# Patient Record
Sex: FEMALE | Race: WHITE | NOT HISPANIC OR LATINO | Employment: OTHER | URBAN - METROPOLITAN AREA
[De-identification: names, ages, dates, MRNs, and addresses within clinical notes are randomized per-mention and may not be internally consistent; named-entity substitution may affect disease eponyms.]

---

## 2017-01-05 DIAGNOSIS — I10 HTN, GOAL BELOW 140/90: Primary | ICD-10-CM

## 2017-01-05 RX ORDER — LISINOPRIL 40 MG/1
40 TABLET ORAL DAILY
Qty: 90 TABLET | Refills: 0 | Status: SHIPPED | OUTPATIENT
Start: 2017-01-05 | End: 2017-03-23

## 2017-01-05 RX ORDER — HYDROCHLOROTHIAZIDE 25 MG/1
25 TABLET ORAL EVERY MORNING
Qty: 90 TABLET | Refills: 0 | Status: SHIPPED | OUTPATIENT
Start: 2017-01-05 | End: 2017-03-23

## 2017-01-05 NOTE — TELEPHONE ENCOUNTER
Pt calls, reports she requested refills from pharm a few days ago.    Lisinopril, HCTZ      Last Written Prescription Date: 09/30/16 (both)  Last Fill Quantity: 90 (both), # refills: 0 (both)  [place] [facility] [place] or [facility] prescribing provider: 05/12/16       POTASSIUM   Date Value Ref Range Status   09/07/2016 4.1 3.4 - 5.3 mmol/L Final     CREATININE   Date Value Ref Range Status   09/07/2016 0.97 0.52 - 1.04 mg/dL Final     BP Readings from Last 3 Encounters:   12/14/16 142/72   11/17/16 116/70   11/10/16 116/68     Routing refill request to provider for review/approval because:  BP out SO parameters     Please advise, thanks.    Pt reports she does not need phone call once rxs sent.

## 2017-01-23 ENCOUNTER — TRANSFERRED RECORDS (OUTPATIENT)
Dept: HEALTH INFORMATION MANAGEMENT | Facility: CLINIC | Age: 67
End: 2017-01-23

## 2017-01-30 ENCOUNTER — ALLIED HEALTH/NURSE VISIT (OUTPATIENT)
Dept: EDUCATION SERVICES | Facility: CLINIC | Age: 67
End: 2017-01-30
Payer: COMMERCIAL

## 2017-01-30 DIAGNOSIS — E11.9 DIABETES MELLITUS WITHOUT COMPLICATION (H): Primary | Chronic | ICD-10-CM

## 2017-01-30 PROCEDURE — G0108 DIAB MANAGE TRN  PER INDIV: HCPCS

## 2017-01-30 NOTE — TELEPHONE ENCOUNTER
glipizide         Last Written Prescription Date: 11/22/16  Last Fill Quantity: 60, # refills: 1  Last Office Visit with [facility] P or [facility] prescribing provider:  5/12/16        BP Readings from Last 3 Encounters:   12/14/16 142/72   11/17/16 116/70   11/10/16 116/68     MICROL        7   9/7/2016  No results found for this basename: microalbumin  CREATININE   Date Value Ref Range Status   09/07/2016 0.97 0.52 - 1.04 mg/dL Final   ]  GFR ESTIMATE   Date Value Ref Range Status   09/07/2016 57* >60 mL/min/1.7m2 Final     Comment:     Non  GFR Calc   02/04/2016 83 >60 mL/min/1.7m2 Final     Comment:     Non  GFR Calc   08/03/2015 79 >60 mL/min/1.7m2 Final     Comment:     Non  GFR Calc     GFR ESTIMATE IF BLACK   Date Value Ref Range Status   09/07/2016 69 >60 mL/min/1.7m2 Final     Comment:      GFR Calc   02/04/2016 >90   GFR Calc   >60 mL/min/1.7m2 Final   08/03/2015 >90   GFR Calc   >60 mL/min/1.7m2 Final     CHOL      165   9/7/2016  HDL       38   9/7/2016  LDL       60   9/7/2016  TRIG      334   9/7/2016  CHOLHDLRATIO      3.1   8/3/2015  AST       23   9/7/2016  ALT       42   9/7/2016  A1C      7.0   12/5/2016  A1C      6.8   9/7/2016  A1C      7.6   5/3/2016  A1C      7.4   2/4/2016  A1C      7.0   8/3/2015  POTASSIUM   Date Value Ref Range Status   09/07/2016 4.1 3.4 - 5.3 mmol/L Final       Labs showing if normal/abnormal  Lab Results   Component Value Date    UCRR 98 09/07/2016    MICROL 7 09/07/2016     Lab Results   Component Value Date    CHOL 165 09/07/2016    TRIG 334* 09/07/2016    HDL 38* 09/07/2016    LDL 60 09/07/2016    VLDL 33* 08/03/2015    CHOLHDLRATIO 3.1 08/03/2015     Lab Results   Component Value Date    A1C 7.0* 12/05/2016    A1C 6.8* 09/07/2016    A1C 7.6* 05/03/2016

## 2017-01-30 NOTE — PROGRESS NOTES
Diabetes Self Management Training: Individual Review Visit    Gricelda Sabillon presents today for education related to Type 2 diabetes.    She is accompanied by self    Patient's diabetes management related comments/concerns: none, doesn't know why she was told to come here    Patient's emotional response to diabetes: helplessness and uncertain    Patient would like this visit to be focused around the following diabetes-related behaviors and goals: not sure    ASSESSMENT:  Patient Problem List and Family Medical History reviewed for relevant medical history, current medical status, and diabetes risk factors.    Gricelda came in frustrated, not sure why she was told to come see me, but was hoping I knew why she needed to see me. Has been to diabetes classes before, and found it very unhelpful, and was not sure anything would be different today. Did want to see if she needed medication adjustment, however did not bring a meter, so this was unable to be accomplished.     Eventually, opened up that she has not understood carb counting or what it involves, and allowed a conversation on this. She was open to this education, and did find it helpful. Was not interested in talking about any other AADE7 topics today.     Current Diabetes Management per Patient:  Taking diabetes medications?   yes:     Diabetes Medication(s)     Biguanides Sig    metFORMIN (GLUCOPHAGE) 1000 MG tablet Take 1 tablet (1,000 mg) by mouth 2 times daily (with meals)    Dipeptidyl Peptidase-4 (DPP-4) Inhibitors Sig    sitagliptin (JANUVIA) 50 MG tablet Take 1 tablet (50 mg) by mouth daily    Sulfonylureas Sig    glipiZIDE (GLUCOTROL) 5 MG tablet Take 1 tablet (5 mg) by mouth 2 times daily (before meals)          *Abbreviated insulin dose documentation key: Insulin Trade Name (nytmzseai-ttujs-vbkwow-bedtime) - i.e. Humalog 5-5-5-0 (Humalog 5 units at breakfast, 5 units at lunch, and 5 units at dinner).    Past Diabetes Education: Yes    Patient  "glucose self monitoring as follows: one time daily.   BG meter: unsure meter  BG results: not available     BG values are: unable to assess  Patient's most recent A1C      7.0   12/5/2016 is not meeting goal of <7.0    Nutrition:  Patient skips lunch regularly    Not hungry in the morning  Breakfast (late) - 2 slices toast, pastrami, 2 clementines, coffee with cream (SF)  Snack - fruit in the afternoon  Dinner (6:30pm) - split pea soup and ham, bread, glass of milk OR meat/baked potato/nonstarchy veggies, glass of milk    Snacks - not in the evening OR sometimes some chocolate covered cherries (1 per day)    Beverages: diet soda in the afternoon, as as above    Cultural/Temple diet restrictions: No     Biggest Challenge to Healthy Eating: portion control and knowing what to eat    Physical Activity:    Rotator cuff and hip issues, states unable to be active right now    Diabetes Complications:  Not discussed today.    Vitals:  LMP 12/13/2002  Estimated body mass index is 34.45 kg/(m^2) as calculated from the following:    Height as of 12/14/16: 1.702 m (5' 7\").    Weight as of 11/17/16: 99.791 kg (220 lb).   Last 3 BP:   BP Readings from Last 3 Encounters:   12/14/16 142/72   11/17/16 116/70   11/10/16 116/68       History   Smoking status     Never Smoker    Smokeless tobacco     Never Used       Labs:  A1C      7.0   12/5/2016  GLC      155   9/7/2016  LDL       60   9/7/2016  HDL CHOLESTEROL   Date Value Ref Range Status   09/07/2016 38* >49 mg/dL Final   ]  GFR ESTIMATE   Date Value Ref Range Status   09/07/2016 57* >60 mL/min/1.7m2 Final     Comment:     Non  GFR Calc     GFR ESTIMATE IF BLACK   Date Value Ref Range Status   09/07/2016 69 >60 mL/min/1.7m2 Final     Comment:      GFR Calc     CR     0.97   9/7/2016  No results found for this basename: microalbumin    Socio/Economic Considerations:    Support system: spouse/significant other    Health Beliefs and Attitudes: "   Patient Activation Measure Survey Score:  ADRIANO Score (Last Two) 8/26/2013   ADRIANO Raw Score 41   Activation Score 63.2   ADRIANO Level 3       Stage of Change: CONTEMPLATION (Considering change and yet undecided)      Diabetes knowledge and skills assessment:     Patient is knowledgeable in diabetes management concepts related to: Healthy Eating    Patient needs further education on the following diabetes management concepts: Being Active and Healthy Coping    Barriers to Learning Assessment: No Barriers identified    Based on learning assessment above, most appropriate setting for further diabetes education would be: Individual setting.    INTERVENTION:   Education provided today on:  AADE Self-Care Behaviors:  Healthy Eating: carbohydrate counting, consistency in amount, composition, and timing of food intake, plate planning method and label reading  Healthy Coping: benefits of making appropriate lifestyle changes    Opportunities for ongoing education and support in diabetes-self management were discussed.    Pt verbalized understanding of concepts discussed and recommendations provided today.       Education Materials Provided:  Carbohydrate Counting and MyPlate    PLAN:  See Patient Instructions for co-developed, patient-stated behavior change goals.  Meal Plan Recommendation: eat 3 meals a day, have small snacks between meals, if needed, use plate planning method and Aim for 2-3 carb servings at meals and 1-2 carb servings at snacks  Consider bedtime snack to see if this helps morning blood sugars    FOLLOW-UP:  Follow-up as needed.   Follow-up yearly for diabetes education review.  Chart routed to referring provider.    Ongoing plan for education and support: Written resources (magazines, books, etc.)    DANTE Jasmine CDE  Time Spent: 60 minutes  Encounter Type: Individual    Any diabetes medication dose changes were made via the CDE Protocol and Collaborative Practice Agreement with the patient's referring  provider. A copy of this encounter was shared with the provider.

## 2017-01-31 RX ORDER — GLIPIZIDE 5 MG/1
5 TABLET ORAL
Qty: 60 TABLET | Refills: 4 | Status: SHIPPED | OUTPATIENT
Start: 2017-01-31 | End: 2017-07-20

## 2017-01-31 NOTE — TELEPHONE ENCOUNTER
Routing refill request to provider for review/approval because:  Labs out of range:      Pt states she is out of med

## 2017-03-09 ENCOUNTER — TELEPHONE (OUTPATIENT)
Dept: ENDOCRINOLOGY | Facility: CLINIC | Age: 67
End: 2017-03-09

## 2017-03-09 ENCOUNTER — TELEPHONE (OUTPATIENT)
Dept: EDUCATION SERVICES | Facility: CLINIC | Age: 67
End: 2017-03-09

## 2017-03-09 DIAGNOSIS — E11.9 DIABETES MELLITUS WITHOUT COMPLICATION (H): Primary | Chronic | ICD-10-CM

## 2017-03-09 NOTE — TELEPHONE ENCOUNTER
Pt calls because her morning BG readings are too high.  The readings for the last week's are:  174, 158, 139, 144, 125, 162, 191.    Natalia, diabetic educator recommended that she double her Januvia, however if she did that she would run out too soon.  If Dr Corado agrees, will she please send new rx for Januvia to [facility].      Also she has an appt scheduled for 4/6/17, and would like to have some lab orders so she can have values at appt.

## 2017-03-09 NOTE — TELEPHONE ENCOUNTER
Increase Januvia to 100 mg/day  Rx sent.    Please inform patient of above information by call or mychart.

## 2017-03-09 NOTE — TELEPHONE ENCOUNTER
Left patient a voice message (left voice message twice, as she has called back and then was unavailable when I called again). Explained than we can talk about a few medication adjustments potentially, but I'd need to be able to speak with her over the phone to discuss what has changed with her blood sugars recently. Also noticed that she is due to see Endo again, so encouraged her to make this appointment as well in case I do not hear back from her.    DANTE Jasmine CDE      ADDENDUM: Did speak with patient, has been having morning blood sugars >160 for 2-3 weeks, and does not know why. No changes in food intake, weight, or illnesses. Ok'ed an increase in her Januvia to 100 mg (currently on 50mg) for now, but needs to make an appointment with Dr Corado as she is due for an Endo followup. Patient agreed.     DANTE Jasmine CDE

## 2017-03-09 NOTE — TELEPHONE ENCOUNTER
Pt called at 2:14 pm, trying to connect with Diabetic nurse.  Please call her again at [phone].  Can leave a detailed message.

## 2017-03-09 NOTE — TELEPHONE ENCOUNTER
Pt is concerned about her meds and #'s. Feels the med change is not working well for her.    BB #'s starting 3-3-17: 191  162  125  144  139  168  174

## 2017-03-10 NOTE — TELEPHONE ENCOUNTER
Patient informed of information.  Future labs ordered for next appointment.  Lori Oliveros CMA ([facility])

## 2017-03-17 DIAGNOSIS — E11.9 DIABETES MELLITUS WITHOUT COMPLICATION (H): Chronic | ICD-10-CM

## 2017-03-17 LAB — HBA1C MFR BLD: 7.5 % (ref 4.3–6)

## 2017-03-17 PROCEDURE — 36415 COLL VENOUS BLD VENIPUNCTURE: CPT | Performed by: INTERNAL MEDICINE

## 2017-03-17 PROCEDURE — 83036 HEMOGLOBIN GLYCOSYLATED A1C: CPT | Performed by: INTERNAL MEDICINE

## 2017-03-22 ENCOUNTER — OFFICE VISIT (OUTPATIENT)
Dept: ORTHOPEDICS | Facility: CLINIC | Age: 67
End: 2017-03-22
Payer: COMMERCIAL

## 2017-03-22 VITALS
DIASTOLIC BLOOD PRESSURE: 84 MMHG | WEIGHT: 226 LBS | BODY MASS INDEX: 36.32 KG/M2 | HEIGHT: 66 IN | SYSTOLIC BLOOD PRESSURE: 128 MMHG

## 2017-03-22 DIAGNOSIS — M75.41 SUBACROMIAL IMPINGEMENT OF RIGHT SHOULDER: Primary | ICD-10-CM

## 2017-03-22 PROCEDURE — 20610 DRAIN/INJ JOINT/BURSA W/O US: CPT | Mod: RT | Performed by: ORTHOPAEDIC SURGERY

## 2017-03-22 PROCEDURE — 99213 OFFICE O/P EST LOW 20 MIN: CPT | Mod: 25 | Performed by: ORTHOPAEDIC SURGERY

## 2017-03-22 NOTE — MR AVS SNAPSHOT
"              After Visit Summary   3/22/2017    Gricelda Sabillon    MRN: [id number]           Patient Information     Date Of Birth          1950        Visit Information        Provider Department      3/22/2017 2:40 PM Ayaan Pena MD [facility]        Today's Diagnoses     Subacromial impingement of right shoulder    -  1       Follow-ups after your visit        Who to contact     If you have questions or need follow up information about today's clinic visit or your schedule please contact [facility] directly at [phone].  Normal or non-critical lab and imaging results will be communicated to you by [facility], letter or phone within 4 business days after the clinic has received the results. If you do not hear from us within 7 days, please contact the clinic through [facility] or phone. If you have a critical or abnormal lab result, we will notify you by phone as soon as possible.  Submit refill requests through [employer] or call your pharmacy and they will forward the refill request to us. Please allow 3 business days for your refill to be completed.          Additional Information About Your Visit        MyChart Information     [employer] gives you secure access to your electronic health record. If you see a primary care provider, you can also send messages to your care team and make appointments. If you have questions, please call your primary care clinic.  If you do not have a primary care provider, please call [phone] and they will assist you.        Care EveryWhere ID     This is your Care EveryWhere ID. This could be used by other organizations to access your [facility] medical records  [id number]        Your Vitals Were     Height Last Period BMI (Body Mass Index)             5' 6.4\" (1.687 m) 12/13/2002 36.04 kg/m2          Blood Pressure from Last 3 Encounters:   03/22/17 128/84   12/14/16 142/72   11/17/16 116/70    Weight from Last 3 " Encounters:   03/22/17 226 lb (102.5 kg)   11/17/16 220 lb (99.8 kg)   11/10/16 220 lb (99.8 kg)              We Performed the Following     DEXAMETHASONE SODIUM PHOS PER 1 MG     DRAIN/INJECT LARGE JOINT/BURSA     METHYLPREDNISOLONE 40 MG INJ          Today's Medication Changes          These changes are accurate as of: 3/22/17 11:59 PM.  If you have any questions, ask your nurse or doctor.               Start taking these medicines.        Dose/Directions    dexamethasone 4 MG/ML injection   Commonly known as:  DECADRON   Used for:  Subacromial impingement of right shoulder   Started by:  Ayaan Pena MD        Dose:  4 mg   Inject 1 mL (4 mg) as directed once for 1 dose   Quantity:  1 mL   Refills:  0       lidocaine 1 % injection   Used for:  Subacromial impingement of right shoulder   Started by:  Ayaan Pena MD        Dose:  3 mL   3 mLs by INTRA-ARTICULAR route once for 1 dose   Quantity:  3 mL   Refills:  0       methylPREDNISolone acetate 40 MG/ML injection   Commonly known as:  DEPO-MEDROL   Used for:  Subacromial impingement of right shoulder   Started by:  Ayaan Pena MD        Dose:  40 mg   1 mL (40 mg) by INTRA-ARTICULAR route once for 1 dose   Quantity:  1 mL   Refills:  0         These medicines have changed or have updated prescriptions.        Dose/Directions    aspirin 81 MG tablet   This may have changed:  See the new instructions.   Used for:  Type II or unspecified type diabetes mellitus without mention of complication, not stated as uncontrolled        1 tab po QD (Once per day)   Quantity:  0   Refills:  0            Where to get your medicines      Some of these will need a paper prescription and others can be bought over the counter.  Ask your nurse if you have questions.     You don't need a prescription for these medications     dexamethasone 4 MG/ML injection    lidocaine 1 % injection    methylPREDNISolone acetate 40 MG/ML injection                 Primary Care Provider Office Phone # Fax #    Raisa Davidson [phone]       [facility] [address] [facility] [address]        Thank you!     Thank you for choosing [facility]  for your care. Our goal is always to provide you with excellent care. Hearing back from our patients is one way we can continue to improve our services. Please take a few minutes to complete the written survey that you may receive in the mail after your visit with us. Thank you!             Your Updated Medication List - Protect others around you: Learn how to safely use, store and throw away your medicines at www.disposemymeds.org.          This list is accurate as of: 3/22/17 11:59 PM.  Always use your most recent med list.                   Brand Name Dispense Instructions for use    albuterol 108 (90 BASE) MCG/ACT Inhaler    PROAIR HFA/PROVENTIL HFA/VENTOLIN HFA    1 Inhaler    Inhale 2 puffs into the lungs every 4 hours as needed for shortness of breath / dyspnea       aspirin 81 MG tablet     0    1 tab po QD (Once per day)       azelastine 0.05 % Soln ophthalmic solution    OPTIVAR     Place 1 drop into both eyes 2 times daily as needed Reported on 3/22/2017       azelastine 0.1 % spray    ASTELIN     Spray 1 spray into both nostrils 2 times daily as needed Reported on 3/22/2017       blood glucose monitoring test strip    no brand specified    100 each    by In Vitro route. Test as directed3 times a day or as directed       cetirizine 10 MG tablet    zyrTEC     Take 10 mg by mouth every morning.       desoximetasone 0.25 % cream    TOPICORT    60 g    Apply sparingly to affected area's       dexamethasone 4 MG/ML injection    DECADRON    1 mL    Inject 1 mL (4 mg) as directed once for 1 dose       dorzolamide 2 % ophthalmic solution    TRUSOPT    1 Bottle    Place 2 drops into both eyes 2 times daily       fish oil-omega-3 fatty acids 1000 MG capsule       Take 1 g by mouth daily Reported on 3/22/2017       glipiZIDE 5 MG tablet    GLUCOTROL    60 tablet    Take 1 tablet (5 mg) by mouth 2 times daily (before meals)       lidocaine 1 % injection     3 mL    3 mLs by INTRA-ARTICULAR route once for 1 dose       metFORMIN 1000 MG tablet    GLUCOPHAGE    180 tablet    Take 1 tablet (1,000 mg) by mouth 2 times daily (with meals)       methylPREDNISolone acetate 40 MG/ML injection    DEPO-MEDROL    1 mL    1 mL (40 mg) by INTRA-ARTICULAR route once for 1 dose       MULTIVITAMIN TABS   OR      Reported on 3/22/2017       order for DME     3 Month    All diabetic testing supplies including test strips, lancets and solution for testing 3 times per day. Patient is using  no Insulin. A1C      7.6   5/3/2016 A1C      7.4   2/4/2016       sitagliptin 100 MG tablet    JANUVIA    30 tablet    Take 1 tablet (100 mg) by mouth daily       triamcinolone 0.1 % cream    KENALOG    60 g    Apply  topically 2 times daily.       XALATAN 0.005 % ophthalmic solution   Generic drug:  latanoprost     2.5 mL    Place 1 drop Into the left eye At Bedtime

## 2017-03-22 NOTE — NURSING NOTE
"Chief Complaint   Patient presents with     RECHECK     Right shoulder       Initial /84 (BP Location: Right arm, Patient Position: Chair, Cuff Size: Adult Regular)  Ht 5' 6.4\" (1.687 m)  Wt 226 lb (102.5 kg)  LMP 12/13/2002  BMI 36.04 kg/m2 Estimated body mass index is 36.04 kg/(m^2) as calculated from the following:    Height as of this encounter: 5' 6.4\" (1.687 m).    Weight as of this encounter: 226 lb (102.5 kg).  Medication Reconciliation: complete    "

## 2017-03-22 NOTE — LETTER
"  3/22/2017       RE: Gricelda Sabillon  [address]  [facility] [address]           Dear Colleague,    Thank you for referring your patient, Gricelda Sabillon, to the [facility]. Please see a copy of my visit note below.    HISTORY OF PRESENT ILLNESS:    Gricelda Sabillon is a 66 year old female who is seen in follow up for right shoulder pain.  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  Pt states the entire shoulder aches, will have pain going into the upper arm at times as well.  Pt states she had relief from the cortisone injection states relief lasted until the end of January 2+ months.  Treatments tried to this point: MRI, cortisone injection (11/17/2016)    PHYSICAL EXAM:  /84 (BP Location: Right arm, Patient Position: Chair, Cuff Size: Adult Regular)  Ht 5' 6.4\" (1.687 m)  Wt 226 lb (102.5 kg)  LMP 12/13/2002  BMI 36.04 kg/m2  Body mass index is 36.04 kg/(m^2).   GENERAL APPEARANCE: healthy, alert and no distress   SKIN: no suspicious lesions or rashes  NEURO: Normal strength and tone, mentation intact and speech normal  VASCULAR:  good pulses, and cappillary refill   LYMPH: no lymphadenopathy   PSYCH:  mentation appears normal and affect normal/bright    MSK:  Examination of the neck and shoulder girdle musculature reveals no asymmetry, or atrophy to the muscle masses.  There is no erythema, ecchymosis or edema.  There is a normal lordosis to the cervical and lumbar spine regions.  There is a normal kyphosis to the thoracic spine.  There is no clinical evidence of scoliosis. There is no tenderness to palpation or percussion.  There is no paraspinal muscle spasm present.  There is a Normal range of motion to the cervical spine. Forward flexion to 45, extension to 55, R and L lateral bending to 45, and rotation to 70, both R " and L.    Strength : Bicep 5/5   C5-6       Sensation :     Deltoid 5/5   C5    Lateral Arm    Wrist extensors 5/5  C6    Thumb    Tricep  5/5   C7    Middle Finger    Finger flexors  5/5  C8    Little Finger    Interossei  5/5   T1    Medial Arm    Reflexes :  Bicep   Symmetric  C5-6    BR Symmetric  C6    Tricep Symmetric  C7    Shoulder:  Examination of the right shoulder reveals a partial active ROM and full passive ROM.    Forward Flexion : 180 degrees Pain  YES --   Abduction  :  180 degrees  YES --   Internal Rotation : thoracic 10  YES --  Subscap Lift-off test negative  External Rotation :    0 Deg-90  90 Deg- 90  Contralateral side symmetric    There is no tenderness to palpation about the AC joint, anterior capsule or Bicep tendon.  There is mild tenderness to palpation in the subacromial space.  There is give-way rotator cuff weakness.  Speed's an Yergason's Tests for Bicep pathology are negative. Arm adduction does not cause pain in the AC joint.  Apprehension sign is negative. Scapular winging is not present. ROM of the elbow and wrist is normal and CMS is intact to fingertips.        IMAGING INTERPRETATION:       ASSESSMENT / PLAN: Subacromial impingement. I offered her another corticosteroid injection, which she accepted.  Procedure Note:  After risks and benefits were explained and questions answered, pt agreed to undergo a right sub-acromial shoulder injection. Using aseptic technique and a posterior approach, the subacromial space was infiltrated with 4 mg of Dexamethasone, 40 mg of Depo Medrol, and 3 cc of 1.0% Lidocaine.  There were no complications and the patient tolerated the procedure well.      Return to clinic p.carol.    Peña Pena MD  Dept. Orthopedic Surgery  [facility]       Disclaimer: This note consists of symbols derived from keyboarding, dictation and/or voice recognition software. As a result, there may be errors in the script that have gone undetected. Please  consider this when interpreting information found in this chart.        Again, thank you for allowing me to participate in the care of your patient.        Sincerely,              Ayaan Pena MD

## 2017-03-22 NOTE — PROGRESS NOTES
"HISTORY OF PRESENT ILLNESS:    Gricelda Sabillon is a 66 year old female who is seen in follow up for right shoulder pain.  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  Pt states the entire shoulder aches, will have pain going into the upper arm at times as well.  Pt states she had relief from the cortisone injection states relief lasted until the end of January 2+ months.  Treatments tried to this point: MRI, cortisone injection (11/17/2016)    PHYSICAL EXAM:  /84 (BP Location: Right arm, Patient Position: Chair, Cuff Size: Adult Regular)  Ht 5' 6.4\" (1.687 m)  Wt 226 lb (102.5 kg)  LMP 12/13/2002  BMI 36.04 kg/m2  Body mass index is 36.04 kg/(m^2).   GENERAL APPEARANCE: healthy, alert and no distress   SKIN: no suspicious lesions or rashes  NEURO: Normal strength and tone, mentation intact and speech normal  VASCULAR:  good pulses, and cappillary refill   LYMPH: no lymphadenopathy   PSYCH:  mentation appears normal and affect normal/bright    MSK:  Examination of the neck and shoulder girdle musculature reveals no asymmetry, or atrophy to the muscle masses.  There is no erythema, ecchymosis or edema.  There is a normal lordosis to the cervical and lumbar spine regions.  There is a normal kyphosis to the thoracic spine.  There is no clinical evidence of scoliosis. There is no tenderness to palpation or percussion.  There is no paraspinal muscle spasm present.  There is a Normal range of motion to the cervical spine. Forward flexion to 45, extension to 55, R and L lateral bending to 45, and rotation to 70, both R and L.    Strength : Bicep 5/5   C5-6       Sensation :     Deltoid 5/5   C5    Lateral Arm    Wrist extensors 5/5  C6    Thumb    Tricep  5/5   C7    Middle Finger    Finger flexors  5/5  C8    Little Finger    Interossei  5/5   T1    Medial Arm    Reflexes : "  Bicep   Symmetric  C5-6    BR Symmetric  C6    Tricep Symmetric  C7    Shoulder:  Examination of the right shoulder reveals a partial active ROM and full passive ROM.    Forward Flexion : 180 degrees Pain  YES --   Abduction  :  180 degrees  YES --   Internal Rotation : thoracic 10  YES --  Subscap Lift-off test negative  External Rotation :    0 Deg-90  90 Deg- 90  Contralateral side symmetric    There is no tenderness to palpation about the AC joint, anterior capsule or Bicep tendon.  There is mild tenderness to palpation in the subacromial space.  There is give-way rotator cuff weakness.  Speed's an Yergason's Tests for Bicep pathology are negative. Arm adduction does not cause pain in the AC joint.  Apprehension sign is negative. Scapular winging is not present. ROM of the elbow and wrist is normal and CMS is intact to fingertips.        IMAGING INTERPRETATION:       ASSESSMENT / PLAN: Subacromial impingement. I offered her another corticosteroid injection, which she accepted.  Procedure Note:  After risks and benefits were explained and questions answered, pt agreed to undergo a right sub-acromial shoulder injection. Using aseptic technique and a posterior approach, the subacromial space was infiltrated with 4 mg of Dexamethasone, 40 mg of Depo Medrol, and 3 cc of 1.0% Lidocaine.  There were no complications and the patient tolerated the procedure well.      Return to clinic p.r.n.    Peña Pena MD  Dept. Orthopedic Surgery  [facility]       Disclaimer: This note consists of symbols derived from keyboarding, dictation and/or voice recognition software. As a result, there may be errors in the script that have gone undetected. Please consider this when interpreting information found in this chart.

## 2017-03-23 DIAGNOSIS — E78.5 HYPERLIPIDEMIA WITH TARGET LDL LESS THAN 100: Chronic | ICD-10-CM

## 2017-03-23 DIAGNOSIS — R00.0 SINUS TACHYCARDIA: ICD-10-CM

## 2017-03-23 DIAGNOSIS — I10 HTN, GOAL BELOW 140/90: ICD-10-CM

## 2017-03-23 RX ORDER — ATENOLOL 50 MG/1
50 TABLET ORAL DAILY
Qty: 90 TABLET | Refills: 1 | Status: SHIPPED | OUTPATIENT
Start: 2017-03-23 | End: 2017-12-09

## 2017-03-23 RX ORDER — ATORVASTATIN CALCIUM 20 MG/1
20 TABLET, FILM COATED ORAL DAILY
Qty: 90 TABLET | Refills: 1 | Status: SHIPPED | OUTPATIENT
Start: 2017-03-23 | End: 2017-12-01

## 2017-03-23 RX ORDER — HYDROCHLOROTHIAZIDE 25 MG/1
25 TABLET ORAL EVERY MORNING
Qty: 90 TABLET | Refills: 1 | Status: SHIPPED | OUTPATIENT
Start: 2017-03-23 | End: 2017-10-19

## 2017-03-23 RX ORDER — LISINOPRIL 40 MG/1
40 TABLET ORAL DAILY
Qty: 90 TABLET | Refills: 0 | Status: SHIPPED | OUTPATIENT
Start: 2017-03-23 | End: 2017-07-07

## 2017-03-23 NOTE — TELEPHONE ENCOUNTER
Dr Corado is not interested to see non endocrinologic problems.   Since she is doing well I refilled the BP and Chol meds and I will see her in aug- Sept . I refilled BP and chol meds  She is due though for mamm

## 2017-03-23 NOTE — TELEPHONE ENCOUNTER
atenolol      Last Written Prescription Date: 12/7/16  Last Fill Quantity: 90, # refills: 1    Last Office Visit with [facility], [facility] or [facility] prescribing provider:  5/12/16   Future Office Visit:    Next 5 appointments (look out 90 days)     Apr 06, 2017  9:30 AM CDT   Return Visit with Clara Corado MD   [facility]    [address] [facility] [address]  [facility] [id number] [phone]                    BP Readings from Last 3 Encounters:   03/22/17 128/84   12/14/16 142/72   11/17/16 116/70

## 2017-03-23 NOTE — TELEPHONE ENCOUNTER
Pt calling back and informed due for OV.    States she was advised by PCP that Dr. Corado is to see her for everything now.    Please advise who pt is to see re: HTN?    States she does not want to go to multiple doctors.    Please advise, thanks.    (Has a future appt scheduled with Dr. Corado in April 2017.)

## 2017-03-24 NOTE — TELEPHONE ENCOUNTER
Called pt-relayed below. Cxl future appt with Michele. Pt is working on getting mammo scheduled. Will call back to get f/u sched with Melissa for Aug/Sept

## 2017-04-22 RX ORDER — DEXAMETHASONE SODIUM PHOSPHATE 4 MG/ML
4 INJECTION, SOLUTION INTRA-ARTICULAR; INTRALESIONAL; INTRAMUSCULAR; INTRAVENOUS; SOFT TISSUE ONCE
Qty: 1 ML | Refills: 0 | OUTPATIENT
Start: 2017-04-22 | End: 2017-09-08

## 2017-04-22 RX ORDER — METHYLPREDNISOLONE ACETATE 40 MG/ML
40 INJECTION, SUSPENSION INTRA-ARTICULAR; INTRALESIONAL; INTRAMUSCULAR; SOFT TISSUE ONCE
Qty: 1 ML | Refills: 0 | OUTPATIENT
Start: 2017-04-22 | End: 2017-04-22

## 2017-04-22 RX ORDER — LIDOCAINE HYDROCHLORIDE 10 MG/ML
3 INJECTION, SOLUTION INFILTRATION; PERINEURAL ONCE
Qty: 3 ML | Refills: 0 | OUTPATIENT
Start: 2017-04-22 | End: 2017-04-22

## 2017-05-22 ENCOUNTER — TRANSFERRED RECORDS (OUTPATIENT)
Dept: HEALTH INFORMATION MANAGEMENT | Facility: CLINIC | Age: 67
End: 2017-05-22

## 2017-05-30 DIAGNOSIS — E11.9 TYPE 2 DIABETES, HBA1C GOAL < 8% (H): ICD-10-CM

## 2017-05-30 NOTE — TELEPHONE ENCOUNTER
Metformin         Last Written Prescription Date: 12/7/16  Last Fill Quantity: 180, # refills: 1  Last Office Visit with [facility], [facility] or [facility] prescribing provider:  5/12/16- no future OV made        BP Readings from Last 3 Encounters:   03/22/17 128/84   12/14/16 142/72   11/17/16 116/70     Lab Results   Component Value Date    MICROL 7 09/07/2016     Lab Results   Component Value Date    UMALCR 6.64 09/07/2016     Creatinine   Date Value Ref Range Status   09/07/2016 0.97 0.52 - 1.04 mg/dL Final   ]  GFR Estimate   Date Value Ref Range Status   09/07/2016 57 (L) >60 mL/min/1.7m2 Final     Comment:     Non  GFR Calc   02/04/2016 83 >60 mL/min/1.7m2 Final     Comment:     Non  GFR Calc   08/03/2015 79 >60 mL/min/1.7m2 Final     Comment:     Non  GFR Calc     GFR Estimate If Black   Date Value Ref Range Status   09/07/2016 69 >60 mL/min/1.7m2 Final     Comment:      GFR Calc   02/04/2016 >90   GFR Calc   >60 mL/min/1.7m2 Final   08/03/2015 >90   GFR Calc   >60 mL/min/1.7m2 Final     Lab Results   Component Value Date    CHOL 165 09/07/2016     Lab Results   Component Value Date    HDL 38 09/07/2016     Lab Results   Component Value Date    LDL 60 09/07/2016     Lab Results   Component Value Date    TRIG 334 09/07/2016     Lab Results   Component Value Date    CHOLHDLRATIO 3.1 08/03/2015     Lab Results   Component Value Date    AST 23 09/07/2016     Lab Results   Component Value Date    ALT 42 09/07/2016     Lab Results   Component Value Date    A1C 7.5 03/17/2017    A1C 7.0 12/05/2016    A1C 6.8 09/07/2016    A1C 7.6 05/03/2016    A1C 7.4 02/04/2016     Potassium   Date Value Ref Range Status   09/07/2016 4.1 3.4 - 5.3 mmol/L Final       Labs showing if normal/abnormal  Lab Results   Component Value Date    UCRR 98 09/07/2016    MICROL 7 09/07/2016     Lab Results   Component Value Date    CHOL 165 09/07/2016    TRIG 334  (H) 09/07/2016    HDL 38 (L) 09/07/2016    LDL 60 09/07/2016    VLDL 33 (H) 08/03/2015    CHOLHDLRATIO 3.1 08/03/2015     Lab Results   Component Value Date    A1C 7.5 (H) 03/17/2017    A1C 7.0 (H) 12/05/2016    A1C 6.8 (H) 09/07/2016

## 2017-06-09 ENCOUNTER — TELEPHONE (OUTPATIENT)
Dept: INTERNAL MEDICINE | Facility: CLINIC | Age: 67
End: 2017-06-09

## 2017-06-19 NOTE — TELEPHONE ENCOUNTER
Panel Management Review      Patient has the following on her problem list:     Asthma review     ACT Total Scores 2/11/2016   ACT TOTAL SCORE -   ASTHMA ER VISITS -   ASTHMA HOSPITALIZATIONS -   ACT TOTAL SCORE (Goal Greater than or Equal to 20) 25   In the past 12 months, how many times did you visit the emergency room for your asthma without being admitted to the hospital? 0   In the past 12 months, how many times were you hospitalized overnight because of your asthma? 0      1. Is Asthma diagnosis on the Problem List? Yes    2. Is Asthma listed on Health Maintenance? Yes    3. Patient is due for:  ACT and AAP    Diabetes    ASA: Passed    Last A1C  Lab Results   Component Value Date    A1C 7.5 03/17/2017    A1C 7.0 12/05/2016    A1C 6.8 09/07/2016    A1C 7.6 05/03/2016    A1C 7.4 02/04/2016     A1C tested: Passed    Last LDL:    Lab Results   Component Value Date    CHOL 165 09/07/2016     Lab Results   Component Value Date    HDL 38 09/07/2016     Lab Results   Component Value Date    LDL 60 09/07/2016     Lab Results   Component Value Date    TRIG 334 09/07/2016     Lab Results   Component Value Date    CHOLHDLRATIO 3.1 08/03/2015     Lab Results   Component Value Date    NHDL 127 09/07/2016       Is the patient on a Statin? YES             Is the patient on Aspirin? YES    Medications     HMG CoA Reductase Inhibitors    atorvastatin (LIPITOR) 20 MG tablet    Salicylates    ASPIRIN 81 MG OR TABS          Last three blood pressure readings:  BP Readings from Last 3 Encounters:   03/22/17 128/84   12/14/16 142/72   11/17/16 116/70       Date of last diabetes office visit: 12/2016     Tobacco History:     History   Smoking Status     Never Smoker   Smokeless Tobacco     Never Used           Composite cancer screening  Chart review shows that this patient is due/due soon for the following None  Summary:    Patient is due/failing the following:   A1C, AAP, ACT and MAMMOGRAM    Action needed:   Above     Type of  outreach:    Phone, spoke to patient.  she states she does not want a mammo scheduled by us . She will wait until she sees  's a gyn ----also states she does not have asthma , she only has bronchitis that gets out of control     Questions for provider review:    Please review my notes                                                                                                                                     ROSENDA Lagunas LPN       Chart routed to Provider .

## 2017-07-07 ENCOUNTER — TELEPHONE (OUTPATIENT)
Dept: INTERNAL MEDICINE | Facility: CLINIC | Age: 67
End: 2017-07-07

## 2017-07-07 DIAGNOSIS — I10 HTN, GOAL BELOW 140/90: ICD-10-CM

## 2017-07-07 DIAGNOSIS — E11.9 DIABETES MELLITUS WITHOUT COMPLICATION (H): Chronic | ICD-10-CM

## 2017-07-07 DIAGNOSIS — E78.5 HYPERLIPIDEMIA WITH TARGET LDL LESS THAN 100: Primary | Chronic | ICD-10-CM

## 2017-07-07 DIAGNOSIS — I10 ESSENTIAL HYPERTENSION WITH GOAL BLOOD PRESSURE LESS THAN 140/90: Chronic | ICD-10-CM

## 2017-07-07 DIAGNOSIS — Z00.00 ENCOUNTER FOR ROUTINE ADULT HEALTH EXAMINATION WITHOUT ABNORMAL FINDINGS: ICD-10-CM

## 2017-07-07 RX ORDER — LISINOPRIL 40 MG/1
TABLET ORAL
Qty: 90 TABLET | Refills: 0 | Status: SHIPPED | OUTPATIENT
Start: 2017-07-07 | End: 2017-10-19

## 2017-07-07 NOTE — TELEPHONE ENCOUNTER
Reason for Call: Request for an order or referral:    Order or referral being requested: labs    Date needed: before my next appointment 09/05/17  Has the patient been seen by the PCP for this problem? YES    Additional comments: no need to call pt    Phone number Patient can be reached at:  Home number on file [phone] (home)    Best Time:  any    Can we leave a detailed message on this number?  Not Applicable    Call taken on 7/7/2017 at 10:28 AM by Venecia Jacobson

## 2017-07-07 NOTE — TELEPHONE ENCOUNTER
Lisinopril 40mg       Last Written Prescription Date: 3/23/17  Last Fill Quantity: 90, # refills: 0  Last Office Visit with [facility], [facility] or [facility] prescribing provider: 5/12/16  Next 5 appointments (look out 90 days)     Sep 12, 2017  1:00 PM CDT   SHORT with Raisa Davidson MD   [facility])    [address]  [facility] [address]   [phone]                   Potassium   Date Value Ref Range Status   09/07/2016 4.1 3.4 - 5.3 mmol/L Final     Creatinine   Date Value Ref Range Status   09/07/2016 0.97 0.52 - 1.04 mg/dL Final     BP Readings from Last 3 Encounters:   03/22/17 128/84   12/14/16 142/72   11/17/16 116/70     Prescription approved per [facility] Refill Protocol.    EDY Walker

## 2017-07-11 RX ORDER — SITAGLIPTIN 100 MG/1
TABLET, FILM COATED ORAL
Qty: 30 TABLET | Refills: 1 | Status: SHIPPED | OUTPATIENT
Start: 2017-07-11 | End: 2017-09-08

## 2017-07-20 DIAGNOSIS — E11.9 DIABETES MELLITUS WITHOUT COMPLICATION (H): Chronic | ICD-10-CM

## 2017-07-20 RX ORDER — GLIPIZIDE 5 MG/1
TABLET ORAL
Qty: 60 TABLET | Refills: 1 | Status: SHIPPED | OUTPATIENT
Start: 2017-07-20 | End: 2017-09-08

## 2017-07-20 NOTE — TELEPHONE ENCOUNTER
Glipizide 5 mg         Last Written Prescription Date: 1/31/17  Last Fill Quantity: 60, # refills: 4  Last Office Visit with [facility], [facility] or  Health prescribing provider:  3/22/17   Next 5 appointments (look out 90 days)     Sep 12, 2017  1:00 PM CDT   SHORT with Raisa Davidson MD   [facility] ([facility])    [address]  [facility] [id number] [phone]                   BP Readings from Last 3 Encounters:   03/22/17 128/84   12/14/16 142/72   11/17/16 116/70     Lab Results   Component Value Date    MICROL 7 09/07/2016     Lab Results   Component Value Date    UMALCR 6.64 09/07/2016     Creatinine   Date Value Ref Range Status   09/07/2016 0.97 0.52 - 1.04 mg/dL Final   ]  GFR Estimate   Date Value Ref Range Status   09/07/2016 57 (L) >60 mL/min/1.7m2 Final     Comment:     Non  GFR Calc   02/04/2016 83 >60 mL/min/1.7m2 Final     Comment:     Non  GFR Calc   08/03/2015 79 >60 mL/min/1.7m2 Final     Comment:     Non  GFR Calc     GFR Estimate If Black   Date Value Ref Range Status   09/07/2016 69 >60 mL/min/1.7m2 Final     Comment:      GFR Calc   02/04/2016 >90   GFR Calc   >60 mL/min/1.7m2 Final   08/03/2015 >90   GFR Calc   >60 mL/min/1.7m2 Final     Lab Results   Component Value Date    CHOL 165 09/07/2016     Lab Results   Component Value Date    HDL 38 09/07/2016     Lab Results   Component Value Date    LDL 60 09/07/2016     Lab Results   Component Value Date    TRIG 334 09/07/2016     Lab Results   Component Value Date    CHOLHDLRATIO 3.1 08/03/2015     Lab Results   Component Value Date    AST 23 09/07/2016     Lab Results   Component Value Date    ALT 42 09/07/2016     Lab Results   Component Value Date    A1C 7.5 03/17/2017    A1C 7.0 12/05/2016    A1C 6.8 09/07/2016    A1C 7.6 05/03/2016    A1C 7.4 02/04/2016     Potassium   Date Value Ref Range  Status   09/07/2016 4.1 3.4 - 5.3 mmol/L Final       Labs showing if normal/abnormal  Lab Results   Component Value Date    UCRR 98 09/07/2016    MICROL 7 09/07/2016     Lab Results   Component Value Date    CHOL 165 09/07/2016    TRIG 334 (H) 09/07/2016    HDL 38 (L) 09/07/2016    LDL 60 09/07/2016    VLDL 33 (H) 08/03/2015    CHOLHDLRATIO 3.1 08/03/2015     Lab Results   Component Value Date    A1C 7.5 (H) 03/17/2017    A1C 7.0 (H) 12/05/2016    A1C 6.8 (H) 09/07/2016

## 2017-08-23 DIAGNOSIS — E11.9 DIABETES MELLITUS WITHOUT COMPLICATION (H): Primary | Chronic | ICD-10-CM

## 2017-08-23 NOTE — TELEPHONE ENCOUNTER
One Touch Ultra test strips      Last Written Prescription Date: 03/12/12  Last Fill Quantity: 100,  # refills: 3   Last Office Visit with [facility], [facility] or [facility] prescribing provider: 09/15/16 Mickie                                         Next 5 appointments (look out 90 days)     Sep 12, 2017  1:00 PM CDT   SHORT with Raisa Davidson MD   [facility] ([facility])    [address]  [facility] [id number] [phone]

## 2017-08-25 ENCOUNTER — OFFICE VISIT (OUTPATIENT)
Dept: ORTHOPEDICS | Facility: CLINIC | Age: 67
End: 2017-08-25
Payer: COMMERCIAL

## 2017-08-25 ENCOUNTER — RADIANT APPOINTMENT (OUTPATIENT)
Dept: GENERAL RADIOLOGY | Facility: CLINIC | Age: 67
End: 2017-08-25
Attending: ORTHOPAEDIC SURGERY
Payer: COMMERCIAL

## 2017-08-25 VITALS — BODY MASS INDEX: 36.32 KG/M2 | WEIGHT: 226 LBS | HEART RATE: 75 BPM | HEIGHT: 66 IN

## 2017-08-25 DIAGNOSIS — M25.551 RIGHT HIP PAIN: Primary | ICD-10-CM

## 2017-08-25 PROCEDURE — 99213 OFFICE O/P EST LOW 20 MIN: CPT | Performed by: ORTHOPAEDIC SURGERY

## 2017-08-25 PROCEDURE — 73502 X-RAY EXAM HIP UNI 2-3 VIEWS: CPT

## 2017-08-25 NOTE — MR AVS SNAPSHOT
After Visit Summary   8/25/2017    Gricelda Sabillon    MRN: [id number]           Patient Information     Date Of Birth          1950        Visit Information        Provider Department      8/25/2017 1:40 PM Ayaan Pena MD [facility]        Today's Diagnoses     Right hip pain    -  1       Follow-ups after your visit        Your next 10 appointments already scheduled     Sep 15, 2017  4:20 PM CDT   SHORT with [facility] MD   [facility]    [address]  [facility] [address]   [phone]            Sep 25, 2017   Procedure with Ayaan Pena MD   [facility] (--)    [address]  [facility] [address]   [phone]            Oct 06, 2017 11:00 AM CDT   Return Visit with Patricio Samuels PA-C   [facility] ([facility])    [address]  [address]  [facility] [address]   [phone]              Who to contact     If you have questions or need follow up information about today's clinic visit or your schedule please contact [facility] directly at [phone].  Normal or non-critical lab and imaging results will be communicated to you by MyChart, letter or phone within 4 business days after the clinic has received the results. If you do not hear from us within 7 days, please contact the clinic through MyChart or phone. If you have a critical or abnormal lab result, we will notify you by phone as soon as possible.  Submit refill requests through [employer] or call your pharmacy and they will forward the refill request to us. Please allow 3 business days for your refill to be completed.          Additional Information About Your Visit        [facility] Information     [employer] gives you secure access to your electronic health record. If you see a primary care provider,  "you can also send messages to your care team and make appointments. If you have questions, please call your primary care clinic.  If you do not have a primary care provider, please call [phone] and they will assist you.        Care EveryWhere ID     This is your Care EveryWhere ID. This could be used by other organizations to access your [place] medical records  [id number]        Your Vitals Were     Pulse Height Last Period BMI (Body Mass Index)          75 5' 6\" (1.676 m) 12/13/2002 36.48 kg/m2         Blood Pressure from Last 3 Encounters:   09/08/17 118/80   03/22/17 128/84   12/14/16 142/72    Weight from Last 3 Encounters:   09/08/17 220 lb 8 oz (100 kg)   08/25/17 226 lb (102.5 kg)   03/22/17 226 lb (102.5 kg)              We Performed the Following     XR Pelvis and Hip Right 1 View          Today's Medication Changes          These changes are accurate as of: 8/25/17 11:59 PM.  If you have any questions, ask your nurse or doctor.               These medicines have changed or have updated prescriptions.        Dose/Directions    aspirin 81 MG tablet   This may have changed:  See the new instructions.   Used for:  Type II or unspecified type diabetes mellitus without mention of complication, not stated as uncontrolled        1 tab po QD (Once per day)   Quantity:  0   Refills:  0                Primary Care Provider Office Phone # Fax #    Raisa Michelle Davidson [phone]       [address] [facility] [address]        Equal Access to Services     [facility]: Hadii aad ku hadasho Soomaali, waaxda luqadaha, qaybta kaalmada adeegyada, thomas perez. So [facility] [phone].    ATENCIÓN: Si habla español, tiene a vail disposición servicios gratuitos de asistencia lingüística. Llame al [phone].    We comply with applicable federal civil rights laws and [place] laws. We do not discriminate on the basis of race, color, national origin, age, " disability sex, sexual orientation or gender identity.            Thank you!     Thank you for choosing [facility]  for your care. Our goal is always to provide you with excellent care. Hearing back from our patients is one way we can continue to improve our services. Please take a few minutes to complete the written survey that you may receive in the mail after your visit with us. Thank you!             Your Updated Medication List - Protect others around you: Learn how to safely use, store and throw away your medicines at www.disposemymeds.org.          This list is accurate as of: 8/25/17 11:59 PM.  Always use your most recent med list.                   Brand Name Dispense Instructions for use Diagnosis    albuterol 108 (90 BASE) MCG/ACT Inhaler    PROAIR HFA/PROVENTIL HFA/VENTOLIN HFA    1 Inhaler    Inhale 2 puffs into the lungs every 4 hours as needed for shortness of breath / dyspnea    Acute bronchitis       aspirin 81 MG tablet     0    1 tab po QD (Once per day)    Type II or unspecified type diabetes mellitus without mention of complication, not stated as uncontrolled       atenolol 50 MG tablet    TENORMIN    90 tablet    Take 1 tablet (50 mg) by mouth daily    HTN, goal below 140/90, Sinus tachycardia       atorvastatin 20 MG tablet    LIPITOR    90 tablet    Take 1 tablet (20 mg) by mouth daily    Hyperlipidemia with target LDL less than 100       azelastine 0.05 % Soln ophthalmic solution    OPTIVAR     Place 1 drop into both eyes 2 times daily as needed Reported on 3/22/2017        azelastine 0.1 % spray    ASTELIN     Spray 1 spray into both nostrils 2 times daily as needed Reported on 3/22/2017        blood glucose monitoring test strip    ONE TOUCH ULTRA    50 strip    Pt checks BS 1x a day    Diabetes mellitus without complication (H)       cetirizine 10 MG tablet    zyrTEC     Take 10 mg by mouth every morning.        desoximetasone 0.25 % cream    TOPICORT    60 g     Apply sparingly to affected area's    Eczema       dorzolamide 2 % ophthalmic solution    TRUSOPT    1 Bottle    Place 2 drops into both eyes 2 times daily        fish oil-omega-3 fatty acids 1000 MG capsule      Take 1 g by mouth daily Reported on 3/22/2017        hydrochlorothiazide 25 MG tablet    HYDRODIURIL    90 tablet    Take 1 tablet (25 mg) by mouth every morning    HTN, goal below 140/90       lisinopril 40 MG tablet    PRINIVIL/ZESTRIL    90 tablet    TAKE ONE TABLET BY MOUTH ONCE DAILY    HTN, goal below 140/90       MULTIVITAMIN TABS   OR      Reported on 3/22/2017        order for DME     3 Month    All diabetic testing supplies including test strips, lancets and solution for testing 3 times per day. Patient is using  no Insulin. A1C      7.6   5/3/2016 A1C      7.4   2/4/2016    Type 2 diabetes mellitus, uncontrolled (H), Hyperlipidemia with target LDL less than 100, Essential hypertension with goal blood pressure less than 140/90       XALATAN 0.005 % ophthalmic solution   Generic drug:  latanoprost     2.5 mL    Place 1 drop Into the left eye At Bedtime

## 2017-08-25 NOTE — PROGRESS NOTES
HISTORY OF PRESENT ILLNESS:    Gricelda Sabillon is a 67 year old female who is seen as self referral for right hip pain    Present symptoms: pain started about 6 months ago, has been worsening.  Hx TKA on right .  Pain described anterior and posterior.  Denies burning, tingling radiculopathy.  Pain going from sitting to standing, feels better with light walking.  Treatments tried to this point: OTC Medication:  Ibuprofen (Advil) 800mg 2-3 times per day  Orthopedic PMH: As above     Past Medical History:   Diagnosis Date     Allergic rhinitis, cause unspecified      Asthma      Asthma, mild intermittent      Cataract      Cellulitis and abscess of leg, except foot     Bilateral     Heart murmur     aortic area     HTN, goal below 140/90      Hyperlipidemia LDL goal < 100      Hyperplastic colon polyp      Infection     bilateral eye infections     Macular hole of left eye      Obesity (BMI 30-39.9)      Osteoarthritis     knees     Other chronic pain     right knee     Sleep apnea     uses c pap     Type 2 diabetes, HbA1C goal < 8% (H)        Past Surgical History:   Procedure Laterality Date     ARTHROPLASTY KNEE  2013    Procedure: ARTHROPLASTY KNEE;  right total knee arthroplasty ;  Surgeon: Chaparro Wesley MD;  Location: [facility] OR     ARTHROSCOPY KNEE Right 2015    Procedure: ARTHROSCOPY KNEE;  Surgeon: Ayaan Pena MD;  Location: [facility] OR     C INCISION OF HYMEN       CATARACT IOL, RT/LT       COLONOSCOPY       EYE SURGERY      macular hole repaired left eye     HC KNEE SCOPE, DIAGNOSTIC      - both knees     HEAD & NECK SURGERY      wisdom teeth       Family History   Problem Relation Age of Onset     HEART DISEASE Father      , cancer lip     Hypertension Mother      diabetes,hypoythryoidism, stroke     DIABETES Mother      HEART DISEASE Paternal Grandfather           CANCER Paternal Grandmother           CEREBROVASCULAR DISEASE Maternal  Grandfather           CEREBROVASCULAR DISEASE Maternal Grandmother      , diabetes     Connective Tissue Disorder Sister      fibromyalgia     DIABETES Sister      Hypertension Brother      CANCER Paternal Aunt      ovarian       Social History     Social History     Marital status:      Spouse name: Allen     Number of children: 3     Years of education: 15     Occupational History     Not on file.     Social History Main Topics     Smoking status: Never Smoker     Smokeless tobacco: Never Used     Alcohol use No     Drug use: No     Sexual activity: No     Other Topics Concern     Not on file     Social History Narrative       Current Outpatient Prescriptions   Medication Sig Dispense Refill     blood glucose monitoring (ONE TOUCH ULTRA) test strip Pt checks BS 1x a day 50 strip 0     glipiZIDE (GLUCOTROL) 5 MG tablet TAKE ONE TABLET BY MOUTH TWICE DAILY BEFORE MEAL(S) 60 tablet 1     JANUVIA 100 MG tablet TAKE ONE TABLET BY MOUTH ONCE DAILY 30 tablet 1     lisinopril (PRINIVIL/ZESTRIL) 40 MG tablet TAKE ONE TABLET BY MOUTH ONCE DAILY 90 tablet 0     metFORMIN (GLUCOPHAGE) 1000 MG tablet TAKE ONE TABLET BY MOUTH TWICE DAILY WITH MEALS 180 tablet 0     dexamethasone (DECADRON) 4 MG/ML injection Inject 1 mL (4 mg) as directed once for 1 dose 1 mL 0     atenolol (TENORMIN) 50 MG tablet Take 1 tablet (50 mg) by mouth daily 90 tablet 1     hydrochlorothiazide (HYDRODIURIL) 25 MG tablet Take 1 tablet (25 mg) by mouth every morning 90 tablet 1     atorvastatin (LIPITOR) 20 MG tablet Take 1 tablet (20 mg) by mouth daily 90 tablet 1     order for DME All diabetic testing supplies including test strips, lancets and solution for testing 3 times per day. Patient is using  no Insulin. A1C      7.6   5/3/2016  A1C      7.4   2016 3 Month 1     dorzolamide (TRUSOPT) 2 % ophthalmic solution Place 2 drops into both eyes 2 times daily 1 Bottle      desoximetasone (TOPICORT) 0.25 % cream Apply sparingly to  "affected area's (Patient not taking: Reported on 3/22/2017) 60 g 1     triamcinolone (KENALOG) 0.1 % cream Apply  topically 2 times daily. 60 g 1     latanoprost (XALATAN) 0.005 % ophthalmic solution Place 1 drop Into the left eye At Bedtime 2.5 mL 1     albuterol (PROAIR HFA, PROVENTIL HFA, VENTOLIN HFA) 108 (90 BASE) MCG/ACT inhaler Inhale 2 puffs into the lungs every 4 hours as needed for shortness of breath / dyspnea (Patient not taking: Reported on 3/22/2017) 1 Inhaler 3     azelastine (ASTELIN) 137 MCG/SPRAY nasal spray [place] 1 spray into both nostrils 2 times daily as needed Reported on 3/22/2017       azelastine (OPTIVAR) 0.05 % SOLN Place 1 drop into both eyes 2 times daily as needed Reported on 3/22/2017       cetirizine (ZYRTEC) 10 MG tablet Take 10 mg by mouth every morning.       fish oil-omega-3 fatty acids (FISH OIL) 1000 MG capsule Take 1 g by mouth daily Reported on 3/22/2017       MULTIVITAMIN TABS   OR Reported on 3/22/2017       ASPIRIN 81 MG OR TABS 1 tab po QD (Once per day) (Patient taking differently: No sig reported) 0 0       Allergies   Allergen Reactions     Codeine      \"NAUSE,HA AND DIZZINESS\"     Sulfites Visual Disturbance     Xibrom (bromfenac opthalmic solution) 0.09%--eye pain       REVIEW OF SYSTEMS:  CONSTITUTIONAL:  NEGATIVE for fever, chills, change in weight  INTEGUMENTARY/SKIN:  NEGATIVE for worrisome rashes, moles or lesions  EYES:  NEGATIVE for vision changes or irritation  ENT/MOUTH:  NEGATIVE for ear, mouth and throat problems  RESP:  NEGATIVE for significant cough or SOB  BREAST:  NEGATIVE for masses, tenderness or discharge  CV:  NEGATIVE for chest pain, palpitations or peripheral edema  GI:  NEGATIVE for nausea, abdominal pain, heartburn, or change in bowel habits  :  Negative   MUSCULOSKELETAL:  See HPI above  NEURO:  NEGATIVE for weakness, dizziness or paresthesias  ENDOCRINE:  NEGATIVE for temperature intolerance, skin/hair changes  HEME/ALLERGY/IMMUNE:  NEGATIVE " "for bleeding problems  PSYCHIATRIC:  NEGATIVE for changes in mood or affect      PHYSICAL EXAM:  Pulse 75  Ht 5' 6\" (1.676 m)  Wt 226 lb (102.5 kg)  LMP 12/13/2002  BMI 36.48 kg/m2  Body mass index is 36.48 kg/(m^2).   GENERAL APPEARANCE: healthy, alert and no distress   SKIN: no suspicious lesions or rashes  NEURO: Normal strength and tone, mentation intact and speech normal  VASCULAR: Good pulses, and capillary refill   LYMPH: no lymphadenopathy   PSYCH:  mentation appears normal and affect normal/bright    MSK:  A&OX3, NAD  Neck supple, no lymphadenopathy  The patient ambulates without an antalgic gait.  The patient is able to get on and off the exam table without difficulty.    Examination of the spine reveals a normal lordosis to the cervical and lumbar spine, and a normal kyphosis to the thoracic spine.  There is no clinical evidence of scoliosis.    The pelvis is clinically level.  Trendelenberg is negative.  The patient is non-tender to palpation over the greater trochanteric bursal region or piriformis fossa.  With the hip flexed to 90 degrees, internal and external rotation is 20/40 respectively,  with pain at extremes.  The calves and thighs are symmetric, without atrophy and non-tender to palpation. Aleajndro's sign is negative, bilaterally.   CMS is intact to the toes.      ASSESSMENT / PLAN: Primary osteoarthritis right hip. We discussed the treatment algorithm and she feels as though she is at the point of considering a total hip arthroplasty. We discussed the potential risks as well as benefits of this and she would like to proceed. She'll schedule this at her convenience.    Imaging Interpretation:         Peña Pena MD  Department of Orthopedic Surgery        Disclaimer: This note consists of symbols derived from keyboarding, dictation and/or voice recognition software. As a result, there may be errors in the script that have gone undetected. Please consider this when interpreting information " found in this chart.

## 2017-08-25 NOTE — NURSING NOTE
"Chief Complaint   Patient presents with     Musculoskeletal Problem     right hip pain       Initial Pulse 75  Ht 5' 6\" (1.676 m)  Wt 226 lb (102.5 kg)  LMP 12/13/2002  BMI 36.48 kg/m2 Estimated body mass index is 36.48 kg/(m^2) as calculated from the following:    Height as of this encounter: 5' 6\" (1.676 m).    Weight as of this encounter: 226 lb (102.5 kg).  Medication Reconciliation: complete     Jhon Rivera MS, ATC      "

## 2017-08-28 ENCOUNTER — TELEPHONE (OUTPATIENT)
Dept: ORTHOPEDICS | Facility: CLINIC | Age: 67
End: 2017-08-28

## 2017-08-28 NOTE — TELEPHONE ENCOUNTER
Scheduled surgery for Right total hip arthroplasty on 9/25/2017 with  @ [facility] @ 10:10.  Surgery education packet provided to patient.

## 2017-08-30 DIAGNOSIS — E11.9 TYPE 2 DIABETES, HBA1C GOAL < 8% (H): ICD-10-CM

## 2017-08-30 NOTE — TELEPHONE ENCOUNTER
Metformin          Last Written Prescription Date: 5/31/17  Last Fill Quantity: 180, # refills: 0  Last Office Visit with [facility], [facility] or  Health prescribing provider:  5/12/16   Next 5 appointments (look out 90 days)     Sep 12, 2017  1:00 PM CDT   [facility] with Raisa Davidson MD   [facility]    [address]  [facility] [address]   [phone]            Oct 06, 2017 11:00 AM CDT   Return Visit with Patricio Samuels PA-C   [facility] ([facility])    [address]  [address]  [facility] [address]   [phone]                   BP Readings from Last 3 Encounters:   03/22/17 128/84   12/14/16 142/72   11/17/16 116/70     Lab Results   Component Value Date    MICROL 7 09/07/2016     Lab Results   Component Value Date    UMALCR 6.64 09/07/2016     Creatinine   Date Value Ref Range Status   09/07/2016 0.97 0.52 - 1.04 mg/dL Final   ]  GFR Estimate   Date Value Ref Range Status   09/07/2016 57 (L) >60 mL/min/1.7m2 Final     Comment:     Non  GFR Calc   02/04/2016 83 >60 mL/min/1.7m2 Final     Comment:     Non  GFR Calc   08/03/2015 79 >60 mL/min/1.7m2 Final     Comment:     Non  GFR Calc     GFR Estimate If Black   Date Value Ref Range Status   09/07/2016 69 >60 mL/min/1.7m2 Final     Comment:      GFR Calc   02/04/2016 >90   GFR Calc   >60 mL/min/1.7m2 Final   08/03/2015 >90   GFR Calc   >60 mL/min/1.7m2 Final     Lab Results   Component Value Date    CHOL 165 09/07/2016     Lab Results   Component Value Date    HDL 38 09/07/2016     Lab Results   Component Value Date    LDL 60 09/07/2016     Lab Results   Component Value Date    TRIG 334 09/07/2016     Lab Results   Component Value Date    CHOLHDLRATIO 3.1 08/03/2015     Lab Results   Component Value Date    AST 23 09/07/2016     Lab Results    Component Value Date    ALT 42 09/07/2016     Lab Results   Component Value Date    A1C 7.5 03/17/2017    A1C 7.0 12/05/2016    A1C 6.8 09/07/2016    A1C 7.6 05/03/2016    A1C 7.4 02/04/2016     Potassium   Date Value Ref Range Status   09/07/2016 4.1 3.4 - 5.3 mmol/L Final       Labs showing if normal/abnormal  Lab Results   Component Value Date    UCRR 98 09/07/2016    MICROL 7 09/07/2016     Lab Results   Component Value Date    CHOL 165 09/07/2016    TRIG 334 (H) 09/07/2016    HDL 38 (L) 09/07/2016    LDL 60 09/07/2016    VLDL 33 (H) 08/03/2015    CHOLHDLRATIO 3.1 08/03/2015     Lab Results   Component Value Date    A1C 7.5 (H) 03/17/2017    A1C 7.0 (H) 12/05/2016    A1C 6.8 (H) 09/07/2016

## 2017-09-05 DIAGNOSIS — E78.5 HYPERLIPIDEMIA WITH TARGET LDL LESS THAN 100: Chronic | ICD-10-CM

## 2017-09-05 DIAGNOSIS — I10 ESSENTIAL HYPERTENSION WITH GOAL BLOOD PRESSURE LESS THAN 140/90: Chronic | ICD-10-CM

## 2017-09-05 DIAGNOSIS — Z00.00 ENCOUNTER FOR ROUTINE ADULT HEALTH EXAMINATION WITHOUT ABNORMAL FINDINGS: ICD-10-CM

## 2017-09-05 DIAGNOSIS — E11.9 DIABETES MELLITUS WITHOUT COMPLICATION (H): Chronic | ICD-10-CM

## 2017-09-05 LAB
ALBUMIN SERPL-MCNC: 3.4 G/DL (ref 3.4–5)
ALP SERPL-CCNC: 106 U/L (ref 40–150)
ALT SERPL W P-5'-P-CCNC: 36 U/L (ref 0–50)
ANION GAP SERPL CALCULATED.3IONS-SCNC: 9 MMOL/L (ref 3–14)
AST SERPL W P-5'-P-CCNC: 22 U/L (ref 0–45)
BILIRUB SERPL-MCNC: 0.3 MG/DL (ref 0.2–1.3)
BUN SERPL-MCNC: 26 MG/DL (ref 7–30)
CALCIUM SERPL-MCNC: 9.9 MG/DL (ref 8.5–10.1)
CHLORIDE SERPL-SCNC: 101 MMOL/L (ref 94–109)
CHOLEST SERPL-MCNC: 160 MG/DL
CO2 SERPL-SCNC: 26 MMOL/L (ref 20–32)
CREAT SERPL-MCNC: 0.96 MG/DL (ref 0.52–1.04)
ERYTHROCYTE [DISTWIDTH] IN BLOOD BY AUTOMATED COUNT: 13.3 % (ref 10–15)
GFR SERPL CREATININE-BSD FRML MDRD: 58 ML/MIN/1.7M2
GLUCOSE SERPL-MCNC: 150 MG/DL (ref 70–99)
HBA1C MFR BLD: 7.7 % (ref 4.3–6)
HCT VFR BLD AUTO: 37.7 % (ref 35–47)
HDLC SERPL-MCNC: 37 MG/DL
HGB BLD-MCNC: 11.9 G/DL (ref 11.7–15.7)
LDLC SERPL CALC-MCNC: 68 MG/DL
MCH RBC QN AUTO: 27.2 PG (ref 26.5–33)
MCHC RBC AUTO-ENTMCNC: 31.6 G/DL (ref 31.5–36.5)
MCV RBC AUTO: 86 FL (ref 78–100)
NONHDLC SERPL-MCNC: 123 MG/DL
PLATELET # BLD AUTO: 337 10E9/L (ref 150–450)
POTASSIUM SERPL-SCNC: 4.5 MMOL/L (ref 3.4–5.3)
PROT SERPL-MCNC: 7.5 G/DL (ref 6.8–8.8)
RBC # BLD AUTO: 4.37 10E12/L (ref 3.8–5.2)
SODIUM SERPL-SCNC: 136 MMOL/L (ref 133–144)
TRIGL SERPL-MCNC: 276 MG/DL
TSH SERPL DL<=0.005 MIU/L-ACNC: 2.33 MU/L (ref 0.4–4)
WBC # BLD AUTO: 6.8 10E9/L (ref 4–11)

## 2017-09-05 PROCEDURE — 84443 ASSAY THYROID STIM HORMONE: CPT | Performed by: INTERNAL MEDICINE

## 2017-09-05 PROCEDURE — 80061 LIPID PANEL: CPT | Performed by: INTERNAL MEDICINE

## 2017-09-05 PROCEDURE — 80053 COMPREHEN METABOLIC PANEL: CPT | Performed by: INTERNAL MEDICINE

## 2017-09-05 PROCEDURE — 36415 COLL VENOUS BLD VENIPUNCTURE: CPT | Performed by: INTERNAL MEDICINE

## 2017-09-05 PROCEDURE — 83036 HEMOGLOBIN GLYCOSYLATED A1C: CPT | Performed by: INTERNAL MEDICINE

## 2017-09-05 PROCEDURE — 85027 COMPLETE CBC AUTOMATED: CPT | Performed by: INTERNAL MEDICINE

## 2017-09-07 ENCOUNTER — TELEPHONE (OUTPATIENT)
Dept: INTERNAL MEDICINE | Facility: CLINIC | Age: 67
End: 2017-09-07

## 2017-09-07 ENCOUNTER — HOSPITAL ENCOUNTER (OUTPATIENT)
Dept: LAB | Facility: CLINIC | Age: 67
Discharge: HOME | End: 2017-09-07
Attending: ORTHOPAEDIC SURGERY | Admitting: ORTHOPAEDIC SURGERY
Payer: MEDICARE

## 2017-09-07 DIAGNOSIS — Z01.812 PRE-OPERATIVE LABORATORY EXAMINATION: ICD-10-CM

## 2017-09-07 DIAGNOSIS — E11.9 DIABETES MELLITUS WITHOUT COMPLICATION (H): Chronic | ICD-10-CM

## 2017-09-07 LAB
MRSA DNA SPEC QL NAA+PROBE: NEGATIVE
SPECIMEN SOURCE: NORMAL

## 2017-09-07 PROCEDURE — 40000829 ZZHCL STATISTIC STAPH AUREUS SUSCEPT SCREEN PCR: Performed by: ORTHOPAEDIC SURGERY

## 2017-09-07 PROCEDURE — 40000830 ZZHCL STATISTIC STAPH AUREUS METH RESIST SCREEN PCR: Performed by: ORTHOPAEDIC SURGERY

## 2017-09-07 PROCEDURE — 87640 STAPH A DNA AMP PROBE: CPT | Performed by: ORTHOPAEDIC SURGERY

## 2017-09-07 PROCEDURE — 87641 MR-STAPH DNA AMP PROBE: CPT | Performed by: ORTHOPAEDIC SURGERY

## 2017-09-07 RX ORDER — SITAGLIPTIN 100 MG/1
TABLET, FILM COATED ORAL
Qty: 30 TABLET | Refills: 1 | Status: CANCELLED | OUTPATIENT
Start: 2017-09-07

## 2017-09-07 NOTE — TELEPHONE ENCOUNTER
Please call the patient A1c is higher and we need to work on BS before the surgery.    Recommendation:  -- come tomorrow with BS records and we will decide what to do  -- keep the preop for the Sept 12. Tomorrow appointment is not prop

## 2017-09-08 ENCOUNTER — OFFICE VISIT (OUTPATIENT)
Dept: INTERNAL MEDICINE | Facility: CLINIC | Age: 67
End: 2017-09-08
Payer: COMMERCIAL

## 2017-09-08 VITALS
HEART RATE: 83 BPM | TEMPERATURE: 97.8 F | BODY MASS INDEX: 35.44 KG/M2 | SYSTOLIC BLOOD PRESSURE: 118 MMHG | HEIGHT: 66 IN | OXYGEN SATURATION: 96 % | DIASTOLIC BLOOD PRESSURE: 80 MMHG | WEIGHT: 220.5 LBS

## 2017-09-08 DIAGNOSIS — Z71.89 ADVANCED DIRECTIVES, COUNSELING/DISCUSSION: ICD-10-CM

## 2017-09-08 DIAGNOSIS — E11.65 TYPE 2 DIABETES MELLITUS WITH HYPERGLYCEMIA, WITHOUT LONG-TERM CURRENT USE OF INSULIN (H): ICD-10-CM

## 2017-09-08 DIAGNOSIS — Z12.31 VISIT FOR SCREENING MAMMOGRAM: ICD-10-CM

## 2017-09-08 DIAGNOSIS — E11.9 DIABETES MELLITUS WITHOUT COMPLICATION (H): Primary | Chronic | ICD-10-CM

## 2017-09-08 DIAGNOSIS — J45.20 MILD INTERMITTENT ASTHMA WITHOUT COMPLICATION: Chronic | ICD-10-CM

## 2017-09-08 PROCEDURE — 99213 OFFICE O/P EST LOW 20 MIN: CPT | Performed by: INTERNAL MEDICINE

## 2017-09-08 RX ORDER — GLIPIZIDE 5 MG/1
TABLET ORAL
Qty: 120 TABLET | Refills: 1 | Status: SHIPPED | OUTPATIENT
Start: 2017-09-08 | End: 2017-11-16

## 2017-09-08 ASSESSMENT — ANXIETY QUESTIONNAIRES
1. FEELING NERVOUS, ANXIOUS, OR ON EDGE: NOT AT ALL
IF YOU CHECKED OFF ANY PROBLEMS ON THIS QUESTIONNAIRE, HOW DIFFICULT HAVE THESE PROBLEMS MADE IT FOR YOU TO DO YOUR WORK, TAKE CARE OF THINGS AT HOME, OR GET ALONG WITH OTHER PEOPLE: NOT DIFFICULT AT ALL
2. NOT BEING ABLE TO STOP OR CONTROL WORRYING: NOT AT ALL
7. FEELING AFRAID AS IF SOMETHING AWFUL MIGHT HAPPEN: NOT AT ALL
6. BECOMING EASILY ANNOYED OR IRRITABLE: NOT AT ALL
GAD7 TOTAL SCORE: 0
5. BEING SO RESTLESS THAT IT IS HARD TO SIT STILL: NOT AT ALL
3. WORRYING TOO MUCH ABOUT DIFFERENT THINGS: NOT AT ALL

## 2017-09-08 ASSESSMENT — PATIENT HEALTH QUESTIONNAIRE - PHQ9
5. POOR APPETITE OR OVEREATING: NOT AT ALL
SUM OF ALL RESPONSES TO PHQ QUESTIONS 1-9: 2

## 2017-09-08 NOTE — LETTER
My Asthma Action Plan  Name: Gricelda Sabillon   YOB: 1950  Date: 9/8/2017   My doctor: Raisa Davidson MD   My clinic: [facility]        My Control Medicine: None  My Rescue Medicine: Albuterol (Proair/Ventolin/Proventil) inhaler .   My Asthma Severity: intermittent  Avoid your asthma triggers: .               GREEN ZONE   Good Control    I feel good    No cough or wheeze    Can work, sleep and play without asthma symptoms       Take your asthma control medicine every day.     1. If exercise triggers your asthma, take your rescue medication    15 minutes before exercise or sports, and    During exercise if you have asthma symptoms  2. Spacer to use with inhaler: If you have a spacer, make sure to use it with your inhaler             YELLOW ZONE Getting Worse  I have ANY of these:    I do not feel good    Cough or wheeze    Chest feels tight    Wake up at night   1. Keep taking your Green Zone medications  2. Start taking your rescue medicine:    every 20 minutes for up to 1 hour. Then every 4 hours for 24-48 hours.  3. If you stay in the Yellow Zone for more than 12-24 hours, contact your doctor.  4. If you do not return to the Green Zone in 12-24 hours or you get worse, start taking your oral steroid medicine if prescribed by your provider.           RED ZONE Medical Alert - Get Help  I have ANY of these:    I feel awful    Medicine is not helping    Breathing getting harder    Trouble walking or talking    Nose opens wide to breathe       1. Take your rescue medicine NOW  2. If your provider has prescribed an oral steroid medicine, start taking it NOW  3. Call your doctor NOW  4. If you are still in the Red Zone after 20 minutes and you have not reached your doctor:    Take your rescue medicine again and    Call 911 or go to the emergency room right away    See your regular doctor within 2 weeks of an Emergency Room or Urgent Care visit for follow-up treatment.         Electronically signed by: Raisa Davidson, September 8, 2017    Annual Reminders:  Meet with Asthma Educator,  Flu Shot in the Fall, consider Pneumonia Vaccination for patients with asthma (aged 19 and older).    Pharmacy:    [facility] [place], [place] - [address]                    Asthma Triggers  How To Control Things That Make Your Asthma Worse    Triggers are things that make your asthma worse.  Look at the list below to help you find your triggers and what you can do about them.  You can help prevent asthma flare-ups by staying away from your triggers.      Trigger                                                          What you can do   Cigarette Smoke  Tobacco smoke can make asthma worse. Do not allow smoking in your home, car or around you.  Be sure no one smokes at a child s day care or school.  If you smoke, ask your health care provider for ways to help you quit.  Ask family members to quit too.  Ask your health care provider for a referral to Quit Plan to help you quit smoking, or call 4-588-722-PLAN.     Colds, Flu, Bronchitis  These are common triggers of asthma. Wash your hands often.  Don t touch your eyes, nose or mouth.  Get a flu shot every year.     Dust Mites  These are tiny bugs that live in cloth or carpet. They are too small to see. Wash sheets and blankets in hot water every week.   Encase pillows and mattress in dust mite proof covers.  Avoid having carpet if you can. If you have carpet, vacuum weekly.   Use a dust mask and HEPA vacuum.   Pollen and Outdoor Mold  Some people are allergic to trees, grass, or weed pollen, or molds. Try to keep your windows closed.  Limit time out doors when pollen count is high.   Ask you health care provider about taking medicine during allergy season.     Animal Dander  Some people are allergic to skin flakes, urine or saliva from pets with fur or feathers. Keep pets with  fur or feathers out of your home.    If you can t keep the pet outdoors, then keep the pet out of your bedroom.  Keep the bedroom door closed.  Keep pets off cloth furniture and away from stuffed toys.     Mice, Rats, and Cockroaches  Some people are allergic to the waste from these pests.   Cover food and garbage.  Clean up spills and food crumbs.  Store grease in the refrigerator.   Keep food out of the bedroom.   Indoor Mold  This can be a trigger if your home has high moisture. Fix leaking faucets, pipes, or other sources of water.   Clean moldy surfaces.  Dehumidify basement if it is damp and smelly.   Smoke, Strong Odors, and Sprays  These can reduce air quality. Stay away from strong odors and sprays, such as perfume, powder, hair spray, paints, smoke incense, paint, cleaning products, candles and new carpet.   Exercise or Sports  Some people with asthma have this trigger. Be active!  Ask your doctor about taking medicine before sports or exercise to prevent symptoms.    Warm up for 5-10 minutes before and after sports or exercise.     Other Triggers of Asthma  Cold air:  Cover your nose and mouth with a scarf.  Sometimes laughing or crying can be a trigger.  Some medicines and food can trigger asthma.

## 2017-09-08 NOTE — ASSESSMENT & PLAN NOTE
Advance Care Planning 9/8/2017: ACP Review of Chart / Resources Provided:  Reviewed chart for advance care plan.  Gricelda Sabillon has been provided information and resources to begin or update their advance care plan.  Added by Pati Frank

## 2017-09-08 NOTE — NURSING NOTE
"Chief Complaint   Patient presents with     Diabetes       Initial /80 (BP Location: Right arm, Patient Position: Chair, Cuff Size: Adult Large)  Pulse 83  Temp 97.8  F (36.6  C) (Oral)  Ht 5' 6\" (1.676 m)  Wt 220 lb 8 oz (100 kg)  LMP 12/13/2002  SpO2 96%  Breastfeeding? No  BMI 35.59 kg/m2 Estimated body mass index is 35.59 kg/(m^2) as calculated from the following:    Height as of this encounter: 5' 6\" (1.676 m).    Weight as of this encounter: 220 lb 8 oz (100 kg).  Medication Reconciliation: complete   Mammogram ordered today-she will likely not be completing this until she has had some time to recover from her upcoming surgery. Please do not call her with health maintenance reminders. She is also aware that she will be due for colonoscopy in January 2018.  Pati Frank CMA        "

## 2017-09-08 NOTE — PROGRESS NOTES
Dr Torres's note      Patient's instructions / PLAN:                                                        Plan:     increase Glipizide to 2 tabs in the morning and one tab in the afternoon for 3-4 days and if no low blood sugars increase to two tablets twice a day      ASSESSMENT & PLAN:                                                      (E11.9) DM2 no complication  (primary encounter diagnosis)  Comment: Not controlled   Plan: glipiZIDE (GLUCOTROL) 5 MG tablet, sitagliptin         (JANUVIA) 100 MG tablet        as above     (J45.20) Mild intermittent asthma without complication  Comment:   Plan: Asthma Action Plan (AAP)            (Z71.89) Advanced directives, counseling/discussion  Comment:   Plan:     (Z12.31) Visit for screening mammogram  Comment:   Plan: MA Screening Digital Bilateral               Chief complaint:                                                      DM    SUBJECTIVE:   Gricelda Sabillon is a 67 year old female who presents to clinic today for the following health issues:    She is scheduled for hip replacement surgery on September 25.  The recent A1c is 7.7.  I ask her to come to discuss about blood sugars.  They need to be optimized before the surgery.    The BS are allover the place : 110 - 180s in am. Usually is 130-150 in am.   She checks it just in am       Diabetes Follow-up      Patient is checking blood sugars: Every morning before eating, says her results are all over the place, from 112-180.    Diabetic concerns: None     Symptoms of hypoglycemia (low blood sugar): none     Paresthesias (numbness or burning in feet) or sores: No     Date of last diabetic eye exam: About 1 week ago.    Hyperlipidemia Follow-Up      Rate your low fat/cholesterol diet?: fair    Taking statin?  Yes, no muscle aches from statin    Other lipid medications/supplements?:  none    Hypertension Follow-up      Outpatient blood pressures are not being checked.    Low Salt Diet: She tries, but she's not  "sure how well she does.             Amount of exercise or physical activity: None right now, having a lot of trouble with mobility.     Problems taking medications regularly: No    Medication side effects: none  Diet: Tries to do low-salt, low carbs.      Review of Systems:                                                      ROS: negative for fever, chills, cough, wheezes, chest pain, shortness of breath, vomiting, abdominal pain, leg swelling     A 10-point review of systems was obtained.  Those pertinent are above and in the in the Subjective section.  The rest of the systems are negative.           OBJECTIVE:             Physical exam:  Blood pressure 118/80, pulse 83, temperature 97.8  F (36.6  C), temperature source Oral, height 5' 6\" (1.676 m), weight 220 lb 8 oz (100 kg), last menstrual period 12/13/2002, SpO2 96 %, not currently breastfeeding.   NAD, appears comfortable    PMHx: reviewed  Past Medical History:   Diagnosis Date     Allergic rhinitis, cause unspecified      Asthma      Asthma, mild intermittent      Cataract      Cellulitis and abscess of leg, except foot 03/00    Bilateral     Heart murmur     aortic area     HTN, goal below 140/90      Hyperlipidemia LDL goal < 100      Hyperplastic colon polyp 2008     Infection     bilateral eye infections     Macular hole of left eye      Obesity (BMI 30-39.9)      Osteoarthritis     knees     Other chronic pain     right knee     Sleep apnea     uses c pap     Type 2 diabetes, HbA1C goal < 8% (H)       PSHx: reviewed  Past Surgical History:   Procedure Laterality Date     ARTHROPLASTY KNEE  7/12/2013    Procedure: ARTHROPLASTY KNEE;  right total knee arthroplasty ;  Surgeon: Chaparro Wesley MD;  Location: [facility] OR     ARTHROSCOPY KNEE Right 1/21/2015    Procedure: ARTHROSCOPY KNEE;  Surgeon: Ayaan Pena MD;  Location: [facility] OR     C INCISION OF HYMEN       CATARACT IOL, RT/LT       COLONOSCOPY  2008     EYE SURGERY      macular hole repaired " left eye     HC KNEE SCOPE, DIAGNOSTIC      - both knees     HEAD & NECK SURGERY      wisdom teeth        Meds: reviewed  Current Outpatient Prescriptions   Medication Sig Dispense Refill     metFORMIN (GLUCOPHAGE) 1000 MG tablet TAKE ONE TABLET BY MOUTH TWICE DAILY WITH MEALS 60 tablet 0     blood glucose monitoring (ONE TOUCH ULTRA) test strip Pt checks BS 1x a day 50 strip 0     glipiZIDE (GLUCOTROL) 5 MG tablet TAKE ONE TABLET BY MOUTH TWICE DAILY BEFORE MEAL(S) 60 tablet 1     JANUVIA 100 MG tablet TAKE ONE TABLET BY MOUTH ONCE DAILY 30 tablet 1     lisinopril (PRINIVIL/ZESTRIL) 40 MG tablet TAKE ONE TABLET BY MOUTH ONCE DAILY 90 tablet 0     atenolol (TENORMIN) 50 MG tablet Take 1 tablet (50 mg) by mouth daily 90 tablet 1     hydrochlorothiazide (HYDRODIURIL) 25 MG tablet Take 1 tablet (25 mg) by mouth every morning 90 tablet 1     atorvastatin (LIPITOR) 20 MG tablet Take 1 tablet (20 mg) by mouth daily 90 tablet 1     order for DME All diabetic testing supplies including test strips, lancets and solution for testing 3 times per day. Patient is using  no Insulin. A1C      7.6   5/3/2016  A1C      7.4   2/4/2016 3 Month 1     dorzolamide (TRUSOPT) 2 % ophthalmic solution Place 2 drops into both eyes 2 times daily 1 Bottle      desoximetasone (TOPICORT) 0.25 % cream Apply sparingly to affected area's 60 g 1     latanoprost (XALATAN) 0.005 % ophthalmic solution Place 1 drop Into the left eye At Bedtime 2.5 mL 1     albuterol (PROAIR HFA, PROVENTIL HFA, VENTOLIN HFA) 108 (90 BASE) MCG/ACT inhaler Inhale 2 puffs into the lungs every 4 hours as needed for shortness of breath / dyspnea 1 Inhaler 3     azelastine (ASTELIN) 137 MCG/SPRAY nasal spray [place] 1 spray into both nostrils 2 times daily as needed Reported on 3/22/2017       azelastine (OPTIVAR) 0.05 % SOLN Place 1 drop into both eyes 2 times daily as needed Reported on 3/22/2017       cetirizine (ZYRTEC) 10 MG tablet Take 10 mg by mouth every morning.        fish oil-omega-3 fatty acids (FISH OIL) 1000 MG capsule Take 1 g by mouth daily Reported on 3/22/2017       MULTIVITAMIN TABS   OR Reported on 3/22/2017       ASPIRIN 81 MG OR TABS 1 tab po QD (Once per day) (Patient taking differently: No sig reported) 0 0       Soc Hx: reviewed  Fam Hx: reviewed          Raisa Torres MD  Internal Medicine

## 2017-09-08 NOTE — MR AVS SNAPSHOT
After Visit Summary   9/8/2017    Gricelda Sabillon    MRN: [id number]           Patient Information     Date Of Birth          1950        Visit Information        Provider Department      9/8/2017 12:20 PM Raisa Davidson MD [facility]        Today's Diagnoses     DM2 no complication    -  1    Mild intermittent asthma without complication        Advanced directives, counseling/discussion        Visit for screening mammogram        Type 2 diabetes mellitus with hyperglycemia, without long-term current use of insulin (H)           Follow-ups after your visit        Your next 10 appointments already scheduled     Sep 15, 2017  1:40 PM CDT   SHORT with Raisa Davidson MD   [facility] ([facility]    [address]  [facility] [id number] [phone]            Sep 25, 2017   Procedure with Ayaan Pena MD   [facility] PeriOp Services (--)    [address]  [facility] [id number] [phone]            Oct 06, 2017 11:00 AM CDT   Return Visit with Patricio Samuels PA-C   [facility] ORTHOPEDIC SURGERY ([facility] [place])    [address]  [address]  [facility] [address]   [phone]              Future tests that were ordered for you today     Open Future Orders        Priority Expected Expires Ordered    MA Screening Digital Bilateral Routine  9/8/2018 9/8/2017            Who to contact     If you have questions or need follow up information about today's clinic visit or your schedule please contact [facility] directly at [phone].  Normal or non-critical lab and imaging results will be communicated to you by MyChart, letter or phone within 4 business days after the clinic has received the results. If you do not hear from us within 7 days, please contact the clinic through MyChart or phone. If you have a critical or abnormal  "lab result, we will notify you by phone as soon as possible.  Submit refill requests through [email or web address] or call your pharmacy and they will forward the refill request to us. Please allow 3 business days for your refill to be completed.          Additional Information About Your Visit        [employer] Information     [email or web address] gives you secure access to your electronic health record. If you see a primary care provider, you can also send messages to your care team and make appointments. If you have questions, please call your primary care clinic.  If you do not have a primary care provider, please call [phone] and they will assist you.        Care EveryWhere ID     This is your Care EveryWhere ID. This could be used by other organizations to access your [facility] medical records  [id number]        Your Vitals Were     Pulse Temperature Height Last Period Pulse Oximetry Breastfeeding?    83 97.8  F (36.6  C) (Oral) 5' 6\" (1.676 m) 12/13/2002 96% No    BMI (Body Mass Index)                   35.59 kg/m2            Blood Pressure from Last 3 Encounters:   09/08/17 118/80   03/22/17 128/84   12/14/16 142/72    Weight from Last 3 Encounters:   09/08/17 220 lb 8 oz (100 kg)   08/25/17 226 lb (102.5 kg)   03/22/17 226 lb (102.5 kg)              We Performed the Following     Asthma Action Plan (AAP)          Today's Medication Changes          These changes are accurate as of: 9/8/17  2:18 PM.  If you have any questions, ask your nurse or doctor.               These medicines have changed or have updated prescriptions.        Dose/Directions    aspirin 81 MG tablet   This may have changed:  See the new instructions.   Used for:  Type II or unspecified type diabetes mellitus without mention of complication, not stated as uncontrolled        1 tab po QD (Once per day)   Quantity:  0   Refills:  0       glipiZIDE 5 MG tablet   Commonly known as:  GLUCOTROL   This may have changed:  See the new instructions.   Used for:  Diabetes " mellitus without complication (H)   Changed by:  Raisa Davidson MD        TAKE 2 TABLETs BY MOUTH TWICE DAILY BEFORE MEAL(S)   Quantity:  120 tablet   Refills:  1       * order for DME   This may have changed:  Another medication with the same name was added. Make sure you understand how and when to take each.   Used for:  Type 2 diabetes mellitus, uncontrolled (H), Hyperlipidemia with target LDL less than 100, Essential hypertension with goal blood pressure less than 140/90   Changed by:  Raisa Davidson MD        All diabetic testing supplies including test strips, lancets and solution for testing 3 times per day. Patient is using  no Insulin. A1C      7.6   5/3/2016 A1C      7.4   2/4/2016   Quantity:  3 Month   Refills:  1       * order for DME   This may have changed:  You were already taking a medication with the same name, and this prescription was added. Make sure you understand how and when to take each.   Used for:  Type 2 diabetes mellitus with hyperglycemia, without long-term current use of insulin (H)   Changed by:  Raisa Davidson MD        All diabetic testing supplies including test strips, lancets and solution for testing 4 times per day. Patient is using  no Insulin. Last A1c Lab Results      Component                Value               Date                      A1C                      7.7                 09/05/2017                A1C                      7.5                 03/17/2017   Quantity:  3 Month   Refills:  1       sitagliptin 100 MG tablet   Commonly known as:  JANUVIA   This may have changed:  See the new instructions.   Used for:  Diabetes mellitus without complication (H)   Changed by:  Raisa Davidson MD        Dose:  100 mg   Take 1 tablet (100 mg) by mouth daily   Quantity:  90 tablet   Refills:  0       * Notice:  This list has 2 medication(s) that are the same as other medications prescribed for you. Read the  directions carefully, and ask your doctor or other care provider to review them with you.         Where to get your medicines      These medications were sent to [facility] [address] [place] [address] [facility]  [address] [facility], [facility] [address]     Phone:  [phone]     glipiZIDE 5 MG tablet    sitagliptin 100 MG tablet         Some of these will need a paper prescription and others can be bought over the counter.  Ask your nurse if you have questions.     Bring a paper prescription for each of these medications     order for DME                Primary Care Provider Office Phone # Fax #    Raisa Davidson [phone]       [address]  [facility] [address]        Equal Access to Services     TAWANA TREVINO : Hadii elisabeth ashley hadasho Sojoycelynali, waaxda luqadaha, qaybta kaalmada adeegyada, thomas marmolejo . So [facility] [phone].    ATENCIÓN: Si habla español, tiene a vail disposición servicios gratuitos de asistencia lingüística. [facility] [phone].    We comply with applicable federal civil rights laws and [place] laws. We do not discriminate on the basis of race, color, national origin, age, disability sex, sexual orientation or gender identity.            Thank you!     Thank you for choosing [facility]  for your care. Our goal is always to provide you with excellent care. Hearing back from our patients is one way we can continue to improve our services. Please take a few minutes to complete the written survey that you may receive in the mail after your visit with us. Thank you!             Your Updated Medication List - Protect others around you: Learn how to safely use, store and throw away your medicines at www.disposemymeds.org.          This list is accurate as of: 9/8/17  2:18 PM.  Always use your most recent med list.                   Brand Name Dispense Instructions for use Diagnosis    albuterol 108  (90 BASE) MCG/ACT Inhaler    PROAIR HFA/PROVENTIL HFA/VENTOLIN HFA    1 Inhaler    Inhale 2 puffs into the lungs every 4 hours as needed for shortness of breath / dyspnea    Acute bronchitis       aspirin 81 MG tablet     0    1 tab po QD (Once per day)    Type II or unspecified type diabetes mellitus without mention of complication, not stated as uncontrolled       atenolol 50 MG tablet    TENORMIN    90 tablet    Take 1 tablet (50 mg) by mouth daily    HTN, goal below 140/90, Sinus tachycardia       atorvastatin 20 MG tablet    LIPITOR    90 tablet    Take 1 tablet (20 mg) by mouth daily    Hyperlipidemia with target LDL less than 100       azelastine 0.05 % Soln ophthalmic solution    OPTIVAR     Place 1 drop into both eyes 2 times daily as needed Reported on 3/22/2017        azelastine 0.1 % spray    ASTELIN     Spray 1 spray into both nostrils 2 times daily as needed Reported on 3/22/2017        blood glucose monitoring test strip    ONE TOUCH ULTRA    50 strip    Pt checks BS 1x a day    Diabetes mellitus without complication (H)       cetirizine 10 MG tablet    zyrTEC     Take 10 mg by mouth every morning.        desoximetasone 0.25 % cream    TOPICORT    60 g    Apply sparingly to affected area's    Eczema       dorzolamide 2 % ophthalmic solution    TRUSOPT    1 Bottle    Place 2 drops into both eyes 2 times daily        fish oil-omega-3 fatty acids 1000 MG capsule      Take 1 g by mouth daily Reported on 3/22/2017        glipiZIDE 5 MG tablet    GLUCOTROL    120 tablet    TAKE 2 TABLETs BY MOUTH TWICE DAILY BEFORE MEAL(S)    Diabetes mellitus without complication (H)       hydrochlorothiazide 25 MG tablet    HYDRODIURIL    90 tablet    Take 1 tablet (25 mg) by mouth every morning    HTN, goal below 140/90       lisinopril 40 MG tablet    PRINIVIL/ZESTRIL    90 tablet    TAKE ONE TABLET BY MOUTH ONCE DAILY    HTN, goal below 140/90       metFORMIN 1000 MG tablet    GLUCOPHAGE    60 tablet    TAKE ONE TABLET  BY MOUTH TWICE DAILY WITH MEALS    Type 2 diabetes, HbA1C goal < 8% (H)       MULTIVITAMIN TABS   OR      Reported on 3/22/2017        * order for DME     3 Month    All diabetic testing supplies including test strips, lancets and solution for testing 3 times per day. Patient is using  no Insulin. A1C      7.6   5/3/2016 A1C      7.4   2/4/2016    Type 2 diabetes mellitus, uncontrolled (H), Hyperlipidemia with target LDL less than 100, Essential hypertension with goal blood pressure less than 140/90       * order for DME     3 Month    All diabetic testing supplies including test strips, lancets and solution for testing 4 times per day. Patient is using  no Insulin. Last A1c Lab Results      Component                Value               Date                      A1C                      7.7                 09/05/2017                A1C                      7.5                 03/17/2017    Type 2 diabetes mellitus with hyperglycemia, without long-term current use of insulin (H)       sitagliptin 100 MG tablet    JANUVIA    90 tablet    Take 1 tablet (100 mg) by mouth daily    Diabetes mellitus without complication (H)       XALATAN 0.005 % ophthalmic solution   Generic drug:  latanoprost     2.5 mL    Place 1 drop Into the left eye At Bedtime        * Notice:  This list has 2 medication(s) that are the same as other medications prescribed for you. Read the directions carefully, and ask your doctor or other care provider to review them with you.

## 2017-09-09 ASSESSMENT — ANXIETY QUESTIONNAIRES: GAD7 TOTAL SCORE: 0

## 2017-09-09 ASSESSMENT — ASTHMA QUESTIONNAIRES: ACT_TOTALSCORE: 25

## 2017-09-14 DIAGNOSIS — E11.9 DIABETES MELLITUS WITHOUT COMPLICATION (H): Chronic | ICD-10-CM

## 2017-09-14 DIAGNOSIS — L30.9 ECZEMA, UNSPECIFIED TYPE: Primary | ICD-10-CM

## 2017-09-14 RX ORDER — GLIPIZIDE 5 MG/1
TABLET ORAL
Qty: 60 TABLET | Refills: 1 | OUTPATIENT
Start: 2017-09-14

## 2017-09-15 ENCOUNTER — OFFICE VISIT (OUTPATIENT)
Dept: INTERNAL MEDICINE | Facility: CLINIC | Age: 67
End: 2017-09-15
Payer: COMMERCIAL

## 2017-09-15 VITALS
HEIGHT: 66 IN | DIASTOLIC BLOOD PRESSURE: 70 MMHG | BODY MASS INDEX: 35.36 KG/M2 | HEART RATE: 82 BPM | SYSTOLIC BLOOD PRESSURE: 138 MMHG | WEIGHT: 220 LBS | OXYGEN SATURATION: 97 % | TEMPERATURE: 98.7 F

## 2017-09-15 DIAGNOSIS — M16.10 HIP ARTHRITIS: ICD-10-CM

## 2017-09-15 DIAGNOSIS — J45.20 MILD INTERMITTENT ASTHMA WITHOUT COMPLICATION: Chronic | ICD-10-CM

## 2017-09-15 DIAGNOSIS — E11.9 DIABETES MELLITUS WITHOUT COMPLICATION (H): Chronic | ICD-10-CM

## 2017-09-15 DIAGNOSIS — Z01.818 PREOP GENERAL PHYSICAL EXAM: Primary | ICD-10-CM

## 2017-09-15 DIAGNOSIS — G47.33 OSA (OBSTRUCTIVE SLEEP APNEA): ICD-10-CM

## 2017-09-15 DIAGNOSIS — E78.5 HYPERLIPIDEMIA WITH TARGET LDL LESS THAN 100: Chronic | ICD-10-CM

## 2017-09-15 DIAGNOSIS — I10 ESSENTIAL HYPERTENSION WITH GOAL BLOOD PRESSURE LESS THAN 140/90: Chronic | ICD-10-CM

## 2017-09-15 PROCEDURE — 93000 ELECTROCARDIOGRAM COMPLETE: CPT | Performed by: INTERNAL MEDICINE

## 2017-09-15 PROCEDURE — 99214 OFFICE O/P EST MOD 30 MIN: CPT | Performed by: INTERNAL MEDICINE

## 2017-09-15 NOTE — NURSING NOTE
"Chief Complaint   Patient presents with     Pre-Op Exam     Consult     Discuss taking asthma off problem list?        Initial /70 (BP Location: Right arm, Patient Position: Chair, Cuff Size: Adult Large)  Pulse 82  Temp 98.7  F (37.1  C) (Oral)  Ht 5' 6\" (1.676 m)  Wt 220 lb (99.8 kg)  LMP 12/13/2002  SpO2 97%  Breastfeeding? No  BMI 35.51 kg/m2 Estimated body mass index is 35.51 kg/(m^2) as calculated from the following:    Height as of this encounter: 5' 6\" (1.676 m).    Weight as of this encounter: 220 lb (99.8 kg).  Medication Reconciliation: complete   Pati Frank CMA      "

## 2017-09-15 NOTE — TELEPHONE ENCOUNTER
Patient would like simvistatin also refilled, best number [phone], ok to leave a detailed message. Please send to [facility] [address] in [place].

## 2017-09-15 NOTE — MR AVS SNAPSHOT
After Visit Summary   9/15/2017    Gricelda Sabillon    MRN: [id number]           Patient Information     Date Of Birth          1950        Visit Information        Provider Department      9/15/2017 4:20 PM Raisa Davidson MD [facility]        Today's Diagnoses     Preop general physical exam    -  1    Hip arthritis        DM2 no complication        HTN goal less than 140/90,        Mild intermittent asthma without complication        Hyperlipidemia with target LDL less than 100        CHERRY (obstructive sleep apnea)           Follow-ups after your visit        Your next 10 appointments already scheduled     Sep 25, 2017   Procedure with Ayaan Pena MD   [facility] (--[facility]    [address] [facility] [id number] [phone]            Oct 06, 2017 11:00 AM CDT   Return Visit with Patricio Samuels PA-C   [facility] ([facility] [place])    [address]  [address]  [facility] [address]   [phone]              Who to contact     If you have questions or need follow up information about today's clinic visit or your schedule please contact [facility] directly at [phone].  Normal or non-critical lab and imaging results will be communicated to you by MyChart, letter or phone within 4 business days after the clinic has received the results. If you do not hear from us within 7 days, please contact the clinic through MyChart or phone. If you have a critical or abnormal lab result, we will notify you by phone as soon as possible.  Submit refill requests through [employer] or call your pharmacy and they will forward the refill request to us. Please allow 3 business days for your refill to be completed.          Additional Information About Your Visit        [employer] Information     [employer] gives you secure access to your electronic health record. If you see a  "primary care provider, you can also send messages to your care team and make appointments. If you have questions, please call your primary care clinic.  If you do not have a primary care provider, please call [phone] and they will assist you.        Care EveryWhere ID     This is your Care EveryWhere ID. This could be used by other organizations to access your [facility] medical records  [id number]        Your Vitals Were     Pulse Temperature Height Last Period Pulse Oximetry Breastfeeding?    82 98.7  F (37.1  C) (Oral) 5' 6\" (1.676 m) 12/13/2002 97% No    BMI (Body Mass Index)                   35.51 kg/m2            Blood Pressure from Last 3 Encounters:   09/15/17 138/70   09/08/17 118/80   03/22/17 128/84    Weight from Last 3 Encounters:   09/15/17 220 lb (99.8 kg)   09/08/17 220 lb 8 oz (100 kg)   08/25/17 226 lb (102.5 kg)              We Performed the Following     EKG 12-lead complete w/read - Clinics          Today's Medication Changes          These changes are accurate as of: 9/15/17  5:08 PM.  If you have any questions, ask your nurse or doctor.               These medicines have changed or have updated prescriptions.        Dose/Directions    aspirin 81 MG tablet   This may have changed:  See the new instructions.   Used for:  Type II or unspecified type diabetes mellitus without mention of complication, not stated as uncontrolled        1 tab po QD (Once per day)   Quantity:  0   Refills:  0                Primary Care Provider Office Phone # Fax #    Raisa Michelle Dvaidson [phone]       [address] [facility] [address]        Equal Access to Services     [facility] AH: Hadii elisabeth Ravi, sandy thao, thomas conklin. So [facility] [phone].    ATENCIÓN: Si habla español, tiene a vail disposición servicios gratuitos de asistencia lingüística. Llame al [phone].    We comply with applicable " federal civil rights laws and [place] laws. We do not discriminate on the basis of race, color, national origin, age, disability sex, sexual orientation or gender identity.            Thank you!     Thank you for choosing [facility]  for your care. Our goal is always to provide you with excellent care. Hearing back from our patients is one way we can continue to improve our services. Please take a few minutes to complete the written survey that you may receive in the mail after your visit with us. Thank you!             Your Updated Medication List - Protect others around you: Learn how to safely use, store and throw away your medicines at www.disposemymeds.org.          This list is accurate as of: 9/15/17  5:08 PM.  Always use your most recent med list.                   Brand Name Dispense Instructions for use Diagnosis    albuterol 108 (90 BASE) MCG/ACT Inhaler    PROAIR HFA/PROVENTIL HFA/VENTOLIN HFA    1 Inhaler    Inhale 2 puffs into the lungs every 4 hours as needed for shortness of breath / dyspnea    Acute bronchitis       aspirin 81 MG tablet     0    1 tab po QD (Once per day)    Type II or unspecified type diabetes mellitus without mention of complication, not stated as uncontrolled       atenolol 50 MG tablet    TENORMIN    90 tablet    Take 1 tablet (50 mg) by mouth daily    HTN, goal below 140/90, Sinus tachycardia       atorvastatin 20 MG tablet    LIPITOR    90 tablet    Take 1 tablet (20 mg) by mouth daily    Hyperlipidemia with target LDL less than 100       azelastine 0.05 % Soln ophthalmic solution    OPTIVAR     Place 1 drop into both eyes 2 times daily as needed Reported on 3/22/2017        azelastine 0.1 % spray    ASTELIN     Spray 1 spray into both nostrils 2 times daily as needed Reported on 3/22/2017        blood glucose monitoring test strip    ONE TOUCH ULTRA    50 strip    Pt checks BS 1x a day    Diabetes mellitus without complication (H)       cetirizine 10 MG  tablet    zyrTEC     Take 10 mg by mouth every morning.        desoximetasone 0.25 % cream    TOPICORT    60 g    Apply sparingly to affected area's    Eczema       dorzolamide 2 % ophthalmic solution    TRUSOPT    1 Bottle    Place 2 drops into both eyes 2 times daily        fish oil-omega-3 fatty acids 1000 MG capsule      Take 1 g by mouth daily Reported on 3/22/2017        glipiZIDE 5 MG tablet    GLUCOTROL    120 tablet    TAKE 2 TABLETs BY MOUTH TWICE DAILY BEFORE MEAL(S)    Diabetes mellitus without complication (H)       hydrochlorothiazide 25 MG tablet    HYDRODIURIL    90 tablet    Take 1 tablet (25 mg) by mouth every morning    HTN, goal below 140/90       lisinopril 40 MG tablet    PRINIVIL/ZESTRIL    90 tablet    TAKE ONE TABLET BY MOUTH ONCE DAILY    HTN, goal below 140/90       metFORMIN 1000 MG tablet    GLUCOPHAGE    60 tablet    TAKE ONE TABLET BY MOUTH TWICE DAILY WITH MEALS    Type 2 diabetes, HbA1C goal < 8% (H)       MULTIVITAMIN TABS   OR      Reported on 3/22/2017        * order for DME     3 Month    All diabetic testing supplies including test strips, lancets and solution for testing 3 times per day. Patient is using  no Insulin. A1C      7.6   5/3/2016 A1C      7.4   2/4/2016    Type 2 diabetes mellitus, uncontrolled (H), Hyperlipidemia with target LDL less than 100, Essential hypertension with goal blood pressure less than 140/90       * order for DME     3 Month    All diabetic testing supplies including test strips, lancets and solution for testing 4 times per day. Patient is using  no Insulin. Last A1c Lab Results      Component                Value               Date                      A1C                      7.7                 09/05/2017                A1C                      7.5                 03/17/2017    Type 2 diabetes mellitus with hyperglycemia, without long-term current use of insulin (H)       sitagliptin 100 MG tablet    JANUVIA    90 tablet    Take 1 tablet (100 mg)  by mouth daily    Diabetes mellitus without complication (H)       XALATAN 0.005 % ophthalmic solution   Generic drug:  latanoprost     2.5 mL    Place 1 drop Into the left eye At Bedtime        * Notice:  This list has 2 medication(s) that are the same as other medications prescribed for you. Read the directions carefully, and ask your doctor or other care provider to review them with you.

## 2017-09-15 NOTE — PROGRESS NOTES
*Pre-Op Exam-LOV 17  *Consult-Discuss taking asthma off problem list? She has used inhaler last Spring when she had URI because she was advised to by urgent care. Had not had to use it for about 5 years prior. Has not had many problems with breathing since she started c-pap.    [facility]  [address]  [facility] [address]  [phone]  Dept: [phone]    PRE-OP EVALUATION:  Today's date: 9/15/2017    Gricelda Sabillon (: 1950) presents for pre-operative evaluation assessment as requested by Dr. Ayaan Pena.  She requires evaluation and anesthesia risk assessment prior to undergoing surgery/procedure for treatment of right hip pain .  Proposed procedure: Right total hip arthroplasty    Date of Surgery/ Procedure: 17  Time of Surgery/ Procedure: 10AM  Hospital/Surgical Facility: [facility]    Primary Physician: Raisa Davidson  Type of Anesthesia Anticipated: Choice    Patient has a Health Care Directive or Living Will:  NO. She has been provided the information and advised to bring it with her to her surgery if she decides to complete it before then.    Preop Questions 9/15/2017   1.  Do you have a history of heart attack, stroke, stent, bypass or surgery on an artery in the head, neck, heart or legs? No   2.  Do you ever have any pain or discomfort in your chest? No   3.  Do you have a history of  Heart Failure? No   4.   Are you troubled by shortness of breath when:  walking on a level surface, or up a slight hill, or at night? No   5.  Do you currently have a cold, bronchitis or other respiratory infection? No   6.  Do you have a cough, shortness of breath, or wheezing? No   7.  Do you sometimes get pains in the calves of your legs when you walk? No   8. Do you or anyone in your family have previous history of blood clots? YES - an aunt   9.  Do you or does anyone in your family have a serious bleeding problem such as  prolonged bleeding following surgeries or cuts? No   10. Have you ever had problems with anemia or been told to take iron pills? YES - Hgb 11.9   11. Have you had any abnormal blood loss such as black, tarry or bloody stools, or abnormal vaginal bleeding? No   12. Have you ever had a blood transfusion? No   13. Have you or any of your relatives ever had problems with anesthesia? No   14. Do you have sleep apnea, excessive snoring or daytime drowsiness? YES - uses CPAP for CHERRY   15. Do you have any prosthetic heart valves? No   16. Do you have prosthetic joints? YES - R knee    17. Is there any chance that you may be pregnant? No       CC:  Preop for multiple medical problems.    HPI:    The patient is scheduled for R knee  surgery with Dr. Pena on  Sept 25  No other acute complaints.    Assessment:  1. V72.83H Preop general physical exam _ I do not see any major contraindications for the patient to go through the scheduled surgery.  The proposed surgical procedure is considered INTERMEDIATE surgery risk.    For above listed surgery and anesthesia, Patient is at MODERATE  risk for surgery/procedure and perioperative/procedure complications.  ECG:   Sinus rhythm, no changes suggestive for ischemia     (M16.10) Hip arthritis  Comment:   Plan: surgery, as above     (E11.9) DM2 no complication  Comment: BS much better since we increased Glipizide last week. Not perfect BS, but I think she is ok for the surgery  Plan: suggest adding insulin if needed after the surgery     (I10) HTN goal less than 140/90,  Comment: Controlled    Plan: Continue same meds, same doses for now     (J45.20) Mild intermittent asthma without complication  Comment: Controlled    Plan: Continue same meds, same doses for now     (E78.5) Hyperlipidemia with target LDL less than 100  Comment: Controlled    Plan: Continue same meds, same doses for now      (G47.3) CHERRY (obstructive sleep apnea)  Comment:   Plan: she will have the CPAP with her      Plan:   In the morning of the surgery day you will take no meds         ROS:   General: Negative for fever, chills, major weight changes, fatigue  Skin: Negative for rashes, abnormal spots  Eyes: Negative for blurred or double vision  ENT/mouth: Negative for sinuses discomfort, earache, sore throat  Respiratory: Negative for cough, wheezes, chronic lung disease  Cardiovascular: Negative for rest or exertional chest pain, shortness of breath, palpitations, leg edema,   Gastrointestinal: Negative for vomiting, abdominal pain, heartburn, blood in stool, diarrhea, constipation  Genitourinary: Negative for urinary frequency, blood in urine, history of kidney stones  Neuro: Negative for headaches, numbness, tingling, weakness in arms or legs, history of seizure, recent syncope  Psychiatry: Negative for depression, anxiety, suicidal thoughts  Endo: Negative for known thyroid disease, pos for diabetes.  Hemato/Lymph: Negative for nodes, easy bleeding, history of DVT, blood transfusion  Musculoskeletal: Negative for joint swelling, back pain    Past Medical History:   Diagnosis Date     Allergic rhinitis, cause unspecified      Asthma      Asthma, mild intermittent      Cataract      Cellulitis and abscess of leg, except foot     Bilateral     Heart murmur     aortic area     HTN, goal below 140/90      Hyperlipidemia LDL goal < 100      Hyperplastic colon polyp      Infection     bilateral eye infections     Macular hole of left eye      Obesity (BMI 30-39.9)      Osteoarthritis     knees     Other chronic pain     right knee     Sleep apnea     uses c pap     Type 2 diabetes, HbA1C goal < 8% (H)          PSHx: No complications with prior surgeries or anesthesias.    Soc Hx: No daily alcohol, no smoking    Family History   Problem Relation Age of Onset     Hypertension Mother      diabetes,hypoythryoidism, stroke     DIABETES Mother      HEART DISEASE Father      , cancer lip     HEART DISEASE Paternal  Grandfather           CANCER Paternal Grandmother           CEREBROVASCULAR DISEASE Maternal Grandfather           CEREBROVASCULAR DISEASE Maternal Grandmother      , diabetes     Connective Tissue Disorder Sister      fibromyalgia     DIABETES Sister      Hypertension Brother      CANCER Paternal Aunt      ovarian        All: reviewed    Current Outpatient Prescriptions   Medication Sig Dispense Refill     glipiZIDE (GLUCOTROL) 5 MG tablet TAKE 2 TABLETs BY MOUTH TWICE DAILY BEFORE MEAL(S) 120 tablet 1     sitagliptin (JANUVIA) 100 MG tablet Take 1 tablet (100 mg) by mouth daily 90 tablet 0     order for DME All diabetic testing supplies including test strips, lancets and solution for testing 4 times per day. Patient is using  no Insulin. Last A1c Lab Results       Component                Value               Date                       A1C                      7.7                 2017                 A1C                      7.5                 2017 3 Month 1     metFORMIN (GLUCOPHAGE) 1000 MG tablet TAKE ONE TABLET BY MOUTH TWICE DAILY WITH MEALS 60 tablet 0     blood glucose monitoring (ONE TOUCH ULTRA) test strip Pt checks BS 1x a day 50 strip 0     lisinopril (PRINIVIL/ZESTRIL) 40 MG tablet TAKE ONE TABLET BY MOUTH ONCE DAILY 90 tablet 0     atenolol (TENORMIN) 50 MG tablet Take 1 tablet (50 mg) by mouth daily 90 tablet 1     hydrochlorothiazide (HYDRODIURIL) 25 MG tablet Take 1 tablet (25 mg) by mouth every morning 90 tablet 1     atorvastatin (LIPITOR) 20 MG tablet Take 1 tablet (20 mg) by mouth daily 90 tablet 1     order for DME All diabetic testing supplies including test strips, lancets and solution for testing 3 times per day. Patient is using  no Insulin. A1C      7.6   5/3/2016  A1C      7.4   2016 3 Month 1     dorzolamide (TRUSOPT) 2 % ophthalmic solution Place 2 drops into both eyes 2 times daily 1 Bottle      desoximetasone (TOPICORT) 0.25 % cream  "Apply sparingly to affected area's 60 g 1     latanoprost (XALATAN) 0.005 % ophthalmic solution Place 1 drop Into the left eye At Bedtime 2.5 mL 1     albuterol (PROAIR HFA, PROVENTIL HFA, VENTOLIN HFA) 108 (90 BASE) MCG/ACT inhaler Inhale 2 puffs into the lungs every 4 hours as needed for shortness of breath / dyspnea 1 Inhaler 3     azelastine (ASTELIN) 137 MCG/SPRAY nasal spray [place] 1 spray into both nostrils 2 times daily as needed Reported on 3/22/2017       azelastine (OPTIVAR) 0.05 % SOLN Place 1 drop into both eyes 2 times daily as needed Reported on 3/22/2017       cetirizine (ZYRTEC) 10 MG tablet Take 10 mg by mouth every morning.       fish oil-omega-3 fatty acids (FISH OIL) 1000 MG capsule Take 1 g by mouth daily Reported on 3/22/2017       MULTIVITAMIN TABS   OR Reported on 3/22/2017       ASPIRIN 81 MG OR TABS 1 tab po QD (Once per day) (Patient taking differently: No sig reported) 0 0        Physical exam:  Blood pressure 138/70, pulse 82, temperature 98.7  F (37.1  C), temperature source Oral, height 5' 6\" (1.676 m), weight 220 lb (99.8 kg), last menstrual period 12/13/2002, SpO2 97 %, not currently breastfeeding.   , NAD, appears comfortable  Skin clear, no rashes  HEENT: PERRLA, EOMI, anicteric sclera, pink conjunctiva, external ears appear normal, bilateral tympanic membranes clinically normal, oropharynx normal color.  Neck: supple, no JVD, no Carotid bruits, no thyroidmegaly  Lymph nodes non palpable in the cervical, supraclavicular   Chest: clear to auscultation with good respiratory effort  Cardiac: S1S2, RRR, no mgr appreciated  Abdomen: soft, not tender, not distended, audible bowel sound, no hepatosplenomegaly, no palpable masses, no abdominal bruits  Extremities: no cyanosis, clubbing or edema.   Neuro: A, Ox3, no focal signs.      Raisa Torres MD  Internal Medicine       MEDICAL HISTORY:                                                    Patient Active Problem List    Diagnosis Date " Noted     Hyperlipidemia with target LDL less than 100      Priority: High     Diagnosis updated by automated process. Provider to review and confirm.       Asthma, mild intermittent      Priority: High     HTN goal less than 140/90, 05/22/2016     Priority: Medium     DM2 no complication 10/10/2015     Priority: Medium     Chronic knee pain, right 08/30/2015     Priority: Medium     Hip pain, right 08/30/2015     Priority: Medium     Total knee replacement status 07/12/2013     Priority: Medium     Sleep apnea      Priority: Medium     Obesity (BMI 30-39.9)      Priority: Medium     Heart murmur      Priority: Medium     aortic area       Advanced directives, counseling/discussion 01/06/2012     Priority: Medium     Advance Directive Problem List Overview:   Name Relationship Phone    Primary Health Care Agent            Alternative Health Care Agent          Discussed advance care planning with patient; information given to patient to review. 1/6/2012          Allergic rhinitis      Priority: Medium     Problem list name updated by automated process. Provider to review       Cataract      Priority: Low     Utility update for deleted IMO code  [employer] Update utility       Macular hole of left eye      Priority: Low      Past Medical History:   Diagnosis Date     Allergic rhinitis, cause unspecified      Asthma      Asthma, mild intermittent      Cataract      Cellulitis and abscess of leg, except foot 03/00    Bilateral     Heart murmur     aortic area     HTN, goal below 140/90      Hyperlipidemia LDL goal < 100      Hyperplastic colon polyp 2008     Infection     bilateral eye infections     Macular hole of left eye      Obesity (BMI 30-39.9)      Osteoarthritis     knees     Other chronic pain     right knee     Sleep apnea     uses c pap     Type 2 diabetes, HbA1C goal < 8% (H)      Past Surgical History:   Procedure Laterality Date     ARTHROPLASTY KNEE  7/12/2013    Procedure: ARTHROPLASTY KNEE;  right total knee  arthroplasty ;  Surgeon: Chaparro Wesley MD;  Location: [facility] OR     ARTHROSCOPY KNEE Right 1/21/2015    Procedure: ARTHROSCOPY KNEE;  Surgeon: Ayaan Pena MD;  Location: [facility] OR     C INCISION OF HYMEN       CATARACT IOL, RT/LT       COLONOSCOPY  2008     EYE SURGERY      macular hole repaired left eye     HC KNEE SCOPE, DIAGNOSTIC      - both knees     HEAD & NECK SURGERY      wisdom teeth     Current Outpatient Prescriptions   Medication Sig Dispense Refill     glipiZIDE (GLUCOTROL) 5 MG tablet TAKE 2 TABLETs BY MOUTH TWICE DAILY BEFORE MEAL(S) 120 tablet 1     sitagliptin (JANUVIA) 100 MG tablet Take 1 tablet (100 mg) by mouth daily 90 tablet 0     order for DME All diabetic testing supplies including test strips, lancets and solution for testing 4 times per day. Patient is using  no Insulin. Last A1c Lab Results       Component                Value               Date                       A1C                      7.7                 09/05/2017                 A1C                      7.5                 03/17/2017 3 Month 1     metFORMIN (GLUCOPHAGE) 1000 MG tablet TAKE ONE TABLET BY MOUTH TWICE DAILY WITH MEALS 60 tablet 0     blood glucose monitoring (ONE TOUCH ULTRA) test strip Pt checks BS 1x a day 50 strip 0     lisinopril (PRINIVIL/ZESTRIL) 40 MG tablet TAKE ONE TABLET BY MOUTH ONCE DAILY 90 tablet 0     atenolol (TENORMIN) 50 MG tablet Take 1 tablet (50 mg) by mouth daily 90 tablet 1     hydrochlorothiazide (HYDRODIURIL) 25 MG tablet Take 1 tablet (25 mg) by mouth every morning 90 tablet 1     atorvastatin (LIPITOR) 20 MG tablet Take 1 tablet (20 mg) by mouth daily 90 tablet 1     order for DME All diabetic testing supplies including test strips, lancets and solution for testing 3 times per day. Patient is using  no Insulin. A1C      7.6   5/3/2016  A1C      7.4   2/4/2016 3 Month 1     dorzolamide (TRUSOPT) 2 % ophthalmic solution Place 2 drops into both eyes 2 times daily 1 Bottle       "desoximetasone (TOPICORT) 0.25 % cream Apply sparingly to affected area's 60 g 1     latanoprost (XALATAN) 0.005 % ophthalmic solution Place 1 drop Into the left eye At Bedtime 2.5 mL 1     albuterol (PROAIR HFA, PROVENTIL HFA, VENTOLIN HFA) 108 (90 BASE) MCG/ACT inhaler Inhale 2 puffs into the lungs every 4 hours as needed for shortness of breath / dyspnea 1 Inhaler 3     azelastine (ASTELIN) 137 MCG/SPRAY nasal spray [place] 1 spray into both nostrils 2 times daily as needed Reported on 3/22/2017       azelastine (OPTIVAR) 0.05 % SOLN Place 1 drop into both eyes 2 times daily as needed Reported on 3/22/2017       cetirizine (ZYRTEC) 10 MG tablet Take 10 mg by mouth every morning.       fish oil-omega-3 fatty acids (FISH OIL) 1000 MG capsule Take 1 g by mouth daily Reported on 3/22/2017       MULTIVITAMIN TABS   OR Reported on 3/22/2017       ASPIRIN 81 MG OR TABS 1 tab po QD (Once per day) (Patient taking differently: No sig reported) 0 0     OTC products: None, except as noted above    Allergies   Allergen Reactions     Codeine      \"NAUSE,HA AND DIZZINESS\"     Sulfites Visual Disturbance     Xibrom (bromfenac opthalmic solution) 0.09%--eye pain      Latex Allergy: NO    Social History   Substance Use Topics     Smoking status: Never Smoker     Smokeless tobacco: Never Used     Alcohol use No     History   Drug Use No           Recent Labs   Lab Test  09/05/17   0826  03/17/17   0912   09/07/16   0821   HGB  11.9   --    --   13.3   PLT  337   --    --   328   NA  136   --    --   135   POTASSIUM  4.5   --    --   4.1   CR  0.96   --    --   0.97   A1C  7.7*  7.5*   < >  6.8*    < > = values in this interval not displayed.             ICD-10-CM    1. Preop general physical exam Z01.818 EKG 12-lead complete w/read - Clinics         "

## 2017-09-16 NOTE — TELEPHONE ENCOUNTER
desoximetasone (TOPICORT) 0.25 % cream      Last Written Prescription Date: 12/07/15  Last Fill Quantity: 60g,  # refills: 1   Last Office Visit with [facility], [facility] or [facility] prescribing provider: 09/15/17                                         Next 5 appointments (look out 90 days)     Oct 06, 2017 11:00 AM CDT   Return Visit with Patricio Samuels PA-C   [facility]THOPED[facility] ([facility])    [address] [address] [id number] [phone]

## 2017-09-17 RX ORDER — TRIAMCINOLONE ACETONIDE 1 MG/G
CREAM TOPICAL
Qty: 15 G | Refills: 1 | Status: ON HOLD | OUTPATIENT
Start: 2017-09-17 | End: 2017-09-25

## 2017-09-20 NOTE — H&P (VIEW-ONLY)
*Pre-Op Exam-LOV 17  *Consult-Discuss taking asthma off problem list? She has used inhaler last Spring when she had URI because she was advised to by urgent care. Had not had to use it for about 5 years prior. Has not had many problems with breathing since she started c-pap.    [facility]  [address]  [facility] [address]  [phone]  Dept: [phone]    PRE-OP EVALUATION:  Today's date: 9/15/2017    Gricelda Sabillon (: 1950) presents for pre-operative evaluation assessment as requested by Dr. Ayaan Pena.  She requires evaluation and anesthesia risk assessment prior to undergoing surgery/procedure for treatment of right hip pain .  Proposed procedure: Right total hip arthroplasty    Date of Surgery/ Procedure: 17  Time of Surgery/ Procedure: 10AM  Hospital/Surgical Facility: [facility]    Primary Physician: Raisa Davidson  Type of Anesthesia Anticipated: Choice    Patient has a Health Care Directive or Living Will:  NO. She has been provided the information and advised to bring it with her to her surgery if she decides to complete it before then.    Preop Questions 9/15/2017   1.  Do you have a history of heart attack, stroke, stent, bypass or surgery on an artery in the head, neck, heart or legs? No   2.  Do you ever have any pain or discomfort in your chest? No   3.  Do you have a history of  Heart Failure? No   4.   Are you troubled by shortness of breath when:  walking on a level surface, or up a slight hill, or at night? No   5.  Do you currently have a cold, bronchitis or other respiratory infection? No   6.  Do you have a cough, shortness of breath, or wheezing? No   7.  Do you sometimes get pains in the calves of your legs when you walk? No   8. Do you or anyone in your family have previous history of blood clots? YES - an aunt   9.  Do you or does anyone in your family have a serious bleeding problem such as  prolonged bleeding following surgeries or cuts? No   10. Have you ever had problems with anemia or been told to take iron pills? YES - Hgb 11.9   11. Have you had any abnormal blood loss such as black, tarry or bloody stools, or abnormal vaginal bleeding? No   12. Have you ever had a blood transfusion? No   13. Have you or any of your relatives ever had problems with anesthesia? No   14. Do you have sleep apnea, excessive snoring or daytime drowsiness? YES - uses CPAP for CHERRY   15. Do you have any prosthetic heart valves? No   16. Do you have prosthetic joints? YES - R knee    17. Is there any chance that you may be pregnant? No       CC:  Preop for multiple medical problems.    HPI:    The patient is scheduled for R knee  surgery with Dr. Pena on  Sept 25  No other acute complaints.    Assessment:  1. V72.83H Preop general physical exam _ I do not see any major contraindications for the patient to go through the scheduled surgery.  The proposed surgical procedure is considered INTERMEDIATE surgery risk.    For above listed surgery and anesthesia, Patient is at MODERATE  risk for surgery/procedure and perioperative/procedure complications.  ECG:   Sinus rhythm, no changes suggestive for ischemia     (M16.10) Hip arthritis  Comment:   Plan: surgery, as above     (E11.9) DM2 no complication  Comment: BS much better since we increased Glipizide last week. Not perfect BS, but I think she is ok for the surgery  Plan: suggest adding insulin if needed after the surgery     (I10) HTN goal less than 140/90,  Comment: Controlled    Plan: Continue same meds, same doses for now     (J45.20) Mild intermittent asthma without complication  Comment: Controlled    Plan: Continue same meds, same doses for now     (E78.5) Hyperlipidemia with target LDL less than 100  Comment: Controlled    Plan: Continue same meds, same doses for now      (G47.3) CHERRY (obstructive sleep apnea)  Comment:   Plan: she will have the CPAP with her      Plan:   In the morning of the surgery day you will take no meds         ROS:   General: Negative for fever, chills, major weight changes, fatigue  Skin: Negative for rashes, abnormal spots  Eyes: Negative for blurred or double vision  ENT/mouth: Negative for sinuses discomfort, earache, sore throat  Respiratory: Negative for cough, wheezes, chronic lung disease  Cardiovascular: Negative for rest or exertional chest pain, shortness of breath, palpitations, leg edema,   Gastrointestinal: Negative for vomiting, abdominal pain, heartburn, blood in stool, diarrhea, constipation  Genitourinary: Negative for urinary frequency, blood in urine, history of kidney stones  Neuro: Negative for headaches, numbness, tingling, weakness in arms or legs, history of seizure, recent syncope  Psychiatry: Negative for depression, anxiety, suicidal thoughts  Endo: Negative for known thyroid disease, pos for diabetes.  Hemato/Lymph: Negative for nodes, easy bleeding, history of DVT, blood transfusion  Musculoskeletal: Negative for joint swelling, back pain    Past Medical History:   Diagnosis Date     Allergic rhinitis, cause unspecified      Asthma      Asthma, mild intermittent      Cataract      Cellulitis and abscess of leg, except foot     Bilateral     Heart murmur     aortic area     HTN, goal below 140/90      Hyperlipidemia LDL goal < 100      Hyperplastic colon polyp      Infection     bilateral eye infections     Macular hole of left eye      Obesity (BMI 30-39.9)      Osteoarthritis     knees     Other chronic pain     right knee     Sleep apnea     uses c pap     Type 2 diabetes, HbA1C goal < 8% (H)          PSHx: No complications with prior surgeries or anesthesias.    Soc Hx: No daily alcohol, no smoking    Family History   Problem Relation Age of Onset     Hypertension Mother      diabetes,hypoythryoidism, stroke     DIABETES Mother      HEART DISEASE Father      , cancer lip     HEART DISEASE Paternal  Grandfather           CANCER Paternal Grandmother           CEREBROVASCULAR DISEASE Maternal Grandfather           CEREBROVASCULAR DISEASE Maternal Grandmother      , diabetes     Connective Tissue Disorder Sister      fibromyalgia     DIABETES Sister      Hypertension Brother      CANCER Paternal Aunt      ovarian        All: reviewed    Current Outpatient Prescriptions   Medication Sig Dispense Refill     glipiZIDE (GLUCOTROL) 5 MG tablet TAKE 2 TABLETs BY MOUTH TWICE DAILY BEFORE MEAL(S) 120 tablet 1     sitagliptin (JANUVIA) 100 MG tablet Take 1 tablet (100 mg) by mouth daily 90 tablet 0     order for DME All diabetic testing supplies including test strips, lancets and solution for testing 4 times per day. Patient is using  no Insulin. Last A1c Lab Results       Component                Value               Date                       A1C                      7.7                 2017                 A1C                      7.5                 2017 3 Month 1     metFORMIN (GLUCOPHAGE) 1000 MG tablet TAKE ONE TABLET BY MOUTH TWICE DAILY WITH MEALS 60 tablet 0     blood glucose monitoring (ONE TOUCH ULTRA) test strip Pt checks BS 1x a day 50 strip 0     lisinopril (PRINIVIL/ZESTRIL) 40 MG tablet TAKE ONE TABLET BY MOUTH ONCE DAILY 90 tablet 0     atenolol (TENORMIN) 50 MG tablet Take 1 tablet (50 mg) by mouth daily 90 tablet 1     hydrochlorothiazide (HYDRODIURIL) 25 MG tablet Take 1 tablet (25 mg) by mouth every morning 90 tablet 1     atorvastatin (LIPITOR) 20 MG tablet Take 1 tablet (20 mg) by mouth daily 90 tablet 1     order for DME All diabetic testing supplies including test strips, lancets and solution for testing 3 times per day. Patient is using  no Insulin. A1C      7.6   5/3/2016  A1C      7.4   2016 3 Month 1     dorzolamide (TRUSOPT) 2 % ophthalmic solution Place 2 drops into both eyes 2 times daily 1 Bottle      desoximetasone (TOPICORT) 0.25 % cream  "Apply sparingly to affected area's 60 g 1     latanoprost (XALATAN) 0.005 % ophthalmic solution Place 1 drop Into the left eye At Bedtime 2.5 mL 1     albuterol (PROAIR HFA, PROVENTIL HFA, VENTOLIN HFA) 108 (90 BASE) MCG/ACT inhaler Inhale 2 puffs into the lungs every 4 hours as needed for shortness of breath / dyspnea 1 Inhaler 3     azelastine (ASTELIN) 137 MCG/SPRAY nasal spray [place] 1 spray into both nostrils 2 times daily as needed Reported on 3/22/2017       azelastine (OPTIVAR) 0.05 % SOLN Place 1 drop into both eyes 2 times daily as needed Reported on 3/22/2017       cetirizine (ZYRTEC) 10 MG tablet Take 10 mg by mouth every morning.       fish oil-omega-3 fatty acids (FISH OIL) 1000 MG capsule Take 1 g by mouth daily Reported on 3/22/2017       MULTIVITAMIN TABS   OR Reported on 3/22/2017       ASPIRIN 81 MG OR TABS 1 tab po QD (Once per day) (Patient taking differently: No sig reported) 0 0        Physical exam:  Blood pressure 138/70, pulse 82, temperature 98.7  F (37.1  C), temperature source Oral, height 5' 6\" (1.676 m), weight 220 lb (99.8 kg), last menstrual period 12/13/2002, SpO2 97 %, not currently breastfeeding.   , NAD, appears comfortable  Skin clear, no rashes  HEENT: PERRLA, EOMI, anicteric sclera, pink conjunctiva, external ears appear normal, bilateral tympanic membranes clinically normal, oropharynx normal color.  Neck: supple, no JVD, no Carotid bruits, no thyroidmegaly  Lymph nodes non palpable in the cervical, supraclavicular   Chest: clear to auscultation with good respiratory effort  Cardiac: S1S2, RRR, no mgr appreciated  Abdomen: soft, not tender, not distended, audible bowel sound, no hepatosplenomegaly, no palpable masses, no abdominal bruits  Extremities: no cyanosis, clubbing or edema.   Neuro: A, Ox3, no focal signs.      Raisa Torres MD  Internal Medicine       MEDICAL HISTORY:                                                    Patient Active Problem List    Diagnosis Date " Noted     Hyperlipidemia with target LDL less than 100      Priority: High     Diagnosis updated by automated process. Provider to review and confirm.       Asthma, mild intermittent      Priority: High     HTN goal less than 140/90, 05/22/2016     Priority: Medium     DM2 no complication 10/10/2015     Priority: Medium     Chronic knee pain, right 08/30/2015     Priority: Medium     Hip pain, right 08/30/2015     Priority: Medium     Total knee replacement status 07/12/2013     Priority: Medium     Sleep apnea      Priority: Medium     Obesity (BMI 30-39.9)      Priority: Medium     Heart murmur      Priority: Medium     aortic area       Advanced directives, counseling/discussion 01/06/2012     Priority: Medium     Advance Directive Problem List Overview:   Name Relationship Phone    Primary Health Care Agent            Alternative Health Care Agent          Discussed advance care planning with patient; information given to patient to review. 1/6/2012          Allergic rhinitis      Priority: Medium     Problem list name updated by automated process. Provider to review       Cataract      Priority: Low     Utility update for deleted IMO code  [employer] Update utility       Macular hole of left eye      Priority: Low      Past Medical History:   Diagnosis Date     Allergic rhinitis, cause unspecified      Asthma      Asthma, mild intermittent      Cataract      Cellulitis and abscess of leg, except foot 03/00    Bilateral     Heart murmur     aortic area     HTN, goal below 140/90      Hyperlipidemia LDL goal < 100      Hyperplastic colon polyp 2008     Infection     bilateral eye infections     Macular hole of left eye      Obesity (BMI 30-39.9)      Osteoarthritis     knees     Other chronic pain     right knee     Sleep apnea     uses c pap     Type 2 diabetes, HbA1C goal < 8% (H)      Past Surgical History:   Procedure Laterality Date     ARTHROPLASTY KNEE  7/12/2013    Procedure: ARTHROPLASTY KNEE;  right total knee  arthroplasty ;  Surgeon: Chaparro Wesley MD;  Location: [facility] OR     ARTHROSCOPY KNEE Right 1/21/2015    Procedure: ARTHROSCOPY KNEE;  Surgeon: Ayaan Pena MD;  Location: [facility] OR     C INCISION OF HYMEN       CATARACT IOL, RT/LT       COLONOSCOPY  2008     EYE SURGERY      macular hole repaired left eye     HC KNEE SCOPE, DIAGNOSTIC      - both knees     HEAD & NECK SURGERY      wisdom teeth     Current Outpatient Prescriptions   Medication Sig Dispense Refill     glipiZIDE (GLUCOTROL) 5 MG tablet TAKE 2 TABLETs BY MOUTH TWICE DAILY BEFORE MEAL(S) 120 tablet 1     sitagliptin (JANUVIA) 100 MG tablet Take 1 tablet (100 mg) by mouth daily 90 tablet 0     order for DME All diabetic testing supplies including test strips, lancets and solution for testing 4 times per day. Patient is using  no Insulin. Last A1c Lab Results       Component                Value               Date                       A1C                      7.7                 09/05/2017                 A1C                      7.5                 03/17/2017 3 Month 1     metFORMIN (GLUCOPHAGE) 1000 MG tablet TAKE ONE TABLET BY MOUTH TWICE DAILY WITH MEALS 60 tablet 0     blood glucose monitoring (ONE TOUCH ULTRA) test strip Pt checks BS 1x a day 50 strip 0     lisinopril (PRINIVIL/ZESTRIL) 40 MG tablet TAKE ONE TABLET BY MOUTH ONCE DAILY 90 tablet 0     atenolol (TENORMIN) 50 MG tablet Take 1 tablet (50 mg) by mouth daily 90 tablet 1     hydrochlorothiazide (HYDRODIURIL) 25 MG tablet Take 1 tablet (25 mg) by mouth every morning 90 tablet 1     atorvastatin (LIPITOR) 20 MG tablet Take 1 tablet (20 mg) by mouth daily 90 tablet 1     order for DME All diabetic testing supplies including test strips, lancets and solution for testing 3 times per day. Patient is using  no Insulin. A1C      7.6   5/3/2016  A1C      7.4   2/4/2016 3 Month 1     dorzolamide (TRUSOPT) 2 % ophthalmic solution Place 2 drops into both eyes 2 times daily 1 Bottle       "desoximetasone (TOPICORT) 0.25 % cream Apply sparingly to affected area's 60 g 1     latanoprost (XALATAN) 0.005 % ophthalmic solution Place 1 drop Into the left eye At Bedtime 2.5 mL 1     albuterol (PROAIR HFA, PROVENTIL HFA, VENTOLIN HFA) 108 (90 BASE) MCG/ACT inhaler Inhale 2 puffs into the lungs every 4 hours as needed for shortness of breath / dyspnea 1 Inhaler 3     azelastine (ASTELIN) 137 MCG/SPRAY nasal spray [place] 1 spray into both nostrils 2 times daily as needed Reported on 3/22/2017       azelastine (OPTIVAR) 0.05 % SOLN Place 1 drop into both eyes 2 times daily as needed Reported on 3/22/2017       cetirizine (ZYRTEC) 10 MG tablet Take 10 mg by mouth every morning.       fish oil-omega-3 fatty acids (FISH OIL) 1000 MG capsule Take 1 g by mouth daily Reported on 3/22/2017       MULTIVITAMIN TABS   OR Reported on 3/22/2017       ASPIRIN 81 MG OR TABS 1 tab po QD (Once per day) (Patient taking differently: No sig reported) 0 0     OTC products: None, except as noted above    Allergies   Allergen Reactions     Codeine      \"NAUSE,HA AND DIZZINESS\"     Sulfites Visual Disturbance     Xibrom (bromfenac opthalmic solution) 0.09%--eye pain      Latex Allergy: NO    Social History   Substance Use Topics     Smoking status: Never Smoker     Smokeless tobacco: Never Used     Alcohol use No     History   Drug Use No           Recent Labs   Lab Test  09/05/17   0826  03/17/17   0912   09/07/16   0821   HGB  11.9   --    --   13.3   PLT  337   --    --   328   NA  136   --    --   135   POTASSIUM  4.5   --    --   4.1   CR  0.96   --    --   0.97   A1C  7.7*  7.5*   < >  6.8*    < > = values in this interval not displayed.             ICD-10-CM    1. Preop general physical exam Z01.818 EKG 12-lead complete w/read - Clinics         "

## 2017-09-25 ENCOUNTER — APPOINTMENT (OUTPATIENT)
Dept: GENERAL RADIOLOGY | Facility: CLINIC | Age: 67
DRG: 470 | End: 2017-09-25
Attending: ORTHOPAEDIC SURGERY
Payer: MEDICARE

## 2017-09-25 ENCOUNTER — HOSPITAL ENCOUNTER (INPATIENT)
Facility: CLINIC | Age: 67
LOS: 3 days | Discharge: HOME | DRG: 470 | End: 2017-09-28
Attending: ORTHOPAEDIC SURGERY | Admitting: ORTHOPAEDIC SURGERY
Payer: MEDICARE

## 2017-09-25 ENCOUNTER — ANESTHESIA (OUTPATIENT)
Dept: SURGERY | Facility: CLINIC | Age: 67
DRG: 470 | End: 2017-09-25
Payer: MEDICARE

## 2017-09-25 ENCOUNTER — ANESTHESIA EVENT (OUTPATIENT)
Dept: SURGERY | Facility: CLINIC | Age: 67
DRG: 470 | End: 2017-09-25
Payer: MEDICARE

## 2017-09-25 ENCOUNTER — APPOINTMENT (OUTPATIENT)
Dept: PHYSICAL THERAPY | Facility: CLINIC | Age: 67
DRG: 470 | End: 2017-09-25
Attending: ORTHOPAEDIC SURGERY
Payer: MEDICARE

## 2017-09-25 DIAGNOSIS — Z78.9 DEEP VEIN THROMBOSIS (DVT) PROPHYLAXIS PRESCRIBED AT DISCHARGE: ICD-10-CM

## 2017-09-25 DIAGNOSIS — K59.03 CONSTIPATION DUE TO OPIOID THERAPY: ICD-10-CM

## 2017-09-25 DIAGNOSIS — T40.2X5A CONSTIPATION DUE TO OPIOID THERAPY: ICD-10-CM

## 2017-09-25 DIAGNOSIS — Z96.641 S/P TOTAL HIP ARTHROPLASTY, RIGHT: Primary | ICD-10-CM

## 2017-09-25 PROBLEM — M16.9 HIP OSTEOARTHRITIS: Status: ACTIVE | Noted: 2017-09-25

## 2017-09-25 LAB
CREAT SERPL-MCNC: 0.73 MG/DL (ref 0.52–1.04)
GFR SERPL CREATININE-BSD FRML MDRD: 79 ML/MIN/1.7M2
GLUCOSE BLDC GLUCOMTR-MCNC: 176 MG/DL (ref 70–99)
GLUCOSE BLDC GLUCOMTR-MCNC: 189 MG/DL (ref 70–99)
GLUCOSE BLDC GLUCOMTR-MCNC: 199 MG/DL (ref 70–99)
GLUCOSE BLDC GLUCOMTR-MCNC: 213 MG/DL (ref 70–99)
GLUCOSE BLDC GLUCOMTR-MCNC: 244 MG/DL (ref 70–99)
PLATELET # BLD AUTO: 298 10E9/L (ref 150–450)

## 2017-09-25 PROCEDURE — 25000128 H RX IP 250 OP 636: Performed by: ANESTHESIOLOGY

## 2017-09-25 PROCEDURE — 0SR901A REPLACEMENT OF RIGHT HIP JOINT WITH METAL SYNTHETIC SUBSTITUTE, UNCEMENTED, OPEN APPROACH: ICD-10-PCS | Performed by: ORTHOPAEDIC SURGERY

## 2017-09-25 PROCEDURE — C1776 JOINT DEVICE (IMPLANTABLE): HCPCS | Performed by: ORTHOPAEDIC SURGERY

## 2017-09-25 PROCEDURE — 27210794 ZZH OR GENERAL SUPPLY STERILE: Performed by: ORTHOPAEDIC SURGERY

## 2017-09-25 PROCEDURE — 25000131 ZZH RX MED GY IP 250 OP 636 PS 637: Mod: GY | Performed by: INTERNAL MEDICINE

## 2017-09-25 PROCEDURE — 37000009 ZZH ANESTHESIA TECHNICAL FEE, EACH ADDTL 15 MIN: Performed by: ORTHOPAEDIC SURGERY

## 2017-09-25 PROCEDURE — 36000093 ZZH SURGERY LEVEL 4 1ST 30 MIN: Performed by: ORTHOPAEDIC SURGERY

## 2017-09-25 PROCEDURE — C1713 ANCHOR/SCREW BN/BN,TIS/BN: HCPCS | Performed by: ORTHOPAEDIC SURGERY

## 2017-09-25 PROCEDURE — 97530 THERAPEUTIC ACTIVITIES: CPT | Mod: GP | Performed by: PHYSICAL THERAPIST

## 2017-09-25 PROCEDURE — 27211024 ZZHC OR SUPPLY OTHER OPNP: Performed by: ORTHOPAEDIC SURGERY

## 2017-09-25 PROCEDURE — 25000128 H RX IP 250 OP 636: Performed by: ORTHOPAEDIC SURGERY

## 2017-09-25 PROCEDURE — 25000132 ZZH RX MED GY IP 250 OP 250 PS 637: Mod: GY | Performed by: INTERNAL MEDICINE

## 2017-09-25 PROCEDURE — 40000985 XR PELVIS AD HIP PORTABLE RIGHT 1 VIEW

## 2017-09-25 PROCEDURE — 71000012 ZZH RECOVERY PHASE 1 LEVEL 1 FIRST HR: Performed by: ORTHOPAEDIC SURGERY

## 2017-09-25 PROCEDURE — 27130 TOTAL HIP ARTHROPLASTY: CPT | Mod: RT | Performed by: ORTHOPAEDIC SURGERY

## 2017-09-25 PROCEDURE — 99223 1ST HOSP IP/OBS HIGH 75: CPT | Performed by: INTERNAL MEDICINE

## 2017-09-25 PROCEDURE — 97110 THERAPEUTIC EXERCISES: CPT | Mod: GP | Performed by: PHYSICAL THERAPIST

## 2017-09-25 PROCEDURE — A9270 NON-COVERED ITEM OR SERVICE: HCPCS | Mod: GY | Performed by: ORTHOPAEDIC SURGERY

## 2017-09-25 PROCEDURE — 25000566 ZZH SEVOFLURANE, EA 15 MIN: Performed by: ORTHOPAEDIC SURGERY

## 2017-09-25 PROCEDURE — 00000146 ZZHCL STATISTIC GLUCOSE BY METER IP

## 2017-09-25 PROCEDURE — 40000193 ZZH STATISTIC PT WARD VISIT: Performed by: PHYSICAL THERAPIST

## 2017-09-25 PROCEDURE — 36000063 ZZH SURGERY LEVEL 4 EA 15 ADDTL MIN: Performed by: ORTHOPAEDIC SURGERY

## 2017-09-25 PROCEDURE — 37000008 ZZH ANESTHESIA TECHNICAL FEE, 1ST 30 MIN: Performed by: ORTHOPAEDIC SURGERY

## 2017-09-25 PROCEDURE — 36415 COLL VENOUS BLD VENIPUNCTURE: CPT | Performed by: ORTHOPAEDIC SURGERY

## 2017-09-25 PROCEDURE — 97161 PT EVAL LOW COMPLEX 20 MIN: CPT | Mod: GP | Performed by: PHYSICAL THERAPIST

## 2017-09-25 PROCEDURE — 71000013 ZZH RECOVERY PHASE 1 LEVEL 1 EA ADDTL HR: Performed by: ORTHOPAEDIC SURGERY

## 2017-09-25 PROCEDURE — 25000128 H RX IP 250 OP 636: Performed by: NURSE ANESTHETIST, CERTIFIED REGISTERED

## 2017-09-25 PROCEDURE — 25000125 ZZHC RX 250: Performed by: NURSE ANESTHETIST, CERTIFIED REGISTERED

## 2017-09-25 PROCEDURE — 25000132 ZZH RX MED GY IP 250 OP 250 PS 637: Mod: GY | Performed by: ORTHOPAEDIC SURGERY

## 2017-09-25 PROCEDURE — 12000007 ZZH R&B INTERMEDIATE

## 2017-09-25 PROCEDURE — 85049 AUTOMATED PLATELET COUNT: CPT | Performed by: ORTHOPAEDIC SURGERY

## 2017-09-25 PROCEDURE — 25800025 ZZH RX 258: Performed by: ORTHOPAEDIC SURGERY

## 2017-09-25 PROCEDURE — 40000306 ZZH STATISTIC PRE PROC ASSESS II: Performed by: ORTHOPAEDIC SURGERY

## 2017-09-25 PROCEDURE — 99207 ZZC CDG-CODE CATEGORY CHANGED: CPT | Performed by: INTERNAL MEDICINE

## 2017-09-25 PROCEDURE — A9270 NON-COVERED ITEM OR SERVICE: HCPCS | Mod: GY | Performed by: INTERNAL MEDICINE

## 2017-09-25 PROCEDURE — 82565 ASSAY OF CREATININE: CPT | Performed by: ORTHOPAEDIC SURGERY

## 2017-09-25 RX ORDER — NICOTINE POLACRILEX 4 MG
15-30 LOZENGE BUCCAL
Status: DISCONTINUED | OUTPATIENT
Start: 2017-09-25 | End: 2017-09-28 | Stop reason: HOSPADM

## 2017-09-25 RX ORDER — SODIUM CHLORIDE, SODIUM LACTATE, POTASSIUM CHLORIDE, CALCIUM CHLORIDE 600; 310; 30; 20 MG/100ML; MG/100ML; MG/100ML; MG/100ML
INJECTION, SOLUTION INTRAVENOUS CONTINUOUS
Status: DISCONTINUED | OUTPATIENT
Start: 2017-09-25 | End: 2017-09-25 | Stop reason: CLARIF

## 2017-09-25 RX ORDER — LIDOCAINE 40 MG/G
CREAM TOPICAL
Status: DISCONTINUED | OUTPATIENT
Start: 2017-09-25 | End: 2017-09-28 | Stop reason: HOSPADM

## 2017-09-25 RX ORDER — ONDANSETRON 2 MG/ML
4 INJECTION INTRAMUSCULAR; INTRAVENOUS EVERY 6 HOURS PRN
Status: DISCONTINUED | OUTPATIENT
Start: 2017-09-25 | End: 2017-09-28 | Stop reason: HOSPADM

## 2017-09-25 RX ORDER — LABETALOL HYDROCHLORIDE 5 MG/ML
10 INJECTION, SOLUTION INTRAVENOUS
Status: DISCONTINUED | OUTPATIENT
Start: 2017-09-25 | End: 2017-09-25 | Stop reason: HOSPADM

## 2017-09-25 RX ORDER — KETOROLAC TROMETHAMINE 30 MG/ML
30 INJECTION, SOLUTION INTRAMUSCULAR; INTRAVENOUS EVERY 6 HOURS PRN
Status: DISCONTINUED | OUTPATIENT
Start: 2017-09-25 | End: 2017-09-25

## 2017-09-25 RX ORDER — LATANOPROST 50 UG/ML
1 SOLUTION/ DROPS OPHTHALMIC AT BEDTIME
Status: DISCONTINUED | OUTPATIENT
Start: 2017-09-25 | End: 2017-09-28 | Stop reason: HOSPADM

## 2017-09-25 RX ORDER — ONDANSETRON 4 MG/1
4 TABLET, ORALLY DISINTEGRATING ORAL EVERY 30 MIN PRN
Status: DISCONTINUED | OUTPATIENT
Start: 2017-09-25 | End: 2017-09-25 | Stop reason: HOSPADM

## 2017-09-25 RX ORDER — NEOSTIGMINE METHYLSULFATE 1 MG/ML
VIAL (ML) INJECTION PRN
Status: DISCONTINUED | OUTPATIENT
Start: 2017-09-25 | End: 2017-09-25

## 2017-09-25 RX ORDER — SODIUM CHLORIDE, SODIUM LACTATE, POTASSIUM CHLORIDE, CALCIUM CHLORIDE 600; 310; 30; 20 MG/100ML; MG/100ML; MG/100ML; MG/100ML
INJECTION, SOLUTION INTRAVENOUS CONTINUOUS
Status: DISCONTINUED | OUTPATIENT
Start: 2017-09-25 | End: 2017-09-25 | Stop reason: HOSPADM

## 2017-09-25 RX ORDER — ATENOLOL 50 MG/1
50 TABLET ORAL DAILY
Status: DISCONTINUED | OUTPATIENT
Start: 2017-09-25 | End: 2017-09-28 | Stop reason: HOSPADM

## 2017-09-25 RX ORDER — HYDROMORPHONE HYDROCHLORIDE 1 MG/ML
.5-1 INJECTION, SOLUTION INTRAMUSCULAR; INTRAVENOUS; SUBCUTANEOUS
Status: DISCONTINUED | OUTPATIENT
Start: 2017-09-25 | End: 2017-09-28 | Stop reason: HOSPADM

## 2017-09-25 RX ORDER — METOCLOPRAMIDE 5 MG/1
5 TABLET ORAL EVERY 6 HOURS PRN
Status: DISCONTINUED | OUTPATIENT
Start: 2017-09-25 | End: 2017-09-25 | Stop reason: HOSPADM

## 2017-09-25 RX ORDER — DEXAMETHASONE SODIUM PHOSPHATE 4 MG/ML
INJECTION, SOLUTION INTRA-ARTICULAR; INTRALESIONAL; INTRAMUSCULAR; INTRAVENOUS; SOFT TISSUE PRN
Status: DISCONTINUED | OUTPATIENT
Start: 2017-09-25 | End: 2017-09-25

## 2017-09-25 RX ORDER — ACETAMINOPHEN 325 MG/1
650 TABLET ORAL EVERY 4 HOURS PRN
Status: DISCONTINUED | OUTPATIENT
Start: 2017-09-28 | End: 2017-09-28 | Stop reason: HOSPADM

## 2017-09-25 RX ORDER — KETOROLAC TROMETHAMINE 15 MG/ML
15 INJECTION, SOLUTION INTRAMUSCULAR; INTRAVENOUS
Status: COMPLETED | OUTPATIENT
Start: 2017-09-25 | End: 2017-09-25

## 2017-09-25 RX ORDER — HYDROMORPHONE HYDROCHLORIDE 2 MG/1
2-4 TABLET ORAL
Status: DISCONTINUED | OUTPATIENT
Start: 2017-09-25 | End: 2017-09-28 | Stop reason: HOSPADM

## 2017-09-25 RX ORDER — ATORVASTATIN CALCIUM 20 MG/1
20 TABLET, FILM COATED ORAL EVERY EVENING
Status: DISCONTINUED | OUTPATIENT
Start: 2017-09-25 | End: 2017-09-28 | Stop reason: HOSPADM

## 2017-09-25 RX ORDER — METOCLOPRAMIDE HYDROCHLORIDE 5 MG/ML
5 INJECTION INTRAMUSCULAR; INTRAVENOUS EVERY 6 HOURS PRN
Status: DISCONTINUED | OUTPATIENT
Start: 2017-09-25 | End: 2017-09-25 | Stop reason: HOSPADM

## 2017-09-25 RX ORDER — CEFAZOLIN SODIUM 2 G/100ML
2 INJECTION, SOLUTION INTRAVENOUS
Status: COMPLETED | OUTPATIENT
Start: 2017-09-25 | End: 2017-09-25

## 2017-09-25 RX ORDER — TEMAZEPAM 7.5 MG/1
7.5 CAPSULE ORAL
Status: DISCONTINUED | OUTPATIENT
Start: 2017-09-26 | End: 2017-09-28 | Stop reason: HOSPADM

## 2017-09-25 RX ORDER — DROPERIDOL 2.5 MG/ML
0.62 INJECTION, SOLUTION INTRAMUSCULAR; INTRAVENOUS
Status: DISCONTINUED | OUTPATIENT
Start: 2017-09-25 | End: 2017-09-25 | Stop reason: RX

## 2017-09-25 RX ORDER — CEFAZOLIN SODIUM 2 G/100ML
2 INJECTION, SOLUTION INTRAVENOUS EVERY 8 HOURS
Status: COMPLETED | OUTPATIENT
Start: 2017-09-25 | End: 2017-09-26

## 2017-09-25 RX ORDER — ONDANSETRON 2 MG/ML
4 INJECTION INTRAMUSCULAR; INTRAVENOUS EVERY 30 MIN PRN
Status: DISCONTINUED | OUTPATIENT
Start: 2017-09-25 | End: 2017-09-25 | Stop reason: HOSPADM

## 2017-09-25 RX ORDER — CYCLOBENZAPRINE HCL 5 MG
5 TABLET ORAL 3 TIMES DAILY PRN
Status: DISCONTINUED | OUTPATIENT
Start: 2017-09-25 | End: 2017-09-28 | Stop reason: HOSPADM

## 2017-09-25 RX ORDER — GLIPIZIDE 10 MG/1
10 TABLET ORAL
Status: DISCONTINUED | OUTPATIENT
Start: 2017-09-25 | End: 2017-09-28 | Stop reason: HOSPADM

## 2017-09-25 RX ORDER — FENTANYL CITRATE 50 UG/ML
25-50 INJECTION, SOLUTION INTRAMUSCULAR; INTRAVENOUS
Status: DISCONTINUED | OUTPATIENT
Start: 2017-09-25 | End: 2017-09-25 | Stop reason: HOSPADM

## 2017-09-25 RX ORDER — MEPERIDINE HYDROCHLORIDE 25 MG/ML
12.5 INJECTION INTRAMUSCULAR; INTRAVENOUS; SUBCUTANEOUS EVERY 5 MIN PRN
Status: DISCONTINUED | OUTPATIENT
Start: 2017-09-25 | End: 2017-09-25 | Stop reason: HOSPADM

## 2017-09-25 RX ORDER — CEFAZOLIN SODIUM 1 G/3ML
1 INJECTION, POWDER, FOR SOLUTION INTRAMUSCULAR; INTRAVENOUS SEE ADMIN INSTRUCTIONS
Status: DISCONTINUED | OUTPATIENT
Start: 2017-09-25 | End: 2017-09-25 | Stop reason: CLARIF

## 2017-09-25 RX ORDER — NALOXONE HYDROCHLORIDE 0.4 MG/ML
.1-.4 INJECTION, SOLUTION INTRAMUSCULAR; INTRAVENOUS; SUBCUTANEOUS
Status: DISCONTINUED | OUTPATIENT
Start: 2017-09-25 | End: 2017-09-28 | Stop reason: HOSPADM

## 2017-09-25 RX ORDER — LIDOCAINE HYDROCHLORIDE 10 MG/ML
INJECTION, SOLUTION INFILTRATION; PERINEURAL PRN
Status: DISCONTINUED | OUTPATIENT
Start: 2017-09-25 | End: 2017-09-25

## 2017-09-25 RX ORDER — PROPOFOL 10 MG/ML
INJECTION, EMULSION INTRAVENOUS PRN
Status: DISCONTINUED | OUTPATIENT
Start: 2017-09-25 | End: 2017-09-25

## 2017-09-25 RX ORDER — SODIUM CHLORIDE 9 MG/ML
INJECTION, SOLUTION INTRAVENOUS CONTINUOUS
Status: DISCONTINUED | OUTPATIENT
Start: 2017-09-25 | End: 2017-09-28 | Stop reason: HOSPADM

## 2017-09-25 RX ORDER — HYDRALAZINE HYDROCHLORIDE 20 MG/ML
2.5-5 INJECTION INTRAMUSCULAR; INTRAVENOUS EVERY 10 MIN PRN
Status: DISCONTINUED | OUTPATIENT
Start: 2017-09-25 | End: 2017-09-25 | Stop reason: HOSPADM

## 2017-09-25 RX ORDER — GLYCOPYRROLATE 0.2 MG/ML
INJECTION, SOLUTION INTRAMUSCULAR; INTRAVENOUS PRN
Status: DISCONTINUED | OUTPATIENT
Start: 2017-09-25 | End: 2017-09-25

## 2017-09-25 RX ORDER — HYDROCHLOROTHIAZIDE 25 MG/1
25 TABLET ORAL EVERY MORNING
Status: DISCONTINUED | OUTPATIENT
Start: 2017-09-26 | End: 2017-09-28 | Stop reason: HOSPADM

## 2017-09-25 RX ORDER — AMOXICILLIN 250 MG
1-2 CAPSULE ORAL 2 TIMES DAILY
Status: DISCONTINUED | OUTPATIENT
Start: 2017-09-25 | End: 2017-09-28 | Stop reason: HOSPADM

## 2017-09-25 RX ORDER — DORZOLAMIDE HCL 20 MG/ML
2 SOLUTION/ DROPS OPHTHALMIC 2 TIMES DAILY
Status: DISCONTINUED | OUTPATIENT
Start: 2017-09-25 | End: 2017-09-28 | Stop reason: HOSPADM

## 2017-09-25 RX ORDER — DIMENHYDRINATE 50 MG/ML
25 INJECTION, SOLUTION INTRAMUSCULAR; INTRAVENOUS
Status: DISCONTINUED | OUTPATIENT
Start: 2017-09-25 | End: 2017-09-25 | Stop reason: HOSPADM

## 2017-09-25 RX ORDER — LIDOCAINE 40 MG/G
CREAM TOPICAL
Status: DISCONTINUED | OUTPATIENT
Start: 2017-09-25 | End: 2017-09-25 | Stop reason: CLARIF

## 2017-09-25 RX ORDER — FENTANYL CITRATE 50 UG/ML
INJECTION, SOLUTION INTRAMUSCULAR; INTRAVENOUS PRN
Status: DISCONTINUED | OUTPATIENT
Start: 2017-09-25 | End: 2017-09-25

## 2017-09-25 RX ORDER — DIPHENHYDRAMINE HYDROCHLORIDE 50 MG/ML
12.5 INJECTION INTRAMUSCULAR; INTRAVENOUS EVERY 6 HOURS PRN
Status: DISCONTINUED | OUTPATIENT
Start: 2017-09-25 | End: 2017-09-28 | Stop reason: HOSPADM

## 2017-09-25 RX ORDER — DIPHENHYDRAMINE HCL 12.5MG/5ML
12.5 LIQUID (ML) ORAL EVERY 6 HOURS PRN
Status: DISCONTINUED | OUTPATIENT
Start: 2017-09-25 | End: 2017-09-28 | Stop reason: HOSPADM

## 2017-09-25 RX ORDER — KETOROLAC TROMETHAMINE 15 MG/ML
15 INJECTION, SOLUTION INTRAMUSCULAR; INTRAVENOUS EVERY 6 HOURS
Status: DISCONTINUED | OUTPATIENT
Start: 2017-09-25 | End: 2017-09-26

## 2017-09-25 RX ORDER — ONDANSETRON 2 MG/ML
INJECTION INTRAMUSCULAR; INTRAVENOUS PRN
Status: DISCONTINUED | OUTPATIENT
Start: 2017-09-25 | End: 2017-09-25

## 2017-09-25 RX ORDER — LISINOPRIL 40 MG/1
40 TABLET ORAL DAILY
Status: DISCONTINUED | OUTPATIENT
Start: 2017-09-25 | End: 2017-09-28 | Stop reason: HOSPADM

## 2017-09-25 RX ORDER — DESOXIMETASONE 2.5 MG/G
CREAM TOPICAL 2 TIMES DAILY PRN
Status: DISCONTINUED | OUTPATIENT
Start: 2017-09-25 | End: 2017-09-28 | Stop reason: HOSPADM

## 2017-09-25 RX ORDER — ACETAMINOPHEN 325 MG/1
975 TABLET ORAL EVERY 8 HOURS
Status: DISCONTINUED | OUTPATIENT
Start: 2017-09-25 | End: 2017-09-28 | Stop reason: HOSPADM

## 2017-09-25 RX ORDER — ONDANSETRON 4 MG/1
4 TABLET, ORALLY DISINTEGRATING ORAL EVERY 6 HOURS PRN
Status: DISCONTINUED | OUTPATIENT
Start: 2017-09-25 | End: 2017-09-28 | Stop reason: HOSPADM

## 2017-09-25 RX ORDER — DEXTROSE MONOHYDRATE 25 G/50ML
25-50 INJECTION, SOLUTION INTRAVENOUS
Status: DISCONTINUED | OUTPATIENT
Start: 2017-09-25 | End: 2017-09-28 | Stop reason: HOSPADM

## 2017-09-25 RX ORDER — HYDROMORPHONE HYDROCHLORIDE 1 MG/ML
.3-.5 INJECTION, SOLUTION INTRAMUSCULAR; INTRAVENOUS; SUBCUTANEOUS EVERY 5 MIN PRN
Status: DISCONTINUED | OUTPATIENT
Start: 2017-09-25 | End: 2017-09-25 | Stop reason: HOSPADM

## 2017-09-25 RX ADMIN — FENTANYL CITRATE 50 MCG: 50 INJECTION INTRAMUSCULAR; INTRAVENOUS at 13:17

## 2017-09-25 RX ADMIN — INSULIN ASPART 2 UNITS: 100 INJECTION, SOLUTION INTRAVENOUS; SUBCUTANEOUS at 18:20

## 2017-09-25 RX ADMIN — GLYCOPYRROLATE 0.2 MG: 0.2 INJECTION, SOLUTION INTRAMUSCULAR; INTRAVENOUS at 11:44

## 2017-09-25 RX ADMIN — SODIUM CHLORIDE, POTASSIUM CHLORIDE, SODIUM LACTATE AND CALCIUM CHLORIDE: 600; 310; 30; 20 INJECTION, SOLUTION INTRAVENOUS at 12:00

## 2017-09-25 RX ADMIN — Medication 2 MG: at 12:44

## 2017-09-25 RX ADMIN — FENTANYL CITRATE 50 MCG: 50 INJECTION INTRAMUSCULAR; INTRAVENOUS at 13:08

## 2017-09-25 RX ADMIN — CEFAZOLIN SODIUM 2 G: 2 INJECTION, SOLUTION INTRAVENOUS at 11:39

## 2017-09-25 RX ADMIN — ACETAMINOPHEN 975 MG: 325 TABLET, FILM COATED ORAL at 23:59

## 2017-09-25 RX ADMIN — KETOROLAC TROMETHAMINE 15 MG: 15 INJECTION, SOLUTION INTRAMUSCULAR; INTRAVENOUS at 13:55

## 2017-09-25 RX ADMIN — LATANOPROST 1 DROP: 50 SOLUTION/ DROPS OPHTHALMIC at 22:51

## 2017-09-25 RX ADMIN — ATORVASTATIN CALCIUM 20 MG: 20 TABLET, FILM COATED ORAL at 21:30

## 2017-09-25 RX ADMIN — ONDANSETRON 4 MG: 2 INJECTION INTRAMUSCULAR; INTRAVENOUS at 12:40

## 2017-09-25 RX ADMIN — FENTANYL CITRATE 150 MCG: 50 INJECTION, SOLUTION INTRAMUSCULAR; INTRAVENOUS at 11:44

## 2017-09-25 RX ADMIN — SODIUM CHLORIDE: 9 INJECTION, SOLUTION INTRAVENOUS at 15:00

## 2017-09-25 RX ADMIN — ATENOLOL 50 MG: 50 TABLET ORAL at 16:52

## 2017-09-25 RX ADMIN — SODIUM CHLORIDE, POTASSIUM CHLORIDE, SODIUM LACTATE AND CALCIUM CHLORIDE: 600; 310; 30; 20 INJECTION, SOLUTION INTRAVENOUS at 14:20

## 2017-09-25 RX ADMIN — METFORMIN HYDROCHLORIDE 1000 MG: 500 TABLET ORAL at 16:57

## 2017-09-25 RX ADMIN — CEFAZOLIN SODIUM 2 G: 2 INJECTION, SOLUTION INTRAVENOUS at 18:18

## 2017-09-25 RX ADMIN — FENTANYL CITRATE 50 MCG: 50 INJECTION, SOLUTION INTRAMUSCULAR; INTRAVENOUS at 12:49

## 2017-09-25 RX ADMIN — KETOROLAC TROMETHAMINE 15 MG: 15 INJECTION, SOLUTION INTRAMUSCULAR; INTRAVENOUS at 21:30

## 2017-09-25 RX ADMIN — SODIUM CHLORIDE, POTASSIUM CHLORIDE, SODIUM LACTATE AND CALCIUM CHLORIDE: 600; 310; 30; 20 INJECTION, SOLUTION INTRAVENOUS at 11:00

## 2017-09-25 RX ADMIN — SITAGLIPTIN 100 MG: 50 TABLET, FILM COATED ORAL at 16:53

## 2017-09-25 RX ADMIN — HYDRALAZINE HYDROCHLORIDE 2.5 MG: 20 INJECTION INTRAMUSCULAR; INTRAVENOUS at 14:09

## 2017-09-25 RX ADMIN — DORZOLAMIDE HYDROCHLORIDE 2 DROP: 20 SOLUTION/ DROPS OPHTHALMIC at 21:36

## 2017-09-25 RX ADMIN — LIDOCAINE HYDROCHLORIDE 50 MG: 10 INJECTION, SOLUTION INFILTRATION; PERINEURAL at 11:44

## 2017-09-25 RX ADMIN — SENNOSIDES AND DOCUSATE SODIUM 2 TABLET: 8.6; 5 TABLET ORAL at 21:30

## 2017-09-25 RX ADMIN — PROPOFOL 150 MG: 10 INJECTION, EMULSION INTRAVENOUS at 11:44

## 2017-09-25 RX ADMIN — HYDROMORPHONE HYDROCHLORIDE 0.3 MG: 1 INJECTION, SOLUTION INTRAMUSCULAR; INTRAVENOUS; SUBCUTANEOUS at 13:48

## 2017-09-25 RX ADMIN — HYDROMORPHONE HYDROCHLORIDE 1 MG: 1 INJECTION, SOLUTION INTRAMUSCULAR; INTRAVENOUS; SUBCUTANEOUS at 13:00

## 2017-09-25 RX ADMIN — GLYCOPYRROLATE 0.2 MG: 0.2 INJECTION, SOLUTION INTRAMUSCULAR; INTRAVENOUS at 12:44

## 2017-09-25 RX ADMIN — GLIPIZIDE 10 MG: 10 TABLET ORAL at 16:53

## 2017-09-25 RX ADMIN — DEXAMETHASONE SODIUM PHOSPHATE 4 MG: 4 INJECTION, SOLUTION INTRA-ARTICULAR; INTRALESIONAL; INTRAMUSCULAR; INTRAVENOUS; SOFT TISSUE at 11:44

## 2017-09-25 RX ADMIN — ROCURONIUM BROMIDE 50 MG: 10 INJECTION INTRAVENOUS at 11:44

## 2017-09-25 RX ADMIN — LISINOPRIL 40 MG: 40 TABLET ORAL at 16:52

## 2017-09-25 RX ADMIN — FENTANYL CITRATE 50 MCG: 50 INJECTION, SOLUTION INTRAMUSCULAR; INTRAVENOUS at 12:00

## 2017-09-25 NOTE — ANESTHESIA CARE TRANSFER NOTE
Patient: Gricelda Sabillon    Procedure(s):  right total  hip arthroplasty     - Wound Class: I-Clean    Diagnosis: right  hip osteoarthritis  Diagnosis Additional Information: No value filed.    Anesthesia Type:   General     Note:  Airway :Face Mask  Patient transferred to:PACU  Comments: Pt SV good tidal volume, op sxn cuff down extubated.  Transfer to pacu.  Report to PACU RN transfer care.       Vitals: (Last set prior to Anesthesia Care Transfer)    CRNA VITALS  9/25/2017 1227 - 9/25/2017 1302      9/25/2017             Pulse: 86    SpO2: 98 %    Resp Rate (observed): 10                Electronically Signed By: DENYS Jiménez CRNA  September 25, 2017  1:02 PM

## 2017-09-25 NOTE — IP AVS SNAPSHOT
MRN:[id number]                      After Visit Summary   9/25/2017    Gricelda Sabillon    MRN: [id number]           Thank you!     Thank you for choosing [facility] for your care. Our goal is always to provide you with excellent care. Hearing back from our patients is one way we can continue to improve our services. Please take a few minutes to complete the written survey that you may receive in the mail after you visit. If you would like to speak to someone directly about your visit please contact Patient Relations at [phone]. Thank you!          Patient Information     Date Of Birth          1950        Designated Caregiver       Most Recent Value    Caregiver    Will someone help with your care after discharge? yes    Name of designated caregiver Allen    Phone number of caregiver [phone]    Caregiver address same as pt      About your hospital stay     You were admitted on:  September 25, 2017 You last received care in the:  [facility]    You were discharged on:  September 28, 2017        Reason for your hospital stay       Right total hip arthroplasty                  Who to Call     For medical emergencies, please call 911.  For non-urgent questions about your medical care, please call your primary care provider or clinic, [phone]  For questions related to your surgery, please call your surgery clinic        Attending Provider     Provider Specialty    Ayaan Pena MD Orthopedics       Primary Care Provider Office Phone # Fax #    Raisa Michelle Davidson [phone]      After Care Instructions     Diet       Follow this diet upon discharge: Advance to a regular diet as tolerated and increase water and fiber intake while on pain medication                  Follow-up Appointments     Follow-up and recommended labs and tests        Follow up with me,  Patricio Samuels, within 14 days as previously  scheduled. to evaluate after surgery.  No follow up labs or test are needed.    Patricio Samuels PA-C  [facility] - Surgery  [facility]  [address] [facility]  [place]  [address]  [phone]  [phone] fax  [phone] for scheduling                  Your next 10 appointments already scheduled     Oct 06, 2017 11:00 AM CDT   Return Visit with Patircio Samuels PA-C   [facility] ([facility] [place])    [address]  [address]  [place] [address]   [phone]            Oct 25, 2017  1:30 PM CDT   [facility] with Clara Corado MD   [facility]    [address] [facility] [address]  [facility] [id number] [phone]              Further instructions from your care team       POST OP TOTAL HIP INSTRUCTION:    Incision care:  To close the incision, staples will be used in most cases. The staples will be removed at the office in 10-14 days from the surgery. If you are going to a TCU (nursing home) from the hospital, the staples can be removed at the nursing home in that time frame. For first 3 days, place a water proof cover over the dressing for showers. If the dressings get wet, change it with new gauze and paper tape. The incision can be wet after 4 days from the surgery.  You can take a shower but do not soak the incision. The incision should be covered with a light gauze that is held by 2 inch paper tape until follow up.     If you have an aquacell dressing, (flesh colored rubber like bandage), you may leave this dressing in place until follow up in the clinic, unless; the dressing becomes completely saturated with blood, is leaking, gets water inside as evident by moisture appearing on the inside of the dressing, or you have a skin reaction.  In this case you should remove and use dressings like what is mentioned above.    Soft tissue: It is not unusual to have  swelling in the leg (thigh down to the foot) up to 2 months from the surgery. Frequent ankle pumping, elevation of the leg above the heart level and gentle compression support with ace wrap should help with swelling. Icing regularly, for 20 minutes every hour, will also help with swelling and pain.  Due to soft tissue swelling, increased amount of redness around the incision site is also commonly seen. Progressively worsening redness along with increasing pain or increasing drainage from the wound should be a reason to alert us immediately.     Medication:You will be discharged from the hospital with pain medication(s). In most cases, consistent use the pain pills can be limited to a few days. After this period, the medication should be weaned off as soon as possible to avoid potential complications of constipation, nausea, or rash, etc. Until you can be completely off the pain pills, using them only at nighttime along with controlling the pain with over-the-counter medication(s) such as Ibuprofen/Naproxen and/or Tylenol during the day would be a good way of managing the pain.       In order to minimize the potential problem of blood clot, you'll be asked to take aspirin (low risk patients) or undergo a period of blood thinner injections. If you were on Coumadin before the surgery you will be going back to it after the surgery.    It is also recommend you take a stool softener while taking opioid prescription pain mediation to avoid constipation.  Also stay well hydrated in general during this period.    Activities:  One of the main problems related to the hip replacement is that there is 1-3% chance for dislocation. This means the ball comes out of the socket.  This is due to the fact that the size of the femoral head is going to be smaller than the native femoral head and also due to the fact that the hip capsule which supports the joint is disrupted and repaired to perform the surgery.  As a result, there is a  lack of a protecting barrier until it is re-established with healing of the capsule, which takes a minimum of 4 months.  For this reason, you are asked to avoid bending your hip greater than 90 degrees of flexion for the first 4 months. This also means avoiding sitting on a very plush chair/couch and not crossing the legs.    As long as there is no fracture complication, you can put all the weight on the operated leg. Walker/crutches can be discontinued according to your own progress. The general guideline is to discontinue the walker/crutches when you feel fairly comfortable and confident with your balance. You can return to walking, swimming, golfing, double tennis but jogging or running are not recommended.     You can lie on either side of your body as soon as you feel comfortable putting pressure on the incision. We recommend putting one or 2 pillows in between the legs when you lie on that side.     Please do not undergo procedures such as dental cleaning, oral surgery, colonoscopy as well as any type of surgery that involves a significant incision for 4 months after surgery.  In case of an emergency, you will need an antibiotic, usually amoxicillin or clindamycin is used for this purpose.  You'll be taking the medication one hour before the procedure.  Please let the provider know that you have artificial implants in your body    Also, for the rest of your life, some providers recommend that you take an antibiotic for procedures such as dental cleaning, oral surgery, colonoscopy as well as any type of surgery that involves a significant incision.  Please check with that provider.    Patricio Samuels PA-C  [facility] - Surgery  [facility]  [address] [place]  [address]  [phone]          Pending Results     No orders found from 9/23/2017 to 9/26/2017.            Statement of Approval     Ordered          09/28/17 1436  I have reviewed and agree with all the  "recommendations and orders detailed in this document.  EFFECTIVE NOW     Approved and electronically signed by:  Patricio Samuels PA-C             Admission Information     Date & Time Provider Department Dept. Phone    9/25/2017 Ayaan Pena MD [facility] [phone]      Your Vitals Were     Blood Pressure Pulse Temperature Respirations Height Last Period    113/52 (BP Location: Right arm) 94 97.7  F (36.5  C) (Oral) 16 1.676 m (5' 6\") 12/13/2002    Pulse Oximetry                   96%           MyChart Information     [facility] gives you secure access to your electronic health record. If you see a primary care provider, you can also send messages to your care team and make appointments. If you have questions, please call your primary care clinic.  If you do not have a primary care provider, please call [phone] and they will assist you.        Care EveryWhere ID     This is your Care EveryWhere ID. This could be used by other organizations to access your [facility] medical records  [id number]        Equal Access to Services     TAWANA TREVINO : Hadii aad ku hadasho Sotiffanie, waaxda luqadaha, qaybta kaalmada adebridgetyabrionna, thomas marmolejo . So [facility] [phone].    ATENCIÓN: Si habla español, tiene a vail disposición servicios gratuitos de asistencia lingüística. Llame al [phone].    We comply with applicable federal civil rights laws and [place] laws. We do not discriminate on the basis of race, color, national origin, age, disability sex, sexual orientation or gender identity.               Review of your medicines      START taking        Dose / Directions    acetaminophen 325 MG tablet   Commonly known as:  TYLENOL   Used for:  S/P total hip arthroplasty, right   Notes to Patient:  Available today at 7 PM  Do NOT take more than 4,000 mg daily        Dose:  650 mg   Take 2 tablets (650 mg) by mouth every 4 hours as needed for other (surgical pain) "   Quantity:  100 tablet   Refills:  0       aspirin  MG EC tablet   Used for:  Deep vein thrombosis (DVT) prophylaxis prescribed at discharge   Replaces:  aspirin 81 MG tablet        Dose:  325 mg   Take 1 tablet (325 mg) by mouth daily   Quantity:  45 tablet   Refills:  0       senna-docusate 8.6-50 MG per tablet   Commonly known as:  SENOKOT-S;PERICOLACE   Used for:  Constipation due to opioid therapy        Dose:  1-2 tablet   Take 1-2 tablets by mouth 2 times daily as needed for constipation   Quantity:  40 tablet   Refills:  0       traMADol 50 MG tablet   Commonly known as:  ULTRAM   Used for:  S/P total hip arthroplasty, right   Notes to Patient:  Available today at 9 PM        Dose:  25-50 mg   Take 0.5-1 tablets (25-50 mg) by mouth every 6 hours as needed for moderate pain   Quantity:  40 tablet   Refills:  0         CONTINUE these medicines which have NOT CHANGED        Dose / Directions    albuterol 108 (90 BASE) MCG/ACT Inhaler   Commonly known as:  PROAIR HFA/PROVENTIL HFA/VENTOLIN HFA   Used for:  Acute bronchitis        Dose:  2 puff   Inhale 2 puffs into the lungs every 4 hours as needed for shortness of breath / dyspnea   Quantity:  1 Inhaler   Refills:  3       atenolol 50 MG tablet   Commonly known as:  TENORMIN   Used for:  HTN, goal below 140/90, Sinus tachycardia        Dose:  50 mg   Take 1 tablet (50 mg) by mouth daily   Quantity:  90 tablet   Refills:  1       atorvastatin 20 MG tablet   Commonly known as:  LIPITOR   Used for:  Hyperlipidemia with target LDL less than 100        Dose:  20 mg   Take 1 tablet (20 mg) by mouth daily   Quantity:  90 tablet   Refills:  1       azelastine 0.05 % Soln ophthalmic solution   Commonly known as:  OPTIVAR        Dose:  1 drop   Place 1 drop into both eyes 2 times daily as needed Reported on 3/22/2017   Refills:  0       azelastine 0.1 % spray   Commonly known as:  ASTELIN        Dose:  1 spray   Spray 1 spray into both nostrils 2 times daily as  needed Reported on 3/22/2017   Refills:  0       blood glucose monitoring test strip   Commonly known as:  ONE TOUCH ULTRA   Used for:  Diabetes mellitus without complication (H)        Pt checks BS 1x a day   Quantity:  50 strip   Refills:  0       cetirizine 10 MG tablet   Commonly known as:  zyrTEC        Dose:  10 mg   Take 10 mg by mouth every morning.   Refills:  0       desoximetasone 0.25 % cream   Commonly known as:  TOPICORT   Used for:  Eczema        Apply sparingly to affected area's   Quantity:  60 g   Refills:  1       dorzolamide 2 % ophthalmic solution   Commonly known as:  TRUSOPT        Dose:  2 drop   Place 2 drops into both eyes 2 times daily   Quantity:  1 Bottle   Refills:  0       fish oil-omega-3 fatty acids 1000 MG capsule   Notes to Patient:  Resume per home schedule        Dose:  1 g   Take 1 g by mouth daily Reported on 3/22/2017   Refills:  0       glipiZIDE 5 MG tablet   Commonly known as:  GLUCOTROL   Used for:  Diabetes mellitus without complication (H)        TAKE 2 TABLETs BY MOUTH TWICE DAILY BEFORE MEAL(S)   Quantity:  120 tablet   Refills:  1       hydrochlorothiazide 25 MG tablet   Commonly known as:  HYDRODIURIL   Used for:  HTN, goal below 140/90        Dose:  25 mg   Take 1 tablet (25 mg) by mouth every morning   Quantity:  90 tablet   Refills:  1       lisinopril 40 MG tablet   Commonly known as:  PRINIVIL/ZESTRIL   Used for:  HTN, goal below 140/90        TAKE ONE TABLET BY MOUTH ONCE DAILY   Quantity:  90 tablet   Refills:  0       metFORMIN 1000 MG tablet   Commonly known as:  GLUCOPHAGE   Used for:  Type 2 diabetes, HbA1C goal < 8% (H)        TAKE ONE TABLET BY MOUTH TWICE DAILY WITH MEALS   Quantity:  60 tablet   Refills:  0       MULTIVITAMIN TABS   OR   Notes to Patient:  Resume per home schedule        Reported on 3/22/2017   Refills:  0       * order for DME   Used for:  Type 2 diabetes mellitus, uncontrolled (H), Hyperlipidemia with target LDL less than 100,  Essential hypertension with goal blood pressure less than 140/90        All diabetic testing supplies including test strips, lancets and solution for testing 3 times per day. Patient is using  no Insulin. A1C      7.6   5/3/2016 A1C      7.4   2/4/2016   Quantity:  3 Month   Refills:  1       * order for DME   Used for:  Type 2 diabetes mellitus with hyperglycemia, without long-term current use of insulin (H)        All diabetic testing supplies including test strips, lancets and solution for testing 4 times per day. Patient is using  no Insulin. Last A1c Lab Results      Component                Value               Date                      A1C                      7.7                 09/05/2017                A1C                      7.5                 03/17/2017   Quantity:  3 Month   Refills:  1       sitagliptin 100 MG tablet   Commonly known as:  JANUVIA   Used for:  Diabetes mellitus without complication (H)        Dose:  100 mg   Take 1 tablet (100 mg) by mouth daily   Quantity:  90 tablet   Refills:  0       XALATAN 0.005 % ophthalmic solution   Generic drug:  latanoprost        Dose:  1 drop   Place 1 drop Into the left eye At Bedtime   Quantity:  2.5 mL   Refills:  1       * Notice:  This list has 2 medication(s) that are the same as other medications prescribed for you. Read the directions carefully, and ask your doctor or other care provider to review them with you.      STOP taking     aspirin 81 MG tablet   Replaced by:  aspirin  MG EC tablet           IBUPROFEN PO                Where to get your medicines      These medications were sent to [facility] [id number] [facility] [address]     Phone:  [phone]     acetaminophen 325 MG tablet    aspirin  MG EC tablet    senna-docusate 8.6-50 MG per tablet         Some of these will need a paper prescription and others can be bought over the counter. Ask your nurse if  you have questions.     Bring a paper prescription for each of these medications     traMADol 50 MG tablet                Protect others around you: Learn how to safely use, store and throw away your medicines at www.disposemymeds.org.             Medication List: This is a list of all your medications and when to take them. Check marks below indicate your daily home schedule. Keep this list as a reference.      Medications           Morning Afternoon Evening Bedtime As Needed    acetaminophen 325 MG tablet   Commonly known as:  TYLENOL   Take 2 tablets (650 mg) by mouth every 4 hours as needed for other (surgical pain)   Last time this was given:  975 mg on 9/28/2017 10:45 AM   Notes to Patient:  Available today at 7 PM  Do NOT take more than 4,000 mg daily                                   albuterol 108 (90 BASE) MCG/ACT Inhaler   Commonly known as:  PROAIR HFA/PROVENTIL HFA/VENTOLIN HFA   Inhale 2 puffs into the lungs every 4 hours as needed for shortness of breath / dyspnea                                   aspirin  MG EC tablet   Take 1 tablet (325 mg) by mouth daily   Next Dose Due:  Friday                                   atenolol 50 MG tablet   Commonly known as:  TENORMIN   Take 1 tablet (50 mg) by mouth daily   Last time this was given:  50 mg on 9/28/2017  8:52 AM   Next Dose Due:  Friday                                   atorvastatin 20 MG tablet   Commonly known as:  LIPITOR   Take 1 tablet (20 mg) by mouth daily   Last time this was given:  20 mg on 9/27/2017  7:54 PM   Next Dose Due:  Friday                                   azelastine 0.05 % Soln ophthalmic solution   Commonly known as:  OPTIVAR   Place 1 drop into both eyes 2 times daily as needed Reported on 3/22/2017                                         azelastine 0.1 % spray   Commonly known as:  ASTELIN   Spray 1 spray into both nostrils 2 times daily as needed Reported on 3/22/2017                                         blood glucose  monitoring test strip   Commonly known as:  ONE TOUCH ULTRA   Pt checks BS 1x a day                                cetirizine 10 MG tablet   Commonly known as:  zyrTEC   Take 10 mg by mouth every morning.   Last time this was given:  10 mg on 9/28/2017  8:53 AM   Next Dose Due:  Friday                                   desoximetasone 0.25 % cream   Commonly known as:  TOPICORT   Apply sparingly to affected area's                                dorzolamide 2 % ophthalmic solution   Commonly known as:  TRUSOPT   Place 2 drops into both eyes 2 times daily   Last time this was given:  2 drops on 9/28/2017  8:57 AM   Next Dose Due:  Tonight                                      fish oil-omega-3 fatty acids 1000 MG capsule   Take 1 g by mouth daily Reported on 3/22/2017   Notes to Patient:  Resume per home schedule                                glipiZIDE 5 MG tablet   Commonly known as:  GLUCOTROL   TAKE 2 TABLETs BY MOUTH TWICE DAILY BEFORE MEAL(S)   Last time this was given:  10 mg on 9/28/2017  8:53 AM   Next Dose Due:  Friday                                   hydrochlorothiazide 25 MG tablet   Commonly known as:  HYDRODIURIL   Take 1 tablet (25 mg) by mouth every morning   Last time this was given:  25 mg on 9/27/2017  8:57 AM   Next Dose Due:  Friday                                   lisinopril 40 MG tablet   Commonly known as:  PRINIVIL/ZESTRIL   TAKE ONE TABLET BY MOUTH ONCE DAILY   Last time this was given:  40 mg on 9/27/2017  8:58 AM   Next Dose Due:  Friday                                   metFORMIN 1000 MG tablet   Commonly known as:  GLUCOPHAGE   TAKE ONE TABLET BY MOUTH TWICE DAILY WITH MEALS   Last time this was given:  1,000 mg on 9/28/2017  8:53 AM   Next Dose Due:  Tonight                                      MULTIVITAMIN TABS   OR   Reported on 3/22/2017   Notes to Patient:  Resume per home schedule                                * order for DME   All diabetic testing supplies including test strips,  lancets and solution for testing 3 times per day. Patient is using  no Insulin. A1C      7.6   5/3/2016 A1C      7.4   2/4/2016                                * order for DME   All diabetic testing supplies including test strips, lancets and solution for testing 4 times per day. Patient is using  no Insulin. Last A1c Lab Results      Component                Value               Date                      A1C                      7.7                 09/05/2017                A1C                      7.5                 03/17/2017                                senna-docusate 8.6-50 MG per tablet   Commonly known as:  SENOKOT-S;PERICOLACE   Take 1-2 tablets by mouth 2 times daily as needed for constipation   Last time this was given:  2 tablets on 9/27/2017  7:54 PM   Next Dose Due:  Tonight                                         sitagliptin 100 MG tablet   Commonly known as:  JANUVIA   Take 1 tablet (100 mg) by mouth daily   Last time this was given:  100 mg on 9/28/2017  8:53 AM   Next Dose Due:  Friday                                   traMADol 50 MG tablet   Commonly known as:  ULTRAM   Take 0.5-1 tablets (25-50 mg) by mouth every 6 hours as needed for moderate pain   Last time this was given:  25 mg on 9/28/2017  2:59 PM   Notes to Patient:  Available today at 9 PM                                   XALATAN 0.005 % ophthalmic solution   Place 1 drop Into the left eye At Bedtime   Last time this was given:  1 drop on 9/27/2017  9:40 PM   Generic drug:  latanoprost   Next Dose Due:  Tonight                                   * Notice:  This list has 2 medication(s) that are the same as other medications prescribed for you. Read the directions carefully, and ask your doctor or other care provider to review them with you.

## 2017-09-25 NOTE — IP AVS SNAPSHOT
[facility]    [address]    [facility] [address]    Phone:  [phone]    Fax:  [phone]                                       After Visit Summary   9/25/2017    Gricelda Sabillon    MRN: [id number]           After Visit Summary Signature Page     I have received my discharge instructions, and my questions have been answered. I have discussed any challenges I see with this plan with the nurse or doctor.    ..........................................................................................................................................  Patient/Patient Representative Signature      ..........................................................................................................................................  Patient Representative Print Name and Relationship to Patient    ..................................................               ................................................  Date                                            Time    ..........................................................................................................................................  Reviewed by Signature/Title    ...................................................              ..............................................  Date                                                            Time

## 2017-09-25 NOTE — PHARMACY-ADMISSION MEDICATION HISTORY
Pharmacy reviewed prior to admission med list from pre-admitting rn, RAVINDRA Dukes.        Prior to Admission medications    Medication Sig Last Dose Taking? Auth Provider   IBUPROFEN PO Take 800 mg by mouth every 6 hours 1 week ago Yes Reported, Patient   glipiZIDE (GLUCOTROL) 5 MG tablet TAKE 2 TABLETs BY MOUTH TWICE DAILY BEFORE MEAL(S) 9/24/2017 at 2000 Yes Raisa Davidson MD   sitagliptin (JANUVIA) 100 MG tablet Take 1 tablet (100 mg) by mouth daily 9/24/2017 at 0800 Yes Raisa Davidson MD   metFORMIN (GLUCOPHAGE) 1000 MG tablet TAKE ONE TABLET BY MOUTH TWICE DAILY WITH MEALS 9/24/2017 at 2000 Yes Raisa Davidson MD   lisinopril (PRINIVIL/ZESTRIL) 40 MG tablet TAKE ONE TABLET BY MOUTH ONCE DAILY 9/24/2017 at 0800 Yes Raisa Davidson MD   atenolol (TENORMIN) 50 MG tablet Take 1 tablet (50 mg) by mouth daily 9/24/2017 at 0800 Yes Raisa Davidson MD   hydrochlorothiazide (HYDRODIURIL) 25 MG tablet Take 1 tablet (25 mg) by mouth every morning 9/24/2017 at 0800 Yes Raisa Davidson MD   atorvastatin (LIPITOR) 20 MG tablet Take 1 tablet (20 mg) by mouth daily 9/24/2017 at 2200 Yes Raisa Davidson MD   dorzolamide (TRUSOPT) 2 % ophthalmic solution Place 2 drops into both eyes 2 times daily 9/25/2017 at 0730 Yes Raisa Davidson MD   desoximetasone (TOPICORT) 0.25 % cream Apply sparingly to affected area's 9/23/2017 at 2000 Yes Raisa Davidson MD   latanoprost (XALATAN) 0.005 % ophthalmic solution Place 1 drop Into the left eye At Bedtime 9/24/2017 at 2200 Yes Raisa Davidson MD   albuterol (PROAIR HFA, PROVENTIL HFA, VENTOLIN HFA) 108 (90 BASE) MCG/ACT inhaler Inhale 2 puffs into the lungs every 4 hours as needed for shortness of breath / dyspnea March Yes Raisa Davidson MD   azelastine (ASTELIN) 137 MCG/SPRAY nasal spray [place] 1 spray into both  nostrils 2 times daily as needed Reported on 3/22/2017 April Yes Unknown, Entered By History   azelastine (OPTIVAR) 0.05 % SOLN Place 1 drop into both eyes 2 times daily as needed Reported on 3/22/2017 April Yes Unknown, Entered By History   cetirizine (ZYRTEC) 10 MG tablet Take 10 mg by mouth every morning. 9/24/2017 at 0800 Yes Unknown, Entered By History   MULTIVITAMIN TABS   OR Reported on 3/22/2017 9/24/2017 at 0800 Yes Reported, Patient   ASPIRIN 81 MG OR TABS 1 tab po QD (Once per day)  Patient taking differently: No sig reported 1 week ago Yes Patricia Hensley MD   fish oil-omega-3 fatty acids (FISH OIL) 1000 MG capsule Take 1 g by mouth daily Reported on 3/22/2017 1 week ago at Unknown time  Unknown, Entered By History

## 2017-09-25 NOTE — ANESTHESIA POSTPROCEDURE EVALUATION
Patient: Gricelda Sabillon    Procedure(s):  right total  hip arthroplasty     - Wound Class: I-Clean    Diagnosis:right  hip osteoarthritis  Diagnosis Additional Information: right  hip osteoarthritis    Anesthesia Type:  General    Note:  Anesthesia Post Evaluation    Patient location during evaluation: PACU  Patient participation: Able to fully participate in evaluation  Level of consciousness: awake and alert  Pain management: adequate  Airway patency: patent  Cardiovascular status: acceptable  Respiratory status: acceptable  Hydration status: acceptable  PONV: controlled     Anesthetic complications: None          Last vitals:  Vitals:    09/25/17 1300 09/25/17 1315 09/25/17 1330   BP: (!) 167/121  169/83   Pulse:      Resp: 9 12 13   Temp:      SpO2: 98% 94% 94%         Electronically Signed By: Miah Rain MD  September 25, 2017  1:53 PM

## 2017-09-25 NOTE — ANESTHESIA PREPROCEDURE EVALUATION
Anesthesia Evaluation     .             ROS/MED HX    ENT/Pulmonary:     (+)sleep apnea, Intermittent asthma Treatment: Inhaler prn,  uses CPAP , . .    Neurologic:  - neg neurologic ROS     Cardiovascular:     (+) hypertension----. : . . . :. valvular problems/murmurs .       METS/Exercise Tolerance:     Hematologic:  - neg hematologic  ROS       Musculoskeletal:  - neg musculoskeletal ROS       GI/Hepatic:  - neg GI/hepatic ROS       Renal/Genitourinary:  - ROS Renal section negative       Endo:     (+) type II DM Not using insulin - not using insulin pump not previously admitted for DM/DKA Obesity, .      Psychiatric:  - neg psychiatric ROS       Infectious Disease:  - neg infectious disease ROS       Malignancy:      - no malignancy   Other:    (+) No chance of pregnancy C-spine cleared: N/A, H/O Chronic Pain,no other significant disability   - neg other ROS                 Physical Exam  Normal systems: cardiovascular, pulmonary and dental    Airway   Mallampati: II  TM distance: >3 FB  Neck ROM: full    Dental     Cardiovascular       Pulmonary                     Anesthesia Plan      History & Physical Review  History and physical reviewed and following examination; no interval change.    ASA Status:  3 .    NPO Status:  > 8 hours    Plan for General with Intravenous induction. Maintenance will be Balanced.    PONV prophylaxis:  Ondansetron (or other 5HT-3) and Dexamethasone or Solumedrol       Postoperative Care  Postoperative pain management:  IV analgesics.      Consents  Anesthetic plan, risks, benefits and alternatives discussed with:  Patient.  Use of blood products discussed: Yes.   Use of blood products discussed with Patient.  Consented to blood products.  .                          .

## 2017-09-25 NOTE — PLAN OF CARE
Problem: Patient Care Overview  Goal: Plan of Care/Patient Progress Review  PT: Patient seen for physical therapy evaluation and treatment. Pt is a 67 year old female s/p R KEVEN. Pt has a PMH of DM2, mild intermittent asthma, osteoarthritis and HTN. Pt lives in a single story home with her , they have 2 large steps to enter with no hand rails. 1 step inside to reach sunken living room. Pt reports that at baseline she was walking without an AD only using the SEC for uneven surfaces and icy areas.      Discharge Planner PT   Patient plan for discharge: Home with assist from spouse  Current status: Pt supine upon arrival. Educated in KEVEN precautions. Pt performed supine KEVEN exercises. Pt transferred supine to sit with SBA and verbal cues for technique. Upon sitting pt reported dizziness, resolved in 1-2 minutes. Pt transferred sit to stand with Min A and raised bed height. Pt reports hip pain and dizziness with standing. Sit to supine with Min A for LE. Pt having bouts of dizziness with scooting in bed.   Barriers to return to prior living situation: Stairs  Recommendations for discharge: TBD POD #2  Rationale for recommendations: TBD POD #2       Entered by: Kena Meredith 09/25/2017 4:53 PM

## 2017-09-25 NOTE — PLAN OF CARE
Problem: Patient Care Overview  Goal: Plan of Care/Patient Progress Review  Outcome: No Change  To 607 1430: A/O. Drowsy post anesthesia. Dressing CDI. Incontinent of urine. Skin intact. Nausea during transport but improved once settled in bed. Tolerating clears. Reports chronic pain in R knee, no pain in hip currently.

## 2017-09-25 NOTE — PROGRESS NOTES
09/25/17 1600   Quick Adds   Type of Visit Initial PT Evaluation   Living Environment   Lives With spouse   Living Arrangements house   Number of Stairs to Enter Home 2   Number of Stairs Within Home 1   Stair Railings at Home none   Self-Care   Usual Activity Tolerance good   Current Activity Tolerance moderate   Regular Exercise no   Equipment Currently Used at Home cane, straight;walker, rolling;shower chair;raised toilet   Activity/Exercise/Self-Care Comment Pt was walking independently prior to admission. SEC for uneven or snowy surfaces.   Functional Level Prior   Ambulation 0-->independent   Transferring 0-->independent   Toileting 0-->independent   Bathing 0-->independent   Dressing 0-->independent   Eating 0-->independent   Communication 0-->understands/communicates without difficulty   Swallowing 0-->swallows foods/liquids without difficulty   Cognition 0 - no cognition issues reported   Fall history within last six months no   Which of the above functional risks had a recent onset or change? ambulation;transferring   General Information   Onset of Illness/Injury or Date of Surgery - Date 09/25/17   Referring Physician Ayaan Pena MD   Patient/Family Goals Statement Pt would like to return home   Pertinent History of Current Problem (include personal factors and/or comorbidities that impact the POC) Pt is a 67 year old female s/p R KEVEN. Pt has a PMH of DM2, mild intermittent asthma, osteoarthritis and HTN.   Precautions/Limitations right hip precautions   Weight-Bearing Status - RLE weight-bearing as tolerated   Cognitive Status Examination   Orientation orientation to person, place and time   Follows Commands and Answers Questions 100% of the time   Personal Safety and Judgment intact   Memory intact   Pain Assessment   Patient Currently in Pain Yes, see Vital Sign flowsheet   Integumentary/Edema   Integumentary/Edema no deficits were identifed   Posture    Posture Not impaired   Range of  "Motion (ROM)   ROM Comment WFL, except R hip due to precautions   Strength   Strength Comments Decreased strength in R LE   Bed Mobility   Bed Mobility Comments Pt transfered supine to sit with SBA and verbal cues. Sit to supine with Min A for LE.    Transfer Skills   Transfer Comments Sit to stand with Min A, 2WW and raised bed height.   Gait   Gait Comments Pt took 1 step to HOB   Balance   Balance no deficits were identified   Sensory Examination   Sensory Perception no deficits were identified   Coordination   Coordination no deficits were identified   Muscle Tone   Muscle Tone no deficits were identified   General Therapy Interventions   Planned Therapy Interventions bed mobility training;gait training;strengthening;ROM;transfer training;risk factor education;home program guidelines   Clinical Impression   Criteria for Skilled Therapeutic Intervention yes, treatment indicated   PT Diagnosis Decreased functional mobility   Influenced by the following impairments pain, weakness, decreased ROM   Functional limitations due to impairments bed mobility, transfers, gait, stairs   Clinical Presentation Stable/Uncomplicated   Clinical Presentation Rationale Clinical Judgement   Clinical Decision Making (Complexity) Low complexity   Therapy Frequency` 2 times/day   Predicted Duration of Therapy Intervention (days/wks) 3 days   Anticipated Discharge Disposition Home   Risk & Benefits of therapy have been explained Yes   Patient, Family & other staff in agreement with plan of care Yes   [facility] [employer] TM \"6 Clicks\"   2016, Trustees of [facility], under license to [employer].  All rights reserved.   6 Clicks Short Forms Basic Mobility Inpatient Short Form   [facility] [employer]  \"6 Clicks\" V.2 Basic Mobility Inpatient Short Form   1. Turning from your back to your side while in a flat bed without using bedrails? 3 - A Little   2. Moving from lying on your back to sitting on the side of a flat bed " without using bedrails? 3 - A Little   3. Moving to and from a bed to a chair (including a wheelchair)? 3 - A Little   4. Standing up from a chair using your arms (e.g., wheelchair, or bedside chair)? 3 - A Little   5. To walk in hospital room? 2 - A Lot   6. Climbing 3-5 steps with a railing? 2 - A Lot   Basic Mobility Raw Score (Score out of 24.Lower scores equate to lower levels of function) 16   Total Evaluation Time   Total Evaluation Time (Minutes) 5

## 2017-09-25 NOTE — CONSULTS
[facility]  Hospitalist Consult Note  Name: Gricelda Sabillon    MRN: [id number]  YOB: 1950    Age: 67 year old  Date of admission: 9/25/2017  Primary care provider: Raisa Davidson     Requesting Physician:  Dr. Pena  Reason for consult:  Post-operative medical management         Assessment and Plan:   Gricelda Sabillon is a 67 year old female with a history of DM2, mild intermittent asthma, osteoarthritis and HTN who was admitted for right KEVEN on 9/25.    1. DJD s/p right total hip arthroplasty on 9/25: The patient is doing well, currently has well controlled pain and is hemodynamically stable. Will defer diet, activity, DVT prophylaxis, and pain control to the primary team. Continue physical and occupational therapy. Social work consulted for possible placement needs. Continue incentive spirometry and check hemoglobin to evaluate for surgical blood loss and potential need for transfusion.     2. DM2: resume home metformin, januvia, glipizide.  Will have insulin SSI available as well.  A1C 7.7 on 9/5/17.  I have added hold parameters to metformin to hold this for now unless she is eating >50% of a regular meal as she has had some nausea and metformin may worsen things.    3.  HTN: agree w/ resuming home lisinopril, hctz and atenolol though have added hold parameters favoring BB use.  Will check a bmp in the AM.    4.  Mild intermittent asthma: resume home inhalers.  Not in an acute exacerbation.  She relates this more to seasonal allergies and she feels it is currently not an issue.    5.  Chronic right knee pain: she notes this has been an issue for a few years following a right TKA.  Continue pain control, mobilize as able.  Somewhat worse after history.      Code status: full      Thank you for the consultation, we will continue to follow along during the hospitalization. Please page with any questions or concerns.         History of Present Illness:    Gricelda Sabillon is a 67 year old female with a history of DM2, mild intermittent asthma, osteoarthritis and HTN who was admitted for right KEVEN on 9/25.  She has some chronic right knee pain which she feels is worse than usual after surgery as well as some nausea.  She is somewhat sedated but alert and oriented and appropriate.  She is on low rate O2 via nasal cannula currently.    Otherwise the patient had no complications related to the procedure and has had an unremarkable post-operative course to this point.  No vomiting, diarrhea or constipation. No fevers, chills, diaphoresis. No chest pain, palpitations, dyspnea.  Tolerating oral intake. No excessive somnolence and patient is fully alert and oriented. The patient has no other complaints at this time.                Past Medical History:     Past Medical History:   Diagnosis Date     Allergic rhinitis, cause unspecified      Asthma      Asthma, mild intermittent      Cataract      Cellulitis and abscess of leg, except foot 03/00    Bilateral     Eczema      Heart murmur     aortic area     HTN, goal below 140/90      Hyperlipidemia LDL goal < 100      Hyperplastic colon polyp 2008     Infection     bilateral eye infections     Macular hole of left eye      Obesity (BMI 30-39.9)      Osteoarthritis     knees     Other chronic pain     right knee     Sleep apnea     uses c pap     Type 2 diabetes, HbA1C goal < 8% (H)              Past Surgical History:     Past Surgical History:   Procedure Laterality Date     ARTHROPLASTY KNEE  7/12/2013    Procedure: ARTHROPLASTY KNEE;  right total knee arthroplasty ;  Surgeon: Chaparro Wesley MD;  Location: [facility] OR     ARTHROSCOPY KNEE Right 1/21/2015    Procedure: ARTHROSCOPY KNEE;  Surgeon: Ayaan Pena MD;  Location: [facility] OR     C INCISION OF HYMEN       CATARACT IOL, RT/LT       COLONOSCOPY  2008     EYE SURGERY      macular hole repaired left eye     HC KNEE SCOPE, DIAGNOSTIC      - both knees     HEAD &  "NECK SURGERY      wisdom teeth               Social History:     Social History   Substance Use Topics     Smoking status: Never Smoker     Smokeless tobacco: Never Used     Alcohol use No             Family History:   Family history was fully reviewed and non-contributory in this case.          Allergies:     Allergies   Allergen Reactions     Codeine      \"NAUSE,HA AND DIZZINESS\"     Sulfites Visual Disturbance     Xibrom (bromfenac opthalmic solution) 0.09%--eye pain             Medications:     Prior to Admission medications    Medication Sig Last Dose Taking? Auth Provider   IBUPROFEN PO Take 800 mg by mouth every 6 hours 1 week ago Yes Reported, Patient   glipiZIDE (GLUCOTROL) 5 MG tablet TAKE 2 TABLETs BY MOUTH TWICE DAILY BEFORE MEAL(S) 9/24/2017 at 2000 Yes Raisa Davidson MD   sitagliptin (JANUVIA) 100 MG tablet Take 1 tablet (100 mg) by mouth daily 9/24/2017 at 0800 Yes Raisa Davidson MD   order for DME All diabetic testing supplies including test strips, lancets and solution for testing 4 times per day. Patient is using  no Insulin. Last A1c Lab Results       Component                Value               Date                       A1C                      7.7                 09/05/2017                 A1C                      7.5                 03/17/2017  Yes Raisa Davidson MD   metFORMIN (GLUCOPHAGE) 1000 MG tablet TAKE ONE TABLET BY MOUTH TWICE DAILY WITH MEALS 9/24/2017 at 2000 Yes Raisa Davidson MD   lisinopril (PRINIVIL/ZESTRIL) 40 MG tablet TAKE ONE TABLET BY MOUTH ONCE DAILY 9/24/2017 at 0800 Yes Raisa Davidson MD   atenolol (TENORMIN) 50 MG tablet Take 1 tablet (50 mg) by mouth daily 9/24/2017 at 0800 Yes Raisa Davidson MD   hydrochlorothiazide (HYDRODIURIL) 25 MG tablet Take 1 tablet (25 mg) by mouth every morning 9/24/2017 at 0800 Yes Raisa Davidson MD   atorvastatin (LIPITOR) " 20 MG tablet Take 1 tablet (20 mg) by mouth daily 9/24/2017 at 2200 Yes Raisa Davidson MD   order for DME All diabetic testing supplies including test strips, lancets and solution for testing 3 times per day. Patient is using  no Insulin. A1C      7.6   5/3/2016  A1C      7.4   2/4/2016  Yes Raisa aDvidson MD   dorzolamide (TRUSOPT) 2 % ophthalmic solution Place 2 drops into both eyes 2 times daily 9/25/2017 at 0730 Yes Raisa Davidson MD   desoximetasone (TOPICORT) 0.25 % cream Apply sparingly to affected area's 9/23/2017 at 2000 Yes Raisa Davidson MD   latanoprost (XALATAN) 0.005 % ophthalmic solution Place 1 drop Into the left eye At Bedtime 9/24/2017 at 2200 Yes Raisa Davidson MD   albuterol (PROAIR HFA, PROVENTIL HFA, VENTOLIN HFA) 108 (90 BASE) MCG/ACT inhaler Inhale 2 puffs into the lungs every 4 hours as needed for shortness of breath / dyspnea March Yes Raisa Davidson MD   azelastine (ASTELIN) 137 MCG/SPRAY nasal spray [place] 1 spray into both nostrils 2 times daily as needed Reported on 3/22/2017 April Yes Unknown, Entered By History   azelastine (OPTIVAR) 0.05 % SOLN Place 1 drop into both eyes 2 times daily as needed Reported on 3/22/2017 April Yes Unknown, Entered By History   cetirizine (ZYRTEC) 10 MG tablet Take 10 mg by mouth every morning. 9/24/2017 at 0800 Yes Unknown, Entered By History   MULTIVITAMIN TABS   OR Reported on 3/22/2017 9/24/2017 at 0800 Yes Reported, Patient   ASPIRIN 81 MG OR TABS 1 tab po QD (Once per day)  Patient taking differently: No sig reported 1 week ago Yes Patricia Hensley MD   blood glucose monitoring (ONE TOUCH ULTRA) test strip Pt checks BS 1x a day   Raisa Davidson MD   fish oil-omega-3 fatty acids (FISH OIL) 1000 MG capsule Take 1 g by mouth daily Reported on 3/22/2017 1 week ago at Unknown time  Unknown, Entered By History       Current  "hospital administered medication list (MAR) also reviewed.          Review of Systems:   A comprehensive greater than 10 system review of systems was carried out.  Pertinent positives and negatives are noted above.  Otherwise negative for contributory info.            Physical Exam:   Blood pressure 147/68, pulse 72, temperature 95.4  F (35.2  C), temperature source Oral, resp. rate 16, height 1.676 m (5' 6\"), last menstrual period 12/13/2002, SpO2 92 %, not currently breastfeeding.  Exam:  GENERAL: No apparent distress. Awake, alert, and fully oriented but tired/slightly somnolent.  HEENT: Normocephalic, atraumatic. Extraocular movements intact.  CARDIOVASCULAR: Regular rate and rhythm without murmurs or rubs. No S3.  PULMONARY: Clear bilaterally.  ABDOMINAL: Soft, non-tender, non-distended. Bowel sounds normoactive. No hepatosplenomegaly.  EXTREMITIES: right hip dressed, c/d/i.  Right leg is warm, well perfused.  No cyanosis or clubbing. No edema.  NEUROLOGICAL: CN 2-12 grossly intact, no focal neurological deficits.  DERMATOLOGICAL: No rash, ulcer, ecchymoses, jaundice.         Data:   Imaging:  Reviewed.    EKG/Telemetry:  Reviewed.    Labs: Reviewed.   No results for input(s): WBC, HGB, HCT, MCV, PLT in the last 168 hours.       Lab Results   Component Value Date     09/05/2017     09/07/2016     02/04/2016    Lab Results   Component Value Date    CHLORIDE 101 09/05/2017    CHLORIDE 102 09/07/2016    CHLORIDE 106 02/04/2016    Lab Results   Component Value Date    BUN 26 09/05/2017    BUN 30 09/07/2016    BUN 13 02/04/2016      Lab Results   Component Value Date    POTASSIUM 4.5 09/05/2017    POTASSIUM 4.1 09/07/2016    POTASSIUM 3.7 02/04/2016    Lab Results   Component Value Date    CO2 26 09/05/2017    CO2 30 09/07/2016    CO2 25 02/04/2016    Lab Results   Component Value Date    CR 0.96 09/05/2017    CR 0.97 09/07/2016    CR 0.70 02/04/2016          Avinash Holder MD  [facility] " Hospitalist  September 25, 2017

## 2017-09-25 NOTE — BRIEF OP NOTE
[facility]  Orthopedics Brief Operative Note    Pre-operative diagnosis: right  hip osteoarthritis   Post-operative diagnosis Same   Procedure: Procedure(s):  right total  hip arthroplasty     - Wound Class: I-Clean   Surgeon: Ayaan Pena MD   Assistants(s): Surgeon(s) and Role:     * Ayaan Pena MD - Primary     * Patricio Samuels PA-C - Assisting   Anesthesia: General    Estimated blood loss: 300 mL    Total IV fluids: See anesthesia record   Blood transfusion: None       Drains: None   Specimens: * No specimens in log *   Implants: Rodriguez Nephew #13 femur, 50mm acetab. 20 degree liner, 32mm +0 chrome head   Findings: Dictated   Complications: None   Condition: Stable   Comments: See Dictated Operative report for full details

## 2017-09-26 ENCOUNTER — APPOINTMENT (OUTPATIENT)
Dept: PHYSICAL THERAPY | Facility: CLINIC | Age: 67
DRG: 470 | End: 2017-09-26
Attending: ORTHOPAEDIC SURGERY
Payer: MEDICARE

## 2017-09-26 ENCOUNTER — APPOINTMENT (OUTPATIENT)
Dept: OCCUPATIONAL THERAPY | Facility: CLINIC | Age: 67
DRG: 470 | End: 2017-09-26
Attending: ORTHOPAEDIC SURGERY
Payer: MEDICARE

## 2017-09-26 LAB
ANION GAP SERPL CALCULATED.3IONS-SCNC: 8 MMOL/L (ref 3–14)
BUN SERPL-MCNC: 20 MG/DL (ref 7–30)
CALCIUM SERPL-MCNC: 8.8 MG/DL (ref 8.5–10.1)
CHLORIDE SERPL-SCNC: 100 MMOL/L (ref 94–109)
CO2 SERPL-SCNC: 25 MMOL/L (ref 20–32)
CREAT SERPL-MCNC: 0.88 MG/DL (ref 0.52–1.04)
GFR SERPL CREATININE-BSD FRML MDRD: 63 ML/MIN/1.7M2
GLUCOSE BLDC GLUCOMTR-MCNC: 111 MG/DL (ref 70–99)
GLUCOSE BLDC GLUCOMTR-MCNC: 112 MG/DL (ref 70–99)
GLUCOSE BLDC GLUCOMTR-MCNC: 131 MG/DL (ref 70–99)
GLUCOSE BLDC GLUCOMTR-MCNC: 143 MG/DL (ref 70–99)
GLUCOSE BLDC GLUCOMTR-MCNC: 206 MG/DL (ref 70–99)
GLUCOSE SERPL-MCNC: 137 MG/DL (ref 70–99)
HGB BLD-MCNC: 9.4 G/DL (ref 11.7–15.7)
POTASSIUM SERPL-SCNC: 4.1 MMOL/L (ref 3.4–5.3)
SODIUM SERPL-SCNC: 133 MMOL/L (ref 133–144)

## 2017-09-26 PROCEDURE — 99232 SBSQ HOSP IP/OBS MODERATE 35: CPT | Performed by: INTERNAL MEDICINE

## 2017-09-26 PROCEDURE — 25000132 ZZH RX MED GY IP 250 OP 250 PS 637: Mod: GY | Performed by: ORTHOPAEDIC SURGERY

## 2017-09-26 PROCEDURE — 97110 THERAPEUTIC EXERCISES: CPT | Mod: GP

## 2017-09-26 PROCEDURE — 12000000 ZZH R&B MED SURG/OB

## 2017-09-26 PROCEDURE — 40000133 ZZH STATISTIC OT WARD VISIT: Performed by: REHABILITATION PRACTITIONER

## 2017-09-26 PROCEDURE — 25000128 H RX IP 250 OP 636: Performed by: ORTHOPAEDIC SURGERY

## 2017-09-26 PROCEDURE — 80048 BASIC METABOLIC PNL TOTAL CA: CPT | Performed by: ORTHOPAEDIC SURGERY

## 2017-09-26 PROCEDURE — 97116 GAIT TRAINING THERAPY: CPT | Mod: GP

## 2017-09-26 PROCEDURE — 97165 OT EVAL LOW COMPLEX 30 MIN: CPT | Mod: GO | Performed by: REHABILITATION PRACTITIONER

## 2017-09-26 PROCEDURE — A9270 NON-COVERED ITEM OR SERVICE: HCPCS | Mod: GY | Performed by: INTERNAL MEDICINE

## 2017-09-26 PROCEDURE — 85018 HEMOGLOBIN: CPT | Performed by: ORTHOPAEDIC SURGERY

## 2017-09-26 PROCEDURE — 36415 COLL VENOUS BLD VENIPUNCTURE: CPT | Performed by: ORTHOPAEDIC SURGERY

## 2017-09-26 PROCEDURE — 97535 SELF CARE MNGMENT TRAINING: CPT | Mod: GO | Performed by: REHABILITATION PRACTITIONER

## 2017-09-26 PROCEDURE — A9270 NON-COVERED ITEM OR SERVICE: HCPCS | Mod: GY | Performed by: ORTHOPAEDIC SURGERY

## 2017-09-26 PROCEDURE — 97530 THERAPEUTIC ACTIVITIES: CPT | Mod: GP

## 2017-09-26 PROCEDURE — 00000146 ZZHCL STATISTIC GLUCOSE BY METER IP

## 2017-09-26 PROCEDURE — 25000132 ZZH RX MED GY IP 250 OP 250 PS 637: Mod: GY | Performed by: INTERNAL MEDICINE

## 2017-09-26 PROCEDURE — 40000193 ZZH STATISTIC PT WARD VISIT

## 2017-09-26 RX ADMIN — SENNOSIDES AND DOCUSATE SODIUM 2 TABLET: 8.6; 5 TABLET ORAL at 08:16

## 2017-09-26 RX ADMIN — DORZOLAMIDE HYDROCHLORIDE 2 DROP: 20 SOLUTION/ DROPS OPHTHALMIC at 21:10

## 2017-09-26 RX ADMIN — ENOXAPARIN SODIUM 40 MG: 40 INJECTION SUBCUTANEOUS at 08:19

## 2017-09-26 RX ADMIN — LATANOPROST 1 DROP: 50 SOLUTION/ DROPS OPHTHALMIC at 21:14

## 2017-09-26 RX ADMIN — KETOROLAC TROMETHAMINE 15 MG: 15 INJECTION, SOLUTION INTRAMUSCULAR; INTRAVENOUS at 02:28

## 2017-09-26 RX ADMIN — KETOROLAC TROMETHAMINE 15 MG: 15 INJECTION, SOLUTION INTRAMUSCULAR; INTRAVENOUS at 08:20

## 2017-09-26 RX ADMIN — CEFAZOLIN SODIUM 2 G: 2 INJECTION, SOLUTION INTRAVENOUS at 02:28

## 2017-09-26 RX ADMIN — METFORMIN HYDROCHLORIDE 1000 MG: 500 TABLET ORAL at 18:51

## 2017-09-26 RX ADMIN — ACETAMINOPHEN 975 MG: 325 TABLET, FILM COATED ORAL at 08:16

## 2017-09-26 RX ADMIN — SODIUM CHLORIDE: 9 INJECTION, SOLUTION INTRAVENOUS at 04:05

## 2017-09-26 RX ADMIN — ATENOLOL 50 MG: 50 TABLET ORAL at 08:18

## 2017-09-26 RX ADMIN — HYDROMORPHONE HYDROCHLORIDE 2 MG: 2 TABLET ORAL at 13:06

## 2017-09-26 RX ADMIN — DORZOLAMIDE HYDROCHLORIDE 2 DROP: 20 SOLUTION/ DROPS OPHTHALMIC at 09:18

## 2017-09-26 RX ADMIN — GLIPIZIDE 10 MG: 10 TABLET ORAL at 08:17

## 2017-09-26 RX ADMIN — SENNOSIDES AND DOCUSATE SODIUM 2 TABLET: 8.6; 5 TABLET ORAL at 21:07

## 2017-09-26 RX ADMIN — METFORMIN HYDROCHLORIDE 1000 MG: 500 TABLET ORAL at 08:16

## 2017-09-26 RX ADMIN — ATORVASTATIN CALCIUM 20 MG: 20 TABLET, FILM COATED ORAL at 21:07

## 2017-09-26 RX ADMIN — GLIPIZIDE 10 MG: 10 TABLET ORAL at 18:51

## 2017-09-26 RX ADMIN — SITAGLIPTIN 100 MG: 50 TABLET, FILM COATED ORAL at 08:17

## 2017-09-26 RX ADMIN — HYDROMORPHONE HYDROCHLORIDE 2 MG: 2 TABLET ORAL at 09:15

## 2017-09-26 RX ADMIN — ACETAMINOPHEN 975 MG: 325 TABLET, FILM COATED ORAL at 17:02

## 2017-09-26 RX ADMIN — INSULIN ASPART 1 UNITS: 100 INJECTION, SOLUTION INTRAVENOUS; SUBCUTANEOUS at 13:08

## 2017-09-26 ASSESSMENT — ACTIVITIES OF DAILY LIVING (ADL): IADL_COMMENTS: SPOUSE TO COMPLETE AS NEEDED

## 2017-09-26 NOTE — PLAN OF CARE
Problem: Patient Care Overview  Goal: Plan of Care/Patient Progress Review  Outcome: Improving  A/O. Up A1/SBA walker and belt. Minimal pain, dilaudid pre PT. Voided once today small amount, see bladder scans, DTV. Tolerating reg diet. Blood sugars controlled. D/C home today or tomorrow? If pt does go home today she will need her vaccinations pre d/c otherwise tomorrow.

## 2017-09-26 NOTE — PLAN OF CARE
Problem: Patient Care Overview  Goal: Plan of Care/Patient Progress Review      Discharge Planner PT   Patient plan for discharge: Home with assist from spouse  Current status: Pt seen for treatment in [facility] gym. Pt completed supine KEVEN exercises. Pt performs supine to sit with SBA. Sit to supine with Min A for LE. Pt amb ~275 ft with 2WW and CGA-SBA.   Barriers to return to prior living situation: Stairs  Recommendations for discharge: TBD POD #2  Rationale for recommendations: TBD POD #2       Entered by: Kena Meredith 09/26/2017 4:53 PM

## 2017-09-26 NOTE — PLAN OF CARE
Problem: Patient Care Overview  Goal: Discharge Needs Assessment  Outcome: No Change  [id number]: Pt resting comfortably. AO. VSS- Requiring O2 with CPAP.  Pain controlled with Tylenol and toradol. Blood sugars 176, and 112. Dressing CDI, CMS intact. Transfers with Ax1. DTV- Bladder scan showed 458ml., straight cathed for 400ml. DTV. Bladder scan this am was 76ml. Pt denies bladder pressure, or urge to void. Will continue to monitor.

## 2017-09-26 NOTE — PLAN OF CARE
Problem: Patient Care Overview  Goal: Plan of Care/Patient Progress Review      Discharge Planner PT   Patient plan for discharge: Home with assist from spouse  Current status: Pt in bedside chair upon arrival. Transferred sit to stand with CGA. Pt performs sit to supine transfer with SBA and verbal cues, second attempt Pt was able to do without bed rail. Pt performed supine KEVEN exercises. Pt ambulated ~200 ft with 2WW and CGA. Pt required verbal cues to maintain hip precautions.   Barriers to return to prior living situation: Stairs  Recommendations for discharge: TBD POD #2  Rationale for recommendations: TBD POD #2       Entered by: Kena Meredith 09/26/2017 9:57 AM

## 2017-09-26 NOTE — PROGRESS NOTES
"  [facility]  Hospitalist Progress Note  Mandeep Garcia MD 09/26/2017    Reason for Stay (Diagnosis): *s/p KEVEN         Assessment and Plan:      Summary of Stay: Gricelda Sabillon is a 67 year old female with a history of DM2, mild intermittent asthma, osteoarthritis and HTN who was admitted for right KEVEN on 9/25.  She is having some issues with mild urine retention, o/w no complaints     1. DJD s/p right total hip arthroplasty on 9/25: The patient is doing well.  DVT prophylaxis, and pain control to the primary team. Continue physical and occupational therapy.      2. DM2: resumed home metformin, januvia, glipizide.  Will have insulin SSI available as well.  A1C 7.7 on 9/5/17.  adequate control in-house     3.  HTN: continue home lisinopril, hctz and atenolol.  Good control on home meds.  BMP with normal creatinine     4.  Mild intermittent asthma: resume home inhalers.  no e/o exacerbation    5.  Mild urine retention with reported PVR of 300-400cc.  Suspect from narcotic pain meds, and anticipate improvement with decreased pain med needs.  On no other culprit meds.  D/w pt          DVT Prophylaxis: Defer to primary service  Code Status: Full Code  Discharge Dispo: home  Estimated Disch Date / # of Days until Disch: POD #3        Interval History (Subjective):      Pain adequately controlled.  Mobilizing with rehab.  Having some urine retention                  Physical Exam:      Last Vital Signs:  /54 (BP Location: Right arm)  Pulse 94  Temp 97.7  F (36.5  C) (Oral)  Resp 16  Ht 1.676 m (5' 6\")  LMP 12/13/2002  SpO2 98%  Breastfeeding? No      Intake/Output Summary (Last 24 hours) at 09/26/17 1709  Last data filed at 09/26/17 1438   Gross per 24 hour   Intake             2081 ml   Output              650 ml   Net             1431 ml       Constitutional: Awake, alert, cooperative, no apparent distress   Respiratory: Clear to auscultation bilaterally, no crackles or wheezing "   Cardiovascular: Regular rate and rhythm, normal S1 and S2, and no murmur noted   Abdomen: Normal bowel sounds, soft, non-distended, non-tender   Skin: No rashes, no cyanosis, dry to touch. Incision of R hip covered with dressing   Neuro: Alert and oriented x3, no weakness, numbness, memory loss   Extremities: No edema, normal range of motion   Other(s):        All other systems: Negative          Medications:      All current medications were reviewed with changes reflected in problem list.         Data:      All new lab and imaging data was reviewed.   Labs:    Recent Labs  Lab 09/26/17  0750 09/25/17 2000   HGB 9.4*  --    PLT  --  298      Imaging:   No results found for this or any previous visit (from the past 24 hour(s)).

## 2017-09-26 NOTE — PLAN OF CARE
A&O x 4. Up with one assist, walker, and gait belt. Dilaudid available for pain control. Voided 100 ml, - pt declined urge to void, will monitor close. NS at 75 ml/hr. Dressing CDI, denies N/T

## 2017-09-26 NOTE — PROGRESS NOTES
[facility]  Orthopedics Progress Note             Interval History:     67 year old female admitted postoperatively for elective Right total hip arthroplasty  with Dr. SANDY Pena due to DJD unresponsive to non operative treatment.  There were no intraoperative complications.  Patient currently Post op day #1.    Patient reports doing well.  Pain tolerable, eating, ambulating, one second of lighthead if stands up too quick.  Issue with urinary retention.   Understands hip restrictions.     Pain: tolerable.   Nausea: none.   Light headedness : none  Chest pain: none  Shortness of breath: none              Assessment and Plan:   S/P R KEVEN, Day #1.  VSS, hemodynamically asymptomatic, pain management adequate.  Urinary retention.    PLAN:  DVT prop  Pain control  PT/OT  Monitor outputs    Hopeful discharge to home tomorrow.                  Physical Exam:   Patient Vitals for the past 12 hrs:   BP Temp Temp src Heart Rate Resp SpO2   09/26/17 1702 - - - - 16 -   09/26/17 1614 133/54 97.7  F (36.5  C) Oral 82 16 -   09/26/17 1355 - - - - 16 - 09/26/17 1306 - - - - 16 - 09/26/17 1000 - - - - 16 - 09/26/17 0915 - - - - 16 - 09/26/17 0820 - - - - 16 - 09/26/17 0751 110/48 97.8  F (36.6  C) Oral 69 16 98 %     Hemoglobin   Date Value Ref Range Status   09/26/2017 9.4 (L) 11.7 - 15.7 g/dL Final   09/05/2017 11.9 11.7 - 15.7 g/dL Final       Patient is alert and oriented.  Vitals: stable  Neurovascular status: intact  Dressing: clean and dry  Calves: soft and non tender          Patricio Samuels PA-C  [facility] - Surgery    9/26/2017

## 2017-09-26 NOTE — PROGRESS NOTES
09/26/17 1022   Quick Adds   Type of Visit Initial Occupational Therapy Evaluation   Living Environment   Lives With spouse   Living Arrangements house  (rambler with walkout)   Home Accessibility (laundry in lower level)   Number of Stairs to Enter Home 2   Number of Stairs Within Home 1   Transportation Available car;family or friend will provide   Living Environment Comment patient will walk in shower   Self-Care   Usual Activity Tolerance good   Current Activity Tolerance moderate   Regular Exercise no   Equipment Currently Used at Home cane, straight;walker, rolling;shower chair;commode  (reacher)   Activity/Exercise/Self-Care Comment Pt was walking independently prior to admission. SEC for uneven or snowy surfaces.   Functional Level Prior   Ambulation 0-->independent  (used SEC only icy or uneven surfaces)   Transferring 0-->independent   Toileting 0-->independent   Bathing 0-->independent   Dressing 0-->independent   Eating 0-->independent   Communication 0-->understands/communicates without difficulty   Swallowing 0-->swallows foods/liquids without difficulty   Cognition 0 - no cognition issues reported   Fall history within last six months no   Which of the above functional risks had a recent onset or change? ambulation;transferring;toileting;bathing;dressing   General Information   Onset of Illness/Injury or Date of Surgery - Date 09/25/17   Referring Physician Dr. Pena   Patient/Family Goals Statement to return home   Additional Occupational Profile Info/Pertinent History of Current Problem Patient is POD#1 for R KEVEN   Precautions/Limitations fall precautions;right hip precautions  (posterior hip precautions)   Weight-Bearing Status - RLE weight-bearing as tolerated   General Observations patient was in bed and agreeable to OT session   Cognitive Status Examination   Orientation orientation to person, place and time   Level of Consciousness alert   Able to Follow Commands WNL/WFL   Personal Safety  (Cognitive) WNL/WFL   Memory intact   Visual Perception   Visual Perception Wears glasses   Integumentary/Edema   Integumentary/Edema no deficits were identifed   Posture   Posture not impaired   Range of Motion (ROM)   ROM Quick Adds No deficits were identified   Strength   Manual Muscle Testing Quick Adds No deficits were identified   Hand Strength   Hand Strength Comments intact   Muscle Tone Assessment   Muscle Tone Quick Adds No deficits were identified   Coordination   Upper Extremity Coordination No deficits were identified   Mobility   Bed Mobility Comments min A with sit to supine, treatment initiated-defer to OT daily note for details   Transfer Skill: Bed to Chair/Chair to Bed   Level of [place]: Bed to Chair (treatment initiated-defer OT daily note for details)   Transfer Skill: Sit to Stand   Level of [place]: Sit/Stand contact guard   Physical Assist/Nonphysical Assist: Sit/Stand supervision;1 person assist   Transfer Skill: Sit to Stand weight-bearing as tolerated   Assistive Device for Transfer: Sit/Stand rolling walker   Toilet Transfer   Toilet Transfer Comments treatment initiated-defer OT daily note for details   Tub/Shower Transfer   Tub/Shower Transfer Comments treatment initiated-defer OT daily note for details   Balance   Balance Comments LOB was not noted- general unsteadiness to be expected-   Lower Body Dressing   Level of [place]: Dress Lower Body (treatment initiated-defer OT daily note for details)   Eating/Self Feeding   Level of [place]: Eating independent   Instrumental Activities of Daily Living (IADL)   IADL Comments spouse to complete as needed   Activities of Daily Living Analysis   Impairments Contributing to Impaired Activities of Daily Living balance impaired;pain;post surgical precautions;ROM decreased;strength decreased   General Therapy Interventions   Planned Therapy Interventions ADL retraining;progressive activity/exercise;transfer training   Clinical  "Impression   Criteria for Skilled Therapeutic Interventions Met yes, treatment indicated   OT Diagnosis decreased ADL's   Influenced by the following impairments balance impaired;pain;post surgical precautions;ROM decreased;strength decreased   Assessment of Occupational Performance 5 or more Performance Deficits   Identified Performance Deficits decreased ADL's and IADL's- dsg, toileting, bathing, housekeeping, cooking, driving, community mobility   Clinical Decision Making (Complexity) Low complexity   Therapy Frequency daily   Predicted Duration of Therapy Intervention (days/wks) 3 days   Anticipated Discharge Disposition Home   Risks and Benefits of Treatment have been explained. Yes   Patient, Family & other staff in agreement with plan of care Yes   [facility] TM \"6 Clicks\"   2016, Trustees of [facility], under license to [employer].  All rights reserved.   6 Clicks Short Forms Daily Activity Inpatient Short Form   [facility]  \"6 Clicks\" Daily Activity Inpatient Short Form   1. Putting on and taking off regular lower body clothing? 3 - A Little   2. Bathing (including washing, rinsing, drying)? 3 - A Little   3. Toileting, which includes using toilet, bedpan or urinal? 4 - None   4. Putting on and taking off regular upper body clothing? 4 - None   5. Taking care of personal grooming such as brushing teeth? 4 - None   6. Eating meals? 4 - None   Daily Activity Raw Score (Score out of 24.Lower scores equate to lower levels of function) 22   Total Evaluation Time   Total Evaluation Time (Minutes) 10     "

## 2017-09-26 NOTE — OP NOTE
DATE OF PROCEDURE:  09/25/2017      PREOPERATIVE DIAGNOSIS:  End-stage primary osteoarthritis to the right hip.      POSTOPERATIVE DIAGNOSIS:  End-stage primary osteoarthritis to the right hip.      PROCEDURE:  Right total hip arthroplasty.      SURGEON:  Ayaan Pena MD      ASSISTANT:  Avery Samuels PA-C      ANESTHESIA:  General.      INDICATIONS FOR PROCEDURE:  Gricelda Sabillon is a 67-year-old woman with end-stage osteoarthritis of her right hip who elected to undergo the above-stated procedure, fully understanding the potential risks as well as benefits of this procedure.      DESCRIPTION OF PROCEDURE:  Patient was brought to the operating room, placed in a supine position on the operating table, where general anesthesia was administered without difficulty.  She was then placed in a left lateral decubitus position, carefully padding all dependent appendages.  Her right hip and lower extremity were scrubbed and prepped in the usual sterile fashion and draped sterilely.      Using a posterolateral approach the skin and subcutaneous tissue were dissected sharply down to the IT band and gluteus luna which were split along their fibers.  Dissection was then carried around the posterior aspect of the greater trochanter to the short external rotators and posterior hip capsule which was opened in a T fashion and tagged with #1 Ethibond suture.  The hip was dislocated and the femoral neck was cut off at 15 mm above the lesser trochanter as preoperatively planned.  Sequential reaming of the acetabulum allowed for a size 50 acetabular shell.  A [employer] ingrowth shell was then fitted and fixed with a single screw.  A 20 degree liner was then fitted to the acetabular shell for a 32 mm head.  Sequential reaming and broaching of the femur allowed for a size 13 femoral prosthetic stem to be fitted.  This had the best range of motion with a 32 mm chrome head with a +0 neck length.  Following a final reduction  hemostasis was observed.  Short external rotators were reapproximated through drill holes in the posterior greater trochanter and the wound was closed in a routine layered fashion and dressed sterilely.  Patient tolerated the procedure well and left the operating room in satisfactory and stable condition.      ESTIMATED BLOOD LOSS:  300 mL.      SPONGE AND NEEDLE COUNT:  Correct x2.         BRAD HAMM MD             D: 2017 14:01   T: 2017 21:23   MT: [id number]      Name:     MOLINA KO   MRN:      [id number]        Account:        [id number]   :      1950           Procedure Date: 2017      Document: [id number]

## 2017-09-27 ENCOUNTER — APPOINTMENT (OUTPATIENT)
Dept: PHYSICAL THERAPY | Facility: CLINIC | Age: 67
DRG: 470 | End: 2017-09-27
Attending: ORTHOPAEDIC SURGERY
Payer: MEDICARE

## 2017-09-27 LAB
GLUCOSE BLDC GLUCOMTR-MCNC: 120 MG/DL (ref 70–99)
GLUCOSE BLDC GLUCOMTR-MCNC: 122 MG/DL (ref 70–99)
GLUCOSE BLDC GLUCOMTR-MCNC: 123 MG/DL (ref 70–99)
GLUCOSE BLDC GLUCOMTR-MCNC: 134 MG/DL (ref 70–99)
GLUCOSE BLDC GLUCOMTR-MCNC: 153 MG/DL (ref 70–99)
GLUCOSE BLDC GLUCOMTR-MCNC: 211 MG/DL (ref 70–99)
HGB BLD-MCNC: 8.9 G/DL (ref 11.7–15.7)

## 2017-09-27 PROCEDURE — 25000128 H RX IP 250 OP 636: Performed by: ORTHOPAEDIC SURGERY

## 2017-09-27 PROCEDURE — 25000132 ZZH RX MED GY IP 250 OP 250 PS 637: Mod: GY | Performed by: INTERNAL MEDICINE

## 2017-09-27 PROCEDURE — 97110 THERAPEUTIC EXERCISES: CPT | Mod: GP | Performed by: PHYSICAL THERAPY ASSISTANT

## 2017-09-27 PROCEDURE — 97110 THERAPEUTIC EXERCISES: CPT | Mod: GP | Performed by: PHYSICAL THERAPIST

## 2017-09-27 PROCEDURE — A9270 NON-COVERED ITEM OR SERVICE: HCPCS | Mod: GY | Performed by: INTERNAL MEDICINE

## 2017-09-27 PROCEDURE — 40000193 ZZH STATISTIC PT WARD VISIT: Performed by: PHYSICAL THERAPY ASSISTANT

## 2017-09-27 PROCEDURE — 00000146 ZZHCL STATISTIC GLUCOSE BY METER IP

## 2017-09-27 PROCEDURE — 36415 COLL VENOUS BLD VENIPUNCTURE: CPT | Performed by: ORTHOPAEDIC SURGERY

## 2017-09-27 PROCEDURE — 85018 HEMOGLOBIN: CPT | Performed by: ORTHOPAEDIC SURGERY

## 2017-09-27 PROCEDURE — 97530 THERAPEUTIC ACTIVITIES: CPT | Mod: GP | Performed by: PHYSICAL THERAPIST

## 2017-09-27 PROCEDURE — 25000132 ZZH RX MED GY IP 250 OP 250 PS 637: Mod: GY | Performed by: PHYSICIAN ASSISTANT

## 2017-09-27 PROCEDURE — 25000132 ZZH RX MED GY IP 250 OP 250 PS 637: Mod: GY | Performed by: ORTHOPAEDIC SURGERY

## 2017-09-27 PROCEDURE — 12000000 ZZH R&B MED SURG/OB

## 2017-09-27 PROCEDURE — 97116 GAIT TRAINING THERAPY: CPT | Mod: GP | Performed by: PHYSICAL THERAPIST

## 2017-09-27 PROCEDURE — 40000193 ZZH STATISTIC PT WARD VISIT: Performed by: PHYSICAL THERAPIST

## 2017-09-27 PROCEDURE — 97116 GAIT TRAINING THERAPY: CPT | Mod: GP | Performed by: PHYSICAL THERAPY ASSISTANT

## 2017-09-27 PROCEDURE — 99232 SBSQ HOSP IP/OBS MODERATE 35: CPT | Performed by: INTERNAL MEDICINE

## 2017-09-27 PROCEDURE — A9270 NON-COVERED ITEM OR SERVICE: HCPCS | Mod: GY | Performed by: ORTHOPAEDIC SURGERY

## 2017-09-27 RX ORDER — CETIRIZINE HYDROCHLORIDE 10 MG/1
10 TABLET ORAL EVERY MORNING
Status: DISCONTINUED | OUTPATIENT
Start: 2017-09-27 | End: 2017-09-28 | Stop reason: HOSPADM

## 2017-09-27 RX ADMIN — INSULIN ASPART 1 UNITS: 100 INJECTION, SOLUTION INTRAVENOUS; SUBCUTANEOUS at 13:40

## 2017-09-27 RX ADMIN — SENNOSIDES AND DOCUSATE SODIUM 2 TABLET: 8.6; 5 TABLET ORAL at 19:54

## 2017-09-27 RX ADMIN — HYDROCHLOROTHIAZIDE 25 MG: 25 TABLET ORAL at 08:57

## 2017-09-27 RX ADMIN — SENNOSIDES AND DOCUSATE SODIUM 2 TABLET: 8.6; 5 TABLET ORAL at 08:54

## 2017-09-27 RX ADMIN — ACETAMINOPHEN 975 MG: 325 TABLET, FILM COATED ORAL at 00:38

## 2017-09-27 RX ADMIN — HYDROMORPHONE HYDROCHLORIDE 2 MG: 2 TABLET ORAL at 00:37

## 2017-09-27 RX ADMIN — METFORMIN HYDROCHLORIDE 1000 MG: 500 TABLET ORAL at 10:05

## 2017-09-27 RX ADMIN — ATORVASTATIN CALCIUM 20 MG: 20 TABLET, FILM COATED ORAL at 19:54

## 2017-09-27 RX ADMIN — DORZOLAMIDE HYDROCHLORIDE 2 DROP: 20 SOLUTION/ DROPS OPHTHALMIC at 21:40

## 2017-09-27 RX ADMIN — GLIPIZIDE 10 MG: 10 TABLET ORAL at 10:05

## 2017-09-27 RX ADMIN — ACETAMINOPHEN 975 MG: 325 TABLET, FILM COATED ORAL at 08:53

## 2017-09-27 RX ADMIN — GLIPIZIDE 10 MG: 10 TABLET ORAL at 18:14

## 2017-09-27 RX ADMIN — ENOXAPARIN SODIUM 40 MG: 40 INJECTION SUBCUTANEOUS at 08:57

## 2017-09-27 RX ADMIN — ATENOLOL 50 MG: 50 TABLET ORAL at 08:57

## 2017-09-27 RX ADMIN — TRAMADOL HYDROCHLORIDE 25 MG: 50 TABLET, FILM COATED ORAL at 21:39

## 2017-09-27 RX ADMIN — TRAMADOL HYDROCHLORIDE 25 MG: 50 TABLET, FILM COATED ORAL at 13:31

## 2017-09-27 RX ADMIN — LATANOPROST 1 DROP: 50 SOLUTION/ DROPS OPHTHALMIC at 21:40

## 2017-09-27 RX ADMIN — LISINOPRIL 40 MG: 40 TABLET ORAL at 08:58

## 2017-09-27 RX ADMIN — ACETAMINOPHEN 975 MG: 325 TABLET, FILM COATED ORAL at 16:18

## 2017-09-27 RX ADMIN — CETIRIZINE HYDROCHLORIDE 10 MG: 10 TABLET, FILM COATED ORAL at 10:05

## 2017-09-27 RX ADMIN — METFORMIN HYDROCHLORIDE 1000 MG: 500 TABLET ORAL at 18:14

## 2017-09-27 RX ADMIN — DORZOLAMIDE HYDROCHLORIDE 2 DROP: 20 SOLUTION/ DROPS OPHTHALMIC at 09:00

## 2017-09-27 RX ADMIN — SITAGLIPTIN 100 MG: 50 TABLET, FILM COATED ORAL at 08:58

## 2017-09-27 RX ADMIN — HYDROMORPHONE HYDROCHLORIDE 2 MG: 2 TABLET ORAL at 08:54

## 2017-09-27 NOTE — PLAN OF CARE
Problem: Patient Care Overview  Goal: Plan of Care/Patient Progress Review  Outcome: Improving  Pain controlled with po meds. Up with assist of 1 to bathroom- needing some help lifting leg back into bed. Cms intact. drsg CD&I. Still having post void residuals- last at 2225 was 461 and pt refused st cath- wants to have second attempt at void.

## 2017-09-27 NOTE — PROGRESS NOTES
Day RN  VS monitored, up w/A1 and ww, aquacel drsg placed, CMS+ ex mild edema LE's, BG checks QID, voiding, PVR this morning 160 cc, voiding larger amounts this afternoon (up in chair after each PM void so unable to check PVR), pt generally not feeling well this morning and became lightheaded w/PT, VS and BG checked and stable, switched pain meds from PO Dilaudid to Tramadol and pt reports pain controlled and feeling better in afternoon, plan is home tomorrow, will cont to monitor.

## 2017-09-27 NOTE — PLAN OF CARE
Problem: Hip Replacement, Total (Adult)  Goal: Signs and Symptoms of Listed Potential Problems Will be Absent or Manageable (Hip Replacement, Total)  Signs and symptoms of listed potential problems will be absent or manageable by discharge/transition of care (reference Hip Replacement, Total (Adult) CPG).   Outcome: Improving   Sleeping well. Rating right hip and knee pain 4/10. PO Dilaudid effective.  Voided  in BR x 2/ PVR= 300cc- improved from earlier residual. Will monitor. BRPs w/A1/walker/gait belt. This am voided 400cc/PVR= 650cc. Able to void another 200cc. Straight cathed for 400cc clear, zach urine.

## 2017-09-27 NOTE — PROGRESS NOTES
[facility]  Orthopedics Progress Note             Interval History:     67 year old female admitted postoperatively for elective Right total hip arthroplasty  with Dr. SANDY Pena due to DJD unresponsive to non operative treatment.  There were no intraoperative complications.  Patient currently Post op day #2.    Patient reports pain fluctuating.  Is very tired currently, and states is very foggy.  Eating, ambulating.  Still with some residual urine after voiding.  No new complaints.     Nursing reporting voiding residual are decreasing.    Pain: tolerable.   Nausea: none.   Light headedness : none  Chest pain: none  Shortness of breath: none              Assessment and Plan:   S/P R KEVEN day #2.  VSS, hemodynamically asymptomatic, pain management adequate.  Urine retention.  Foggy.    PLAN:  Continue voiding measures.  Change dilaudid to tramadol for decreased opioid side effects.  Hip restrictions.  PT/OT    Discharge to home tomorrow.                  Physical Exam:   Patient Vitals for the past 12 hrs:   BP Temp Temp src Heart Rate Resp SpO2   09/27/17 1615 118/56 98.8  F (37.1  C) Oral 89 16 95 %   09/27/17 1416 - - - - 16 -   09/27/17 1331 - - - - 16 -   09/27/17 1117 147/57 - - 86 16 96 %   09/27/17 0940 - - - - 16 -   09/27/17 0856 142/55 - - 82 - -   09/27/17 0854 - - - - 16 -   09/27/17 0817 145/53 98.4  F (36.9  C) Oral 88 16 95 %     Hemoglobin   Date Value Ref Range Status   09/27/2017 8.9 (L) 11.7 - 15.7 g/dL Final   09/26/2017 9.4 (L) 11.7 - 15.7 g/dL Final       Patient is alert and oriented.  Vitals: stable  Neurovascular status: intact  Dressing: clean and dry  Calves: soft and non tender          Patricio Samuels PA-C  [facility] - Surgery    9/27/2017

## 2017-09-27 NOTE — PROGRESS NOTES
"  [facility]  Hospitalist Progress Note  Mateus Blank MD 09/27/2017    Reason for Stay (Diagnosis): s/p KEVEN         Assessment and Plan:      Summary of Stay: Gricelda Sabillon is a 67 year old female with a history of DM2, mild intermittent asthma, osteoarthritis and HTN who was admitted for right KEVEN on 9/25.       1. DJD s/p right total hip arthroplasty on 9/25: The patient is doing well.  DVT prophylaxis, and pain control to the primary team. Continue physical and occupational therapy.      2. DM II - Continue home metformin, januvia, glipizide.  Insulin SSI available as well.  A1C 7.7 on 9/5/17.  adequate control in-house     3.  HTN: Continue home lisinopril, hctz and atenolol.  BP better controlled home meds.  BMP with normal creatinine     4.  Mild intermittent asthma: resume home inhalers.  no e/o exacerbation    5.  Mild urine retention- resolved. Likely due to narcotic pain medication.     DVT Prophylaxis: Defer to primary service.  Code Status: Full Code  Discharge Dispo: home  Estimated Disch Date / # of Days until Disch: Likely tomorrow per Ortho.  I discussed with patient and also nursing staff.      Interval History (Subjective):      Patient seen and examined, assumed care today.  No new issues. Pain adequately controlled.  Mobilizing with rehab.  FDelt dizzy earlier when trying to work                 Physical Exam:      Last Vital Signs:  /57  Pulse 94  Temp 98.4  F (36.9  C) (Oral)  Resp 16  Ht 1.676 m (5' 6\")  LMP 12/13/2002  SpO2 96%  Breastfeeding? No    Intake/Output Summary (Last 24 hours) - Reviewed.    Constitutional: Awake, alert, cooperative, no apparent distress   Respiratory: Clear to auscultation bilaterally, no crackles or wheezing   Cardiovascular: Regular rate and rhythm, normal S1 and S2, and no murmur noted   Abdomen: Normal bowel sounds, soft, non-distended, non-tender   Skin: No rashes, no cyanosis, dry to touch. Incision of R hip covered " with dressing   Neuro: Alert and oriented x3, no weakness, numbness, memory loss   Extremities: No edema, normal range of motion   Other(s):        All other systems: Negative          Medications:      All current medications were reviewed with changes reflected in problem list.         Data:      All new lab and imaging data was reviewed.   Labs:    Recent Labs  Lab 09/27/17 0637  09/25/17 2000   HGB 8.9*  < >  --    PLT  --   --  298   < > = values in this interval not displayed.   Imaging:   No results found for this or any previous visit (from the past 24 hour(s)).

## 2017-09-27 NOTE — PLAN OF CARE
Problem: Patient Care Overview  Goal: Plan of Care/Patient Progress Review      Discharge Planner PT   Patient plan for discharge: Home with assist from spouse  Current status: Pt transfers supine to sit independently and CGA with sit to supine. Pt transfers sit to/from stand indep. Pt transfers bed to chair with ww with CGA. Pt amb 175' and 15' with ww with SBA to CGA. Pt performed 4 steps with both rails with CGA with c/o dizziness on last step. Pt transferred to chair and notified nursing. Note HR @ 91 & O2 sats @ 95% with RA. Pt transferred back to room via wc.   Barriers to return to prior living situation: Stairs  Recommendations for discharge:Collaborated with PT and recommend home with assist from spouse.   Rationale for recommendations:  Pt would benefit from continued therapy to allow pt to meet all goals and return home safely.        Entered by: Maribel Mueller 09/27/2017 11:27 AM

## 2017-09-28 ENCOUNTER — APPOINTMENT (OUTPATIENT)
Dept: PHYSICAL THERAPY | Facility: CLINIC | Age: 67
DRG: 470 | End: 2017-09-28
Attending: ORTHOPAEDIC SURGERY
Payer: MEDICARE

## 2017-09-28 VITALS
OXYGEN SATURATION: 96 % | TEMPERATURE: 97.7 F | HEART RATE: 94 BPM | SYSTOLIC BLOOD PRESSURE: 113 MMHG | DIASTOLIC BLOOD PRESSURE: 52 MMHG | RESPIRATION RATE: 16 BRPM | HEIGHT: 66 IN

## 2017-09-28 LAB
GLUCOSE BLDC GLUCOMTR-MCNC: 102 MG/DL (ref 70–99)
GLUCOSE BLDC GLUCOMTR-MCNC: 133 MG/DL (ref 70–99)
GLUCOSE BLDC GLUCOMTR-MCNC: 147 MG/DL (ref 70–99)
PLATELET # BLD AUTO: 246 10E9/L (ref 150–450)

## 2017-09-28 PROCEDURE — 25000132 ZZH RX MED GY IP 250 OP 250 PS 637: Mod: GY | Performed by: INTERNAL MEDICINE

## 2017-09-28 PROCEDURE — 25000132 ZZH RX MED GY IP 250 OP 250 PS 637: Mod: GY | Performed by: ORTHOPAEDIC SURGERY

## 2017-09-28 PROCEDURE — 00000146 ZZHCL STATISTIC GLUCOSE BY METER IP

## 2017-09-28 PROCEDURE — A9270 NON-COVERED ITEM OR SERVICE: HCPCS | Mod: GY | Performed by: INTERNAL MEDICINE

## 2017-09-28 PROCEDURE — 97530 THERAPEUTIC ACTIVITIES: CPT | Mod: GP | Performed by: PHYSICAL THERAPY ASSISTANT

## 2017-09-28 PROCEDURE — 40000193 ZZH STATISTIC PT WARD VISIT: Performed by: PHYSICAL THERAPY ASSISTANT

## 2017-09-28 PROCEDURE — 97116 GAIT TRAINING THERAPY: CPT | Mod: GP | Performed by: PHYSICAL THERAPY ASSISTANT

## 2017-09-28 PROCEDURE — 25000128 H RX IP 250 OP 636: Performed by: ORTHOPAEDIC SURGERY

## 2017-09-28 PROCEDURE — 25000128 H RX IP 250 OP 636: Performed by: INTERNAL MEDICINE

## 2017-09-28 PROCEDURE — 90732 PPSV23 VACC 2 YRS+ SUBQ/IM: CPT | Performed by: INTERNAL MEDICINE

## 2017-09-28 PROCEDURE — A9270 NON-COVERED ITEM OR SERVICE: HCPCS | Mod: GY | Performed by: ORTHOPAEDIC SURGERY

## 2017-09-28 PROCEDURE — 36415 COLL VENOUS BLD VENIPUNCTURE: CPT | Performed by: ORTHOPAEDIC SURGERY

## 2017-09-28 PROCEDURE — 25000132 ZZH RX MED GY IP 250 OP 250 PS 637: Mod: GY | Performed by: PHYSICIAN ASSISTANT

## 2017-09-28 PROCEDURE — 99232 SBSQ HOSP IP/OBS MODERATE 35: CPT | Performed by: INTERNAL MEDICINE

## 2017-09-28 PROCEDURE — 85049 AUTOMATED PLATELET COUNT: CPT | Performed by: ORTHOPAEDIC SURGERY

## 2017-09-28 RX ORDER — AMOXICILLIN 250 MG
1-2 CAPSULE ORAL 2 TIMES DAILY PRN
Qty: 40 TABLET | Refills: 0 | Status: SHIPPED | OUTPATIENT
Start: 2017-09-28 | End: 2019-04-12

## 2017-09-28 RX ORDER — TRAMADOL HYDROCHLORIDE 50 MG/1
25-50 TABLET ORAL EVERY 6 HOURS PRN
Qty: 40 TABLET | Refills: 0 | Status: SHIPPED | OUTPATIENT
Start: 2017-09-28 | End: 2017-10-25

## 2017-09-28 RX ORDER — ACETAMINOPHEN 325 MG/1
650 TABLET ORAL EVERY 4 HOURS PRN
Qty: 100 TABLET | Refills: 0 | Status: SHIPPED | OUTPATIENT
Start: 2017-09-28 | End: 2017-10-25

## 2017-09-28 RX ADMIN — SITAGLIPTIN 100 MG: 50 TABLET, FILM COATED ORAL at 08:53

## 2017-09-28 RX ADMIN — PNEUMOCOCCAL VACCINE POLYVALENT 0.5 ML
25; 25; 25; 25; 25; 25; 25; 25; 25; 25; 25; 25; 25; 25; 25; 25; 25; 25; 25; 25; 25; 25; 25 INJECTION, SOLUTION INTRAMUSCULAR; SUBCUTANEOUS at 10:45

## 2017-09-28 RX ADMIN — METFORMIN HYDROCHLORIDE 1000 MG: 500 TABLET ORAL at 08:53

## 2017-09-28 RX ADMIN — ACETAMINOPHEN 975 MG: 325 TABLET, FILM COATED ORAL at 10:45

## 2017-09-28 RX ADMIN — ENOXAPARIN SODIUM 40 MG: 40 INJECTION SUBCUTANEOUS at 08:54

## 2017-09-28 RX ADMIN — TRAMADOL HYDROCHLORIDE 25 MG: 50 TABLET, FILM COATED ORAL at 14:59

## 2017-09-28 RX ADMIN — DORZOLAMIDE HYDROCHLORIDE 2 DROP: 20 SOLUTION/ DROPS OPHTHALMIC at 08:57

## 2017-09-28 RX ADMIN — CETIRIZINE HYDROCHLORIDE 10 MG: 10 TABLET, FILM COATED ORAL at 08:53

## 2017-09-28 RX ADMIN — GLIPIZIDE 10 MG: 10 TABLET ORAL at 08:53

## 2017-09-28 RX ADMIN — ACETAMINOPHEN 975 MG: 325 TABLET, FILM COATED ORAL at 02:50

## 2017-09-28 RX ADMIN — TRAMADOL HYDROCHLORIDE 25 MG: 50 TABLET, FILM COATED ORAL at 08:52

## 2017-09-28 RX ADMIN — ATENOLOL 50 MG: 50 TABLET ORAL at 08:52

## 2017-09-28 NOTE — PROGRESS NOTES
Day RN  VS monitored, cpap at hs, up w/A1 and ww, drsg w/scant dried drainage, CMS+ ex mild edema feet, voiding, Tylenol/Tramadol for pain, BG checks QID, home early evening, gave pt d/c ppwk to look over, awaiting meds to be filled, doing well overall.

## 2017-09-28 NOTE — PLAN OF CARE
Problem: Patient Care Overview  Goal: Plan of Care/Patient Progress Review  Discharge Planner PT   Patient plan for discharge: home later today  Current status: Mod Ind with all transfers and bed mobility gait with RW to 145 feet. Up and down 4 steps with 1 rail and SEC does not plan to need to use stairs at home but able to do with SBA  Barriers to return to prior living situation: none has RW and education on how to fit SEC  Recommendations for discharge: PT- Per plan established by the Physical Therapist, according to functional mobility the discharge recommendation is home with no further PT until sees MD     Rationale for recommendations: all goals are met will continue to progress at home with HEP.       Entered by: Katherin Marmolejo 09/28/2017 10:27 AM           Physical Therapy Discharge Summary    Reason for therapy discharge:    Discharged to home.    Progress towards therapy goal(s). See goals on Care Plan in [facility] electronic health record for goal details.  Goals met    Therapy recommendation(s):    Continue home exercise program.

## 2017-09-28 NOTE — PROGRESS NOTES
Reviewed discharge instructions and medications with patient and . Questions answered. Patient discharged to home with , discharge instructions, medications (Tylenol, Senna, Aspirin, and Tramadol), and belongings at this time.

## 2017-09-28 NOTE — PLAN OF CARE
Problem: Hip Replacement, Total (Adult)  Goal: Signs and Symptoms of Listed Potential Problems Will be Absent or Manageable (Hip Replacement, Total)  Signs and symptoms of listed potential problems will be absent or manageable by discharge/transition of care (reference Hip Replacement, Total (Adult) CPG).   Outcome: Improving  Alert and oriented. VSS. Tolerating moderate carb diet. Transfers with Ax1, gait belt, and walker. CMS intact. 2+ edema in BLE. Voiding in adequate amounts. Pain managed with scheduled Tylenol and prn Tramadol. Plans d/c to home tomorrow.

## 2017-09-28 NOTE — DISCHARGE SUMMARY
[facility]  Discharge Summary            Interval History:     67 year old female admitted postoperatively for elective Right total hip arthroplasty  with Dr. SANDY Pena due to DJD unresponsive to non operative treatment.  There were no notable intraoperative complications.  Patient being discharged post op Day #3.  Gricelda Sabillon received skilled nursing, PT, OT, SW inquiry.  There was no Hospitalist care during the stay.     Gricelda Sabillon has progressed satisfactorily with ambulation and pain management and without medical complication.  Patient is confident in their discharge and has  met goals with PT and OT. Pt lives at home with spouse and will be discharged to home based on home living conditions and level of support.  Gricelda Sabillon leaves the hospital in stable condition with good chance for recovery.  Today: Doing well.  Pain well controlled, voiding, ambulating, eating.  No new complaints.     Pain: tolerable.   Nausea: none.  Light headedness : none  Chest pain: none  Shortness of breath: none              Assessment and Plan:   S/P R KEVEN Day #3.  Final Diagnosis: same.   VSS, Hemodynamically asymptomatic, pain management adequate.    PLAN:  DVT prophylaxis with aspirin, as patient has no history of VTE.  Pain management with tramadol and tylenol.  pericolace  Hip restrictions.  Patient instructions attached to discharge.      Discharge to home.  Follow up in clinic as previously scheduled, approx 10-14 days post op.    [facility]  [facility] [address]  [facility]  [address]  [phone]                    Physical Exam:   Patient Vitals for the past 12 hrs:   BP Temp Temp src Heart Rate Resp SpO2   09/28/17 1158 113/52 - - 81 - -   09/28/17 0937 - - - - 16 -   09/28/17 0843 116/55 - - 76 16 -   09/28/17 0825 108/73 97.7  F (36.5  C) Oral 80 16 96 %     Hemoglobin   Date Value Ref Range Status   09/27/2017 8.9 (L) 11.7 - 15.7 g/dL Final   09/26/2017 9.4  (L) 11.7 - 15.7 g/dL Final       Patient is alert and oriented.  Vitals: stable  Neurovascular status: intact  Dressing: clean and dry  Calves: soft and non tender          Patricio Samuels PA-C  [facility] - Surgery    9/28/2017

## 2017-09-28 NOTE — PROGRESS NOTES
"  [facility]  Hospitalist Progress Note  Mateus Blank MD 09/28/2017    Reason for Stay (Diagnosis): s/p KEVEN         Assessment and Plan:      Summary of Stay: Gricelda Sabillon is a 67 year old female with a history of DM2, mild intermittent asthma, osteoarthritis and HTN who was admitted for right KEVEN on 9/25.       1. DJD s/p right total hip arthroplasty on 9/25: The patient is doing well.  DVT prophylaxis, and pain control to the primary team. Continue physical and occupational therapy.      2. DM II - Continue home metformin, januvia, glipizide.  Insulin SSI available as well.  A1C 7.7 on 9/5/17.  adequate control in-house     3.  HTN: Continue home lisinopril, hctz and atenolol.  BP better controlled on home meds.  BMP with normal creatinine.   - Advised her to get a BP machine and check BP at home.     4.  Mild intermittent asthma: resume home inhalers.  no e/o exacerbation    5.  Mild urine retention- resolved. Likely due to narcotic pain medication.     DVT Prophylaxis: Defer to primary service.  Code Status: Full Code  Discharge Dispo: home  Estimated Disch Date / # of Days until Disch: Likely today per Ortho.  I discussed with patient at length the plan of care and also discussed with nursing staff.      Interval History (Subjective):      Patient seen and examined, no new issues.feels better, no dizziness, worked well with PT. Pain adequately controlled.  Mobilizing with rehab.                 Physical Exam:      Last Vital Signs:  /52 (BP Location: Right arm)  Pulse 94  Temp 97.7  F (36.5  C) (Oral)  Resp 16  Ht 1.676 m (5' 6\")  LMP 12/13/2002  SpO2 96%  Breastfeeding? No    Intake/Output Summary (Last 24 hours) - Reviewed.    Constitutional: Awake, alert, cooperative, no apparent distress   Respiratory: Clear to auscultation bilaterally, no crackles or wheezing   Cardiovascular: Regular rate and rhythm, normal S1 and S2, and no murmur noted   Abdomen: Normal bowel " sounds, soft, non-distended, non-tender   Skin: No rashes, no cyanosis, dry to touch. Incision of R hip covered with dressing   Neuro: Alert and oriented x3, no weakness, numbness, memory loss   Extremities: No edema, normal range of motion   Other(s):        All other systems: Negative          Medications:      All current medications were reviewed with changes reflected in problem list.         Data:      All new lab and imaging data was reviewed.   Labs:    Recent Labs  Lab 09/28/17  0708 09/27/17  0637   HGB  --  8.9*     --       Imaging:   No results found for this or any previous visit (from the past 24 hour(s)).

## 2017-09-29 ENCOUNTER — TELEPHONE (OUTPATIENT)
Dept: INTERNAL MEDICINE | Facility: CLINIC | Age: 67
End: 2017-09-29

## 2017-09-29 ENCOUNTER — TELEPHONE (OUTPATIENT)
Dept: ORTHOPEDICS | Facility: CLINIC | Age: 67
End: 2017-09-29

## 2017-09-29 NOTE — TELEPHONE ENCOUNTER
Spoke with patient.  Doing well, no questions at this time.  Offered number for nurse triage and confirmed follow up appointment.    Patricio Samuels PA-C  [facility] - Surgery

## 2017-09-29 NOTE — TELEPHONE ENCOUNTER
IP F/U    Date: 9-28-17  Diagnosis: R hip osteoarthritis  Is patient active in care coordination? No  Was patient in TCU? No    Next 5 appointments (look out 90 days)     Oct 06, 2017 11:00 AM CDT   Return Visit with Patricio Samuels PA-C   [facility] ORTHOPEDIC SURGERY ([facility])    [address]  [address]  [place] [address]   [phone]            Oct 25, 2017  1:30 PM CDT   Return Visit with Clara Corado MD   [facility] ([facility])    [address] [facility] [address]  [place] [id number] [phone]

## 2017-10-06 ENCOUNTER — OFFICE VISIT (OUTPATIENT)
Dept: ORTHOPEDICS | Facility: CLINIC | Age: 67
End: 2017-10-06
Payer: COMMERCIAL

## 2017-10-06 DIAGNOSIS — E11.9 TYPE 2 DIABETES, HBA1C GOAL < 8% (H): ICD-10-CM

## 2017-10-06 DIAGNOSIS — Z47.89 ORTHOPEDIC AFTERCARE: Primary | ICD-10-CM

## 2017-10-06 PROCEDURE — 99024 POSTOP FOLLOW-UP VISIT: CPT | Performed by: PHYSICIAN ASSISTANT

## 2017-10-06 NOTE — MR AVS SNAPSHOT
After Visit Summary   10/6/2017    Gricelda Sabillon    MRN: [id number]           Patient Information     Date Of Birth          1950        Visit Information        Provider Department      10/6/2017 11:00 AM Patricio Samuels PA-C [facility] ORTHOPEDIC SURGERY        Today's Diagnoses     Orthopedic aftercare    -  1      Care Instructions    Incision care instructions:  Keep dry 24 hours.  Showering is ok after that time, however no soaking or scrubbing of incision for 2 weeks.  Tape-strips will most likely fall off on their own, however they may be removed after 2 weeks with rubbing alcohol if they have not.    If draining or bleeding stops the tape-strips are enough coverage unless you were instructed otherwise or you would like to cover for comfort.  If drainage or bleeding continues please cover with clean dressings.     Outside thigh pain.  1. Stretch as described 3-4x daily - stand up straight, slide opposite hand down the outside of the leg while throwing your right hip out.  2. Ice the outside of the thigh for 20 min several times daily    Start IBUPROFEN, up to 600 mg 3-4 times daily.    Some providers recommend that from now on, you take a single dose antibiotic prior to any invasive procedure such as dental work, colonoscopy or minor surgeries.  Please notify the provider of the procedure of your implant prior to the procedure. They may prescribe an antibiotic for you.    It is recommended that you avoid invasive procedures such as the before mentioned for 4 months after surgery unless it is an emergency.    You may discontinue the aspirin regimen or return to a dose recommended by your Primary care provider 6 weeks after surgery.    You may increase your activities as you can tolerate them.  Walking is a good activity.    No bending past 90 degrees at the hip joint, do not cross your legs, or perform excessive twisting at the hip for 4 months from surgery.    Please  follow up in 4 weeks.              Follow-ups after your visit        Follow-up notes from your care team     Return in about 4 weeks (around 11/3/2017).      Your next 10 appointments already scheduled     Oct 25, 2017  1:30 PM CDT   Return Visit with Clara Corado MD   [facility] ([facility])    [address]  [facility] [id number] [phone]              Who to contact     If you have questions or need follow up information about today's clinic visit or your schedule please contact [facility] ORTHOPEDIC SURGERY directly at [phone].  Normal or non-critical lab and imaging results will be communicated to you by MyChart, letter or phone within 4 business days after the clinic has received the results. If you do not hear from us within 7 days, please contact the clinic through [employer] or phone. If you have a critical or abnormal lab result, we will notify you by phone as soon as possible.  Submit refill requests through [employer] or call your pharmacy and they will forward the refill request to us. Please allow 3 business days for your refill to be completed.          Additional Information About Your Visit        [employer] Information     [employer] gives you secure access to your electronic health record. If you see a primary care provider, you can also send messages to your care team and make appointments. If you have questions, please call your primary care clinic.  If you do not have a primary care provider, please call [phone] and they will assist you.        Care EveryWhere ID     This is your Care EveryWhere ID. This could be used by other organizations to access your [facility] medical records  [id number]        Your Vitals Were     Last Period                   12/13/2002            Blood Pressure from Last 3 Encounters:   09/28/17 113/52   09/15/17 138/70   09/08/17 118/80    Weight from Last 3 Encounters:   09/15/17 220 lb (99.8 kg)    09/08/17 220 lb 8 oz (100 kg)   08/25/17 226 lb (102.5 kg)              Today, you had the following     No orders found for display       Primary Care Provider Office Phone # Fax #    Raisa Davidson [phone]       [address]  [facility] [address]        Equal Access to Services     [facility]: Hadii aad ku hadasho Soomaali, waaxda luqadaha, qaybta kaalmada adeegyada, waxay idiin hayaan adeeg kharash la'aan ah. So [facility] [place] [phone].    ATENCIÓN: Si habla español, tiene a vail disposición servicios gratuitos de asistencia lingüística. Llame al [phone].    We comply with applicable federal civil rights laws and [place] laws. We do not discriminate on the basis of race, color, national origin, age, disability, sex, sexual orientation, or gender identity.            Thank you!     Thank you for choosing [facility] ORTHOPEDIC SURGERY  for your care. Our goal is always to provide you with excellent care. Hearing back from our patients is one way we can continue to improve our services. Please take a few minutes to complete the written survey that you may receive in the mail after your visit with us. Thank you!             Your Updated Medication List - Protect others around you: Learn how to safely use, store and throw away your medicines at www.disposemymeds.org.          This list is accurate as of: 10/6/17 11:35 AM.  Always use your most recent med list.                   Brand Name Dispense Instructions for use Diagnosis    acetaminophen 325 MG tablet    TYLENOL    100 tablet    Take 2 tablets (650 mg) by mouth every 4 hours as needed for other (surgical pain)    S/P total hip arthroplasty, right       albuterol 108 (90 BASE) MCG/ACT Inhaler    PROAIR HFA/PROVENTIL HFA/VENTOLIN HFA    1 Inhaler    Inhale 2 puffs into the lungs every 4 hours as needed for shortness of breath / dyspnea    Acute bronchitis       aspirin  MG EC tablet     45 tablet    Take 1  tablet (325 mg) by mouth daily    Deep vein thrombosis (DVT) prophylaxis prescribed at discharge       atenolol 50 MG tablet    TENORMIN    90 tablet    Take 1 tablet (50 mg) by mouth daily    HTN, goal below 140/90, Sinus tachycardia       atorvastatin 20 MG tablet    LIPITOR    90 tablet    Take 1 tablet (20 mg) by mouth daily    Hyperlipidemia with target LDL less than 100       azelastine 0.05 % Soln ophthalmic solution    OPTIVAR     Place 1 drop into both eyes 2 times daily as needed Reported on 3/22/2017        azelastine 0.1 % spray    ASTELIN     Spray 1 spray into both nostrils 2 times daily as needed Reported on 3/22/2017        blood glucose monitoring test strip    ONE TOUCH ULTRA    50 strip    Pt checks BS 1x a day    Diabetes mellitus without complication (H)       cetirizine 10 MG tablet    zyrTEC     Take 10 mg by mouth every morning.        desoximetasone 0.25 % cream    TOPICORT    60 g    Apply sparingly to affected area's    Eczema       dorzolamide 2 % ophthalmic solution    TRUSOPT    1 Bottle    Place 2 drops into both eyes 2 times daily        fish oil-omega-3 fatty acids 1000 MG capsule      Take 1 g by mouth daily Reported on 3/22/2017        glipiZIDE 5 MG tablet    GLUCOTROL    120 tablet    TAKE 2 TABLETs BY MOUTH TWICE DAILY BEFORE MEAL(S)    Diabetes mellitus without complication (H)       hydrochlorothiazide 25 MG tablet    HYDRODIURIL    90 tablet    Take 1 tablet (25 mg) by mouth every morning    HTN, goal below 140/90       lisinopril 40 MG tablet    PRINIVIL/ZESTRIL    90 tablet    TAKE ONE TABLET BY MOUTH ONCE DAILY    HTN, goal below 140/90       metFORMIN 1000 MG tablet    GLUCOPHAGE    60 tablet    TAKE ONE TABLET BY MOUTH TWICE DAILY WITH MEALS    Type 2 diabetes, HbA1C goal < 8% (H)       MULTIVITAMIN TABS   OR      Reported on 3/22/2017        * order for DME     3 Month    All diabetic testing supplies including test strips, lancets and solution for testing 3 times per  day. Patient is using  no Insulin. A1C      7.6   5/3/2016 A1C      7.4   2/4/2016    Type 2 diabetes mellitus, uncontrolled (H), Hyperlipidemia with target LDL less than 100, Essential hypertension with goal blood pressure less than 140/90       * order for DME     3 Month    All diabetic testing supplies including test strips, lancets and solution for testing 4 times per day. Patient is using  no Insulin. Last A1c Lab Results      Component                Value               Date                      A1C                      7.7                 09/05/2017                A1C                      7.5                 03/17/2017    Type 2 diabetes mellitus with hyperglycemia, without long-term current use of insulin (H)       senna-docusate 8.6-50 MG per tablet    SENOKOT-S;PERICOLACE    40 tablet    Take 1-2 tablets by mouth 2 times daily as needed for constipation    Constipation due to opioid therapy       sitagliptin 100 MG tablet    JANUVIA    90 tablet    Take 1 tablet (100 mg) by mouth daily    Diabetes mellitus without complication (H)       traMADol 50 MG tablet    ULTRAM    40 tablet    Take 0.5-1 tablets (25-50 mg) by mouth every 6 hours as needed for moderate pain    S/P total hip arthroplasty, right       XALATAN 0.005 % ophthalmic solution   Generic drug:  latanoprost     2.5 mL    Place 1 drop Into the left eye At Bedtime        * Notice:  This list has 2 medication(s) that are the same as other medications prescribed for you. Read the directions carefully, and ask your doctor or other care provider to review them with you.

## 2017-10-06 NOTE — PATIENT INSTRUCTIONS
Incision care instructions:  Keep dry 24 hours.  Showering is ok after that time, however no soaking or scrubbing of incision for 2 weeks.  Tape-strips will most likely fall off on their own, however they may be removed after 2 weeks with rubbing alcohol if they have not.    If draining or bleeding stops the tape-strips are enough coverage unless you were instructed otherwise or you would like to cover for comfort.  If drainage or bleeding continues please cover with clean dressings.     Outside thigh pain.  1. Stretch as described 3-4x daily - stand up straight, slide opposite hand down the outside of the leg while throwing your right hip out.  2. Ice the outside of the thigh for 20 min several times daily    Start IBUPROFEN, up to 600 mg 3-4 times daily.    Some providers recommend that from now on, you take a single dose antibiotic prior to any invasive procedure such as dental work, colonoscopy or minor surgeries.  Please notify the provider of the procedure of your implant prior to the procedure. They may prescribe an antibiotic for you.    It is recommended that you avoid invasive procedures such as the before mentioned for 4 months after surgery unless it is an emergency.    You may discontinue the aspirin regimen or return to a dose recommended by your Primary care provider 6 weeks after surgery.    You may increase your activities as you can tolerate them.  Walking is a good activity.    No bending past 90 degrees at the hip joint, do not cross your legs, or perform excessive twisting at the hip for 4 months from surgery.    Please follow up in 4 weeks.

## 2017-10-19 DIAGNOSIS — I10 HTN, GOAL BELOW 140/90: ICD-10-CM

## 2017-10-23 RX ORDER — LISINOPRIL 40 MG/1
TABLET ORAL
Qty: 90 TABLET | Refills: 1 | Status: SHIPPED | OUTPATIENT
Start: 2017-10-23 | End: 2018-01-26

## 2017-10-23 RX ORDER — HYDROCHLOROTHIAZIDE 25 MG/1
TABLET ORAL
Qty: 90 TABLET | Refills: 1 | Status: SHIPPED | OUTPATIENT
Start: 2017-10-23 | End: 2018-01-26

## 2017-10-25 ENCOUNTER — OFFICE VISIT (OUTPATIENT)
Dept: ENDOCRINOLOGY | Facility: CLINIC | Age: 67
End: 2017-10-25
Payer: COMMERCIAL

## 2017-10-25 VITALS
TEMPERATURE: 98.3 F | HEIGHT: 66 IN | SYSTOLIC BLOOD PRESSURE: 120 MMHG | DIASTOLIC BLOOD PRESSURE: 72 MMHG | WEIGHT: 220.1 LBS | OXYGEN SATURATION: 97 % | BODY MASS INDEX: 35.37 KG/M2 | HEART RATE: 90 BPM

## 2017-10-25 DIAGNOSIS — E11.9 DIABETES MELLITUS WITHOUT COMPLICATION (H): Primary | Chronic | ICD-10-CM

## 2017-10-25 DIAGNOSIS — I10 ESSENTIAL HYPERTENSION WITH GOAL BLOOD PRESSURE LESS THAN 140/90: Chronic | ICD-10-CM

## 2017-10-25 DIAGNOSIS — E78.5 HYPERLIPIDEMIA WITH TARGET LDL LESS THAN 100: Chronic | ICD-10-CM

## 2017-10-25 PROCEDURE — 99213 OFFICE O/P EST LOW 20 MIN: CPT | Performed by: INTERNAL MEDICINE

## 2017-10-25 RX ORDER — IBUPROFEN 200 MG
TABLET ORAL
Status: ON HOLD | COMMUNITY
End: 2022-08-03

## 2017-10-25 NOTE — PATIENT INSTRUCTIONS
[facility]   Dr Corado, Endocrinology Department      [facility]   [address] [address]  Appointment [phone]  Fax: [phone]   Monday and Tuesday         [facility]   [address] [place] [address]  Appointment [phone]  Fax: [phone]  Wednesday and Thursday           Labs in mid Decme  Continue current regimen    Follow up in 3-6 months with  or with me.    Check Blood glucose fasting every day and before lunch/dinner/bedtime on alternate days.  If Blood glucose are low more often-> 2-3 times/week- give us a call.  The patient is advised to Make better food choices: reduce carbs, Reduce portion size, weight loss and exercise 3-4 times a week.  Discussed hypoglycemia signs and symptoms as well as management in detail.

## 2017-10-25 NOTE — NURSING NOTE
"Chief Complaint   Patient presents with     Diabetes     DM2       Initial /72 (BP Location: Left arm, Patient Position: Sitting, Cuff Size: Adult Large)  Pulse 90  Temp 98.3  F (36.8  C) (Oral)  Ht 1.676 m (5' 6\")  Wt 99.8 kg (220 lb 1.6 oz)  LMP 12/13/2002  SpO2 97%  BMI 35.53 kg/m2 Estimated body mass index is 35.53 kg/(m^2) as calculated from the following:    Height as of this encounter: 1.676 m (5' 6\").    Weight as of this encounter: 99.8 kg (220 lb 1.6 oz).  Medication Reconciliation: complete   Katarzyna Choi MA      "

## 2017-10-25 NOTE — PROGRESS NOTES
ENDOCRINOLOGY CLINIC NOTE:  Name: Gricelda Sabillon  Seen for f/u of Diabetes.  HPI:  Gricelda Sabillon is a 66 year old female who presents for the evaluation/management of Diabetes.  S/p hip replacement in 9/2017  Since then activity is improved.  Walking everyday.    1. Type 2 DM:  Orginally diagnosed about 10 years back   Current Regimen: Metformin 1000 mg twice a day, glipizide 10 mg twice a day and Januvia 100 mg/day  Was on INvokana but not able to afford.  BS checks: Once a day.  Average Meter Download: 116. Checking FBgs- mostly in range  No major episodes of hypoglycemia noted/reported.     Reports FBGs are usually <110  Exercise: Limited secondary to knee pain  Last A1c: 7.7%  Symptoms of hypoglycemia (low blood sugar):  Gets symptoms of hypoglycemia.  Episodes of hypoglycemia: No  Fixed meal pattern: Yes.    Patient counting carbs: No    DM Complications:   Nephropathy: No  Retinopathy: No  Neuropathy: No  Microalbuminuria: No  MICROL       12   8/3/2015  MICROALBUMIN     8.58   8/3/2015    CAD/PAD: No  Gastroparesis: No  Hypoglycemia unawareness: No    2. Hypertension:    Blood pressure medications include lisinopril 40 mg, atenolol 50 mg and hydrochlorothiazide 25 mg  3. Hyperlipidemia: Takes atorvastatin 20 mg.    PMH/PSH:  Past Medical History:   Diagnosis Date     Allergic rhinitis, cause unspecified      Asthma      Asthma, mild intermittent      Cataract      Cellulitis and abscess of leg, except foot 03/00    Bilateral     Eczema      Heart murmur     aortic area     HTN, goal below 140/90      Hyperlipidemia LDL goal < 100      Hyperplastic colon polyp 2008     Infection     bilateral eye infections     Macular hole of left eye      Obesity (BMI 30-39.9)      Osteoarthritis     knees     Other chronic pain     right knee     Sleep apnea     uses c pap     Type 2 diabetes, HbA1C goal < 8% (H)      Past Surgical History:   Procedure Laterality Date     ARTHROPLASTY HIP Right 9/25/2017     Procedure: ARTHROPLASTY HIP;  Right total hip arthroplasty  ;  Surgeon: Ayaan Pena MD;  Location: [facility]     ARTHROPLASTY KNEE  2013    Procedure: ARTHROPLASTY KNEE;  right total knee arthroplasty ;  Surgeon: Chaparro Wesley MD;  Location: [facility] OR     ARTHROSCOPY KNEE Right 2015    Procedure: ARTHROSCOPY KNEE;  Surgeon: Ayaan Pena MD;  Location: [facility] OR     C INCISION OF HYMEN       CATARACT IOL, RT/LT       COLONOSCOPY  2008     EYE SURGERY      macular hole repaired left eye     HC KNEE SCOPE, DIAGNOSTIC      - both knees     HEAD & NECK SURGERY      wisdom teeth     Family Hx:  Family History   Problem Relation Age of Onset     Hypertension Mother      diabetes,hypoythryoidism, stroke     DIABETES Mother      HEART DISEASE Father      , cancer lip     HEART DISEASE Paternal Grandfather           CANCER Paternal Grandmother           CEREBROVASCULAR DISEASE Maternal Grandfather           CEREBROVASCULAR DISEASE Maternal Grandmother      , diabetes     Connective Tissue Disorder Sister      fibromyalgia     DIABETES Sister      Hypertension Brother      CANCER Paternal Aunt      ovarian       Diabetes:    Social Hx:  Social History     Social History     Marital status:      Spouse name: Allen     Number of children: 3     Years of education: 15     Occupational History     Not on file.     Social History Main Topics     Smoking status: Never Smoker     Smokeless tobacco: Never Used     Alcohol use No     Drug use: No     Sexual activity: No     Other Topics Concern     Not on file     Social History Narrative          MEDICATIONS:  has a current medication list which includes the following prescription(s): hydrochlorothiazide, lisinopril, metformin, aspirin ec, glipizide, sitagliptin, order for dme, blood glucose monitoring, atenolol, atorvastatin, order for dme, dorzolamide, desoximetasone, latanoprost, albuterol, azelastine,  "azelastine, cetirizine, multiple vitamin, ibuprofen, senna-docusate, and fish oil-omega-3 fatty acids.    ROS     ROS: 10 point ROS neg other than the symptoms noted above in the HPI.    Physical Exam   VS: /72 (BP Location: Left arm, Patient Position: Sitting, Cuff Size: Adult Large)  Pulse 90  Temp 98.3  F (36.8  C) (Oral)  Ht 1.676 m (5' 6\")  Wt 99.8 kg (220 lb 1.6 oz)  LMP 12/13/2002  SpO2 97%  BMI 35.53 kg/m2  GENERAL: AXOX3, NAD, well dressed, answering questions appropriately, appears stated age.  HEENT: No exopthalmous, no proptosis, EOMI, no lig lag, no retraction  NECK: Thyroid normal in size, non tender, no nodules were palpated.  CV: RRR  LUNGS: CTAB  ABDOMEN: +BS  NEUROLOGY: CN grossly intact, no tremors  PSYCH: normal affect and mood    LABS:  A1c:  Lab Results   Component Value Date    A1C 7.7 09/05/2017    A1C 7.5 03/17/2017    A1C 7.0 12/05/2016    A1C 6.8 09/07/2016    A1C 7.6 05/03/2016       Creatinine:  Creatinine   Date Value Ref Range Status   09/26/2017 0.88 0.52 - 1.04 mg/dL Final   ]    Urine Micro:  Lab Results   Component Value Date    UMALCR 6.64 09/07/2016      MICROL       12   8/3/2015  MICROALBUMIN     8.58   8/3/2015      LFTs/Lipids:  Recent Labs   Lab Test  09/05/17   0826  09/07/16   0821  08/03/15   0835  08/12/14   0848   CHOL  160  165  132  149   HDL  37*  38*  42*  46*   LDL  68  60  57  43   TRIG  276*  334*  167*  298*   CHOLHDLRATIO   --    --   3.1  3.2       TFTs:  TSH   Date Value Ref Range Status   09/05/2017 2.33 0.40 - 4.00 mU/L Final       All pertinent notes, labs, and images personally reviewed by me.     A/P  Ms.Barbara Evens Sabillon is a 66 year old here for the evaluation/management of diabetes:    1. DM2 - Under good control.  A1c 7.7%.    BG mostly normal range.  She will be more active now after hip replacement  As blood sugars are mostly normal range and evidence is improving planned to continue current regimen for diabetes.  Continue Metformin " 1000 mg twice a day, glipizide 10 mg twice a day and Januvia 100 mg/day     Recommend checking blood sugars before meals and at bedtime.    If Blood glucose are low more often-> 2-3 times/week- give us a call.  The patient is advised to Make better food choices: reduce carbs, Reduce portion size, weight loss and exercise 3-4 times a week.  Discussed hypoglycemia signs and symptoms as well as management in detail.        2. Hypertension - Under Good control.  Continue lisinopril 40 mg, atenolol 50 mg and hydrochlorothiazide 25 mg      3. Hyperlipidemia - Under Good control.  Continue atorvastatin 20 mg.  LDL 57, HDL 42.     4. Prevention  Opthalmology- August 2016  ASA-81 mg  Smoking- no    5.  Obesity:  The patient is advised to Make better food choices: reduce carbs, Reduce portion size, weight loss and exercise 3-4 times a week.  Health  referral. She has registered at [facility] and is planning to start exercise.      All questions were answered.  The patient indicates understanding of the above issues and agrees with the plan set forth.     More than 50% of face to face time spent with Ms. Sabillon on counseling / coordinating her care.  Total appointment times was 30 minutes.      Follow-up:  Follow up in 3-6 months/triny Corado M.D  Endocrinology  [facility]/[place]  CC: Raisa Davidson    Disclaimer: This note consists of symbols derived from keyboarding, dictation and/or voice recognition software. As a result, there may be errors in the script that have gone undetected. Please consider this when interpreting information found in this chart.

## 2017-10-25 NOTE — MR AVS SNAPSHOT
After Visit Summary   10/25/2017    Gricelda Sabillon    MRN: [id number]           Patient Information     Date Of Birth          1950        Visit Information        Provider Department      10/25/2017 1:30 PM Clara Corado MD [facility]        Today's Diagnoses     DM2 no complication    -  1    Hyperlipidemia with target LDL less than 100        HTN goal less than 140/90,          Care Instructions    [facility]   Dr Corado, Endocrinology Department      [facility]   [address] [address]  Appointment [phone]  Fax: [phone]   Monday and Tuesday         [place] [address] [address] [place] [address]  Appointment [phone]  Fax: [phone]  Wednesday and Thursday           [facility]  Continue current regimen    Follow up in 3-6 months with  or with me.    Check Blood glucose fasting every day and before lunch/dinner/bedtime on alternate days.  If Blood glucose are low more often-> 2-3 times/week- give us a call.  The patient is advised to Make better food choices: reduce carbs, Reduce portion size, weight loss and exercise 3-4 times a week.  Discussed hypoglycemia signs and symptoms as well as management in detail.                Follow-ups after your visit        Your next 10 appointments already scheduled     2017  1:40 PM CST   Return Visit with Patricio Samuels PA-C   [facility] ([facility])    [address] [address] [id number] [phone]              Future tests that were ordered for you today     Open Future Orders        Priority Expected Expires Ordered    Hemoglobin A1c Routine  2018 10/25/2017            Who to contact     If you have questions or need follow up information about today's clinic visit  "or your schedule please contact [facility] directly at [phone].  Normal or non-critical lab and imaging results will be communicated to you by MyChart, letter or phone within 4 business days after the clinic has received the results. If you do not hear from us within 7 days, please contact the clinic through [facility] or phone. If you have a critical or abnormal lab result, we will notify you by phone as soon as possible.  Submit refill requests through [email or web address] or call your pharmacy and they will forward the refill request to us. Please allow 3 business days for your refill to be completed.          Additional Information About Your Visit        [facility] Information     [email or web address] gives you secure access to your electronic health record. If you see a primary care provider, you can also send messages to your care team and make appointments. If you have questions, please call your primary care clinic.  If you do not have a primary care provider, please call [phone] and they will assist you.        Care EveryWhere ID     This is your Care EveryWhere ID. This could be used by other organizations to access your [facility] medical records  [id number]        Your Vitals Were     Pulse Temperature Height Last Period Pulse Oximetry BMI (Body Mass Index)    90 98.3  F (36.8  C) (Oral) 1.676 m (5' 6\") 12/13/2002 97% 35.53 kg/m2       Blood Pressure from Last 3 Encounters:   10/25/17 120/72   09/28/17 113/52   09/15/17 138/70    Weight from Last 3 Encounters:   10/25/17 99.8 kg (220 lb 1.6 oz)   09/15/17 99.8 kg (220 lb)   09/08/17 100 kg (220 lb 8 oz)               Primary Care Provider Office Phone # Fax #    Raisa Davidson [phone]       [address] [facility] [address]        Equal Access to Services     TAWANA TREVINO AH: Gisel reynosoo Sotiffanie, waaxda luqadaha, qaybta kaalmada jordy, thomas perez. So [facility] " [phone].    ATENCIÓN: Si benton kern, tiene a vail disposición servicios gratuitos de asistencia lingüística. Baldev gauthier [phone].    We comply with applicable federal civil rights laws and [place] laws. We do not discriminate on the basis of race, color, national origin, age, disability, sex, sexual orientation, or gender identity.            Thank you!     Thank you for choosing [facility]  for your care. Our goal is always to provide you with excellent care. Hearing back from our patients is one way we can continue to improve our services. Please take a few minutes to complete the written survey that you may receive in the mail after your visit with us. Thank you!             Your Updated Medication List - Protect others around you: Learn how to safely use, store and throw away your medicines at www.disposemymeds.org.          This list is accurate as of: 10/25/17  2:06 PM.  Always use your most recent med list.                   Brand Name Dispense Instructions for use Diagnosis    ADVIL 200 MG tablet   Generic drug:  ibuprofen      take 4 tablet (200 mg) by oral route every 8 hours as needed with food        albuterol 108 (90 BASE) MCG/ACT Inhaler    PROAIR HFA/PROVENTIL HFA/VENTOLIN HFA    1 Inhaler    Inhale 2 puffs into the lungs every 4 hours as needed for shortness of breath / dyspnea    Acute bronchitis       aspirin  MG EC tablet     45 tablet    Take 1 tablet (325 mg) by mouth daily    Deep vein thrombosis (DVT) prophylaxis prescribed at discharge       atenolol 50 MG tablet    TENORMIN    90 tablet    Take 1 tablet (50 mg) by mouth daily    HTN, goal below 140/90, Sinus tachycardia       atorvastatin 20 MG tablet    LIPITOR    90 tablet    Take 1 tablet (20 mg) by mouth daily    Hyperlipidemia with target LDL less than 100       azelastine 0.05 % Soln ophthalmic solution    OPTIVAR     Place 1 drop into both eyes 2 times daily as needed Reported on 3/22/2017         azelastine 0.1 % spray    ASTELIN     Spray 1 spray into both nostrils 2 times daily as needed Reported on 3/22/2017        blood glucose monitoring test strip    ONE TOUCH ULTRA    50 strip    Pt checks BS 1x a day    Diabetes mellitus without complication (H)       cetirizine 10 MG tablet    zyrTEC     Take 10 mg by mouth every morning.        desoximetasone 0.25 % cream    TOPICORT    60 g    Apply sparingly to affected area's    Eczema       dorzolamide 2 % ophthalmic solution    TRUSOPT    1 Bottle    Place 2 drops into both eyes 2 times daily        fish oil-omega-3 fatty acids 1000 MG capsule      Take 1 g by mouth daily Reported on 3/22/2017        glipiZIDE 5 MG tablet    GLUCOTROL    120 tablet    TAKE 2 TABLETs BY MOUTH TWICE DAILY BEFORE MEAL(S)    Diabetes mellitus without complication (H)       hydrochlorothiazide 25 MG tablet    HYDRODIURIL    90 tablet    TAKE ONE TABLET BY MOUTH ONCE DAILY IN THE MORNING    HTN, goal below 140/90       lisinopril 40 MG tablet    PRINIVIL/ZESTRIL    90 tablet    TAKE ONE TABLET BY MOUTH ONCE DAILY    HTN, goal below 140/90       metFORMIN 1000 MG tablet    GLUCOPHAGE    180 tablet    TAKE ONE TABLET BY MOUTH TWICE DAILY WITH MEALS    Type 2 diabetes, HbA1C goal < 8% (H)       MULTIVITAMIN TABS   OR      Reported on 3/22/2017        * order for DME     3 Month    All diabetic testing supplies including test strips, lancets and solution for testing 3 times per day. Patient is using  no Insulin. A1C      7.6   5/3/2016 A1C      7.4   2/4/2016    Type 2 diabetes mellitus, uncontrolled (H), Hyperlipidemia with target LDL less than 100, Essential hypertension with goal blood pressure less than 140/90       * order for DME     3 Month    All diabetic testing supplies including test strips, lancets and solution for testing 4 times per day. Patient is using  no Insulin. Last A1c Lab Results      Component                Value               Date                      A1C                       7.7                 09/05/2017                A1C                      7.5                 03/17/2017    Type 2 diabetes mellitus with hyperglycemia, without long-term current use of insulin (H)       senna-docusate 8.6-50 MG per tablet    SENOKOT-S;PERICOLACE    40 tablet    Take 1-2 tablets by mouth 2 times daily as needed for constipation    Constipation due to opioid therapy       sitagliptin 100 MG tablet    JANUVIA    90 tablet    Take 1 tablet (100 mg) by mouth daily    Diabetes mellitus without complication (H)       XALATAN 0.005 % ophthalmic solution   Generic drug:  latanoprost     2.5 mL    Place 1 drop Into the left eye At Bedtime        * Notice:  This list has 2 medication(s) that are the same as other medications prescribed for you. Read the directions carefully, and ask your doctor or other care provider to review them with you.

## 2017-10-27 DIAGNOSIS — E11.9 DIABETES MELLITUS WITHOUT COMPLICATION (H): Chronic | ICD-10-CM

## 2017-10-30 NOTE — TELEPHONE ENCOUNTER
Lab Results   Component Value Date    A1C 7.7 09/05/2017    A1C 7.5 03/17/2017    A1C 7.0 12/05/2016    A1C 6.8 09/07/2016    A1C 7.6 05/03/2016

## 2017-11-06 ENCOUNTER — OFFICE VISIT (OUTPATIENT)
Dept: ORTHOPEDICS | Facility: CLINIC | Age: 67
End: 2017-11-06
Payer: COMMERCIAL

## 2017-11-06 DIAGNOSIS — Z47.89 ORTHOPEDIC AFTERCARE: Primary | ICD-10-CM

## 2017-11-06 PROCEDURE — 99024 POSTOP FOLLOW-UP VISIT: CPT | Performed by: PHYSICIAN ASSISTANT

## 2017-11-06 NOTE — PATIENT INSTRUCTIONS
Some providers recommend that from now on, you take a single dose antibiotic prior to any invasive procedure such as dental work, colonoscopy or minor surgeries.  Please notify the provider of the procedure of your implant prior to the procedure. They may prescribe an antibiotic for you.    It is recommended that you avoid invasive procedures such as the before mentioned for 4 months after surgery unless it is an emergency.    You may discontinue the aspirin regimen or return to a dose recommended by your Primary care provider 6 weeks after surgery.    You may increase your activities as you can tolerate them.  Walking is a good activity.    No bending past 90 degrees at the hip joint, do not cross your legs, or perform excessive twisting at the hip for 4 months from surgery.    Please follow up in 6 week and/or with your surgeon at one year from surgery for an X ray of your hip.

## 2017-11-06 NOTE — PROGRESS NOTES
HISTORY OF PRESENT ILLNESS:    Gricelda Sabillon is a 67 year old female who is seen in follow up for Right KEVEN, DOS 9/25/2017, Dr. Pena.  Present symptoms: Pain in left 4th toe, kicked cane.  Taping and icing.    Hip, advil at night and PRN during the day.  Aspirin daily.  Following hip restrictions.  Cane for ambulation. Noticing slow improvements.  No new complaints.    Denies Chest pain, Calve pain, Fever, Chills.    Current Treatment: post op    PHYSICAL EXAM:  LMP 12/13/2002  There is no height or weight on file to calculate BMI.   GENERAL APPEARANCE: healthy, alert and no distress   PSYCH:  mentation appears normal and affect normal/bright    MSK:  Right: hip .  Ambulates: well with cane, slowly.  Incision clean and dry, well healed.   Edema min at ankle.  CMS: rita incisional numbness, otherwise grossly intact.      IMAGING INTERPRETATION:  None today.       ASSESSMENT:  Gricelda Sabillon is a 67 year old female S/P Right KEVEN, DOS 9/25/2017, Dr. Pena..  Doing well.    PLAN:  - Surgery discussed, images reviewed if applicable, and all questions were answered at this time.  - Care instructions given and verbally acknowledged.  - Medications: return to preop dose aspirn.  - hip restrictions.  - AAT    Return to clinic 6 weeks or at 1 year post op with Dr. Pena.    Patricio Samuels PA-C    Dept. Orthopedic Surgery  [facility]   11/6/2017

## 2017-11-16 DIAGNOSIS — E11.9 DIABETES MELLITUS WITHOUT COMPLICATION (H): Chronic | ICD-10-CM

## 2017-11-20 RX ORDER — GLIPIZIDE 5 MG/1
TABLET ORAL
Qty: 360 TABLET | Refills: 1 | Status: SHIPPED | OUTPATIENT
Start: 2017-11-20 | End: 2018-01-26

## 2017-11-21 ENCOUNTER — TRANSFERRED RECORDS (OUTPATIENT)
Dept: HEALTH INFORMATION MANAGEMENT | Facility: CLINIC | Age: 67
End: 2017-11-21

## 2017-12-01 DIAGNOSIS — E78.5 HYPERLIPIDEMIA WITH TARGET LDL LESS THAN 100: Chronic | ICD-10-CM

## 2017-12-05 RX ORDER — ATORVASTATIN CALCIUM 20 MG/1
TABLET, FILM COATED ORAL
Qty: 90 TABLET | Refills: 1 | Status: SHIPPED | OUTPATIENT
Start: 2017-12-05 | End: 2018-01-26

## 2017-12-09 DIAGNOSIS — I10 HTN, GOAL BELOW 140/90: ICD-10-CM

## 2017-12-09 DIAGNOSIS — R00.0 SINUS TACHYCARDIA: ICD-10-CM

## 2017-12-09 RX ORDER — ATENOLOL 50 MG/1
TABLET ORAL
Qty: 90 TABLET | Refills: 1 | Status: SHIPPED | OUTPATIENT
Start: 2017-12-09 | End: 2018-01-26

## 2017-12-16 DIAGNOSIS — E11.9 DIABETES MELLITUS WITHOUT COMPLICATION (H): Chronic | ICD-10-CM

## 2017-12-19 RX ORDER — SITAGLIPTIN 100 MG/1
TABLET, FILM COATED ORAL
Qty: 90 TABLET | Refills: 0 | Status: SHIPPED | OUTPATIENT
Start: 2017-12-19 | End: 2018-01-26

## 2017-12-19 NOTE — TELEPHONE ENCOUNTER
Requested Prescriptions   Pending Prescriptions Disp Refills     JANUVIA 100 MG tablet [Pharmacy Med Name: JANUVIA 100MG       TAB] 90 tablet 0     Sig: TAKE ONE TABLET BY MOUTH ONCE DAILY    DPP4 Inhibitors Protocol Failed    12/16/2017 12:28 PM       Failed - Microalbumin on file in past 12 months    Recent Labs   Lab Test  09/07/16   0822   MICROL  7   UMALCR  6.64            Passed - Blood pressure less than 140/90 in past 6 months    BP Readings from Last 3 Encounters:   10/25/17 120/72   09/28/17 113/52   09/15/17 138/70                Passed - LDL on file in past 12 months    Recent Labs   Lab Test  09/05/17   0826   LDL  68            Passed - HgbA1C in past 3 or 6 months    Recent Labs   Lab Test  09/05/17   0826   A1C  7.7*            Passed - Patient is age 18 or older       Passed - No active pregnancy on record       Passed - Normal serum creatinine in past 12 months    Recent Labs   Lab Test  09/26/17   0750   CR  0.88            Passed - No positive pregnancy test in past 12 months       Passed - Recent visit with authorizing provider's specialty in past 6 months    IV to PO - Antibiotics     None                    Medication is being filled for 1 time refill only due to:  Patient needs labs Microalbumin past due.

## 2017-12-26 ENCOUNTER — TELEPHONE (OUTPATIENT)
Dept: ORTHOPEDICS | Facility: CLINIC | Age: 67
End: 2017-12-26

## 2017-12-26 NOTE — TELEPHONE ENCOUNTER
Pt left VM stating she fell on Saturday and is concerned she needs to be seen.    Returned call to patient - pt states she miss judged the steps going into her living room, and fell into her debi tree.  Pt denies having significant pain at this time states overall she has some soreness from the fall and walking will cause pain, this is new since fall on Saturday.  But patient states today pain is better than yesterday, has been resting.  Pt reports overall she has had very little pain with her hip and post op had been going well, states she continues to struggle not to limp as she states she had been doing this for so long prior to surgery it has become a habit for her.    We discussed continued monitoring at this time as pain has been improving, scheduled patient to see A[facility] on 1/5 (1st available) if she continues to have pain, likely pain will continue to resolve, if that is the case she will call to cancel appointment.  Pt verbalized understanding and was in agreement with POC.

## 2018-01-19 DIAGNOSIS — E11.9 DIABETES MELLITUS WITHOUT COMPLICATION (H): Chronic | ICD-10-CM

## 2018-01-19 LAB — HBA1C MFR BLD: 7.3 % (ref 4.3–6)

## 2018-01-19 PROCEDURE — 83036 HEMOGLOBIN GLYCOSYLATED A1C: CPT | Performed by: INTERNAL MEDICINE

## 2018-01-19 PROCEDURE — 36415 COLL VENOUS BLD VENIPUNCTURE: CPT | Performed by: INTERNAL MEDICINE

## 2018-01-26 ENCOUNTER — OFFICE VISIT (OUTPATIENT)
Dept: INTERNAL MEDICINE | Facility: CLINIC | Age: 68
End: 2018-01-26
Payer: COMMERCIAL

## 2018-01-26 VITALS
HEIGHT: 66 IN | BODY MASS INDEX: 35.36 KG/M2 | DIASTOLIC BLOOD PRESSURE: 72 MMHG | HEART RATE: 92 BPM | SYSTOLIC BLOOD PRESSURE: 124 MMHG | OXYGEN SATURATION: 97 % | WEIGHT: 220 LBS | TEMPERATURE: 98.4 F

## 2018-01-26 DIAGNOSIS — I10 ESSENTIAL HYPERTENSION WITH GOAL BLOOD PRESSURE LESS THAN 140/90: Chronic | ICD-10-CM

## 2018-01-26 DIAGNOSIS — I10 HTN, GOAL BELOW 140/90: ICD-10-CM

## 2018-01-26 DIAGNOSIS — E11.9 DIABETES MELLITUS WITHOUT COMPLICATION (H): Primary | Chronic | ICD-10-CM

## 2018-01-26 DIAGNOSIS — E78.5 HYPERLIPIDEMIA WITH TARGET LDL LESS THAN 100: Chronic | ICD-10-CM

## 2018-01-26 DIAGNOSIS — R00.0 SINUS TACHYCARDIA: ICD-10-CM

## 2018-01-26 PROCEDURE — 82043 UR ALBUMIN QUANTITATIVE: CPT | Performed by: INTERNAL MEDICINE

## 2018-01-26 PROCEDURE — 99214 OFFICE O/P EST MOD 30 MIN: CPT | Performed by: INTERNAL MEDICINE

## 2018-01-26 RX ORDER — LISINOPRIL 40 MG/1
40 TABLET ORAL DAILY
Qty: 90 TABLET | Refills: 1 | Status: SHIPPED | OUTPATIENT
Start: 2018-01-26 | End: 2018-05-21

## 2018-01-26 RX ORDER — GLIPIZIDE 5 MG/1
TABLET ORAL
Qty: 360 TABLET | Refills: 1 | Status: SHIPPED | OUTPATIENT
Start: 2018-01-26 | End: 2018-05-21

## 2018-01-26 RX ORDER — ATENOLOL 50 MG/1
50 TABLET ORAL DAILY
Qty: 90 TABLET | Refills: 1 | Status: SHIPPED | OUTPATIENT
Start: 2018-01-26 | End: 2018-05-21

## 2018-01-26 RX ORDER — HYDROCHLOROTHIAZIDE 25 MG/1
TABLET ORAL
Qty: 90 TABLET | Refills: 1 | Status: SHIPPED | OUTPATIENT
Start: 2018-01-26 | End: 2018-05-21

## 2018-01-26 RX ORDER — ATORVASTATIN CALCIUM 20 MG/1
20 TABLET, FILM COATED ORAL DAILY
Qty: 90 TABLET | Refills: 1 | Status: SHIPPED | OUTPATIENT
Start: 2018-01-26 | End: 2018-05-21

## 2018-01-26 NOTE — MR AVS SNAPSHOT
After Visit Summary   1/26/2018    Gricelda Sabillon    MRN: [id number]           Patient Information     Date Of Birth          1950        Visit Information        Provider Department      1/26/2018 1:40 PM Raisa Davidson MD [facility]        Today's Diagnoses     DM2 no complication    -  1    HTN goal less than 140/90,        Hyperlipidemia with target LDL less than 100        HTN, goal below 140/90        Sinus tachycardia          Care Instructions    Plan:  1. Mammogram ( please call [phone] to schedule it)   2. Cerave or Cetaphil cream - for dry skin   3. Continue same meds, same doses for now   4. Urine trest today   5. Please make a lab appointment for non fasting labs in May  6. Please make an appointment few days after the labs to discuss about the results.           Follow-ups after your visit        Your next 10 appointments already scheduled     Jan 29, 2018  2:00 PM CST   Return Visit with Patricio Samuels PA-C   [facility] ([facility])    [address] [address]   [phone]              Future tests that were ordered for you today     Open Future Orders        Priority Expected Expires Ordered    CBC with platelets Routine  1/26/2019 1/26/2018    Comprehensive metabolic panel Routine  1/26/2019 1/26/2018    Lipid panel reflex to direct LDL Fasting Routine  1/26/2019 1/26/2018    TSH with free T4 reflex Routine  1/26/2019 1/26/2018    Hemoglobin A1c Routine  1/26/2019 1/26/2018            Who to contact     If you have questions or need follow up information about today's clinic visit or your schedule please contact [facility] directly at [phone].  Normal or non-critical lab and imaging results will be communicated to you by MyChart, letter or phone within 4 business days after the clinic has received the results. If you do not hear from  "us within 7 days, please contact the clinic through [employer] or phone. If you have a critical or abnormal lab result, we will notify you by phone as soon as possible.  Submit refill requests through [employer] or call your pharmacy and they will forward the refill request to us. Please allow 3 business days for your refill to be completed.          Additional Information About Your Visit        [employer] Information     [employer] gives you secure access to your electronic health record. If you see a primary care provider, you can also send messages to your care team and make appointments. If you have questions, please call your primary care clinic.  If you do not have a primary care provider, please call [phone] and they will assist you.        Care EveryWhere ID     This is your Care EveryWhere ID. This could be used by other organizations to access your [facility] medical records  [id number]        Your Vitals Were     Pulse Temperature Height Last Period Pulse Oximetry Breastfeeding?    92 98.4  F (36.9  C) (Oral) 5' 6\" (1.676 m) 12/13/2002 97% No    BMI (Body Mass Index)                   35.51 kg/m2            Blood Pressure from Last 3 Encounters:   01/26/18 124/72   10/25/17 120/72   09/28/17 113/52    Weight from Last 3 Encounters:   01/26/18 220 lb (99.8 kg)   10/25/17 220 lb 1.6 oz (99.8 kg)   09/15/17 220 lb (99.8 kg)              We Performed the Following     Albumin Random Urine Quantitative with Creat Ratio          Today's Medication Changes          These changes are accurate as of 1/26/18  2:07 PM.  If you have any questions, ask your nurse or doctor.               These medicines have changed or have updated prescriptions.        Dose/Directions    atenolol 50 MG tablet   Commonly known as:  TENORMIN   This may have changed:  See the new instructions.   Used for:  HTN, goal below 140/90, Sinus tachycardia, Diabetes mellitus without complication (H), Essential hypertension with goal blood pressure " less than 140/90, Hyperlipidemia with target LDL less than 100   Changed by:  Raisa Davidson MD        Dose:  50 mg   Take 1 tablet (50 mg) by mouth daily   Quantity:  90 tablet   Refills:  1       atorvastatin 20 MG tablet   Commonly known as:  LIPITOR   This may have changed:  See the new instructions.   Used for:  Hyperlipidemia with target LDL less than 100, Diabetes mellitus without complication (H), Essential hypertension with goal blood pressure less than 140/90, HTN, goal below 140/90, Sinus tachycardia   Changed by:  Raisa Davidson MD        Dose:  20 mg   Take 1 tablet (20 mg) by mouth daily   Quantity:  90 tablet   Refills:  1       glipiZIDE 5 MG tablet   Commonly known as:  GLUCOTROL   This may have changed:  See the new instructions.   Used for:  Diabetes mellitus without complication (H), Essential hypertension with goal blood pressure less than 140/90, Hyperlipidemia with target LDL less than 100, HTN, goal below 140/90, Sinus tachycardia   Changed by:  Raisa Davidson MD        TAKE TWO TABLETS BY MOUTH TWICE DAILY BEFORE MEAL(S)   Quantity:  360 tablet   Refills:  1       hydrochlorothiazide 25 MG tablet   Commonly known as:  HYDRODIURIL   This may have changed:  See the new instructions.   Used for:  HTN, goal below 140/90, Diabetes mellitus without complication (H), Essential hypertension with goal blood pressure less than 140/90, Hyperlipidemia with target LDL less than 100, Sinus tachycardia   Changed by:  Raisa Davidson MD        TAKE ONE TABLET BY MOUTH ONCE DAILY IN THE MORNING   Quantity:  90 tablet   Refills:  1       lisinopril 40 MG tablet   Commonly known as:  PRINIVIL/ZESTRIL   This may have changed:  See the new instructions.   Used for:  HTN, goal below 140/90, Diabetes mellitus without complication (H), Essential hypertension with goal blood pressure less than 140/90, Hyperlipidemia with target LDL less than 100,  Sinus tachycardia   Changed by:  Raisa Davidson MD        Dose:  40 mg   Take 1 tablet (40 mg) by mouth daily   Quantity:  90 tablet   Refills:  1       metFORMIN 1000 MG tablet   Commonly known as:  GLUCOPHAGE   This may have changed:  See the new instructions.   Used for:  Diabetes mellitus without complication (H), Essential hypertension with goal blood pressure less than 140/90, Hyperlipidemia with target LDL less than 100, HTN, goal below 140/90, Sinus tachycardia   Changed by:  Raisa Davidson MD        TAKE ONE TABLET BY MOUTH TWICE DAILY WITH MEALS   Quantity:  180 tablet   Refills:  1       sitagliptin 100 MG tablet   Commonly known as:  JANUVIA   This may have changed:  See the new instructions.   Used for:  Diabetes mellitus without complication (H), Essential hypertension with goal blood pressure less than 140/90, Hyperlipidemia with target LDL less than 100, HTN, goal below 140/90, Sinus tachycardia   Changed by:  Raisa Davidson MD        Dose:  100 mg   Take 1 tablet (100 mg) by mouth daily   Quantity:  90 tablet   Refills:  0            Where to get your medicines      These medications were sent to [facility] [address] [place], [place] [id number] [facility]  [address] [facility] [address]     Phone:  [phone]     atenolol 50 MG tablet    atorvastatin 20 MG tablet    glipiZIDE 5 MG tablet    hydrochlorothiazide 25 MG tablet    lisinopril 40 MG tablet    metFORMIN 1000 MG tablet    sitagliptin 100 MG tablet                Primary Care Provider Office Phone # Fax #    Raisa Davidson [phone]       [address] [facility] [address]        Equal Access to Services     [facility]: Hadii elisabeth ashley hadasho Sotiffanie, waaxda luqadaha, qaybta kaalmada jordy, thomas perez. So [facility] [phone].    ATENCIÓN: Si habla español, tiene a vail disposición servicios  shantelle de asistencia lingüística. Baldev gauthier [phone].    We comply with applicable federal civil rights laws and [place] laws. We do not discriminate on the basis of race, color, national origin, age, disability, sex, sexual orientation, or gender identity.            Thank you!     Thank you for choosing [facility]  for your care. Our goal is always to provide you with excellent care. Hearing back from our patients is one way we can continue to improve our services. Please take a few minutes to complete the written survey that you may receive in the mail after your visit with us. Thank you!             Your Updated Medication List - Protect others around you: Learn how to safely use, store and throw away your medicines at www.disposemymeds.org.          This list is accurate as of 1/26/18  2:07 PM.  Always use your most recent med list.                   Brand Name Dispense Instructions for use Diagnosis    ADVIL 200 MG tablet   Generic drug:  ibuprofen      take 4 tablet (200 mg) by oral route every 8 hours as needed with food        albuterol 108 (90 BASE) MCG/ACT Inhaler    PROAIR HFA/PROVENTIL HFA/VENTOLIN HFA    1 Inhaler    Inhale 2 puffs into the lungs every 4 hours as needed for shortness of breath / dyspnea    Acute bronchitis       aspirin  MG EC tablet     45 tablet    Take 1 tablet (325 mg) by mouth daily    Deep vein thrombosis (DVT) prophylaxis prescribed at discharge       atenolol 50 MG tablet    TENORMIN    90 tablet    Take 1 tablet (50 mg) by mouth daily    HTN, goal below 140/90, Sinus tachycardia, Diabetes mellitus without complication (H), Essential hypertension with goal blood pressure less than 140/90, Hyperlipidemia with target LDL less than 100       atorvastatin 20 MG tablet    LIPITOR    90 tablet    Take 1 tablet (20 mg) by mouth daily    Hyperlipidemia with target LDL less than 100, Diabetes mellitus without complication (H), Essential hypertension with  goal blood pressure less than 140/90, HTN, goal below 140/90, Sinus tachycardia       azelastine 0.05 % Soln ophthalmic solution    OPTIVAR     Place 1 drop into both eyes 2 times daily as needed Reported on 3/22/2017        azelastine 0.1 % spray    ASTELIN     Spray 1 spray into both nostrils 2 times daily as needed Reported on 3/22/2017        cetirizine 10 MG tablet    zyrTEC     Take 10 mg by mouth every morning.        desoximetasone 0.25 % cream    TOPICORT    60 g    Apply sparingly to affected area's    Eczema       dorzolamide 2 % ophthalmic solution    TRUSOPT    1 Bottle    Place 2 drops into both eyes 2 times daily        fish oil-omega-3 fatty acids 1000 MG capsule      Take 1 g by mouth daily Reported on 3/22/2017        glipiZIDE 5 MG tablet    GLUCOTROL    360 tablet    TAKE TWO TABLETS BY MOUTH TWICE DAILY BEFORE MEAL(S)    Diabetes mellitus without complication (H), Essential hypertension with goal blood pressure less than 140/90, Hyperlipidemia with target LDL less than 100, HTN, goal below 140/90, Sinus tachycardia       hydrochlorothiazide 25 MG tablet    HYDRODIURIL    90 tablet    TAKE ONE TABLET BY MOUTH ONCE DAILY IN THE MORNING    HTN, goal below 140/90, Diabetes mellitus without complication (H), Essential hypertension with goal blood pressure less than 140/90, Hyperlipidemia with target LDL less than 100, Sinus tachycardia       lisinopril 40 MG tablet    PRINIVIL/ZESTRIL    90 tablet    Take 1 tablet (40 mg) by mouth daily    HTN, goal below 140/90, Diabetes mellitus without complication (H), Essential hypertension with goal blood pressure less than 140/90, Hyperlipidemia with target LDL less than 100, Sinus tachycardia       metFORMIN 1000 MG tablet    GLUCOPHAGE    180 tablet    TAKE ONE TABLET BY MOUTH TWICE DAILY WITH MEALS    Diabetes mellitus without complication (H), Essential hypertension with goal blood pressure less than 140/90, Hyperlipidemia with target LDL less than 100,  HTN, goal below 140/90, Sinus tachycardia       MULTIVITAMIN TABS   OR      Reported on 3/22/2017        ONETOUCH ULTRA test strip   Generic drug:  blood glucose monitoring     100 strip    USE ONE STRIP TO CHECK GLUCOSE ONCE DAILY DIAG  CODE  E11.9    Diabetes mellitus without complication (H)       * order for DME     3 Month    All diabetic testing supplies including test strips, lancets and solution for testing 3 times per day. Patient is using  no Insulin. A1C      7.6   5/3/2016 A1C      7.4   2/4/2016    Type 2 diabetes mellitus, uncontrolled (H), Hyperlipidemia with target LDL less than 100, Essential hypertension with goal blood pressure less than 140/90       * order for DME     3 Month    All diabetic testing supplies including test strips, lancets and solution for testing 4 times per day. Patient is using  no Insulin. Last A1c Lab Results      Component                Value               Date                      A1C                      7.7                 09/05/2017                A1C                      7.5                 03/17/2017    Type 2 diabetes mellitus with hyperglycemia, without long-term current use of insulin (H)       senna-docusate 8.6-50 MG per tablet    SENOKOT-S;PERICOLACE    40 tablet    Take 1-2 tablets by mouth 2 times daily as needed for constipation    Constipation due to opioid therapy       sitagliptin 100 MG tablet    JANUVIA    90 tablet    Take 1 tablet (100 mg) by mouth daily    Diabetes mellitus without complication (H), Essential hypertension with goal blood pressure less than 140/90, Hyperlipidemia with target LDL less than 100, HTN, goal below 140/90, Sinus tachycardia       XALATAN 0.005 % ophthalmic solution   Generic drug:  latanoprost     2.5 mL    Place 1 drop Into the left eye At Bedtime        * Notice:  This list has 2 medication(s) that are the same as other medications prescribed for you. Read the directions carefully, and ask your doctor or other care  provider to review them with you.

## 2018-01-26 NOTE — PATIENT INSTRUCTIONS
Plan:  1. Mammogram ( please call [phone] to schedule it)   2. Cerave or Cetaphil cream - for dry skin   3. Continue same meds, same doses for now   4. Urine trest today   5. Please make a lab appointment for non fasting labs in May  6. Please make an appointment few days after the labs to discuss about the results.

## 2018-01-26 NOTE — NURSING NOTE
"Chief Complaint   Patient presents with     RECHECK     DM, Bp,Lip       Initial /72 (BP Location: Left arm, Patient Position: Sitting, Cuff Size: Adult Regular)  Pulse 92  Temp 98.4  F (36.9  C) (Oral)  Ht 5' 6\" (1.676 m)  Wt 220 lb (99.8 kg)  LMP 12/13/2002  SpO2 97%  Breastfeeding? No  BMI 35.51 kg/m2 Estimated body mass index is 35.51 kg/(m^2) as calculated from the following:    Height as of this encounter: 5' 6\" (1.676 m).    Weight as of this encounter: 220 lb (99.8 kg).  Medication Reconciliation: complete   Ro Pollack MA    "

## 2018-01-26 NOTE — PROGRESS NOTES
Dr Torres's note      Patient's instructions / PLAN:                                                        Plan:  1. Mammogram ( please call [phone] to schedule it)   2. Cerave or Cetaphil cream - for dry skin   3. Continue same meds, same doses for now   4. Urine trest today   5. Please make a lab appointment for non fasting labs in May  6. Please make an appointment few days after the labs to discuss about the results.       ASSESSMENT & PLAN:                                                      (E11.9) DM2 no complication  (primary encounter diagnosis)  Comment: Controlled    Plan: sitagliptin (JANUVIA) 100 MG tablet,         hydrochlorothiazide (HYDRODIURIL) 25 MG tablet,        lisinopril (PRINIVIL/ZESTRIL) 40 MG tablet,         metFORMIN (GLUCOPHAGE) 1000 MG tablet,         glipiZIDE (GLUCOTROL) 5 MG tablet, atenolol         (TENORMIN) 50 MG tablet, atorvastatin (LIPITOR)        20 MG tablet, Albumin Random Urine Quantitative        with Creat Ratio, CBC with platelets,         Comprehensive metabolic panel, Lipid panel         reflex to direct LDL Fasting, TSH with free T4         reflex, Hemoglobin A1c            (I10) HTN goal less than 140/90,  Comment: Controlled    Plan: sitagliptin (JANUVIA) 100 MG tablet,         hydrochlorothiazide (HYDRODIURIL) 25 MG tablet,        lisinopril (PRINIVIL/ZESTRIL) 40 MG tablet,         metFORMIN (GLUCOPHAGE) 1000 MG tablet,         glipiZIDE (GLUCOTROL) 5 MG tablet, atenolol         (TENORMIN) 50 MG tablet, atorvastatin (LIPITOR)        20 MG tablet, Albumin Random Urine Quantitative        with Creat Ratio, CBC with platelets,         Comprehensive metabolic panel, Lipid panel         reflex to direct LDL Fasting, TSH with free T4         reflex, Hemoglobin A1c            (E78.5) Hyperlipidemia with target LDL less than 100  Comment: Controlled    Plan: sitagliptin (JANUVIA) 100 MG tablet,         hydrochlorothiazide (HYDRODIURIL) 25 MG tablet,        lisinopril  (PRINIVIL/ZESTRIL) 40 MG tablet,         metFORMIN (GLUCOPHAGE) 1000 MG tablet,         glipiZIDE (GLUCOTROL) 5 MG tablet, atenolol         (TENORMIN) 50 MG tablet, atorvastatin (LIPITOR)        20 MG tablet, Albumin Random Urine Quantitative        with Creat Ratio, CBC with platelets,         Comprehensive metabolic panel, Lipid panel         reflex to direct LDL Fasting, TSH with free T4         reflex, Hemoglobin A1c            (I10) HTN, goal below 140/90  Comment: Controlled    Plan: sitagliptin (JANUVIA) 100 MG tablet,         hydrochlorothiazide (HYDRODIURIL) 25 MG tablet,        lisinopril (PRINIVIL/ZESTRIL) 40 MG tablet,         metFORMIN (GLUCOPHAGE) 1000 MG tablet,         glipiZIDE (GLUCOTROL) 5 MG tablet, atenolol         (TENORMIN) 50 MG tablet, atorvastatin (LIPITOR)        20 MG tablet, Albumin Random Urine Quantitative        with Creat Ratio, CBC with platelets,         Comprehensive metabolic panel, Lipid panel         reflex to direct LDL Fasting, TSH with free T4         reflex, Hemoglobin A1c            (R00.0) Sinus tachycardia  Comment:   Plan: sitagliptin (JANUVIA) 100 MG tablet,         hydrochlorothiazide (HYDRODIURIL) 25 MG tablet,        lisinopril (PRINIVIL/ZESTRIL) 40 MG tablet,         metFORMIN (GLUCOPHAGE) 1000 MG tablet,         glipiZIDE (GLUCOTROL) 5 MG tablet, atenolol         (TENORMIN) 50 MG tablet, atorvastatin (LIPITOR)        20 MG tablet, Albumin Random Urine Quantitative        with Creat Ratio, CBC with platelets,         Comprehensive metabolic panel, Lipid panel         reflex to direct LDL Fasting, TSH with free T4         reflex, Hemoglobin A1c               Chief complaint:                                                      Follow up chronic medical problems      SUBJECTIVE:   Gricelda Sabillon is a 67 year old female who presents to clinic today for the following health issues:    Walking is better. She will try to do more exercise. She does 5 days a week  "        Diabetes Follow-up    Patient is checking blood sugars: once daily.  Results are as follows:         am - 140    Diabetic concerns: None     Symptoms of hypoglycemia (low blood sugar): none     Paresthesias (numbness or burning in feet) or sores: No     Date of last diabetic eye exam: November 2017    Hyperlipidemia Follow-Up      Rate your low fat/cholesterol diet?: fair    Taking statin?  Yes, no muscle aches from statin    Other lipid medications/supplements?:  none    Hypertension Follow-up      Outpatient blood pressures are not being checked.    Low Salt Diet: no added salt    BP Readings from Last 2 Encounters:   10/25/17 120/72   09/28/17 113/52     Hemoglobin A1C (%)   Date Value   01/19/2018 7.3 (H)   09/05/2017 7.7 (H)     LDL Cholesterol Calculated (mg/dL)   Date Value   09/05/2017 68   09/07/2016 60       Amount of exercise or physical activity: 4-5 days/week for an average of 30-45 minutes    Problems taking medications regularly: No    Medication side effects: none    Diet: low salt      Review of Systems:                                                      ROS: negative for fever, chills, cough, wheezes, chest pain, shortness of breath, vomiting, abdominal pain, leg swelling       OBJECTIVE:             Physical exam:  Blood pressure 124/72, pulse 92, temperature 98.4  F (36.9  C), temperature source Oral, height 5' 6\" (1.676 m), weight 220 lb (99.8 kg), last menstrual period 12/13/2002, SpO2 97 %, not currently breastfeeding.   NAD, appears comfortable  Skin: no rashes  Very dry skin on feet.   Neck: supple, no JVD,  No thyroidmegaly. Lymph nodes nonpalpable cervical and supraclavicular.  Chest: clear to auscultation bilaterally, good respiratory effort  Heart: S1 S2, RRR, no mgr appreciated  Abdomen: soft, not tender, no hepatosplenomegaly or masses appreciated, no abdominal bruit, present bowel sounds  Extremities: no edema, clubbing, cyanosis. Palpable pedal pulses bilaterally, " Monofilament skin sensation is intact, no feet skin lesions   Neurologic: A, Ox3, no focal signs appreciated    PMHx: reviewed  Past Medical History:   Diagnosis Date     Allergic rhinitis, cause unspecified      Asthma      Asthma, mild intermittent      Cataract      Cellulitis and abscess of leg, except foot 03/00    Bilateral     Eczema      Heart murmur     aortic area     HTN, goal below 140/90      Hyperlipidemia LDL goal < 100      Hyperplastic colon polyp 2008     Infection     bilateral eye infections     Macular hole of left eye      Obesity (BMI 30-39.9)      Osteoarthritis     knees     Other chronic pain     right knee     Sleep apnea     uses c pap     Type 2 diabetes, HbA1C goal < 8% (H)       PSHx: reviewed  Past Surgical History:   Procedure Laterality Date     ARTHROPLASTY HIP Right 9/25/2017    Procedure: ARTHROPLASTY HIP;  Right total hip arthroplasty  ;  Surgeon: Ayaan Pena MD;  Location: [facility] OR     ARTHROPLASTY KNEE  7/12/2013    Procedure: ARTHROPLASTY KNEE;  right total knee arthroplasty ;  Surgeon: Chaparro Wesley MD;  Location: [facility] OR     ARTHROSCOPY KNEE Right 1/21/2015    Procedure: ARTHROSCOPY KNEE;  Surgeon: Ayaan Pena MD;  Location: [facility] OR     C INCISION OF HYMEN       CATARACT IOL, RT/LT       COLONOSCOPY  2008     EYE SURGERY      macular hole repaired left eye     HC KNEE SCOPE, DIAGNOSTIC      - both knees     HEAD & NECK SURGERY      wisdom teeth        Meds: reviewed  Current Outpatient Prescriptions   Medication Sig Dispense Refill     JANUVIA 100 MG tablet TAKE ONE TABLET BY MOUTH ONCE DAILY 90 tablet 0     atenolol (TENORMIN) 50 MG tablet TAKE ONE TABLET BY MOUTH ONCE DAILY 90 tablet 1     atorvastatin (LIPITOR) 20 MG tablet TAKE ONE TABLET BY MOUTH ONCE DAILY 90 tablet 1     glipiZIDE (GLUCOTROL) 5 MG tablet TAKE TWO TABLETS BY MOUTH TWICE DAILY BEFORE MEAL(S) 360 tablet 1     ONE TOUCH ULTRA test strip USE ONE STRIP TO CHECK GLUCOSE ONCE DAILY  DIAG  CODE  E11.9 100 strip 0     ibuprofen (ADVIL) 200 MG tablet take 4 tablet (200 mg) by oral route every 8 hours as needed with food       hydrochlorothiazide (HYDRODIURIL) 25 MG tablet TAKE ONE TABLET BY MOUTH ONCE DAILY IN THE MORNING 90 tablet 1     lisinopril (PRINIVIL/ZESTRIL) 40 MG tablet TAKE ONE TABLET BY MOUTH ONCE DAILY 90 tablet 1     metFORMIN (GLUCOPHAGE) 1000 MG tablet TAKE ONE TABLET BY MOUTH TWICE DAILY WITH MEALS 180 tablet 1     aspirin  MG EC tablet Take 1 tablet (325 mg) by mouth daily 45 tablet 0     order for DME All diabetic testing supplies including test strips, lancets and solution for testing 4 times per day. Patient is using  no Insulin. Last A1c Lab Results       Component                Value               Date                       A1C                      7.7                 09/05/2017                 A1C                      7.5                 03/17/2017 3 Month 1     order for DME All diabetic testing supplies including test strips, lancets and solution for testing 3 times per day. Patient is using  no Insulin. A1C      7.6   5/3/2016  A1C      7.4   2/4/2016 3 Month 1     dorzolamide (TRUSOPT) 2 % ophthalmic solution Place 2 drops into both eyes 2 times daily 1 Bottle      desoximetasone (TOPICORT) 0.25 % cream Apply sparingly to affected area's 60 g 1     latanoprost (XALATAN) 0.005 % ophthalmic solution Place 1 drop Into the left eye At Bedtime 2.5 mL 1     albuterol (PROAIR HFA, PROVENTIL HFA, VENTOLIN HFA) 108 (90 BASE) MCG/ACT inhaler Inhale 2 puffs into the lungs every 4 hours as needed for shortness of breath / dyspnea 1 Inhaler 3     azelastine (ASTELIN) 137 MCG/SPRAY nasal spray [place] 1 spray into both nostrils 2 times daily as needed Reported on 3/22/2017       azelastine (OPTIVAR) 0.05 % SOLN Place 1 drop into both eyes 2 times daily as needed Reported on 3/22/2017       cetirizine (ZYRTEC) 10 MG tablet Take 10 mg by mouth every morning.       fish oil-omega-3  fatty acids (FISH OIL) 1000 MG capsule Take 1 g by mouth daily Reported on 3/22/2017       MULTIVITAMIN TABS   OR Reported on 3/22/2017       senna-docusate (SENOKOT-S;PERICOLACE) 8.6-50 MG per tablet Take 1-2 tablets by mouth 2 times daily as needed for constipation (Patient not taking: Reported on 10/25/2017) 40 tablet 0       Soc Hx: reviewed  Fam Hx: reviewed          Raisa Torres MD  Internal Medicine

## 2018-01-27 LAB
CREAT UR-MCNC: 81 MG/DL
MICROALBUMIN UR-MCNC: 7 MG/L
MICROALBUMIN/CREAT UR: 8.89 MG/G CR (ref 0–25)

## 2018-01-27 ASSESSMENT — ASTHMA QUESTIONNAIRES: ACT_TOTALSCORE: 25

## 2018-01-27 ASSESSMENT — PATIENT HEALTH QUESTIONNAIRE - PHQ9: SUM OF ALL RESPONSES TO PHQ QUESTIONS 1-9: 4

## 2018-01-29 ENCOUNTER — OFFICE VISIT (OUTPATIENT)
Dept: ORTHOPEDICS | Facility: CLINIC | Age: 68
End: 2018-01-29
Payer: COMMERCIAL

## 2018-01-29 DIAGNOSIS — R29.898 ANKLE WEAKNESS: ICD-10-CM

## 2018-01-29 DIAGNOSIS — Z96.641 S/P HIP REPLACEMENT, RIGHT: ICD-10-CM

## 2018-01-29 DIAGNOSIS — Z91.81 RISK FOR FALLS: ICD-10-CM

## 2018-01-29 DIAGNOSIS — Z47.89 ORTHOPEDIC AFTERCARE: Primary | ICD-10-CM

## 2018-01-29 PROCEDURE — 99212 OFFICE O/P EST SF 10 MIN: CPT | Performed by: PHYSICIAN ASSISTANT

## 2018-01-29 NOTE — PROGRESS NOTES
HISTORY OF PRESENT ILLNESS:    Gricelda Sabillon is a 67 year old female who is seen in follow up for S/P Right KEVEN, DOS 9/25/2017, Dr. Pena.  Present symptoms: Pt reports no pain at hips, but none at hip.  Knees are better.  Uses cane for balance.  Main issue is strength on stairs and getting up from chair and noticing limps if not concentrating.    Denies Chest pain, Calve pain, Fever, Chills.    Current Treatment: Post op.    PHYSICAL EXAM:  [facility] 12/13/2002  There is no height or weight on file to calculate BMI.   GENERAL APPEARANCE: healthy, alert and no distress   PSYCH:  mentation appears normal and affect normal/bright    MSK:  Right: Hip .  Ambulates: slight limp on right without cane.  Incision clean and dry, well healed.  Edema min at ankles.  CMS: rita incisional numbness, otherwise grossly intact.  Strength:  Seated, hip flexion, abd and add 5/5.  Standing, hip abd 4+/5.  Right Ankle with pronation.  Unable to single leg stand on right LE due to balance observed at ankle.  Unable to bear weight on right LE with more than 30 deg knee flexion.       ASSESSMENT:  Gricelda Sabillon is a 67 year old female S/P Right KEVEN, DOS 9/25/17, Dr. Pena.        PLAN:  - Long term care instructions given and verbally acknowledged.  - Medications: per PCP.  - continue exercises for strengthening/ PT referral..  - Brace ankle and or good pronation control at foot.    Return to clinic 1 year post op with Dr. Becky Samuels PA-C    Dept. Orthopedic Surgery  [facility]   1/29/2018

## 2018-01-31 NOTE — PATIENT INSTRUCTIONS
May proceed with activities as tolerated.    Continue exercises for strengthening.    Use cane to avoid limping.    Good foot wear with Pronation control as we discussed.      Speak with PT about strengthening hip and ankle.    Follow up at 1 year after surgery with Dr. Pena

## 2018-02-10 DIAGNOSIS — E11.9 DIABETES MELLITUS WITHOUT COMPLICATION (H): Chronic | ICD-10-CM

## 2018-02-12 ENCOUNTER — HOSPITAL ENCOUNTER (OUTPATIENT)
Dept: MAMMOGRAPHY | Facility: CLINIC | Age: 68
Discharge: HOME | End: 2018-02-12
Attending: INTERNAL MEDICINE | Admitting: INTERNAL MEDICINE
Payer: MEDICARE

## 2018-02-12 DIAGNOSIS — Z12.31 VISIT FOR SCREENING MAMMOGRAM: ICD-10-CM

## 2018-02-12 PROCEDURE — 77063 BREAST TOMOSYNTHESIS BI: CPT

## 2018-02-12 NOTE — TELEPHONE ENCOUNTER
"Requested Prescriptions   Pending Prescriptions Disp Refills     ONETOUCH ULTRA test strip [Pharmacy Med Name: ONETOUCH ULTRA BLUE TEST STP] 100 strip 0     Sig: USE ONE STRIP TO CHECK GLUCOSE ONCE DAILY DIAG  CODE  E11.9    Diabetic Supplies Protocol Passed    2/10/2018  1:36 PM       Passed - Patient is 18 years of age or older       Passed - Patient has had appt within past 6 mos    Patient had office visit in the last 6 months or has a visit in the next 30 days with authorizing provider.  See \"Patient Info\" tab in inbasket, or \"Choose Columns\" in Meds & Orders section of the refill encounter.              Prescription approved per [facility] Refill Protocol.    "

## 2018-02-13 ENCOUNTER — THERAPY VISIT (OUTPATIENT)
Dept: PHYSICAL THERAPY | Facility: CLINIC | Age: 68
End: 2018-02-13
Payer: COMMERCIAL

## 2018-02-13 DIAGNOSIS — R26.9 GAIT DISTURBANCE: ICD-10-CM

## 2018-02-13 DIAGNOSIS — Z96.641 STATUS POST TOTAL REPLACEMENT OF RIGHT HIP: Primary | ICD-10-CM

## 2018-02-13 PROCEDURE — 97110 THERAPEUTIC EXERCISES: CPT | Mod: GP | Performed by: PHYSICAL THERAPIST

## 2018-02-13 PROCEDURE — 97162 PT EVAL MOD COMPLEX 30 MIN: CPT | Mod: GP | Performed by: PHYSICAL THERAPIST

## 2018-02-13 ASSESSMENT — ACTIVITIES OF DAILY LIVING (ADL)
STEPPING_UP_AND_DOWN_CURBS: SLIGHT DIFFICULTY
GOING_DOWN_1_FLIGHT_OF_STAIRS: MODERATE DIFFICULTY
PUTTING_ON_SOCKS_AND_SHOES: EXTREME DIFFICULTY
SITTING_FOR_15_MINUTES: NO DIFFICULTY AT ALL
LIGHT_TO_MODERATE_WORK: NO DIFFICULTY AT ALL
WALKING_15_MINUTES_OR_GREATER: NO DIFFICULTY AT ALL
GETTING_INTO_AND_OUT_OF_AN_AVERAGE_CAR: SLIGHT DIFFICULTY
HOS_ADL_SCORE(%): 68.33
WALKING_UP_STEEP_HILLS: MODERATE DIFFICULTY
WALKING_DOWN_STEEP_HILLS: MODERATE DIFFICULTY
HEAVY_WORK: SLIGHT DIFFICULTY
HOS_ADL_COUNT: 15
HOS_ADL_ITEM_SCORE_TOTAL: 41
TWISTING/PIVOTING_ON_INVOLVED_LEG: SLIGHT DIFFICULTY
DEEP_SQUATTING: UNABLE TO DO
STANDING_FOR_15_MINUTES: SLIGHT DIFFICULTY
WALKING_APPROXIMATELY_10_MINUTES: NO DIFFICULTY AT ALL
GOING_UP_1_FLIGHT_OF_STAIRS: MODERATE DIFFICULTY
ROLLING_OVER_IN_BED: SLIGHT DIFFICULTY
HOS_ADL_HIGHEST_POTENTIAL_SCORE: 60
WALKING_INITIALLY: SLIGHT DIFFICULTY

## 2018-02-13 NOTE — PROGRESS NOTES
"[place] [facility] Initial Evaluation -- Lower Extremity    Evaluation Date: February 13, 2018  Gricelda Sabillon is a 67 year old female with a R ankle/hip condition.   Referral: Dr. Pena  Work mechanical stresses: retired   Employment status: retired  Leisure mechanical stresses: walking 5 days per week, 40-60'  Functional disability score: see flow sheet  VAS score (0-10): 0/10  Patient goals/expectations:  \"I want to walk normally, and working on stairs up and down better\"    HISTORY:    Present symptoms: not pain, but instability and can limp  Pain quality (sharp/shooting/stabbing/aching/burning/cramping):  Aching back, R knee    Present since (onset date): September 2017 after R KEVEN    Symptoms (improving/unchaning/worsening):  unchanging.      Symptoms commenced as a result of: worsening of gait/pain   Condition occurred in the following environment: n/a     Symptoms at onset: R hip/knee  Paresthesia (yes/no):  no  Spinal history: episodic back pain over several years   Cough/Sneeze (pos/neg):  neg    Constant symptoms:   Intermittent symptoms: R knee    Symptoms are worse with the following: Always Stairs and Sometimes Squatting   Symptoms are better with the following: Always Sitting    Continued use makes the pain (better/worse/no effect): no effect    Disturbed night (yes/no): no      Pain at rest (yes/no):  no  Site (back/hip/knee/ankle/foot):  R hip, knee, ankle    Other questions (swelling/clicking/locking/giving way/falling):  Swelling in R ankle     Previous episodes: n/a  Previous treatments:  R TKA    Specific Questions:  General health (excellent/good/fair/poor):  good  Pertinent medical history includes: Osteoarthritis, Diabetes, Overweight and High blood pressure  Medications (nil/NSAIDS/analg/steroids/anticoag/other):  NSAIDS and Other - High blood pressure  Medical allergies:  Codiene  Imaging (none/Xray/MRI/other):  None recent  Recent or major surgery (yes/no):  No   Night " "pain (yes/no):  no  Accidents (yes/no):  no  Unexplained weight loss (yes/no):  none  Barriers at home: none  Other red flags: none    Sites for physical examination (back/hip/knee/ankle/foot/other): R hip/knee/ankle    EXAMINATION    Posture:  Sitting (good/fair/poor): n/a    Correction of Posture (better/worse/no effect/NA): n/a  Standing (good/fair/poor): n/a  Other observations:  n/a    Neurological: (NA/motor/sensory/reflexes/dural): intact    Baselines (pain or functional activity): descending stairs    Extremities (Hip / Knee / Ankle / Foot): R ankle    Movement Loss Celestine Mod Min Nil Pain   Flexion        Extension        Abduction        Adduction        Internal Rotation        External Rotation        Dorsiflexion  x x     Plantarflexion   x     Inversion    x    Eversion    x      Passive Movement (+/- over pressure)/(PDM/ERP):  -pain w/passive op  Resisted Test Response (pain): painfree MMT: DF bilat=4,4+/5, PF on R=4-/5, INV=5/5 bilat, EV=4+/5 R; R hip flex=4+/5, ABD=4-/5.   Other Tests: SLS on R=20\" difficulty maintaining R knee extension.    Spine:  Movement loss: n/a  Effect of repeated movements: n/a  Effect of static positioning: n/a  Spine testing (not relevant/relevant/secondary problem): n/a    Baseline Symptoms:   Repeated Tests Symptom Response Mechanical Response   Active/Passive movement, resisted test, functional test During -  Produce, Abolish, Increase, Decrease, NE After -  Better, Worse, NB, NW, NE Effect -   ? or ? ROM, strength or key functional test No   Effect                               Effect of static positioning                Provisional Classification (Extremity/Spine):  Extremity - Inconclusive/Other - Post-Surgery      Princicple of Management:   Education:  Frequency of HEP, expectations from PT    Equipment provided:  none  Exercise and dosage:  Gastroc/Soleus stretching, strengthening DF/PF, balance.     ASSESSMENT/PLAN:    Patient is a 67 year old female with gait/R LE " weakness complaints.    Patient has the following significant findings with corresponding treatment plan.                Diagnosis 1:  R leg weakness  Decreased ROM/flexibility - manual therapy and therapeutic exercise  Decreased strength - therapeutic exercise and therapeutic activities  Impaired gait - gait training  Decreased function - therapeutic activities    Therapy Evaluation Codes:   1) History comprised of:   Personal factors that impact the plan of care:      None.    Comorbidity factors that impact the plan of care are:      Diabetes, High blood pressure, Osteoarthritis and Overweight.     Medications impacting care: High blood pressure and Pain.  2) Examination of Body Systems comprised of:   Body structures and functions that impact the plan of care:      Ankle and Hip.   Activity limitations that impact the plan of care are:      Squatting/kneeling, Stairs and Walking.  3) Clinical presentation characteristics are:   Stable/Uncomplicated.  4) Decision-Making    Low complexity using standardized patient assessment instrument and/or measureable assessment of functional outcome.  Cumulative Therapy Evaluation is: Low complexity.    Previous and current functional limitations:  (See Goal Flow Sheet for this information)    Short term and Long term goals: (See Goal Flow Sheet for this information)     Communication ability:  Patient appears to be able to clearly communicate and understand verbal and written communication and follow directions correctly.  Treatment Explanation - The following has been discussed with the patient:   RX ordered/plan of care  Anticipated outcomes  Possible risks and side effects  This patient would benefit from PT intervention to resume normal activities.   Rehab potential is good.    Frequency:  1 X week, once daily  Duration:  for 8 weeks  Discharge Plan:  Achieve all LTG.  Independent in home treatment program.  Reach maximal therapeutic benefit.    Please refer to the daily  flowsheet for treatment today, total treatment time and time spent performing 1:1 timed codes.

## 2018-02-13 NOTE — PROGRESS NOTES
[place] [facility] Initial Evaluation -- Lumbar    Date: February 13, 2018  Gricelda Sabillon is a 67 year old female with a *** condition.   Referral: ***  Work mechanical stresses:  ***  Employment status:  ***  Leisure mechanical stresses: ***  Functional disability score (AUR[employer]A/STarT Back):  ***  VAS score (0-10): ***  Patient goals/expectations:  ***    HISTORY:    Present symptoms: ***  Pain quality (sharp/shooting/stabbing/aching/burning/cramping):  ***   Paresthesia (yes/no):  ***    Present since (onset date): ***.     Symptoms (improving/unchanging/worsening):  ***.     Symptoms commenced as a result of: ***   Condition occurred in the following environment:   ***     Symptoms at onset (back/thigh/leg): ***  Constant symptoms (back/thigh/leg): ***  Intermittent symptoms (back/thigh/leg): ***    Symptoms are made worse with the following: {PACO Lumbar Better/Worse:650139}   Symptoms are made better with the following: {PACO Lumbar Better/Worse:646455}    Disturbed sleep (yes/no):  *** Sleeping postures (prone/sup/side R/L): ***    Previous episodes (0/1-5/6-10/11+): *** Year of first episode: ***    Previous history: ***  Previous treatments: ***      Specific Questions:  Cough/Sneeze/Strain (pos/neg): ***  Bowel/Bladder (normal/abnormal): ***  Gait (normal/abnormal): ***  Medications (nil/NSAIDS/analg/steroids/anticoag/other):  {PACO Medications:389524}  Medical allergies:  ***  General health (excellent/good/fair/poor):  ***  Pertinent medical history:  {PACO Past Medical History:801187}  Imaging (None/Xray/MRI/Other):  ***  Recent or major surgery (yes/no):  ***  Night pain (yes/no): ***  Accidents (yes/no): ***  Unexplained weight loss (yes/no): ***  Barriers at home: ***  Other red flags: ***    EXAMINATION    Posture:   Sitting (good/fair/poor): ***  Standing (good/fair/poor):***  Lordosis (red/acc/normal): ***  Correction of posture (better/worse/no effect): ***    Lateral Shift  "(right/left/nil): ***  Relevant (yes/no):  ***  Other Observations: ***    Neurological:    Motor deficit:  ***  Reflexes:  ***  Sensory deficit:  ***  Dural signs:  ***    Movement Loss:   Celestine Mod Min Nil Pain   Flexion     ***   Extension     ***   Side Gliding R     ***   Side Gliding L     ***     Test Movements:   During: produces, abolishes, increases, decreases, no effect, centralizing, peripheralizing   After: better, worse, no better, no worse, no effect, centralized, peripheralized    Pretest symptoms standing: ***   Symptoms During Symptoms After ROM increased ROM decreased No Effect   FIS {PACO MDT During Testin::\" \"} {PACO MDT After Testin::\" \"}      Rep FIS {PACO MDT During Testin::\" \"} {PACO MDT After Testin::\" \"}      EIS {PACO MDT During Testin::\" \"} {PACO MDT After Testin::\" \"}      Rep EIS {PACO MDT During Testin::\" \"} {PACO MDT After Testin::\" \"}      Pretest symptoms lying: ***    Symptoms During Symptoms After ROM increased ROM decreased No Effect   AVERY {PACO MDT During Testin::\" \"} {PACO MDT After Testin::\" \"}      Rep AVERY {PACO MDT During Testin::\" \"} {PACO MDT After Testin::\" \"}      EIL {PACO MDT During Testin::\" \"} {PACO MDT After Testin::\" \"}      Rep EIL {PACO MDT During Testin::\" \"} {PACO MDT After Testin::\" \"}      If required, pretest symptoms: ***   Symptoms During Symptoms After ROM increased ROM decreased No Effect   SGIS - R {PACO MDT During Testin::\" \"} {PACO MDT After Testin::\" \"}      Rep SGIS - R {PACO MDT During Testin::\" \"} {PACO MDT After Testin::\" \"}      SGIS - L {PACO MDT During Testin::\" \"} {PACO MDT After Testin::\" \"}      Rep SGIS - L {PACO MDT During Testin::\" \"} {PACO MDT After Testin::\" \"}        Static Tests:  Sitting slouched:  ***  Sitting erect:  ***  Standing slouched ***  Standing erect:  " ***  Lying prone in extension:  *** Long sitting:  ***    Other Tests: ***    Provisional Classification:  {oscar mdt lumbar classification:321276}    Principle of Management:  Education:  ***   Equipment provided:  ***  Mechanical therapy (Y/N):  ***   Extension principle:  ***  Lateral Principle:  ***  Flexion principle:  ***  Other:  ***    ASSESSMENT/PLAN:    {REHAB NOTES:405962}

## 2018-02-13 NOTE — MR AVS SNAPSHOT
After Visit Summary   2/13/2018    Gricelda Sabillon    MRN: [id number]           Patient Information     Date Of Birth          1950        Visit Information        Provider Department      2/13/2018 3:30 PM Eugenia Lin, PT [facility]        Today's Diagnoses     Status post total replacement of right hip    -  1    Gait disturbance           Follow-ups after your visit        Your next 10 appointments already scheduled     Feb 20, 2018  2:50 PM CST   PACO Extremity with Eugenia Lin, PT   [facility] ([facility]  )    [address] [place] [address]   [phone]            Feb 27, 2018  2:50 PM CST   PACO Extremity with Eugenia Lin, PT   [facility] ([facility]  )    [address] [place] [address]   [phone]            May 14, 2018  9:00 AM CDT   LAB with RI LAB   [facility] ([facility])    [address]  [facility] [id number] [phone]           Please do not eat 10-12 hours before your appointment if you are coming in fasting for labs on lipids, cholesterol, or glucose (sugar). This does not apply to pregnant women. Water, hot tea and black coffee (with nothing added) are okay. Do not drink other fluids, diet soda or chew gum.            May 21, 2018  1:00 PM CDT   Office Visit with Raisa Davidson MD   [facility] ([facility])    [address]  [facility] [address] [phone]           Bring a current list of meds and any records pertaining to this visit. For Physicals, please bring immunization records and any forms needing to be filled out. Please arrive 10 minutes early to complete paperwork.            Sep 24, 2018  1:20 PM CDT   Return Visit with Ayaan Pena MD   [facility] ([facility])    [address] [place] [address]  [place] [address]   [phone]              Who to contact     If you have questions or need follow up information about today's clinic visit or your schedule please contact [facility] directly at [phone].  Normal or non-critical lab and imaging results will be communicated to you by MyChart, letter or phone within 4 business days after the clinic has received the results. If you do not hear from us within 7 days, please contact the clinic through MyChart or phone. If you have a critical or abnormal lab result, we will notify you by phone as soon as possible.  Submit refill requests through [employer] or call your pharmacy and they will forward the refill request to us. Please allow 3 business days for your refill to be completed.          Additional Information About Your Visit        [email or web address] Information     [employer] gives you secure access to your electronic health record. If you see a primary care provider, you can also send messages to your care team and make appointments. If you have questions, please call your primary care clinic.  If you do not have a primary care provider, please call [phone] and they will assist you.        Care EveryWhere ID     This is your Care EveryWhere ID. This could be used by other organizations to access your [facility] medical records  [id number]        Your Vitals Were     Last Period                   12/13/2002            Blood Pressure from Last 3 Encounters:   01/26/18 124/72   10/25/17 120/72   09/28/17 113/52    Weight from Last 3 Encounters:   01/26/18 99.8 kg (220 lb)   10/25/17 99.8 kg (220 lb 1.6 oz)   09/15/17 99.8 kg (220 lb)              We Performed the Following     HC PT EVAL, MODERATE COMPLEXITY     PACO INITIAL EVAL REPORT     THERAPEUTIC EXERCISES        Primary Care Provider Office Phone # Fax #    Raisa Davidson [phone]       [address] [place] [address]        Equal Access to Services     TAWANA TREVINO  AH: Hadii elisabeth ashley angeleso Soojycelynali, waaxda luqadaha, qaybta kaalmanjit spence, thomas josein hayaamarisela ruedabridget cash laAlessiaarlene perez. So [facility] [phone].    ATENCIÓN: Si habla español, tiene a vail disposición servicios gratuitos de asistencia lingüística. Llame al [phone].    We comply with applicable federal civil rights laws and [place] laws. We do not discriminate on the basis of race, color, national origin, age, disability, sex, sexual orientation, or gender identity.            Thank you!     Thank you for choosing [facility]  for your care. Our goal is always to provide you with excellent care. Hearing back from our patients is one way we can continue to improve our services. Please take a few minutes to complete the written survey that you may receive in the mail after your visit with us. Thank you!             Your Updated Medication List - Protect others around you: Learn how to safely use, store and throw away your medicines at www.disposemymeds.org.          This list is accurate as of 2/13/18  4:25 PM.  Always use your most recent med list.                   Brand Name Dispense Instructions for use Diagnosis    ADVIL 200 MG tablet   Generic drug:  ibuprofen      take 4 tablet (200 mg) by oral route every 8 hours as needed with food        albuterol 108 (90 BASE) MCG/ACT Inhaler    PROAIR HFA/PROVENTIL HFA/VENTOLIN HFA    1 Inhaler    Inhale 2 puffs into the lungs every 4 hours as needed for shortness of breath / dyspnea    Acute bronchitis       aspirin  MG EC tablet     45 tablet    Take 1 tablet (325 mg) by mouth daily    Deep vein thrombosis (DVT) prophylaxis prescribed at discharge       atenolol 50 MG tablet    TENORMIN    90 tablet    Take 1 tablet (50 mg) by mouth daily    HTN, goal below 140/90, Sinus tachycardia, Diabetes mellitus without complication (H), Essential hypertension with goal blood pressure less than 140/90, Hyperlipidemia with target LDL less than 100        atorvastatin 20 MG tablet    LIPITOR    90 tablet    Take 1 tablet (20 mg) by mouth daily    Hyperlipidemia with target LDL less than 100, Diabetes mellitus without complication (H), Essential hypertension with goal blood pressure less than 140/90, HTN, goal below 140/90, Sinus tachycardia       azelastine 0.05 % Soln ophthalmic solution    OPTIVAR     Place 1 drop into both eyes 2 times daily as needed Reported on 3/22/2017        azelastine 0.1 % spray    ASTELIN     Spray 1 spray into both nostrils 2 times daily as needed Reported on 3/22/2017        cetirizine 10 MG tablet    zyrTEC     Take 10 mg by mouth every morning.        desoximetasone 0.25 % cream    TOPICORT    60 g    Apply sparingly to affected area's    Eczema       dorzolamide 2 % ophthalmic solution    TRUSOPT    1 Bottle    Place 2 drops into both eyes 2 times daily        fish oil-omega-3 fatty acids 1000 MG capsule      Take 1 g by mouth daily Reported on 3/22/2017        glipiZIDE 5 MG tablet    GLUCOTROL    360 tablet    TAKE TWO TABLETS BY MOUTH TWICE DAILY BEFORE MEAL(S)    Diabetes mellitus without complication (H), Essential hypertension with goal blood pressure less than 140/90, Hyperlipidemia with target LDL less than 100, HTN, goal below 140/90, Sinus tachycardia       hydrochlorothiazide 25 MG tablet    HYDRODIURIL    90 tablet    TAKE ONE TABLET BY MOUTH ONCE DAILY IN THE MORNING    HTN, goal below 140/90, Diabetes mellitus without complication (H), Essential hypertension with goal blood pressure less than 140/90, Hyperlipidemia with target LDL less than 100, Sinus tachycardia       lisinopril 40 MG tablet    PRINIVIL/ZESTRIL    90 tablet    Take 1 tablet (40 mg) by mouth daily    HTN, goal below 140/90, Diabetes mellitus without complication (H), Essential hypertension with goal blood pressure less than 140/90, Hyperlipidemia with target LDL less than 100, Sinus tachycardia       metFORMIN 1000 MG tablet    GLUCOPHAGE    180 tablet     TAKE ONE TABLET BY MOUTH TWICE DAILY WITH MEALS    Diabetes mellitus without complication (H), Essential hypertension with goal blood pressure less than 140/90, Hyperlipidemia with target LDL less than 100, HTN, goal below 140/90, Sinus tachycardia       MULTIVITAMIN TABS   OR      Reported on 3/22/2017        ONETOUCH ULTRA test strip   Generic drug:  blood glucose monitoring     100 strip    USE ONE STRIP TO CHECK GLUCOSE ONCE DAILY DIAG  CODE  E11.9    Diabetes mellitus without complication (H)       * order for DME     3 Month    All diabetic testing supplies including test strips, lancets and solution for testing 3 times per day. Patient is using  no Insulin. A1C      7.6   5/3/2016 A1C      7.4   2/4/2016    Type 2 diabetes mellitus, uncontrolled (H), Hyperlipidemia with target LDL less than 100, Essential hypertension with goal blood pressure less than 140/90       * order for DME     3 Month    All diabetic testing supplies including test strips, lancets and solution for testing 4 times per day. Patient is using  no Insulin. Last A1c Lab Results      Component                Value               Date                      A1C                      7.7                 09/05/2017                A1C                      7.5                 03/17/2017    Type 2 diabetes mellitus with hyperglycemia, without long-term current use of insulin (H)       senna-docusate 8.6-50 MG per tablet    SENOKOT-S;PERICOLACE    40 tablet    Take 1-2 tablets by mouth 2 times daily as needed for constipation    Constipation due to opioid therapy       sitagliptin 100 MG tablet    JANUVIA    90 tablet    Take 1 tablet (100 mg) by mouth daily    Diabetes mellitus without complication (H), Essential hypertension with goal blood pressure less than 140/90, Hyperlipidemia with target LDL less than 100, HTN, goal below 140/90, Sinus tachycardia       XALATAN 0.005 % ophthalmic solution   Generic drug:  latanoprost     2.5 mL    Place 1  drop Into the left eye At Bedtime        * Notice:  This list has 2 medication(s) that are the same as other medications prescribed for you. Read the directions carefully, and ask your doctor or other care provider to review them with you.

## 2018-02-20 ENCOUNTER — THERAPY VISIT (OUTPATIENT)
Dept: PHYSICAL THERAPY | Facility: CLINIC | Age: 68
End: 2018-02-20
Payer: COMMERCIAL

## 2018-02-20 DIAGNOSIS — Z96.641 STATUS POST TOTAL REPLACEMENT OF RIGHT HIP: Primary | ICD-10-CM

## 2018-02-20 PROCEDURE — 97112 NEUROMUSCULAR REEDUCATION: CPT | Mod: GP | Performed by: PHYSICAL THERAPIST

## 2018-02-20 PROCEDURE — 97110 THERAPEUTIC EXERCISES: CPT | Mod: GP | Performed by: PHYSICAL THERAPIST

## 2018-02-27 ENCOUNTER — THERAPY VISIT (OUTPATIENT)
Dept: PHYSICAL THERAPY | Facility: CLINIC | Age: 68
End: 2018-02-27
Payer: COMMERCIAL

## 2018-02-27 DIAGNOSIS — Z96.641 STATUS POST TOTAL REPLACEMENT OF RIGHT HIP: ICD-10-CM

## 2018-02-27 PROCEDURE — 97112 NEUROMUSCULAR REEDUCATION: CPT | Mod: GP | Performed by: PHYSICAL THERAPIST

## 2018-02-27 PROCEDURE — 97110 THERAPEUTIC EXERCISES: CPT | Mod: GP | Performed by: PHYSICAL THERAPIST

## 2018-03-12 ENCOUNTER — THERAPY VISIT (OUTPATIENT)
Dept: PHYSICAL THERAPY | Facility: CLINIC | Age: 68
End: 2018-03-12
Payer: COMMERCIAL

## 2018-03-12 DIAGNOSIS — Z96.641 STATUS POST TOTAL REPLACEMENT OF RIGHT HIP: ICD-10-CM

## 2018-03-12 PROCEDURE — 97110 THERAPEUTIC EXERCISES: CPT | Mod: GP | Performed by: PHYSICAL THERAPIST

## 2018-03-12 PROCEDURE — 97112 NEUROMUSCULAR REEDUCATION: CPT | Mod: GP | Performed by: PHYSICAL THERAPIST

## 2018-04-02 ENCOUNTER — THERAPY VISIT (OUTPATIENT)
Dept: PHYSICAL THERAPY | Facility: CLINIC | Age: 68
End: 2018-04-02
Payer: COMMERCIAL

## 2018-04-02 DIAGNOSIS — Z96.641 STATUS POST TOTAL REPLACEMENT OF RIGHT HIP: ICD-10-CM

## 2018-04-02 PROCEDURE — 97110 THERAPEUTIC EXERCISES: CPT | Mod: GP | Performed by: PHYSICAL THERAPIST

## 2018-04-02 PROCEDURE — 97112 NEUROMUSCULAR REEDUCATION: CPT | Mod: GP | Performed by: PHYSICAL THERAPIST

## 2018-04-02 PROCEDURE — 97530 THERAPEUTIC ACTIVITIES: CPT | Mod: GP | Performed by: PHYSICAL THERAPIST

## 2018-04-16 ENCOUNTER — THERAPY VISIT (OUTPATIENT)
Dept: PHYSICAL THERAPY | Facility: CLINIC | Age: 68
End: 2018-04-16
Payer: COMMERCIAL

## 2018-04-16 DIAGNOSIS — Z96.641 STATUS POST TOTAL REPLACEMENT OF RIGHT HIP: ICD-10-CM

## 2018-04-16 PROCEDURE — 97112 NEUROMUSCULAR REEDUCATION: CPT | Mod: GP | Performed by: PHYSICAL THERAPIST

## 2018-04-16 PROCEDURE — 97110 THERAPEUTIC EXERCISES: CPT | Mod: GP | Performed by: PHYSICAL THERAPIST

## 2018-04-16 PROCEDURE — 97530 THERAPEUTIC ACTIVITIES: CPT | Mod: GP | Performed by: PHYSICAL THERAPIST

## 2018-04-28 DIAGNOSIS — L30.9 ECZEMA, UNSPECIFIED TYPE: ICD-10-CM

## 2018-04-30 RX ORDER — TRIAMCINOLONE ACETONIDE 1 MG/G
CREAM TOPICAL
Qty: 30 G | Refills: 1 | Status: SHIPPED | OUTPATIENT
Start: 2018-04-30 | End: 2018-05-21

## 2018-04-30 NOTE — TELEPHONE ENCOUNTER
Last refill-9/17/17-#15g x1      Med taken off med list-Med clean up    Pt sched for appt on 5/21    Per Melissa-ok to fill

## 2018-05-07 ENCOUNTER — THERAPY VISIT (OUTPATIENT)
Dept: PHYSICAL THERAPY | Facility: CLINIC | Age: 68
End: 2018-05-07
Payer: COMMERCIAL

## 2018-05-07 DIAGNOSIS — Z96.641 STATUS POST TOTAL REPLACEMENT OF RIGHT HIP: ICD-10-CM

## 2018-05-07 PROCEDURE — 97110 THERAPEUTIC EXERCISES: CPT | Mod: GP | Performed by: PHYSICAL THERAPIST

## 2018-05-07 PROCEDURE — 97530 THERAPEUTIC ACTIVITIES: CPT | Mod: GP | Performed by: PHYSICAL THERAPIST

## 2018-05-07 PROCEDURE — 97112 NEUROMUSCULAR REEDUCATION: CPT | Mod: GP | Performed by: PHYSICAL THERAPIST

## 2018-05-07 ASSESSMENT — ACTIVITIES OF DAILY LIVING (ADL)
HEAVY_WORK: SLIGHT DIFFICULTY
SITTING_FOR_15_MINUTES: NO DIFFICULTY AT ALL
HOS_ADL_ITEM_SCORE_TOTAL: 55
RECREATIONAL_ACTIVITIES: SLIGHT DIFFICULTY
STANDING_FOR_15_MINUTES: NO DIFFICULTY AT ALL
STEPPING_UP_AND_DOWN_CURBS: SLIGHT DIFFICULTY
WALKING_APPROXIMATELY_10_MINUTES: NO DIFFICULTY AT ALL
WALKING_UP_STEEP_HILLS: SLIGHT DIFFICULTY
WALKING_15_MINUTES_OR_GREATER: NO DIFFICULTY AT ALL
TWISTING/PIVOTING_ON_INVOLVED_LEG: SLIGHT DIFFICULTY
LIGHT_TO_MODERATE_WORK: NO DIFFICULTY AT ALL
GETTING_INTO_AND_OUT_OF_AN_AVERAGE_CAR: MODERATE DIFFICULTY
HOS_ADL_COUNT: 17
GETTING_INTO_AND_OUT_OF_A_BATHTUB: NO DIFFICULTY AT ALL
GOING_DOWN_1_FLIGHT_OF_STAIRS: MODERATE DIFFICULTY
WALKING_INITIALLY: NO DIFFICULTY AT ALL
PUTTING_ON_SOCKS_AND_SHOES: MODERATE DIFFICULTY
HOS_ADL_HIGHEST_POTENTIAL_SCORE: 68
ROLLING_OVER_IN_BED: SLIGHT DIFFICULTY
DEEP_SQUATTING: SLIGHT DIFFICULTY
WALKING_DOWN_STEEP_HILLS: SLIGHT DIFFICULTY
HOW_WOULD_YOU_RATE_YOUR_CURRENT_LEVEL_OF_FUNCTION_DURING_YOUR_USUAL_ACTIVITIES_OF_DAILY_LIVING_FROM_0_TO_100_WITH_100_BEING_YOUR_LEVEL_OF_FUNCTION_PRIOR_TO_YOUR_HIP_PROBLEM_AND_0_BEING_THE_INABILITY_TO_PERFORM_ANY_OF_YOUR_USUAL_DAILY_ACTIVITIES?: 90
GOING_UP_1_FLIGHT_OF_STAIRS: SLIGHT DIFFICULTY
HOS_ADL_SCORE(%): 80.88

## 2018-05-07 NOTE — MR AVS SNAPSHOT
After Visit Summary   5/7/2018    Gricelda Sabillon    MRN: [id number]           Patient Information     Date Of Birth          1950        Visit Information        Provider Department      5/7/2018 1:05 PM Eugenia Lin, PT [facility]        Today's Diagnoses     Status post total replacement of right hip           Follow-ups after your visit        Your next 10 appointments already scheduled     May 14, 2018  9:00 AM CDT   LAB with RI LAB   [facility] ([facility])    [address]  [facility] [address]   [phone]           Please do not eat 10-12 hours before your appointment if you are coming in fasting for labs on lipids, cholesterol, or glucose (sugar). This does not apply to pregnant women. Water, hot tea and black coffee (with nothing added) are okay. Do not drink other fluids, diet soda or chew gum.            May 21, 2018  1:00 PM CDT   Office Visit with Raisa Davidson MD   [facility] ([facility])    [address]  [facility] [address] [phone]           Bring a current list of meds and any records pertaining to this visit. For Physicals, please bring immunization records and any forms needing to be filled out. Please arrive 10 minutes early to complete paperwork.            Sep 24, 2018  1:20 PM CDT   Return Visit with Ayaan Pena MD   [facility] ([facility])    [address] [address] [address]   [phone]              Who to contact     If you have questions or need follow up information about today's clinic visit or your schedule please contact [facility] directly at [phone].  Normal or non-critical lab and imaging results will be communicated to you by MyChart, letter or phone within 4 business days after the clinic has received the results. If you do not hear  from us within 7 days, please contact the clinic through [employer] or phone. If you have a critical or abnormal lab result, we will notify you by phone as soon as possible.  Submit refill requests through [employer] or call your pharmacy and they will forward the refill request to us. Please allow 3 business days for your refill to be completed.          Additional Information About Your Visit        [facility] Information     [employer] gives you secure access to your electronic health record. If you see a primary care provider, you can also send messages to your care team and make appointments. If you have questions, please call your primary care clinic.  If you do not have a primary care provider, please call [phone] and they will assist you.        Care EveryWhere ID     This is your Care EveryWhere ID. This could be used by other organizations to access your [facility] medical records  [id number]        Your Vitals Were     Last Period                   12/13/2002            Blood Pressure from Last 3 Encounters:   01/26/18 124/72   10/25/17 120/72   09/28/17 113/52    Weight from Last 3 Encounters:   01/26/18 99.8 kg (220 lb)   10/25/17 99.8 kg (220 lb 1.6 oz)   09/15/17 99.8 kg (220 lb)              We Performed the Following     NEUROMUSCULAR RE-EDUCATION     THERAPEUTIC ACTIVITIES     THERAPEUTIC EXERCISES        Primary Care Provider Office Phone # Fax #    Raisa Michelle Davidson [phone]       [address] [facility] [address]        Equal Access to Services     YUE TREVINO : Hadii elisabeth ku hadasho Soomaali, waaxda luqadaha, qaybta kaalmada adeegyabrionna, waxay natalia marmolejo . So [facility] [phone].    ATENCIÓN: Si habla español, tiene a vail disposición servicios gratuitos de asistencia lingüística. Baldev al [phone].    We comply with applicable federal civil rights laws and [place] laws. We do not discriminate on the basis of race, color, national origin,  age, disability, sex, sexual orientation, or gender identity.            Thank you!     Thank you for choosing [facility]  for your care. Our goal is always to provide you with excellent care. Hearing back from our patients is one way we can continue to improve our services. Please take a few minutes to complete the written survey that you may receive in the mail after your visit with us. Thank you!             Your Updated Medication List - Protect others around you: Learn how to safely use, store and throw away your medicines at www.disposemymeds.org.          This list is accurate as of 5/7/18  1:41 PM.  Always use your most recent med list.                   Brand Name Dispense Instructions for use Diagnosis    ADVIL 200 MG tablet   Generic drug:  ibuprofen      take 4 tablet (200 mg) by oral route every 8 hours as needed with food        albuterol 108 (90 Base) MCG/ACT Inhaler    PROAIR HFA/PROVENTIL HFA/VENTOLIN HFA    1 Inhaler    Inhale 2 puffs into the lungs every 4 hours as needed for shortness of breath / dyspnea    Acute bronchitis       aspirin 325 MG EC tablet     45 tablet    Take 1 tablet (325 mg) by mouth daily    Deep vein thrombosis (DVT) prophylaxis prescribed at discharge       atenolol 50 MG tablet    TENORMIN    90 tablet    Take 1 tablet (50 mg) by mouth daily    HTN, goal below 140/90, Sinus tachycardia, Diabetes mellitus without complication (H), Essential hypertension with goal blood pressure less than 140/90, Hyperlipidemia with target LDL less than 100       atorvastatin 20 MG tablet    LIPITOR    90 tablet    Take 1 tablet (20 mg) by mouth daily    Hyperlipidemia with target LDL less than 100, Diabetes mellitus without complication (H), Essential hypertension with goal blood pressure less than 140/90, HTN, goal below 140/90, Sinus tachycardia       azelastine 0.05 % Soln ophthalmic solution    OPTIVAR     Place 1 drop into both eyes 2 times daily as needed Reported on  3/22/2017        azelastine 0.1 % spray    ASTELIN     Spray 1 spray into both nostrils 2 times daily as needed Reported on 3/22/2017        cetirizine 10 MG tablet    zyrTEC     Take 10 mg by mouth every morning.        desoximetasone 0.25 % cream    TOPICORT    60 g    Apply sparingly to affected area's    Eczema       dorzolamide 2 % ophthalmic solution    TRUSOPT    1 Bottle    Place 2 drops into both eyes 2 times daily        fish oil-omega-3 fatty acids 1000 MG capsule      Take 1 g by mouth daily Reported on 3/22/2017        glipiZIDE 5 MG tablet    GLUCOTROL    360 tablet    TAKE TWO TABLETS BY MOUTH TWICE DAILY BEFORE MEAL(S)    Diabetes mellitus without complication (H), Essential hypertension with goal blood pressure less than 140/90, Hyperlipidemia with target LDL less than 100, HTN, goal below 140/90, Sinus tachycardia       hydrochlorothiazide 25 MG tablet    HYDRODIURIL    90 tablet    TAKE ONE TABLET BY MOUTH ONCE DAILY IN THE MORNING    HTN, goal below 140/90, Diabetes mellitus without complication (H), Essential hypertension with goal blood pressure less than 140/90, Hyperlipidemia with target LDL less than 100, Sinus tachycardia       lisinopril 40 MG tablet    PRINIVIL/ZESTRIL    90 tablet    Take 1 tablet (40 mg) by mouth daily    HTN, goal below 140/90, Diabetes mellitus without complication (H), Essential hypertension with goal blood pressure less than 140/90, Hyperlipidemia with target LDL less than 100, Sinus tachycardia       metFORMIN 1000 MG tablet    GLUCOPHAGE    180 tablet    TAKE ONE TABLET BY MOUTH TWICE DAILY WITH MEALS    Diabetes mellitus without complication (H), Essential hypertension with goal blood pressure less than 140/90, Hyperlipidemia with target LDL less than 100, HTN, goal below 140/90, Sinus tachycardia       MULTIVITAMIN TABS   OR      Reported on 3/22/2017        ONETOUCH ULTRA test strip   Generic drug:  blood glucose monitoring     100 strip    USE ONE STRIP TO  CHECK GLUCOSE ONCE DAILY DIAG  CODE  E11.9    Diabetes mellitus without complication (H)       * order for DME     3 Month    All diabetic testing supplies including test strips, lancets and solution for testing 3 times per day. Patient is using  no Insulin. A1C      7.6   5/3/2016 A1C      7.4   2/4/2016    Type 2 diabetes mellitus, uncontrolled (H), Hyperlipidemia with target LDL less than 100, Essential hypertension with goal blood pressure less than 140/90       * order for DME     3 Month    All diabetic testing supplies including test strips, lancets and solution for testing 4 times per day. Patient is using  no Insulin. Last A1c Lab Results      Component                Value               Date                      A1C                      7.7                 09/05/2017                A1C                      7.5                 03/17/2017    Type 2 diabetes mellitus with hyperglycemia, without long-term current use of insulin (H)       senna-docusate 8.6-50 MG per tablet    SENOKOT-S;PERICOLACE    40 tablet    Take 1-2 tablets by mouth 2 times daily as needed for constipation    Constipation due to opioid therapy       sitagliptin 100 MG tablet    JANUVIA    90 tablet    Take 1 tablet (100 mg) by mouth daily    Diabetes mellitus without complication (H), Essential hypertension with goal blood pressure less than 140/90, Hyperlipidemia with target LDL less than 100, HTN, goal below 140/90, Sinus tachycardia       triamcinolone 0.1 % cream    KENALOG    30 g    APPLY  CREAM EXTERNALLY TWICE DAILY    Eczema, unspecified type       XALATAN 0.005 % ophthalmic solution   Generic drug:  latanoprost     2.5 mL    Place 1 drop Into the left eye At Bedtime        * Notice:  This list has 2 medication(s) that are the same as other medications prescribed for you. Read the directions carefully, and ask your doctor or other care provider to review them with you.

## 2018-05-07 NOTE — PROGRESS NOTES
Subjective:  HPI                    Objective:  System    Physical Exam    General     ROS    Assessment/Plan:    DISCHARGE REPORT    Progress reporting period is May 7, 2018.       SUBJECTIVE  Subjective changes noted by patient:  .  Subjective: Overall feels she is at least 40% better, still has problems going down the stairs but can stand unrestricted, go up the stairs and walking with more endurance and less gait disturbance. Performing HEP regularly.  Current pain level is 0/10  .     Previous pain level was  0/10 Initial Pain level: 0/10.   Changes in function:  Yes (See Goal flowsheet attached for changes in current functional level)  Adverse reaction to treatment or activity: None    OBJECTIVE  Changes noted in objective findings:  The objective findings below are from DOS 5-7-18.  Objective: R glut med=4-/5, ambulates with SEC w/out gair deviations.       ASSESSMENT/PLAN  Updated problem list and treatment plan: Diagnosis 1:  S/p R THR  Pain -  self management, education, directional preference exercise and home program  Decreased ROM/flexibility - manual therapy and therapeutic exercise  Decreased joint mobility - manual therapy and therapeutic exercise  Decreased strength - therapeutic exercise and therapeutic activities  Impaired balance - neuro re-education and therapeutic activities  Impaired gait - gait training  Decreased function - therapeutic activities  STG/LTGs have been met or progress has been made towards goals:  Yes (See Goal flow sheet completed today.)  Assessment of Progress: Patient is meeting short term goals and is progressing towards long term goals.  Self Management Plans:  Patient is independent in a home treatment program.  Patient is independent in self management of symptoms.  I have re-evaluated this patient and find that the nature, scope, duration and intensity of the therapy is appropriate for the medical condition of the patient.  Gricelda continues to require the following  intervention to meet STG and LTG's:  PT intervention is no longer required to meet STG/LTG.    Recommendations:  This patient is ready to be discharged from therapy and continue their home treatment program.    Please refer to the daily flowsheet for treatment today, total treatment time and time spent performing 1:1 timed codes.

## 2018-05-14 DIAGNOSIS — I10 HTN, GOAL BELOW 140/90: ICD-10-CM

## 2018-05-14 DIAGNOSIS — I10 ESSENTIAL HYPERTENSION WITH GOAL BLOOD PRESSURE LESS THAN 140/90: Chronic | ICD-10-CM

## 2018-05-14 DIAGNOSIS — E78.5 HYPERLIPIDEMIA WITH TARGET LDL LESS THAN 100: Chronic | ICD-10-CM

## 2018-05-14 DIAGNOSIS — E11.9 DIABETES MELLITUS WITHOUT COMPLICATION (H): Chronic | ICD-10-CM

## 2018-05-14 DIAGNOSIS — R00.0 SINUS TACHYCARDIA: ICD-10-CM

## 2018-05-14 LAB
ALBUMIN SERPL-MCNC: 3.8 G/DL (ref 3.4–5)
ALP SERPL-CCNC: 101 U/L (ref 40–150)
ALT SERPL W P-5'-P-CCNC: 63 U/L (ref 0–50)
ANION GAP SERPL CALCULATED.3IONS-SCNC: 7 MMOL/L (ref 3–14)
AST SERPL W P-5'-P-CCNC: 38 U/L (ref 0–45)
BILIRUB SERPL-MCNC: 0.3 MG/DL (ref 0.2–1.3)
BUN SERPL-MCNC: 18 MG/DL (ref 7–30)
CALCIUM SERPL-MCNC: 9.7 MG/DL (ref 8.5–10.1)
CHLORIDE SERPL-SCNC: 105 MMOL/L (ref 94–109)
CHOLEST SERPL-MCNC: 158 MG/DL
CO2 SERPL-SCNC: 26 MMOL/L (ref 20–32)
CREAT SERPL-MCNC: 0.76 MG/DL (ref 0.52–1.04)
ERYTHROCYTE [DISTWIDTH] IN BLOOD BY AUTOMATED COUNT: 13.5 % (ref 10–15)
GFR SERPL CREATININE-BSD FRML MDRD: 75 ML/MIN/1.7M2
GLUCOSE SERPL-MCNC: 204 MG/DL (ref 70–99)
HBA1C MFR BLD: 7.4 % (ref 0–5.6)
HCT VFR BLD AUTO: 40.4 % (ref 35–47)
HDLC SERPL-MCNC: 40 MG/DL
HGB BLD-MCNC: 12.7 G/DL (ref 11.7–15.7)
LDLC SERPL CALC-MCNC: 75 MG/DL
MCH RBC QN AUTO: 27.2 PG (ref 26.5–33)
MCHC RBC AUTO-ENTMCNC: 31.4 G/DL (ref 31.5–36.5)
MCV RBC AUTO: 87 FL (ref 78–100)
NONHDLC SERPL-MCNC: 118 MG/DL
PLATELET # BLD AUTO: 303 10E9/L (ref 150–450)
POTASSIUM SERPL-SCNC: 4.4 MMOL/L (ref 3.4–5.3)
PROT SERPL-MCNC: 7.5 G/DL (ref 6.8–8.8)
RBC # BLD AUTO: 4.67 10E12/L (ref 3.8–5.2)
SODIUM SERPL-SCNC: 138 MMOL/L (ref 133–144)
TRIGL SERPL-MCNC: 214 MG/DL
TSH SERPL DL<=0.005 MIU/L-ACNC: 3.28 MU/L (ref 0.4–4)
WBC # BLD AUTO: 7.5 10E9/L (ref 4–11)

## 2018-05-14 PROCEDURE — 84443 ASSAY THYROID STIM HORMONE: CPT | Performed by: INTERNAL MEDICINE

## 2018-05-14 PROCEDURE — 36415 COLL VENOUS BLD VENIPUNCTURE: CPT | Performed by: INTERNAL MEDICINE

## 2018-05-14 PROCEDURE — 85027 COMPLETE CBC AUTOMATED: CPT | Performed by: INTERNAL MEDICINE

## 2018-05-14 PROCEDURE — 80061 LIPID PANEL: CPT | Performed by: INTERNAL MEDICINE

## 2018-05-14 PROCEDURE — 83036 HEMOGLOBIN GLYCOSYLATED A1C: CPT | Performed by: INTERNAL MEDICINE

## 2018-05-14 PROCEDURE — 80053 COMPREHEN METABOLIC PANEL: CPT | Performed by: INTERNAL MEDICINE

## 2018-05-21 ENCOUNTER — OFFICE VISIT (OUTPATIENT)
Dept: INTERNAL MEDICINE | Facility: CLINIC | Age: 68
End: 2018-05-21
Payer: COMMERCIAL

## 2018-05-21 VITALS
RESPIRATION RATE: 16 BRPM | TEMPERATURE: 99 F | BODY MASS INDEX: 35.36 KG/M2 | SYSTOLIC BLOOD PRESSURE: 110 MMHG | OXYGEN SATURATION: 95 % | HEART RATE: 84 BPM | HEIGHT: 66 IN | WEIGHT: 220 LBS | DIASTOLIC BLOOD PRESSURE: 60 MMHG

## 2018-05-21 DIAGNOSIS — E78.5 HYPERLIPIDEMIA WITH TARGET LDL LESS THAN 100: Chronic | ICD-10-CM

## 2018-05-21 DIAGNOSIS — R79.89 LFT ELEVATION: ICD-10-CM

## 2018-05-21 DIAGNOSIS — I10 HTN, GOAL BELOW 140/90: ICD-10-CM

## 2018-05-21 DIAGNOSIS — R00.0 SINUS TACHYCARDIA: ICD-10-CM

## 2018-05-21 DIAGNOSIS — E11.9 DIABETES MELLITUS WITHOUT COMPLICATION (H): Primary | Chronic | ICD-10-CM

## 2018-05-21 PROCEDURE — 99214 OFFICE O/P EST MOD 30 MIN: CPT | Performed by: INTERNAL MEDICINE

## 2018-05-21 RX ORDER — GLIPIZIDE 5 MG/1
TABLET ORAL
Qty: 360 TABLET | Refills: 1 | Status: SHIPPED | OUTPATIENT
Start: 2018-05-21 | End: 2019-05-09

## 2018-05-21 RX ORDER — ATENOLOL 50 MG/1
50 TABLET ORAL DAILY
Qty: 90 TABLET | Refills: 1 | Status: SHIPPED | OUTPATIENT
Start: 2018-05-21 | End: 2019-04-12

## 2018-05-21 RX ORDER — LISINOPRIL 40 MG/1
40 TABLET ORAL DAILY
Qty: 90 TABLET | Refills: 1 | Status: SHIPPED | OUTPATIENT
Start: 2018-05-21 | End: 2019-05-09

## 2018-05-21 RX ORDER — HYDROCHLOROTHIAZIDE 25 MG/1
TABLET ORAL
Qty: 90 TABLET | Refills: 1 | Status: SHIPPED | OUTPATIENT
Start: 2018-05-21 | End: 2019-05-09

## 2018-05-21 RX ORDER — ATORVASTATIN CALCIUM 20 MG/1
20 TABLET, FILM COATED ORAL DAILY
Qty: 90 TABLET | Refills: 1 | Status: SHIPPED | OUTPATIENT
Start: 2018-05-21 | End: 2018-10-24

## 2018-05-21 NOTE — PATIENT INSTRUCTIONS
Plan:  1. Continue same meds, same doses for now   2. The following vaccines are recommended for you.   Medicare covers better if you have these vaccines at the pharmacy:  -- Prevnar 13 ( pneumonia vaccine) - Sept 2018  -- Shingerix vaccine - the newest vaccine for shingles   3.  Please make a lab appointment for nonfasting labs    4. Please make an appointment few days after the labs to discuss about the results.

## 2018-05-21 NOTE — NURSING NOTE
"Vital signs:  Temp: 99  F (37.2  C) Temp src: Oral BP: 110/60 Pulse: 84   Resp: 16 SpO2: 95 %     Height: 5' 6\" (167.6 cm) Weight: 220 lb (99.8 kg) (refused)  Estimated body mass index is 35.51 kg/(m^2) as calculated from the following:    Height as of this encounter: 5' 6\" (1.676 m).    Weight as of this encounter: 220 lb (99.8 kg).          "

## 2018-05-21 NOTE — PROGRESS NOTES
Dr Torres's note      Patient's instructions / PLAN:                                                        Plan:  1. Continue same meds, same doses for now   2. The following vaccines are recommended for you.   Medicare covers better if you have these vaccines at the pharmacy:  -- Prevnar 13 ( pneumonia vaccine) - Sept 2018  -- Shingerix vaccine - the newest vaccine for shingles   3.  Please make a lab appointment for nonfasting labs  In 3 month   4. Please make an appointment few days after the labs to discuss about the results.         ASSESSMENT & PLAN:                                                      (E11.9) DM2 no complication  (primary encounter diagnosis)  Comment: Controlled    Plan: Comprehensive metabolic panel, Hemoglobin A1c,         atenolol (TENORMIN) 50 MG tablet, atorvastatin         (LIPITOR) 20 MG tablet, glipiZIDE (GLUCOTROL) 5        MG tablet, hydrochlorothiazide (HYDRODIURIL) 25        MG tablet, lisinopril (PRINIVIL/ZESTRIL) 40 MG         tablet, metFORMIN (GLUCOPHAGE) 1000 MG tablet,         sitagliptin (JANUVIA) 100 MG tablet      (I10) HTN, goal below 140/90  Comment: Controlled    Plan: atenolol (TENORMIN) 50 MG tablet, atorvastatin         (LIPITOR) 20 MG tablet, glipiZIDE (GLUCOTROL) 5        MG tablet, hydrochlorothiazide (HYDRODIURIL) 25        MG tablet, lisinopril (PRINIVIL/ZESTRIL) 40 MG         tablet, metFORMIN (GLUCOPHAGE) 1000 MG tablet,         sitagliptin (JANUVIA) 100 MG tablet            (E78.5) Hyperlipidemia with target LDL less than 100  Comment:   Plan: Comprehensive metabolic panel, Hemoglobin A1c,         atenolol (TENORMIN) 50 MG tablet, atorvastatin         (LIPITOR) 20 MG tablet, glipiZIDE (GLUCOTROL) 5        MG tablet, hydrochlorothiazide (HYDRODIURIL) 25        MG tablet, lisinopril (PRINIVIL/ZESTRIL) 40 MG         tablet, metFORMIN (GLUCOPHAGE) 1000 MG tablet,         sitagliptin (JANUVIA) 100 MG tablet            (R79.89) LFT elevation  Comment:   Plan:  Comprehensive metabolic panel, Hemoglobin A1c            (R00.0) Sinus tachycardia  Comment:   Plan: atenolol (TENORMIN) 50 MG tablet, atorvastatin         (LIPITOR) 20 MG tablet, glipiZIDE (GLUCOTROL) 5        MG tablet, hydrochlorothiazide (HYDRODIURIL) 25        MG tablet, lisinopril (PRINIVIL/ZESTRIL) 40 MG         tablet, metFORMIN (GLUCOPHAGE) 1000 MG tablet,         sitagliptin (JANUVIA) 100 MG tablet               Chief complaint:                                                      Follow up chronic medical problems      SUBJECTIVE:   Gricelda Sabillon is a 68 year old female who presents to clinic today for the following health issues:      Diabetes Follow-up    Patient is checking blood sugars: once daily.  Results are as follows:         am - 140-160    Diabetic concerns: None     Symptoms of hypoglycemia (low blood sugar): none     Paresthesias (numbness or burning in feet) or sores: No     Date of last diabetic eye exam: 04/15/18    We talked about medication changes.  She prefers to work more on the diabetic diet.    Hyperlipidemia Follow-Up      Rate your low fat/cholesterol diet?: fair    Taking statin?  Yes, no muscle aches from statin    Other lipid medications/supplements?:  none    Hypertension Follow-up      Outpatient blood pressures are not being checked.    Low Salt Diet: no added salt    BP Readings from Last 2 Encounters:   05/21/18 110/60   01/26/18 124/72     Hemoglobin A1C (%)   Date Value   05/14/2018 7.4 (H)   01/19/2018 7.3 (H)     LDL Cholesterol Calculated (mg/dL)   Date Value   05/14/2018 75   09/05/2017 68       Amount of exercise or physical activity: physical active, walk, do exercise for the knee    Problems taking medications regularly: No    Medication side effects: none    Diet: regular (no restrictions) and carbohydrate counting      R leg  -- s/p knee and hip replacement and ankle fx. - doing well with PT    Review of Systems:                                             "          ROS: negative for fever, chills, cough, wheezes, chest pain, shortness of breath, vomiting, abdominal pain, leg swelling      OBJECTIVE:             Physical exam:  Blood pressure 110/60, pulse 84, temperature 99  F (37.2  C), temperature source Oral, resp. rate 16, height 5' 6\" (1.676 m), weight 220 lb (99.8 kg), last menstrual period 12/13/2002, SpO2 95 %, not currently breastfeeding.   NAD, appears comfortable  Skin: no rashes   Chest: clear to auscultation bilaterally, good respiratory effort  Heart: S1 S2, RRR, no mgr appreciated  Abdomen: soft, not tender,   Extremities: trace feet edema,   Neurologic: A, Ox3, no focal signs appreciated    PMHx: reviewed  Past Medical History:   Diagnosis Date     Allergic rhinitis, cause unspecified      Asthma      Asthma, mild intermittent      Cataract      Cellulitis and abscess of leg, except foot 03/00    Bilateral     Eczema      Heart murmur     aortic area     HTN, goal below 140/90      Hyperlipidemia LDL goal < 100      Hyperplastic colon polyp 2008     Infection     bilateral eye infections     Macular hole of left eye      Obesity (BMI 30-39.9)      Osteoarthritis     knees     Other chronic pain     right knee     Sleep apnea     uses c pap     Type 2 diabetes, HbA1C goal < 8% (H)       PSHx: reviewed  Past Surgical History:   Procedure Laterality Date     ARTHROPLASTY HIP Right 9/25/2017    Procedure: ARTHROPLASTY HIP;  Right total hip arthroplasty  ;  Surgeon: Ayaan Pena MD;  Location: [facility] OR     ARTHROPLASTY KNEE  7/12/2013    Procedure: ARTHROPLASTY KNEE;  right total knee arthroplasty ;  Surgeon: Chaparro Wesley MD;  Location: [facility] OR     ARTHROSCOPY KNEE Right 1/21/2015    Procedure: ARTHROSCOPY KNEE;  Surgeon: Ayaan Pena MD;  Location: [facility] OR     C INCISION OF HYMEN       CATARACT IOL, RT/LT       COLONOSCOPY  2008     EYE SURGERY      macular hole repaired left eye     HC KNEE SCOPE, DIAGNOSTIC      - both knees     " HEAD & NECK SURGERY      wisdom teeth        Meds: reviewed  Current Outpatient Prescriptions   Medication Sig Dispense Refill     aspirin  MG EC tablet Take 1 tablet (325 mg) by mouth daily (Patient taking differently: Take 81 mg by mouth daily ) 45 tablet 0     atenolol (TENORMIN) 50 MG tablet Take 1 tablet (50 mg) by mouth daily 90 tablet 1     atorvastatin (LIPITOR) 20 MG tablet Take 1 tablet (20 mg) by mouth daily 90 tablet 1     azelastine (OPTIVAR) 0.05 % SOLN Place 1 drop into both eyes 2 times daily as needed Reported on 3/22/2017       calcium-vitamin D 500-125 MG-UNIT TABS        cetirizine (ZYRTEC) 10 MG tablet Take 10 mg by mouth every morning.       desoximetasone (TOPICORT) 0.25 % cream Apply sparingly to affected area's 60 g 1     dorzolamide (TRUSOPT) 2 % ophthalmic solution Place 2 drops into both eyes 2 times daily 1 Bottle      fish oil-omega-3 fatty acids (FISH OIL) 1000 MG capsule Take 1 g by mouth daily Reported on 3/22/2017       glipiZIDE (GLUCOTROL) 5 MG tablet TAKE TWO TABLETS BY MOUTH TWICE DAILY BEFORE MEAL(S) 360 tablet 1     hydrochlorothiazide (HYDRODIURIL) 25 MG tablet TAKE ONE TABLET BY MOUTH ONCE DAILY IN THE MORNING 90 tablet 1     latanoprost (XALATAN) 0.005 % ophthalmic solution Place 1 drop Into the left eye At Bedtime 2.5 mL 1     lisinopril (PRINIVIL/ZESTRIL) 40 MG tablet Take 1 tablet (40 mg) by mouth daily 90 tablet 1     metFORMIN (GLUCOPHAGE) 1000 MG tablet TAKE ONE TABLET BY MOUTH TWICE DAILY WITH MEALS 180 tablet 1     MULTIVITAMIN TABS   OR Reported on 3/22/2017       sitagliptin (JANUVIA) 100 MG tablet Take 1 tablet (100 mg) by mouth daily 90 tablet 0     albuterol (PROAIR HFA, PROVENTIL HFA, VENTOLIN HFA) 108 (90 BASE) MCG/ACT inhaler Inhale 2 puffs into the lungs every 4 hours as needed for shortness of breath / dyspnea 1 Inhaler 3     azelastine (ASTELIN) 137 MCG/SPRAY nasal spray [place] 1 spray into both nostrils 2 times daily as needed Reported on 3/22/2017        ibuprofen (ADVIL) 200 MG tablet take 4 tablet (200 mg) by oral route every 8 hours as needed with food       ONETOUCH ULTRA test strip USE ONE STRIP TO CHECK GLUCOSE ONCE DAILY DIAG  CODE  E11.9 100 strip 3     order for DME All diabetic testing supplies including test strips, lancets and solution for testing 3 times per day. Patient is using  no Insulin. A1C      7.6   5/3/2016  A1C      7.4   2/4/2016 3 Month 1     order for DME All diabetic testing supplies including test strips, lancets and solution for testing 4 times per day. Patient is using  no Insulin. Last A1c Lab Results       Component                Value               Date                       A1C                      7.7                 09/05/2017                 A1C                      7.5                 03/17/2017 3 Month 1     senna-docusate (SENOKOT-S;PERICOLACE) 8.6-50 MG per tablet Take 1-2 tablets by mouth 2 times daily as needed for constipation (Patient not taking: Reported on 10/25/2017) 40 tablet 0       Soc Hx: reviewed  Fam Hx: reviewed          Raisa Torres MD  Internal Medicine

## 2018-05-21 NOTE — MR AVS SNAPSHOT
After Visit Summary   5/21/2018    Gricelda Sabillon    MRN: [id number]           Patient Information     Date Of Birth          1950        Visit Information        Provider Department      5/21/2018 1:00 PM Raisa Davidson MD [facility]        Today's Diagnoses     DM2 no complication    -  1    HTN goal less than 140/90,        Hyperlipidemia with target LDL less than 100        LFT elevation          Care Instructions    Plan:  1. Continue same meds, same doses for now   2. The following vaccines are recommended for you.   Medicare covers better if you have these vaccines at the pharmacy:  -- Prevnar 13 ( pneumonia vaccine) - Sept 2018  -- Shingerix vaccine - the newest vaccine for shingles   3.  Please make a lab appointment for nonfasting labs    4. Please make an appointment few days after the labs to discuss about the results.           Follow-ups after your visit        Your next 10 appointments already scheduled     Sep 24, 2018  1:20 PM CDT   [facility] Visit [facility] Pena MD   [facility]    [address] [address] [address]   [phone]              Future tests that were ordered for you today     Open Future Orders        Priority Expected Expires Ordered    Comprehensive metabolic panel Routine  5/21/2019 5/21/2018    Hemoglobin A1c Routine  5/21/2019 5/21/2018            Who to contact     If you have questions or need follow up information about today's clinic visit or your schedule please contact [facility] directly at [phone].  Normal or non-critical lab and imaging results will be communicated to you by MyChart, letter or phone within 4 business days after the clinic has received the results. If you do not hear from us within 7 days, please contact the clinic through MyChart or phone. If you have a critical or abnormal lab result, we  "will notify you by phone as soon as possible.  Submit refill requests through [employer] or call your pharmacy and they will forward the refill request to us. Please allow 3 business days for your refill to be completed.          Additional Information About Your Visit        [employer] Information     [employer] gives you secure access to your electronic health record. If you see a primary care provider, you can also send messages to your care team and make appointments. If you have questions, please call your primary care clinic.  If you do not have a primary care provider, please call [phone] and they will assist you.        Care EveryWhere ID     This is your Care EveryWhere ID. This could be used by other organizations to access your [place] medical records  [id number]        Your Vitals Were     Pulse Temperature Respirations Height Last Period Pulse Oximetry    84 99  F (37.2  C) (Oral) 16 5' 6\" (1.676 m) 12/13/2002 95%    BMI (Body Mass Index)                   35.51 kg/m2            Blood Pressure from Last 3 Encounters:   05/21/18 110/60   01/26/18 124/72   10/25/17 120/72    Weight from Last 3 Encounters:   05/21/18 220 lb (99.8 kg)   01/26/18 220 lb (99.8 kg)   10/25/17 220 lb 1.6 oz (99.8 kg)                 Today's Medication Changes          These changes are accurate as of 5/21/18  1:41 PM.  If you have any questions, ask your nurse or doctor.               These medicines have changed or have updated prescriptions.        Dose/Directions    aspirin 325 MG EC tablet   This may have changed:  how much to take   Used for:  Deep vein thrombosis (DVT) prophylaxis prescribed at discharge        Dose:  325 mg   Take 1 tablet (325 mg) by mouth daily   Quantity:  45 tablet   Refills:  0                Primary Care Provider Office Phone # Fax #    Raisa Davidson [phone]       [address]  [facility] [address]        Equal Access to Services     TAWANA TREVINO AH: Hadii " elisabeth Ravi, wanicoleda luqadaha, qaybta kaalmada jordy, waxclarissa idiin haynadermarisela mauriciomilanjonh hankinsAlessiaarlene ana. So [facility] [phone].    ATENCIÓN: Si habla español, tiene a vail disposición servicios gratuitos de asistencia lingüística. Courtneyame al [phone].    We comply with applicable federal civil rights laws and [place] laws. We do not discriminate on the basis of race, color, national origin, age, disability, sex, sexual orientation, or gender identity.            Thank you!     Thank you for choosing [facility]  for your care. Our goal is always to provide you with excellent care. Hearing back from our patients is one way we can continue to improve our services. Please take a few minutes to complete the written survey that you may receive in the mail after your visit with us. Thank you!             Your Updated Medication List - Protect others around you: Learn how to safely use, store and throw away your medicines at www.disposemymeds.org.          This list is accurate as of 5/21/18  1:41 PM.  Always use your most recent med list.                   Brand Name Dispense Instructions for use Diagnosis    ADVIL 200 MG tablet   Generic drug:  ibuprofen      take 4 tablet (200 mg) by oral route every 8 hours as needed with food        albuterol 108 (90 Base) MCG/ACT Inhaler    PROAIR HFA/PROVENTIL HFA/VENTOLIN HFA    1 Inhaler    Inhale 2 puffs into the lungs every 4 hours as needed for shortness of breath / dyspnea    Acute bronchitis       aspirin 325 MG EC tablet     45 tablet    Take 1 tablet (325 mg) by mouth daily    Deep vein thrombosis (DVT) prophylaxis prescribed at discharge       atenolol 50 MG tablet    TENORMIN    90 tablet    Take 1 tablet (50 mg) by mouth daily    HTN, goal below 140/90, Sinus tachycardia, Diabetes mellitus without complication (H), Essential hypertension with goal blood pressure less than 140/90, Hyperlipidemia with target LDL less than 100       atorvastatin 20  MG tablet    LIPITOR    90 tablet    Take 1 tablet (20 mg) by mouth daily    Hyperlipidemia with target LDL less than 100, Diabetes mellitus without complication (H), Essential hypertension with goal blood pressure less than 140/90, HTN, goal below 140/90, Sinus tachycardia       azelastine 0.05 % Soln ophthalmic solution    OPTIVAR     Place 1 drop into both eyes 2 times daily as needed Reported on 3/22/2017        azelastine 0.1 % spray    ASTELIN     Spray 1 spray into both nostrils 2 times daily as needed Reported on 3/22/2017        calcium-vitamin D 500-125 MG-UNIT Tabs           cetirizine 10 MG tablet    zyrTEC     Take 10 mg by mouth every morning.        desoximetasone 0.25 % cream    TOPICORT    60 g    Apply sparingly to affected area's    Eczema       dorzolamide 2 % ophthalmic solution    TRUSOPT    1 Bottle    Place 2 drops into both eyes 2 times daily        fish oil-omega-3 fatty acids 1000 MG capsule      Take 1 g by mouth daily Reported on 3/22/2017        glipiZIDE 5 MG tablet    GLUCOTROL    360 tablet    TAKE TWO TABLETS BY MOUTH TWICE DAILY BEFORE MEAL(S)    Diabetes mellitus without complication (H), Essential hypertension with goal blood pressure less than 140/90, Hyperlipidemia with target LDL less than 100, HTN, goal below 140/90, Sinus tachycardia       hydrochlorothiazide 25 MG tablet    HYDRODIURIL    90 tablet    TAKE ONE TABLET BY MOUTH ONCE DAILY IN THE MORNING    HTN, goal below 140/90, Diabetes mellitus without complication (H), Essential hypertension with goal blood pressure less than 140/90, Hyperlipidemia with target LDL less than 100, Sinus tachycardia       lisinopril 40 MG tablet    PRINIVIL/ZESTRIL    90 tablet    Take 1 tablet (40 mg) by mouth daily    HTN, goal below 140/90, Diabetes mellitus without complication (H), Essential hypertension with goal blood pressure less than 140/90, Hyperlipidemia with target LDL less than 100, Sinus tachycardia       metFORMIN 1000 MG  tablet    GLUCOPHAGE    180 tablet    TAKE ONE TABLET BY MOUTH TWICE DAILY WITH MEALS    Diabetes mellitus without complication (H), Essential hypertension with goal blood pressure less than 140/90, Hyperlipidemia with target LDL less than 100, HTN, goal below 140/90, Sinus tachycardia       MULTIVITAMIN TABS   OR      Reported on 3/22/2017        ONETOUCH ULTRA test strip   Generic drug:  blood glucose monitoring     100 strip    USE ONE STRIP TO CHECK GLUCOSE ONCE DAILY DIAG  CODE  E11.9    Diabetes mellitus without complication (H)       * order for DME     3 Month    All diabetic testing supplies including test strips, lancets and solution for testing 3 times per day. Patient is using  no Insulin. A1C      7.6   5/3/2016 A1C      7.4   2/4/2016    Type 2 diabetes mellitus, uncontrolled (H), Hyperlipidemia with target LDL less than 100, Essential hypertension with goal blood pressure less than 140/90       * order for DME     3 Month    All diabetic testing supplies including test strips, lancets and solution for testing 4 times per day. Patient is using  no Insulin. Last A1c Lab Results      Component                Value               Date                      A1C                      7.7                 09/05/2017                A1C                      7.5                 03/17/2017    Type 2 diabetes mellitus with hyperglycemia, without long-term current use of insulin (H)       senna-docusate 8.6-50 MG per tablet    SENOKOT-S;PERICOLACE    40 tablet    Take 1-2 tablets by mouth 2 times daily as needed for constipation    Constipation due to opioid therapy       sitagliptin 100 MG tablet    JANUVIA    90 tablet    Take 1 tablet (100 mg) by mouth daily    Diabetes mellitus without complication (H), Essential hypertension with goal blood pressure less than 140/90, Hyperlipidemia with target LDL less than 100, HTN, goal below 140/90, Sinus tachycardia       XALATAN 0.005 % ophthalmic solution   Generic drug:   latanoprost     2.5 mL    Place 1 drop Into the left eye At Bedtime        * Notice:  This list has 2 medication(s) that are the same as other medications prescribed for you. Read the directions carefully, and ask your doctor or other care provider to review them with you.

## 2018-06-27 ENCOUNTER — TRANSFERRED RECORDS (OUTPATIENT)
Dept: HEALTH INFORMATION MANAGEMENT | Facility: CLINIC | Age: 68
End: 2018-06-27

## 2018-07-17 ENCOUNTER — TELEPHONE (OUTPATIENT)
Dept: INTERNAL MEDICINE | Facility: CLINIC | Age: 68
End: 2018-07-17

## 2018-07-17 NOTE — LETTER
[facility]  [address], [address]  [place], [place]  [address]                                            TEL:[phone]  FAX:[phone]      Gricelda Sabillon  [address] [address]      July 17, 2018    Dear Gricelda       A review of your chart has indicated that you are not up-to-date on your colon cancer screening.   We strongly encourage colonoscopies, but if this is something you are unable or unwilling to do for any reason, we wanted you to be aware of the available alternatives.    *Cologuard. This is a relatively new test to the market that uses DNA to assess your risk for colon cancer. You would be provided a kit for you to collect a small stool sample at home, which is then sent to the lab for testing. This is the preferred alternative if you are unable to have a colonoscopy.  The Pros of this test include the fact that you don't have to do any prep for this, it is quick and easy, and a negative result would not need to be repeated for 3 years. The Cons for this test is that because it is so new, we highly suggest contacting your insurance company to see what portion they would cover.We are finding that most insurance companies are covering this, but the only way to know for sure is to call them and double-check. The out-of-pocket cost for this if it is not covered can be anywhere from $600-900.     *Fecal Immunochemical Test (commonly known as a FIT test). This test requires you to place a small stool sample on a card and send it in to our lab, where it is tested for trace amounts of blood (which can be an early indicator of colon cancer). We would provide you with a kit from our lab that would have all of the necessary materials.   The Pros of this test include that it is quick and easy to do, and is much less costly than some other testing methods. The Cons of this test is that it is not as reliable as some other testing methods-not all patient with  Quick Note:    Your stress was abnormal.   You need to see the cardiologist and have further studies done since the test seems to indicate heart problems that can lead to heart damage.  ______   "blood in their stool will have colon cancer, and not all people with colon cancer will have blood in their stool. Because it is a less reliable screening tool, this would need to be repeated yearly.     Another Con for both of these tests is that if either one of them were to be found abnormal, then it would still be recommended that you have an actual colonoscopy.   Colonoscopies are still considered the \"Gold Standard\" when it comes to screening for colon cancer-nothing beats being able to physically see the inside of the colon and hopefully prevent small issues before they become bigger ones.   If you are interested in either of these colon cancer screening options, please let us know and we will place the appropriate orders.     Please let us know which (if any) of these options you would be interested in when you come for your appointment next month. As mentioned above-if you're interested in having the Cologuard done, please check with your insurance to make sure it would be covered.     Let us know if you have any questions or concerns. Thank you.      Sincerely,      The Office of Raisa Torres M.D.    "

## 2018-07-17 NOTE — TELEPHONE ENCOUNTER
Panel Management Review      Patient has the following on her problem list:     Asthma review     ACT Total Scores 1/26/2018   ACT TOTAL SCORE -   ASTHMA ER VISITS -   ASTHMA HOSPITALIZATIONS -   ACT TOTAL SCORE (Goal Greater than or Equal to 20) 25   In the past 12 months, how many times did you visit the emergency room for your asthma without being admitted to the hospital? 0   In the past 12 months, how many times were you hospitalized overnight because of your asthma? 0      1. Is Asthma diagnosis on the Problem List? Yes    2. Is Asthma listed on Health Maintenance? Yes    3. Patient is due for:  ACT will be due 7/26/18    Diabetes    ASA: Passed    Last A1C  Lab Results   Component Value Date    A1C 7.4 05/14/2018    A1C 7.3 01/19/2018    A1C 7.7 09/05/2017    A1C 7.5 03/17/2017    A1C 7.0 12/05/2016     A1C tested: Passed    Last LDL:    Lab Results   Component Value Date    CHOL 158 05/14/2018     Lab Results   Component Value Date    HDL 40 05/14/2018     Lab Results   Component Value Date    LDL 75 05/14/2018     Lab Results   Component Value Date    TRIG 214 05/14/2018     Lab Results   Component Value Date    CHOLHDLRATIO 3.1 08/03/2015     Lab Results   Component Value Date    NHDL 118 05/14/2018       Is the patient on a Statin? YES             Is the patient on Aspirin? YES    Medications     HMG CoA Reductase Inhibitors    atorvastatin (LIPITOR) 20 MG tablet    Salicylates    aspirin  MG EC tablet          Last three blood pressure readings:  BP Readings from Last 3 Encounters:   05/21/18 110/60   01/26/18 124/72   10/25/17 120/72       Date of last diabetes office visit: 5/21/18     Tobacco History:     History   Smoking Status     Never Smoker   Smokeless Tobacco     Never Used         Hypertension   Last three blood pressure readings:  BP Readings from Last 3 Encounters:   05/21/18 110/60   01/26/18 124/72   10/25/17 120/72     Blood pressure: Passed    HTN Guidelines:  Age 18-59 BP range:   Less than 140/90  Age 60-85 with Diabetes:  Less than 140/90  Age 60-85 without Diabetes:  less than 150/90      Composite cancer screening  Chart review shows that this patient is due/due soon for the following Colonoscopy  Summary:    Patient is due/failing the following:   Colonoscopy, ACT.     Action needed:   She needs ACT, but she already has appt schedule with Dr. Torres for one month from now-I don't think she will do it if we send it to her because the last time we gave it to her, she didn't want to do it because she doesn't feel like she has asthma anymore since her sleep apnea is being treated. Added note to her August appt to complete ACT (she can ask Dr. Torres to take asthma off her problem list if she still wants this).   Pt also needs to complete colonoscopy.     Type of outreach:    Letter sent advising she is due for colonoscopy, and if she is not able to have this done, that she should check with her insurance regarding the Cologuard and we will discuss when she comes for her next appointment.     Questions for provider review:    None                                                                                                                                    Pati Frank [facility]       Chart not routed.

## 2018-07-26 ENCOUNTER — TELEPHONE (OUTPATIENT)
Dept: ENDOCRINOLOGY | Facility: CLINIC | Age: 68
End: 2018-07-26

## 2018-07-26 NOTE — TELEPHONE ENCOUNTER
Panel Management Review      Patient has the following on her problem list:     Asthma review     ACT Total Scores 1/26/2018   ACT TOTAL SCORE -   ASTHMA ER VISITS -   ASTHMA HOSPITALIZATIONS -   ACT TOTAL SCORE (Goal Greater than or Equal to 20) 25   In the past 12 months, how many times did you visit the emergency room for your asthma without being admitted to the hospital? 0   In the past 12 months, how many times were you hospitalized overnight because of your asthma? 0      1. Is Asthma diagnosis on the Problem List? Yes    2. Is Asthma listed on Health Maintenance? Yes    3. Patient is due for:  none    Diabetes    ASA: Passed    Last A1C  Lab Results   Component Value Date    A1C 7.4 05/14/2018    A1C 7.3 01/19/2018    A1C 7.7 09/05/2017    A1C 7.5 03/17/2017    A1C 7.0 12/05/2016     A1C tested: Passed    Last LDL:    Lab Results   Component Value Date    CHOL 158 05/14/2018     Lab Results   Component Value Date    HDL 40 05/14/2018     Lab Results   Component Value Date    LDL 75 05/14/2018     Lab Results   Component Value Date    TRIG 214 05/14/2018     Lab Results   Component Value Date    CHOLHDLRATIO 3.1 08/03/2015     Lab Results   Component Value Date    NHDL 118 05/14/2018       Is the patient on a Statin? YES             Is the patient on Aspirin? YES    Medications     HMG CoA Reductase Inhibitors    atorvastatin (LIPITOR) 20 MG tablet    Salicylates    aspirin  MG EC tablet          Last three blood pressure readings:  BP Readings from Last 3 Encounters:   05/21/18 110/60   01/26/18 124/72   10/25/17 120/72       Date of last diabetes office visit: 10/25/17     Tobacco History:     History   Smoking Status     Never Smoker   Smokeless Tobacco     Never Used         Hypertension   Last three blood pressure readings:  BP Readings from Last 3 Encounters:   05/21/18 110/60   01/26/18 124/72   10/25/17 120/72     Blood pressure: Passed    HTN Guidelines:  Age 18-59 BP range:  Less than  140/90  Age 60-85 with Diabetes:  Less than 140/90  Age 60-85 without Diabetes:  less than 150/90      Composite cancer screening  Chart review shows that this patient is due/due soon for the following Colonoscopy  Summary:    Patient is due/failing the following:   COLONOSCOPY and FOLLOW UP    Action needed:   Patient needs office visit for endo.    Type of outreach:    Sent [employer] message.    Questions for provider review:    None                                                                                                                                    Mica Swift CMA ([facility])       Chart routed to closed .

## 2018-08-20 DIAGNOSIS — R79.89 LFT ELEVATION: ICD-10-CM

## 2018-08-20 DIAGNOSIS — E78.5 HYPERLIPIDEMIA WITH TARGET LDL LESS THAN 100: Chronic | ICD-10-CM

## 2018-08-20 DIAGNOSIS — E11.9 DIABETES MELLITUS WITHOUT COMPLICATION (H): Chronic | ICD-10-CM

## 2018-08-20 LAB — HBA1C MFR BLD: 7.9 % (ref 0–5.6)

## 2018-08-20 PROCEDURE — 36415 COLL VENOUS BLD VENIPUNCTURE: CPT | Performed by: INTERNAL MEDICINE

## 2018-08-20 PROCEDURE — 80053 COMPREHEN METABOLIC PANEL: CPT | Performed by: INTERNAL MEDICINE

## 2018-08-20 PROCEDURE — 83036 HEMOGLOBIN GLYCOSYLATED A1C: CPT | Performed by: INTERNAL MEDICINE

## 2018-08-21 LAB
ALBUMIN SERPL-MCNC: 3.8 G/DL (ref 3.4–5)
ALP SERPL-CCNC: 287 U/L (ref 40–150)
ALT SERPL W P-5'-P-CCNC: 194 U/L (ref 0–50)
ANION GAP SERPL CALCULATED.3IONS-SCNC: 12 MMOL/L (ref 3–14)
AST SERPL W P-5'-P-CCNC: 115 U/L (ref 0–45)
BILIRUB SERPL-MCNC: 0.6 MG/DL (ref 0.2–1.3)
BUN SERPL-MCNC: 18 MG/DL (ref 7–30)
CALCIUM SERPL-MCNC: 10.4 MG/DL (ref 8.5–10.1)
CHLORIDE SERPL-SCNC: 101 MMOL/L (ref 94–109)
CO2 SERPL-SCNC: 26 MMOL/L (ref 20–32)
CREAT SERPL-MCNC: 0.76 MG/DL (ref 0.52–1.04)
GFR SERPL CREATININE-BSD FRML MDRD: 76 ML/MIN/1.7M2
GLUCOSE SERPL-MCNC: 200 MG/DL (ref 70–99)
POTASSIUM SERPL-SCNC: 4.1 MMOL/L (ref 3.4–5.3)
PROT SERPL-MCNC: 7.6 G/DL (ref 6.8–8.8)
SODIUM SERPL-SCNC: 139 MMOL/L (ref 133–144)

## 2018-08-27 ENCOUNTER — OFFICE VISIT (OUTPATIENT)
Dept: INTERNAL MEDICINE | Facility: CLINIC | Age: 68
End: 2018-08-27
Payer: COMMERCIAL

## 2018-08-27 VITALS
BODY MASS INDEX: 35.36 KG/M2 | WEIGHT: 220 LBS | SYSTOLIC BLOOD PRESSURE: 130 MMHG | RESPIRATION RATE: 16 BRPM | HEART RATE: 81 BPM | OXYGEN SATURATION: 99 % | TEMPERATURE: 98.3 F | HEIGHT: 66 IN | DIASTOLIC BLOOD PRESSURE: 80 MMHG

## 2018-08-27 DIAGNOSIS — E11.9 DIABETES MELLITUS WITHOUT COMPLICATION (H): Primary | Chronic | ICD-10-CM

## 2018-08-27 DIAGNOSIS — R73.9 HYPERGLYCEMIA: ICD-10-CM

## 2018-08-27 DIAGNOSIS — R79.89 LFT ELEVATION: ICD-10-CM

## 2018-08-27 DIAGNOSIS — E78.5 HYPERLIPIDEMIA WITH TARGET LDL LESS THAN 100: Chronic | ICD-10-CM

## 2018-08-27 DIAGNOSIS — I10 ESSENTIAL HYPERTENSION WITH GOAL BLOOD PRESSURE LESS THAN 140/90: Chronic | ICD-10-CM

## 2018-08-27 DIAGNOSIS — J45.20 MILD INTERMITTENT ASTHMA WITHOUT COMPLICATION: Chronic | ICD-10-CM

## 2018-08-27 PROCEDURE — 99214 OFFICE O/P EST MOD 30 MIN: CPT | Performed by: INTERNAL MEDICINE

## 2018-08-27 NOTE — MR AVS SNAPSHOT
After Visit Summary   8/27/2018    Gricelda Sabillon    MRN: [id number]           Patient Information     Date Of Birth          1950        Visit Information        Provider Department      8/27/2018 1:00 PM Raisa Davidson MD [facility]        Today's Diagnoses     Mild intermittent asthma without complication    -  1    Hyperglycemia        DM2 no complication        LFT elevation          Care Instructions    Plan:  1. Check with the insurance about these diabetes meds:  --- Bydureon, Trulicity, Tanzeum ( once a week), Victoza ( daily ) Byetta ( twice a day) - injections  2. Hold the lipitor   3.Continue the other meds, same doses for now.  4. Please make a lab appointment for fasting labs  In 3 months  5. Please make an appointment few days after the labs to discuss about the results. With dr Corado and Melissa   6. Please check with your insurance company regarding coverage of the Cologuard.           Follow-ups after your visit        Your next 10 appointments already scheduled     Sep 24, 2018  1:20 PM CDT   Return Visit with Ayaan Pena MD   [facility] ORTHOPEDIC SURGERY ([facility])    [address] [address]   [phone]              Future tests that were ordered for you today     Open Future Orders        Priority Expected Expires Ordered    Hemoglobin A1c Routine  8/27/2019 8/27/2018    Comprehensive metabolic panel Routine  8/27/2019 8/27/2018    Comprehensive metabolic panel Routine  8/27/2019 8/27/2018    Hemoglobin A1c Routine  8/27/2019 8/27/2018            Who to contact     If you have questions or need follow up information about today's clinic visit or your schedule please contact [facility] directly at [phone].  Normal or non-critical lab and imaging results will be communicated to you by MyChart, letter or phone within 4 business days after the  "clinic has received the results. If you do not hear from us within 7 days, please contact the clinic through [employer] or phone. If you have a critical or abnormal lab result, we will notify you by phone as soon as possible.  Submit refill requests through [employer] or call your pharmacy and they will forward the refill request to us. Please allow 3 business days for your refill to be completed.          Additional Information About Your Visit        [employer] Information     [employer] gives you secure access to your electronic health record. If you see a primary care provider, you can also send messages to your care team and make appointments. If you have questions, please call your primary care clinic.  If you do not have a primary care provider, please call [phone] and they will assist you.        Care EveryWhere ID     This is your Care EveryWhere ID. This could be used by other organizations to access your [place] medical records  [id number]        Your Vitals Were     Pulse Temperature Respirations Height Last Period Pulse Oximetry    81 98.3  F (36.8  C) (Oral) 16 5' 6\" (1.676 m) 12/13/2002 99%    Breastfeeding? BMI (Body Mass Index)                No 35.51 kg/m2           Blood Pressure from Last 3 Encounters:   08/27/18 130/80   05/21/18 110/60   01/26/18 124/72    Weight from Last 3 Encounters:   08/27/18 220 lb (99.8 kg)   05/21/18 220 lb (99.8 kg)   01/26/18 220 lb (99.8 kg)              We Performed the Following     Asthma Action Plan (AAP)          Today's Medication Changes          These changes are accurate as of 8/27/18  1:34 PM.  If you have any questions, ask your nurse or doctor.               Start taking these medicines.        Dose/Directions    order for DME   Used for:  Diabetes mellitus without complication (H), Hyperglycemia, Mild intermittent asthma without complication, LFT elevation   Started by:  Raisa Davidson MD        All diabetic testing supplies including test " strips, lancets and solution for testing 2 times per day. Patient is using   no Insulin. Last A1c Lab Results      Component                Value               Date                      A1C                      7.9                 08/20/2018   Quantity:  100 each   Refills:  11         These medicines have changed or have updated prescriptions.        Dose/Directions    aspirin 325 MG EC tablet   This may have changed:  how much to take   Used for:  Deep vein thrombosis (DVT) prophylaxis prescribed at discharge        Dose:  325 mg   Take 1 tablet (325 mg) by mouth daily   Quantity:  45 tablet   Refills:  0            Where to get your medicines      Some of these will need a paper prescription and others can be bought over the counter.  Ask your nurse if you have questions.     Bring a paper prescription for each of these medications     order for DME                Primary Care Provider Office Phone # Fax #    Raisa Davidson [phone]       [address]  [facility] [address]        Equal Access to Services     [facility] Hospital[facility]IS[facility] : Hadii elisabeth ashley hadasho Soomaali, waaxda luqadaha, qaybta kaalmada adeegyada, thomas fisher hayarlene marmolejo . So [facility] [place] [phone].    ATENCIÓN: Si habla español, tiene a vail disposición servicios gratuitos de asistencia lingüística. Llame al [phone].    We comply with applicable federal civil rights laws and [place] laws. We do not discriminate on the basis of race, color, national origin, age, disability, sex, sexual orientation, or gender identity.            Thank you!     Thank you for choosing [facility]  for your care. Our goal is always to provide you with excellent care. Hearing back from our patients is one way we can continue to improve our services. Please take a few minutes to complete the written survey that you may receive in the mail after your visit with us. Thank you!             Your Updated Medication  List - Protect others around you: Learn how to safely use, store and throw away your medicines at www.disposemymeds.org.          This list is accurate as of 8/27/18  1:34 PM.  Always use your most recent med list.                   Brand Name Dispense Instructions for use Diagnosis    ADVIL 200 MG tablet   Generic drug:  ibuprofen      take 4 tablet (200 mg) by oral route every 8 hours as needed with food        albuterol 108 (90 Base) MCG/ACT inhaler    PROAIR HFA/PROVENTIL HFA/VENTOLIN HFA    1 Inhaler    Inhale 2 puffs into the lungs every 4 hours as needed for shortness of breath / dyspnea    Acute bronchitis       aspirin 325 MG EC tablet     45 tablet    Take 1 tablet (325 mg) by mouth daily    Deep vein thrombosis (DVT) prophylaxis prescribed at discharge       atenolol 50 MG tablet    TENORMIN    90 tablet    Take 1 tablet (50 mg) by mouth daily    HTN, goal below 140/90, Sinus tachycardia, Diabetes mellitus without complication (H), Hyperlipidemia with target LDL less than 100       atorvastatin 20 MG tablet    LIPITOR    90 tablet    Take 1 tablet (20 mg) by mouth daily    Hyperlipidemia with target LDL less than 100, Diabetes mellitus without complication (H), HTN, goal below 140/90, Sinus tachycardia       azelastine 0.05 % ophthalmic solution    OPTIVAR     Place 1 drop into both eyes 2 times daily as needed Reported on 3/22/2017        azelastine 0.1 % nasal spray    ASTELIN     Spray 1 spray into both nostrils 2 times daily as needed Reported on 3/22/2017        calcium-vitamin D 500-125 MG-UNIT Tabs           cetirizine 10 MG tablet    zyrTEC     Take 10 mg by mouth every morning.        desoximetasone 0.25 % cream    TOPICORT    60 g    Apply sparingly to affected area's    Eczema       dorzolamide 2 % ophthalmic solution    TRUSOPT    1 Bottle    Place 2 drops into both eyes 2 times daily        fish oil-omega-3 fatty acids 1000 MG capsule      Take 1 g by mouth daily Reported on 3/22/2017         glipiZIDE 5 MG tablet    GLUCOTROL    360 tablet    TAKE TWO TABLETS BY MOUTH TWICE DAILY BEFORE MEAL(S)    Diabetes mellitus without complication (H), Hyperlipidemia with target LDL less than 100, HTN, goal below 140/90, Sinus tachycardia       hydrochlorothiazide 25 MG tablet    HYDRODIURIL    90 tablet    TAKE ONE TABLET BY MOUTH ONCE DAILY IN THE MORNING    HTN, goal below 140/90, Diabetes mellitus without complication (H), Hyperlipidemia with target LDL less than 100, Sinus tachycardia       lisinopril 40 MG tablet    PRINIVIL/ZESTRIL    90 tablet    Take 1 tablet (40 mg) by mouth daily    HTN, goal below 140/90, Diabetes mellitus without complication (H), Hyperlipidemia with target LDL less than 100, Sinus tachycardia       metFORMIN 1000 MG tablet    GLUCOPHAGE    180 tablet    TAKE ONE TABLET BY MOUTH TWICE DAILY WITH MEALS    Diabetes mellitus without complication (H), Hyperlipidemia with target LDL less than 100, HTN, goal below 140/90, Sinus tachycardia       MULTIVITAMIN TABS   OR      Reported on 3/22/2017        ONETOUCH ULTRA test strip   Generic drug:  blood glucose monitoring     100 strip    USE ONE STRIP TO CHECK GLUCOSE ONCE DAILY DIAG  CODE  E11.9    Diabetes mellitus without complication (H)       * order for DME     3 Month    All diabetic testing supplies including test strips, lancets and solution for testing 3 times per day. Patient is using  no Insulin. A1C      7.6   5/3/2016 A1C      7.4   2/4/2016    Type 2 diabetes mellitus, uncontrolled (H), Hyperlipidemia with target LDL less than 100, Essential hypertension with goal blood pressure less than 140/90       * order for DME     3 Month    All diabetic testing supplies including test strips, lancets and solution for testing 4 times per day. Patient is using  no Insulin. Last A1c Lab Results      Component                Value               Date                      A1C                      7.7                 09/05/2017                 A1C                      7.5                 03/17/2017    Type 2 diabetes mellitus with hyperglycemia, without long-term current use of insulin (H)       order for DME     100 each    All diabetic testing supplies including test strips, lancets and solution for testing 2 times per day. Patient is using   no Insulin. Last A1c Lab Results      Component                Value               Date                      A1C                      7.9                 08/20/2018    Diabetes mellitus without complication (H), Hyperglycemia, Mild intermittent asthma without complication, LFT elevation       senna-docusate 8.6-50 MG per tablet    SENOKOT-S;PERICOLACE    40 tablet    Take 1-2 tablets by mouth 2 times daily as needed for constipation    Constipation due to opioid therapy       sitagliptin 100 MG tablet    JANUVIA    90 tablet    Take 1 tablet (100 mg) by mouth daily    Diabetes mellitus without complication (H), Hyperlipidemia with target LDL less than 100, HTN, goal below 140/90, Sinus tachycardia       XALATAN 0.005 % ophthalmic solution   Generic drug:  latanoprost     2.5 mL    Place 1 drop Into the left eye At Bedtime        * Notice:  This list has 2 medication(s) that are the same as other medications prescribed for you. Read the directions carefully, and ask your doctor or other care provider to review them with you.

## 2018-08-27 NOTE — NURSING NOTE
"/80 (BP Location: Right arm, Patient Position: Chair, Cuff Size: Adult Large)  Pulse 81  Temp 98.3  F (36.8  C) (Oral)  Resp 16  Ht 5' 6\" (1.676 m)  Wt 220 lb (99.8 kg)  LMP 12/13/2002  SpO2 99%  Breastfeeding? No  BMI 35.51 kg/m2  Pati Frank CMA    "

## 2018-08-27 NOTE — LETTER
My Asthma Action Plan  Name: Gricelda Sabillon   YOB: 1950  Date: 8/27/2018   My doctor: Raisa Davidson MD   My clinic: [facility]        My Control Medicine: None  My Rescue Medicine: Albuterol (Proair/Ventolin/Proventil) inhaler two puffs every 6 hours as needed   My Asthma Severity: intermittent  Avoid your asthma triggers: Patient is unaware of triggers               GREEN ZONE   Good Control    I feel good    No cough or wheeze    Can work, sleep and play without asthma symptoms       Take your asthma control medicine every day.     1. If exercise triggers your asthma, take your rescue medication    15 minutes before exercise or sports, and    During exercise if you have asthma symptoms  2. Spacer to use with inhaler: If you have a spacer, make sure to use it with your inhaler             YELLOW ZONE Getting Worse  I have ANY of these:    I do not feel good    Cough or wheeze    Chest feels tight    Wake up at night   1. Keep taking your Green Zone medications  2. Start taking your rescue medicine:    every 20 minutes for up to 1 hour. Then every 4 hours for 24-48 hours.  3. If you stay in the Yellow Zone for more than 12-24 hours, contact your doctor.  4. If you do not return to the Green Zone in 12-24 hours or you get worse, start taking your oral steroid medicine if prescribed by your provider.           RED ZONE Medical Alert - Get Help  I have ANY of these:    I feel awful    Medicine is not helping    Breathing getting harder    Trouble walking or talking    Nose opens wide to breathe       1. Take your rescue medicine NOW  2. If your provider has prescribed an oral steroid medicine, start taking it NOW  3. Call your doctor NOW  4. If you are still in the Red Zone after 20 minutes and you have not reached your doctor:    Take your rescue medicine again and    Call 911 or go to the emergency room right away    See your regular doctor within 2 weeks of an  Emergency Room or Urgent Care visit for follow-up treatment.          Annual Reminders:  Meet with Asthma Educator,  Flu Shot in the Fall, consider Pneumonia Vaccination for patients with asthma (aged 19 and older).    Pharm[facility]:    [employer] [facility] [address] [address] [address]                      Asthma Triggers  How To Control Things That Make Your Asthma Worse    Triggers are things that make your asthma worse.  Look at the list below to help you find your triggers and what you can do about them.  You can help prevent asthma flare-ups by staying away from your triggers.      Trigger                                                          What you can do   Cigarette Smoke  Tobacco smoke can make asthma worse. Do not allow smoking in your home, car or around you.  Be sure no one smokes at a child s day care or school.  If you smoke, ask your health care provider for ways to help you quit.  Ask family members to quit too.  Ask your health care provider for a referral to Quit Plan to help you quit smoking, or call 5-363-825-PLAN.     Colds, Flu, Bronchitis  These are common triggers of asthma. Wash your hands often.  Don t touch your eyes, nose or mouth.  Get a flu shot every year.     Dust Mites  These are tiny bugs that live in cloth or carpet. They are too small to see. Wash sheets and blankets in hot water every week.   Encase pillows and mattress in dust mite proof covers.  Avoid having carpet if you can. If you have carpet, vacuum weekly.   Use a dust mask and HEPA vacuum.   Pollen and Outdoor Mold  Some people are allergic to trees, grass, or weed pollen, or molds. Try to keep your windows closed.  Limit time out doors when pollen count is high.   Ask you health care provider about taking medicine during allergy season.     Animal Dander  Some people are allergic to skin flakes, urine or saliva from pets with fur or feathers. Keep pets with fur or  feathers out of your home.    If you can t keep the pet outdoors, then keep the pet out of your bedroom.  Keep the bedroom door closed.  Keep pets off cloth furniture and away from stuffed toys.     Mice, Rats, and Cockroaches  Some people are allergic to the waste from these pests.   Cover food and garbage.  Clean up spills and food crumbs.  Store grease in the refrigerator.   Keep food out of the bedroom.   Indoor Mold  This can be a trigger if your home has high moisture. Fix leaking faucets, pipes, or other sources of water.   Clean moldy surfaces.  Dehumidify basement if it is damp and smelly.   Smoke, Strong Odors, and Sprays  These can reduce air quality. Stay away from strong odors and sprays, such as perfume, powder, hair spray, paints, smoke incense, paint, cleaning products, candles and new carpet.   Exercise or Sports  Some people with asthma have this trigger. Be active!  Ask your doctor about taking medicine before sports or exercise to prevent symptoms.    Warm up for 5-10 minutes before and after sports or exercise.     Other Triggers of Asthma  Cold air:  Cover your nose and mouth with a scarf.  Sometimes laughing or crying can be a trigger.  Some medicines and food can trigger asthma.

## 2018-08-27 NOTE — PROGRESS NOTES
Dr Torres's note      Patient's instructions / PLAN:                                                        Plan:  1. Check with the insurance about these diabetes meds:  --- Bydureon, Trulicity, Tanzeum ( once a week), Victoza ( daily ) Byetta ( twice a day) - injections  2. Hold the lipitor   3.Continue the other meds, same doses for now.  4. Please make a lab appointment for fasting labs  In 3 months  5. Please make an appointment few days after the labs to discuss about the results. With dr Corado and Melissa     ASSESSMENT & PLAN:                                                      (E11.9) DM2 no complication  (primary encounter diagnosis)  Comment: Not controlled   She does not want to start insulin.  We will try to add 1 of the above medications  Plan: Asthma Action Plan (AAP), order for DME,         Comprehensive metabolic panel, Hemoglobin A1c,         Hemoglobin A1c, Comprehensive metabolic panel            (R73.9) Hyperglycemia  Comment: She will check the blood sugars more frequently  Plan: Asthma Action Plan (AAP), order for DME,         Comprehensive metabolic panel, Hemoglobin A1c,         Hemoglobin A1c, Comprehensive metabolic panel            (R79.89) LFT elevation  Comment: Possible secondary statins  Plan: Asthma Action Plan (AAP), order for DME,         Comprehensive metabolic panel, Hemoglobin A1c,         Hemoglobin A1c, Comprehensive metabolic panel        as above     (E78.5) Hyperlipidemia with target LDL less than 100  Comment: Controlled    Plan: Hold the statins for now    (I10) HTN goal less than 140/90,  Comment: Controlled    Plan: Continue same meds, same doses for now     (J45.20) Mild intermittent asthma without complication  Comment: Controlled    Plan: Asthma Action Plan (AAP), order for DME,         Comprehensive metabolic panel, Hemoglobin A1c               Chief complaint:                                                      Follow up chronic medical problems       SUBJECTIVE:   Gricelda Sabillon is a 68 year old female who presents to clinic today for the following health issues:    *Results-Labs done 8/20/18 --discussed  Elevated LFTs  --No alcohol  Diabetes Follow-up      Unfortunately the A1c is getting higher.  Weight has been stable.  Not higher, but unable to lose weight  Lab Results   Component Value Date    A1C 7.9 08/20/2018    A1C 7.4 05/14/2018    A1C 7.3 01/19/2018    A1C 7.7 09/05/2017    A1C 7.5 03/17/2017            Patient is checking blood sugars: Every morning, she noticed her readings were higher when she was only eating two meals per day instead of 3 per day. Readings were 140-200 during that time, but now that she is back to 3 meals per day, she is having readings closer to the 120's.     Diabetic concerns: None     Symptoms of hypoglycemia (low blood sugar): none     Paresthesias (numbness or burning in feet) or sores: No     Date of last diabetic eye exam: Had a macular hole, on medication to prevent glaucoma, last visit was 6/27/18 through [facility].     BP Readings from Last 2 Encounters:   05/21/18 110/60   01/26/18 124/72     Hemoglobin A1C (%)   Date Value   08/20/2018 7.9 (H)   05/14/2018 7.4 (H)     LDL Cholesterol Calculated (mg/dL)   Date Value   05/14/2018 75   09/05/2017 68       Diabetes Management Resources  Hyperlipidemia Follow-Up      Rate your low fat/cholesterol diet?: good    Taking statin?  Yes, no muscle aches from statin    Other lipid medications/supplements?:  none    Hypertension Follow-up      Outpatient blood pressures are not being checked.    Low Salt Diet: Tries to do low salt, no added salt.       Amount of exercise or physical activity: Tries to do long walks at least 3 days per week, would like to try going to the [facility].     Problems taking medications regularly: No    Medication side effects: none    Diet: Tries to do low-salt, low carb.           Review of Systems:                                        "               ROS: negative for fever, chills, cough, wheezes, chest pain, shortness of breath, vomiting, abdominal pain, leg swelling     OBJECTIVE:             Physical exam:  Blood pressure 130/80, pulse 81, temperature 98.3  F (36.8  C), temperature source Oral, resp. rate 16, height 5' 6\" (1.676 m), weight 220 lb (99.8 kg), last menstrual period 12/13/2002, SpO2 99 %, not currently breastfeeding.   NAD, appears comfortable  Skin: no rashes   Neck: supple, no JVD,  No thyroidmegaly. Lymph nodes nonpalpable cervical and supraclavicular.  Chest: clear to auscultation bilaterally, good respiratory effort  Heart: S1 S2, RRR, no mgr appreciated  Abdomen: soft, not tender,   Extremities: no edema,   Neurologic: A, Ox3, no focal signs appreciated    PMHx: reviewed  Past Medical History:   Diagnosis Date     Allergic rhinitis, cause unspecified      Asthma      Asthma, mild intermittent      Cataract      Cellulitis and abscess of leg, except foot 03/00    Bilateral     Eczema      Heart murmur     aortic area     HTN, goal below 140/90      Hyperlipidemia LDL goal < 100      Hyperplastic colon polyp 2008     Infection     bilateral eye infections     Macular hole of left eye      Obesity (BMI 30-39.9)      Osteoarthritis     knees     Other chronic pain     right knee     Sleep apnea     uses c pap     Type 2 diabetes, HbA1C goal < 8% (H)       PSHx: reviewed  Past Surgical History:   Procedure Laterality Date     ARTHROPLASTY HIP Right 9/25/2017    Procedure: ARTHROPLASTY HIP;  Right total hip arthroplasty  ;  Surgeon: Ayaan Pena MD;  Location: [facility] OR     ARTHROPLASTY KNEE  7/12/2013    Procedure: ARTHROPLASTY KNEE;  right total knee arthroplasty ;  Surgeon: Chaparro Wesley MD;  Location: [facility] OR     ARTHROSCOPY KNEE Right 1/21/2015    Procedure: ARTHROSCOPY KNEE;  Surgeon: Ayaan Pena MD;  Location: [facility] OR     C INCISION OF HYMEN       CATARACT IOL, RT/LT       COLONOSCOPY  2008     EYE " SURGERY      macular hole repaired left eye     HC KNEE SCOPE, DIAGNOSTIC      - both knees     HEAD & NECK SURGERY      wisdom teeth        Meds: reviewed  Current Outpatient Prescriptions   Medication Sig Dispense Refill     albuterol (PROAIR HFA, PROVENTIL HFA, VENTOLIN HFA) 108 (90 BASE) MCG/ACT inhaler Inhale 2 puffs into the lungs every 4 hours as needed for shortness of breath / dyspnea 1 Inhaler 3     aspirin  MG EC tablet Take 1 tablet (325 mg) by mouth daily (Patient taking differently: Take 81 mg by mouth daily ) 45 tablet 0     atenolol (TENORMIN) 50 MG tablet Take 1 tablet (50 mg) by mouth daily 90 tablet 1     atorvastatin (LIPITOR) 20 MG tablet Take 1 tablet (20 mg) by mouth daily 90 tablet 1     azelastine (ASTELIN) 137 MCG/SPRAY nasal spray [place] 1 spray into both nostrils 2 times daily as needed Reported on 3/22/2017       azelastine (OPTIVAR) 0.05 % SOLN Place 1 drop into both eyes 2 times daily as needed Reported on 3/22/2017       calcium-vitamin D 500-125 MG-UNIT TABS        cetirizine (ZYRTEC) 10 MG tablet Take 10 mg by mouth every morning.       desoximetasone (TOPICORT) 0.25 % cream Apply sparingly to affected area's 60 g 1     dorzolamide (TRUSOPT) 2 % ophthalmic solution Place 2 drops into both eyes 2 times daily 1 Bottle      fish oil-omega-3 fatty acids (FISH OIL) 1000 MG capsule Take 1 g by mouth daily Reported on 3/22/2017       glipiZIDE (GLUCOTROL) 5 MG tablet TAKE TWO TABLETS BY MOUTH TWICE DAILY BEFORE MEAL(S) 360 tablet 1     hydrochlorothiazide (HYDRODIURIL) 25 MG tablet TAKE ONE TABLET BY MOUTH ONCE DAILY IN THE MORNING 90 tablet 1     ibuprofen (ADVIL) 200 MG tablet take 4 tablet (200 mg) by oral route every 8 hours as needed with food       latanoprost (XALATAN) 0.005 % ophthalmic solution Place 1 drop Into the left eye At Bedtime 2.5 mL 1     lisinopril (PRINIVIL/ZESTRIL) 40 MG tablet Take 1 tablet (40 mg) by mouth daily 90 tablet 1     metFORMIN (GLUCOPHAGE) 1000 MG  tablet TAKE ONE TABLET BY MOUTH TWICE DAILY WITH MEALS 180 tablet 1     MULTIVITAMIN TABS   OR Reported on 3/22/2017       ONETOUCH ULTRA test strip USE ONE STRIP TO CHECK GLUCOSE ONCE DAILY DIAG  CODE  E11.9 100 strip 3     order for DME All diabetic testing supplies including test strips, lancets and solution for testing 4 times per day. Patient is using  no Insulin. Last A1c Lab Results       Component                Value               Date                       A1C                      7.7                 09/05/2017                 A1C                      7.5                 03/17/2017 3 Month 1     order for DME All diabetic testing supplies including test strips, lancets and solution for testing 3 times per day. Patient is using  no Insulin. A1C      7.6   5/3/2016  A1C      7.4   2/4/2016 3 Month 1     senna-docusate (SENOKOT-S;PERICOLACE) 8.6-50 MG per tablet Take 1-2 tablets by mouth 2 times daily as needed for constipation 40 tablet 0     sitagliptin (JANUVIA) 100 MG tablet Take 1 tablet (100 mg) by mouth daily 90 tablet 1       Soc Hx: reviewed  Fam Hx: reviewed          Raisa Torres MD  Internal Medicine

## 2018-08-27 NOTE — Clinical Note
Pt reports last eye exam was done 6/27/18 through [facility].

## 2018-08-27 NOTE — PATIENT INSTRUCTIONS
Plan:  1. Check with the insurance about these diabetes meds:  --- Bydureon, Trulicity, Tanzeum ( once a week), Victoza ( daily ) Byetta ( twice a day) - injections  2. Hold the lipitor   3.Continue the other meds, same doses for now.  4. Please make a lab appointment for fasting labs  In 3 months  5. Please make an appointment few days after the labs to discuss about the results. With dr Trent   6. Please check with your insurance company regarding coverage of the Cologuard.

## 2018-08-28 ASSESSMENT — ASTHMA QUESTIONNAIRES: ACT_TOTALSCORE: 25

## 2018-09-23 NOTE — PROGRESS NOTES
HISTORY OF PRESENT ILLNESS:    Gricelda Sabillon is a 67 year old female who is seen in follow up for Right KEVEN, DOS 9/25/2017, Dr. Pena.  Present symptoms: Pt reports doing ok.  Taking daily outings to stores for walking.  Using walker.  Taking tramadol PRN, 0.5-1 tab at a time.  Using mostly for night time.  Is sleeping several hours at a time.  Main complaint is pain at lateral hip down the leg to the knee.   Denies Chest pain, Calve pain, Fever, Chills.    Current Treatment: postop, walker, hip restrictions.     PHYSICAL EXAM:  LMP 12/13/2002  There is no height or weight on file to calculate BMI.   GENERAL APPEARANCE: healthy, alert and no distress   PSYCH:  mentation appears normal and affect normal/bright    MSK:  Right: Hip .  Ambulates: well with walker. .  Incision clean and dry, staples present, healing.  Appropriate incisional erythema.   Yes Ecchymosis resolving.  No calve pain on palpation.  Edema min at ankle.  CMS: rita incisional numbness, otherwise grossly intact.  Palpation reveals severe tenderness over IT band mid thigh to knee.      IMAGING INTERPRETATION:  None today.     ASSESSMENT:  Gricelda Sabillon is a 67 year old female S/P Right KEVEN, DOS 9/25/2017, Dr. Pena..  Doing well.  IT band issue.    PLAN:  - Surgery discussed, images reviewed if applicable, and all questions were answered at this time.  - Staples removed with sterile technique, steri-strips applied in usual fashion, care instructions given and verbally acknowledged.  - Medications: as current.  - Stretching demonstrated for IT band, along with icing and add Ibuprofen.  - Hip restrictions.    Return to clinic 4, weeks.    Patricio Samuels PA-C    Dept. Orthopedic Surgery  [facility]   10/6/2017       [Erythematous Oropharynx] : erythematous oropharynx [Capillary Refill <2s] : capillary refill < 2s [NL] : warm

## 2018-09-24 ENCOUNTER — RADIANT APPOINTMENT (OUTPATIENT)
Dept: GENERAL RADIOLOGY | Facility: CLINIC | Age: 68
End: 2018-09-24
Attending: ORTHOPAEDIC SURGERY
Payer: COMMERCIAL

## 2018-09-24 ENCOUNTER — OFFICE VISIT (OUTPATIENT)
Dept: ORTHOPEDICS | Facility: CLINIC | Age: 68
End: 2018-09-24
Payer: COMMERCIAL

## 2018-09-24 VITALS — DIASTOLIC BLOOD PRESSURE: 80 MMHG | SYSTOLIC BLOOD PRESSURE: 121 MMHG

## 2018-09-24 DIAGNOSIS — M25.559 PAIN IN JOINT, PELVIC REGION AND THIGH: ICD-10-CM

## 2018-09-24 DIAGNOSIS — Z96.641 S/P HIP REPLACEMENT, RIGHT: Primary | ICD-10-CM

## 2018-09-24 PROCEDURE — 99213 OFFICE O/P EST LOW 20 MIN: CPT | Performed by: ORTHOPAEDIC SURGERY

## 2018-09-24 PROCEDURE — 73502 X-RAY EXAM HIP UNI 2-3 VIEWS: CPT

## 2018-09-24 NOTE — PROGRESS NOTES
HISTORY OF PRESENT ILLNESS:    Gricelda Sabillon is a 68 year old female who is seen in follow up for post 1 year Right KEVEN.    Present symptoms: Patient feels that she is doing well at this point. Patient does have some issues with her knees as well- that makes going up and down steps a little of a challenge yet but she still does them. She also walks with a cane when on uneven surfaces due to some weakness in the right hip/ knee.  Patient reports no issue with walking.  Treatments tried to this point: she is currently doing her home exercise program and has jointed the [facility] to continue to build strength.    PHYSICAL EXAM:  /80  LMP 12/13/2002  There is no height or weight on file to calculate BMI.   GENERAL APPEARANCE: healthy, alert and no distress   SKIN: no suspicious lesions or rashes  NEURO: Normal strength and tone, mentation intact and speech normal  VASCULAR:  good pulses, and cappillary refill   LYMPH: no lymphadenopathy   PSYCH:  mentation appears normal and affect normal/bright    MSK:  The patient ambulates without an antalgic gait.  The patient is able to get on and off the exam table without difficulty.    Examination of the spine reveals a normal lordosis to the cervical and lumbar spine, and a normal kyphosis to the thoracic spine.  There is no clinical evidence of scoliosis.    The pelvis is clinically level.  Trendelenberg is negative.  The patient is none tender to palpation over the greater trochanteric bursal region or piriformis fossa.  With the hip flexed to 90 degrees, internal and external rotation is 30/60 respectively,  with no pain.  The calves and thighs are symmetric, without atrophy and non-tender to palpation. Alejandro's sign is negative, bilaterally.   CMS is intact to the toes.       IMAGING INTERPRETATION:       ASSESSMENT / PLAN: 1 year status post right total hip arthroplasty, doing very well.  She is going to continue her activities of daily living and return to see us  annually or get x-rays with an annual physical.    Return to clinic     Peña Pena MD  Dept. Orthopedic Surgery  [facility]       Disclaimer: This note consists of symbols derived from keyboarding, dictation and/or voice recognition software. As a result, there may be errors in the script that have gone undetected. Please consider this when interpreting information found in this chart.

## 2018-09-24 NOTE — LETTER
9/24/2018         RE: Gricelda Sabillon  [address]  [place] [address]        Dear Colleague,    Thank you for referring your patient, Gricelda Sabillon, to the [facility]. Please see a copy of my visit note below.    HISTORY OF PRESENT ILLNESS:    Gricelda Sabillon is a 68 year old female who is seen in follow up for post 1 year Right KEVEN.    Present symptoms: Patient feels that she is doing well at this point. Patient does have some issues with her knees as well- that makes going up and down steps a little of a challenge yet but she still does them. She also walks with a cane when on uneven surfaces due to some weakness in the right hip/ knee.  Patient reports no issue with walking.  Treatments tried to this point: she is currently doing her home exercise program and has jointed the [facility] to continue to build strength.    PHYSICAL EXAM:  /80  LMP 12/13/2002  There is no height or weight on file to calculate BMI.   GENERAL APPEARANCE: healthy, alert and no distress   SKIN: no suspicious lesions or rashes  NEURO: Normal strength and tone, mentation intact and speech normal  VASCULAR:  good pulses, and cappillary refill   LYMPH: no lymphadenopathy   PSYCH:  mentation appears normal and affect normal/bright    MSK:  The patient ambulates without an antalgic gait.  The patient is able to get on and off the exam table without difficulty.    Examination of the spine reveals a normal lordosis to the cervical and lumbar spine, and a normal kyphosis to the thoracic spine.  There is no clinical evidence of scoliosis.    The pelvis is clinically level.  Trendelenberg is negative.  The patient is none tender to palpation over the greater trochanteric bursal region or piriformis fossa.  With the hip flexed to 90 degrees, internal and external rotation is 30/60 respectively,  with no pain.  The calves and thighs are symmetric, without atrophy and non-tender to palpation.  Alejandro's sign is negative, bilaterally.   CMS is intact to the toes.       IMAGING INTERPRETATION:       ASSESSMENT / PLAN: 1 year status post right total hip arthroplasty, doing very well.  She is going to continue her activities of daily living and return to see us annually or get x-rays with an annual physical.    Return to clinic     Peña Pena MD  Dept. Orthopedic Surgery  [facility]       Disclaimer: This note consists of symbols derived from keyboarding, dictation and/or voice recognition software. As a result, there may be errors in the script that have gone undetected. Please consider this when interpreting information found in this chart.          Again, thank you for allowing me to participate in the care of your patient.        Sincerely,        Ayaan Pena MD

## 2018-09-24 NOTE — MR AVS SNAPSHOT
After Visit Summary   9/24/2018    Gricelda Sabillon    MRN: [id number]           Patient Information     Date Of Birth          1950        Visit Information        Provider Department      9/24/2018 1:20 PM Ayaan Pena MD [facility] ORTHOPEDIC SURGERY        Today's Diagnoses     S/P hip replacement, right    -  1       Follow-ups after your visit        Your next 10 appointments already scheduled     Oct 24, 2018  1:00 PM CDT   New Visit with Clara Corado MD   [facility] ([facility])    [address]  [facility] [address] [phone]            Nov 19, 2018  8:15 AM CST   LAB with [facility]   [facility] ([facility])    [address]  [facility] [id number] [phone]           Please do not eat 10-12 hours before your appointment if you are coming in fasting for labs on lipids, cholesterol, or glucose (sugar). This does not apply to pregnant women. Water, hot tea and black coffee (with nothing added) are okay. Do not drink other fluids, diet soda or chew gum.            Nov 29, 2018  1:00 PM CST   SHORT with Raisa Davidson MD   [facility] ([facility])    [address]  [facility] [id number] [phone]              Who to contact     If you have questions or need follow up information about today's clinic visit or your schedule please contact [facility] ORTHOPEDIC SURGERY directly at [phone].  Normal or non-critical lab and imaging results will be communicated to you by MyChart, letter or phone within 4 business days after the clinic has received the results. If you do not hear from us within 7 days, please contact the clinic through MyChart or phone. If you have a critical or abnormal lab result, we will notify you by phone as soon as possible.  Submit refill requests through [employer] or call  your pharmacy and they will forward the refill request to us. Please allow 3 business days for your refill to be completed.          Additional Information About Your Visit        MyChart Information     [employer] gives you secure access to your electronic health record. If you see a primary care provider, you can also send messages to your care team and make appointments. If you have questions, please call your primary care clinic.  If you do not have a primary care provider, please call [phone] and they will assist you.        Care EveryWhere ID     This is your Care EveryWhere ID. This could be used by other organizations to access your [facility] medical records  [id number]        Your Vitals Were     Last Period                   12/13/2002            Blood Pressure from Last 3 Encounters:   09/24/18 121/80   08/27/18 130/80   05/21/18 110/60    Weight from Last 3 Encounters:   08/27/18 220 lb (99.8 kg)   05/21/18 220 lb (99.8 kg)   01/26/18 220 lb (99.8 kg)                 Today's Medication Changes          These changes are accurate as of 9/24/18 11:59 PM.  If you have any questions, ask your nurse or doctor.               These medicines have changed or have updated prescriptions.        Dose/Directions    aspirin 325 MG EC tablet   This may have changed:  how much to take   Used for:  Deep vein thrombosis (DVT) prophylaxis prescribed at discharge        Dose:  325 mg   Take 1 tablet (325 mg) by mouth daily   Quantity:  45 tablet   Refills:  0                Primary Care Provider Office Phone # Fax #    Raisa Michelle Davidson [phone]       [address] [facility] [address]        Equal Access to Services     [facility]: Hadii elisabeth ashley hadkrystalo Sotiffanie, waaxda luqadaha, qaybta kaalmada thomas spence . So [facility] [phone].    ATENCIÓN: Si habla español, tiene a vail disposición servicios gratuitos de asistencia lingüística. Llame al  [phone].    We comply with applicable federal civil rights laws and [place] laws. We do not discriminate on the basis of race, color, national origin, age, disability, sex, sexual orientation, or gender identity.            Thank you!     Thank you for choosing [facility] ORTHOPEDIC SURGERY  for your care. Our goal is always to provide you with excellent care. Hearing back from our patients is one way we can continue to improve our services. Please take a few minutes to complete the written survey that you may receive in the mail after your visit with us. Thank you!             Your Updated Medication List - Protect others around you: Learn how to safely use, store and throw away your medicines at www.disposemymeds.org.          This list is accurate as of 9/24/18 11:59 PM.  Always use your most recent med list.                   Brand Name Dispense Instructions for use Diagnosis    ADVIL 200 MG tablet   Generic drug:  ibuprofen      take 4 tablet (200 mg) by oral route every 8 hours as needed with food        albuterol 108 (90 Base) MCG/ACT inhaler    PROAIR HFA/PROVENTIL HFA/VENTOLIN HFA    1 Inhaler    Inhale 2 puffs into the lungs every 4 hours as needed for shortness of breath / dyspnea    Acute bronchitis       aspirin 325 MG EC tablet     45 tablet    Take 1 tablet (325 mg) by mouth daily    Deep vein thrombosis (DVT) prophylaxis prescribed at discharge       atenolol 50 MG tablet    TENORMIN    90 tablet    Take 1 tablet (50 mg) by mouth daily    HTN, goal below 140/90, Sinus tachycardia, Diabetes mellitus without complication (H), Hyperlipidemia with target LDL less than 100       atorvastatin 20 MG tablet    LIPITOR    90 tablet    Take 1 tablet (20 mg) by mouth daily    Hyperlipidemia with target LDL less than 100, Diabetes mellitus without complication (H), HTN, goal below 140/90, Sinus tachycardia       azelastine 0.05 % ophthalmic solution    OPTIVAR     Place 1 drop into both eyes 2 times  daily as needed Reported on 3/22/2017        azelastine 0.1 % nasal spray    ASTELIN     Spray 1 spray into both nostrils 2 times daily as needed Reported on 3/22/2017        calcium-vitamin D 500-125 MG-UNIT Tabs           cetirizine 10 MG tablet    zyrTEC     Take 10 mg by mouth every morning.        desoximetasone 0.25 % cream    TOPICORT    60 g    Apply sparingly to affected area's    Eczema       dorzolamide 2 % ophthalmic solution    TRUSOPT    1 Bottle    Place 2 drops into both eyes 2 times daily        fish oil-omega-3 fatty acids 1000 MG capsule      Take 1 g by mouth daily Reported on 3/22/2017        glipiZIDE 5 MG tablet    GLUCOTROL    360 tablet    TAKE TWO TABLETS BY MOUTH TWICE DAILY BEFORE MEAL(S)    Diabetes mellitus without complication (H), Hyperlipidemia with target LDL less than 100, HTN, goal below 140/90, Sinus tachycardia       hydrochlorothiazide 25 MG tablet    HYDRODIURIL    90 tablet    TAKE ONE TABLET BY MOUTH ONCE DAILY IN THE MORNING    HTN, goal below 140/90, Diabetes mellitus without complication (H), Hyperlipidemia with target LDL less than 100, Sinus tachycardia       lisinopril 40 MG tablet    PRINIVIL/ZESTRIL    90 tablet    Take 1 tablet (40 mg) by mouth daily    HTN, goal below 140/90, Diabetes mellitus without complication (H), Hyperlipidemia with target LDL less than 100, Sinus tachycardia       metFORMIN 1000 MG tablet    GLUCOPHAGE    180 tablet    TAKE ONE TABLET BY MOUTH TWICE DAILY WITH MEALS    Diabetes mellitus without complication (H), Hyperlipidemia with target LDL less than 100, HTN, goal below 140/90, Sinus tachycardia       MULTIVITAMIN TABS   OR      Reported on 3/22/2017        ONETOUCH ULTRA test strip   Generic drug:  blood glucose monitoring     100 strip    USE ONE STRIP TO CHECK GLUCOSE ONCE DAILY DIAG  CODE  E11.9    Diabetes mellitus without complication (H)       * order for DME     3 Month    All diabetic testing supplies including test strips,  lancets and solution for testing 3 times per day. Patient is using  no Insulin. A1C      7.6   5/3/2016 A1C      7.4   2/4/2016    Type 2 diabetes mellitus, uncontrolled (H), Hyperlipidemia with target LDL less than 100, Essential hypertension with goal blood pressure less than 140/90       * order for DME     3 Month    All diabetic testing supplies including test strips, lancets and solution for testing 4 times per day. Patient is using  no Insulin. Last A1c Lab Results      Component                Value               Date                      A1C                      7.7                 09/05/2017                A1C                      7.5                 03/17/2017    Type 2 diabetes mellitus with hyperglycemia, without long-term current use of insulin (H)       order for DME     100 each    All diabetic testing supplies including test strips, lancets and solution for testing 2 times per day. Patient is using   no Insulin. Last A1c Lab Results      Component                Value               Date                      A1C                      7.9                 08/20/2018    Diabetes mellitus without complication (H), Hyperglycemia, Mild intermittent asthma without complication, LFT elevation       senna-docusate 8.6-50 MG per tablet    SENOKOT-S;PERICOLACE    40 tablet    Take 1-2 tablets by mouth 2 times daily as needed for constipation    Constipation due to opioid therapy       sitagliptin 100 MG tablet    JANUVIA    90 tablet    Take 1 tablet (100 mg) by mouth daily    Diabetes mellitus without complication (H), Hyperlipidemia with target LDL less than 100, HTN, goal below 140/90, Sinus tachycardia       XALATAN 0.005 % ophthalmic solution   Generic drug:  latanoprost     2.5 mL    Place 1 drop Into the left eye At Bedtime        * Notice:  This list has 2 medication(s) that are the same as other medications prescribed for you. Read the directions carefully, and ask your doctor or other care provider to  review them with you.

## 2018-09-29 ENCOUNTER — TRANSFERRED RECORDS (OUTPATIENT)
Dept: HEALTH INFORMATION MANAGEMENT | Facility: CLINIC | Age: 68
End: 2018-09-29

## 2018-10-24 ENCOUNTER — ALLIED HEALTH/NURSE VISIT (OUTPATIENT)
Dept: EDUCATION SERVICES | Facility: CLINIC | Age: 68
End: 2018-10-24
Payer: COMMERCIAL

## 2018-10-24 ENCOUNTER — OFFICE VISIT (OUTPATIENT)
Dept: ENDOCRINOLOGY | Facility: CLINIC | Age: 68
End: 2018-10-24
Payer: COMMERCIAL

## 2018-10-24 VITALS
SYSTOLIC BLOOD PRESSURE: 126 MMHG | WEIGHT: 215 LBS | BODY MASS INDEX: 34.55 KG/M2 | HEART RATE: 80 BPM | OXYGEN SATURATION: 97 % | DIASTOLIC BLOOD PRESSURE: 70 MMHG | HEIGHT: 66 IN | TEMPERATURE: 97.9 F

## 2018-10-24 DIAGNOSIS — I10 ESSENTIAL HYPERTENSION WITH GOAL BLOOD PRESSURE LESS THAN 140/90: Chronic | ICD-10-CM

## 2018-10-24 DIAGNOSIS — E11.9 DIABETES MELLITUS WITHOUT COMPLICATION (H): Primary | Chronic | ICD-10-CM

## 2018-10-24 PROCEDURE — 95250 CONT GLUC MNTR PHYS/QHP EQP: CPT

## 2018-10-24 PROCEDURE — 99213 OFFICE O/P EST LOW 20 MIN: CPT | Performed by: INTERNAL MEDICINE

## 2018-10-24 NOTE — LETTER
10/24/2018         RE: Gricelda Sabillon  [address]  [facility] [address]        Dear Colleague,    Thank you for referring your patient, Gricelda Sabillon, to the [facility]. Please see a copy of my visit note below.    ENDOCRINOLOGY CLINIC NOTE:  Name: Gricelda Sabillon  Seen for f/u of Diabetes.  HPI:  Gricelda Sabillon is a 68 year old female who presents for the evaluation/management of Diabetes.  S/p hip replacement in 9/2017  Since then activity is improved.  Walking everyday.    1. Type 2 DM:  Orginally diagnosed about 10 years back   Current Regimen: Metformin 1000 mg twice a day, glipizide 10 mg twice a day and Januvia 100 mg/day  Was on INvokana but not able to afford.  BS checks: Once a day.  Average Meter Download:  Patient did not bring glucometer. Without Blood sugar data it is difficult to make adjustment to medical regimen.   Reports that blood sugar is variable and mostly on higher side.  No major episodes of hypoglycemia noted/reported.   Exercise: Limited secondary to knee pain  Last A1c: 7.9%  Symptoms of hypoglycemia (low blood sugar):  Gets symptoms of hypoglycemia.  Episodes of hypoglycemia: No  Fixed meal pattern: Yes.    Patient counting carbs: No    DM Complications:   Nephropathy: No  Retinopathy: No  Neuropathy: No  Microalbuminuria: No  MICROL       12   8/3/2015  MICROALBUMIN     8.58   8/3/2015    CAD/PAD: No  Gastroparesis: No  Hypoglycemia unawareness: No    2. Hypertension:    Blood pressure medications include lisinopril 40 mg, atenolol 50 mg and hydrochlorothiazide 25 mg  3. Hyperlipidemia: Takes atorvastatin 20 mg.    PMH/PSH:  Past Medical History:   Diagnosis Date     Allergic rhinitis, cause unspecified      Asthma      Asthma, mild intermittent      Cataract      Cellulitis and abscess of leg, except foot 03/00    Bilateral     Eczema      Heart murmur     aortic area     HTN, goal below 140/90      Hyperlipidemia LDL goal < 100       Hyperplastic colon polyp      Infection     bilateral eye infections     Macular hole of left eye      Obesity (BMI 30-39.9)      Osteoarthritis     knees     Other chronic pain     right knee     Sleep apnea     uses c pap     Type 2 diabetes, HbA1C goal < 8% (H)      Past Surgical History:   Procedure Laterality Date     ARTHROPLASTY HIP Right 2017    Procedure: ARTHROPLASTY HIP;  Right total hip arthroplasty  ;  Surgeon: Ayaan Pena MD;  Location: [facility] OR     ARTHROPLASTY KNEE  2013    Procedure: ARTHROPLASTY KNEE;  right total knee arthroplasty ;  Surgeon: Chaparro Wesley MD;  Location: [facility] OR     ARTHROSCOPY KNEE Right 2015    Procedure: ARTHROSCOPY KNEE;  Surgeon: Ayaan Pena MD;  Location: [facility] OR     C INCISION OF HYMEN       CATARACT IOL, RT/LT       COLONOSCOPY  2008     EYE SURGERY      macular hole repaired left eye     HC KNEE SCOPE, DIAGNOSTIC      - both knees     HEAD & NECK SURGERY      wisdom teeth     Family Hx:  Family History   Problem Relation Age of Onset     Hypertension Mother      diabetes,hypoythryoidism, stroke     Diabetes Mother      HEART DISEASE Father      , cancer lip     HEART DISEASE Paternal Grandfather           Cancer Paternal Grandmother           Cerebrovascular Disease Maternal Grandfather           Cerebrovascular Disease Maternal Grandmother      , diabetes     Connective Tissue Disorder Sister      fibromyalgia     Diabetes Sister      Hypertension Brother      Cancer Paternal Aunt      ovarian       Diabetes:    Social Hx:  Social History     Social History     Marital status:      Spouse name: Allen     Number of children: 3     Years of education: 15     Occupational History     Not on file.     Social History Main Topics     Smoking status: Never Smoker     Smokeless tobacco: Never Used     Alcohol use No     Drug use: No     Sexual activity: No     Other Topics Concern     Not on  "file     Social History Narrative          MEDICATIONS:  has a current medication list which includes the following prescription(s): albuterol, aspirin, atenolol, azelastine, azelastine, calcium-vitamin d, cetirizine, desoximetasone, dorzolamide, fish oil-omega-3 fatty acids, glipizide, hydrochlorothiazide, ibuprofen, latanoprost, lisinopril, metformin, multiple vitamin, onetouch ultra, order for dme, order for dme, senna-docusate, and sitagliptin.    ROS     ROS: 10 point ROS neg other than the symptoms noted above in the HPI.    Physical Exam   VS: /70 (BP Location: Left arm, Patient Position: Chair, Cuff Size: Adult Large)  Pulse 80  Temp 97.9  F (36.6  C) (Oral)  Ht 1.676 m (5' 6\")  Wt 97.5 kg (215 lb)  LMP 12/13/2002  SpO2 97%  Breastfeeding? No  BMI 34.7 kg/m2  GENERAL: AXOX3, NAD, well dressed, answering questions appropriately, appears stated age.  HEENT: No exopthalmous, no proptosis, EOMI, no lig lag, no retraction  NECK: Thyroid normal in size, non tender, no nodules were palpated.  CV: RRR  LUNGS: CTAB  ABDOMEN: +BS  NEUROLOGY: CN grossly intact, no tremors  PSYCH: normal affect and mood    LABS:  A1c:  Lab Results   Component Value Date    A1C 7.9 08/20/2018    A1C 7.4 05/14/2018    A1C 7.3 01/19/2018    A1C 7.7 09/05/2017    A1C 7.5 03/17/2017       Creatinine:  Creatinine   Date Value Ref Range Status   08/20/2018 0.76 0.52 - 1.04 mg/dL Final   ]    Urine Micro:  Lab Results   Component Value Date    UMALCR 8.89 01/26/2018        LFTs/Lipids:  Recent Labs   Lab Test  05/14/18   0855  09/05/17   0826   08/03/15   0835  08/12/14   0848   CHOL  158  160   < >  132  149   HDL  40*  37*   < >  42*  46*   LDL  75  68   < >  57  43   TRIG  214*  276*   < >  167*  298*   CHOLHDLRATIO   --    --    --   3.1  3.2    < > = values in this interval not displayed.     TFTs:  TSH   Date Value Ref Range Status   05/14/2018 3.28 0.40 - 4.00 mU/L Final       All pertinent notes, labs, and images " personally reviewed by me.     A/P  Ms.Barbara Evens Sabillon is a 68 year old here for the evaluation/management of diabetes:    1. DM2 - Under good control.  A1c 7.9%.    Patient did not bring glucometer. Without Blood sugar data it is difficult to make adjustment to medical regimen.   A1c is rising.  Patient reports that she is not following good diet  Plan  Recommend diagnostic CGM which was placed by CDE today  Continue current regimen for now.  Continue Metformin 1000 mg twice a day, glipizide 10 mg twice a day and Januvia 100 mg/day     Recommend checking blood sugars before meals and at bedtime.    If Blood glucose are low more often-> 2-3 times/week- give us a call.  The patient is advised to Make better food choices: reduce carbs, Reduce portion size, weight loss and exercise 3-4 times a week.  Discussed hypoglycemia signs and symptoms as well as management in detail.        2. Hypertension - Under Good control.  Continue lisinopril 40 mg, atenolol 50 mg and hydrochlorothiazide 25 mg      3. Hyperlipidemia - Under Good control.  Continue atorvastatin 20 mg.  LDL 57, HDL 42.     4. Prevention  Opthalmology- August 2016   ASA-81 mg  Smoking- no    5.  Obesity:  The patient is advised to Make better food choices: reduce carbs, Reduce portion size, weight loss and exercise 3-4 times a week.  Health  referral. She has registered at [facility] and is planning to start exercise.      All questions were answered.  The patient indicates understanding of the above issues and agrees with the plan set forth.     More than 50% of face to face time spent with Ms. Sabillon on counseling / coordinating her care.  Total appointment times was 30 minutes.      Follow-up:  Follow up in 3-6 months/triny Corado M.D  Endocrinology  [facility]/[place]  CC: Raisa Davidson    Disclaimer: This note consists of symbols derived from keyboarding, dictation and/or voice recognition software. As a  result, there may be errors in the script that have gone undetected. Please consider this when interpreting information found in this chart.        Again, thank you for allowing me to participate in the care of your patient.        Sincerely,        Clara Corado MD

## 2018-10-24 NOTE — PATIENT INSTRUCTIONS
1. Plan to wear the LibrePro sensor for 14 days. It is okay to shower, bathe, and swim (up to 3 feet deep for 30 minutes)    2. Continue with your usual diabetes care plan - check blood sugars and take medicines, as prescribed.    3. Keep a log of what you eat and drink, when you take your medications and how much you take, and exercise you do while you are wearing the sensor.    3. Do not cover the sensor with extra adhesive (the small hole in the center of the sensor must remain uncovered)    4. Use a little extra care, especially when getting dressed or exercising, to avoid accidentally loosening or removing the sensor.     5. Remove the sensor if you need to have an MRI or CT scan.     Return the sensor to the [facility] on 11/8.    Follow-up appointment: 11/8    [facility]:  For Your Diabetes Education and Nutrition Appointments Call:  [phone]   For Diabetes Education or Nutrition Related Questions:   Phone: [phone]  E-mail: [email or web address]  Fax: [phone]   If you need a medication refill please contact your pharmacy. Please allow 3 business days for your refills to be completed.    Instructions for emailing the Diabetes Educators    If you need to communicate a non-urgent message to a Diabetes Educator via email, please send to [email or web address].    Please follow the following email guidelines:    Subject line: Secure: your clinic name (example: Secure: [facility])  In the email please include: First name, middle initial, last name and date of birth.    We will be in touch with you within one (1) business day.

## 2018-10-24 NOTE — MR AVS SNAPSHOT
After Visit Summary   10/24/2018    Gricelda Sabillon    MRN: [id number]           Patient Information     Date Of Birth          1950        Visit Information        Provider Department      10/24/2018 1:00 PM Clara Corado MD [facility]        Today's Diagnoses     DM2 no complication    -  1    HTN goal less than 140/90,          Care Instructions    [facility] & [place] locations   Dr Corado, Endocrinology Department      [facility]   [address]  [place] [address]  Appointment [phone]  Fax: [phone]  [place]: Monday and Tuesday         [facility]   [address]  [place] [address]  Appointment [phone]  Fax: [phone]  [place]: Wednesday and Thursday            To provide the best diabetic care, please bring your blood glucose meter to each and every visit with your Endocrinologist.  Your blood glucose meter/insulin pump will be downloaded at every appointment.      Please arrive 15 minutes before your scheduled appointment.  This will allow for your blood glucose meter/insulin pump to be downloaded and glycosylated hemoglobin (A1c) to be obtained prior to your appointment.    Continue current regimen  Your provider has referred you to Diabetes Education: For all [facility]:  Phone [phone]; Fax [phone]  Please call and make the appointment.-->continous glucose monitor (dexom, ipro) (diagnostic)    Check Blood glucose fasting every day and before lunch/dinner/bedtime on alternate days.  If Blood glucose are low more often-> 2-3 times/week- give us a call.  The patient is advised to Make better food choices: reduce carbs, Reduce portion size, weight loss and exercise 3-4 times a week.  Discussed hypoglycemia signs and symptoms as well as management in detail.                  Follow-ups after your visit         Additional Services     DIABETES EDUCATOR REFERRAL       Your provider has referred you to Diabetes Education: For all [place] [facility]:  Phone [phone]; Fax [phone]    This is a Previous Diagnosis     Type of diabetes is Type 2 - On Oral Medication   Medicare covers: 10 hours of initial DSMT in 12 month period from the time of first visit, plus 2 hours of follow-up DSMT annually, and additional hours as requested for insulin training.         Diabetes Co-Morbidities: dyslipidemia and hypertension               A1C Goal:  <7.0       A1C is: Lab Results       Component                Value               Date                       A1C                      7.9                 08/20/2018              MEDICAL NUTRITION THERAPY (MNT) for Diabetes    Medical Nutrition Therapy with a Registered Dietitian can be provided in coordination with Diabetes Self-Management Training to assist in achieving optimal diabetes management.    MNT Type and Hours: Previous diagnosis: Annual follow-up MNT - 2 hours                       Medicare will cover: 3 hours initial MNT in 12 month period after first visit, plus 2 hours of follow-up MNT annually                                                          A1C is: Lab Results       Component                Value               Date                       A1C                      7.9                 08/20/2018            If an urgent visit is needed or A1C is above 12, Care Team to call the diabetes education team at [phone] or send a message to the diabetes education pool (P DIAB ED-PATIENT CARE).    Diabetes education focus: Continuous glucose monitor Diagnostic CGM (minimum of 72-hours)      Education needs: None                                                                                                                                                      Please be aware that coverage of these services is subject to the terms and limitations of your health insurance  plan.  Call member services at your health plan to determine Diabetes Self-Management Training benefits and ask which blood glucose monitor brands are covered by your plan.      Please bring the following to your appointment:    -   List of current medications   -   List of blood glucose monitor brands that are covered by your insurance plan  -   Blood glucose monitor and log book  -   Food records for the 3 days prior to your visit      The Certified Diabetes Educator may make diabetes medication adjustments per  the CDE Protocol and Collaborative Practice Agreement.                  Your next 10 appointments already scheduled     Nov 19, 2018  8:15 AM CST   LAB with [facility]   [facility] ([facility])    [address]  [facility] [address]   [phone]           Please do not eat 10-12 hours before your appointment if you are coming in fasting for labs on lipids, cholesterol, or glucose (sugar). This does not apply to pregnant women. Water, hot tea and black coffee (with nothing added) are okay. Do not drink other fluids, diet soda or chew gum.            Nov 29, 2018  1:00 PM CST   SHORT with Raisa Davidson MD   [facility] ([facility])    [address] [facility] [address] [phone]              Who to contact     If you have questions or need follow up information about today's clinic visit or your schedule please contact [facility] directly at [phone].  Normal or non-critical lab and imaging results will be communicated to you by MyChart, letter or phone within 4 business days after the clinic has received the results. If you do not hear from us within 7 days, please contact the clinic through MyChart or phone. If you have a critical or abnormal lab result, we will notify you by phone as soon as possible.  Submit refill requests through [employer] or call your pharmacy and they  "will forward the refill request to us. Please allow 3 business days for your refill to be completed.          Additional Information About Your Visit        [employer] Information     [employer] gives you secure access to your electronic health record. If you see a primary care provider, you can also send messages to your care team and make appointments. If you have questions, please call your primary care clinic.  If you do not have a primary care provider, please call [phone] and they will assist you.        Care EveryWhere ID     This is your Care EveryWhere ID. This could be used by other organizations to access your [facility] medical records  [id number]        Your Vitals Were     Pulse Temperature Height Last Period Pulse Oximetry Breastfeeding?    80 97.9  F (36.6  C) (Oral) 1.676 m (5' 6\") 12/13/2002 97% No    BMI (Body Mass Index)                   34.7 kg/m2            Blood Pressure from Last 3 Encounters:   10/24/18 126/70   09/24/18 121/80   08/27/18 130/80    Weight from Last 3 Encounters:   10/24/18 97.5 kg (215 lb)   08/27/18 99.8 kg (220 lb)   05/21/18 99.8 kg (220 lb)              We Performed the Following     DIABETES EDUCATOR REFERRAL          Today's Medication Changes          These changes are accurate as of 10/24/18  1:27 PM.  If you have any questions, ask your nurse or doctor.               These medicines have changed or have updated prescriptions.        Dose/Directions    aspirin 81 MG EC tablet   This may have changed:  Another medication with the same name was removed. Continue taking this medication, and follow the directions you see here.   Changed by:  Clara Corado MD        Dose:  81 mg   Take 1 tablet (81 mg) by mouth daily   Quantity:  90 tablet   Refills:  3       order for DME   This may have changed:  Another medication with the same name was removed. Continue taking this medication, and follow the directions you see here.   Used for:  Type 2 diabetes mellitus, " uncontrolled (H), Hyperlipidemia with target LDL less than 100, Essential hypertension with goal blood pressure less than 140/90   Changed by:  Clara Corado MD        All diabetic testing supplies including test strips, lancets and solution for testing 3 times per day. Patient is using  no Insulin. A1C      7.6   5/3/2016 A1C      7.4   2/4/2016   Quantity:  3 Month   Refills:  1         Stop taking these medicines if you haven't already. Please contact your care team if you have questions.     atorvastatin 20 MG tablet   Commonly known as:  LIPITOR   Stopped by:  Clara Corado MD                    Primary Care Provider Office Phone # Fax #    Raisa Michelle Davidson [phone]       [address] [facility] [address]        Equal Access to Services     YUE [facility] HospitalCRISPIN : Hadii elisabeth ashley hadasho Soomaali, waaxda luqadaha, qaybta kaalmada adeegyada, thomas marmolejo . So [facility] [phone].    ATENCIÓN: Si habla español, tiene a vail disposición servicios gratuitos de asistencia lingüística. Llame al [phone].    We comply with applicable federal civil rights laws and [place] laws. We do not discriminate on the basis of race, color, national origin, age, disability, sex, sexual orientation, or gender identity.            Thank you!     Thank you for choosing [facility]  for your care. Our goal is always to provide you with excellent care. Hearing back from our patients is one way we can continue to improve our services. Please take a few minutes to complete the written survey that you may receive in the mail after your visit with us. Thank you!             Your Updated Medication List - Protect others around you: Learn how to safely use, store and throw away your medicines at www.disposemymeds.org.          This list is accurate as of 10/24/18  1:27 PM.  Always use your most recent med list.                   Brand Name Dispense  Instructions for use Diagnosis    ADVIL 200 MG tablet   Generic drug:  ibuprofen      take 4 tablet (200 mg) by oral route every 8 hours as needed with food        albuterol 108 (90 Base) MCG/ACT inhaler    PROAIR HFA/PROVENTIL HFA/VENTOLIN HFA    1 Inhaler    Inhale 2 puffs into the lungs every 4 hours as needed for shortness of breath / dyspnea    Acute bronchitis       aspirin 81 MG EC tablet     90 tablet    Take 1 tablet (81 mg) by mouth daily        atenolol 50 MG tablet    TENORMIN    90 tablet    Take 1 tablet (50 mg) by mouth daily    HTN, goal below 140/90, Sinus tachycardia, Diabetes mellitus without complication (H), Hyperlipidemia with target LDL less than 100       azelastine 0.05 % ophthalmic solution    OPTIVAR     Place 1 drop into both eyes 2 times daily as needed Reported on 3/22/2017        azelastine 0.1 % nasal spray    ASTELIN     Spray 1 spray into both nostrils 2 times daily as needed Reported on 3/22/2017        calcium-vitamin D 500-125 MG-UNIT Tabs           cetirizine 10 MG tablet    zyrTEC     Take 10 mg by mouth every morning.        desoximetasone 0.25 % cream    TOPICORT    60 g    Apply sparingly to affected area's    Eczema       dorzolamide 2 % ophthalmic solution    TRUSOPT    1 Bottle    Place 2 drops into both eyes 2 times daily        fish oil-omega-3 fatty acids 1000 MG capsule      Take 1 g by mouth daily Reported on 3/22/2017        glipiZIDE 5 MG tablet    GLUCOTROL    360 tablet    TAKE TWO TABLETS BY MOUTH TWICE DAILY BEFORE MEAL(S)    Diabetes mellitus without complication (H), Hyperlipidemia with target LDL less than 100, HTN, goal below 140/90, Sinus tachycardia       hydrochlorothiazide 25 MG tablet    HYDRODIURIL    90 tablet    TAKE ONE TABLET BY MOUTH ONCE DAILY IN THE MORNING    HTN, goal below 140/90, Diabetes mellitus without complication (H), Hyperlipidemia with target LDL less than 100, Sinus tachycardia       lisinopril 40 MG tablet    PRINIVIL/ZESTRIL    90  tablet    Take 1 tablet (40 mg) by mouth daily    HTN, goal below 140/90, Diabetes mellitus without complication (H), Hyperlipidemia with target LDL less than 100, Sinus tachycardia       metFORMIN 1000 MG tablet    GLUCOPHAGE    180 tablet    TAKE ONE TABLET BY MOUTH TWICE DAILY WITH MEALS    Diabetes mellitus without complication (H), Hyperlipidemia with target LDL less than 100, HTN, goal below 140/90, Sinus tachycardia       MULTIVITAMIN TABS   OR      Reported on 3/22/2017        ONETOUCH ULTRA test strip   Generic drug:  blood glucose monitoring     100 strip    USE ONE STRIP TO CHECK GLUCOSE ONCE DAILY DIAG  CODE  E11.9    Diabetes mellitus without complication (H)       order for DME     3 Month    All diabetic testing supplies including test strips, lancets and solution for testing 3 times per day. Patient is using  no Insulin. A1C      7.6   5/3/2016 A1C      7.4   2/4/2016    Type 2 diabetes mellitus, uncontrolled (H), Hyperlipidemia with target LDL less than 100, Essential hypertension with goal blood pressure less than 140/90       order for DME     100 each    All diabetic testing supplies including test strips, lancets and solution for testing 2 times per day. Patient is using   no Insulin. Last A1c Lab Results      Component                Value               Date                      A1C                      7.9                 08/20/2018    Diabetes mellitus without complication (H), Hyperglycemia, Mild intermittent asthma without complication, LFT elevation       senna-docusate 8.6-50 MG per tablet    SENOKOT-S;PERICOLACE    40 tablet    Take 1-2 tablets by mouth 2 times daily as needed for constipation    Constipation due to opioid therapy       sitagliptin 100 MG tablet    JANUVIA    90 tablet    Take 1 tablet (100 mg) by mouth daily    Diabetes mellitus without complication (H), Hyperlipidemia with target LDL less than 100, HTN, goal below 140/90, Sinus tachycardia       XALATAN 0.005 %  ophthalmic solution   Generic drug:  latanoprost     2.5 mL    Place 1 drop Into the left eye At Bedtime

## 2018-10-24 NOTE — PATIENT INSTRUCTIONS
[facility] & [place] locations   Dr Corado, Endocrinology Department      [facility]   [address]  [place] [address]  Appointment [phone]  Fax: [phone]  [place]: Monday and Tuesday         [facility]   [address]  [place] [address]  Appointment [phone]  Fax: [phone]  [place]: Wednesday and Thursday            To provide the best diabetic care, please bring your blood glucose meter to each and every visit with your Endocrinologist.  Your blood glucose meter/insulin pump will be downloaded at every appointment.      Please arrive 15 minutes before your scheduled appointment.  This will allow for your blood glucose meter/insulin pump to be downloaded and glycosylated hemoglobin (A1c) to be obtained prior to your appointment.    Continue current regimen  Your provider has referred you to Diabetes Education: For all [facility]:  Phone [phone]; Fax [phone]  Please call and make the appointment.-->continous glucose monitor (dexom, ipro) (diagnostic)    Check Blood glucose fasting every day and before lunch/dinner/bedtime on alternate days.  If Blood glucose are low more often-> 2-3 times/week- give us a call.  The patient is advised to Make better food choices: reduce carbs, Reduce portion size, weight loss and exercise 3-4 times a week.  Discussed hypoglycemia signs and symptoms as well as management in detail.

## 2018-10-24 NOTE — MR AVS SNAPSHOT
After Visit Summary   10/24/2018    Gricelda Sabillon    MRN: [id number]           Patient Information     Date Of Birth          1950        Visit Information        Provider Department      10/24/2018 1:30 PM [facility] DI[facility] ED RESOURCE [facility]        Today's Diagnoses     DM2 no complication    -  1      Care Instructions    1. Plan to wear the LibrePro sensor for 14 days. It is okay to shower, bathe, and swim (up to 3 feet deep for 30 minutes)    2. Continue with your usual diabetes care plan - check blood sugars and take medicines, as prescribed.    3. Keep a log of what you eat and drink, when you take your medications and how much you take, and exercise you do while you are wearing the sensor.    3. Do not cover the sensor with extra adhesive (the small hole in the center of the sensor must remain uncovered)    4. Use a little extra care, especially when getting dressed or exercising, to avoid accidentally loosening or removing the sensor.     5. Remove the sensor if you need to have an MRI or CT scan.     Return the sensor to the [facility] on 11/8.    Follow-up appointment: 11/8    [facility] for the [facility]:  For Your Diabetes Education and Nutrition Appointments Call:  [phone]   For Diabetes Education or Nutrition Related Questions:   Phone: [phone]  E-mail: [email or web address]  Fax: [phone]   If you need a medication refill please contact your pharmacy. Please allow 3 business days for your refills to be completed.    Instructions for emailing the Diabetes Educators    If you need to communicate a non-urgent message to a Diabetes Educator via email, please send to [email or web address].    Please follow the following email guidelines:    Subject line: Secure: your clinic name (example: Secure: [facility])  In the email please include: First name, middle initial, last name and date  of birth.    We will be in touch with you within one (1) business day.            Follow-ups after your visit        Your next 10 appointments already scheduled     Nov 08, 2018  1:30 PM CST   Diabetes Education with [facility] DI[facility] ED RESOURCE   [facility] ([facility])    [address]  [facility] [id number] [phone]            Nov 19, 2018  8:15 AM CST   LAB with [facility] LAB   [facility]    [address]  [facility] [id number] [phone]           Please do not eat 10-12 hours before your appointment if you are coming in fasting for labs on lipids, cholesterol, or glucose (sugar). This does not apply to pregnant women. Water, hot tea and black coffee (with nothing added) are okay. Do not drink other fluids, diet soda or chew gum.            Nov 29, 2018  1:00 PM CST   SHORT with Raisa Davidson MD   [facility]    [address]  [facility] [id number] [phone]              Who to contact     If you have questions or need follow up information about today's clinic visit or your schedule please contact [facility] directly at [phone].  Normal or non-critical lab and imaging results will be communicated to you by MyChart, letter or phone within 4 business days after the clinic has received the results. If you do not hear from us within 7 days, please contact the clinic through MyChart or phone. If you have a critical or abnormal lab result, we will notify you by phone as soon as possible.  Submit refill requests through [employer] or call your pharmacy and they will forward the refill request to us. Please allow 3 business days for your refill to be completed.          Additional Information About Your Visit        [employer] Information     [employer] gives you secure access to your electronic  health record. If you see a primary care provider, you can also send messages to your care team and make appointments. If you have questions, please call your primary care clinic.  If you do not have a primary care provider, please call [phone] and they will assist you.        Care EveryWhere ID     This is your Care EveryWhere ID. This could be used by other organizations to access your [place] medical records  [id number]        Your Vitals Were     Last Period                   12/13/2002            Blood Pressure from Last 3 Encounters:   10/24/18 126/70   09/24/18 121/80   08/27/18 130/80    Weight from Last 3 Encounters:   10/24/18 97.5 kg (215 lb)   08/27/18 99.8 kg (220 lb)   05/21/18 99.8 kg (220 lb)              We Performed the Following     CONTINUOUS GLUCOSE MONITORING                              [[id number]]          Today's Medication Changes          These changes are accurate as of 10/24/18  1:49 PM.  If you have any questions, ask your nurse or doctor.               These medicines have changed or have updated prescriptions.        Dose/Directions    aspirin 81 MG EC tablet   This may have changed:  Another medication with the same name was removed. Continue taking this medication, and follow the directions you see here.   Changed by:  Clara Corado MD        Dose:  81 mg   Take 1 tablet (81 mg) by mouth daily   Quantity:  90 tablet   Refills:  3       order for DME   This may have changed:  Another medication with the same name was removed. Continue taking this medication, and follow the directions you see here.   Used for:  Type 2 diabetes mellitus, uncontrolled (H), Hyperlipidemia with target LDL less than 100, Essential hypertension with goal blood pressure less than 140/90   Changed by:  Clara Corado MD        All diabetic testing supplies including test strips, lancets and solution for testing 3 times per day. Patient is using  no Insulin. A1C      7.6   5/3/2016 A1C       7.4   2/4/2016   Quantity:  3 Month   Refills:  1         Stop taking these medicines if you haven't already. Please contact your care team if you have questions.     atorvastatin 20 MG tablet   Commonly known as:  LIPITOR   Stopped by:  Clara Corado MD                    Primary Care Provider Office Phone # Fax #    Raisa Michelle Davidson [phone]       [address] [facility] [address]        Equal Access to Services     [facility]: Hadii aad ku hadasho Soomaali, waaxda luqadaha, qaybta kaalmada adeegyada, waxay idiin hayaan adeeg kharash la'aan . So [facility] [phone].    ATENCIÓN: Si habla español, tiene a vail disposición servicios gratuitos de asistencia lingüística. [facility] [phone].    We comply with applicable federal civil rights laws and [place] laws. We do not discriminate on the basis of race, color, national origin, age, disability, sex, sexual orientation, or gender identity.            Thank you!     Thank you for choosing [facility]  for your care. Our goal is always to provide you with excellent care. Hearing back from our patients is one way we can continue to improve our services. Please take a few minutes to complete the written survey that you may receive in the mail after your visit with us. Thank you!             Your Updated Medication List - Protect others around you: Learn how to safely use, store and throw away your medicines at www.disposemymeds.org.          This list is accurate as of 10/24/18  1:49 PM.  Always use your most recent med list.                   Brand Name Dispense Instructions for use Diagnosis    ADVIL 200 MG tablet   Generic drug:  ibuprofen      take 4 tablet (200 mg) by oral route every 8 hours as needed with food        albuterol 108 (90 Base) MCG/ACT inhaler    PROAIR HFA/PROVENTIL HFA/VENTOLIN HFA    1 Inhaler    Inhale 2 puffs into the lungs every 4 hours as needed for shortness of  breath / dyspnea    Acute bronchitis       aspirin 81 MG EC tablet     90 tablet    Take 1 tablet (81 mg) by mouth daily        atenolol 50 MG tablet    TENORMIN    90 tablet    Take 1 tablet (50 mg) by mouth daily    HTN, goal below 140/90, Sinus tachycardia, Diabetes mellitus without complication (H), Hyperlipidemia with target LDL less than 100       azelastine 0.05 % ophthalmic solution    OPTIVAR     Place 1 drop into both eyes 2 times daily as needed Reported on 3/22/2017        azelastine 0.1 % nasal spray    ASTELIN     Spray 1 spray into both nostrils 2 times daily as needed Reported on 3/22/2017        calcium-vitamin D 500-125 MG-UNIT Tabs           cetirizine 10 MG tablet    zyrTEC     Take 10 mg by mouth every morning.        desoximetasone 0.25 % cream    TOPICORT    60 g    Apply sparingly to affected area's    Eczema       dorzolamide 2 % ophthalmic solution    TRUSOPT    1 Bottle    Place 2 drops into both eyes 2 times daily        fish oil-omega-3 fatty acids 1000 MG capsule      Take 1 g by mouth daily Reported on 3/22/2017        glipiZIDE 5 MG tablet    GLUCOTROL    360 tablet    TAKE TWO TABLETS BY MOUTH TWICE DAILY BEFORE MEAL(S)    Diabetes mellitus without complication (H), Hyperlipidemia with target LDL less than 100, HTN, goal below 140/90, Sinus tachycardia       hydrochlorothiazide 25 MG tablet    HYDRODIURIL    90 tablet    TAKE ONE TABLET BY MOUTH ONCE DAILY IN THE MORNING    HTN, goal below 140/90, Diabetes mellitus without complication (H), Hyperlipidemia with target LDL less than 100, Sinus tachycardia       lisinopril 40 MG tablet    PRINIVIL/ZESTRIL    90 tablet    Take 1 tablet (40 mg) by mouth daily    HTN, goal below 140/90, Diabetes mellitus without complication (H), Hyperlipidemia with target LDL less than 100, Sinus tachycardia       metFORMIN 1000 MG tablet    GLUCOPHAGE    180 tablet    TAKE ONE TABLET BY MOUTH TWICE DAILY WITH MEALS    Diabetes mellitus without  complication (H), Hyperlipidemia with target LDL less than 100, HTN, goal below 140/90, Sinus tachycardia       MULTIVITAMIN TABS   OR      Reported on 3/22/2017        ONETOUCH ULTRA test strip   Generic drug:  blood glucose monitoring     100 strip    USE ONE STRIP TO CHECK GLUCOSE ONCE DAILY DIAG  CODE  E11.9    Diabetes mellitus without complication (H)       order for DME     3 Month    All diabetic testing supplies including test strips, lancets and solution for testing 3 times per day. Patient is using  no Insulin. A1C      7.6   5/3/2016 A1C      7.4   2/4/2016    Type 2 diabetes mellitus, uncontrolled (H), Hyperlipidemia with target LDL less than 100, Essential hypertension with goal blood pressure less than 140/90       order for DME     100 each    All diabetic testing supplies including test strips, lancets and solution for testing 2 times per day. Patient is using   no Insulin. Last A1c Lab Results      Component                Value               Date                      A1C                      7.9                 08/20/2018    Diabetes mellitus without complication (H), Hyperglycemia, Mild intermittent asthma without complication, LFT elevation       senna-docusate 8.6-50 MG per tablet    SENOKOT-S;PERICOLACE    40 tablet    Take 1-2 tablets by mouth 2 times daily as needed for constipation    Constipation due to opioid therapy       sitagliptin 100 MG tablet    JANUVIA    90 tablet    Take 1 tablet (100 mg) by mouth daily    Diabetes mellitus without complication (H), Hyperlipidemia with target LDL less than 100, HTN, goal below 140/90, Sinus tachycardia       XALATAN 0.005 % ophthalmic solution   Generic drug:  latanoprost     2.5 mL    Place 1 drop Into the left eye At Bedtime

## 2018-10-24 NOTE — PROGRESS NOTES
Diabetes Self-Management Education & Support      Diabetes Self-Management Education & Support - Professional CGM Insertion    SUBJECTIVE/OBJECTIVE     Cultural Influences/Ethnic Background:  American        Patient seen today for Professional CGM Insertion:    Professional CGM Insertion  Sensor Type: LibrePro  Lot #: [id number]  Serial #: [id number]  Expiration Date: 04/30/19  Indication(s) for CGM Study: Unexplained fluctuations in glucose values       Healthy Eating       Being Active       Monitoring           Taking Medications  Diabetes Medication(s)     Biguanides Sig    metFORMIN (GLUCOPHAGE) 1000 MG tablet TAKE ONE TABLET BY MOUTH TWICE DAILY WITH MEALS    Dipeptidyl Peptidase-4 (DPP-4) Inhibitors Sig    sitagliptin (JANUVIA) 100 MG tablet Take 1 tablet (100 mg) by mouth daily    Sulfonylureas Sig    glipiZIDE (GLUCOTROL) 5 MG tablet TAKE TWO TABLETS BY MOUTH TWICE DAILY BEFORE MEAL(S)               Problem Solving                 Reducing Risks       Healthy Coping     Patient Activation Measure Survey Score:  ADRIANO Score (Last Two) 8/26/2013   ADRIANO Raw Score 41   Activation Score 63.2   ADRIANO Level 3         ASSESSMENT  Order received from Dr. Corado for CGM insertion in order to obtain more BG data.      INTERVENTION:   Diabetes knowledge and skills assessment:     Patient is knowledgeable in diabetes management concepts related to: not assessed    Patient needs further education on the following diabetes management concepts: not assessed    Based on learning assessment above, most appropriate setting for further diabetes education would be: Group class or Individual setting.    Education provided today on:  AADE Self-Care Behaviors:  none    WRITTEN AND VERBAL INFORMATION GIVEN TO SUPPORT UNDERSTANDING OF:   LibrePro CGM: Sensor insertion, intention of monitoring for 14 days. Keep records of BG, food intake, exercise, and medication dosing during wear.     Opportunities for ongoing education and support in  diabetes-self management were discussed.    Pt verbalized understanding of concepts discussed and recommendations provided today.       Education Materials Provided:  No new materials provided today      PLAN  See Patient Instructions for co-developed, patient-stated behavior change goals.  AVS printed and provided to patient today. See Follow-Up section for recommended follow-up.    DANTE Navarro CDE    Time Spent: 30 minutes  Encounter Type: Individual    Any diabetes medication dose changes were made via the CDE Protocol and Collaborative Practice Agreement with the patient's endocrinology provider. A copy of this encounter was shared with the provider.

## 2018-10-24 NOTE — NURSING NOTE
"ENDOCRINOLOGY INTAKE FORM    Patient Name:  Gricelda Sabillon  :  1950    Is patient Diabetic?   Yes: type 2  Does patient have non-diabetic or other endocrine issues?  Yes:     Vitals: /70 (BP Location: Left arm, Patient Position: Chair, Cuff Size: Adult Large)  Pulse 80  Temp 97.9  F (36.6  C) (Oral)  Ht 1.676 m (5' 6\")  Wt 97.5 kg (215 lb)  LMP 2002  SpO2 97%  Breastfeeding? No  BMI 34.7 kg/m2  BMI= Body mass index is 34.7 kg/(m^2).    Flu vaccine:  No  Pneumonia vaccine:  Yes: pneumovax    Smoking and Alcohol use:  Social History   Substance Use Topics     Smoking status: Never Smoker     Smokeless tobacco: Never Used     Alcohol use No       Foot Exam: Yes: 18  Eye Exam:  Yes: 18  Dental Exam:  Yes: 2 weeks ago  Aspirin Use:  Yes: 81 mg    Lab Results   Component Value Date    A1C 7.9 2018    A1C 7.4 2018    A1C 7.3 2018    A1C 7.7 2017    A1C 7.5 2017       Lab Results   Component Value Date    MICROL 7 2018     No results found for: MICROALBUMIN    Radha Liao CMA    "

## 2018-10-24 NOTE — PROGRESS NOTES
ENDOCRINOLOGY CLINIC NOTE:  Name: Gricelda Sabillon  Seen for f/u of Diabetes.  HPI:  Gricelda Sabillon is a 68 year old female who presents for the evaluation/management of Diabetes.  S/p hip replacement in 9/2017  Since then activity is improved.  Walking everyday.    1. Type 2 DM:  Orginally diagnosed about 10 years back   Current Regimen: Metformin 1000 mg twice a day, glipizide 10 mg twice a day and Januvia 100 mg/day  Was on INvokana but not able to afford.  BS checks: Once a day.  Average Meter Download:  Patient did not bring glucometer. Without Blood sugar data it is difficult to make adjustment to medical regimen.   Reports that blood sugar is variable and mostly on higher side.  No major episodes of hypoglycemia noted/reported.   Exercise: Limited secondary to knee pain  Last A1c: 7.9%  Symptoms of hypoglycemia (low blood sugar):  Gets symptoms of hypoglycemia.  Episodes of hypoglycemia: No  Fixed meal pattern: Yes.    Patient counting carbs: No    DM Complications:   Nephropathy: No  Retinopathy: No  Neuropathy: No  Microalbuminuria: No  MICROL       12   8/3/2015  MICROALBUMIN     8.58   8/3/2015    CAD/PAD: No  Gastroparesis: No  Hypoglycemia unawareness: No    2. Hypertension:    Blood pressure medications include lisinopril 40 mg, atenolol 50 mg and hydrochlorothiazide 25 mg  3. Hyperlipidemia: Takes atorvastatin 20 mg.    PMH/PSH:  Past Medical History:   Diagnosis Date     Allergic rhinitis, cause unspecified      Asthma      Asthma, mild intermittent      Cataract      Cellulitis and abscess of leg, except foot 03/00    Bilateral     Eczema      Heart murmur     aortic area     HTN, goal below 140/90      Hyperlipidemia LDL goal < 100      Hyperplastic colon polyp 2008     Infection     bilateral eye infections     Macular hole of left eye      Obesity (BMI 30-39.9)      Osteoarthritis     knees     Other chronic pain     right knee     Sleep apnea     uses c pap     Type 2 diabetes, HbA1C  goal < 8% (H)      Past Surgical History:   Procedure Laterality Date     ARTHROPLASTY HIP Right 2017    Procedure: ARTHROPLASTY HIP;  Right total hip arthroplasty  ;  Surgeon: Ayaan Pena MD;  Location: [facility] OR     ARTHROPLASTY KNEE  2013    Procedure: ARTHROPLASTY KNEE;  right total knee arthroplasty ;  Surgeon: Chaparro Wesley MD;  Location: [facility] OR     ARTHROSCOPY KNEE Right 2015    Procedure: ARTHROSCOPY KNEE;  Surgeon: Ayaan Pena MD;  Location: [facility] OR     C INCISION OF HYMEN       CATARACT IOL, RT/LT       COLONOSCOPY       EYE SURGERY      macular hole repaired left eye     HC KNEE SCOPE, DIAGNOSTIC      - both knees     HEAD & NECK SURGERY      wisdom teeth     Family Hx:  Family History   Problem Relation Age of Onset     Hypertension Mother      diabetes,hypoythryoidism, stroke     Diabetes Mother      HEART DISEASE Father      , cancer lip     HEART DISEASE Paternal Grandfather           Cancer Paternal Grandmother           Cerebrovascular Disease Maternal Grandfather           Cerebrovascular Disease Maternal Grandmother      , diabetes     Connective Tissue Disorder Sister      fibromyalgia     Diabetes Sister      Hypertension Brother      Cancer Paternal Aunt      ovarian       Diabetes:    Social Hx:  Social History     Social History     Marital status:      Spouse name: Allen     Number of children: 3     Years of education: 15     Occupational History     Not on file.     Social History Main Topics     Smoking status: Never Smoker     Smokeless tobacco: Never Used     Alcohol use No     Drug use: No     Sexual activity: No     Other Topics Concern     Not on file     Social History Narrative          MEDICATIONS:  has a current medication list which includes the following prescription(s): albuterol, aspirin, atenolol, azelastine, azelastine, calcium-vitamin d, cetirizine, desoximetasone, dorzolamide, fish  "oil-omega-3 fatty acids, glipizide, hydrochlorothiazide, ibuprofen, latanoprost, lisinopril, metformin, multiple vitamin, onetouch ultra, order for dme, order for dme, senna-docusate, and sitagliptin.    ROS     ROS: 10 point ROS neg other than the symptoms noted above in the HPI.    Physical Exam   VS: /70 (BP Location: Left arm, Patient Position: Chair, Cuff Size: Adult Large)  Pulse 80  Temp 97.9  F (36.6  C) (Oral)  Ht 1.676 m (5' 6\")  Wt 97.5 kg (215 lb)  LMP 12/13/2002  SpO2 97%  Breastfeeding? No  BMI 34.7 kg/m2  GENERAL: AXOX3, NAD, well dressed, answering questions appropriately, appears stated age.  HEENT: No exopthalmous, no proptosis, EOMI, no lig lag, no retraction  NECK: Thyroid normal in size, non tender, no nodules were palpated.  CV: RRR  LUNGS: CTAB  ABDOMEN: +BS  NEUROLOGY: CN grossly intact, no tremors  PSYCH: normal affect and mood    LABS:  A1c:  Lab Results   Component Value Date    A1C 7.9 08/20/2018    A1C 7.4 05/14/2018    A1C 7.3 01/19/2018    A1C 7.7 09/05/2017    A1C 7.5 03/17/2017       Creatinine:  Creatinine   Date Value Ref Range Status   08/20/2018 0.76 0.52 - 1.04 mg/dL Final   ]    Urine Micro:  Lab Results   Component Value Date    UMALCR 8.89 01/26/2018        LFTs/Lipids:  Recent Labs   Lab Test  05/14/18   0855  09/05/17   0826   08/03/15   0835  08/12/14   0848   CHOL  158  160   < >  132  149   HDL  40*  37*   < >  42*  46*   LDL  75  68   < >  57  43   TRIG  214*  276*   < >  167*  298*   CHOLHDLRATIO   --    --    --   3.1  3.2    < > = values in this interval not displayed.     TFTs:  TSH   Date Value Ref Range Status   05/14/2018 3.28 0.40 - 4.00 mU/L Final       All pertinent notes, labs, and images personally reviewed by me.     A/P  Ms.Barbara Evens Sabillon is a 68 year old here for the evaluation/management of diabetes:    1. DM2 - Under good control.  A1c 7.9%.    Patient did not bring glucometer. Without Blood sugar data it is difficult to make " adjustment to medical regimen.   A1c is rising.  Patient reports that she is not following good diet  Plan  Recommend diagnostic CGM which was placed by CDE today  Continue current regimen for now.  Continue Metformin 1000 mg twice a day, glipizide 10 mg twice a day and Januvia 100 mg/day     Recommend checking blood sugars before meals and at bedtime.    If Blood glucose are low more often-> 2-3 times/week- give us a call.  The patient is advised to Make better food choices: reduce carbs, Reduce portion size, weight loss and exercise 3-4 times a week.  Discussed hypoglycemia signs and symptoms as well as management in detail.        2. Hypertension - Under Good control.  Continue lisinopril 40 mg, atenolol 50 mg and hydrochlorothiazide 25 mg      3. Hyperlipidemia - Under Good control.  Continue atorvastatin 20 mg.  LDL 57, HDL 42.     4. Prevention  Opthalmology- August 2016   ASA-81 mg  Smoking- no    5.  Obesity:  The patient is advised to Make better food choices: reduce carbs, Reduce portion size, weight loss and exercise 3-4 times a week.  Health  referral. She has [facility] at [facility] and is planning to start exercise.      All questions were answered.  The patient indicates understanding of the above issues and agrees with the plan set forth.     More than 50% of face to face time spent with Ms. Sabillon on counseling / coordinating her care.  Total appointment times was 30 minutes.      Follow-up:  Follow up in 3-6 months/triny Corado M.D  Endocrinology  [facility]/[place]  CC: Raisa Davidson    Disclaimer: This note consists of symbols derived from keyboarding, dictation and/or voice recognition software. As a result, there may be errors in the script that have gone undetected. Please consider this when interpreting information found in this chart.

## 2018-11-08 ENCOUNTER — TELEPHONE (OUTPATIENT)
Dept: ENDOCRINOLOGY | Facility: CLINIC | Age: 68
End: 2018-11-08

## 2018-11-08 ENCOUNTER — ALLIED HEALTH/NURSE VISIT (OUTPATIENT)
Dept: EDUCATION SERVICES | Facility: CLINIC | Age: 68
End: 2018-11-08
Payer: COMMERCIAL

## 2018-11-08 DIAGNOSIS — E11.9 DIABETES MELLITUS WITHOUT COMPLICATION (H): Primary | Chronic | ICD-10-CM

## 2018-11-08 PROCEDURE — G0108 DIAB MANAGE TRN  PER INDIV: HCPCS

## 2018-11-08 PROCEDURE — 95251 CONT GLUC MNTR ANALYSIS I&R: CPT | Performed by: INTERNAL MEDICINE

## 2018-11-08 NOTE — TELEPHONE ENCOUNTER
Current Diabetes Management per Patient:  Diabetes Medication(s)     Biguanides Sig     metFORMIN (GLUCOPHAGE) 1000 MG tablet TAKE ONE TABLET BY MOUTH TWICE DAILY WITH MEALS    Dipeptidyl Peptidase-4 (DPP-4) Inhibitors Sig     sitagliptin (JANUVIA) 100 MG tablet Take 1 tablet (100 mg) by mouth daily    Sulfonylureas Sig     glipiZIDE (GLUCOTROL) 5 MG tablet TAKE TWO TABLETS BY MOUTH TWICE DAILY BEFORE MEAL(S)            Current Treatments: (P) Diet, Oral Agent (triple therapy)     Most Recent A1c Result:          Lab Results   Component Value Date     A1C 7.9 08/20/2018         Continuous Glucose Monitor Interpretation      Reports:                           Healthy Eating  Cultural/Moravian diet restrictions?: (P) No  Meal planning: (P) Smaller portions  Meals include: (P) Breakfast, Dinner, Snacks     Being Active  Barrier to exercise: (P) Time, Physical limitation     Monitoring  Blood Glucose Meter: (P) Accu-check  Home Glucose (Sugar) Monitoring: (P) 1-2 times per day  Blood glucose trend: (P) Fluctuating minimally  Low Glucose Range (mg/dL): (P) 140-180     Taking Medications  Diabetes Medication(s)     Biguanides Sig     metFORMIN (GLUCOPHAGE) 1000 MG tablet TAKE ONE TABLET BY MOUTH TWICE DAILY WITH MEALS    Dipeptidyl Peptidase-4 (DPP-4) Inhibitors Sig     sitagliptin (JANUVIA) 100 MG tablet Take 1 tablet (100 mg) by mouth daily    Sulfonylureas Sig     glipiZIDE (GLUCOTROL) 5 MG tablet TAKE TWO TABLETS BY MOUTH TWICE DAILY BEFORE MEAL(S)            Current Treatments: (P) Diet, Oral Agent (triple therapy)       PLAN:  1. Take Glipizide before meals (currently taking Glipizide after meals at inconsistent times)  2. Modify breakfast - half bagel or whole grain english muffin, 1/2 serving fruit  3. Consider add GLP-1 in place of Januvia in the future  4. Do some post-meal BG checks after breakfast        CGM data reviewed.  Diabetic Educator Assessment and plan reviewed.    I agree with CGM data assessment  and plan.    Clara Corado

## 2018-11-08 NOTE — PROGRESS NOTES
Diabetes Self-Management Education & Support      Diabetes Education Self-Management & Training: Follow-up and Continuous Glucose Monitor Download    Gricelda Sabillon presents today for download and report review of Professional continuous glucose monitor device      Current Diabetes Management per Patient:  Diabetes Medication(s)     Biguanides Sig    metFORMIN (GLUCOPHAGE) 1000 MG tablet TAKE ONE TABLET BY MOUTH TWICE DAILY WITH MEALS    Dipeptidyl Peptidase-4 (DPP-4) Inhibitors Sig    sitagliptin (JANUVIA) 100 MG tablet Take 1 tablet (100 mg) by mouth daily    Sulfonylureas Sig    glipiZIDE (GLUCOTROL) 5 MG tablet TAKE TWO TABLETS BY MOUTH TWICE DAILY BEFORE MEAL(S)          Current Treatments: (P) Diet, Oral Agent (triple therapy)    Most Recent A1c Result:    Lab Results   Component Value Date    A1C 7.9 08/20/2018       Continuous Glucose Monitor Interpretation     Reports:                       Healthy Eating  Cultural/Rastafari diet restrictions?: (P) No  Meal planning: (P) Smaller portions  Meals include: (P) Breakfast, Dinner, Snacks    Being Active  Barrier to exercise: (P) Time, Physical limitation    Monitoring  Blood Glucose Meter: (P) Accu-check  Home Glucose (Sugar) Monitoring: (P) 1-2 times per day  Blood glucose trend: (P) Fluctuating minimally  Low Glucose Range (mg/dL): (P) 140-180    Taking Medications  Diabetes Medication(s)     Biguanides Sig    metFORMIN (GLUCOPHAGE) 1000 MG tablet TAKE ONE TABLET BY MOUTH TWICE DAILY WITH MEALS    Dipeptidyl Peptidase-4 (DPP-4) Inhibitors Sig    sitagliptin (JANUVIA) 100 MG tablet Take 1 tablet (100 mg) by mouth daily    Sulfonylureas Sig    glipiZIDE (GLUCOTROL) 5 MG tablet TAKE TWO TABLETS BY MOUTH TWICE DAILY BEFORE MEAL(S)          Current Treatments: (P) Diet, Oral Agent (triple therapy)    Problem Solving  Hypoglycemia Frequency: (P) Never  Medical alert: (P) No  Severe weather/disaster plan for diabetes management?: (P) No  DKA prevention plan?:  (P) No  Sick day plan for diabetes management?: (P) No              Reducing Risks  CAD Risks: (P) Diabetes Mellitus, Family history, Hypertension, Obesity, Post-menopausal, Sedentary lifestyle  Has dilated eye exam at least once a year?: (P) Yes  Sees dentist every 6 months?: (P) Yes  Sees podiatrist (foot doctor)?: (P) No    Healthy Coping  Informal Support system:: (P) Children, [place] based, Family, Friends, Spouse  Difficulty affording diabetes management supplies?: (P) No  Patient Activation Measure Survey Score:  ADRIANO Score (Last Two) 8/26/2013   ADRIANO Raw Score 41   Activation Score 63.2   ADRIANO Level 3         Assessment:  Medication and/or insulin dosing is: accurate        INTERVENTION:   Diabetes knowledge and skills assessment:     Patient is knowledgeable in diabetes management concepts related to: Healthy Eating and Monitoring    Patient needs further education on the following diabetes management concepts: Healthy Eating, Monitoring, Taking Medication and Problem Solving    Based on learning assessment above, most appropriate setting for further diabetes education would be: Group class or Individual setting.    Education provided today on:  AADE Self-Care Behaviors:  Healthy Eating: carbohydrate counting, consistency in amount, composition, and timing of food intake and portion control  Monitoring: log and interpret results, individual blood glucose targets and frequency of monitoring  Taking Medication: action of prescribed medication, side effects of prescribed medications and when to take medications  Problem Solving: high blood glucose - causes, signs/symptoms, treatment and prevention and low blood glucose - causes, signs/symptoms, treatment and prevention    CGM-specific education:   Use of trends and graphs for pattern management and problem solving    Pt verbalized understanding of concepts discussed and recommendations provided today.       Education Materials Provided:  No new materials provided  today    PLAN:  1. Take Glipizide before meals (currently taking Glipizide after meals at inconsistent times)  2. Modify breakfast - half bagel or whole grain english muffin, 1/2 serving fruit  3. Consider add GLP-1 in place of Januvia in the future  4. Do some post-meal BG checks after breakfast      DANTE Navarro CDE    Time Spent: 30 minutes  Encounter Type: Individual    Any diabetes medication dose changes were made via the CDE Protocol and Collaborative Practice Agreement with the patient's endocrinology provider. A copy of this encounter was shared with the provider.

## 2018-11-08 NOTE — MR AVS SNAPSHOT
After Visit Summary   11/8/2018    Gricelda Sabillon    MRN: [id number]           Patient Information     Date Of Birth          1950        Visit Information        Provider Department      11/8/2018 1:30 PM [facility] DIABETIC ED RESOURCE [facility]        Today's Diagnoses     DM2 no complication    -  1      Care Instructions    PLAN:  1. Take Glipizide before meals (currently taking Glipizide after meals at inconsistent times)  2. Modify breakfast - half bagel or whole grain english muffin, 1/2 serving fruit  3. Consider add GLP-1 in place of Januvia in the future  4. Do some post-meal BG checks after breakfast            Follow-ups after your visit        Your next 10 appointments already scheduled     Nov 19, 2018  8:15 AM CST   LAB with [facility]   [facility]    [address] [facility] [address]   [phone]           Please do not eat 10-12 hours before your appointment if you are coming in fasting for labs on lipids, cholesterol, or glucose (sugar). This does not apply to pregnant women. Water, hot tea and black coffee (with nothing added) are okay. Do not drink other fluids, diet soda or chew gum.            Nov 29, 2018  1:00 PM CST   SHORT with Raisa Davidson MD   [facility] ([facility]    [address] [place]  [facility] [id number] [phone]              Who to contact     If you have questions or need follow up information about today's clinic visit or your schedule please contact [facility] directly at [phone].  Normal or non-critical lab and imaging results will be communicated to you by MyChart, letter or phone within 4 business days after the clinic has received the results. If you do not hear from us within 7 days, please contact the clinic through MyChart or phone. If you have a critical or abnormal  lab result, we will notify you by phone as soon as possible.  Submit refill requests through Hangtime or call your pharmacy and they will forward the refill request to us. Please allow 3 business days for your refill to be completed.          Additional Information About Your Visit        [employer] Information     Hang[employer] gives you secure access to your electronic health record. If you see a primary care provider, you can also send messages to your care team and make appointments. If you have questions, please call your primary care clinic.  If you do not have a primary care provider, please call [phone] and they will assist you.        Care EveryWhere ID     This is your Care EveryWhere ID. This could be used by other organizations to access your [facility] medical records  [id number]        Your Vitals Were     Last Period                   12/13/2002            Blood Pressure from Last 3 Encounters:   10/24/18 126/70   09/24/18 121/80   08/27/18 130/80    Weight from Last 3 Encounters:   10/24/18 97.5 kg (215 lb)   08/27/18 99.8 kg (220 lb)   05/21/18 99.8 kg (220 lb)              We Performed the Following     DIABETES EDUCATION - Individual  []        Primary Care Provider Office Phone # Fax #    Raisa Michelle Davidson [phone]       [address] [facility] [address]        Equal Access to Services     [facility]: Hadii aad ku hadasho Soomaali, waaxda luqadaha, qaybta kaalmada adeegyada, thomas marmolejo . So [facility] [phone].    ATENCIÓN: Si habla español, tiene a vail disposición servicios gratuitos de asistencia lingüística. Baldev al [phone].    We comply with applicable federal civil rights laws and [place] laws. We do not discriminate on the basis of race, color, national origin, age, disability, sex, sexual orientation, or gender identity.            Thank you!     Thank you for choosing [place] [facility]  for  your care. Our goal is always to provide you with excellent care. Hearing back from our patients is one way we can continue to improve our services. Please take a few minutes to complete the written survey that you may receive in the mail after your visit with us. Thank you!             Your Updated Medication List - Protect others around you: Learn how to safely use, store and throw away your medicines at www.disposemymeds.org.          This list is accurate as of 11/8/18  2:08 PM.  Always use your most recent med list.                   Brand Name Dispense Instructions for use Diagnosis    ADVIL 200 MG tablet   Generic drug:  ibuprofen      take 4 tablet (200 mg) by oral route every 8 hours as needed with food        albuterol 108 (90 Base) MCG/ACT inhaler    PROAIR HFA/PROVENTIL HFA/VENTOLIN HFA    1 Inhaler    Inhale 2 puffs into the lungs every 4 hours as needed for shortness of breath / dyspnea    Acute bronchitis       aspirin 81 MG EC tablet     90 tablet    Take 1 tablet (81 mg) by mouth daily        atenolol 50 MG tablet    TENORMIN    90 tablet    Take 1 tablet (50 mg) by mouth daily    HTN, goal below 140/90, Sinus tachycardia, Diabetes mellitus without complication (H), Hyperlipidemia with target LDL less than 100       azelastine 0.05 % ophthalmic solution    OPTIVAR     Place 1 drop into both eyes 2 times daily as needed Reported on 3/22/2017        azelastine 0.1 % nasal spray    ASTELIN     Spray 1 spray into both nostrils 2 times daily as needed Reported on 3/22/2017        calcium-vitamin D 500-125 MG-UNIT Tabs           cetirizine 10 MG tablet    zyrTEC     Take 10 mg by mouth every morning.        desoximetasone 0.25 % cream    TOPICORT    60 g    Apply sparingly to affected area's    Eczema       dorzolamide 2 % ophthalmic solution    TRUSOPT    1 Bottle    Place 2 drops into both eyes 2 times daily        fish oil-omega-3 fatty acids 1000 MG capsule      Take 1 g by mouth daily Reported on  3/22/2017        glipiZIDE 5 MG tablet    GLUCOTROL    360 tablet    TAKE TWO TABLETS BY MOUTH TWICE DAILY BEFORE MEAL(S)    Diabetes mellitus without complication (H), Hyperlipidemia with target LDL less than 100, HTN, goal below 140/90, Sinus tachycardia       hydrochlorothiazide 25 MG tablet    HYDRODIURIL    90 tablet    TAKE ONE TABLET BY MOUTH ONCE DAILY IN THE MORNING    HTN, goal below 140/90, Diabetes mellitus without complication (H), Hyperlipidemia with target LDL less than 100, Sinus tachycardia       lisinopril 40 MG tablet    PRINIVIL/ZESTRIL    90 tablet    Take 1 tablet (40 mg) by mouth daily    HTN, goal below 140/90, Diabetes mellitus without complication (H), Hyperlipidemia with target LDL less than 100, Sinus tachycardia       metFORMIN 1000 MG tablet    GLUCOPHAGE    180 tablet    TAKE ONE TABLET BY MOUTH TWICE DAILY WITH MEALS    Diabetes mellitus without complication (H), Hyperlipidemia with target LDL less than 100, HTN, goal below 140/90, Sinus tachycardia       MULTIVITAMIN TABS   OR      Reported on 3/22/2017        ONETOUCH ULTRA test strip   Generic drug:  blood glucose monitoring     100 strip    USE ONE STRIP TO CHECK GLUCOSE ONCE DAILY DIAG  CODE  E11.9    Diabetes mellitus without complication (H)       order for DME     3 Month    All diabetic testing supplies including test strips, lancets and solution for testing 3 times per day. Patient is using  no Insulin. A1C      7.6   5/3/2016 A1C      7.4   2/4/2016    Type 2 diabetes mellitus, uncontrolled (H), Hyperlipidemia with target LDL less than 100, Essential hypertension with goal blood pressure less than 140/90       order for DME     100 each    All diabetic testing supplies including test strips, lancets and solution for testing 2 times per day. Patient is using   no Insulin. Last A1c Lab Results      Component                Value               Date                      A1C                      7.9                 08/20/2018     Diabetes mellitus without complication (H), Hyperglycemia, Mild intermittent asthma without complication, LFT elevation       senna-docusate 8.6-50 MG per tablet    SENOKOT-S;PERICOLACE    40 tablet    Take 1-2 tablets by mouth 2 times daily as needed for constipation    Constipation due to opioid therapy       sitagliptin 100 MG tablet    JANUVIA    90 tablet    Take 1 tablet (100 mg) by mouth daily    Diabetes mellitus without complication (H), Hyperlipidemia with target LDL less than 100, HTN, goal below 140/90, Sinus tachycardia       XALATAN 0.005 % ophthalmic solution   Generic drug:  latanoprost     2.5 mL    Place 1 drop Into the left eye At Bedtime

## 2018-11-08 NOTE — Clinical Note
Please see scanned continuous glucose monitoring (CGM) reports, interpretation and recommendations. As a provider, you can bill for a non face-to-face interpretation of the sensor report. If you feel it is appropriate, please create a 'Documentation Only' encounter noting the interpretation and your recommended plan and bill for this glucose sensor interpretation using code [id number].

## 2018-11-15 ENCOUNTER — TRANSFERRED RECORDS (OUTPATIENT)
Dept: HEALTH INFORMATION MANAGEMENT | Facility: CLINIC | Age: 68
End: 2018-11-15

## 2018-11-19 DIAGNOSIS — R79.89 LFT ELEVATION: ICD-10-CM

## 2018-11-19 DIAGNOSIS — E11.9 DIABETES MELLITUS WITHOUT COMPLICATION (H): Chronic | ICD-10-CM

## 2018-11-19 DIAGNOSIS — R73.9 HYPERGLYCEMIA: ICD-10-CM

## 2018-11-19 LAB
ALBUMIN SERPL-MCNC: 3.5 G/DL (ref 3.4–5)
ALP SERPL-CCNC: 267 U/L (ref 40–150)
ALT SERPL W P-5'-P-CCNC: 232 U/L (ref 0–50)
ANION GAP SERPL CALCULATED.3IONS-SCNC: 7 MMOL/L (ref 3–14)
AST SERPL W P-5'-P-CCNC: 115 U/L (ref 0–45)
BILIRUB SERPL-MCNC: 0.6 MG/DL (ref 0.2–1.3)
BUN SERPL-MCNC: 17 MG/DL (ref 7–30)
CALCIUM SERPL-MCNC: 10 MG/DL (ref 8.5–10.1)
CHLORIDE SERPL-SCNC: 100 MMOL/L (ref 94–109)
CO2 SERPL-SCNC: 28 MMOL/L (ref 20–32)
CREAT SERPL-MCNC: 0.78 MG/DL (ref 0.52–1.04)
GFR SERPL CREATININE-BSD FRML MDRD: 72 ML/MIN/1.7M2
GLUCOSE SERPL-MCNC: 197 MG/DL (ref 70–99)
HBA1C MFR BLD: 8 % (ref 0–5.6)
POTASSIUM SERPL-SCNC: 3.7 MMOL/L (ref 3.4–5.3)
PROT SERPL-MCNC: 7.6 G/DL (ref 6.8–8.8)
SODIUM SERPL-SCNC: 135 MMOL/L (ref 133–144)

## 2018-11-19 PROCEDURE — 80053 COMPREHEN METABOLIC PANEL: CPT | Performed by: INTERNAL MEDICINE

## 2018-11-19 PROCEDURE — 83036 HEMOGLOBIN GLYCOSYLATED A1C: CPT | Performed by: INTERNAL MEDICINE

## 2018-11-19 PROCEDURE — 36415 COLL VENOUS BLD VENIPUNCTURE: CPT | Performed by: INTERNAL MEDICINE

## 2018-11-29 ENCOUNTER — OFFICE VISIT (OUTPATIENT)
Dept: INTERNAL MEDICINE | Facility: CLINIC | Age: 68
End: 2018-11-29
Payer: COMMERCIAL

## 2018-11-29 VITALS
TEMPERATURE: 98.5 F | RESPIRATION RATE: 16 BRPM | DIASTOLIC BLOOD PRESSURE: 72 MMHG | OXYGEN SATURATION: 96 % | SYSTOLIC BLOOD PRESSURE: 134 MMHG | HEART RATE: 79 BPM

## 2018-11-29 DIAGNOSIS — I10 ESSENTIAL HYPERTENSION WITH GOAL BLOOD PRESSURE LESS THAN 140/90: Chronic | ICD-10-CM

## 2018-11-29 DIAGNOSIS — E11.65 TYPE 2 DIABETES MELLITUS WITH HYPERGLYCEMIA, WITHOUT LONG-TERM CURRENT USE OF INSULIN (H): ICD-10-CM

## 2018-11-29 DIAGNOSIS — E78.5 HYPERLIPIDEMIA WITH TARGET LDL LESS THAN 100: Chronic | ICD-10-CM

## 2018-11-29 DIAGNOSIS — E66.9 OBESITY (BMI 30-39.9): ICD-10-CM

## 2018-11-29 DIAGNOSIS — R79.89 LFT ELEVATION: Primary | ICD-10-CM

## 2018-11-29 PROCEDURE — 83540 ASSAY OF IRON: CPT | Performed by: INTERNAL MEDICINE

## 2018-11-29 PROCEDURE — 83550 IRON BINDING TEST: CPT | Performed by: INTERNAL MEDICINE

## 2018-11-29 PROCEDURE — 83516 IMMUNOASSAY NONANTIBODY: CPT | Mod: 59 | Performed by: INTERNAL MEDICINE

## 2018-11-29 PROCEDURE — 84165 PROTEIN E-PHORESIS SERUM: CPT | Performed by: INTERNAL MEDICINE

## 2018-11-29 PROCEDURE — 86706 HEP B SURFACE ANTIBODY: CPT | Performed by: INTERNAL MEDICINE

## 2018-11-29 PROCEDURE — 82728 ASSAY OF FERRITIN: CPT | Performed by: INTERNAL MEDICINE

## 2018-11-29 PROCEDURE — 36415 COLL VENOUS BLD VENIPUNCTURE: CPT | Performed by: INTERNAL MEDICINE

## 2018-11-29 PROCEDURE — 86038 ANTINUCLEAR ANTIBODIES: CPT | Performed by: INTERNAL MEDICINE

## 2018-11-29 PROCEDURE — 00000402 ZZHCL STATISTIC TOTAL PROTEIN: Performed by: INTERNAL MEDICINE

## 2018-11-29 PROCEDURE — 99214 OFFICE O/P EST MOD 30 MIN: CPT | Performed by: INTERNAL MEDICINE

## 2018-11-29 PROCEDURE — 99000 SPECIMEN HANDLING OFFICE-LAB: CPT | Performed by: INTERNAL MEDICINE

## 2018-11-29 PROCEDURE — 87340 HEPATITIS B SURFACE AG IA: CPT | Performed by: INTERNAL MEDICINE

## 2018-11-29 PROCEDURE — 83516 IMMUNOASSAY NONANTIBODY: CPT | Mod: 90 | Performed by: INTERNAL MEDICINE

## 2018-11-29 NOTE — PATIENT INSTRUCTIONS
Plan:  1. Diabetes meds options. Please call back after you discuss with the insurance   A. Discontinue Januvia and startt Trulicity injections once a week OR Victoza injections daily  B. Continue Januvia and add Invokana OR Jardiance   C. Insulin   2. Liver ultrasound - To schedule this test you may call Scheduling center at [phone]    3. Labs today   4. Please make a lab appointment for non fasting labs in 3 months   5. Please make an appointment few days after the labs to discuss about the results.   6. Referral to Liver specialist

## 2018-11-29 NOTE — MR AVS SNAPSHOT
After Visit Summary   11/29/2018    Gricelda Sabillon    MRN: [id number]           Patient Information     Date Of Birth          1950        Visit Information        Provider Department      11/29/2018 1:00 PM Raisa Davidson MD [facility]        Today's Diagnoses     LFT elevation    -  1      Care Instructions    Plan:  1. Diabetes meds options. Please call back after you discuss with the insurance   A. Discontinue Januvia and startt Trulicity injections once a week OR Victoza injections daily  B. Continue Januvia and add Invokana OR Jardiance   C. Insulin   2. Liver ultrasound - To schedule this test you may call Scheduling center at [phone]    3. Labs today   4. Please make a lab appointment for non fasting labs in 3 months   5. Please make an appointment few days after the labs to discuss about the results.   6. Referral to Liver specialist            Follow-ups after your visit        Additional Services     GASTROENTEROLOGY ADULT REF CONSULT ONLY       Preferred Location: [facility],   [facility] [phone]   [place] [facility] [phone]      Please be aware that coverage of these services is subject to the terms and limitations of your health insurance plan.  Call member services at your health plan with any benefit or coverage questions.  Any procedures must be performed at a [place] facility OR coordinated by your clinic's referral office.    Please bring the following with you to your appointment:    (1) Any X-Rays, CTs or MRIs which have been performed.  Contact the facility where they were done to arrange for  prior to your scheduled appointment.    (2) List of current medications   (3) This referral request   (4) Any documents/labs given to you for this referral                  Future tests that were ordered for you today     Open Future Orders        Priority Expected Expires Ordered    US Abdomen Limited Routine  11/29/2019 11/29/2018             Who to contact     If you have questions or need follow up information about today's clinic visit or your schedule please contact [facility] directly at [phone].  Normal or non-critical lab and imaging results will be communicated to you by MyChart, letter or phone within 4 business days after the clinic has received the results. If you do not hear from us within 7 days, please contact the clinic through [employer] or phone. If you have a critical or abnormal lab result, we will notify you by phone as soon as possible.  Submit refill requests through [employer] or call your pharmacy and they will forward the refill request to us. Please allow 3 business days for your refill to be completed.          Additional Information About Your Visit        [employer] Information     [employer] gives you secure access to your electronic health record. If you see a primary care provider, you can also send messages to your care team and make appointments. If you have questions, please call your primary care clinic.  If you do not have a primary care provider, please call [phone] and they will assist you.        Care EveryWhere ID     This is your Care EveryWhere ID. This could be used by other organizations to access your [facility] medical records  [id number]        Your Vitals Were     Pulse Temperature Respirations Last Period Pulse Oximetry       79 98.5  F (36.9  C) (Oral) 16 12/13/2002 96%        Blood Pressure from Last 3 Encounters:   11/29/18 134/72   10/24/18 126/70   09/24/18 121/80    Weight from Last 3 Encounters:   10/24/18 215 lb (97.5 kg)   08/27/18 220 lb (99.8 kg)   05/21/18 220 lb (99.8 kg)              We Performed the Following     Anti Nuclear Candace IgG by IFA with Reflex     F Actin EIA with reflex     Ferritin     GASTROENTEROLOGY ADULT REF CONSULT ONLY     Hepatitis B Surface Antibody     Hepatitis B surface antigen     Iron and iron binding capacity     Protein electrophoresis      Tissue transglutaminase luis IgA and IgG        Primary Care Provider Office Phone # Fax #    Raisa Davidson [phone]       Yuly KRISHNABETTIE FRANZ  [facility] [address]        Equal Access to Services     CHARIYUE BELINDA : Hadii aad ku hadkrystalo Soomaali, waaxda luqadaha, qaybta kaalmada adeegyada, waxay idiin hayaan adeeg shailesh lacece . So [facility] [phone].    ATENCIÓN: Si habla español, tiene a vail disposición servicios gratuitos de asistencia lingüística. Llame al [phone].    We comply with applicable federal civil rights laws and [place] laws. We do not discriminate on the basis of race, color, national origin, age, disability, sex, sexual orientation, or gender identity.            Thank you!     Thank you for choosing [facility]  for your care. Our goal is always to provide you with excellent care. Hearing back from our patients is one way we can continue to improve our services. Please take a few minutes to complete the written survey that you may receive in the mail after your visit with us. Thank you!             Your Updated Medication List - Protect others around you: Learn how to safely use, store and throw away your medicines at www.disposemymeds.org.          This list is accurate as of 11/29/18  2:02 PM.  Always use your most recent med list.                   Brand Name Dispense Instructions for use Diagnosis    ADVIL 200 MG tablet   Generic drug:  ibuprofen      take 4 tablet (200 mg) by oral route every 8 hours as needed with food        albuterol 108 (90 Base) MCG/ACT inhaler    PROAIR HFA/PROVENTIL HFA/VENTOLIN HFA    1 Inhaler    Inhale 2 puffs into the lungs every 4 hours as needed for shortness of breath / dyspnea    Acute bronchitis       aspirin 81 MG EC tablet    ASA    90 tablet    Take 1 tablet (81 mg) by mouth daily        atenolol 50 MG tablet    TENORMIN    90 tablet    Take 1 tablet (50 mg) by mouth daily    HTN, goal below 140/90,  Sinus tachycardia, Diabetes mellitus without complication (H), Hyperlipidemia with target LDL less than 100       azelastine 0.05 % ophthalmic solution    OPTIVAR     Place 1 drop into both eyes 2 times daily as needed Reported on 3/22/2017        azelastine 0.1 % nasal spray    ASTELIN     Spray 1 spray into both nostrils 2 times daily as needed Reported on 3/22/2017        calcium-vitamin D 500-125 MG-UNIT Tabs           cetirizine 10 MG tablet    zyrTEC     Take 10 mg by mouth every morning.        desoximetasone 0.25 % external cream    TOPICORT    60 g    Apply sparingly to affected area's    Eczema       dorzolamide 2 % ophthalmic solution    TRUSOPT    1 Bottle    Place 2 drops into both eyes 2 times daily        fish oil-omega-3 fatty acids 1000 MG capsule      Take 1 g by mouth daily Reported on 3/22/2017        glipiZIDE 5 MG tablet    GLUCOTROL    360 tablet    TAKE TWO TABLETS BY MOUTH TWICE DAILY BEFORE MEAL(S)    Diabetes mellitus without complication (H), Hyperlipidemia with target LDL less than 100, HTN, goal below 140/90, Sinus tachycardia       hydrochlorothiazide 25 MG tablet    HYDRODIURIL    90 tablet    TAKE ONE TABLET BY MOUTH ONCE DAILY IN THE MORNING    HTN, goal below 140/90, Diabetes mellitus without complication (H), Hyperlipidemia with target LDL less than 100, Sinus tachycardia       lisinopril 40 MG tablet    PRINIVIL/ZESTRIL    90 tablet    Take 1 tablet (40 mg) by mouth daily    HTN, goal below 140/90, Diabetes mellitus without complication (H), Hyperlipidemia with target LDL less than 100, Sinus tachycardia       metFORMIN 1000 MG tablet    GLUCOPHAGE    180 tablet    TAKE ONE TABLET BY MOUTH TWICE DAILY WITH MEALS    Diabetes mellitus without complication (H), Hyperlipidemia with target LDL less than 100, HTN, goal below 140/90, Sinus tachycardia       MULTIVITAMIN TABS   OR      Reported on 3/22/2017        ONETOUCH ULTRA test strip   Generic drug:  blood glucose monitoring      100 strip    USE ONE STRIP TO CHECK GLUCOSE ONCE DAILY DIAG  CODE  E11.9    Diabetes mellitus without complication (H)       order for DME     3 Month    All diabetic testing supplies including test strips, lancets and solution for testing 3 times per day. Patient is using  no Insulin. A1C      7.6   5/3/2016 A1C      7.4   2/4/2016    Type 2 diabetes mellitus, uncontrolled (H), Hyperlipidemia with target LDL less than 100, Essential hypertension with goal blood pressure less than 140/90       order for DME     100 each    All diabetic testing supplies including test strips, lancets and solution for testing 2 times per day. Patient is using   no Insulin. Last A1c Lab Results      Component                Value               Date                      A1C                      7.9                 08/20/2018    Diabetes mellitus without complication (H), Hyperglycemia, Mild intermittent asthma without complication, LFT elevation       senna-docusate 8.6-50 MG tablet    SENOKOT-S/PERICOLACE    40 tablet    Take 1-2 tablets by mouth 2 times daily as needed for constipation    Constipation due to opioid therapy       sitagliptin 100 MG tablet    JANUVIA    90 tablet    Take 1 tablet (100 mg) by mouth daily    Diabetes mellitus without complication (H), Hyperlipidemia with target LDL less than 100, HTN, goal below 140/90, Sinus tachycardia       XALATAN 0.005 % ophthalmic solution   Generic drug:  latanoprost     2.5 mL    Place 1 drop Into the left eye At Bedtime

## 2018-11-29 NOTE — PROGRESS NOTES
Dr Torres's note      Patient's instructions / PLAN:                                                        Plan:  1. Diabetes meds options. Please call back after you discuss with the insurance   A. Discontinue Januvia and startt Trulicity injections once a week OR Victoza injections daily  B. Continue Januvia and add Invokana OR Jardiance   C. Insulin   2. Liver ultrasound - To schedule this test you may call Scheduling center at [phone]    3. Labs today   4. Please make a lab appointment for non fasting labs in 3 months   5. Please make an appointment few days after the labs to discuss about the results.   6. Referral to Liver specialist      ASSESSMENT & PLAN:                                                      (R94.5) LFT elevation  (primary encounter diagnosis)  Comment: not clear the etiol. We will do labs, US   Plan: US Abdomen Limited, GASTROENTEROLOGY ADULT REF         CONSULT ONLY, Hepatitis B Surface Antibody,         Hepatitis B surface antigen, Ferritin, Iron and        iron binding capacity, Protein electrophoresis,        Anti Nuclear Luis IgG by IFA with Reflex, F         Actin EIA with reflex, Tissue transglutaminase         luis IgA and IgG, Comprehensive metabolic panel,        Hemoglobin A1c, STATIN NOT PRESCRIBED         (INTENTIONAL)            (E11.65) DM 2 with hyperglycemia (H)  Comment: Not controlled on max dose of 3 oral meds   Plan: US Abdomen Limited, GASTROENTEROLOGY ADULT REF         CONSULT ONLY, Hepatitis B Surface Antibody,         Hepatitis B surface antigen, Ferritin, Iron and        iron binding capacity, Protein electrophoresis,        Anti Nuclear Luis IgG by IFA with Reflex, F         Actin EIA with reflex, Tissue transglutaminase         luis IgA and IgG, Comprehensive metabolic panel,        Hemoglobin A1c, STATIN NOT PRESCRIBED         (INTENTIONAL)        as above     (I10) HTN goal less than 140/90,  Comment: Controlled    Plan: US Abdomen Limited, GASTROENTEROLOGY  ADULT REF         CONSULT ONLY, Hepatitis B Surface Antibody,         Hepatitis B surface antigen, Ferritin, Iron and        iron binding capacity, Protein electrophoresis,        Anti Nuclear Luis IgG by IFA with Reflex, F         Actin EIA with reflex, Tissue transglutaminase         luis IgA and IgG, Comprehensive metabolic panel,        Hemoglobin A1c            (E66.9) Obesity (BMI 30-39.9)  Comment:   Plan:  we discussed about diet ( reducing calories intake) and increasing the exercise      (E78.5) Hyperlipidemia goal LDL <  100  Comment:   Plan: STATIN NOT PRESCRIBED (INTENTIONAL)               Chief complaint:                                                      Follow up chronic medical problems           SUBJECTIVE:                                                    Gricelda Sabillon is a 68 year old female who presents to clinic today for the following health issues:    LFTs   -- still elevated after stopping the Lipitor  --Denies alcohol      Diabetes Follow-up      Patient is checking blood sugars: 1-2 x a day . 116 to 180    Diabetic concerns: None    She is compliant with her medications.  A1c is getting higher     Symptoms of hypoglycemia (low blood sugar): none     Paresthesias (numbness or burning in feet) or sores: No     Date of last diabetic eye exam: 11/15/18    Diabetes Management Resources    Hyperlipidemia Follow-Up      Rate your low fat/cholesterol diet?: fair    Taking statin?  No--- on hold     Other lipid medications/supplements?:  none    Hypertension Follow-up      Outpatient blood pressures are not being checked.    Low Salt Diet: no added salt    BP Readings from Last 2 Encounters:   11/29/18 170/84   10/24/18 126/70     Hemoglobin A1C (%)   Date Value   11/19/2018 8.0 (H)   08/20/2018 7.9 (H)     LDL Cholesterol Calculated (mg/dL)   Date Value   05/14/2018 75   09/05/2017 68       Amount of exercise or physical activity: 2-3 days/week for an average of 45-60 minutes    Problems  taking medications regularly: No    Medication side effects: none    Diet: diabetic      Review of Systems:                                                      ROS: negative for fever, chills, cough, wheezes, chest pain, shortness of breath, vomiting, abdominal pain, leg swelling       OBJECTIVE:             Physical exam:   Blood pressure 134/72, pulse 79, temperature 98.5  F (36.9  C), temperature source Oral, resp. rate 16, last menstrual period 12/13/2002, SpO2 96 %, not currently breastfeeding.     NAD, appears comfortable  Skin: no rashes   Neck: supple, no JVD,  No thyroidmegaly. Lymph nodes nonpalpable cervical and supraclavicular.  Chest: clear to auscultation bilaterally, good respiratory effort  Heart: S1 S2, RRR, no mgr appreciated  Abdomen: soft, not tender, no hepatosplenomegaly or masses appreciated, no abdominal bruit, present bowel sounds  Extremities: no edema,   Neurologic: A, Ox3, no focal signs appreciated    PMHx: reviewed  Past Medical History:   Diagnosis Date     Allergic rhinitis, cause unspecified      Asthma      Asthma, mild intermittent      Cataract      Cellulitis and abscess of leg, except foot 03/00    Bilateral     Eczema      Heart murmur     aortic area     HTN, goal below 140/90      Hyperlipidemia LDL goal < 100      Hyperplastic colon polyp 2008     Infection     bilateral eye infections     Macular hole of left eye      Obesity (BMI 30-39.9)      Osteoarthritis     knees     Other chronic pain     right knee     Sleep apnea     uses c pap     Type 2 diabetes, HbA1C goal < 8% (H)       PSHx: reviewed  Past Surgical History:   Procedure Laterality Date     ARTHROPLASTY HIP Right 9/25/2017    Procedure: ARTHROPLASTY HIP;  Right total hip arthroplasty  ;  Surgeon: Ayaan Pena MD;  Location:  OR     ARTHROPLASTY KNEE  7/12/2013    Procedure: ARTHROPLASTY KNEE;  right total knee arthroplasty ;  Surgeon: Chaparro Wesley MD;  Location:  OR     ARTHROSCOPY KNEE  Right 1/21/2015    Procedure: ARTHROSCOPY KNEE;  Surgeon: Ayaan Pena MD;  Location: [facility] OR     C INCISION OF HYMEN       CATARACT IOL, RT/LT       COLONOSCOPY  2008     EYE SURGERY      macular hole repaired left eye     HC KNEE SCOPE, DIAGNOSTIC      - both knees     HEAD & NECK SURGERY      wisdom teeth        Meds: reviewed  Current Outpatient Prescriptions   Medication Sig Dispense Refill     albuterol (PROAIR HFA, PROVENTIL HFA, VENTOLIN HFA) 108 (90 BASE) MCG/ACT inhaler Inhale 2 puffs into the lungs every 4 hours as needed for shortness of breath / dyspnea 1 Inhaler 3     aspirin 81 MG EC tablet Take 1 tablet (81 mg) by mouth daily 90 tablet 3     atenolol (TENORMIN) 50 MG tablet Take 1 tablet (50 mg) by mouth daily 90 tablet 1     calcium-vitamin D 500-125 MG-UNIT TABS        cetirizine (ZYRTEC) 10 MG tablet Take 10 mg by mouth every morning.       dorzolamide (TRUSOPT) 2 % ophthalmic solution Place 2 drops into both eyes 2 times daily 1 Bottle      fish oil-omega-3 fatty acids (FISH OIL) 1000 MG capsule Take 1 g by mouth daily Reported on 3/22/2017       glipiZIDE (GLUCOTROL) 5 MG tablet TAKE TWO TABLETS BY MOUTH TWICE DAILY BEFORE MEAL(S) 360 tablet 1     hydrochlorothiazide (HYDRODIURIL) 25 MG tablet TAKE ONE TABLET BY MOUTH ONCE DAILY IN THE MORNING 90 tablet 1     ibuprofen (ADVIL) 200 MG tablet take 4 tablet (200 mg) by oral route every 8 hours as needed with food       latanoprost (XALATAN) 0.005 % ophthalmic solution Place 1 drop Into the left eye At Bedtime 2.5 mL 1     lisinopril (PRINIVIL/ZESTRIL) 40 MG tablet Take 1 tablet (40 mg) by mouth daily 90 tablet 1     metFORMIN (GLUCOPHAGE) 1000 MG tablet TAKE ONE TABLET BY MOUTH TWICE DAILY WITH MEALS 180 tablet 1     MULTIVITAMIN TABS   OR Reported on 3/22/2017       ONETOUCH ULTRA test strip USE ONE STRIP TO CHECK GLUCOSE ONCE DAILY DIAG  CODE  E11.9 100 strip 3     sitagliptin (JANUVIA) 100 MG tablet Take 1 tablet (100 mg) by mouth daily  90 tablet 1     azelastine (ASTELIN) 137 MCG/SPRAY nasal spray [place] 1 spray into both nostrils 2 times daily as needed Reported on 3/22/2017       azelastine (OPTIVAR) 0.05 % SOLN Place 1 drop into both eyes 2 times daily as needed Reported on 3/22/2017       desoximetasone (TOPICORT) 0.25 % cream Apply sparingly to affected area's 60 g 1     order for DME All diabetic testing supplies including test strips, lancets and solution for testing 2 times per day. Patient is using   no Insulin. Last A1c Lab Results       Component                Value               Date                       A1C                      7.9                 08/20/2018 100 each 11     order for DME All diabetic testing supplies including test strips, lancets and solution for testing 3 times per day. Patient is using  no Insulin. A1C      7.6   5/3/2016  A1C      7.4   2/4/2016 3 Month 1     senna-docusate (SENOKOT-S;PERICOLACE) 8.6-50 MG per tablet Take 1-2 tablets by mouth 2 times daily as needed for constipation 40 tablet 0       Soc Hx: reviewed  Fam Hx: reviewed          Raisa Torres MD  Internal Medicine

## 2018-11-30 LAB
ALBUMIN SERPL ELPH-MCNC: 4.5 G/DL (ref 3.7–5.1)
ALPHA1 GLOB SERPL ELPH-MCNC: 0.3 G/DL (ref 0.2–0.4)
ALPHA2 GLOB SERPL ELPH-MCNC: 1 G/DL (ref 0.5–0.9)
ANA SER QL IF: NEGATIVE
B-GLOBULIN SERPL ELPH-MCNC: 1 G/DL (ref 0.6–1)
FERRITIN SERPL-MCNC: 58 NG/ML (ref 8–252)
GAMMA GLOB SERPL ELPH-MCNC: 0.9 G/DL (ref 0.7–1.6)
HBV SURFACE AB SERPL IA-ACNC: 0.13 M[IU]/ML
HBV SURFACE AG SERPL QL IA: NONREACTIVE
IRON SATN MFR SERPL: 32 % (ref 15–46)
IRON SERPL-MCNC: 116 UG/DL (ref 35–180)
M PROTEIN SERPL ELPH-MCNC: 0 G/DL
PROT PATTERN SERPL ELPH-IMP: ABNORMAL
SMA IGG SER-ACNC: 19 UNITS (ref 0–19)
TIBC SERPL-MCNC: 367 UG/DL (ref 240–430)

## 2018-12-02 PROBLEM — E11.65 TYPE 2 DIABETES MELLITUS WITH HYPERGLYCEMIA, WITHOUT LONG-TERM CURRENT USE OF INSULIN (H): Status: ACTIVE | Noted: 2018-12-02

## 2018-12-03 LAB
TTG IGA SER-ACNC: 1 U/ML
TTG IGG SER-ACNC: <1 U/ML

## 2018-12-08 DIAGNOSIS — I10 HTN, GOAL BELOW 140/90: ICD-10-CM

## 2018-12-08 DIAGNOSIS — R00.0 SINUS TACHYCARDIA: ICD-10-CM

## 2018-12-08 DIAGNOSIS — E11.9 DIABETES MELLITUS WITHOUT COMPLICATION (H): Chronic | ICD-10-CM

## 2018-12-08 DIAGNOSIS — E78.5 HYPERLIPIDEMIA WITH TARGET LDL LESS THAN 100: Chronic | ICD-10-CM

## 2018-12-10 NOTE — TELEPHONE ENCOUNTER
"Requested Prescriptions   Pending Prescriptions Disp Refills     JANUVIA 100 MG tablet [Pharmacy Med Name: JANUVIA 100MG       TAB] 90 tablet 1    Last Written Prescription Date:  05/21/2018  Last Fill Quantity: 90,  # refills: 1   Last office visit: 11/29/2018 with prescribing provider:     Future Office Visit:   Next 5 appointments (look out 90 days)    Mar 01, 2019  1:00 PM CST  [facility] Visit with [facility] Davidson MD  [facility] [address]  [facility] [address]  [phone]        Sig: TAKE 1 TABLET BY MOUTH ONCE DAILY    DPP4 Inhibitors Protocol Passed - 12/8/2018  5:30 AM       Passed - Blood pressure less than 140/90 in past 6 months    BP Readings from Last 3 Encounters:   11/29/18 134/72   10/24/18 126/70   09/24/18 121/80                Passed - LDL on file in past 12 months    Recent Labs   Lab Test 05/14/18  0855   LDL 75            Passed - Microalbumin on file in past 12 months    Recent Labs   Lab Test 01/26/18  1414   MICROL 7   UMALCR 8.89            Passed - HgbA1C in past 3 or 6 months    If HgbA1C is 8 or greater, it needs to be on file within the past 3 months.  If less than 8, must be on file within the past 6 months.     Recent Labs   Lab Test 11/19/18  0823   A1C 8.0*            Passed - Patient is age 18 or older       Passed - No active pregnancy on record       Passed - Normal serum creatinine in past 12 months    Recent Labs   Lab Test 11/19/18  0823   CR 0.78            Passed - No positive pregnancy test in past 12 months       Passed - Recent (6 mo) or future (30 days) visit within the authorizing provider's specialty    Patient had office visit in the last 6 months or has a visit in the next 30 days with authorizing provider.  See \"Patient Info\" tab in inbasket, or \"Choose Columns\" in Meds & Orders section of the refill encounter.            " Nutrition Education/Meals and Snack/Mineral/Vitamin

## 2018-12-12 RX ORDER — SITAGLIPTIN 100 MG/1
TABLET, FILM COATED ORAL
Qty: 90 TABLET | Refills: 1 | Status: SHIPPED | OUTPATIENT
Start: 2018-12-12 | End: 2019-06-24

## 2018-12-14 ENCOUNTER — MYC MEDICAL ADVICE (OUTPATIENT)
Dept: INTERNAL MEDICINE | Facility: CLINIC | Age: 68
End: 2018-12-14

## 2018-12-14 ENCOUNTER — HOSPITAL ENCOUNTER (OUTPATIENT)
Dept: ULTRASOUND IMAGING | Facility: CLINIC | Age: 68
Discharge: HOME | End: 2018-12-14
Attending: INTERNAL MEDICINE | Admitting: INTERNAL MEDICINE
Payer: MEDICARE

## 2018-12-14 DIAGNOSIS — I10 ESSENTIAL HYPERTENSION WITH GOAL BLOOD PRESSURE LESS THAN 140/90: Chronic | ICD-10-CM

## 2018-12-14 DIAGNOSIS — K76.0 FATTY LIVER: Primary | ICD-10-CM

## 2018-12-14 DIAGNOSIS — R79.89 LFT ELEVATION: ICD-10-CM

## 2018-12-14 DIAGNOSIS — E11.65 TYPE 2 DIABETES MELLITUS WITH HYPERGLYCEMIA, WITHOUT LONG-TERM CURRENT USE OF INSULIN (H): ICD-10-CM

## 2018-12-14 DIAGNOSIS — R79.89 LFTS ABNORMAL: ICD-10-CM

## 2018-12-14 PROCEDURE — 76705 ECHO EXAM OF ABDOMEN: CPT

## 2018-12-21 DIAGNOSIS — Z11.59 ENCOUNTER FOR SCREENING FOR OTHER VIRAL DISEASES: ICD-10-CM

## 2018-12-21 DIAGNOSIS — R74.8 ELEVATED LIVER ENZYMES: Primary | ICD-10-CM

## 2018-12-21 DIAGNOSIS — R94.5 ABNORMAL RESULTS OF LIVER FUNCTION STUDIES: ICD-10-CM

## 2019-01-01 LAB — RETINOPATHY: NORMAL

## 2019-01-14 DIAGNOSIS — E11.9 DIABETES MELLITUS WITHOUT COMPLICATION (H): Chronic | ICD-10-CM

## 2019-01-14 DIAGNOSIS — R00.0 SINUS TACHYCARDIA: ICD-10-CM

## 2019-01-14 DIAGNOSIS — E78.5 HYPERLIPIDEMIA WITH TARGET LDL LESS THAN 100: Chronic | ICD-10-CM

## 2019-01-14 DIAGNOSIS — I10 HTN, GOAL BELOW 140/90: ICD-10-CM

## 2019-01-14 NOTE — TELEPHONE ENCOUNTER
Requested Prescriptions   Pending Prescriptions Disp Refills     metFORMIN (GLUCOPHAGE) 1000 MG tablet [Pharmacy Med Name: METFORMIN 1000MG TAB]  Last Written Prescription Date:  5/21/2018  Last Fill Quantity: 180,  # refills: 1   Last office visit: 11/29/2018 with prescribing provider:     Future Office Visit:   Next 5 appointments (look out 90 days)    Mar 01, 2019  1:00 PM CST  Office Visit with Raisa [facility] MD  [facility] [address]  [facility] [address]  [phone]        180 tablet 1     Sig: TAKE 1 TABLET BY MOUTH TWICE DAILY WITH MEALS    Biguanide Agents Passed - 1/14/2019  9:33 AM       Passed - Blood pressure less than 140/90 in past 6 months    BP Readings from Last 3 Encounters:   11/29/18 134/72   10/24/18 126/70   09/24/18 121/80                Passed - Patient has documented LDL within the past 12 mos.    Recent Labs   Lab Test 05/14/18  0855   LDL 75            Passed - Patient has had a Microalbumin in the past 15 mos.    Recent Labs   Lab Test 01/26/18  1414   MICROL 7   UMALCR 8.89            Passed - Patient is age 10 or older       Passed - Patient has documented A1c within the specified period of time.    If HgbA1C is 8 or greater, it needs to be on file within the past 3 months.  If less than 8, must be on file within the past 6 months.     Recent Labs   Lab Test 11/19/18  0823   A1C 8.0*            Passed - Patient's CR is NOT>1.4 OR Patient's EGFR is NOT<45 within past 12 mos.    Recent Labs   Lab Test 11/19/18  0823   GFRESTIMATED 72   GFRESTBLACK 88       Recent Labs   Lab Test 11/19/18  0823   CR 0.78            Passed - Patient does NOT have a diagnosis of CHF.       Passed - Medication is active on med list       Passed - Patient is not pregnant       Passed - Patient has not had a positive pregnancy test within the past 12 mos.        Passed - Recent (6 mo) or future (30 days) visit within the  "authorizing provider's specialty    Patient had office visit in the last 6 months or has a visit in the next 30 days with authorizing provider or within the authorizing provider's specialty.  See \"Patient Info\" tab in inbasket, or \"Choose Columns\" in Meds & Orders section of the refill encounter.            "

## 2019-01-16 NOTE — TELEPHONE ENCOUNTER
Prescription approved per [facility] Refill Protocol. TOAN Ferguson R.N.

## 2019-01-17 DIAGNOSIS — R94.5 ABNORMAL RESULTS OF LIVER FUNCTION STUDIES: ICD-10-CM

## 2019-01-17 DIAGNOSIS — Z11.59 ENCOUNTER FOR SCREENING FOR OTHER VIRAL DISEASES: ICD-10-CM

## 2019-01-17 DIAGNOSIS — R74.8 ELEVATED LIVER ENZYMES: ICD-10-CM

## 2019-01-17 LAB
ERYTHROCYTE [DISTWIDTH] IN BLOOD BY AUTOMATED COUNT: 13.7 % (ref 10–15)
HCT VFR BLD AUTO: 39.7 % (ref 35–47)
HGB BLD-MCNC: 12.9 G/DL (ref 11.7–15.7)
INR PPP: 1.02 (ref 0.86–1.14)
MCH RBC QN AUTO: 27.3 PG (ref 26.5–33)
MCHC RBC AUTO-ENTMCNC: 32.5 G/DL (ref 31.5–36.5)
MCV RBC AUTO: 84 FL (ref 78–100)
PLATELET # BLD AUTO: 368 10E9/L (ref 150–450)
RBC # BLD AUTO: 4.73 10E12/L (ref 3.8–5.2)
WBC # BLD AUTO: 5.7 10E9/L (ref 4–11)

## 2019-01-17 PROCEDURE — 36415 COLL VENOUS BLD VENIPUNCTURE: CPT | Performed by: INTERNAL MEDICINE

## 2019-01-17 PROCEDURE — 80076 HEPATIC FUNCTION PANEL: CPT | Performed by: INTERNAL MEDICINE

## 2019-01-17 PROCEDURE — 86803 HEPATITIS C AB TEST: CPT | Performed by: INTERNAL MEDICINE

## 2019-01-17 PROCEDURE — 85027 COMPLETE CBC AUTOMATED: CPT | Performed by: INTERNAL MEDICINE

## 2019-01-17 PROCEDURE — 85610 PROTHROMBIN TIME: CPT | Performed by: INTERNAL MEDICINE

## 2019-01-17 PROCEDURE — 80048 BASIC METABOLIC PNL TOTAL CA: CPT | Performed by: INTERNAL MEDICINE

## 2019-01-17 PROCEDURE — 86704 HEP B CORE ANTIBODY TOTAL: CPT | Performed by: INTERNAL MEDICINE

## 2019-01-17 NOTE — PROGRESS NOTES
REASON FOR CONSULTATION: elevated liver tests  REFERRING PROVIDER: Dr. Raisa Davidson    A/P  Gricelda Sabillon is a 68 year old female with elevated liver tests. Repeat LFTs from today pending.    We discussed possible sources for this change. If they are still elevated, she will need liver biopsy. Highest on the list would be AIH. She may also need MRCP, but will await repeat labs to decide. I do not think this is at all related to Lipitor.     This was a 30 minute visit, over 50% counseling and coordination of care.     Tonya Llamas MD  Hepatology/Liver Transplant  Medical Director, Liver Transplantation  [facility]  Gricelda Sabillon is a 68 year old female referred for elevated liver tests. Here with her .  AST and ALT were previously normal, and then in August 2018 ALT went up to 194, repeat 232. AST was 38, then 115, repeat 115. AP was 101, then 287, 267. Bili normal    Had been on lipitor for years and it was stopped in August of Sept and still off. No new meds.      Has had a few episodes where she has felt dizzy, nauseated and cold. This has occurred a couple of times. Otherwise she is in her usual state of health.    Recent Labs   Lab Test 11/19/18  0823   PROTTOTAL 7.6   ALBUMIN 3.5   BILITOTAL 0.6   ALKPHOS 267*   *   *     Recent Labs   Lab Test 01/17/19  1000   WBC 5.7   RBC 4.73   HGB 12.9   HCT 39.7   MCV 84   MCH 27.3   MCHC 32.5   RDW 13.7        Factin nl  Ferritin 58  Iron sat 32  TTG nl  TSH nl    HCV neg  HBV neg sAg  SPEP nl  KERON neg  ASMA no record  AMA no record    US 12/14/18  1. Mild gallbladder sludge. Mild gallbladder distention.  Borderline-minimal wall thickening. Mild intrahepatic duct and  borderline CBD dilatation. These findings could relate to mild  cholecystitis and if indicated clinically, a hepatobiliary scan could  be performed.  2. Fatty liver.    Risk factors for fatty liver disease: obesity, DM,  HTN  ETOH use: none    Past Medical History:   Diagnosis Date     Allergic rhinitis, cause unspecified      Asthma      Asthma, mild intermittent      Cataract      Cellulitis and abscess of leg, except foot     Bilateral     Eczema      Heart murmur     aortic area     HTN, goal below 140/90      Hyperlipidemia LDL goal < 100      Hyperplastic colon polyp      Infection     bilateral eye infections     Macular hole of left eye      Obesity (BMI 30-39.9)      Osteoarthritis     knees     Other chronic pain     right knee     Sleep apnea     uses c pap     Type 2 diabetes, HbA1C goal < 8% (H)        Social History     Socioeconomic History     Marital status:      Spouse name: Allen     Number of children: 3     Years of education: 15     Highest education level: Not on file   Social Needs     Financial resource strain: Not on file     Food insecurity - worry: Not on file     Food insecurity - inability: Not on file     Transportation needs - medical: Not on file     Transportation needs - non-medical: Not on file   Occupational History     Not on file   Tobacco Use     Smoking status: Never Smoker     Smokeless tobacco: Never Used   Substance and Sexual Activity     Alcohol use: No     Alcohol/week: 0.0 oz     Drug use: No     Sexual activity: No     Partners: Male   Other Topics Concern     Parent/sibling w/ CABG, MI or angioplasty before 65F 55M? Not Asked   Social History Narrative     Not on file       Family History   Problem Relation Age of Onset     Hypertension Mother         diabetes,hypoythryoidism, stroke     Diabetes Mother      Heart Disease Father         , cancer lip     Heart Disease Paternal Grandfather              Cancer Paternal Grandmother              Cerebrovascular Disease Maternal Grandfather              Cerebrovascular Disease Maternal Grandmother         , diabetes     Connective Tissue Disorder Sister         fibromyalgia      "Diabetes Sister      Hypertension Brother      Cancer Paternal Aunt         ovarian       ROS 10 point ROS neg other than the symptoms noted above in the HPI.    LMP 12/13/2002   /77   Pulse 86   Ht 1.676 m (5' 6\")   Wt 96.4 kg (212 lb 9.6 oz)   LMP 12/13/2002   SpO2 96%   BMI 34.31 kg/m    Constitutional: alert and no distress.   Neck: Neck supple. No adenopathy. Thyroid symmetric, normal size  HEENT:Normocephalic. No masses, lesions, tenderness or abnormalities. No temporal muscle wasting ENT exam normal, no neck nodes or sinus tenderness. No oral lesions  Cardiovascular: negative, No lifts, heaves, or thrills. RRR. No murmurs, clicks gallops or rub  Respiratory: negative, Good diaphragmatic excursion. Lungs clear. No wheezes or rales  Gastrointestinal: Abdomen soft, non-tender. BS normal.  No masses. Liver is enlarged.  Skin: no suspicious lesions or rashes. No spider angiomata or palmar erythema. Nails normal.  Neurologic: Alert and oriented x3. No asterixis or tremor. Gait normal.   Psychiatric:  Appropriate, well groomed.  Hematologic/Lymphatic/Immunologic: Normal cervical and supraclavicular  lymph nodes      "

## 2019-01-18 ENCOUNTER — TELEPHONE (OUTPATIENT)
Dept: GASTROENTEROLOGY | Facility: CLINIC | Age: 69
End: 2019-01-18

## 2019-01-18 ENCOUNTER — OFFICE VISIT (OUTPATIENT)
Dept: GASTROENTEROLOGY | Facility: CLINIC | Age: 69
End: 2019-01-18
Attending: INTERNAL MEDICINE
Payer: COMMERCIAL

## 2019-01-18 VITALS
DIASTOLIC BLOOD PRESSURE: 77 MMHG | HEIGHT: 66 IN | HEART RATE: 86 BPM | OXYGEN SATURATION: 96 % | WEIGHT: 212.6 LBS | SYSTOLIC BLOOD PRESSURE: 148 MMHG | BODY MASS INDEX: 34.17 KG/M2

## 2019-01-18 DIAGNOSIS — R74.8 ELEVATED LIVER ENZYMES: Primary | ICD-10-CM

## 2019-01-18 DIAGNOSIS — R79.89 ELEVATED LIVER FUNCTION TESTS: Primary | ICD-10-CM

## 2019-01-18 DIAGNOSIS — E83.52 HYPERCALCEMIA: ICD-10-CM

## 2019-01-18 LAB
ALBUMIN SERPL-MCNC: 3.6 G/DL (ref 3.4–5)
ALP SERPL-CCNC: 288 U/L (ref 40–150)
ALT SERPL W P-5'-P-CCNC: 247 U/L (ref 0–50)
ANION GAP SERPL CALCULATED.3IONS-SCNC: 7 MMOL/L (ref 3–14)
AST SERPL W P-5'-P-CCNC: 129 U/L (ref 0–45)
BILIRUB DIRECT SERPL-MCNC: 0.8 MG/DL (ref 0–0.2)
BILIRUB SERPL-MCNC: 1.2 MG/DL (ref 0.2–1.3)
BUN SERPL-MCNC: 20 MG/DL (ref 7–30)
CALCIUM SERPL-MCNC: 10.3 MG/DL (ref 8.5–10.1)
CHLORIDE SERPL-SCNC: 101 MMOL/L (ref 94–109)
CO2 SERPL-SCNC: 26 MMOL/L (ref 20–32)
CREAT SERPL-MCNC: 0.79 MG/DL (ref 0.52–1.04)
GFR SERPL CREATININE-BSD FRML MDRD: 76 ML/MIN/{1.73_M2}
GLUCOSE SERPL-MCNC: 187 MG/DL (ref 70–99)
HBV CORE AB SERPL QL IA: NONREACTIVE
HCV AB SERPL QL IA: NONREACTIVE
POTASSIUM SERPL-SCNC: 3.8 MMOL/L (ref 3.4–5.3)
PROT SERPL-MCNC: 7.7 G/DL (ref 6.8–8.8)
SODIUM SERPL-SCNC: 134 MMOL/L (ref 133–144)

## 2019-01-18 PROCEDURE — G0463 HOSPITAL OUTPT CLINIC VISIT: HCPCS | Mod: ZF

## 2019-01-18 ASSESSMENT — MIFFLIN-ST. JEOR: SCORE: 1501.1

## 2019-01-18 ASSESSMENT — PAIN SCALES - GENERAL: PAINLEVEL: NO PAIN (0)

## 2019-01-18 NOTE — LETTER
1/18/2019       RE: Gricelda Sabillon  [address]  [place] [place] [address]     Dear Colleague,    Thank you for referring your patient, Gricelda Sabillon, to the [facility] HEPATOLOGY at [facility]. Please see a copy of my visit note below.    REASON FOR CONSULTATION: elevated liver tests  REFERRING PROVIDER: Dr. Raisa Davidson    A/P  Gricelda Sabillon is a 68 year old female with elevated liver tests. Repeat LFTs from today pending.    We discussed possible sources for this change. If they are still elevated, she will need liver biopsy. Highest on the list would be AIH. She may also need MRCP, but will await repeat labs to decide. I do not think this is at all related to Lipitor.     This was a 30 minute visit, over 50% counseling and coordination of care.     Tonya Llamas MD  Hepatology/Liver Transplant  Medical Director, Liver Transplantation  [facility]  Subjective  Gricelda Sabillon is a 68 year old female referred for elevated liver tests. Here with her .  AST and ALT were previously normal, and then in August 2018 ALT went up to 194, repeat 232. AST was 38, then 115, repeat 115. AP was 101, then 287, 267. Bili normal    Had been on lipitor for years and it was stopped in August of Sept and still off. No new meds.      Has had a few episodes where she has felt dizzy, nauseated and cold. This has occurred a couple of times. Otherwise she is in her usual state of health.    Recent Labs   Lab Test 11/19/18  0823   PROTTOTAL 7.6   ALBUMIN 3.5   BILITOTAL 0.6   ALKPHOS 267*   *   *     Recent Labs   Lab Test 01/17/19  1000   WBC 5.7   RBC 4.73   HGB 12.9   HCT 39.7   MCV 84   MCH 27.3   MCHC 32.5   RDW 13.7        Factin nl  Ferritin 58  Iron sat 32  TTG nl  TSH nl    HCV neg  HBV neg sAg  SPEP nl  KERON neg  ASMA no record  AMA no record    US 12/14/18  1. Mild gallbladder sludge. Mild gallbladder  distention.  Borderline-minimal wall thickening. Mild intrahepatic duct and  borderline CBD dilatation. These findings could relate to mild  cholecystitis and if indicated clinically, a hepatobiliary scan could  be performed.  2. Fatty liver.    Risk factors for fatty liver disease: obesity, DM, HTN  ETOH use: none    Past Medical History:   Diagnosis Date     Allergic rhinitis, cause unspecified      Asthma      Asthma, mild intermittent      Cataract      Cellulitis and abscess of leg, except foot     Bilateral     Eczema      Heart murmur     aortic area     HTN, goal below 140/90      Hyperlipidemia LDL goal < 100      Hyperplastic colon polyp      Infection     bilateral eye infections     Macular hole of left eye      Obesity (BMI 30-39.9)      Osteoarthritis     knees     Other chronic pain     right knee     Sleep apnea     uses c pap     Type 2 diabetes, HbA1C goal < 8% (H)        Social History     Socioeconomic History     Marital status:      Spouse name: Allen     Number of children: 3     Years of education: 15     Highest education level: Not on file   Social Needs     Financial resource strain: Not on file     Food insecurity - worry: Not on file     Food insecurity - inability: Not on file     Transportation needs - medical: Not on file     Transportation needs - non-medical: Not on file   Occupational History     Not on file   Tobacco Use     Smoking status: Never Smoker     Smokeless tobacco: Never Used   Substance and Sexual Activity     Alcohol use: No     Alcohol/week: 0.0 oz     Drug use: No     Sexual activity: No     Partners: Male   Other Topics Concern     Parent/sibling w/ CABG, MI or angioplasty before 65F 55M? Not Asked   Social History Narrative     Not on file       Family History   Problem Relation Age of Onset     Hypertension Mother         diabetes,hypoythryoidism, stroke     Diabetes Mother      Heart Disease Father         , cancer lip     Heart Disease  "Paternal Grandfather              Cancer Paternal Grandmother              Cerebrovascular Disease Maternal Grandfather              Cerebrovascular Disease Maternal Grandmother         , diabetes     Connective Tissue Disorder Sister         fibromyalgia     Diabetes Sister      Hypertension Brother      Cancer Paternal Aunt         ovarian       ROS 10 point ROS neg other than the symptoms noted above in the HPI.    LMP 2002   /77   Pulse 86   Ht 1.676 m (5' 6\")   Wt 96.4 kg (212 lb 9.6 oz)   LMP 2002   SpO2 96%   BMI 34.31 kg/m     Constitutional: alert and no distress.   Neck: Neck supple. No adenopathy. Thyroid symmetric, normal size  HEENT:Normocephalic. No masses, lesions, tenderness or abnormalities. No temporal muscle wasting ENT exam normal, no neck nodes or sinus tenderness. No oral lesions  Cardiovascular: negative, No lifts, heaves, or thrills. RRR. No murmurs, clicks gallops or rub  Respiratory: negative, Good diaphragmatic excursion. Lungs clear. No wheezes or rales  Gastrointestinal: Abdomen soft, non-tender. BS normal.  No masses. Liver is enlarged.  Skin: no suspicious lesions or rashes. No spider angiomata or palmar erythema. Nails normal.  Neurologic: Alert and oriented x3. No asterixis or tremor. Gait normal.   Psychiatric:  Appropriate, well groomed.  Hematologic/Lymphatic/Immunologic: Normal cervical and supraclavicular  lymph nodes        Again, thank you for allowing me to participate in the care of your patient.      Sincerely,    Tonya Llamas MD      "

## 2019-01-18 NOTE — NURSING NOTE
"Chief Complaint   Patient presents with     Consult     Abnormal liver function studies     /77   Pulse 86   Ht 1.676 m (5' 6\")   Wt 96.4 kg (212 lb 9.6 oz)   LMP 12/13/2002   SpO2 96%   BMI 34.31 kg/m    Evon Mims CMA    "

## 2019-01-18 NOTE — TELEPHONE ENCOUNTER
Writer spoke to pt and instructed her of Dr. Llamas's orders as follows: Thanks for your patience today.   Your liver tests are still quite elevated.   Instead of going straight to a biopsy, I would like to get an MRI of your bile ducts (test is called MRCP).   I also note that your calcium is elevated. This needs a couple of blood tests as well.   I would like to do the following   1. Labs: alpha-1-AT phenotype, ioinzed Ca, PTH   2. MRCP   Pt plans to schedule test thru [facility]. Pt will schedule them herself and let me know appts.

## 2019-01-21 ENCOUNTER — HOSPITAL ENCOUNTER (OUTPATIENT)
Dept: MRI IMAGING | Facility: CLINIC | Age: 69
Discharge: HOME | End: 2019-01-21
Payer: COMMERCIAL

## 2019-01-21 DIAGNOSIS — E83.52 HYPERCALCEMIA: ICD-10-CM

## 2019-01-21 DIAGNOSIS — R74.8 ELEVATED LIVER ENZYMES: ICD-10-CM

## 2019-01-21 LAB
CA-I SERPL ISE-MCNC: 5.4 MG/DL (ref 4.4–5.2)
PTH-INTACT SERPL-MCNC: 25 PG/ML (ref 18–80)

## 2019-01-21 PROCEDURE — 82330 ASSAY OF CALCIUM: CPT | Performed by: INTERNAL MEDICINE

## 2019-01-21 PROCEDURE — 25500064 ZZH RX 255 OP 636: Performed by: RADIOLOGY

## 2019-01-21 PROCEDURE — 99000 SPECIMEN HANDLING OFFICE-LAB: CPT | Performed by: INTERNAL MEDICINE

## 2019-01-21 PROCEDURE — A9585 GADOBUTROL INJECTION: HCPCS | Performed by: RADIOLOGY

## 2019-01-21 PROCEDURE — 82103 ALPHA-1-ANTITRYPSIN TOTAL: CPT | Mod: 90 | Performed by: INTERNAL MEDICINE

## 2019-01-21 PROCEDURE — 83970 ASSAY OF PARATHORMONE: CPT | Performed by: INTERNAL MEDICINE

## 2019-01-21 PROCEDURE — 74183 MRI ABD W/O CNTR FLWD CNTR: CPT

## 2019-01-21 PROCEDURE — 81332 SERPINA1 GENE: CPT | Performed by: INTERNAL MEDICINE

## 2019-01-21 PROCEDURE — 36415 COLL VENOUS BLD VENIPUNCTURE: CPT | Performed by: INTERNAL MEDICINE

## 2019-01-21 RX ORDER — GADOBUTROL 604.72 MG/ML
10 INJECTION INTRAVENOUS ONCE
Status: COMPLETED | OUTPATIENT
Start: 2019-01-21 | End: 2019-01-21

## 2019-01-21 RX ADMIN — GADOBUTROL 10 ML: 604.72 INJECTION INTRAVENOUS at 09:37

## 2019-01-24 ENCOUNTER — TELEPHONE (OUTPATIENT)
Dept: GASTROENTEROLOGY | Facility: CLINIC | Age: 69
End: 2019-01-24

## 2019-01-24 ENCOUNTER — CARE COORDINATION (OUTPATIENT)
Dept: GASTROENTEROLOGY | Facility: CLINIC | Age: 69
End: 2019-01-24

## 2019-01-24 DIAGNOSIS — K80.50 BILE DUCT STONE: Primary | ICD-10-CM

## 2019-01-24 NOTE — PROGRESS NOTES
Received message from Dr. Gonzalez to organize the following:   Please assist in scheduling:     Procedure/Imaging/Clinic: ERCP   Physician: Dr Gonzalez   Timin/28 or    Dx: Bile duct stone     Called and spoke with patient regarding ERCP-     Can expect a call for date and time for procedure.   Will need a , someone to stay with them for 24 hours and stay in town for 24 hours (within 45 min of Hospital) post procedure, rational explained.   Will get pre-op physical done locally and will fax a copy to us along with bringing a hard copy with them. Fax number given. [phone]    Blood thinner - no    ASA - yes, will talk to provider who manages   Diabetic - yes, will talk to provider who manages   NSAIDS: no    A pre-op nurse will call 1-2 days prior to the procedure.   Is advised to be NPO/solid food at midnight before the procedure in the event the procedure time is moved up. Ok to drink clear liquids (Water, Apple Juice or Gatorade) up to 6 hours prior to procedure.     Post Procedure: start with clear liquids and then advance as tolerated.     ARI[address] Dr[address] Dr. Ramirez, & Dr. Gonzalez  [facility]  [phone]

## 2019-01-24 NOTE — TELEPHONE ENCOUNTER
Guru Andie Gonzalez MD Martinez, Danielle   Cc: Samara Scott             Please assist in scheduling:     Procedure/Imaging/Clinic: ERCP   Physician: Me   Timin/28   Dx: Bile duct stone         Comments:   Can use Dr Llamas note for H aand P            Spoke to patient in regards to message received above. Informed patient that she is scheduled for 19 with Dr. Gómez. Informed patient she does not need a pre-op physical per Dr. Gómez. Advised patient she does need a  and someone to monitor her for 24 hours after the procedure. Confirmed location of procedure with patient. [facility]    HK 19 104pm

## 2019-01-25 ENCOUNTER — TELEPHONE (OUTPATIENT)
Dept: GASTROENTEROLOGY | Facility: CLINIC | Age: 69
End: 2019-01-25

## 2019-01-25 NOTE — TELEPHONE ENCOUNTER
Patient called stating her scheduled procedure on 1/28/19 with Dr. Gómez no longer worked for her scheduled and she needs to reschedule. Informed patient we have her rescheduled to 2/6/19. Informed patient she will need a  and someone to monitor her for 24 hours after the procedure. Advised patient all new scheduling details will be sent to her MyChart. Direct line given in case there were any further questions.     1/25/19 1019am

## 2019-01-25 NOTE — OR NURSING
Spoke with Gricelda and she stated that she is unable to have her surgery on Monday and needs to reschedule. She stated she has been unable to reach someone in the scheduling dept. Informed patient that I would contact the clinic and have someone reach out to her to reschedule and we would be in contact with her to review pre-op information once her surgery is rescheduled. This RN did inform her that she would need a pre-op H&P once her surgery was rescheduled. Pt expressed understanding and was appreciative.

## 2019-01-27 LAB
A1AT PHENOTYP SERPL-IMP: NORMAL
A1AT SERPL-MCNC: 115 MG/DL (ref 90–200)
A1AT SS SERPL-MCNC: NEGATIVE G/L
A1AT SZ SERPL-MCNC: NORMAL G/L
A1AT ZZ SERPL-MCNC: NEGATIVE G/L
SPECIMEN SOURCE: NORMAL

## 2019-02-04 ENCOUNTER — OFFICE VISIT (OUTPATIENT)
Dept: INTERNAL MEDICINE | Facility: CLINIC | Age: 69
End: 2019-02-04
Payer: COMMERCIAL

## 2019-02-04 VITALS
TEMPERATURE: 97.7 F | DIASTOLIC BLOOD PRESSURE: 66 MMHG | OXYGEN SATURATION: 97 % | BODY MASS INDEX: 34.57 KG/M2 | SYSTOLIC BLOOD PRESSURE: 131 MMHG | HEIGHT: 66 IN | WEIGHT: 215.1 LBS | HEART RATE: 88 BPM | RESPIRATION RATE: 14 BRPM

## 2019-02-04 DIAGNOSIS — E11.65 TYPE 2 DIABETES MELLITUS WITH HYPERGLYCEMIA, WITHOUT LONG-TERM CURRENT USE OF INSULIN (H): ICD-10-CM

## 2019-02-04 DIAGNOSIS — K80.50 CALCULUS OF BILE DUCT WITHOUT CHOLECYSTITIS AND WITHOUT OBSTRUCTION: ICD-10-CM

## 2019-02-04 DIAGNOSIS — Z01.818 PREOP GENERAL PHYSICAL EXAM: Primary | ICD-10-CM

## 2019-02-04 DIAGNOSIS — I10 ESSENTIAL HYPERTENSION WITH GOAL BLOOD PRESSURE LESS THAN 140/90: Chronic | ICD-10-CM

## 2019-02-04 DIAGNOSIS — G47.33 OSA (OBSTRUCTIVE SLEEP APNEA): ICD-10-CM

## 2019-02-04 PROCEDURE — 99214 OFFICE O/P EST MOD 30 MIN: CPT | Performed by: INTERNAL MEDICINE

## 2019-02-04 PROCEDURE — 93000 ELECTROCARDIOGRAM COMPLETE: CPT | Performed by: INTERNAL MEDICINE

## 2019-02-04 ASSESSMENT — MIFFLIN-ST. JEOR: SCORE: 1512.44

## 2019-02-04 NOTE — PATIENT INSTRUCTIONS
Plan:  In the morning of the surgery day you take with a sip of water just these meds: Atenolol. The rest of the meds you resume after procedure.

## 2019-02-04 NOTE — PROGRESS NOTES
[place] [address]  [facility] [address]  [phone]  Dept: [phone]    PRE-OP EVALUATION:  Today's date: 2019    Gricelda Sabillon (: 1950) presents for pre-operative evaluation assessment as requested by Dr. Lisa Gómez.  She requires evaluation and anesthesia risk assessment prior to undergoing surgery/procedure for treatment of Endoscopy .    Fax number for surgical facility:   Primary Physician: Raisa Davidson  Type of Anesthesia Anticipated: General    Patient has a Health Care Directive or Living Will:  NO    Preop Questions 2019   Who is doing your surgery? gur   What are you having done? endoscopy of liver pancreas gallblader   Date of Surgery/Procedure: -   Facility or Hospital where procedure/surgery will be performed: [facility]   1.  Do you have a history of Heart attack, stroke, stent, coronary bypass surgery, or other heart surgery? No   2.  Do you ever have any pain or discomfort in your chest? No   3.  Do you have a history of  Heart Failure? No   4.   Are you troubled by shortness of breath when:  walking on a level surface, or up a slight hill, or at night? No   5.  Do you currently have a cold, bronchitis or other respiratory infection? Nasal congestion   6.  Do you have a cough, shortness of breath, or wheezing? No   7.  Do you sometimes get pains in the calves of your legs when you walk? No   8. Do you or anyone in your family have previous history of blood clots? No   9.  Do you or does anyone in your family have a serious bleeding problem such as prolonged bleeding following surgeries or cuts? No   10. Have you ever had problems with anemia or been told to take iron pills? YES in the past. The recent Hgb is normal   11. Have you had any abnormal blood loss such as black, tarry or bloody stools, or abnormal vaginal bleeding? No   12. Have you ever had a blood transfusion? No   13. Have you or  any of your relatives ever had problems with anesthesia? YES - hard to wake up after the anesthesia    14. Do you have sleep apnea, excessive snoring or daytime drowsiness? YES - she wears the CPAP   15. Do you have any prosthetic heart valves? No   16. Do you have prosthetic joints? YES - R knee and R hip   17. Is there any chance that you may be pregnant? No       CC:  Preop for multiple medical problems.    HPI:    The patient is scheduled for endoscopy procedure with Dr. Lisa Gómez.* on  Feb 6, 2019  During the work up for the elevated LFTs she was found with biliary stones and she was advised for the ERCP.  No other acute complaints.    Assessment:  1. V72.83H Preop general physical exam _ I do not see any major contraindications for the patient to go through the scheduled surgery.    The proposed surgical procedure is considered   LOW  surgery risk.    For above listed surgery and anesthesia, Patient is at MODERATE, risk for surgery/procedure and perioperative/procedure complications.    Cardiovascular risk  Assessment -- low risk   --  No further cardiac work up is needed before this surgery.        ECG:    sinus rhythm, no changes suggestive for ischemia    Pulmonary Risk Assessment -- low risk   --  The patient doesn't have chronic lung disease nor acute respiratory problems       Obstructive Sleep Apnea (or suspected sleep apnea) -- she wears the CPAP    Anemia Assessment :  -- no anemia - patient doesn't need further anemia work up    Blood Sugar Assessment  -- patient has controlled DM,     Anticoagulation assessment  --  patient takes ASA for C-vs prevention. She stopped it for the procedure       (E11.65) DM 2 with hyperglycemia (H)  Comment:   Plan: Continue same meds, same doses for now     (G47.33) CHERRY (obstructive sleep apnea)  Comment:   Plan:     (I10) HTN goal less than 140/90,  Comment: Controlled    Plan: Continue same meds, same doses for now      .(K80.50) Calculus of bile duct  "without cholecystitis and without obstruction  Comment:   Plan: procedure, as above     Plan:  In the morning of the surgery day you take with a sip of water just these meds: Atenolol. The rest of the meds you resume after procedure.      Physical exam:    Blood pressure 131/66, pulse 88, temperature 97.7  F (36.5  C), temperature source Oral, resp. rate 14, height 1.676 m (5' 6\"), weight 97.6 kg (215 lb 1.6 oz), last menstrual period 12/13/2002, SpO2 97 %, not currently breastfeeding.   NAD, appears comfortable  Skin clear, no rashes  HEENT: PERRLA, EOMI, anicteric sclera, pink conjunctiva, external ears appear normal, bilateral tympanic membranes clinically normal, oropharynx normal color.  Neck: supple, no JVD,  no thyroidmegaly  Lymph nodes non palpable in the cervical, supraclavicular   Chest: clear to auscultation with good respiratory effort  Cardiac: S1S2, RRR, no mgr appreciated  Abdomen: soft, not tender, not distended, audible bowel sound, no hepatosplenomegaly, no palpable masses, no abdominal bruits  Extremities: no cyanosis, clubbing or edema.   Neuro: A, Ox3, no focal signs.        ROS:   as above     Patient Active Problem List   Diagnosis     Allergic rhinitis     Hyperlipidemia with target LDL less than 100     Asthma, mild intermittent     Cataract     Macular hole of left eye     Advanced directives, counseling/discussion     Obesity (BMI 30-39.9)     Heart murmur     Sleep apnea     Total knee replacement status     Chronic knee pain, right     Hip pain, right     HTN goal less than 140/90,     CHERRY (obstructive sleep apnea)     Hip osteoarthritis     Status post total replacement of right hip     Gait disturbance     DM 2 with hyperglycemia (H)        Past Medical History:   Diagnosis Date     Allergic rhinitis, cause unspecified      Asthma      Asthma, mild intermittent      Cataract      Cellulitis and abscess of leg, except foot 03/00    Bilateral     Eczema      Heart murmur     aortic area "     HTN, goal below 140/90      Hyperlipidemia LDL goal < 100      Hyperplastic colon polyp      Infection     bilateral eye infections     Macular hole of left eye      Obesity (BMI 30-39.9)      Osteoarthritis     knees     Other chronic pain     right knee     Sleep apnea     uses c pap     Type 2 diabetes, HbA1C goal < 8% (H)       Past Surgical History:   Procedure Laterality Date     ARTHROPLASTY HIP Right 2017    Procedure: ARTHROPLASTY HIP;  Right total hip arthroplasty  ;  Surgeon: Ayaan Pena MD;  Location: [facility]     ARTHROPLASTY KNEE  2013    Procedure: ARTHROPLASTY KNEE;  right total knee arthroplasty ;  Surgeon: Chaparro Wesley MD;  Location: [facility] OR     ARTHROSCOPY KNEE Right 2015    Procedure: ARTHROSCOPY KNEE;  Surgeon: Ayaan Pena MD;  Location: [facility] OR     C INCISION OF HYMEN       CATARACT IOL, RT/LT       COLONOSCOPY       EYE SURGERY      macular hole repaired left eye     HC KNEE SCOPE, DIAGNOSTIC      - both knees     HEAD & NECK SURGERY      wisdom teeth        PSHx: Complications with prior surgeries or anesthesia - as above     Soc Hx: No daily alcohol, no smoking     Family History   Problem Relation Age of Onset     Hypertension Mother         diabetes,hypoythryoidism, stroke     Diabetes Mother      Heart Disease Father         , cancer lip     Heart Disease Paternal Grandfather              Cancer Paternal Grandmother              Cerebrovascular Disease Maternal Grandfather              Cerebrovascular Disease Maternal Grandmother         , diabetes     Connective Tissue Disorder Sister         fibromyalgia     Diabetes Sister      Hypertension Brother      Cancer Paternal Aunt         ovarian        All: reviewed    Meds: reviewed  Current Outpatient Medications   Medication Sig Dispense Refill     atenolol (TENORMIN) 50 MG tablet Take 1 tablet (50 mg) by mouth daily 90 tablet 1     calcium-vitamin  D 500-125 MG-UNIT TABS        cetirizine (ZYRTEC) 10 MG tablet Take 10 mg by mouth every morning.       dorzolamide (TRUSOPT) 2 % ophthalmic solution Place 2 drops into both eyes 2 times daily 1 Bottle      fish oil-omega-3 fatty acids (FISH OIL) 1000 MG capsule Take 1 g by mouth daily Reported on 3/22/2017       glipiZIDE (GLUCOTROL) 5 MG tablet TAKE TWO TABLETS BY MOUTH TWICE DAILY BEFORE MEAL(S) 360 tablet 1     hydrochlorothiazide (HYDRODIURIL) 25 MG tablet TAKE ONE TABLET BY MOUTH ONCE DAILY IN THE MORNING 90 tablet 1     JANUVIA 100 MG tablet TAKE 1 TABLET BY MOUTH ONCE DAILY 90 tablet 1     latanoprost (XALATAN) 0.005 % ophthalmic solution Place 1 drop Into the left eye At Bedtime 2.5 mL 1     lisinopril (PRINIVIL/ZESTRIL) 40 MG tablet Take 1 tablet (40 mg) by mouth daily 90 tablet 1     metFORMIN (GLUCOPHAGE) 1000 MG tablet TAKE 1 TABLET BY MOUTH TWICE DAILY WITH MEALS 180 tablet 0     MULTIVITAMIN TABS   OR Reported on 3/22/2017       ONETOUCH ULTRA test strip USE ONE STRIP TO CHECK GLUCOSE ONCE DAILY DIAG  CODE  E11.9 100 strip 3     order for DME All diabetic testing supplies including test strips, lancets and solution for testing 2 times per day. Patient is using   no Insulin. Last A1c Lab Results       Component                Value               Date                       A1C                      7.9                 08/20/2018 100 each 11     order for DME All diabetic testing supplies including test strips, lancets and solution for testing 3 times per day. Patient is using  no Insulin. A1C      7.6   5/3/2016  A1C      7.4   2/4/2016 3 Month 1     albuterol (PROAIR HFA, PROVENTIL HFA, VENTOLIN HFA) 108 (90 BASE) MCG/ACT inhaler Inhale 2 puffs into the lungs every 4 hours as needed for shortness of breath / dyspnea (Patient not taking: Reported on 2/4/2019) 1 Inhaler 3     aspirin 81 MG EC tablet Take 1 tablet (81 mg) by mouth daily 90 tablet 3     azelastine (ASTELIN) 137 MCG/SPRAY nasal spray [place] 1  spray into both nostrils 2 times daily as needed Reported on 3/22/2017       azelastine (OPTIVAR) 0.05 % SOLN Place 1 drop into both eyes 2 times daily as needed Reported on 3/22/2017       desoximetasone (TOPICORT) 0.25 % cream Apply sparingly to affected area's (Patient not taking: Reported on 2/4/2019) 60 g 1     ibuprofen (ADVIL) 200 MG tablet take 4 tablet (200 mg) by oral route every 8 hours as needed with food       senna-docusate (SENOKOT-S;PERICOLACE) 8.6-50 MG per tablet Take 1-2 tablets by mouth 2 times daily as needed for constipation (Patient not taking: Reported on 2/4/2019) 40 tablet 0     STATIN NOT PRESCRIBED (INTENTIONAL) Please choose reason not prescribed, below (Patient not taking: Reported on 2/4/2019)              Raisa Torres MD  Internal Medicine       MEDICAL HISTORY:     Patient Active Problem List    Diagnosis Date Noted     Hyperlipidemia with target LDL less than 100      Priority: High     Diagnosis updated by automated process. Provider to review and confirm.       Asthma, mild intermittent      Priority: High     DM 2 with hyperglycemia (H) 12/02/2018     Priority: Medium     Status post total replacement of right hip 02/13/2018     Priority: Medium     Gait disturbance 02/13/2018     Priority: Medium     Hip osteoarthritis 09/25/2017     Priority: Medium     CHERRY (obstructive sleep apnea) 09/15/2017     Priority: Medium     HTN goal less than 140/90, 05/22/2016     Priority: Medium     Chronic knee pain, right 08/30/2015     Priority: Medium     Hip pain, right 08/30/2015     Priority: Medium     Total knee replacement status 07/12/2013     Priority: Medium     Sleep apnea      Priority: Medium     Obesity (BMI 30-39.9)      Priority: Medium     Heart murmur      Priority: Medium     aortic area       Advanced directives, counseling/discussion 01/06/2012     Priority: Medium     Advance Directive Problem List Overview:   Name Relationship Phone    Primary Health Care Agent             Alternative Health Care Agent          Discussed advance care planning with patient; information given to patient to review. 1/6/2012          Allergic rhinitis      Priority: Medium     Problem list name updated by automated process. Provider to review       Cataract      Priority: Low     Utility update for deleted IMO code  [employer] Update utility       Macular hole of left eye      Priority: Low      Past Medical History:   Diagnosis Date     Allergic rhinitis, cause unspecified      Asthma      Asthma, mild intermittent      Cataract      Cellulitis and abscess of leg, except foot 03/00    Bilateral     Eczema      Heart murmur     aortic area     HTN, goal below 140/90      Hyperlipidemia LDL goal < 100      Hyperplastic colon polyp 2008     Infection     bilateral eye infections     Macular hole of left eye      Obesity (BMI 30-39.9)      Osteoarthritis     knees     Other chronic pain     right knee     Sleep apnea     uses c pap     Type 2 diabetes, HbA1C goal < 8% (H)      Past Surgical History:   Procedure Laterality Date     ARTHROPLASTY HIP Right 9/25/2017    Procedure: ARTHROPLASTY HIP;  Right total hip arthroplasty  ;  Surgeon: Ayaan Pena MD;  Location: [facility]     ARTHROPLASTY KNEE  7/12/2013    Procedure: ARTHROPLASTY KNEE;  right total knee arthroplasty ;  Surgeon: Chaparro Wesley MD;  Location: [facility]     ARTHROSCOPY KNEE Right 1/21/2015    Procedure: ARTHROSCOPY KNEE;  Surgeon: Ayaan Pena MD;  Location: [facility] OR     C INCISION OF HYMEN       CATARACT IOL, RT/LT       COLONOSCOPY  2008     EYE SURGERY      macular hole repaired left eye     HC KNEE SCOPE, DIAGNOSTIC      - both knees     HEAD & NECK SURGERY      wisdom teeth     Current Outpatient Medications   Medication Sig Dispense Refill     atenolol (TENORMIN) 50 MG tablet Take 1 tablet (50 mg) by mouth daily 90 tablet 1     calcium-vitamin D 500-125 MG-UNIT TABS        cetirizine (ZYRTEC) 10 MG tablet Take 10 mg by mouth  every morning.       dorzolamide (TRUSOPT) 2 % ophthalmic solution Place 2 drops into both eyes 2 times daily 1 Bottle      fish oil-omega-3 fatty acids (FISH OIL) 1000 MG capsule Take 1 g by mouth daily Reported on 3/22/2017       glipiZIDE (GLUCOTROL) 5 MG tablet TAKE TWO TABLETS BY MOUTH TWICE DAILY BEFORE MEAL(S) 360 tablet 1     hydrochlorothiazide (HYDRODIURIL) 25 MG tablet TAKE ONE TABLET BY MOUTH ONCE DAILY IN THE MORNING 90 tablet 1     JANUVIA 100 MG tablet TAKE 1 TABLET BY MOUTH ONCE DAILY 90 tablet 1     latanoprost (XALATAN) 0.005 % ophthalmic solution Place 1 drop Into the left eye At Bedtime 2.5 mL 1     lisinopril (PRINIVIL/ZESTRIL) 40 MG tablet Take 1 tablet (40 mg) by mouth daily 90 tablet 1     metFORMIN (GLUCOPHAGE) 1000 MG tablet TAKE 1 TABLET BY MOUTH TWICE DAILY WITH MEALS 180 tablet 0     MULTIVITAMIN TABS   OR Reported on 3/22/2017       ONETOUCH ULTRA test strip USE ONE STRIP TO CHECK GLUCOSE ONCE DAILY DIAG  CODE  E11.9 100 strip 3     order for DME All diabetic testing supplies including test strips, lancets and solution for testing 2 times per day. Patient is using   no Insulin. Last A1c Lab Results       Component                Value               Date                       A1C                      7.9                 08/20/2018 100 each 11     order for DME All diabetic testing supplies including test strips, lancets and solution for testing 3 times per day. Patient is using  no Insulin. A1C      7.6   5/3/2016  A1C      7.4   2/4/2016 3 Month 1     albuterol (PROAIR HFA, PROVENTIL HFA, VENTOLIN HFA) 108 (90 BASE) MCG/ACT inhaler Inhale 2 puffs into the lungs every 4 hours as needed for shortness of breath / dyspnea (Patient not taking: Reported on 2/4/2019) 1 Inhaler 3     aspirin 81 MG EC tablet Take 1 tablet (81 mg) by mouth daily 90 tablet 3     azelastine (ASTELIN) 137 MCG/SPRAY nasal spray [place] 1 spray into both nostrils 2 times daily as needed Reported on 3/22/2017        "azelastine (OPTIVAR) 0.05 % SOLN Place 1 drop into both eyes 2 times daily as needed Reported on 3/22/2017       desoximetasone (TOPICORT) 0.25 % cream Apply sparingly to affected area's (Patient not taking: Reported on 2/4/2019) 60 g 1     ibuprofen (ADVIL) 200 MG tablet take 4 tablet (200 mg) by oral route every 8 hours as needed with food       senna-docusate (SENOKOT-S;PERICOLACE) 8.6-50 MG per tablet Take 1-2 tablets by mouth 2 times daily as needed for constipation (Patient not taking: Reported on 2/4/2019) 40 tablet 0     STATIN NOT PRESCRIBED (INTENTIONAL) Please choose reason not prescribed, below (Patient not taking: Reported on 2/4/2019)       OTC products: None, except as noted above    Allergies   Allergen Reactions     Bromfenac Visual Disturbance     Sulfites, xibrom  Causes sever eye pain     Codeine      \"NAUSE,HA AND DIZZINESS\"     Codeine Nausea     Other reaction(s): Dizziness, Headache     Sulfites Visual Disturbance     Xibrom (bromfenac opthalmic solution) 0.09%--eye pain      Latex Allergy: NO    Social History     Tobacco Use     Smoking status: Never Smoker     Smokeless tobacco: Never Used   Substance Use Topics     Alcohol use: No     Alcohol/week: 0.0 oz     History   Drug Use No       Recent Labs   Lab Test 01/17/19  1000 11/19/18  0823 08/20/18  0956 05/14/18  0855   HGB 12.9  --   --  12.7     --   --  303   INR 1.02  --   --   --     135 139 138   POTASSIUM 3.8 3.7 4.1 4.4   CR 0.79 0.78 0.76 0.76   A1C  --  8.0* 7.9* 7.4*        "

## 2019-02-05 ENCOUNTER — ANESTHESIA EVENT (OUTPATIENT)
Dept: SURGERY | Facility: CLINIC | Age: 69
End: 2019-02-05
Payer: COMMERCIAL

## 2019-02-06 ENCOUNTER — HOSPITAL ENCOUNTER (OUTPATIENT)
Facility: CLINIC | Age: 69
Discharge: HOME | End: 2019-02-06
Attending: INTERNAL MEDICINE | Admitting: INTERNAL MEDICINE
Payer: COMMERCIAL

## 2019-02-06 ENCOUNTER — APPOINTMENT (OUTPATIENT)
Dept: GENERAL RADIOLOGY | Facility: CLINIC | Age: 69
End: 2019-02-06
Attending: INTERNAL MEDICINE
Payer: COMMERCIAL

## 2019-02-06 ENCOUNTER — ANESTHESIA (OUTPATIENT)
Dept: SURGERY | Facility: CLINIC | Age: 69
End: 2019-02-06
Payer: COMMERCIAL

## 2019-02-06 VITALS
HEIGHT: 66 IN | SYSTOLIC BLOOD PRESSURE: 129 MMHG | DIASTOLIC BLOOD PRESSURE: 66 MMHG | RESPIRATION RATE: 16 BRPM | BODY MASS INDEX: 34.55 KG/M2 | HEART RATE: 69 BPM | OXYGEN SATURATION: 91 % | WEIGHT: 214.95 LBS | TEMPERATURE: 97.9 F

## 2019-02-06 LAB
ALBUMIN SERPL-MCNC: 3.5 G/DL (ref 3.4–5)
ALP SERPL-CCNC: 333 U/L (ref 40–150)
ALT SERPL W P-5'-P-CCNC: 215 U/L (ref 0–50)
AMYLASE SERPL-CCNC: 65 U/L (ref 30–110)
AMYLASE SERPL-CCNC: 73 U/L (ref 30–110)
ANION GAP SERPL CALCULATED.3IONS-SCNC: 8 MMOL/L (ref 3–14)
AST SERPL W P-5'-P-CCNC: 153 U/L (ref 0–45)
BILIRUB SERPL-MCNC: 0.9 MG/DL (ref 0.2–1.3)
BUN SERPL-MCNC: 13 MG/DL (ref 7–30)
CALCIUM SERPL-MCNC: 10.1 MG/DL (ref 8.5–10.1)
CHLORIDE SERPL-SCNC: 98 MMOL/L (ref 94–109)
CO2 SERPL-SCNC: 26 MMOL/L (ref 20–32)
CREAT SERPL-MCNC: 0.74 MG/DL (ref 0.52–1.04)
ERCP: NORMAL
ERYTHROCYTE [DISTWIDTH] IN BLOOD BY AUTOMATED COUNT: 13.3 % (ref 10–15)
GFR SERPL CREATININE-BSD FRML MDRD: 81 ML/MIN/{1.73_M2}
GLUCOSE BLDC GLUCOMTR-MCNC: 196 MG/DL (ref 70–99)
GLUCOSE BLDC GLUCOMTR-MCNC: 236 MG/DL (ref 70–99)
GLUCOSE BLDC GLUCOMTR-MCNC: 246 MG/DL (ref 70–99)
GLUCOSE SERPL-MCNC: 248 MG/DL (ref 70–99)
HCT VFR BLD AUTO: 41.9 % (ref 35–47)
HGB BLD-MCNC: 13.1 G/DL (ref 11.7–15.7)
LIPASE SERPL-CCNC: 1062 U/L (ref 73–393)
LIPASE SERPL-CCNC: 589 U/L (ref 73–393)
MCH RBC QN AUTO: 27.3 PG (ref 26.5–33)
MCHC RBC AUTO-ENTMCNC: 31.3 G/DL (ref 31.5–36.5)
MCV RBC AUTO: 88 FL (ref 78–100)
PLATELET # BLD AUTO: 353 10E9/L (ref 150–450)
POTASSIUM SERPL-SCNC: 4.1 MMOL/L (ref 3.4–5.3)
PROT SERPL-MCNC: 8 G/DL (ref 6.8–8.8)
RBC # BLD AUTO: 4.79 10E12/L (ref 3.8–5.2)
SODIUM SERPL-SCNC: 132 MMOL/L (ref 133–144)
WBC # BLD AUTO: 6.8 10E9/L (ref 4–11)

## 2019-02-06 PROCEDURE — 25000128 H RX IP 250 OP 636: Performed by: NURSE ANESTHETIST, CERTIFIED REGISTERED

## 2019-02-06 PROCEDURE — 36000059 ZZH SURGERY LEVEL 3 EA 15 ADDTL MIN UMMC: Performed by: INTERNAL MEDICINE

## 2019-02-06 PROCEDURE — C1877 STENT, NON-COAT/COV W/O DEL: HCPCS | Performed by: INTERNAL MEDICINE

## 2019-02-06 PROCEDURE — 40000170 ZZH STATISTIC PRE-PROCEDURE ASSESSMENT II: Performed by: INTERNAL MEDICINE

## 2019-02-06 PROCEDURE — 36000061 ZZH SURGERY LEVEL 3 W FLUORO 1ST 30 MIN - UMMC: Performed by: INTERNAL MEDICINE

## 2019-02-06 PROCEDURE — 25000125 ZZHC RX 250: Performed by: NURSE ANESTHETIST, CERTIFIED REGISTERED

## 2019-02-06 PROCEDURE — 36415 COLL VENOUS BLD VENIPUNCTURE: CPT | Performed by: INTERNAL MEDICINE

## 2019-02-06 PROCEDURE — 40000277 XR SURGERY CARM FLUORO LESS THAN 5 MIN W STILLS: Mod: TC

## 2019-02-06 PROCEDURE — 25000125 ZZHC RX 250: Performed by: INTERNAL MEDICINE

## 2019-02-06 PROCEDURE — C1726 CATH, BAL DIL, NON-VASCULAR: HCPCS | Performed by: INTERNAL MEDICINE

## 2019-02-06 PROCEDURE — 82962 GLUCOSE BLOOD TEST: CPT

## 2019-02-06 PROCEDURE — 85027 COMPLETE CBC AUTOMATED: CPT | Performed by: INTERNAL MEDICINE

## 2019-02-06 PROCEDURE — 27210794 ZZH OR GENERAL SUPPLY STERILE: Performed by: INTERNAL MEDICINE

## 2019-02-06 PROCEDURE — 71000027 ZZH RECOVERY PHASE 2 EACH 15 MINS: Performed by: INTERNAL MEDICINE

## 2019-02-06 PROCEDURE — 37000009 ZZH ANESTHESIA TECHNICAL FEE, EACH ADDTL 15 MIN: Performed by: INTERNAL MEDICINE

## 2019-02-06 PROCEDURE — 82150 ASSAY OF AMYLASE: CPT | Performed by: INTERNAL MEDICINE

## 2019-02-06 PROCEDURE — 83690 ASSAY OF LIPASE: CPT | Performed by: INTERNAL MEDICINE

## 2019-02-06 PROCEDURE — C1769 GUIDE WIRE: HCPCS | Performed by: INTERNAL MEDICINE

## 2019-02-06 PROCEDURE — 25000565 ZZH ISOFLURANE, EA 15 MIN: Performed by: INTERNAL MEDICINE

## 2019-02-06 PROCEDURE — 25500064 ZZH RX 255 OP 636: Performed by: INTERNAL MEDICINE

## 2019-02-06 PROCEDURE — 80053 COMPREHEN METABOLIC PANEL: CPT | Performed by: INTERNAL MEDICINE

## 2019-02-06 PROCEDURE — 37000008 ZZH ANESTHESIA TECHNICAL FEE, 1ST 30 MIN: Performed by: INTERNAL MEDICINE

## 2019-02-06 PROCEDURE — 71000014 ZZH RECOVERY PHASE 1 LEVEL 2 FIRST HR: Performed by: INTERNAL MEDICINE

## 2019-02-06 DEVICE — IMPLANTABLE DEVICE
Type: IMPLANTABLE DEVICE | Site: BILE DUCT | Status: NON-FUNCTIONAL
Removed: 2019-02-28

## 2019-02-06 RX ORDER — LIDOCAINE 40 MG/G
CREAM TOPICAL
Status: DISCONTINUED | OUTPATIENT
Start: 2019-02-06 | End: 2019-02-06 | Stop reason: HOSPADM

## 2019-02-06 RX ORDER — SODIUM CHLORIDE, SODIUM LACTATE, POTASSIUM CHLORIDE, CALCIUM CHLORIDE 600; 310; 30; 20 MG/100ML; MG/100ML; MG/100ML; MG/100ML
INJECTION, SOLUTION INTRAVENOUS CONTINUOUS PRN
Status: DISCONTINUED | OUTPATIENT
Start: 2019-02-06 | End: 2019-02-06

## 2019-02-06 RX ORDER — SODIUM CHLORIDE, SODIUM LACTATE, POTASSIUM CHLORIDE, CALCIUM CHLORIDE 600; 310; 30; 20 MG/100ML; MG/100ML; MG/100ML; MG/100ML
INJECTION, SOLUTION INTRAVENOUS CONTINUOUS
Status: DISCONTINUED | OUTPATIENT
Start: 2019-02-06 | End: 2019-02-06 | Stop reason: HOSPADM

## 2019-02-06 RX ORDER — FENTANYL CITRATE 50 UG/ML
25-50 INJECTION, SOLUTION INTRAMUSCULAR; INTRAVENOUS
Status: DISCONTINUED | OUTPATIENT
Start: 2019-02-06 | End: 2019-02-06 | Stop reason: HOSPADM

## 2019-02-06 RX ORDER — MEPERIDINE HYDROCHLORIDE 50 MG/ML
12.5 INJECTION INTRAMUSCULAR; INTRAVENOUS; SUBCUTANEOUS
Status: DISCONTINUED | OUTPATIENT
Start: 2019-02-06 | End: 2019-02-06 | Stop reason: HOSPADM

## 2019-02-06 RX ORDER — ONDANSETRON 2 MG/ML
4 INJECTION INTRAMUSCULAR; INTRAVENOUS EVERY 30 MIN PRN
Status: DISCONTINUED | OUTPATIENT
Start: 2019-02-06 | End: 2019-02-06 | Stop reason: HOSPADM

## 2019-02-06 RX ORDER — LIDOCAINE HYDROCHLORIDE 20 MG/ML
INJECTION, SOLUTION INFILTRATION; PERINEURAL PRN
Status: DISCONTINUED | OUTPATIENT
Start: 2019-02-06 | End: 2019-02-06

## 2019-02-06 RX ORDER — OXYCODONE HCL 5 MG/5 ML
5 SOLUTION, ORAL ORAL EVERY 4 HOURS PRN
Status: DISCONTINUED | OUTPATIENT
Start: 2019-02-06 | End: 2019-02-06 | Stop reason: HOSPADM

## 2019-02-06 RX ORDER — ONDANSETRON 4 MG/1
4 TABLET, ORALLY DISINTEGRATING ORAL EVERY 30 MIN PRN
Status: DISCONTINUED | OUTPATIENT
Start: 2019-02-06 | End: 2019-02-06 | Stop reason: HOSPADM

## 2019-02-06 RX ORDER — PROPOFOL 10 MG/ML
INJECTION, EMULSION INTRAVENOUS PRN
Status: DISCONTINUED | OUTPATIENT
Start: 2019-02-06 | End: 2019-02-06

## 2019-02-06 RX ORDER — ONDANSETRON 2 MG/ML
INJECTION INTRAMUSCULAR; INTRAVENOUS PRN
Status: DISCONTINUED | OUTPATIENT
Start: 2019-02-06 | End: 2019-02-06

## 2019-02-06 RX ORDER — NALOXONE HYDROCHLORIDE 0.4 MG/ML
.1-.4 INJECTION, SOLUTION INTRAMUSCULAR; INTRAVENOUS; SUBCUTANEOUS
Status: DISCONTINUED | OUTPATIENT
Start: 2019-02-06 | End: 2019-02-06 | Stop reason: HOSPADM

## 2019-02-06 RX ORDER — LEVOFLOXACIN 5 MG/ML
INJECTION, SOLUTION INTRAVENOUS PRN
Status: DISCONTINUED | OUTPATIENT
Start: 2019-02-06 | End: 2019-02-06

## 2019-02-06 RX ORDER — FENTANYL CITRATE 50 UG/ML
INJECTION, SOLUTION INTRAMUSCULAR; INTRAVENOUS PRN
Status: DISCONTINUED | OUTPATIENT
Start: 2019-02-06 | End: 2019-02-06

## 2019-02-06 RX ORDER — IOPAMIDOL 510 MG/ML
INJECTION, SOLUTION INTRAVASCULAR PRN
Status: DISCONTINUED | OUTPATIENT
Start: 2019-02-06 | End: 2019-02-06 | Stop reason: HOSPADM

## 2019-02-06 RX ORDER — FLUMAZENIL 0.1 MG/ML
0.2 INJECTION, SOLUTION INTRAVENOUS
Status: DISCONTINUED | OUTPATIENT
Start: 2019-02-06 | End: 2019-02-06 | Stop reason: HOSPADM

## 2019-02-06 RX ORDER — ALBUTEROL SULFATE 0.83 MG/ML
2.5 SOLUTION RESPIRATORY (INHALATION) EVERY 4 HOURS PRN
Status: DISCONTINUED | OUTPATIENT
Start: 2019-02-06 | End: 2019-02-06 | Stop reason: HOSPADM

## 2019-02-06 RX ORDER — HYDROMORPHONE HYDROCHLORIDE 1 MG/ML
.3-.5 INJECTION, SOLUTION INTRAMUSCULAR; INTRAVENOUS; SUBCUTANEOUS EVERY 10 MIN PRN
Status: DISCONTINUED | OUTPATIENT
Start: 2019-02-06 | End: 2019-02-06 | Stop reason: HOSPADM

## 2019-02-06 RX ADMIN — FENTANYL CITRATE 100 MCG: 50 INJECTION, SOLUTION INTRAMUSCULAR; INTRAVENOUS at 09:39

## 2019-02-06 RX ADMIN — ROCURONIUM BROMIDE 50 MG: 10 INJECTION INTRAVENOUS at 09:49

## 2019-02-06 RX ADMIN — LEVOFLOXACIN 500 MG: 5 INJECTION, SOLUTION INTRAVENOUS at 10:00

## 2019-02-06 RX ADMIN — PHENYLEPHRINE HYDROCHLORIDE 100 MCG: 10 INJECTION, SOLUTION INTRAMUSCULAR; INTRAVENOUS; SUBCUTANEOUS at 10:06

## 2019-02-06 RX ADMIN — SODIUM CHLORIDE, POTASSIUM CHLORIDE, SODIUM LACTATE AND CALCIUM CHLORIDE: 600; 310; 30; 20 INJECTION, SOLUTION INTRAVENOUS at 09:36

## 2019-02-06 RX ADMIN — ONDANSETRON 4 MG: 2 INJECTION INTRAMUSCULAR; INTRAVENOUS at 10:13

## 2019-02-06 RX ADMIN — PROPOFOL 130 MG: 10 INJECTION, EMULSION INTRAVENOUS at 09:48

## 2019-02-06 RX ADMIN — PHENYLEPHRINE HYDROCHLORIDE 200 MCG: 10 INJECTION, SOLUTION INTRAMUSCULAR; INTRAVENOUS; SUBCUTANEOUS at 10:23

## 2019-02-06 RX ADMIN — LIDOCAINE HYDROCHLORIDE 80 MG: 20 INJECTION, SOLUTION INFILTRATION; PERINEURAL at 09:48

## 2019-02-06 RX ADMIN — SUGAMMADEX 200 MG: 100 INJECTION, SOLUTION INTRAVENOUS at 10:30

## 2019-02-06 RX ADMIN — PHENYLEPHRINE HYDROCHLORIDE 150 MCG: 10 INJECTION, SOLUTION INTRAMUSCULAR; INTRAVENOUS; SUBCUTANEOUS at 10:18

## 2019-02-06 ASSESSMENT — MIFFLIN-ST. JEOR: SCORE: 1511.75

## 2019-02-06 NOTE — ANESTHESIA PREPROCEDURE EVALUATION
Anesthesia Pre-Procedure Evaluation    Patient: Gricelda Sabillon   MRN:     [id number] Gender:   female   Age:    68 year old :      1950        Preoperative Diagnosis: Bile Duct Stone   Procedure(s):  Endoscopic Retrograde Cholangiopancreatogram     Past Medical History:   Diagnosis Date     Allergic rhinitis, cause unspecified      Asthma      Asthma, mild intermittent      Cataract      Cellulitis and abscess of leg, except foot     Bilateral     Eczema      Heart murmur     aortic area     HTN, goal below 140/90      Hyperlipidemia LDL goal < 100      Hyperplastic colon polyp      Infection     bilateral eye infections     Macular hole of left eye      Obesity (BMI 30-39.9)      Osteoarthritis     knees     Other chronic pain     right knee     Sleep apnea     uses c pap     Type 2 diabetes, HbA1C goal < 8% (H)       Past Surgical History:   Procedure Laterality Date     ARTHROPLASTY HIP Right 2017    Procedure: ARTHROPLASTY HIP;  Right total hip arthroplasty  ;  Surgeon: Ayaan Pena MD;  Location: [facility]     ARTHROPLASTY KNEE  2013    Procedure: ARTHROPLASTY KNEE;  right total knee arthroplasty ;  Surgeon: Chaparro Wesley MD;  Location: [facility]     ARTHROSCOPY KNEE Right 2015    Procedure: ARTHROSCOPY KNEE;  Surgeon: Ayaan Pena MD;  Location: [facility]     C INCISION OF HYMEN       CATARACT IOL, RT/LT       COLONOSCOPY  2008     EYE SURGERY      macular hole repaired left eye     HC KNEE SCOPE, DIAGNOSTIC      - both knees     HEAD & NECK SURGERY      wisdom teeth          Anesthesia Evaluation     .             ROS/MED HX    ENT/Pulmonary:     (+)sleep apnea, Intermittent asthma Treatment: Inhaler prn,  uses CPAP , . .    Neurologic:  - neg neurologic ROS     Cardiovascular:     (+) hypertension----. : . . . :. valvular problems/murmurs .       METS/Exercise Tolerance:     Hematologic:  - neg hematologic  ROS       Musculoskeletal:  - neg  musculoskeletal ROS       GI/Hepatic:  - neg GI/hepatic ROS       Renal/Genitourinary:  - ROS Renal section negative       Endo:     (+) type II DM Not using insulin - not using insulin pump not previously admitted for DM/DKA Obesity, .      Psychiatric:  - neg psychiatric ROS       Infectious Disease:  - neg infectious disease ROS       Malignancy:      - no malignancy   Other:    (+) No chance of pregnancy C-spine cleared: N/A, H/O Chronic Pain,no other significant disability   - neg other ROS                     PHYSICAL EXAM:   Mental Status/Neuro: A/A/O   Airway: Facies: Feasible  Mallampati: III  Mouth/Opening: Full  TM distance: > 6 cm  Neck ROM: Full   Respiratory: Auscultation: CTAB     Resp. Rate: Normal     Resp. Effort: Normal      CV: Rhythm: Regular  Rate: Age appropriate  Heart: Normal Sounds   Comments:      Dental: Normal                  Lab Results   Component Value Date    WBC 6.8 02/06/2019    HGB 13.1 02/06/2019    HCT 41.9 02/06/2019     02/06/2019    CRP <5.0 01/07/2014    SED 16 01/07/2014     (L) 02/06/2019    POTASSIUM 4.1 02/06/2019    CHLORIDE 98 02/06/2019    CO2 26 02/06/2019    BUN 13 02/06/2019    CR 0.74 02/06/2019     (H) 02/06/2019    KATHRYN 10.1 02/06/2019    ALBUMIN 3.5 02/06/2019    PROTTOTAL 8.0 02/06/2019     (H) 02/06/2019     (H) 02/06/2019    ALKPHOS 333 (H) 02/06/2019    BILITOTAL 0.9 02/06/2019    LIPASE 1,062 (H) 02/06/2019    AMYLASE 73 02/06/2019    INR 1.02 01/17/2019    TSH 3.28 05/14/2018    T4 1.17 02/04/2016       Preop Vitals  BP Readings from Last 3 Encounters:   02/06/19 161/76   02/04/19 131/66   01/18/19 148/77    Pulse Readings from Last 3 Encounters:   02/06/19 68   02/04/19 88   01/18/19 86      Resp Readings from Last 3 Encounters:   02/06/19 14   02/04/19 14   11/29/18 16    SpO2 Readings from Last 3 Encounters:   02/06/19 95%   02/04/19 97%   01/18/19 96%      Temp Readings from Last 1 Encounters:   02/06/19 36.6  C (97.9  " F) (Oral)    Ht Readings from Last 1 Encounters:   02/06/19 1.676 m (5' 6\")      Wt Readings from Last 1 Encounters:   02/06/19 97.5 kg (214 lb 15.2 oz)    Estimated body mass index is 34.69 kg/m  as calculated from the following:    Height as of this encounter: 1.676 m (5' 6\").    Weight as of this encounter: 97.5 kg (214 lb 15.2 oz).     LDA:  Peripheral IV 02/06/19 Left Lower forearm (Active)   Site Assessment WDL 2/6/2019  9:01 AM   Line Status Saline locked 2/6/2019  9:01 AM   Phlebitis Scale 0-->no symptoms 2/6/2019  9:01 AM   Infiltration Scale 0 2/6/2019  9:01 AM   Number of days: 0            Assessment:   ASA SCORE: 3    NPO Status: > 6 hours since completed Solid Foods   Documentation: H&P complete; Preop Testing complete; Beta blocker taken (<24 hrs)   Proceeding: Proceed without further delay  Tobacco Use:  NO Active use of Tobacco/UNKNOWN Tobacco use status     Plan:   Anes. Type:  General   Pre-Induction: Midazolam IV   Induction:  IV (Standard)   Airway: Oral ETT   Access/Monitoring: PIV   Maintenance: Balanced   Emergence: Procedure Site   Logistics: Same Day Surgery     Postop Pain/Sedation Strategy:  Standard-Options: Opioids PRN     PONV Management:  Adult Risk Factors: Female, Non-Smoker, Postop Opioids  Prevention: Ondansetron     CONSENT: Direct conversation   Plan and risks discussed with: Patient          Comments for Plan/Consent:  ______________________________________________________________________  I discussed the risks and benefits of general anesthesia with the patient.  Questions were sought and answered.      Jevon Kline MD  Attending Anesthesiologist                           Jevon Kline MD  "

## 2019-02-06 NOTE — DISCHARGE INSTRUCTIONS
[facility]  Same-Day Surgery   Adult Discharge Orders & Instructions     For 24 hours after surgery    1. Get plenty of rest.  A responsible adult must stay with you for at least 24 hours after you leave the hospital.   2. Do not drive or use heavy equipment.  If you have weakness or tingling, don't drive or use heavy equipment until this feeling goes away.  3. Do not drink alcohol.  4. Avoid strenuous or risky activities.  Ask for help when climbing stairs.   5. You may feel lightheaded.  IF so, sit for a few minutes before standing.  Have someone help you get up.   6. If you have nausea (feel sick to your stomach): Drink only clear liquids such as apple juice, ginger ale, broth or 7-Up.  Rest may also help.  Be sure to drink enough fluids.  Move to a regular diet as you feel able.  7. You may have a slight fever. Call the doctor if your fever is over 100 F (37.7 C) (taken under the tongue) or lasts longer than 24 hours.  8. You may have a dry mouth, a sore throat, muscle aches or trouble sleeping.  These should go away after 24 hours.  9. Do not make important or legal decisions.   Call your doctor for any of the followin.  Signs of infection (fever, growing tenderness at the surgery site, a large amount of drainage or bleeding, severe pain, foul-smelling drainage, redness, swelling).    2. It has been over 8 to 10 hours since surgery and you are still not able to urinate (pass water).    3.  Headache for over 24 hours.    4.  Numbness, tingling or weakness the day after surgery (if you had spinal anesthesia).  To contact a doctor, call Dr. Guru Gonzalez @ [phone] or:        [phone] and ask for the resident on call for   Gastroenterology (answered 24 hours a day)      Emergency Department:    [facility]: [phone]       (TTY for hearing impaired: [phone])      PATIENT TO USE CPAP MACHINE WHILE SLEEPING/NAPPING DURING THE DAY AND NIGHT.

## 2019-02-06 NOTE — OR NURSING
Dr. Kline notified patient ready for Phase II.  Per Dr. Kline, ok to transfer patient to Phase II and he will see patient prior to discharge in PACU or Phase II.

## 2019-02-06 NOTE — ANESTHESIA CARE TRANSFER NOTE
Patient: Gricelda Sabillon    Procedure(s):  COMBINED ENDOSCOPIC RETROGRADE CHOLANGIOPANCREATOGRAPHY, BILIARY SPINCTEROTOMY AND DILATION, PLACE BILE DUCT STENT    Diagnosis: Bile Duct Stone  Diagnosis Additional Information: No value filed.    Anesthesia Type:   No value filed.     Note:  Airway :Face Mask  Patient transferred to:PACU  Comments: Patient oxygenating and ventilating well on 6LFM.  Patient FLORES, following commands, and denies pain.  Report given to RN and VSS. Handoff Report: Identifed the Patient, Identified the Reponsible Provider, Reviewed the pertinent medical history, Discussed the surgical course, Reviewed Intra-OP anesthesia mangement and issues during anesthesia, Set expectations for post-procedure period and Allowed opportunity for questions and acknowledgement of understanding      Vitals: (Last set prior to Anesthesia Care Transfer)    CRNA VITALS  2/6/2019 1011 - 2/6/2019 1049      2/6/2019             NIBP:  145/83    Pulse:  80    SpO2:  98 %    Resp Rate (observed):  16    EKG:  Sinus rhythm                Electronically Signed By: DENYS Morin CRNA  February 6, 2019  10:49 AM

## 2019-02-06 NOTE — BRIEF OP NOTE
[facility] Brief Operative Note    Pre-operative diagnosis: Bile Duct Stone   Post-operative diagnosis * No post-op diagnosis entered *   Procedure: Procedure(s):  COMBINED ENDOSCOPIC RETROGRADE CHOLANGIOPANCREATOGRAPHY, BILIARY SPINCTEROTOMY AND DILATION, PLACE BILE DUCT STENT   Surgeon: Guru Lisa MD       Estimated blood loss: None    Specimens: None   Findings:    ERCP  Periampullary flat adenoma   Selective biliary cannulation and biliary sphinctertomy was performed     Bile duct was swept and intraducal polyp was seen    No sampling done    A 10 Fr by 7 cm Sofflex stent in CBD    Recommendations    Will schedule EGD with EMR, and EUS with Dr Ramirez

## 2019-02-06 NOTE — ANESTHESIA POSTPROCEDURE EVALUATION
Anesthesia POST Procedure Evaluation    Patient: Gricelda Sabillon   MRN:     [id number] Gender:   female   Age:    68 year old :      1950        Preoperative Diagnosis: Bile Duct Stone   Procedure(s):  COMBINED ENDOSCOPIC RETROGRADE CHOLANGIOPANCREATOGRAPHY, BILIARY SPINCTEROTOMY AND DILATION, PLACE BILE DUCT STENT   Postop Comments: No value filed.       Anesthesia Type:  General    Reportable Event: NO     PAIN: Uncomplicated   Sign Out status: Comfortable, Well controlled pain     PONV: No PONV   Sign Out status:  No Nausea or Vomiting     Neuro/Psych: Uneventful perioperative course   Sign Out Status: Preoperative baseline; Age appropriate mentation     Airway/Resp.: Uneventful perioperative course   Sign Out Status: Non labored breathing, age appropriate RR; Resp. Status within EXPECTED Parameters     CV: Uneventful perioperative course   Sign Out status: Appropriate BP and perfusion indices; Appropriate HR/Rhythm     Disposition:   Sign Out in:  PACU  Disposition:  Phase II; Home  Recovery Course: Uneventful  Follow-Up: Not required     Comments/Narrative:  No noted anesthetic complications.  Patient satisfied with anesthetic.             Last Anesthesia Record Vitals:  CRNA VITALS  2019 1011 - 2019 1111      2019             NIBP:  145/83    Pulse:  80    SpO2:  98 %    Resp Rate (observed):  16    EKG:  Sinus rhythm          Last PACU/Preop Vitals:  Vitals:    19 1130 19 1148 19 1200   BP: 124/55 136/69 156/77   Pulse: 62 69 71   Resp: 16 16 16   Temp:  36.6  C (97.9  F)    SpO2: 98% 98% 96%         Electronically Signed By: Jevon Kline MD, 2019, 12:19 PM

## 2019-02-06 NOTE — OR NURSING
Dr Gonzalez at bedside to see pt, OK to discharge home before lab results. Pt doing well. VSS. Denies pain or nausea.

## 2019-02-08 ENCOUNTER — CARE COORDINATION (OUTPATIENT)
Dept: GASTROENTEROLOGY | Facility: CLINIC | Age: 69
End: 2019-02-08

## 2019-02-08 NOTE — PROGRESS NOTES
Post ERCP (2/6/19) with Dr. Gonzalez: Follow-up    Post procedure recommendations: Discussed with patient and her  post  operatively along with Dr Guerrero. Will recommend  endoscopic ultrasound to evaluate the depth of invasion.   EMR with ampullectomy and biliary/pancreatic stenting  followed by frequent surveillance is a possibility. Whipple procedure may be the alternative. Pt to be  discussed in our multidisciplinary tumor conference to discuss further management. If Surg Onc will need pre-operative sampling a Spylglass cholangioscope can be used to explore the bile duct better     Orders placed: None at this time.     Patient states she is feeling good besides feeling weak. Denies nausea, vomiting, abdominal pain and fever.     Clinic contact and scheduling numbers verified for future questions/concerns.     ARI[address] Dr. Ramirez, & Dr. Gonzalez  Advanced Endoscopy  [phone]

## 2019-02-13 ENCOUNTER — OFFICE VISIT (OUTPATIENT)
Dept: GASTROENTEROLOGY | Facility: CLINIC | Age: 69
End: 2019-02-13
Payer: COMMERCIAL

## 2019-02-13 VITALS
HEIGHT: 66 IN | TEMPERATURE: 98.1 F | SYSTOLIC BLOOD PRESSURE: 147 MMHG | BODY MASS INDEX: 34.67 KG/M2 | DIASTOLIC BLOOD PRESSURE: 78 MMHG | OXYGEN SATURATION: 95 % | HEART RATE: 80 BPM | WEIGHT: 215.7 LBS

## 2019-02-13 DIAGNOSIS — D13.5 AMPULLARY ADENOMA: Primary | ICD-10-CM

## 2019-02-13 RX ORDER — PANTOPRAZOLE SODIUM 40 MG/1
40 TABLET, DELAYED RELEASE ORAL 2 TIMES DAILY
Qty: 120 TABLET | Refills: 3 | Status: ON HOLD | OUTPATIENT
Start: 2019-02-13 | End: 2019-03-01

## 2019-02-13 RX ORDER — SUCRALFATE 1 G/1
1 TABLET ORAL 4 TIMES DAILY
Qty: 120 TABLET | Refills: 1 | Status: ON HOLD | OUTPATIENT
Start: 2019-02-13 | End: 2019-03-01

## 2019-02-13 ASSESSMENT — ENCOUNTER SYMPTOMS
HYPERTENSION: 0
MUSCLE WEAKNESS: 0
NIGHT SWEATS: 0
ORTHOPNEA: 0
WEIGHT LOSS: 0
FATIGUE: 1
NECK PAIN: 0
HYPOTENSION: 0
INCREASED ENERGY: 1
HALLUCINATIONS: 0
LEG PAIN: 1
LIGHT-HEADEDNESS: 1
DECREASED APPETITE: 0
MUSCLE CRAMPS: 1
ALTERED TEMPERATURE REGULATION: 1
PALPITATIONS: 0
ARTHRALGIAS: 1
FEVER: 0
JOINT SWELLING: 0
POLYPHAGIA: 0
CHILLS: 1
MYALGIAS: 1
STIFFNESS: 1
SYNCOPE: 0
BACK PAIN: 1
EXERCISE INTOLERANCE: 0
SLEEP DISTURBANCES DUE TO BREATHING: 0
WEIGHT GAIN: 0
POLYDIPSIA: 0

## 2019-02-13 ASSESSMENT — MIFFLIN-ST. JEOR: SCORE: 1515.16

## 2019-02-13 ASSESSMENT — PAIN SCALES - GENERAL: PAINLEVEL: NO PAIN (0)

## 2019-02-13 NOTE — LETTER
February 13, 2019    Gricelda Sabillon                              [address] [place] [address]    Dear Gricelda,    You are scheduled to receive an EUS (Endoscopic Ultrasound) and ERCP (Endoscopic Retrograde Cholangiopancreatography) under general anesthesia.    DATE: 2/28/2019  ARRIVAL TIME: 12:10 pm   PROCEDURE TIME: 2:10 pm   PHYSICIAN: Dr. Guerrero     Your procedure is taking place at   [facility], [address] [address]    *Go to Station 3[address], 3rd floor of the Hospital*    **A pre-surgery nurse will call you to go over your medications, confirm details & answer questions 1-2 days prior to exam.**    It is an anesthesia requirement to have a PRE-OP PHYSICAL by your Primary Care Clinic before receiving GENERAL ANESTHESIA. Without it, your procedure will be delayed or cancelled. The physical can be faxed to [phone].    You can have milk products/solids until 8 hours prior to procedure. You can also have clear liquids until 4 hours prior to procedure. Make sure to let your Insurance Company know what you are having done. If they need anything from us, let me know. If you are a new patient to the [facility], make sure to call Registration 7-10 days prior to procedure at [phone].    You are scheduled to go home this same day and you will need someone to drive you home (no cabs or buses) and a  24 hour   around you after receiving General Anesthesia.  You and/or the Doctor(s) may want you to spend the night for observation, depending on your recovery process.    Please prepare for this possibility, just in case.      If you take blood thinning medication, you should get your Dr.s approval to be OFF of COUMADIN/WARFARIN for 5 days (if it s switched to Lovenox, that should be held the night before and morning of ERCP). .   DO NOT TAKE IBUPROFEN, ADVIL, ETC. for 7 days prior to EUS and until further notice after your EUS.  If you take  CELEBREX and can HOLD it for 2 days prior to exam, please do so.  Call the GI nurse if you have questions about this.    If you are diabetic, ask your doctor how much medicine, if any, you should take on the day of the exam. You may need to stop taking your medicine.    WHAT IS AN EUS (Endoscopic Ultrasound)?  EUS is an ultrasound examination of the upper part of your gastro-intestinal (GI) tract and surrounding organs.  This includes the esophagus (food tube), stomach, duodenum (the first part of the bowel), the liver, pancreas, gallbladder, spleen and lymph nodes in this area.  Your doctor will explain why the EUS is being performed and will ask you to sign a consent form before the exam begins.      WHAT IS AN ERCP (Endoscopic Retrograde CholangioPancreatography)?  ERCP is a specialized technique used to study the ducts of the gallbladder, pancreas and liver.  Ducts are drainage routes; the drainage channels from the liver are called bile or biliary ducts.  It is a valuable exam for diagnosing - and sometimes treating - disorders of the pancreas, liver, gallbladder and bile ducts.    Using both x-rays and a thin, flexible, lighted tube (endoscope), we can see inside the pancreas, liver, gallbladder and bile ducts. The endoscope is put through the mouth and throat into the stomach.  You will receive General Anesthesia for this exam.    FOLLOW-UP, POST PROCEDURE:   Activity:  You should not drive, operate machinery, make important decisions, or do activities that require coordination or balance until the next day.  Abdominal Cramping:  It is normal to have mild cramping after the study.  This is due to air and water put into the intestines during the ultrasound.  Walking or turning side to side will usually help the air to pass.  Call if the pain is severe.  Sore Throat:  You may have a sore throat for one to two days.  Throat lozenges or warm saltwater gargles may help.  Mix   teaspoon salt in a glass of warm  water, gargle, and then spit.    Sincerely,    Shirley Uribe RN  Phone: [phone]

## 2019-02-13 NOTE — NURSING NOTE
"Chief Complaint   Patient presents with     Consult     NEW - Ampullary Lesion       Vitals:    02/13/19 1455   BP: 147/78   Pulse: 80   Temp: 98.1  F (36.7  C)   TempSrc: Oral   SpO2: 95%   Weight: 97.8 kg (215 lb 11.2 oz)   Height: 1.676 m (5' 6\")       Body mass index is 34.81 kg/m .      Hugo Myers, EMT on 2/13/2019 at 2:59 PM                        "

## 2019-02-13 NOTE — LETTER
2/13/2019       RE: Gricelda Sabillon  [address]  [facility] [address]     Dear Colleague,    Thank you for referring your patient, Gricelda Sabillon, to the [facility] at [facility]. Please see a copy of my visit note below.    INTERVENTIONAL ENDOSCOPY OUTPATIENT CLINIC CONSULT    DATE OF SERVICE: 2/13/2019  PHYSICIAN REQUESTING CONSULT: Guru Lisa MD; Tonya Llamas MD  Reason for Consultation: Periampullary/ampullary polyp    ASSESSMENT:  Gricelda Vaughn is a 68 year old female with a PMHx of CHERRY with CPAP, DM2, asthma, HTN, and HLD who was being evaluated by Dr. Llamas and Dr. Gonzalez for elevated LFTs that was thought to be from choledocholithiasis but ultimately found to be from an obstructing periampullary polyp. I was able to assess the polyp intraoperatively and there were no obvious signs of more advance neoplasia/deep invasion. The extension of polyp into the bile duct appeared superficial and within the ampulla and likely will come out with a papillectomy. Her MRI/MRCP was reviewed at tumor conference as well and no evidence of more advanced neoplasia or lymphadenopathy was seen. 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 No biopsies were obtained at my request to minimize fibrosis and optimize chances for a successful endoscopic resection. I discussed with the patient and her  what I would recommend is further staging with EUS and if that also shows no deep invasion then we would proceed with a papillectomy and piecemeal EMR of the surrounding polyp around the major papilla which appears to extend slightly into D3. Pathology will allow us to determine if we removed the intraductal extension of polyp during the papillectomy. If there is extension into the bile duct on the final path but no signs of adenocarcinoma, then either we pursue Whipple at that juncture or we could do  intraductal RFA to ablate any residual polyp as the extension is likely to be minimal. We would then need to continue surveying that intraductal portion. I also explained that we will need to place pancreatic and biliary stents to ensure patency after resection and minimize the risk of pancreatitis. I explained we will need to do a repeat ERCP/EGD one month later (depending on the EUS and pathology findings) to remove any residual polyp after a piecemeal resection and remove her stents. I also explained that she will need ongoing endoscopic surveillance of her duodenum after that. I explained the alternative to an endoscopic approach would be to proceed with a Whipple surgery, with the main advantage of this approach being definitive upfront treatment without the need for multiple endoscopic procedures however the risk of morbidity and mortality related to a Whipple far outweigh this benefit. This case was also discussed with Dr. Anjum Santos who was in agreement with an endoscopic approach. I also explained that even though I may be able to endoscopically remove all visible polyp tissue, if the pathology shows an area of adenocarcinoma she may need a Whipple surgery anyway. She and her  were in agreement with pursuing EUS, ERCP, papillectomy and EMR. We discussed the risks which include: perforation (~2%), pancreatitis (5-10%), bleeding (10%), cholangitis and delayed complications of biliary/pancreatic orifice stricturing. I think it is unlikely that she would develop a duodenal stricture after EMR as the polyp appears only 1/2 circumferential. To minimize the risk of delayed bleeding post-resection, we will start her on BID PPI and Carafate.    She has never had a colonoscopy. I explained that presence of duodenal adenomas are independently associated colon adenomas and also we should rule out attenuated FAP, although unlikely. Given the complexity of the initial procedure, I would favor doing the  colonoscopy as part of the follow up ERCP/EGD 1 month later.    RECOMMENDATIONS:  - Plan for EUS, ERCP with papillectomy and EMR  - Start BID pantoprazole 40 mg BID a couple days before the procedure and 8 weeks following  - Start Carafate 1 g QID 4 weeks after the procedure  - Tentatively schedule repeat ERCP/EGD and colonoscopy in 1 month depending on results from initial procedure  - Aspirin may be continued periprocedurally      Thank you for this consultation.  It was a pleasure to participate in the care of this patient; please contact us with any further questions.  A total of 45 minutes was spent in face to face evaluation with this patient, >50% of which was counseling and coordinating a management plan for this patient.     Shaun Guerrero MD  [facility]  Division of Gastroenterology and Hepatology  [place] [address]  ________________________________________________________________    HPI:  Gricelda Vaughn is a 68 year old female with a PMHx of CHERRY with CPAP, DM2, asthma, HTN, and HLD who was being evaluated by Dr. Llamas and Dr. Gonzalez for elevated LFTs that was thought to be from choledocholithiasis but ultimately found to be from an obstructing periampullary polyp. The patient was in her usual state of health when it was incidentally noted that she had elevated liver chemistries on routine blood work. U/S showed mild CBD dilation and subsequent MRCP showed a filling defect in the distal bile duct. She underwent ERCP by Dr. Gonzalez on 2/6/19 that endoscopically showed a large carpeted periampullary polyp involving the major papilla. I was asked into the OR to visually assess the lesion as well. Endoscopically it was consistent with an adenoma, Elayne 0-IIa lesion. No high risk features to suggest deeper invasion were seen. A small intraductal polyp was seen after biliary sphincterotomy that appeared to still be within the  ampulla. A biliary stent was placed and clinically she is doing well post-procedure. She has never a an endoscopy previously nor has she had a screening colonoscopy. No family history of GI malignancy.     Patient denies jaundice or abdominal pain. No history of melena, hematemesis or hematochezia.  Patient denies fevers, sweats, chills or weight loss.    PMHx:  Past Medical History:   Diagnosis Date     Allergic rhinitis, cause unspecified      Asthma      Asthma, mild intermittent      Cataract      Cellulitis and abscess of leg, except foot 03/00    Bilateral     Eczema      Heart murmur     aortic area     HTN, goal below 140/90      Hyperlipidemia LDL goal < 100      Hyperplastic colon polyp 2008     Infection     bilateral eye infections     Macular hole of left eye      Obesity (BMI 30-39.9)      Osteoarthritis     knees     Other chronic pain     right knee     Sleep apnea     uses c pap     Type 2 diabetes, HbA1C goal < 8% (H)        PSurgHx:  Past Surgical History:   Procedure Laterality Date     ARTHROPLASTY HIP Right 9/25/2017    Procedure: ARTHROPLASTY HIP;  Right total hip arthroplasty  ;  Surgeon: Ayaan Pena MD;  Location: [facility] OR     ARTHROPLASTY KNEE  7/12/2013    Procedure: ARTHROPLASTY KNEE;  right total knee arthroplasty ;  Surgeon: Chaparro Wesley MD;  Location: [facility] OR     ARTHROSCOPY KNEE Right 1/21/2015    Procedure: ARTHROSCOPY KNEE;  Surgeon: Ayaan Pena MD;  Location: [facility] OR     C INCISION OF HYMEN       CATARACT IOL, RT/LT       COLONOSCOPY  2008     ENDOSCOPIC RETROGRADE CHOLANGIOPANCREATOGRAM N/A 2/6/2019    Procedure: COMBINED ENDOSCOPIC RETROGRADE CHOLANGIOPANCREATOGRAPHY, BILIARY SPINCTEROTOMY AND DILATION, PLACE BILE DUCT STENT;  Surgeon: Guru Andie Gonzalez MD;  Location: [facility] OR     EYE SURGERY      macular hole repaired left eye     HC KNEE SCOPE, DIAGNOSTIC      - both knees     HEAD & NECK SURGERY      wisdom teeth       MEDS:  Current  "Outpatient Medications   Medication     pantoprazole (PROTONIX) 40 MG EC tablet     sucralfate (CARAFATE) 1 GM tablet     albuterol (PROAIR HFA, PROVENTIL HFA, VENTOLIN HFA) 108 (90 BASE) MCG/ACT inhaler     aspirin 81 MG EC tablet     atenolol (TENORMIN) 50 MG tablet     azelastine (ASTELIN) 137 MCG/SPRAY nasal spray     azelastine (OPTIVAR) 0.05 % SOLN     calcium-vitamin D 500-125 MG-UNIT TABS     cetirizine (ZYRTEC) 10 MG tablet     desoximetasone (TOPICORT) 0.25 % cream     dorzolamide (TRUSOPT) 2 % ophthalmic solution     fish oil-omega-3 fatty acids (FISH OIL) 1000 MG capsule     glipiZIDE (GLUCOTROL) 5 MG tablet     hydrochlorothiazide (HYDRODIURIL) 25 MG tablet     ibuprofen (ADVIL) 200 MG tablet     JANUVIA 100 MG tablet     latanoprost (XALATAN) 0.005 % ophthalmic solution     lisinopril (PRINIVIL/ZESTRIL) 40 MG tablet     metFORMIN (GLUCOPHAGE) 1000 MG tablet     MULTIVITAMIN TABS   OR     ONETOUCH ULTRA test strip     order for DME     order for DME     senna-docusate (SENOKOT-S;PERICOLACE) 8.6-50 MG per tablet     STATIN NOT PRESCRIBED (INTENTIONAL)     No current facility-administered medications for this visit.      ALLERGIES:    Allergies   Allergen Reactions     Bromfenac Visual Disturbance     Sulfites, xibrom  Causes sever eye pain     Codeine      \"NAUSE,HA AND DIZZINESS\"     Codeine Nausea     Other reaction(s): Dizziness, Headache     Sulfites Visual Disturbance     Xibrom (bromfenac opthalmic solution) 0.09%--eye pain     FHx:  Family History   Problem Relation Age of Onset     Hypertension Mother         diabetes,hypoythryoidism, stroke     Diabetes Mother      Heart Disease Father         , cancer lip     Heart Disease Paternal Grandfather              Cancer Paternal Grandmother              Cerebrovascular Disease Maternal Grandfather              Cerebrovascular Disease Maternal Grandmother         , diabetes     Connective Tissue Disorder Sister  " "       fibromyalgia     Diabetes Sister      Hypertension Brother      Cancer Paternal Aunt         ovarian       SOCIAL Hx:  Social History     Socioeconomic History     Marital status:      Spouse name: Allen     Number of children: 3     Years of education: 15     Highest education level: Not on file   Social Needs     Financial resource strain: Not on file     Food insecurity - worry: Not on file     Food insecurity - inability: Not on file     Transportation needs - medical: Not on file     Transportation needs - non-medical: Not on file   Occupational History     Not on file   Tobacco Use     Smoking status: Never Smoker     Smokeless tobacco: Never Used   Substance and Sexual Activity     Alcohol use: No     Alcohol/week: 0.0 oz     Drug use: No     Sexual activity: No     Partners: Male   Other Topics Concern     Parent/sibling w/ CABG, MI or angioplasty before 65F 55M? Not Asked   Social History Narrative     Not on file       ROS: A comprehensive Review of Systems was asked and answered in the negative unless specifically commented upon in the HPI    Physical Exam  /78   Pulse 80   Temp 98.1  F (36.7  C) (Oral)   Ht 1.676 m (5' 6\")   Wt 97.8 kg (215 lb 11.2 oz)   LMP 12/13/2002   SpO2 95%   BMI 34.81 kg/m     Body mass index is 34.81 kg/m .  Gen: A&Ox3, NAD  HEENT: Moist mucus membranes, no scleral icterus.  Lungs: no respiratory distress  Abd: soft, non-tender, non-distended.  No guarding/rigidity/rebound.  Skin: no jaundice, no stigmata of chronic liver disease  Ext: warm, dry, no evidence of edema    LABS:  Admission on 02/06/2019, Discharged on 02/06/2019   Component Date Value Ref Range Status     Amylase 02/06/2019 73  30 - 110 U/L Final     Lipase 02/06/2019 1,062* 73 - 393 U/L Final     WBC 02/06/2019 6.8  4.0 - 11.0 10e9/L Final     RBC Count 02/06/2019 4.79  3.8 - 5.2 10e12/L Final     Hemoglobin 02/06/2019 13.1  11.7 - 15.7 g/dL Final     Hematocrit 02/06/2019 41.9  35.0 - " 47.0 % Final     MCV 02/06/2019 88  78 - 100 fl Final     MCH 02/06/2019 27.3  26.5 - 33.0 pg Final     MCHC 02/06/2019 31.3* 31.5 - 36.5 g/dL Final     RDW 02/06/2019 13.3  10.0 - 15.0 % Final     Platelet Count 02/06/2019 353  150 - 450 10e9/L Final     Sodium 02/06/2019 132* 133 - 144 mmol/L Final     Potassium 02/06/2019 4.1  3.4 - 5.3 mmol/L Final     Chloride 02/06/2019 98  94 - 109 mmol/L Final     Carbon Dioxide 02/06/2019 26  20 - 32 mmol/L Final     Anion Gap 02/06/2019 8  3 - 14 mmol/L Final     Glucose 02/06/2019 248* 70 - 99 mg/dL Final     Urea Nitrogen 02/06/2019 13  7 - 30 mg/dL Final     Creatinine 02/06/2019 0.74  0.52 - 1.04 mg/dL Final     GFR Estimate 02/06/2019 81  >60 mL/min/[1.73_m2] Final    Comment: Non  GFR Calc  Starting 12/18/2018, serum creatinine based estimated GFR (eGFR) will be   calculated using the Chronic Kidney Disease Epidemiology Collaboration   (CKD-EPI) equation.       GFR Estimate If Black 02/06/2019 >90  >60 mL/min/[1.73_m2] Final    Comment:  GFR Calc  Starting 12/18/2018, serum creatinine based estimated GFR (eGFR) will be   calculated using the Chronic Kidney Disease Epidemiology Collaboration   (CKD-EPI) equation.       Calcium 02/06/2019 10.1  8.5 - 10.1 mg/dL Final     Bilirubin Total 02/06/2019 0.9  0.2 - 1.3 mg/dL Final     Albumin 02/06/2019 3.5  3.4 - 5.0 g/dL Final     Protein Total 02/06/2019 8.0  6.8 - 8.8 g/dL Final     Alkaline Phosphatase 02/06/2019 333* 40 - 150 U/L Final     ALT 02/06/2019 215* 0 - 50 U/L Final     AST 02/06/2019 153* 0 - 45 U/L Final     Glucose 02/06/2019 246* 70 - 99 mg/dL Final     Glucose 02/06/2019 236* 70 - 99 mg/dL Final     Glucose 02/06/2019 196* 70 - 99 mg/dL Final     Amylase 02/06/2019 65  30 - 110 U/L Final     Lipase 02/06/2019 589* 73 - 393 U/L Final     ERCP 02/06/2019    Final                    Value:[address] [place] [address] [phone]      Endoscopy Department  _______________________________________________________________________________  Patient Name: Gricelda Sabillon         Procedure Date: 2/6/2019 9:46 AM  MRN: [id number]                       Account Number: [id number]  YOB: 1950              Admit Type: Outpatient  Age: 68                               Room: OR  Gender: Female                        Note Status: Finalized  Attending MD: Guru Gonzalez ,     Total Sedation Time:   _______________________________________________________________________________     Procedure:           ERCP  Indications:         Jaundice                       68 year old white female with h/o obstructive sleep                        apnea, asthma, hypertension, hyperlipidemia was                        evaluated by hepatology for elevated LFTs. MRCP showed a                        filling defect in the distal bile duct. ERCP with p                          lans                        to perform biliary sphinctertomy and evaluate filing                        defect  Providers:           Guru Gonzalez  Referring MD:        Tonya Llamas MD  Medicines:           General Anesthesia, Indomethacin 100 mg rectal, Levaquin                        500 mg IV  Complications:       No immediate complications.  _______________________________________________________________________________  Procedure:           Pre-Anesthesia Assessment:                       - Prior to the procedure, a History and Physical was                        performed, and patient medications and allergies were                        reviewed. The patient is competent. The risks and                        benefits of the procedure and the sedation options and                        risks were discussed with the patient. All questions                        were answered and informed consent was obtained. Patient                        identification and proposed p                           rocedure were verified by                        the physician and the nurse in the pre-procedure area.                        Mental Status Examination: alert and oriented. Airway                        Examination: normal oropharyngeal airway and neck                        mobility. Respiratory Examination: clear to                        auscultation. CV Examination: normal. Prophylactic                        Antibiotics: The patient requires prophylactic                        antibiotics for the planned ERCP in an obstructed bile                        duct. The patient received antibiotic therapy before the                        procedure. Prior Anticoagulants: The patient has taken                        no previous anticoagulant or antiplatelet agents. ASA                        Grade Assessment: II - A patient with mild systemic                        disease. After reviewing the risks and benefits, the                        patient was deemed in satisfactory condition to                           undergo                        the procedure. The anesthesia plan was to use general                        anesthesia. Immediately prior to administration of                        medications, the patient was re-assessed for adequacy to                        receive sedatives. The heart rate, respiratory rate,                        oxygen saturations, blood pressure, adequacy of                        pulmonary ventilation, and response to care were                        monitored throughout the procedure. The physical status                        of the patient was re-assessed after the procedure.                       After obtaining informed consent, the scope was passed                        under direct vision. Throughout the procedure, the                        patient's blood pressure, pulse, and oxygen saturations                        were monitored continuously. The  Duodenoscope was                        introduced through the mouth, and used to inject                                                  contrast into and used to inject contrast into the bile                        duct.                                                                                   Findings:       The  film was normal. The scope was advanced to a normal major        papilla in the descending duodenum. Examination of the pharynx, larynx        and associated structures, and upper GI tract was normal. A large        periampullary polypoid lesion seen around the major papilla.The bile        duct was deeply cannulated with the short-nosed traction sphincterotome.        Contrast was injected. I personally interpreted the bile duct images.        There was brisk flow of contrast through the ducts. Image quality was        excellent. Contrast extended to the main bile duct. The main bile duct        was normal. NovaGold was passed into the biliary tree. To discover        objects, the biliary tree was swept with a 12 mm balloon starting at the        upper third of the main bile duct. A in                          traductal polypoid lesion was        everted out of the CBD. One 10 Fr by 7 cm temporary plastic biliary        stent with a single external flap and a single internal flap was placed        7 cm into the common bile duct. Bile flowed through the stent. The stent        was in good position.                                                                                   Impression:          - A large periampullary flat polypoid lesion seen in the                        region of the major papilla possibly involving the                        ampulla                       - Selective biliary cannulation was performed with 9 mm                        biliary sphinctertomy and a small intraductal polyp                        appeared everted in position. This is likely to be the                         filling defect seen in the distal bile duct and was most                        likely the cause for biliary obstruction. Since the                        pancreatic enzymes were elevated                           it is likely                        compressing PD as well                       - No obstructing stone seen                       - Dr Guerrero and Dr Ramirez was intra-operatively                        consulted and they agreed that sampling this lesion will                        make further endoscopic management more challenging                       - A 10 Fr by 7 cm Sofflex stent was placed in the bile                        duct since she had an obstructive component  Recommendation:      - Observe patient in same day observation unit for                        ongoing care.                       - Check amylase and lipase in 2 hours. Will discharge pt                        if she does not have pain                       - Avoid ASA and anti-coagulation for 3 days                       - Discussed with patient and her  post                        operatively along with Dr Guerrero. Will recommend                        endoscopic ultrasound to evaluate the depth of invasion.                                                  EMR with ampullectomy and biliary/pancreatic stenting                        followed by frequent surveillance is a possibility.                        Whipple procedure may be the alternative. Pt to be                        discussed in our multidisciplinary tumor conference to                        discuss further management. If Surg Onc will need                        pre-operative sampling a Spylglass cholangioscope can be                        used to explore the bile duct better                                                                                     ____________________  Guru Gonzalez,   2/6/2019 12:48:09 PM  I was physically present  for the entire viewing portion of the exam.  __________________________  Signature of teaching physician  Michela/[id number] Gonzalez  Number of Addenda: 0    Note Initiated On: 2/6/2019 9:46 AM  Scope In:  Scope Out:         IMAGING:  MR ABDOMEN MAGNETIC RESONANCE CHOLANGIOPANCREATOGRAPHY WITHOUT AND  WITH CONTRAST  1/21/2019 10:35 AM      HISTORY: Abnormal liver function tests (LFTs). Elevated liver enzymes.     TECHNIQUE: Multiplanar multisequence MRI of the liver with and without  contrast including MRCP images. 10 mL Gadavist.     COMPARISON: Ultrasound 12/14/2018     FINDINGS: The common bile duct is dilated at 11 mm. The common hepatic  duct and intrahepatic biliary tree are also dilated. There is a  filling defect in the distal common bile duct that measures 8 mm  (series 4, image 18). Pancreatic duct is not dilated. The gallbladder  is only mildly distended. A few gallstones are present along with  sludge.     No focal liver lesions demonstrated. There is mild fatty infiltration  of the liver. No ascites.     The spleen, pancreas, and adrenal glands are unremarkable. No  hydronephrosis or enhancing renal mass. There is a right renal cyst  that measures 5.5 cm. No abnormal bowel distention or ascites. No  adenopathy.                                                              IMPRESSION:  1. Cholelithiasis and choledocholithiasis. Common bile duct stone  measures 8 mm.  2. Intra- and extrahepatic biliary ductal dilation.  3. Mild fatty infiltration of the liver.     MD Shaun BURR MD

## 2019-02-13 NOTE — LETTER
2/13/2019      RE: Gricelda Sabillon  [address]  [facility] [address]       INTERVENTIONAL ENDOSCOPY OUTPATIENT CLINIC CONSULT    DATE OF SERVICE: 2/13/2019  PHYSICIAN REQUESTING CONSULT: Guru Lisa MD; Tonya Llamas MD  Reason for Consultation: Periampullary/ampullary polyp    ASSESSMENT:  Gricelda Vaughn is a 68 year old female with a PMHx of CHERRY with CPAP, DM2, asthma, HTN, and HLD who was being evaluated by Dr. Llamas and Dr. Gonzalez for elevated LFTs that was thought to be from choledocholithiasis but ultimately found to be from an obstructing periampullary polyp. I was able to assess the polyp intraoperatively and there were no obvious signs of more advance neoplasia/deep invasion. The extension of polyp into the bile duct appeared superficial and within the ampulla and likely will come out with a papillectomy. Her MRI/MRCP was reviewed at tumor conference as well and no evidence of more advanced neoplasia or lymphadenopathy was seen. 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 No biopsies were obtained at my request to minimize fibrosis and optimize chances for a successful endoscopic resection. I discussed with the patient and her  what I would recommend is further staging with EUS and if that also shows no deep invasion then we would proceed with a papillectomy and piecemeal EMR of the surrounding polyp around the major papilla which appears to extend slightly into D3. Pathology will allow us to determine if we removed the intraductal extension of polyp during the papillectomy. If there is extension into the bile duct on the final path but no signs of adenocarcinoma, then either we pursue Whipple at that juncture or we could do intraductal RFA to ablate any residual polyp as the extension is likely to be minimal. We would then need to continue surveying that intraductal portion. I also explained that we will need to place pancreatic and  biliary stents to ensure patency after resection and minimize the risk of pancreatitis. I explained we will need to do a repeat ERCP/EGD one month later (depending on the EUS and pathology findings) to remove any residual polyp after a piecemeal resection and remove her stents. I also explained that she will need ongoing endoscopic surveillance of her duodenum after that. I explained the alternative to an endoscopic approach would be to proceed with a Whipple surgery, with the main advantage of this approach being definitive upfront treatment without the need for multiple endoscopic procedures however the risk of morbidity and mortality related to a Whipple far outweigh this benefit. This case was also discussed with Dr. Anjum Santos who was in agreement with an endoscopic approach. I also explained that even though I may be able to endoscopically remove all visible polyp tissue, if the pathology shows an area of adenocarcinoma she may need a Whipple surgery anyway. She and her  were in agreement with pursuing EUS, ERCP, papillectomy and EMR. We discussed the risks which include: perforation (~2%), pancreatitis (5-10%), bleeding (10%), cholangitis and delayed complications of biliary/pancreatic orifice stricturing. I think it is unlikely that she would develop a duodenal stricture after EMR as the polyp appears only 1/2 circumferential. To minimize the risk of delayed bleeding post-resection, we will start her on BID PPI and Carafate.    She has never had a colonoscopy. I explained that presence of duodenal adenomas are independently associated colon adenomas and also we should rule out attenuated FAP, although unlikely. Given the complexity of the initial procedure, I would favor doing the colonoscopy as part of the follow up ERCP/EGD 1 month later.    RECOMMENDATIONS:  - Plan for EUS, ERCP with papillectomy and EMR  - Start BID pantoprazole 40 mg BID a couple days before the procedure and 8 weeks  following  - Start Carafate 1 g QID 4 weeks after the procedure  - Tentatively schedule repeat ERCP/EGD and colonoscopy in 1 month depending on results from initial procedure  - Aspirin may be continued periprocedurally    Thank you for this consultation.  It was a pleasure to participate in the care of this patient; please contact us with any further questions.  A total of 45 minutes was spent in face to face evaluation with this patient, >50% of which was counseling and coordinating a management plan for this patient.     Shaun Guerrero MD  [facility]  Division of Gastroenterology and Hepatology  [facility] [address] [address]  _______________________________________________________________    HPI:  Gricelda Vaughn is a 68 year old female with a PMHx of CHERRY with CPAP, DM2, asthma, HTN, and HLD who was being evaluated by Dr. Llamas and Dr. Gonzalez for elevated LFTs that was thought to be from choledocholithiasis but ultimately found to be from an obstructing periampullary polyp. The patient was in her usual state of health when it was incidentally noted that she had elevated liver chemistries on routine blood work. U/S showed mild CBD dilation and subsequent MRCP showed a filling defect in the distal bile duct. She underwent ERCP by Dr. Gonzalez on 2/6/19 that endoscopically showed a large carpeted periampullary polyp involving the major papilla. I was asked into the OR to visually assess the lesion as well. Endoscopically it was consistent with an adenoma, Elayne 0-IIa lesion. No high risk features to suggest deeper invasion were seen. A small intraductal polyp was seen after biliary sphincterotomy that appeared to still be within the ampulla. A biliary stent was placed and clinically she is doing well post-procedure. She has never a an endoscopy previously nor has she had a screening colonoscopy. No family history of GI malignancy.     Patient  denies jaundice or abdominal pain. No history of melena, hematemesis or hematochezia.  Patient denies fevers, sweats, chills or weight loss.    PMHx:  Past Medical History:   Diagnosis Date     Allergic rhinitis, cause unspecified      Asthma      Asthma, mild intermittent      Cataract      Cellulitis and abscess of leg, except foot 03/00    Bilateral     Eczema      Heart murmur     aortic area     HTN, goal below 140/90      Hyperlipidemia LDL goal < 100      Hyperplastic colon polyp 2008     Infection     bilateral eye infections     Macular hole of left eye      Obesity (BMI 30-39.9)      Osteoarthritis     knees     Other chronic pain     right knee     Sleep apnea     uses c pap     Type 2 diabetes, HbA1C goal < 8% (H)        PSurgHx:  Past Surgical History:   Procedure Laterality Date     ARTHROPLASTY HIP Right 9/25/2017    Procedure: ARTHROPLASTY HIP;  Right total hip arthroplasty  ;  Surgeon: Ayaan Pena MD;  Location: [facility] OR     ARTHROPLASTY KNEE  7/12/2013    Procedure: ARTHROPLASTY KNEE;  right total knee arthroplasty ;  Surgeon: Chaparro Wesley MD;  Location: [facility] OR     ARTHROSCOPY KNEE Right 1/21/2015    Procedure: ARTHROSCOPY KNEE;  Surgeon: Ayaan Pena MD;  Location: [facility] OR     C INCISION OF HYMEN       CATARACT IOL, RT/LT       COLONOSCOPY  2008     ENDOSCOPIC RETROGRADE CHOLANGIOPANCREATOGRAM N/A 2/6/2019    Procedure: COMBINED ENDOSCOPIC RETROGRADE CHOLANGIOPANCREATOGRAPHY, BILIARY SPINCTEROTOMY AND DILATION, PLACE BILE DUCT STENT;  Surgeon: Guru Andie Gonzalez MD;  Location: [facility] OR     EYE SURGERY      macular hole repaired left eye     HC KNEE SCOPE, DIAGNOSTIC      - both knees     HEAD & NECK SURGERY      wisdom teeth       MEDS:  Current Outpatient Medications   Medication     pantoprazole (PROTONIX) 40 MG EC tablet     sucralfate (CARAFATE) 1 GM tablet     albuterol (PROAIR HFA, PROVENTIL HFA, VENTOLIN HFA) 108 (90 BASE) MCG/ACT inhaler     aspirin  "81 MG EC tablet     atenolol (TENORMIN) 50 MG tablet     azelastine (ASTELIN) 137 MCG/SPRAY nasal spray     azelastine (OPTIVAR) 0.05 % SOLN     calcium-vitamin D 500-125 MG-UNIT TABS     cetirizine (ZYRTEC) 10 MG tablet     desoximetasone (TOPICORT) 0.25 % cream     dorzolamide (TRUSOPT) 2 % ophthalmic solution     fish oil-omega-3 fatty acids (FISH OIL) 1000 MG capsule     glipiZIDE (GLUCOTROL) 5 MG tablet     hydrochlorothiazide (HYDRODIURIL) 25 MG tablet     ibuprofen (ADVIL) 200 MG tablet     JANUVIA 100 MG tablet     latanoprost (XALATAN) 0.005 % ophthalmic solution     lisinopril (PRINIVIL/ZESTRIL) 40 MG tablet     metFORMIN (GLUCOPHAGE) 1000 MG tablet     MULTIVITAMIN TABS   OR     ONETOUCH ULTRA test strip     order for DME     order for DME     senna-docusate (SENOKOT-S;PERICOLACE) 8.6-50 MG per tablet     STATIN NOT PRESCRIBED (INTENTIONAL)     No current facility-administered medications for this visit.      ALLERGIES:    Allergies   Allergen Reactions     Bromfenac Visual Disturbance     Sulfites, xibrom  Causes sever eye pain     Codeine      \"NAUSE,HA AND DIZZINESS\"     Codeine Nausea     Other reaction(s): Dizziness, Headache     Sulfites Visual Disturbance     Xibrom (bromfenac opthalmic solution) 0.09%--eye pain     FHx:  Family History   Problem Relation Age of Onset     Hypertension Mother         diabetes,hypoythryoidism, stroke     Diabetes Mother      Heart Disease Father         , cancer lip     Heart Disease Paternal Grandfather              Cancer Paternal Grandmother              Cerebrovascular Disease Maternal Grandfather              Cerebrovascular Disease Maternal Grandmother         , diabetes     Connective Tissue Disorder Sister         fibromyalgia     Diabetes Sister      Hypertension Brother      Cancer Paternal Aunt         ovarian       SOCIAL Hx:  Social History     Socioeconomic History     Marital status:      Spouse name: Allen" "    Number of children: 3     Years of education: 15     Highest education level: Not on file   Social Needs     Financial resource strain: Not on file     Food insecurity - worry: Not on file     Food insecurity - inability: Not on file     Transportation needs - medical: Not on file     Transportation needs - non-medical: Not on file   Occupational History     Not on file   Tobacco Use     Smoking status: Never Smoker     Smokeless tobacco: Never Used   Substance and Sexual Activity     Alcohol use: No     Alcohol/week: 0.0 oz     Drug use: No     Sexual activity: No     Partners: Male   Other Topics Concern     Parent/sibling w/ CABG, MI or angioplasty before 65F 55M? Not Asked   Social History Narrative     Not on file       ROS: A comprehensive Review of Systems was asked and answered in the negative unless specifically commented upon in the HPI    Physical Exam  /78   Pulse 80   Temp 98.1  F (36.7  C) (Oral)   Ht 1.676 m (5' 6\")   Wt 97.8 kg (215 lb 11.2 oz)   LMP 12/13/2002   SpO2 95%   BMI 34.81 kg/m     Body mass index is 34.81 kg/m .  Gen: A&Ox3, NAD  HEENT: Moist mucus membranes, no scleral icterus.  Lungs: no respiratory distress  Abd: soft, non-tender, non-distended.  No guarding/rigidity/rebound.  Skin: no jaundice, no stigmata of chronic liver disease  Ext: warm, dry, no evidence of edema    LABS:  Admission on 02/06/2019, Discharged on 02/06/2019   Component Date Value Ref Range Status     Amylase 02/06/2019 73  30 - 110 U/L Final     Lipase 02/06/2019 1,062* 73 - 393 U/L Final     WBC 02/06/2019 6.8  4.0 - 11.0 10e9/L Final     RBC Count 02/06/2019 4.79  3.8 - 5.2 10e12/L Final     Hemoglobin 02/06/2019 13.1  11.7 - 15.7 g/dL Final     Hematocrit 02/06/2019 41.9  35.0 - 47.0 % Final     MCV 02/06/2019 88  78 - 100 fl Final     MCH 02/06/2019 27.3  26.5 - 33.0 pg Final     MCHC 02/06/2019 31.3* 31.5 - 36.5 g/dL Final     RDW 02/06/2019 13.3  10.0 - 15.0 % Final     Platelet Count " 02/06/2019 353  150 - 450 10e9/L Final     Sodium 02/06/2019 132* 133 - 144 mmol/L Final     Potassium 02/06/2019 4.1  3.4 - 5.3 mmol/L Final     Chloride 02/06/2019 98  94 - 109 mmol/L Final     Carbon Dioxide 02/06/2019 26  20 - 32 mmol/L Final     Anion Gap 02/06/2019 8  3 - 14 mmol/L Final     Glucose 02/06/2019 248* 70 - 99 mg/dL Final     Urea Nitrogen 02/06/2019 13  7 - 30 mg/dL Final     Creatinine 02/06/2019 0.74  0.52 - 1.04 mg/dL Final     GFR Estimate 02/06/2019 81  >60 mL/min/[1.73_m2] Final    Comment: Non  GFR Calc  Starting 12/18/2018, serum creatinine based estimated GFR (eGFR) will be   calculated using the Chronic Kidney Disease Epidemiology Collaboration   (CKD-EPI) equation.       GFR Estimate If Black 02/06/2019 >90  >60 mL/min/[1.73_m2] Final    Comment:  GFR Calc  Starting 12/18/2018, serum creatinine based estimated GFR (eGFR) will be   calculated using the Chronic Kidney Disease Epidemiology Collaboration   (CKD-EPI) equation.       Calcium 02/06/2019 10.1  8.5 - 10.1 mg/dL Final     Bilirubin Total 02/06/2019 0.9  0.2 - 1.3 mg/dL Final     Albumin 02/06/2019 3.5  3.4 - 5.0 g/dL Final     Protein Total 02/06/2019 8.0  6.8 - 8.8 g/dL Final     Alkaline Phosphatase 02/06/2019 333* 40 - 150 U/L Final     ALT 02/06/2019 215* 0 - 50 U/L Final     AST 02/06/2019 153* 0 - 45 U/L Final     Glucose 02/06/2019 246* 70 - 99 mg/dL Final     Glucose 02/06/2019 236* 70 - 99 mg/dL Final     Glucose 02/06/2019 196* 70 - 99 mg/dL Final     Amylase 02/06/2019 65  30 - 110 U/L Final     Lipase 02/06/2019 589* 73 - 393 U/L Final     ERCP 02/06/2019    Final                    Value:[address] [place] [address] [phone]     Endoscopy Department  _______________________________________________________________________________  Patient Name: Gricelda Sabillon         Procedure Date: 2/6/2019 9:46 AM  N: [id number]                        Account Number: [id number]  YOB: 1950              Admit Type: Outpatient  Age: 68                               Room: OR  Gender: Female                        Note Status: Finalized  Attending MD: Guru Gonzalez ,     Total Sedation Time:   _______________________________________________________________________________     Procedure:           ERCP  Indications:         Jaundice                       68 year old white female with h/o obstructive sleep                        apnea, asthma, hypertension, hyperlipidemia was                        evaluated by hepatology for elevated LFTs. MRCP showed a                        filling defect in the distal bile duct. ERCP with p                          lans                        to perform biliary sphinctertomy and evaluate filing                        defect  Providers:           Guru Gonzalez  Referring MD:        Tonya Llamas MD  Medicines:           General Anesthesia, Indomethacin 100 mg rectal, Levaquin                        500 mg IV  Complications:       No immediate complications.  _______________________________________________________________________________  Procedure:           Pre-Anesthesia Assessment:                       - Prior to the procedure, a History and Physical was                        performed, and patient medications and allergies were                        reviewed. The patient is competent. The risks and                        benefits of the procedure and the sedation options and                        risks were discussed with the patient. All questions                        were answered and informed consent was obtained. Patient                        identification and proposed p                          rocedure were verified by                        the physician and the nurse in the pre-procedure area.                        Mental Status Examination: alert and oriented. Airway                         Examination: normal oropharyngeal airway and neck                        mobility. Respiratory Examination: clear to                        auscultation. CV Examination: normal. Prophylactic                        Antibiotics: The patient requires prophylactic                        antibiotics for the planned ERCP in an obstructed bile                        duct. The patient received antibiotic therapy before the                        procedure. Prior Anticoagulants: The patient has taken                        no previous anticoagulant or antiplatelet agents. ASA                        Grade Assessment: II - A patient with mild systemic                        disease. After reviewing the risks and benefits, the                        patient was deemed in satisfactory condition to                           undergo                        the procedure. The anesthesia plan was to use general                        anesthesia. Immediately prior to administration of                        medications, the patient was re-assessed for adequacy to                        receive sedatives. The heart rate, respiratory rate,                        oxygen saturations, blood pressure, adequacy of                        pulmonary ventilation, and response to care were                        monitored throughout the procedure. The physical status                        of the patient was re-assessed after the procedure.                       After obtaining informed consent, the scope was passed                        under direct vision. Throughout the procedure, the                        patient's blood pressure, pulse, and oxygen saturations                        were monitored continuously. The Duodenoscope was                        introduced through the mouth, and used to inject                                                  contrast into and used to inject contrast into the bile                         duct.                                                                                   Findings:       The  film was normal. The scope was advanced to a normal major        papilla in the descending duodenum. Examination of the pharynx, larynx        and associated structures, and upper GI tract was normal. A large        periampullary polypoid lesion seen around the major papilla.The bile        duct was deeply cannulated with the short-nosed traction sphincterotome.        Contrast was injected. I personally interpreted the bile duct images.        There was brisk flow of contrast through the ducts. Image quality was        excellent. Contrast extended to the main bile duct. The main bile duct        was normal. NovaGold was passed into the biliary tree. To discover        objects, the biliary tree was swept with a 12 mm balloon starting at the        upper third of the main bile duct. A in                          traductal polypoid lesion was        everted out of the CBD. One 10 Fr by 7 cm temporary plastic biliary        stent with a single external flap and a single internal flap was placed        7 cm into the common bile duct. Bile flowed through the stent. The stent        was in good position.                                                                                   Impression:          - A large periampullary flat polypoid lesion seen in the                        region of the major papilla possibly involving the                        ampulla                       - Selective biliary cannulation was performed with 9 mm                        biliary sphinctertomy and a small intraductal polyp                        appeared everted in position. This is likely to be the                        filling defect seen in the distal bile duct and was most                        likely the cause for biliary obstruction. Since the                        pancreatic enzymes were elevated                            it is likely                        compressing PD as well                       - No obstructing stone seen                       - Dr Guerrero and Dr Ramirez was intra-operatively                        consulted and they agreed that sampling this lesion will                        make further endoscopic management more challenging                       - A 10 Fr by 7 cm Sofflex stent was placed in the bile                        duct since she had an obstructive component  Recommendation:      - Observe patient in same day observation unit for                        ongoing care.                       - Check amylase and lipase in 2 hours. Will discharge pt                        if she does not have pain                       - Avoid ASA and anti-coagulation for 3 days                       - Discussed with patient and her  post                        operatively along with Dr Guerrero. Will recommend                        endoscopic ultrasound to evaluate the depth of invasion.                                                  EMR with ampullectomy and biliary/pancreatic stenting                        followed by frequent surveillance is a possibility.                        Whipple procedure may be the alternative. Pt to be                        discussed in our multidisciplinary tumor conference to                        discuss further management. If Surg Onc will need                        pre-operative sampling a Spylglass cholangioscope can be                        used to explore the bile duct better                                                                                     ____________________  Guru Gonzalez,   2/6/2019 12:48:09 PM  I was physically present for the entire viewing portion of the exam.  __________________________  Signature of teaching physician  Michela/Roxane Gonzalez  Number of Addenda: 0    Note Initiated On: 2/6/2019 9:46 AM  Scope In:  Scope Out:          IMAGING:  MR ABDOMEN MAGNETIC RESONANCE CHOLANGIOPANCREATOGRAPHY WITHOUT AND  WITH CONTRAST  1/21/2019 10:35 AM      HISTORY: Abnormal liver function tests (LFTs). Elevated liver enzymes.     TECHNIQUE: Multiplanar multisequence MRI of the liver with and without  contrast including MRCP images. 10 mL Gadavist.     COMPARISON: Ultrasound 12/14/2018     FINDINGS: The common bile duct is dilated at 11 mm. The common hepatic  duct and intrahepatic biliary tree are also dilated. There is a  filling defect in the distal common bile duct that measures 8 mm  (series 4, image 18). Pancreatic duct is not dilated. The gallbladder  is only mildly distended. A few gallstones are present along with  sludge.     No focal liver lesions demonstrated. There is mild fatty infiltration  of the liver. No ascites.     The spleen, pancreas, and adrenal glands are unremarkable. No  hydronephrosis or enhancing renal mass. There is a right renal cyst  that measures 5.5 cm. No abnormal bowel distention or ascites. No  adenopathy.                                                                   IMPRESSION:  1. Cholelithiasis and choledocholithiasis. Common bile duct stone  measures 8 mm.  2. Intra- and extrahepatic biliary ductal dilation.  3. Mild fatty infiltration of the liver.     EDILIA VILLELA MD

## 2019-02-14 ENCOUNTER — MYC MEDICAL ADVICE (OUTPATIENT)
Dept: INTERNAL MEDICINE | Facility: CLINIC | Age: 69
End: 2019-02-14

## 2019-02-19 NOTE — TELEPHONE ENCOUNTER
Only the Atenolol in that morning.   The evening before the colonoscopy skip the Metformin   If low BS in the morning of the colonoscopy - she may use glucose gel or tablets

## 2019-02-25 DIAGNOSIS — E11.65 TYPE 2 DIABETES MELLITUS WITH HYPERGLYCEMIA, WITHOUT LONG-TERM CURRENT USE OF INSULIN (H): ICD-10-CM

## 2019-02-25 DIAGNOSIS — R79.89 LFT ELEVATION: ICD-10-CM

## 2019-02-25 DIAGNOSIS — I10 ESSENTIAL HYPERTENSION WITH GOAL BLOOD PRESSURE LESS THAN 140/90: Chronic | ICD-10-CM

## 2019-02-25 LAB — HBA1C MFR BLD: 8.1 % (ref 0–5.6)

## 2019-02-25 PROCEDURE — 83036 HEMOGLOBIN GLYCOSYLATED A1C: CPT | Performed by: INTERNAL MEDICINE

## 2019-02-25 PROCEDURE — 36415 COLL VENOUS BLD VENIPUNCTURE: CPT | Performed by: INTERNAL MEDICINE

## 2019-02-25 PROCEDURE — 80053 COMPREHEN METABOLIC PANEL: CPT | Performed by: INTERNAL MEDICINE

## 2019-02-26 LAB
ALBUMIN SERPL-MCNC: 3.6 G/DL (ref 3.4–5)
ALP SERPL-CCNC: 128 U/L (ref 40–150)
ALT SERPL W P-5'-P-CCNC: 99 U/L (ref 0–50)
ANION GAP SERPL CALCULATED.3IONS-SCNC: 16 MMOL/L (ref 3–14)
AST SERPL W P-5'-P-CCNC: 69 U/L (ref 0–45)
BILIRUB SERPL-MCNC: 0.3 MG/DL (ref 0.2–1.3)
BUN SERPL-MCNC: 17 MG/DL (ref 7–30)
CALCIUM SERPL-MCNC: 9.9 MG/DL (ref 8.5–10.1)
CHLORIDE SERPL-SCNC: 103 MMOL/L (ref 94–109)
CO2 SERPL-SCNC: 18 MMOL/L (ref 20–32)
CREAT SERPL-MCNC: 0.92 MG/DL (ref 0.52–1.04)
GFR SERPL CREATININE-BSD FRML MDRD: 63 ML/MIN/{1.73_M2}
GLUCOSE SERPL-MCNC: 185 MG/DL (ref 70–99)
POTASSIUM SERPL-SCNC: 4.2 MMOL/L (ref 3.4–5.3)
PROT SERPL-MCNC: 7.3 G/DL (ref 6.8–8.8)
SODIUM SERPL-SCNC: 137 MMOL/L (ref 133–144)

## 2019-02-28 ENCOUNTER — HOSPITAL ENCOUNTER (OUTPATIENT)
Facility: CLINIC | Age: 69
Setting detail: OBSERVATION
Discharge: HOME | End: 2019-03-01
Attending: INTERNAL MEDICINE | Admitting: INTERNAL MEDICINE
Payer: COMMERCIAL

## 2019-02-28 ENCOUNTER — ANESTHESIA EVENT (OUTPATIENT)
Dept: SURGERY | Facility: CLINIC | Age: 69
End: 2019-02-28
Payer: COMMERCIAL

## 2019-02-28 ENCOUNTER — APPOINTMENT (OUTPATIENT)
Dept: GENERAL RADIOLOGY | Facility: CLINIC | Age: 69
End: 2019-02-28
Attending: INTERNAL MEDICINE
Payer: COMMERCIAL

## 2019-02-28 ENCOUNTER — ANESTHESIA (OUTPATIENT)
Dept: SURGERY | Facility: CLINIC | Age: 69
End: 2019-02-28
Payer: COMMERCIAL

## 2019-02-28 DIAGNOSIS — D13.5 AMPULLARY ADENOMA: Primary | ICD-10-CM

## 2019-02-28 LAB
ALBUMIN SERPL-MCNC: 3.5 G/DL (ref 3.4–5)
ALP SERPL-CCNC: 116 U/L (ref 40–150)
ALT SERPL W P-5'-P-CCNC: 99 U/L (ref 0–50)
AMYLASE SERPL-CCNC: 48 U/L (ref 30–110)
ANION GAP SERPL CALCULATED.3IONS-SCNC: 9 MMOL/L (ref 3–14)
AST SERPL W P-5'-P-CCNC: 79 U/L (ref 0–45)
BILIRUB SERPL-MCNC: 0.4 MG/DL (ref 0.2–1.3)
BUN SERPL-MCNC: 15 MG/DL (ref 7–30)
CALCIUM SERPL-MCNC: 9.8 MG/DL (ref 8.5–10.1)
CHLORIDE SERPL-SCNC: 102 MMOL/L (ref 94–109)
CO2 SERPL-SCNC: 25 MMOL/L (ref 20–32)
CREAT SERPL-MCNC: 0.74 MG/DL (ref 0.52–1.04)
ERYTHROCYTE [DISTWIDTH] IN BLOOD BY AUTOMATED COUNT: 13.2 % (ref 10–15)
GFR SERPL CREATININE-BSD FRML MDRD: 81 ML/MIN/{1.73_M2}
GLUCOSE BLDC GLUCOMTR-MCNC: 146 MG/DL (ref 70–99)
GLUCOSE BLDC GLUCOMTR-MCNC: 150 MG/DL (ref 70–99)
GLUCOSE BLDC GLUCOMTR-MCNC: 292 MG/DL (ref 70–99)
GLUCOSE BLDC GLUCOMTR-MCNC: 305 MG/DL (ref 70–99)
GLUCOSE SERPL-MCNC: 152 MG/DL (ref 70–99)
HCT VFR BLD AUTO: 42.7 % (ref 35–47)
HGB BLD-MCNC: 13.3 G/DL (ref 11.7–15.7)
LIPASE SERPL-CCNC: 200 U/L (ref 73–393)
MCH RBC QN AUTO: 27 PG (ref 26.5–33)
MCHC RBC AUTO-ENTMCNC: 31.1 G/DL (ref 31.5–36.5)
MCV RBC AUTO: 87 FL (ref 78–100)
PLATELET # BLD AUTO: 325 10E9/L (ref 150–450)
POTASSIUM SERPL-SCNC: 4 MMOL/L (ref 3.4–5.3)
PROT SERPL-MCNC: 7.3 G/DL (ref 6.8–8.8)
RBC # BLD AUTO: 4.92 10E12/L (ref 3.8–5.2)
SODIUM SERPL-SCNC: 136 MMOL/L (ref 133–144)
WBC # BLD AUTO: 6.6 10E9/L (ref 4–11)

## 2019-02-28 PROCEDURE — 71000014 ZZH RECOVERY PHASE 1 LEVEL 2 FIRST HR: Performed by: INTERNAL MEDICINE

## 2019-02-28 PROCEDURE — 85027 COMPLETE CBC AUTOMATED: CPT | Performed by: INTERNAL MEDICINE

## 2019-02-28 PROCEDURE — C1877 STENT, NON-COAT/COV W/O DEL: HCPCS | Performed by: INTERNAL MEDICINE

## 2019-02-28 PROCEDURE — 80053 COMPREHEN METABOLIC PANEL: CPT | Performed by: INTERNAL MEDICINE

## 2019-02-28 PROCEDURE — 25000125 ZZHC RX 250: Performed by: NURSE ANESTHETIST, CERTIFIED REGISTERED

## 2019-02-28 PROCEDURE — 88305 TISSUE EXAM BY PATHOLOGIST: CPT | Performed by: INTERNAL MEDICINE

## 2019-02-28 PROCEDURE — 25000132 ZZH RX MED GY IP 250 OP 250 PS 637: Performed by: ANESTHESIOLOGY

## 2019-02-28 PROCEDURE — 25000128 H RX IP 250 OP 636: Performed by: STUDENT IN AN ORGANIZED HEALTH CARE EDUCATION/TRAINING PROGRAM

## 2019-02-28 PROCEDURE — 36000066 ZZH SURGERY LEVEL 4 W FLUORO 1ST 30 MIN - UMMC: Performed by: INTERNAL MEDICINE

## 2019-02-28 PROCEDURE — G0378 HOSPITAL OBSERVATION PER HR: HCPCS

## 2019-02-28 PROCEDURE — 25000128 H RX IP 250 OP 636: Performed by: ANESTHESIOLOGY

## 2019-02-28 PROCEDURE — 25000128 H RX IP 250 OP 636: Performed by: NURSE ANESTHETIST, CERTIFIED REGISTERED

## 2019-02-28 PROCEDURE — 25000125 ZZHC RX 250: Performed by: INTERNAL MEDICINE

## 2019-02-28 PROCEDURE — C1769 GUIDE WIRE: HCPCS | Performed by: INTERNAL MEDICINE

## 2019-02-28 PROCEDURE — 25800030 ZZH RX IP 258 OP 636: Performed by: ANESTHESIOLOGY

## 2019-02-28 PROCEDURE — 25000566 ZZH SEVOFLURANE, EA 15 MIN: Performed by: INTERNAL MEDICINE

## 2019-02-28 PROCEDURE — 00000146 ZZHCL STATISTIC GLUCOSE BY METER IP

## 2019-02-28 PROCEDURE — 36415 COLL VENOUS BLD VENIPUNCTURE: CPT | Performed by: INTERNAL MEDICINE

## 2019-02-28 PROCEDURE — 82150 ASSAY OF AMYLASE: CPT | Performed by: INTERNAL MEDICINE

## 2019-02-28 PROCEDURE — 36000064 ZZH SURGERY LEVEL 4 EA 15 ADDTL MIN - UMMC: Performed by: INTERNAL MEDICINE

## 2019-02-28 PROCEDURE — 37000009 ZZH ANESTHESIA TECHNICAL FEE, EACH ADDTL 15 MIN: Performed by: INTERNAL MEDICINE

## 2019-02-28 PROCEDURE — 85018 HEMOGLOBIN: CPT | Mod: 91 | Performed by: STUDENT IN AN ORGANIZED HEALTH CARE EDUCATION/TRAINING PROGRAM

## 2019-02-28 PROCEDURE — 27211024 ZZHC OR SUPPLY OTHER OPNP: Performed by: INTERNAL MEDICINE

## 2019-02-28 PROCEDURE — 36415 COLL VENOUS BLD VENIPUNCTURE: CPT | Performed by: STUDENT IN AN ORGANIZED HEALTH CARE EDUCATION/TRAINING PROGRAM

## 2019-02-28 PROCEDURE — 25000128 H RX IP 250 OP 636: Performed by: INTERNAL MEDICINE

## 2019-02-28 PROCEDURE — 40000277 XR SURGERY CARM FLUORO LESS THAN 5 MIN W STILLS: Mod: TC

## 2019-02-28 PROCEDURE — 83690 ASSAY OF LIPASE: CPT | Performed by: INTERNAL MEDICINE

## 2019-02-28 PROCEDURE — 27210794 ZZH OR GENERAL SUPPLY STERILE: Performed by: INTERNAL MEDICINE

## 2019-02-28 PROCEDURE — 40000169 ZZH STATISTIC PRE-PROCEDURE ASSESSMENT I: Performed by: INTERNAL MEDICINE

## 2019-02-28 PROCEDURE — 37000008 ZZH ANESTHESIA TECHNICAL FEE, 1ST 30 MIN: Performed by: INTERNAL MEDICINE

## 2019-02-28 PROCEDURE — 99220 ZZC INITIAL OBSERVATION CARE,LEVL III: CPT | Mod: GC | Performed by: STUDENT IN AN ORGANIZED HEALTH CARE EDUCATION/TRAINING PROGRAM

## 2019-02-28 PROCEDURE — 25000125 ZZHC RX 250: Performed by: STUDENT IN AN ORGANIZED HEALTH CARE EDUCATION/TRAINING PROGRAM

## 2019-02-28 PROCEDURE — C1726 CATH, BAL DIL, NON-VASCULAR: HCPCS | Performed by: INTERNAL MEDICINE

## 2019-02-28 PROCEDURE — 12000001 ZZH R&B MED SURG/OB UMMC

## 2019-02-28 DEVICE — STENT GEENEN PANCREA 5FRX3CM G22107 GPSO-5-3
Type: IMPLANTABLE DEVICE | Site: PANCREATIC DUCT | Status: NON-FUNCTIONAL
Removed: 2019-07-26

## 2019-02-28 DEVICE — IMPLANTABLE DEVICE
Type: IMPLANTABLE DEVICE | Site: BILE DUCT | Status: NON-FUNCTIONAL
Removed: 2019-04-18

## 2019-02-28 DEVICE — IMPLANTABLE DEVICE
Type: IMPLANTABLE DEVICE | Site: BILE DUCT | Status: NON-FUNCTIONAL
Removed: 2019-07-26

## 2019-02-28 RX ORDER — FENTANYL CITRATE 50 UG/ML
25-50 INJECTION, SOLUTION INTRAMUSCULAR; INTRAVENOUS
Status: DISCONTINUED | OUTPATIENT
Start: 2019-02-28 | End: 2019-02-28 | Stop reason: HOSPADM

## 2019-02-28 RX ORDER — PROPOFOL 10 MG/ML
INJECTION, EMULSION INTRAVENOUS PRN
Status: DISCONTINUED | OUTPATIENT
Start: 2019-02-28 | End: 2019-02-28

## 2019-02-28 RX ORDER — ONDANSETRON 2 MG/ML
4 INJECTION INTRAMUSCULAR; INTRAVENOUS EVERY 6 HOURS PRN
Status: DISCONTINUED | OUTPATIENT
Start: 2019-02-28 | End: 2019-03-01 | Stop reason: HOSPADM

## 2019-02-28 RX ORDER — DORZOLAMIDE HYDROCHLORIDE AND TIMOLOL MALEATE 20; 5 MG/ML; MG/ML
1 SOLUTION/ DROPS OPHTHALMIC 2 TIMES DAILY
Status: DISCONTINUED | OUTPATIENT
Start: 2019-02-28 | End: 2019-03-01 | Stop reason: HOSPADM

## 2019-02-28 RX ORDER — LIDOCAINE HYDROCHLORIDE 20 MG/ML
INJECTION, SOLUTION INFILTRATION; PERINEURAL PRN
Status: DISCONTINUED | OUTPATIENT
Start: 2019-02-28 | End: 2019-02-28

## 2019-02-28 RX ORDER — LATANOPROST 50 UG/ML
1 SOLUTION/ DROPS OPHTHALMIC AT BEDTIME
Status: DISCONTINUED | OUTPATIENT
Start: 2019-02-28 | End: 2019-03-01 | Stop reason: HOSPADM

## 2019-02-28 RX ORDER — SUCRALFATE ORAL 1 G/10ML
1 SUSPENSION ORAL
Status: DISCONTINUED | OUTPATIENT
Start: 2019-02-28 | End: 2019-02-28

## 2019-02-28 RX ORDER — HETASTARCH 6 G/100ML
500 INJECTION, SOLUTION INTRAVENOUS ONCE
Status: DISCONTINUED | OUTPATIENT
Start: 2019-02-28 | End: 2019-02-28

## 2019-02-28 RX ORDER — NALOXONE HYDROCHLORIDE 0.4 MG/ML
.1-.4 INJECTION, SOLUTION INTRAMUSCULAR; INTRAVENOUS; SUBCUTANEOUS
Status: DISCONTINUED | OUTPATIENT
Start: 2019-02-28 | End: 2019-02-28 | Stop reason: HOSPADM

## 2019-02-28 RX ORDER — ONDANSETRON 2 MG/ML
INJECTION INTRAMUSCULAR; INTRAVENOUS PRN
Status: DISCONTINUED | OUTPATIENT
Start: 2019-02-28 | End: 2019-02-28

## 2019-02-28 RX ORDER — MEPERIDINE HYDROCHLORIDE 50 MG/ML
12.5 INJECTION INTRAMUSCULAR; INTRAVENOUS; SUBCUTANEOUS
Status: DISCONTINUED | OUTPATIENT
Start: 2019-02-28 | End: 2019-02-28 | Stop reason: HOSPADM

## 2019-02-28 RX ORDER — ONDANSETRON 4 MG/1
4 TABLET, ORALLY DISINTEGRATING ORAL EVERY 6 HOURS PRN
Status: DISCONTINUED | OUTPATIENT
Start: 2019-02-28 | End: 2019-03-01 | Stop reason: HOSPADM

## 2019-02-28 RX ORDER — LABETALOL 20 MG/4 ML (5 MG/ML) INTRAVENOUS SYRINGE
10 EVERY 4 HOURS PRN
Status: DISCONTINUED | OUTPATIENT
Start: 2019-02-28 | End: 2019-03-01 | Stop reason: HOSPADM

## 2019-02-28 RX ORDER — NALOXONE HYDROCHLORIDE 0.4 MG/ML
.1-.4 INJECTION, SOLUTION INTRAMUSCULAR; INTRAVENOUS; SUBCUTANEOUS
Status: DISCONTINUED | OUTPATIENT
Start: 2019-02-28 | End: 2019-03-01 | Stop reason: HOSPADM

## 2019-02-28 RX ORDER — SODIUM CHLORIDE, SODIUM LACTATE, POTASSIUM CHLORIDE, CALCIUM CHLORIDE 600; 310; 30; 20 MG/100ML; MG/100ML; MG/100ML; MG/100ML
INJECTION, SOLUTION INTRAVENOUS CONTINUOUS
Status: DISCONTINUED | OUTPATIENT
Start: 2019-02-28 | End: 2019-02-28 | Stop reason: HOSPADM

## 2019-02-28 RX ORDER — LIDOCAINE 40 MG/G
CREAM TOPICAL
Status: DISCONTINUED | OUTPATIENT
Start: 2019-02-28 | End: 2019-02-28 | Stop reason: HOSPADM

## 2019-02-28 RX ORDER — NICOTINE POLACRILEX 4 MG
15-30 LOZENGE BUCCAL
Status: DISCONTINUED | OUTPATIENT
Start: 2019-02-28 | End: 2019-03-01 | Stop reason: HOSPADM

## 2019-02-28 RX ORDER — FENTANYL CITRATE 50 UG/ML
INJECTION, SOLUTION INTRAMUSCULAR; INTRAVENOUS PRN
Status: DISCONTINUED | OUTPATIENT
Start: 2019-02-28 | End: 2019-02-28

## 2019-02-28 RX ORDER — DORZOLAMIDE HYDROCHLORIDE AND TIMOLOL MALEATE 20; 5 MG/ML; MG/ML
1 SOLUTION/ DROPS OPHTHALMIC 2 TIMES DAILY
COMMUNITY
End: 2020-08-24

## 2019-02-28 RX ORDER — DEXTROSE MONOHYDRATE 25 G/50ML
25-50 INJECTION, SOLUTION INTRAVENOUS
Status: DISCONTINUED | OUTPATIENT
Start: 2019-02-28 | End: 2019-02-28

## 2019-02-28 RX ORDER — DEXAMETHASONE SODIUM PHOSPHATE 4 MG/ML
INJECTION, SOLUTION INTRA-ARTICULAR; INTRALESIONAL; INTRAMUSCULAR; INTRAVENOUS; SOFT TISSUE PRN
Status: DISCONTINUED | OUTPATIENT
Start: 2019-02-28 | End: 2019-02-28

## 2019-02-28 RX ORDER — ONDANSETRON 2 MG/ML
4 INJECTION INTRAMUSCULAR; INTRAVENOUS EVERY 30 MIN PRN
Status: DISCONTINUED | OUTPATIENT
Start: 2019-02-28 | End: 2019-02-28 | Stop reason: HOSPADM

## 2019-02-28 RX ORDER — FLUMAZENIL 0.1 MG/ML
0.2 INJECTION, SOLUTION INTRAVENOUS
Status: ACTIVE | OUTPATIENT
Start: 2019-02-28 | End: 2019-03-01

## 2019-02-28 RX ORDER — ONDANSETRON 4 MG/1
4 TABLET, ORALLY DISINTEGRATING ORAL EVERY 30 MIN PRN
Status: DISCONTINUED | OUTPATIENT
Start: 2019-02-28 | End: 2019-02-28 | Stop reason: HOSPADM

## 2019-02-28 RX ORDER — SUCRALFATE ORAL 1 G/10ML
1 SUSPENSION ORAL
Status: DISCONTINUED | OUTPATIENT
Start: 2019-02-28 | End: 2019-03-01 | Stop reason: HOSPADM

## 2019-02-28 RX ORDER — AZELASTINE HYDROCHLORIDE 0.5 MG/ML
1 SOLUTION/ DROPS OPHTHALMIC 2 TIMES DAILY PRN
Status: DISCONTINUED | OUTPATIENT
Start: 2019-02-28 | End: 2019-03-01 | Stop reason: HOSPADM

## 2019-02-28 RX ORDER — DEXTROSE MONOHYDRATE 25 G/50ML
25-50 INJECTION, SOLUTION INTRAVENOUS
Status: DISCONTINUED | OUTPATIENT
Start: 2019-02-28 | End: 2019-03-01 | Stop reason: HOSPADM

## 2019-02-28 RX ORDER — SUCRALFATE ORAL 1 G/10ML
1 SUSPENSION ORAL
Status: DISCONTINUED | OUTPATIENT
Start: 2019-03-01 | End: 2019-02-28

## 2019-02-28 RX ORDER — NICOTINE POLACRILEX 4 MG
15-30 LOZENGE BUCCAL
Status: DISCONTINUED | OUTPATIENT
Start: 2019-02-28 | End: 2019-02-28

## 2019-02-28 RX ADMIN — PROCHLORPERAZINE EDISYLATE 2.5 MG: 5 INJECTION INTRAMUSCULAR; INTRAVENOUS at 20:14

## 2019-02-28 RX ADMIN — FENTANYL CITRATE 25 MCG: 50 INJECTION, SOLUTION INTRAMUSCULAR; INTRAVENOUS at 18:37

## 2019-02-28 RX ADMIN — GLUCAGON HYDROCHLORIDE 0.4 MG: KIT at 16:37

## 2019-02-28 RX ADMIN — ROCURONIUM BROMIDE 30 MG: 10 INJECTION INTRAVENOUS at 16:00

## 2019-02-28 RX ADMIN — GLUCAGON HYDROCHLORIDE 0.4 MG: KIT at 16:12

## 2019-02-28 RX ADMIN — FENTANYL CITRATE 50 MCG: 50 INJECTION, SOLUTION INTRAMUSCULAR; INTRAVENOUS at 19:12

## 2019-02-28 RX ADMIN — GLUCAGON HYDROCHLORIDE 0.4 MG: KIT at 15:42

## 2019-02-28 RX ADMIN — GLUCAGON HYDROCHLORIDE 0.4 MG: KIT at 19:02

## 2019-02-28 RX ADMIN — ONDANSETRON 4 MG: 2 INJECTION INTRAMUSCULAR; INTRAVENOUS at 20:00

## 2019-02-28 RX ADMIN — HUMAN INSULIN 4 UNITS: 100 INJECTION, SOLUTION SUBCUTANEOUS at 20:56

## 2019-02-28 RX ADMIN — PROCHLORPERAZINE EDISYLATE 2.5 MG: 5 INJECTION INTRAMUSCULAR; INTRAVENOUS at 20:33

## 2019-02-28 RX ADMIN — PROPOFOL 80 MG: 10 INJECTION, EMULSION INTRAVENOUS at 14:14

## 2019-02-28 RX ADMIN — ONDANSETRON 4 MG: 2 INJECTION INTRAMUSCULAR; INTRAVENOUS at 19:30

## 2019-02-28 RX ADMIN — DEXAMETHASONE SODIUM PHOSPHATE 4 MG: 4 INJECTION, SOLUTION INTRA-ARTICULAR; INTRALESIONAL; INTRAMUSCULAR; INTRAVENOUS; SOFT TISSUE at 14:36

## 2019-02-28 RX ADMIN — FENTANYL CITRATE 50 MCG: 50 INJECTION, SOLUTION INTRAMUSCULAR; INTRAVENOUS at 19:39

## 2019-02-28 RX ADMIN — FENTANYL CITRATE 25 MCG: 50 INJECTION, SOLUTION INTRAMUSCULAR; INTRAVENOUS at 17:08

## 2019-02-28 RX ADMIN — SUGAMMADEX 200 MG: 100 INJECTION, SOLUTION INTRAVENOUS at 19:30

## 2019-02-28 RX ADMIN — FENTANYL CITRATE 25 MCG: 50 INJECTION, SOLUTION INTRAMUSCULAR; INTRAVENOUS at 14:06

## 2019-02-28 RX ADMIN — FENTANYL CITRATE 25 MCG: 50 INJECTION, SOLUTION INTRAMUSCULAR; INTRAVENOUS at 18:57

## 2019-02-28 RX ADMIN — SODIUM CHLORIDE, POTASSIUM CHLORIDE, SODIUM LACTATE AND CALCIUM CHLORIDE: 600; 310; 30; 20 INJECTION, SOLUTION INTRAVENOUS at 16:19

## 2019-02-28 RX ADMIN — ROCURONIUM BROMIDE 20 MG: 10 INJECTION INTRAVENOUS at 14:15

## 2019-02-28 RX ADMIN — SODIUM CHLORIDE, POTASSIUM CHLORIDE, SODIUM LACTATE AND CALCIUM CHLORIDE: 600; 310; 30; 20 INJECTION, SOLUTION INTRAVENOUS at 14:06

## 2019-02-28 RX ADMIN — LIDOCAINE HYDROCHLORIDE 50 MG: 20 INJECTION, SOLUTION INFILTRATION; PERINEURAL at 14:14

## 2019-02-28 RX ADMIN — ROCURONIUM BROMIDE 10 MG: 10 INJECTION INTRAVENOUS at 14:14

## 2019-02-28 RX ADMIN — HUMAN INSULIN 4 UNITS: 100 INJECTION, SOLUTION SUBCUTANEOUS at 20:11

## 2019-02-28 ASSESSMENT — ACTIVITIES OF DAILY LIVING (ADL)
RETIRED_COMMUNICATION: 0-->UNDERSTANDS/COMMUNICATES WITHOUT DIFFICULTY
DRESS: 0-->INDEPENDENT
COGNITION: 0 - NO COGNITION ISSUES REPORTED
TOILETING: 0-->INDEPENDENT
TRANSFERRING: 0-->INDEPENDENT
FALL_HISTORY_WITHIN_LAST_SIX_MONTHS: NO
AMBULATION: 0-->INDEPENDENT
BATHING: 0-->INDEPENDENT
SWALLOWING: 0-->SWALLOWS FOODS/LIQUIDS WITHOUT DIFFICULTY
RETIRED_EATING: 0-->INDEPENDENT

## 2019-02-28 ASSESSMENT — MIFFLIN-ST. JEOR: SCORE: 1525.63

## 2019-02-28 NOTE — LETTER
Transition Communication Hand-off for Care Transitions to Next Level of Care Provider    Name: Gricelda Sabillon  : 1950  MRN #: [id number]  Primary Care Provider: Raisa Davidson     [facility] [address]     Reason for Hospitalization:  Ampullary Adenoma  Ampullary adenoma  Ampullary adenoma  Admit Date/Time: 2019 12:19 PM  Discharge Date: 3/1/19  Payor Source: Payor: [facility] / Plan: [facility] MEDICARE ADVANTAGE / Product Type: Medicare /     Reason for Communication Hand-off Referral: continuation of care    Discharge Plan: home       Concern for non-adherence with plan of care:   Y/N n  Discharge Needs Assessment:      Already enrolled in Tele-monitoring program and name of program:    Follow-up specialty is recommended: Yes    Follow-up plan:    Future Appointments   Date Time Provider Department Center   3/12/2019  2:00 PM Raisa Davidson MD [facility]       Any outstanding tests or procedures:              Key Recommendations:      Raisa Johnston    AVS/Discharge Summary is the source of truth; this is a helpful guide for improved communication of patient story

## 2019-02-28 NOTE — ANESTHESIA PREPROCEDURE EVALUATION
Anesthesia Pre-Procedure Evaluation    Patient: Gricelda Sabillon   MRN:     [id number] Gender:   female   Age:    68 year old :      1950        Preoperative Diagnosis: Ampullary Adenoma   Procedure(s):  Upper Endoscopy, Endoscopic Ultrasound, Endoscopic Mucosal Resection  Endoscopic Retrograde Cholangiopancreatogram     Past Medical History:   Diagnosis Date     Allergic rhinitis, cause unspecified      Asthma      Asthma, mild intermittent      Cataract      Cellulitis and abscess of leg, except foot     Bilateral     Eczema      Heart murmur     aortic area     HTN, goal below 140/90      Hyperlipidemia LDL goal < 100      Hyperplastic colon polyp      Infection     bilateral eye infections     Macular hole of left eye      Obesity (BMI 30-39.9)      Osteoarthritis     knees     Other chronic pain     right knee     Sleep apnea     uses c pap     Type 2 diabetes, HbA1C goal < 8% (H)       Past Surgical History:   Procedure Laterality Date     ARTHROPLASTY HIP Right 2017    Procedure: ARTHROPLASTY HIP;  Right total hip arthroplasty  ;  Surgeon: Ayaan Pena MD;  Location: [facility]     ARTHROPLASTY KNEE  2013    Procedure: ARTHROPLASTY KNEE;  right total knee arthroplasty ;  Surgeon: Chaparro Wesley MD;  Location: [facility] OR     ARTHROSCOPY KNEE Right 2015    Procedure: ARTHROSCOPY KNEE;  Surgeon: Ayaan Pena MD;  Location: [facility] OR     C INCISION OF HYMEN       CATARACT IOL, RT/LT       COLONOSCOPY       ENDOSCOPIC RETROGRADE CHOLANGIOPANCREATOGRAM N/A 2019    Procedure: COMBINED ENDOSCOPIC RETROGRADE CHOLANGIOPANCREATOGRAPHY, BILIARY SPINCTEROTOMY AND DILATION, PLACE BILE DUCT STENT;  Surgeon: Guru Andie Gonzalez MD;  Location: [facility] OR     EYE SURGERY      macular hole repaired left eye     HC KNEE SCOPE, DIAGNOSTIC      - both knees     HEAD & NECK SURGERY      wisdom teeth          Anesthesia Evaluation     . Pt has had prior anesthetic.  Type of anesthetic: SLOW TO WAKE UP.           ROS/MED HX    ENT/Pulmonary:     (+)sleep apnea, asthma uses CPAP , . .    Neurologic: Comment: MACULAR HOLE IN LEFT EYE.      Cardiovascular:     (+) hypertension----. : . . . :. valvular problems/murmurs .       METS/Exercise Tolerance:     Hematologic:     (+) Anemia, -      Musculoskeletal:         GI/Hepatic:         Renal/Genitourinary:         Endo:     (+) type I DM (HbA1c 8.0  Feb. 2019), Obesity, .      Psychiatric:         Infectious Disease:         Malignancy:         Other:                         PHYSICAL EXAM:   Mental Status/Neuro: A/A/O   Airway: Facies: Feasible  Mallampati: II  Mouth/Opening: Full  TM distance: > 6 cm  Neck ROM: Full   Respiratory: Auscultation: CTAB     Resp. Rate: Normal     Resp. Effort: Normal      CV: Rhythm: Regular  Heart: Normal Sounds   Comments:                    Lab Results   Component Value Date    WBC 6.8 02/06/2019    HGB 13.1 02/06/2019    HCT 41.9 02/06/2019     02/06/2019    CRP <5.0 01/07/2014    SED 16 01/07/2014     02/25/2019    POTASSIUM 4.2 02/25/2019    CHLORIDE 103 02/25/2019    CO2 18 (L) 02/25/2019    BUN 17 02/25/2019    CR 0.92 02/25/2019     (H) 02/25/2019    KATHRYN 9.9 02/25/2019    ALBUMIN 3.6 02/25/2019    PROTTOTAL 7.3 02/25/2019    ALT 99 (H) 02/25/2019    AST 69 (H) 02/25/2019    ALKPHOS 128 02/25/2019    BILITOTAL 0.3 02/25/2019    LIPASE 589 (H) 02/06/2019    AMYLASE 65 02/06/2019    INR 1.02 01/17/2019    TSH 3.28 05/14/2018    T4 1.17 02/04/2016       Preop Vitals  BP Readings from Last 3 Encounters:   02/28/19 149/77   02/13/19 147/78   02/06/19 129/66    Pulse Readings from Last 3 Encounters:   02/28/19 73   02/13/19 80   02/06/19 69      Resp Readings from Last 3 Encounters:   02/28/19 12   02/06/19 16   02/04/19 14    SpO2 Readings from Last 3 Encounters:   02/28/19 97%   02/13/19 95%   02/06/19 91%      Temp Readings from Last 1 Encounters:   02/28/19 36.6  C (97.8  F)  "(Oral)    Ht Readings from Last 1 Encounters:   02/28/19 1.702 m (5' 7\")      Wt Readings from Last 1 Encounters:   02/28/19 97.3 kg (214 lb 8.1 oz)    Estimated body mass index is 33.6 kg/m  as calculated from the following:    Height as of this encounter: 1.702 m (5' 7\").    Weight as of this encounter: 97.3 kg (214 lb 8.1 oz).     LDA:            Assessment:   ASA SCORE: 3       Documentation: H&P complete; Preop Testing complete; Consents complete   Proceeding: Proceed without further delay  Tobacco Use:  NO Active use of Tobacco/UNKNOWN Tobacco use status     Plan:   Anes. Type:  General   Pre-Induction: Acetaminophen PO   Induction:  IV (Standard)   Airway: Oral ETT   Access/Monitoring: PIV   Maintenance: Balanced   Emergence: Procedure Site   Logistics: Same Day Surgery     Postop Pain/Sedation Strategy:  Standard-Options: Opioids PRN     PONV Management:  Adult Risk Factors: Female, Non-Smoker, Postop Opioids                         Abbey Soto DO  "

## 2019-03-01 ENCOUNTER — DOCUMENTATION ONLY (OUTPATIENT)
Dept: PHARMACY | Facility: CLINIC | Age: 69
End: 2019-03-01

## 2019-03-01 VITALS
BODY MASS INDEX: 33.67 KG/M2 | WEIGHT: 214.51 LBS | DIASTOLIC BLOOD PRESSURE: 68 MMHG | RESPIRATION RATE: 16 BRPM | HEIGHT: 67 IN | HEART RATE: 79 BPM | TEMPERATURE: 99 F | SYSTOLIC BLOOD PRESSURE: 100 MMHG | OXYGEN SATURATION: 94 %

## 2019-03-01 PROBLEM — Z09 SURGERY FOLLOW-UP: Status: ACTIVE | Noted: 2019-03-01

## 2019-03-01 LAB
ALBUMIN SERPL-MCNC: 2.8 G/DL (ref 3.4–5)
ALP SERPL-CCNC: 91 U/L (ref 40–150)
ALT SERPL W P-5'-P-CCNC: 81 U/L (ref 0–50)
ANION GAP SERPL CALCULATED.3IONS-SCNC: 10 MMOL/L (ref 3–14)
AST SERPL W P-5'-P-CCNC: 55 U/L (ref 0–45)
BASOPHILS # BLD AUTO: 0 10E9/L (ref 0–0.2)
BASOPHILS NFR BLD AUTO: 0.1 %
BILIRUB SERPL-MCNC: 0.4 MG/DL (ref 0.2–1.3)
BUN SERPL-MCNC: 17 MG/DL (ref 7–30)
CALCIUM SERPL-MCNC: 8.6 MG/DL (ref 8.5–10.1)
CHLORIDE SERPL-SCNC: 100 MMOL/L (ref 94–109)
CO2 SERPL-SCNC: 27 MMOL/L (ref 20–32)
CREAT SERPL-MCNC: 0.75 MG/DL (ref 0.52–1.04)
DIFFERENTIAL METHOD BLD: ABNORMAL
EOSINOPHIL # BLD AUTO: 0 10E9/L (ref 0–0.7)
EOSINOPHIL NFR BLD AUTO: 0.3 %
ERYTHROCYTE [DISTWIDTH] IN BLOOD BY AUTOMATED COUNT: 12.9 % (ref 10–15)
GFR SERPL CREATININE-BSD FRML MDRD: 80 ML/MIN/{1.73_M2}
GLUCOSE BLDC GLUCOMTR-MCNC: 133 MG/DL (ref 70–99)
GLUCOSE BLDC GLUCOMTR-MCNC: 212 MG/DL (ref 70–99)
GLUCOSE SERPL-MCNC: 117 MG/DL (ref 70–99)
HCT VFR BLD AUTO: 37.1 % (ref 35–47)
HGB BLD-MCNC: 11.5 G/DL (ref 11.7–15.7)
HGB BLD-MCNC: 11.8 G/DL (ref 11.7–15.7)
IMM GRANULOCYTES # BLD: 0 10E9/L (ref 0–0.4)
IMM GRANULOCYTES NFR BLD: 0.5 %
INR PPP: 1.07 (ref 0.86–1.14)
LIPASE SERPL-CCNC: 457 U/L (ref 73–393)
LYMPHOCYTES # BLD AUTO: 0.9 10E9/L (ref 0.8–5.3)
LYMPHOCYTES NFR BLD AUTO: 11.6 %
MCH RBC QN AUTO: 27.2 PG (ref 26.5–33)
MCHC RBC AUTO-ENTMCNC: 31 G/DL (ref 31.5–36.5)
MCV RBC AUTO: 88 FL (ref 78–100)
MONOCYTES # BLD AUTO: 0.3 10E9/L (ref 0–1.3)
MONOCYTES NFR BLD AUTO: 3.8 %
NEUTROPHILS # BLD AUTO: 6.2 10E9/L (ref 1.6–8.3)
NEUTROPHILS NFR BLD AUTO: 83.7 %
NRBC # BLD AUTO: 0 10*3/UL
NRBC BLD AUTO-RTO: 0 /100
PLATELET # BLD AUTO: 247 10E9/L (ref 150–450)
POTASSIUM SERPL-SCNC: 4.1 MMOL/L (ref 3.4–5.3)
PROT SERPL-MCNC: 6.1 G/DL (ref 6.8–8.8)
RBC # BLD AUTO: 4.23 10E12/L (ref 3.8–5.2)
SODIUM SERPL-SCNC: 136 MMOL/L (ref 133–144)
WBC # BLD AUTO: 7.4 10E9/L (ref 4–11)

## 2019-03-01 PROCEDURE — 96372 THER/PROPH/DIAG INJ SC/IM: CPT | Mod: XS

## 2019-03-01 PROCEDURE — C9113 INJ PANTOPRAZOLE SODIUM, VIA: HCPCS | Performed by: STUDENT IN AN ORGANIZED HEALTH CARE EDUCATION/TRAINING PROGRAM

## 2019-03-01 PROCEDURE — 96376 TX/PRO/DX INJ SAME DRUG ADON: CPT

## 2019-03-01 PROCEDURE — 00000146 ZZHCL STATISTIC GLUCOSE BY METER IP

## 2019-03-01 PROCEDURE — 25000128 H RX IP 250 OP 636: Performed by: STUDENT IN AN ORGANIZED HEALTH CARE EDUCATION/TRAINING PROGRAM

## 2019-03-01 PROCEDURE — 83690 ASSAY OF LIPASE: CPT | Performed by: STUDENT IN AN ORGANIZED HEALTH CARE EDUCATION/TRAINING PROGRAM

## 2019-03-01 PROCEDURE — 96374 THER/PROPH/DIAG INJ IV PUSH: CPT

## 2019-03-01 PROCEDURE — 80053 COMPREHEN METABOLIC PANEL: CPT | Performed by: STUDENT IN AN ORGANIZED HEALTH CARE EDUCATION/TRAINING PROGRAM

## 2019-03-01 PROCEDURE — 25000131 ZZH RX MED GY IP 250 OP 636 PS 637: Performed by: STUDENT IN AN ORGANIZED HEALTH CARE EDUCATION/TRAINING PROGRAM

## 2019-03-01 PROCEDURE — 85610 PROTHROMBIN TIME: CPT | Performed by: STUDENT IN AN ORGANIZED HEALTH CARE EDUCATION/TRAINING PROGRAM

## 2019-03-01 PROCEDURE — G0378 HOSPITAL OBSERVATION PER HR: HCPCS

## 2019-03-01 PROCEDURE — 85025 COMPLETE CBC W/AUTO DIFF WBC: CPT | Performed by: STUDENT IN AN ORGANIZED HEALTH CARE EDUCATION/TRAINING PROGRAM

## 2019-03-01 PROCEDURE — 25000132 ZZH RX MED GY IP 250 OP 250 PS 637: Performed by: STUDENT IN AN ORGANIZED HEALTH CARE EDUCATION/TRAINING PROGRAM

## 2019-03-01 PROCEDURE — 99217 ZZC OBSERVATION CARE DISCHARGE: CPT | Performed by: INTERNAL MEDICINE

## 2019-03-01 PROCEDURE — 36415 COLL VENOUS BLD VENIPUNCTURE: CPT | Performed by: STUDENT IN AN ORGANIZED HEALTH CARE EDUCATION/TRAINING PROGRAM

## 2019-03-01 RX ORDER — PANTOPRAZOLE SODIUM 40 MG/1
40 TABLET, DELAYED RELEASE ORAL
Status: DISCONTINUED | OUTPATIENT
Start: 2019-03-01 | End: 2019-03-01 | Stop reason: HOSPADM

## 2019-03-01 RX ORDER — SUCRALFATE 1 G/1
1 TABLET ORAL 4 TIMES DAILY
Qty: 120 TABLET | Refills: 1 | Status: ON HOLD | OUTPATIENT
Start: 2019-03-01 | End: 2019-04-18

## 2019-03-01 RX ORDER — PANTOPRAZOLE SODIUM 40 MG/1
40 TABLET, DELAYED RELEASE ORAL 2 TIMES DAILY
Qty: 120 TABLET | Refills: 3 | Status: ON HOLD | OUTPATIENT
Start: 2019-03-01 | End: 2019-04-18

## 2019-03-01 RX ORDER — DORZOLAMIDE HYDROCHLORIDE AND TIMOLOL MALEATE 20; 5 MG/ML; MG/ML
1 SOLUTION/ DROPS OPHTHALMIC 2 TIMES DAILY
Status: DISCONTINUED | OUTPATIENT
Start: 2019-03-01 | End: 2019-03-01

## 2019-03-01 RX ORDER — ONDANSETRON 4 MG/1
4 TABLET, ORALLY DISINTEGRATING ORAL EVERY 6 HOURS PRN
Qty: 30 TABLET | Refills: 0 | Status: SHIPPED | OUTPATIENT
Start: 2019-03-01 | End: 2021-03-23

## 2019-03-01 RX ADMIN — SUCRALFATE 1 G: 1 SUSPENSION ORAL at 00:03

## 2019-03-01 RX ADMIN — INSULIN ASPART 2 UNITS: 100 INJECTION, SOLUTION INTRAVENOUS; SUBCUTANEOUS at 00:00

## 2019-03-01 RX ADMIN — SUCRALFATE 1 G: 1 SUSPENSION ORAL at 11:51

## 2019-03-01 RX ADMIN — SUCRALFATE 1 G: 1 SUSPENSION ORAL at 08:09

## 2019-03-01 RX ADMIN — DORZOLAMIDE HYDROCHLORIDE AND TIMOLOL MALEATE 1 DROP: 20; 5 SOLUTION/ DROPS OPHTHALMIC at 00:03

## 2019-03-01 RX ADMIN — PANTOPRAZOLE SODIUM 40 MG: 40 INJECTION, POWDER, FOR SOLUTION INTRAVENOUS at 00:03

## 2019-03-01 RX ADMIN — DORZOLAMIDE HYDROCHLORIDE AND TIMOLOL MALEATE 1 DROP: 20; 5 SOLUTION/ DROPS OPHTHALMIC at 08:09

## 2019-03-01 RX ADMIN — PANTOPRAZOLE SODIUM 40 MG: 40 INJECTION, POWDER, FOR SOLUTION INTRAVENOUS at 08:09

## 2019-03-01 RX ADMIN — LATANOPROST 1 DROP: 50 SOLUTION OPHTHALMIC at 00:03

## 2019-03-01 ASSESSMENT — ACTIVITIES OF DAILY LIVING (ADL)
ADLS_ACUITY_SCORE: 12

## 2019-03-01 NOTE — DISCHARGE SUMMARY
[facility], [place]  Discharge Summary - Medicine & Pediatrics       Date of Admission:  2/28/2019  Date of Discharge:  3/1/2019  Discharging Provider: Dr. Smith  Discharge Service: Tresa Villafana    Discharge Diagnoses   Ampullary adenoma s/p ERCP    Follow-ups Needed After Discharge   Follow-up Appointments     Adult [facility] Follow-up and recommended labs and tests      Your follow up is being arranged by gastroenterology    Appointments on [place] and/or [facility] (with [facility] or [facility]   provider or service). Call [phone] if you haven't heard regarding   these appointments within 7 days of discharge.             Unresulted Labs Ordered in the Past 30 Days of this Admission     Date and Time Order Name Status Description    2/28/2019 1620 Surgical pathology exam In process           Discharge Disposition   Discharged to home  Condition at discharge: Stable    Hospital Course     Gricelda Sabillon is a 68 year old female who has a history of T2DM, HTN, asthma allergic rhinitis, macular hole, CHERRY w/CPAP and is admitted for post procedural observation following ERCP/endoscopic papillectomy.    #. Ampullary adenoma s/p endoscopic mucosal resection  #. S/p ERCP, EMR, and CBD stent exchange  Patient underwent endoscopic intervention the afternoon of admission, tolerated the procedure without incident. Some bleeding intra-op, hemostasis achieved and post-op fluoro without evidence of perforation. Day after procedure without abdominal pain, tolerated clear liquid diet.  -- Clear diet today and tomorrow, can advance diet thereafter  -- PPI 40 mg BID for 8 weeks post procedure  -- Carafate 1 g QID for 4 weeks post procedure  -- Follow up per GI (order placed by GI)    #. T2DM:  Restart PTA medications     #. CHERRY:  -- CPAP     #. Macular hole (s/p eye infections):  -- continue all home ocular/ophtho medications     #. Allergic rhinitis  #. Asthma  -- PTA medication     #. HTN  -- Resume  PTA medications      Consultations This Hospital Stay   None    Code Status   Full Code       The patient was discussed with [facility] 2 [facility]  [facility] [place]  Pager: [phone]  ______________________________________________________________________    Physical Exam   Vital Signs: Temp: 99  F (37.2  C) Temp src: Oral BP: 100/68 Pulse: 79 Heart Rate: 77 Resp: 16 SpO2: 94 % O2 Device: None (Room air) Oxygen Delivery: 4 LPM  Weight: 214 lbs 8.12 oz  General Appearance: alert oriented, appears comfortable, sitting at edge of bed, ambulating  Eyes: EOMI, anicteric sclera  HEENT: full neck ROM  Respiratory: CTAB, comfortable, nasal cannula removed  Cardiovascular: RRR, no m/r/c/g  GI: obese, soft, nontender, no rebound, no organomegaly  Lymph/Hematologic: no LAD, no ecchymoses on limited exam  Genitourinary: no suprapubic tenderness  Skin: grossly intact without lesion on limited exam of exposed skin  Musculoskeletal: moving all extremities without issue, no gross deformity  Neurologic: A&O x3, grossly intact        Primary Care Physician   Raisa Davidson    Immunizations       Discharge Orders      Reason for your hospital stay    Papillectomy     Adult [facility] Follow-up and recommended labs and tests    Your follow up is being arranged by gastroenterology    Appointments on [place] and/or [facility] (with [facility] or [facility] provider or service). Call [phone] if you haven't heard regarding these appointments within 7 days of discharge.     Activity    Your activity upon discharge: activity as tolerated     Discharge Instructions    Monitor for any signs of bleeding - dark, tarry or red stool. If this happens, come to the emergency departement     Diet    Follow this diet upon discharge: Orders Placed This Encounter      Advance Diet as Tolerated: Clear Liquid Diet today and tomorrow. March 3rd can advance diet as tolerated        Significant Results and Procedures   Results for orders placed or performed during the hospital encounter of 02/28/19   XR Surgery NIURKA L/T 5 Min Fluoro w Stills    Narrative    This exam was marked as non-reportable because it will not be read by a   radiologist or a [place] non-radiologist provider.               Results of ERCP/ampullectomy w/CBD stent placement (2/28/19):  Findings: Large flat periampullary polyp involving the major papilla and nearly 50% the circumference of the duodenum. EUS showed polypoid tissue within the CBD but confined within it. No invasion into the pancreatic head or signs of invasion beyond th muscularis propria with the rest of the duodenal polyp. Papillectomy performed and specimen submitted separately. PD stented. CBD cannulated and residual polyp tissue in the distal CBD everted out of the bile duct and snared out in pieces and submitted separately. Bile duct stented. The periampullary polyp was then removed in piecemeal fashion using cold snare EMR. The borders of the EMR defect were treated with APC to reduce the rate of recurrence. Active bleeding arterial vessel just below the PD orifice was treated with a hemoclip with cessation of bleeding. Smaller caliber vessels were treated with a coagrasper. Contrast study during the procedure did not show any suggestion of a perforation on fluoroscopy.   Complications:            None.  Implants:         5 Fr x 3 cm Geenen stent in PD; 10 Fr x 5 cm Sofflex stent in CBD; 1 hemoclip          Discharge Medications   Current Discharge Medication List      START taking these medications    Details   ondansetron (ZOFRAN-ODT) 4 MG ODT tab Take 1 tablet (4 mg) by mouth every 6 hours as needed for nausea or vomiting  Qty: 30 tablet, Refills: 0    Associated Diagnoses: Ampullary adenoma         CONTINUE these medications which have CHANGED    Details   pantoprazole (PROTONIX) 40 MG EC tablet Take 1 tablet (40 mg) by mouth 2 times  daily  Qty: 120 tablet, Refills: 3    Associated Diagnoses: Ampullary adenoma      sucralfate (CARAFATE) 1 GM tablet Take 1 tablet (1 g) by mouth 4 times daily  Qty: 120 tablet, Refills: 1    Associated Diagnoses: Ampullary adenoma         CONTINUE these medications which have NOT CHANGED    Details   aspirin 81 MG EC tablet Take 1 tablet (81 mg) by mouth daily  Qty: 90 tablet, Refills: 3      atenolol (TENORMIN) 50 MG tablet Take 1 tablet (50 mg) by mouth daily  Qty: 90 tablet, Refills: 1    Associated Diagnoses: HTN, goal below 140/90; Sinus tachycardia; Diabetes mellitus without complication (H); Hyperlipidemia with target LDL less than 100      calcium-vitamin D 500-125 MG-UNIT TABS       cetirizine (ZYRTEC) 10 MG tablet Take 10 mg by mouth every morning.      dorzolamide-timolol (COSOPT) 2-0.5 % ophthalmic solution Place 1 drop into both eyes 2 times daily       fish oil-omega-3 fatty acids (FISH OIL) 1000 MG capsule Take 1 g by mouth daily Reported on 3/22/2017      glipiZIDE (GLUCOTROL) 5 MG tablet TAKE TWO TABLETS BY MOUTH TWICE DAILY BEFORE MEAL(S)  Qty: 360 tablet, Refills: 1    Associated Diagnoses: Diabetes mellitus without complication (H); Hyperlipidemia with target LDL less than 100; HTN, goal below 140/90; Sinus tachycardia      hydrochlorothiazide (HYDRODIURIL) 25 MG tablet TAKE ONE TABLET BY MOUTH ONCE DAILY IN THE MORNING  Qty: 90 tablet, Refills: 1    Associated Diagnoses: HTN, goal below 140/90; Diabetes mellitus without complication (H); Hyperlipidemia with target LDL less than 100; Sinus tachycardia      ibuprofen (ADVIL) 200 MG tablet take 4 tablet (200 mg) by oral route every 8 hours as needed with food      JANUVIA 100 MG tablet TAKE 1 TABLET BY MOUTH ONCE DAILY  Qty: 90 tablet, Refills: 1    Associated Diagnoses: Diabetes mellitus without complication (H); Hyperlipidemia with target LDL less than 100; HTN, goal below 140/90; Sinus tachycardia      lisinopril (PRINIVIL/ZESTRIL) 40 MG tablet  Take 1 tablet (40 mg) by mouth daily  Qty: 90 tablet, Refills: 1    Associated Diagnoses: HTN, goal below 140/90; Diabetes mellitus without complication (H); Hyperlipidemia with target LDL less than 100; Sinus tachycardia      metFORMIN (GLUCOPHAGE) 1000 MG tablet TAKE 1 TABLET BY MOUTH TWICE DAILY WITH MEALS  Qty: 180 tablet, Refills: 0    Associated Diagnoses: Diabetes mellitus without complication (H); Hyperlipidemia with target LDL less than 100; HTN, goal below 140/90; Sinus tachycardia      MULTIVITAMIN TABS   OR Reported on 3/22/2017      ONETOUCH ULTRA test strip USE ONE STRIP TO CHECK GLUCOSE ONCE DAILY DIAG  CODE  E11.9  Qty: 100 strip, Refills: 3    Associated Diagnoses: Diabetes mellitus without complication (H)      albuterol (PROAIR HFA, PROVENTIL HFA, VENTOLIN HFA) 108 (90 BASE) MCG/ACT inhaler Inhale 2 puffs into the lungs every 4 hours as needed for shortness of breath / dyspnea  Qty: 1 Inhaler, Refills: 3    Associated Diagnoses: Acute bronchitis      azelastine (ASTELIN) 137 MCG/SPRAY nasal spray Taylor 1 spray into both nostrils 2 times daily as needed Reported on 3/22/2017      azelastine (OPTIVAR) 0.05 % SOLN Place 1 drop into both eyes 2 times daily as needed Reported on 3/22/2017      desoximetasone (TOPICORT) 0.25 % cream Apply sparingly to affected area's  Qty: 60 g, Refills: 1    Associated Diagnoses: Eczema      latanoprost (XALATAN) 0.005 % ophthalmic solution Place 1 drop Into the left eye At Bedtime  Qty: 2.5 mL, Refills: 1      !! order for DME All diabetic testing supplies including test strips, lancets and solution for testing 2 times per day. Patient is using   no Insulin. Last A1c Lab Results       Component                Value               Date                       A1C                      7.9                 08/20/2018  Qty: 100 each, Refills: 11    Associated Diagnoses: Diabetes mellitus without complication (H); Hyperglycemia; Mild intermittent asthma without complication;  "LFT elevation      !! order for DME All diabetic testing supplies including test strips, lancets and solution for testing 3 times per day. Patient is using  no Insulin. A1C      7.6   5/3/2016  A1C      7.4   2/4/2016  Qty: 3 Month, Refills: 1    Associated Diagnoses: Type 2 diabetes mellitus, uncontrolled (H); Hyperlipidemia with target LDL less than 100; Essential hypertension with goal blood pressure less than 140/90      senna-docusate (SENOKOT-S;PERICOLACE) 8.6-50 MG per tablet Take 1-2 tablets by mouth 2 times daily as needed for constipation  Qty: 40 tablet, Refills: 0    Associated Diagnoses: Constipation due to opioid therapy       !! - Potential duplicate medications found. Please discuss with provider.      STOP taking these medications       dorzolamide (TRUSOPT) 2 % ophthalmic solution Comments:   Reason for Stopping:         STATIN NOT PRESCRIBED (INTENTIONAL) Comments:   Reason for Stopping:             Allergies   Allergies   Allergen Reactions     Bromfenac Visual Disturbance     Sulfites, xibrom  Causes sever eye pain     Codeine      \"NAUSE,HA AND DIZZINESS\"     Codeine Nausea     Other reaction(s): Dizziness, Headache     Sulfites Visual Disturbance     Xibrom (bromfenac opthalmic solution) 0.09%--eye pain     Internal Medicine Staff Addendum  Date of Service: 3/1/2019  I have seen and examined Ms Sabillon, reviewed the data and discussed the plan of care with the care team on rounds.  I agree with the above documentation with the additions/changes to the ROS, HPI, Exam or data (including my edits in italics):    I discussed pt's care with bedside RN, case management/social work today.  I personally reviewed, labs, medications and past 24 hr notes.  Assessment/Plan/Diagnoses: plan/dx as above, which contains my edits and reflects our joint medical decision-making.     Ayaan Smith MD PhD  Internal Medicine Hospitalist & Staff Physician   of Internal Medicine   [facility]" [place]  Pager: [phone]

## 2019-03-01 NOTE — ANESTHESIA POSTPROCEDURE EVALUATION
Anesthesia POST Procedure Evaluation    Patient: Gricelda Sabillon   MRN:     [id number] Gender:   female   Age:    68 year old :      1950        Preoperative Diagnosis: Ampullary Adenoma   Procedure(s):  Upper Endoscopy, Endoscopic Ultrasound, Endoscopic Mucosal Resection,  Ampullectomy, polypectomy.  Endoscopic Retrograde Cholangiopancreatogram, Bile duct stent removal and placement   Postop Comments: No value filed.       Anesthesia Type:  General    Reportable Event: NO     PAIN: Uncomplicated   Sign Out status: Comfortable, Well controlled pain     PONV: No PONV   Sign Out status:  No Nausea or Vomiting     Neuro/Psych: Uneventful perioperative course   Sign Out Status: Preoperative baseline; Age appropriate mentation     Airway/Resp.: Uneventful perioperative course   Sign Out Status: Non labored breathing, age appropriate RR; Resp. Status within EXPECTED Parameters     CV: Uneventful perioperative course   Sign Out status: Appropriate BP and perfusion indices; Appropriate HR/Rhythm     Disposition:   Sign Out in:  PACU  Disposition:  Floor  Recovery Course: Uneventful  Follow-Up: Not required           Last Anesthesia Record Vitals:  CRNA VITALS  2019 1908 - 2019 2008      2019             Resp Rate (observed):  29          Last PACU/Preop Vitals:  Vitals:    19 2111   BP: 152/84 147/71 139/74   Pulse:  85    Resp: 18    Temp: 36.5  C (97.7  F) 36.1  C (97  F) 36.7  C (98  F)   SpO2: 94% 95% 90%         Electronically Signed By: Marty Patino MD, 2019, 9:39 PM

## 2019-03-01 NOTE — BRIEF OP NOTE
[facility], [place]    Brief Operative Note    Pre-operative diagnosis: Ampullary Adenoma  Post-operative diagnosis * Same *  Procedure: Procedure(s):  Upper Endoscopy, Endoscopic Ultrasound, Endoscopic Mucosal Resection  Endoscopic Retrograde Cholangiopancreatogram  Surgeon: Surgeon(s) and Role:     * Shaun Guerrero MD - Primary  Anesthesia: General   Estimated blood loss: Minimal  Drains: None  Specimens:   ID Type Source Tests Collected by Time Destination   A : Ampullary polyp.  Tissue Other SURGICAL PATHOLOGY EXAM Shaun Guerrero MD 2/28/2019  4:18 PM    B : Tamica ampullar polyp Polyp Other SURGICAL PATHOLOGY EXAM Shaun Guerrero MD 2/28/2019  4:38 PM    C : Intra ductal polyp Polyp Other SURGICAL PATHOLOGY EXAM Shaun Guerrero MD 2/28/2019  5:17 PM      Findings:   Large flat periampullary polyp involving the major papilla and nearly 50% the circumference of the duodenum. EUS showed polypoid tissue within the CBD but confined within it. No invasion into the pancreatic head or signs of invasion beyond th muscularis propria with the rest of the duodenal polyp. Papillectomy performed and specimen submitted separately. PD stented. CBD cannulated and residual polyp tissue in the distal CBD everted out of the bile duct and snared out in pieces and submitted separately. Bile duct stented. The periampullary polyp was then removed in piecemeal fashion using cold snare EMR. The borders of the EMR defect were treated with APC to reduce the rate of recurrence. Active bleeding arterial vessel just below the PD orifice was treated with a hemoclip with cessation of bleeding. Smaller caliber vessels were treated with a coagrasper. Contrast study during the procedure did not show any suggestion of a perforation on fluoroscopy.   Complications: None.  Implants: 5 Fr x 3 cm Geenen stent in PD; 10 Fr x 5 cm Sofflex stent in CBD; 1 hemoclip    Recommendations:  - Admit patient for observation  - Start  IV PPI and QID 1 g liquid Carafate  - NPO for tonight. IV fluids  - If doing well tomorrow, can advance to liquids and potential discharge later tomorrow.  - Check CBC, CMP, and lipase tomorrow morning  - [facility] GI team will see tomorrow (Dr. Guerrero taking over as staff starting tomorrow)  - Await pathology results  - Next bowel movement will likely have blood/melena related to bleeding during procedure today

## 2019-03-01 NOTE — PLAN OF CARE
Status: POD #1 upper endoscopy and ERCP.     VS: VSS  On RA 94%. Refused home CPAP overnight.    Neuros: A&Ox4.   GI:clear liquid diet. Denies nausea.   : Voiding spontaneously. ?  IV: R PIV discontinued  Activity: Up independently   Pain: Denies.  Respiratory/Trach: No issues.   Skin: Intact.    Labs:  & 133. Checks Q4H.   Plan of care: pt.discharged home in stable condition  At 1237.discharge instructions discussed with pt.she verbalized understanding.pt will  discharger medication from pharmacy.pt wheeled down to main entrance by her .

## 2019-03-01 NOTE — UTILIZATION REVIEW
"  Admission Status; Secondary Review Determination         Under the authority of the Utilization Management Committee, the utilization review process indicated a secondary review on the above patient.  The review outcome is based on review of the medical records, discussions with staff, and applying clinical experience noted on the date of the review.        ()      Inpatient Status Appropriate - This patient's medical care is consistent with medical management for inpatient care and reasonable inpatient medical practice.      (x) Observation Status Appropriate - This patient does not meet hospital inpatient criteria and is placed in observation status. If this patient's primary payer is Medicare and was admitted as an inpatient, Condition Code 44 should be used and patient status changed to \"observation\".   () Admission Status NOT Appropriate - This patient's medical care is not consistent with medical management for Inpatient or Observation Status.          RATIONALE FOR DETERMINATION     Gricelda Sabillon is a 68 year old female who underwent ERCP/endoscopic papillectomy with some intraoperative bleeding on 2/28/19.  She was admitted to the hospital following the procedure for postoperative monitoring for bleeding/complications.  Patient did well and was stable for discharge on 3/1/19.  I spoke with the team who agree observation status appropriate.    The severity of illness, intensity of service provided, expected LOS and risk for adverse outcome make the care complex, high risk and appropriate for hospital admission.        The information on this document is developed by the utilization review team in order for the business office to ensure compliance.  This only denotes the appropriateness of proper admission status and does not reflect the quality of care rendered.         The definitions of Inpatient Status and Observation Status used in making the determination above are those provided in the CMS Coverage " Manual, Chapter 1 and Chapter 6, section 70.4.      Sincerely,     Sara Holley MD  Physician Advisor  Utilization Review/ Case Management  [facility].

## 2019-03-01 NOTE — PROGRESS NOTES
Patient admitted overnight for observation after EUS, ERCP, papillectomy, and duodenal EMR. Seen and examined by me this morning. Had an uneventful night. Denies abdominal pain except for mild discomfort with deep palpation in RUQ. Notes sore throat. No nausea, vomiting. Has not had a bowel movement yet. Labs stable this morning. Mild elevation in lipase expected. No signs of pancreatitis. I discussed the finding from the procedure with her again this morning. There was some intra-bile duct extension of polypoid tissue that appeared to be attached just at the biliary orifice. I resected most of this out. We will need to await pathology findings, but if there is any concern for carcinoma on the path then she would probably be best served with a Whipple. If it all comes back as adenoma/low grade dysplasia, then we will plan on repeating an ERCP/EGD in 4-6 weeks to resect any residual polyp and replace stents and then repeat in 3 months with possible spyglass into the CBD to assess for any remaining polypoid tissue and resect. If no remaining visibile tissue, will then treat with intraductal RFA and continue with close surveillance endoscopic exams.    Can advance to liquid diet today and tomorrow. Then advance to regular diet as tolerated after that.  Continue BID PPI for 8 weeks post-procedure and QID Carafate for 4 weeks after.  If tolerating liquid diet, may be discharged home later this afternoon.  Aspirin may be continued.   Patient instructed to monitor for any signs of bleeding.    Shaun Guerrero MD  [facility]  Division of Gastroenterology and Hepatology  [facility] [address], [place] [address]

## 2019-03-01 NOTE — H&P
[facility]    History and Physical - Cometa Service        Date of Admission:  2/28/2019    Assessment & Plan   Gricelda Sabillon is a 68 year old female who has a history of T2DM, HTN, asthma allergic rhinitis, macular hole, CHERRY w/CPAP and is admitted for post procedural observation following ERCP/endoscopic papillectomy.    #. Ampullary adenoma s/p endoscopic mucosal resection  #. S/p ERCP, EMR, and CBD stent exchange  Patient underwent endoscopic intervention the afternoon of admission, tolerated the procedure without incident. Some bleeding intra-op, hemostasis achieved and post-op fluoro without evidence of perforation.   -- Hgb at [place]  -- IV PPI 40 mg BID  -- Carafate solution q 4 hours  -- CMP, CBC, INR, Lipase in AM  -- NPO overnight   -- IF abdominal pain, obtain abdominal film  -- IF hypotensive, obtain Hgb      #. T2DM:  Hold home oral hypoglycemics. Post-procedural hyperglycemia >300.   -- medium sliding scale insulin  -- hypoglycemia protocol  -- q 4 hr BGs    #. CHERRY:  -- CPAP if desired    #. Macular hole (s/p eye infections):  -- continue all home ocular/ophtho medications    #. Allergic rhinitis  #. Asthma  -- Hold home meds while NPO tonight     #. HTN  -- Hold home meds while NPO tonight  -- PRN labetalol     Diet: NPO  Fluids: bolus PRN  DVT Prophylaxis: Pneumatic Compression Devices  Meza Catheter: not present  Code Status: Full Code      Disposition Plan   Expected discharge: Tomorrow, recommended to prior living arrangement once post procedural course without incident. .  Entered: Anthony Hurley MD 02/28/2019, 9:44 PM       The patient's care was discussed with the Attending Physician, Dr. Ramos.    Anthony Hurley MD  Cometa Service  [facility], [place]  Pager: see medicine-[employer] cross cover  Please see sticky note for cross cover  information  ______________________________________________________________________    Chief Complaint   Post-procedural observation    History is obtained from the patient and the medical record     History of Present Illness   Gricelda Sabillon is a 68 year old female who has a history of T2DM, HTN, asthma allergic rhinitis, macular hole, CHERRY w/CPAP and is admitted for post procedural observation following ERCP/endoscopic ampullectomy.    Briefly, patient was admitted for post-procedural observation following ERCP/ampullectomy for ampullary adenoma. She tolerated the procedure without incident and was transferred to the floor. She experienced transient hyperglycemia following the procedure and was treated with regular insulin.     Patient has no discrete complaints this evening following her procedure aside from fatigue and left hand numbness that is slowly resolving. She would also like to have the BP cuff inflate less frequently. Understands the plan of care moving forward and all questions were answered.     Denies N/V, abdominal pain, fever, chills, presyncope.     =====================================================================    Results of ERCP/ampullectomy w/CBD stent placement (2/28/19):  Findings: Large flat periampullary polyp involving the major papilla and nearly 50% the circumference of the duodenum. EUS showed polypoid tissue within the CBD but confined within it. No invasion into the pancreatic head or signs of invasion beyond th muscularis propria with the rest of the duodenal polyp. Papillectomy performed and specimen submitted separately. PD stented. CBD cannulated and residual polyp tissue in the distal CBD everted out of the bile duct and snared out in pieces and submitted separately. Bile duct stented. The periampullary polyp was then removed in piecemeal fashion using cold snare EMR. The borders of the EMR defect were treated with APC to reduce the rate of recurrence. Active bleeding  arterial vessel just below the PD orifice was treated with a hemoclip with cessation of bleeding. Smaller caliber vessels were treated with a coagrasper. Contrast study during the procedure did not show any suggestion of a perforation on fluoroscopy.   Complications:            None.  Implants:         5 Fr x 3 cm Geenen stent in PD; 10 Fr x 5 cm Sofflex stent in CBD; 1 hemoclip     Recommendations:  - Admit patient for observation  - Start IV PPI and QID 1 g liquid Carafate  - NPO for tonight. IV fluids  - If doing well tomorrow, can advance to liquids and potential discharge later tomorrow.  - Check CBC, CMP, and lipase tomorrow morning  - [facility] team will see tomorrow (Dr. Guerrero taking over as staff starting tomorrow)  - Await pathology results  - Next bowel movement will likely have blood/melena related to bleeding during procedure today  =====================================================================      Review of Systems    The 10 point Review of Systems is negative other than noted in the HPI or here.     Past Medical History    I have reviewed this patient's medical history and updated it with pertinent information if needed.   Past Medical History:   Diagnosis Date     Allergic rhinitis, cause unspecified      Asthma      Asthma, mild intermittent      Cataract      Cellulitis and abscess of leg, except foot 03/00    Bilateral     Eczema      Heart murmur     aortic area     HTN, goal below 140/90      Hyperlipidemia LDL goal < 100      Hyperplastic colon polyp 2008     Infection     bilateral eye infections     Macular hole of left eye      Obesity (BMI 30-39.9)      Osteoarthritis     knees     Other chronic pain     right knee     Sleep apnea     uses c pap     Type 2 diabetes, HbA1C goal < 8% (H)         Past Surgical History   I have reviewed this patient's surgical history and updated it with pertinent information if needed.  Past Surgical History:   Procedure Laterality Date      ARTHROPLASTY HIP Right 2017    Procedure: ARTHROPLASTY HIP;  Right total hip arthroplasty  ;  Surgeon: Ayaan Pena MD;  Location: [facility] OR     ARTHROPLASTY KNEE  2013    Procedure: ARTHROPLASTY KNEE;  right total knee arthroplasty ;  Surgeon: Chaparro Wesley MD;  Location: [facility] OR     ARTHROSCOPY KNEE Right 2015    Procedure: ARTHROSCOPY KNEE;  Surgeon: Ayaan Pena MD;  Location: [facility] OR     C INCISION OF HYMEN       CATARACT IOL, RT/LT       COLONOSCOPY       ENDOSCOPIC RETROGRADE CHOLANGIOPANCREATOGRAM N/A 2019    Procedure: COMBINED ENDOSCOPIC RETROGRADE CHOLANGIOPANCREATOGRAPHY, BILIARY SPINCTEROTOMY AND DILATION, PLACE BILE DUCT STENT;  Surgeon: Guru Andie Gonzalez MD;  Location: [facility] OR     EYE SURGERY      macular hole repaired left eye     HC KNEE SCOPE, DIAGNOSTIC      - both knees     HEAD & NECK SURGERY      wisdom teeth        Social History   I have reviewed this patient's social history and updated it with pertinent information if needed. Gricelda Sabillon  reports that  has never smoked. she has never used smokeless tobacco. She reports that she does not drink alcohol or use drugs.    Family History   I have reviewed this patient's family history and updated it with pertinent information if needed.   Family History   Problem Relation Age of Onset     Hypertension Mother         diabetes,hypoythryoidism, stroke     Diabetes Mother      Heart Disease Father         , cancer lip     Heart Disease Paternal Grandfather              Cancer Paternal Grandmother              Cerebrovascular Disease Maternal Grandfather              Cerebrovascular Disease Maternal Grandmother         , diabetes     Connective Tissue Disorder Sister         fibromyalgia     Diabetes Sister      Hypertension Brother      Cancer Paternal Aunt         ovarian       Prior to Admission Medications   Prior to Admission Medications    Prescriptions Last Dose Informant Patient Reported? Taking?   JANUVIA 100 MG tablet 2/27/2019 at 0700  No Yes   Sig: TAKE 1 TABLET BY MOUTH ONCE DAILY   MULTIVITAMIN TABS   OR Past Week at Unknown time Self Yes Yes   Sig: Reported on 3/22/2017   ONETOUCH ULTRA test strip 2/28/2019 at 0700  No Yes   Sig: USE ONE STRIP TO CHECK GLUCOSE ONCE DAILY DIAG  CODE  E11.9   STATIN NOT PRESCRIBED (INTENTIONAL) Unknown at Unknown time  No No   Sig: Please choose reason not prescribed, below   Patient not taking: Reported on 2/4/2019   albuterol (PROAIR HFA, PROVENTIL HFA, VENTOLIN HFA) 108 (90 BASE) MCG/ACT inhaler More than a month at Unknown time  No No   Sig: Inhale 2 puffs into the lungs every 4 hours as needed for shortness of breath / dyspnea   Patient not taking: Reported on 2/4/2019   aspirin 81 MG EC tablet 1/31/2019 at Unknown time  Yes Yes   Sig: Take 1 tablet (81 mg) by mouth daily   atenolol (TENORMIN) 50 MG tablet 2/28/2019 at 0700  No Yes   Sig: Take 1 tablet (50 mg) by mouth daily   azelastine (ASTELIN) 137 MCG/SPRAY nasal spray Unknown at Unknown time Self Yes No   Sig: Spray 1 spray into both nostrils 2 times daily as needed Reported on 3/22/2017   azelastine (OPTIVAR) 0.05 % SOLN Unknown at Unknown time Self Yes No   Sig: Place 1 drop into both eyes 2 times daily as needed Reported on 3/22/2017   calcium-vitamin D 500-125 MG-UNIT TABS Past Week at Unknown time  Yes Yes   cetirizine (ZYRTEC) 10 MG tablet 2/27/2019 at 0700 Self Yes Yes   Sig: Take 10 mg by mouth every morning.   desoximetasone (TOPICORT) 0.25 % cream Unknown at Unknown time  No No   Sig: Apply sparingly to affected area's   Patient not taking: Reported on 2/4/2019   dorzolamide (TRUSOPT) 2 % ophthalmic solution 2/28/2019 at 0700  Yes Yes   Sig: Place 2 drops into both eyes 2 times daily   fish oil-omega-3 fatty acids (FISH OIL) 1000 MG capsule Past Week at Unknown time Self Yes Yes   Sig: Take 1 g by mouth daily Reported on 3/22/2017  "  glipiZIDE (GLUCOTROL) 5 MG tablet 2/27/2019 at 2300  No Yes   Sig: TAKE TWO TABLETS BY MOUTH TWICE DAILY BEFORE MEAL(S)   hydrochlorothiazide (HYDRODIURIL) 25 MG tablet 2/27/2019 at 0700  No Yes   Sig: TAKE ONE TABLET BY MOUTH ONCE DAILY IN THE MORNING   ibuprofen (ADVIL) 200 MG tablet Past Week at Unknown time  Yes Yes   Sig: take 4 tablet (200 mg) by oral route every 8 hours as needed with food   latanoprost (XALATAN) 0.005 % ophthalmic solution Unknown at Unknown time  Yes No   Sig: Place 1 drop Into the left eye At Bedtime   lisinopril (PRINIVIL/ZESTRIL) 40 MG tablet 2/27/2019 at 0700  No Yes   Sig: Take 1 tablet (40 mg) by mouth daily   metFORMIN (GLUCOPHAGE) 1000 MG tablet 2/27/2019 at 2300  No Yes   Sig: TAKE 1 TABLET BY MOUTH TWICE DAILY WITH MEALS   order for DME   No No   Sig: All diabetic testing supplies including test strips, lancets and solution for testing 3 times per day. Patient is using  no Insulin. A1C      7.6   5/3/2016  A1C      7.4   2/4/2016   order for DME   No No   Sig: All diabetic testing supplies including test strips, lancets and solution for testing 2 times per day. Patient is using   no Insulin. Last A1c Lab Results       Component                Value               Date                       A1C                      7.9                 08/20/2018   pantoprazole (PROTONIX) 40 MG EC tablet Unknown at Unknown time  No No   Sig: Take 1 tablet (40 mg) by mouth 2 times daily   senna-docusate (SENOKOT-S;PERICOLACE) 8.6-50 MG per tablet Unknown at Unknown time  No No   Sig: Take 1-2 tablets by mouth 2 times daily as needed for constipation   Patient not taking: Reported on 2/4/2019   sucralfate (CARAFATE) 1 GM tablet Unknown at Unknown time  No No   Sig: Take 1 tablet (1 g) by mouth 4 times daily      Facility-Administered Medications: None     Allergies   Allergies   Allergen Reactions     Bromfenac Visual Disturbance     Sulfites, xibrom  Causes sever eye pain     Codeine      \"NAUSE,HA " "AND DIZZINESS\"     Codeine Nausea     Other reaction(s): Dizziness, Headache     Sulfites Visual Disturbance     Xibrom (bromfenac opthalmic solution) 0.09%--eye pain       Physical Exam   Vital Signs: Temp: 98  F (36.7  C) Temp src: Oral BP: 139/74 Pulse: 85 Heart Rate: 87 Resp: 22 SpO2: 90 % O2 Device: Nasal cannula Oxygen Delivery: 4 LPM  Weight: 214 lbs 8.12 oz    General Appearance: fatigued, NAD  Eyes: EOMI, anicteric sclera  HEENT: full neck ROM, thyroid midline  Respiratory: CTAB, comfortable on NC, no wheezes  Cardiovascular: RRR, no m/r/c/g  GI: obese, soft, nontender, no rebound, no organomegaly, normoactive BS  Lymph/Hematologic: no LAD, no ecchymoses on limited exam  Genitourinary: no suprapubic tenderness  Skin: grossly intact without lesion on limited exam of exposed skin  Musculoskeletal: moving all extremities without issue, no gross deformity  Neurologic: A&O x3, grossly intact  Psychiatric: appropriate     Data   Data reviewed today: I reviewed all medications, new labs and imaging results over the last 24 hours. I personally reviewed post procedural information/imaging.     Recent Labs   Lab 02/28/19  1335 02/28/19  1319 02/25/19  0900   WBC 6.6  --   --    HGB 13.3  --   --    MCV 87  --   --      --   --    NA  --  136 137   POTASSIUM  --  4.0 4.2   CHLORIDE  --  102 103   CO2  --  25 18*   BUN  --  15 17   CR  --  0.74 0.92   ANIONGAP  --  9 16*   KATHRYN  --  9.8 9.9   GLC  --  152* 185*   ALBUMIN  --  3.5 3.6   PROTTOTAL  --  7.3 7.3   BILITOTAL  --  0.4 0.3   ALKPHOS  --  116 128   ALT  --  99* 99*   AST  --  79* 69*   LIPASE  --  200  --      "

## 2019-03-01 NOTE — PLAN OF CARE
Status: POD #1 upper endoscopy and ERCP.     VS: VSS on 2L NC. Refused home CPAP overnight.    Neuros: A&Ox4. N/T to L middle finger, MD aware.  GI: NPO. Denies nausea.   : Voiding spontaneously. ?  IV: R PIV SL.  Activity: Up w/ SBA.  Pain: Denies.  Respiratory/Trach: No issues.   Skin: Intact.    Labs:  & 133. Checks Q4H.   Plan of care: Continue to monitor and follow POC.

## 2019-03-03 LAB
ERCP: NORMAL
UPPER EUS: NORMAL
UPPER GI ENDOSCOPY: NORMAL

## 2019-03-04 ENCOUNTER — TELEPHONE (OUTPATIENT)
Dept: INTERNAL MEDICINE | Facility: CLINIC | Age: 69
End: 2019-03-04

## 2019-03-04 ENCOUNTER — PATIENT OUTREACH (OUTPATIENT)
Dept: CARE COORDINATION | Facility: CLINIC | Age: 69
End: 2019-03-04

## 2019-03-04 NOTE — PROGRESS NOTES
Prior Authorization Approval    ondansetron 4mg ODT  Date Initiated: 3/1/2019  Date Completed: 3/1/2019  Prior Auth Type: B vs D                Status: Approved    Effective Date: 01/10/2019 - 03/01/2020  Copay: 13.29      [place] Filled: Yes    Insurance: [employer] [place] - Phone [phone] Fax [phone]  ID: [id number]  Case Number: [id number]   Submitted Via: Yaw Joseph  [facility] Liaison  Ph: [phone] Page: [phone]

## 2019-03-05 ENCOUNTER — PATIENT OUTREACH (OUTPATIENT)
Dept: CARE COORDINATION | Facility: CLINIC | Age: 69
End: 2019-03-05

## 2019-03-05 ASSESSMENT — ACTIVITIES OF DAILY LIVING (ADL): DEPENDENT_IADLS:: INDEPENDENT

## 2019-03-05 NOTE — TELEPHONE ENCOUNTER
IP F/U    Date: 3/1/19  Diagnosis: Ampullary adenoma  Is patient active in care coordination? No  Was patient in TCU? No    Next 5 appointments (look out 90 days)    Mar 12, 2019  2:00 PM CDT  Office Visit with Raisa Davidson MD  [facility] ([facility] [address]  [facility] [id number] [phone]

## 2019-03-05 NOTE — PROGRESS NOTES
Clinic Care Coordination Contact    Clinic Care Coordination Contact  OUTREACH    Referral Information:  Referral Source: IP Handoff    Patient admitted to [facility] 2/28-3/1 for post procedural observation following ERCP/endoscopic papillectomy.    CTS handoff received for continuation of care.      RN CC spoke with patient.  Melva states she is doing well.  She is now back to eating a regular diet and that is going slow.  She isn't feeling terribly hungry so she isn't eating much.  She is trying to eating softer foods that are higher in protein, like scrambled eggs.     No clinic care coordination needs at this time.     Primary Diagnosis: GI Disorders    Chief Complaint   Patient presents with     Clinic Care Coordination - Post Hospital     [facility] 2/28/19-3/1/19        [place] Utilization:   Clinic Utilization  Difficulty keeping appointments:: No  Compliance Concerns: No  No-Show Concerns: No  No PCP office visit in Past Year: No  Utilization    Last refreshed: 3/4/2019  9:33 PM:  Hospital Admissions 1           Last refreshed: 3/4/2019  9:33 PM:  ED Visits 0           Last refreshed: 3/4/2019  9:33 PM:  No Show Count (past year) 0              Current as of: 3/4/2019  9:33 PM            Equipment Currently Used at Home: cane, straight, walker, rolling, shower chair, commode     Pt reports understanding and denies any additional questions or concerns at this times. RN CC engaged in A[facility]ET communication during encounter.         Future Appointments              In 1 week Raisa Davidson MD [place], [place]          Plan: Patient will follow up with PCP as scheduled.  No further outreaches will be made at this time unless a new referral is made or a change in the pt's status occurs. Patient was provided with this writer's contact information and encouraged to call with any questions or concerns.    Natalia Bruno RN-BSN   Care Coordination  Phone:  [phone]  Email:  [email or web address]

## 2019-03-05 NOTE — LETTER
[facility] CARE COORDINATION  [address]  [place] [place] [facility] [address]    March 5, 2019    Gricelda Sabillon  [address] [facility] [place] [address]      Dear Gricelda,    I am a clinic care coordinator who works with Raisa Davidson MD at [facility]. I wanted to thank you for spending the time to talk with me. Please do not hesitate to call with any questions or concerns about your health.  Below is a description of clinic care coordination and how I can further assist you.     The clinic care coordinator is a registered nurse and/or  who understand the health care system. The goal of clinic care coordination is to help you manage your health and improve access to the [facility] system in the most efficient manner. The registered nurse can assist you in meeting your health care goals by providing education, coordinating services, and strengthening the communication among your providers. The  can assist you with financial, behavioral, psychosocial, chemical dependency, counseling, and/or psychiatric resources.    Please feel free to contact me at [phone], with any questions or concerns. We at [facility] are focused on providing you with the highest-quality healthcare experience possible and that all starts with you.     Sincerely,     Natalia Bruno RN-BSN   Care Coordination  Phone:  [phone]  Email: [email or web address]

## 2019-03-06 LAB — COPATH REPORT: NORMAL

## 2019-03-06 NOTE — RESULT ENCOUNTER NOTE
I called the patient and reviewed her pathology results from her recent endoscopic procedure. 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 No adenocarcinoma or high grade dysplasia seen. Given these findings, we will proceed as described in my procedure notes. Repeat ERCP and EGD in 4 weeks to remove PD stent and assess for further resection and colonoscopy at the same session in the OR. Then repeat ERCP/EGD in 3 months with spyglass and possible intraductal RFA. Patient and  expressed understanding and were in agreement with this plan.     Shaun Guerrero MD  [facility]  Division of Gastroenterology and Hepatology  [facility] [address] [id number]

## 2019-03-07 ENCOUNTER — CARE COORDINATION (OUTPATIENT)
Dept: SURGERY | Facility: CLINIC | Age: 69
End: 2019-03-07

## 2019-03-07 DIAGNOSIS — Z12.11 ENCOUNTER FOR SCREENING COLONOSCOPY: ICD-10-CM

## 2019-03-07 DIAGNOSIS — D13.5 AMPULLARY ADENOMA: Primary | ICD-10-CM

## 2019-03-07 NOTE — PROGRESS NOTES
[facility] RN Care Coordination Note:    Procedure requested: ERCP, EGD w/ possible EMR, and Colonoscopy (4 week follow up) and then in 3 months pt to be scheduled for ERCP with EGD and spyglass with possible intraductal RFA.     Requesting provider: Dr. Guerrero     Indication: Ampullary Adenoma     Urgency:   4-6 week follow up for ERCP/EGD w/ EMR and colonoscopy  3 month follow up after that procedure for a repeat ERCP w/ spyglass and EGD with possible intraductal RFA    Pre procedure testing needed: Dr. Guerrero will update H&P for 4 week follow up but patient should be scheduled for H&P for the 3 month follow up.     PAC appointment: only if patient prefers.      Other notes:    Received a result note from Dr. Guerrero on patients pathology regarding follow up recommendations. She will need to be scheduled for 2 follow up procedure, initial follow up in 4-6 weeks and then a follow up 3 months after that procedure.     Orders placed for both procedures and prep letter for colonoscopy sent to scheduling to mail with info. Prep medications sent to [facility] pharmacy listed in patients chart.       Shirley Uribe RN   Care Coordinator   [phone]            Repeat ERCP and EGD in 4 weeks to remove PD stent and assess for further resection and colonoscopy at the same session in the OR. Then repeat ERCP/EGD in 3 months with spyglass and possible intraductal RFA. Patient and  expressed understanding and were in agreement with this plan.

## 2019-03-07 NOTE — TELEPHONE ENCOUNTER
Patient has already been contacted by care coordination. See Patient Outreach encounter from 3/4/19.

## 2019-03-11 ENCOUNTER — TELEPHONE (OUTPATIENT)
Dept: GASTROENTEROLOGY | Facility: CLINIC | Age: 69
End: 2019-03-11

## 2019-03-11 NOTE — TELEPHONE ENCOUNTER
LVM for patient in regards to scheduling next few procedures with Dr. Guerrero. Left direct line for patient to call and schedule.     3/11/19 1046am

## 2019-03-12 NOTE — TELEPHONE ENCOUNTER
Patient returned my phone call. Informed patient she is scheduled with Dr. Guerrero on 4/18/19 and 3 months after that 7/19/19. Informed patient she will need an updated pre-op physical within 30 days of both procedures Patient stated she is going to have this done locally with her PCP. Informed patient she will need a  and someone to monitor her for 24 hours after the procedure. Confirmed location of the procedures with the patient. Informed patient all scheduling details will be sent to the address listed on Epic. Address confirmed on this call.     3/12/19 310pm

## 2019-03-12 NOTE — TELEPHONE ENCOUNTER
LVM #2 for patient in regards to scheduling next few procedures with Dr. Guerrero. Left direct line for patient to call and schedule.     3/12/19 1119am

## 2019-03-25 ENCOUNTER — OFFICE VISIT (OUTPATIENT)
Dept: INTERNAL MEDICINE | Facility: CLINIC | Age: 69
End: 2019-03-25
Payer: COMMERCIAL

## 2019-03-25 VITALS
RESPIRATION RATE: 16 BRPM | OXYGEN SATURATION: 96 % | DIASTOLIC BLOOD PRESSURE: 66 MMHG | HEIGHT: 67 IN | HEART RATE: 80 BPM | WEIGHT: 215.9 LBS | SYSTOLIC BLOOD PRESSURE: 128 MMHG | BODY MASS INDEX: 33.89 KG/M2 | TEMPERATURE: 98.3 F

## 2019-03-25 DIAGNOSIS — I10 ESSENTIAL HYPERTENSION WITH GOAL BLOOD PRESSURE LESS THAN 140/90: Chronic | ICD-10-CM

## 2019-03-25 DIAGNOSIS — D13.5 AMPULLARY ADENOMA: ICD-10-CM

## 2019-03-25 DIAGNOSIS — E11.65 TYPE 2 DIABETES MELLITUS WITH HYPERGLYCEMIA, WITHOUT LONG-TERM CURRENT USE OF INSULIN (H): Primary | ICD-10-CM

## 2019-03-25 DIAGNOSIS — E78.5 HYPERLIPIDEMIA WITH TARGET LDL LESS THAN 100: Chronic | ICD-10-CM

## 2019-03-25 PROCEDURE — 99214 OFFICE O/P EST MOD 30 MIN: CPT | Performed by: INTERNAL MEDICINE

## 2019-03-25 ASSESSMENT — MIFFLIN-ST. JEOR: SCORE: 1531.95

## 2019-03-25 NOTE — PROGRESS NOTES
Dr Torres's note      Patient's instructions / PLAN:                                                        Plan:  1. Add Invokana 100 mg daily  2. Please make a lab appointment for non fasting labs in 2-3 weeks (CMP)  3. Continue the other meds, same doses for now.  4. Follow up for Preop       ASSESSMENT & PLAN:                                                      (E11.65) DM 2 with hyperglycemia (H)  (primary encounter diagnosis)  Comment:   Plan: canagliflozin (INVOKANA) 100 MG tablet,         Comprehensive metabolic panel            (I10) HTN goal less than 140/90,  Comment: Controlled    Plan: Continue same meds, same doses for now     (E78.5) Hyperlipidemia with target LDL less than 100  Comment: Controlled    Plan: Continue same meds, same doses for now     (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  Comment:   Plan: f[facility]u w [facility]       Chief complaint:                                                      Follow up chronic medical problems      SUBJECTIVE:   Gricelda Sabillon is a 68 year old female who presents to clinic today for the following health issues:      Diabetes Follow-up    Patient is checking blood sugars: once daily.  Results are as follows:  in past mo    Diabetic concerns: None     Symptoms of hypoglycemia (low blood sugar): none in past mo     Paresthesias (numbness or burning in feet) or sores: No     Date of last diabetic eye exam: 2-    BP Readings from Last 2 Encounters:   03/01/19 100/68   02/13/19 147/78     Hemoglobin A1C (%)   Date Value   02/25/2019 8.1 (H)   11/19/2018 8.0 (H)     LDL Cholesterol Calculated (mg/dL)   Date Value   05/14/2018 75   09/05/2017 68       Diabetes Management Resources  Hypertension Follow-up      Outpatient blood pressures are not being checked.    Low Salt Diet: not monitoring salt      Amount of exercise or physical activity: None    Problems taking medications regularly: No    Medication side effects: none    Diet: diabetic      Review of Systems:      "                                                 ROS: negative for fever, chills, cough, wheezes, chest pain, shortness of breath, vomiting, abdominal pain, leg swelling        OBJECTIVE:             Physical exam:  Blood pressure 128/66, pulse 80, temperature 98.3  F (36.8  C), temperature source Oral, resp. rate 16, height 1.702 m (5' 7\"), weight 97.9 kg (215 lb 14.4 oz), last menstrual period 12/13/2002, SpO2 96 %, not currently breastfeeding.   NAD, appears comfortable  Skin: no rashes   Neck: supple, no JVD,  No thyroidmegaly. Lymph nodes nonpalpable cervical and supraclavicular.  Chest: clear to auscultation bilaterally, good respiratory effort  Heart: S1 S2, RRR, no mgr appreciated  Abdomen: soft, not tender,   Extremities: no edema, clubbing, cyanosis. Palpable pedal pulses bilaterally,    Neurologic: A, Ox3, no focal signs appreciated    PMHx: reviewed  Past Medical History:   Diagnosis Date     Allergic rhinitis, cause unspecified      Asthma      Asthma, mild intermittent      Cataract      Cellulitis and abscess of leg, except foot 03/00    Bilateral     Eczema      Heart murmur     aortic area     HTN, goal below 140/90      Hyperlipidemia LDL goal < 100      Hyperplastic colon polyp 2008     Infection     bilateral eye infections     Macular hole of left eye      Obesity (BMI 30-39.9)      Osteoarthritis     knees     Other chronic pain     right knee     Sleep apnea     uses c pap     Type 2 diabetes, HbA1C goal < 8% (H)       PSHx: reviewed  Past Surgical History:   Procedure Laterality Date     ARTHROPLASTY HIP Right 9/25/2017    Procedure: ARTHROPLASTY HIP;  Right total hip arthroplasty  ;  Surgeon: Ayaan Pena MD;  Location: [facility]     ARTHROPLASTY KNEE  7/12/2013    Procedure: ARTHROPLASTY KNEE;  right total knee arthroplasty ;  Surgeon: Chaparro Wesley MD;  Location: [facility]     ARTHROSCOPY KNEE Right 1/21/2015    Procedure: ARTHROSCOPY KNEE;  Surgeon: Ayaan Pena MD;  " Location: [facility]     C INCISION OF HYMEN       CATARACT IOL, RT/LT       COLONOSCOPY  2008     ENDOSCOPIC RETROGRADE CHOLANGIOPANCREATOGRAM N/A 2/6/2019    Procedure: COMBINED ENDOSCOPIC RETROGRADE CHOLANGIOPANCREATOGRAPHY, BILIARY SPINCTEROTOMY AND DILATION, PLACE BILE DUCT STENT;  Surgeon: Guru Andie Gonzalez MD;  [facility]     ENDOSCOPIC RETROGRADE CHOLANGIOPANCREATOGRAM N/A 2/28/2019    Procedure: Endoscopic Retrograde Cholangiopancreatogram, Bile duct stent removal and placement;  Surgeon: Shaun Guerrero MD;  [facility]: [facility]     ESOPHAGOSCOPY, GASTROSCOPY, DUODENOSCOPY (EGD), RESECT MUCOSA, COMBINED N/A 2/28/2019    Procedure: Upper Endoscopy, Endoscopic Ultrasound, Endoscopic Mucosal Resection,  Ampullectomy, polypectomy.;  Surgeon: Shaun Guerrero MD;  Location: [facility]     EYE SURGERY      macular hole repaired left eye     HC KNEE SCOPE, DIAGNOSTIC      - both knees     HEAD & NECK SURGERY      wisdom teeth        Meds: reviewed  Current Outpatient Medications   Medication Sig Dispense Refill     albuterol (PROAIR HFA, PROVENTIL HFA, VENTOLIN HFA) 108 (90 BASE) MCG/ACT inhaler Inhale 2 puffs into the lungs every 4 hours as needed for shortness of breath / dyspnea 1 Inhaler 3     aspirin 81 MG EC tablet Take 1 tablet (81 mg) by mouth daily 90 tablet 3     atenolol (TENORMIN) 50 MG tablet Take 1 tablet (50 mg) by mouth daily 90 tablet 1     azelastine (ASTELIN) 137 MCG/SPRAY nasal spray [place] 1 spray into both nostrils 2 times daily as needed Reported on 3/22/2017       azelastine (OPTIVAR) 0.05 % SOLN Place 1 drop into both eyes 2 times daily as needed Reported on 3/22/2017       calcium-vitamin D 500-125 MG-UNIT TABS        cetirizine (ZYRTEC) 10 MG tablet Take 10 mg by mouth every morning.       desoximetasone (TOPICORT) 0.25 % cream Apply sparingly to affected area's 60 g 1     dorzolamide-timolol (COSOPT) 2-0.5 % ophthalmic solution Place 1 drop into both eyes 2 times daily        fish  oil-omega-3 fatty acids (FISH OIL) 1000 MG capsule Take 1 g by mouth daily Reported on 3/22/2017       glipiZIDE (GLUCOTROL) 5 MG tablet TAKE TWO TABLETS BY MOUTH TWICE DAILY BEFORE MEAL(S) 360 tablet 1     hydrochlorothiazide (HYDRODIURIL) 25 MG tablet TAKE ONE TABLET BY MOUTH ONCE DAILY IN THE MORNING 90 tablet 1     ibuprofen (ADVIL) 200 MG tablet take 4 tablet (200 mg) by oral route every 8 hours as needed with food       JANUVIA 100 MG tablet TAKE 1 TABLET BY MOUTH ONCE DAILY 90 tablet 1     latanoprost (XALATAN) 0.005 % ophthalmic solution Place 1 drop Into the left eye At Bedtime 2.5 mL 1     lisinopril (PRINIVIL/ZESTRIL) 40 MG tablet Take 1 tablet (40 mg) by mouth daily 90 tablet 1     metFORMIN (GLUCOPHAGE) 1000 MG tablet TAKE 1 TABLET BY MOUTH TWICE DAILY WITH MEALS 180 tablet 0     MULTIVITAMIN TABS   OR Reported on 3/22/2017       ondansetron (ZOFRAN-ODT) 4 MG ODT tab Take 1 tablet (4 mg) by mouth every 6 hours as needed for nausea or vomiting 30 tablet 0     ONETOUCH ULTRA test strip USE ONE STRIP TO CHECK GLUCOSE ONCE DAILY DIAG  CODE  E11.9 100 strip 3     order for DME All diabetic testing supplies including test strips, lancets and solution for testing 2 times per day. Patient is using   no Insulin. Last A1c Lab Results       Component                Value               Date                       A1C                      7.9                 08/20/2018 100 each 11     order for DME All diabetic testing supplies including test strips, lancets and solution for testing 3 times per day. Patient is using  no Insulin. A1C      7.6   5/3/2016  A1C      7.4   2/4/2016 3 Month 1     pantoprazole (PROTONIX) 40 MG EC tablet Take 1 tablet (40 mg) by mouth 2 times daily 120 tablet 3     polyethylene glycol (GOLYTELY/NULYTELY) 236 g suspension Take 4,000 mLs (4 L) by mouth once for 1 dose 4000 mL 0     senna-docusate (SENOKOT-S;PERICOLACE) 8.6-50 MG per tablet Take 1-2 tablets by mouth 2 times daily as needed for  constipation 40 tablet 0     sucralfate (CARAFATE) 1 GM tablet Take 1 tablet (1 g) by mouth 4 times daily 120 tablet 1       Soc Hx: reviewed  Fam Hx: reviewed          Raisa Torres MD  Internal Medicine

## 2019-03-25 NOTE — PATIENT INSTRUCTIONS
Plan:  1. Add Invokana 100 mg daily  2. Please make a lab appointment for non fasting labs in 2-3 weeks (CMP)  3. Continue the other meds, same doses for now.  4. Follow up for Preop

## 2019-03-28 DIAGNOSIS — E11.9 DIABETES MELLITUS WITHOUT COMPLICATION (H): Chronic | ICD-10-CM

## 2019-03-28 DIAGNOSIS — I10 HTN, GOAL BELOW 140/90: ICD-10-CM

## 2019-03-28 DIAGNOSIS — R00.0 SINUS TACHYCARDIA: ICD-10-CM

## 2019-03-28 DIAGNOSIS — I10 ESSENTIAL HYPERTENSION WITH GOAL BLOOD PRESSURE LESS THAN 140/90: Chronic | ICD-10-CM

## 2019-03-28 DIAGNOSIS — E78.5 HYPERLIPIDEMIA WITH TARGET LDL LESS THAN 100: Chronic | ICD-10-CM

## 2019-03-29 NOTE — TELEPHONE ENCOUNTER
"Requested Prescriptions   Pending Prescriptions Disp Refills     ONETOUCH ULTRA test strip [Pharmacy Med Name: ONETOUCH ULTRA BLUE TEST STP] 100 strip 3    Last Written Prescription Date:  02/12/2018  Last Fill Quantity: 100,  # refills: 3   Last office visit: 3/25/2019 with prescribing provider:     Future Office Visit:   Next 5 appointments (look out 90 days)    Apr 12, 2019  1:40 PM CDT  Pre-Op physical with R[facility]a Davidson MD  [facility] [address]  [facility] [address]  [phone]        Sig: USE ONE STRIP TO CHECK GLUCOSE ONE DAILY DIAG CODE E11.9    Diabetic Supplies Protocol Passed - 3/28/2019  1:52 PM       Passed - Medication is active on med list       Passed - Patient is 18 years of age or older       Passed - Recent (6 mo) or future (30 days) visit within the authorizing provider's specialty    Patient had office visit in the last 6 months or has a visit in the next 30 days with authorizing provider.  See \"Patient Info\" tab in inbasket, or \"Choose Columns\" in Meds & Orders section of the refill encounter.            atenolol (TENORMIN) 50 MG tablet [Pharmacy Med Name: ATENOLOL 50MG       TAB] 90 tablet 1    Last Written Prescription Date:  05/21/2018  Last Fill Quantity: 90,  # refills: 1   Last office visit: 3/25/2019 with prescribing provider:     Future Office Visit:   Next 5 appointments (look out 90 days)    Apr 12, 2019  1:40 PM CDT  Pre-Op physical with Raisa Davidson MD  [facility] [address]  [facility] [address]  [phone]        Sig: TAKE ONE TABLET BY MOUTH ONCE DAILY    Beta-Blockers Protocol Passed - 3/28/2019  1:52 PM       Passed - Blood pressure under 140/90 in past 12 months    BP Readings from Last 3 Encounters:   03/25/19 128/66   03/01/19 100/68   02/13/19 147/78                Passed - Patient is age 6 or older       " "Passed - Recent (12 mo) or future (30 days) visit within the authorizing provider's specialty    Patient had office visit in the last 12 months or has a visit in the next 30 days with authorizing provider or within the authorizing provider's specialty.  See \"Patient Info\" tab in inbasket, or \"Choose Columns\" in Meds & Orders section of the refill encounter.             Passed - Medication is active on med list        "

## 2019-03-30 RX ORDER — ATENOLOL 50 MG/1
TABLET ORAL
Qty: 90 TABLET | Refills: 3 | Status: SHIPPED | OUTPATIENT
Start: 2019-03-30 | End: 2020-02-27

## 2019-04-08 DIAGNOSIS — E11.65 TYPE 2 DIABETES MELLITUS WITH HYPERGLYCEMIA, WITHOUT LONG-TERM CURRENT USE OF INSULIN (H): ICD-10-CM

## 2019-04-08 PROCEDURE — 80053 COMPREHEN METABOLIC PANEL: CPT | Performed by: INTERNAL MEDICINE

## 2019-04-08 PROCEDURE — 36415 COLL VENOUS BLD VENIPUNCTURE: CPT | Performed by: INTERNAL MEDICINE

## 2019-04-09 LAB
ALBUMIN SERPL-MCNC: 3.7 G/DL (ref 3.4–5)
ALP SERPL-CCNC: 109 U/L (ref 40–150)
ALT SERPL W P-5'-P-CCNC: 141 U/L (ref 0–50)
ANION GAP SERPL CALCULATED.3IONS-SCNC: 8 MMOL/L (ref 3–14)
AST SERPL W P-5'-P-CCNC: 121 U/L (ref 0–45)
BILIRUB SERPL-MCNC: 0.3 MG/DL (ref 0.2–1.3)
BUN SERPL-MCNC: 27 MG/DL (ref 7–30)
CALCIUM SERPL-MCNC: 10.2 MG/DL (ref 8.5–10.1)
CHLORIDE SERPL-SCNC: 104 MMOL/L (ref 94–109)
CO2 SERPL-SCNC: 26 MMOL/L (ref 20–32)
CREAT SERPL-MCNC: 1.06 MG/DL (ref 0.52–1.04)
GFR SERPL CREATININE-BSD FRML MDRD: 53 ML/MIN/{1.73_M2}
GLUCOSE SERPL-MCNC: 131 MG/DL (ref 70–99)
POTASSIUM SERPL-SCNC: 4.2 MMOL/L (ref 3.4–5.3)
PROT SERPL-MCNC: 7.8 G/DL (ref 6.8–8.8)
SODIUM SERPL-SCNC: 138 MMOL/L (ref 133–144)

## 2019-04-12 ENCOUNTER — OFFICE VISIT (OUTPATIENT)
Dept: INTERNAL MEDICINE | Facility: CLINIC | Age: 69
End: 2019-04-12
Payer: COMMERCIAL

## 2019-04-12 VITALS
RESPIRATION RATE: 22 BRPM | BODY MASS INDEX: 33.78 KG/M2 | HEIGHT: 67 IN | DIASTOLIC BLOOD PRESSURE: 74 MMHG | HEART RATE: 87 BPM | OXYGEN SATURATION: 99 % | TEMPERATURE: 97.9 F | WEIGHT: 215.2 LBS | SYSTOLIC BLOOD PRESSURE: 130 MMHG

## 2019-04-12 DIAGNOSIS — E11.65 TYPE 2 DIABETES MELLITUS WITH HYPERGLYCEMIA, WITHOUT LONG-TERM CURRENT USE OF INSULIN (H): ICD-10-CM

## 2019-04-12 DIAGNOSIS — E78.5 HYPERLIPIDEMIA WITH TARGET LDL LESS THAN 100: Chronic | ICD-10-CM

## 2019-04-12 DIAGNOSIS — I10 ESSENTIAL HYPERTENSION WITH GOAL BLOOD PRESSURE LESS THAN 140/90: Chronic | ICD-10-CM

## 2019-04-12 DIAGNOSIS — G47.33 OSA (OBSTRUCTIVE SLEEP APNEA): ICD-10-CM

## 2019-04-12 DIAGNOSIS — Z01.818 PREOPERATIVE EXAMINATION: Primary | ICD-10-CM

## 2019-04-12 DIAGNOSIS — D13.5 AMPULLARY ADENOMA: ICD-10-CM

## 2019-04-12 PROCEDURE — 99214 OFFICE O/P EST MOD 30 MIN: CPT | Performed by: INTERNAL MEDICINE

## 2019-04-12 ASSESSMENT — MIFFLIN-ST. JEOR: SCORE: 1528.77

## 2019-04-12 NOTE — NURSING NOTE
"/74 (BP Location: Left arm, Patient Position: Sitting, Cuff Size: Adult Large)   Pulse 87   Temp 97.9  F (36.6  C) (Oral)   Resp 22   Ht 1.702 m (5' 7\")   Wt 97.6 kg (215 lb 3.2 oz)   LMP 12/13/2002 (Exact Date)   SpO2 99%   Breastfeeding? No   BMI 33.71 kg/m    Pati Frank CMA    "

## 2019-04-12 NOTE — PATIENT INSTRUCTIONS
Plan:    1. In the morning of the surgery day you take with a sip of water just these meds: Atenolol and 5 mg Glipizide . The rest of the meds you resume after procedure.  2. Do not take the hydrochlorathiazide when you do the colonoscopy prep

## 2019-04-12 NOTE — PROGRESS NOTES
*Pre-op Exam-LOV 3/25/19, labs done 19. Wondering if she needs to stop her baby aspirin prior to procedure?     [facility]  [address]  [facility] [id number] [phone]  Dept: [phone]    PRE-OP EVALUATION:  Today's date: 2019    Gricelda Sabillon (: 1950) presents for pre-operative evaluation assessment as requested by Dr. Shaun Guerrero.  She requires evaluation and anesthesia risk assessment prior to undergoing surgery/procedure for treatment of Endoscopic Retrograde Cholangiopancreatogram, Upper endoscopy possible, Endoscopic Mucosal Resection, Colonoscopy.    Fax number for surgical facility: N/A, at [facility]  Primary Physician: Raisa Davidson  Type of Anesthesia Anticipated: General    Patient has a Health Care Directive or Living Will:  NO    Preop Questions 2019   Who is doing your surgery? dr ash   What are you having done? endoscopic exam and colonoscopy   Date of Surgery/Procedure: 19   Facility or Hospital where procedure/surgery will be performed: [facility]   1.  Do you have a history of Heart attack, stroke, stent, coronary bypass surgery, or other heart surgery? No   2.  Do you ever have any pain or discomfort in your chest? No   3.  Do you have a history of  Heart Failure? No   4.   Are you troubled by shortness of breath when:  walking on a level surface, or up a slight hill, or at night? No   5.  Do you currently have a cold, bronchitis or other respiratory infection? No   6.  Do you have a cough, shortness of breath, or wheezing? No   7.  Do you sometimes get pains in the calves of your legs when you walk? No   8. Do you or anyone in your family have previous history of blood clots? No   9.  Do you or does anyone in your family have a serious bleeding problem such as prolonged bleeding following surgeries or cuts? No   10. Have you ever had problems with anemia or been told  to take iron pills? YES - in the past. The recent Hgb is normal   11. Have you had any abnormal blood loss such as black, tarry or bloody stools, or abnormal vaginal bleeding? No   12. Have you ever had a blood transfusion? No   13. Have you or any of your relatives ever had problems with anesthesia? No   14. Do you have sleep apnea, excessive snoring or daytime drowsiness? YES - she wears the CPAP   15. Do you have any prosthetic heart valves? No   16. Do you have prosthetic joints? YES - R knee and R hip   17. Is there any chance that you may be pregnant? No       CC:  Preop for multiple medical problems.    HPI:    The patient is scheduled for ERCP procedure  with Dr. Guerrero on  April 18, 2019  No other acute complaints.    Assessment:  1. V72.83H Preop general physical exam _ I do not see any major contraindications for the patient to go through the scheduled surgery.     The proposed surgical procedure is considered   LOW  surgery risk.     For above listed surgery and anesthesia, Patient is at MODERATE, risk for surgery/procedure and perioperative/procedure complications.     Cardiovascular risk  Assessment -- low risk   --  No further cardiac work up is needed before this surgery.        ECG:    sinus rhythm, no changes suggestive for ischemia     Pulmonary Risk Assessment -- low risk   --  The patient doesn't have chronic lung disease nor acute respiratory problems         Obstructive Sleep Apnea (or suspected sleep apnea) -- she wears the CPAP     Anemia Assessment :  -- no anemia - patient doesn't need further anemia work up     Blood Sugar Assessment  -- patient has controlled DM,      Anticoagulation assessment  --  patient takes ASA for C-vs prevention. She stopped it for the procedure         (E11.65) DM 2 with hyperglycemia (H)  Comment:   Plan: Continue same meds, same doses for now      (G47.33) CHERRY (obstructive sleep apnea)  Comment:   Plan:      (I10) HTN goal less than 140/90,  Comment: Controlled  "   Plan: Continue same meds, same doses for now      .(K80.50) Calculus of bile duct without cholecystitis and without obstruction  Comment:   Plan: procedure, as above      Plan:    1. In the morning of the surgery day you take with a sip of water just these meds: Atenolol and 5 mg Glipizide . The rest of the meds you resume after procedure.  2. Do not take the hydrochlorathiazide when you do the colonoscopy prep       Physical exam:    Blood pressure 130/74, pulse 87, temperature 97.9  F (36.6  C), temperature source Oral, resp. rate 22, height 1.702 m (5' 7\"), weight 97.6 kg (215 lb 3.2 oz), last menstrual period 12/13/2002, SpO2 99 %, not currently breastfeeding.   NAD, appears comfortable  Skin clear, no rashes  Neck: supple, no JVD,  no thyroidmegaly  Lymph nodes non palpable in the cervical, supraclavicular   Chest: clear to auscultation with good respiratory effort  Cardiac: S1S2, RRR, no mgr appreciated  Abdomen: soft, not tender, not distended, audible bowel sound, no hepatosplenomegaly, no palpable masses, no abdominal bruits  Extremities: no cyanosis, clubbing or edema.   Neuro: A, Ox3, no focal signs.        ROS:   as above     Patient Active Problem List   Diagnosis     Allergic rhinitis     Hyperlipidemia with target LDL less than 100     Asthma, mild intermittent     Cataract     Macular hole of left eye     Advanced directives, counseling/discussion     Obesity (BMI 30-39.9)     Heart murmur     Sleep apnea     Total knee replacement status     Chronic knee pain, right     Hip pain, right     HTN goal less than 140/90,     CHERRY (obstructive sleep apnea)     Hip osteoarthritis     Status post total replacement of right hip     Gait disturbance     DM 2 with hyperglycemia (H)     Ampullary adenoma     Surgery follow-up        Past Medical History:   Diagnosis Date     Allergic rhinitis, cause unspecified      Asthma      Asthma, mild intermittent      Cataract      Cellulitis and abscess of leg, except " foot 03/00    Bilateral     Eczema      Heart murmur     aortic area     HTN, goal below 140/90      Hyperlipidemia LDL goal < 100      Hyperplastic colon polyp 2008     Infection     bilateral eye infections     Macular hole of left eye      Obesity (BMI 30-39.9)      Osteoarthritis     knees     Other chronic pain     right knee     Sleep apnea     uses c pap     Type 2 diabetes, HbA1C goal < 8% (H)       Past Surgical History:   Procedure Laterality Date     ARTHROPLASTY HIP Right 9/25/2017    Procedure: ARTHROPLASTY HIP;  Right total hip arthroplasty  ;  Surgeon: Ayaan Pena MD;  [facility]: [facility]     ARTHROPLASTY KNEE  7/12/2013    Procedure: ARTHROPLASTY KNEE;  right total knee arthroplasty ;  Surgeon: Chaparro Wesley MD;  Location: [facility]     ARTHROSCOPY KNEE Right 1/21/2015    Procedure: ARTHROSCOPY KNEE;  Surgeon: Ayaan Pena MD;  Location: [facility]     C INCISION OF HYMEN       CATARACT IOL, RT/LT       COLONOSCOPY  2008     ENDOSCOPIC RETROGRADE CHOLANGIOPANCREATOGRAM N/A 2/6/2019    Procedure: COMBINED ENDOSCOPIC RETROGRADE CHOLANGIOPANCREATOGRAPHY, BILIARY SPINCTEROTOMY AND DILATION, PLACE BILE DUCT STENT;  Surgeon: Guru Andie Gonzalez MD;  [facility]     ENDOSCOPIC RETROGRADE CHOLANGIOPANCREATOGRAM N/A 2/28/2019    Procedure: Endoscopic Retrograde Cholangiopancreatogram, Bile duct stent removal and placement;  Surgeon: Shaun Guerrero MD;  [facility]     ESOPHAGOSCOPY, GASTROSCOPY, DUODENOSCOPY (EGD), RESECT MUCOSA, COMBINED N/A 2/28/2019    Procedure: Upper Endoscopy, Endoscopic Ultrasound, Endoscopic Mucosal Resection,  Ampullectomy, polypectomy.;  Surgeon: Shaun Guerrero MD;  Location: [facility]     EYE SURGERY      macular hole repaired left eye     HC KNEE SCOPE, DIAGNOSTIC      - both knees     HEAD & NECK SURGERY      wisdom teeth        PSHx: No complications with prior surgeries or anesthesia as above     Soc Hx: No daily alcohol, no smoking      Family History   Problem Relation Age of Onset     Hypertension Mother         diabetes,hypoythryoidism, stroke     Diabetes Mother      Heart Disease Father         , cancer lip     Heart Disease Paternal Grandfather              Cancer Paternal Grandmother              Cerebrovascular Disease Maternal Grandfather              Cerebrovascular Disease Maternal Grandmother         , diabetes     Connective Tissue Disorder Sister         fibromyalgia     Diabetes Sister      Hypertension Brother      Cancer Paternal Aunt         ovarian        All: reviewed    Meds: reviewed  Current Outpatient Medications   Medication Sig Dispense Refill     albuterol (PROAIR HFA, PROVENTIL HFA, VENTOLIN HFA) 108 (90 BASE) MCG/ACT inhaler Inhale 2 puffs into the lungs every 4 hours as needed for shortness of breath / dyspnea 1 Inhaler 3     aspirin 81 MG EC tablet Take 1 tablet (81 mg) by mouth daily 90 tablet 3     atenolol (TENORMIN) 50 MG tablet TAKE ONE TABLET BY MOUTH ONCE DAILY 90 tablet 3     calcium-vitamin D 500-125 MG-UNIT TABS        canagliflozin (INVOKANA) 100 MG tablet Take 1 tablet (100 mg) by mouth every morning (before breakfast) 30 tablet 1     cetirizine (ZYRTEC) 10 MG tablet Take 10 mg by mouth every morning.       dorzolamide-timolol (COSOPT) 2-0.5 % ophthalmic solution Place 1 drop into both eyes 2 times daily        fish oil-omega-3 fatty acids (FISH OIL) 1000 MG capsule Take 1 g by mouth daily Reported on 3/22/2017       glipiZIDE (GLUCOTROL) 5 MG tablet TAKE TWO TABLETS BY MOUTH TWICE DAILY BEFORE MEAL(S) 360 tablet 1     hydrochlorothiazide (HYDRODIURIL) 25 MG tablet TAKE ONE TABLET BY MOUTH ONCE DAILY IN THE MORNING 90 tablet 1     ibuprofen (ADVIL) 200 MG tablet take 4 tablet (200 mg) by oral route every 8 hours as needed with food       JANUVIA 100 MG tablet TAKE 1 TABLET BY MOUTH ONCE DAILY 90 tablet 1     latanoprost (XALATAN) 0.005 % ophthalmic solution Place  1 drop Into the left eye At Bedtime 2.5 mL 1     lisinopril (PRINIVIL/ZESTRIL) 40 MG tablet Take 1 tablet (40 mg) by mouth daily 90 tablet 1     metFORMIN (GLUCOPHAGE) 1000 MG tablet TAKE 1 TABLET BY MOUTH TWICE DAILY WITH MEALS 180 tablet 0     MULTIVITAMIN TABS   OR Reported on 3/22/2017       ondansetron (ZOFRAN-ODT) 4 MG ODT tab Take 1 tablet (4 mg) by mouth every 6 hours as needed for nausea or vomiting 30 tablet 0     pantoprazole (PROTONIX) 40 MG EC tablet Take 1 tablet (40 mg) by mouth 2 times daily 120 tablet 3     polyethylene glycol (GOLYTELY/NULYTELY) 236 g suspension Take 4,000 mLs (4 L) by mouth once for 1 dose 4000 mL 0     STATIN NOT PRESCRIBED (INTENTIONAL) 1 each Please choose reason not prescribed, below       sucralfate (CARAFATE) 1 GM tablet Take 1 tablet (1 g) by mouth 4 times daily 120 tablet 1     azelastine (ASTELIN) 137 MCG/SPRAY nasal spray [place] 1 spray into both nostrils 2 times daily as needed Reported on 3/22/2017       azelastine (OPTIVAR) 0.05 % SOLN Place 1 drop into both eyes 2 times daily as needed Reported on 3/22/2017       desoximetasone (TOPICORT) 0.25 % cream Apply sparingly to affected area's 60 g 1     ONETOUCH ULTRA test strip USE ONE STRIP TO CHECK GLUCOSE ONE DAILY DIAG CODE E11.9 100 strip 1     order for DME All diabetic testing supplies including test strips, lancets and solution for testing 2 times per day. Patient is using   no Insulin. Last A1c Lab Results       Component                Value               Date                       A1C                      7.9                 08/20/2018 100 each 11            Raisa Torres MD  Internal Medicine        MEDICAL HISTORY:     Patient Active Problem List    Diagnosis Date Noted     Hyperlipidemia with target LDL less than 100      Priority: High     Diagnosis updated by automated process. Provider to review and confirm.       Asthma, mild intermittent      Priority: High     Surgery follow-up 03/01/2019     Priority:  Medium     Ampullary adenoma 02/28/2019     Priority: Medium     DM 2 with hyperglycemia (H) 12/02/2018     Priority: Medium     Status post total replacement of right hip 02/13/2018     Priority: Medium     Gait disturbance 02/13/2018     Priority: Medium     Hip osteoarthritis 09/25/2017     Priority: Medium     CHERRY (obstructive sleep apnea) 09/15/2017     Priority: Medium     HTN goal less than 140/90, 05/22/2016     Priority: Medium     Chronic knee pain, right 08/30/2015     Priority: Medium     Hip pain, right 08/30/2015     Priority: Medium     Total knee replacement status 07/12/2013     Priority: Medium     Sleep apnea      Priority: Medium     Obesity (BMI 30-39.9)      Priority: Medium     Heart murmur      Priority: Medium     aortic area       Advanced directives, counseling/discussion 01/06/2012     Priority: Medium               Discussed advance care planning with patient; information given to patient to review. 1/6/2012

## 2019-04-13 ASSESSMENT — ASTHMA QUESTIONNAIRES: ACT_TOTALSCORE: 25

## 2019-04-16 DIAGNOSIS — I10 HTN, GOAL BELOW 140/90: ICD-10-CM

## 2019-04-16 DIAGNOSIS — E11.9 DIABETES MELLITUS WITHOUT COMPLICATION (H): Chronic | ICD-10-CM

## 2019-04-16 DIAGNOSIS — E78.5 HYPERLIPIDEMIA WITH TARGET LDL LESS THAN 100: Chronic | ICD-10-CM

## 2019-04-16 DIAGNOSIS — R00.0 SINUS TACHYCARDIA: ICD-10-CM

## 2019-04-17 ENCOUNTER — ANESTHESIA EVENT (OUTPATIENT)
Dept: SURGERY | Facility: CLINIC | Age: 69
End: 2019-04-17
Payer: COMMERCIAL

## 2019-04-17 NOTE — TELEPHONE ENCOUNTER
"Requested Prescriptions   Pending Prescriptions Disp Refills     metFORMIN (GLUCOPHAGE) 1000 MG tablet [Pharmacy Med Name: METFORMIN 1000MG TAB] 180 tablet 0     Sig: TAKE 1 TABLET BY MOUTH TWICE DAILY WITH MEALS   Last Written Prescription Date:  01/16/2019  Last Fill Quantity: 180,  # refills: 0   Last office visit: 4/12/2019 with prescribing provider:     Future Office Visit:      Biguanide Agents Passed - 4/16/2019  6:13 PM        Passed - Blood pressure less than 140/90 in past 6 months     BP Readings from Last 3 Encounters:   04/12/19 130/74   03/25/19 128/66   03/01/19 100/68                 Passed - Patient has documented LDL within the past 12 mos.     Recent Labs   Lab Test 05/14/18  0855   LDL 75             Passed - Patient has had a Microalbumin in the past 15 mos.     Recent Labs   Lab Test 01/26/18  1414   MICROL 7   UMALCR 8.89             Passed - Patient is age 10 or older        Passed - Patient has documented A1c within the specified period of time.     If HgbA1C is 8 or greater, it needs to be on file within the past 3 months.  If less than 8, must be on file within the past 6 months.     Recent Labs   Lab Test 02/25/19  0900   A1C 8.1*             Passed - Patient's CR is NOT>1.4 OR Patient's EGFR is NOT<45 within past 12 mos.     Recent Labs   Lab Test 04/08/19  1245   GFRESTIMATED 53*   GFRESTBLACK 61       Recent Labs   Lab Test 04/08/19  1245   CR 1.06*             Passed - Patient does NOT have a diagnosis of CHF.        Passed - Medication is active on med list        Passed - Patient is not pregnant        Passed - Patient has not had a positive pregnancy test within the past 12 mos.         Passed - Recent (6 mo) or future (30 days) visit within the authorizing provider's specialty     Patient had office visit in the last 6 months or has a visit in the next 30 days with authorizing provider or within the authorizing provider's specialty.  See \"Patient Info\" tab in inbasket, or \"Choose " "Columns\" in Meds & Orders section of the refill encounter.            "

## 2019-04-18 ENCOUNTER — ANESTHESIA (OUTPATIENT)
Dept: SURGERY | Facility: CLINIC | Age: 69
End: 2019-04-18
Payer: COMMERCIAL

## 2019-04-18 ENCOUNTER — HOSPITAL ENCOUNTER (OUTPATIENT)
Facility: CLINIC | Age: 69
Discharge: HOME | End: 2019-04-18
Attending: INTERNAL MEDICINE | Admitting: INTERNAL MEDICINE
Payer: COMMERCIAL

## 2019-04-18 ENCOUNTER — APPOINTMENT (OUTPATIENT)
Dept: GENERAL RADIOLOGY | Facility: CLINIC | Age: 69
End: 2019-04-18
Attending: INTERNAL MEDICINE
Payer: COMMERCIAL

## 2019-04-18 VITALS
OXYGEN SATURATION: 95 % | WEIGHT: 211.86 LBS | BODY MASS INDEX: 33.25 KG/M2 | TEMPERATURE: 97.6 F | SYSTOLIC BLOOD PRESSURE: 150 MMHG | DIASTOLIC BLOOD PRESSURE: 73 MMHG | HEART RATE: 65 BPM | HEIGHT: 67 IN | RESPIRATION RATE: 16 BRPM

## 2019-04-18 LAB
ALBUMIN SERPL-MCNC: 3.7 G/DL (ref 3.4–5)
ALP SERPL-CCNC: 103 U/L (ref 40–150)
ALT SERPL W P-5'-P-CCNC: 131 U/L (ref 0–50)
AMYLASE SERPL-CCNC: 55 U/L (ref 30–110)
AMYLASE SERPL-CCNC: 65 U/L (ref 30–110)
ANION GAP SERPL CALCULATED.3IONS-SCNC: 10 MMOL/L (ref 3–14)
AST SERPL W P-5'-P-CCNC: 98 U/L (ref 0–45)
BILIRUB SERPL-MCNC: 0.7 MG/DL (ref 0.2–1.3)
BUN SERPL-MCNC: 14 MG/DL (ref 7–30)
CALCIUM SERPL-MCNC: 9.7 MG/DL (ref 8.5–10.1)
CHLORIDE SERPL-SCNC: 103 MMOL/L (ref 94–109)
CO2 SERPL-SCNC: 25 MMOL/L (ref 20–32)
COLONOSCOPY: NORMAL
CREAT SERPL-MCNC: 0.96 MG/DL (ref 0.52–1.04)
ERCP: NORMAL
ERYTHROCYTE [DISTWIDTH] IN BLOOD BY AUTOMATED COUNT: 13 % (ref 10–15)
GFR SERPL CREATININE-BSD FRML MDRD: 59 ML/MIN/{1.73_M2}
GLUCOSE BLDC GLUCOMTR-MCNC: 126 MG/DL (ref 70–99)
GLUCOSE BLDC GLUCOMTR-MCNC: 173 MG/DL (ref 70–99)
GLUCOSE SERPL-MCNC: 178 MG/DL (ref 70–99)
HCT VFR BLD AUTO: 41.9 % (ref 35–47)
HGB BLD-MCNC: 12.8 G/DL (ref 11.7–15.7)
LIPASE SERPL-CCNC: 478 U/L (ref 73–393)
LIPASE SERPL-CCNC: 518 U/L (ref 73–393)
MCH RBC QN AUTO: 26.7 PG (ref 26.5–33)
MCHC RBC AUTO-ENTMCNC: 30.5 G/DL (ref 31.5–36.5)
MCV RBC AUTO: 87 FL (ref 78–100)
PLATELET # BLD AUTO: 312 10E9/L (ref 150–450)
POTASSIUM SERPL-SCNC: 3.9 MMOL/L (ref 3.4–5.3)
PROT SERPL-MCNC: 7.5 G/DL (ref 6.8–8.8)
RBC # BLD AUTO: 4.8 10E12/L (ref 3.8–5.2)
SODIUM SERPL-SCNC: 138 MMOL/L (ref 133–144)
WBC # BLD AUTO: 5.6 10E9/L (ref 4–11)

## 2019-04-18 PROCEDURE — 25000128 H RX IP 250 OP 636: Performed by: NURSE ANESTHETIST, CERTIFIED REGISTERED

## 2019-04-18 PROCEDURE — 36000061 ZZH SURGERY LEVEL 3 W FLUORO 1ST 30 MIN - UMMC: Performed by: INTERNAL MEDICINE

## 2019-04-18 PROCEDURE — 82150 ASSAY OF AMYLASE: CPT | Performed by: INTERNAL MEDICINE

## 2019-04-18 PROCEDURE — 25500064 ZZH RX 255 OP 636: Performed by: INTERNAL MEDICINE

## 2019-04-18 PROCEDURE — 85027 COMPLETE CBC AUTOMATED: CPT | Performed by: INTERNAL MEDICINE

## 2019-04-18 PROCEDURE — C1877 STENT, NON-COAT/COV W/O DEL: HCPCS | Performed by: INTERNAL MEDICINE

## 2019-04-18 PROCEDURE — 83690 ASSAY OF LIPASE: CPT | Performed by: INTERNAL MEDICINE

## 2019-04-18 PROCEDURE — 25000566 ZZH SEVOFLURANE, EA 15 MIN: Performed by: INTERNAL MEDICINE

## 2019-04-18 PROCEDURE — 25000125 ZZHC RX 250: Performed by: INTERNAL MEDICINE

## 2019-04-18 PROCEDURE — 40000170 ZZH STATISTIC PRE-PROCEDURE ASSESSMENT II: Performed by: INTERNAL MEDICINE

## 2019-04-18 PROCEDURE — 37000009 ZZH ANESTHESIA TECHNICAL FEE, EACH ADDTL 15 MIN: Performed by: INTERNAL MEDICINE

## 2019-04-18 PROCEDURE — 25800030 ZZH RX IP 258 OP 636: Performed by: ANESTHESIOLOGY

## 2019-04-18 PROCEDURE — 36000059 ZZH SURGERY LEVEL 3 EA 15 ADDTL MIN UMMC: Performed by: INTERNAL MEDICINE

## 2019-04-18 PROCEDURE — 27211024 ZZHC OR SUPPLY OTHER OPNP: Performed by: INTERNAL MEDICINE

## 2019-04-18 PROCEDURE — 36415 COLL VENOUS BLD VENIPUNCTURE: CPT | Performed by: INTERNAL MEDICINE

## 2019-04-18 PROCEDURE — C1769 GUIDE WIRE: HCPCS | Performed by: INTERNAL MEDICINE

## 2019-04-18 PROCEDURE — 71000027 ZZH RECOVERY PHASE 2 EACH 15 MINS: Performed by: INTERNAL MEDICINE

## 2019-04-18 PROCEDURE — 25800030 ZZH RX IP 258 OP 636: Performed by: NURSE ANESTHETIST, CERTIFIED REGISTERED

## 2019-04-18 PROCEDURE — 37000008 ZZH ANESTHESIA TECHNICAL FEE, 1ST 30 MIN: Performed by: INTERNAL MEDICINE

## 2019-04-18 PROCEDURE — 27210794 ZZH OR GENERAL SUPPLY STERILE: Performed by: INTERNAL MEDICINE

## 2019-04-18 PROCEDURE — 25000125 ZZHC RX 250: Performed by: NURSE ANESTHETIST, CERTIFIED REGISTERED

## 2019-04-18 PROCEDURE — 71000014 ZZH RECOVERY PHASE 1 LEVEL 2 FIRST HR: Performed by: INTERNAL MEDICINE

## 2019-04-18 PROCEDURE — 88305 TISSUE EXAM BY PATHOLOGIST: CPT | Performed by: INTERNAL MEDICINE

## 2019-04-18 PROCEDURE — 80053 COMPREHEN METABOLIC PANEL: CPT | Performed by: INTERNAL MEDICINE

## 2019-04-18 PROCEDURE — 82962 GLUCOSE BLOOD TEST: CPT

## 2019-04-18 PROCEDURE — 40000277 XR SURGERY CARM FLUORO LESS THAN 5 MIN W STILLS: Mod: TC

## 2019-04-18 DEVICE — IMPLANTABLE DEVICE
Type: IMPLANTABLE DEVICE | Site: BILE DUCT | Status: NON-FUNCTIONAL
Removed: 2019-07-26

## 2019-04-18 RX ORDER — NALOXONE HYDROCHLORIDE 0.4 MG/ML
.1-.4 INJECTION, SOLUTION INTRAMUSCULAR; INTRAVENOUS; SUBCUTANEOUS
Status: DISCONTINUED | OUTPATIENT
Start: 2019-04-18 | End: 2019-04-18 | Stop reason: HOSPADM

## 2019-04-18 RX ORDER — INDOMETHACIN 50 MG/1
SUPPOSITORY RECTAL PRN
Status: DISCONTINUED | OUTPATIENT
Start: 2019-04-18 | End: 2019-04-18 | Stop reason: HOSPADM

## 2019-04-18 RX ORDER — SODIUM CHLORIDE, SODIUM LACTATE, POTASSIUM CHLORIDE, CALCIUM CHLORIDE 600; 310; 30; 20 MG/100ML; MG/100ML; MG/100ML; MG/100ML
INJECTION, SOLUTION INTRAVENOUS CONTINUOUS
Status: DISCONTINUED | OUTPATIENT
Start: 2019-04-18 | End: 2019-04-18 | Stop reason: HOSPADM

## 2019-04-18 RX ORDER — ONDANSETRON 2 MG/ML
INJECTION INTRAMUSCULAR; INTRAVENOUS PRN
Status: DISCONTINUED | OUTPATIENT
Start: 2019-04-18 | End: 2019-04-18

## 2019-04-18 RX ORDER — EPHEDRINE SULFATE 50 MG/ML
INJECTION, SOLUTION INTRAMUSCULAR; INTRAVENOUS; SUBCUTANEOUS PRN
Status: DISCONTINUED | OUTPATIENT
Start: 2019-04-18 | End: 2019-04-18

## 2019-04-18 RX ORDER — FLUMAZENIL 0.1 MG/ML
0.2 INJECTION, SOLUTION INTRAVENOUS
Status: DISCONTINUED | OUTPATIENT
Start: 2019-04-18 | End: 2019-04-18 | Stop reason: HOSPADM

## 2019-04-18 RX ORDER — MEPERIDINE HYDROCHLORIDE 25 MG/ML
12.5 INJECTION INTRAMUSCULAR; INTRAVENOUS; SUBCUTANEOUS
Status: DISCONTINUED | OUTPATIENT
Start: 2019-04-18 | End: 2019-04-18 | Stop reason: HOSPADM

## 2019-04-18 RX ORDER — LIDOCAINE 40 MG/G
CREAM TOPICAL
Status: DISCONTINUED | OUTPATIENT
Start: 2019-04-18 | End: 2019-04-18 | Stop reason: HOSPADM

## 2019-04-18 RX ORDER — ONDANSETRON 2 MG/ML
4 INJECTION INTRAMUSCULAR; INTRAVENOUS EVERY 30 MIN PRN
Status: DISCONTINUED | OUTPATIENT
Start: 2019-04-18 | End: 2019-04-18 | Stop reason: HOSPADM

## 2019-04-18 RX ORDER — FENTANYL CITRATE 50 UG/ML
25-50 INJECTION, SOLUTION INTRAMUSCULAR; INTRAVENOUS
Status: DISCONTINUED | OUTPATIENT
Start: 2019-04-18 | End: 2019-04-18 | Stop reason: HOSPADM

## 2019-04-18 RX ORDER — FENTANYL CITRATE 50 UG/ML
INJECTION, SOLUTION INTRAMUSCULAR; INTRAVENOUS PRN
Status: DISCONTINUED | OUTPATIENT
Start: 2019-04-18 | End: 2019-04-18

## 2019-04-18 RX ORDER — IOPAMIDOL 510 MG/ML
INJECTION, SOLUTION INTRAVASCULAR PRN
Status: DISCONTINUED | OUTPATIENT
Start: 2019-04-18 | End: 2019-04-18 | Stop reason: HOSPADM

## 2019-04-18 RX ORDER — PROPOFOL 10 MG/ML
INJECTION, EMULSION INTRAVENOUS PRN
Status: DISCONTINUED | OUTPATIENT
Start: 2019-04-18 | End: 2019-04-18

## 2019-04-18 RX ORDER — DIMENHYDRINATE 50 MG/ML
25 INJECTION, SOLUTION INTRAMUSCULAR; INTRAVENOUS
Status: DISCONTINUED | OUTPATIENT
Start: 2019-04-18 | End: 2019-04-18 | Stop reason: HOSPADM

## 2019-04-18 RX ORDER — LIDOCAINE HYDROCHLORIDE 20 MG/ML
INJECTION, SOLUTION INFILTRATION; PERINEURAL PRN
Status: DISCONTINUED | OUTPATIENT
Start: 2019-04-18 | End: 2019-04-18

## 2019-04-18 RX ORDER — ONDANSETRON 4 MG/1
4 TABLET, ORALLY DISINTEGRATING ORAL EVERY 30 MIN PRN
Status: DISCONTINUED | OUTPATIENT
Start: 2019-04-18 | End: 2019-04-18 | Stop reason: HOSPADM

## 2019-04-18 RX ADMIN — PHENYLEPHRINE HYDROCHLORIDE 100 MCG: 10 INJECTION, SOLUTION INTRAMUSCULAR; INTRAVENOUS; SUBCUTANEOUS at 11:25

## 2019-04-18 RX ADMIN — SODIUM CHLORIDE, POTASSIUM CHLORIDE, SODIUM LACTATE AND CALCIUM CHLORIDE: 600; 310; 30; 20 INJECTION, SOLUTION INTRAVENOUS at 09:50

## 2019-04-18 RX ADMIN — PHENYLEPHRINE HYDROCHLORIDE 100 MCG: 10 INJECTION, SOLUTION INTRAMUSCULAR; INTRAVENOUS; SUBCUTANEOUS at 11:10

## 2019-04-18 RX ADMIN — PROPOFOL 140 MG: 10 INJECTION, EMULSION INTRAVENOUS at 10:47

## 2019-04-18 RX ADMIN — ROCURONIUM BROMIDE 35 MG: 10 INJECTION INTRAVENOUS at 10:47

## 2019-04-18 RX ADMIN — SODIUM CHLORIDE, POTASSIUM CHLORIDE, SODIUM LACTATE AND CALCIUM CHLORIDE: 600; 310; 30; 20 INJECTION, SOLUTION INTRAVENOUS at 11:43

## 2019-04-18 RX ADMIN — PHENYLEPHRINE HYDROCHLORIDE 100 MCG: 10 INJECTION, SOLUTION INTRAMUSCULAR; INTRAVENOUS; SUBCUTANEOUS at 11:54

## 2019-04-18 RX ADMIN — ONDANSETRON 4 MG: 2 INJECTION INTRAMUSCULAR; INTRAVENOUS at 11:39

## 2019-04-18 RX ADMIN — SUGAMMADEX 200 MG: 100 INJECTION, SOLUTION INTRAVENOUS at 12:27

## 2019-04-18 RX ADMIN — Medication 10 MG: at 10:58

## 2019-04-18 RX ADMIN — Medication 5 MG: at 12:22

## 2019-04-18 RX ADMIN — FENTANYL CITRATE 50 MCG: 50 INJECTION, SOLUTION INTRAMUSCULAR; INTRAVENOUS at 10:47

## 2019-04-18 RX ADMIN — LIDOCAINE HYDROCHLORIDE 80 MG: 20 INJECTION, SOLUTION INFILTRATION; PERINEURAL at 10:47

## 2019-04-18 ASSESSMENT — MIFFLIN-ST. JEOR: SCORE: 1513.63

## 2019-04-18 NOTE — OR NURSING
Dr. Guerrero at bedside in PACU to see patient. Per Dr. Guerrero patient may stop taking carafate and protonix.

## 2019-04-18 NOTE — OP NOTE
ERCP 04/18/2019 10:49 AM [facility], [address] [place] [address] [phone]     Endoscopy Department   _______________________________________________________________________________   Patient Name: Gricelda Sabillon         Procedure Date: 4/18/2019 10:49 AM   MRN: [id number]                       Account Number: [id number]   YOB: 1950              Admit Type: Outpatient   Age: 68                                Gender: Female   Note Status: Finalized                Attending MD: Shaun Guerrero MD   Pause for the Cause: time out performed Total Sedation Time:   _______________________________________________________________________________       Procedure:           ERCP   Indications:         Duodenal polyp(s), 68 year old female with a PMHx of CHERRY                        with CPAP, DM2, asthma, HTN, and HLD who was being                        evaluated by Dr. Llamas and Dr. Gonzalez for                        elevated LFTs that was thought to be from                        choledocholithiasis but ultimately found to be from an                        obstructing periampullary polyp with a large duodenal                        extension. Her MRI/MRCP was reviewed at tumor conference                        as well and no evidence of more advanced neoplasia or                        lymphadenopathy was seen. 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 There                        was polypoid tissue entering the distal portion of the                        CBD but without any invasion outside the duct or into                        the pancreatic head or beyond the muscularis propria.                        EMR was performed including resection of the intraductal                        extension on 2/28/19. All path showed just tubulovillous                        adenoma. Plan for reevaluation of EMR  site and resection                        of residual polyp but mainly for PD stent removal.   Providers:           Shaun Guerrero MD   Referring MD:           Requesting Provider: Tonya Harris MD   Medicines:           General Anesthesia, Indomethacin 100 mg SC given after                        colonoscopy; 2 L LR periprocedurally   Complications:       No immediate complications. Estimated blood loss:                        Minimal.   _______________________________________________________________________________   Procedure:           Pre-Anesthesia Assessment:                        - Prior to the procedure, a History and Physical was                        performed, and patient medications and allergies were                        reviewed. The patient is competent. The risks and                        benefits of the procedure and the sedation options and                        risks were discussed with the patient. All questions                        were answered and informed consent was obtained. Patient                        identification and proposed procedure were verified by                        the nurse, the anesthesiologist and the anesthetist in                        the procedure room. Mental Status Examination: alert and                        oriented. Airway Examination: normal oropharyngeal                        airway and neck mobility. Respiratory Examination: clear                        to auscultation. CV Examination: normal. Prophylactic                        Antibiotics: The patient does not require prophylactic                        antibiotics. Prior Anticoagulants: The patient has taken                        no previous anticoagulant or antiplatelet agents. ASA                        Grade Assessment: III - A patient with severe systemic                        disease. After reviewing the risks and benefits, the                        patient was  deemed in satisfactory condition to undergo                        the procedure. The anesthesia plan was to use general                        anesthesia. Immediately prior to administration of                        medications, the patient was re-assessed for adequacy to                        receive sedatives. The heart rate, respiratory rate,                        oxygen saturations, blood pressure, adequacy of                        pulmonary ventilation, and response to care were                        monitored throughout the procedure. The physical status                        of the patient was re-assessed after the procedure.                        After obtaining informed consent, the scope was passed                        under direct vision. Throughout the procedure, the                        patient's blood pressure, pulse, and oxygen saturations                        were monitored continuously. The Duodenoscope was                        introduced through the mouth, and used to inject                        contrast into and used to inject contrast into the bile                        duct. The Endoscope was introduced through the mouth,                        and used to inject contrast into and used to locate the                        major papilla. The ERCP was accomplished without                        difficulty. The patient tolerated the procedure well.                                                                                     Findings:        A biliary stent and pancreatic stent were visible on the  film. An        adult gastroscope with a cap was first advanced to the duodenum. A few        small fundic gland polyps noted in the stomach but otherwise normal        esophagus and stomach. Evidence of prior EMR in the second portion of        the duodenum with slight narrowing of the lumen but gastroscope was able        to be advanced to the thrid portion of the  duodenum. Mild residual poly        noted adjacent to the biliary stent and at the caudal margin of the        prior EMR scar. The was partly resected with a cold snare and retrieved.        Gastroscope was then exchanged for a duodenoscope. The esophagus was        successfully intubated under direct vision. The scope was advanced from        the mouth to the duodenum. The pharynx, larynx and associated        structures, as well as the upper GI tract, were normal. Inspection of        the major papilla revealed that an ampullectomy (papillectomy) had been        performed previously. One plastic stent originating in the common bile        duct was emerging from the major papilla. The stent was visibly patent.        One pancreatic stent originating in the pancreatic duct was emerging        from the major papilla. The stent was visibly occluded. Residual polyps        noted around biliary orifice. Some of this was resected with a cold        snare and retrieved. One stent was removed from the common bile duct        using a snare. The bile duct was deeply cannulated with the short-nosed        traction sphincterotome. Contrast was injected. I personally interpreted        the bile duct images. There was brisk flow of contrast through the        ducts. Image quality was excellent. Contrast extended to the hepatic        ducts. The lower third of the main bile duct contained filling defect(s)        thought to be small residual polyp tissue. Visiglide wire was passed        into the biliary tree. The biliary tree was swept with a 12 mm balloon        starting at the bifurcation. Nothing was found and only minimal residual        endoscopically apparent polyp was seen at the biliary orifice. Residual        polyp then resected with a cold snare. One stent was removed from the        pancreatic duct using a snare. One 10 Fr by 5 cm plastic Sofflex stent        with a single external flap and a single internal flap was  placed 5 cm        into the common bile duct as contrast did not drain well from the duct        and some polyp was resected adjacent to the biliary orifice. Bile flowed        through the stent. The stent was in good position.                                                                                    Impression:          - Forward viewing gastroscope and duodenoscope used.                        Prior EMR scar seen in the second portion of the                        duodenum. Only minimal residual polyp seen by the                        biliary orifice and the caudal margin of the prior EMR                        site although site not completely healed to make an                        accurate assessment. Mild narrowing in D2 from EMR                        scaring but gastroscope able to pass easily into D3.                        - PD stent appeared occluded. Biliary stent in place.                        Evidence of a prior endoscopic papillectomy                        - Endoscopically visible residual polyp removed with a                        cold snare.                        - Biliary stent removed. Cholangiogram suggested some                        residual polyp at the biliary orifice. Balloon sweeping                        yielded nothing. Minimal polyp noted adjacent to the                        biliary orifice which was resected with a cold snare.                        - Biliary stent replaced with a 10 Fr x 5 cm Sofflex                        stent                        - Pancreatic stent removed.   Recommendation:      - Proceed with colonoscopy today.                        - Await path results.                        - Check amylase/lipase 2 hours post-procedure                        - Plan to repeat EGD/ERCP in 3 months to reassess EMR                        site and potentially perform cholangioscopy to assess                        residual intraductal polyp. May perform RFA at  that                        point of distal bile duct depending on findings. 0  - Carafate and pantoprazole may be stopped                       - Above discussed with patient and .                                                                                       Shaun Guerrero MD

## 2019-04-18 NOTE — ANESTHESIA CARE TRANSFER NOTE
Patient: Gricelda Sabillon    Procedure(s):  COMBINED ENDOSCOPIC RETROGRADE CHOLANGIOPANCREATOGRAPHY, Bile duct stent exchange and Polypectomy  upper endoscopy with polypectomy  Colonoscopy with polypectomy    Diagnosis: Ampullary Adenoma  Diagnosis Additional Information: No value filed.    Anesthesia Type:   No value filed.     Note:    Patient transferred to:PACU  Comments: Anesthesia Care Transfer Note    Patient: Gricelda Sabillon    Transferred to: PACU with supplemental O2    Patient vital signs: stable    Airway: none    Monitors on, VSS, breathing spontaneously, report to EDY Langston CRNA   4/18/2019 12:35 PMHandoff Report: Identifed the Patient, Identified the Reponsible Provider, Reviewed the pertinent medical history, Discussed the surgical course, Reviewed Intra-OP anesthesia mangement and issues during anesthesia, Set expectations for post-procedure period and Allowed opportunity for questions and acknowledgement of understanding      Vitals: (Last set prior to Anesthesia Care Transfer)    CRNA VITALS  4/18/2019 1205 - 4/18/2019 1235      4/18/2019             Pulse:  65    SpO2:  99 %  resp 12             Electronically Signed By: DENYS Herrera CRNA  April 18, 2019  12:35 PM

## 2019-04-18 NOTE — DISCHARGE INSTRUCTIONS
General acute hospital  Same-Day Surgery   Adult Discharge Orders & Instructions     For 24 hours after surgery    1. Get plenty of rest.  A responsible adult must stay with you for at least 24 hours after you leave the hospital.   2. Do not drive or use heavy equipment.  If you have weakness or tingling, don't drive or use heavy equipment until this feeling goes away.  3. Do not drink alcohol.  4. Avoid strenuous or risky activities.  Ask for help when climbing stairs.   5. You may feel lightheaded.  IF so, sit for a few minutes before standing.  Have someone help you get up.   6. If you have nausea (feel sick to your stomach): Drink only clear liquids such as apple juice, ginger ale, broth or 7-Up.  Rest may also help.  Be sure to drink enough fluids.  Move to a regular diet as you feel able.  7. You may have a slight fever. Call the doctor if your fever is over 100 F (37.7 C) (taken under the tongue) or lasts longer than 24 hours.  8. You may have a dry mouth, a sore throat, muscle aches or trouble sleeping.  These should go away after 24 hours.  9. Do not make important or legal decisions.   Call your doctor for any of the followin.  Signs of infection (fever, growing tenderness at the surgery site, a large amount of drainage or bleeding, severe pain, foul-smelling drainage, redness, swelling).    2. It has been over 8 to 10 hours since surgery and you are still not able to urinate (pass water).    3.  Headache for over 24 hours.  To contact a doctor, call _____Dr Guerrero's office at [phone] at the GI Clinic or:        [phone] and ask for the resident on call for   _______Gastroenterology________ (answered 24 hours a day)      Emergency Department:    [facility]: [phone]       (TTY for hearing impaired: [phone])    [facility]: [phone]       (TTY for hearing impaired: [phone])

## 2019-04-18 NOTE — ANESTHESIA PREPROCEDURE EVALUATION
Anesthesia Pre-Procedure Evaluation    Patient: Gricelda Sabillon   MRN:     [id number] Gender:   female   Age:    68 year old :      1950        Preoperative Diagnosis: Ampullary Adenoma   Procedure(s):  Endoscopic Retrograde Cholangiopancreatogram  Upper Endoscopy Possible Endoscopic Mucosal Resection  Colonoscopy     Past Medical History:   Diagnosis Date     Allergic rhinitis, cause unspecified      Asthma      Asthma, mild intermittent      Cataract      Cellulitis and abscess of leg, except foot     Bilateral     Eczema      Heart murmur     aortic area     HTN, goal below 140/90      Hyperlipidemia LDL goal < 100      Hyperplastic colon polyp      Infection     bilateral eye infections     Macular hole of left eye      Obesity (BMI 30-39.9)      Osteoarthritis     knees     Other chronic pain     right knee     Sleep apnea     uses c pap     Type 2 diabetes, HbA1C goal < 8% (H)       Past Surgical History:   Procedure Laterality Date     ARTHROPLASTY HIP Right 2017    Procedure: ARTHROPLASTY HIP;  Right total hip arthroplasty  ;  Surgeon: Ayaan Pena MD;  Location: [facility] OR     ARTHROPLASTY KNEE  2013    Procedure: ARTHROPLASTY KNEE;  right total knee arthroplasty ;  Surgeon: Chaparro Wesley MD;  Location: [facility] OR     ARTHROSCOPY KNEE Right 2015    Procedure: ARTHROSCOPY KNEE;  Surgeon: Ayaan Pena MD;  Location: [facility] OR     C INCISION OF HYMEN       CATARACT IOL, RT/LT       COLONOSCOPY       ENDOSCOPIC RETROGRADE CHOLANGIOPANCREATOGRAM N/A 2019    Procedure: COMBINED ENDOSCOPIC RETROGRADE CHOLANGIOPANCREATOGRAPHY, BILIARY SPINCTEROTOMY AND DILATION, PLACE BILE DUCT STENT;  Surgeon: Guru Andie Gonzalez MD;  Location: [facility] OR     ENDOSCOPIC RETROGRADE CHOLANGIOPANCREATOGRAM N/A 2019    Procedure: Endoscopic Retrograde Cholangiopancreatogram, Bile duct stent removal and placement;  Surgeon: Shaun Guerrero MD;  Location: [facility] OR      ESOPHAGOSCOPY, GASTROSCOPY, DUODENOSCOPY (EGD), RESECT MUCOSA, COMBINED N/A 2/28/2019    Procedure: Upper Endoscopy, Endoscopic Ultrasound, Endoscopic Mucosal Resection,  Ampullectomy, polypectomy.;  Surgeon: Shaun Guerrero MD;  Location: [facility] OR     EYE SURGERY      macular hole repaired left eye     HC KNEE SCOPE, DIAGNOSTIC      - both knees     HEAD & NECK SURGERY      wisdom teeth          Anesthesia Evaluation     .             ROS/MED HX    ENT/Pulmonary:     (+)sleep apnea, Intermittent asthma uses CPAP , . .    Neurologic:       Cardiovascular:     (+) hypertension----. : . . . :. .       METS/Exercise Tolerance:  >4 METS   Hematologic:         Musculoskeletal:         GI/Hepatic:     (+) bowel prep,       Renal/Genitourinary:         Endo:     (+) type II DM Obesity, .      Psychiatric:         Infectious Disease:         Malignancy:         Other:                         PHYSICAL EXAM:   Mental Status/Neuro:    Airway: Facies: Feasible  Mallampati: I  Mouth/Opening: Full  TM distance: > 6 cm  Neck ROM: Full   Respiratory: Auscultation: CTAB     Resp. Rate: Normal     Resp. Effort: Normal      CV: Rhythm: Regular   Comments:      Dental: Normal                  Lab Results   Component Value Date    WBC 5.6 04/18/2019    HGB 12.8 04/18/2019    HCT 41.9 04/18/2019     04/18/2019    CRP <5.0 01/07/2014    SED 16 01/07/2014     04/18/2019    POTASSIUM 3.9 04/18/2019    CHLORIDE 103 04/18/2019    CO2 25 04/18/2019    BUN 14 04/18/2019    CR 0.96 04/18/2019     (H) 04/18/2019    KATHRYN 9.7 04/18/2019    ALBUMIN 3.7 04/18/2019    PROTTOTAL 7.5 04/18/2019     (H) 04/18/2019    AST 98 (H) 04/18/2019    ALKPHOS 103 04/18/2019    BILITOTAL 0.7 04/18/2019    LIPASE 478 (H) 04/18/2019    AMYLASE 65 04/18/2019    INR 1.07 03/01/2019    TSH 3.28 05/14/2018    T4 1.17 02/04/2016       Preop Vitals  BP Readings from Last 3 Encounters:   04/18/19 131/61   04/12/19 130/74   03/25/19 128/66    Pulse  "Readings from Last 3 Encounters:   04/18/19 66   04/12/19 87   03/25/19 80      Resp Readings from Last 3 Encounters:   04/18/19 16   04/12/19 22   03/25/19 16    SpO2 Readings from Last 3 Encounters:   04/18/19 97%   04/12/19 99%   03/25/19 96%      Temp Readings from Last 1 Encounters:   04/18/19 36.6  C (97.8  F) (Oral)    Ht Readings from Last 1 Encounters:   04/18/19 1.702 m (5' 7\")      Wt Readings from Last 1 Encounters:   04/18/19 96.1 kg (211 lb 13.8 oz)    Estimated body mass index is 33.18 kg/m  as calculated from the following:    Height as of this encounter: 1.702 m (5' 7\").    Weight as of this encounter: 96.1 kg (211 lb 13.8 oz).     LDA:  Peripheral IV 04/18/19 Right;Posterior Hand (Active)   Number of days: 0            Assessment:   ASA SCORE: 2    NPO Status: > 2 hours since completed Clear Liquids; > 6 hours since completed Solid Foods   Documentation: H&P complete; Preop Testing complete; Consents complete   Proceeding: Proceed without further delay  Tobacco Use:  NO Active use of Tobacco/UNKNOWN Tobacco use status     Plan:   Anes. Type:  General   Pre-Induction: Midazolam IV   Induction:  IV (Standard)   Airway: Oral ETT   Access/Monitoring: PIV   Maintenance: Balanced   Emergence: Procedure Site   Logistics: Same Day Surgery     Postop Pain/Sedation Strategy:  Standard-Options: Opioids PRN     PONV Management:  Adult Risk Factors: Female, Non-Smoker, Postop Opioids  Prevention: Ondansetron; Propofol Infusion     CONSENT: Direct conversation   Plan and risks discussed with: Patient   Blood Products: Consent Deferred (Minimal Blood Loss)                         Camryn Gaytan MD  "

## 2019-04-18 NOTE — ANESTHESIA POSTPROCEDURE EVALUATION
Anesthesia POST Procedure Evaluation    Patient: Gricelda Sabillon   MRN:     [id number] Gender:   female   Age:    68 year old :      1950        Preoperative Diagnosis: Ampullary Adenoma   Procedure(s):  COMBINED ENDOSCOPIC RETROGRADE CHOLANGIOPANCREATOGRAPHY, Bile duct stent exchange and Polypectomy  upper endoscopy with polypectomy  Colonoscopy with polypectomy   Postop Comments: No value filed.       Anesthesia Type:  General  General    Reportable Event: NO     PAIN: Uncomplicated   Sign Out status: Comfortable, Well controlled pain     PONV: No PONV   Sign Out status:  No Nausea or Vomiting     Neuro/Psych: Uneventful perioperative course   Sign Out Status: Preoperative baseline; Age appropriate mentation     Airway/Resp.: Uneventful perioperative course   Sign Out Status: Non labored breathing, age appropriate RR; Resp. Status within EXPECTED Parameters     CV: Uneventful perioperative course   Sign Out status: Appropriate BP and perfusion indices; Appropriate HR/Rhythm     Disposition:   Sign Out in:  PACU  Disposition:  Phase II; Home  Recovery Course: Uneventful  Follow-Up: Not required           Last Anesthesia Record Vitals:  CRNA VITALS  2019 1205 - 2019 1305      2019             Pulse:  65    SpO2:  99 %    Resp Rate (observed):  3  (Abnormal)           Last PACU Vitals:  Vitals Value Taken Time   /68 2019  1:10 PM   Temp 36.4  C (97.5  F) 2019  1:00 PM   Pulse 66 2019  1:10 PM   Resp 18 2019  1:00 PM   SpO2 92 % 2019  1:11 PM   Temp src     NIBP     Pulse     SpO2     Resp     Temp     Ht Rate     Temp 2     Vitals shown include unvalidated device data.      Electronically Signed By: Camryn Gaytan MD, 2019, 1:12 PM

## 2019-04-19 LAB — COPATH REPORT: NORMAL

## 2019-04-22 NOTE — RESULT ENCOUNTER NOTE
Called patient and reviewed pathology. Residual polyp from prior EMR site returned as just TVA again. Patient already schedule for surveillance ERCP/EGD in July. Colon polyp returned as just a hyperplastic polyp. Next surveillance colonoscopy recommended in 10 years.    Shaun Guerrero MD  [facility]  Division of Gastroenterology and Hepatology  [facility] [address], [place] [address]

## 2019-05-09 DIAGNOSIS — I10 HTN, GOAL BELOW 140/90: ICD-10-CM

## 2019-05-09 DIAGNOSIS — E11.9 DIABETES MELLITUS WITHOUT COMPLICATION (H): Chronic | ICD-10-CM

## 2019-05-09 DIAGNOSIS — R00.0 SINUS TACHYCARDIA: ICD-10-CM

## 2019-05-09 DIAGNOSIS — E78.5 HYPERLIPIDEMIA WITH TARGET LDL LESS THAN 100: Chronic | ICD-10-CM

## 2019-05-09 RX ORDER — HYDROCHLOROTHIAZIDE 25 MG/1
TABLET ORAL
Qty: 90 TABLET | Refills: 0 | Status: SHIPPED | OUTPATIENT
Start: 2019-05-09 | End: 2019-07-16

## 2019-05-09 RX ORDER — GLIPIZIDE 5 MG/1
TABLET ORAL
Qty: 360 TABLET | Refills: 0 | Status: SHIPPED | OUTPATIENT
Start: 2019-05-09 | End: 2019-07-16

## 2019-05-09 RX ORDER — LISINOPRIL 40 MG/1
TABLET ORAL
Qty: 90 TABLET | Refills: 0 | Status: SHIPPED | OUTPATIENT
Start: 2019-05-09 | End: 2019-07-16

## 2019-05-09 NOTE — TELEPHONE ENCOUNTER
Routing refill requests to provider for review/approval because:  Most recent blood pressure ouside standing order parameters.    Please advise, thanks.

## 2019-05-09 NOTE — TELEPHONE ENCOUNTER
"Requested Prescriptions   Pending Prescriptions Disp Refills     glipiZIDE (GLUCOTROL) 5 MG tablet [Pharmacy Med Name: GlipiZIDE 5MG       TAB] 360 tablet 1     Sig: TAKE 2 TABLETS BY MOUTH TWICE DAILY BEFORE MEAL(S)   Last Written Prescription Date:  05/21/2018  Last Fill Quantity: 360,  # refills: 1   Last office visit: 4/12/2019 with prescribing provider:     Future Office Visit:      Sulfonylurea Agents Failed - 5/9/2019  9:31 AM        Failed - Blood pressure less than 140/90 in past 6 months     BP Readings from Last 3 Encounters:   04/18/19 150/73   04/12/19 130/74   03/25/19 128/66                 Failed - Patient has had a Microalbumin in the past 12 mos.     Recent Labs   Lab Test 01/26/18  1414   MICROL 7   UMALCR 8.89             Passed - Patient has documented LDL within the past 12 mos.     Recent Labs   Lab Test 05/14/18  0855   LDL 75             Passed - Patient has documented A1c within the specified period of time.     If HgbA1C is 8 or greater, it needs to be on file within the past 3 months.  If less than 8, must be on file within the past 6 months.     Recent Labs   Lab Test 02/25/19  0900   A1C 8.1*             Passed - Medication is active on med list        Passed - Patient is age 18 or older        Passed - No active pregnancy on record        Passed - Patient has a recent creatinine (normal) within the past 12 mos.     Recent Labs   Lab Test 04/18/19  0810   CR 0.96             Passed - Patient has not had a positive pregnancy test within the past 12 mos.        Passed - Recent (6 mo) or future (30 days) visit within the authorizing provider's specialty     Patient had office visit in the last 6 months or has a visit in the next 30 days with authorizing provider or within the authorizing provider's specialty.  See \"Patient Info\" tab in inbasket, or \"Choose Columns\" in Meds & Orders section of the refill encounter.            hydrochlorothiazide (HYDRODIURIL) 25 MG tablet [Pharmacy Med " "Name: HYDROCHLOROT 25MG   TAB] 90 tablet 1     Sig: TAKE 1 TABLET BY MOUTH ONCE DAILY IN THE MORNING   Last Written Prescription Date:  05/21/2018  Last Fill Quantity: 90,  # refills: 1   Last office visit: 4/12/2019 with prescribing provider:     Future Office Visit:      Diuretics (Including Combos) Protocol Failed - 5/9/2019  9:31 AM        Failed - Blood pressure under 140/90 in past 12 months     BP Readings from Last 3 Encounters:   04/18/19 150/73   04/12/19 130/74   03/25/19 128/66                 Passed - Recent (12 mo) or future (30 days) visit within the authorizing provider's specialty     Patient had office visit in the last 12 months or has a visit in the next 30 days with authorizing provider or within the authorizing provider's specialty.  See \"Patient Info\" tab in inbasket, or \"Choose Columns\" in Meds & Orders section of the refill encounter.              Passed - Medication is active on med list        Passed - Patient is age 18 or older        Passed - No active pregancy on record        Passed - Normal serum creatinine on file in past 12 months     Recent Labs   Lab Test 04/18/19  0810   CR 0.96              Passed - Normal serum potassium on file in past 12 months     Recent Labs   Lab Test 04/18/19  0810   POTASSIUM 3.9                    Passed - Normal serum sodium on file in past 12 months     Recent Labs   Lab Test 04/18/19  0810                 Passed - No positive pregnancy test in past 12 months        lisinopril (PRINIVIL/ZESTRIL) 40 MG tablet [Pharmacy Med Name: LISINOPRIL 40MG     TAB] 90 tablet 1     Sig: TAKE 1 TABLET BY MOUTH ONCE DAILY   Last Written Prescription Date:  05/21/2018  Last Fill Quantity: 90,  # refills: 1   Last office visit: 4/12/2019 with prescribing provider:     Future Office Visit:      ACE Inhibitors (Including Combos) Protocol Failed - 5/9/2019  9:31 AM        Failed - Blood pressure under 140/90 in past 12 months     BP Readings from Last 3 Encounters: " "  04/18/19 150/73   04/12/19 130/74   03/25/19 128/66                 Passed - Recent (12 mo) or future (30 days) visit within the authorizing provider's specialty     Patient had office visit in the last 12 months or has a visit in the next 30 days with authorizing provider or within the authorizing provider's specialty.  See \"Patient Info\" tab in inbasket, or \"Choose Columns\" in Meds & Orders section of the refill encounter.              Passed - Medication is active on med list        Passed - Patient is age 18 or older        Passed - No active pregnancy on record        Passed - Normal serum creatinine on file in past 12 months     Recent Labs   Lab Test 04/18/19  0810   CR 0.96             Passed - Normal serum potassium on file in past 12 months     Recent Labs   Lab Test 04/18/19  0810   POTASSIUM 3.9             Passed - No positive pregnancy test within past 12 months        "

## 2019-05-10 ENCOUNTER — TELEPHONE (OUTPATIENT)
Dept: GASTROENTEROLOGY | Facility: CLINIC | Age: 69
End: 2019-05-10

## 2019-05-10 NOTE — TELEPHONE ENCOUNTER
Patient called stating that she needed to reschedule 7/19/19 procedure with Dr. Guerrero. Patient stated that 7/26/19 will be much better for her scheduled. Informed patient she is rescheduled to 7/26/19.    HK 5/10/19

## 2019-05-13 ENCOUNTER — TELEPHONE (OUTPATIENT)
Dept: ENDOCRINOLOGY | Facility: CLINIC | Age: 69
End: 2019-05-13

## 2019-05-13 NOTE — TELEPHONE ENCOUNTER
Panel Management Review      Patient has the following on her problem list:     Diabetes    ASA: Passed    Last A1C  Lab Results   Component Value Date    A1C 8.1 02/25/2019    A1C 8.0 11/19/2018    A1C 7.9 08/20/2018    A1C 7.4 05/14/2018    A1C 7.3 01/19/2018     A1C tested: FAILED    Last LDL:    Lab Results   Component Value Date    CHOL 158 05/14/2018     Lab Results   Component Value Date    HDL 40 05/14/2018     Lab Results   Component Value Date    LDL 75 05/14/2018     Lab Results   Component Value Date    TRIG 214 05/14/2018     Lab Results   Component Value Date    CHOLHDLRATIO 3.1 08/03/2015     Lab Results   Component Value Date    NHDL 118 05/14/2018       Is the patient on a Statin? NO             Is the patient on Aspirin? YES    Medications     HMG CoA Reductase Inhibitors     STATIN NOT PRESCRIBED (INTENTIONAL)       Salicylates     aspirin 81 MG EC tablet             Last three blood pressure readings:  BP Readings from Last 3 Encounters:   04/18/19 150/73   04/12/19 130/74   03/25/19 128/66       Date of last diabetes office visit: 10/24/18     Tobacco History:     History   Smoking Status     Never Smoker   Smokeless Tobacco     Never Used           Composite cancer screening  Chart review shows that this patient is due/due soon for the following None  Summary:    Patient is due/failing the following:   FOLLOW UP    Action needed:   Patient needs office visit for endo.    Type of outreach:    Sent Tag'By message.    Questions for provider review:    None                                                                                                                                    Radha Liao, [facility]  [facility]       Chart routed to no one .

## 2019-05-25 DIAGNOSIS — E11.65 TYPE 2 DIABETES MELLITUS WITH HYPERGLYCEMIA, WITHOUT LONG-TERM CURRENT USE OF INSULIN (H): ICD-10-CM

## 2019-05-25 RX ORDER — CANAGLIFLOZIN 100 MG/1
TABLET, FILM COATED ORAL
Qty: 90 TABLET | Refills: 0 | Status: SHIPPED | OUTPATIENT
Start: 2019-05-25 | End: 2019-07-16

## 2019-05-25 NOTE — TELEPHONE ENCOUNTER
"Requested Prescriptions   Pending Prescriptions Disp Refills     INVOKANA 100 MG tablet [Pharmacy Med Name: INVOKANA 100MG TAB]  Last office visit: 4/12/19 30 tablet 1     Sig: TAKE 1 TABLET BY MOUTH IN THE MORNING BEFORE  BREAKFAST       Sodium Glucose Co-Transport Inhibitor Agents Failed - 5/25/2019  5:30 AM        Failed - Blood pressure less than 140/90 in past 6 months     BP Readings from Last 3 Encounters:   04/18/19 150/73   04/12/19 130/74   03/25/19 128/66                 Failed - Patient has documented LDL within the past 12 mos.     Recent Labs   Lab Test 05/14/18  0855   LDL 75             Failed - Patient has had a Microalbumin in the past 15 mos.     Recent Labs   Lab Test 01/26/18  1414   MICROL 7   UMALCR 8.89             Passed - Patient has documented A1c within the specified period of time.     If HgbA1C is 8 or greater, it needs to be on file within the past 3 months.  If less than 8, must be on file within the past 6 months.     Recent Labs   Lab Test 02/25/19  0900   A1C 8.1*             Passed - No creatinine >1.4 or GFR <45 within the past 12 mos     Recent Labs   Lab Test 04/18/19  0810   GFRESTIMATED 59*   GFRESTBLACK 69       Recent Labs   Lab Test 04/18/19  0810   CR 0.96             Passed - Medication is active on med list        Passed - Patient is age 18 or older        Passed - Patient is not pregnant        Passed - Patient has documented normal Potassium within the last 12 mos.     Recent Labs   Lab Test 04/18/19  0810   POTASSIUM 3.9             Passed - Patient has no positive pregnancy test within the past 12 mos.        Passed - Recent (6 mo) or future (30 days) visit within the authorizing provider's specialty     Patient had office visit in the last 6 months or has a visit in the next 30 days with authorizing provider or within the authorizing provider's specialty.  See \"Patient Info\" tab in inbasket, or \"Choose Columns\" in Meds & Orders section of the refill encounter.     "

## 2019-05-25 NOTE — TELEPHONE ENCOUNTER
Routing refill request to provider for review/approval because:  Blood pressures, labs not current    Last office visit: 4/12/19, patient was advised to follow up in July 2019  Next 5 appointments (look out 90 days)    Jul 16, 2019  1:00 PM CDT  Pre-Op physical with Raisa Davidson MD  [facility] ([facility]) [address]  [facility] [id number] [phone]

## 2019-06-05 ENCOUNTER — TRANSFERRED RECORDS (OUTPATIENT)
Dept: HEALTH INFORMATION MANAGEMENT | Facility: CLINIC | Age: 69
End: 2019-06-05

## 2019-06-13 ENCOUNTER — DOCUMENTATION ONLY (OUTPATIENT)
Dept: LAB | Facility: CLINIC | Age: 69
End: 2019-06-13

## 2019-06-13 DIAGNOSIS — E11.65 TYPE 2 DIABETES MELLITUS WITH HYPERGLYCEMIA, WITHOUT LONG-TERM CURRENT USE OF INSULIN (H): Primary | ICD-10-CM

## 2019-06-13 DIAGNOSIS — E78.5 HYPERLIPIDEMIA WITH TARGET LDL LESS THAN 100: Chronic | ICD-10-CM

## 2019-06-13 DIAGNOSIS — I10 ESSENTIAL HYPERTENSION WITH GOAL BLOOD PRESSURE LESS THAN 140/90: Chronic | ICD-10-CM

## 2019-06-24 DIAGNOSIS — I10 HTN, GOAL BELOW 140/90: ICD-10-CM

## 2019-06-24 DIAGNOSIS — R00.0 SINUS TACHYCARDIA: ICD-10-CM

## 2019-06-24 DIAGNOSIS — E78.5 HYPERLIPIDEMIA WITH TARGET LDL LESS THAN 100: Chronic | ICD-10-CM

## 2019-06-24 DIAGNOSIS — E11.9 DIABETES MELLITUS WITHOUT COMPLICATION (H): Chronic | ICD-10-CM

## 2019-06-24 NOTE — TELEPHONE ENCOUNTER
"Requested Prescriptions   Pending Prescriptions Disp Refills     JANUVIA 100 MG tablet [Pharmacy Med Name: JANUVIA 100MG       TAB] 90 tablet 1     Sig: TAKE 1 TABLET BY MOUTH ONCE DAILY   Last Written Prescription Date:  12/12/2018  Last Fill Quantity: 90,  # refills: 01   Last office visit: 4/12/2019 with prescribing provider:     Future Office Visit:   Next 5 appointments (look out 90 days)    Jul 16, 2019  1:00 PM CDT  Pre-Op physical with Raisa Davidson MD  [facility] [address]  [facility] [address]  [phone]           DPP4 Inhibitors Protocol Failed - 6/24/2019 11:41 AM        Failed - Blood pressure less than 140/90 in past 6 months     BP Readings from Last 3 Encounters:   04/18/19 150/73   04/12/19 130/74   03/25/19 128/66                 Failed - LDL on file in past 12 months     Recent Labs   Lab Test 05/14/18  0855   LDL 75             Failed - Microalbumin on file in past 12 months     Recent Labs   Lab Test 01/26/18  1414   MICROL 7   UMALCR 8.89             Failed - HgbA1C in past 3 or 6 months     If HgbA1C is 8 or greater, it needs to be on file within the past 3 months.  If less than 8, must be on file within the past 6 months.     Recent Labs   Lab Test 02/25/19  0900   A1C 8.1*             Passed - Medication is active on med list        Passed - Patient is age 18 or older        Passed - No active pregnancy on record        Passed - Normal serum creatinine in past 12 months     Recent Labs   Lab Test 04/18/19  0810   CR 0.96             Passed - No positive pregnancy test in past 12 months        Passed - Recent (6 mo) or future (30 days) visit within the authorizing provider's specialty     Patient had office visit in the last 6 months or has a visit in the next 30 days with authorizing provider.  See \"Patient Info\" tab in inbasket, or \"Choose Columns\" in Meds & Orders section of the refill encounter.        "

## 2019-06-25 RX ORDER — SITAGLIPTIN 100 MG/1
TABLET, FILM COATED ORAL
Qty: 90 TABLET | Refills: 0 | Status: SHIPPED | OUTPATIENT
Start: 2019-06-25 | End: 2019-07-16

## 2019-06-25 NOTE — TELEPHONE ENCOUNTER
Patient has lab appointment 7/9.    Medication is being filled for 1 time refill only due to:  Future labs ordered ..

## 2019-07-09 DIAGNOSIS — E11.65 TYPE 2 DIABETES MELLITUS WITH HYPERGLYCEMIA, WITHOUT LONG-TERM CURRENT USE OF INSULIN (H): ICD-10-CM

## 2019-07-09 DIAGNOSIS — E78.5 HYPERLIPIDEMIA WITH TARGET LDL LESS THAN 100: Chronic | ICD-10-CM

## 2019-07-09 DIAGNOSIS — I10 ESSENTIAL HYPERTENSION WITH GOAL BLOOD PRESSURE LESS THAN 140/90: Chronic | ICD-10-CM

## 2019-07-09 LAB
ALBUMIN SERPL-MCNC: 3.6 G/DL (ref 3.4–5)
ALP SERPL-CCNC: 105 U/L (ref 40–150)
ALT SERPL W P-5'-P-CCNC: 138 U/L (ref 0–50)
ANION GAP SERPL CALCULATED.3IONS-SCNC: 7 MMOL/L (ref 3–14)
AST SERPL W P-5'-P-CCNC: 94 U/L (ref 0–45)
BILIRUB SERPL-MCNC: 0.4 MG/DL (ref 0.2–1.3)
BUN SERPL-MCNC: 22 MG/DL (ref 7–30)
CALCIUM SERPL-MCNC: 9.6 MG/DL (ref 8.5–10.1)
CHLORIDE SERPL-SCNC: 107 MMOL/L (ref 94–109)
CHOLEST SERPL-MCNC: 291 MG/DL
CO2 SERPL-SCNC: 27 MMOL/L (ref 20–32)
CREAT SERPL-MCNC: 0.93 MG/DL (ref 0.52–1.04)
CREAT UR-MCNC: 126 MG/DL
ERYTHROCYTE [DISTWIDTH] IN BLOOD BY AUTOMATED COUNT: 14.7 % (ref 10–15)
GFR SERPL CREATININE-BSD FRML MDRD: 62 ML/MIN/{1.73_M2}
GLUCOSE SERPL-MCNC: 125 MG/DL (ref 70–99)
HBA1C MFR BLD: 6.8 % (ref 0–5.6)
HCT VFR BLD AUTO: 45.9 % (ref 35–47)
HDLC SERPL-MCNC: 41 MG/DL
HGB BLD-MCNC: 14.4 G/DL (ref 11.7–15.7)
LDLC SERPL CALC-MCNC: 201 MG/DL
MCH RBC QN AUTO: 26.6 PG (ref 26.5–33)
MCHC RBC AUTO-ENTMCNC: 31.4 G/DL (ref 31.5–36.5)
MCV RBC AUTO: 85 FL (ref 78–100)
MICROALBUMIN UR-MCNC: 11 MG/L
MICROALBUMIN/CREAT UR: 8.57 MG/G CR (ref 0–25)
NONHDLC SERPL-MCNC: 250 MG/DL
PLATELET # BLD AUTO: 299 10E9/L (ref 150–450)
POTASSIUM SERPL-SCNC: 4.3 MMOL/L (ref 3.4–5.3)
PROT SERPL-MCNC: 7.5 G/DL (ref 6.8–8.8)
RBC # BLD AUTO: 5.41 10E12/L (ref 3.8–5.2)
SODIUM SERPL-SCNC: 142 MMOL/L (ref 133–144)
TRIGL SERPL-MCNC: 244 MG/DL
WBC # BLD AUTO: 6.2 10E9/L (ref 4–11)

## 2019-07-09 PROCEDURE — 80053 COMPREHEN METABOLIC PANEL: CPT | Performed by: INTERNAL MEDICINE

## 2019-07-09 PROCEDURE — 85027 COMPLETE CBC AUTOMATED: CPT | Performed by: INTERNAL MEDICINE

## 2019-07-09 PROCEDURE — 83036 HEMOGLOBIN GLYCOSYLATED A1C: CPT | Performed by: INTERNAL MEDICINE

## 2019-07-09 PROCEDURE — 36415 COLL VENOUS BLD VENIPUNCTURE: CPT | Performed by: INTERNAL MEDICINE

## 2019-07-09 PROCEDURE — 80061 LIPID PANEL: CPT | Performed by: INTERNAL MEDICINE

## 2019-07-09 PROCEDURE — 82043 UR ALBUMIN QUANTITATIVE: CPT | Performed by: INTERNAL MEDICINE

## 2019-07-16 ENCOUNTER — OFFICE VISIT (OUTPATIENT)
Dept: INTERNAL MEDICINE | Facility: CLINIC | Age: 69
End: 2019-07-16
Payer: COMMERCIAL

## 2019-07-16 VITALS
OXYGEN SATURATION: 96 % | WEIGHT: 209.4 LBS | HEIGHT: 67 IN | SYSTOLIC BLOOD PRESSURE: 118 MMHG | RESPIRATION RATE: 18 BRPM | BODY MASS INDEX: 32.87 KG/M2 | HEART RATE: 83 BPM | DIASTOLIC BLOOD PRESSURE: 74 MMHG | TEMPERATURE: 98 F

## 2019-07-16 DIAGNOSIS — R00.0 SINUS TACHYCARDIA: ICD-10-CM

## 2019-07-16 DIAGNOSIS — E11.9 DIABETES MELLITUS WITHOUT COMPLICATION (H): Chronic | ICD-10-CM

## 2019-07-16 DIAGNOSIS — I10 HTN, GOAL BELOW 140/90: ICD-10-CM

## 2019-07-16 DIAGNOSIS — E78.5 HYPERLIPIDEMIA WITH TARGET LDL LESS THAN 100: Chronic | ICD-10-CM

## 2019-07-16 DIAGNOSIS — Z01.818 PREOP GENERAL PHYSICAL EXAM: Primary | ICD-10-CM

## 2019-07-16 DIAGNOSIS — E11.65 TYPE 2 DIABETES MELLITUS WITH HYPERGLYCEMIA, WITHOUT LONG-TERM CURRENT USE OF INSULIN (H): ICD-10-CM

## 2019-07-16 PROCEDURE — 99215 OFFICE O/P EST HI 40 MIN: CPT | Performed by: INTERNAL MEDICINE

## 2019-07-16 PROCEDURE — 93000 ELECTROCARDIOGRAM COMPLETE: CPT | Performed by: INTERNAL MEDICINE

## 2019-07-16 RX ORDER — GLIPIZIDE 5 MG/1
TABLET ORAL
Qty: 360 TABLET | Refills: 1 | Status: SHIPPED | OUTPATIENT
Start: 2019-07-16 | End: 2019-11-22

## 2019-07-16 RX ORDER — HYDROCHLOROTHIAZIDE 25 MG/1
TABLET ORAL
Qty: 90 TABLET | Refills: 1 | Status: SHIPPED | OUTPATIENT
Start: 2019-07-16 | End: 2019-11-22

## 2019-07-16 RX ORDER — LISINOPRIL 40 MG/1
40 TABLET ORAL DAILY
Qty: 90 TABLET | Refills: 1 | Status: SHIPPED | OUTPATIENT
Start: 2019-07-16 | End: 2019-11-22

## 2019-07-16 ASSESSMENT — MIFFLIN-ST. JEOR: SCORE: 1497.46

## 2019-07-16 NOTE — PROGRESS NOTES
[place] [address]  [facility] [address]  [phone]  Dept: [phone]    PRE-OP EVALUATION:  Today's date: 2019    Gricelda Sabillon (: 1950) presents for pre-operative evaluation assessment as requested by Dr. Shaun Guerrero.  She requires evaluation and anesthesia risk assessment prior to undergoing surgery/procedure for treatment of Endoscopic cholangiopancreatogram with possible spyglass intraductal radiofrequency ablation. .    Fax number for surgical facility: [facility]  Primary Physician: Raisa Davidson  Type of Anesthesia Anticipated: General    Patient has a Health Care Directive or Living Will:  NO    Preop Questions 2019   Who is doing your surgery? dominga   What are you having done? recheck on  previous procedure endoscopicly   Date of Surgery/Procedure: 2019   Facility or Hospital where procedure/surgery will be performed: [facility]   1.  Do you have a history of Heart attack, stroke, stent, coronary bypass surgery, or other heart surgery? No   2.  Do you ever have any pain or discomfort in your chest? No   3.  Do you have a history of  Heart Failure? No   4.   Are you troubled by shortness of breath when:  walking on a level surface, or up a slight hill, or at night? No   5.  Do you currently have a cold, bronchitis or other respiratory infection? No   6.  Do you have a cough, shortness of breath, or wheezing? No   7.  Do you sometimes get pains in the calves of your legs when you walk? No   8. Do you or anyone in your family have previous history of blood clots? No   9.  Do you or does anyone in your family have a serious bleeding problem such as prolonged bleeding following surgeries or cuts? No   10. Have you ever had problems with anemia or been told to take iron pills? YES - in the past. The recent Hgb is normal   11. Have you had any abnormal blood loss such as black, tarry or bloody  stools, or abnormal vaginal bleeding? No   12. Have you ever had a blood transfusion? No   13. Have you or any of your relatives ever had problems with anesthesia? No   14. Do you have sleep apnea, excessive snoring or daytime drowsiness? YES - - she wears the CPAP   15. Do you have any prosthetic heart valves? No   16. Do you have prosthetic joints? YES - - R knee and R hip   17. Is there any chance that you may be pregnant? No       CC:  Preop for multiple medical problems.    HPI:    The patient is scheduled for ERCP procedure  with Dr. Guerrero on  July 26, 2019  No other acute complaints.    Assessment:  1. V72.83H Preop general physical exam _ I do not see any major contraindications for the patient to go through the scheduled surgery.     The proposed surgical procedure is considered   LOW  surgery risk.     For above listed surgery and anesthesia, Patient is at MODERATE, risk for surgery/procedure and perioperative/procedure complications.     Cardiovascular risk  Assessment -- low risk   --  No further cardiac work up is needed before this surgery.         ECG:    sinus rhythm, no changes suggestive for ischemia     Pulmonary Risk Assessment -- low risk   --  The patient doesn't have chronic lung disease nor acute respiratory problems   Obstructive Sleep Apnea (or suspected sleep apnea) -- she wears the CPAP     Anemia Assessment :  -- no anemia - patient doesn't need further anemia work up     Blood Sugar Assessment  -- patient has controlled DM,      Anticoagulation assessment  --  patient takes ASA for C-vs prevention. She stopped it for the procedure         (E11.65) DM 2 with hyperglycemia (H)  Comment:   Plan: Continue same meds, same doses for now      (G47.33) CHERRY (obstructive sleep apnea)  Comment:   Plan:      (I10) HTN goal less than 140/90,  Comment: Controlled    Plan: Continue same meds, same doses for now      .(K80.50) Calculus of bile duct without cholecystitis and without  "obstruction  Comment:   Plan: procedure, as above      Plan:    1. In the morning of the surgery day you take with a sip of water just these meds: Atenolol   The rest of the meds you resume after procedure.      Physical exam:    Blood pressure 118/74, pulse 83, temperature 98  F (36.7  C), temperature source Oral, resp. rate 18, height 1.702 m (5' 7\"), weight 95 kg (209 lb 6.4 oz), last menstrual period 12/13/2002, SpO2 96 %, not currently breastfeeding.   NAD, appears comfortable  Skin clear, no rashes  Neck: supple, no JVD,  no thyroidmegaly  Lymph nodes non palpable in the cervical, supraclavicular   Chest: clear to auscultation with good respiratory effort  Cardiac: S1S2, RRR, no mgr appreciated  Abdomen: soft, not tender, not distended, audible bowel sound, no hepatosplenomegaly, no palpable masses, no abdominal bruits  Extremities: no cyanosis, clubbing or edema.   Neuro: A, Ox3, no focal signs.      ROS:   as above     Patient Active Problem List   Diagnosis     Allergic rhinitis     Hyperlipidemia with target LDL less than 100     Asthma, mild intermittent     Cataract     Macular hole of left eye     Advanced directives, counseling/discussion     Obesity (BMI 30-39.9)     Heart murmur     Sleep apnea     Total knee replacement status     Chronic knee pain, right     Hip pain, right     HTN goal less than 140/90,     CHERRY (obstructive sleep apnea)     Hip osteoarthritis     Status post total replacement of right hip     Gait disturbance     DM 2 with hyperglycemia (H)     Ampullary adenoma     Surgery follow-up        Past Medical History:   Diagnosis Date     Allergic rhinitis, cause unspecified      Asthma      Asthma, mild intermittent      Cataract      Cellulitis and abscess of leg, except foot 03/00    Bilateral     Eczema      Heart murmur     aortic area     HTN, goal below 140/90      Hyperlipidemia LDL goal < 100      Hyperplastic colon polyp 2008     Infection     bilateral eye infections     " Macular hole of left eye      Obesity (BMI 30-39.9)      Osteoarthritis     knees     Other chronic pain     right knee     Sleep apnea     uses c pap     Type 2 diabetes, HbA1C goal < 8% (H)       Past Surgical History:   Procedure Laterality Date     ARTHROPLASTY HIP Right 9/25/2017    Procedure: ARTHROPLASTY HIP;  Right total hip arthroplasty  ;  Surgeon: Ayaan Pena MD;  Location: [facility]     ARTHROPLASTY KNEE  7/12/2013    Procedure: ARTHROPLASTY KNEE;  right total knee arthroplasty ;  Surgeon: Chaparro Wesley MD;  Location: [facility] OR     ARTHROSCOPY KNEE Right 1/21/2015    Procedure: ARTHROSCOPY KNEE;  Surgeon: Ayaan Pena MD;  Location: [facility] OR     C INCISION OF HYMEN       CATARACT IOL, RT/LT       COLONOSCOPY  2008     COLONOSCOPY N/A 4/18/2019    Procedure: Colonoscopy with polypectomy;  Surgeon: Shaun Guerrero MD;  [facility]     ENDOSCOPIC RETROGRADE CHOLANGIOPANCREATOGRAM N/A 2/6/2019    Procedure: COMBINED ENDOSCOPIC RETROGRADE CHOLANGIOPANCREATOGRAPHY, BILIARY SPINCTEROTOMY AND DILATION, PLACE BILE DUCT STENT;  Surgeon: Guru Andie Gonzalez MD;  [facility]     ENDOSCOPIC RETROGRADE CHOLANGIOPANCREATOGRAM N/A 2/28/2019    Procedure: Endoscopic Retrograde Cholangiopancreatogram, Bile duct stent removal and placement;  Surgeon: Shaun Guerrero MD;  [facility]     ENDOSCOPIC RETROGRADE CHOLANGIOPANCREATOGRAM N/A 4/18/2019    Procedure: COMBINED ENDOSCOPIC RETROGRADE CHOLANGIOPANCREATOGRAPHY, Bile duct stent exchange and Polypectomy;  Surgeon: Shaun Guerrero MD;  [facility]     ESOPHAGOSCOPY, GASTROSCOPY, DUODENOSCOPY (EGD), COMBINED N/A 4/18/2019    Procedure: upper endoscopy with polypectomy;  Surgeon: Shaun Guerrero MD;  [facility]     ESOPHAGOSCOPY, GASTROSCOPY, DUODENOSCOPY (EGD), RESECT MUCOSA, COMBINED N/A 2/28/2019    Procedure: Upper Endoscopy, Endoscopic Ultrasound, Endoscopic Mucosal Resection,  Ampullectomy, polypectomy.;  Surgeon:  Shaun Guerrero MD;  [facility]     EYE SURGERY      macular hole repaired left eye     HC KNEE SCOPE, DIAGNOSTIC      - both knees     HEAD & NECK SURGERY      wisdom teeth        PSHx: No complications with prior surgeries or anesthesia as above      Soc Hx: No daily alcohol, no smoking     Family History   Problem Relation Age of Onset     Hypertension Mother         diabetes,hypoythryoidism, stroke     Diabetes Mother      Heart Disease Father         , cancer lip     Heart Disease Paternal Grandfather              Cancer Paternal Grandmother              Cerebrovascular Disease Maternal Grandfather              Cerebrovascular Disease Maternal Grandmother         , diabetes     Connective Tissue Disorder Sister         fibromyalgia     Diabetes Sister      Hypertension Brother      Cancer Paternal Aunt         ovarian        All: reviewed    Meds: reviewed  Current Outpatient Medications   Medication Sig Dispense Refill     albuterol (PROAIR HFA, PROVENTIL HFA, VENTOLIN HFA) 108 (90 BASE) MCG/ACT inhaler Inhale 2 puffs into the lungs every 4 hours as needed for shortness of breath / dyspnea 1 Inhaler 3     aspirin 81 MG EC tablet Take 1 tablet (81 mg) by mouth daily 90 tablet 3     atenolol (TENORMIN) 50 MG tablet TAKE ONE TABLET BY MOUTH ONCE DAILY 90 tablet 3     azelastine (ASTELIN) 137 MCG/SPRAY nasal spray [place] 1 spray into both nostrils 2 times daily as needed Reported on 3/22/2017       azelastine (OPTIVAR) 0.05 % SOLN Place 1 drop into both eyes 2 times daily as needed Reported on 3/22/2017       calcium-vitamin D 500-125 MG-UNIT TABS        cetirizine (ZYRTEC) 10 MG tablet Take 10 mg by mouth every morning.       desoximetasone (TOPICORT) 0.25 % cream Apply sparingly to affected area's 60 g 1     dorzolamide-timolol (COSOPT) 2-0.5 % ophthalmic solution Place 1 drop into both eyes 2 times daily        fish oil-omega-3 fatty acids (FISH OIL) 1000 MG capsule  Take 1 g by mouth daily Reported on 3/22/2017       glipiZIDE (GLUCOTROL) 5 MG tablet TAKE 2 TABLETS BY MOUTH TWICE DAILY BEFORE MEAL(S) 360 tablet 0     hydrochlorothiazide (HYDRODIURIL) 25 MG tablet TAKE 1 TABLET BY MOUTH ONCE DAILY IN THE MORNING 90 tablet 0     ibuprofen (ADVIL) 200 MG tablet take 4 tablet (200 mg) by oral route every 8 hours as needed with food       INVOKANA 100 MG tablet TAKE 1 TABLET BY MOUTH IN THE MORNING BEFORE  BREAKFAST 90 tablet 0     JANUVIA 100 MG tablet TAKE 1 TABLET BY MOUTH ONCE DAILY 90 tablet 0     latanoprost (XALATAN) 0.005 % ophthalmic solution Place 1 drop Into the left eye At Bedtime 2.5 mL 1     lisinopril (PRINIVIL/ZESTRIL) 40 MG tablet TAKE 1 TABLET BY MOUTH ONCE DAILY 90 tablet 0     metFORMIN (GLUCOPHAGE) 1000 MG tablet TAKE 1 TABLET BY MOUTH TWICE DAILY WITH MEALS 180 tablet 0     MULTIVITAMIN TABS   OR Reported on 3/22/2017       ondansetron (ZOFRAN-ODT) 4 MG ODT tab Take 1 tablet (4 mg) by mouth every 6 hours as needed for nausea or vomiting 30 tablet 0     ONETOUCH ULTRA test strip USE ONE STRIP TO CHECK GLUCOSE ONE DAILY DIAG CODE E11.9 100 strip 1     order for DME All diabetic testing supplies including test strips, lancets and solution for testing 2 times per day. Patient is using   no Insulin. Last A1c Lab Results       Component                Value               Date                       A1C                      7.9                 08/20/2018 100 each 11     STATIN NOT PRESCRIBED (INTENTIONAL) 1 each Please choose reason not prescribed, below              Raisa Torres MD  Internal Medicine       MEDICAL HISTORY:     Patient Active Problem List    Diagnosis Date Noted     Hyperlipidemia with target LDL less than 100      Priority: High     Diagnosis updated by automated process. Provider to review and confirm.       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/2019      Priority: Medium     DM 2 with hyperglycemia (H) 12/02/2018     Priority: Medium     Status post total replacement of right hip 02/13/2018     Priority: Medium     Gait disturbance 02/13/2018     Priority: Medium     Hip osteoarthritis 09/25/2017     Priority: Medium     CHERRY (obstructive sleep apnea) 09/15/2017     Priority: Medium     HTN goal less than 140/90, 05/22/2016     Priority: Medium     Chronic knee pain, right 08/30/2015     Priority: Medium     Hip pain, right 08/30/2015     Priority: Medium     Total knee replacement status 07/12/2013     Priority: Medium     Sleep apnea      Priority: Medium     Obesity (BMI 30-39.9)      Priority: Medium     Heart murmur      Priority: Medium     aortic area       Advanced directives, counseling/discussion 01/06/2012     Priority: Medium     Advance Directive Problem List Overview:   Name Relationship Phone    Primary Health Care Agent            Alternative Health Care Agent          Discussed advance care planning with patient; information given to patient to review. 1/6/2012          Allergic rhinitis      Priority: Medium     Problem list name updated by automated process. Provider to review       Cataract      Priority: Low     Utility update for deleted IMO code  [employer] Update utility       Macular hole of left eye      Priority: Low      Past Medical History:   Diagnosis Date     Allergic rhinitis, cause unspecified      Asthma      Asthma, mild intermittent      Cataract      Cellulitis and abscess of leg, except foot 03/00    Bilateral     Eczema      Heart murmur     aortic area     HTN, goal below 140/90      Hyperlipidemia LDL goal < 100      Hyperplastic colon polyp 2008     Infection     bilateral eye infections     Macular hole of left eye      Obesity (BMI 30-39.9)      Osteoarthritis     knees     Other chronic pain     right knee     Sleep apnea     uses c pap     Type 2 diabetes, HbA1C goal < 8% (H)      Past Surgical History:   Procedure Laterality  Date     ARTHROPLASTY HIP Right 9/25/2017    Procedure: ARTHROPLASTY HIP;  Right total hip arthroplasty  ;  Surgeon: Ayaan Pena MD;  [facility]: [facility]     ARTHROPLASTY KNEE  7/12/2013    Procedure: ARTHROPLASTY KNEE;  right total knee arthroplasty ;  Surgeon: Chaparro Wesley MD;  [facility]: [facility]     ARTHROSCOPY KNEE Right 1/21/2015    Procedure: ARTHROSCOPY KNEE;  Surgeon: Ayaan Pena MD;  Location: [facility]     C INCISION OF HYMEN       CATARACT IOL, RT/LT       COLONOSCOPY  2008     COLONOSCOPY N/A 4/18/2019    Procedure: Colonoscopy with polypectomy;  Surgeon: Shaun Guerrero MD;  [facility]     ENDOSCOPIC RETROGRADE CHOLANGIOPANCREATOGRAM N/A 2/6/2019    Procedure: COMBINED ENDOSCOPIC RETROGRADE CHOLANGIOPANCREATOGRAPHY, BILIARY SPINCTEROTOMY AND DILATION, PLACE BILE DUCT STENT;  Surgeon: Guru Andie Gonzalez MD;  [facility]     ENDOSCOPIC RETROGRADE CHOLANGIOPANCREATOGRAM N/A 2/28/2019    Procedure: Endoscopic Retrograde Cholangiopancreatogram, Bile duct stent removal and placement;  Surgeon: Shaun Guerrero MD;  [facility]     ENDOSCOPIC RETROGRADE CHOLANGIOPANCREATOGRAM N/A 4/18/2019    Procedure: COMBINED ENDOSCOPIC RETROGRADE CHOLANGIOPANCREATOGRAPHY, Bile duct stent exchange and Polypectomy;  Surgeon: Shaun Guerrero MD;  [facility]     ESOPHAGOSCOPY, GASTROSCOPY, DUODENOSCOPY (EGD), COMBINED N/A 4/18/2019    Procedure: upper endoscopy with polypectomy;  Surgeon: Shaun Guerrero MD;  [facility]     ESOPHAGOSCOPY, GASTROSCOPY, DUODENOSCOPY (EGD), RESECT MUCOSA, COMBINED N/A 2/28/2019    Procedure: Upper Endoscopy, Endoscopic Ultrasound, Endoscopic Mucosal Resection,  Ampullectomy, polypectomy.;  Surgeon: Shaun Guerrero MD;  [facility]     EYE SURGERY      macular hole repaired left eye     HC KNEE SCOPE, DIAGNOSTIC      - both knees     HEAD & NECK SURGERY      wisdom teeth     Current Outpatient Medications   Medication Sig Dispense Refill      albuterol (PROAIR HFA, PROVENTIL HFA, VENTOLIN HFA) 108 (90 BASE) MCG/ACT inhaler Inhale 2 puffs into the lungs every 4 hours as needed for shortness of breath / dyspnea 1 Inhaler 3     aspirin 81 MG EC tablet Take 1 tablet (81 mg) by mouth daily 90 tablet 3     atenolol (TENORMIN) 50 MG tablet TAKE ONE TABLET BY MOUTH ONCE DAILY 90 tablet 3     azelastine (ASTELIN) 137 MCG/SPRAY nasal spray [place] 1 spray into both nostrils 2 times daily as needed Reported on 3/22/2017       azelastine (OPTIVAR) 0.05 % SOLN Place 1 drop into both eyes 2 times daily as needed Reported on 3/22/2017       calcium-vitamin D 500-125 MG-UNIT TABS        cetirizine (ZYRTEC) 10 MG tablet Take 10 mg by mouth every morning.       desoximetasone (TOPICORT) 0.25 % cream Apply sparingly to affected area's 60 g 1     dorzolamide-timolol (COSOPT) 2-0.5 % ophthalmic solution Place 1 drop into both eyes 2 times daily        fish oil-omega-3 fatty acids (FISH OIL) 1000 MG capsule Take 1 g by mouth daily Reported on 3/22/2017       glipiZIDE (GLUCOTROL) 5 MG tablet TAKE 2 TABLETS BY MOUTH TWICE DAILY BEFORE MEAL(S) 360 tablet 0     hydrochlorothiazide (HYDRODIURIL) 25 MG tablet TAKE 1 TABLET BY MOUTH ONCE DAILY IN THE MORNING 90 tablet 0     ibuprofen (ADVIL) 200 MG tablet take 4 tablet (200 mg) by oral route every 8 hours as needed with food       INVOKANA 100 MG tablet TAKE 1 TABLET BY MOUTH IN THE MORNING BEFORE  BREAKFAST 90 tablet 0     JANUVIA 100 MG tablet TAKE 1 TABLET BY MOUTH ONCE DAILY 90 tablet 0     latanoprost (XALATAN) 0.005 % ophthalmic solution Place 1 drop Into the left eye At Bedtime 2.5 mL 1     lisinopril (PRINIVIL/ZESTRIL) 40 MG tablet TAKE 1 TABLET BY MOUTH ONCE DAILY 90 tablet 0     metFORMIN (GLUCOPHAGE) 1000 MG tablet TAKE 1 TABLET BY MOUTH TWICE DAILY WITH MEALS 180 tablet 0     MULTIVITAMIN TABS   OR Reported on 3/22/2017       ondansetron (ZOFRAN-ODT) 4 MG ODT tab Take 1 tablet (4 mg) by mouth every 6 hours as needed  "for nausea or vomiting 30 tablet 0     ONETOUCH ULTRA test strip USE ONE STRIP TO CHECK GLUCOSE ONE DAILY DIAG CODE E11.9 100 strip 1     order for DME All diabetic testing supplies including test strips, lancets and solution for testing 2 times per day. Patient is using   no Insulin. Last A1c Lab Results       Component                Value               Date                       A1C                      7.9                 08/20/2018 100 each 11     STATIN NOT PRESCRIBED (INTENTIONAL) 1 each Please choose reason not prescribed, below       OTC products: None, except as noted above    Allergies   Allergen Reactions     Bromfenac Visual Disturbance     Sulfites, xibrom  Causes sever eye pain     Codeine      \"NAUSE,HA AND DIZZINESS\"     Codeine Nausea     Other reaction(s): Dizziness, Headache     Sulfites Visual Disturbance     Xibrom (bromfenac opthalmic solution) 0.09%--eye pain      Latex Allergy: NO    Social History     Tobacco Use     Smoking status: Never Smoker     Smokeless tobacco: Never Used   Substance Use Topics     Alcohol use: No     Alcohol/week: 0.0 oz     History   Drug Use No       REVIEW OF SYSTEMS:           Recent Labs   Lab Test 07/09/19  0824 04/18/19  0810  03/01/19  0536  02/25/19  0900  01/17/19  1000   HGB 14.4 12.8  --  11.5*   < >  --    < > 12.9    312  --  247   < >  --    < > 368   INR  --   --   --  1.07  --   --   --  1.02    138   < > 136   < > 137   < > 134   POTASSIUM 4.3 3.9   < > 4.1   < > 4.2   < > 3.8   CR 0.93 0.96   < > 0.75   < > 0.92   < > 0.79   A1C 6.8*  --   --   --   --  8.1*  --   --     < > = values in this interval not displayed.        "

## 2019-07-16 NOTE — NURSING NOTE
"/74 (BP Location: Left arm, Patient Position: Sitting, Cuff Size: Adult Large)   Pulse 83   Temp 98  F (36.7  C) (Oral)   Resp 18   Ht 1.702 m (5' 7\")   Wt 95 kg (209 lb 6.4 oz)   LMP 12/13/2002 (Exact Date)   SpO2 96%   BMI 32.80 kg/m    Patient is here for a pre op exam.  "

## 2019-07-16 NOTE — PATIENT INSTRUCTIONS
Plan:    1. In the morning of the surgery day you take with a sip of water just these meds: Atenolol   The rest of the meds you resume after procedure.

## 2019-07-25 ENCOUNTER — ANESTHESIA EVENT (OUTPATIENT)
Dept: SURGERY | Facility: CLINIC | Age: 69
End: 2019-07-25
Payer: COMMERCIAL

## 2019-07-26 ENCOUNTER — HOSPITAL ENCOUNTER (OUTPATIENT)
Facility: CLINIC | Age: 69
Discharge: HOME | End: 2019-07-26
Attending: INTERNAL MEDICINE | Admitting: INTERNAL MEDICINE
Payer: COMMERCIAL

## 2019-07-26 ENCOUNTER — APPOINTMENT (OUTPATIENT)
Dept: GENERAL RADIOLOGY | Facility: CLINIC | Age: 69
End: 2019-07-26
Attending: INTERNAL MEDICINE
Payer: COMMERCIAL

## 2019-07-26 ENCOUNTER — ANESTHESIA (OUTPATIENT)
Dept: SURGERY | Facility: CLINIC | Age: 69
End: 2019-07-26
Payer: COMMERCIAL

## 2019-07-26 VITALS
RESPIRATION RATE: 20 BRPM | HEIGHT: 66 IN | WEIGHT: 208.56 LBS | BODY MASS INDEX: 33.52 KG/M2 | OXYGEN SATURATION: 93 % | HEART RATE: 73 BPM | SYSTOLIC BLOOD PRESSURE: 129 MMHG | DIASTOLIC BLOOD PRESSURE: 81 MMHG | TEMPERATURE: 97.9 F

## 2019-07-26 LAB
ALBUMIN SERPL-MCNC: 3.8 G/DL (ref 3.4–5)
ALP SERPL-CCNC: 109 U/L (ref 40–150)
ALT SERPL W P-5'-P-CCNC: 184 U/L (ref 0–50)
AMYLASE SERPL-CCNC: 52 U/L (ref 30–110)
AMYLASE SERPL-CCNC: 77 U/L (ref 30–110)
ANION GAP SERPL CALCULATED.3IONS-SCNC: 8 MMOL/L (ref 3–14)
AST SERPL W P-5'-P-CCNC: 128 U/L (ref 0–45)
BILIRUB SERPL-MCNC: 0.4 MG/DL (ref 0.2–1.3)
BUN SERPL-MCNC: 22 MG/DL (ref 7–30)
CALCIUM SERPL-MCNC: 10.2 MG/DL (ref 8.5–10.1)
CHLORIDE SERPL-SCNC: 102 MMOL/L (ref 94–109)
CO2 SERPL-SCNC: 25 MMOL/L (ref 20–32)
CREAT SERPL-MCNC: 0.83 MG/DL (ref 0.52–1.04)
ERYTHROCYTE [DISTWIDTH] IN BLOOD BY AUTOMATED COUNT: 13.9 % (ref 10–15)
GFR SERPL CREATININE-BSD FRML MDRD: 71 ML/MIN/{1.73_M2}
GLUCOSE BLDC GLUCOMTR-MCNC: 128 MG/DL (ref 70–99)
GLUCOSE BLDC GLUCOMTR-MCNC: 129 MG/DL (ref 70–99)
GLUCOSE SERPL-MCNC: 142 MG/DL (ref 70–99)
HCT VFR BLD AUTO: 49 % (ref 35–47)
HGB BLD-MCNC: 14.9 G/DL (ref 11.7–15.7)
LIPASE SERPL-CCNC: 348 U/L (ref 73–393)
LIPASE SERPL-CCNC: 662 U/L (ref 73–393)
MCH RBC QN AUTO: 26.2 PG (ref 26.5–33)
MCHC RBC AUTO-ENTMCNC: 30.4 G/DL (ref 31.5–36.5)
MCV RBC AUTO: 86 FL (ref 78–100)
PLATELET # BLD AUTO: 267 10E9/L (ref 150–450)
POTASSIUM SERPL-SCNC: 4.2 MMOL/L (ref 3.4–5.3)
PROT SERPL-MCNC: 7.9 G/DL (ref 6.8–8.8)
RBC # BLD AUTO: 5.69 10E12/L (ref 3.8–5.2)
SODIUM SERPL-SCNC: 136 MMOL/L (ref 133–144)
WBC # BLD AUTO: 6.2 10E9/L (ref 4–11)

## 2019-07-26 PROCEDURE — 88305 TISSUE EXAM BY PATHOLOGIST: CPT | Performed by: INTERNAL MEDICINE

## 2019-07-26 PROCEDURE — 25500064 ZZH RX 255 OP 636: Performed by: INTERNAL MEDICINE

## 2019-07-26 PROCEDURE — 25000566 ZZH SEVOFLURANE, EA 15 MIN: Performed by: INTERNAL MEDICINE

## 2019-07-26 PROCEDURE — 82150 ASSAY OF AMYLASE: CPT | Performed by: INTERNAL MEDICINE

## 2019-07-26 PROCEDURE — 37000009 ZZH ANESTHESIA TECHNICAL FEE, EACH ADDTL 15 MIN: Performed by: INTERNAL MEDICINE

## 2019-07-26 PROCEDURE — 71000014 ZZH RECOVERY PHASE 1 LEVEL 2 FIRST HR: Performed by: INTERNAL MEDICINE

## 2019-07-26 PROCEDURE — 27210794 ZZH OR GENERAL SUPPLY STERILE: Performed by: INTERNAL MEDICINE

## 2019-07-26 PROCEDURE — 36000066 ZZH SURGERY LEVEL 4 W FLUORO 1ST 30 MIN - UMMC: Performed by: INTERNAL MEDICINE

## 2019-07-26 PROCEDURE — 25000125 ZZHC RX 250: Performed by: NURSE ANESTHETIST, CERTIFIED REGISTERED

## 2019-07-26 PROCEDURE — 40000279 XR SURGERY CARM FLUORO GREATER THAN 5 MIN W STILLS: Mod: TC

## 2019-07-26 PROCEDURE — 25000125 ZZHC RX 250: Performed by: INTERNAL MEDICINE

## 2019-07-26 PROCEDURE — 36415 COLL VENOUS BLD VENIPUNCTURE: CPT | Performed by: INTERNAL MEDICINE

## 2019-07-26 PROCEDURE — 83690 ASSAY OF LIPASE: CPT | Performed by: INTERNAL MEDICINE

## 2019-07-26 PROCEDURE — 36000064 ZZH SURGERY LEVEL 4 EA 15 ADDTL MIN - UMMC: Performed by: INTERNAL MEDICINE

## 2019-07-26 PROCEDURE — 80053 COMPREHEN METABOLIC PANEL: CPT | Performed by: INTERNAL MEDICINE

## 2019-07-26 PROCEDURE — 37000008 ZZH ANESTHESIA TECHNICAL FEE, 1ST 30 MIN: Performed by: INTERNAL MEDICINE

## 2019-07-26 PROCEDURE — 25000128 H RX IP 250 OP 636: Performed by: NURSE ANESTHETIST, CERTIFIED REGISTERED

## 2019-07-26 PROCEDURE — C1769 GUIDE WIRE: HCPCS | Performed by: INTERNAL MEDICINE

## 2019-07-26 PROCEDURE — 82962 GLUCOSE BLOOD TEST: CPT

## 2019-07-26 PROCEDURE — C1726 CATH, BAL DIL, NON-VASCULAR: HCPCS | Performed by: INTERNAL MEDICINE

## 2019-07-26 PROCEDURE — 71000027 ZZH RECOVERY PHASE 2 EACH 15 MINS: Performed by: INTERNAL MEDICINE

## 2019-07-26 PROCEDURE — 25800030 ZZH RX IP 258 OP 636: Performed by: NURSE ANESTHETIST, CERTIFIED REGISTERED

## 2019-07-26 PROCEDURE — 40000170 ZZH STATISTIC PRE-PROCEDURE ASSESSMENT II: Performed by: INTERNAL MEDICINE

## 2019-07-26 PROCEDURE — 85027 COMPLETE CBC AUTOMATED: CPT | Performed by: INTERNAL MEDICINE

## 2019-07-26 PROCEDURE — C1888 ENDOVAS NON-CARDIAC ABL CATH: HCPCS | Performed by: INTERNAL MEDICINE

## 2019-07-26 PROCEDURE — C1877 STENT, NON-COAT/COV W/O DEL: HCPCS | Performed by: INTERNAL MEDICINE

## 2019-07-26 DEVICE — IMPLANTABLE DEVICE: Type: IMPLANTABLE DEVICE | Site: BILE DUCT | Status: FUNCTIONAL

## 2019-07-26 DEVICE — STENT FREEMAN PANCREA FLEX 5FRX5CM SGL PIGTAIL: Type: IMPLANTABLE DEVICE | Site: PANCREATIC DUCT | Status: FUNCTIONAL

## 2019-07-26 RX ORDER — NALOXONE HYDROCHLORIDE 0.4 MG/ML
.1-.4 INJECTION, SOLUTION INTRAMUSCULAR; INTRAVENOUS; SUBCUTANEOUS
Status: DISCONTINUED | OUTPATIENT
Start: 2019-07-26 | End: 2019-07-26 | Stop reason: HOSPADM

## 2019-07-26 RX ORDER — PROPOFOL 10 MG/ML
INJECTION, EMULSION INTRAVENOUS PRN
Status: DISCONTINUED | OUTPATIENT
Start: 2019-07-26 | End: 2019-07-26

## 2019-07-26 RX ORDER — FENTANYL CITRATE 50 UG/ML
INJECTION, SOLUTION INTRAMUSCULAR; INTRAVENOUS PRN
Status: DISCONTINUED | OUTPATIENT
Start: 2019-07-26 | End: 2019-07-26

## 2019-07-26 RX ORDER — IOPAMIDOL 510 MG/ML
INJECTION, SOLUTION INTRAVASCULAR PRN
Status: DISCONTINUED | OUTPATIENT
Start: 2019-07-26 | End: 2019-07-26 | Stop reason: HOSPADM

## 2019-07-26 RX ORDER — SODIUM CHLORIDE, SODIUM LACTATE, POTASSIUM CHLORIDE, CALCIUM CHLORIDE 600; 310; 30; 20 MG/100ML; MG/100ML; MG/100ML; MG/100ML
INJECTION, SOLUTION INTRAVENOUS CONTINUOUS PRN
Status: DISCONTINUED | OUTPATIENT
Start: 2019-07-26 | End: 2019-07-26

## 2019-07-26 RX ORDER — ONDANSETRON 2 MG/ML
INJECTION INTRAMUSCULAR; INTRAVENOUS PRN
Status: DISCONTINUED | OUTPATIENT
Start: 2019-07-26 | End: 2019-07-26

## 2019-07-26 RX ORDER — LIDOCAINE 40 MG/G
CREAM TOPICAL
Status: DISCONTINUED | OUTPATIENT
Start: 2019-07-26 | End: 2019-07-26 | Stop reason: HOSPADM

## 2019-07-26 RX ORDER — GLYCOPYRROLATE 0.2 MG/ML
INJECTION, SOLUTION INTRAMUSCULAR; INTRAVENOUS PRN
Status: DISCONTINUED | OUTPATIENT
Start: 2019-07-26 | End: 2019-07-26

## 2019-07-26 RX ORDER — LEVOFLOXACIN 5 MG/ML
INJECTION, SOLUTION INTRAVENOUS PRN
Status: DISCONTINUED | OUTPATIENT
Start: 2019-07-26 | End: 2019-07-26

## 2019-07-26 RX ORDER — LIDOCAINE HYDROCHLORIDE 20 MG/ML
INJECTION, SOLUTION INFILTRATION; PERINEURAL PRN
Status: DISCONTINUED | OUTPATIENT
Start: 2019-07-26 | End: 2019-07-26

## 2019-07-26 RX ORDER — EPHEDRINE SULFATE 50 MG/ML
INJECTION, SOLUTION INTRAMUSCULAR; INTRAVENOUS; SUBCUTANEOUS PRN
Status: DISCONTINUED | OUTPATIENT
Start: 2019-07-26 | End: 2019-07-26

## 2019-07-26 RX ORDER — FLUMAZENIL 0.1 MG/ML
0.2 INJECTION, SOLUTION INTRAVENOUS
Status: DISCONTINUED | OUTPATIENT
Start: 2019-07-26 | End: 2019-07-26 | Stop reason: HOSPADM

## 2019-07-26 RX ORDER — INDOMETHACIN 50 MG/1
100 SUPPOSITORY RECTAL
Status: DISCONTINUED | OUTPATIENT
Start: 2019-07-26 | End: 2019-07-26 | Stop reason: HOSPADM

## 2019-07-26 RX ORDER — SODIUM CHLORIDE, SODIUM LACTATE, POTASSIUM CHLORIDE, CALCIUM CHLORIDE 600; 310; 30; 20 MG/100ML; MG/100ML; MG/100ML; MG/100ML
INJECTION, SOLUTION INTRAVENOUS CONTINUOUS
Status: DISCONTINUED | OUTPATIENT
Start: 2019-07-26 | End: 2019-07-26 | Stop reason: HOSPADM

## 2019-07-26 RX ADMIN — PHENYLEPHRINE HYDROCHLORIDE 50 MCG: 10 INJECTION INTRAVENOUS at 14:01

## 2019-07-26 RX ADMIN — ONDANSETRON 4 MG: 2 INJECTION INTRAMUSCULAR; INTRAVENOUS at 13:00

## 2019-07-26 RX ADMIN — PHENYLEPHRINE HYDROCHLORIDE 100 MCG: 10 INJECTION INTRAVENOUS at 14:27

## 2019-07-26 RX ADMIN — FENTANYL CITRATE 50 MCG: 50 INJECTION, SOLUTION INTRAMUSCULAR; INTRAVENOUS at 12:43

## 2019-07-26 RX ADMIN — PHENYLEPHRINE HYDROCHLORIDE 100 MCG: 10 INJECTION INTRAVENOUS at 14:04

## 2019-07-26 RX ADMIN — SODIUM CHLORIDE, POTASSIUM CHLORIDE, SODIUM LACTATE AND CALCIUM CHLORIDE: 600; 310; 30; 20 INJECTION, SOLUTION INTRAVENOUS at 14:26

## 2019-07-26 RX ADMIN — SUGAMMADEX 200 MG: 100 INJECTION, SOLUTION INTRAVENOUS at 14:32

## 2019-07-26 RX ADMIN — PROPOFOL 120 MG: 10 INJECTION, EMULSION INTRAVENOUS at 12:45

## 2019-07-26 RX ADMIN — Medication 5 MG: at 14:13

## 2019-07-26 RX ADMIN — SODIUM CHLORIDE, POTASSIUM CHLORIDE, SODIUM LACTATE AND CALCIUM CHLORIDE: 600; 310; 30; 20 INJECTION, SOLUTION INTRAVENOUS at 12:34

## 2019-07-26 RX ADMIN — Medication 5 MG: at 13:02

## 2019-07-26 RX ADMIN — LEVOFLOXACIN 500 MG: 5 INJECTION, SOLUTION INTRAVENOUS at 13:20

## 2019-07-26 RX ADMIN — Medication 5 MG: at 13:03

## 2019-07-26 RX ADMIN — GLUCAGON HYDROCHLORIDE 0.4 MG: KIT at 13:33

## 2019-07-26 RX ADMIN — Medication 10 MG: at 13:11

## 2019-07-26 RX ADMIN — Medication 5 MG: at 14:27

## 2019-07-26 RX ADMIN — LIDOCAINE HYDROCHLORIDE 100 MG: 20 INJECTION, SOLUTION INFILTRATION; PERINEURAL at 12:45

## 2019-07-26 RX ADMIN — PHENYLEPHRINE HYDROCHLORIDE 100 MCG: 10 INJECTION INTRAVENOUS at 14:13

## 2019-07-26 RX ADMIN — GLYCOPYRROLATE 0.1 MG: 0.2 INJECTION, SOLUTION INTRAMUSCULAR; INTRAVENOUS at 13:04

## 2019-07-26 RX ADMIN — LIDOCAINE HYDROCHLORIDE 50 MG: 20 INJECTION, SOLUTION INFILTRATION; PERINEURAL at 14:21

## 2019-07-26 RX ADMIN — Medication 5 MG: at 13:31

## 2019-07-26 RX ADMIN — ROCURONIUM BROMIDE 50 MG: 10 INJECTION INTRAVENOUS at 12:45

## 2019-07-26 RX ADMIN — Medication 5 MG: at 13:04

## 2019-07-26 RX ADMIN — PHENYLEPHRINE HYDROCHLORIDE 50 MCG: 10 INJECTION INTRAVENOUS at 13:35

## 2019-07-26 RX ADMIN — FENTANYL CITRATE 50 MCG: 50 INJECTION, SOLUTION INTRAMUSCULAR; INTRAVENOUS at 12:45

## 2019-07-26 ASSESSMENT — MIFFLIN-ST. JEOR: SCORE: 1477.75

## 2019-07-26 NOTE — BRIEF OP NOTE
[facility] Brief Operative Note    Pre-operative diagnosis: Ampullary Adenoma   Post-operative diagnosis Residual polyp    Procedure: Procedure(s):  Endoscopic Retrograde Cholangiopancreatogram With Spyglass Possible Intraductal Radiofrequency Ablation   Surgeon: LAVINIA DRAKE MD   Assistants(s): Timothy Paulson MD    Estimated blood loss: None    Specimens: Ampullary biopsy  Duodenal biopsy   Findings: -Small area suggestive of residual polyp by endoscopic view. Biopsied and removed.   -There was no intraductal involvement on cholangioscopy. The distal duct and ampulla was treated with radiofrequency ablation.  -Stent exchange (Pancreatic and CBD)  -Small polyp in the second part of the duodenum. Removed with cold snare.     Recommendations:                      Monitor post op with plan to discharge home                                                        Ice chips now and then light dinner                                                        Resume current medication                                                        KUB in 4 weeks to ensure stent passage                                                          Implants:    Implant Name Type Inv. Item Serial No.  Lot No. LRB No. Used   STENT COTTON-SALOMON ([place]) BILIARY SOF-FLEX [id number] Stent STENT COTTON-SALOMON ([place]) BILIARY SOF-FLEX [id number]  [employer] [id number] N/A 1   STENT GEENEN PANCREA [id number] Stent STENT GEENEN PANCREA [id number]  [employer] [id number] N/A 1   STENT COTTON-SALOMON ([place]) BILIARY SOF-FLEX [id number] Stent STENT COTTON-SALOMON ([place]) BILIARY SOF-FLEX [id number]  [employer] [id number] N/A 1   STENT MCKNIGHT PANCREA FLEX [id number] SGL PIGTAIL Stent STENT MCKNIGHT PANCREA FLEX [id number] SGL PIGTAIL  [place] [id number] N/A 1   STENT COTTON-SALOMON ([place]) BILIARY SOF-FLEX [id number] Stent STENT COTTON-SALOMON ([place] BILIARY SOF-FLEX [id number]  [employer]  [id number] N/A 1          Timothy Paulson MD  Interventional Endoscopy Fellow

## 2019-07-26 NOTE — OR NURSING
1445 patient arrived to PACU via cart with OR Rn and CRNA at bedside , hand off report completed , patient settled into bay19 , VSS , bs 128 , no c/o pain  Assessment completed refer to chart.  1515 Dr Guerrero at bedside spoke with patient , amylase and lipase in two hours , order place in [facility].  1530 anesthesia called for sign out . ( Dr Gaytan ).

## 2019-07-26 NOTE — ANESTHESIA CARE TRANSFER NOTE
Patient: Gricelda Sabillon    Procedure(s):  Endoscopic Retrograde Cholangiopancreatogram With Spyglas,l Radiofrequency Ablation, Stent Exchangeand Duodenal Biopsy x2    Diagnosis: Ampullary Adenoma  Diagnosis Additional Information: No value filed.    Anesthesia Type:   No value filed.     Note:  Airway :Face Mask  Patient transferred to:PACU  Comments: VSS.  Report given to RN.Handoff Report: Identifed the Patient, Identified the Reponsible Provider, Reviewed the pertinent medical history, Discussed the surgical course, Reviewed Intra-OP anesthesia mangement and issues during anesthesia, Set expectations for post-procedure period and Allowed opportunity for questions and acknowledgement of understanding      Vitals: (Last set prior to Anesthesia Care Transfer)    CRNA VITALS  7/26/2019 1416 - 7/26/2019 1446      7/26/2019             Resp Rate (observed):  Sat  HR  BP 12  98  72  135/69                Electronically Signed By: DENYS Cordero CRNA  July 26, 2019  2:46 PM

## 2019-07-26 NOTE — ANESTHESIA PREPROCEDURE EVALUATION
Anesthesia Pre-Procedure Evaluation    Patient: Gricelda Sabillon   MRN:     [id number] Gender:   female   Age:    69 year old :      1950        Preoperative Diagnosis: Ampullary Adenoma   Procedure(s):  Endoscopic Retrograde Cholangiopancreatogram With Spyglass Possible Intraductal Radiofrequency Ablation     Past Medical History:   Diagnosis Date     Allergic rhinitis, cause unspecified      Asthma      Asthma, mild intermittent      Cataract      Cellulitis and abscess of leg, except foot     Bilateral     Eczema      Heart murmur     aortic area     HTN, goal below 140/90      Hyperlipidemia LDL goal < 100      Hyperplastic colon polyp      Infection     bilateral eye infections     Macular hole of left eye      Obesity (BMI 30-39.9)      Osteoarthritis     knees     Other chronic pain     right knee     Sleep apnea     uses c pap     Type 2 diabetes, HbA1C goal < 8% (H)       Past Surgical History:   Procedure Laterality Date     ARTHROPLASTY HIP Right 2017    Procedure: ARTHROPLASTY HIP;  Right total hip arthroplasty  ;  Surgeon: Ayaan Pena MD;  Location: [facility]     ARTHROPLASTY KNEE  2013    Procedure: ARTHROPLASTY KNEE;  right total knee arthroplasty ;  Surgeon: Chaparro Wesley MD;  Location: [facility]     ARTHROSCOPY KNEE Right 2015    Procedure: ARTHROSCOPY KNEE;  Surgeon: Ayaan Pena MD;  Location: [facility]     C INCISION OF HYMEN       CATARACT IOL, RT/LT       COLONOSCOPY       COLONOSCOPY N/A 2019    Procedure: Colonoscopy with polypectomy;  Surgeon: Shaun Guerrero MD;  Location: [facility]     ENDOSCOPIC RETROGRADE CHOLANGIOPANCREATOGRAM N/A 2019    Procedure: COMBINED ENDOSCOPIC RETROGRADE CHOLANGIOPANCREATOGRAPHY, BILIARY SPINCTEROTOMY AND DILATION, PLACE BILE DUCT STENT;  Surgeon: Guru Andie Gonzalez MD;  [facility]     ENDOSCOPIC RETROGRADE CHOLANGIOPANCREATOGRAM N/A 2019    Procedure: Endoscopic Retrograde  Cholangiopancreatogram, Bile duct stent removal and placement;  Surgeon: Shaun Guerrero MD;  [facility]     ENDOSCOPIC RETROGRADE CHOLANGIOPANCREATOGRAM N/A 4/18/2019    Procedure: COMBINED ENDOSCOPIC RETROGRADE CHOLANGIOPANCREATOGRAPHY, Bile duct stent exchange and Polypectomy;  Surgeon: Shaun Guerrero MD;  [facility]     ESOPHAGOSCOPY, GASTROSCOPY, DUODENOSCOPY (EGD), COMBINED N/A 4/18/2019    Procedure: upper endoscopy with polypectomy;  Surgeon: Shaun Guerrero MD;  [facility]     ESOPHAGOSCOPY, GASTROSCOPY, DUODENOSCOPY (EGD), RESECT MUCOSA, COMBINED N/A 2/28/2019    Procedure: Upper Endoscopy, Endoscopic Ultrasound, Endoscopic Mucosal Resection,  Ampullectomy, polypectomy.;  Surgeon: Shaun Guerrero MD;  Location: [facility]     EYE SURGERY      macular hole repaired left eye     HC KNEE SCOPE, DIAGNOSTIC      - both knees     HEAD & NECK SURGERY      wisdom teeth          Anesthesia Evaluation     . Pt has had prior anesthetic.            ROS/MED HX    ENT/Pulmonary:     (+)sleep apnea, Intermittent asthma uses CPAP , . .    Neurologic:       Cardiovascular:     (+) hypertension----. : . . . :. .       METS/Exercise Tolerance:  >4 METS   Hematologic:         Musculoskeletal:         GI/Hepatic:         Renal/Genitourinary:         Endo:     (+) type II DM Obesity, .      Psychiatric:         Infectious Disease:         Malignancy:         Other:                         PHYSICAL EXAM:   Mental Status/Neuro: A/A/O; Age Appropriate   Airway: Facies: Feasible  Mallampati: I  Mouth/Opening: Full  TM distance: > 6 cm  Neck ROM: Full   Respiratory: Auscultation: CTAB     Resp. Rate: Normal     Resp. Effort: Normal      CV: Rhythm: Regular   Comments:      Dental: Normal Dentition                LABS:  CBC:   Lab Results   Component Value Date    WBC 6.2 07/26/2019    WBC 6.2 07/09/2019    HGB 14.9 07/26/2019    HGB 14.4 07/09/2019    HCT 49.0 (H) 07/26/2019    HCT 45.9 07/09/2019     07/26/2019    PLT  "299 07/09/2019     BMP:   Lab Results   Component Value Date     07/26/2019     07/09/2019    POTASSIUM 4.2 07/26/2019    POTASSIUM 4.3 07/09/2019    CHLORIDE 102 07/26/2019    CHLORIDE 107 07/09/2019    CO2 25 07/26/2019    CO2 27 07/09/2019    BUN 22 07/26/2019    BUN 22 07/09/2019    CR 0.83 07/26/2019    CR 0.93 07/09/2019     (H) 07/26/2019     (H) 07/09/2019     COAGS:   Lab Results   Component Value Date    INR 1.07 03/01/2019     POC:   Lab Results   Component Value Date     (H) 07/26/2019     OTHER:   Lab Results   Component Value Date    A1C 6.8 (H) 07/09/2019    KATHRYN 10.2 (H) 07/26/2019    ALBUMIN 3.8 07/26/2019    PROTTOTAL 7.9 07/26/2019     (H) 07/26/2019     (H) 07/26/2019    ALKPHOS 109 07/26/2019    BILITOTAL 0.4 07/26/2019    LIPASE 662 (H) 07/26/2019    AMYLASE 77 07/26/2019    TSH 3.28 05/14/2018    T4 1.17 02/04/2016    CRP <5.0 01/07/2014    SED 16 01/07/2014        Preop Vitals    BP Readings from Last 3 Encounters:   07/26/19 130/79   07/16/19 118/74   04/18/19 150/73    Pulse Readings from Last 3 Encounters:   07/26/19 66   07/16/19 83   04/18/19 65      Resp Readings from Last 3 Encounters:   07/26/19 18   07/16/19 18   04/18/19 16    SpO2 Readings from Last 3 Encounters:   07/26/19 100%   07/16/19 96%   04/18/19 95%      Temp Readings from Last 1 Encounters:   07/26/19 36.5  C (97.7  F) (Oral)    Ht Readings from Last 1 Encounters:   07/26/19 1.676 m (5' 6\")      Wt Readings from Last 1 Encounters:   07/26/19 94.6 kg (208 lb 8.9 oz)    Estimated body mass index is 33.66 kg/m  as calculated from the following:    Height as of this encounter: 1.676 m (5' 6\").    Weight as of this encounter: 94.6 kg (208 lb 8.9 oz).     LDA:  Peripheral IV 07/26/19 Left Hand (Active)   Site Assessment WDL 7/26/2019 10:42 AM   Line Status Saline locked 7/26/2019 10:42 AM   Phlebitis Scale 0-->no symptoms 7/26/2019 10:42 AM   Infiltration Scale 0 7/26/2019 10:42 AM "   Infiltration Site Treatment Method  None 7/26/2019 10:42 AM   Number of days: 0        Assessment:   ASA SCORE: 3    H&P: History and physical reviewed and following examination; no interval change.   Smoking Status:  Non-Smoker/Unknown   NPO Status: NPO Appropriate     Plan:   Anes. Type:  General   Pre-Medication: None   Induction:  IV (Standard)   Airway: ETT; Oral   Access/Monitoring: PIV   Maintenance: Balanced     Postop Plan:   Postop Pain: Opioids  Postop Sedation/Airway: Not planned  Disposition: Outpatient     PONV Management:   Adult Risk Factors: Female, Non-Smoker, Postop Opioids   Prevention: Ondansetron, Propofol     CONSENT: Direct conversation   Plan and risks discussed with: Patient   Blood Products: Consent Deferred (Minimal Blood Loss)                   Camryn Gaytan MD

## 2019-07-26 NOTE — OR NURSING
1618 nurse handoff report completed report given to Tanya Stokes Rn.  1619 patient left Pacu via cart with NST ,

## 2019-07-26 NOTE — ANESTHESIA POSTPROCEDURE EVALUATION
Anesthesia POST Procedure Evaluation    Patient: Gricelda Sabillon   MRN:     [id number] Gender:   female   Age:    69 year old :      1950        Preoperative Diagnosis: Ampullary Adenoma   Procedure(s):  Endoscopic Retrograde Cholangiopancreatogram With Spyglas,l Radiofrequency Ablation, Stent Exchangeand Duodenal Biopsy x2   Postop Comments: No value filed.       Anesthesia Type:  Not documented  General    Reportable Event: NO     PAIN: Uncomplicated   Sign Out status: Comfortable, Well controlled pain     PONV: No PONV   Sign Out status:  No Nausea or Vomiting     Neuro/Psych: Uneventful perioperative course   Sign Out Status: Preoperative baseline; Age appropriate mentation     Airway/Resp.: Uneventful perioperative course   Sign Out Status: Non labored breathing, age appropriate RR; Resp. Status within EXPECTED Parameters     CV: Uneventful perioperative course   Sign Out status: Appropriate BP and perfusion indices; Appropriate HR/Rhythm     Disposition:   Sign Out in:  PACU  Disposition:  Phase II; Home  Recovery Course: Uneventful  Follow-Up: Not required           Last Anesthesia Record Vitals:  CRNA VITALS  2019 1411 - 2019 1511      2019             Resp Rate (observed):  4  (Abnormal)       CRNA VITALS  2019 1412 - 2019 1512      2019             Resp Rate (observed):  4  (Abnormal)           Last PACU Vitals:  Vitals Value Taken Time   /75 2019  4:00 PM   Temp 36.2  C (97.2  F) 2019  2:45 PM   Pulse 55 2019  4:00 PM   Resp 16 2019  4:00 PM   SpO2 98 % 2019  4:10 PM   Temp src     NIBP     Pulse     SpO2     Resp     Temp     Ht Rate     Temp 2     Vitals shown include unvalidated device data.      Electronically Signed By: Camryn Gaytan MD, 2019, 4:11 PM

## 2019-07-26 NOTE — DISCHARGE INSTRUCTIONS
[facility]  Same-Day Surgery   Adult Discharge Orders & Instructions     For 24 hours after surgery    1. Get plenty of rest.  A responsible adult must stay with you for at least 24 hours after you leave the hospital.   2. Do not drive or use heavy equipment.  If you have weakness or tingling, don't drive or use heavy equipment until this feeling goes away.  3. Do not drink alcohol.  4. Avoid strenuous or risky activities.  Ask for help when climbing stairs.   5. You may feel lightheaded.  IF so, sit for a few minutes before standing.  Have someone help you get up.   6. If you have nausea (feel sick to your stomach): Drink only clear liquids such as apple juice, ginger ale, broth or 7-Up.  Rest may also help.  Be sure to drink enough fluids.  Move to a regular diet as you feel able.  7. You may have a slight fever. Call the doctor if your fever is over 100 F (37.7 C) (taken under the tongue) or lasts longer than 24 hours.  8. You may have a dry mouth, a sore throat, muscle aches or trouble sleeping.  These should go away after 24 hours.  9. Do not make important or legal decisions.   Call your doctor for any of the followin.  Signs of infection (fever, growing tenderness at the surgery site, a large amount of drainage or bleeding, severe pain, foul-smelling drainage, redness, swelling).    2. It has been over 8 to 10 hours since surgery and you are still not able to urinate (pass water).    3.  Headache for over 24 hours.      To contact a doctor, call Dr. Guerrero's clinic at #[phone] or:        [phone] and ask for the resident on call for surgery/gastroenterology (answered 24 hours a day)      Emergency Department:    [facility]: [phone]       (TTY for hearing impaired: [phone])

## 2019-07-29 ENCOUNTER — CARE COORDINATION (OUTPATIENT)
Dept: GASTROENTEROLOGY | Facility: CLINIC | Age: 69
End: 2019-07-29

## 2019-07-29 DIAGNOSIS — D13.5 AMPULLARY ADENOMA: Primary | ICD-10-CM

## 2019-07-29 NOTE — PROGRESS NOTES
"Care Coordination Telephone Call  [facility]     Called patient to discuss how she is feeling.    Has c/o lip swelling at one spot on her lip that has been there since endoscopy on Friday.  \"It is getting better.\"    Denies fever, chills, nausea, vomiting or pain.  \"I'm doing pretty well.\"  \"had my first regular food.\"    She is aware that she will need abdominal xray in 4 weeks and potential for EGD for stent removal after that is done.    I have asked the patient to call with any additional questions or concerns and have provided my contact information.      Kalli Le  BSN, HNBC, STAR-T  RN Care Coordinator  [facility]  Ph: [phone]  FAX: [phone]          "

## 2019-07-31 LAB — ERCP: NORMAL

## 2019-08-02 LAB — COPATH REPORT: NORMAL

## 2019-08-02 NOTE — RESULT ENCOUNTER NOTE
Pathology from endoscopy reviewed. 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 Plan remains the same as per procedure note. Message left on patient's phone and [employer] message sent.     Shaun Guerrero MD  [facility]  Division of Gastroenterology and Hepatology  [facility] [address], [place] [address]

## 2019-08-20 ENCOUNTER — TELEPHONE (OUTPATIENT)
Dept: GASTROENTEROLOGY | Facility: CLINIC | Age: 69
End: 2019-08-20

## 2019-08-20 NOTE — TELEPHONE ENCOUNTER
Patient Name: Gricelda Sabillon   : 1950  MRN: [id number]       : [x] N/A   [] Yes:  Language  /  ID:        Additional Information regarding appointment:      Patient scheduled for:  [x] EGD  [] Colonoscopy  [] EUS  [] Flex Sig   [] Other:      Indication for procedure.   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 Guerrero      Referring Provider. Dr. Guerrero    Arrival time verified: 7:30 am / Tues / 19    Facility location verified:   [x][facility] - [address], [address]st Floor, [id number]    Pt meets medical necessity for outpatient procedure in hospital Endoscopy Unit:  Sleep apnea      Prep Type:   [x]NPO /p 5 am, No solid food /p 2200 the night before    Anticoagulants or blood thinners: []None [x] ASA 81mg  - may continue           [] Warfarin   [] Warfarin + Lovenox bridge [] Plavix [] Effient [] Eliquis         [] Xarelto  [] Brilinta [] NSAIDS  [] Other     LAST anticoagulant dose: Date/Time:    INR:      Electronic implanted devices: [x] No  [] IPG  []  ICD  []  LVAD  []      H&P / Pre op physical completed: [x] N/A, [] Complete, Date  , [] Scheduled, Date  , [] No,      Additional Information:  Patient to check in at endoscopy clinic before going to radiology for the x ray. Left a message on 19 @ 1400 to update her on the new plan of care. Also left a VM with call back number with further questions.     _______________________________________________      Instructions given: [] Rec'd & Read   [] Reviewed           Pre procedure teaching completed: [x] Yes - Reviewed, [] No,      [x] No questions regarding Sedation as ordered, [x]      Transportation from procedure & responsible adult to be with patient following procedure for a minimum of 6 hrs (Conscious Sedation) 24 hrs (MAC): [x] Yes   - confirmed will have post-procedure  companionship as required, [] Pending  , [] No        Jamila Donald, RN, RN  [facility] Endoscopy

## 2019-08-21 ENCOUNTER — CARE COORDINATION (OUTPATIENT)
Dept: GASTROENTEROLOGY | Facility: CLINIC | Age: 69
End: 2019-08-21

## 2019-08-21 DIAGNOSIS — K80.50 BILE DUCT STONE: Primary | ICD-10-CM

## 2019-08-25 RX ORDER — LIDOCAINE 40 MG/G
CREAM TOPICAL
Status: CANCELLED | OUTPATIENT
Start: 2019-08-25

## 2019-08-25 RX ORDER — ONDANSETRON 2 MG/ML
4 INJECTION INTRAMUSCULAR; INTRAVENOUS
Status: CANCELLED | OUTPATIENT
Start: 2019-08-25

## 2019-08-27 ENCOUNTER — HOSPITAL ENCOUNTER (OUTPATIENT)
Facility: CLINIC | Age: 69
Discharge: HOME | End: 2019-08-27
Attending: INTERNAL MEDICINE | Admitting: INTERNAL MEDICINE
Payer: COMMERCIAL

## 2019-08-27 ENCOUNTER — HOSPITAL ENCOUNTER (OUTPATIENT)
Dept: GENERAL RADIOLOGY | Facility: CLINIC | Age: 69
End: 2019-08-27
Attending: INTERNAL MEDICINE | Admitting: INTERNAL MEDICINE
Payer: COMMERCIAL

## 2019-08-27 ENCOUNTER — CARE COORDINATION (OUTPATIENT)
Dept: GASTROENTEROLOGY | Facility: CLINIC | Age: 69
End: 2019-08-27

## 2019-08-27 DIAGNOSIS — D13.2 DUODENAL ADENOMA: Primary | ICD-10-CM

## 2019-08-27 DIAGNOSIS — D13.5 AMPULLARY ADENOMA: ICD-10-CM

## 2019-08-27 PROCEDURE — 74019 RADEX ABDOMEN 2 VIEWS: CPT

## 2019-08-27 NOTE — OR NURSING
Dr. Guerrero spoke with pt regarding abdominal xray. Pt informed pancreatic stent has passed, biliary stent is still in and pt will need ercp. Pt verbalized understanding. Pt discharged. No procedure performed.

## 2019-09-06 ENCOUNTER — TELEPHONE (OUTPATIENT)
Dept: GASTROENTEROLOGY | Facility: CLINIC | Age: 69
End: 2019-09-06

## 2019-09-24 NOTE — TELEPHONE ENCOUNTER
Spoke to patient in regards to scheduled procedure. Informed patient she is scheduled with Dr. Guerrero on 10/18/19. Informed patient she will need an updated pre-op physical within 30 days of her procedure. Patient stated she is going to have this done locally. Informed patient she will need a  and someone to monitor her for 24 hours after the procedure. Informed patient all scheduling details will be sent to the address listed on Epic. Address confirmed on this call.     9/6/19 1139am      Dorsal Nasal Flap Text: The defect edges were debeveled with a #15 scalpel blade.  Given the location of the defect and the proximity to free margins a dorsal nasal flap was deemed most appropriate.  Using a sterile surgical marker, an appropriate dorsal nasal flap was drawn around the defect.    The area thus outlined was incised deep to adipose tissue with a #15 scalpel blade.  The skin margins were undermined to an appropriate distance in all directions utilizing iris scissors.

## 2019-09-29 ENCOUNTER — HEALTH MAINTENANCE LETTER (OUTPATIENT)
Age: 69
End: 2019-09-29

## 2019-10-09 ENCOUNTER — OFFICE VISIT (OUTPATIENT)
Dept: INTERNAL MEDICINE | Facility: CLINIC | Age: 69
End: 2019-10-09
Payer: COMMERCIAL

## 2019-10-09 VITALS
BODY MASS INDEX: 33.62 KG/M2 | SYSTOLIC BLOOD PRESSURE: 116 MMHG | WEIGHT: 209.2 LBS | DIASTOLIC BLOOD PRESSURE: 64 MMHG | HEIGHT: 66 IN | OXYGEN SATURATION: 96 % | TEMPERATURE: 98.3 F | HEART RATE: 78 BPM | RESPIRATION RATE: 16 BRPM

## 2019-10-09 DIAGNOSIS — Z01.818 PREOPERATIVE EXAMINATION: Primary | ICD-10-CM

## 2019-10-09 LAB
ERYTHROCYTE [DISTWIDTH] IN BLOOD BY AUTOMATED COUNT: 13.9 % (ref 10–15)
HCT VFR BLD AUTO: 45.2 % (ref 35–47)
HGB BLD-MCNC: 14.5 G/DL (ref 11.7–15.7)
MCH RBC QN AUTO: 26.9 PG (ref 26.5–33)
MCHC RBC AUTO-ENTMCNC: 32.1 G/DL (ref 31.5–36.5)
MCV RBC AUTO: 84 FL (ref 78–100)
PLATELET # BLD AUTO: 294 10E9/L (ref 150–450)
RBC # BLD AUTO: 5.4 10E12/L (ref 3.8–5.2)
WBC # BLD AUTO: 6.8 10E9/L (ref 4–11)

## 2019-10-09 PROCEDURE — 85027 COMPLETE CBC AUTOMATED: CPT | Performed by: NURSE PRACTITIONER

## 2019-10-09 PROCEDURE — 99214 OFFICE O/P EST MOD 30 MIN: CPT | Performed by: NURSE PRACTITIONER

## 2019-10-09 PROCEDURE — 36415 COLL VENOUS BLD VENIPUNCTURE: CPT | Performed by: NURSE PRACTITIONER

## 2019-10-09 PROCEDURE — 80048 BASIC METABOLIC PNL TOTAL CA: CPT | Performed by: NURSE PRACTITIONER

## 2019-10-09 ASSESSMENT — MIFFLIN-ST. JEOR: SCORE: 1480.67

## 2019-10-09 NOTE — PROGRESS NOTES
[place] [address]  [facility] [address]  [phone]  Dept: [phone]    PRE-OP EVALUATION:  Today's date: 10/9/2019    Gricelda Sabillon (: 1950) presents for pre-operative evaluation assessment as requested by Dr. Guerrero.  She requires evaluation and anesthesia risk assessment prior to undergoing surgery/procedure for treatment of ERCP endoscopy .    Fax number for surgical facility: [facility]  Primary Physician: Raisa Davidson  Type of Anesthesia Anticipated: General    Patient has a Health Care Directive or Living Will:  NO    Preop Questions 10/9/2019   Who is doing your surgery? [email or web address]   What are you having done? ERCP   Date of Surgery/Procedure: 10 18 2019   Facility or Hospital where procedure/surgery will be performed: [facility]   1.  Do you have a history of Heart attack, stroke, stent, coronary bypass surgery, or other heart surgery? No   2.  Do you ever have any pain or discomfort in your chest? No   3.  Do you have a history of  Heart Failure? No   4.   Are you troubled by shortness of breath when:  walking on a level surface, or up a slight hill, or at night? No   5.  Do you currently have a cold, bronchitis or other respiratory infection? No   6.  Do you have a cough, shortness of breath, or wheezing? No   7.  Do you sometimes get pains in the calves of your legs when you walk? No   8. Do you or anyone in your family have previous history of blood clots? No   9.  Do you or does anyone in your family have a serious bleeding problem such as prolonged bleeding following surgeries or cuts? No   10. Have you ever had problems with anemia or been told to take iron pills? YES - young adult   11. Have you had any abnormal blood loss such as black, tarry or bloody stools, or abnormal vaginal bleeding? No   12. Have you ever had a blood transfusion? No   13. Have you or any of your relatives ever had problems with anesthesia? No   14. Do you  have sleep apnea, excessive snoring or daytime drowsiness? YES - CPAP   15. Do you have any prosthetic heart valves? No   16. Do you have prosthetic joints? YES - R knee and hip   17. Is there any chance that you may be pregnant? No         HPI:     HPI related to upcoming procedure: ERCP      See problem list for active medical problems.  Problems all longstanding and stable, except as noted/documented.  See ROS for pertinent symptoms related to these conditions.      MEDICAL HISTORY:     Patient Active Problem List    Diagnosis Date Noted     Hyperlipidemia with target LDL less than 100      Priority: High     Diagnosis updated by automated process. Provider to review and confirm.       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/2019     Priority: Medium     DM 2 with hyperglycemia (H) 12/02/2018     Priority: Medium     Status post total replacement of right hip 02/13/2018     Priority: Medium     Gait disturbance 02/13/2018     Priority: Medium     Hip osteoarthritis 09/25/2017     Priority: Medium     CHERRY (obstructive sleep apnea) 09/15/2017     Priority: Medium     HTN goal less than 140/90, 05/22/2016     Priority: Medium     Chronic knee pain, right 08/30/2015     Priority: Medium     Hip pain, right 08/30/2015     Priority: Medium     Total knee replacement status 07/12/2013     Priority: Medium     Sleep apnea      Priority: Medium     Obesity (BMI 30-39.9)      Priority: Medium     Heart murmur      Priority: Medium     aortic area       Advanced directives, counseling/discussion 01/06/2012     Priority: Medium     Advance Directive Problem List Overview:   Name Relationship Phone    Primary Health Care Agent            Alternative Health Care Agent          Discussed advance care planning with patient; information given to patient to review. 1/6/2012          Allergic rhinitis      Priority: Medium     Problem list name  updated by automated process. Provider to review       Cataract      Priority: Low     Utility update for deleted IMO code  [employer] Update utility       Macular hole of left eye      Priority: Low      Past Medical History:   Diagnosis Date     Allergic rhinitis, cause unspecified      Asthma      Asthma, mild intermittent      Cataract      Cellulitis and abscess of leg, except foot 03/00    Bilateral     Eczema      Heart murmur     aortic area     HTN, goal below 140/90      Hyperlipidemia LDL goal < 100      Hyperplastic colon polyp 2008     Infection     bilateral eye infections     Macular hole of left eye      Obesity (BMI 30-39.9)      Osteoarthritis     knees     Other chronic pain     right knee     Sleep apnea     uses c pap     Type 2 diabetes, HbA1C goal < 8% (H)      Past Surgical History:   Procedure Laterality Date     ARTHROPLASTY HIP Right 9/25/2017    Procedure: ARTHROPLASTY HIP;  Right total hip arthroplasty  ;  Surgeon: Ayaan Pena MD;  Location: [facility]     ARTHROPLASTY KNEE  7/12/2013    Procedure: ARTHROPLASTY KNEE;  right total knee arthroplasty ;  Surgeon: Chaparro Wesley MD;  Location: [facility]     ARTHROSCOPY KNEE Right 1/21/2015    Procedure: ARTHROSCOPY KNEE;  Surgeon: Ayaan Pena MD;  Location: [facility]     C INCISION OF HYMEN       CATARACT IOL, RT/LT       COLONOSCOPY  2008     COLONOSCOPY N/A 4/18/2019    Procedure: Colonoscopy with polypectomy;  Surgeon: Shaun Guerrero MD;  [facility]     ENDOSCOPIC RETROGRADE CHOLANGIOPANCREATOGRAM N/A 2/6/2019    Procedure: COMBINED ENDOSCOPIC RETROGRADE CHOLANGIOPANCREATOGRAPHY, BILIARY SPINCTEROTOMY AND DILATION, PLACE BILE DUCT STENT;  Surgeon: Guru Andie Gonzalez MD;  [facility]     ENDOSCOPIC RETROGRADE CHOLANGIOPANCREATOGRAM N/A 2/28/2019    Procedure: Endoscopic Retrograde Cholangiopancreatogram, Bile duct stent removal and placement;  Surgeon: Shaun Guerrero MD;  [facility]     ENDOSCOPIC  RETROGRADE CHOLANGIOPANCREATOGRAM N/A 4/18/2019    Procedure: COMBINED ENDOSCOPIC RETROGRADE CHOLANGIOPANCREATOGRAPHY, Bile duct stent exchange and Polypectomy;  Surgeon: Shaun Guerrero MD;  [facility]     ENDOSCOPIC RETROGRADE CHOLANGIOPANCREATOGRAM WITH SPYGLASS N/A 7/26/2019    Procedure: Endoscopic Retrograde Cholangiopancreatogram With Spyglas,l Radiofrequency Ablation, Stent Exchangeand Duodenal Biopsy x2;  Surgeon: Shaun Guerrero MD;  [facility]     ESOPHAGOSCOPY, GASTROSCOPY, DUODENOSCOPY (EGD), COMBINED N/A 4/18/2019    Procedure: upper endoscopy with polypectomy;  Surgeon: Shaun Guerrero MD;  [facility]     ESOPHAGOSCOPY, GASTROSCOPY, DUODENOSCOPY (EGD), RESECT MUCOSA, COMBINED N/A 2/28/2019    Procedure: Upper Endoscopy, Endoscopic Ultrasound, Endoscopic Mucosal Resection,  Ampullectomy, polypectomy.;  Surgeon: Shaun Guerrero MD;  [facility]     EYE SURGERY      macular hole repaired left eye     HC KNEE SCOPE, DIAGNOSTIC      - both knees     HEAD & NECK SURGERY      wisdom teeth     Current Outpatient Medications   Medication Sig Dispense Refill     albuterol (PROAIR HFA, PROVENTIL HFA, VENTOLIN HFA) 108 (90 BASE) MCG/ACT inhaler Inhale 2 puffs into the lungs every 4 hours as needed for shortness of breath / dyspnea 1 Inhaler 3     aspirin 81 MG EC tablet Take 1 tablet (81 mg) by mouth daily 90 tablet 3     atenolol (TENORMIN) 50 MG tablet TAKE ONE TABLET BY MOUTH ONCE DAILY 90 tablet 3     azelastine (ASTELIN) 137 MCG/SPRAY nasal spray [place] 1 spray into both nostrils 2 times daily as needed Reported on 3/22/2017       azelastine (OPTIVAR) 0.05 % SOLN Place 1 drop into both eyes 2 times daily as needed Reported on 3/22/2017       calcium-vitamin D 500-125 MG-UNIT TABS        canagliflozin (INVOKANA) 100 MG tablet Take 1 tablet (100 mg) by mouth every morning (before breakfast) 90 tablet 1     cetirizine (ZYRTEC) 10 MG tablet Take 10 mg by mouth every morning.       desoximetasone  "(TOPICORT) 0.25 % cream Apply sparingly to affected area's 60 g 1     dorzolamide-timolol (COSOPT) 2-0.5 % ophthalmic solution Place 1 drop into both eyes 2 times daily        fish oil-omega-3 fatty acids (FISH OIL) 1000 MG capsule Take 1 g by mouth daily Reported on 3/22/2017       glipiZIDE (GLUCOTROL) 5 MG tablet TAKE 2 TABLETS BY MOUTH TWICE DAILY BEFORE MEAL(S) 360 tablet 1     hydrochlorothiazide (HYDRODIURIL) 25 MG tablet TAKE 1 TABLET BY MOUTH ONCE DAILY IN THE MORNING 90 tablet 1     ibuprofen (ADVIL) 200 MG tablet take 4 tablet (200 mg) by oral route every 8 hours as needed with food       latanoprost (XALATAN) 0.005 % ophthalmic solution Place 1 drop Into the left eye At Bedtime 2.5 mL 1     lisinopril (PRINIVIL/ZESTRIL) 40 MG tablet Take 1 tablet (40 mg) by mouth daily 90 tablet 1     metFORMIN (GLUCOPHAGE) 1000 MG tablet TAKE 1 TABLET BY MOUTH TWICE DAILY WITH MEALS 180 tablet 1     MULTIVITAMIN TABS   OR Reported on 3/22/2017       ondansetron (ZOFRAN-ODT) 4 MG ODT tab Take 1 tablet (4 mg) by mouth every 6 hours as needed for nausea or vomiting 30 tablet 0     ONETOUCH ULTRA test strip USE ONE STRIP TO CHECK GLUCOSE ONE DAILY DIAG CODE E11.9 100 strip 1     order for DME All diabetic testing supplies including test strips, lancets and solution for testing 2 times per day. Patient is using   no Insulin. Last A1c Lab Results       Component                Value               Date                       A1C                      7.9                 08/20/2018 100 each 11     sitagliptin (JANUVIA) 100 MG tablet Take 1 tablet (100 mg) by mouth daily 90 tablet 1     STATIN NOT PRESCRIBED (INTENTIONAL) 1 each Please choose reason not prescribed, below       OTC products: None, except as noted above    Allergies   Allergen Reactions     Bromfenac Visual Disturbance     Sulfites, xibrom  Causes sever eye pain     Codeine      \"NAUSE,HA AND DIZZINESS\"     Codeine Nausea     Other reaction(s): Dizziness, Headache " "    Sulfites Visual Disturbance     Xibrom (bromfenac opthalmic solution) 0.09%--eye pain      Latex Allergy: NO    Social History     Tobacco Use     Smoking status: Never Smoker     Smokeless tobacco: Never Used   Substance Use Topics     Alcohol use: No     Alcohol/week: 0.0 standard drinks     History   Drug Use No       REVIEW OF SYSTEMS:   CONSTITUTIONAL: NEGATIVE for fever, chills, change in weight  ENT/MOUTH: NEGATIVE for ear, mouth and throat problems  RESP: NEGATIVE for significant cough or SOB  CV: NEGATIVE for chest pain, palpitations or peripheral edema  GI: NEGATIVE for nausea, abdominal pain, heartburn, or change in bowel habits  : NEGATIVE for frequency, dysuria, or hematuria  MUSCULOSKELETAL: NEGATIVE for significant arthralgias or myalgia  NEURO: NEGATIVE for weakness, dizziness or paresthesias  ENDOCRINE: NEGATIVE for temperature intolerance, skin/hair changes  HEME: NEGATIVE for bleeding problems    EXAM:   /64 (BP Location: Right arm, Patient Position: Sitting, Cuff Size: Adult Large)   Pulse 78   Temp 98.3  F (36.8  C) (Oral)   Resp 16   Ht 1.676 m (5' 6\")   Wt 94.9 kg (209 lb 3.2 oz)   LMP 12/13/2002 (Exact Date)   SpO2 96%   BMI 33.77 kg/m      GENERAL APPEARANCE: obese     NECK: no adenopathy, no asymmetry, masses, or scars and thyroid normal to palpation     RESP: lungs clear to auscultation - no rales, rhonchi or wheezes     CV: regular rates and rhythm, normal S1 S2, no S3 or S4 and no murmur, click or rub     ABDOMEN:  soft, nontender, no HSM or masses and bowel sounds normal     MS: extremities normal- no gross deformities noted, no evidence of inflammation in joints, FROM in all extremities.     NEURO: Normal strength and tone, sensory exam grossly normal, mentation intact and speech normal     PSYCH: mentation appears normal. and affect normal/bright     LYMPHATICS: No cervical adenopathy    DIAGNOSTICS:   EKG: appears normal, NSR, 7/21/19    Recent Labs   Lab Test " 07/26/19  1024 07/09/19  0824  03/01/19  0536  02/25/19  0900  01/17/19  1000   HGB 14.9 14.4   < > 11.5*   < >  --    < > 12.9    299   < > 247   < >  --    < > 368   INR  --   --   --  1.07  --   --   --  1.02    142   < > 136   < > 137   < > 134   POTASSIUM 4.2 4.3   < > 4.1   < > 4.2   < > 3.8   CR 0.83 0.93   < > 0.75   < > 0.92   < > 0.79   A1C  --  6.8*  --   --   --  8.1*  --   --     < > = values in this interval not displayed.        IMPRESSION:   Reason for surgery/procedure: ERCP    The proposed surgical procedure is considered LOW risk.    REVISED CARDIAC RISK INDEX  The patient has the following serious cardiovascular risks for perioperative complications such as (MI, PE, VFib and 3  AV Block):  No serious cardiac risks  INTERPRETATION: 0 risks: Class I (very low risk - 0.4% complication rate)    The patient has the following additional risks for perioperative complications:  No identified additional risks    (Z01.818) Preoperative examination  (primary encounter diagnosis)  Comment:   Plan: CBC with platelets, Basic metabolic panel              RECOMMENDATIONS:     --Consult hospital rounder / IM to assist post-op medical management    --Patient is to take all scheduled medications on the day of surgery EXCEPT for modifications listed below.    APPROVAL GIVEN to proceed with proposed procedure, without further diagnostic evaluation       Signed Electronically by: Bettina Rogel NP    Copy of this evaluation report is provided to requesting physician.    [facility] Preop Guidelines    Revised Cardiac Risk Index

## 2019-10-09 NOTE — LETTER
October 14, 2019      Gricelda Sabillon  [address] [address]        Dear ,    We are writing to inform you of your test results.    Your recent lab results were normal.     Resulted Orders   CBC with platelets   Result Value Ref Range    WBC 6.8 4.0 - 11.0 10e9/L    RBC Count 5.40 (H) 3.8 - 5.2 10e12/L    Hemoglobin 14.5 11.7 - 15.7 g/dL    Hematocrit 45.2 35.0 - 47.0 %    MCV 84 78 - 100 fl    MCH 26.9 26.5 - 33.0 pg    MCHC 32.1 31.5 - 36.5 g/dL    RDW 13.9 10.0 - 15.0 %    Platelet Count 294 150 - 450 10e9/L   Basic metabolic panel   Result Value Ref Range    Sodium 137 133 - 144 mmol/L    Potassium 4.1 3.4 - 5.3 mmol/L    Chloride 104 94 - 109 mmol/L    Carbon Dioxide 24 20 - 32 mmol/L    Anion Gap 9 3 - 14 mmol/L    Glucose 88 70 - 99 mg/dL    Urea Nitrogen 22 7 - 30 mg/dL    Creatinine 0.83 0.52 - 1.04 mg/dL    GFR Estimate 70 >60 mL/min/[1.73_m2]      Comment:      Non  GFR Calc  Starting 12/18/2018, serum creatinine based estimated GFR (eGFR) will be   calculated using the Chronic Kidney Disease Epidemiology Collaboration   (CKD-EPI) equation.      GFR Estimate If Black 81 >60 mL/min/[1.73_m2]      Comment:       GFR Calc  Starting 12/18/2018, serum creatinine based estimated GFR (eGFR) will be   calculated using the Chronic Kidney Disease Epidemiology Collaboration   (CKD-EPI) equation.      Calcium 10.0 8.5 - 10.1 mg/dL       If you have any questions or concerns, please call the clinic at the number listed above.       Sincerely,        Bettina Rogel NP

## 2019-10-09 NOTE — NURSING NOTE
"Patient here for a pre op fr endoscopy.  /64 (BP Location: Right arm, Patient Position: Sitting, Cuff Size: Adult Large)   Pulse 78   Temp 98.3  F (36.8  C) (Oral)   Resp 16   Ht 1.676 m (5' 6\")   Wt 94.9 kg (209 lb 3.2 oz)   LMP 12/13/2002 (Exact Date)   SpO2 96%   BMI 33.77 kg/m      "

## 2019-10-10 LAB
ANION GAP SERPL CALCULATED.3IONS-SCNC: 9 MMOL/L (ref 3–14)
BUN SERPL-MCNC: 22 MG/DL (ref 7–30)
CALCIUM SERPL-MCNC: 10 MG/DL (ref 8.5–10.1)
CHLORIDE SERPL-SCNC: 104 MMOL/L (ref 94–109)
CO2 SERPL-SCNC: 24 MMOL/L (ref 20–32)
CREAT SERPL-MCNC: 0.83 MG/DL (ref 0.52–1.04)
GFR SERPL CREATININE-BSD FRML MDRD: 70 ML/MIN/{1.73_M2}
GLUCOSE SERPL-MCNC: 88 MG/DL (ref 70–99)
POTASSIUM SERPL-SCNC: 4.1 MMOL/L (ref 3.4–5.3)
SODIUM SERPL-SCNC: 137 MMOL/L (ref 133–144)

## 2019-10-17 ENCOUNTER — ANESTHESIA EVENT (OUTPATIENT)
Dept: SURGERY | Facility: CLINIC | Age: 69
End: 2019-10-17
Payer: COMMERCIAL

## 2019-10-18 ENCOUNTER — ANESTHESIA (OUTPATIENT)
Dept: SURGERY | Facility: CLINIC | Age: 69
End: 2019-10-18
Payer: COMMERCIAL

## 2019-10-18 ENCOUNTER — APPOINTMENT (OUTPATIENT)
Dept: GENERAL RADIOLOGY | Facility: CLINIC | Age: 69
End: 2019-10-18
Attending: INTERNAL MEDICINE
Payer: COMMERCIAL

## 2019-10-18 ENCOUNTER — HOSPITAL ENCOUNTER (OUTPATIENT)
Facility: CLINIC | Age: 69
Discharge: HOME | End: 2019-10-18
Attending: INTERNAL MEDICINE | Admitting: INTERNAL MEDICINE
Payer: COMMERCIAL

## 2019-10-18 VITALS
SYSTOLIC BLOOD PRESSURE: 143 MMHG | TEMPERATURE: 98 F | HEART RATE: 60 BPM | RESPIRATION RATE: 16 BRPM | DIASTOLIC BLOOD PRESSURE: 77 MMHG | WEIGHT: 209.66 LBS | HEIGHT: 67 IN | BODY MASS INDEX: 32.91 KG/M2 | OXYGEN SATURATION: 97 %

## 2019-10-18 LAB
ALBUMIN SERPL-MCNC: 3.4 G/DL (ref 3.4–5)
ALP SERPL-CCNC: 96 U/L (ref 40–150)
ALT SERPL W P-5'-P-CCNC: 128 U/L (ref 0–50)
AMYLASE SERPL-CCNC: 80 U/L (ref 30–110)
ANION GAP SERPL CALCULATED.3IONS-SCNC: 9 MMOL/L (ref 3–14)
AST SERPL W P-5'-P-CCNC: 82 U/L (ref 0–45)
BILIRUB SERPL-MCNC: 0.4 MG/DL (ref 0.2–1.3)
BUN SERPL-MCNC: 24 MG/DL (ref 7–30)
CALCIUM SERPL-MCNC: 10.3 MG/DL (ref 8.5–10.1)
CHLORIDE SERPL-SCNC: 105 MMOL/L (ref 94–109)
CO2 SERPL-SCNC: 24 MMOL/L (ref 20–32)
CREAT SERPL-MCNC: 0.82 MG/DL (ref 0.52–1.04)
ERCP: NORMAL
ERYTHROCYTE [DISTWIDTH] IN BLOOD BY AUTOMATED COUNT: 13.5 % (ref 10–15)
GFR SERPL CREATININE-BSD FRML MDRD: 72 ML/MIN/{1.73_M2}
GLUCOSE BLDC GLUCOMTR-MCNC: 118 MG/DL (ref 70–99)
GLUCOSE BLDC GLUCOMTR-MCNC: 135 MG/DL (ref 70–99)
GLUCOSE SERPL-MCNC: 127 MG/DL (ref 70–99)
HCT VFR BLD AUTO: 44.2 % (ref 35–47)
HGB BLD-MCNC: 13.5 G/DL (ref 11.7–15.7)
LIPASE SERPL-CCNC: 264 U/L (ref 73–393)
MCH RBC QN AUTO: 26.5 PG (ref 26.5–33)
MCHC RBC AUTO-ENTMCNC: 30.5 G/DL (ref 31.5–36.5)
MCV RBC AUTO: 87 FL (ref 78–100)
PLATELET # BLD AUTO: 265 10E9/L (ref 150–450)
POTASSIUM SERPL-SCNC: 4 MMOL/L (ref 3.4–5.3)
PROT SERPL-MCNC: 7 G/DL (ref 6.8–8.8)
RBC # BLD AUTO: 5.1 10E12/L (ref 3.8–5.2)
SODIUM SERPL-SCNC: 138 MMOL/L (ref 133–144)
WBC # BLD AUTO: 5.6 10E9/L (ref 4–11)

## 2019-10-18 PROCEDURE — 88305 TISSUE EXAM BY PATHOLOGIST: CPT | Performed by: INTERNAL MEDICINE

## 2019-10-18 PROCEDURE — 82150 ASSAY OF AMYLASE: CPT | Performed by: INTERNAL MEDICINE

## 2019-10-18 PROCEDURE — 36415 COLL VENOUS BLD VENIPUNCTURE: CPT | Performed by: INTERNAL MEDICINE

## 2019-10-18 PROCEDURE — 83690 ASSAY OF LIPASE: CPT | Performed by: INTERNAL MEDICINE

## 2019-10-18 PROCEDURE — C1769 GUIDE WIRE: HCPCS | Performed by: INTERNAL MEDICINE

## 2019-10-18 PROCEDURE — 25000128 H RX IP 250 OP 636: Performed by: NURSE ANESTHETIST, CERTIFIED REGISTERED

## 2019-10-18 PROCEDURE — 25000125 ZZHC RX 250: Performed by: NURSE ANESTHETIST, CERTIFIED REGISTERED

## 2019-10-18 PROCEDURE — 40000170 ZZH STATISTIC PRE-PROCEDURE ASSESSMENT II: Performed by: INTERNAL MEDICINE

## 2019-10-18 PROCEDURE — 82962 GLUCOSE BLOOD TEST: CPT

## 2019-10-18 PROCEDURE — C1726 CATH, BAL DIL, NON-VASCULAR: HCPCS | Performed by: INTERNAL MEDICINE

## 2019-10-18 PROCEDURE — 37000009 ZZH ANESTHESIA TECHNICAL FEE, EACH ADDTL 15 MIN: Performed by: INTERNAL MEDICINE

## 2019-10-18 PROCEDURE — 36000061 ZZH SURGERY LEVEL 3 W FLUORO 1ST 30 MIN - UMMC: Performed by: INTERNAL MEDICINE

## 2019-10-18 PROCEDURE — 36000059 ZZH SURGERY LEVEL 3 EA 15 ADDTL MIN UMMC: Performed by: INTERNAL MEDICINE

## 2019-10-18 PROCEDURE — 25500064 ZZH RX 255 OP 636: Performed by: INTERNAL MEDICINE

## 2019-10-18 PROCEDURE — 80053 COMPREHEN METABOLIC PANEL: CPT | Performed by: INTERNAL MEDICINE

## 2019-10-18 PROCEDURE — 40000277 XR SURGERY CARM FLUORO LESS THAN 5 MIN W STILLS: Mod: TC

## 2019-10-18 PROCEDURE — 37000008 ZZH ANESTHESIA TECHNICAL FEE, 1ST 30 MIN: Performed by: INTERNAL MEDICINE

## 2019-10-18 PROCEDURE — 71000014 ZZH RECOVERY PHASE 1 LEVEL 2 FIRST HR: Performed by: INTERNAL MEDICINE

## 2019-10-18 PROCEDURE — 71000027 ZZH RECOVERY PHASE 2 EACH 15 MINS: Performed by: INTERNAL MEDICINE

## 2019-10-18 PROCEDURE — 27210794 ZZH OR GENERAL SUPPLY STERILE: Performed by: INTERNAL MEDICINE

## 2019-10-18 PROCEDURE — 25000125 ZZHC RX 250: Performed by: INTERNAL MEDICINE

## 2019-10-18 PROCEDURE — 25000566 ZZH SEVOFLURANE, EA 15 MIN: Performed by: INTERNAL MEDICINE

## 2019-10-18 PROCEDURE — 25800030 ZZH RX IP 258 OP 636: Performed by: NURSE ANESTHETIST, CERTIFIED REGISTERED

## 2019-10-18 PROCEDURE — 85027 COMPLETE CBC AUTOMATED: CPT | Performed by: INTERNAL MEDICINE

## 2019-10-18 RX ORDER — FENTANYL CITRATE 50 UG/ML
25-50 INJECTION, SOLUTION INTRAMUSCULAR; INTRAVENOUS
Status: DISCONTINUED | OUTPATIENT
Start: 2019-10-18 | End: 2019-10-18 | Stop reason: HOSPADM

## 2019-10-18 RX ORDER — SODIUM CHLORIDE, SODIUM LACTATE, POTASSIUM CHLORIDE, CALCIUM CHLORIDE 600; 310; 30; 20 MG/100ML; MG/100ML; MG/100ML; MG/100ML
INJECTION, SOLUTION INTRAVENOUS CONTINUOUS
Status: CANCELLED | OUTPATIENT
Start: 2019-10-18

## 2019-10-18 RX ORDER — FENTANYL CITRATE 50 UG/ML
INJECTION, SOLUTION INTRAMUSCULAR; INTRAVENOUS PRN
Status: DISCONTINUED | OUTPATIENT
Start: 2019-10-18 | End: 2019-10-18

## 2019-10-18 RX ORDER — FLUMAZENIL 0.1 MG/ML
0.2 INJECTION, SOLUTION INTRAVENOUS
Status: CANCELLED | OUTPATIENT
Start: 2019-10-18 | End: 2019-10-19

## 2019-10-18 RX ORDER — NALOXONE HYDROCHLORIDE 0.4 MG/ML
.1-.4 INJECTION, SOLUTION INTRAMUSCULAR; INTRAVENOUS; SUBCUTANEOUS
Status: CANCELLED | OUTPATIENT
Start: 2019-10-18 | End: 2019-10-19

## 2019-10-18 RX ORDER — MEPERIDINE HYDROCHLORIDE 50 MG/ML
12.5 INJECTION INTRAMUSCULAR; INTRAVENOUS; SUBCUTANEOUS
Status: CANCELLED | OUTPATIENT
Start: 2019-10-18

## 2019-10-18 RX ORDER — ONDANSETRON 4 MG/1
4 TABLET, ORALLY DISINTEGRATING ORAL EVERY 30 MIN PRN
Status: CANCELLED | OUTPATIENT
Start: 2019-10-18

## 2019-10-18 RX ORDER — IOPAMIDOL 510 MG/ML
INJECTION, SOLUTION INTRAVASCULAR PRN
Status: DISCONTINUED | OUTPATIENT
Start: 2019-10-18 | End: 2019-10-18 | Stop reason: HOSPADM

## 2019-10-18 RX ORDER — ONDANSETRON 2 MG/ML
INJECTION INTRAMUSCULAR; INTRAVENOUS PRN
Status: DISCONTINUED | OUTPATIENT
Start: 2019-10-18 | End: 2019-10-18

## 2019-10-18 RX ORDER — HYDROMORPHONE HYDROCHLORIDE 1 MG/ML
.3-.5 INJECTION, SOLUTION INTRAMUSCULAR; INTRAVENOUS; SUBCUTANEOUS EVERY 10 MIN PRN
Status: CANCELLED | OUTPATIENT
Start: 2019-10-18

## 2019-10-18 RX ORDER — HYDRALAZINE HYDROCHLORIDE 20 MG/ML
2.5-5 INJECTION INTRAMUSCULAR; INTRAVENOUS EVERY 10 MIN PRN
Status: DISCONTINUED | OUTPATIENT
Start: 2019-10-18 | End: 2019-10-18 | Stop reason: HOSPADM

## 2019-10-18 RX ORDER — ONDANSETRON 2 MG/ML
4 INJECTION INTRAMUSCULAR; INTRAVENOUS EVERY 30 MIN PRN
Status: CANCELLED | OUTPATIENT
Start: 2019-10-18

## 2019-10-18 RX ORDER — LIDOCAINE HYDROCHLORIDE 20 MG/ML
INJECTION, SOLUTION INFILTRATION; PERINEURAL PRN
Status: DISCONTINUED | OUTPATIENT
Start: 2019-10-18 | End: 2019-10-18

## 2019-10-18 RX ORDER — LIDOCAINE 40 MG/G
CREAM TOPICAL
Status: DISCONTINUED | OUTPATIENT
Start: 2019-10-18 | End: 2019-10-18 | Stop reason: HOSPADM

## 2019-10-18 RX ORDER — ALBUTEROL SULFATE 0.83 MG/ML
2.5 SOLUTION RESPIRATORY (INHALATION) EVERY 4 HOURS PRN
Status: DISCONTINUED | OUTPATIENT
Start: 2019-10-18 | End: 2019-10-18 | Stop reason: HOSPADM

## 2019-10-18 RX ORDER — PROPOFOL 10 MG/ML
INJECTION, EMULSION INTRAVENOUS PRN
Status: DISCONTINUED | OUTPATIENT
Start: 2019-10-18 | End: 2019-10-18

## 2019-10-18 RX ORDER — SODIUM CHLORIDE, SODIUM LACTATE, POTASSIUM CHLORIDE, CALCIUM CHLORIDE 600; 310; 30; 20 MG/100ML; MG/100ML; MG/100ML; MG/100ML
INJECTION, SOLUTION INTRAVENOUS CONTINUOUS
Status: DISCONTINUED | OUTPATIENT
Start: 2019-10-18 | End: 2019-10-18 | Stop reason: HOSPADM

## 2019-10-18 RX ORDER — SODIUM CHLORIDE, SODIUM LACTATE, POTASSIUM CHLORIDE, CALCIUM CHLORIDE 600; 310; 30; 20 MG/100ML; MG/100ML; MG/100ML; MG/100ML
INJECTION, SOLUTION INTRAVENOUS CONTINUOUS PRN
Status: DISCONTINUED | OUTPATIENT
Start: 2019-10-18 | End: 2019-10-18

## 2019-10-18 RX ADMIN — PHENYLEPHRINE HYDROCHLORIDE 100 MCG: 10 INJECTION INTRAVENOUS at 11:49

## 2019-10-18 RX ADMIN — ONDANSETRON 4 MG: 2 INJECTION INTRAMUSCULAR; INTRAVENOUS at 11:55

## 2019-10-18 RX ADMIN — FENTANYL CITRATE 50 MCG: 50 INJECTION, SOLUTION INTRAMUSCULAR; INTRAVENOUS at 11:42

## 2019-10-18 RX ADMIN — PROPOFOL 150 MG: 10 INJECTION, EMULSION INTRAVENOUS at 11:18

## 2019-10-18 RX ADMIN — LIDOCAINE HYDROCHLORIDE 80 MG: 20 INJECTION, SOLUTION INFILTRATION; PERINEURAL at 11:16

## 2019-10-18 RX ADMIN — SUGAMMADEX 200 MG: 100 INJECTION, SOLUTION INTRAVENOUS at 11:56

## 2019-10-18 RX ADMIN — SODIUM CHLORIDE, POTASSIUM CHLORIDE, SODIUM LACTATE AND CALCIUM CHLORIDE: 600; 310; 30; 20 INJECTION, SOLUTION INTRAVENOUS at 11:03

## 2019-10-18 RX ADMIN — ROCURONIUM BROMIDE 50 MG: 10 INJECTION INTRAVENOUS at 11:20

## 2019-10-18 RX ADMIN — FENTANYL CITRATE 50 MCG: 50 INJECTION, SOLUTION INTRAMUSCULAR; INTRAVENOUS at 11:16

## 2019-10-18 ASSESSMENT — MIFFLIN-ST. JEOR: SCORE: 1498.63

## 2019-10-18 NOTE — ANESTHESIA CARE TRANSFER NOTE
Patient: Gricelda Sabillon    Procedure(s):  Upper Endoscopy and ERCP with stent removal, stone removal and biopsy  Endoscopic Retrograde Cholangiopancreatogram    Diagnosis: Duodenal Adenoma  Diagnosis Additional Information: No value filed.    Anesthesia Type:   General     Note:  Airway :Face Mask  Patient transferred to:PACU  Handoff Report: Identifed the Patient, Identified the Reponsible Provider, Reviewed the pertinent medical history, Discussed the surgical course, Reviewed Intra-OP anesthesia mangement and issues during anesthesia, Set expectations for post-procedure period and Allowed opportunity for questions and acknowledgement of understanding      Vitals: (Last set prior to Anesthesia Care Transfer)    CRNA VITALS  10/18/2019 1131 - 10/18/2019 1205      10/18/2019             Resp Rate (observed):  12                Electronically Signed By: Charles Patrick Schlatter, APRN CRNA  October 18, 2019  12:05 PM

## 2019-10-18 NOTE — ANESTHESIA PREPROCEDURE EVALUATION
Anesthesia Pre-Procedure Evaluation    Patient: Gricelda Sabillon   MRN:     [id number] Gender:   female   Age:    69 year old :      1950        Preoperative Diagnosis: Duodenal Adenoma   Procedure(s):  Upper Endoscopy  Endoscopic Retrograde Cholangiopancreatogram     Past Medical History:   Diagnosis Date     Allergic rhinitis, cause unspecified      Asthma      Asthma, mild intermittent      Cataract      Cellulitis and abscess of leg, except foot     Bilateral     Eczema      Heart murmur     aortic area     HTN, goal below 140/90      Hyperlipidemia LDL goal < 100      Hyperplastic colon polyp      Infection     bilateral eye infections     Macular hole of left eye      Obesity (BMI 30-39.9)      Osteoarthritis     knees     Other chronic pain     right knee     Sleep apnea     uses c pap     Type 2 diabetes, HbA1C goal < 8% (H)       Past Surgical History:   Procedure Laterality Date     ARTHROPLASTY HIP Right 2017    Procedure: ARTHROPLASTY HIP;  Right total hip arthroplasty  ;  Surgeon: Ayaan Pena MD;  [facility]: [facility]     ARTHROPLASTY KNEE  2013    Procedure: ARTHROPLASTY KNEE;  right total knee arthroplasty ;  Surgeon: Chaparro Wesley MD;  [facility]: [facility]     ARTHROSCOPY KNEE Right 2015    Procedure: ARTHROSCOPY KNEE;  Surgeon: Ayaan Pena MD;  Location: [facility]     C INCISION OF HYMEN       CATARACT IOL, RT/LT       COLONOSCOPY       COLONOSCOPY N/A 2019    Procedure: Colonoscopy with polypectomy;  Surgeon: Shaun Guerrero MD;  [facility]     ENDOSCOPIC RETROGRADE CHOLANGIOPANCREATOGRAM N/A 2019    Procedure: COMBINED ENDOSCOPIC RETROGRADE CHOLANGIOPANCREATOGRAPHY, BILIARY SPINCTEROTOMY AND DILATION, PLACE BILE DUCT STENT;  Surgeon: Guru Andie Gonzalez MD;  [facility]     ENDOSCOPIC RETROGRADE CHOLANGIOPANCREATOGRAM N/A 2019    Procedure: Endoscopic Retrograde Cholangiopancreatogram, Bile duct stent  removal and placement;  Surgeon: Shaun Guerrero MD;  [facility]     ENDOSCOPIC RETROGRADE CHOLANGIOPANCREATOGRAM N/A 4/18/2019    Procedure: COMBINED ENDOSCOPIC RETROGRADE CHOLANGIOPANCREATOGRAPHY, Bile duct stent exchange and Polypectomy;  Surgeon: Shaun Guerrero MD;  [facility]     ENDOSCOPIC RETROGRADE CHOLANGIOPANCREATOGRAM WITH SPYGLASS N/A 7/26/2019    Procedure: Endoscopic Retrograde Cholangiopancreatogram With Spyglas,l Radiofrequency Ablation, Stent Exchangeand Duodenal Biopsy x2;  Surgeon: Shaun Guerrero MD;  [facility]     ESOPHAGOSCOPY, GASTROSCOPY, DUODENOSCOPY (EGD), COMBINED N/A 4/18/2019    Procedure: upper endoscopy with polypectomy;  Surgeon: Shaun Guerrero MD;  [facility]     ESOPHAGOSCOPY, GASTROSCOPY, DUODENOSCOPY (EGD), RESECT MUCOSA, COMBINED N/A 2/28/2019    Procedure: Upper Endoscopy, Endoscopic Ultrasound, Endoscopic Mucosal Resection,  Ampullectomy, polypectomy.;  Surgeon: Shaun Guerrero MD;  [facility]     EYE SURGERY      macular hole repaired left eye     HC KNEE SCOPE, DIAGNOSTIC      - both knees     HEAD & NECK SURGERY      wisdom teeth          Anesthesia Evaluation     .             ROS/MED HX    ENT/Pulmonary:     (+)sleep apnea, Mild Persistent asthma uses CPAP , . .    Neurologic:       Cardiovascular:     (+) hypertension-range: treated ace inhibitor, diuretic, ---. : . . . :. .       METS/Exercise Tolerance:     Hematologic:     (+) Anemia, -      Musculoskeletal:         GI/Hepatic:         Renal/Genitourinary:         Endo:     (+) type II DM Last HgA1c: about 6.5 Obesity, .      Psychiatric:         Infectious Disease:         Malignancy:         Other:                         PHYSICAL EXAM:   Mental Status/Neuro:    Airway: Facies: Feasible  Mallampati: I  Mouth/Opening: Full  TM distance: > 6 cm  Neck ROM: Full   Respiratory: Auscultation: CTAB     Resp. Rate: Normal     Resp. Effort: Normal      CV: Rhythm: Regular  Rate: Age appropriate  Heart:  "Normal Sounds  Edema: None   Comments: Looks well. Chest - sounds clear     Dental: Normal Dentition                LABS:  CBC:   Lab Results   Component Value Date    WBC 6.8 10/09/2019    WBC 6.2 07/26/2019    HGB 14.5 10/09/2019    HGB 14.9 07/26/2019    HCT 45.2 10/09/2019    HCT 49.0 (H) 07/26/2019     10/09/2019     07/26/2019     BMP:   Lab Results   Component Value Date     10/09/2019     07/26/2019    POTASSIUM 4.1 10/09/2019    POTASSIUM 4.2 07/26/2019    CHLORIDE 104 10/09/2019    CHLORIDE 102 07/26/2019    CO2 24 10/09/2019    CO2 25 07/26/2019    BUN 22 10/09/2019    BUN 22 07/26/2019    CR 0.83 10/09/2019    CR 0.83 07/26/2019    GLC 88 10/09/2019     (H) 07/26/2019     COAGS:   Lab Results   Component Value Date    INR 1.07 03/01/2019     POC:   Lab Results   Component Value Date     (H) 10/18/2019     OTHER:   Lab Results   Component Value Date    A1C 6.8 (H) 07/09/2019    KATHRYN 10.0 10/09/2019    ALBUMIN 3.8 07/26/2019    PROTTOTAL 7.9 07/26/2019     (H) 07/26/2019     (H) 07/26/2019    ALKPHOS 109 07/26/2019    BILITOTAL 0.4 07/26/2019    LIPASE 348 07/26/2019    AMYLASE 52 07/26/2019    TSH 3.28 05/14/2018    T4 1.17 02/04/2016    CRP <5.0 01/07/2014    SED 16 01/07/2014        Preop Vitals    BP Readings from Last 3 Encounters:   10/18/19 (!) 142/75   10/09/19 116/64   07/26/19 129/81    Pulse Readings from Last 3 Encounters:   10/18/19 62   10/09/19 78   07/26/19 73      Resp Readings from Last 3 Encounters:   10/18/19 15   10/09/19 16   07/26/19 20    SpO2 Readings from Last 3 Encounters:   10/18/19 98%   10/09/19 96%   07/26/19 93%      Temp Readings from Last 1 Encounters:   10/18/19 36.5  C (97.7  F) (Oral)    Ht Readings from Last 1 Encounters:   10/18/19 1.702 m (5' 7\")      Wt Readings from Last 1 Encounters:   10/18/19 95.1 kg (209 lb 10.5 oz)    Estimated body mass index is 32.84 kg/m  as calculated from the following:    Height as of " "this encounter: 1.702 m (5' 7\").    Weight as of this encounter: 95.1 kg (209 lb 10.5 oz).     LDA:        Assessment:   ASA SCORE: 2      Smoking Status:  Non-Smoker/Unknown   NPO Status: NPO Appropriate     Plan:   Anes. Type:  General                    Postop Plan:   Postop Pain: Opioids  Disposition: Outpatient     PONV Management:   Adult Risk Factors: Female, Non-Smoker, Postop Opioids   Prevention: Ondansetron, Dexamethasone     CONSENT: Direct conversation   Plan and risks discussed with: Patient   Blood Products: Consent Deferred (Minimal Blood Loss)       Comments for Plan/Consent:  Looks well. bmi 33 + sleep apnoea + cpap. Reactive airways Diabetes 2 no history or symptoms heart problems. Hb 11.5.                 Allen Conte MD  "

## 2019-10-18 NOTE — ANESTHESIA POSTPROCEDURE EVALUATION
Anesthesia POST Procedure Evaluation    Patient: Gricelda Sabillon   MRN:     [id number] Gender:   female   Age:    69 year old :      1950        Preoperative Diagnosis: Duodenal Adenoma   Procedure(s):  Upper Endoscopy and ERCP with stent removal, stone removal and biopsy  Endoscopic Retrograde Cholangiopancreatogram   Postop Comments: No value filed.       Anesthesia Type:  Not documented  General    Reportable Event: NO     PAIN: Uncomplicated   Sign Out status: Comfortable, Well controlled pain     PONV: No PONV   Sign Out status:  No Nausea or Vomiting     Neuro/Psych: Uneventful perioperative course   Sign Out Status: Preoperative baseline; Age appropriate mentation     Airway/Resp.: Uneventful perioperative course   Sign Out Status: Non labored breathing, age appropriate RR; Resp. Status within EXPECTED Parameters     CV: Uneventful perioperative course   Sign Out status: Appropriate BP and perfusion indices; Appropriate HR/Rhythm     Disposition:   Sign Out in:  PACU  Disposition:  Phase II; Home  Recovery Course: Uneventful  Follow-Up: Not required           Last Anesthesia Record Vitals:  CRNA VITALS  10/18/2019 1131 - 10/18/2019 1231      10/18/2019             Resp Rate (observed):  12          Last PACU Vitals:  Vitals Value Taken Time   /56 10/18/2019 12:30 PM   Temp 36.7  C (98  F) 10/18/2019 12:04 PM   Pulse 65 10/18/2019 12:30 PM   Resp 16 10/18/2019 12:30 PM   SpO2 97 % 10/18/2019 12:37 PM   Temp src     NIBP     Pulse     SpO2     Resp     Temp     Ht Rate     Temp 2     Vitals shown include unvalidated device data.      Electronically Signed By: Allen Conte MD, 2019, 12:38 PM

## 2019-10-18 NOTE — OR NURSING
Dr. Guerrero at bedside to update pt. Stated that pt does not need any post op labs and does not need to see pt before she leaves.

## 2019-10-18 NOTE — DISCHARGE INSTRUCTIONS
REMEMBER: SAME DAY SURGERY IS NOT SAME DAY RECOVERY. TAKE IT EASY, GET PLENTY OF REST, AND HAVE SOMEONE WITH YOU FOR 24 HOURS. FOLLOW THESE INSTRUCTIONS AND PLEASE DO NOT HESITATE TO CALL WITH ANY QUESTIONS.         If you have obstructive sleep apnea     You were given anesthesia medicine during surgery to keep you comfortable and free of pain. After surgery, you may have more apnea spells because of this medicine and other medicines you were given. The spells may last longer than usual.     At home:        Keep using the continuous positive airway pressure (CPAP) device when you sleep. Unless your health care provider tells you not to, use it when you sleep, day or night. CPAP is a common device used to treat obstructive sleep apnea.        [facility], [place]  Same-Day Surgery   Adult Discharge Orders & Instructions     For 24 hours after surgery    1. Get plenty of rest.  A responsible adult must stay with you for at least 24 hours after you leave the hospital.   2. Do not drive or use heavy equipment.  If you have weakness or tingling, don't drive or use heavy equipment until this feeling goes away.  3. Do not drink alcohol.  4. Avoid strenuous or risky activities.  Ask for help when climbing stairs.   5. You may feel lightheaded.  IF so, sit for a few minutes before standing.  Have someone help you get up.   6. If you have nausea (feel sick to your stomach): Drink only clear liquids such as apple juice, ginger ale, broth or 7-Up.  Rest may also help.  Be sure to drink enough fluids.  Move to a regular diet as you feel able.  7. You may have a slight fever. Call the doctor if your fever is over 100 F (37.7 C) (taken under the tongue) or lasts longer than 24 hours.  8. You may have a dry mouth, a sore throat, muscle aches or trouble sleeping.  These should go away after 24 hours.  9. Do not make important or legal decisions.   Call your doctor for any of the followin.  Signs  of infection (fever, growing tenderness at the surgery site, a large amount of drainage or bleeding, severe pain, foul-smelling drainage, redness, swelling).    2. It has been over 8 to 10 hours since surgery and you are still not able to urinate (pass water).    3.  Headache for over 24 hours.    To contact a doctor, call Dr. Cesar baer at [phone] or:        [phone] and ask for the resident on call for Gastroenterology (answered 24 hours a day)      Emergency Department:    [facility]: [phone]       (TTY for hearing impaired: [phone])

## 2019-10-18 NOTE — OP NOTE
ERCP 10/18/2019 11:01 AM [facility], [address] [place] [address] [phone]     Endoscopy Department   _______________________________________________________________________________   Patient Name: Gricelda Sabillon         Procedure Date: 10/18/2019 11:01 AM   MRN: [id number]                       Account Number: [id number]   YOB: 1950              Admit Type: Outpatient   Age: 69                                Gender: Female   Note Status: Finalized                Attending MD: Shaun Guerrero MD   Pause for the Cause: time out performed Total Sedation Time:   _______________________________________________________________________________       Procedure:           ERCP   Indications:         Follow-up of ampullary and periampullary tumor; s/p                        papillectomy and piecemeal resection of duodenal polyp                        and s/p intraductal RFA of biliary orifice for residual                        polyp   Providers:           Shaun Guerrero MD   Referring MD:           Requesting Provider: Tonya Llamas MD, [facility]   Medicines:           General Anesthesia   Complications:       No immediate complications. Estimated blood loss:                        Minimal.   _______________________________________________________________________________   Procedure:           Pre-Anesthesia Assessment:                        - Prior to the procedure, a History and Physical was                        performed, and patient medications and allergies were                        reviewed. The patient is competent. The risks and                        benefits of the procedure and the sedation options and                        risks were discussed with the patient. All questions                        were answered and informed consent was obtained. Patient                        identification and proposed procedure were verified by                         the physician, the nurse, the anesthesiologist and the                        anesthetist in the procedure room. Mental Status                        Examination: alert and oriented. Airway Examination:                        normal oropharyngeal airway and neck mobility.                        Respiratory Examination: clear to auscultation. CV                        Examination: normal. Prophylactic Antibiotics: The                        patient does not require prophylactic antibiotics. Prior                        Anticoagulants: The patient has taken no previous                        anticoagulant or antiplatelet agents. ASA Grade                        Assessment: III - A patient with severe systemic                        disease. After reviewing the risks and benefits, the                        patient was deemed in satisfactory condition to undergo                        the procedure. The anesthesia plan was to use general                        anesthesia. Immediately prior to administration of                        medications, the patient was re-assessed for adequacy to                        receive sedatives. The heart rate, respiratory rate,                        oxygen saturations, blood pressure, adequacy of                        pulmonary ventilation, and response to care were                        monitored throughout the procedure. The physical status                        of the patient was re-assessed after the procedure.                        After obtaining informed consent, the scope was passed                        under direct vision. Throughout the procedure, the                        patient's blood pressure, pulse, and oxygen saturations                        were monitored continuously. The was introduced through                        the mouth, and used to inject contrast into and used to                        inject contrast into the bile duct. The  Endoscope was                        introduced through the mouth, and used to inject                        contrast into and used to locate the major papilla. The                        ERCP was accomplished without difficulty. The patient                        tolerated the procedure well.                                                                                     Findings:        A biliary stent was visible on the  film. The esophagus was        successfully intubated under direct vision. The scope was advanced from        the mouth to the duodenum. The pharynx, larynx and associated        structures, as well as the upper GI tract, were normal. One plastic        stent originating in the common bile duct was emerging from the major        papilla. The stent was visibly occluded. Inspection of the major papilla        revealed that an ampullectomy (papillectomy) had been performed        previously. One stent was removed from the common bile duct using a        snare. The bile duct was deeply cannulated with the short-nosed traction        sphincterotome. Contrast was injected. I personally interpreted the bile        duct images. There was brisk flow of contrast through the ducts. Image        quality was excellent. Contrast extended to the entire biliary tree. The        lower third of the main bile duct contained multiple stones, the largest        of which was small in diameter. Visiglide wire was passed into the        biliary tree. The biliary tree was swept with a 12 mm balloon starting        at the bifurcation. Sludge was swept from the duct. A few stones were        removed. No stones remained. Biopsies were taken in the second portion        of the duodenum through the ERCP scope with the cold forceps for        histology. Duodenoscope then exchanged for a gastroscope to inspect        prior EMR site. No residual polyp seen.                                                                                      Impression:          - One visibly occluded stent from the common bile duct                        was seen in the major papilla. Removed                        - Prior endoscopic papillectomy                        - Likely stent related choledocholithiasis was found.                        Complete removal was accomplished by balloon extraction.                        - One 1 mm area of possible residual polyp just distal                        to the major papilla on the medial D2. Removed with                        biospy forceps and retrieved.                        - No evidence of residual polyp seen at the biliary                        orifice or with a forward viewing gastroscope.   Recommendation:      - Discharge patient to home (ambulatory).                        - Await path results.                        - Repeat ERCP with spyglass and possible repeat RFA/EGD                        in 6 months for surveillance.                                                                                       Shaun Guerrero MD

## 2019-10-21 ENCOUNTER — CARE COORDINATION (OUTPATIENT)
Dept: GASTROENTEROLOGY | Facility: CLINIC | Age: 69
End: 2019-10-21

## 2019-10-21 LAB — COPATH REPORT: NORMAL

## 2019-10-21 NOTE — PROGRESS NOTES
Care Coordination   [facility]     At the request of Dr. Guerrero the following will be scheduled:    Procedure/Imaging/Clinic: ERCP with spyglass and possible repeat RFA/EGD   Physician: Cesar   Timing: in 6 months   Procedure length:60 min   Anesthesia:Gen   Dx: duodenal polyp surveillance   Location: OR     Kalli SIMPSONN, HNBC, STAR-T  RN Care Coordinator  [facility] service  Ph: [phone]  FAX: [phone]

## 2019-10-28 DIAGNOSIS — E11.9 DIABETES MELLITUS WITHOUT COMPLICATION (H): Chronic | ICD-10-CM

## 2019-10-28 NOTE — TELEPHONE ENCOUNTER
"Requested Prescriptions   Pending Prescriptions Disp Refills     CONTOUR NEXT TEST test strip [Pharmacy Med Name: CONTOUR NEXT        ROBERTH] 100 strip 1     Sig: USE 1 STRIP TO CHECK GLUCOSE ONCE DAILY DIAGNOSIS  CODE  E11.9   Last Written Prescription Date:  03/30/2019  Last Fill Quantity: 100,  # refills: 01   Last office visit: 10/9/2019 with prescribing provider:     Future Office Visit:   Next 5 appointments (look out 90 days)    Nov 22, 2019  1:40 PM CST  SHORT with Raisa Davidson MD  [facility] ([facility]) [address]  [facility] [address]  [phone]           Diabetic Supplies Protocol Passed - 10/28/2019  8:22 AM        Passed - Medication is active on med list        Passed - Patient is 18 years of age or older        Passed - Recent (6 mo) or future (30 days) visit within the authorizing provider's specialty     Patient had office visit in the last 6 months or has a visit in the next 30 days with authorizing provider.  See \"Patient Info\" tab in inbasket, or \"Choose Columns\" in Meds & Orders section of the refill encounter.            "

## 2019-10-31 DIAGNOSIS — I10 HTN, GOAL BELOW 140/90: ICD-10-CM

## 2019-10-31 DIAGNOSIS — E11.9 DIABETES MELLITUS WITHOUT COMPLICATION (H): Chronic | ICD-10-CM

## 2019-10-31 LAB
ALBUMIN SERPL-MCNC: 3.6 G/DL (ref 3.4–5)
ALP SERPL-CCNC: 114 U/L (ref 40–150)
ALT SERPL W P-5'-P-CCNC: 129 U/L (ref 0–50)
ANION GAP SERPL CALCULATED.3IONS-SCNC: 8 MMOL/L (ref 3–14)
AST SERPL W P-5'-P-CCNC: 81 U/L (ref 0–45)
BILIRUB SERPL-MCNC: 0.5 MG/DL (ref 0.2–1.3)
BUN SERPL-MCNC: 22 MG/DL (ref 7–30)
CALCIUM SERPL-MCNC: 9.5 MG/DL (ref 8.5–10.1)
CHLORIDE SERPL-SCNC: 103 MMOL/L (ref 94–109)
CO2 SERPL-SCNC: 28 MMOL/L (ref 20–32)
CREAT SERPL-MCNC: 0.83 MG/DL (ref 0.52–1.04)
GFR SERPL CREATININE-BSD FRML MDRD: 70 ML/MIN/{1.73_M2}
GLUCOSE SERPL-MCNC: 158 MG/DL (ref 70–99)
HBA1C MFR BLD: 6.5 % (ref 0–5.6)
POTASSIUM SERPL-SCNC: 4 MMOL/L (ref 3.4–5.3)
PROT SERPL-MCNC: 7.6 G/DL (ref 6.8–8.8)
SODIUM SERPL-SCNC: 139 MMOL/L (ref 133–144)

## 2019-10-31 PROCEDURE — 36415 COLL VENOUS BLD VENIPUNCTURE: CPT | Performed by: INTERNAL MEDICINE

## 2019-10-31 PROCEDURE — 83036 HEMOGLOBIN GLYCOSYLATED A1C: CPT | Performed by: INTERNAL MEDICINE

## 2019-10-31 PROCEDURE — 80053 COMPREHEN METABOLIC PANEL: CPT | Performed by: INTERNAL MEDICINE

## 2019-11-21 ENCOUNTER — TRANSFERRED RECORDS (OUTPATIENT)
Dept: HEALTH INFORMATION MANAGEMENT | Facility: CLINIC | Age: 69
End: 2019-11-21

## 2019-11-22 ENCOUNTER — OFFICE VISIT (OUTPATIENT)
Dept: INTERNAL MEDICINE | Facility: CLINIC | Age: 69
End: 2019-11-22
Payer: COMMERCIAL

## 2019-11-22 VITALS
WEIGHT: 209 LBS | SYSTOLIC BLOOD PRESSURE: 105 MMHG | RESPIRATION RATE: 16 BRPM | OXYGEN SATURATION: 96 % | DIASTOLIC BLOOD PRESSURE: 64 MMHG | TEMPERATURE: 98.2 F | BODY MASS INDEX: 32.8 KG/M2 | HEART RATE: 77 BPM | HEIGHT: 67 IN

## 2019-11-22 DIAGNOSIS — Z71.89 ADVANCED DIRECTIVES, COUNSELING/DISCUSSION: ICD-10-CM

## 2019-11-22 DIAGNOSIS — J45.20 MILD INTERMITTENT ASTHMA WITHOUT COMPLICATION: Chronic | ICD-10-CM

## 2019-11-22 DIAGNOSIS — Z00.00 ROUTINE GENERAL MEDICAL EXAMINATION AT A HEALTH CARE FACILITY: Primary | ICD-10-CM

## 2019-11-22 DIAGNOSIS — R00.0 SINUS TACHYCARDIA: ICD-10-CM

## 2019-11-22 DIAGNOSIS — D13.5 AMPULLARY ADENOMA: ICD-10-CM

## 2019-11-22 DIAGNOSIS — R79.89 LFTS ABNORMAL: ICD-10-CM

## 2019-11-22 DIAGNOSIS — I10 ESSENTIAL HYPERTENSION WITH GOAL BLOOD PRESSURE LESS THAN 140/90: Chronic | ICD-10-CM

## 2019-11-22 DIAGNOSIS — Z78.0 ASYMPTOMATIC POSTMENOPAUSAL STATUS: ICD-10-CM

## 2019-11-22 DIAGNOSIS — E11.65 TYPE 2 DIABETES MELLITUS WITH HYPERGLYCEMIA, WITHOUT LONG-TERM CURRENT USE OF INSULIN (H): ICD-10-CM

## 2019-11-22 DIAGNOSIS — E78.5 HYPERLIPIDEMIA WITH TARGET LDL LESS THAN 100: Chronic | ICD-10-CM

## 2019-11-22 PROCEDURE — 99397 PER PM REEVAL EST PAT 65+ YR: CPT | Performed by: INTERNAL MEDICINE

## 2019-11-22 PROCEDURE — 99213 OFFICE O/P EST LOW 20 MIN: CPT | Mod: 25 | Performed by: INTERNAL MEDICINE

## 2019-11-22 RX ORDER — HYDROCHLOROTHIAZIDE 25 MG/1
TABLET ORAL
Qty: 90 TABLET | Refills: 1 | Status: SHIPPED | OUTPATIENT
Start: 2019-11-22 | End: 2020-02-27

## 2019-11-22 RX ORDER — GLIPIZIDE 5 MG/1
TABLET ORAL
Qty: 360 TABLET | Refills: 1 | Status: SHIPPED | OUTPATIENT
Start: 2019-11-22 | End: 2020-02-27

## 2019-11-22 RX ORDER — ATORVASTATIN CALCIUM 20 MG/1
20 TABLET, FILM COATED ORAL DAILY
Qty: 90 TABLET | Refills: 1 | Status: SHIPPED | OUTPATIENT
Start: 2019-11-22 | End: 2020-02-27

## 2019-11-22 RX ORDER — LISINOPRIL 40 MG/1
40 TABLET ORAL DAILY
Qty: 90 TABLET | Refills: 1 | Status: SHIPPED | OUTPATIENT
Start: 2019-11-22 | End: 2020-02-27

## 2019-11-22 ASSESSMENT — MIFFLIN-ST. JEOR: SCORE: 1495.65

## 2019-11-22 ASSESSMENT — ENCOUNTER SYMPTOMS
HEADACHES: 0
FEVER: 0
DYSURIA: 0
CHILLS: 0
NERVOUS/ANXIOUS: 0
BREAST MASS: 0
WEAKNESS: 0
MYALGIAS: 0
COUGH: 0
FREQUENCY: 0
PARESTHESIAS: 0
HEMATURIA: 0
CONSTIPATION: 0
PALPITATIONS: 0
DIZZINESS: 0
JOINT SWELLING: 1
HEARTBURN: 0
EYE PAIN: 0
NAUSEA: 0
SHORTNESS OF BREATH: 0
ARTHRALGIAS: 1
SORE THROAT: 0
HEMATOCHEZIA: 0
ABDOMINAL PAIN: 0

## 2019-11-22 ASSESSMENT — ACTIVITIES OF DAILY LIVING (ADL): CURRENT_FUNCTION: NO ASSISTANCE NEEDED

## 2019-11-22 NOTE — PATIENT INSTRUCTIONS
Plan:  1.if insurance doesn't approve Atenolol I will change to Metoprolol XR 50 mg daily   2. Resume the Lipitor 20 mg daily   3. Please make a lab appointment for non fasting labs  In 2-3 weeks to check the Liver ( AST, ALT)   4. Please make a lab appointment for fasting labs  In 3-4 months ( A1c, lipid, CMP)   5. Please make an appointment few days after the labs to discuss about the results.   6. Mammogram ( please call [phone] to schedule it)   7. Bone density scan - [phone]  8. The following vaccines are recommended for you. Please check with your insurance about coverage.  Some insurances cover better if you have these vaccines at the pharmacy:  -- Prevnar 13 ( pneumonia vaccine)  -- Tetanus vaccine  -- Shingerix vaccine - the newest vaccine for shingles   -- flu vaccine

## 2019-11-22 NOTE — PROGRESS NOTES
Dr Torres's note    Patient's instructions / PLAN:                                                        Plan:  1.if insurance doesn't approve Atenolol I will change to Metoprolol XR 50 mg daily   2. Resume the Lipitor 20 mg daily   3. Please make a lab appointment for non fasting labs  In 2-3 weeks to check the Liver ( AST, ALT)   4. Please make a lab appointment for fasting labs  In 3-4 months ( A1c, lipid, CMP)   5. Please make an appointment few days after the labs to discuss about the results.   6. Mammogram ( please call [phone] to schedule it)   7. Bone density scan - [phone]  8. The following vaccines are recommended for you. Please check with your insurance about coverage.    ASSESSMENT & PLAN:                                                      (Z00.00) Routine general medical examination at a health care facility  (primary encounter diagnosis)  Comment:   Plan:     (I10) HTN goal less than 140/90  (I10) HTN, goal below 140/90  Comment: Controlled    Plan: Comprehensive metabolic panel, Lipid panel         reflex to direct LDL Fasting, glipiZIDE (GLUCOTROL) 5 MG tablet,         hydrochlorothiazide (HYDRODIURIL) 25 MG tablet,        lisinopril (PRINIVIL/ZESTRIL) 40 MG tablet,         metFORMIN (GLUCOPHAGE) 1000 MG tablet,         sitagliptin (JANUVIA) 100 MG tablet                (E78.5) Hyperlipidemia with target LDL less than 100  Comment: Controlled    Plan: atorvastatin (LIPITOR) 20 MG tablet, ALT, AST,         Comprehensive metabolic panel, Lipid panel         reflex to direct LDL Fasting, glipiZIDE         (GLUCOTROL) 5 MG tablet, hydrochlorothiazide         (HYDRODIURIL) 25 MG tablet, lisinopril         (PRINIVIL/ZESTRIL) 40 MG tablet, metFORMIN         (GLUCOPHAGE) 1000 MG tablet, sitagliptin         (JANUVIA) 100 MG tablet          (J45.20) Mild intermittent asthma without complication  Comment: stable   Plan:     (E11.65) DM 2 with hyperglycemia (H)  (E11.9) DM2 no  complication  Comment: Controlled    Plan: Hemoglobin A1c, Comprehensive metabolic panel,         Lipid panel reflex to direct LDL Fasting,         canagliflozin (INVOKANA) 100 MG tablet        glipiZIDE (GLUCOTROL) 5 MG tablet,         hydrochlorothiazide (HYDRODIURIL) 25 MG tablet,        lisinopril (PRINIVIL/ZESTRIL) 40 MG tablet,         metFORMIN (GLUCOPHAGE) 1000 MG tablet,         sitagliptin (JANUVIA) 100 MG tablet          (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  Comment: no more abnormality, will f/u every 6 months with GI  Plan:     (R94.5) LFTs abnormal  Comment:   Plan: ALT, AST, Comprehensive metabolic panel    (R00.0) Sinus tachycardia  Comment: stable   Plan: glipiZIDE (GLUCOTROL) 5 MG tablet,         hydrochlorothiazide (HYDRODIURIL) 25 MG tablet,        lisinopril (PRINIVIL/ZESTRIL) 40 MG tablet,         metFORMIN (GLUCOPHAGE) 1000 MG tablet,         sitagliptin (JANUVIA) 100 MG tablet          (Z78.0) Asymptomatic postmenopausal status  Comment:   Plan: DX Hip/Pelvis/Spine          (Z71.89) Advanced directives, counseling/discussion  Comment:   Plan:        Chief Complaint:                                                      Annual exam  Follow up chronic medical problems      SUBJECTIVE:                                                    History of present illness     We reviewed the chronic medical problems as above.   I reviewed the recent tests results in Epic     Reviewed Labs from 10/31/2019    -- Liver numbers reviewed we will restart Lipitor    DM  -- A1c 6.5 <--- 6.8  -- Glucose 158 <--- 127    Eye  -- Saw eye doctor yesterday. No changes in retina found. Slightly more glaucoma in left eye, otherwise stable.     -- Had upper endoscopy on 10/18/2019, stent removal, and no new growth was found              ROS:   General: Negative for fever, chills, major weight changes, fatigue  Skin: Negative for rashes, abnormal spots  Eyes: Negative for blurred or double vision  ENT/mouth: Negative for sinuses  discomfort, earache, sore throat  Respiratory: Negative for cough, wheezes, chronic lung disease  Cardiovascular: Negative for rest or exertional chest pain, shortness of breath, palpitations, leg edema,   Gastrointestinal: Negative for vomiting, abdominal pain, heartburn, blood in stool, diarrhea, constipation  Genitourinary: Negative for urinary frequency, blood in urine, history of kidney stones  Female: Negative for abnormal vaginal bleeding, vaginal discharge  Neuro: Negative for headaches, numbness, tingling, weakness in arms or legs, history of seizure, recent syncope  Psychiatry: Negative for depression, anxiety, suicidal thoughts  Endo: Negative for known thyroid disease, diabetes.  Hemato/Lymph: Negative for nodes, easy bleeding, history of DVT, blood transfusion  Musculoskeletal: Negative for joint swelling, back pain    This document serves as a record of the services and decisions personally performed and made by Dr. Melissa MD. It was created on their behalf by Adi Salvador, a trained medical scribe. The creation of this document is based on the provider's statements to the medical scribe.  Adi Salvador November 22, 2019 1:58 PM      PMHx: - reviewed  Past Medical History:   Diagnosis Date     Allergic rhinitis, cause unspecified      Asthma      Asthma, mild intermittent      Cataract      Cellulitis and abscess of leg, except foot 03/00    Bilateral     Eczema      Heart murmur     aortic area     HTN, goal below 140/90      Hyperlipidemia LDL goal < 100      Hyperplastic colon polyp 2008     Infection     bilateral eye infections     Macular hole of left eye      Obesity (BMI 30-39.9)      Osteoarthritis     knees     Other chronic pain     right knee     Sleep apnea     uses c pap     Type 2 diabetes, HbA1C goal < 8% (H)        PSHx: reviewed  Past Surgical History:   Procedure Laterality Date     ARTHROPLASTY HIP Right 9/25/2017    Procedure: ARTHROPLASTY HIP;  Right total hip arthroplasty  ;   Surgeon: Ayaan Pena MD;  Location: [facility]     ARTHROPLASTY KNEE  7/12/2013    Procedure: ARTHROPLASTY KNEE;  right total knee arthroplasty ;  Surgeon: Chaparro Wesley MD;  Location: [facility]     ARTHROSCOPY KNEE Right 1/21/2015    Procedure: ARTHROSCOPY KNEE;  Surgeon: Ayaan Pena MD;  Location: [facility]     C INCISION OF HYMEN       CATARACT IOL, RT/LT       COLONOSCOPY  2008     COLONOSCOPY N/A 4/18/2019    Procedure: Colonoscopy with polypectomy;  Surgeon: Shaun Guerrero MD;  [facility]     ENDOSCOPIC RETROGRADE CHOLANGIOPANCREATOGRAM N/A 2/6/2019    Procedure: COMBINED ENDOSCOPIC RETROGRADE CHOLANGIOPANCREATOGRAPHY, BILIARY SPINCTEROTOMY AND DILATION, PLACE BILE DUCT STENT;  Surgeon: Guru Andie Gonzalez MD;  [facility]: [facility]     ENDOSCOPIC RETROGRADE CHOLANGIOPANCREATOGRAM N/A 2/28/2019    Procedure: Endoscopic Retrograde Cholangiopancreatogram, Bile duct stent removal and placement;  Surgeon: Shaun Guerrero MD;  [facility]     ENDOSCOPIC RETROGRADE CHOLANGIOPANCREATOGRAM N/A 4/18/2019    Procedure: COMBINED ENDOSCOPIC RETROGRADE CHOLANGIOPANCREATOGRAPHY, Bile duct stent exchange and Polypectomy;  Surgeon: Shaun Guerrero MD;  [facility]     ENDOSCOPIC RETROGRADE CHOLANGIOPANCREATOGRAM N/A 10/18/2019    Procedure: Endoscopic Retrograde Cholangiopancreatogram;  Surgeon: Shaun Guerrero MD;  [facility]     ENDOSCOPIC RETROGRADE CHOLANGIOPANCREATOGRAM WITH SPYGLASS N/A 7/26/2019    Procedure: Endoscopic Retrograde Cholangiopancreatogram With Spyglas,l Radiofrequency Ablation, Stent Exchangeand Duodenal Biopsy x2;  Surgeon: Shaun Guerrero MD;  [facility]     ESOPHAGOSCOPY, GASTROSCOPY, DUODENOSCOPY (EGD), COMBINED N/A 4/18/2019    Procedure: upper endoscopy with polypectomy;  Surgeon: Shaun Guerrero MD;  [facility]     ESOPHAGOSCOPY, GASTROSCOPY, DUODENOSCOPY (EGD), COMBINED N/A 10/18/2019    Procedure: Upper Endoscopy and ERCP with stent removal,  stone removal and biopsy;  Surgeon: Shaun Guerrero MD;  [facility]     ESOPHAGOSCOPY, GASTROSCOPY, DUODENOSCOPY (EGD), RESECT MUCOSA, COMBINED N/A 2/28/2019    Procedure: Upper Endoscopy, Endoscopic Ultrasound, Endoscopic Mucosal Resection,  Ampullectomy, polypectomy.;  Surgeon: Shaun Guerrero MD;  Location[facility]     EYE SURGERY      macular hole repaired left eye     HC KNEE SCOPE, DIAGNOSTIC      - both knees     HEAD & NECK SURGERY      wisdom teeth        Soc Hx: No daily alcohol, no smoking  Social History     Socioeconomic History     Marital status:      Spouse name: Allen     Number of children: 3     Years of education: 15     Highest education level: Not on file   Occupational History     Not on file   Social Needs     Financial resource strain: Not on file     Food insecurity:     Worry: Not on file     Inability: Not on file     Transportation needs:     Medical: Not on file     Non-medical: Not on file   Tobacco Use     Smoking status: Never Smoker     Smokeless tobacco: Never Used   Substance and Sexual Activity     Alcohol use: No     Alcohol/week: 0.0 standard drinks     Drug use: No     Sexual activity: Not Currently     Partners: Male   Lifestyle     Physical activity:     Days per week: Not on file     Minutes per session: Not on file     Stress: Not on file   Relationships     Social connections:     Talks on phone: Not on file     Gets together: Not on file     Attends Taoism service: Not on file     Active member of club or organization: Not on file     Attends meetings of clubs or organizations: Not on file     Relationship status: Not on file     Intimate partner violence:     Fear of current or ex partner: Not on file     Emotionally abused: Not on file     Physically abused: Not on file     Forced sexual activity: Not on file   Other Topics Concern     Parent/sibling w/ CABG, MI or angioplasty before 65F 55M? Not Asked   Social History Narrative     Not on file        Fam Hx:  reviewed  Family History   Problem Relation Age of Onset     Hypertension Mother         diabetes,hypoythryoidism, stroke     Diabetes Mother      Heart Disease Father         , cancer lip     Heart Disease Paternal Grandfather              Cancer Paternal Grandmother              Cerebrovascular Disease Maternal Grandfather              Cerebrovascular Disease Maternal Grandmother         , diabetes     Connective Tissue Disorder Sister         fibromyalgia     Diabetes Sister      Hypertension Brother      Cancer Paternal Aunt         ovarian         Screening: reviewed      All: reviewed    Meds: reviewed  Current Outpatient Medications   Medication Sig Dispense Refill     albuterol (PROAIR HFA, PROVENTIL HFA, VENTOLIN HFA) 108 (90 BASE) MCG/ACT inhaler Inhale 2 puffs into the lungs every 4 hours as needed for shortness of breath / dyspnea 1 Inhaler 3     aspirin 81 MG EC tablet Take 1 tablet (81 mg) by mouth daily 90 tablet 3     atenolol (TENORMIN) 50 MG tablet TAKE ONE TABLET BY MOUTH ONCE DAILY 90 tablet 3     azelastine (ASTELIN) 137 MCG/SPRAY nasal spray [place] 1 spray into both nostrils 2 times daily as needed Reported on 3/22/2017       azelastine (OPTIVAR) 0.05 % SOLN Place 1 drop into both eyes 2 times daily as needed Reported on 3/22/2017       calcium-vitamin D 500-125 MG-UNIT TABS        canagliflozin (INVOKANA) 100 MG tablet Take 1 tablet (100 mg) by mouth every morning (before breakfast) 90 tablet 1     cetirizine (ZYRTEC) 10 MG tablet Take 10 mg by mouth every morning.       CONTOUR NEXT TEST test strip USE 1 STRIP TO CHECK GLUCOSE ONCE DAILY DIAGNOSIS  CODE  E11.9 100 strip 1     desoximetasone (TOPICORT) 0.25 % cream Apply sparingly to affected area's 60 g 1     dorzolamide-timolol (COSOPT) 2-0.5 % ophthalmic solution Place 1 drop into both eyes 2 times daily        fish oil-omega-3 fatty acids (FISH OIL) 1000 MG capsule Take 1 g by mouth daily Reported on  "3/22/2017       glipiZIDE (GLUCOTROL) 5 MG tablet TAKE 2 TABLETS BY MOUTH TWICE DAILY BEFORE MEAL(S) 360 tablet 1     hydrochlorothiazide (HYDRODIURIL) 25 MG tablet TAKE 1 TABLET BY MOUTH ONCE DAILY IN THE MORNING 90 tablet 1     ibuprofen (ADVIL) 200 MG tablet take 4 tablet (200 mg) by oral route every 8 hours as needed with food       latanoprost (XALATAN) 0.005 % ophthalmic solution Place 1 drop Into the left eye At Bedtime 2.5 mL 1     lisinopril (PRINIVIL/ZESTRIL) 40 MG tablet Take 1 tablet (40 mg) by mouth daily 90 tablet 1     metFORMIN (GLUCOPHAGE) 1000 MG tablet TAKE 1 TABLET BY MOUTH TWICE DAILY WITH MEALS 180 tablet 1     MULTIVITAMIN TABS   OR Reported on 3/22/2017       ondansetron (ZOFRAN-ODT) 4 MG ODT tab Take 1 tablet (4 mg) by mouth every 6 hours as needed for nausea or vomiting 30 tablet 0     order for DME All diabetic testing supplies including test strips, lancets and solution for testing 2 times per day. Patient is using   no Insulin. Last A1c Lab Results       Component                Value               Date                       A1C                      7.9                 08/20/2018 100 each 11     sitagliptin (JANUVIA) 100 MG tablet Take 1 tablet (100 mg) by mouth daily 90 tablet 1       OBJECTIVE:                                                    Physical Exam :  Blood pressure 105/64, pulse 77, temperature 98.2  F (36.8  C), temperature source Oral, resp. rate 16, height 1.702 m (5' 7\"), weight 94.8 kg (209 lb), last menstrual period 12/13/2002, SpO2 96 %, not currently breastfeeding.     NAD, appears comfortable  Skin clear, no rashes  HEENT: PERRLA, EOMI, anicteric sclera, pink conjunctiva, external ears appear normal, bilateral tympanic membranes clinically normal, oropharynx normal color.  Neck: supple, no JVD,  no thyroidmegaly  Lymph nodes non palpable in the cervical, supraclavicular axillaries, inguinal areas  Chest: clear to auscultation with good respiratory effort  Cardiac: " "S1S2, RRR, no mgr appreciated  Abdomen: soft, not tender, not distended, audible bowel sound, no hepatosplenomegaly, no palpable masses, no abdominal bruits  Extremities: no cyanosis, clubbing or edema.   Neuro: A, Ox3, no focal signs.  Breast exam in supine and erect position: they are symmetrical, no skin changes, no tenderness or nodes on palpation. Nipples are erect, no skin lesions, no discharge on pressure.    Pelvic exam: deferred, s/p menopause, no symptoms, no hx of abnormal pap     The information in this document, created by the medical scribe for me, accurately reflects the services I personally performed and the decisions made by me. I have reviewed and approved this document for accuracy prior to leaving the patient care area.  November 22, 2019 2:38 PM     Raisa Torres MD  Internal Medicine    SUBJECTIVE:   Gricelda Sabillon is a 69 year old female who presents for Preventive Visit.    Are you in the first 12 months of your Medicare coverage?  No    Healthy Habits:     In general, how would you rate your overall health?  Good    Frequency of exercise:  2-3 days/week    Duration of exercise:  Less than 15 minutes    Do you usually eat at least 4 servings of fruit and vegetables a day, include whole grains    & fiber and avoid regularly eating high fat or \"junk\" foods?  Yes    Taking medications regularly:  Yes    Medication side effects:  None    Ability to successfully perform activities of daily living:  No assistance needed    Home Safety:  No safety concerns identified    Hearing Impairment:  No hearing concerns    In the past 6 months, have you been bothered by leaking of urine? Yes    In general, how would you rate your overall mental or emotional health?  Excellent      PHQ-2 Total Score: 0    Additional concerns today:  Yes    Do you feel safe in your environment? Yes    Have you ever done Advance Care Planning? (For example, a Health Directive, POLST, or a discussion with a medical " provider or your loved ones about your wishes): Yes, patient states has an Advance Care Planning document and will bring a copy to the clinic.    Fall risk  Fallen 2 or more times in the past year?: Yes  Any fall with injury in the past year?: No    Cognitive Screening   1) Repeat 3 items (Leader, Season, Table)    2) Clock draw: NORMAL  3) 3 item recall: Recalls 2 objects   Results: NORMAL clock, 1-2 items recalled: COGNITIVE IMPAIRMENT LESS LIKELY    Mini-CogTM Copyright SHELLIE Reza. Licensed by the author for use in [employer]; reprinted with permission (sarah@[facility]). All rights reserved.      Do you have sleep apnea, excessive snoring or daytime drowsiness?: yes      Diabetes Follow-up    How often are you checking your blood sugar? One time daily  What time of day are you checking your blood sugars (select all that apply)?  Before meals  Have you had any blood sugars above 200?  No  Have you had any blood sugars below 70?  No  What symptoms do you notice when your blood sugar is low?  None  What concerns do you have today about your diabetes? None   Do you have any of these symptoms? (Select all that apply)  No numbness or tingling in feet.  No redness, sores or blisters on feet.  No complaints of excessive thirst.  No reports of blurry vision.  No significant changes to weight.   Have you had a diabetic eye exam in the last 12 months? Yes- Date of last eye exam: 11/21/19    Diabetes Management Resources    Hyperlipidemia Follow-Up    Are you having any of the following symptoms? (Select all that apply)  No complaints of shortness of breath, chest pain or pressure.  No increased sweating or nausea with activity.  No left-sided neck or arm pain.  No complaints of pain in calves when walking 1-2 blocks.  Are you regularly taking any medication or supplement to lower your cholesterol?   Yes- Fish oil  Are you having muscle aches or other side effects that you think could be caused by your cholesterol  lowering medication?  No    Hypertension Follow-up    Do you check your blood pressure regularly outside of the clinic? No   Are you following a low salt diet? Yes  Are your blood pressures ever more than 140 on the top number (systolic) OR more    than 90 on the bottom number (diastolic), for example 140/90? No      Reviewed and updated as needed this visit by clinical staff  Tobacco  Allergies  Meds  Soc Hx        Reviewed and updated as needed this visit by Provider        Social History     Tobacco Use     Smoking status: Never Smoker     Smokeless tobacco: Never Used   Substance Use Topics     Alcohol use: No     Alcohol/week: 0.0 standard drinks     If you drink alcohol do you typically have >3 drinks per day or >7 drinks per week? No    Alcohol Use 11/22/2019   Prescreen: >3 drinks/day or >7 drinks/week? No   No flowsheet data found.    Current providers sharing in care for this patient include:   Patient Care Team:  Raisa Davidson MD as PCP - General (Internal Medicine)  Raisa Davidson MD as Assigned PCP  Tanya Sheehan RD as Diabetes Educator (Dietitian, Registered)    The following health maintenance items are reviewed in Epic and correct as of today:  Health Maintenance   Topic Date Due     MEDICARE ANNUAL WELLNESS VISIT  03/11/2015     PNEUMOCOCCAL IMMUNIZATION 65+ LOW/MEDIUM RISK (2 of 2 - PCV13) 09/28/2018     EYE EXAM  06/27/2019     ASTHMA ACTION PLAN  08/27/2019     INFLUENZA VACCINE (1) 09/01/2019     MAMMO SCREENING  02/12/2020     FALL RISK ASSESSMENT  03/05/2020     DIABETIC FOOT EXAM  03/25/2020     ASTHMA CONTROL TEST  04/09/2020     A1C  04/30/2020     TSH W/FREE T4 REFLEX  05/14/2020     LIPID  07/09/2020     MICROALBUMIN  07/09/2020     DTAP/TDAP/TD IMMUNIZATION (2 - Td) 08/23/2020     BMP  10/31/2020     ADVANCE CARE PLANNING  09/08/2022     COLONOSCOPY  04/18/2029     DEXA  Completed     HEPATITIS C SCREENING  Completed     PHQ-2  Completed     IPV  "IMMUNIZATION  Aged Out     MENINGITIS IMMUNIZATION  Aged Out     Labs reviewed in EPIC    Review of Systems   Constitutional: Negative for chills and fever.   HENT: Negative for congestion, ear pain, hearing loss and sore throat.    Eyes: Negative for pain and visual disturbance.   Respiratory: Negative for cough and shortness of breath.    Cardiovascular: Negative for chest pain, palpitations and peripheral edema.   Gastrointestinal: Negative for abdominal pain, constipation, heartburn, hematochezia and nausea.   Breasts:  Negative for tenderness, breast mass and discharge.   Genitourinary: Negative for dysuria, frequency, genital sores, hematuria, pelvic pain, urgency, vaginal bleeding and vaginal discharge.   Musculoskeletal: Positive for arthralgias and joint swelling. Negative for myalgias.   Skin: Negative for rash.   Neurological: Negative for dizziness, weakness, headaches and paresthesias.   Psychiatric/Behavioral: Negative for mood changes. The patient is not nervous/anxious.        COUNSELING:  Reviewed preventive health counseling, as reflected in patient instructions       Regular exercise       Healthy diet/nutrition    Estimated body mass index is 32.84 kg/m  as calculated from the following:    Height as of 10/18/19: 1.702 m (5' 7\").    Weight as of 10/18/19: 95.1 kg (209 lb 10.5 oz).    Weight management plan: Discussed healthy diet and exercise guidelines     reports that she has never smoked. She has never used smokeless tobacco.      Appropriate preventive services were discussed with this patient, including applicable screening as appropriate for cardiovascular disease, diabetes, osteopenia/osteoporosis, and glaucoma.  As appropriate for age/gender, discussed screening for colorectal cancer, prostate cancer, breast cancer, and cervical cancer. Checklist reviewing preventive services available has been given to the patient.    Reviewed patients plan of care and provided an AVS. The Basic Care " Plan (routine screening as documented in Health Maintenance) for Gricelda meets the Care Plan requirement. This Care Plan has been established and reviewed with the Patient.    Counseling Resources:  ATP IV Guidelines  Pooled Cohorts Equation Calculator  Breast Cancer Risk Calculator  FRAX Risk Assessment  ICSI Preventive Guidelines  Dietary Guidelines for Americans, 2010  USDA's MyPlate  ASA Prophylaxis  Lung CA Screening    Raisa Davidson MD  [facility]    Identified Health Risks:

## 2019-11-23 ASSESSMENT — ASTHMA QUESTIONNAIRES: ACT_TOTALSCORE: 25

## 2019-11-26 ENCOUNTER — TELEPHONE (OUTPATIENT)
Dept: ENDOCRINOLOGY | Facility: CLINIC | Age: 69
End: 2019-11-26

## 2019-11-26 NOTE — LETTER
[facility]  [address], [address]  [place], [place]  [address]                                            TEL:[phone]  FAX:[phone]      Gricelda Sabillon  [address] [facility] [id number]      November 26, 2019    Dear Gricelda       This letter is being sent on behalf of Dr. Corado. It is to remind you that your provider expected you to return for a follow up clinic visit. Please make a lab only appointment, and then follow up with a clinic visit one week afterwards to review those results. If this is not done, it may result in your provider not being able to refill your medications.     You may call our office at [phone] ([place]) or [phone] ([place]) to schedule an appointment. You may also see Jamila Rodriguez in [place] at [phone]    If you have already scheduled these appointments, please disregard this notice.      Sincerely,    [facility],  Dr. Clara Corado

## 2019-11-26 NOTE — TELEPHONE ENCOUNTER
Panel Management Review      Patient has the following on her problem list:   Diabetes    ASA: Passed    Last A1C  Lab Results   Component Value Date    A1C 6.5 10/31/2019    A1C 6.8 07/09/2019    A1C 8.1 02/25/2019    A1C 8.0 11/19/2018    A1C 7.9 08/20/2018     A1C tested: Passed    Last LDL:    Lab Results   Component Value Date    CHOL 291 07/09/2019     Lab Results   Component Value Date    HDL 41 07/09/2019     Lab Results   Component Value Date     07/09/2019     Lab Results   Component Value Date    TRIG 244 07/09/2019     Lab Results   Component Value Date    CHOLHDLRATIO 3.1 08/03/2015     Lab Results   Component Value Date    NHDL 250 07/09/2019       Is the patient on a Statin? YES             Is the patient on Aspirin? YES    Medications     HMG CoA Reductase Inhibitors     atorvastatin (LIPITOR) 20 MG tablet       Salicylates     aspirin 81 MG EC tablet             Last three blood pressure readings:  BP Readings from Last 3 Encounters:   11/22/19 105/64   10/18/19 (!) 143/77   10/09/19 116/64       Date of last diabetes office visit: 10/24/18     Tobacco History:     History   Smoking Status     Never Smoker   Smokeless Tobacco     Never Used           Composite cancer screening  Chart review shows that this patient is due/due soon for the following None  Summary:    Patient is due/failing the following:   FOLLOW UP    Action needed:   Patient needs office visit for endo.    Type of outreach:    Sent letter.    Questions for provider review:    None                                                                                                                                    Radha Liao, MARSHALL  [facility]  Simone/[place]mil[place]o       Chart routed to no one .

## 2019-12-06 DIAGNOSIS — R79.89 LFTS ABNORMAL: ICD-10-CM

## 2019-12-06 DIAGNOSIS — E78.5 HYPERLIPIDEMIA WITH TARGET LDL LESS THAN 100: Chronic | ICD-10-CM

## 2019-12-06 PROCEDURE — 84450 TRANSFERASE (AST) (SGOT): CPT | Performed by: INTERNAL MEDICINE

## 2019-12-06 PROCEDURE — 84460 ALANINE AMINO (ALT) (SGPT): CPT | Performed by: INTERNAL MEDICINE

## 2019-12-06 PROCEDURE — 36415 COLL VENOUS BLD VENIPUNCTURE: CPT | Performed by: INTERNAL MEDICINE

## 2019-12-07 LAB
ALT SERPL W P-5'-P-CCNC: 109 U/L (ref 0–50)
AST SERPL W P-5'-P-CCNC: 77 U/L (ref 0–45)

## 2020-01-20 ENCOUNTER — ANCILLARY PROCEDURE (OUTPATIENT)
Dept: BONE DENSITY | Facility: CLINIC | Age: 70
End: 2020-01-20
Attending: INTERNAL MEDICINE
Payer: COMMERCIAL

## 2020-01-20 ENCOUNTER — ANCILLARY PROCEDURE (OUTPATIENT)
Dept: BONE DENSITY | Facility: CLINIC | Age: 70
End: 2020-01-20
Payer: COMMERCIAL

## 2020-01-20 ENCOUNTER — HOSPITAL ENCOUNTER (OUTPATIENT)
Dept: MAMMOGRAPHY | Facility: CLINIC | Age: 70
Discharge: HOME | End: 2020-01-20
Attending: INTERNAL MEDICINE | Admitting: INTERNAL MEDICINE
Payer: COMMERCIAL

## 2020-01-20 DIAGNOSIS — Z78.0 ASYMPTOMATIC POSTMENOPAUSAL STATUS: ICD-10-CM

## 2020-01-20 DIAGNOSIS — Z12.31 SCREENING MAMMOGRAM FOR HIGH-RISK PATIENT: ICD-10-CM

## 2020-01-20 DIAGNOSIS — Z78.0 ASYMPTOMATIC MENOPAUSAL STATE: ICD-10-CM

## 2020-01-20 PROCEDURE — 77081 DXA BONE DENSITY APPENDICULR: CPT | Mod: 59 | Performed by: INTERNAL MEDICINE

## 2020-01-20 PROCEDURE — 77063 BREAST TOMOSYNTHESIS BI: CPT

## 2020-01-20 PROCEDURE — 77085 DXA BONE DENSITY AXL VRT FX: CPT | Performed by: INTERNAL MEDICINE

## 2020-02-18 ENCOUNTER — CARE COORDINATION (OUTPATIENT)
Dept: GASTROENTEROLOGY | Facility: CLINIC | Age: 70
End: 2020-02-18

## 2020-02-18 ENCOUNTER — PREP FOR PROCEDURE (OUTPATIENT)
Dept: GASTROENTEROLOGY | Facility: CLINIC | Age: 70
End: 2020-02-18

## 2020-02-18 DIAGNOSIS — K31.7 POLYP OF DUODENUM: Primary | ICD-10-CM

## 2020-02-18 NOTE — PROGRESS NOTES
[facility] RN CARE COORDINATOR NOTE:    Information provided for Patient:   The following will need to be completed for the procedure that is due for April 2020 with Dr. Guerrero:     1. You may have solid food up to 8 hours prior to the procedure;  You may have clear liquids up to 2 hours prior to procedure;  NOTHING TO EAT OR DRINK FOR 2 HOURS PRIOR TO PROCEDURE  2. You must have a  present the day of the exam, cabs and buses are not accepted as rides. If you do not have a proper ride at the time of the exam your exam will be canceled and rescheduled.   3. If you are currently taking a blood thinner: It is recommended that you stop taking your Coumadin/Warfarin or Plavix at least 5 days prior to exam, as long as it is ok with the prescribing MD.  If you are taking aspirin we prefer stopping the medication 3 days prior to procedure. Please check with your provider.  - If your MD switches you to Lovenox prior to exam we prefer the PM dose the evening before the exam and the AM dose the day of the exam are held.   4. If you are a diabetic please ask your primary care provider about adjusting your insulin for the procedure as you will not be able to eat or drink prior to the procedure.   5. If you have medications that need to be taken the morning of the procedure,  please take with small sips of water.   It is an anesthesia requirement to have a PRE-OP PHYSICAL by your Primary Care Clinic before receiving GENERAL ANESTHESIA. THIS MUST BE COMPLETED WITHIN 30 DAYS OF YOUR PROCEDURE.  Without it, your procedure will be delayed or cancelled. The physical can be faxed to [phone]    I have asked that she call me back to confirm above and discuss any additional questions or concerns that she may have for procedure.    Orders placed for OR case:   Procedure/Imaging/Clinic: ERCP with spyglass and possible repeat RFA/EGD   Physician: Cesar   Timing: in 6 months   Procedure length:60 min   Anesthesia:Gen   Dx:  duodenal polyp surveillance   Location: OR   Kalli Le RN   BSN, HNBC, STAR-T  [facility]  Care Coordinator  Ph: [phone]  FAX: [phone]

## 2020-02-20 DIAGNOSIS — E78.5 HYPERLIPIDEMIA WITH TARGET LDL LESS THAN 100: Chronic | ICD-10-CM

## 2020-02-20 DIAGNOSIS — R79.89 LFTS ABNORMAL: ICD-10-CM

## 2020-02-20 LAB
ALBUMIN SERPL-MCNC: 3.7 G/DL (ref 3.4–5)
ALP SERPL-CCNC: 118 U/L (ref 40–150)
ALT SERPL W P-5'-P-CCNC: 178 U/L (ref 0–50)
ANION GAP SERPL CALCULATED.3IONS-SCNC: 6 MMOL/L (ref 3–14)
AST SERPL W P-5'-P-CCNC: 122 U/L (ref 0–45)
BILIRUB SERPL-MCNC: 0.6 MG/DL (ref 0.2–1.3)
BUN SERPL-MCNC: 20 MG/DL (ref 7–30)
CALCIUM SERPL-MCNC: 9.9 MG/DL (ref 8.5–10.1)
CHLORIDE SERPL-SCNC: 104 MMOL/L (ref 94–109)
CHOLEST SERPL-MCNC: 172 MG/DL
CO2 SERPL-SCNC: 28 MMOL/L (ref 20–32)
CREAT SERPL-MCNC: 0.84 MG/DL (ref 0.52–1.04)
GFR SERPL CREATININE-BSD FRML MDRD: 70 ML/MIN/{1.73_M2}
GLUCOSE SERPL-MCNC: 195 MG/DL (ref 70–99)
HBA1C MFR BLD: 7 % (ref 0–5.6)
HDLC SERPL-MCNC: 42 MG/DL
LDLC SERPL CALC-MCNC: 88 MG/DL
NONHDLC SERPL-MCNC: 130 MG/DL
POTASSIUM SERPL-SCNC: 4.2 MMOL/L (ref 3.4–5.3)
PROT SERPL-MCNC: 7.5 G/DL (ref 6.8–8.8)
SODIUM SERPL-SCNC: 138 MMOL/L (ref 133–144)
TRIGL SERPL-MCNC: 209 MG/DL

## 2020-02-20 PROCEDURE — 80061 LIPID PANEL: CPT | Performed by: INTERNAL MEDICINE

## 2020-02-20 PROCEDURE — 80053 COMPREHEN METABOLIC PANEL: CPT | Performed by: INTERNAL MEDICINE

## 2020-02-20 PROCEDURE — 83036 HEMOGLOBIN GLYCOSYLATED A1C: CPT | Performed by: INTERNAL MEDICINE

## 2020-02-20 PROCEDURE — 36415 COLL VENOUS BLD VENIPUNCTURE: CPT | Performed by: INTERNAL MEDICINE

## 2020-02-27 ENCOUNTER — OFFICE VISIT (OUTPATIENT)
Dept: INTERNAL MEDICINE | Facility: CLINIC | Age: 70
End: 2020-02-27
Payer: COMMERCIAL

## 2020-02-27 VITALS
TEMPERATURE: 98 F | OXYGEN SATURATION: 97 % | HEART RATE: 83 BPM | DIASTOLIC BLOOD PRESSURE: 60 MMHG | BODY MASS INDEX: 32.93 KG/M2 | HEIGHT: 67 IN | RESPIRATION RATE: 18 BRPM | SYSTOLIC BLOOD PRESSURE: 90 MMHG | WEIGHT: 209.8 LBS

## 2020-02-27 DIAGNOSIS — Z23 NEED FOR VACCINATION: ICD-10-CM

## 2020-02-27 DIAGNOSIS — Z23 NEED FOR PROPHYLACTIC VACCINATION AND INOCULATION AGAINST INFLUENZA: ICD-10-CM

## 2020-02-27 DIAGNOSIS — I10 ESSENTIAL HYPERTENSION WITH GOAL BLOOD PRESSURE LESS THAN 140/90: Chronic | ICD-10-CM

## 2020-02-27 DIAGNOSIS — R00.0 SINUS TACHYCARDIA: ICD-10-CM

## 2020-02-27 DIAGNOSIS — I10 HTN, GOAL BELOW 140/90: ICD-10-CM

## 2020-02-27 DIAGNOSIS — E11.9 DIABETES MELLITUS WITHOUT COMPLICATION (H): Primary | Chronic | ICD-10-CM

## 2020-02-27 DIAGNOSIS — E78.5 HYPERLIPIDEMIA WITH TARGET LDL LESS THAN 100: Chronic | ICD-10-CM

## 2020-02-27 DIAGNOSIS — K31.7 POLYP OF DUODENUM: ICD-10-CM

## 2020-02-27 PROCEDURE — G0008 ADMIN INFLUENZA VIRUS VAC: HCPCS | Performed by: INTERNAL MEDICINE

## 2020-02-27 PROCEDURE — 99214 OFFICE O/P EST MOD 30 MIN: CPT | Mod: 25 | Performed by: INTERNAL MEDICINE

## 2020-02-27 PROCEDURE — 90662 IIV NO PRSV INCREASED AG IM: CPT | Performed by: INTERNAL MEDICINE

## 2020-02-27 RX ORDER — ATORVASTATIN CALCIUM 20 MG/1
20 TABLET, FILM COATED ORAL DAILY
Qty: 90 TABLET | Refills: 1 | Status: SHIPPED | OUTPATIENT
Start: 2020-02-27 | End: 2020-05-27

## 2020-02-27 RX ORDER — HYDROCHLOROTHIAZIDE 25 MG/1
TABLET ORAL
Qty: 90 TABLET | Refills: 1 | Status: SHIPPED | OUTPATIENT
Start: 2020-02-27 | End: 2020-05-27

## 2020-02-27 RX ORDER — GLIPIZIDE 5 MG/1
TABLET ORAL
Qty: 360 TABLET | Refills: 1 | Status: SHIPPED | OUTPATIENT
Start: 2020-02-27 | End: 2021-01-12

## 2020-02-27 RX ORDER — ATENOLOL 50 MG/1
50 TABLET ORAL DAILY
Qty: 90 TABLET | Refills: 1 | Status: SHIPPED | OUTPATIENT
Start: 2020-02-27 | End: 2020-05-27

## 2020-02-27 RX ORDER — LISINOPRIL 40 MG/1
40 TABLET ORAL DAILY
Qty: 90 TABLET | Refills: 1 | Status: SHIPPED | OUTPATIENT
Start: 2020-02-27 | End: 2020-10-26

## 2020-02-27 ASSESSMENT — MIFFLIN-ST. JEOR: SCORE: 1499.28

## 2020-02-27 NOTE — NURSING NOTE
"BP 90/60   Pulse 83   Temp 98  F (36.7  C) (Oral)   Resp 18   Ht 1.702 m (5' 7\")   Wt 95.2 kg (209 lb 12.8 oz)   LMP 12/13/2002 (Exact Date)   SpO2 97%   BMI 32.86 kg/m    Patient is being seen to review her labs.  "

## 2020-02-27 NOTE — PATIENT INSTRUCTIONS
Plan:  1. If you feel lightheaded decrease the Lisinopril to half tablet   2. Continue the other meds, same doses for now.  3. Schedule the preop beginning of April 4. For diabetes we will schedule labs and appointment in 3-4 months

## 2020-02-27 NOTE — NURSING NOTE
Prior to immunization administration, verified patients identity using patient s name and date of birth. Please see Immunization Activity for additional information.     Screening Questionnaire for Adult Immunization    Are you sick today?   No   Do you have allergies to medications, food, a vaccine component or latex?   No   Have you ever had a serious reaction after receiving a vaccination?   No   Do you have a long-term health problem with heart, lung, kidney, or metabolic disease (e.g., diabetes), asthma, a blood disorder, no spleen, complement component deficiency, a cochlear implant, or a spinal fluid leak?  Are you on long-term aspirin therapy?   No   Do you have cancer, leukemia, HIV/AIDS, or any other immune system problem?   No   Do you have a parent, brother, or sister with an immune system problem?   No   In the past 3 months, have you taken medications that affect  your immune system, such as prednisone, other steroids, or anticancer drugs; drugs for the treatment of rheumatoid arthritis, Crohn s disease, or psoriasis; or have you had radiation treatments?   No   Have you had a seizure, or a brain or other nervous system problem?   No   During the past year, have you received a transfusion of blood or blood    products, or been given immune (gamma) globulin or antiviral drug?   No   For women: Are you pregnant or is there a chance you could become       pregnant during the next month?   No   Have you received any vaccinations in the past 4 weeks?   No     Immunization questionnaire answers were all negative.        Per orders of Dr. Torres, injection of High dose flu given by Thais Cadet. Patient instructed to remain in clinic for 15 minutes afterwards, and to report any adverse reaction to me immediately.       Screening performed by Thais Cadet on 2/27/2020 at 1:52 PM.

## 2020-02-27 NOTE — PROGRESS NOTES
Dr Torres's note      Patient's instructions / PLAN:                                                        Plan:  1. If you feel lightheaded decrease the Lisinopril to half tablet   2. Continue the other meds, same doses for now.  3. Schedule the preop beginning of April 4. 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) Polyp of duodenum  Comment: With elevated transaminase  Plan: Follow-up with GI    (E78.5) Hyperlipidemia with target LDL less than 100  Comment: controlled  Plan: atenolol (TENORMIN) 50 MG tablet, atorvastatin         (LIPITOR) 20 MG tablet, glipiZIDE (GLUCOTROL) 5        MG tablet, hydrochlorothiazide (HYDRODIURIL) 25        MG tablet, lisinopril (ZESTRIL) 40 MG tablet,         metFORMIN (GLUCOPHAGE) 1000 MG tablet,         sitagliptin (JANUVIA) 100 MG tablet        Continue same meds, same doses for now    (Z23) Need for prophylactic vaccination and inoculation against influenza  (primary encounter diagnosis)  Comment:   Plan: INFLUENZA (HIGH DOSE) 3 VALENT VACCINE [58514]          Chief complaint:                                                      Review Labs  Follow up chronic medical problems     SUBJECTIVE:                                                    History of present illness:    02/20/2020 Labs Reviewed       Duodenal Polyp:    -- Elevated ALT - 178  -- Elevated AST - 122  -- Denies any discomfort or pain    DM  -- Elevated A1c  -- A1c 7.0 <-- 6.5  < 6.8  -- she thinks the numbers are higher because of too much dark chocolate   -- Eye exam 11/2019, at [facility]    HTN  -- Low BP  -- Pt denies light headedness with posture changes, but felt light headed today after Physical exam   -- Pt notes leg edema     Diabetes Follow-up    How often are you checking your blood sugar? One time daily  What time of day are you checking your blood sugars (select all that apply)?  Before meals  Have you had any blood sugars above 200?  No  Have you had any blood sugars below 70?  No    What symptoms do you notice when your blood sugar is low?  None    What concerns do you have today about your diabetes? None and Other: Would like it later.     Do you have any of these symptoms? (Select all that apply)  No numbness or tingling in feet.  No redness, sores or blisters on feet.  No complaints of excessive thirst.  No reports of blurry vision.  No significant changes to weight.    Have you had a diabetic eye exam in the last 12 months? Yes- Date of last eye exam: [facility] ,  Location: 2019      Hyperlipidemia Follow-Up      Are you regularly taking any medication or supplement to lower your cholesterol?   Yes- Lipitor    Are you having muscle aches or other side effects that you think could be caused by your cholesterol lowering medication?  Yes- Patient has pain on outside of thumb, not sure if it Lipitor is the casue.    Hypertension Follow-up      Do you check your blood pressure regularly outside of the clinic? No     Are you following a low salt diet? Yes    Are your blood pressures ever more than 140 on the top number (systolic) OR more   than 90 on the bottom number (diastolic), for example 140/90? Yes    BP Readings from Last 2 Encounters:   02/27/20 90/60   11/22/19 105/64     Hemoglobin A1C (%)   Date Value   02/20/2020 7.0 (H)   10/31/2019 6.5 (H)     LDL Cholesterol Calculated (mg/dL)   Date Value   02/20/2020 88   07/09/2019 201 (H)  "        How many servings of fruits and vegetables do you eat daily?  4 or more    On average, how many sweetened beverages do you drink each day (Examples: soda, juice, sweet tea, etc.  Do NOT count diet or artificially sweetened beverages)?   0    How many days per week do you exercise enough to make your heart beat faster? 3 or less    How many minutes a day do you exercise enough to make your heart beat faster? 9 or less    How many days per week do you miss taking your medication? 0      Review of Systems:                                                      ROS: negative for fever, chills, cough, wheezes, chest pain, shortness of breath, vomiting, abdominal pain, leg swelling     This document serves as a record of the services and decisions personally performed and made by Josefina Torres MD. It was created on her behalf by Jeannie Gresham, a trained medical scribe. The creation of this document is based on the provider's statements to the medical scribe.  Jeannie Gresham 1:30 PM February 27, 2020     OBJECTIVE:             Physical exam:  Blood pressure 90/60, pulse 83, temperature 98  F (36.7  C), temperature source Oral, resp. rate 18, height 1.702 m (5' 7\"), weight 95.2 kg (209 lb 12.8 oz), last menstrual period 12/13/2002, SpO2 97 %, not currently breastfeeding.   NAD, appears comfortable  Skin: no rashes   Neck: supple, no JVD,  No thyroidmegaly. Lymph nodes nonpalpable cervical and supraclavicular.  Chest: clear to auscultation bilaterally, good respiratory effort  Heart: S1 S2, RRR, no mgr appreciated  Abdomen: soft, not tender, no hepatosplenomegaly or masses appreciated, no abdominal bruit, present bowel sounds  Extremities: no edema,  Neurologic: A, Ox3, no focal signs appreciated    PMHx: reviewed  Past Medical History:   Diagnosis Date     Allergic rhinitis, cause unspecified      Asthma      Asthma, mild intermittent      Cataract      Cellulitis and abscess of leg, except foot 03/00    Bilateral "     Eczema      Heart murmur     aortic area     HTN, goal below 140/90      Hyperlipidemia LDL goal < 100      Hyperplastic colon polyp 2008     Infection     bilateral eye infections     Macular hole of left eye      Obesity (BMI 30-39.9)      Osteoarthritis     knees     Other chronic pain     right knee     Sleep apnea     uses c pap     Type 2 diabetes, HbA1C goal < 8% (H)       PSHx: reviewed  Past Surgical History:   Procedure Laterality Date     ARTHROPLASTY HIP Right 9/25/2017    Procedure: ARTHROPLASTY HIP;  Right total hip arthroplasty  ;  Surgeon: Ayaan Pena MD;  Location: [facility] OR     ARTHROPLASTY KNEE  7/12/2013    Procedure: ARTHROPLASTY KNEE;  right total knee arthroplasty ;  Surgeon: Chaparro Wesley MD;  Location: [facility] OR     ARTHROSCOPY KNEE Right 1/21/2015    Procedure: ARTHROSCOPY KNEE;  Surgeon: Ayaan Pena MD;  Location: [facility] OR     C INCISION OF HYMEN       CATARACT IOL, RT/LT       COLONOSCOPY  2008     COLONOSCOPY N/A 4/18/2019    Procedure: Colonoscopy with polypectomy;  Surgeon: Shaun Guerrero MD;  Location: [facility]     ENDOSCOPIC RETROGRADE CHOLANGIOPANCREATOGRAM N/A 2/6/2019    Procedure: COMBINED ENDOSCOPIC RETROGRADE CHOLANGIOPANCREATOGRAPHY, BILIARY SPINCTEROTOMY AND DILATION, PLACE BILE DUCT STENT;  Surgeon: Guru Andie Gonzalez MD;  Location: [facility]     ENDOSCOPIC RETROGRADE CHOLANGIOPANCREATOGRAM N/A 2/28/2019    Procedure: Endoscopic Retrograde Cholangiopancreatogram, Bile duct stent removal and placement;  Surgeon: Shaun Guerrero MD;  [facility]: [facility]     ENDOSCOPIC RETROGRADE CHOLANGIOPANCREATOGRAM N/A 4/18/2019    Procedure: COMBINED ENDOSCOPIC RETROGRADE CHOLANGIOPANCREATOGRAPHY, Bile duct stent exchange and Polypectomy;  Surgeon: Shaun Guerrero MD;  [facility]: [facility]     ENDOSCOPIC RETROGRADE CHOLANGIOPANCREATOGRAM N/A 10/18/2019    Procedure: Endoscopic Retrograde Cholangiopancreatogram;  Surgeon: Shaun Guerrero MD;  [facility]      ENDOSCOPIC RETROGRADE CHOLANGIOPANCREATOGRAM WITH SPYGLASS N/A 7/26/2019    Procedure: Endoscopic Retrograde Cholangiopancreatogram With Spyglas,l Radiofrequency Ablation, Stent Exchangeand Duodenal Biopsy x2;  Surgeon: Shaun Guerrero MD;  Location: [facility]     ESOPHAGOSCOPY, GASTROSCOPY, DUODENOSCOPY (EGD), COMBINED N/A 4/18/2019    Procedure: upper endoscopy with polypectomy;  Surgeon: Shaun Guerrero MD;  [facility]     ESOPHAGOSCOPY, GASTROSCOPY, DUODENOSCOPY (EGD), COMBINED N/A 10/18/2019    Procedure: Upper Endoscopy and ERCP with stent removal, stone removal and biopsy;  Surgeon: Shaun Guerrero MD;  Location: [facility]     ESOPHAGOSCOPY, GASTROSCOPY, DUODENOSCOPY (EGD), RESECT MUCOSA, COMBINED N/A 2/28/2019    Procedure: Upper Endoscopy, Endoscopic Ultrasound, Endoscopic Mucosal Resection,  Ampullectomy, polypectomy.;  Surgeon: Shaun Guerrero MD;  Location: [facility]     EYE SURGERY      macular hole repaired left eye     HC KNEE SCOPE, DIAGNOSTIC      - both knees     HEAD & NECK SURGERY      wisdom teeth        Meds: reviewed  Current Outpatient Medications   Medication Sig Dispense Refill     albuterol (PROAIR HFA, PROVENTIL HFA, VENTOLIN HFA) 108 (90 BASE) MCG/ACT inhaler Inhale 2 puffs into the lungs every 4 hours as needed for shortness of breath / dyspnea 1 Inhaler 3     aspirin 81 MG EC tablet Take 1 tablet (81 mg) by mouth daily 90 tablet 3     atenolol (TENORMIN) 50 MG tablet Take 1 tablet (50 mg) by mouth daily 90 tablet 1     atorvastatin (LIPITOR) 20 MG tablet Take 1 tablet (20 mg) by mouth daily 90 tablet 1     azelastine (ASTELIN) 137 MCG/SPRAY nasal spray Kinzers 1 spray into both nostrils 2 times daily as needed Reported on 3/22/2017       azelastine (OPTIVAR) 0.05 % SOLN Place 1 drop into both eyes 2 times daily as needed Reported on 3/22/2017       calcium-vitamin D 500-125 MG-UNIT TABS        canagliflozin (INVOKANA) 100 MG tablet Take 1 tablet (100 mg) by mouth every morning (before breakfast) 90  tablet 1     cetirizine (ZYRTEC) 10 MG tablet Take 10 mg by mouth every morning.       CONTOUR NEXT TEST test strip USE 1 STRIP TO CHECK GLUCOSE ONCE DAILY DIAGNOSIS  CODE  E11.9 100 strip 1     desoximetasone (TOPICORT) 0.25 % cream Apply sparingly to affected area's 60 g 1     dorzolamide-timolol (COSOPT) 2-0.5 % ophthalmic solution Place 1 drop into both eyes 2 times daily        fish oil-omega-3 fatty acids (FISH OIL) 1000 MG capsule Take 1 g by mouth daily Reported on 3/22/2017       glipiZIDE (GLUCOTROL) 5 MG tablet TAKE 2 TABLETS BY MOUTH TWICE DAILY BEFORE MEAL(S) 360 tablet 1     hydrochlorothiazide (HYDRODIURIL) 25 MG tablet TAKE 1 TABLET BY MOUTH ONCE DAILY IN THE MORNING 90 tablet 1     ibuprofen (ADVIL) 200 MG tablet take 4 tablet (200 mg) by oral route every 8 hours as needed with food       latanoprost (XALATAN) 0.005 % ophthalmic solution Place 1 drop Into the left eye At Bedtime 2.5 mL 1     lisinopril (ZESTRIL) 40 MG tablet Take 1 tablet (40 mg) by mouth daily 90 tablet 1     metFORMIN (GLUCOPHAGE) 1000 MG tablet TAKE 1 TABLET BY MOUTH TWICE DAILY WITH MEALS 180 tablet 1     MULTIVITAMIN TABS   OR Reported on 3/22/2017       ondansetron (ZOFRAN-ODT) 4 MG ODT tab Take 1 tablet (4 mg) by mouth every 6 hours as needed for nausea or vomiting 30 tablet 0     order for DME All diabetic testing supplies including test strips, lancets and solution for testing 2 times per day. Patient is using   no Insulin. Last A1c Lab Results       Component                Value               Date                       A1C                      7.9                 08/20/2018 100 each 11     sitagliptin (JANUVIA) 100 MG tablet Take 1 tablet (100 mg) by mouth daily 90 tablet 1       Soc Hx: reviewed  Fam Hx: reviewed    The information in this document, created by the medical scribe for me, accurately reflects the services I personally performed and the decisions made by me. I have reviewed and approved this document for  accuracy prior to leaving the patient care area.  February 27, 2020 1:38 PM    Raisa Torres MD  Internal Medicine

## 2020-03-02 ENCOUNTER — TELEPHONE (OUTPATIENT)
Dept: GASTROENTEROLOGY | Facility: CLINIC | Age: 70
End: 2020-03-02

## 2020-03-02 NOTE — TELEPHONE ENCOUNTER
Spoke to patient in regards to scheduled procedure. Informed patient she is scheduled with Dr. Guerrero on 4/17/2020. Informed patient she will need an updated pre-op physical within 30 days of her procedure. Patient stated she is going to have this done locally. Informed patient she will need a  and someone to monitor her for 24 hours after the procedure. Informed patient all scheduling details will be sent to the address listed on Epic. Address confirmed on this call.

## 2020-03-15 ENCOUNTER — HEALTH MAINTENANCE LETTER (OUTPATIENT)
Age: 70
End: 2020-03-15

## 2020-03-18 DIAGNOSIS — J20.9 ACUTE BRONCHITIS: ICD-10-CM

## 2020-03-18 NOTE — TELEPHONE ENCOUNTER
"Patient requests medications due to allergies        Requested Prescriptions   Pending Prescriptions Disp Refills     albuterol (PROAIR HFA/PROVENTIL HFA/VENTOLIN HFA) 108 (90 Base) MCG/ACT inhaler  Last Written Prescription Date:  unknown  Last Fill Quantity: unknown,  # refills: unknown   Last office visit: 2/27/2020 with prescribing provider:  Melissa Johnson Office Visit:   Next 5 appointments (look out 90 days)    Apr 06, 2020 12:00 PM CDT  Pre-Op physical with Raisa Davidson MD  [facility] ([facility] [address]  [facility] [address]  [phone]   May 11, 2020  2:20 PM CDT  SHORT with Raisa Davidson MD  [facility] ([facility]) [address]  [facility] [address]  [phone]        1 Inhaler 3     Sig: Inhale 2 puffs into the lungs every 4 hours as needed for shortness of breath / dyspnea       Asthma Maintenance Inhalers - Anticholinergics Passed - 3/18/2020 12:29 PM        Passed - Patient is age 12 years or older        Passed - Asthma control assessment score within normal limits in last 6 months     Please review ACT score.           Passed - Medication is active on med list        Passed - Recent (6 mo) or future (30 days) visit within the authorizing provider's specialty     Patient had office visit in the last 6 months or has a visit in the next 30 days with authorizing provider or within the authorizing provider's specialty.  See \"Patient Info\" tab in inbasket, or \"Choose Columns\" in Meds & Orders section of the refill encounter.           Short-Acting Beta Agonist Inhalers Protocol  Passed - 3/18/2020 12:29 PM        Passed - Patient is age 12 or older        Passed - Asthma control assessment score within normal limits in last 6 months     Please review ACT score.           Passed - Medication is active on med list        Passed - Recent (6 mo) or future (30 days) " "visit within the authorizing provider's specialty     Patient had office visit in the last 6 months or has a visit in the next 30 days with authorizing provider or within the authorizing provider's specialty.  See \"Patient Info\" tab in inbasket, or \"Choose Columns\" in Meds & Orders section of the refill encounter.               azelastine (ASTELIN) 0.1 % nasal spray  Last Written Prescription Date:  unknown  Last Fill Quantity: unknown,  # refills: unknown   Last office visit: 2/27/2020 with prescribing provider:  Melissa   Future Office Visit:   Next 5 appointments (look out 90 days)    Apr 06, 2020 12:00 PM CDT  Pre-Op physical with Raisa Davidson MD  [facility]) [address] [place]  [facility] [address]  [phone]   May 11, 2020  2:20 PM CDT  SHORT with Raisa Davidson MD  [facility] ([facility]) [address]  [facility] [address]  [phone]                Sig: Spray 1 spray into both nostrils 2 times daily as needed Reported on 3/22/2017       Antihistamines Protocol Failed - 3/18/2020 12:29 PM        Failed - Patient is 3-64 years of age     Apply weight-based dosing for peds patients age 3 - 12 years of age.    Forward request to provider for patients under the age of 3 or over the age of 64.          Passed - Recent (12 mo) or future (30 days) visit within the authorizing provider's specialty     Patient has had an office visit with the authorizing provider or a provider within the authorizing providers department within the previous 12 mos or has a future within next 30 days. See \"Patient Info\" tab in inbasket, or \"Choose Columns\" in Meds & Orders section of the refill encounter.              Passed - Medication is active on med list           "

## 2020-03-20 RX ORDER — AZELASTINE 1 MG/ML
1 SPRAY, METERED NASAL 2 TIMES DAILY PRN
Qty: 30 ML | Refills: 3 | Status: SHIPPED | OUTPATIENT
Start: 2020-03-20 | End: 2021-04-06

## 2020-03-20 RX ORDER — ALBUTEROL SULFATE 90 UG/1
2 AEROSOL, METERED RESPIRATORY (INHALATION) EVERY 4 HOURS PRN
Qty: 1 INHALER | Refills: 3 | Status: SHIPPED | OUTPATIENT
Start: 2020-03-20 | End: 2022-12-20

## 2020-03-20 NOTE — TELEPHONE ENCOUNTER
Prescription approved per [facility] Refill Protocol.  Arlet Harding , Pharm D  [phone] (phone)  [phone] (pager)  Medication Therapy Management Pharmacist

## 2020-03-30 ENCOUNTER — CARE COORDINATION (OUTPATIENT)
Dept: GASTROENTEROLOGY | Facility: CLINIC | Age: 70
End: 2020-03-30

## 2020-03-31 ENCOUNTER — TELEPHONE (OUTPATIENT)
Dept: GASTROENTEROLOGY | Facility: CLINIC | Age: 70
End: 2020-03-31

## 2020-03-31 NOTE — TELEPHONE ENCOUNTER
Patient returned my phone call from earlier. Patient stated she would like to be rescheduled to 5/29/2020 because her allergies are usually very bad in the beginning of May. Informed patient she will need an updated pre-op within 30 days of her procedure. Patient stated she is going to have this done with her PCP. Informed patient she will need a  and someone to monitor her for 24 hours after the procedure. Patient would like all scheduling informations end to address listed in epic.

## 2020-03-31 NOTE — TELEPHONE ENCOUNTER
LVM for patient in regards to rescheduling procedure with Dr. Guerrero to some time in May. Left direct line for patient to call to go over scheduling details.

## 2020-04-30 ENCOUNTER — DOCUMENTATION ONLY (OUTPATIENT)
Dept: INTERNAL MEDICINE | Facility: CLINIC | Age: 70
End: 2020-04-30

## 2020-05-04 DIAGNOSIS — Z11.59 ENCOUNTER FOR SCREENING FOR OTHER VIRAL DISEASES: Primary | ICD-10-CM

## 2020-05-05 ENCOUNTER — PATIENT OUTREACH (OUTPATIENT)
Dept: GASTROENTEROLOGY | Facility: CLINIC | Age: 70
End: 2020-05-05

## 2020-05-05 NOTE — PROGRESS NOTES
Care Coordination Telephone Call  [facility]     Called patient to discuss plan to move OR case to first part of September as part of the Covid Tier 4 protocol.  Will review again at beginning of August 2020 for possible go ahead with procedure.    I have asked the patient to call with any additional questions or concerns and have provided my contact information.    Plan:  Postpone procedure until later this year; patient was in agreement with above plan    Kalli ROCHA, HNBC, STAR-T  RN Care Coordinator  [facility]  Ph: [phone]  FAX: [phone]

## 2020-05-06 ENCOUNTER — TELEPHONE (OUTPATIENT)
Dept: INTERNAL MEDICINE | Facility: CLINIC | Age: 70
End: 2020-05-06

## 2020-05-06 DIAGNOSIS — R79.89 LFTS ABNORMAL: ICD-10-CM

## 2020-05-06 DIAGNOSIS — E11.9 DIABETES MELLITUS WITHOUT COMPLICATION (H): Primary | ICD-10-CM

## 2020-05-06 NOTE — TELEPHONE ENCOUNTER
"Per office visit note from  2/27/20:  \"4. For diabetes we will schedule labs and appointment in 3-4 \"    Please see message below and advise.  "

## 2020-05-09 DIAGNOSIS — E11.9 DIABETES MELLITUS WITHOUT COMPLICATION (H): Chronic | ICD-10-CM

## 2020-05-11 NOTE — TELEPHONE ENCOUNTER
Pending Prescriptions:                       Disp   Refills    CONTOUR NEXT TEST test strip [Pharmacy Me*       0            Sig: USE 1 STRIP TO CHECK GLUCOSE ONCE DAILY E11.9      Prescription approved per [facility] Refill Protocol.

## 2020-05-21 DIAGNOSIS — E11.9 DIABETES MELLITUS WITHOUT COMPLICATION (H): ICD-10-CM

## 2020-05-21 DIAGNOSIS — R79.89 LFTS ABNORMAL: ICD-10-CM

## 2020-05-21 LAB
ALBUMIN SERPL-MCNC: 3.5 G/DL (ref 3.4–5)
ALP SERPL-CCNC: 86 U/L (ref 40–150)
ALT SERPL W P-5'-P-CCNC: 83 U/L (ref 0–50)
ANION GAP SERPL CALCULATED.3IONS-SCNC: 6 MMOL/L (ref 3–14)
AST SERPL W P-5'-P-CCNC: 61 U/L (ref 0–45)
BILIRUB SERPL-MCNC: 0.6 MG/DL (ref 0.2–1.3)
BUN SERPL-MCNC: 20 MG/DL (ref 7–30)
CALCIUM SERPL-MCNC: 9.8 MG/DL (ref 8.5–10.1)
CHLORIDE SERPL-SCNC: 104 MMOL/L (ref 94–109)
CO2 SERPL-SCNC: 28 MMOL/L (ref 20–32)
CREAT SERPL-MCNC: 0.84 MG/DL (ref 0.52–1.04)
GFR SERPL CREATININE-BSD FRML MDRD: 70 ML/MIN/{1.73_M2}
GLUCOSE SERPL-MCNC: 165 MG/DL (ref 70–99)
HBA1C MFR BLD: 7.3 % (ref 0–5.6)
POTASSIUM SERPL-SCNC: 3.9 MMOL/L (ref 3.4–5.3)
PROT SERPL-MCNC: 7.5 G/DL (ref 6.8–8.8)
SODIUM SERPL-SCNC: 138 MMOL/L (ref 133–144)

## 2020-05-21 PROCEDURE — 36415 COLL VENOUS BLD VENIPUNCTURE: CPT | Performed by: INTERNAL MEDICINE

## 2020-05-21 PROCEDURE — 83036 HEMOGLOBIN GLYCOSYLATED A1C: CPT | Performed by: INTERNAL MEDICINE

## 2020-05-21 PROCEDURE — 80053 COMPREHEN METABOLIC PANEL: CPT | Performed by: INTERNAL MEDICINE

## 2020-05-27 ENCOUNTER — VIRTUAL VISIT (OUTPATIENT)
Dept: INTERNAL MEDICINE | Facility: CLINIC | Age: 70
End: 2020-05-27
Payer: COMMERCIAL

## 2020-05-27 DIAGNOSIS — R00.0 SINUS TACHYCARDIA: ICD-10-CM

## 2020-05-27 DIAGNOSIS — E78.5 HYPERLIPIDEMIA WITH TARGET LDL LESS THAN 100: Chronic | ICD-10-CM

## 2020-05-27 DIAGNOSIS — E11.9 DIABETES MELLITUS WITHOUT COMPLICATION (H): Chronic | ICD-10-CM

## 2020-05-27 DIAGNOSIS — I10 HTN, GOAL BELOW 140/90: Primary | ICD-10-CM

## 2020-05-27 PROCEDURE — 99214 OFFICE O/P EST MOD 30 MIN: CPT | Mod: 95 | Performed by: INTERNAL MEDICINE

## 2020-05-27 RX ORDER — ATENOLOL 50 MG/1
50 TABLET ORAL DAILY
Qty: 90 TABLET | Refills: 1 | Status: SHIPPED | OUTPATIENT
Start: 2020-05-27 | End: 2021-03-18

## 2020-05-27 RX ORDER — HYDROCHLOROTHIAZIDE 25 MG/1
TABLET ORAL
Qty: 90 TABLET | Refills: 1 | Status: SHIPPED | OUTPATIENT
Start: 2020-05-27 | End: 2021-03-23

## 2020-05-27 RX ORDER — ATORVASTATIN CALCIUM 20 MG/1
20 TABLET, FILM COATED ORAL DAILY
Qty: 90 TABLET | Refills: 1 | Status: SHIPPED | OUTPATIENT
Start: 2020-05-27 | End: 2021-03-23

## 2020-05-27 RX ORDER — LISINOPRIL 20 MG/1
20 TABLET ORAL 2 TIMES DAILY
Qty: 180 TABLET | Refills: 1 | Status: SHIPPED | OUTPATIENT
Start: 2020-05-27 | End: 2020-12-09

## 2020-05-27 RX ORDER — HYDROCHLOROTHIAZIDE 25 MG/1
TABLET ORAL
Qty: 90 TABLET | Refills: 1 | Status: CANCELLED | OUTPATIENT
Start: 2020-05-27

## 2020-05-27 RX ORDER — GLIPIZIDE 5 MG/1
TABLET ORAL
Qty: 360 TABLET | Refills: 1 | Status: CANCELLED | OUTPATIENT
Start: 2020-05-27

## 2020-05-27 NOTE — PROGRESS NOTES
"Gricelda Sabillon is a 70 year old female who is being evaluated via a billable telephone visit.      The patient has been notified of following:     \"This telephone visit will be conducted via a call between you and your physician/provider. We have found that certain health care needs can be provided without the need for a physical exam.  This service lets us provide the care you need with a short phone conversation.  If a prescription is necessary we can send it directly to your pharmacy.  If lab work is needed we can place an order for that and you can then stop by our lab to have the test done at a later time.    Telephone visits are billed at different rates depending on your insurance coverage. During this emergency period, for some insurers they may be billed the same as an in-person visit.  Please reach out to your insurance provider with any questions.    If during the course of the call the physician/provider feels a telephone visit is not appropriate, you will not be charged for this service.\"    Patient has given verbal consent for Telephone visit?  Yes, Katarzyna Choi MA    What phone number would you like to be contacted at? [phone]    How would you like to obtain your AVS? MyChart      This is a telephone encounter with the patient.       09:01 --- 09:22          Dr Torres's note      Patient's instructions / PLAN:                                                        Plan:  1. Blood pressure machine - convince the insurance to approve it   2. Continue same meds, same doses for now   3. Please make a lab appointment for non fasting labs in 3 months  4. Please make an appointment few days after the labs to discuss about the results.   5. Voltaren gel to the painful muscle or and Lidocaine patch 4% ( maximum 12 hours a day)         ASSESSMENT & PLAN:                                                      (I10) HTN, goal below 140/90  (primary encounter diagnosis)  Comment: Controlled    Plan: " Home Blood Pressure Monitor Order for DME -         ONLY FOR DME, atenolol (TENORMIN) 50 MG tablet,        hydrochlorothiazide (HYDRODIURIL) 25 MG tablet,        lisinopril (ZESTRIL) 20 MG tablet, Hemoglobin         A1c, CBC with platelets, Albumin Random Urine         Quantitative with Creat Ratio, TSH with free T4        reflex            (E11.9) DM2 no complication  Comment: Controlled  But higher A1c   Plan: canagliflozin (INVOKANA) 100 MG tablet,         metFORMIN (GLUCOPHAGE) 1000 MG tablet,         sitagliptin (JANUVIA) 100 MG tablet, Hemoglobin        A1c, CBC with platelets, TSH with free T4         reflex            (E78.5) Hyperlipidemia with target LDL less than 100  Comment: Controlled    Plan: atorvastatin (LIPITOR) 20 MG tablet, Hemoglobin        A1c, CBC with platelets, Albumin Random Urine         Quantitative with Creat Ratio, TSH with free T4        reflex            (R00.0) Sinus tachycardia  Comment: Controlled    Plan: atenolol (TENORMIN) 50 MG tablet               Chief complaint:                                                      Follow up chronic medical problems       SUBJECTIVE:                                                    History of present illness:    DM   A1c 7.3 <-- 6.5<-- 6.8  -- less exercise and diet difficulties with the COvid     Seasonal allergies     HTN  -- no lightheadedness. Tolerates Lisinopril 40 mg 1/2 tab bid, but the pills don't split easily   -- no BP machine at home  --     Muscle pulled  -- around on the R shoulder blade with certain movements or deep breath    LOV: Plan:  1. If you feel lightheaded decrease the Lisinopril to half tablet   2. Continue the other meds, same doses for now.  3. Schedule the preop beginning of April 4. For diabetes we will schedule labs and appointment in 3-4 months     Diabetes Follow-up    How often are you checking your blood sugar? One time daily  What time of day are you checking your blood sugars (select all that apply)?   Before meals  Have you had any blood sugars above 200?  No  Have you had any blood sugars below 70?  No    What symptoms do you notice when your blood sugar is low?  None    What concerns do you have today about your diabetes? Blood sugar is high at 120     Do you have any of these symptoms? (Select all that apply)  No numbness or tingling in feet.  No redness, sores or blisters on feet.  No complaints of excessive thirst.  No reports of blurry vision.  No significant changes to weight.              Hyperlipidemia Follow-Up      Are you regularly taking any medication or supplement to lower your cholesterol?   Yes- Lipitor    Are you having muscle aches or other side effects that you think could be caused by your cholesterol lowering medication?  No    Hypertension Follow-up      Do you check your blood pressure regularly outside of the clinic? No     Are you following a low salt diet? Yes    Are your blood pressures ever more than 140 on the top number (systolic) OR more   than 90 on the bottom number (diastolic), for example 140/90? No    BP Readings from Last 2 Encounters:   02/27/20 90/60   11/22/19 105/64     Hemoglobin A1C (%)   Date Value   05/21/2020 7.3 (H)   02/20/2020 7.0 (H)     LDL Cholesterol Calculated (mg/dL)   Date Value   02/20/2020 88   07/09/2019 201 (H)         How many servings of fruits and vegetables do you eat daily?  4 or more    On average, how many sweetened beverages do you drink each day (Examples: soda, juice, sweet tea, etc.  Do NOT count diet or artificially sweetened beverages)?   0    How many days per week do you exercise enough to make your heart beat faster? 3 or less    How many minutes a day do you exercise enough to make your heart beat faster? 9 or less    How many days per week do you miss taking your medication? 0      Review of Systems:                                                      ROS: negative for fever, chills, cough, wheezes, chest pain, shortness of breath,  vomiting, abdominal pain, leg swelling     A 10-point review of systems was obtained.  Those pertinent are above and in the in the Subjective section.  The rest of the systems are negative.           OBJECTIVE:           An actual physical exam can't be done during phone visit   A limited exam can sometimes be performed by video visit       PMHx: reviewed  Past Medical History:   Diagnosis Date     Allergic rhinitis, cause unspecified      Asthma      Asthma, mild intermittent      Cataract      Cellulitis and abscess of leg, except foot 03/00    Bilateral     Eczema      Heart murmur     aortic area     HTN, goal below 140/90      Hyperlipidemia LDL goal < 100      Hyperplastic colon polyp 2008     Infection     bilateral eye infections     Macular hole of left eye      Obesity (BMI 30-39.9)      Osteoarthritis     knees     Other chronic pain     right knee     Sleep apnea     uses c pap     Type 2 diabetes, HbA1C goal < 8% (H)       PSHx: reviewed  Past Surgical History:   Procedure Laterality Date     ARTHROPLASTY HIP Right 9/25/2017    Procedure: ARTHROPLASTY HIP;  Right total hip arthroplasty  ;  Surgeon: Ayaan Pena MD;  Location: [facility] OR     ARTHROPLASTY KNEE  7/12/2013    Procedure: ARTHROPLASTY KNEE;  right total knee arthroplasty ;  Surgeon: Chaparro Wesley MD;  Location: [facility] OR     ARTHROSCOPY KNEE Right 1/21/2015    Procedure: ARTHROSCOPY KNEE;  Surgeon: Ayaan Pena MD;  Location: [facility] OR     C INCISION OF HYMEN       CATARACT IOL, RT/LT       COLONOSCOPY  2008     COLONOSCOPY N/A 4/18/2019    Procedure: Colonoscopy with polypectomy;  Surgeon: Shaun Guerrero MD;  Location: [facility]     ENDOSCOPIC RETROGRADE CHOLANGIOPANCREATOGRAM N/A 2/6/2019    Procedure: COMBINED ENDOSCOPIC RETROGRADE CHOLANGIOPANCREATOGRAPHY, BILIARY SPINCTEROTOMY AND DILATION, PLACE BILE DUCT STENT;  Surgeon: Guru Andie Gonzalez MD;  Location: [facility]     ENDOSCOPIC RETROGRADE  CHOLANGIOPANCREATOGRAM N/A 2/28/2019    Procedure: Endoscopic Retrograde Cholangiopancreatogram, Bile duct stent removal and placement;  Surgeon: Shaun Guerrero MD;  Location: [facility]     ENDOSCOPIC RETROGRADE CHOLANGIOPANCREATOGRAM N/A 4/18/2019    Procedure: COMBINED ENDOSCOPIC RETROGRADE CHOLANGIOPANCREATOGRAPHY, Bile duct stent exchange and Polypectomy;  Surgeon: Shaun Guerrero MD;  Location: [facility]     ENDOSCOPIC RETROGRADE CHOLANGIOPANCREATOGRAM N/A 10/18/2019    Procedure: Endoscopic Retrograde Cholangiopancreatogram;  Surgeon: Shaun Guerrero MD;  [facility]: [facility]     ENDOSCOPIC RETROGRADE CHOLANGIOPANCREATOGRAM WITH SPYGLASS N/A 7/26/2019    Procedure: Endoscopic Retrograde Cholangiopancreatogram With Spyglas,l Radiofrequency Ablation, Stent Exchangeand Duodenal Biopsy x2;  Surgeon: Shaun Guerrero MD;  Location: [facility]     ESOPHAGOSCOPY, GASTROSCOPY, DUODENOSCOPY (EGD), COMBINED N/A 4/18/2019    Procedure: upper endoscopy with polypectomy;  Surgeon: Shaun Guerrero MD;  [facility]: [facility]     ESOPHAGOSCOPY, GASTROSCOPY, DUODENOSCOPY (EGD), COMBINED N/A 10/18/2019    Procedure: Upper Endoscopy and ERCP with stent removal, stone removal and biopsy;  Surgeon: Shaun Guerrero MD;  Location: [facility]     ESOPHAGOSCOPY, GASTROSCOPY, DUODENOSCOPY (EGD), RESECT MUCOSA, COMBINED N/A 2/28/2019    Procedure: Upper Endoscopy, Endoscopic Ultrasound, Endoscopic Mucosal Resection,  Ampullectomy, polypectomy.;  Surgeon: Shaun Guerrero MD;  Location: [facility]     EYE SURGERY      macular hole repaired left eye     HC KNEE SCOPE, DIAGNOSTIC      - both knees     HEAD & NECK SURGERY      wisdom teeth        Meds: reviewed  Current Outpatient Medications   Medication Sig Dispense Refill     albuterol (PROAIR HFA/PROVENTIL HFA/VENTOLIN HFA) 108 (90 Base) MCG/ACT inhaler Inhale 2 puffs into the lungs every 4 hours as needed for shortness of breath / dyspnea 1 Inhaler 3     aspirin 81 MG EC tablet Take 1 tablet (81 mg) by mouth daily 90 tablet 3      atenolol (TENORMIN) 50 MG tablet Take 1 tablet (50 mg) by mouth daily 90 tablet 1     atorvastatin (LIPITOR) 20 MG tablet Take 1 tablet (20 mg) by mouth daily 90 tablet 1     azelastine (ASTELIN) 0.1 % nasal spray Spray 1 spray into both nostrils 2 times daily as needed Reported on 3/22/2017 30 mL 3     azelastine (OPTIVAR) 0.05 % SOLN Place 1 drop into both eyes 2 times daily as needed Reported on 3/22/2017       calcium-vitamin D 500-125 MG-UNIT TABS        canagliflozin (INVOKANA) 100 MG tablet Take 1 tablet (100 mg) by mouth every morning (before breakfast) 90 tablet 1     cetirizine (ZYRTEC) 10 MG tablet Take 10 mg by mouth every morning.       CONTOUR NEXT TEST test strip USE 1 STRIP TO CHECK GLUCOSE ONCE DAILY E11.9 100 each 2     desoximetasone (TOPICORT) 0.25 % cream Apply sparingly to affected area's 60 g 1     dorzolamide-timolol (COSOPT) 2-0.5 % ophthalmic solution Place 1 drop into both eyes 2 times daily        fish oil-omega-3 fatty acids (FISH OIL) 1000 MG capsule Take 1 g by mouth daily Reported on 3/22/2017       glipiZIDE (GLUCOTROL) 5 MG tablet TAKE 2 TABLETS BY MOUTH TWICE DAILY BEFORE MEAL(S) 360 tablet 1     hydrochlorothiazide (HYDRODIURIL) 25 MG tablet TAKE 1 TABLET BY MOUTH ONCE DAILY IN THE MORNING 90 tablet 1     ibuprofen (ADVIL) 200 MG tablet take 4 tablet (200 mg) by oral route every 8 hours as needed with food       latanoprost (XALATAN) 0.005 % ophthalmic solution Place 1 drop Into the left eye At Bedtime 2.5 mL 1     lisinopril (ZESTRIL) 40 MG tablet Take 1 tablet (40 mg) by mouth daily 90 tablet 1     metFORMIN (GLUCOPHAGE) 1000 MG tablet TAKE 1 TABLET BY MOUTH TWICE DAILY WITH MEALS 180 tablet 1     MULTIVITAMIN TABS   OR Reported on 3/22/2017       ondansetron (ZOFRAN-ODT) 4 MG ODT tab Take 1 tablet (4 mg) by mouth every 6 hours as needed for nausea or vomiting 30 tablet 0     order for DME All diabetic testing supplies including test strips, lancets and solution for testing  2 times per day. Patient is using   no Insulin. Last A1c Lab Results       Component                Value               Date                       A1C                      7.9                 08/20/2018 100 each 11     sitagliptin (JANUVIA) 100 MG tablet Take 1 tablet (100 mg) by mouth daily 90 tablet 1       Soc Hx: reviewed  Fam Hx: reviewed          Raisa Torres MD  Internal Medicine

## 2020-05-27 NOTE — PATIENT INSTRUCTIONS
Plan:  1. Blood pressure machine - convince the insurance to approve it   2. Continue same meds, same doses for now   3. Please make a lab appointment for non fasting labs in 3 months  4. Please make an appointment few days after the labs to discuss about the results.   5. Voltaren gel to the painful muscle or and Lidocaine patch 4% ( maximum 12 hours a day)

## 2020-05-27 NOTE — LETTER
May 29, 2020      Gricelda Sabillon  [address] [place] [address]

## 2020-07-22 NOTE — PLAN OF CARE
Problem: Patient Care Overview  Goal: Plan of Care/Patient Progress Review  OT- eval and treatment completed. Patient is POD #1 for R KEVEN with posterior hip precautions. Patient resides with spouse in rambler style home with 1 step down to sunken living room. Patient has following AE from previous TKA 3 years ago; commode, shower chair, reacher, SEC, fww.     Discharge Planner OT   Patient plan for discharge: home, possibly today  Current status: Throughout session, patient was CGA to SBA with fww for functional mobility. After instruction, patient was mod I with TB dressing with use of reacher and sockaid to don pants, socks and shoes. Mod I with toilet transfer with fww and commode for support and walk in shower with support from fww. Min A with bed mobility, educated in use of leg , patient to consider practicing with PT at next session. Educated in walker safety, EC/WS techniques, advancement of activity following surgery and driving readiness, patient was engaged in instruction and verbalized understanding. Recommended AE includes; long handle sponge, sockaid and possibly a leg . Educated in how to obtain in community as needed. Goals met, will dc OT services.   Barriers to return to prior living situation: stairs- defer to PT, none from OT standpoint  Recommendations for discharge: home with spouse A  Rationale for recommendations: patient has met all OT goals for safe discharge home with spouse to A as needed with IADL's       Entered by: Katherin Tamayo 09/26/2017 11:29 AM         Occupational Therapy Discharge Summary     Reason for therapy discharge:    All goals and outcomes met, no further needs identified.     Progress towards therapy goal(s). See goals on Care Plan in [facility] electronic health record for goal details.  Goals met     Therapy recommendation(s):    No further therapy is recommended.                 No

## 2020-08-19 ENCOUNTER — PATIENT OUTREACH (OUTPATIENT)
Dept: GASTROENTEROLOGY | Facility: CLINIC | Age: 70
End: 2020-08-19

## 2020-08-19 NOTE — PROGRESS NOTES
Care Coordination Telephone Call  [facility]     Called patient to discuss scheduling of ERCP that was delayed due to Covid 19.      Call back received and she is ok to proceed on 9/10/20.    Reviewed preop instructions and she will physical done with her PCP, Covid within 4 days, she is diabetic and will discuss with PCP for medications, no blood thinners, NPO solids 8 hours, clear liquids up to 2 hours prior.    I have asked the patient to call with any additional questions or concerns and have provided my contact information.    Plan:  Scheduling to contact to confirm procedure    Kalli SIMPSONN, HNBC, STAR-T  RN Care Coordinator  [facility]  Ph: [phone]  FAX: [phone]

## 2020-08-24 ENCOUNTER — OFFICE VISIT (OUTPATIENT)
Dept: INTERNAL MEDICINE | Facility: CLINIC | Age: 70
End: 2020-08-24
Payer: COMMERCIAL

## 2020-08-24 VITALS
DIASTOLIC BLOOD PRESSURE: 74 MMHG | BODY MASS INDEX: 32.89 KG/M2 | OXYGEN SATURATION: 98 % | HEART RATE: 78 BPM | WEIGHT: 210 LBS | SYSTOLIC BLOOD PRESSURE: 128 MMHG | RESPIRATION RATE: 16 BRPM

## 2020-08-24 DIAGNOSIS — E11.9 DIABETES MELLITUS WITHOUT COMPLICATION (H): Chronic | ICD-10-CM

## 2020-08-24 DIAGNOSIS — I10 HTN, GOAL BELOW 140/90: ICD-10-CM

## 2020-08-24 DIAGNOSIS — E78.5 HYPERLIPIDEMIA WITH TARGET LDL LESS THAN 100: Chronic | ICD-10-CM

## 2020-08-24 DIAGNOSIS — Z01.818 PREOP GENERAL PHYSICAL EXAM: Primary | ICD-10-CM

## 2020-08-24 LAB
ERYTHROCYTE [DISTWIDTH] IN BLOOD BY AUTOMATED COUNT: 13.4 % (ref 10–15)
HBA1C MFR BLD: 7.2 % (ref 0–5.6)
HCT VFR BLD AUTO: 46.4 % (ref 35–47)
HGB BLD-MCNC: 14.6 G/DL (ref 11.7–15.7)
MCH RBC QN AUTO: 27.2 PG (ref 26.5–33)
MCHC RBC AUTO-ENTMCNC: 31.5 G/DL (ref 31.5–36.5)
MCV RBC AUTO: 86 FL (ref 78–100)
PLATELET # BLD AUTO: 290 10E9/L (ref 150–450)
RBC # BLD AUTO: 5.37 10E12/L (ref 3.8–5.2)
WBC # BLD AUTO: 7.4 10E9/L (ref 4–11)

## 2020-08-24 PROCEDURE — 83036 HEMOGLOBIN GLYCOSYLATED A1C: CPT | Performed by: INTERNAL MEDICINE

## 2020-08-24 PROCEDURE — 99215 OFFICE O/P EST HI 40 MIN: CPT | Performed by: INTERNAL MEDICINE

## 2020-08-24 PROCEDURE — 36415 COLL VENOUS BLD VENIPUNCTURE: CPT | Performed by: INTERNAL MEDICINE

## 2020-08-24 PROCEDURE — 82043 UR ALBUMIN QUANTITATIVE: CPT | Performed by: INTERNAL MEDICINE

## 2020-08-24 PROCEDURE — 84443 ASSAY THYROID STIM HORMONE: CPT | Performed by: INTERNAL MEDICINE

## 2020-08-24 PROCEDURE — 93000 ELECTROCARDIOGRAM COMPLETE: CPT | Performed by: INTERNAL MEDICINE

## 2020-08-24 PROCEDURE — 85027 COMPLETE CBC AUTOMATED: CPT | Performed by: INTERNAL MEDICINE

## 2020-08-24 RX ORDER — LATANOPROSTENE BUNOD 0.24 MG/ML
1 SOLUTION/ DROPS OPHTHALMIC AT BEDTIME
COMMUNITY
End: 2021-03-03

## 2020-08-24 NOTE — PROGRESS NOTES
[place] [address]  [facility] [address]  [phone]  Dept: [phone]    PRE-OP EVALUATION:  Today's date: 2020    Gricelda Sabillon (: 1950) presents for pre-operative evaluation assessment as requested by Dr. Guerrero.  She requires evaluation and anesthesia risk assessment prior to undergoing surgery/procedure for treatment of endoscopic of possible polyp .    Fax number for surgical facility: [facility]  Primary Physician: Raisa Davidson  Type of Anesthesia Anticipated: to be determined    Preop Questionnnaire:  Pre-op Questionnaire 2020   Surgery Location: [facility]   Surgeon: Unknown   Surgery/Procedure: Endoscopic exam   Surgery Date: September  10,2020   Time of Surgery: Unknown   Where patient plans to recover: At home with family   Have you ever had a heart attack or stroke? No   Have you ever had surgery on your heart or blood vessels, such as a stent placement, a coronary artery bypass, or surgery on an artery in your head, neck, heart, or legs? No   Do you have chest pain with activity? No   Do you have a history of  heart failure? No   Do you currently have a cold, bronchitis or symptoms of other infection? No   Do you have a cough, shortness of breath, or wheezing? No   Do you or anyone in your family have previous history of blood clots? No   Do you or does anyone in your family have a serious bleeding problem such as prolonged bleeding following surgeries or cuts? No   Have you ever had problems with anemia or been told to take iron pills? YES - Last Hgb was normal   Have you had any abnormal blood loss such as black, tarry or bloody stools, or abnormal vaginal bleeding? No   Have you ever had a blood transfusion? No   Are you willing to have a blood transfusion if it is medically needed before, during, or after your surgery? Yes   Have you or any of your relatives ever had problems with anesthesia? No    Do you have sleep apnea, excessive snoring or daytime drowsiness? YES - she wears the CPAP   Do you have a CPAP machine? Yes   Do you have any artifical heart valves or other implanted medical devices like a pacemaker, defibrillator, or continuous glucose monitor? No   Do you have artificial joints? YES - R hip, R knee   Are you allergic to latex? No   Is there any chance that you may be pregnant? No         CC:  Preop for multiple medical problems.     HPI:    The patient is scheduled for ERCP procedure  with Dr. Guerrero on  Sept 10, 2020  No other acute complaints.     Assessment:  1. V72.83H Preop general physical exam _ I do not see any major contraindications for the patient to go through the scheduled surgery.     The proposed surgical procedure is considered   LOW  surgery risk.     For above listed surgery and anesthesia, Patient is at MODERATE, risk for surgery/procedure and perioperative/procedure complications.     Cardiovascular risk  Assessment -- low risk   --  No further cardiac work up is needed before this surgery.         ECG:    sinus rhythm, no changes suggestive for ischemia     Pulmonary Risk Assessment -- low risk   --  The patient doesn't have chronic lung disease nor acute respiratory problems   Obstructive Sleep Apnea (or suspected sleep apnea) -- she wears the CPAP     Anemia Assessment :  -- no anemia - patient doesn't need further anemia work up     Blood Sugar Assessment  -- patient has controlled DM,      Anticoagulation assessment  --  patient takes ASA for C-vs prevention. She stopped it for the procedure         (E11.65) DM 2 with hyperglycemia (H)  Comment:   Plan: Continue same meds, same doses for now      (G47.33) CHERRY (obstructive sleep apnea)  Comment:   Plan:      (I10) HTN goal less than 140/90,  Comment: Controlled    Plan: Continue same meds, same doses for now      .(K80.50) Calculus of bile duct without cholecystitis and without obstruction  Comment:   Plan: procedure, as  above      Plan:    1. In the morning of the surgery day you take with a sip of water just these meds: Atenolol   The rest of the meds you resume after procedure.  2.  Labs today - [address]       ===    MEDICAL HISTORY:     Patient Active Problem List    Diagnosis Date Noted     Hyperlipidemia with target LDL less than 100      Priority: High     Diagnosis updated by automated process. Provider to review and confirm.       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     Priority: Medium     DM 2 with hyperglycemia (H) 12/02/2018     Priority: Medium     Status post total replacement of right hip 02/13/2018     Priority: Medium     Gait disturbance 02/13/2018     Priority: Medium     Hip osteoarthritis 09/25/2017     Priority: Medium     CHERRY (obstructive sleep apnea) 09/15/2017     Priority: Medium     HTN goal less than 140/90, 05/22/2016     Priority: Medium     Chronic knee pain, right 08/30/2015     Priority: Medium     Hip pain, right 08/30/2015     Priority: Medium     Total knee replacement status 07/12/2013     Priority: Medium     Sleep apnea      Priority: Medium     Obesity (BMI 30-39.9)      Priority: Medium     Heart murmur      Priority: Medium     aortic area       Advanced directives, counseling/discussion 01/06/2012     Priority: Medium     Advance Directive Problem List Overview:   Name Relationship Phone    Primary Health Care Agent            Alternative Health Care Agent          Discussed advance care planning with patient; information given to patient to review. 1/6/2012          Allergic rhinitis      Priority: Medium     Problem list name updated by automated process. Provider to review       Cataract      Priority: Low     Utility  update for deleted IMO code  [employer] Update utility       Macular hole of left eye      Priority: Low      Past Medical History:   Diagnosis Date     Allergic rhinitis, cause unspecified      Asthma      Asthma, mild intermittent      Cataract      Cellulitis and abscess of leg, except foot 03/00    Bilateral     Eczema      Heart murmur     aortic area     HTN, goal below 140/90      Hyperlipidemia LDL goal < 100      Hyperplastic colon polyp 2008     Infection     bilateral eye infections     Macular hole of left eye      Obesity (BMI 30-39.9)      Osteoarthritis     knees     Other chronic pain     right knee     Sleep apnea     uses c pap     Type 2 diabetes, HbA1C goal < 8% (H)      Past Surgical History:   Procedure Laterality Date     ARTHROPLASTY HIP Right 9/25/2017    Procedure: ARTHROPLASTY HIP;  Right total hip arthroplasty  ;  Surgeon: Ayaan Pena MD;  Location: [facility]     ARTHROPLASTY KNEE  7/12/2013    Procedure: ARTHROPLASTY KNEE;  right total knee arthroplasty ;  Surgeon: Chaparro Wesley MD;  Location: [facility]     ARTHROSCOPY KNEE Right 1/21/2015    Procedure: ARTHROSCOPY KNEE;  Surgeon: Ayaan Pena MD;  Location: [facility]     C INCISION OF HYMEN       CATARACT IOL, RT/LT       COLONOSCOPY  2008     COLONOSCOPY N/A 4/18/2019    Procedure: Colonoscopy with polypectomy;  Surgeon: Shaun Guerrero MD;  [facility]     ENDOSCOPIC RETROGRADE CHOLANGIOPANCREATOGRAM N/A 2/6/2019    Procedure: COMBINED ENDOSCOPIC RETROGRADE CHOLANGIOPANCREATOGRAPHY, BILIARY SPINCTEROTOMY AND DILATION, PLACE BILE DUCT STENT;  Surgeon: Guru Andie Gonzalez MD;  [facility]     ENDOSCOPIC RETROGRADE CHOLANGIOPANCREATOGRAM N/A 2/28/2019    Procedure: Endoscopic Retrograde Cholangiopancreatogram, Bile duct stent removal and placement;  Surgeon: Shaun Guerrero MD;  [facility]     ENDOSCOPIC RETROGRADE CHOLANGIOPANCREATOGRAM N/A 4/18/2019    Procedure: COMBINED ENDOSCOPIC RETROGRADE  CHOLANGIOPANCREATOGRAPHY, Bile duct stent exchange and Polypectomy;  Surgeon: Shaun Guerrero MD;  [facility]     ENDOSCOPIC RETROGRADE CHOLANGIOPANCREATOGRAM N/A 10/18/2019    Procedure: Endoscopic Retrograde Cholangiopancreatogram;  Surgeon: Shaun Guerrero MD;  [facility]     ENDOSCOPIC RETROGRADE CHOLANGIOPANCREATOGRAM WITH SPYGLASS N/A 7/26/2019    Procedure: Endoscopic Retrograde Cholangiopancreatogram With Spyglas,l Radiofrequency Ablation, Stent Exchangeand Duodenal Biopsy x2;  Surgeon: Shaun Guerrero MD;  [facility]     ESOPHAGOSCOPY, GASTROSCOPY, DUODENOSCOPY (EGD), COMBINED N/A 4/18/2019    Procedure: upper endoscopy with polypectomy;  Surgeon: Shaun Guerrero MD;  [facility]     ESOPHAGOSCOPY, GASTROSCOPY, DUODENOSCOPY (EGD), COMBINED N/A 10/18/2019    Procedure: Upper Endoscopy and ERCP with stent removal, stone removal and biopsy;  Surgeon: Shaun Guerrero MD;  [facility]     ESOPHAGOSCOPY, GASTROSCOPY, DUODENOSCOPY (EGD), RESECT MUCOSA, COMBINED N/A 2/28/2019    Procedure: Upper Endoscopy, Endoscopic Ultrasound, Endoscopic Mucosal Resection,  Ampullectomy, polypectomy.;  Surgeon: Shaun Guerrero MD;  [facility]     EYE SURGERY      macular hole repaired left eye     HC KNEE SCOPE, DIAGNOSTIC      - both knees     HEAD & NECK SURGERY      wisdom teeth     Current Outpatient Medications   Medication Sig Dispense Refill     albuterol (PROAIR HFA/PROVENTIL HFA/VENTOLIN HFA) 108 (90 Base) MCG/ACT inhaler Inhale 2 puffs into the lungs every 4 hours as needed for shortness of breath / dyspnea 1 Inhaler 3     aspirin 81 MG EC tablet Take 1 tablet (81 mg) by mouth daily 90 tablet 3     atenolol (TENORMIN) 50 MG tablet Take 1 tablet (50 mg) by mouth daily 90 tablet 1     atorvastatin (LIPITOR) 20 MG tablet Take 1 tablet (20 mg) by mouth daily 90 tablet 1     azelastine (ASTELIN) 0.1 % nasal spray Spray 1 spray into both nostrils 2 times daily as needed Reported on 3/22/2017 30 mL 3      azelastine (OPTIVAR) 0.05 % SOLN Place 1 drop into both eyes 2 times daily as needed Reported on 3/22/2017       calcium-vitamin D 500-125 MG-UNIT TABS        canagliflozin (INVOKANA) 100 MG tablet Take 1 tablet (100 mg) by mouth every morning (before breakfast) 90 tablet 1     cetirizine (ZYRTEC) 10 MG tablet Take 10 mg by mouth every morning.       CONTOUR NEXT TEST test strip USE 1 STRIP TO CHECK GLUCOSE ONCE DAILY E11.9 100 each 2     desoximetasone (TOPICORT) 0.25 % cream Apply sparingly to affected area's 60 g 1     fish oil-omega-3 fatty acids (FISH OIL) 1000 MG capsule Take 1 g by mouth daily Reported on 3/22/2017       glipiZIDE (GLUCOTROL) 5 MG tablet TAKE 2 TABLETS BY MOUTH TWICE DAILY BEFORE MEAL(S) 360 tablet 1     hydrochlorothiazide (HYDRODIURIL) 25 MG tablet TAKE 1 TABLET BY MOUTH ONCE DAILY IN THE MORNING 90 tablet 1     ibuprofen (ADVIL) 200 MG tablet take 4 tablet (200 mg) by oral route every 8 hours as needed with food       latanoprost (XALATAN) 0.005 % ophthalmic solution Place 1 drop Into the left eye At Bedtime 2.5 mL 1     latanoprostene bunod (VYZULTA) 0.024 % SOLN ophthalmic solution 1 drop At Bedtime       lisinopril (ZESTRIL) 20 MG tablet Take 1 tablet (20 mg) by mouth 2 times daily 180 tablet 1     lisinopril (ZESTRIL) 40 MG tablet Take 1 tablet (40 mg) by mouth daily 90 tablet 1     metFORMIN (GLUCOPHAGE) 1000 MG tablet TAKE 1 TABLET BY MOUTH TWICE DAILY WITH MEALS 180 tablet 1     MULTIVITAMIN TABS   OR Reported on 3/22/2017       ondansetron (ZOFRAN-ODT) 4 MG ODT tab Take 1 tablet (4 mg) by mouth every 6 hours as needed for nausea or vomiting 30 tablet 0     order for DME All diabetic testing supplies including test strips, lancets and solution for testing 2 times per day. Patient is using   no Insulin. Last A1c Lab Results       Component                Value               Date                       A1C                      7.9                 08/20/2018 100 each 11     sitagliptin  "(JANUVIA) 100 MG tablet Take 1 tablet (100 mg) by mouth daily 90 tablet 1     Voltaren gel  OTC products: None, except as noted above    Allergies   Allergen Reactions     Bromfenac Visual Disturbance     Sulfites, xibrom  Causes sever eye pain     Codeine      \"NAUSE,HA AND DIZZINESS\"     Codeine Nausea     Other reaction(s): Dizziness, Headache     Sulfites Visual Disturbance     Xibrom (bromfenac opthalmic solution) 0.09%--eye pain      Latex Allergy: NO    Social History     Tobacco Use     Smoking status: Never Smoker     Smokeless tobacco: Never Used   Substance Use Topics     Alcohol use: No     Alcohol/week: 0.0 standard drinks     History   Drug Use No         Recent Labs   Lab Test 05/21/20  0848 02/20/20  0841  10/18/19  0949 10/09/19  1336  03/01/19  0536  01/17/19  1000   HGB  --   --   --  13.5 14.5   < > 11.5*   < > 12.9   PLT  --   --   --  265 294   < > 247   < > 368   INR  --   --   --   --   --   --  1.07  --  1.02    138   < > 138 137   < > 136   < > 134   POTASSIUM 3.9 4.2   < > 4.0 4.1   < > 4.1   < > 3.8   CR 0.84 0.84   < > 0.82 0.83   < > 0.75   < > 0.79   A1C 7.3* 7.0*   < >  --   --    < >  --    < >  --     < > = values in this interval not displayed.            Signed Electronically by: Raisa Davidson MD    Copy of this evaluation report is provided to requesting physician.    [facility] Preop Guidelines    Revised Cardiac Risk Index  "

## 2020-08-24 NOTE — PATIENT INSTRUCTIONS
Plan:    1. In the morning of the surgery day you take with a sip of water just these meds: Atenolol   The rest of the meds you resume after procedure.  2.  Labs today - [address]

## 2020-08-25 LAB
CREAT UR-MCNC: 84 MG/DL
MICROALBUMIN UR-MCNC: 8 MG/L
MICROALBUMIN/CREAT UR: 8.93 MG/G CR (ref 0–25)
TSH SERPL DL<=0.005 MIU/L-ACNC: 2.13 MU/L (ref 0.4–4)

## 2020-09-08 DIAGNOSIS — Z11.59 ENCOUNTER FOR SCREENING FOR OTHER VIRAL DISEASES: ICD-10-CM

## 2020-09-08 LAB
SARS-COV-2 RNA SPEC QL NAA+PROBE: NOT DETECTED
SPECIMEN SOURCE: NORMAL

## 2020-09-08 PROCEDURE — U0003 INFECTIOUS AGENT DETECTION BY NUCLEIC ACID (DNA OR RNA); SEVERE ACUTE RESPIRATORY SYNDROME CORONAVIRUS 2 (SARS-COV-2) (CORONAVIRUS DISEASE [COVID-19]), AMPLIFIED PROBE TECHNIQUE, MAKING USE OF HIGH THROUGHPUT TECHNOLOGIES AS DESCRIBED BY CMS-2020-01-R: HCPCS | Performed by: INTERNAL MEDICINE

## 2020-09-09 ENCOUNTER — ANESTHESIA EVENT (OUTPATIENT)
Dept: SURGERY | Facility: CLINIC | Age: 70
End: 2020-09-09
Payer: COMMERCIAL

## 2020-09-09 NOTE — ANESTHESIA PREPROCEDURE EVALUATION
Anesthesia Pre-Procedure Evaluation    Patient: Gricelda Sabillon   MRN:     [id number] Gender:   female   Age:    70 year old :      1950        Preoperative Diagnosis: Polyp of duodenum [K31.7]   Procedure(s):  ENDOSCOPIC RETROGRADE CHOLANGIOPANCREATOGRAPHY, WITH DIRECT DUCT VISUALIZATION, USING PANCREATICOBILIARY FIBEROPTIC PROBE  possible repeat ESOPHAGOGASTRODUODENOSCOPY, WITH RADIOFREQUENCY ABLATION     LABS:  CBC:   Lab Results   Component Value Date    WBC 7.4 2020    WBC 5.6 10/18/2019    HGB 14.6 2020    HGB 13.5 10/18/2019    HCT 46.4 2020    HCT 44.2 10/18/2019     2020     10/18/2019     BMP:   Lab Results   Component Value Date     2020     2020    POTASSIUM 3.9 2020    POTASSIUM 4.2 2020    CHLORIDE 104 2020    CHLORIDE 104 2020    CO2 28 2020    CO2 28 2020    BUN 20 2020    BUN 20 2020    CR 0.84 2020    CR 0.84 2020     (H) 2020     (H) 2020     COAGS:   Lab Results   Component Value Date    INR 1.07 2019     POC:   Lab Results   Component Value Date     (H) 10/18/2019     OTHER:   Lab Results   Component Value Date    A1C 7.2 (H) 2020    KATHRYN 9.8 2020    ALBUMIN 3.5 2020    PROTTOTAL 7.5 2020    ALT 83 (H) 2020    AST 61 (H) 2020    ALKPHOS 86 2020    BILITOTAL 0.6 2020    LIPASE 264 10/18/2019    AMYLASE 80 10/18/2019    TSH 2.13 2020    T4 1.17 2016    CRP <5.0 2014    SED 16 2014        Preop Vitals    BP Readings from Last 3 Encounters:   20 128/74   20 90/60   19 105/64    Pulse Readings from Last 3 Encounters:   20 78   20 83   19 77      Resp Readings from Last 3 Encounters:   20 16   20 18   19 16    SpO2 Readings from Last 3 Encounters:   20 98%   20 97%   19 96%      Temp  "Readings from Last 1 Encounters:   02/27/20 36.7  C (98  F) (Oral)    Ht Readings from Last 1 Encounters:   02/27/20 1.702 m (5' 7\")      Wt Readings from Last 1 Encounters:   08/24/20 95.3 kg (210 lb)    Estimated body mass index is 32.89 kg/m  as calculated from the following:    Height as of 2/27/20: 1.702 m (5' 7\").    Weight as of 8/24/20: 95.3 kg (210 lb).     LDA:        Past Medical History:   Diagnosis Date     Allergic rhinitis, cause unspecified      Asthma      Asthma, mild intermittent      Cataract      Cellulitis and abscess of leg, except foot 03/00    Bilateral     Eczema      Heart murmur     aortic area     HTN, goal below 140/90      Hyperlipidemia LDL goal < 100      Hyperplastic colon polyp 2008     Infection     bilateral eye infections     Macular hole of left eye      Obesity (BMI 30-39.9)      Osteoarthritis     knees     Other chronic pain     right knee     Sleep apnea     uses c pap     Type 2 diabetes, HbA1C goal < 8% (H)       Past Surgical History:   Procedure Laterality Date     ARTHROPLASTY HIP Right 9/25/2017    Procedure: ARTHROPLASTY HIP;  Right total hip arthroplasty  ;  Surgeon: Ayaan Pena MD;  [facility]: [facility]     ARTHROPLASTY KNEE  7/12/2013    Procedure: ARTHROPLASTY KNEE;  right total knee arthroplasty ;  Surgeon: Chaparro Wesley MD;  [facility]: [facility]     ARTHROSCOPY KNEE Right 1/21/2015    Procedure: ARTHROSCOPY KNEE;  Surgeon: Ayaan Pena MD;  Location: [facility]     C INCISION OF HYMEN       CATARACT IOL, RT/LT       COLONOSCOPY  2008     COLONOSCOPY N/A 4/18/2019    Procedure: Colonoscopy with polypectomy;  Surgeon: Shaun Guerrero MD;  [facility]     ENDOSCOPIC RETROGRADE CHOLANGIOPANCREATOGRAM N/A 2/6/2019    Procedure: COMBINED ENDOSCOPIC RETROGRADE CHOLANGIOPANCREATOGRAPHY, BILIARY SPINCTEROTOMY AND DILATION, PLACE BILE DUCT STENT;  Surgeon: Guru Andie Gonzalez MD;  [facility]     ENDOSCOPIC RETROGRADE " "CHOLANGIOPANCREATOGRAM N/A 2/28/2019    Procedure: Endoscopic Retrograde Cholangiopancreatogram, Bile duct stent removal and placement;  Surgeon: Shaun Guerrero MD;  [facility]     ENDOSCOPIC RETROGRADE CHOLANGIOPANCREATOGRAM N/A 4/18/2019    Procedure: COMBINED ENDOSCOPIC RETROGRADE CHOLANGIOPANCREATOGRAPHY, Bile duct stent exchange and Polypectomy;  Surgeon: Shaun Guerrero MD;  [facility]     ENDOSCOPIC RETROGRADE CHOLANGIOPANCREATOGRAM N/A 10/18/2019    Procedure: Endoscopic Retrograde Cholangiopancreatogram;  Surgeon: Shaun Guerrero MD;  [facility]     ENDOSCOPIC RETROGRADE CHOLANGIOPANCREATOGRAM WITH SPYGLASS N/A 7/26/2019    Procedure: Endoscopic Retrograde Cholangiopancreatogram With Spyglas,l Radiofrequency Ablation, Stent Exchangeand Duodenal Biopsy x2;  Surgeon: Shaun Guerrero MD;  [facility]     ESOPHAGOSCOPY, GASTROSCOPY, DUODENOSCOPY (EGD), COMBINED N/A 4/18/2019    Procedure: upper endoscopy with polypectomy;  Surgeon: Shaun Guerrero MD;  [facility]     ESOPHAGOSCOPY, GASTROSCOPY, DUODENOSCOPY (EGD), COMBINED N/A 10/18/2019    Procedure: Upper Endoscopy and ERCP with stent removal, stone removal and biopsy;  Surgeon: Shaun Guerrero MD;  [facility]     ESOPHAGOSCOPY, GASTROSCOPY, DUODENOSCOPY (EGD), RESECT MUCOSA, COMBINED N/A 2/28/2019    Procedure: Upper Endoscopy, Endoscopic Ultrasound, Endoscopic Mucosal Resection,  Ampullectomy, polypectomy.;  Surgeon: Shaun Guerrero MD;  [facility]     EYE SURGERY      macular hole repaired left eye     HC KNEE SCOPE, DIAGNOSTIC      - both knees     HEAD & NECK SURGERY      wisdom teeth      Allergies   Allergen Reactions     Bromfenac Visual Disturbance     Sulfites, xibrom  Causes sever eye pain     Codeine      \"NAUSE,HA AND DIZZINESS\"     Codeine Nausea     Other reaction(s): Dizziness, Headache     Sulfites Visual Disturbance     Xibrom (bromfenac opthalmic solution) 0.09%--eye pain        Anesthesia Evaluation     . Pt has " had prior anesthetic. Type: General    No history of anesthetic complications          ROS/MED HX    ENT/Pulmonary:     (+)sleep apnea, asthma , . .    Neurologic:  - neg neurologic ROS     Cardiovascular:         METS/Exercise Tolerance:     Hematologic:         Musculoskeletal:         GI/Hepatic:         Renal/Genitourinary:         Endo:         Psychiatric:  - neg psychiatric ROS       Infectious Disease:         Malignancy:         Other:                         PHYSICAL EXAM:   Mental Status/Neuro: A/A/O   Airway: Facies: Feasible  Mallampati: II  Mouth/Opening: Full  TM distance: > 6 cm  Neck ROM: Full   Respiratory: Auscultation: CTAB     Resp. Rate: Normal     Resp. Effort: Normal      CV: Rhythm: Regular  Rate: Age appropriate  Heart: Normal Sounds  Edema: None   Comments:      Dental: Normal Dentition                Assessment:   ASA SCORE: 2      Smoking Status:  Non-Smoker/Unknown   NPO Status: NPO Appropriate     Plan:   Anes. Type:  General   Pre-Medication: None   Induction:  IV (Standard)   Airway: ETT; Oral   Access/Monitoring: PIV   Maintenance: Balanced     Postop Plan:   Postop Pain: Opioids  Postop Sedation/Airway: Not planned     PONV Management:   Adult Risk Factors: Female, Non-Smoker, Postop Opioids   Prevention: Ondansetron, Dexamethasone     CONSENT: Direct conversation   Plan and risks discussed with: Patient   Blood Products: Consent Deferred (Minimal Blood Loss)                   Uday Gross MD

## 2020-09-10 ENCOUNTER — APPOINTMENT (OUTPATIENT)
Dept: GENERAL RADIOLOGY | Facility: CLINIC | Age: 70
End: 2020-09-10
Attending: INTERNAL MEDICINE
Payer: COMMERCIAL

## 2020-09-10 ENCOUNTER — HOSPITAL ENCOUNTER (OUTPATIENT)
Facility: CLINIC | Age: 70
Discharge: HOME | End: 2020-09-10
Attending: INTERNAL MEDICINE | Admitting: INTERNAL MEDICINE
Payer: COMMERCIAL

## 2020-09-10 ENCOUNTER — ANESTHESIA (OUTPATIENT)
Dept: SURGERY | Facility: CLINIC | Age: 70
End: 2020-09-10
Payer: COMMERCIAL

## 2020-09-10 ENCOUNTER — PATIENT OUTREACH (OUTPATIENT)
Dept: GASTROENTEROLOGY | Facility: CLINIC | Age: 70
End: 2020-09-10

## 2020-09-10 VITALS
TEMPERATURE: 98.4 F | HEIGHT: 67 IN | OXYGEN SATURATION: 96 % | DIASTOLIC BLOOD PRESSURE: 74 MMHG | SYSTOLIC BLOOD PRESSURE: 130 MMHG | BODY MASS INDEX: 33.63 KG/M2 | WEIGHT: 214.29 LBS | RESPIRATION RATE: 16 BRPM | HEART RATE: 81 BPM

## 2020-09-10 DIAGNOSIS — K31.7 POLYP OF DUODENUM: ICD-10-CM

## 2020-09-10 LAB
ALBUMIN SERPL-MCNC: 3.8 G/DL (ref 3.4–5)
ALP SERPL-CCNC: 92 U/L (ref 40–150)
ALT SERPL W P-5'-P-CCNC: 63 U/L (ref 0–50)
AMYLASE SERPL-CCNC: 103 U/L (ref 30–110)
ANION GAP SERPL CALCULATED.3IONS-SCNC: 8 MMOL/L (ref 3–14)
AST SERPL W P-5'-P-CCNC: 57 U/L (ref 0–45)
BILIRUB SERPL-MCNC: 0.8 MG/DL (ref 0.2–1.3)
BUN SERPL-MCNC: 26 MG/DL (ref 7–30)
CALCIUM SERPL-MCNC: 10 MG/DL (ref 8.5–10.1)
CHLORIDE SERPL-SCNC: 106 MMOL/L (ref 94–109)
CO2 SERPL-SCNC: 23 MMOL/L (ref 20–32)
CREAT SERPL-MCNC: 0.86 MG/DL (ref 0.52–1.04)
ERCP: NORMAL
ERYTHROCYTE [DISTWIDTH] IN BLOOD BY AUTOMATED COUNT: 13.3 % (ref 10–15)
GFR SERPL CREATININE-BSD FRML MDRD: 67 ML/MIN/{1.73_M2}
GLUCOSE BLDC GLUCOMTR-MCNC: 145 MG/DL (ref 70–99)
GLUCOSE BLDC GLUCOMTR-MCNC: 211 MG/DL (ref 70–99)
GLUCOSE BLDC GLUCOMTR-MCNC: 214 MG/DL (ref 70–99)
GLUCOSE SERPL-MCNC: 167 MG/DL (ref 70–99)
HCT VFR BLD AUTO: 45.5 % (ref 35–47)
HGB BLD-MCNC: 14.2 G/DL (ref 11.7–15.7)
INR PPP: 1 (ref 0.86–1.14)
LIPASE SERPL-CCNC: 1463 U/L (ref 73–393)
MCH RBC QN AUTO: 27.4 PG (ref 26.5–33)
MCHC RBC AUTO-ENTMCNC: 31.2 G/DL (ref 31.5–36.5)
MCV RBC AUTO: 88 FL (ref 78–100)
PLATELET # BLD AUTO: 253 10E9/L (ref 150–450)
POTASSIUM SERPL-SCNC: 3.8 MMOL/L (ref 3.4–5.3)
PROT SERPL-MCNC: 7.5 G/DL (ref 6.8–8.8)
RBC # BLD AUTO: 5.19 10E12/L (ref 3.8–5.2)
SODIUM SERPL-SCNC: 137 MMOL/L (ref 133–144)
WBC # BLD AUTO: 7.5 10E9/L (ref 4–11)

## 2020-09-10 PROCEDURE — 25000131 ZZH RX MED GY IP 250 OP 636 PS 637: Performed by: ANESTHESIOLOGY

## 2020-09-10 PROCEDURE — 71000014 ZZH RECOVERY PHASE 1 LEVEL 2 FIRST HR: Performed by: INTERNAL MEDICINE

## 2020-09-10 PROCEDURE — 82150 ASSAY OF AMYLASE: CPT | Performed by: STUDENT IN AN ORGANIZED HEALTH CARE EDUCATION/TRAINING PROGRAM

## 2020-09-10 PROCEDURE — 27210794 ZZH OR GENERAL SUPPLY STERILE: Performed by: INTERNAL MEDICINE

## 2020-09-10 PROCEDURE — 82962 GLUCOSE BLOOD TEST: CPT | Mod: GZ

## 2020-09-10 PROCEDURE — 36000066 ZZH SURGERY LEVEL 4 W FLUORO 1ST 30 MIN - UMMC: Performed by: INTERNAL MEDICINE

## 2020-09-10 PROCEDURE — 25500064 ZZH RX 255 OP 636: Performed by: INTERNAL MEDICINE

## 2020-09-10 PROCEDURE — 25000566 ZZH SEVOFLURANE, EA 15 MIN: Performed by: INTERNAL MEDICINE

## 2020-09-10 PROCEDURE — 40000277 XR SURGERY CARM FLUORO LESS THAN 5 MIN W STILLS: Mod: TC

## 2020-09-10 PROCEDURE — 37000009 ZZH ANESTHESIA TECHNICAL FEE, EACH ADDTL 15 MIN: Performed by: INTERNAL MEDICINE

## 2020-09-10 PROCEDURE — 71000027 ZZH RECOVERY PHASE 2 EACH 15 MINS: Performed by: INTERNAL MEDICINE

## 2020-09-10 PROCEDURE — 85610 PROTHROMBIN TIME: CPT | Mod: GZ | Performed by: STUDENT IN AN ORGANIZED HEALTH CARE EDUCATION/TRAINING PROGRAM

## 2020-09-10 PROCEDURE — 37000008 ZZH ANESTHESIA TECHNICAL FEE, 1ST 30 MIN: Performed by: INTERNAL MEDICINE

## 2020-09-10 PROCEDURE — 25000128 H RX IP 250 OP 636: Performed by: STUDENT IN AN ORGANIZED HEALTH CARE EDUCATION/TRAINING PROGRAM

## 2020-09-10 PROCEDURE — 85027 COMPLETE CBC AUTOMATED: CPT | Performed by: STUDENT IN AN ORGANIZED HEALTH CARE EDUCATION/TRAINING PROGRAM

## 2020-09-10 PROCEDURE — 83690 ASSAY OF LIPASE: CPT | Performed by: STUDENT IN AN ORGANIZED HEALTH CARE EDUCATION/TRAINING PROGRAM

## 2020-09-10 PROCEDURE — 40000170 ZZH STATISTIC PRE-PROCEDURE ASSESSMENT II: Performed by: INTERNAL MEDICINE

## 2020-09-10 PROCEDURE — 36000064 ZZH SURGERY LEVEL 4 EA 15 ADDTL MIN - UMMC: Performed by: INTERNAL MEDICINE

## 2020-09-10 PROCEDURE — 80053 COMPREHEN METABOLIC PANEL: CPT | Performed by: STUDENT IN AN ORGANIZED HEALTH CARE EDUCATION/TRAINING PROGRAM

## 2020-09-10 PROCEDURE — 25800030 ZZH RX IP 258 OP 636: Performed by: STUDENT IN AN ORGANIZED HEALTH CARE EDUCATION/TRAINING PROGRAM

## 2020-09-10 PROCEDURE — 25000125 ZZHC RX 250: Performed by: INTERNAL MEDICINE

## 2020-09-10 PROCEDURE — 36415 COLL VENOUS BLD VENIPUNCTURE: CPT | Performed by: STUDENT IN AN ORGANIZED HEALTH CARE EDUCATION/TRAINING PROGRAM

## 2020-09-10 PROCEDURE — 25000125 ZZHC RX 250: Performed by: STUDENT IN AN ORGANIZED HEALTH CARE EDUCATION/TRAINING PROGRAM

## 2020-09-10 PROCEDURE — 88305 TISSUE EXAM BY PATHOLOGIST: CPT | Performed by: INTERNAL MEDICINE

## 2020-09-10 RX ORDER — IOPAMIDOL 510 MG/ML
INJECTION, SOLUTION INTRAVASCULAR PRN
Status: DISCONTINUED | OUTPATIENT
Start: 2020-09-10 | End: 2020-09-10 | Stop reason: HOSPADM

## 2020-09-10 RX ORDER — LIDOCAINE 40 MG/G
CREAM TOPICAL
Status: DISCONTINUED | OUTPATIENT
Start: 2020-09-10 | End: 2020-09-10 | Stop reason: HOSPADM

## 2020-09-10 RX ORDER — FLUMAZENIL 0.1 MG/ML
0.2 INJECTION, SOLUTION INTRAVENOUS
Status: DISCONTINUED | OUTPATIENT
Start: 2020-09-10 | End: 2020-09-10 | Stop reason: HOSPADM

## 2020-09-10 RX ORDER — NALOXONE HYDROCHLORIDE 0.4 MG/ML
.1-.4 INJECTION, SOLUTION INTRAMUSCULAR; INTRAVENOUS; SUBCUTANEOUS
Status: DISCONTINUED | OUTPATIENT
Start: 2020-09-10 | End: 2020-09-10

## 2020-09-10 RX ORDER — LABETALOL 20 MG/4 ML (5 MG/ML) INTRAVENOUS SYRINGE
10
Status: DISCONTINUED | OUTPATIENT
Start: 2020-09-10 | End: 2020-09-10 | Stop reason: HOSPADM

## 2020-09-10 RX ORDER — FENTANYL CITRATE 50 UG/ML
25-50 INJECTION, SOLUTION INTRAMUSCULAR; INTRAVENOUS
Status: DISCONTINUED | OUTPATIENT
Start: 2020-09-10 | End: 2020-09-10 | Stop reason: HOSPADM

## 2020-09-10 RX ORDER — ONDANSETRON 2 MG/ML
INJECTION INTRAMUSCULAR; INTRAVENOUS PRN
Status: DISCONTINUED | OUTPATIENT
Start: 2020-09-10 | End: 2020-09-10

## 2020-09-10 RX ORDER — ONDANSETRON 2 MG/ML
4 INJECTION INTRAMUSCULAR; INTRAVENOUS EVERY 30 MIN PRN
Status: DISCONTINUED | OUTPATIENT
Start: 2020-09-10 | End: 2020-09-10 | Stop reason: HOSPADM

## 2020-09-10 RX ORDER — LIDOCAINE HYDROCHLORIDE 20 MG/ML
INJECTION, SOLUTION INFILTRATION; PERINEURAL PRN
Status: DISCONTINUED | OUTPATIENT
Start: 2020-09-10 | End: 2020-09-10

## 2020-09-10 RX ORDER — SODIUM CHLORIDE, SODIUM LACTATE, POTASSIUM CHLORIDE, CALCIUM CHLORIDE 600; 310; 30; 20 MG/100ML; MG/100ML; MG/100ML; MG/100ML
INJECTION, SOLUTION INTRAVENOUS CONTINUOUS
Status: DISCONTINUED | OUTPATIENT
Start: 2020-09-10 | End: 2020-09-10 | Stop reason: HOSPADM

## 2020-09-10 RX ORDER — DEXAMETHASONE SODIUM PHOSPHATE 4 MG/ML
INJECTION, SOLUTION INTRA-ARTICULAR; INTRALESIONAL; INTRAMUSCULAR; INTRAVENOUS; SOFT TISSUE PRN
Status: DISCONTINUED | OUTPATIENT
Start: 2020-09-10 | End: 2020-09-10

## 2020-09-10 RX ORDER — ONDANSETRON 4 MG/1
4 TABLET, ORALLY DISINTEGRATING ORAL EVERY 30 MIN PRN
Status: DISCONTINUED | OUTPATIENT
Start: 2020-09-10 | End: 2020-09-10 | Stop reason: HOSPADM

## 2020-09-10 RX ORDER — MEPERIDINE HYDROCHLORIDE 25 MG/ML
12.5 INJECTION INTRAMUSCULAR; INTRAVENOUS; SUBCUTANEOUS
Status: DISCONTINUED | OUTPATIENT
Start: 2020-09-10 | End: 2020-09-10 | Stop reason: HOSPADM

## 2020-09-10 RX ORDER — FENTANYL CITRATE 50 UG/ML
25-50 INJECTION, SOLUTION INTRAMUSCULAR; INTRAVENOUS EVERY 5 MIN PRN
Status: DISCONTINUED | OUTPATIENT
Start: 2020-09-10 | End: 2020-09-10 | Stop reason: HOSPADM

## 2020-09-10 RX ORDER — INDOMETHACIN 50 MG/1
100 SUPPOSITORY RECTAL
Status: DISCONTINUED | OUTPATIENT
Start: 2020-09-10 | End: 2020-09-10 | Stop reason: HOSPADM

## 2020-09-10 RX ORDER — HYDROMORPHONE HYDROCHLORIDE 1 MG/ML
.3-.5 INJECTION, SOLUTION INTRAMUSCULAR; INTRAVENOUS; SUBCUTANEOUS EVERY 10 MIN PRN
Status: DISCONTINUED | OUTPATIENT
Start: 2020-09-10 | End: 2020-09-10 | Stop reason: HOSPADM

## 2020-09-10 RX ORDER — DEXTROSE MONOHYDRATE 25 G/50ML
25-50 INJECTION, SOLUTION INTRAVENOUS
Status: DISCONTINUED | OUTPATIENT
Start: 2020-09-10 | End: 2020-09-10 | Stop reason: HOSPADM

## 2020-09-10 RX ORDER — PROPOFOL 10 MG/ML
INJECTION, EMULSION INTRAVENOUS PRN
Status: DISCONTINUED | OUTPATIENT
Start: 2020-09-10 | End: 2020-09-10

## 2020-09-10 RX ORDER — INDOMETHACIN 50 MG/1
SUPPOSITORY RECTAL PRN
Status: DISCONTINUED | OUTPATIENT
Start: 2020-09-10 | End: 2020-09-10 | Stop reason: HOSPADM

## 2020-09-10 RX ORDER — FENTANYL CITRATE 50 UG/ML
INJECTION, SOLUTION INTRAMUSCULAR; INTRAVENOUS PRN
Status: DISCONTINUED | OUTPATIENT
Start: 2020-09-10 | End: 2020-09-10

## 2020-09-10 RX ORDER — NALOXONE HYDROCHLORIDE 0.4 MG/ML
.1-.4 INJECTION, SOLUTION INTRAMUSCULAR; INTRAVENOUS; SUBCUTANEOUS
Status: DISCONTINUED | OUTPATIENT
Start: 2020-09-10 | End: 2020-09-10 | Stop reason: HOSPADM

## 2020-09-10 RX ORDER — NICOTINE POLACRILEX 4 MG
15-30 LOZENGE BUCCAL
Status: DISCONTINUED | OUTPATIENT
Start: 2020-09-10 | End: 2020-09-10 | Stop reason: HOSPADM

## 2020-09-10 RX ADMIN — PROPOFOL 20 MG: 10 INJECTION, EMULSION INTRAVENOUS at 08:26

## 2020-09-10 RX ADMIN — INSULIN ASPART 3 UNITS: 100 INJECTION, SOLUTION INTRAVENOUS; SUBCUTANEOUS at 09:33

## 2020-09-10 RX ADMIN — ONDANSETRON 4 MG: 2 INJECTION INTRAMUSCULAR; INTRAVENOUS at 08:22

## 2020-09-10 RX ADMIN — PHENYLEPHRINE HYDROCHLORIDE 100 MCG: 10 INJECTION INTRAVENOUS at 08:04

## 2020-09-10 RX ADMIN — PHENYLEPHRINE HYDROCHLORIDE 100 MCG: 10 INJECTION INTRAVENOUS at 08:24

## 2020-09-10 RX ADMIN — PHENYLEPHRINE HYDROCHLORIDE 100 MCG: 10 INJECTION INTRAVENOUS at 07:58

## 2020-09-10 RX ADMIN — ROCURONIUM BROMIDE 60 MG: 10 INJECTION INTRAVENOUS at 07:30

## 2020-09-10 RX ADMIN — PHENYLEPHRINE HYDROCHLORIDE 100 MCG: 10 INJECTION INTRAVENOUS at 08:15

## 2020-09-10 RX ADMIN — PHENYLEPHRINE HYDROCHLORIDE 100 MCG: 10 INJECTION INTRAVENOUS at 07:35

## 2020-09-10 RX ADMIN — PROPOFOL 130 MG: 10 INJECTION, EMULSION INTRAVENOUS at 07:30

## 2020-09-10 RX ADMIN — SUGAMMADEX 200 MG: 100 INJECTION, SOLUTION INTRAVENOUS at 08:23

## 2020-09-10 RX ADMIN — LIDOCAINE HYDROCHLORIDE 100 MG: 20 INJECTION, SOLUTION INFILTRATION; PERINEURAL at 07:30

## 2020-09-10 RX ADMIN — GLUCAGON HYDROCHLORIDE 0.24 MG: KIT at 08:11

## 2020-09-10 RX ADMIN — DEXAMETHASONE SODIUM PHOSPHATE 8 MG: 4 INJECTION, SOLUTION INTRA-ARTICULAR; INTRALESIONAL; INTRAMUSCULAR; INTRAVENOUS; SOFT TISSUE at 08:18

## 2020-09-10 RX ADMIN — FENTANYL CITRATE 50 MCG: 50 INJECTION, SOLUTION INTRAMUSCULAR; INTRAVENOUS at 07:30

## 2020-09-10 ASSESSMENT — MIFFLIN-ST. JEOR: SCORE: 1514.63

## 2020-09-10 NOTE — PROGRESS NOTES
[facility] Internal Procedure form:    Referring/Requesting provider: Cesar    Procedure Requested:   Procedure/Imaging/Clinic: EGD with possible EMR, ERCP with spyglass and possible RFA   Physician: Cesar   Timin months (due mid 2021)  Procedure length: 60 min   Anesthesia: Gen   Dx: ampullary adenoma   Tier: 4   [facility]   Requested provider (if specified): Cesar    OR orders to be placed closer to time of procedure.     Kalli Le RN   BSN, HNBC, STAR-T  [facility]  Care Coordinator  Ph: [phone]  FAX: [phone]

## 2020-09-10 NOTE — ANESTHESIA POSTPROCEDURE EVALUATION
Anesthesia POST Procedure Evaluation    Patient: Gricelda Sabillon   MRN:     [id number] Gender:   female   Age:    70 year old :      1950        Preoperative Diagnosis: Polyp of duodenum [K31.7]   Procedure(s):  ENDOSCOPIC RETROGRADE CHOLANGIOPANCREATOGRAPHY, WITH DIRECT DUCT VISUALIZATION, USING PANCREATICOBILIARY FIBEROPTIC PROBE  possible repeat ESOPHAGOGASTRODUODENOSCOPY, WITH RADIOFREQUENCY ABLATION and endoscopic mucosal resection   Postop Comments: No value filed.     Anesthesia Type: General       Disposition: Outpatient   Postop Pain Control: Uneventful            Sign Out: Well controlled pain   PONV: No   Neuro/Psych: Uneventful            Sign Out: Acceptable/Baseline neuro status   Airway/Respiratory: Uneventful            Sign Out: Acceptable/Baseline resp. status   CV/Hemodynamics: Uneventful            Sign Out: Acceptable CV status   Other NRE:    DID A NON-ROUTINE EVENT OCCUR? No    Event details/Postop Comments:  Hyperglycemia, treated with subcutaneous insulin         Last Anesthesia Record Vitals:  CRNA VITALS  9/10/2020 0805 - 9/10/2020 0905      9/10/2020             NIBP:  111/66    Ht Rate:  70          Last PACU Vitals:  Vitals Value Taken Time   /91 9/10/2020  9:30 AM   Temp 36.5  C (97.7  F) 9/10/2020  9:30 AM   Pulse 70 9/10/2020  9:40 AM   Resp 18 9/10/2020  9:30 AM   SpO2 96 % 9/10/2020  9:40 AM   Temp src     NIBP     Pulse     SpO2     Resp     Temp     Ht Rate     Temp 2     Vitals shown include unvalidated device data.      Electronically Signed By: Chaparro Hartman MD, September 10, 2020, 9:41 AM

## 2020-09-10 NOTE — DISCHARGE INSTRUCTIONS
[facility]  Same-Day Surgery   Adult Discharge Orders & Instructions     For 24 hours after surgery    1. Get plenty of rest.  A responsible adult must stay with you for at least 24 hours after you leave the hospital.   2. Do not drive or use heavy equipment.  If you have weakness or tingling, don't drive or use heavy equipment until this feeling goes away.  3. Do not drink alcohol.  4. Avoid strenuous or risky activities.  Ask for help when climbing stairs.   5. You may feel lightheaded.  IF so, sit for a few minutes before standing.  Have someone help you get up.   6. If you have nausea (feel sick to your stomach): Drink only clear liquids such as apple juice, ginger ale, broth or 7-Up.  Rest may also help.  Be sure to drink enough fluids.  Move to a regular diet as you feel able.  7. You may have a slight fever. Call the doctor if your fever is over 100.4 F (taken under the tongue) or lasts longer than 24 hours.  8. You may have a dry mouth, a sore throat, muscle aches or trouble sleeping.  These should go away after 24 hours.  9. Do not make important or legal decisions.   Call your doctor for any of the followin.  Signs of infection (fever, growing tenderness at the surgery site, a large amount of drainage or bleeding, severe pain, foul-smelling drainage, redness, swelling).    2. It has been over 8 to 10 hours since surgery and you are still not able to urinate (pass water).    3.  Headache for over 24 hours.      To contact a doctor, call [facility] at [phone] or:        [phone] and ask for the resident on call for GASTROENTEROLOGY OR SURGERY (answered 24 hours a day)      Emergency Department:    [facility]: [phone]       (TTY for hearing impaired: [phone])

## 2020-09-10 NOTE — ANESTHESIA CARE TRANSFER NOTE
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    Anesthesia Type:   General     Note:  Airway :ETT  Patient transferred to:PACU  Comments: Airway :Face Mask  Patient transferred to:PACU  Comments: Prior to extubation, patient was reversed with 200 mg of Sugammadex and shortly afterwards observed with spontaneous breathing with regular pattern and proper tidal volumes. Patient woke up, opened their eyes to verbal stimuli and followed basic commands. Patient was suctioned and extubated without complication.   Transported to PACU on 6L O2 via face mask.   VSS upon arrival to PACU.  Patient denies nausea or pain at this time.   Care transfer plan communicated, appropriate time for questions was given and patient care transferred to PACU RN       Uday Gross MD    Handoff Report: Identifed the Patient, Identified the Reponsible Provider, Reviewed the pertinent medical history, Discussed the surgical course, Reviewed Intra-OP anesthesia mangement and issues during anesthesia, Set expectations for post-procedure period and Allowed opportunity for questions and acknowledgement of understanding      Vitals: (Last set prior to Anesthesia Care Transfer)    CRNA VITALS  9/10/2020 0805 - 9/10/2020 0845      9/10/2020             NIBP:  111/66    Ht Rate:  70                Electronically Signed By: Uday Gross MD  September 10, 2020  8:45 AM

## 2020-09-10 NOTE — OP NOTE
ERCP  09/10/2020  7:40 AM  [facility] ([facility], [address] [place] [address] [phone]     Endoscopy Department   _______________________________________________________________________________   Patient Name: Gricelda Sabillon         Procedure Date: 9/10/2020 7:40 AM   MRN: [id number]                       Account Number: [id number]   YOB: 1950              Admit Type: Outpatient   Age: 70                               Room: OR   Gender: Female                        Note Status: Finalized   Attending MD: Shaun Guerrero MD       Pause for the Cause: time out performed   Total Sedation Time:                     _______________________________________________________________________________       Procedure:           ERCP   Indications:         Follow-up of ampullary and periampullary tumor; s/p                        papillectomy and piecemeal resection of duodenal polyp                        and s/p intraductal RFA of biliary orifice for residual                        polyp; Here for surveillance   Providers:           Shaun Guerrero MD   Referring MD:           Requesting Provider: Raisa Davidson MD   Medicines:           General Anesthesia, Indomethacin 100 mg TX   Complications:       No immediate complications. Estimated blood loss:                        Minimal.   _______________________________________________________________________________   Procedure:           Pre-Anesthesia Assessment:                        - Prior to the procedure, a History and Physical was                        performed, and patient medications and allergies were                        reviewed. The patient is competent. The risks and                        benefits of the procedure and the sedation options and                        risks were discussed with the patient. All questions                        were answered and informed consent was obtained. Patient                         identification and proposed procedure were verified by                        the physician, the nurse, the anesthesiologist and the                        anesthetist in the procedure room. Mental Status                        Examination: alert and oriented. Airway Examination:                        normal oropharyngeal airway and neck mobility.                        Respiratory Examination: clear to auscultation. CV                        Examination: normal. Prophylactic Antibiotics: The                        patient does not require prophylactic antibiotics. Prior                        Anticoagulants: The patient has taken no previous                        anticoagulant or antiplatelet agents. ASA Grade                        Assessment: III - A patient with severe systemic                        disease. After reviewing the risks and benefits, the                        patient was deemed in satisfactory condition to undergo                        the procedure. The anesthesia plan was to use general                        anesthesia. Immediately prior to administration of                        medications, the patient was re-assessed for adequacy to                        receive sedatives. The heart rate, respiratory rate,                        oxygen saturations, blood pressure, adequacy of                        pulmonary ventilation, and response to care were                        monitored throughout the procedure. The physical status                        of the patient was re-assessed after the procedure.                        After obtaining informed consent, the scope was passed                        under direct vision. Throughout the procedure, the                        patient's blood pressure, pulse, and oxygen saturations                        were monitored continuously. The Endoscope was                        introduced through the mouth, and used to inject                         contrast into and used to locate the major papilla. The                        was introduced through the mouth, and used to inject                        contrast into and used to inject contrast into the bile                        duct. The ERCP was accomplished without difficulty. The                        patient tolerated the procedure well.                                                                                     Findings:        The  film was normal. Adult gastroscope first passed to third        portion of duodenum. There was a residual 7 mm sessile (jeanie IIa) polyp        just posterior to the major papilla. The polyp was removed with a cold        snare en bloc. The polypectomy was performed through the        esophagogastroduodenoscope. Resection and retrieval were complete. No        other remaining residual polyp tissue identified. Gastroscope exchanged        for duodenoscope. The esophagus was successfully intubated under direct        vision. The scope was advanced from the mouth to the duodenum. The        pharynx, larynx and associated structures, as well as the upper GI        tract, were normal. Inspection of the major papilla revealed that an        ampullectomy (papillectomy) had been performed previously. No other        sites of residual polyp were seen with the side viewing duodenoscope        including at the biliary/pancreatic orfice. The bile duct was deeply        cannulated with the 12 mm balloon. Contrast was injected. I personally        interpreted the bile duct images. There was brisk flow of contrast        through the ducts. Image quality was excellent. Contrast extended to the        entire biliary tree. The entire biliary tree was normal. Visiglide wire        was passed into the biliary tree. The bile duct was explored        endoscopically using the SpOptizen labslass direct visualization system. The        SpyGlass probe was advanced to the middle third  of the main bile duct.        Visibility with the probe was excellent. The lower third of the main        bile duct and middle third of the main bile duct were normal.                                                                                    Impression:          - EGD performed first. 7 mm residual duodenal polyp seen                        at D2 just posterior to the major papilla. Resected with                        a cold snare and retrieved.                        - Prior endoscopic papillectomy                        - No other residual polyp seen with either the forward                        viewing gastroscope or the side viewing duodenoscope                        - Spyglass cholangioscopy performed and no residual                        polypoid tissue seen within the distal bile duct either.                        - The cholangiogram was normal.   Recommendation:      - Discharge patient to home (ambulatory).                        - Repeat EGD/ERCP in 6 months for surveillance. If                        normal at that time, will likely space out to 1 year                        after that.                        - Resume medications and diet                        - Above discussed with family and patient                                                                                       Shaun Guerrero MD

## 2020-09-11 LAB — COPATH REPORT: NORMAL

## 2020-10-15 NOTE — TELEPHONE ENCOUNTER
Problem: Pain  Goal: #Acceptable pain level achieved/maintained at rest using NRS/Faces  Description: This goal is used for patients who can self-report.  Acceptable means the level is at or below the identified comfort/function goal.  Outcome: Outcome Met, Continue evaluating goal progress toward completion  Note: Pt reports satisfactory control (0-2/10) of RLQ pain with PCA pump.      Problem: Pressure Injury, Risk for  Goal: # Skin remains intact  Outcome: Outcome Met, Continue evaluating goal progress toward completion  Note: Previously noted dark red rash on feet and inner thighs expanded throughout shift, noted also along posterior thighs. New, smaller light pink rash observed across neck and back, and on upper abdomen in morning. D/w Amber Winter,  Sarah, and Hermelindo.      Intervention: # Collaborate with Nutrition Services for patients who have inadequate nutritional intake  Description: Collaborate with Nutritional Services for patients with inadequate intake, nutritional barriers, or BMI < 18.5  Note: Diet advanced to Full liquid today, d/w nutrition to order ensure. Pt consuming ~50% supplement with meals. Tolerating full liquids without nausea or abdominal pain. Tube feed initiated at 1500.         Januvia         Last Written Prescription Date: 3/9/17  Last Fill Quantity: 30, # refills: 3  Last Office Visit with [facility], [facility] or  Health prescribing provider:  3/22/17   Next 5 appointments (look out 90 days)     Sep 12, 2017  1:00 PM CDT   SHORT with Raisa Davidson MD   [facility]    [address]  [facility] [id number] [phone]                   BP Readings from Last 3 Encounters:   03/22/17 128/84   12/14/16 142/72   11/17/16 116/70     Lab Results   Component Value Date    MICROL 7 09/07/2016     Lab Results   Component Value Date    UMALCR 6.64 09/07/2016     Creatinine   Date Value Ref Range Status   09/07/2016 0.97 0.52 - 1.04 mg/dL Final   ]  GFR Estimate   Date Value Ref Range Status   09/07/2016 57 (L) >60 mL/min/1.7m2 Final     Comment:     Non  GFR Calc   02/04/2016 83 >60 mL/min/1.7m2 Final     Comment:     Non  GFR Calc   08/03/2015 79 >60 mL/min/1.7m2 Final     Comment:     Non  GFR Calc     GFR Estimate If Black   Date Value Ref Range Status   09/07/2016 69 >60 mL/min/1.7m2 Final     Comment:      GFR Calc   02/04/2016 >90   GFR Calc   >60 mL/min/1.7m2 Final   08/03/2015 >90   GFR Calc   >60 mL/min/1.7m2 Final     Lab Results   Component Value Date    CHOL 165 09/07/2016     Lab Results   Component Value Date    HDL 38 09/07/2016     Lab Results   Component Value Date    LDL 60 09/07/2016     Lab Results   Component Value Date    TRIG 334 09/07/2016     Lab Results   Component Value Date    CHOLHDLRATIO 3.1 08/03/2015     Lab Results   Component Value Date    AST 23 09/07/2016     Lab Results   Component Value Date    ALT 42 09/07/2016     Lab Results   Component Value Date    A1C 7.5 03/17/2017    A1C 7.0 12/05/2016    A1C 6.8 09/07/2016    A1C 7.6 05/03/2016    A1C 7.4 02/04/2016     Potassium   Date Value Ref Range Status    09/07/2016 4.1 3.4 - 5.3 mmol/L Final       Labs showing if normal/abnormal  Lab Results   Component Value Date    UCRR 98 09/07/2016    MICROL 7 09/07/2016     Lab Results   Component Value Date    CHOL 165 09/07/2016    TRIG 334 (H) 09/07/2016    HDL 38 (L) 09/07/2016    LDL 60 09/07/2016    VLDL 33 (H) 08/03/2015    CHOLHDLRATIO 3.1 08/03/2015     Lab Results   Component Value Date    A1C 7.5 (H) 03/17/2017    A1C 7.0 (H) 12/05/2016    A1C 6.8 (H) 09/07/2016     Prescription approved per [facility].

## 2020-10-26 ENCOUNTER — OFFICE VISIT (OUTPATIENT)
Dept: PHARMACY | Facility: CLINIC | Age: 70
End: 2020-10-26
Payer: COMMERCIAL

## 2020-10-26 DIAGNOSIS — J45.20 MILD INTERMITTENT ASTHMA WITHOUT COMPLICATION: ICD-10-CM

## 2020-10-26 DIAGNOSIS — R52 PAIN: ICD-10-CM

## 2020-10-26 DIAGNOSIS — J30.9 ALLERGIC RHINITIS, UNSPECIFIED SEASONALITY, UNSPECIFIED TRIGGER: ICD-10-CM

## 2020-10-26 DIAGNOSIS — H40.053 BORDERLINE GLAUCOMA WITH OCULAR HYPERTENSION, BILATERAL: ICD-10-CM

## 2020-10-26 DIAGNOSIS — L30.9 ECZEMA, UNSPECIFIED TYPE: ICD-10-CM

## 2020-10-26 DIAGNOSIS — Z78.9 TAKES DIETARY SUPPLEMENTS: ICD-10-CM

## 2020-10-26 DIAGNOSIS — E78.5 HYPERLIPIDEMIA WITH TARGET LDL LESS THAN 100: ICD-10-CM

## 2020-10-26 DIAGNOSIS — Z71.85 VACCINE COUNSELING: ICD-10-CM

## 2020-10-26 DIAGNOSIS — I10 HTN, GOAL BELOW 140/90: ICD-10-CM

## 2020-10-26 DIAGNOSIS — E11.65 TYPE 2 DIABETES MELLITUS WITH HYPERGLYCEMIA, WITHOUT LONG-TERM CURRENT USE OF INSULIN (H): Primary | ICD-10-CM

## 2020-10-26 DIAGNOSIS — R11.0 NAUSEA: ICD-10-CM

## 2020-10-26 PROCEDURE — 99607 MTMS BY PHARM ADDL 15 MIN: CPT | Performed by: PHARMACIST

## 2020-10-26 PROCEDURE — 99605 MTMS BY PHARM NP 15 MIN: CPT | Performed by: PHARMACIST

## 2020-10-26 NOTE — LETTER
"     [facility]     Date: 10/26/2020    Gricelda Sabillon  [address] [address]    To Dr. Davidson,    Thank you for talking with me on 10/26/2020 about your health and medications. Medicare s MTM (Medication Therapy Management) program helps you understand your medications and use them safely.      This letter includes an action plan (Medication Action Plan) and medication list (Personal Medication List). The action plan has steps you should take to help you get the best results from your medications. The medication list will help you keep track of your medications and how to use them the right way.       Have your action plan and medication list with you when you talk with your doctors, pharmacists, and other healthcare providers in your care team.     Ask your doctors, pharmacists, and other healthcare providers to update the action plan and medication list at every visit.     Take your medication list with you if you go to the hospital or emergency room.     Give a copy of the action plan and medication list to your family or caregivers.     If you want to talk about this letter or any of the papers with it, please call   [phone].   We look forward to working with you, your doctors, and other healthcare providers to help you stay healthy through the [facility] MTM program.    Sincerely,    Giovanna Heller [facility]      Enclosed: Medication Action Plan and Personal Medication List    MEDICATION ACTION PLAN FOR Gricelda Sabillon,  1950     This action plan will help you get the best results from your medications if you:   1. Read \"What we talked about.\"   2. Take the steps listed in the \"What I need to do\" boxes.   3. Fill in \"What I did and when I did it.\"   4. Fill in \"My follow-up plan\" and \"Questions I want to ask.\"     Have this action plan with you when you talk with your doctors, pharmacists, and other healthcare providers in your care team. Share " this with your family or caregivers too.  DATE PREPARED: 10/26/2020  What we talked about: Recommend the patient receive the flu shot, Tdap, and Shingrix vaccines.                                                  What I need to do: Review with patient at next visit.     What I did and when I did it:                                              What we talked about: Consider increasing atorvastatin 40 mg per guidelines.                                                  What I need to do: Recommend discussing this with patient at next visit.   What I did and when I did it:                                               What we talked about: Consider checking A1c in about a month to determine if therapy adjustments are needed. Consider increasing canagliflozin if clinically appropriate.                                               What I need to do: Recommend discussing with patient.     What I did and when I did it:                                                     My follow-up plan:                 Questions I want to ask:              If you have any questions about your action plan, call Giovanna Heller [facility], Phone: [phone] , Monday-Friday 8-4:30pm.           PERSONAL MEDICATION LIST FOR Gricelda Sabillon,  1950     This medication list was made for you after we talked. We also used information from your doctor's chart.      Use blank rows to add new medications. Then fill in the dates you started using them.    Cross out medications when you no longer use them. Then write the date and why you stopped using them.    Ask your doctors, pharmacists, and other healthcare providers to update this list at every visit. Keep this list up-to-date with:       Prescription medications    Over the counter drugs     Herbals    Vitamins    Minerals      If you go to the hospital or emergency room, take this list with you. Share this with your family or caregivers too.     DATE PREPARED: 10/26/2020  Allergies or  side effects: Bromfenac, Codeine, Codeine, and Sulfites     Medication:  ALBUTEROL SULFATE  (90 BASE) MCG/ACT IN AERS      How I use it:  Inhale 2 puffs into the lungs every 4 hours as needed for shortness of breath / dyspnea      Why I use it: Asthma    Prescriber:  Raisa Davidson MD      Date I started using it:       Date I stopped using it:         Why I stopped using it:            Medication:  ASPIRIN 81 MG PO TBEC      How I use it:  Take 1 tablet (81 mg) by mouth daily      Why I use it: Elevated ASCVD risk    Prescriber:  Clara Corado MD      Date I started using it:       Date I stopped using it:         Why I stopped using it:            Medication:  ATENOLOL 50 MG PO TABS      How I use it:  Take 1 tablet (50 mg) by mouth daily      Why I use it: HTN, goal below 140/90    Prescriber:  Raisa Davidson MD      Date I started using it:       Date I stopped using it:         Why I stopped using it:            Medication:  ATORVASTATIN CALCIUM 20 MG PO TABS      How I use it:  Take 1 tablet (20 mg) by mouth daily      Why I use it: Hyperlipidemia with target LDL less than 100    Prescriber:  Raisa Davidson MD      Date I started using it:       Date I stopped using it:         Why I stopped using it:            Medication:  AZELASTINE HCL 0.05 % OP SOLN      How I use it:  Place 1 drop into both eyes 2 times daily as needed Reported on 3/22/2017      Why I use it:  Allergies    Prescriber:  Patient Reported      Date I started using it:       Date I stopped using it:         Why I stopped using it:            Medication:  AZELASTINE HCL 0.1 % NA SOLN      How I use it:  Spray 1 spray into both nostrils 2 times daily as needed Reported on 3/22/2017      Why I use it: Acute bronchitis    Prescriber:  Raisa Davidson MD      Date I started using it:       Date I stopped using it:         Why I stopped using it:            Medication:   CALCIUM-VITAMIN D 500-125 MG-UNIT PO TABS      How I use it:   Take 1 tab by mouth daily      Why I use it:  Supplementation    Prescriber:  Patient Reported      Date I started using it:       Date I stopped using it:         Why I stopped using it:            Medication:  CANAGLIFLOZIN 100 MG PO TABS      How I use it:  Take 1 tablet (100 mg) by mouth every morning (before breakfast)      Why I use it: Diabetes mellitus without complication (H)    Prescriber:  Raisa Davidson MD      Date I started using it:       Date I stopped using it:         Why I stopped using it:            Medication:  CETIRIZINE HCL 10 MG PO TABS      How I use it:  Take 10 mg by mouth every morning.      Why I use it:  Allergies    Prescriber:  Patient Reported      Date I started using it:       Date I stopped using it:         Why I stopped using it:            Medication:  DESOXIMETASONE 0.25 % EX CREA      How I use it:  Apply sparingly to affected area's      Why I use it: Eczema    Prescriber:  Raisa Davidson MD      Date I started using it:       Date I stopped using it:         Why I stopped using it:            Medication:  GLIPIZIDE 5 MG PO TABS      How I use it:  TAKE 2 TABLETS BY MOUTH TWICE DAILY BEFORE MEAL(S)      Why I use it:  Type 2 Diabetes    Prescriber:  Raisa Davidson MD      Date I started using it:       Date I stopped using it:         Why I stopped using it:            Medication:  HYDROCHLOROTHIAZIDE 25 MG PO TABS      How I use it:  TAKE 1 TABLET BY MOUTH ONCE DAILY IN THE MORNING      Why I use it: HTN, goal below 140/90    Prescriber:  Raisa Davidson MD      Date I started using it:       Date I stopped using it:         Why I stopped using it:            Medication:  IBUPROFEN 200 MG PO TABS      How I use it:  take 4 tablet (200 mg) by oral route every 8 hours as needed with food      Why I use it:  Pain    Prescriber:  Patient Reported       Date I started using it:       Date I stopped using it:         Why I stopped using it:            Medication:  LATANOPROST 0.005 % OP SOLN      How I use it:  Place 1 drop Into the left eye At Bedtime      Why I use it:  Glaucoma    Prescriber:  Raisa Davidson MD      Date I started using it:       Date I stopped using it:         Why I stopped using it:            Medication:  LISINOPRIL 20 MG PO TABS      How I use it:  Take 1 tablet (20 mg) by mouth 2 times daily      Why I use it: HTN, goal below 140/90    Prescriber:  Raisa Davidson MD      Date I started using it:       Date I stopped using it:         Why I stopped using it:            Medication:  METFORMIN HCL 1000 MG PO TABS      How I use it:  TAKE 1 TABLET BY MOUTH TWICE DAILY WITH MEALS      Why I use it: Diabetes mellitus without complication (H)    Prescriber:  Raisa Davidson MD      Date I started using it:       Date I stopped using it:         Why I stopped using it:            Medication:  MULTIVITAMIN TABS   OR      How I use it:  Reported on 3/22/2017      Why I use it:  Supplement    Prescriber:  Patient Reported      Date I started using it:       Date I stopped using it:         Why I stopped using it:            Medication:  OMEGA-3 FATTY ACIDS 1000 MG PO CAPS      How I use it:  Take 1 g by mouth daily Reported on 3/22/2017      Why I use it:  Hyperlipidemia    Prescriber:  Patient Reports      Date I started using it:       Date I stopped using it:         Why I stopped using it:            Medication:  ONDANSETRON 4 MG PO TBDP      How I use it:  Take 1 tablet (4 mg) by mouth every 6 hours as needed for nausea or vomiting      Why I use it: Ampullary adenoma    Prescriber:  Doreen Holder MD      Date I started using it:       Date I stopped using it:         Why I stopped using it:            Medication:  SITAGLIPTIN PHOSPHATE 100 MG PO TABS      How I use it:  Take 1 tablet (100 mg) by  mouth daily      Why I use it: Diabetes mellitus without complication (H)    Prescriber:  Raisa Davidson MD      Date I started using it:       Date I stopped using it:         Why I stopped using it:            Medication:  VYZULTA 0.024 % OP SOLN      How I use it:  1 drop At Bedtime      Why I use it:  Glaucoma    Prescriber:  Patient Reported      Date I started using it:       Date I stopped using it:         Why I stopped using it:                Other Information:     If you have any questions about your medication list, call Giovanna Heller [facility], Phone: [phone] , Monday-Friday 8-4:30pm.    According to the Paperwork Reduction Act of 1995, no persons are required to respond to a collection of information unless it displays a valid OMB control number. The valid OMB number for this information collection is [id number]. The time required to complete this information collection is estimated to average 40 minutes per response, including the time to review instructions, searching existing data resources, gather the data needed, and complete and review the information collection. If you have any comments concerning the accuracy of the time estimate(s) or suggestions for improving this form, please write to: CMS, Attn: [employer] Reports Clearance Officer, [address] [place] [phone].

## 2020-10-26 NOTE — PROGRESS NOTES
"SUBJECTIVE/OBJECTIVE:                                                    Gricelda Sabillon is a 70 year old female referred to MTM by her insurance company, [employer]. We were unable to reach Melva to schedule an initial MTM appointment. On behalf of the patient's insurance plan, a review of Melva's medication list and chart was completed.      Per Epic Chart:   Allergies   Allergen Reactions     Bromfenac Visual Disturbance     Sulfites, xibrom  Causes sever eye pain     Codeine      \"NAUSE,HA AND DIZZINESS\"     Codeine Nausea     Other reaction(s): Dizziness, Headache     Sulfites Visual Disturbance     Xibrom (bromfenac opthalmic solution) 0.09%--eye pain     Tobacco: No tobacco use   PMH: Reviewed in chart    Type 2 Diabetes:  Currently taking canagliflozin (Invokana) 100 mg, glipizide 10 mg twice daily, metformin 1000 mg twice daily, sitagliptin (Januvia) 100 mg.   Eye exam: up to date  Foot exam: due  Aspirin: Taking 81mg daily - appropriate per ASCVD risk  Statin: Yes: atorvastatin 20 mg   ACEi/ARB: Yes: lisinopril 20 mg twice daily.   Urine Albumin:   Lab Results   Component Value Date    UMALCR 8.93 08/24/2020      Lab Results   Component Value Date    A1C 7.2 08/24/2020    A1C 7.3 05/21/2020    A1C 7.0 02/20/2020    A1C 6.5 10/31/2019    A1C 6.8 07/09/2019     Hyperlipidemia: Current therapy includes atorvastatin 20 mg daily and Fish Oil 1000 mg once daily.    The 10-year ASCVD risk score (Joey DAVENPORT Jr., et al., 2013) is: 28.5%    Values used to calculate the score:      Age: 70 years      Sex: Female      Is Non- : No      Diabetic: Yes      Tobacco smoker: No      Systolic Blood Pressure: 130 mmHg      Is BP treated: Yes      HDL Cholesterol: 42 mg/dL      Total Cholesterol: 172 mg/dL  Recent Labs   Lab Test 02/20/20  0841 07/09/19  0824 08/03/15  0835 08/03/15  0835 08/12/14  0848   CHOL 172 291*   < > 132 149   HDL 42* 41*   < > 42* 46*   LDL 88 201*   < > 57 43   TRIG 209* 244*   < > " 167* 298*   CHOLHDLRATIO  --   --   --  3.1 3.2    < > = values in this interval not displayed.     Hypertension: Current medications include lisinopril 20 mg twice daily, atenolol 50 mg, hydrochlorothiazide 25 mg.    BP Readings from Last 3 Encounters:   09/10/20 130/74   08/24/20 128/74   02/27/20 90/60     Allergic Rhinitis: Current medications include cetirizine 10 mg, azelastine 0.1% nasal spray 1 spray in both nostrils twice daily as needed, and azelastine eye drops as needed.     Glaucoma: Current medications include two different strengths of latanoprost. Unclear if patient is using both.     Supplements: Currently taking calcium-vitamin D 500-125 mg, multivitamin.    Asthma: Current medications: Short-Acting Bronchodilator: Albuterol MDI.  Asthma Action Plan on file: No  ACT Total Scores 8/27/2018 4/12/2019 11/22/2019   ACT TOTAL SCORE - - -   ASTHMA ER VISITS - - -   ASTHMA HOSPITALIZATIONS - - -   ACT TOTAL SCORE (Goal Greater than or Equal to 20) 25 25 25   In the past 12 months, how many times did you visit the emergency room for your asthma without being admitted to the hospital? 0 0 0   In the past 12 months, how many times were you hospitalized overnight because of your asthma? 0 0 0     Nausea:  Current medications include: ondansetron 4 mg every 6 hours as needed.      Pain:  Current medications include: ibuprofen as needed.     Eczema:  Current medications include: desoximetasone 0.25%.     Immunizations: Patient is due for seasonal flu shot, Tdap, and Shingrix vaccines.  Most Recent Immunizations   Administered Date(s) Administered     HEPA 05/31/2011     Influenza (High Dose) 3 valent vaccine 02/27/2020     Influenza (IIV3) PF 12/11/2007     Pneumococcal 23 valent 09/28/2017     TDAP Vaccine (Adacel) 08/23/2010     Typhoid IM 08/23/2010       ASSESSMENT:                                                       Current medications were reviewed with her PCP. Medicare Part D topics discussed:Reminder  to refill/ medication, Lab monitoring and Recommendations to doctor    Type 2 Diabetes: Patient is not at goal of A1c of <7%. Patient would benefit from a re-check of her A1c in about a month to determine if increase in therapy is needed. Prior to increasing therapy would want to confirm patient is not experiencing any lows. If no concerns for lows, consider increasing canagliflozin. If patient is experiencing lows, and concern for safety, consider making A1c <8% her goal and adjust meds as needed.    Hyperlipidemia: Patient is on moderate intensity statin which is indicated based on ADA guidelines. Per ACC/AHA 2019 guidelines consider increasing statin to high intensity due to elevated ASCVD risk. Patient is meeting LDL goal <100.     Hypertension: Stable. Patient is meeting blood pressure goal of < 140/90mmHg.    Allergic rhinitis: Stable.    Galucoma: Patient may benefit from medication reconciliation to confirm meds she is using at next visit.    Supplements: Stable.    Asthma: Not at goal; ACT < 20. Patient would benefit from ACT and Asthma Action Plan, as per care gaps.    Nausea: Stable.    Pain: Stable.    Eczema: Stable.    Immunizations: Patient would benefit from receiving the seasonal flu shot, Tdap, and Shingrix vaccines.    PLAN:                                                      The following items were discussed with Gricelda's primary care provider: These considerations have not been communicated with the patient.    1. Recommend the patient receive the following vaccines: flu shot, Tdap, and Shingrix  2. Consider increasing atorvastatin to 40 mg daily per guideline recommendations  3. Check A1c in 1 month to determine if changes in therapy needed. Consider increasing canagliflozin dose.    The patient is due for the following Health Maintenance items:  - Asthma Action Plan  - ACT  - Diabetic Foot Exam  - Flu shot  - Tdap    I spent 60 minutes completing the chart review and discussing the  findings with the primary care provider  (an extra 15 minutes was spent creating the Medication Action Plan).    Please contact me with any questions or concerns.     I concur with the note as dictated above.   Arlet Harding, ShellieD      Giovanna Heller, PharmD  PGY1 Medication Therapy Management Resident  Voicemail: [phone]

## 2020-12-08 DIAGNOSIS — I10 HTN, GOAL BELOW 140/90: ICD-10-CM

## 2020-12-09 RX ORDER — LISINOPRIL 20 MG/1
TABLET ORAL
Qty: 180 TABLET | Refills: 0 | Status: SHIPPED | OUTPATIENT
Start: 2020-12-09 | End: 2020-12-16

## 2020-12-15 DIAGNOSIS — I10 HTN, GOAL BELOW 140/90: ICD-10-CM

## 2020-12-16 RX ORDER — LISINOPRIL 20 MG/1
TABLET ORAL
Qty: 180 TABLET | Refills: 0 | Status: SHIPPED | OUTPATIENT
Start: 2020-12-16 | End: 2021-03-23

## 2021-01-12 DIAGNOSIS — E11.9 DIABETES MELLITUS (H): Primary | ICD-10-CM

## 2021-01-12 RX ORDER — GLIPIZIDE 5 MG/1
TABLET ORAL
Qty: 360 TABLET | Refills: 0 | Status: SHIPPED | OUTPATIENT
Start: 2021-01-12 | End: 2021-03-23

## 2021-01-13 ENCOUNTER — PREP FOR PROCEDURE (OUTPATIENT)
Dept: GASTROENTEROLOGY | Facility: CLINIC | Age: 71
End: 2021-01-13

## 2021-01-13 DIAGNOSIS — D13.5 AMPULLARY ADENOMA: Primary | ICD-10-CM

## 2021-01-14 ENCOUNTER — HEALTH MAINTENANCE LETTER (OUTPATIENT)
Age: 71
End: 2021-01-14

## 2021-02-16 ENCOUNTER — TELEPHONE (OUTPATIENT)
Dept: GASTROENTEROLOGY | Facility: CLINIC | Age: 71
End: 2021-02-16

## 2021-02-16 PROBLEM — D13.5 AMPULLARY ADENOMA: Status: ACTIVE | Noted: 2019-02-28

## 2021-02-16 NOTE — TELEPHONE ENCOUNTER
LVM for patient in regards to scheduled procedure with Dr. Guerrero. Left direct line for patient to call to go over scheduling details.

## 2021-02-20 DIAGNOSIS — Z11.59 ENCOUNTER FOR SCREENING FOR OTHER VIRAL DISEASES: Primary | ICD-10-CM

## 2021-03-02 ENCOUNTER — TELEPHONE (OUTPATIENT)
Dept: INTERNAL MEDICINE | Facility: CLINIC | Age: 71
End: 2021-03-02

## 2021-03-02 ENCOUNTER — PATIENT OUTREACH (OUTPATIENT)
Dept: GASTROENTEROLOGY | Facility: CLINIC | Age: 71
End: 2021-03-02

## 2021-03-02 ENCOUNTER — DOCUMENTATION ONLY (OUTPATIENT)
Dept: INTERNAL MEDICINE | Facility: CLINIC | Age: 71
End: 2021-03-02

## 2021-03-02 NOTE — PROGRESS NOTES
Care Coordination Telephone Call  [facility]     Called patient to respond to her call regarding timing of covid vaccine.  Informed patient that vaccine cannot be done within 5 days of the procedure.    I have asked the patient to call with any additional questions or concerns and have provided my contact information.    Kalli SIMPSONN, HNBC, STAR-T  RN Care Coordinator  [facility]  Ph: [phone]  FAX: [phone]

## 2021-03-02 NOTE — TELEPHONE ENCOUNTER
MTM referral from: Patient's insurance ([employer])    MTM referral outreach attempt #1 on March 2, 2021 at 2:18 PM      Outcome: Spoke with patient who opted out for MTM for [employer]  Part D    Sade Grullon MTM Coordinator

## 2021-03-03 ENCOUNTER — OFFICE VISIT (OUTPATIENT)
Dept: INTERNAL MEDICINE | Facility: CLINIC | Age: 71
End: 2021-03-03
Payer: COMMERCIAL

## 2021-03-03 VITALS
BODY MASS INDEX: 33.38 KG/M2 | DIASTOLIC BLOOD PRESSURE: 72 MMHG | TEMPERATURE: 97.7 F | SYSTOLIC BLOOD PRESSURE: 124 MMHG | RESPIRATION RATE: 18 BRPM | WEIGHT: 212.7 LBS | OXYGEN SATURATION: 98 % | HEIGHT: 67 IN | HEART RATE: 71 BPM

## 2021-03-03 DIAGNOSIS — E78.5 HYPERLIPIDEMIA WITH TARGET LDL LESS THAN 100: Chronic | ICD-10-CM

## 2021-03-03 DIAGNOSIS — D13.5 AMPULLARY ADENOMA: ICD-10-CM

## 2021-03-03 DIAGNOSIS — I10 HTN, GOAL BELOW 140/90: ICD-10-CM

## 2021-03-03 DIAGNOSIS — E11.65 TYPE 2 DIABETES MELLITUS WITH HYPERGLYCEMIA, WITHOUT LONG-TERM CURRENT USE OF INSULIN (H): ICD-10-CM

## 2021-03-03 DIAGNOSIS — I10 ESSENTIAL HYPERTENSION WITH GOAL BLOOD PRESSURE LESS THAN 140/90: Chronic | ICD-10-CM

## 2021-03-03 DIAGNOSIS — Z01.818 PREOP GENERAL PHYSICAL EXAM: Primary | ICD-10-CM

## 2021-03-03 LAB
ERYTHROCYTE [DISTWIDTH] IN BLOOD BY AUTOMATED COUNT: 13.6 % (ref 10–15)
HBA1C MFR BLD: 7.1 % (ref 0–5.6)
HCT VFR BLD AUTO: 44 % (ref 35–47)
HGB BLD-MCNC: 14 G/DL (ref 11.7–15.7)
MCH RBC QN AUTO: 27.2 PG (ref 26.5–33)
MCHC RBC AUTO-ENTMCNC: 31.8 G/DL (ref 31.5–36.5)
MCV RBC AUTO: 86 FL (ref 78–100)
PLATELET # BLD AUTO: 251 10E9/L (ref 150–450)
RBC # BLD AUTO: 5.14 10E12/L (ref 3.8–5.2)
WBC # BLD AUTO: 7.9 10E9/L (ref 4–11)

## 2021-03-03 PROCEDURE — 80053 COMPREHEN METABOLIC PANEL: CPT | Performed by: INTERNAL MEDICINE

## 2021-03-03 PROCEDURE — 84443 ASSAY THYROID STIM HORMONE: CPT | Performed by: INTERNAL MEDICINE

## 2021-03-03 PROCEDURE — 93000 ELECTROCARDIOGRAM COMPLETE: CPT | Performed by: INTERNAL MEDICINE

## 2021-03-03 PROCEDURE — 83036 HEMOGLOBIN GLYCOSYLATED A1C: CPT | Performed by: INTERNAL MEDICINE

## 2021-03-03 PROCEDURE — 99214 OFFICE O/P EST MOD 30 MIN: CPT | Performed by: INTERNAL MEDICINE

## 2021-03-03 PROCEDURE — 80061 LIPID PANEL: CPT | Performed by: INTERNAL MEDICINE

## 2021-03-03 PROCEDURE — 85027 COMPLETE CBC AUTOMATED: CPT | Performed by: INTERNAL MEDICINE

## 2021-03-03 PROCEDURE — 36415 COLL VENOUS BLD VENIPUNCTURE: CPT | Performed by: INTERNAL MEDICINE

## 2021-03-03 PROCEDURE — 82043 UR ALBUMIN QUANTITATIVE: CPT | Performed by: INTERNAL MEDICINE

## 2021-03-03 ASSESSMENT — MIFFLIN-ST. JEOR: SCORE: 1507.43

## 2021-03-03 NOTE — PATIENT INSTRUCTIONS
Plan:    1. In the morning of the surgery day you take with a sip of water just these meds: Atenolol   The rest of the meds you resume after procedure.  2.  Labs today - [address]         Preparing for Your Surgery  Getting started  A nurse will call you to review your health history and instructions. They will give you an arrival time based on your scheduled surgery time.  Please be ready to share the following:    Your doctor's clinic name and phone number    Your medical, surgical and anesthesia history    A list of allergies and sensitivities    A list of medicines, including herbal treatments and over-the-counter drugs    Whether the patient has a legal guardian (ask how to send us the papers in advance)  If you have a child who's having surgery, please ask for a copy of Preparing for Your Child's Surgery.    Preparing for surgery    Within 30 days of surgery: Have a pre-op exam (sometimes called an H&P, or History and Physical). This can be done at a clinic or pre-operative center.  ? If you're having a , you may not need this exam. Talk to your care team    At your pre-op exam, talk to your care team about all medicines you take. If you need to stop any medicines before surgery, ask when to start taking them again.  ? We do this for your safety. Many medicines can make you bleed too much during surgery. Some change how well surgery (anesthesia) drugs work.    Call your insurance company to let them know you're having surgery. (If you don't have insurance, call [phone].)    Call your clinic if there's any change in your health. This includes signs of a cold or flu (sore throat, runny nose, cough, rash, fever). It also includes a scrape or scratch near the surgery site.    If you have questions on the day of surgery, call your hospital or surgery center.  Eating and drinking guidelines  For your safety: Unless your surgeon tells you otherwise, follow the guidelines below.    Eat and drink as  usual until 8 hours before surgery. After that, no food or milk.    Drink clear liquids until 2 hours before surgery. These are liquids you can see through, like water, Gatorade and Propel Water. You may also have black coffee and tea (no cream or milk).    Nothing by mouth within 2 hours of surgery. This includes gum, candy and breath mints.    If you drink, stop drinking alcohol the night before surgery.    If your care team tells you to take medicine on the morning of surgery, it's okay to take it with a sip of water.  Preventing infection    Shower or bathe the night before and morning of your surgery. Follow the instructions your clinic gave you. (If no instructions, use regular soap.)    Don't shave or clip hair near your surgery site. We'll remove the hair if needed.    Don't smoke or vape the morning of surgery. You may chew nicotine gum up to 2 hours before surgery. A nicotine patch is okay.  ? Note: Some surgeries require you to completely quit smoking and nicotine. Check with your surgeon.    Your care team will make every effort to keep you safe from infection. We will:  ? Clean our hands often with soap and water (or an alcohol-based hand rub).  ? Clean the skin at your surgery site with a special soap that kills germs.  ? Give you a special gown to keep you warm. (Cold raises the risk of infection.)  ? Wear special hair covers, masks, gowns and gloves during surgery.  ? Give antibiotic medicine, if prescribed. Not all surgeries need antibiotics.  What to bring on the day of surgery    Photo ID and insurance card    Copy of your health care directive, if you have one    Glasses and hearing aides (bring cases)  ? You can't wear contacts during surgery    Inhaler and eye drops, if you use them (tell us about these when you arrive)    CPAP machine or breathing device, if you use them    A few personal items, if spending the night    If you have . . .  ? A pacemaker or ICD (cardiac defibrillator): Bring the  ID card.  ? An implanted stimulator: Bring the remote control.  ? A legal guardian: Bring a copy of the certified (court-stamped) guardianship papers.  Please remove any jewelry, including body piercings. Leave jewelry and other valuables at home.  If you're going home the day of surgery  Important: If you don't follow the rules below, we must cancel your surgery.     Arrange for someone to drive you home after surgery. You may not drive, take a taxi or take public transportation by yourself (unless you'll have local anesthesia only).    Arrange for a responsible adult to stay with you overnight. If you don't, we may keep you in the hospital overnight, and you may need to pay the costs yourself.  Questions?   If you have any questions for your care team, list them here: _________________________________________________________________________________________________________________________________________________________________________________________________________________________________________________________________________________________________________________________  For informational purposes only. Not to replace the advice of your health care provider. Copyright   2003, 2019 [employer] [facility]. All rights reserved. Clinically reviewed by Shirin Foote MD. [employer] [id number] - REV 4/20.    Preparing for Your Surgery  Getting started  A nurse will call you to review your health history and instructions. They will give you an arrival time based on your scheduled surgery time.  Please be ready to share the following:    Your doctor's clinic name and phone number    Your medical, surgical and anesthesia history    A list of allergies and sensitivities    A list of medicines, including herbal treatments and over-the-counter drugs    Whether the patient has a legal guardian (ask how to send us the papers in advance)  If you have a child who's having surgery, please ask for a copy of Preparing for Your  Child's Surgery.    Preparing for surgery    Within 30 days of surgery: Have a pre-op exam (sometimes called an H&P, or History and Physical). This can be done at a clinic or pre-operative center.  ? If you're having a , you may not need this exam. Talk to your care team    At your pre-op exam, talk to your care team about all medicines you take. If you need to stop any medicines before surgery, ask when to start taking them again.  ? We do this for your safety. Many medicines can make you bleed too much during surgery. Some change how well surgery (anesthesia) drugs work.    Call your insurance company to let them know you're having surgery. (If you don't have insurance, call [phone].)    Call your clinic if there's any change in your health. This includes signs of a cold or flu (sore throat, runny nose, cough, rash, fever). It also includes a scrape or scratch near the surgery site.    If you have questions on the day of surgery, call your hospital or surgery center.  Eating and drinking guidelines  For your safety: Unless your surgeon tells you otherwise, follow the guidelines below.    Eat and drink as usual until 8 hours before surgery. After that, no food or milk.    Drink clear liquids until 2 hours before surgery. These are liquids you can see through, like water, Gatorade and Propel Water. You may also have black coffee and tea (no cream or milk).    Nothing by mouth within 2 hours of surgery. This includes gum, candy and breath mints.    If you drink, stop drinking alcohol the night before surgery.    If your care team tells you to take medicine on the morning of surgery, it's okay to take it with a sip of water.  Preventing infection    Shower or bathe the night before and morning of your surgery. Follow the instructions your clinic gave you. (If no instructions, use regular soap.)    Don't shave or clip hair near your surgery site. We'll remove the hair if needed.    Don't smoke or vape the  morning of surgery. You may chew nicotine gum up to 2 hours before surgery. A nicotine patch is okay.  ? Note: Some surgeries require you to completely quit smoking and nicotine. Check with your surgeon.    Your care team will make every effort to keep you safe from infection. We will:  ? Clean our hands often with soap and water (or an alcohol-based hand rub).  ? Clean the skin at your surgery site with a special soap that kills germs.  ? Give you a special gown to keep you warm. (Cold raises the risk of infection.)  ? Wear special hair covers, masks, gowns and gloves during surgery.  ? Give antibiotic medicine, if prescribed. Not all surgeries need antibiotics.  What to bring on the day of surgery    Photo ID and insurance card    Copy of your health care directive, if you have one    Glasses and hearing aides (bring cases)  ? You can't wear contacts during surgery    Inhaler and eye drops, if you use them (tell us about these when you arrive)    CPAP machine or breathing device, if you use them    A few personal items, if spending the night    If you have . . .  ? A pacemaker or ICD (cardiac defibrillator): Bring the ID card.  ? An implanted stimulator: Bring the remote control.  ? A legal guardian: Bring a copy of the certified (court-stamped) guardianship papers.  Please remove any jewelry, including body piercings. Leave jewelry and other valuables at home.  If you're going home the day of surgery  Important: If you don't follow the rules below, we must cancel your surgery.     Arrange for someone to drive you home after surgery. You may not drive, take a taxi or take public transportation by yourself (unless you'll have local anesthesia only).    Arrange for a responsible adult to stay with you overnight. If you don't, we may keep you in the hospital overnight, and you may need to pay the costs yourself.  Questions?   If you have any questions for your care team, list them here:  _________________________________________________________________________________________________________________________________________________________________________________________________________________________________________________________________________________________________________________________  For informational purposes only. Not to replace the advice of your health care provider. Copyright   2019 [facility]. All rights reserved. Clinically reviewed by Shirin Foote MD. [employer] [id number] - REV .    Preparing for Your Surgery  Getting started  A nurse will call you to review your health history and instructions. They will give you an arrival time based on your scheduled surgery time.  Please be ready to share the following:    Your doctor's clinic name and phone number    Your medical, surgical and anesthesia history    A list of allergies and sensitivities    A list of medicines, including herbal treatments and over-the-counter drugs    Whether the patient has a legal guardian (ask how to send us the papers in advance)  If you have a child who's having surgery, please ask for a copy of Preparing for Your Child's Surgery.    Preparing for surgery    Within 30 days of surgery: Have a pre-op exam (sometimes called an H&P, or History and Physical). This can be done at a clinic or pre-operative center.  ? If you're having a , you may not need this exam. Talk to your care team    At your pre-op exam, talk to your care team about all medicines you take. If you need to stop any medicines before surgery, ask when to start taking them again.  ? We do this for your safety. Many medicines can make you bleed too much during surgery. Some change how well surgery (anesthesia) drugs work.    Call your insurance company to let them know you're having surgery. (If you don't have insurance, call [phone].)    Call your clinic if there's any change in your health. This includes  signs of a cold or flu (sore throat, runny nose, cough, rash, fever). It also includes a scrape or scratch near the surgery site.    If you have questions on the day of surgery, call your hospital or surgery center.  Eating and drinking guidelines  For your safety: Unless your surgeon tells you otherwise, follow the guidelines below.    Eat and drink as usual until 8 hours before surgery. After that, no food or milk.    Drink clear liquids until 2 hours before surgery. These are liquids you can see through, like water, Gatorade and Propel Water. You may also have black coffee and tea (no cream or milk).    Nothing by mouth within 2 hours of surgery. This includes gum, candy and breath mints.    If you drink, stop drinking alcohol the night before surgery.    If your care team tells you to take medicine on the morning of surgery, it's okay to take it with a sip of water.  Preventing infection    Shower or bathe the night before and morning of your surgery. Follow the instructions your clinic gave you. (If no instructions, use regular soap.)    Don't shave or clip hair near your surgery site. We'll remove the hair if needed.    Don't smoke or vape the morning of surgery. You may chew nicotine gum up to 2 hours before surgery. A nicotine patch is okay.  ? Note: Some surgeries require you to completely quit smoking and nicotine. Check with your surgeon.    Your care team will make every effort to keep you safe from infection. We will:  ? Clean our hands often with soap and water (or an alcohol-based hand rub).  ? Clean the skin at your surgery site with a special soap that kills germs.  ? Give you a special gown to keep you warm. (Cold raises the risk of infection.)  ? Wear special hair covers, masks, gowns and gloves during surgery.  ? Give antibiotic medicine, if prescribed. Not all surgeries need antibiotics.  What to bring on the day of surgery    Photo ID and insurance card    Copy of your health care directive, if  you have one    Glasses and hearing aides (bring cases)  ? You can't wear contacts during surgery    Inhaler and eye drops, if you use them (tell us about these when you arrive)    CPAP machine or breathing device, if you use them    A few personal items, if spending the night    If you have . . .  ? A pacemaker or ICD (cardiac defibrillator): Bring the ID card.  ? An implanted stimulator: Bring the remote control.  ? A legal guardian: Bring a copy of the certified (court-stamped) guardianship papers.  Please remove any jewelry, including body piercings. Leave jewelry and other valuables at home.  If you're going home the day of surgery  Important: If you don't follow the rules below, we must cancel your surgery.     Arrange for someone to drive you home after surgery. You may not drive, take a taxi or take public transportation by yourself (unless you'll have local anesthesia only).    Arrange for a responsible adult to stay with you overnight. If you don't, we may keep you in the hospital overnight, and you may need to pay the costs yourself.  Questions?   If you have any questions for your care team, list them here: _________________________________________________________________________________________________________________________________________________________________________________________________________________________________________________________________________________________________________________________  For informational purposes only. Not to replace the advice of your health care provider. Copyright   2003, 2019 [facility]. All rights reserved. Clinically reviewed by Shirin Foote MD. [employer] [id number] - REV 4/20.

## 2021-03-03 NOTE — PROGRESS NOTES
[place] [address]  [facility] [address]  Phone: [phone]  Primary Provider: Raisa Davidson  Pre-op Performing Provider: RAISA DAVIDSON      PREOPERATIVE EVALUATION:  Today's date: 3/3/2021    Gricelda Sabillon is a 70 year old female who presents for a preoperative evaluation.    Surgical Information:  Surgery/Procedure:   ESOPHAGOGASTRODUODENOSCOPY (EGD) with possible endoscopic mucosal resection N/A General   ENDOSCOPIC RETROGRADE CHOLANGIOPANCREATOGRAPHY, WITH DIRECT DUCT VISUALIZATION, USING PANCREATICOBILIARY FIBEROPTIC PROBE and possible radiofrequency ablation         Surgery Location: [facility]  Surgeon: Shaun Guerrero MD  Surgery Date: 3/26/2021  Time of Surgery: 11:25 AM  Where patient plans to recover: At home with family  Fax number for surgical facility:     Type of Anesthesia Anticipated: General      Risks and Recommendations:  The patient has the following additional risks and recommendations for perioperative complications:   - No identified additional risk factors other than previously addressed      RECOMMENDATION:  APPROVAL GIVEN to proceed with proposed procedure, without further diagnostic evaluation.    Review of the result(s) of each unique test - .              Preop Questions 3/3/2021   1. Have you ever had a heart attack or stroke? No   2. Have you ever had surgery on your heart or blood vessels, such as a stent placement, a coronary artery bypass, or surgery on an artery in your head, neck, heart, or legs? No   3. Do you have chest pain with activity? No   4. Do you have a history of  heart failure? No   5. Do you currently have a cold, bronchitis or symptoms of other infection? No   6. Do you have a cough, shortness of breath, or wheezing? No   7. Do you or anyone in your family have previous history of blood clots? No   8. Do you or does anyone in your family have a serious bleeding problem such as  prolonged bleeding following surgeries or cuts? No   9. Have you ever had problems with anemia or been told to take iron pills? YES - years ago   10. Have you had any abnormal blood loss such as black, tarry or bloody stools, or abnormal vaginal bleeding? No   11. Have you ever had a blood transfusion? No   12. Are you willing to have a blood transfusion if it is medically needed before, during, or after your surgery? Yes   13. Have you or any of your relatives ever had problems with anesthesia? No   14. Do you have sleep apnea, excessive snoring or daytime drowsiness? YES - has CPAP   14a. Do you have a CPAP machine? Yes   15. Do you have any artifical heart valves or other implanted medical devices like a pacemaker, defibrillator, or continuous glucose monitor? No   16. Do you have artificial joints? YES - R hip, R knee   17. Are you allergic to latex? No   18. Is there any chance that you may be pregnant? -     CC:  Preop for multiple medical problems.    HPI:    The patient is scheduled for ERCP procedure  with Dr. Guerrero on  March 12, 2021  No other acute complaints.       Assessment:  1. V72.83H Preop general physical exam _ I do not see any major contraindications for the patient to go through the scheduled surgery.     The proposed surgical procedure is considered   LOW  surgery risk.     For above listed surgery and anesthesia, Patient is at MODERATE, risk for surgery/procedure and perioperative/procedure complications.     Cardiovascular risk  Assessment -- low risk   --  No further cardiac work up is needed before this surgery.         ECG:    sinus rhythm, no changes suggestive for ischemia     Pulmonary Risk Assessment -- low risk   --  The patient doesn't have chronic lung disease nor acute respiratory problems     Obstructive Sleep Apnea (or suspected sleep apnea) -- she wears the CPAP     Anemia Assessment :  -- no anemia - patient doesn't need further anemia work up     Blood Sugar  "Assessment  -- patient has controlled DM,       Anticoagulation assessment  --  patient takes ASA for C-vs prevention. She stopped it for the procedure       (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  Comment:   Plan: procedure, as above     (I10) HTN goal less than 140/90,  Comment: Controlled    Plan: CBC with platelets, Comprehensive metabolic         panel, Hemoglobin A1c, Lipid panel reflex to         direct LDL Fasting, TSH with free T4 reflex,         Albumin Random Urine Quantitative with Creat         Ratio            (E11.65) DM 2 with hyperglycemia (H)  Comment:   Plan: CBC with platelets, Comprehensive metabolic         panel, Hemoglobin A1c, Lipid panel reflex to         direct LDL Fasting, TSH with free T4 reflex,         Albumin Random Urine Quantitative with Creat         Ratio              (E78.5) Hyperlipidemia with target LDL less than 100  Comment:   Plan: CBC with platelets, Comprehensive metabolic         panel, Hemoglobin A1c, Lipid panel reflex to         direct LDL Fasting, TSH with free T4 reflex,         Albumin Random Urine Quantitative with Creat         Ratio             Plan:    1. In the morning of the surgery day you take with a sip of water just these meds: Atenolol   The rest of the meds you resume after procedure.  2.  Labs today - [address]       Physical exam:    Blood pressure 124/72, pulse 71, temperature 97.7  F (36.5  C), temperature source Oral, resp. rate 18, height 1.702 m (5' 7\"), weight 96.5 kg (212 lb 11.2 oz), last menstrual period 12/13/2002, SpO2 98 %, not currently breastfeeding.   NAD, appears comfortable  Skin clear, no rashes  Neck: supple, no JVD,  no thyroidmegaly  Lymph nodes non palpable in the cervical, supraclavicular   Chest: clear to auscultation with good respiratory effort  Cardiac: S1S2, RRR, no mgr appreciated  Abdomen: soft, not tender, not distended, audible bowel sound, no hepatosplenomegaly, no palpable masses, no abdominal bruits  Extremities: no cyanosis, " clubbing or edema.   Neuro: A, Ox3, no focal signs.        ROS:   as above     Patient Active Problem List   Diagnosis     Allergic rhinitis     Hyperlipidemia with target LDL less than 100     Asthma, mild intermittent     Cataract     Macular hole of left eye     Advanced directives, counseling/discussion     Obesity (BMI 30-39.9)     Heart murmur     Sleep apnea     Total knee replacement status     Chronic knee pain, right     Hip pain, right     HTN goal less than 140/90,     CHERRY (obstructive sleep apnea)     Hip osteoarthritis     Status post total replacement of right hip     Gait disturbance     DM 2 with hyperglycemia (H)     Ampullary adenoma     Surgery follow-up     Polyp of duodenum     HTN, goal below 140/90        Past Medical History:   Diagnosis Date     Allergic rhinitis, cause unspecified      Asthma      Asthma, mild intermittent      Cataract      Cellulitis and abscess of leg, except foot 03/00    Bilateral     Eczema      Heart murmur     aortic area     HTN, goal below 140/90      Hyperlipidemia LDL goal < 100      Hyperplastic colon polyp 2008     Infection     bilateral eye infections     Macular hole of left eye      Obesity (BMI 30-39.9)      Osteoarthritis     knees     Other chronic pain     right knee     Sleep apnea     uses c pap     Type 2 diabetes, HbA1C goal < 8% (H)       Past Surgical History:   Procedure Laterality Date     ARTHROPLASTY HIP Right 9/25/2017    Procedure: ARTHROPLASTY HIP;  Right total hip arthroplasty  ;  Surgeon: Ayaan Pena MD;  [facility]     ARTHROPLASTY KNEE  7/12/2013    Procedure: ARTHROPLASTY KNEE;  right total knee arthroplasty ;  Surgeon: Chaparro Wesley MD;  [facility]: [facility]     ARTHROSCOPY KNEE Right 1/21/2015    Procedure: ARTHROSCOPY KNEE;  Surgeon: Ayaan Pena MD;  Location: [facility]     C INCISION OF HYMEN       CATARACT IOL, RT/LT       COLONOSCOPY  2008     COLONOSCOPY N/A 4/18/2019    Procedure: Colonoscopy with  polypectomy;  Surgeon: Shaun Guerrero MD;  [facility]     ENDOSCOPIC RETROGRADE CHOLANGIOPANCREATOGRAM N/A 2/6/2019    Procedure: COMBINED ENDOSCOPIC RETROGRADE CHOLANGIOPANCREATOGRAPHY, BILIARY SPINCTEROTOMY AND DILATION, PLACE BILE DUCT STENT;  Surgeon: Guru Andie Gonzalez MD;  [facility]     ENDOSCOPIC RETROGRADE CHOLANGIOPANCREATOGRAM N/A 2/28/2019    Procedure: Endoscopic Retrograde Cholangiopancreatogram, Bile duct stent removal and placement;  Surgeon: Shaun Guerrero MD;  [facility]     ENDOSCOPIC RETROGRADE CHOLANGIOPANCREATOGRAM N/A 4/18/2019    Procedure: COMBINED ENDOSCOPIC RETROGRADE CHOLANGIOPANCREATOGRAPHY, Bile duct stent exchange and Polypectomy;  Surgeon: Shaun Guerrero MD;  [facility]     ENDOSCOPIC RETROGRADE CHOLANGIOPANCREATOGRAM N/A 10/18/2019    Procedure: Endoscopic Retrograde Cholangiopancreatogram;  Surgeon: Shaun Guerrero MD;  [facility]     ENDOSCOPIC RETROGRADE CHOLANGIOPANCREATOGRAM WITH SPYGLASS N/A 7/26/2019    Procedure: Endoscopic Retrograde Cholangiopancreatogram With Spyglas,l Radiofrequency Ablation, Stent Exchangeand Duodenal Biopsy x2;  Surgeon: Shaun Guerrero MD;  [facility]     ENDOSCOPIC RETROGRADE CHOLANGIOPANCREATOGRAM WITH SPYGLASS N/A 9/10/2020    Procedure: ENDOSCOPIC RETROGRADE CHOLANGIOPANCREATOGRAPHY, WITH DIRECT DUCT VISUALIZATION, USING PANCREATICOBILIARY FIBEROPTIC PROBE;  Surgeon: Shaun Guerrero MD;  [facility]     ESOPHAGOSCOPY, GASTROSCOPY, DUODENOSCOPY (EGD) WITH RADIO FREQUENCY ABLATION, COMBINED N/A 9/10/2020    Procedure: possible repeat ESOPHAGOGASTRODUODENOSCOPY, WITH RADIOFREQUENCY ABLATION and endoscopic mucosal resection;  Surgeon: Shaun Guerrero MD;  [facility]     ESOPHAGOSCOPY, GASTROSCOPY, DUODENOSCOPY (EGD), COMBINED N/A 4/18/2019    Procedure: upper endoscopy with polypectomy;  Surgeon: Shaun Guerrero MD;  [facility]     ESOPHAGOSCOPY, GASTROSCOPY, DUODENOSCOPY (EGD), COMBINED N/A  10/18/2019    Procedure: Upper Endoscopy and ERCP with stent removal, stone removal and biopsy;  Surgeon: Shaun Guerrero MD;  [facility]     ESOPHAGOSCOPY, GASTROSCOPY, DUODENOSCOPY (EGD), RESECT MUCOSA, COMBINED N/A 2019    Procedure: Upper Endoscopy, Endoscopic Ultrasound, Endoscopic Mucosal Resection,  Ampullectomy, polypectomy.;  Surgeon: Shaun Guerrero MD;  [facility]     EYE SURGERY      macular hole repaired left eye     HC KNEE SCOPE, DIAGNOSTIC      - both knees     HEAD & NECK SURGERY      wisdom teeth        PSHx: No complications with prior surgeries or anesthesia     Soc Hx: No daily alcohol, no smoking     Family History   Problem Relation Age of Onset     Hypertension Mother         diabetes,hypoythryoidism, stroke     Diabetes Mother      Heart Disease Father         , cancer lip     Heart Disease Paternal Grandfather              Cancer Paternal Grandmother              Cerebrovascular Disease Maternal Grandfather              Cerebrovascular Disease Maternal Grandmother         , diabetes     Connective Tissue Disorder Sister         fibromyalgia     Diabetes Sister      Hypertension Brother      Cancer Paternal Aunt         ovarian        All: reviewed    Meds: reviewed  Current Outpatient Medications   Medication Sig Dispense Refill     albuterol (PROAIR HFA/PROVENTIL HFA/VENTOLIN HFA) 108 (90 Base) MCG/ACT inhaler Inhale 2 puffs into the lungs every 4 hours as needed for shortness of breath / dyspnea 1 Inhaler 3     aspirin 81 MG EC tablet Take 1 tablet (81 mg) by mouth daily 90 tablet 3     atenolol (TENORMIN) 50 MG tablet Take 1 tablet (50 mg) by mouth daily 90 tablet 1     atorvastatin (LIPITOR) 20 MG tablet Take 1 tablet (20 mg) by mouth daily 90 tablet 1     azelastine (ASTELIN) 0.1 % nasal spray Spray 1 spray into both nostrils 2 times daily as needed Reported on 3/22/2017 30 mL 3     azelastine (OPTIVAR) 0.05 % SOLN Place 1 drop into  both eyes 2 times daily as needed Reported on 3/22/2017       calcium-vitamin D 500-125 MG-UNIT TABS        canagliflozin (INVOKANA) 100 MG tablet Take 1 tablet (100 mg) by mouth every morning (before breakfast) 90 tablet 1     cetirizine (ZYRTEC) 10 MG tablet Take 10 mg by mouth every morning.       CONTOUR NEXT TEST test strip USE 1 STRIP TO CHECK GLUCOSE ONCE DAILY E11.9 100 each 2     desoximetasone (TOPICORT) 0.25 % cream Apply sparingly to affected area's 60 g 1     fish oil-omega-3 fatty acids (FISH OIL) 1000 MG capsule Take 1 g by mouth daily Reported on 3/22/2017       glipiZIDE (GLUCOTROL) 5 MG tablet TAKE 2 TABLETS BY MOUTH TWICE DAILY BEFORE MEAL(S) 360 tablet 0     hydrochlorothiazide (HYDRODIURIL) 25 MG tablet TAKE 1 TABLET BY MOUTH ONCE DAILY IN THE MORNING 90 tablet 1     ibuprofen (ADVIL) 200 MG tablet take 4 tablet (200 mg) by oral route every 8 hours as needed with food       latanoprost (XALATAN) 0.005 % ophthalmic solution Place 1 drop Into the left eye At Bedtime 2.5 mL 1     latanoprostene bunod (VYZULTA) 0.024 % SOLN ophthalmic solution 1 drop At Bedtime       lisinopril (ZESTRIL) 20 MG tablet Take 1 tablet by mouth twice daily 180 tablet 0     metFORMIN (GLUCOPHAGE) 1000 MG tablet TAKE 1 TABLET BY MOUTH TWICE DAILY WITH MEALS 180 tablet 1     MULTIVITAMIN TABS   OR Reported on 3/22/2017       ondansetron (ZOFRAN-ODT) 4 MG ODT tab Take 1 tablet (4 mg) by mouth every 6 hours as needed for nausea or vomiting 30 tablet 0     order for DME All diabetic testing supplies including test strips, lancets and solution for testing 2 times per day. Patient is using   no Insulin. Last A1c Lab Results       Component                Value               Date                       A1C                      7.9                 08/20/2018 100 each 11     sitagliptin (JANUVIA) 100 MG tablet Take 1 tablet (100 mg) by mouth daily 90 tablet 1            Raisa Torres MD  Internal Medicine           Health Care  Directive:  Patient does not have a Health Care Directive or Living Will: Discussed advance care planning with patient; however, patient declined at this time.    Patient Active Problem List    Diagnosis Date Noted     Hyperlipidemia with target LDL less than 100      Priority: High     Diagnosis updated by automated process. Provider to review and confirm.       Asthma, mild intermittent      Priority: High     HTN, goal below 140/90 05/27/2020     Priority: Medium     Polyp of duodenum 02/18/2020     Priority: Medium     Added automatically from request for surgery [id number]       Surgery follow-up 03/01/2019     Priority: Medium     Ampullary adenoma 02/28/2019     Priority: Medium     DM 2 with hyperglycemia (H) 12/02/2018     Priority: Medium     Status post total replacement of right hip 02/13/2018     Priority: Medium     Gait disturbance 02/13/2018     Priority: Medium     Hip osteoarthritis 09/25/2017     Priority: Medium     CHERRY (obstructive sleep apnea) 09/15/2017     Priority: Medium     HTN goal less than 140/90, 05/22/2016     Priority: Medium     Chronic knee pain, right 08/30/2015     Priority: Medium     Hip pain, right 08/30/2015     Priority: Medium     Total knee replacement status 07/12/2013     Priority: Medium     Sleep apnea      Priority: Medium     Obesity (BMI 30-39.9)      Priority: Medium     Heart murmur      Priority: Medium     aortic area       Advanced directives, counseling/discussion 01/06/2012     Priority: Medium     Advance Directive Problem List Overview:   Name Relationship Phone    Primary Health Care Agent            Alternative Health Care Agent          Discussed advance care planning with patient; information given to patient to review. 1/6/2012          Allergic rhinitis      Priority: Medium     Problem list name updated by automated process. Provider to review       Cataract      Priority: Low     Utility update for deleted IMO code  [employer] Update utility       Macular  hole of left eye      Priority: Low      Past Medical History:   Diagnosis Date     Allergic rhinitis, cause unspecified      Asthma      Asthma, mild intermittent      Cataract      Cellulitis and abscess of leg, except foot 03/00    Bilateral     Eczema      Heart murmur     aortic area     HTN, goal below 140/90      Hyperlipidemia LDL goal < 100      Hyperplastic colon polyp 2008     Infection     bilateral eye infections     Macular hole of left eye      Obesity (BMI 30-39.9)      Osteoarthritis     knees     Other chronic pain     right knee     Sleep apnea     uses c pap     Type 2 diabetes, HbA1C goal < 8% (H)      Past Surgical History:   Procedure Laterality Date     ARTHROPLASTY HIP Right 9/25/2017    Procedure: ARTHROPLASTY HIP;  Right total hip arthroplasty  ;  Surgeon: Ayaan Pena MD;  [facility]     ARTHROPLASTY KNEE  7/12/2013    Procedure: ARTHROPLASTY KNEE;  right total knee arthroplasty ;  Surgeon: Chaparro Wesley MD;  [facility]     ARTHROSCOPY KNEE Right 1/21/2015    Procedure: ARTHROSCOPY KNEE;  Surgeon: Ayaan Pena MD;  [facility]     C INCISION OF HYMEN       CATARACT IOL, RT/LT       COLONOSCOPY  2008     COLONOSCOPY N/A 4/18/2019    Procedure: Colonoscopy with polypectomy;  Surgeon: Shaun Guerrero MD;  [facility]     ENDOSCOPIC RETROGRADE CHOLANGIOPANCREATOGRAM N/A 2/6/2019    Procedure: COMBINED ENDOSCOPIC RETROGRADE CHOLANGIOPANCREATOGRAPHY, BILIARY SPINCTEROTOMY AND DILATION, PLACE BILE DUCT STENT;  Surgeon: Guru Andie Gonzalez MD;  [facility]     ENDOSCOPIC RETROGRADE CHOLANGIOPANCREATOGRAM N/A 2/28/2019    Procedure: Endoscopic Retrograde Cholangiopancreatogram, Bile duct stent removal and placement;  Surgeon: Shaun Guerrero MD;  [facility]     ENDOSCOPIC RETROGRADE CHOLANGIOPANCREATOGRAM N/A 4/18/2019    Procedure: COMBINED ENDOSCOPIC RETROGRADE CHOLANGIOPANCREATOGRAPHY, Bile duct stent exchange and  Polypectomy;  Surgeon: Shaun Guerrero MD;  [facility]     ENDOSCOPIC RETROGRADE CHOLANGIOPANCREATOGRAM N/A 10/18/2019    Procedure: Endoscopic Retrograde Cholangiopancreatogram;  Surgeon: Shaun Guerrero MD;  [facility]     ENDOSCOPIC RETROGRADE CHOLANGIOPANCREATOGRAM WITH SPYGLASS N/A 7/26/2019    Procedure: Endoscopic Retrograde Cholangiopancreatogram With Spyglas,l Radiofrequency Ablation, Stent Exchangeand Duodenal Biopsy x2;  Surgeon: Shaun Guerrero MD;  [facility]     ENDOSCOPIC RETROGRADE CHOLANGIOPANCREATOGRAM WITH SPYGLASS N/A 9/10/2020    Procedure: ENDOSCOPIC RETROGRADE CHOLANGIOPANCREATOGRAPHY, WITH DIRECT DUCT VISUALIZATION, USING PANCREATICOBILIARY FIBEROPTIC PROBE;  Surgeon: Shaun Guerrero MD;  [facility]     ESOPHAGOSCOPY, GASTROSCOPY, DUODENOSCOPY (EGD) WITH RADIO FREQUENCY ABLATION, COMBINED N/A 9/10/2020    Procedure: possible repeat ESOPHAGOGASTRODUODENOSCOPY, WITH RADIOFREQUENCY ABLATION and endoscopic mucosal resection;  Surgeon: Shaun Guerrero MD;  [facility]     ESOPHAGOSCOPY, GASTROSCOPY, DUODENOSCOPY (EGD), COMBINED N/A 4/18/2019    Procedure: upper endoscopy with polypectomy;  Surgeon: Shaun Guerrero MD;  [facility]     ESOPHAGOSCOPY, GASTROSCOPY, DUODENOSCOPY (EGD), COMBINED N/A 10/18/2019    Procedure: Upper Endoscopy and ERCP with stent removal, stone removal and biopsy;  Surgeon: Shaun Guerrero MD;  [facility]     ESOPHAGOSCOPY, GASTROSCOPY, DUODENOSCOPY (EGD), RESECT MUCOSA, COMBINED N/A 2/28/2019    Procedure: Upper Endoscopy, Endoscopic Ultrasound, Endoscopic Mucosal Resection,  Ampullectomy, polypectomy.;  Surgeon: Shaun Guerrero MD;  [facility]     EYE SURGERY      macular hole repaired left eye     HC KNEE SCOPE, DIAGNOSTIC      - both knees     HEAD & NECK SURGERY      wisdom teeth     Current Outpatient Medications   Medication Sig Dispense Refill     albuterol (PROAIR HFA/PROVENTIL HFA/VENTOLIN HFA) 108 (90 Base) MCG/ACT inhaler Inhale 2  puffs into the lungs every 4 hours as needed for shortness of breath / dyspnea 1 Inhaler 3     aspirin 81 MG EC tablet Take 1 tablet (81 mg) by mouth daily 90 tablet 3     atenolol (TENORMIN) 50 MG tablet Take 1 tablet (50 mg) by mouth daily 90 tablet 1     atorvastatin (LIPITOR) 20 MG tablet Take 1 tablet (20 mg) by mouth daily 90 tablet 1     azelastine (ASTELIN) 0.1 % nasal spray Spray 1 spray into both nostrils 2 times daily as needed Reported on 3/22/2017 30 mL 3     azelastine (OPTIVAR) 0.05 % SOLN Place 1 drop into both eyes 2 times daily as needed Reported on 3/22/2017       calcium-vitamin D 500-125 MG-UNIT TABS        canagliflozin (INVOKANA) 100 MG tablet Take 1 tablet (100 mg) by mouth every morning (before breakfast) 90 tablet 1     cetirizine (ZYRTEC) 10 MG tablet Take 10 mg by mouth every morning.       CONTOUR NEXT TEST test strip USE 1 STRIP TO CHECK GLUCOSE ONCE DAILY E11.9 100 each 2     desoximetasone (TOPICORT) 0.25 % cream Apply sparingly to affected area's 60 g 1     fish oil-omega-3 fatty acids (FISH OIL) 1000 MG capsule Take 1 g by mouth daily Reported on 3/22/2017       glipiZIDE (GLUCOTROL) 5 MG tablet TAKE 2 TABLETS BY MOUTH TWICE DAILY BEFORE MEAL(S) 360 tablet 0     hydrochlorothiazide (HYDRODIURIL) 25 MG tablet TAKE 1 TABLET BY MOUTH ONCE DAILY IN THE MORNING 90 tablet 1     ibuprofen (ADVIL) 200 MG tablet take 4 tablet (200 mg) by oral route every 8 hours as needed with food       latanoprost (XALATAN) 0.005 % ophthalmic solution Place 1 drop Into the left eye At Bedtime 2.5 mL 1     latanoprostene bunod (VYZULTA) 0.024 % SOLN ophthalmic solution 1 drop At Bedtime       lisinopril (ZESTRIL) 20 MG tablet Take 1 tablet by mouth twice daily 180 tablet 0     metFORMIN (GLUCOPHAGE) 1000 MG tablet TAKE 1 TABLET BY MOUTH TWICE DAILY WITH MEALS 180 tablet 1     MULTIVITAMIN TABS   OR Reported on 3/22/2017       ondansetron (ZOFRAN-ODT) 4 MG ODT tab Take 1 tablet (4 mg) by mouth every 6 hours  "as needed for nausea or vomiting 30 tablet 0     order for DME All diabetic testing supplies including test strips, lancets and solution for testing 2 times per day. Patient is using   no Insulin. Last A1c Lab Results       Component                Value               Date                       A1C                      7.9                 08/20/2018 100 each 11     sitagliptin (JANUVIA) 100 MG tablet Take 1 tablet (100 mg) by mouth daily 90 tablet 1       Allergies   Allergen Reactions     Bromfenac Visual Disturbance     Sulfites, xibrom  Causes sever eye pain     Codeine      \"NAUSE,HA AND DIZZINESS\"     Codeine Nausea     Other reaction(s): Dizziness, Headache     Sulfites Visual Disturbance     Xibrom (bromfenac opthalmic solution) 0.09%--eye pain        Social History     Tobacco Use     Smoking status: Never Smoker     Smokeless tobacco: Never Used   Substance Use Topics     Alcohol use: No     Alcohol/week: 0.0 standard drinks       History   Drug Use No         Recent Labs   Lab Test 09/10/20  0710 08/24/20  1420 05/21/20  0848   HGB 14.2 14.6  --     290  --    INR 1.00  --   --      --  138   POTASSIUM 3.8  --  3.9   CR 0.86  --  0.84   A1C  --  7.2* 7.3*                   Signed Electronically by: Raisa Davidson MD  Copy of this evaluation report is provided to requesting physician.    [facility] Guidelines    Revised Cardiac Risk Index   "

## 2021-03-04 LAB
ALBUMIN SERPL-MCNC: 3.8 G/DL (ref 3.4–5)
ALP SERPL-CCNC: 82 U/L (ref 40–150)
ALT SERPL W P-5'-P-CCNC: 37 U/L (ref 0–50)
ANION GAP SERPL CALCULATED.3IONS-SCNC: 9 MMOL/L (ref 3–14)
AST SERPL W P-5'-P-CCNC: 20 U/L (ref 0–45)
BILIRUB SERPL-MCNC: 0.5 MG/DL (ref 0.2–1.3)
BUN SERPL-MCNC: 19 MG/DL (ref 7–30)
CALCIUM SERPL-MCNC: 10.7 MG/DL (ref 8.5–10.1)
CHLORIDE SERPL-SCNC: 105 MMOL/L (ref 94–109)
CHOLEST SERPL-MCNC: 177 MG/DL
CO2 SERPL-SCNC: 26 MMOL/L (ref 20–32)
CREAT SERPL-MCNC: 0.98 MG/DL (ref 0.52–1.04)
CREAT UR-MCNC: 79 MG/DL
GFR SERPL CREATININE-BSD FRML MDRD: 58 ML/MIN/{1.73_M2}
GLUCOSE SERPL-MCNC: 188 MG/DL (ref 70–99)
HDLC SERPL-MCNC: 40 MG/DL
LDLC SERPL CALC-MCNC: 91 MG/DL
MICROALBUMIN UR-MCNC: 9 MG/L
MICROALBUMIN/CREAT UR: 10.83 MG/G CR (ref 0–25)
NONHDLC SERPL-MCNC: 137 MG/DL
POTASSIUM SERPL-SCNC: 4.4 MMOL/L (ref 3.4–5.3)
PROT SERPL-MCNC: 7.6 G/DL (ref 6.8–8.8)
SODIUM SERPL-SCNC: 140 MMOL/L (ref 133–144)
TRIGL SERPL-MCNC: 231 MG/DL
TSH SERPL DL<=0.005 MIU/L-ACNC: 2.56 MU/L (ref 0.4–4)

## 2021-03-05 ENCOUNTER — PATIENT OUTREACH (OUTPATIENT)
Dept: GASTROENTEROLOGY | Facility: CLINIC | Age: 71
End: 2021-03-05

## 2021-03-05 ENCOUNTER — TELEPHONE (OUTPATIENT)
Dept: GASTROENTEROLOGY | Facility: CLINIC | Age: 71
End: 2021-03-05

## 2021-03-05 DIAGNOSIS — Z11.59 ENCOUNTER FOR SCREENING FOR OTHER VIRAL DISEASES: ICD-10-CM

## 2021-03-05 NOTE — TELEPHONE ENCOUNTER
Spoke to patient in regards to scheduled procedure with Dr. Guerrero on 3/26. Patient though it was scheduled for 3/12 and her  will be out of town on 3/26. Offered patient the date of 3/22 and she stated she can make that work so we have her rescheduled to 3/22. Informed patient she will need a covid test within 96-72 hours of procedure date. Informed patient all scheduling details will be mailed per her request.

## 2021-03-17 DIAGNOSIS — I10 HTN, GOAL BELOW 140/90: ICD-10-CM

## 2021-03-17 DIAGNOSIS — R00.0 SINUS TACHYCARDIA: ICD-10-CM

## 2021-03-18 DIAGNOSIS — Z11.59 ENCOUNTER FOR SCREENING FOR OTHER VIRAL DISEASES: ICD-10-CM

## 2021-03-18 LAB
SARS-COV-2 RNA RESP QL NAA+PROBE: NORMAL
SPECIMEN SOURCE: NORMAL

## 2021-03-18 PROCEDURE — U0003 INFECTIOUS AGENT DETECTION BY NUCLEIC ACID (DNA OR RNA); SEVERE ACUTE RESPIRATORY SYNDROME CORONAVIRUS 2 (SARS-COV-2) (CORONAVIRUS DISEASE [COVID-19]), AMPLIFIED PROBE TECHNIQUE, MAKING USE OF HIGH THROUGHPUT TECHNOLOGIES AS DESCRIBED BY CMS-2020-01-R: HCPCS | Performed by: INTERNAL MEDICINE

## 2021-03-18 PROCEDURE — U0005 INFEC AGEN DETEC AMPLI PROBE: HCPCS | Performed by: INTERNAL MEDICINE

## 2021-03-18 RX ORDER — ATENOLOL 50 MG/1
TABLET ORAL
Qty: 90 TABLET | Refills: 0 | Status: SHIPPED | OUTPATIENT
Start: 2021-03-18 | End: 2021-03-19

## 2021-03-18 NOTE — TELEPHONE ENCOUNTER
Pending Prescriptions:                       Disp   Refills    atenolol (TENORMIN) 50 MG tablet [Pharmacy*90 tab*0        Sig: Take 1 tablet by mouth once daily    Routing refill request to provider for review/approval because:  A break in medication

## 2021-03-19 LAB
LABORATORY COMMENT REPORT: NORMAL
SARS-COV-2 RNA RESP QL NAA+PROBE: NEGATIVE
SPECIMEN SOURCE: NORMAL

## 2021-03-19 RX ORDER — ATENOLOL 50 MG/1
50 TABLET ORAL DAILY
Qty: 90 TABLET | Refills: 0 | Status: SHIPPED | OUTPATIENT
Start: 2021-03-19 | End: 2021-03-23

## 2021-03-22 ENCOUNTER — HOSPITAL ENCOUNTER (OUTPATIENT)
Facility: CLINIC | Age: 71
Discharge: HOME | End: 2021-03-22
Attending: INTERNAL MEDICINE | Admitting: INTERNAL MEDICINE
Payer: COMMERCIAL

## 2021-03-22 ENCOUNTER — ANESTHESIA EVENT (OUTPATIENT)
Dept: SURGERY | Facility: CLINIC | Age: 71
End: 2021-03-22
Payer: COMMERCIAL

## 2021-03-22 ENCOUNTER — APPOINTMENT (OUTPATIENT)
Dept: GENERAL RADIOLOGY | Facility: CLINIC | Age: 71
End: 2021-03-22
Attending: INTERNAL MEDICINE
Payer: COMMERCIAL

## 2021-03-22 ENCOUNTER — TELEPHONE (OUTPATIENT)
Dept: GASTROENTEROLOGY | Facility: CLINIC | Age: 71
End: 2021-03-22

## 2021-03-22 ENCOUNTER — ANESTHESIA (OUTPATIENT)
Dept: SURGERY | Facility: CLINIC | Age: 71
End: 2021-03-22
Payer: COMMERCIAL

## 2021-03-22 VITALS
TEMPERATURE: 97.6 F | DIASTOLIC BLOOD PRESSURE: 92 MMHG | BODY MASS INDEX: 33.74 KG/M2 | HEIGHT: 67 IN | SYSTOLIC BLOOD PRESSURE: 150 MMHG | HEART RATE: 75 BPM | OXYGEN SATURATION: 92 % | WEIGHT: 214.95 LBS | RESPIRATION RATE: 16 BRPM

## 2021-03-22 DIAGNOSIS — D13.5 AMPULLARY ADENOMA: ICD-10-CM

## 2021-03-22 LAB
ALBUMIN SERPL-MCNC: 3.8 G/DL (ref 3.4–5)
ALP SERPL-CCNC: 89 U/L (ref 40–150)
ALT SERPL W P-5'-P-CCNC: 41 U/L (ref 0–50)
AMYLASE SERPL-CCNC: 73 U/L (ref 30–110)
ANION GAP SERPL CALCULATED.3IONS-SCNC: 9 MMOL/L (ref 3–14)
AST SERPL W P-5'-P-CCNC: 19 U/L (ref 0–45)
BILIRUB SERPL-MCNC: 0.5 MG/DL (ref 0.2–1.3)
BUN SERPL-MCNC: 19 MG/DL (ref 7–30)
CALCIUM SERPL-MCNC: 10.2 MG/DL (ref 8.5–10.1)
CHLORIDE SERPL-SCNC: 106 MMOL/L (ref 94–109)
CO2 SERPL-SCNC: 23 MMOL/L (ref 20–32)
CREAT SERPL-MCNC: 0.82 MG/DL (ref 0.52–1.04)
ERCP: NORMAL
ERYTHROCYTE [DISTWIDTH] IN BLOOD BY AUTOMATED COUNT: 13.7 % (ref 10–15)
GFR SERPL CREATININE-BSD FRML MDRD: 71 ML/MIN/{1.73_M2}
GLUCOSE BLDC GLUCOMTR-MCNC: 170 MG/DL (ref 70–99)
GLUCOSE BLDC GLUCOMTR-MCNC: 195 MG/DL (ref 70–99)
GLUCOSE SERPL-MCNC: 222 MG/DL (ref 70–99)
HCT VFR BLD AUTO: 45.4 % (ref 35–47)
HGB BLD-MCNC: 14.1 G/DL (ref 11.7–15.7)
INR PPP: 0.98 (ref 0.86–1.14)
LIPASE SERPL-CCNC: 347 U/L (ref 73–393)
MCH RBC QN AUTO: 27 PG (ref 26.5–33)
MCHC RBC AUTO-ENTMCNC: 31.1 G/DL (ref 31.5–36.5)
MCV RBC AUTO: 87 FL (ref 78–100)
PLATELET # BLD AUTO: 285 10E9/L (ref 150–450)
POTASSIUM SERPL-SCNC: 3.9 MMOL/L (ref 3.4–5.3)
PROT SERPL-MCNC: 7.5 G/DL (ref 6.8–8.8)
RBC # BLD AUTO: 5.23 10E12/L (ref 3.8–5.2)
SODIUM SERPL-SCNC: 138 MMOL/L (ref 133–144)
WBC # BLD AUTO: 6.2 10E9/L (ref 4–11)

## 2021-03-22 PROCEDURE — 710N000009 HC RECOVERY PHASE 1, LEVEL 1, PER MIN: Performed by: INTERNAL MEDICINE

## 2021-03-22 PROCEDURE — 255N000002 HC RX 255 OP 636: Performed by: INTERNAL MEDICINE

## 2021-03-22 PROCEDURE — 84132 ASSAY OF SERUM POTASSIUM: CPT | Performed by: STUDENT IN AN ORGANIZED HEALTH CARE EDUCATION/TRAINING PROGRAM

## 2021-03-22 PROCEDURE — 999N000141 HC STATISTIC PRE-PROCEDURE NURSING ASSESSMENT: Performed by: INTERNAL MEDICINE

## 2021-03-22 PROCEDURE — 999N000179 XR SURGERY CARM FLUORO LESS THAN 5 MIN W STILLS

## 2021-03-22 PROCEDURE — 710N000012 HC RECOVERY PHASE 2, PER MINUTE: Performed by: INTERNAL MEDICINE

## 2021-03-22 PROCEDURE — 85610 PROTHROMBIN TIME: CPT | Performed by: STUDENT IN AN ORGANIZED HEALTH CARE EDUCATION/TRAINING PROGRAM

## 2021-03-22 PROCEDURE — 88305 TISSUE EXAM BY PATHOLOGIST: CPT | Mod: TC | Performed by: INTERNAL MEDICINE

## 2021-03-22 PROCEDURE — 83690 ASSAY OF LIPASE: CPT | Performed by: STUDENT IN AN ORGANIZED HEALTH CARE EDUCATION/TRAINING PROGRAM

## 2021-03-22 PROCEDURE — 250N000011 HC RX IP 250 OP 636

## 2021-03-22 PROCEDURE — 250N000009 HC RX 250

## 2021-03-22 PROCEDURE — 82150 ASSAY OF AMYLASE: CPT | Performed by: STUDENT IN AN ORGANIZED HEALTH CARE EDUCATION/TRAINING PROGRAM

## 2021-03-22 PROCEDURE — 80053 COMPREHEN METABOLIC PANEL: CPT | Performed by: STUDENT IN AN ORGANIZED HEALTH CARE EDUCATION/TRAINING PROGRAM

## 2021-03-22 PROCEDURE — 258N000003 HC RX IP 258 OP 636

## 2021-03-22 PROCEDURE — 272N000002 HC OR SUPPLY OTHER OPNP: Performed by: INTERNAL MEDICINE

## 2021-03-22 PROCEDURE — 88305 TISSUE EXAM BY PATHOLOGIST: CPT | Mod: 26 | Performed by: PATHOLOGY

## 2021-03-22 PROCEDURE — 370N000017 HC ANESTHESIA TECHNICAL FEE, PER MIN: Performed by: INTERNAL MEDICINE

## 2021-03-22 PROCEDURE — 360N000084 HC SURGERY LEVEL 4 W/ FLUORO, PER MIN: Performed by: INTERNAL MEDICINE

## 2021-03-22 PROCEDURE — 250N000009 HC RX 250: Performed by: INTERNAL MEDICINE

## 2021-03-22 PROCEDURE — 250N000025 HC SEVOFLURANE, PER MIN: Performed by: INTERNAL MEDICINE

## 2021-03-22 PROCEDURE — 250N000013 HC RX MED GY IP 250 OP 250 PS 637

## 2021-03-22 PROCEDURE — 82962 GLUCOSE BLOOD TEST: CPT

## 2021-03-22 PROCEDURE — 272N000001 HC OR GENERAL SUPPLY STERILE: Performed by: INTERNAL MEDICINE

## 2021-03-22 PROCEDURE — 250N000009 HC RX 250: Performed by: STUDENT IN AN ORGANIZED HEALTH CARE EDUCATION/TRAINING PROGRAM

## 2021-03-22 PROCEDURE — 258N000003 HC RX IP 258 OP 636: Performed by: ANESTHESIOLOGY

## 2021-03-22 PROCEDURE — 36415 COLL VENOUS BLD VENIPUNCTURE: CPT | Performed by: STUDENT IN AN ORGANIZED HEALTH CARE EDUCATION/TRAINING PROGRAM

## 2021-03-22 PROCEDURE — 85027 COMPLETE CBC AUTOMATED: CPT | Performed by: STUDENT IN AN ORGANIZED HEALTH CARE EDUCATION/TRAINING PROGRAM

## 2021-03-22 RX ORDER — GLYCOPYRROLATE 0.2 MG/ML
INJECTION, SOLUTION INTRAMUSCULAR; INTRAVENOUS PRN
Status: DISCONTINUED | OUTPATIENT
Start: 2021-03-22 | End: 2021-03-22

## 2021-03-22 RX ORDER — NALOXONE HYDROCHLORIDE 0.4 MG/ML
0.2 INJECTION, SOLUTION INTRAMUSCULAR; INTRAVENOUS; SUBCUTANEOUS
Status: DISCONTINUED | OUTPATIENT
Start: 2021-03-22 | End: 2021-03-22 | Stop reason: HOSPADM

## 2021-03-22 RX ORDER — IOPAMIDOL 510 MG/ML
INJECTION, SOLUTION INTRAVASCULAR PRN
Status: DISCONTINUED | OUTPATIENT
Start: 2021-03-22 | End: 2021-03-22 | Stop reason: HOSPADM

## 2021-03-22 RX ORDER — MEPERIDINE HYDROCHLORIDE 25 MG/ML
12.5 INJECTION INTRAMUSCULAR; INTRAVENOUS; SUBCUTANEOUS
Status: DISCONTINUED | OUTPATIENT
Start: 2021-03-22 | End: 2021-03-22 | Stop reason: HOSPADM

## 2021-03-22 RX ORDER — OXYCODONE HYDROCHLORIDE 5 MG/1
5 TABLET ORAL EVERY 4 HOURS PRN
Status: DISCONTINUED | OUTPATIENT
Start: 2021-03-22 | End: 2021-03-22 | Stop reason: HOSPADM

## 2021-03-22 RX ORDER — LIDOCAINE HYDROCHLORIDE 20 MG/ML
INJECTION, SOLUTION INFILTRATION; PERINEURAL PRN
Status: DISCONTINUED | OUTPATIENT
Start: 2021-03-22 | End: 2021-03-22

## 2021-03-22 RX ORDER — ONDANSETRON 4 MG/1
4 TABLET, ORALLY DISINTEGRATING ORAL EVERY 30 MIN PRN
Status: DISCONTINUED | OUTPATIENT
Start: 2021-03-22 | End: 2021-03-22 | Stop reason: HOSPADM

## 2021-03-22 RX ORDER — NALOXONE HYDROCHLORIDE 0.4 MG/ML
0.4 INJECTION, SOLUTION INTRAMUSCULAR; INTRAVENOUS; SUBCUTANEOUS
Status: DISCONTINUED | OUTPATIENT
Start: 2021-03-22 | End: 2021-03-22 | Stop reason: HOSPADM

## 2021-03-22 RX ORDER — ALBUTEROL SULFATE 0.83 MG/ML
2.5 SOLUTION RESPIRATORY (INHALATION) EVERY 4 HOURS PRN
Status: DISCONTINUED | OUTPATIENT
Start: 2021-03-22 | End: 2021-03-22 | Stop reason: HOSPADM

## 2021-03-22 RX ORDER — LIDOCAINE 40 MG/G
CREAM TOPICAL
Status: DISCONTINUED | OUTPATIENT
Start: 2021-03-22 | End: 2021-03-22 | Stop reason: HOSPADM

## 2021-03-22 RX ORDER — EPHEDRINE SULFATE 50 MG/ML
INJECTION, SOLUTION INTRAMUSCULAR; INTRAVENOUS; SUBCUTANEOUS PRN
Status: DISCONTINUED | OUTPATIENT
Start: 2021-03-22 | End: 2021-03-22

## 2021-03-22 RX ORDER — FENTANYL CITRATE 50 UG/ML
25-50 INJECTION, SOLUTION INTRAMUSCULAR; INTRAVENOUS
Status: DISCONTINUED | OUTPATIENT
Start: 2021-03-22 | End: 2021-03-22 | Stop reason: HOSPADM

## 2021-03-22 RX ORDER — HYDRALAZINE HYDROCHLORIDE 20 MG/ML
2.5-5 INJECTION INTRAMUSCULAR; INTRAVENOUS
Status: DISCONTINUED | OUTPATIENT
Start: 2021-03-22 | End: 2021-03-22 | Stop reason: HOSPADM

## 2021-03-22 RX ORDER — ONDANSETRON 2 MG/ML
INJECTION INTRAMUSCULAR; INTRAVENOUS PRN
Status: DISCONTINUED | OUTPATIENT
Start: 2021-03-22 | End: 2021-03-22

## 2021-03-22 RX ORDER — DEXAMETHASONE SODIUM PHOSPHATE 4 MG/ML
INJECTION, SOLUTION INTRA-ARTICULAR; INTRALESIONAL; INTRAMUSCULAR; INTRAVENOUS; SOFT TISSUE PRN
Status: DISCONTINUED | OUTPATIENT
Start: 2021-03-22 | End: 2021-03-22

## 2021-03-22 RX ORDER — PROPOFOL 10 MG/ML
INJECTION, EMULSION INTRAVENOUS PRN
Status: DISCONTINUED | OUTPATIENT
Start: 2021-03-22 | End: 2021-03-22

## 2021-03-22 RX ORDER — INDOMETHACIN 50 MG/1
100 SUPPOSITORY RECTAL
Status: COMPLETED | OUTPATIENT
Start: 2021-03-22 | End: 2021-03-22

## 2021-03-22 RX ORDER — FENTANYL CITRATE 50 UG/ML
INJECTION, SOLUTION INTRAMUSCULAR; INTRAVENOUS PRN
Status: DISCONTINUED | OUTPATIENT
Start: 2021-03-22 | End: 2021-03-22

## 2021-03-22 RX ORDER — ALBUTEROL SULFATE 90 UG/1
AEROSOL, METERED RESPIRATORY (INHALATION) PRN
Status: DISCONTINUED | OUTPATIENT
Start: 2021-03-22 | End: 2021-03-22

## 2021-03-22 RX ORDER — TIMOLOL 5 MG/ML
1 SOLUTION/ DROPS OPHTHALMIC 2 TIMES DAILY
COMMUNITY
End: 2022-01-06

## 2021-03-22 RX ORDER — ONDANSETRON 2 MG/ML
4 INJECTION INTRAMUSCULAR; INTRAVENOUS EVERY 30 MIN PRN
Status: DISCONTINUED | OUTPATIENT
Start: 2021-03-22 | End: 2021-03-22 | Stop reason: HOSPADM

## 2021-03-22 RX ORDER — FLUMAZENIL 0.1 MG/ML
0.2 INJECTION, SOLUTION INTRAVENOUS
Status: DISCONTINUED | OUTPATIENT
Start: 2021-03-22 | End: 2021-03-22 | Stop reason: HOSPADM

## 2021-03-22 RX ORDER — FENTANYL CITRATE 50 UG/ML
25-50 INJECTION, SOLUTION INTRAMUSCULAR; INTRAVENOUS EVERY 5 MIN PRN
Status: DISCONTINUED | OUTPATIENT
Start: 2021-03-22 | End: 2021-03-22 | Stop reason: HOSPADM

## 2021-03-22 RX ORDER — SODIUM CHLORIDE, SODIUM LACTATE, POTASSIUM CHLORIDE, CALCIUM CHLORIDE 600; 310; 30; 20 MG/100ML; MG/100ML; MG/100ML; MG/100ML
INJECTION, SOLUTION INTRAVENOUS CONTINUOUS
Status: DISCONTINUED | OUTPATIENT
Start: 2021-03-22 | End: 2021-03-22 | Stop reason: HOSPADM

## 2021-03-22 RX ORDER — HYDROMORPHONE HYDROCHLORIDE 1 MG/ML
.3-.5 INJECTION, SOLUTION INTRAMUSCULAR; INTRAVENOUS; SUBCUTANEOUS EVERY 10 MIN PRN
Status: DISCONTINUED | OUTPATIENT
Start: 2021-03-22 | End: 2021-03-22 | Stop reason: HOSPADM

## 2021-03-22 RX ADMIN — DEXAMETHASONE SODIUM PHOSPHATE 4 MG: 4 INJECTION, SOLUTION INTRA-ARTICULAR; INTRALESIONAL; INTRAMUSCULAR; INTRAVENOUS; SOFT TISSUE at 08:37

## 2021-03-22 RX ADMIN — Medication 5 MG: at 09:10

## 2021-03-22 RX ADMIN — Medication 10 MG: at 08:57

## 2021-03-22 RX ADMIN — PROPOFOL 100 MG: 10 INJECTION, EMULSION INTRAVENOUS at 08:37

## 2021-03-22 RX ADMIN — GLYCOPYRROLATE 0.2 MG: 0.2 INJECTION, SOLUTION INTRAMUSCULAR; INTRAVENOUS at 09:00

## 2021-03-22 RX ADMIN — SODIUM CHLORIDE, POTASSIUM CHLORIDE, SODIUM LACTATE AND CALCIUM CHLORIDE: 600; 310; 30; 20 INJECTION, SOLUTION INTRAVENOUS at 08:29

## 2021-03-22 RX ADMIN — LIDOCAINE HYDROCHLORIDE 100 MG: 20 INJECTION, SOLUTION INFILTRATION; PERINEURAL at 08:36

## 2021-03-22 RX ADMIN — GLYCOPYRROLATE 0.2 MG: 0.2 INJECTION, SOLUTION INTRAMUSCULAR; INTRAVENOUS at 09:02

## 2021-03-22 RX ADMIN — ALBUTEROL SULFATE 6 PUFF: 108 INHALANT RESPIRATORY (INHALATION) at 09:00

## 2021-03-22 RX ADMIN — FENTANYL CITRATE 50 MCG: 50 INJECTION, SOLUTION INTRAMUSCULAR; INTRAVENOUS at 08:32

## 2021-03-22 RX ADMIN — Medication 5 MG: at 09:22

## 2021-03-22 RX ADMIN — FENTANYL CITRATE 200 MCG: 50 INJECTION, SOLUTION INTRAMUSCULAR; INTRAVENOUS at 08:36

## 2021-03-22 RX ADMIN — Medication 5 MG: at 09:14

## 2021-03-22 RX ADMIN — PHENYLEPHRINE HYDROCHLORIDE 200 MCG: 10 INJECTION INTRAVENOUS at 08:56

## 2021-03-22 RX ADMIN — ROCURONIUM BROMIDE 50 MG: 10 INJECTION INTRAVENOUS at 08:39

## 2021-03-22 RX ADMIN — PHENYLEPHRINE HYDROCHLORIDE 100 MCG: 10 INJECTION INTRAVENOUS at 08:46

## 2021-03-22 RX ADMIN — ONDANSETRON 4 MG: 2 INJECTION INTRAMUSCULAR; INTRAVENOUS at 08:37

## 2021-03-22 RX ADMIN — SUGAMMADEX 200 MG: 100 INJECTION, SOLUTION INTRAVENOUS at 09:33

## 2021-03-22 ASSESSMENT — MIFFLIN-ST. JEOR: SCORE: 1517.63

## 2021-03-22 NOTE — PROGRESS NOTES
Medication Therapy Management (MTM) Encounter    ASSESSMENT:                            Medication Adherence/Access: No issues identified    Type 2 Diabetes: Patient is not meeting A1c goal of < 7%. Self monitoring of blood glucose is not at goal of fasting  mg/dL. Patient would benefit from switching to metformin XR to see if this helps improve her diarrhea. Patient would benefit from increasing Invokana or starting a GLP-1 to help further control her blood sugars. Patient declined today due to wanting to continue trying to make lifestyle changes. In the future consider increasing invokana if blood sugars not well controlled.    Hyperlipidemia: Stable. Patient is on moderate intensity statin which is indicated based on 2019 ACC/AHA guidelines for lipid management.      Hypertension: Stable. Patient is meeting blood pressure goal of < 140/90mmHg.    Glaucoma: Stable.    Supplements: Stable.    Allergic rhinitis: Stable.    Asthma: Stable    Pain: Patient would benefit from continuing to limit her ibuprofen use and using acetaminophen as needed up to a total of 4000 mg daily.    PLAN:                            1. Try to limit the ibuprofen and you can take acetaminophen up to 4000 mg in a day  2. I will talk with Dr. Torres about switching to metformin XR instead to see if this helps your diarrhea - PCP agreed, called patient and notified her that the Rx was sent  3. Try to increase you exercise to 30-60 min 3-5 times a week. Also try to improve your diet by avoiding carbs and eating fruits, vegetables, and whole grains.    Follow-up: 1 month     SUBJECTIVE/OBJECTIVE:                          Gricelda Sabillon is a 70 year old female coming in for an initial visit. She was referred to me from Dr. Torres.      Reason for visit: Blood sugars. Patient had a esophagogastroduodenoscopy with endoscopic mucosal resection/polypectomy yesterday.    Allergies/ADRs: Reviewed in chart  Tobacco: She reports that she  has never smoked. She has never used smokeless tobacco.  Alcohol: not currently using  Caffeine: 1 cups/day of coffee, 1 sodas/day  Activity: Not as much as she would like. Tries to walk 3 times a week half an hour to an hour    Medication Adherence/Access: Patient uses pill box(es).  Patient takes medications 2 time(s) per day.   Per patient, misses medication 0 times per week. Once or twice had a pill that stuck in the pill box.  Medication barriers: none.   The patient fills medications at [facility] [place]: NO, fills medications at [facility] in [place]. Really likes the pharmacist there. The pharmacist has advocated for her in the past to make sure meds were right.    Type 2 Diabetes:  Currently taking canagliflozin (Invokana) 100 mg, glipizide 10 mg twice daily, metformin 1000 mg twice daily, sitagliptin (Januvia) 100 mg.   Patient does report more frequent urination after starting invokana. Didn't notice frequent urination with the start of hydrochlorothiazide, but does remember it with this medication. She does have diarrhea once every week or so.   Patient is happy with her blood sugars. She feels like she they have slowly been improving and is working on continual improvement in diet and exercise.  Would like to stay away from injections right now. Patient would like to try increasing exercise. Doesn't want to make any changes today to regimen. Reports she feels like she is so close  Blood sugars (patient reported):  Patient reports they used to always bet <120 in the morning.   Patient reports that in the morning they are always around 130's. Lowest she has seen lately is 117. Highest fasting blood sugar was in the 140's. Does report yesterday at surgery her blood sugar was 158, but she didn't take any of her meds.  Diet: Breakfast: toast, coffee, fruit  Lunch: tuna or chicken salad with lettuce  Dinner: meat, fruit, veggies  Snack: apple or an orange  No recent episodes of hypoglycemia  No recent episodes of  hyperglycemia  Eye exam: up to date  Foot exam: due  Aspirin: Taking 81mg daily and no concerns with blleding   Statin: Yes: atorvastatin 20 mg   ACEi/ARB: Yes: lisinopril 20 mg twice daily.   Urine Albumin:   Lab Results   Component Value Date    UMALCR 8.93 08/24/2020      Lab Results   Component Value Date    A1C 7.1 03/03/2021    A1C 7.2 08/24/2020    A1C 7.3 05/21/2020    A1C 7.0 02/20/2020    A1C 6.5 10/31/2019     GFR Estimate   Date Value Ref Range Status   03/22/2021 71 >60 mL/min/[1.73_m2] Final     Comment:     Non  GFR Calc  Starting 12/18/2018, serum creatinine based estimated GFR (eGFR) will be   calculated using the Chronic Kidney Disease Epidemiology Collaboration   (CKD-EPI) equation.       Hyperlipidemia: Current therapy includes atorvastatin 20 mg daily.  No concerns with side effects  The 10-year ASCVD risk score (Joey DAVENPORT Jr., et al., 2013) is: 23.6%    Values used to calculate the score:      Age: 70 years      Sex: Female      Is Non- : No      Diabetic: Yes      Tobacco smoker: No      Systolic Blood Pressure: 116 mmHg      Is BP treated: Yes      HDL Cholesterol: 40 mg/dL      Total Cholesterol: 177 mg/dL    Recent Labs   Lab Test 03/03/21  0845 02/20/20  0841 08/03/15  0835 08/03/15  0835 08/12/14  0848   CHOL 177 172   < > 132 149   HDL 40* 42*   < > 42* 46*   LDL 91 88   < > 57 43   TRIG 231* 209*   < > 167* 298*   CHOLHDLRATIO  --   --   --  3.1 3.2    < > = values in this interval not displayed.     Hypertension: Current medications include lisinopril 20 mg twice daily, atenolol 50 mg daily, hydrochlorothiazide 25 mg daily.    No concerns with side effects. Did have some dizziness post surgery, otherwise no concerns.  Per chart review patient is on atenolol for history of sinus tachycardia. Heart rate has been good since being on atenolol. Does check her heart rate at home on occasion, but doesn't remember what it typically is.    BP Readings from  Last 3 Encounters:   03/23/21 116/68   03/22/21 (!) 150/92   03/03/21 124/72     Glaucoma: Current medications include latanoprost, and timolol daily. Also taking fish oil. Fish oil 1 g daily recommended by eye doctor to help with moisture in the eyes. Next eye doctor appt is the middle of April, was supposed to be yesterday but she wasn't feeling great after her surgery.    Supplements: Currently taking calcium-vitamin D 500-125 mg, multivitamin. Takes these to make sure she gets her daily requirements. No concerns with side effects.    Allergic Rhinitis: Current medications include cetirizine 10 mg daily, azelastine 0.1% nasal spray 1 spray in both nostrils twice daily as needed, and azelastine eye drops as needed.  Nasal spray uses with allergies. Uses all of these as needed just in the spring except for cetirizine which she takes every day. No concerns with side effects.  Reports that these work well to control her allergy symptoms.    Asthma: Current medications: Short-Acting Bronchodilator: Albuterol MDI.  Main concern with asthma comes during allergy season. Uses albuterol inhaler with her allergies. In the spring she typically needs it on occasion. Last used last spring, doesn't use it any other time of year. Feels that breathing is well controlled. No concerns with side effects.  ACT Total Scores 8/27/2018 4/12/2019 11/22/2019   ACT TOTAL SCORE - - -   ASTHMA ER VISITS - - -   ASTHMA HOSPITALIZATIONS - - -   ACT TOTAL SCORE (Goal Greater than or Equal to 20) 25 25 25   In the past 12 months, how many times did you visit the emergency room for your asthma without being admitted to the hospital? 0 0 0   In the past 12 months, how many times were you hospitalized overnight because of your asthma? 0 0 0     Pain:  Current medications include: acetaminophen 650 mg 2 tablets before bed, ibuprofen as needed. Will take 2 tablets at bedtime if shoulders hurt. Has torn rotator cuffs in her shoulder. Last took  ibuprofen in February, had only been using acetaminophen up to the surgery. Patient really just needs pain meds before bed. If she isn't careful with her shoulders then she might need pain meds more frequently. Does feel ibuprofen works better than acetaminophen.  She also has arthritis in her hands so she applies voltaren gel as needed to her hand, specifically her thumb and middle finger on her right hand.  Does not use voltaren gel on her shoulder joint, doesn't feel like that is necessary. Overall feels that her pain is well controlled.      Today's Vitals: LMP 12/13/2002 (Exact Date)  Vitals taken at PCP visit today  ----------------    I spent 36 minutes with this patient today. I offer these suggestions for consideration by Dr. Torres. A copy of the visit note was provided to the patient's primary care provider.    The patient was given a summary of these recommendations.     I concur with the note as dictated above.   Arlet Harding, PharmD      Shellie CollinsD  PGY1 Medication Therapy Management Resident  Voicemail: [phone]          Medication Therapy Recommendations  Pain    Current Medication: acetaminophen (ACETAMINOPHEN 8 HOUR) 650 MG CR tablet   Rationale: Frequency inappropriate - Dosage too low - Effectiveness   Recommendation: Increase Frequency - Can take acetaminophen up to 4000 mg a day, and try to limit ibuprofen   Status: Accepted - no CPA Needed         Type 2 diabetes mellitus with hyperglycemia, without long-term current use of insulin (H)    Current Medication: metFORMIN (GLUCOPHAGE) 1000 MG tablet   Rationale: Undesirable effect - Adverse medication event - Safety   Recommendation: Change Medication - Change to metformin XR   Status: Contact Provider - Awaiting Response

## 2021-03-22 NOTE — OR NURSING
Spoke with JENNIFER Booth, . Patient does not take insulin at home. Per JENNIFER no intervention at this time.

## 2021-03-22 NOTE — ANESTHESIA PREPROCEDURE EVALUATION
Anesthesia Pre-Procedure Evaluation    Patient: Gricelda Sabillon   MRN:     [id number] Gender:   female   Age:    70 year old :      1950        Preoperative Diagnosis: Polyp of duodenum [K31.7]   Procedure(s):  ENDOSCOPIC RETROGRADE CHOLANGIOPANCREATOGRAPHY, WITH DIRECT DUCT VISUALIZATION, USING PANCREATICOBILIARY FIBEROPTIC PROBE  possible repeat ESOPHAGOGASTRODUODENOSCOPY, WITH RADIOFREQUENCY ABLATION     LABS:  CBC:   Lab Results   Component Value Date    WBC 6.2 2021    WBC 7.9 2021    HGB 14.1 2021    HGB 14.0 2021    HCT 45.4 2021    HCT 44.0 2021     2021     2021     BMP:   Lab Results   Component Value Date     2021     09/10/2020    POTASSIUM 4.4 2021    POTASSIUM 3.8 09/10/2020    CHLORIDE 105 2021    CHLORIDE 106 09/10/2020    CO2 26 2021    CO2 23 09/10/2020    BUN 19 2021    BUN 26 09/10/2020    CR 0.98 2021    CR 0.86 09/10/2020     (H) 2021     (H) 09/10/2020     COAGS:   Lab Results   Component Value Date    INR 1.00 09/10/2020     POC:   Lab Results   Component Value Date     (H) 2021     OTHER:   Lab Results   Component Value Date    A1C 7.1 (H) 2021    KATHRYN 10.7 (H) 2021    ALBUMIN 3.8 2021    PROTTOTAL 7.6 2021    ALT 37 2021    AST 20 2021    ALKPHOS 82 2021    BILITOTAL 0.5 2021    LIPASE 1,463 (H) 09/10/2020    AMYLASE 103 09/10/2020    TSH 2.56 2021    T4 1.17 2016    CRP <5.0 2014    SED 16 2014        Preop Vitals    BP Readings from Last 3 Encounters:   21 138/64   21 124/72   09/10/20 130/74    Pulse Readings from Last 3 Encounters:   21 70   21 71   09/10/20 81      Resp Readings from Last 3 Encounters:   21 16   21 18   09/10/20 16    SpO2 Readings from Last 3 Encounters:   21 98%   21 98%   09/10/20 96%      Temp  "Readings from Last 1 Encounters:   03/22/21 36.5  C (97.7  F) (Oral)    Ht Readings from Last 1 Encounters:   03/22/21 1.702 m (5' 7\")      Wt Readings from Last 1 Encounters:   03/22/21 97.5 kg (214 lb 15.2 oz)    Estimated body mass index is 33.67 kg/m  as calculated from the following:    Height as of an earlier encounter on 3/22/21: 1.702 m (5' 7\").    Weight as of an earlier encounter on 3/22/21: 97.5 kg (214 lb 15.2 oz).     LDA:  Peripheral IV 03/22/21 Left Lower forearm (Active)   Site Assessment WDL 03/22/21 0812   Line Status Saline locked 03/22/21 0812   Phlebitis Scale 0-->no symptoms 03/22/21 0812   Infiltration Scale 0 03/22/21 0812   Infiltration Site Treatment Method  None 03/22/21 0812   Number of days: 0        Past Medical History:   Diagnosis Date     Allergic rhinitis, cause unspecified      Asthma      Asthma, mild intermittent      Cataract      Cellulitis and abscess of leg, except foot 03/00    Bilateral     Eczema      Heart murmur     aortic area     HTN, goal below 140/90      Hyperlipidemia LDL goal < 100      Hyperplastic colon polyp 2008     Infection     bilateral eye infections     Macular hole of left eye      Obesity (BMI 30-39.9)      Osteoarthritis     knees     Other chronic pain     right knee     Sleep apnea     uses c pap     Type 2 diabetes, HbA1C goal < 8% (H)       Past Surgical History:   Procedure Laterality Date     ARTHROPLASTY HIP Right 9/25/2017    Procedure: ARTHROPLASTY HIP;  Right total hip arthroplasty  ;  Surgeon: Ayaan Pena MD;  Location: [facility] OR     ARTHROPLASTY KNEE  7/12/2013    Procedure: ARTHROPLASTY KNEE;  right total knee arthroplasty ;  Surgeon: Chaparro Wesley MD;  Location: [facility] OR     ARTHROSCOPY KNEE Right 1/21/2015    Procedure: ARTHROSCOPY KNEE;  Surgeon: Ayaan Pena MD;  Location: [facility] OR     C INCISION OF HYMEN       CATARACT IOL, RT/LT       COLONOSCOPY  2008     COLONOSCOPY N/A 4/18/2019    Procedure: Colonoscopy with " polypectomy;  Surgeon: Shaun Guerrero MD;  Location: [facility]     ENDOSCOPIC RETROGRADE CHOLANGIOPANCREATOGRAM N/A 2/6/2019    Procedure: COMBINED ENDOSCOPIC RETROGRADE CHOLANGIOPANCREATOGRAPHY, BILIARY SPINCTEROTOMY AND DILATION, PLACE BILE DUCT STENT;  Surgeon: Guru Andie Gonzalez MD;  Location: [facility]     ENDOSCOPIC RETROGRADE CHOLANGIOPANCREATOGRAM N/A 2/28/2019    Procedure: Endoscopic Retrograde Cholangiopancreatogram, Bile duct stent removal and placement;  Surgeon: Shaun Guerrero MD;  Location: [facility]     ENDOSCOPIC RETROGRADE CHOLANGIOPANCREATOGRAM N/A 4/18/2019    Procedure: COMBINED ENDOSCOPIC RETROGRADE CHOLANGIOPANCREATOGRAPHY, Bile duct stent exchange and Polypectomy;  Surgeon: Shaun Guerrero MD;  Location[facility]     ENDOSCOPIC RETROGRADE CHOLANGIOPANCREATOGRAM N/A 10/18/2019    Procedure: Endoscopic Retrograde Cholangiopancreatogram;  Surgeon: Shaun Guerrero MD;  [facility]     ENDOSCOPIC RETROGRADE CHOLANGIOPANCREATOGRAM WITH SPYGLASS N/A 7/26/2019    Procedure: Endoscopic Retrograde Cholangiopancreatogram With Spyglas,l Radiofrequency Ablation, Stent Exchangeand Duodenal Biopsy x2;  Surgeon: Shaun Guerrero MD;  [facility]     ENDOSCOPIC RETROGRADE CHOLANGIOPANCREATOGRAM WITH SPYGLASS N/A 9/10/2020    Procedure: ENDOSCOPIC RETROGRADE CHOLANGIOPANCREATOGRAPHY, WITH DIRECT DUCT VISUALIZATION, USING PANCREATICOBILIARY FIBEROPTIC PROBE;  Surgeon: Shaun Guerrero MD;  [facility]     ESOPHAGOSCOPY, GASTROSCOPY, DUODENOSCOPY (EGD) WITH RADIO FREQUENCY ABLATION, COMBINED N/A 9/10/2020    Procedure: possible repeat ESOPHAGOGASTRODUODENOSCOPY, WITH RADIOFREQUENCY ABLATION and endoscopic mucosal resection;  Surgeon: Shaun Guerrero MD;  [facility]     ESOPHAGOSCOPY, GASTROSCOPY, DUODENOSCOPY (EGD), COMBINED N/A 4/18/2019    Procedure: upper endoscopy with polypectomy;  Surgeon: Shaun Guerrero MD;  [facility]     ESOPHAGOSCOPY, GASTROSCOPY, DUODENOSCOPY (EGD), COMBINED N/A  "10/18/2019    Procedure: Upper Endoscopy and ERCP with stent removal, stone removal and biopsy;  Surgeon: Shaun Guerrero MD;  [facility]: [facility]     ESOPHAGOSCOPY, GASTROSCOPY, DUODENOSCOPY (EGD), RESECT MUCOSA, COMBINED N/A 2/28/2019    Procedure: Upper Endoscopy, Endoscopic Ultrasound, Endoscopic Mucosal Resection,  Ampullectomy, polypectomy.;  Surgeon: Shaun Guerrero MD;  Location: [facility]     EYE SURGERY      macular hole repaired left eye     HC KNEE SCOPE, DIAGNOSTIC      - both knees     HEAD & NECK SURGERY      wisdom teeth      Allergies   Allergen Reactions     Bromfenac Visual Disturbance     Sulfites, xibrom  Causes sever eye pain     Codeine      \"NAUSE,HA AND DIZZINESS\"     Codeine Nausea     Other reaction(s): Dizziness, Headache     Sulfites Visual Disturbance     Xibrom (bromfenac opthalmic solution) 0.09%--eye pain        Anesthesia Evaluation     . Pt has had prior anesthetic. Type: General.    No history of anesthetic complications          ROS/MED HX    ENT/Pulmonary:     (+) sleep apnea, asthma     Neurologic:  - neg neurologic ROS     Cardiovascular:         METS/Exercise Tolerance:     Hematologic:         Musculoskeletal:         GI/Hepatic:         Renal/Genitourinary:         Endo:         Psychiatric:  - neg psychiatric ROS       Infectious Disease:         Malignancy:       Other:                         PHYSICAL EXAM:   Mental Status/Neuro: A/A/O   Airway: Facies: Feasible  Mallampati: II  Mouth/Opening: Full  TM distance: > 6 cm  Neck ROM: Full   Respiratory: Auscultation: CTAB     Resp. Rate: Normal     Resp. Effort: Normal      CV: Rhythm: Regular  Rate: Age appropriate  Heart: Normal Sounds  Edema: None   Comments:      Dental: Normal Dentition                Assessment:   ASA SCORE: 2      Smoking Status:  Non-Smoker/Unknown   NPO Status: NPO Appropriate     Plan:   Anes. Type:  General   Pre-Medication: None   Induction:  IV (Standard)   Airway: ETT; Oral   Access/Monitoring: PIV "   Maintenance: Balanced     Postop Plan:   Postop Pain: Opioids  Postop Sedation/Airway: Not planned     PONV Management:   Adult Risk Factors: Female, Non-Smoker, Postop Opioids   Prevention: Ondansetron, Dexamethasone     CONSENT: Direct conversation   Plan and risks discussed with: Patient   Blood Products: Consent Deferred (Minimal Blood Loss)                   Robert Montalvo MD    Anesthesia Evaluation   Pt has had prior anesthetic. Type: General.    No history of anesthetic complications       ROS/MED HX  ENT/Pulmonary:     (+) sleep apnea, asthma     Neurologic:  - neg neurologic ROS     Cardiovascular:       METS/Exercise Tolerance:     Hematologic:       Musculoskeletal:       GI/Hepatic:       Renal/Genitourinary:       Endo:       Psychiatric/Substance Use:  - neg psychiatric ROS     Infectious Disease:       Malignancy:       Other:              Physical Exam    Airway        Mallampati: II   TM distance: > 3 FB   Neck ROM: full   Mouth opening: > 3 cm    Respiratory Devices and Support         Dental           Cardiovascular          Rhythm and rate: regular and normal     Pulmonary   pulmonary exam normal                  Anesthesia Plan    ASA Status:  2   NPO Status:  NPO Appropriate    Anesthesia Type: General.     - Airway: ETT   Induction: Intravenous.   Maintenance: Balanced.   Techniques and Equipment:     - Lines/Monitors: BIS     Consents    Anesthesia Plan(s) and associated risks, benefits, and realistic alternatives discussed. Questions answered and patient/representative(s) expressed understanding.     - Discussed with:  Patient         Postoperative Care    Pain management: IV analgesics, Oral pain medications, Multi-modal analgesia.   PONV prophylaxis: Ondansetron (or other 5HT-3), Dexamethasone or Solumedrol     Comments:

## 2021-03-22 NOTE — ANESTHESIA CARE TRANSFER NOTE
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    Anesthesia Type:   General     Note:    Oropharynx: oropharynx clear of all foreign objects and spontaneously breathing  Level of Consciousness: drowsy  Oxygen Supplementation: nasal cannula  Level of Supplemental Oxygen (L/min / FiO2): 4  Independent Airway: airway patency satisfactory and stable  Dentition: dentition unchanged  Vital Signs Stable: post-procedure vital signs reviewed and stable  Report to RN Given: handoff report given  Patient transferred to: PACU    Handoff Report: Identifed the Patient, Identified the Reponsible Provider, Reviewed the pertinent medical history, Discussed the surgical course, Reviewed Intra-OP anesthesia mangement and issues during anesthesia, Set expectations for post-procedure period and Allowed opportunity for questions and acknowledgement of understanding      Vitals: (Last set prior to Anesthesia Care Transfer)  CRNA VITALS  3/22/2021 0909 - 3/22/2021 0945      3/22/2021             Pulse:  71    SpO2:  98 %    Resp Rate (observed): 12        Electronically Signed By: DENYS Tan CRNA  March 22, 2021  9:45 AM

## 2021-03-22 NOTE — DISCHARGE INSTRUCTIONS
[facility]  Same-Day Surgery   Adult Discharge Orders & Instructions     For 24 hours after surgery    1. Get plenty of rest.  A responsible adult must stay with you for at least 24 hours after you leave the hospital.   2. Do not drive or use heavy equipment.  If you have weakness or tingling, don't drive or use heavy equipment until this feeling goes away.  3. Do not drink alcohol.  4. Avoid strenuous or risky activities.  Ask for help when climbing stairs.   5. You may feel lightheaded.  IF so, sit for a few minutes before standing.  Have someone help you get up.   6. If you have nausea (feel sick to your stomach): Drink only clear liquids such as apple juice, ginger ale, broth or 7-Up.  Rest may also help.  Be sure to drink enough fluids.  Move to a regular diet as you feel able.  7. You may have a slight fever. Call the doctor if your fever is over 100 F (37.7 C) (taken under the tongue) or lasts longer than 24 hours.  8. You may have a dry mouth, a sore throat, muscle aches or trouble sleeping.  These should go away after 24 hours.  9. Do not make important or legal decisions.   Call your doctor for any of the followin.  Signs of infection (fever, growing tenderness at the surgery site, a large amount of drainage or bleeding, severe pain, foul-smelling drainage, redness, swelling).    2. It has been over 8 to 10 hours since surgery and you are still not able to urinate (pass water).    3.  Headache for over 24 hours.    4.  Numbness, tingling or weakness the day after surgery (if you had spinal anesthesia).  To contact a doctor, call Dr. Guerrero's Office at [phone] or:        [phone] and ask for the resident on call for gastroenterology (answered 24 hours a day)      Emergency Department:[facility]: [phone]

## 2021-03-22 NOTE — ANESTHESIA PROCEDURE NOTES
Airway       Patient location during procedure: OR  Staff -        Anesthesiologist:  Robert Booth MD       CRNA: Macy Casey APRN CRNA       Other Anesthesia Staff: Ronald Childers       Performed By: SRNA  Consent for Airway        Urgency: elective  Indications and Patient Condition       Indications for airway management: rita-procedural       Induction type:intravenous       Mask difficulty assessment: 2 - vent by mask + OA or adjuvant +/- NMBA    Final Airway Details       Final airway type: endotracheal airway       Successful airway: ETT - single and Oral  Endotracheal Airway Details        ETT size (mm): 7.0       Cuffed: yes       Successful intubation technique: direct laryngoscopy       DL Blade Type: Mullen 2       Grade View of Cords: 1       Adjucts: stylet       Position: Right       Measured from: gums/teeth       Secured at (cm): 21       Bite block used: Oral Airway (w/ egd airway)    Post intubation assessment        Placement verified by: capnometry, equal breath sounds and chest rise        Number of attempts at approach: 1       Secured with: pink tape and plastic tape       Ease of procedure: easy       Dentition: Intact and Unchanged    Medication(s) Administered   Medication Administration Time: 3/22/2021 8:41 AM

## 2021-03-22 NOTE — OP NOTE
ERCP 03/22/2021  8:20 AM [facility], [address] [place] [address] [phone]     Endoscopy Department   _______________________________________________________________________________   Patient Name: Gricelda Sabillon         Procedure Date: 3/22/2021 8:20 AM   MRN: [id number]                       Account Number: [id number]   YOB: 1950              Admit Type: Outpatient   Age: 70                               Room: OR   Gender: Female                        Note Status: Finalized   Attending MD: Shaun Guerrero MD       Pause for the Cause: time out performed   Total Sedation Time:                     _______________________________________________________________________________       Procedure:           ERCP   Indications:         Follow-up of ampullary/periampullary tumor, 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 Clinically doing                        well. Here for surveillance.   Providers:           Shaun Guerrero MD   Referring MD:           Requesting Provider: Raisa Davidson MD   Medicines:           General Anesthesia, Indomethacin 100 mg DE   Complications:       No immediate complications. Estimated blood loss:                        Minimal.   _______________________________________________________________________________   Procedure:           Pre-Anesthesia Assessment:                        - Prior to the procedure, a History and Physical was                        performed, and patient medications and allergies were                        reviewed. The patient is competent. The risks and                        benefits of the procedure and the sedation options and                        risks were discussed with the patient. All questions                         were answered and informed consent was obtained. Patient                        identification and proposed procedure were verified by                        the physician, the nurse, the anesthesiologist and the                        anesthetist in the procedure room. Mental Status                        Examination: alert and oriented. Airway Examination:                        normal oropharyngeal airway and neck mobility.                        Respiratory Examination: clear to auscultation. CV                        Examination: normal. Prophylactic Antibiotics: The                        patient does not require prophylactic antibiotics. Prior                        Anticoagulants: The patient has taken no previous                        anticoagulant or antiplatelet agents. ASA Grade                        Assessment: II - A patient with mild systemic disease.                        After reviewing the risks and benefits, the patient was                        deemed in satisfactory condition to undergo the                        procedure. The anesthesia plan was to use general                        anesthesia. Immediately prior to administration of                        medications, the patient was re-assessed for adequacy to                        receive sedatives. The heart rate, respiratory rate,                        oxygen saturations, blood pressure, adequacy of                        pulmonary ventilation, and response to care were                        monitored throughout the procedure. The physical status                        of the patient was re-assessed after the procedure.                        After obtaining informed consent, the scope was passed                        under direct vision. Throughout the procedure, the                        patient's blood pressure, pulse, and oxygen saturations                        were monitored continuously. The was introduced through                         the mouth, and used to inject contrast into and used to                        inject contrast into the bile duct. The Endoscope was                        introduced through the mouth, and used to inject                        contrast into and used to locate the major papilla. The                        ERCP was accomplished without difficulty. The patient                        tolerated the procedure well.                                                                                     Findings:        The  film was normal. The esophagus was successfully intubated        under direct vision without detailed examination of the pharynx, larynx,        and associated structures. A standard esophagogastroduodenoscopy scope        was used for the examination of the upper gastrointestinal tract. The        scope was passed under direct vision through the upper GI tract. The        examined esophagus was normal. The entire examined stomach was normal. A        single 4 mm sessile polyp was found in the second portion of the        duodenum on the posterior wall at the same level of the major papilla.        The polyp was removed with a Orise gel injection-lift technique using a        cold snare. The polypectomy was performed through the        esophagogastroduodenoscope. Resection and retrieval were complete. The        scope was passed through the upper GI tract without discovering UGI        findings. Inspection of the major papilla revealed that an ampullectomy        (papillectomy) had been performed previously. The major papilla was        normal without evidence of residual adenoma. The bile duct was deeply        cannulated with the 15 mm balloon. Contrast was injected. I personally        interpreted the bile duct images. There was brisk flow of contrast        through the ducts. Image quality was excellent. Contrast extended to the        main bile duct. The main bile duct was normal.  Balloon pull through did        no reveal any intraductal residual polyp. The bile duct was explored        endoscopically using the SpyGlass direct visualization system. The        SpyScope was advanced to the middle third of the main bile duct.        Visibility with the scope was excellent. The lower third of the main        bile duct and middle third of the main bile duct were normal without        evidence of residual polyp.                                                                                     Impression:          - EGD performed first. 4 mm possible residual duodenal                        polyp versus scar tissu seen at D2 on posterior wall at                        the same level of major papilla. Same location as                        residual adenoma seen on last procedure. Resected with a                        lift/cold snare and retrieved.                        - Prior endoscopic papillectomy                        - No other residual polyp seen with either the forward                        viewing gastroscope or the side viewing duodenoscope                        - Spyglass cholangioscopy performed and no residual                        polypoid tissue seen within the distal bile duct either.                        - The cholangiogram was normal.   Recommendation:      - Discharge patient to home (ambulatory).                        - Await pathology results                        - Repeat ERCP in 6 months-1 year for surveillance                        depending on if pathology shows any residual adenoma. If                        residual adenoma still seen on follow up exam, may                        require Endorotor resection versus RFA ablation.                        - Resume medications and diet                        - Above discussed with patient and family                                                                                       Shaun Guerrero MD

## 2021-03-22 NOTE — PROGRESS NOTES
Dr. Guerrero saw pt at bedside. No follow-up labs and does not need to see patient on 3C. Report given to EDY Powell 3C.

## 2021-03-22 NOTE — ANESTHESIA POSTPROCEDURE EVALUATION
Patient: Gricelda Sabillon    Procedure(s):  ESOPHAGOGASTRODUODENOSCOPY (EGD) with  endoscopic mucosal resection/ polypectomy  ENDOSCOPIC RETROGRADE CHOLANGIOPANCREATOGRAPHY, WITH DIRECT DUCT VISUALIZATION, USING PANCREATICOBILIARY FIBEROPTIC PROBE    Diagnosis:Ampullary adenoma [D13.5]  Diagnosis Additional Information: No value filed.    Anesthesia Type:  General    Note:  Disposition: Outpatient   Postop Pain Control: Uneventful            Sign Out: Well controlled pain   PONV: No   Neuro/Psych: Uneventful            Sign Out: Acceptable/Baseline neuro status   Airway/Respiratory: Uneventful            Sign Out: Acceptable/Baseline resp. status   CV/Hemodynamics: Uneventful            Sign Out: Acceptable CV status   Other NRE: NONE   DID A NON-ROUTINE EVENT OCCUR? No    Event details/Postop Comments:  Denies nausea, pain, or dyspnea. Still on 4L O2 do maintain SpO2 >96%. Encouraged pulmonary toilette and will continue to wean. Up to chair in phase II will help as well. Okay to discharge from PACU.          Last vitals:  Vitals:    03/22/21 0941 03/22/21 0945 03/22/21 1000   BP: (!) 149/86  136/63   Pulse: 70 73 72   Resp: 13  12   Temp: 35.7  C (96.3  F)     SpO2: 93% 93% 95%       Last vitals prior to Anesthesia Care Transfer:  CRNA VITALS  3/22/2021 0909 - 3/22/2021 1002      3/22/2021             Pulse:  71    SpO2:  98 %    Resp Rate (observed):  (!) 1          Electronically Signed By: Robert Montalvo MD  March 22, 2021  10:02 AM

## 2021-03-23 ENCOUNTER — TELEPHONE (OUTPATIENT)
Dept: PHARMACY | Facility: CLINIC | Age: 71
End: 2021-03-23

## 2021-03-23 ENCOUNTER — OFFICE VISIT (OUTPATIENT)
Dept: PHARMACY | Facility: CLINIC | Age: 71
End: 2021-03-23
Payer: COMMERCIAL

## 2021-03-23 ENCOUNTER — OFFICE VISIT (OUTPATIENT)
Dept: INTERNAL MEDICINE | Facility: CLINIC | Age: 71
End: 2021-03-23
Payer: COMMERCIAL

## 2021-03-23 VITALS
HEART RATE: 79 BPM | RESPIRATION RATE: 18 BRPM | HEIGHT: 67 IN | TEMPERATURE: 98.7 F | OXYGEN SATURATION: 97 % | DIASTOLIC BLOOD PRESSURE: 68 MMHG | WEIGHT: 215.7 LBS | SYSTOLIC BLOOD PRESSURE: 116 MMHG | BODY MASS INDEX: 33.85 KG/M2

## 2021-03-23 DIAGNOSIS — H40.053 BORDERLINE GLAUCOMA WITH OCULAR HYPERTENSION, BILATERAL: ICD-10-CM

## 2021-03-23 DIAGNOSIS — E78.5 HYPERLIPIDEMIA WITH TARGET LDL LESS THAN 100: ICD-10-CM

## 2021-03-23 DIAGNOSIS — R00.0 SINUS TACHYCARDIA: ICD-10-CM

## 2021-03-23 DIAGNOSIS — I10 HTN, GOAL BELOW 140/90: ICD-10-CM

## 2021-03-23 DIAGNOSIS — E11.65 TYPE 2 DIABETES MELLITUS WITH HYPERGLYCEMIA, WITHOUT LONG-TERM CURRENT USE OF INSULIN (H): Primary | ICD-10-CM

## 2021-03-23 DIAGNOSIS — Z78.9 TAKES DIETARY SUPPLEMENTS: ICD-10-CM

## 2021-03-23 DIAGNOSIS — E78.5 HYPERLIPIDEMIA WITH TARGET LDL LESS THAN 100: Chronic | ICD-10-CM

## 2021-03-23 DIAGNOSIS — R52 PAIN: ICD-10-CM

## 2021-03-23 DIAGNOSIS — J30.9 ALLERGIC RHINITIS, UNSPECIFIED SEASONALITY, UNSPECIFIED TRIGGER: ICD-10-CM

## 2021-03-23 DIAGNOSIS — J45.20 MILD INTERMITTENT ASTHMA WITHOUT COMPLICATION: ICD-10-CM

## 2021-03-23 DIAGNOSIS — E11.9 DIABETES MELLITUS WITHOUT COMPLICATION (H): Chronic | ICD-10-CM

## 2021-03-23 DIAGNOSIS — Z00.00 ROUTINE GENERAL MEDICAL EXAMINATION AT A HEALTH CARE FACILITY: Primary | ICD-10-CM

## 2021-03-23 LAB — COPATH REPORT: NORMAL

## 2021-03-23 PROCEDURE — 99605 MTMS BY PHARM NP 15 MIN: CPT | Performed by: PHARMACIST

## 2021-03-23 PROCEDURE — 99397 PER PM REEVAL EST PAT 65+ YR: CPT | Performed by: INTERNAL MEDICINE

## 2021-03-23 PROCEDURE — 99607 MTMS BY PHARM ADDL 15 MIN: CPT | Performed by: PHARMACIST

## 2021-03-23 PROCEDURE — 99213 OFFICE O/P EST LOW 20 MIN: CPT | Mod: 25 | Performed by: INTERNAL MEDICINE

## 2021-03-23 RX ORDER — GLIPIZIDE 5 MG/1
TABLET ORAL
Qty: 360 TABLET | Refills: 1 | Status: SHIPPED | OUTPATIENT
Start: 2021-03-23 | End: 2021-08-17

## 2021-03-23 RX ORDER — LISINOPRIL 20 MG/1
20 TABLET ORAL 2 TIMES DAILY
Qty: 180 TABLET | Refills: 1 | Status: SHIPPED | OUTPATIENT
Start: 2021-03-23 | End: 2021-07-02

## 2021-03-23 RX ORDER — SENNOSIDES 8.6 MG
650 CAPSULE ORAL EVERY 8 HOURS PRN
Status: ON HOLD | COMMUNITY
End: 2022-06-23

## 2021-03-23 RX ORDER — ATENOLOL 50 MG/1
50 TABLET ORAL DAILY
Qty: 90 TABLET | Refills: 1 | Status: SHIPPED | OUTPATIENT
Start: 2021-03-23 | End: 2021-06-18

## 2021-03-23 RX ORDER — METFORMIN HCL 500 MG
1000 TABLET, EXTENDED RELEASE 24 HR ORAL 2 TIMES DAILY WITH MEALS
Qty: 360 TABLET | Refills: 3 | Status: SHIPPED | OUTPATIENT
Start: 2021-03-23 | End: 2022-04-20

## 2021-03-23 RX ORDER — ATORVASTATIN CALCIUM 20 MG/1
20 TABLET, FILM COATED ORAL DAILY
Qty: 90 TABLET | Refills: 1 | Status: SHIPPED | OUTPATIENT
Start: 2021-03-23 | End: 2021-08-17

## 2021-03-23 RX ORDER — HYDROCHLOROTHIAZIDE 25 MG/1
TABLET ORAL
Qty: 90 TABLET | Refills: 1 | Status: SHIPPED | OUTPATIENT
Start: 2021-03-23 | End: 2021-08-17

## 2021-03-23 ASSESSMENT — MIFFLIN-ST. JEOR: SCORE: 1521.04

## 2021-03-23 NOTE — PATIENT INSTRUCTIONS
Recommendations from today's MTM visit:                                                    MTM (medication therapy management) is a service provided by a clinical pharmacist designed to help you get the most of out of your medicines.   Today we reviewed what your medicines are for, how to know if they are working, that your medicines are safe and how to make your medicine regimen as easy as possible.      1. Try to limit the ibuprofen and you can take acetaminophen up to 4000 mg in a day    2. I will talk with Dr. Torres about switching to metformin XR instead to see if this helps your diarrhea    3. Try to increase you exercise to 30-60 min 3-5 times a week. Also try to improve your diet by avoiding carbs and eating fruits, vegetables, and whole grains.    It was great to speak with you today.  I value your experience and would be very thankful for your time with providing feedback on our clinic survey. You may receive a survey via email or text message in the next few days.     Next MTM visit: ~1 month    To schedule another MTM appointment, please call the clinic directly or you may call the MTM scheduling line at [phone] or toll-free at [phone].     My Clinical Pharmacist's contact information:                                                      It was a pleasure talking with you today!  Please feel free to contact me with any questions or concerns you have.      Giovanna Heller, PharmD  PGY1 Medication Therapy Management Resident  Voicemail: [phone]

## 2021-03-23 NOTE — PROGRESS NOTES
Dr Torres's note    Patient's instructions / PLAN:                                                        Plan:  1. Continue same meds, same doses for now   2. Please make a lab appointment for nonfasting labs in 4-5 months + f/u chi   3.  The following vaccines are recommended for you. Please check with your insurance about coverage.  Some insurances cover better if you have these vaccines at the pharmacy:  -- Tetanus vaccine with whooping cough  4. Mammogram ( please call [phone] to schedule it)     ASSESSMENT & PLAN:                                                      (Z00.00) Routine general medical examination at a health care facility  (primary encounter diagnosis)  Comment:   Plan:     (I10) HTN, goal below 140/90  Comment: Controlled    Plan: atenolol (TENORMIN) 50 MG tablet,         hydrochlorothiazide (HYDRODIURIL) 25 MG tablet,        lisinopril (ZESTRIL) 20 MG tablet, Hemoglobin         A1c, Comprehensive metabolic panel            (E78.5) Hyperlipidemia with target LDL less than 100  Comment: Controlled    Plan: atorvastatin (LIPITOR) 20 MG tablet, Hemoglobin        A1c, Comprehensive metabolic panel            (E11.9) DM2 no complication  Comment: Controlled    Plan: canagliflozin (INVOKANA) 100 MG tablet,         glipiZIDE (GLUCOTROL) 5 MG tablet, metFORMIN         (GLUCOPHAGE) 1000 MG tablet, sitagliptin         (JANUVIA) 100 MG tablet, Hemoglobin A1c,         Comprehensive metabolic panel            (R00.0) Sinus tachycardia  Comment:   Plan: atenolol (TENORMIN) 50 MG tablet               Chief Complaint:                                                        Annual exam  Follow up chronic medical problems      SUBJECTIVE:                                                    History of present illness     We reviewed the chronic medical problems as above.   I reviewed the recent tests results in Epic     She had ERCP yesterday am and she vomited last evening. She kept in touch with GI dept. She  feels well now.       ROS:     General: Negative for fever, chills, major weight changes, fatigue  Skin: Negative for rashes, abnormal spots  Eyes: Negative for blurred or double vision  ENT/mouth: Negative for sinuses discomfort, earache, sore throat  Respiratory: Negative for cough, wheezes, chronic lung disease  Cardiovascular: Negative for rest or exertional chest pain, shortness of breath, palpitations, leg edema,   Gastrointestinal: Negative for vomiting, abdominal pain, heartburn, blood in stool, diarrhea, constipation  Genitourinary: Negative for urinary frequency, blood in urine, history of kidney stones  Female: Negative for abnormal vaginal bleeding, vaginal discharge  Neuro: Negative for headaches, numbness, tingling, weakness in arms or legs, history of seizure, recent syncope  Psychiatry: Negative for depression, anxiety, suicidal thoughts  Endo: Negative for known thyroid disease, pos for diabetes.  Hemato/Lymph: Negative for nodes, easy bleeding, history of DVT, blood transfusion  Musculoskeletal: Negative for joint swelling, back pain      PMHx: - reviewed  Past Medical History:   Diagnosis Date     Allergic rhinitis, cause unspecified      Asthma      Asthma, mild intermittent      Cataract      Cellulitis and abscess of leg, except foot 03/00    Bilateral     Eczema      Heart murmur     aortic area     HTN, goal below 140/90      Hyperlipidemia LDL goal < 100      Hyperplastic colon polyp 2008     Infection     bilateral eye infections     Macular hole of left eye      Obesity (BMI 30-39.9)      Osteoarthritis     knees     Other chronic pain     right knee     Sleep apnea     uses c pap     Type 2 diabetes, HbA1C goal < 8% (H)        PSHx: reviewed  Past Surgical History:   Procedure Laterality Date     ARTHROPLASTY HIP Right 9/25/2017    Procedure: ARTHROPLASTY HIP;  Right total hip arthroplasty  ;  Surgeon: Ayaan Pena MD;  Location: [facility]     ARTHROPLASTY KNEE  7/12/2013     Procedure: ARTHROPLASTY KNEE;  right total knee arthroplasty ;  Surgeon: Chaparro Wesley MD;  Location: [facility]     ARTHROSCOPY KNEE Right 1/21/2015    Procedure: ARTHROSCOPY KNEE;  Surgeon: Ayaan Pena MD;  Location: [facility]     C INCISION OF HYMEN       CATARACT IOL, RT/LT       COLONOSCOPY  2008     COLONOSCOPY N/A 4/18/2019    Procedure: Colonoscopy with polypectomy;  Surgeon: Shaun Guerrero MD;  [facility]     ENDOSCOPIC RETROGRADE CHOLANGIOPANCREATOGRAM N/A 2/6/2019    Procedure: COMBINED ENDOSCOPIC RETROGRADE CHOLANGIOPANCREATOGRAPHY, BILIARY SPINCTEROTOMY AND DILATION, PLACE BILE DUCT STENT;  Surgeon: Guru Andie Gonzalez MD;  [facility]     ENDOSCOPIC RETROGRADE CHOLANGIOPANCREATOGRAM N/A 2/28/2019    Procedure: Endoscopic Retrograde Cholangiopancreatogram, Bile duct stent removal and placement;  Surgeon: Shaun Guerrero MD;  [facility]     ENDOSCOPIC RETROGRADE CHOLANGIOPANCREATOGRAM N/A 4/18/2019    Procedure: COMBINED ENDOSCOPIC RETROGRADE CHOLANGIOPANCREATOGRAPHY, Bile duct stent exchange and Polypectomy;  Surgeon: Shaun Guerrero MD;  [facility]     ENDOSCOPIC RETROGRADE CHOLANGIOPANCREATOGRAM N/A 10/18/2019    Procedure: Endoscopic Retrograde Cholangiopancreatogram;  Surgeon: Shaun Guerrero MD;  [facility]     ENDOSCOPIC RETROGRADE CHOLANGIOPANCREATOGRAM WITH SPYGLASS N/A 7/26/2019    Procedure: Endoscopic Retrograde Cholangiopancreatogram With Spyglas,l Radiofrequency Ablation, Stent Exchangeand Duodenal Biopsy x2;  Surgeon: Shaun Guerrero MD;  [facility]     ENDOSCOPIC RETROGRADE CHOLANGIOPANCREATOGRAM WITH SPYGLASS N/A 9/10/2020    Procedure: ENDOSCOPIC RETROGRADE CHOLANGIOPANCREATOGRAPHY, WITH DIRECT DUCT VISUALIZATION, USING PANCREATICOBILIARY FIBEROPTIC PROBE;  Surgeon: Shaun Guerrero MD;  [facility]     ENDOSCOPIC RETROGRADE CHOLANGIOPANCREATOGRAM WITH SPYGLASS N/A 3/22/2021    Procedure: ENDOSCOPIC RETROGRADE  CHOLANGIOPANCREATOGRAPHY, WITH DIRECT DUCT VISUALIZATION, USING PANCREATICOBILIARY FIBEROPTIC PROBE;  Surgeon: Shaun Guerrero MD;  [facility]     ESOPHAGOSCOPY, GASTROSCOPY, DUODENOSCOPY (EGD) WITH RADIO FREQUENCY ABLATION, COMBINED N/A 9/10/2020    Procedure: possible repeat ESOPHAGOGASTRODUODENOSCOPY, WITH RADIOFREQUENCY ABLATION and endoscopic mucosal resection;  Surgeon: Shaun Guerrero MD;  [facility]     ESOPHAGOSCOPY, GASTROSCOPY, DUODENOSCOPY (EGD), COMBINED N/A 4/18/2019    Procedure: upper endoscopy with polypectomy;  Surgeon: Shaun Guerrero MD;  [facility]     ESOPHAGOSCOPY, GASTROSCOPY, DUODENOSCOPY (EGD), COMBINED N/A 10/18/2019    Procedure: Upper Endoscopy and ERCP with stent removal, stone removal and biopsy;  Surgeon: Shaun Guerrero MD;  [facility]     ESOPHAGOSCOPY, GASTROSCOPY, DUODENOSCOPY (EGD), RESECT MUCOSA, COMBINED N/A 2/28/2019    Procedure: Upper Endoscopy, Endoscopic Ultrasound, Endoscopic Mucosal Resection,  Ampullectomy, polypectomy.;  Surgeon: Shaun Guerrero MD;  [facility]     ESOPHAGOSCOPY, GASTROSCOPY, DUODENOSCOPY (EGD), RESECT MUCOSA, COMBINED N/A 3/22/2021    Procedure: ESOPHAGOGASTRODUODENOSCOPY (EGD) with  endoscopic mucosal resection/ polypectomy;  Surgeon: Shaun Guerrero MD;  [facility]     EYE SURGERY      macular hole repaired left eye     HC KNEE SCOPE, DIAGNOSTIC      - both knees     HEAD & NECK SURGERY      wisdom teeth        Soc Hx: No daily alcohol, no smoking  Social History     Socioeconomic History     Marital status:      Spouse name: Allen     Number of children: 3     Years of education: 15     Highest education level: Not on file   Occupational History     Not on file   Social Needs     Financial resource strain: Not on file     Food insecurity     Worry: Not on file     Inability: Not on file     Transportation needs     Medical: Not on file     Non-medical: Not on file   Tobacco Use     Smoking status: Never Smoker      Smokeless tobacco: Never Used   Substance and Sexual Activity     Alcohol use: No     Alcohol/week: 0.0 standard drinks     Drug use: No     Sexual activity: Not Currently     Partners: Male   Lifestyle     Physical activity     Days per week: Not on file     Minutes per session: Not on file     Stress: Not on file   Relationships     Social connections     Talks on phone: Not on file     Gets together: Not on file     Attends Jew service: Not on file     Active member of club or organization: Not on file     Attends meetings of clubs or organizations: Not on file     Relationship status: Not on file     Intimate partner violence     Fear of current or ex partner: Not on file     Emotionally abused: Not on file     Physically abused: Not on file     Forced sexual activity: Not on file   Other Topics Concern     Parent/sibling w/ CABG, MI or angioplasty before 65F 55M? Not Asked   Social History Narrative     Not on file        Fam Hx: reviewed  Family History   Problem Relation Age of Onset     Hypertension Mother         diabetes,hypoythryoidism, stroke     Diabetes Mother      Heart Disease Father         , cancer lip     Heart Disease Paternal Grandfather              Cancer Paternal Grandmother              Cerebrovascular Disease Maternal Grandfather              Cerebrovascular Disease Maternal Grandmother         , diabetes     Connective Tissue Disorder Sister         fibromyalgia     Diabetes Sister      Hypertension Brother      Cancer Paternal Aunt         ovarian         Screening: reviewed      All: reviewed    Meds: reviewed  Current Outpatient Medications   Medication Sig Dispense Refill     albuterol (PROAIR HFA/PROVENTIL HFA/VENTOLIN HFA) 108 (90 Base) MCG/ACT inhaler Inhale 2 puffs into the lungs every 4 hours as needed for shortness of breath / dyspnea 1 Inhaler 3     aspirin 81 MG EC tablet Take 1 tablet (81 mg) by mouth daily 90 tablet 3     atenolol  (TENORMIN) 50 MG tablet Take 1 tablet (50 mg) by mouth daily 90 tablet 0     atorvastatin (LIPITOR) 20 MG tablet Take 1 tablet (20 mg) by mouth daily 90 tablet 1     azelastine (ASTELIN) 0.1 % nasal spray Spray 1 spray into both nostrils 2 times daily as needed Reported on 3/22/2017 30 mL 3     azelastine (OPTIVAR) 0.05 % SOLN Place 1 drop into both eyes 2 times daily as needed Reported on 3/22/2017       calcium-vitamin D 500-125 MG-UNIT TABS        canagliflozin (INVOKANA) 100 MG tablet Take 1 tablet (100 mg) by mouth every morning (before breakfast) 90 tablet 1     cetirizine (ZYRTEC) 10 MG tablet Take 10 mg by mouth every morning.       CONTOUR NEXT TEST test strip USE 1 STRIP TO CHECK GLUCOSE ONCE DAILY E11.9 100 each 2     desoximetasone (TOPICORT) 0.25 % cream Apply sparingly to affected area's 60 g 1     fish oil-omega-3 fatty acids (FISH OIL) 1000 MG capsule Take 1 g by mouth daily Reported on 3/22/2017       glipiZIDE (GLUCOTROL) 5 MG tablet TAKE 2 TABLETS BY MOUTH TWICE DAILY BEFORE MEAL(S) 360 tablet 0     hydrochlorothiazide (HYDRODIURIL) 25 MG tablet TAKE 1 TABLET BY MOUTH ONCE DAILY IN THE MORNING 90 tablet 1     ibuprofen (ADVIL) 200 MG tablet take 4 tablet (200 mg) by oral route every 8 hours as needed with food       latanoprost (XALATAN) 0.005 % ophthalmic solution Place 1 drop Into the left eye At Bedtime 2.5 mL 1     lisinopril (ZESTRIL) 20 MG tablet Take 1 tablet by mouth twice daily 180 tablet 0     metFORMIN (GLUCOPHAGE) 1000 MG tablet TAKE 1 TABLET BY MOUTH TWICE DAILY WITH MEALS 180 tablet 1     MULTIVITAMIN TABS   OR Reported on 3/22/2017       ondansetron (ZOFRAN-ODT) 4 MG ODT tab Take 1 tablet (4 mg) by mouth every 6 hours as needed for nausea or vomiting 30 tablet 0     order for DME All diabetic testing supplies including test strips, lancets and solution for testing 2 times per day. Patient is using   no Insulin. Last A1c Lab Results       Component                Value                "Date                       A1C                      7.9                 08/20/2018 100 each 11     sitagliptin (JANUVIA) 100 MG tablet Take 1 tablet (100 mg) by mouth daily 90 tablet 1     timolol, PF, (TIMOPTIC OCUDOSE) 0.5 % ophthalmic solution 1 drop 2 times daily         OBJECTIVE:                                                    Physical Exam :  Blood pressure 116/68, pulse 79, temperature 98.7  F (37.1  C), temperature source Oral, resp. rate 18, height 1.702 m (5' 7\"), weight 97.8 kg (215 lb 11.2 oz), last menstrual period 12/13/2002, SpO2 97 %, not currently breastfeeding.     NAD, appears comfortable  Skin clear, no rashes  Neck: supple, no JVD,  no thyroidmegaly  Lymph nodes non palpable in the cervical, supraclavicular axillaries,   Chest: clear to auscultation with good respiratory effort  Cardiac: S1S2, RRR, no mgr appreciated  Abdomen: soft, not tender, not distended, audible bowel sound, no hepatosplenomegaly, no palpable masses, no abdominal bruits  Extremities: no cyanosis, clubbing or edema.   Neuro: A, Ox3, no focal signs.  Breast exam in supine and erect position: they are symmetrical, no skin changes, no tenderness or nodes on palpation. Nipples are erect, no skin lesions, no discharge on pressure.    Pelvic exam: deferred, s/p menopause, no symptoms, no hx of abnormal pap         Raisa Torres MD  Internal Medicine         SUBJECTIVE:   Gricelda Sabillon is a 70 year old female who presents for Preventive Visit.      Patient has been advised of split billing requirements and indicates understanding: Yes  Are you in the first 12 months of your Medicare Part B coverage?  No    Physical Health:    In general, how would you rate your overall physical health? good    Outside of work, how many days during the week do you exercise? none    Outside of work, approximately how many minutes a day do you exercise?not applicable    If you drink alcohol do you typically have >3 drinks per day or >7 " "drinks per week? No    Do you usually eat at least 4 servings of fruit and vegetables a day, include whole grains & fiber and avoid regularly eating high fat or \"junk\" foods? Yes    Do you have any problems taking medications regularly?  No    Do you have any side effects from medications? none    Needs assistance for the following daily activities: no assistance needed    Which of the following safety concerns are present in your home?  none identified     Hearing impairment: No    In the past 6 months, have you been bothered by leaking of urine? yes    Mental Health:    In general, how would you rate your overall mental or emotional health? good  PHQ-2 Score:      Do you feel safe in your environment? Yes    Have you ever done Advance Care Planning? (For example, a Health Directive, POLST, or a discussion with a medical provider or your loved ones about your wishes): No, advance care planning information given to patient to review.  Patient declined advance care planning discussion at this time.    Additional concerns to address?      Fall risk:  Fallen 2 or more times in the past year?: No  Any fall with injury in the past year?: No    Cognitive Screenin) Repeat 3 items (Leader, Season, Table)    2) Clock draw: NORMAL  3) 3 item recall: Recalls 3 objects  Results: 3 items recalled: COGNITIVE IMPAIRMENT LESS LIKELY    Mini-CogTM Copyright S Libia. Licensed by the author for use in [facility]; reprinted with permission (sarah@.[facility]). All rights reserved.      Do you have sleep apnea, excessive snoring or daytime drowsiness?: uses a CPAP            Reviewed and updated as needed this visit by clinical staff  Tobacco  Allergies  Meds   Med Hx  Surg Hx  Fam Hx  Soc Hx        Reviewed and updated as needed this visit by Provider                Social History     Tobacco Use     Smoking status: Never Smoker     Smokeless tobacco: Never Used   Substance Use Topics     Alcohol use: No     " "Alcohol/week: 0.0 standard drinks                           Current providers sharing in care for this patient include:   Patient Care Team:  Raisa Davidson MD as PCP - General (Internal Medicine)  Raisa Davidson MD as Assigned PCP  Tanya Sheehan RD as Diabetes Educator (Dietitian, Registered)    The following health maintenance items are reviewed in Epic and correct as of today:  Health Maintenance   Topic Date Due     ASTHMA ACTION PLAN  08/27/2019     DIABETIC FOOT EXAM  03/25/2020     DTAP/TDAP/TD IMMUNIZATION (2 - Td) 08/23/2020     EYE EXAM  11/21/2020     MEDICARE ANNUAL WELLNESS VISIT  11/22/2020     FALL RISK ASSESSMENT  05/27/2021     A1C  09/03/2021     ASTHMA CONTROL TEST  09/23/2021     MAMMO SCREENING  01/20/2022     LIPID  03/03/2022     MICROALBUMIN  03/03/2022     BMP  03/22/2022     ADVANCE CARE PLANNING  11/26/2024     COLORECTAL CANCER SCREENING  04/18/2029     DEXA  01/20/2035     HEPATITIS C SCREENING  Completed     PHQ-2  Completed     INFLUENZA VACCINE  Completed     Pneumococcal Vaccine: Pediatrics (0 to 5 Years) and At-Risk Patients (6 to 64 Years)  Completed     Pneumococcal Vaccine: 65+ Years  Completed     COVID-19 Vaccine  Completed     IPV IMMUNIZATION  Aged Out     MENINGITIS IMMUNIZATION  Aged Out     ZOSTER IMMUNIZATION  Discontinued     Labs reviewed in EPIC            Patient has been advised of split billing requirements and indicates understanding: Yes At the check in, at the      COUNSELING:  Reviewed preventive health counseling, as reflected in patient instructions       Regular exercise       Healthy diet/nutrition    Estimated body mass index is 33.78 kg/m  as calculated from the following:    Height as of this encounter: 1.702 m (5' 7\").    Weight as of this encounter: 97.8 kg (215 lb 11.2 oz).        She reports that she has never smoked. She has never used smokeless tobacco.    Appropriate preventive services were " discussed with this patient, including applicable screening as appropriate for cardiovascular disease, diabetes, osteopenia/osteoporosis, and glaucoma.  As appropriate for age/gender, discussed screening for colorectal cancer, prostate cancer, breast cancer, and cervical cancer. Checklist reviewing preventive services available has been given to the patient.    Reviewed patients plan of care and provided an AVS. The Basic Care Plan (routine screening as documented in Health Maintenance) for Gricelda meets the Care Plan requirement. This Care Plan has been established and reviewed with the Patient.    Counseling Resources:  ATP IV Guidelines  Pooled Cohorts Equation Calculator  Breast Cancer Risk Calculator  BRCA-Related Cancer Risk Assessment: FHS-7 Tool  FRAX Risk Assessment  ICSI Preventive Guidelines  Dietary Guidelines for Americans, 2010  USDA's MyPlate  ASA Prophylaxis  Lung CA Screening    Raisa Davidson MD  [facility]

## 2021-03-23 NOTE — TELEPHONE ENCOUNTER
I called the patient this morning. She's feeling better this morning just tired. She started having nausea and nonbloody/nonbilious vomiting yesterday evening. No abdominal pain or fevers. Symptoms currently not typical for pancreatitis or cholangitis. No signs of delayed bleeding. Difficult to say what caused her symptoms but it does not sound like a serious complication occurred and now things have resolved. She will do a bland diet today and advance as tolerated. I instructed her to call us if she develops abdominal pain, fevers, or signs of GI bleeding.    Ingrid, can you call her tomorrow to make sure her symptoms haven't returned? Thanks.    Shaun Guerrero MD  [facility]  Division of Gastroenterology and Hepatology  [facility] [address]  [address], [place] [address]

## 2021-03-23 NOTE — PATIENT INSTRUCTIONS
Plan:  1. Continue same meds, same doses for now   2. Please make a lab appointment for nonfasting labs in 4-5 months and follow up appointment   3.  The following vaccines are recommended for you. Please check with your insurance about coverage.  Some insurances cover better if you have these vaccines at the pharmacy:  -- Tetanus vaccine with whooping cough  4. Mammogram ( please call [phone] to schedule it)

## 2021-03-23 NOTE — LETTER
"        Date: 3/23/2021    Gricelda Sabillon  [address] [address]    Dear Ms. Sabillon,    Thank you for talking with me on 3/23/21 about your health and medications. Medicare s MTM (Medication Therapy Management) program helps you understand your medications and use them safely.      This letter includes an action plan (Medication Action Plan) and medication list (Personal Medication List). The action plan has steps you should take to help you get the best results from your medications. The medication list will help you keep track of your medications and how to use them the right way.       Have your action plan and medication list with you when you talk with your doctors, pharmacists, and other healthcare providers in your care team.     Ask your doctors, pharmacists, and other healthcare providers to update the action plan and medication list at every visit.     Take your medication list with you if you go to the hospital or emergency room.     Give a copy of the action plan and medication list to your family or caregivers.     If you want to talk about this letter or any of the papers with it, please call   [phone].We look forward to working with you, your doctors, and other healthcare providers to help you stay healthy through the [employer] [place] MTM program.    Sincerely,  Giovanna Heller [facility]    Enclosed: Medication Action Plan and Personal Medication List    MEDICATION ACTION PLAN FOR Gricelda Sabillon,  1950     This action plan will help you get the best results from your medications if you:   1. Read \"What we talked about.\"   2. Take the steps listed in the \"What I need to do\" boxes.   3. Fill in \"What I did and when I did it.\"   4. Fill in \"My follow-up plan\" and \"Questions I want to ask.\"     Have this action plan with you when you talk with your doctors, pharmacists, and other healthcare providers in your care team. Share this with your family " or caregivers too.  DATE PREPARED: 3/23/2021  What we talked about: Metformin may be contributing to your diarrhea                                                  What I need to do:  I will talk with Dr. Torres about switching to metformin XR instead and let you know what she says   What I did and when I did it:                                              What we talked about: Ibuprofen can be harmful to your heart, kidney, and stomach                                                  What I need to do: Try to limit the ibuprofen and you can take acetaminophen up to 4000 mg in a day   What I did and when I did it:                                                 My follow-up plan:                 Questions I want to ask:              If you have any questions about your action plan, call Giovanna Heller [facility], Phone: [phone] , Monday-Friday 8-4:30pm.           PERSONAL MEDICATION LIST FOR Gricelda SabillonCONCEPCIÓN 1950     This medication list was made for you after we talked. We also used information from your doctor's chart.      Use blank rows to add new medications. Then fill in the dates you started using them.    Cross out medications when you no longer use them. Then write the date and why you stopped using them.    Ask your doctors, pharmacists, and other healthcare providers to update this list at every visit. Keep this list up-to-date with:       Prescription medications    Over the counter drugs     Herbals    Vitamins    Minerals      If you go to the hospital or emergency room, take this list with you. Share this with your family or caregivers too.     DATE PREPARED: 3/23/2021  Allergies or side effects: Bromfenac, Codeine, Codeine, and Sulfites     Medication:  ACETAMINOPHEN  MG PO TBCR      How I use it:  Take 650 mg by mouth every 8 hours as needed for mild pain or fever      Why I use it:  Pain    Prescriber:  Patient Reported      Date I started using it:       Date I stopped using  it:         Why I stopped using it:            Medication:  ALBUTEROL SULFATE  (90 BASE) MCG/ACT IN AERS      How I use it:  Inhale 2 puffs into the lungs every 4 hours as needed for shortness of breath / dyspnea      Why I use it: Acute bronchitis    Prescriber:  Raisa Davidson MD      Date I started using it:       Date I stopped using it:         Why I stopped using it:            Medication:  ASPIRIN 81 MG PO TBEC      How I use it:  Take 1 tablet (81 mg) by mouth daily      Why I use it:  Heart protection    Prescriber:  Clara Corado MD      Date I started using it:       Date I stopped using it:         Why I stopped using it:            Medication:  ATENOLOL 50 MG PO TABS      How I use it:  Take 1 tablet (50 mg) by mouth daily      Why I use it: HTN, goal below 140/90; Sinus tachycardia    Prescriber:  Raisa Davidson MD      Date I started using it:       Date I stopped using it:         Why I stopped using it:            Medication:  ATORVASTATIN CALCIUM 20 MG PO TABS      How I use it:  Take 1 tablet (20 mg) by mouth daily      Why I use it: Hyperlipidemia with target LDL less than 100    Prescriber:  Raisa Davidson MD      Date I started using it:       Date I stopped using it:         Why I stopped using it:            Medication:  AZELASTINE HCL 0.05 % OP SOLN      How I use it:  Place 1 drop into both eyes 2 times daily as needed Reported on 3/22/2017      Why I use it:  Allergies    Prescriber:  Patient reported      Date I started using it:       Date I stopped using it:         Why I stopped using it:            Medication:  AZELASTINE HCL 0.1 % NA SOLN      How I use it:  Spray 1 spray into both nostrils 2 times daily as needed Reported on 3/22/2017      Why I use it: Acute bronchitis    Prescriber:  Raisa Davidson MD      Date I started using it:       Date I stopped using it:         Why I stopped using it:             Medication:  CALCIUM-VITAMIN D 500-125 MG-UNIT PO TABS      How I use it:   1 tablet by mouth daily      Why I use it:  General Health    Prescriber:  Patient Reported      Date I started using it:       Date I stopped using it:         Why I stopped using it:            Medication:  CANAGLIFLOZIN 100 MG PO TABS      How I use it:  Take 1 tablet (100 mg) by mouth every morning (before breakfast)      Why I use it: Diabetes mellitus without complication (H)    Prescriber:  Raisa Davidson MD      Date I started using it:       Date I stopped using it:         Why I stopped using it:            Medication:  CETIRIZINE HCL 10 MG PO TABS      How I use it:  Take 10 mg by mouth every morning.      Why I use it:  Allergies    Prescriber:  Patient reported      Date I started using it:       Date I stopped using it:         Why I stopped using it:            Medication:  DICLOFENAC SODIUM 1 % EX GEL      How I use it:  Apply topically 4 times daily      Why I use it:  Pain    Prescriber:  Patient Reported      Date I started using it:       Date I stopped using it:         Why I stopped using it:            Medication:  GLIPIZIDE 5 MG PO TABS      How I use it:  TAKE 2 TABLETS BY MOUTH TWICE DAILY BEFORE MEAL(S)      Why I use it: Diabetes mellitus without complication (H)    Prescriber:  Raisa Davidson MD      Date I started using it:       Date I stopped using it:         Why I stopped using it:            Medication:  HYDROCHLOROTHIAZIDE 25 MG PO TABS      How I use it:  TAKE 1 TABLET BY MOUTH ONCE DAILY IN THE MORNING      Why I use it: HTN, goal below 140/90    Prescriber:  Raisa Davidson MD      Date I started using it:       Date I stopped using it:         Why I stopped using it:            Medication:  IBUPROFEN 200 MG PO TABS      How I use it:  take 4 tablet (200 mg) by oral route every 8 hours as needed with food      Why I use it:  Pain    Prescriber:  Patient  Reported      Date I started using it:       Date I stopped using it:         Why I stopped using it:            Medication:  LATANOPROST 0.005 % OP SOLN      How I use it:  Place 1 drop into both eyes At Bedtime       Why I use it:  Glaucoma    Prescriber:  Raisa Davidson MD      Date I started using it:       Date I stopped using it:         Why I stopped using it:            Medication:  LISINOPRIL 20 MG PO TABS      How I use it:  Take 1 tablet (20 mg) by mouth 2 times daily      Why I use it: HTN, goal below 140/90    Prescriber:  Raisa Davidson MD      Date I started using it:       Date I stopped using it:         Why I stopped using it:            Medication:  METFORMIN HCL 1000 MG PO TABS      How I use it:  TAKE 1 TABLET BY MOUTH TWICE DAILY WITH MEALS      Why I use it: Diabetes mellitus without complication (H)    Prescriber:  Raisa Davidson MD      Date I started using it:       Date I stopped using it:         Why I stopped using it:            Medication:  MULTIVITAMIN TABS   OR      How I use it:  Reported on 3/22/2017      Why I use it:  General Health    Prescriber:  Patient Reported      Date I started using it:       Date I stopped using it:         Why I stopped using it:            Medication:  OMEGA-3 FATTY ACIDS 1000 MG PO CAPS      How I use it:  Take 1 g by mouth daily Reported on 3/22/2017      Why I use it:  Eye health    Prescriber:  Patient reported      Date I started using it:       Date I stopped using it:         Why I stopped using it:            Medication:  SITAGLIPTIN PHOSPHATE 100 MG PO TABS      How I use it:  Take 1 tablet (100 mg) by mouth daily      Why I use it: Diabetes mellitus without complication (H)    Prescriber:  Raisa Davidson MD      Date I started using it:       Date I stopped using it:         Why I stopped using it:            Medication:  TIMOLOL MALEATE PF 0.5 % OP SOLN (NOT PPL)      How I use  it:  1 drop 2 times daily      Why I use it:  Glaucoma    Prescriber:  Patient Reported      Date I started using it:       Date I stopped using it:         Why I stopped using it:                Other Information:     If you have any questions about your medication list, call Giovanna Heller [facility], Phone: [phone] , Monday-Friday 8-4:30pm.    According to the Paperwork Reduction Act of 1995, no persons are required to respond to a collection of information unless it displays a valid OMB control number. The valid OMB number for this information collection is [id number]. The time required to complete this information collection is estimated to average 40 minutes per response, including the time to review instructions, searching existing data resources, gather the data needed, and complete and review the information collection. If you have any comments concerning the accuracy of the time estimate(s) or suggestions for improving this form, please write to: CMS, Attn: [employer] Reports Clearance Officer, [address] [place] [id number].

## 2021-03-23 NOTE — CONFIDENTIAL NOTE
Patient called in this evening to report nausea and vomiting after procedure earlier today. States that she was initially doing well this afternoon, tolerating sips of water, she also had some root beer and crackers for dinner. Around 7:30 she started to have nausea and vomiting, she had several episodes of NBNB emesis, states she was throwing up what looked like brown liquid (thinks this was her root beer). No coffee ground emesis or hematemesis. No abdominal pain or back pain other than some mild abdominal discomfort when she is throwing up which then resolves after vomiting. Presently, she feels that the nausea and vomiting is slowly improving, she has now gone 30 minutes without any episodes. Feels fatigued but no dizziness. No fevers or chills. States she will trial sips of water and gatorade this evening. Writer advised patient to call if symptoms get worse, specifically if she has worse vomiting, abdominal pain that is persistent or worse with eating or drinking.     Will notify Dr. Guerrero of symptoms this evening. If symptoms are worse, new or persistent abdominal pain will consider ED evaluation to ensure no evidence of pancreatitis.    Kaity Martin MD  GI Fellow  P: [phone]    --------  Impression:          - EGD performed first. 4 mm possible residual duodenal                        polyp versus scar tissu seen at D2 on posterior wall at                        the same level of major papilla. Same location as                        residual adenoma seen on last procedure. Resected with a                        lift/cold snare and retrieved.                        - Prior endoscopic papillectomy                        - No other residual polyp seen with either the forward                        viewing gastroscope or the side viewing duodenoscope                        - Spyglass cholangioscopy performed and no residual                        polypoid tissue seen within the distal bile duct either.                         - The cholangiogram was normal.   Recommendation:      - Discharge patient to home (ambulatory).                        - Await pathology results                        - Repeat ERCP in 6 months-1 year for surveillance                        depending on if pathology shows any residual adenoma. If                        residual adenoma still seen on follow up exam, may                        require Endorotor resection versus RFA ablation.                        - Resume medications and diet                        - Above discussed with patient and family

## 2021-03-23 NOTE — TELEPHONE ENCOUNTER
Saw patient for MTM visit. While evaluating for side effects patient expressed that she is having frequent diarrhea. Recommend changing to metformin XR 1000 mg twice daily to see if this helps improve her symptoms. Please let me know if you agree with this recommendation and I will notify the patient.    Giovanna Heller, PharmD  PGY1 Medication Therapy Management Resident  Voicemail: [phone]

## 2021-03-24 ENCOUNTER — PATIENT OUTREACH (OUTPATIENT)
Dept: GASTROENTEROLOGY | Facility: CLINIC | Age: 71
End: 2021-03-24

## 2021-03-24 NOTE — PROGRESS NOTES
"1215  \"Still not totally back to normal but  I haven't had any vomiting since Monday, have had some diarrhea but that could be d/t the all liquid diet. Started some solid foods (toast and berries) today and tolerating without nausea. No blood in stool and no abdominal pain.\"   Advised patient to call back if experiencing abdominal pain or return of vomiting.    1059  Called patient to follow up on symptoms from yesterday's conversation with Dr Guerrero  Procedure done on 3/22   Left contact information for follow up    Aishwarya Reese, RN, BSN,   [facility]  Care coordinator   [phone]  Fax [phone]    "

## 2021-03-24 NOTE — RESULT ENCOUNTER NOTE
I called the patient and left a message reviewing her pathology. Suspicious area returned as just benign inflammation. No residual adenoma remained. Plan will be to repeat surveillance in 1 year.    Ingrid, can you set a reminder?  Please assist in scheduling:     Procedure/Imaging/Clinic: EGD, ERCP   Physician: Cesar  Timin year  Procedure length: 60 min  Anesthesia: Gen  Dx: ampullary adenoma  Tier: 4  Location: [facility] or [place] [facility]    Shaun Guerrero MD  [facility]  Division of Gastroenterology and Hepatology  [facility]  [address] [place] [id number]

## 2021-03-24 NOTE — TELEPHONE ENCOUNTER
Called patient and notified her of the change.    Giovanna Heller, PharmD  PGY1 Medication Therapy Management Resident  Voicemail: [phone]

## 2021-03-29 DIAGNOSIS — E11.9 DIABETES MELLITUS WITHOUT COMPLICATION (H): Chronic | ICD-10-CM

## 2021-04-03 DIAGNOSIS — J20.9 ACUTE BRONCHITIS: ICD-10-CM

## 2021-04-03 DIAGNOSIS — J30.2 SEASONAL ALLERGIC RHINITIS, UNSPECIFIED TRIGGER: Primary | ICD-10-CM

## 2021-04-06 RX ORDER — AZELASTINE 1 MG/ML
SPRAY, METERED NASAL
Qty: 30 ML | Refills: 0 | Status: SHIPPED | OUTPATIENT
Start: 2021-04-06 | End: 2023-04-11

## 2021-04-06 NOTE — TELEPHONE ENCOUNTER
Routing refill request to provider for review/approval because:  Unable to RF due to age    Aishwarya Sow, RN

## 2021-04-07 ENCOUNTER — HOSPITAL ENCOUNTER (OUTPATIENT)
Dept: MAMMOGRAPHY | Facility: CLINIC | Age: 71
Discharge: HOME | End: 2021-04-07
Attending: INTERNAL MEDICINE | Admitting: INTERNAL MEDICINE
Payer: COMMERCIAL

## 2021-04-07 DIAGNOSIS — Z12.31 VISIT FOR SCREENING MAMMOGRAM: ICD-10-CM

## 2021-04-07 PROCEDURE — 77063 BREAST TOMOSYNTHESIS BI: CPT

## 2021-04-23 DIAGNOSIS — E78.5 HYPERLIPIDEMIA WITH TARGET LDL LESS THAN 100: Chronic | ICD-10-CM

## 2021-04-23 RX ORDER — ATORVASTATIN CALCIUM 20 MG/1
TABLET, FILM COATED ORAL
Qty: 90 TABLET | Refills: 0 | OUTPATIENT
Start: 2021-04-23

## 2021-05-31 NOTE — TELEPHONE ENCOUNTER
Patient advised and has been scheduled to see PCP at 12 noon Fri. 9/8/17.  TOAN Ferguson R.N.     None

## 2021-06-17 DIAGNOSIS — I10 HTN, GOAL BELOW 140/90: ICD-10-CM

## 2021-06-17 DIAGNOSIS — R00.0 SINUS TACHYCARDIA: ICD-10-CM

## 2021-06-18 RX ORDER — ATENOLOL 50 MG/1
TABLET ORAL
Qty: 90 TABLET | Refills: 0 | Status: SHIPPED | OUTPATIENT
Start: 2021-06-18 | End: 2021-08-17

## 2021-06-28 ENCOUNTER — TELEPHONE (OUTPATIENT)
Dept: PHARMACY | Facility: CLINIC | Age: 71
End: 2021-06-28

## 2021-06-28 NOTE — TELEPHONE ENCOUNTER
Called patient to see if she would like to schedule a follow-up MTM visit. Patient declined at this time, stating she has no questions right now and is close with her dispensing pharmacist if she does have questions. Was very appreciative of the changes we made at her previous MTM visit and stated that her side effects improved with the formulation switch. Patient will reach out to the clinic if she would like to speak with us in the future. We will stop following this patient.    Giovanna Heller, PharmD  PGY1 Medication Therapy Management Resident  Voicemail: [phone]

## 2021-07-01 DIAGNOSIS — I10 HTN, GOAL BELOW 140/90: ICD-10-CM

## 2021-07-02 RX ORDER — LISINOPRIL 20 MG/1
TABLET ORAL
Qty: 180 TABLET | Refills: 0 | Status: SHIPPED | OUTPATIENT
Start: 2021-07-02 | End: 2021-07-23

## 2021-07-02 NOTE — TELEPHONE ENCOUNTER
Pending Prescriptions:                       Disp   Refills    lisinopril (ZESTRIL) 20 MG tablet [Pharma*180 ta*0            Sig: Take 1 tablet by mouth twice daily    Prescription approved per [facility] Refill Protocol.    Next 5 appointments (look out 90 days)    Aug 17, 2021  2:00 PM  SHORT with Raisa Davidson MD  [facility] ([facility] ) [address]  [facility] [id number] [phone]

## 2021-07-22 DIAGNOSIS — I10 HTN, GOAL BELOW 140/90: ICD-10-CM

## 2021-07-23 RX ORDER — LISINOPRIL 20 MG/1
TABLET ORAL
Qty: 180 TABLET | Refills: 1 | Status: SHIPPED | OUTPATIENT
Start: 2021-07-23 | End: 2021-08-17

## 2021-08-10 ENCOUNTER — LAB (OUTPATIENT)
Dept: LAB | Facility: CLINIC | Age: 71
End: 2021-08-10
Payer: COMMERCIAL

## 2021-08-10 DIAGNOSIS — E11.9 DIABETES MELLITUS WITHOUT COMPLICATION (H): Chronic | ICD-10-CM

## 2021-08-10 DIAGNOSIS — I10 HTN, GOAL BELOW 140/90: ICD-10-CM

## 2021-08-10 DIAGNOSIS — E78.5 HYPERLIPIDEMIA WITH TARGET LDL LESS THAN 100: Chronic | ICD-10-CM

## 2021-08-10 LAB — HBA1C MFR BLD: 7.5 % (ref 0–5.6)

## 2021-08-10 PROCEDURE — 36415 COLL VENOUS BLD VENIPUNCTURE: CPT

## 2021-08-10 PROCEDURE — 83036 HEMOGLOBIN GLYCOSYLATED A1C: CPT

## 2021-08-10 PROCEDURE — 80053 COMPREHEN METABOLIC PANEL: CPT

## 2021-08-11 LAB
ALBUMIN SERPL-MCNC: 3.6 G/DL (ref 3.4–5)
ALP SERPL-CCNC: 87 U/L (ref 40–150)
ALT SERPL W P-5'-P-CCNC: 31 U/L (ref 0–50)
ANION GAP SERPL CALCULATED.3IONS-SCNC: 6 MMOL/L (ref 3–14)
AST SERPL W P-5'-P-CCNC: 20 U/L (ref 0–45)
BILIRUB SERPL-MCNC: 0.5 MG/DL (ref 0.2–1.3)
BUN SERPL-MCNC: 19 MG/DL (ref 7–30)
CALCIUM SERPL-MCNC: 9.8 MG/DL (ref 8.5–10.1)
CHLORIDE BLD-SCNC: 106 MMOL/L (ref 94–109)
CO2 SERPL-SCNC: 25 MMOL/L (ref 20–32)
CREAT SERPL-MCNC: 0.93 MG/DL (ref 0.52–1.04)
GFR SERPL CREATININE-BSD FRML MDRD: 61 ML/MIN/1.73M2
GLUCOSE BLD-MCNC: 193 MG/DL (ref 70–99)
POTASSIUM BLD-SCNC: 4.2 MMOL/L (ref 3.4–5.3)
PROT SERPL-MCNC: 7.2 G/DL (ref 6.8–8.8)
SODIUM SERPL-SCNC: 137 MMOL/L (ref 133–144)

## 2021-08-17 ENCOUNTER — OFFICE VISIT (OUTPATIENT)
Dept: INTERNAL MEDICINE | Facility: CLINIC | Age: 71
End: 2021-08-17
Payer: COMMERCIAL

## 2021-08-17 VITALS
OXYGEN SATURATION: 96 % | SYSTOLIC BLOOD PRESSURE: 115 MMHG | RESPIRATION RATE: 18 BRPM | DIASTOLIC BLOOD PRESSURE: 72 MMHG | BODY MASS INDEX: 33.68 KG/M2 | TEMPERATURE: 98.2 F | WEIGHT: 214.6 LBS | HEART RATE: 85 BPM | HEIGHT: 67 IN

## 2021-08-17 DIAGNOSIS — E11.9 DIABETES MELLITUS WITHOUT COMPLICATION (H): Chronic | ICD-10-CM

## 2021-08-17 DIAGNOSIS — I10 HTN, GOAL BELOW 140/90: ICD-10-CM

## 2021-08-17 DIAGNOSIS — E78.5 HYPERLIPIDEMIA WITH TARGET LDL LESS THAN 100: Chronic | ICD-10-CM

## 2021-08-17 DIAGNOSIS — R00.0 SINUS TACHYCARDIA: ICD-10-CM

## 2021-08-17 PROCEDURE — 99214 OFFICE O/P EST MOD 30 MIN: CPT | Performed by: INTERNAL MEDICINE

## 2021-08-17 RX ORDER — ATORVASTATIN CALCIUM 20 MG/1
20 TABLET, FILM COATED ORAL DAILY
Qty: 90 TABLET | Refills: 1 | Status: SHIPPED | OUTPATIENT
Start: 2021-08-17 | End: 2022-04-29

## 2021-08-17 RX ORDER — LISINOPRIL 20 MG/1
20 TABLET ORAL 2 TIMES DAILY
Qty: 180 TABLET | Refills: 1 | Status: SHIPPED | OUTPATIENT
Start: 2021-08-17 | End: 2022-06-16

## 2021-08-17 RX ORDER — GLIPIZIDE 5 MG/1
TABLET ORAL
Qty: 360 TABLET | Refills: 1 | Status: SHIPPED | OUTPATIENT
Start: 2021-08-17 | End: 2022-04-20

## 2021-08-17 RX ORDER — HYDROCHLOROTHIAZIDE 25 MG/1
TABLET ORAL
Qty: 90 TABLET | Refills: 1 | Status: SHIPPED | OUTPATIENT
Start: 2021-08-17 | End: 2021-12-14

## 2021-08-17 RX ORDER — ATENOLOL 50 MG/1
50 TABLET ORAL DAILY
Qty: 90 TABLET | Refills: 1 | Status: SHIPPED | OUTPATIENT
Start: 2021-08-17 | End: 2021-09-23

## 2021-08-17 ASSESSMENT — MIFFLIN-ST. JEOR: SCORE: 1511.05

## 2021-08-17 NOTE — PATIENT INSTRUCTIONS
Plan:  1. Continue same meds, same doses for now   2. Please make a lab appointment for fasting labs  In 3-6 months  3. Please make an appointment few days after the labs to discuss about the results.   4. Be aware that sometimes it takes more than 3-4 weeks to find an appointment.   It would be advisable to schedule the appointment far in advance so you get get the care and the refills in time.

## 2021-08-17 NOTE — PROGRESS NOTES
Dr Torres's note      Patient's instructions / PLAN:                                                        Plan:  1. Continue same meds, same doses for now   2. Please make a lab appointment for fasting labs  In 3-6 months  3. Please make an appointment few days after the labs to discuss about the results.   4. Be aware that sometimes it takes more than 3-4 weeks to find an appointment.   It would be advisable to schedule the appointment far in advance so you get get the care and the refills in time.        ASSESSMENT & PLAN:                                                      (E11.9) DM2 no complication  Comment: Controlled  But A1c is higher   Plan: canagliflozin (INVOKANA) 100 MG tablet,         glipiZIDE (GLUCOTROL) 5 MG tablet, sitagliptin         (JANUVIA) 100 MG tablet, Hemoglobin A1c            (I10) HTN, goal below 140/90  Comment: Controlled    Plan: atenolol (TENORMIN) 50 MG tablet,         hydrochlorothiazide (HYDRODIURIL) 25 MG tablet,        lisinopril (ZESTRIL) 20 MG tablet            (R00.0) Sinus tachycardia  Comment:   Plan: atenolol (TENORMIN) 50 MG tablet            (E78.5) Hyperlipidemia with target LDL less than 100  Comment:   Plan: atorvastatin (LIPITOR) 20 MG tablet               Chief complaint:                                                      Follow up chronic medical problems      SUBJECTIVE:                                                    History of present illness:    DM  -- Discussed  About recent labs, meds, exercise  -- she prefers to change life style before considering insulin      Diabetes Follow-up    How often are you checking your blood sugar? One time daily  What time of day are you checking your blood sugars (select all that apply)?  Before meals  Have you had any blood sugars above 200?  Yes , once the day she fell out of bed.  Have you had any blood sugars below 70?  Yes     What symptoms do you notice when your blood sugar is low?  None    What concerns do  you have today about your diabetes? None and Other: A1c has increased     Do you have any of these symptoms? (Select all that apply)  No numbness or tingling in feet.  No redness, sores or blisters on feet.  No complaints of excessive thirst.  No reports of blurry vision.  No significant changes to weight.    Have you had a diabetic eye exam in the last 12 months? No                Hyperlipidemia Follow-Up      Are you regularly taking any medication or supplement to lower your cholesterol?   Yes- Atorvastatin    Are you having muscle aches or other side effects that you think could be caused by your cholesterol lowering medication?  No    Hypertension Follow-up      Do you check your blood pressure regularly outside of the clinic? Yes     Are you following a low salt diet? Yes    Are your blood pressures ever more than 140 on the top number (systolic) OR more   than 90 on the bottom number (diastolic), for example 140/90? No    BP Readings from Last 2 Encounters:   03/23/21 116/68   03/22/21 (!) 150/92     Hemoglobin A1C (%)   Date Value   08/10/2021 7.5 (H)   03/03/2021 7.1 (H)   08/24/2020 7.2 (H)     LDL Cholesterol Calculated (mg/dL)   Date Value   03/03/2021 91   02/20/2020 88         How many servings of fruits and vegetables do you eat daily?  4 or more    On average, how many sweetened beverages do you drink each day (Examples: soda, juice, sweet tea, etc.  Do NOT count diet or artificially sweetened beverages)?   0    How many days per week do you exercise enough to make your heart beat faster? 4    How many minutes a day do you exercise enough to make your heart beat faster? 10 - 19    How many days per week do you miss taking your medication? 0      Review of Systems:                                                      ROS: negative for fever, chills, cough, wheezes, chest pain, shortness of breath, vomiting, abdominal pain, leg swelling       OBJECTIVE:             Physical exam:  Blood pressure  "115/72, pulse 85, temperature 98.2  F (36.8  C), temperature source Oral, resp. rate 18, height 1.702 m (5' 7\"), weight 97.3 kg (214 lb 9.6 oz), last menstrual period 12/13/2002, SpO2 96 %, not currently breastfeeding.   NAD, appears comfortable  Skin: no rashes   Neck: supple, no JVD,  No thyroidmegaly. Lymph nodes nonpalpable cervical and supraclavicular.  Chest: clear to auscultation bilaterally, good respiratory effort  Heart: S1 S2, RRR, no mgr appreciated  Abdomen: soft, not tender  Extremities: no edema   Neurologic: A, Ox3, no focal signs appreciated    PMHx: reviewed  Past Medical History:   Diagnosis Date     Allergic rhinitis, cause unspecified      Asthma      Asthma, mild intermittent      Cataract      Cellulitis and abscess of leg, except foot 03/00    Bilateral     Eczema      Heart murmur     aortic area     HTN, goal below 140/90      Hyperlipidemia LDL goal < 100      Hyperplastic colon polyp 2008     Infection     bilateral eye infections     Macular hole of left eye      Obesity (BMI 30-39.9)      Osteoarthritis     knees     Other chronic pain     right knee     Sleep apnea     uses c pap     Type 2 diabetes, HbA1C goal < 8% (H)       PSHx: reviewed  Past Surgical History:   Procedure Laterality Date     ARTHROPLASTY HIP Right 9/25/2017    Procedure: ARTHROPLASTY HIP;  Right total hip arthroplasty  ;  Surgeon: Ayaan Pena MD;  Location: [facility]     ARTHROPLASTY KNEE  7/12/2013    Procedure: ARTHROPLASTY KNEE;  right total knee arthroplasty ;  Surgeon: Chaparro Wesley MD;  Location: [facility]     ARTHROSCOPY KNEE Right 1/21/2015    Procedure: ARTHROSCOPY KNEE;  Surgeon: Ayaan Pena MD;  Location: [facility]     C INCISION OF HYMEN       CATARACT IOL, RT/LT       COLONOSCOPY  2008     COLONOSCOPY N/A 4/18/2019    Procedure: Colonoscopy with polypectomy;  Surgeon: Shaun Guerrero MD;  Location: [facility]     ENDOSCOPIC RETROGRADE CHOLANGIOPANCREATOGRAM N/A 2/6/2019    Procedure: " COMBINED ENDOSCOPIC RETROGRADE CHOLANGIOPANCREATOGRAPHY, BILIARY SPINCTEROTOMY AND DILATION, PLACE BILE DUCT STENT;  Surgeon: Guru Andie Gonzalez MD;  Location: [facility]     ENDOSCOPIC RETROGRADE CHOLANGIOPANCREATOGRAM N/A 2/28/2019    Procedure: Endoscopic Retrograde Cholangiopancreatogram, Bile duct stent removal and placement;  Surgeon: Shaun Guerrero MD;  Location: [facility]     ENDOSCOPIC RETROGRADE CHOLANGIOPANCREATOGRAM N/A 4/18/2019    Procedure: COMBINED ENDOSCOPIC RETROGRADE CHOLANGIOPANCREATOGRAPHY, Bile duct stent exchange and Polypectomy;  Surgeon: Shaun Guerrero MD;  Location: [facility]     ENDOSCOPIC RETROGRADE CHOLANGIOPANCREATOGRAM N/A 10/18/2019    Procedure: Endoscopic Retrograde Cholangiopancreatogram;  Surgeon: Shaun Guerrero MD;  Location: [facility]     ENDOSCOPIC RETROGRADE CHOLANGIOPANCREATOGRAM WITH SPYGLASS N/A 7/26/2019    Procedure: Endoscopic Retrograde Cholangiopancreatogram With Spyglas,l Radiofrequency Ablation, Stent Exchangeand Duodenal Biopsy x2;  Surgeon: Shaun Guerrero MD;  Location: [facility]     ENDOSCOPIC RETROGRADE CHOLANGIOPANCREATOGRAM WITH SPYGLASS N/A 9/10/2020    Procedure: ENDOSCOPIC RETROGRADE CHOLANGIOPANCREATOGRAPHY, WITH DIRECT DUCT VISUALIZATION, USING PANCREATICOBILIARY FIBEROPTIC PROBE;  Surgeon: Shaun Guerrero MD;  Location: [facility]     ENDOSCOPIC RETROGRADE CHOLANGIOPANCREATOGRAM WITH SPYGLASS N/A 3/22/2021    Procedure: ENDOSCOPIC RETROGRADE CHOLANGIOPANCREATOGRAPHY, WITH DIRECT DUCT VISUALIZATION, USING PANCREATICOBILIARY FIBEROPTIC PROBE;  Surgeon: Shaun Guerrero MD;  Location: [facility]     ESOPHAGOSCOPY, GASTROSCOPY, DUODENOSCOPY (EGD) WITH RADIO FREQUENCY ABLATION, COMBINED N/A 9/10/2020    Procedure: possible repeat ESOPHAGOGASTRODUODENOSCOPY, WITH RADIOFREQUENCY ABLATION and endoscopic mucosal resection;  Surgeon: Shaun Guerrero MD;  Location: [facility]     ESOPHAGOSCOPY, GASTROSCOPY, DUODENOSCOPY (EGD), COMBINED N/A 4/18/2019    Procedure: upper endoscopy with  polypectomy;  Surgeon: Shaun Guererro MD;  Location: [facility]     ESOPHAGOSCOPY, GASTROSCOPY, DUODENOSCOPY (EGD), COMBINED N/A 10/18/2019    Procedure: Upper Endoscopy and ERCP with stent removal, stone removal and biopsy;  Surgeon: Shaun Guerrero MD;  Location: [facility]     ESOPHAGOSCOPY, GASTROSCOPY, DUODENOSCOPY (EGD), RESECT MUCOSA, COMBINED N/A 2/28/2019    Procedure: Upper Endoscopy, Endoscopic Ultrasound, Endoscopic Mucosal Resection,  Ampullectomy, polypectomy.;  Surgeon: Shaun Guerrero MD;  Location: [facility]     ESOPHAGOSCOPY, GASTROSCOPY, DUODENOSCOPY (EGD), RESECT MUCOSA, COMBINED N/A 3/22/2021    Procedure: ESOPHAGOGASTRODUODENOSCOPY (EGD) with  endoscopic mucosal resection/ polypectomy;  Surgeon: Shaun Guerrero MD;  Location: [facility]     EYE SURGERY      macular hole repaired left eye     HC KNEE SCOPE, DIAGNOSTIC      - both knees     HEAD & NECK SURGERY      wisdom teeth        Meds: reviewed  Current Outpatient Medications   Medication Sig Dispense Refill     acetaminophen (ACETAMINOPHEN 8 HOUR) 650 MG CR tablet Take 650 mg by mouth every 8 hours as needed for mild pain or fever       albuterol (PROAIR HFA/PROVENTIL HFA/VENTOLIN HFA) 108 (90 Base) MCG/ACT inhaler Inhale 2 puffs into the lungs every 4 hours as needed for shortness of breath / dyspnea 1 Inhaler 3     aspirin 81 MG EC tablet Take 1 tablet (81 mg) by mouth daily 90 tablet 3     atenolol (TENORMIN) 50 MG tablet Take 1 tablet by mouth once daily 90 tablet 0     atorvastatin (LIPITOR) 20 MG tablet Take 1 tablet (20 mg) by mouth daily 90 tablet 1     azelastine (ASTELIN) 0.1 % nasal spray USE 1 SPRAY(S) IN EACH NOSTRIL TWICE DAILY AS NEEDED 30 mL 0     azelastine (OPTIVAR) 0.05 % SOLN Place 1 drop into both eyes 2 times daily as needed Reported on 3/22/2017       calcium-vitamin D 500-125 MG-UNIT TABS        canagliflozin (INVOKANA) 100 MG tablet Take 1 tablet (100 mg) by mouth every morning (before breakfast) 90 tablet 1     cetirizine (ZYRTEC) 10 MG  tablet Take 10 mg by mouth every morning.       CONTOUR NEXT TEST test strip USE 1 STRIP TO CHECK GLUCOSE ONCE DAILY 100 strip 4     diclofenac (VOLTAREN) 1 % topical gel Apply topically 4 times daily       fish oil-omega-3 fatty acids (FISH OIL) 1000 MG capsule Take 1 g by mouth daily Reported on 3/22/2017       glipiZIDE (GLUCOTROL) 5 MG tablet TAKE 2 TABLETS BY MOUTH TWICE DAILY BEFORE MEAL(S) 360 tablet 1     hydrochlorothiazide (HYDRODIURIL) 25 MG tablet TAKE 1 TABLET BY MOUTH ONCE DAILY IN THE MORNING 90 tablet 1     ibuprofen (ADVIL) 200 MG tablet take 4 tablet (200 mg) by oral route every 8 hours as needed with food       latanoprost (XALATAN) 0.005 % ophthalmic solution Place 1 drop into both eyes At Bedtime  2.5 mL 1     lisinopril (ZESTRIL) 20 MG tablet Take 1 tablet by mouth twice daily 180 tablet 1     metFORMIN (GLUCOPHAGE) 1000 MG tablet TAKE 1 TABLET BY MOUTH TWICE DAILY WITH MEALS 180 tablet 1     metFORMIN (GLUCOPHAGE-XR) 500 MG 24 hr tablet Take 2 tablets (1,000 mg) by mouth 2 times daily (with meals) 360 tablet 3     MULTIVITAMIN TABS   OR Reported on 3/22/2017       order for DME All diabetic testing supplies including test strips, lancets and solution for testing 2 times per day. Patient is using   no Insulin. Last A1c Lab Results       Component                Value               Date                       A1C                      7.9                 08/20/2018 100 each 11     sitagliptin (JANUVIA) 100 MG tablet Take 1 tablet (100 mg) by mouth daily 90 tablet 1     timolol, PF, (TIMOPTIC OCUDOSE) 0.5 % ophthalmic solution 1 drop 2 times daily         Soc Hx: reviewed  Fam Hx: reviewed          Raisa Torres MD  Internal Medicine

## 2021-08-18 ASSESSMENT — ASTHMA QUESTIONNAIRES: ACT_TOTALSCORE: 25

## 2021-10-24 ENCOUNTER — HEALTH MAINTENANCE LETTER (OUTPATIENT)
Age: 71
End: 2021-10-24

## 2021-11-15 ENCOUNTER — LAB (OUTPATIENT)
Dept: LAB | Facility: CLINIC | Age: 71
End: 2021-11-15
Payer: COMMERCIAL

## 2021-11-15 DIAGNOSIS — E11.9 DIABETES MELLITUS WITHOUT COMPLICATION (H): Chronic | ICD-10-CM

## 2021-11-15 LAB — HBA1C MFR BLD: 7.8 % (ref 0–5.6)

## 2021-11-15 PROCEDURE — 36415 COLL VENOUS BLD VENIPUNCTURE: CPT

## 2021-11-15 PROCEDURE — 83036 HEMOGLOBIN GLYCOSYLATED A1C: CPT

## 2021-11-22 ENCOUNTER — OFFICE VISIT (OUTPATIENT)
Dept: INTERNAL MEDICINE | Facility: CLINIC | Age: 71
End: 2021-11-22
Payer: COMMERCIAL

## 2021-11-22 VITALS
RESPIRATION RATE: 18 BRPM | BODY MASS INDEX: 33.56 KG/M2 | HEIGHT: 67 IN | WEIGHT: 213.8 LBS | SYSTOLIC BLOOD PRESSURE: 130 MMHG | DIASTOLIC BLOOD PRESSURE: 66 MMHG | OXYGEN SATURATION: 97 % | TEMPERATURE: 98.5 F | HEART RATE: 85 BPM

## 2021-11-22 DIAGNOSIS — R22.41 LEG MASS, RIGHT: ICD-10-CM

## 2021-11-22 DIAGNOSIS — E78.5 HYPERLIPIDEMIA WITH TARGET LDL LESS THAN 100: ICD-10-CM

## 2021-11-22 DIAGNOSIS — I10 HTN, GOAL BELOW 140/90: ICD-10-CM

## 2021-11-22 DIAGNOSIS — E11.9 DIABETES MELLITUS WITHOUT COMPLICATION (H): Primary | ICD-10-CM

## 2021-11-22 PROCEDURE — 99214 OFFICE O/P EST MOD 30 MIN: CPT | Performed by: INTERNAL MEDICINE

## 2021-11-22 ASSESSMENT — MIFFLIN-ST. JEOR: SCORE: 1507.42

## 2021-11-22 NOTE — PATIENT INSTRUCTIONS
Plan:  1. Increase Invokana to 300 mg  2. We can add Semaglutide and stop the Januvia. I gave the prescription to find out about the coverage, but you will not start it yet.   3. I recommend Metformin XR -- it has less GI side effects   4. Schedule follow up with dr Perez   5. Please make a lab appointment for fasting labs  In March 6. Please make an appointment few days after the labs to discuss about the results. ( ANNUAL EXAM if after March 23 )

## 2021-11-22 NOTE — PROGRESS NOTES
Dr Torres's note      Patient's instructions / PLAN:                                                        Plan:  1. Increase Invokana to 300 mg  2. We can add Semaglutide and stop the Januvia. I gave the prescription to find out about the coverage, but you will not start it yet.   3. I recommend Metformin XR -- it has less GI side effects   4. Schedule follow up with dr Perez   5. Please make a lab appointment for fasting labs  In March 6. Please make an appointment few days after the labs to discuss about the results. ( ANNUAL EXAM if after March 23 )  7. Leg ultrasound To schedule this test you may call Scheduling center at [phone]          ASSESSMENT & PLAN:                                                      (E11.9) Diabetes mellitus without complication (H)  (primary encounter diagnosis)  Comment: Not controlled   Plan: Semaglutide 3 MG TABS, canagliflozin (INVOKANA)        300 MG tablet, CBC with platelets, CK total,         Lipid panel reflex to direct LDL Fasting,         Comprehensive metabolic panel, TSH with free T4        reflex, Albumin Random Urine Quantitative with         Creat Ratio, Hemoglobin A1c            (I10) HTN, goal below 140/90  Comment: Controlled    Plan: CBC with platelets, CK total, Lipid panel         reflex to direct LDL Fasting, Comprehensive         metabolic panel, TSH with free T4 reflex,         Albumin Random Urine Quantitative with Creat         Ratio, Hemoglobin A1c            (E78.5) Hyperlipidemia with target LDL less than 100  Comment: Controlled    Plan: CBC with platelets, CK total, Lipid panel         reflex to direct LDL Fasting, Comprehensive         metabolic panel, TSH with free T4 reflex,         Albumin Random Urine Quantitative with Creat         Ratio, Hemoglobin A1c            (R22.41) Leg mass, right  Comment: new. Suspect lipoma   Plan: US Extremity Non Vascular Bilateral               Chief complaint:                                                       Follow up chronic medical problems      SUBJECTIVE:                                                    History of present illness:    DM meds:  -- Glipizide 10 bid, Metformin 1000 bid, Januvia 100, canagliflozin    Feels very tired/ She has CPAP     R thigh nodule  -- x 1 m  -- ant thigh  -- grew a little  -- not tender  -- exam: round 1.5 cm subcutaneous mass      Check with the insurance about these diabetes meds:  --- Januvia, Nesina, Tradjenta, Onglyza ( tabs)  --- Bydureon, Trulicity, Tanzeum ( once a week), Victoza ( daily ) Byetta ( twice a day) - injections  --- Invokana , Farxiga, Jardiance ( Tabs)     do NOT combine GLIPTIN  with GLUTIDE (GLP 1 AIM)    <>GLIPTIN = DP4I    Dipeptidyl Peptidase-IV Inhibitors po qd  1. SITAGLIPTIN / JANUVIA  25 à 50 à 100  2. SAXAGLIPTIN / ONGLYZA  2.5 -- > 5   3. LINAGLIPTINE / TRADJENTA  5 mg   4. ALOGLIPTIN / NESIMA 6.25 à 12.5 à 25    <>GLIFOZIN (SGLT2I) Sodium-Glucose Cotransporter 2 Inhibitors   1. CANAGLIFLOZIN / INVOKANA 100 à 300 mg qd  2. DAPAGLIFLOZIN / FARXIGA  5 à 10 mg qd  3. EMPAGLIFLOZIN / JARDIANCE  10 à 25 mg qd  4. ERTUGLIFLOZIN / STEGLATRO 5 -- > 15 MG   <> GLUTIDE = ( GLP 1A)  Glucagon-Like Peptide-1 Agonists   1. SEMAGLUTIDE / OZEMPIC     ORAL: qd: 3 à 7 à 14   SQ: qw 0.25 à 0.5  2. LIRAGLUTIDE / VICTOZA  qw: 0.6 à 1.2 - 1.8 mg  3. DULAGLUTIDE / TRULICITY  qw 0.75 à 1.5 à .. max 4.5 mg  4. EXENATIDE / BYETTA    - XR 2 mg qw  - IR: 5 mg bid before meals,  5. LIXISENATIDE / ADLYXIN  qd 10mg -- > 15 à 20        Hyperlipidemia Follow-Up      Are you regularly taking any medication or supplement to lower your cholesterol?   Yes- atorvastatin    Are you having muscle aches or other side effects that you think could be caused by your cholesterol lowering medication?  No    Hypertension Follow-up      Do you check your blood pressure regularly outside of the clinic? No     Are you following a low salt diet? Yes    Are your blood pressures ever more than  "140 on the top number (systolic) OR more   than 90 on the bottom number (diastolic), for example 140/90? No      How many servings of fruits and vegetables do you eat daily?  4 or more    On average, how many sweetened beverages do you drink each day (Examples: soda, juice, sweet tea, etc.  Do NOT count diet or artificially sweetened beverages)?   0    How many days per week do you exercise enough to make your heart beat faster? 3 or less    How many minutes a day do you exercise enough to make your heart beat faster? 9 or less    How many days per week do you miss taking your medication? 0      Review of Systems:                                                      ROS: negative for fever, chills, cough, wheezes, chest pain, shortness of breath, vomiting, abdominal pain, leg swelling     OBJECTIVE:             Physical exam:  Blood pressure 130/66, pulse 85, temperature 98.5  F (36.9  C), temperature source Tympanic, resp. rate 18, height 1.702 m (5' 7\"), weight 97 kg (213 lb 12.8 oz), last menstrual period 12/13/2002, SpO2 97 %, not currently breastfeeding.     NAD, appears comfortable  Skin: no rashes   Neck: supple, no JVD,  No thyroidmegaly. Lymph nodes nonpalpable cervical and supraclavicular.  Chest: clear to auscultation bilaterally, good respiratory effort  Heart: S1 S2, RRR, no mgr appreciated  Abdomen: soft, not tender, no hepatosplenomegaly or masses appreciated, no abdominal bruit, present bowel sounds  Extremities: no edema,   Neurologic: A, Ox3, no focal signs appreciated    PMHx: reviewed  Past Medical History:   Diagnosis Date     Allergic rhinitis, cause unspecified      Asthma      Asthma, mild intermittent      Cataract      Cellulitis and abscess of leg, except foot 03/00    Bilateral     Eczema      Heart murmur     aortic area     HTN, goal below 140/90      Hyperlipidemia LDL goal < 100      Hyperplastic colon polyp 2008     Infection     bilateral eye infections     Macular hole of left eye  "     Obesity (BMI 30-39.9)      Osteoarthritis     knees     Other chronic pain     right knee     Sleep apnea     uses c pap     Type 2 diabetes, HbA1C goal < 8% (H)       PSHx: reviewed  Past Surgical History:   Procedure Laterality Date     ARTHROPLASTY HIP Right 9/25/2017    Procedure: ARTHROPLASTY HIP;  Right total hip arthroplasty  ;  Surgeon: Ayaan Pena MD;  [facility]     ARTHROPLASTY KNEE  7/12/2013    Procedure: ARTHROPLASTY KNEE;  right total knee arthroplasty ;  Surgeon: Chaparro Wesley MD;  [facility]     ARTHROSCOPY KNEE Right 1/21/2015    Procedure: ARTHROSCOPY KNEE;  Surgeon: Ayaan Pena MD;  [facility]     C INCISION OF HYMEN       CATARACT IOL, RT/LT       COLONOSCOPY  2008     COLONOSCOPY N/A 4/18/2019    Procedure: Colonoscopy with polypectomy;  Surgeon: Shaun Guerrero MD;  [facility]     ENDOSCOPIC RETROGRADE CHOLANGIOPANCREATOGRAM N/A 2/6/2019    Procedure: COMBINED ENDOSCOPIC RETROGRADE CHOLANGIOPANCREATOGRAPHY, BILIARY SPINCTEROTOMY AND DILATION, PLACE BILE DUCT STENT;  Surgeon: Guru Andie Gonzalez MD;  [facility]     ENDOSCOPIC RETROGRADE CHOLANGIOPANCREATOGRAM N/A 2/28/2019    Procedure: Endoscopic Retrograde Cholangiopancreatogram, Bile duct stent removal and placement;  Surgeon: Shaun Guerrero MD;  [facility]     ENDOSCOPIC RETROGRADE CHOLANGIOPANCREATOGRAM N/A 4/18/2019    Procedure: COMBINED ENDOSCOPIC RETROGRADE CHOLANGIOPANCREATOGRAPHY, Bile duct stent exchange and Polypectomy;  Surgeon: Shaun Guerrero MD;  [facility]     ENDOSCOPIC RETROGRADE CHOLANGIOPANCREATOGRAM N/A 10/18/2019    Procedure: Endoscopic Retrograde Cholangiopancreatogram;  Surgeon: Shaun Guerrero MD;  [facility]     ENDOSCOPIC RETROGRADE CHOLANGIOPANCREATOGRAM WITH SPYGLASS N/A 7/26/2019    Procedure: Endoscopic Retrograde Cholangiopancreatogram With Spyglas,l Radiofrequency Ablation, Stent Exchangeand Duodenal Biopsy x2;  Surgeon:  Shaun Guerrero MD;  [facility]     ENDOSCOPIC RETROGRADE CHOLANGIOPANCREATOGRAM WITH SPYGLASS N/A 9/10/2020    Procedure: ENDOSCOPIC RETROGRADE CHOLANGIOPANCREATOGRAPHY, WITH DIRECT DUCT VISUALIZATION, USING PANCREATICOBILIARY FIBEROPTIC PROBE;  Surgeon: Shaun Guerrero MD;  [facility]     ENDOSCOPIC RETROGRADE CHOLANGIOPANCREATOGRAM WITH SPYGLASS N/A 3/22/2021    Procedure: ENDOSCOPIC RETROGRADE CHOLANGIOPANCREATOGRAPHY, WITH DIRECT DUCT VISUALIZATION, USING PANCREATICOBILIARY FIBEROPTIC PROBE;  Surgeon: Shaun Guerrero MD;  [facility]     ESOPHAGOSCOPY, GASTROSCOPY, DUODENOSCOPY (EGD) WITH RADIO FREQUENCY ABLATION, COMBINED N/A 9/10/2020    Procedure: possible repeat ESOPHAGOGASTRODUODENOSCOPY, WITH RADIOFREQUENCY ABLATION and endoscopic mucosal resection;  Surgeon: Shaun Guerrero MD;  [facility]     ESOPHAGOSCOPY, GASTROSCOPY, DUODENOSCOPY (EGD), COMBINED N/A 4/18/2019    Procedure: upper endoscopy with polypectomy;  Surgeon: Shaun Guerrero MD;  [facility]     ESOPHAGOSCOPY, GASTROSCOPY, DUODENOSCOPY (EGD), COMBINED N/A 10/18/2019    Procedure: Upper Endoscopy and ERCP with stent removal, stone removal and biopsy;  Surgeon: Shaun Guerrero MD;  [facility]     ESOPHAGOSCOPY, GASTROSCOPY, DUODENOSCOPY (EGD), RESECT MUCOSA, COMBINED N/A 2/28/2019    Procedure: Upper Endoscopy, Endoscopic Ultrasound, Endoscopic Mucosal Resection,  Ampullectomy, polypectomy.;  Surgeon: Shaun Guerrero MD;  [facility]     ESOPHAGOSCOPY, GASTROSCOPY, DUODENOSCOPY (EGD), RESECT MUCOSA, COMBINED N/A 3/22/2021    Procedure: ESOPHAGOGASTRODUODENOSCOPY (EGD) with  endoscopic mucosal resection/ polypectomy;  Surgeon: Shaun Guerrero MD;  [facility]     EYE SURGERY      macular hole repaired left eye     HC KNEE SCOPE, DIAGNOSTIC      - both knees     HEAD & NECK SURGERY      wisdom teeth        Meds: reviewed  Current Outpatient Medications   Medication Sig Dispense Refill     acetaminophen (ACETAMINOPHEN 8  HOUR) 650 MG CR tablet Take 650 mg by mouth every 8 hours as needed for mild pain or fever       albuterol (PROAIR HFA/PROVENTIL HFA/VENTOLIN HFA) 108 (90 Base) MCG/ACT inhaler Inhale 2 puffs into the lungs every 4 hours as needed for shortness of breath / dyspnea 1 Inhaler 3     aspirin 81 MG EC tablet Take 1 tablet (81 mg) by mouth daily 90 tablet 3     atenolol (TENORMIN) 50 MG tablet Take 1 tablet by mouth once daily 90 tablet 1     atorvastatin (LIPITOR) 20 MG tablet Take 1 tablet (20 mg) by mouth daily 90 tablet 1     azelastine (ASTELIN) 0.1 % nasal spray USE 1 SPRAY(S) IN EACH NOSTRIL TWICE DAILY AS NEEDED 30 mL 0     azelastine (OPTIVAR) 0.05 % SOLN Place 1 drop into both eyes 2 times daily as needed Reported on 3/22/2017       calcium-vitamin D 500-125 MG-UNIT TABS        canagliflozin (INVOKANA) 100 MG tablet Take 1 tablet (100 mg) by mouth every morning (before breakfast) 90 tablet 1     cetirizine (ZYRTEC) 10 MG tablet Take 10 mg by mouth every morning.       CONTOUR NEXT TEST test strip USE 1 STRIP TO CHECK GLUCOSE ONCE DAILY 100 strip 4     diclofenac (VOLTAREN) 1 % topical gel Apply topically 4 times daily       fish oil-omega-3 fatty acids (FISH OIL) 1000 MG capsule Take 1 g by mouth daily Reported on 3/22/2017       glipiZIDE (GLUCOTROL) 5 MG tablet TAKE 2 TABLETS BY MOUTH TWICE DAILY BEFORE MEAL(S) 360 tablet 1     hydrochlorothiazide (HYDRODIURIL) 25 MG tablet TAKE 1 TABLET BY MOUTH ONCE DAILY IN THE MORNING 90 tablet 1     ibuprofen (ADVIL) 200 MG tablet take 4 tablet (200 mg) by oral route every 8 hours as needed with food       latanoprost (XALATAN) 0.005 % ophthalmic solution Place 1 drop into both eyes At Bedtime  2.5 mL 1     lisinopril (ZESTRIL) 20 MG tablet Take 1 tablet (20 mg) by mouth 2 times daily 180 tablet 1     metFORMIN (GLUCOPHAGE) 1000 MG tablet TAKE 1 TABLET BY MOUTH TWICE DAILY WITH MEALS 180 tablet 1     metFORMIN (GLUCOPHAGE-XR) 500 MG 24 hr tablet Take 2 tablets (1,000 mg)  by mouth 2 times daily (with meals) 360 tablet 3     MULTIVITAMIN TABS   OR Reported on 3/22/2017       order for DME All diabetic testing supplies including test strips, lancets and solution for testing 2 times per day. Patient is using   no Insulin. Last A1c Lab Results       Component                Value               Date                       A1C                      7.9                 08/20/2018 100 each 11     sitagliptin (JANUVIA) 100 MG tablet Take 1 tablet (100 mg) by mouth daily 90 tablet 1     timolol, PF, (TIMOPTIC OCUDOSE) 0.5 % ophthalmic solution 1 drop 2 times daily         Soc Hx: reviewed  Fam Hx: reviewed          Raisa Torres MD  Internal Medicine

## 2021-11-29 ENCOUNTER — HOSPITAL ENCOUNTER (OUTPATIENT)
Dept: ULTRASOUND IMAGING | Facility: CLINIC | Age: 71
Discharge: HOME | End: 2021-11-29
Attending: INTERNAL MEDICINE | Admitting: INTERNAL MEDICINE
Payer: COMMERCIAL

## 2021-11-29 DIAGNOSIS — R22.41 LEG MASS, RIGHT: ICD-10-CM

## 2021-11-29 PROCEDURE — 76882 US LMTD JT/FCL EVL NVASC XTR: CPT | Mod: RT

## 2021-11-29 PROCEDURE — 76882 US LMTD JT/FCL EVL NVASC XTR: CPT

## 2021-12-02 ENCOUNTER — MYC MEDICAL ADVICE (OUTPATIENT)
Dept: INTERNAL MEDICINE | Facility: CLINIC | Age: 71
End: 2021-12-02
Payer: COMMERCIAL

## 2021-12-02 DIAGNOSIS — R22.41 LEG MASS, RIGHT: Primary | ICD-10-CM

## 2021-12-13 ENCOUNTER — OFFICE VISIT (OUTPATIENT)
Dept: SURGERY | Facility: CLINIC | Age: 71
End: 2021-12-13
Payer: COMMERCIAL

## 2021-12-13 ENCOUNTER — TELEPHONE (OUTPATIENT)
Dept: SURGERY | Facility: CLINIC | Age: 71
End: 2021-12-13

## 2021-12-13 VITALS
HEART RATE: 69 BPM | SYSTOLIC BLOOD PRESSURE: 122 MMHG | WEIGHT: 213 LBS | DIASTOLIC BLOOD PRESSURE: 76 MMHG | OXYGEN SATURATION: 96 % | BODY MASS INDEX: 33.43 KG/M2 | RESPIRATION RATE: 16 BRPM | HEIGHT: 67 IN

## 2021-12-13 DIAGNOSIS — R22.41 MASS OF RIGHT THIGH: Primary | ICD-10-CM

## 2021-12-13 DIAGNOSIS — R00.0 SINUS TACHYCARDIA: ICD-10-CM

## 2021-12-13 DIAGNOSIS — I10 HTN, GOAL BELOW 140/90: ICD-10-CM

## 2021-12-13 PROCEDURE — 99203 OFFICE O/P NEW LOW 30 MIN: CPT | Performed by: SURGERY

## 2021-12-13 ASSESSMENT — MIFFLIN-ST. JEOR: SCORE: 1503.79

## 2021-12-13 NOTE — PROGRESS NOTES
I was asked to see this patient with a lump on her right anterior thigh over the last couple of months.  These have arisen only recently, and as such are somewhat suspicious.  She has also noticed some lumps laterally just above the knee.  Ultrasound showed these to be solid lesions.  She has not previously had any similar lesions.    Past Medical History:   Diagnosis Date     Allergic rhinitis, cause unspecified      Asthma      Asthma, mild intermittent      Cataract      Cellulitis and abscess of leg, except foot 03/00    Bilateral     Eczema      Heart murmur     aortic area     HTN, goal below 140/90      Hyperlipidemia LDL goal < 100      Hyperplastic colon polyp 2008     Infection     bilateral eye infections     Macular hole of left eye      Obesity (BMI 30-39.9)      Osteoarthritis     knees     Other chronic pain     right knee     Sleep apnea     uses c pap     Type 2 diabetes, HbA1C goal < 8% (H)      Past Surgical History:   Procedure Laterality Date     ARTHROPLASTY HIP Right 9/25/2017    Procedure: ARTHROPLASTY HIP;  Right total hip arthroplasty  ;  Surgeon: Ayaan Pena MD;  [facility]     ARTHROPLASTY KNEE  7/12/2013    Procedure: ARTHROPLASTY KNEE;  right total knee arthroplasty ;  Surgeon: Chaparro Wesley MD;  [facility]     ARTHROSCOPY KNEE Right 1/21/2015    Procedure: ARTHROSCOPY KNEE;  Surgeon: Ayaan Pena MD;  [facility]     C INCISION OF HYMEN       CATARACT IOL, RT/LT       COLONOSCOPY  2008     COLONOSCOPY N/A 4/18/2019    Procedure: Colonoscopy with polypectomy;  Surgeon: Shaun Guerrero MD;  [facility]     ENDOSCOPIC RETROGRADE CHOLANGIOPANCREATOGRAM N/A 2/6/2019    Procedure: COMBINED ENDOSCOPIC RETROGRADE CHOLANGIOPANCREATOGRAPHY, BILIARY SPINCTEROTOMY AND DILATION, PLACE BILE DUCT STENT;  Surgeon: Guru Andie Gonzalez MD;  [facility]     ENDOSCOPIC RETROGRADE CHOLANGIOPANCREATOGRAM N/A 2/28/2019    Procedure:  Endoscopic Retrograde Cholangiopancreatogram, Bile duct stent removal and placement;  Surgeon: Shaun Guerrero MD;  [facility]     ENDOSCOPIC RETROGRADE CHOLANGIOPANCREATOGRAM N/A 4/18/2019    Procedure: COMBINED ENDOSCOPIC RETROGRADE CHOLANGIOPANCREATOGRAPHY, Bile duct stent exchange and Polypectomy;  Surgeon: Shaun Guerrero MD;  [facility]     ENDOSCOPIC RETROGRADE CHOLANGIOPANCREATOGRAM N/A 10/18/2019    Procedure: Endoscopic Retrograde Cholangiopancreatogram;  Surgeon: Shaun Guerrero MD;  [facility]     ENDOSCOPIC RETROGRADE CHOLANGIOPANCREATOGRAM WITH SPYGLASS N/A 7/26/2019    Procedure: Endoscopic Retrograde Cholangiopancreatogram With Spyglas,l Radiofrequency Ablation, Stent Exchangeand Duodenal Biopsy x2;  Surgeon: Shaun Guerrero MD;  [facility]     ENDOSCOPIC RETROGRADE CHOLANGIOPANCREATOGRAM WITH SPYGLASS N/A 9/10/2020    Procedure: ENDOSCOPIC RETROGRADE CHOLANGIOPANCREATOGRAPHY, WITH DIRECT DUCT VISUALIZATION, USING PANCREATICOBILIARY FIBEROPTIC PROBE;  Surgeon: Shaun Guerrero MD;  [facility]     ENDOSCOPIC RETROGRADE CHOLANGIOPANCREATOGRAM WITH SPYGLASS N/A 3/22/2021    Procedure: ENDOSCOPIC RETROGRADE CHOLANGIOPANCREATOGRAPHY, WITH DIRECT DUCT VISUALIZATION, USING PANCREATICOBILIARY FIBEROPTIC PROBE;  Surgeon: Shaun Guerrero MD;  [facility]     ESOPHAGOSCOPY, GASTROSCOPY, DUODENOSCOPY (EGD) WITH RADIO FREQUENCY ABLATION, COMBINED N/A 9/10/2020    Procedure: possible repeat ESOPHAGOGASTRODUODENOSCOPY, WITH RADIOFREQUENCY ABLATION and endoscopic mucosal resection;  Surgeon: Shaun Guerrero MD;  [facility]     ESOPHAGOSCOPY, GASTROSCOPY, DUODENOSCOPY (EGD), COMBINED N/A 4/18/2019    Procedure: upper endoscopy with polypectomy;  Surgeon: Shaun Guerrero MD;  [facility]     ESOPHAGOSCOPY, GASTROSCOPY, DUODENOSCOPY (EGD), COMBINED N/A 10/18/2019    Procedure: Upper Endoscopy and ERCP with stent removal, stone removal and biopsy;  Surgeon: Shaun Guerrero MD;  [facility]     ESOPHAGOSCOPY, GASTROSCOPY, DUODENOSCOPY (EGD), RESECT MUCOSA, COMBINED N/A 2/28/2019    Procedure: Upper Endoscopy, Endoscopic Ultrasound, Endoscopic Mucosal Resection,  Ampullectomy, polypectomy.;  Surgeon: Shaun Guerrero MD;  [facility]     ESOPHAGOSCOPY, GASTROSCOPY, DUODENOSCOPY (EGD), RESECT MUCOSA, COMBINED N/A 3/22/2021    Procedure: ESOPHAGOGASTRODUODENOSCOPY (EGD) with  endoscopic mucosal resection/ polypectomy;  Surgeon: Shaun Guerrero MD;  [facility]     EYE SURGERY      macular hole repaired left eye     HC KNEE SCOPE, DIAGNOSTIC      - both knees     HEAD & NECK SURGERY      wisdom teeth     Physical exam:  The patient is in no apparent distress.  Breathing is nonlabored.  The right anterior thigh reveals a firm subcutaneous mass 1.8 cm in diameter.  There are a couple of smaller lesions lateral to and just above the knee, each measuring approximately 5 mm.  The overlying skin is unremarkable, though fairly dry with thinning of the epidermis.    Assessment and plan: This is a patient with solid-appearing lesion in the right thigh.  The etiology is uncertain, but there is some concern for a malignant or neoplastic process.  These do not appear to be consistent with benign lipomas.  Excisional biopsy is indicated, and I have recommended that we go ahead and remove the largest of these lesions for diagnostic purposes and leave the smaller lesions in place for the time being.  I think this will minimize the chance of wound healing issues.    A total of 30 minutes was spent today in chart review, patient examination and discussion, and documentation.

## 2021-12-13 NOTE — LETTER
2021    RE: Gricelda Sabillon, : 1950      I was asked to see this patient with a lump on her right anterior thigh over the last couple of months.  She has also noticed some lumps laterally just above the knee.  Ultrasound showed these to be solid lesions.  She has not previously had any similar lesions.     Physical exam:  The patient is in no apparent distress.  Breathing is nonlabored.  The right anterior thigh reveals a firm subcutaneous mass 1.8 cm in diameter.  There are a couple of smaller lesions lateral to and just above the knee, each measuring approximately 5 mm.  The overlying skin is unremarkable, though fairly dry with thinning of the epidermis.     Assessment and plan: This is a patient with solid-appearing lesion in the right thigh.  The etiology is uncertain.  I think biopsy is certainly reasonable, and I have recommended that we go ahead and remove the largest of these lesions and leave the smaller lesions in place for the time being.  I think this will minimize the chance of wound healing issues.       Salvador Kelly MD  [facility], [place]

## 2021-12-13 NOTE — TELEPHONE ENCOUNTER
Type of surgery: EXCISION OF RIGHT THIGH MASS   Location of surgery: [facility]  Date and time of surgery: 1-13-22, 8:45 AM   Surgeon: DR. MARIE   Pre-Op Appt Date: PATIENT TO SCHEDULE   Post-Op Appt Date: PATIENT TO SCHEDULE    Packet sent out: GIVEN TO PATIENT   Pre-cert/Authorization completed:  Not Applicable  Date: 12-13-21         EXCISION OF RIGHT THIGH MASS   MAC   PT INST TO HAVE H&P WITH DR. MONROY  45 MINS REQ  PA ASSIST DFB   ALW

## 2021-12-13 NOTE — LETTER
[facility]    [address], [address]  [place], [place] [address]  Phone [phone]  Fax [phone] [address], [address]  [facility] [place] [address]  Phone [phone]  Fax [phone]    [email or web address]   December 13, 2021    Gricelda Sabillon  [facility] [facility] [address]    We realize with scheduling surgery, one of your first questions is, how much will this cost?  Below we have provided you with the information you will need to receive an estimate for your surgery.    You are scheduled for the following procedure:  Excision of right thigh mass      Surgeon:  Dr. Kelly      Physician Assistant:  Yes      Please make sure to have your insurance card available at the time of calling.    Surgeon & Physician Assistant charges and facility charges for [facility], [facility] or [facility]:    Consumer Price Line at [phone]   -  It is important to note that there may be a Physician Assistant assisting with your surgery.  Please be sure to mention this when calling for the estimate.      If you prefer, you can also request a price estimate online by completing the secure form at:  [email or web address]    Facility Charges at [facility], [facility] or [facility]:  [facility] at [phone]  [facility] at [phone]  [facility] at [phone] or [phone]    Anesthesiologist Charges:  [facility] at [phone]    [facility] - Nurse Anesthetist Charges:  [facility] at [phone]

## 2021-12-13 NOTE — LETTER
[facility]    [address], [address]  [place], [place] [address]  Phone [phone]  Fax [phone] [address]  [facility] [place] [address]  Phone [phone]  Fax [phone]    [email or web address]           Gricelda Sabillon     You have been scheduled for a COVID-19 testing appointment at [facility].    1-10-22 at 2:00 pm     The clinic is located at:  [address]  [place]  [address]         Please wear a mask or face covering to the testing site.  Have your ID out and ready to show staff when you arrive.    Following your testing, you will be required to self-isolate until your surgery.  If you need a note for your employer due to this, please let us know.

## 2021-12-14 RX ORDER — HYDROCHLOROTHIAZIDE 25 MG/1
TABLET ORAL
Qty: 90 TABLET | Refills: 0 | Status: SHIPPED | OUTPATIENT
Start: 2021-12-14 | End: 2022-05-13

## 2021-12-14 RX ORDER — ATENOLOL 50 MG/1
TABLET ORAL
Qty: 90 TABLET | Refills: 0 | Status: SHIPPED | OUTPATIENT
Start: 2021-12-14 | End: 2022-03-02

## 2021-12-19 DIAGNOSIS — Z11.59 ENCOUNTER FOR SCREENING FOR OTHER VIRAL DISEASES: ICD-10-CM

## 2021-12-27 ENCOUNTER — IMMUNIZATION (OUTPATIENT)
Dept: NURSING | Facility: CLINIC | Age: 71
End: 2021-12-27
Payer: COMMERCIAL

## 2021-12-27 PROCEDURE — 0064A COVID-19,PF,MODERNA (18+ YRS BOOSTER .25ML): CPT

## 2021-12-27 PROCEDURE — 91306 COVID-19,PF,MODERNA (18+ YRS BOOSTER .25ML): CPT

## 2022-01-06 RX ORDER — TIMOLOL MALEATE 5 MG/ML
SOLUTION/ DROPS OPHTHALMIC
COMMUNITY
Start: 2021-04-30

## 2022-01-07 ENCOUNTER — OFFICE VISIT (OUTPATIENT)
Dept: INTERNAL MEDICINE | Facility: CLINIC | Age: 72
End: 2022-01-07
Payer: COMMERCIAL

## 2022-01-07 VITALS
HEIGHT: 67 IN | DIASTOLIC BLOOD PRESSURE: 60 MMHG | WEIGHT: 214 LBS | BODY MASS INDEX: 33.59 KG/M2 | OXYGEN SATURATION: 95 % | HEART RATE: 80 BPM | SYSTOLIC BLOOD PRESSURE: 118 MMHG | RESPIRATION RATE: 14 BRPM | TEMPERATURE: 97.5 F

## 2022-01-07 DIAGNOSIS — R22.41 MASS OF RIGHT THIGH: ICD-10-CM

## 2022-01-07 DIAGNOSIS — Z01.818 PREOPERATIVE EXAMINATION: Primary | ICD-10-CM

## 2022-01-07 DIAGNOSIS — E11.9 TYPE 2 DIABETES MELLITUS WITHOUT COMPLICATION, WITHOUT LONG-TERM CURRENT USE OF INSULIN (H): ICD-10-CM

## 2022-01-07 PROBLEM — E11.65 TYPE 2 DIABETES MELLITUS WITH HYPERGLYCEMIA, WITHOUT LONG-TERM CURRENT USE OF INSULIN (H): Status: RESOLVED | Noted: 2018-12-02 | Resolved: 2022-01-07

## 2022-01-07 LAB
BASOPHILS # BLD AUTO: 0 10E3/UL (ref 0–0.2)
BASOPHILS NFR BLD AUTO: 0 %
EOSINOPHIL # BLD AUTO: 0.2 10E3/UL (ref 0–0.7)
EOSINOPHIL NFR BLD AUTO: 3 %
ERYTHROCYTE [DISTWIDTH] IN BLOOD BY AUTOMATED COUNT: 13.9 % (ref 10–15)
HCT VFR BLD AUTO: 43.2 % (ref 35–47)
HGB BLD-MCNC: 13.9 G/DL (ref 11.7–15.7)
LYMPHOCYTES # BLD AUTO: 1.4 10E3/UL (ref 0.8–5.3)
LYMPHOCYTES NFR BLD AUTO: 20 %
MCH RBC QN AUTO: 27.6 PG (ref 26.5–33)
MCHC RBC AUTO-ENTMCNC: 32.2 G/DL (ref 31.5–36.5)
MCV RBC AUTO: 86 FL (ref 78–100)
MONOCYTES # BLD AUTO: 0.5 10E3/UL (ref 0–1.3)
MONOCYTES NFR BLD AUTO: 7 %
NEUTROPHILS # BLD AUTO: 4.9 10E3/UL (ref 1.6–8.3)
NEUTROPHILS NFR BLD AUTO: 70 %
PLATELET # BLD AUTO: 294 10E3/UL (ref 150–450)
RBC # BLD AUTO: 5.03 10E6/UL (ref 3.8–5.2)
WBC # BLD AUTO: 7 10E3/UL (ref 4–11)

## 2022-01-07 PROCEDURE — 93000 ELECTROCARDIOGRAM COMPLETE: CPT | Performed by: INTERNAL MEDICINE

## 2022-01-07 PROCEDURE — 99214 OFFICE O/P EST MOD 30 MIN: CPT | Performed by: INTERNAL MEDICINE

## 2022-01-07 PROCEDURE — 80048 BASIC METABOLIC PNL TOTAL CA: CPT | Performed by: INTERNAL MEDICINE

## 2022-01-07 PROCEDURE — 36415 COLL VENOUS BLD VENIPUNCTURE: CPT | Performed by: INTERNAL MEDICINE

## 2022-01-07 PROCEDURE — 85025 COMPLETE CBC W/AUTO DIFF WBC: CPT | Performed by: INTERNAL MEDICINE

## 2022-01-07 ASSESSMENT — ENCOUNTER SYMPTOMS
NEUROLOGICAL NEGATIVE: 1
GASTROINTESTINAL NEGATIVE: 1
CONSTITUTIONAL NEGATIVE: 1
MUSCULOSKELETAL NEGATIVE: 1
RESPIRATORY NEGATIVE: 1
CARDIOVASCULAR NEGATIVE: 1

## 2022-01-07 ASSESSMENT — MIFFLIN-ST. JEOR: SCORE: 1508.33

## 2022-01-07 NOTE — H&P (VIEW-ONLY)
Addendum  CBC and basic metabolic panel showed no concerning findings.  Her glucose was noted to be elevated, but her most recent A1c was 7.8.  Assuming that her future COVID test is negative, patient should be able to proceed with her surgery as scheduled.    [facility]  [address]  [facility] [address]  Phone: [phone]  Primary Provider: Raisa Davidson  Pre-op Performing Provider: NIDHI HAIRSTON      PREOPERATIVE EVALUATION:  Today's date: 1/7/2022    Gricelda Sabillon is a 71 year old female who presents for a preoperative evaluation.    Surgical Information:  Surgery/Procedure: excision of right thigh mass  Surgery Location: [facility]  Surgeon: Leticia  Surgery Date: 1/13/2022  Time of Surgery: 0845  Where patient plans to recover: At home with family  Fax number for surgical facility: Note does not need to be faxed, will be available electronically in Epic.    Type of Anesthesia Anticipated: General    Assessment & Plan     The proposed surgical procedure is considered INTERMEDIATE risk.    Preoperative examination and mass of right thigh  At this time, patient does have a relatively unremarkable for his examination.  Her blood pressure was noted to be in acceptable level.  She is able to tolerate greater than 4 METS physical activity.  Patient has also tolerated general anesthesia previously without issue.  Her diabetes appears to be under good control as her most recent A1c was noted to be, with.  She does not require the use of albuterol outside of respiratory infections.  Patient does have a Covid PCR, CBC, and basic metabolic panel that is pending.  EKG is unchanged and shows no concerning findings.  At this time, I would consider her to be an acceptable candidate to proceed with a noncardiac related procedure.  Assuming that there are no unexpected abnormalities on her outstanding lab work, patient should be able  to proceed with her surgery as currently scheduled.  She will continue holding her aspirin.  Patient can continue all other medications as currently prescribed.  She should also follow any additional instructions as provided to her by her performing surgeon.  Patient is aware that she should bring her CPAP machine with her on her surgery date for use during the perioperative period.  - EKG 12-lead complete w/read - Clinics  - CBC with platelets and differential  - Basic metabolic panel  (Ca, Cl, CO2, Creat, Gluc, K, Na, BUN)      Type 2 diabetes mellitus without complication, without long-term current use of insulin (H)  At this time, patient's diabetes appears to be under relatively good control as her most recent hemoglobin A1c was noted to be 7.8.  Her diabetes are managed by her regular primary care physician.  We will continue her Metformin, glipizide, Invokana, and Jardiance as currently prescribed.  Patient already has an appointment for diabetic follow-up arranged in the next few months           Risks and Recommendations:  The patient has the following additional risks and recommendations for perioperative complications:   - No identified additional risk factors other than previously addressed    Medication Instructions:   - aspirin: Discontinue aspirin 7-10 days prior to procedure to reduce bleeding risk. It should be resumed postoperatively.     RECOMMENDATION:  APPROVAL GIVEN to proceed with proposed procedure pending review of diagnostic evaluation.        30 minutes spent on the date of the encounter doing chart review, history and exam, documentation and further activities per the note        Subjective     HPI related to upcoming procedure: Patient is a 71-year-old  female with complicated past medical history who presents to the clinic for preoperative examination.  She is currently scheduled to undergo resection of a mass from her right thigh on January 13, 2022.  Patient states this  lesion has been present for some time, but it is not exactly certain as to the exact etiology of the mass.  Patient has undergone numerous surgical procedures in the past.  She has had multiple orthopedic procedures along with an EGD, ERCP, and cataract surgery.  She has tolerated general anesthesia without issue.  Patient is not aware of any family history of anesthesia intolerance or bleeding disorders.  She has been holding her aspirin for approximately 1 week.  Patient does not have a history of a cardiac murmur.  She is able to walk up a flight of stairs without getting chest pain, shortness of breath, or syncopal episodes.  She does have a history of diabetes, and her last A1c was noted to be 7.8 in November 2021.  Patient does take Metformin, glipizide, Januvia, and Invokana for management of her blood sugar.  She also has a history of hypertension for which she takes atenolol and lisinopril.  Patient states that she does carry a diagnosis of mild intermittent asthma on her medical record.  She typically does not ever required use of any type of inhaled medication unless she is dealing with an acute respiratory infection.  Patient states that she does not use her albuterol inhaler at any other times.  She also has been using a CPAP machine for the past several years for management of her obstructive sleep apnea.    Preop Questions 1/7/2022   1. Have you ever had a heart attack or stroke? No   2. Have you ever had surgery on your heart or blood vessels, such as a stent placement, a coronary artery bypass, or surgery on an artery in your head, neck, heart, or legs? No   3. Do you have chest pain with activity? No   4. Do you have a history of  heart failure? No   5. Do you currently have a cold, bronchitis or symptoms of other infection? No   6. Do you have a cough, shortness of breath, or wheezing? No   7. Do you or anyone in your family have previous history of blood clots? No   8. Do you or does anyone in  your family have a serious bleeding problem such as prolonged bleeding following surgeries or cuts? No   9. Have you ever had problems with anemia or been told to take iron pills? YES -    10. Have you had any abnormal blood loss such as black, tarry or bloody stools, or abnormal vaginal bleeding? No   11. Have you ever had a blood transfusion? No   12. Are you willing to have a blood transfusion if it is medically needed before, during, or after your surgery? Yes   13. Have you or any of your relatives ever had problems with anesthesia? No   14. Do you have sleep apnea, excessive snoring or daytime drowsiness? YES -on CPAP for years.   14a. Do you have a CPAP machine? Yes   15. Do you have any artifical heart valves or other implanted medical devices like a pacemaker, defibrillator, or continuous glucose monitor? No   16. Do you have artificial joints? YES -right hip and right knee replacement   17. Are you allergic to latex? No   18. Is there any chance that you may be pregnant? -       Health Care Directive:  Patient does not have a Health Care Directive or Living Will: Discussed advance care planning with patient; however, patient declined at this time.    Preoperative Review of :   reviewed - no record of controlled substances prescribed.        Review of Systems   Constitutional: Negative.    HENT: Negative.    Respiratory: Negative.    Cardiovascular: Negative.    Gastrointestinal: Negative.    Genitourinary: Negative.    Musculoskeletal: Negative.    Neurological: Negative.          Patient Active Problem List    Diagnosis Date Noted     Hyperlipidemia with target LDL less than 100      Priority: High     Diagnosis updated by automated process. Provider to review and confirm.       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     Priority: Medium     DM 2 with hyperglycemia (H) 12/02/2018     Priority: Medium     Status post total replacement of right hip 02/13/2018     Priority: Medium     CHERRY (obstructive sleep apnea) 09/15/2017     Priority: Medium     HTN goal less than 140/90, 05/22/2016     Priority: Medium     Chronic knee pain, right 08/30/2015     Priority: Medium     Total knee replacement status 07/12/2013     Priority: Medium     Sleep apnea      Priority: Medium     Obesity (BMI 30-39.9)      Priority: Medium     Heart murmur      Priority: Medium     aortic area       Advanced directives, counseling/discussion 01/06/2012     Priority: Medium     Advance Directive Problem List Overview:   Name Relationship Phone    Primary Health Care Agent            Alternative Health Care Agent          Discussed advance care planning with patient; information given to patient to review. 1/6/2012          Allergic rhinitis      Priority: Medium     Problem list name updated by automated process. Provider to review       Cataract      Priority: Low     Utility update for deleted IMO code  [employer] Update utility       Macular hole of left eye      Priority: Low      Past Medical History:   Diagnosis Date     Allergic rhinitis, cause unspecified      Asthma      Asthma, mild intermittent      Cataract      Cellulitis and abscess of leg, except foot 03/00    Bilateral     Eczema      Heart murmur     aortic area     HTN, goal below 140/90      Hyperlipidemia LDL goal < 100      Hyperplastic colon polyp 2008     Infection     bilateral eye infections     Macular hole of left eye      Obesity (BMI 30-39.9)      Osteoarthritis     knees     Other chronic pain     right knee     Sleep apnea     uses c pap     Type 2 diabetes, HbA1C goal < 8% (H)      Past Surgical History:   Procedure Laterality Date     ARTHROPLASTY HIP Right 9/25/2017    Procedure: ARTHROPLASTY HIP;  Right total hip arthroplasty  ;  Surgeon: Becky  Ayaan Scales MD;  Location: [facility] OR     ARTHROPLASTY KNEE  7/12/2013    Procedure: ARTHROPLASTY KNEE;  right total knee arthroplasty ;  Surgeon: Chaparro Wesley MD;  Location: [facility] OR     ARTHROSCOPY KNEE Right 1/21/2015    Procedure: ARTHROSCOPY KNEE;  Surgeon: Ayaan Pena MD;  Location: [facility] OR     C INCISION OF HYMEN       CATARACT IOL, RT/LT       COLONOSCOPY  2008     COLONOSCOPY N/A 4/18/2019    Procedure: Colonoscopy with polypectomy;  Surgeon: Shaun Guerrero MD;  Location: [facility]     ENDOSCOPIC RETROGRADE CHOLANGIOPANCREATOGRAM N/A 2/6/2019    Procedure: COMBINED ENDOSCOPIC RETROGRADE CHOLANGIOPANCREATOGRAPHY, BILIARY SPINCTEROTOMY AND DILATION, PLACE BILE DUCT STENT;  Surgeon: Guru Andie Gonzalez MD;  Location: [facility]     ENDOSCOPIC RETROGRADE CHOLANGIOPANCREATOGRAM N/A 2/28/2019    Procedure: Endoscopic Retrograde Cholangiopancreatogram, Bile duct stent removal and placement;  Surgeon: Shaun Guerrero MD;  Location: [facility]     ENDOSCOPIC RETROGRADE CHOLANGIOPANCREATOGRAM N/A 4/18/2019    Procedure: COMBINED ENDOSCOPIC RETROGRADE CHOLANGIOPANCREATOGRAPHY, Bile duct stent exchange and Polypectomy;  Surgeon: Shaun Guerrero MD;  Location: [facility]     ENDOSCOPIC RETROGRADE CHOLANGIOPANCREATOGRAM N/A 10/18/2019    Procedure: Endoscopic Retrograde Cholangiopancreatogram;  Surgeon: Shaun Guerrero MD;  [facility]: [facility]     ENDOSCOPIC RETROGRADE CHOLANGIOPANCREATOGRAM WITH SPYGLASS N/A 7/26/2019    Procedure: Endoscopic Retrograde Cholangiopancreatogram With Spyglas,l Radiofrequency Ablation, Stent Exchangeand Duodenal Biopsy x2;  Surgeon: Shaun Guerrero MD;  [facility]     ENDOSCOPIC RETROGRADE CHOLANGIOPANCREATOGRAM WITH SPYGLASS N/A 9/10/2020    Procedure: ENDOSCOPIC RETROGRADE CHOLANGIOPANCREATOGRAPHY, WITH DIRECT DUCT VISUALIZATION, USING PANCREATICOBILIARY FIBEROPTIC PROBE;  Surgeon: Shaun Guerrero MD;  [facility]     ENDOSCOPIC RETROGRADE CHOLANGIOPANCREATOGRAM WITH SPYGLASS  N/A 3/22/2021    Procedure: ENDOSCOPIC RETROGRADE CHOLANGIOPANCREATOGRAPHY, WITH DIRECT DUCT VISUALIZATION, USING PANCREATICOBILIARY FIBEROPTIC PROBE;  Surgeon: Shaun Guerrero MD;  [facility]     ESOPHAGOSCOPY, GASTROSCOPY, DUODENOSCOPY (EGD) WITH RADIO FREQUENCY ABLATION, COMBINED N/A 9/10/2020    Procedure: possible repeat ESOPHAGOGASTRODUODENOSCOPY, WITH RADIOFREQUENCY ABLATION and endoscopic mucosal resection;  Surgeon: Shaun Guerrero MD;  [facility]     ESOPHAGOSCOPY, GASTROSCOPY, DUODENOSCOPY (EGD), COMBINED N/A 4/18/2019    Procedure: upper endoscopy with polypectomy;  Surgeon: Shaun Guerrero MD;  [facility]     ESOPHAGOSCOPY, GASTROSCOPY, DUODENOSCOPY (EGD), COMBINED N/A 10/18/2019    Procedure: Upper Endoscopy and ERCP with stent removal, stone removal and biopsy;  Surgeon: Shaun Guerrero MD;  [facility]     ESOPHAGOSCOPY, GASTROSCOPY, DUODENOSCOPY (EGD), RESECT MUCOSA, COMBINED N/A 2/28/2019    Procedure: Upper Endoscopy, Endoscopic Ultrasound, Endoscopic Mucosal Resection,  Ampullectomy, polypectomy.;  Surgeon: Shaun Guerrero MD;  Location: [facility]     ESOPHAGOSCOPY, GASTROSCOPY, DUODENOSCOPY (EGD), RESECT MUCOSA, COMBINED N/A 3/22/2021    Procedure: ESOPHAGOGASTRODUODENOSCOPY (EGD) with  endoscopic mucosal resection/ polypectomy;  Surgeon: Shaun Guerrero MD;  Location: [facility]     EYE SURGERY      macular hole repaired left eye     HC KNEE SCOPE, DIAGNOSTIC      - both knees     HEAD & NECK SURGERY      wisdom teeth     Current Outpatient Medications   Medication Sig Dispense Refill     atenolol (TENORMIN) 50 MG tablet Take 1 tablet by mouth once daily 90 tablet 0     atorvastatin (LIPITOR) 20 MG tablet Take 1 tablet (20 mg) by mouth daily 90 tablet 1     canagliflozin (INVOKANA) 300 MG tablet Take 1 tablet (300 mg) by mouth every morning (before breakfast) 90 tablet 1     cetirizine (ZYRTEC) 10 MG tablet Take 10 mg by mouth every morning Takes only PRN       CONTOUR NEXT TEST test strip  USE 1 STRIP TO CHECK GLUCOSE ONCE DAILY 100 strip 4     diclofenac (VOLTAREN) 1 % topical gel Apply topically 4 times daily as needed        fish oil-omega-3 fatty acids (FISH OIL) 1000 MG capsule Take 1 g by mouth daily Reported on 3/22/2017       glipiZIDE (GLUCOTROL) 5 MG tablet TAKE 2 TABLETS BY MOUTH TWICE DAILY BEFORE MEAL(S) 360 tablet 1     hydrochlorothiazide (HYDRODIURIL) 25 MG tablet TAKE 1 TABLET BY MOUTH ONCE DAILY IN THE MORNING 90 tablet 0     ibuprofen (ADVIL) 200 MG tablet take 4 tablet (200 mg) by oral route every 8 hours as needed with food       latanoprost (XALATAN) 0.005 % ophthalmic solution Place 1 drop into both eyes At Bedtime  2.5 mL 1     lisinopril (ZESTRIL) 20 MG tablet Take 1 tablet (20 mg) by mouth 2 times daily 180 tablet 1     metFORMIN (GLUCOPHAGE-XR) 500 MG 24 hr tablet Take 2 tablets (1,000 mg) by mouth 2 times daily (with meals) 360 tablet 3     MULTIVITAMIN TABS   OR Reported on 3/22/2017       order for DME All diabetic testing supplies including test strips, lancets and solution for testing 2 times per day. Patient is using   no Insulin. Last A1c Lab Results       Component                Value               Date                       A1C                      7.9                 08/20/2018 100 each 11     sitagliptin (JANUVIA) 100 MG tablet Take 1 tablet (100 mg) by mouth daily 90 tablet 1     timolol maleate (TIMOPTIC) 0.5 % ophthalmic solution INSTILL 1 DROP INTO EACH EYE TWICE DAILY       acetaminophen (ACETAMINOPHEN 8 HOUR) 650 MG CR tablet Take 650 mg by mouth every 8 hours as needed for mild pain or fever (Patient not taking: Reported on 1/7/2022)       albuterol (PROAIR HFA/PROVENTIL HFA/VENTOLIN HFA) 108 (90 Base) MCG/ACT inhaler Inhale 2 puffs into the lungs every 4 hours as needed for shortness of breath / dyspnea (Patient not taking: Reported on 1/7/2022) 1 Inhaler 3     aspirin 81 MG EC tablet Take 1 tablet (81 mg) by mouth daily (Patient not taking: Reported on  "1/7/2022) 90 tablet 3     azelastine (ASTELIN) 0.1 % nasal spray USE 1 SPRAY(S) IN EACH NOSTRIL TWICE DAILY AS NEEDED (Patient not taking: Reported on 1/7/2022) 30 mL 0     azelastine (OPTIVAR) 0.05 % SOLN Place 1 drop into both eyes 2 times daily as needed Reported on 3/22/2017 (Patient not taking: Reported on 1/7/2022)       Semaglutide 3 MG TABS Take 3 mg by mouth daily (Patient not taking: Reported on 1/7/2022) 30 tablet 3       Allergies   Allergen Reactions     Bromfenac Visual Disturbance     Sulfites, xibrom  Causes sever eye pain     Codeine      \"NAUSE,HA AND DIZZINESS\"     Codeine Nausea     Other reaction(s): Dizziness, Headache     Sulfites Visual Disturbance     Xibrom (bromfenac opthalmic solution) 0.09%--eye pain        Social History     Tobacco Use     Smoking status: Never Smoker     Smokeless tobacco: Never Used   Substance Use Topics     Alcohol use: No     Alcohol/week: 0.0 standard drinks       History   Drug Use No         Objective     /60   Pulse 80   Temp 97.5  F (36.4  C) (Tympanic)   Resp 14   Ht 1.702 m (5' 7\")   Wt 97.1 kg (214 lb)   LMP 12/13/2002 (Exact Date)   SpO2 95%   Breastfeeding No   BMI 33.52 kg/m      Physical Exam  Vitals and nursing note reviewed.   Constitutional:       Appearance: Normal appearance.   HENT:      Head: Normocephalic and atraumatic.      Right Ear: Tympanic membrane, ear canal and external ear normal.      Left Ear: Tympanic membrane, ear canal and external ear normal.      Mouth/Throat:      Mouth: Mucous membranes are moist.      Pharynx: Oropharynx is clear.   Eyes:      Extraocular Movements: Extraocular movements intact.      Conjunctiva/sclera: Conjunctivae normal.      Pupils: Pupils are equal, round, and reactive to light.   Cardiovascular:      Rate and Rhythm: Normal rate and regular rhythm.      Pulses: Normal pulses.      Heart sounds: Normal heart sounds.   Pulmonary:      Effort: Pulmonary effort is normal.      Breath sounds: " Normal breath sounds.   Abdominal:      General: Bowel sounds are normal.      Palpations: Abdomen is soft.   Skin:     General: Skin is warm.      Capillary Refill: Capillary refill takes less than 2 seconds.   Neurological:      General: No focal deficit present.      Mental Status: She is alert and oriented to person, place, and time. Mental status is at baseline.           Recent Labs   Lab Test 11/15/21  0907 08/10/21  1258 03/22/21  0758 03/03/21  0845 09/10/20  0710   HGB  --   --  14.1 14.0 14.2   PLT  --   --  285 251 253   INR  --   --  0.98  --  1.00   NA  --  137 138 140 137   POTASSIUM  --  4.2 3.9 4.4 3.8   CR  --  0.93 0.82 0.98 0.86   A1C 7.8* 7.5*  --  7.1*  --         Diagnostics:  Labs pending at this time.  Results will be reviewed when available.   EKG: appears normal, NSR, normal axis, normal intervals, no acute ST/T changes c/w ischemia, no LVH by voltage criteria, unchanged from previous tracings    Revised Cardiac Risk Index (RCRI):  The patient has the following serious cardiovascular risks for perioperative complications:   - No serious cardiac risks = 0 points     RCRI Interpretation: 0 points: Class I (very low risk - 0.4% complication rate)           Signed Electronically by: Sukhdev Biggs MD  Copy of this evaluation report is provided to requesting physician.

## 2022-01-07 NOTE — PATIENT INSTRUCTIONS
Patient will bring her CPAP machine with her to the hospital on the day of surgery for use during the perioperative period.    She will also continue holding her aspirin and resume it after her procedure.

## 2022-01-07 NOTE — PROGRESS NOTES
Addendum  CBC and basic metabolic panel showed no concerning findings.  Her glucose was noted to be elevated, but her most recent A1c was 7.8.  Assuming that her future COVID test is negative, patient should be able to proceed with her surgery as scheduled.    [facility]  [address]  [facility] [address]  Phone: [phone]  Primary Provider: Raisa Davidson  Pre-op Performing Provider: NIDHI HAIRSTON      PREOPERATIVE EVALUATION:  Today's date: 1/7/2022    Gricelda Sabillon is a 71 year old female who presents for a preoperative evaluation.    Surgical Information:  Surgery/Procedure: excision of right thigh mass  Surgery Location: [facility]  Surgeon: Leticia  Surgery Date: 1/13/2022  Time of Surgery: 0845  Where patient plans to recover: At home with family  Fax number for surgical facility: Note does not need to be faxed, will be available electronically in Epic.    Type of Anesthesia Anticipated: General    Assessment & Plan     The proposed surgical procedure is considered INTERMEDIATE risk.    Preoperative examination and mass of right thigh  At this time, patient does have a relatively unremarkable for his examination.  Her blood pressure was noted to be in acceptable level.  She is able to tolerate greater than 4 METS physical activity.  Patient has also tolerated general anesthesia previously without issue.  Her diabetes appears to be under good control as her most recent A1c was noted to be, with.  She does not require the use of albuterol outside of respiratory infections.  Patient does have a Covid PCR, CBC, and basic metabolic panel that is pending.  EKG is unchanged and shows no concerning findings.  At this time, I would consider her to be an acceptable candidate to proceed with a noncardiac related procedure.  Assuming that there are no unexpected abnormalities on her outstanding lab work, patient should be able  to proceed with her surgery as currently scheduled.  She will continue holding her aspirin.  Patient can continue all other medications as currently prescribed.  She should also follow any additional instructions as provided to her by her performing surgeon.  Patient is aware that she should bring her CPAP machine with her on her surgery date for use during the perioperative period.  - EKG 12-lead complete w/read - Clinics  - CBC with platelets and differential  - Basic metabolic panel  (Ca, Cl, CO2, Creat, Gluc, K, Na, BUN)      Type 2 diabetes mellitus without complication, without long-term current use of insulin (H)  At this time, patient's diabetes appears to be under relatively good control as her most recent hemoglobin A1c was noted to be 7.8.  Her diabetes are managed by her regular primary care physician.  We will continue her Metformin, glipizide, Invokana, and Jardiance as currently prescribed.  Patient already has an appointment for diabetic follow-up arranged in the next few months           Risks and Recommendations:  The patient has the following additional risks and recommendations for perioperative complications:   - No identified additional risk factors other than previously addressed    Medication Instructions:   - aspirin: Discontinue aspirin 7-10 days prior to procedure to reduce bleeding risk. It should be resumed postoperatively.     RECOMMENDATION:  APPROVAL GIVEN to proceed with proposed procedure pending review of diagnostic evaluation.        30 minutes spent on the date of the encounter doing chart review, history and exam, documentation and further activities per the note        Subjective     HPI related to upcoming procedure: Patient is a 71-year-old  female with complicated past medical history who presents to the clinic for preoperative examination.  She is currently scheduled to undergo resection of a mass from her right thigh on January 13, 2022.  Patient states this  lesion has been present for some time, but it is not exactly certain as to the exact etiology of the mass.  Patient has undergone numerous surgical procedures in the past.  She has had multiple orthopedic procedures along with an EGD, ERCP, and cataract surgery.  She has tolerated general anesthesia without issue.  Patient is not aware of any family history of anesthesia intolerance or bleeding disorders.  She has been holding her aspirin for approximately 1 week.  Patient does not have a history of a cardiac murmur.  She is able to walk up a flight of stairs without getting chest pain, shortness of breath, or syncopal episodes.  She does have a history of diabetes, and her last A1c was noted to be 7.8 in November 2021.  Patient does take Metformin, glipizide, Januvia, and Invokana for management of her blood sugar.  She also has a history of hypertension for which she takes atenolol and lisinopril.  Patient states that she does carry a diagnosis of mild intermittent asthma on her medical record.  She typically does not ever required use of any type of inhaled medication unless she is dealing with an acute respiratory infection.  Patient states that she does not use her albuterol inhaler at any other times.  She also has been using a CPAP machine for the past several years for management of her obstructive sleep apnea.    Preop Questions 1/7/2022   1. Have you ever had a heart attack or stroke? No   2. Have you ever had surgery on your heart or blood vessels, such as a stent placement, a coronary artery bypass, or surgery on an artery in your head, neck, heart, or legs? No   3. Do you have chest pain with activity? No   4. Do you have a history of  heart failure? No   5. Do you currently have a cold, bronchitis or symptoms of other infection? No   6. Do you have a cough, shortness of breath, or wheezing? No   7. Do you or anyone in your family have previous history of blood clots? No   8. Do you or does anyone in  your family have a serious bleeding problem such as prolonged bleeding following surgeries or cuts? No   9. Have you ever had problems with anemia or been told to take iron pills? YES -    10. Have you had any abnormal blood loss such as black, tarry or bloody stools, or abnormal vaginal bleeding? No   11. Have you ever had a blood transfusion? No   12. Are you willing to have a blood transfusion if it is medically needed before, during, or after your surgery? Yes   13. Have you or any of your relatives ever had problems with anesthesia? No   14. Do you have sleep apnea, excessive snoring or daytime drowsiness? YES -on CPAP for years.   14a. Do you have a CPAP machine? Yes   15. Do you have any artifical heart valves or other implanted medical devices like a pacemaker, defibrillator, or continuous glucose monitor? No   16. Do you have artificial joints? YES -right hip and right knee replacement   17. Are you allergic to latex? No   18. Is there any chance that you may be pregnant? -       Health Care Directive:  Patient does not have a Health Care Directive or Living Will: Discussed advance care planning with patient; however, patient declined at this time.    Preoperative Review of :   reviewed - no record of controlled substances prescribed.        Review of Systems   Constitutional: Negative.    HENT: Negative.    Respiratory: Negative.    Cardiovascular: Negative.    Gastrointestinal: Negative.    Genitourinary: Negative.    Musculoskeletal: Negative.    Neurological: Negative.          Patient Active Problem List    Diagnosis Date Noted     Hyperlipidemia with target LDL less than 100      Priority: High     Diagnosis updated by automated process. Provider to review and confirm.       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     Priority: Medium     DM 2 with hyperglycemia (H) 12/02/2018     Priority: Medium     Status post total replacement of right hip 02/13/2018     Priority: Medium     CHRERY (obstructive sleep apnea) 09/15/2017     Priority: Medium     HTN goal less than 140/90, 05/22/2016     Priority: Medium     Chronic knee pain, right 08/30/2015     Priority: Medium     Total knee replacement status 07/12/2013     Priority: Medium     Sleep apnea      Priority: Medium     Obesity (BMI 30-39.9)      Priority: Medium     Heart murmur      Priority: Medium     aortic area       Advanced directives, counseling/discussion 01/06/2012     Priority: Medium     Advance Directive Problem List Overview:   Name Relationship Phone    Primary Health Care Agent            Alternative Health Care Agent          Discussed advance care planning with patient; information given to patient to review. 1/6/2012          Allergic rhinitis      Priority: Medium     Problem list name updated by automated process. Provider to review       Cataract      Priority: Low     Utility update for deleted IMO code  [employer] Update utility       Macular hole of left eye      Priority: Low      Past Medical History:   Diagnosis Date     Allergic rhinitis, cause unspecified      Asthma      Asthma, mild intermittent      Cataract      Cellulitis and abscess of leg, except foot 03/00    Bilateral     Eczema      Heart murmur     aortic area     HTN, goal below 140/90      Hyperlipidemia LDL goal < 100      Hyperplastic colon polyp 2008     Infection     bilateral eye infections     Macular hole of left eye      Obesity (BMI 30-39.9)      Osteoarthritis     knees     Other chronic pain     right knee     Sleep apnea     uses c pap     Type 2 diabetes, HbA1C goal < 8% (H)      Past Surgical History:   Procedure Laterality Date     ARTHROPLASTY HIP Right 9/25/2017    Procedure: ARTHROPLASTY HIP;  Right total hip arthroplasty  ;  Surgeon: Becky  Ayaan Scales MD;  Location: [facility] OR     ARTHROPLASTY KNEE  7/12/2013    Procedure: ARTHROPLASTY KNEE;  right total knee arthroplasty ;  Surgeon: Chaparro Wesley MD;  Location: [facility]     ARTHROSCOPY KNEE Right 1/21/2015    Procedure: ARTHROSCOPY KNEE;  Surgeon: Ayaan Pena MD;  Location: [facility]     C INCISION OF HYMEN       CATARACT IOL, RT/LT       COLONOSCOPY  2008     COLONOSCOPY N/A 4/18/2019    Procedure: Colonoscopy with polypectomy;  Surgeon: Shaun Guerrero MD;  Location: [facility]     ENDOSCOPIC RETROGRADE CHOLANGIOPANCREATOGRAM N/A 2/6/2019    Procedure: COMBINED ENDOSCOPIC RETROGRADE CHOLANGIOPANCREATOGRAPHY, BILIARY SPINCTEROTOMY AND DILATION, PLACE BILE DUCT STENT;  Surgeon: Guru Andie Gonzalez MD;  Location: [facility]     ENDOSCOPIC RETROGRADE CHOLANGIOPANCREATOGRAM N/A 2/28/2019    Procedure: Endoscopic Retrograde Cholangiopancreatogram, Bile duct stent removal and placement;  Surgeon: Shaun Guerrero MD;  Location: [facility]     ENDOSCOPIC RETROGRADE CHOLANGIOPANCREATOGRAM N/A 4/18/2019    Procedure: COMBINED ENDOSCOPIC RETROGRADE CHOLANGIOPANCREATOGRAPHY, Bile duct stent exchange and Polypectomy;  Surgeon: Shaun Guerrero MD;  Location: [facility]     ENDOSCOPIC RETROGRADE CHOLANGIOPANCREATOGRAM N/A 10/18/2019    Procedure: Endoscopic Retrograde Cholangiopancreatogram;  Surgeon: Shaun Guerrero MD;  [facility]: [facility]     ENDOSCOPIC RETROGRADE CHOLANGIOPANCREATOGRAM WITH SPYGLASS N/A 7/26/2019    Procedure: Endoscopic Retrograde Cholangiopancreatogram With Spyglas,l Radiofrequency Ablation, Stent Exchangeand Duodenal Biopsy x2;  Surgeon: Shaun Guerrero MD;  [facility]     ENDOSCOPIC RETROGRADE CHOLANGIOPANCREATOGRAM WITH SPYGLASS N/A 9/10/2020    Procedure: ENDOSCOPIC RETROGRADE CHOLANGIOPANCREATOGRAPHY, WITH DIRECT DUCT VISUALIZATION, USING PANCREATICOBILIARY FIBEROPTIC PROBE;  Surgeon: Shaun Guerrero MD;  [facility]     ENDOSCOPIC RETROGRADE CHOLANGIOPANCREATOGRAM WITH SPYGLASS  N/A 3/22/2021    Procedure: ENDOSCOPIC RETROGRADE CHOLANGIOPANCREATOGRAPHY, WITH DIRECT DUCT VISUALIZATION, USING PANCREATICOBILIARY FIBEROPTIC PROBE;  Surgeon: Shaun Guerrero MD;  [facility]     ESOPHAGOSCOPY, GASTROSCOPY, DUODENOSCOPY (EGD) WITH RADIO FREQUENCY ABLATION, COMBINED N/A 9/10/2020    Procedure: possible repeat ESOPHAGOGASTRODUODENOSCOPY, WITH RADIOFREQUENCY ABLATION and endoscopic mucosal resection;  Surgeon: Shaun Guerrero MD;  [facility]     ESOPHAGOSCOPY, GASTROSCOPY, DUODENOSCOPY (EGD), COMBINED N/A 4/18/2019    Procedure: upper endoscopy with polypectomy;  Surgeon: Shaun Guerrero MD;  [facility]     ESOPHAGOSCOPY, GASTROSCOPY, DUODENOSCOPY (EGD), COMBINED N/A 10/18/2019    Procedure: Upper Endoscopy and ERCP with stent removal, stone removal and biopsy;  Surgeon: Shaun Guerrero MD;  [facility]     ESOPHAGOSCOPY, GASTROSCOPY, DUODENOSCOPY (EGD), RESECT MUCOSA, COMBINED N/A 2/28/2019    Procedure: Upper Endoscopy, Endoscopic Ultrasound, Endoscopic Mucosal Resection,  Ampullectomy, polypectomy.;  Surgeon: Shaun Guerrero MD;  Location: [facility]     ESOPHAGOSCOPY, GASTROSCOPY, DUODENOSCOPY (EGD), RESECT MUCOSA, COMBINED N/A 3/22/2021    Procedure: ESOPHAGOGASTRODUODENOSCOPY (EGD) with  endoscopic mucosal resection/ polypectomy;  Surgeon: Shaun Guerrero MD;  Location: [facility]     EYE SURGERY      macular hole repaired left eye     HC KNEE SCOPE, DIAGNOSTIC      - both knees     HEAD & NECK SURGERY      wisdom teeth     Current Outpatient Medications   Medication Sig Dispense Refill     atenolol (TENORMIN) 50 MG tablet Take 1 tablet by mouth once daily 90 tablet 0     atorvastatin (LIPITOR) 20 MG tablet Take 1 tablet (20 mg) by mouth daily 90 tablet 1     canagliflozin (INVOKANA) 300 MG tablet Take 1 tablet (300 mg) by mouth every morning (before breakfast) 90 tablet 1     cetirizine (ZYRTEC) 10 MG tablet Take 10 mg by mouth every morning Takes only PRN       CONTOUR NEXT TEST test strip  USE 1 STRIP TO CHECK GLUCOSE ONCE DAILY 100 strip 4     diclofenac (VOLTAREN) 1 % topical gel Apply topically 4 times daily as needed        fish oil-omega-3 fatty acids (FISH OIL) 1000 MG capsule Take 1 g by mouth daily Reported on 3/22/2017       glipiZIDE (GLUCOTROL) 5 MG tablet TAKE 2 TABLETS BY MOUTH TWICE DAILY BEFORE MEAL(S) 360 tablet 1     hydrochlorothiazide (HYDRODIURIL) 25 MG tablet TAKE 1 TABLET BY MOUTH ONCE DAILY IN THE MORNING 90 tablet 0     ibuprofen (ADVIL) 200 MG tablet take 4 tablet (200 mg) by oral route every 8 hours as needed with food       latanoprost (XALATAN) 0.005 % ophthalmic solution Place 1 drop into both eyes At Bedtime  2.5 mL 1     lisinopril (ZESTRIL) 20 MG tablet Take 1 tablet (20 mg) by mouth 2 times daily 180 tablet 1     metFORMIN (GLUCOPHAGE-XR) 500 MG 24 hr tablet Take 2 tablets (1,000 mg) by mouth 2 times daily (with meals) 360 tablet 3     MULTIVITAMIN TABS   OR Reported on 3/22/2017       order for DME All diabetic testing supplies including test strips, lancets and solution for testing 2 times per day. Patient is using   no Insulin. Last A1c Lab Results       Component                Value               Date                       A1C                      7.9                 08/20/2018 100 each 11     sitagliptin (JANUVIA) 100 MG tablet Take 1 tablet (100 mg) by mouth daily 90 tablet 1     timolol maleate (TIMOPTIC) 0.5 % ophthalmic solution INSTILL 1 DROP INTO EACH EYE TWICE DAILY       acetaminophen (ACETAMINOPHEN 8 HOUR) 650 MG CR tablet Take 650 mg by mouth every 8 hours as needed for mild pain or fever (Patient not taking: Reported on 1/7/2022)       albuterol (PROAIR HFA/PROVENTIL HFA/VENTOLIN HFA) 108 (90 Base) MCG/ACT inhaler Inhale 2 puffs into the lungs every 4 hours as needed for shortness of breath / dyspnea (Patient not taking: Reported on 1/7/2022) 1 Inhaler 3     aspirin 81 MG EC tablet Take 1 tablet (81 mg) by mouth daily (Patient not taking: Reported on  "1/7/2022) 90 tablet 3     azelastine (ASTELIN) 0.1 % nasal spray USE 1 SPRAY(S) IN EACH NOSTRIL TWICE DAILY AS NEEDED (Patient not taking: Reported on 1/7/2022) 30 mL 0     azelastine (OPTIVAR) 0.05 % SOLN Place 1 drop into both eyes 2 times daily as needed Reported on 3/22/2017 (Patient not taking: Reported on 1/7/2022)       Semaglutide 3 MG TABS Take 3 mg by mouth daily (Patient not taking: Reported on 1/7/2022) 30 tablet 3       Allergies   Allergen Reactions     Bromfenac Visual Disturbance     Sulfites, xibrom  Causes sever eye pain     Codeine      \"NAUSE,HA AND DIZZINESS\"     Codeine Nausea     Other reaction(s): Dizziness, Headache     Sulfites Visual Disturbance     Xibrom (bromfenac opthalmic solution) 0.09%--eye pain        Social History     Tobacco Use     Smoking status: Never Smoker     Smokeless tobacco: Never Used   Substance Use Topics     Alcohol use: No     Alcohol/week: 0.0 standard drinks       History   Drug Use No         Objective     /60   Pulse 80   Temp 97.5  F (36.4  C) (Tympanic)   Resp 14   Ht 1.702 m (5' 7\")   Wt 97.1 kg (214 lb)   LMP 12/13/2002 (Exact Date)   SpO2 95%   Breastfeeding No   BMI 33.52 kg/m      Physical Exam  Vitals and nursing note reviewed.   Constitutional:       Appearance: Normal appearance.   HENT:      Head: Normocephalic and atraumatic.      Right Ear: Tympanic membrane, ear canal and external ear normal.      Left Ear: Tympanic membrane, ear canal and external ear normal.      Mouth/Throat:      Mouth: Mucous membranes are moist.      Pharynx: Oropharynx is clear.   Eyes:      Extraocular Movements: Extraocular movements intact.      Conjunctiva/sclera: Conjunctivae normal.      Pupils: Pupils are equal, round, and reactive to light.   Cardiovascular:      Rate and Rhythm: Normal rate and regular rhythm.      Pulses: Normal pulses.      Heart sounds: Normal heart sounds.   Pulmonary:      Effort: Pulmonary effort is normal.      Breath sounds: " Normal breath sounds.   Abdominal:      General: Bowel sounds are normal.      Palpations: Abdomen is soft.   Skin:     General: Skin is warm.      Capillary Refill: Capillary refill takes less than 2 seconds.   Neurological:      General: No focal deficit present.      Mental Status: She is alert and oriented to person, place, and time. Mental status is at baseline.           Recent Labs   Lab Test 11/15/21  0907 08/10/21  1258 03/22/21  0758 03/03/21  0845 09/10/20  0710   HGB  --   --  14.1 14.0 14.2   PLT  --   --  285 251 253   INR  --   --  0.98  --  1.00   NA  --  137 138 140 137   POTASSIUM  --  4.2 3.9 4.4 3.8   CR  --  0.93 0.82 0.98 0.86   A1C 7.8* 7.5*  --  7.1*  --         Diagnostics:  Labs pending at this time.  Results will be reviewed when available.   EKG: appears normal, NSR, normal axis, normal intervals, no acute ST/T changes c/w ischemia, no LVH by voltage criteria, unchanged from previous tracings    Revised Cardiac Risk Index (RCRI):  The patient has the following serious cardiovascular risks for perioperative complications:   - No serious cardiac risks = 0 points     RCRI Interpretation: 0 points: Class I (very low risk - 0.4% complication rate)           Signed Electronically by: Sukhdev Biggs MD  Copy of this evaluation report is provided to requesting physician.

## 2022-01-08 LAB
ANION GAP SERPL CALCULATED.3IONS-SCNC: 8 MMOL/L (ref 3–14)
BUN SERPL-MCNC: 18 MG/DL (ref 7–30)
CALCIUM SERPL-MCNC: 9.7 MG/DL (ref 8.5–10.1)
CHLORIDE BLD-SCNC: 107 MMOL/L (ref 94–109)
CO2 SERPL-SCNC: 23 MMOL/L (ref 20–32)
CREAT SERPL-MCNC: 0.88 MG/DL (ref 0.52–1.04)
GFR SERPL CREATININE-BSD FRML MDRD: 69 ML/MIN/1.73M2
GLUCOSE BLD-MCNC: 298 MG/DL (ref 70–99)
POTASSIUM BLD-SCNC: 3.8 MMOL/L (ref 3.4–5.3)
SODIUM SERPL-SCNC: 138 MMOL/L (ref 133–144)

## 2022-01-10 ENCOUNTER — LAB (OUTPATIENT)
Dept: LAB | Facility: CLINIC | Age: 72
End: 2022-01-10
Payer: COMMERCIAL

## 2022-01-10 DIAGNOSIS — Z11.59 ENCOUNTER FOR SCREENING FOR OTHER VIRAL DISEASES: ICD-10-CM

## 2022-01-10 PROCEDURE — U0003 INFECTIOUS AGENT DETECTION BY NUCLEIC ACID (DNA OR RNA); SEVERE ACUTE RESPIRATORY SYNDROME CORONAVIRUS 2 (SARS-COV-2) (CORONAVIRUS DISEASE [COVID-19]), AMPLIFIED PROBE TECHNIQUE, MAKING USE OF HIGH THROUGHPUT TECHNOLOGIES AS DESCRIBED BY CMS-2020-01-R: HCPCS

## 2022-01-10 PROCEDURE — U0005 INFEC AGEN DETEC AMPLI PROBE: HCPCS

## 2022-01-11 LAB — SARS-COV-2 RNA RESP QL NAA+PROBE: NEGATIVE

## 2022-01-13 ENCOUNTER — ANESTHESIA (OUTPATIENT)
Dept: SURGERY | Facility: CLINIC | Age: 72
End: 2022-01-13
Payer: COMMERCIAL

## 2022-01-13 ENCOUNTER — APPOINTMENT (OUTPATIENT)
Dept: SURGERY | Facility: PHYSICIAN GROUP | Age: 72
End: 2022-01-13
Payer: COMMERCIAL

## 2022-01-13 ENCOUNTER — ANESTHESIA EVENT (OUTPATIENT)
Dept: SURGERY | Facility: CLINIC | Age: 72
End: 2022-01-13
Payer: COMMERCIAL

## 2022-01-13 ENCOUNTER — HOSPITAL ENCOUNTER (OUTPATIENT)
Facility: CLINIC | Age: 72
Discharge: HOME | End: 2022-01-13
Attending: SURGERY | Admitting: SURGERY
Payer: COMMERCIAL

## 2022-01-13 VITALS
RESPIRATION RATE: 15 BRPM | HEIGHT: 67 IN | WEIGHT: 215 LBS | OXYGEN SATURATION: 92 % | TEMPERATURE: 97.9 F | BODY MASS INDEX: 33.74 KG/M2 | DIASTOLIC BLOOD PRESSURE: 68 MMHG | HEART RATE: 75 BPM | SYSTOLIC BLOOD PRESSURE: 104 MMHG

## 2022-01-13 DIAGNOSIS — R22.41 MASS OF RIGHT THIGH: ICD-10-CM

## 2022-01-13 LAB
GLUCOSE BLDC GLUCOMTR-MCNC: 130 MG/DL (ref 70–99)
GLUCOSE BLDC GLUCOMTR-MCNC: 147 MG/DL (ref 70–99)

## 2022-01-13 PROCEDURE — 370N000017 HC ANESTHESIA TECHNICAL FEE, PER MIN: Performed by: SURGERY

## 2022-01-13 PROCEDURE — 250N000009 HC RX 250: Performed by: SURGERY

## 2022-01-13 PROCEDURE — 250N000011 HC RX IP 250 OP 636: Performed by: SURGERY

## 2022-01-13 PROCEDURE — 88305 TISSUE EXAM BY PATHOLOGIST: CPT | Mod: TC | Performed by: SURGERY

## 2022-01-13 PROCEDURE — 360N000075 HC SURGERY LEVEL 2, PER MIN: Performed by: SURGERY

## 2022-01-13 PROCEDURE — 272N000001 HC OR GENERAL SUPPLY STERILE: Performed by: SURGERY

## 2022-01-13 PROCEDURE — 82962 GLUCOSE BLOOD TEST: CPT

## 2022-01-13 PROCEDURE — 999N000141 HC STATISTIC PRE-PROCEDURE NURSING ASSESSMENT: Performed by: SURGERY

## 2022-01-13 PROCEDURE — 250N000011 HC RX IP 250 OP 636: Performed by: NURSE ANESTHETIST, CERTIFIED REGISTERED

## 2022-01-13 PROCEDURE — 258N000003 HC RX IP 258 OP 636: Performed by: NURSE ANESTHETIST, CERTIFIED REGISTERED

## 2022-01-13 PROCEDURE — 11602 EXC TR-EXT MAL+MARG 1.1-2 CM: CPT | Performed by: SURGERY

## 2022-01-13 PROCEDURE — 250N000009 HC RX 250: Performed by: NURSE ANESTHETIST, CERTIFIED REGISTERED

## 2022-01-13 PROCEDURE — 710N000012 HC RECOVERY PHASE 2, PER MINUTE: Performed by: SURGERY

## 2022-01-13 RX ORDER — GLYCOPYRROLATE 0.2 MG/ML
INJECTION, SOLUTION INTRAMUSCULAR; INTRAVENOUS PRN
Status: DISCONTINUED | OUTPATIENT
Start: 2022-01-13 | End: 2022-01-13

## 2022-01-13 RX ORDER — SODIUM CHLORIDE, SODIUM LACTATE, POTASSIUM CHLORIDE, CALCIUM CHLORIDE 600; 310; 30; 20 MG/100ML; MG/100ML; MG/100ML; MG/100ML
INJECTION, SOLUTION INTRAVENOUS CONTINUOUS PRN
Status: DISCONTINUED | OUTPATIENT
Start: 2022-01-13 | End: 2022-01-13

## 2022-01-13 RX ORDER — ONDANSETRON 4 MG/1
4-8 TABLET, ORALLY DISINTEGRATING ORAL EVERY 8 HOURS PRN
Qty: 8 TABLET | Refills: 0 | Status: SHIPPED | OUTPATIENT
Start: 2022-01-13 | End: 2022-07-25

## 2022-01-13 RX ORDER — CEFAZOLIN SODIUM/WATER 2 G/20 ML
2 SYRINGE (ML) INTRAVENOUS
Status: COMPLETED | OUTPATIENT
Start: 2022-01-13 | End: 2022-01-13

## 2022-01-13 RX ORDER — HYDRALAZINE HYDROCHLORIDE 20 MG/ML
2.5-5 INJECTION INTRAMUSCULAR; INTRAVENOUS EVERY 10 MIN PRN
Status: DISCONTINUED | OUTPATIENT
Start: 2022-01-13 | End: 2022-01-13 | Stop reason: HOSPADM

## 2022-01-13 RX ORDER — ONDANSETRON 2 MG/ML
4 INJECTION INTRAMUSCULAR; INTRAVENOUS EVERY 30 MIN PRN
Status: DISCONTINUED | OUTPATIENT
Start: 2022-01-13 | End: 2022-01-13 | Stop reason: HOSPADM

## 2022-01-13 RX ORDER — SODIUM CHLORIDE, SODIUM LACTATE, POTASSIUM CHLORIDE, CALCIUM CHLORIDE 600; 310; 30; 20 MG/100ML; MG/100ML; MG/100ML; MG/100ML
INJECTION, SOLUTION INTRAVENOUS CONTINUOUS
Status: DISCONTINUED | OUTPATIENT
Start: 2022-01-13 | End: 2022-01-13 | Stop reason: HOSPADM

## 2022-01-13 RX ORDER — PROPOFOL 10 MG/ML
INJECTION, EMULSION INTRAVENOUS CONTINUOUS PRN
Status: DISCONTINUED | OUTPATIENT
Start: 2022-01-13 | End: 2022-01-13

## 2022-01-13 RX ORDER — LABETALOL HYDROCHLORIDE 5 MG/ML
10 INJECTION, SOLUTION INTRAVENOUS
Status: DISCONTINUED | OUTPATIENT
Start: 2022-01-13 | End: 2022-01-13 | Stop reason: HOSPADM

## 2022-01-13 RX ORDER — FENTANYL CITRATE 50 UG/ML
25 INJECTION, SOLUTION INTRAMUSCULAR; INTRAVENOUS
Status: DISCONTINUED | OUTPATIENT
Start: 2022-01-13 | End: 2022-01-13 | Stop reason: HOSPADM

## 2022-01-13 RX ORDER — ONDANSETRON 4 MG/1
4 TABLET, ORALLY DISINTEGRATING ORAL EVERY 30 MIN PRN
Status: DISCONTINUED | OUTPATIENT
Start: 2022-01-13 | End: 2022-01-13 | Stop reason: HOSPADM

## 2022-01-13 RX ORDER — CEFAZOLIN SODIUM/WATER 2 G/20 ML
2 SYRINGE (ML) INTRAVENOUS SEE ADMIN INSTRUCTIONS
Status: DISCONTINUED | OUTPATIENT
Start: 2022-01-13 | End: 2022-01-13 | Stop reason: HOSPADM

## 2022-01-13 RX ORDER — ONDANSETRON 2 MG/ML
INJECTION INTRAMUSCULAR; INTRAVENOUS PRN
Status: DISCONTINUED | OUTPATIENT
Start: 2022-01-13 | End: 2022-01-13

## 2022-01-13 RX ORDER — OXYCODONE HYDROCHLORIDE 5 MG/1
5-10 TABLET ORAL EVERY 4 HOURS PRN
Qty: 16 TABLET | Refills: 0 | Status: SHIPPED | OUTPATIENT
Start: 2022-01-13 | End: 2022-06-16

## 2022-01-13 RX ORDER — OXYCODONE HYDROCHLORIDE 5 MG/1
5 TABLET ORAL
Status: DISCONTINUED | OUTPATIENT
Start: 2022-01-13 | End: 2022-01-13 | Stop reason: HOSPADM

## 2022-01-13 RX ORDER — FENTANYL CITRATE 50 UG/ML
25 INJECTION, SOLUTION INTRAMUSCULAR; INTRAVENOUS EVERY 5 MIN PRN
Status: DISCONTINUED | OUTPATIENT
Start: 2022-01-13 | End: 2022-01-13 | Stop reason: HOSPADM

## 2022-01-13 RX ORDER — FENTANYL CITRATE 50 UG/ML
INJECTION, SOLUTION INTRAMUSCULAR; INTRAVENOUS PRN
Status: DISCONTINUED | OUTPATIENT
Start: 2022-01-13 | End: 2022-01-13

## 2022-01-13 RX ORDER — HYDROMORPHONE HCL IN WATER/PF 6 MG/30 ML
0.2 PATIENT CONTROLLED ANALGESIA SYRINGE INTRAVENOUS EVERY 5 MIN PRN
Status: DISCONTINUED | OUTPATIENT
Start: 2022-01-13 | End: 2022-01-13 | Stop reason: HOSPADM

## 2022-01-13 RX ORDER — DEXAMETHASONE SODIUM PHOSPHATE 4 MG/ML
INJECTION, SOLUTION INTRA-ARTICULAR; INTRALESIONAL; INTRAMUSCULAR; INTRAVENOUS; SOFT TISSUE PRN
Status: DISCONTINUED | OUTPATIENT
Start: 2022-01-13 | End: 2022-01-13

## 2022-01-13 RX ORDER — LIDOCAINE HYDROCHLORIDE 10 MG/ML
INJECTION, SOLUTION INFILTRATION; PERINEURAL PRN
Status: DISCONTINUED | OUTPATIENT
Start: 2022-01-13 | End: 2022-01-13

## 2022-01-13 RX ORDER — LIDOCAINE 40 MG/G
CREAM TOPICAL
Status: DISCONTINUED | OUTPATIENT
Start: 2022-01-13 | End: 2022-01-13 | Stop reason: HOSPADM

## 2022-01-13 RX ORDER — MEPERIDINE HYDROCHLORIDE 25 MG/ML
12.5 INJECTION INTRAMUSCULAR; INTRAVENOUS; SUBCUTANEOUS
Status: DISCONTINUED | OUTPATIENT
Start: 2022-01-13 | End: 2022-01-13 | Stop reason: HOSPADM

## 2022-01-13 RX ADMIN — LIDOCAINE HYDROCHLORIDE 50 MG: 10 INJECTION, SOLUTION INFILTRATION; PERINEURAL at 09:01

## 2022-01-13 RX ADMIN — MIDAZOLAM 2 MG: 1 INJECTION INTRAMUSCULAR; INTRAVENOUS at 08:58

## 2022-01-13 RX ADMIN — PHENYLEPHRINE HYDROCHLORIDE 100 MCG: 10 INJECTION INTRAVENOUS at 09:27

## 2022-01-13 RX ADMIN — SODIUM CHLORIDE, POTASSIUM CHLORIDE, SODIUM LACTATE AND CALCIUM CHLORIDE: 600; 310; 30; 20 INJECTION, SOLUTION INTRAVENOUS at 08:30

## 2022-01-13 RX ADMIN — DEXAMETHASONE SODIUM PHOSPHATE 4 MG: 4 INJECTION, SOLUTION INTRA-ARTICULAR; INTRALESIONAL; INTRAMUSCULAR; INTRAVENOUS; SOFT TISSUE at 09:08

## 2022-01-13 RX ADMIN — PHENYLEPHRINE HYDROCHLORIDE 50 MCG: 10 INJECTION INTRAVENOUS at 09:25

## 2022-01-13 RX ADMIN — Medication 2 G: at 08:58

## 2022-01-13 RX ADMIN — FENTANYL CITRATE 50 MCG: 50 INJECTION, SOLUTION INTRAMUSCULAR; INTRAVENOUS at 09:21

## 2022-01-13 RX ADMIN — GLYCOPYRROLATE 0.1 MG: 0.2 INJECTION, SOLUTION INTRAMUSCULAR; INTRAVENOUS at 09:01

## 2022-01-13 RX ADMIN — FENTANYL CITRATE 50 MCG: 50 INJECTION, SOLUTION INTRAMUSCULAR; INTRAVENOUS at 09:03

## 2022-01-13 RX ADMIN — SODIUM CHLORIDE, POTASSIUM CHLORIDE, SODIUM LACTATE AND CALCIUM CHLORIDE: 600; 310; 30; 20 INJECTION, SOLUTION INTRAVENOUS at 09:36

## 2022-01-13 RX ADMIN — ONDANSETRON HYDROCHLORIDE 4 MG: 2 INJECTION, SOLUTION INTRAVENOUS at 09:10

## 2022-01-13 RX ADMIN — PROPOFOL 70 MCG/KG/MIN: 10 INJECTION, EMULSION INTRAVENOUS at 09:03

## 2022-01-13 ASSESSMENT — MIFFLIN-ST. JEOR
SCORE: 1512.86
SCORE: 1512.86

## 2022-01-13 NOTE — ANESTHESIA PREPROCEDURE EVALUATION
Anesthesia Pre-Procedure Evaluation    Patient: Gricelda Sabillon   MRN: [id number] : 1950        Preoperative Diagnosis: Mass of right thigh [R22.41]    Procedure : Procedure(s):  excision of right thigh mass          Past Medical History:   Diagnosis Date     Allergic rhinitis, cause unspecified      Asthma      Asthma, mild intermittent      Cataract      Cellulitis and abscess of leg, except foot     Bilateral     Eczema      Heart murmur     aortic area     HTN, goal below 140/90      Hyperlipidemia LDL goal < 100      Hyperplastic colon polyp      Infection     bilateral eye infections     Macular hole of left eye      Obesity (BMI 30-39.9)      Osteoarthritis     knees     Other chronic pain     right knee     Sleep apnea     uses c pap     Type 2 diabetes, HbA1C goal < 8% (H)       Past Surgical History:   Procedure Laterality Date     ARTHROPLASTY HIP Right 2017    Procedure: ARTHROPLASTY HIP;  Right total hip arthroplasty  ;  Surgeon: Ayaan Pena MD;  Location: [facility]     ARTHROPLASTY KNEE  2013    Procedure: ARTHROPLASTY KNEE;  right total knee arthroplasty ;  Surgeon: Chaparro Wesley MD;  Location: [facility]     ARTHROSCOPY KNEE Right 2015    Procedure: ARTHROSCOPY KNEE;  Surgeon: Ayaan Pena MD;  Location: [facility]     C INCISION OF HYMEN       CATARACT IOL, RT/LT       COLONOSCOPY       COLONOSCOPY N/A 2019    Procedure: Colonoscopy with polypectomy;  Surgeon: Shaun Guerrero MD;  [facility]     ENDOSCOPIC RETROGRADE CHOLANGIOPANCREATOGRAM N/A 2019    Procedure: COMBINED ENDOSCOPIC RETROGRADE CHOLANGIOPANCREATOGRAPHY, BILIARY SPINCTEROTOMY AND DILATION, PLACE BILE DUCT STENT;  Surgeon: Guru Andie Gonzalez MD;  [facility]     ENDOSCOPIC RETROGRADE CHOLANGIOPANCREATOGRAM N/A 2019    Procedure: Endoscopic Retrograde Cholangiopancreatogram, Bile duct stent removal and placement;  Surgeon: Shaun Guerrero MD;   [facility]     ENDOSCOPIC RETROGRADE CHOLANGIOPANCREATOGRAM N/A 4/18/2019    Procedure: COMBINED ENDOSCOPIC RETROGRADE CHOLANGIOPANCREATOGRAPHY, Bile duct stent exchange and Polypectomy;  Surgeon: Shaun Guerrero MD;  [facility]     ENDOSCOPIC RETROGRADE CHOLANGIOPANCREATOGRAM N/A 10/18/2019    Procedure: Endoscopic Retrograde Cholangiopancreatogram;  Surgeon: Shaun Guerrero MD;  [facility]     ENDOSCOPIC RETROGRADE CHOLANGIOPANCREATOGRAM WITH SPYGLASS N/A 7/26/2019    Procedure: Endoscopic Retrograde Cholangiopancreatogram With Spyglas,l Radiofrequency Ablation, Stent Exchangeand Duodenal Biopsy x2;  Surgeon: Shaun Guerrero MD;  [facility]     ENDOSCOPIC RETROGRADE CHOLANGIOPANCREATOGRAM WITH SPYGLASS N/A 9/10/2020    Procedure: ENDOSCOPIC RETROGRADE CHOLANGIOPANCREATOGRAPHY, WITH DIRECT DUCT VISUALIZATION, USING PANCREATICOBILIARY FIBEROPTIC PROBE;  Surgeon: Shaun Guerrero MD;  [facility]     ENDOSCOPIC RETROGRADE CHOLANGIOPANCREATOGRAM WITH SPYGLASS N/A 3/22/2021    Procedure: ENDOSCOPIC RETROGRADE CHOLANGIOPANCREATOGRAPHY, WITH DIRECT DUCT VISUALIZATION, USING PANCREATICOBILIARY FIBEROPTIC PROBE;  Surgeon: Shaun Guerrero MD;  [facility]     ESOPHAGOSCOPY, GASTROSCOPY, DUODENOSCOPY (EGD) WITH RADIO FREQUENCY ABLATION, COMBINED N/A 9/10/2020    Procedure: possible repeat ESOPHAGOGASTRODUODENOSCOPY, WITH RADIOFREQUENCY ABLATION and endoscopic mucosal resection;  Surgeon: Shaun Guerrero MD;  [facility]     ESOPHAGOSCOPY, GASTROSCOPY, DUODENOSCOPY (EGD), COMBINED N/A 4/18/2019    Procedure: upper endoscopy with polypectomy;  Surgeon: Shaun Guerrero MD;  [facility]     ESOPHAGOSCOPY, GASTROSCOPY, DUODENOSCOPY (EGD), COMBINED N/A 10/18/2019    Procedure: Upper Endoscopy and ERCP with stent removal, stone removal and biopsy;  Surgeon: Shaun Guerrero MD;  [facility]     ESOPHAGOSCOPY, GASTROSCOPY, DUODENOSCOPY (EGD), RESECT MUCOSA, COMBINED N/A 2/28/2019    Procedure: Upper  "Endoscopy, Endoscopic Ultrasound, Endoscopic Mucosal Resection,  Ampullectomy, polypectomy.;  Surgeon: Shaun Guerrero MD;  [facility]     ESOPHAGOSCOPY, GASTROSCOPY, DUODENOSCOPY (EGD), RESECT MUCOSA, COMBINED N/A 3/22/2021    Procedure: ESOPHAGOGASTRODUODENOSCOPY (EGD) with  endoscopic mucosal resection/ polypectomy;  Surgeon: Shaun Guerrero MD;  [facility]     EYE SURGERY      macular hole repaired left eye     HC KNEE SCOPE, DIAGNOSTIC      - both knees     HEAD & NECK SURGERY      wisdom teeth      Allergies   Allergen Reactions     Bromfenac Visual Disturbance     Sulfites, xibrom  Causes sever eye pain     Codeine      \"NAUSE,HA AND DIZZINESS\"     Codeine Nausea     Other reaction(s): Dizziness, Headache     Sulfites Visual Disturbance     Xibrom (bromfenac opthalmic solution) 0.09%--eye pain      Social History     Tobacco Use     Smoking status: Never Smoker     Smokeless tobacco: Never Used   Substance Use Topics     Alcohol use: No     Alcohol/week: 0.0 standard drinks      Wt Readings from Last 1 Encounters:   01/13/22 97.5 kg (215 lb)        Anesthesia Evaluation            ROS/MED HX  ENT/Pulmonary:     (+) sleep apnea, uses CPAP, Intermittent, asthma Treatment: Inhaler prn,      Neurologic:  - neg neurologic ROS     Cardiovascular:     (+) Dyslipidemia hypertension-----valvular problems/murmurs     METS/Exercise Tolerance:     Hematologic: Comments: Lab Test        01/07/22     03/22/21     03/03/21     09/10/20     04/18/19     03/01/19                       1106          0758          0845          0710          0810          0536          WBC          7.0          6.2          7.9          7.5            < >        7.4           HGB          13.9         14.1         14.0         14.2           < >        11.5*         MCV          86           87           86           88             < >        88            PLT          294          285          251          253            < >        247 "           INR           --          0.98          --          1.00          --          1.07           < > = values in this interval not displayed.                  Lab Test        01/13/22     01/07/22     08/10/21     03/22/21                       0722          1106          1258          0758          NA            --          138          137          138           POTASSIUM     --          3.8          4.2          3.9           CHLORIDE      --          107          106          106           CO2           --          23           25           23            BUN           --          18           19           19            CR            --          0.88         0.93         0.82          ANIONGAP      --          8            6            9             KATHRYN           --          9.7          9.8          10.2*         GLC          147*         298*         193*         222*           - neg hematologic  ROS     Musculoskeletal:   (+) arthritis,     GI/Hepatic:  - neg GI/hepatic ROS     Renal/Genitourinary:  - neg Renal ROS     Endo:     (+) type II DM, Obesity,     Psychiatric/Substance Use:  - neg psychiatric ROS     Infectious Disease:  - neg infectious disease ROS     Malignancy:  - neg malignancy ROS     Other:      (+) , H/O Chronic Pain,        Physical Exam    Airway        Mallampati: II   TM distance: > 3 FB   Neck ROM: full   Mouth opening: > 3 cm    Respiratory Devices and Support         Dental  no notable dental history         Cardiovascular   cardiovascular exam normal          Pulmonary   pulmonary exam normal                OUTSIDE LABS:  CBC:   Lab Results   Component Value Date    WBC 7.0 01/07/2022    WBC 6.2 03/22/2021    HGB 13.9 01/07/2022    HGB 14.1 03/22/2021    HCT 43.2 01/07/2022    HCT 45.4 03/22/2021     01/07/2022     03/22/2021     BMP:   Lab Results   Component Value Date     01/07/2022     08/10/2021    POTASSIUM 3.8 01/07/2022    POTASSIUM 4.2  08/10/2021    CHLORIDE 107 01/07/2022    CHLORIDE 106 08/10/2021    CO2 23 01/07/2022    CO2 25 08/10/2021    BUN 18 01/07/2022    BUN 19 08/10/2021    CR 0.88 01/07/2022    CR 0.93 08/10/2021     (H) 01/13/2022     (H) 01/07/2022     COAGS:   Lab Results   Component Value Date    INR 0.98 03/22/2021     POC:   Lab Results   Component Value Date     (H) 03/22/2021     HEPATIC:   Lab Results   Component Value Date    ALBUMIN 3.6 08/10/2021    PROTTOTAL 7.2 08/10/2021    ALT 31 08/10/2021    AST 20 08/10/2021    ALKPHOS 87 08/10/2021    BILITOTAL 0.5 08/10/2021     OTHER:   Lab Results   Component Value Date    A1C 7.8 (H) 11/15/2021    KATHRYN 9.7 01/07/2022    LIPASE 347 03/22/2021    AMYLASE 73 03/22/2021    TSH 2.56 03/03/2021    T4 1.17 02/04/2016    CRP <5.0 01/07/2014    SED 16 01/07/2014       Anesthesia Plan    ASA Status:  3      Anesthesia Type: MAC.     - Reason for MAC: immobility needed   Induction: Propofol.   Maintenance: TIVA.        Consents    Anesthesia Plan(s) and associated risks, benefits, and realistic alternatives discussed. Questions answered and patient/representative(s) expressed understanding.    - Discussed:     - Discussed with:  Patient      - Extended Intubation/Ventilatory Support Discussed: No.      - Patient is DNR/DNI Status: No    Use of blood products discussed: Yes.     - Discussed with: Patient.     Postoperative Care    Pain management: IV analgesics.   PONV prophylaxis: Ondansetron (or other 5HT-3), Dexamethasone or Solumedrol     Comments:                Miah Rain MD

## 2022-01-13 NOTE — ANESTHESIA CARE TRANSFER NOTE
Patient: Gricelda Sabillon    Procedure: Procedure(s):  excision of right thigh mass       Diagnosis: Mass of right thigh [R22.41]  Diagnosis Additional Information: No value filed.    Anesthesia Type:   MAC     Note:    Oropharynx: oropharynx clear of all foreign objects  Level of Consciousness: drowsy  Oxygen Supplementation: face mask  Level of Supplemental Oxygen (L/min / FiO2): 6  Independent Airway: airway patency satisfactory and stable    Vital Signs Stable: post-procedure vital signs reviewed and stable  Report to RN Given: handoff report given  Patient transferred to: Phase II    Handoff Report: Identifed the Patient, Identified the Reponsible Provider, Reviewed the pertinent medical history, Discussed the surgical course, Reviewed Intra-OP anesthesia mangement and issues during anesthesia, Set expectations for post-procedure period and Allowed opportunity for questions and acknowledgement of understanding      Vitals:  Vitals Value Taken Time   BP     Temp     Pulse     Resp     SpO2         Electronically Signed By: DENYS Crouch CRNA  January 13, 2022  9:43 AM

## 2022-01-13 NOTE — ANESTHESIA POSTPROCEDURE EVALUATION
Patient: Gricelda Sabillon    Procedure: Procedure(s):  excision of right thigh mass       Diagnosis:Mass of right thigh [R22.41]  Diagnosis Additional Information: No value filed.    Anesthesia Type:  MAC    Note:     Postop Pain Control: Uneventful            Sign Out: Well controlled pain   PONV: No   Neuro/Psych: Uneventful            Sign Out: Acceptable/Baseline neuro status   Airway/Respiratory: Uneventful            Sign Out: Acceptable/Baseline resp. status   CV/Hemodynamics: Uneventful            Sign Out: Acceptable CV status; No obvious hypovolemia; No obvious fluid overload   Other NRE: NONE   DID A NON-ROUTINE EVENT OCCUR? No           Last vitals:  Vitals Value Taken Time   /68 01/13/22 1030   Temp 97.9  F (36.6  C) 01/13/22 0943   Pulse 75 01/13/22 1030   Resp 15 01/13/22 1030   SpO2 92 % 01/13/22 0955       Electronically Signed By: Miah Rain MD  January 13, 2022  3:40 PM

## 2022-01-13 NOTE — DISCHARGE INSTRUCTIONS
HOME CARE FOLLOWING MINOR SURGERY  SANDY Ashby, DEQUAN Daly R. O Donnell, J. Shaheen    RESULTS:  If a biopsy of tissue was done, you may call for your final pathology report after 1p.m. two working days after surgery.  Otherwise, this will be reviewed with you at time of phone follow-up (described below).    INCISIONAL CARE:    If you have a dressing in place, keep clean and dry for 48 hours; you may replace the gauze if it becomes soiled.    After 48 hours you may remove the dressing and shower.  Do not submerse incision in water for 1 week.    If you have a Dermabond dressing (a type of skin glue), you may shower immediately.    Sutures which are beneath the skin will absorb and do not need to be removed.    Sutures you can see should be removed at your surgeon's office near 2 weeks postop, unless otherwise instructed.    If present, leave the steri-strips (white paper tapes) in place for 14 days after surgery.    If present, leave Dermabond glue in place until it wears/flakes off.    You may expect a small amount of drainage from your incision.    A lump/ridge under the incision is normal and will gradually resolve.  If it becomes red or very uncomfortable, contact the nurse at your surgeon's office to discuss whether this needs to be evaluated.    ACTIVITY:  Cautiously resume exercise and strenuous activities such as jogging, tennis, aerobics, etc. Also, be careful of stretching activities which affect the area of surgery for two weeks.    DIET:  Start with liquids and gradually resume your regular diet as tolerated.  Increased fluid intake is recommended. While taking pain medications, consider use of a stool softener, increase your fiber in your diet, or add a fiber supplement (like Metamucil, Citrucel) to help prevent constipation - a possible side effect of pain medications.    DISCOMFORT:  Local anesthetic placed at surgery should provide relief for 4-8 hours.  Begin taking pain  pills before discomfort is severe.  Take the pain medication with some food, when possible, to minimize side effects.  Intermittent use of ice packs may help during the first 1-3 weeks after surgery.  Expect gradual improvement.    Over-the-counter anti-inflammatory medications (i.e. Ibuprofen/Advil/Motrin or Naprosyn/Aleve) may be used per package instructions in addition to or while tapering off the narcotic pain medications to decrease swelling and sensitivity.  DO NOT TAKE these Anti-inflammatory medications if your primary physician has advised against doing so, or if you have acid reflux, ulcer, or bleeding disorder, or take blood-thinner medications.  Call your primary physician or the surgery office if you have medication questions.      FOLLOW-UP AFTER SURGERY:  -Our office will contact you approximately 2-3 weeks after surgery to check on your progress and answer any questions you may have.  If you are doing well, you will not need to return for an office appointment.  If any concerns are identified over the phone, we will help you make an appointment to see a provider.    -If you have not received a phone call, have any questions or concerns, or would like to be seen, please call us at [phone].  We are located at: [address], [address]; [place] [address]    -CONTACT US IF THE FOLLOWING DEVELOPS:   1. A fever that is above 101     2. Increased redness, warmth, drainage, bleeding, or swelling.   3. Pain that is not relieved by rest/ice and your prescription.   4.  Increasing pain after 48 hours.   5. Drainage that is thick, cloudy, yellow, green or white.   6. Any other questions or concerns.      FREQUENTLY ASKED QUESTIONS:    Q:  How should my incision look?    A:  Normally your incision will appear slightly swollen with light redness directly along the incision itself as it heals.  It may feel like a bump or ridge as the healing/scarring happens, and over time (3-4 months) this bump or  ridge feeling should slowly go away.  In general, clear or pink watery drainage can be normal at first as your incision heals, but should decrease over time.    Q:  How do I know if my incision is infected?  A:  Look at your incision for signs of infection, like redness around the incision spreading to surrounding skin, or drainage of cloudy or foul-smelling drainage.  If you feel warm, check your temperature to see if you are running a fever.    **If any of these things occur, please notify the nurse at our office.  We may need you to come into the office for an incision check.      Q:  How do I take care of my incision?  A:  If you have a dressing in place - Starting the day after surgery, replace the dressing 1-2 times a day until there is no further drainage from the incision.  At that time, a dressing is no longer needed.  Try to minimize tape on the skin if irritation is occurring at the tape sites.  If you have significant irritation from tape on the skin, please call the office to discuss other method of dressing your incision.    Small pieces of tape called  steri-strips  may be present directly overlying your incision; these may be removed 10 days after surgery unless otherwise specified by your surgeon.  If these tapes start to loosen at the ends, you may trim them back until they fall off or are removed.    A:  If you had  Dermabond  tissue glue used as a dressing (this causes your incision to look shiny with a clear covering over it) - This type of dressing wears off with time and does not require more dressings over the top unless it is draining around the glue as it wears off.  Do not apply ointments or lotions over the incisions until the glue has completely worn off.    Q:  There is a piece of tape or a sticky  lead  still on my skin.  Can I remove this?  A:  Sometimes the sticky  leads  used for monitoring during surgery or for evaluation in the emergency department are not all removed while you  are in the hospital.  These sometimes have a tab or metal dot on them.  You can easily remove these on your own, like taking off a band-aid.  If there is a gel substance under the  lead , simply wipe/clean it off with a washcloth or paper towel.      Q:  What can I do to minimize constipation (very hard stools, or lack of stools)?  A:  Stay well hydrated.  Increase your dietary fiber intake or take a fiber supplement -with plenty of water.  Walk around frequently.  You may consider an over-the-counter stool-softener.  Your Pharmacist can assist you with choosing one that is stocked at your pharmacy.  Constipation is also one of the most common side effects of pain medication.  If you are using pain medication, be pro-active and try to PREVENT problems with constipation by taking the steps above BEFORE constipation becomes a problem.    Q:  What do I do if I need more pain medications?  A:  Call the office to receive refills.  Be aware that certain pain meds cannot be called into a pharmacy and actually require a paper prescription.  A change may be made in your pain med as you progress thru your recovery period or if you have side effects to certain meds.    --Pain meds are NOT refilled after 5pm on weekdays, and NOT AT ALL on the weekends, so please look ahead to prevent problems.      Q:  Why am I having a hard time sleeping now that I am at home?  A:  Many medications you receive while you are in the hospital can impact your sleep for a number of days after your surgery/hospitalization.  Decreased level of activity and naps during the day may also make sleeping at night difficult.  Try to minimize day-time naps, and get up frequently during the day to walk around your home during your recovery time.  Sleep aides may be of some help, but are not recommended for long-term use.      Q:  I am having some back discomfort.  What should I do?  A:  This may be related to certain positioning that was required for your  surgery, extended periods of time in bed, or other changes in your overall activity level.  You may try ice, heat, acetaminophen, or ibuprofen to treat this temporarily.  Note that many pain medications have acetaminophen in them and would state this on the prescription bottle.  Be sure not to exceed the maximum of 4000mg per day of acetaminophen.     **If the pain you are having does not resolve, is severe, or is a flare of back pain you have had on other occasions prior to surgery, please contact your primary physician for further recommendations or for an appointment to be examined at their office.    Q:  Why am I having headaches?  A:  Headaches can be caused by many things:  caffeine withdrawal, use of pain meds, dehydration, high blood pressure, lack of sleep, over-activity/exhaustion, flare-up of usual migraine headaches.  If you feel this is related to muscle tension (a band-like feeling around the head, or a pressure at the low-back of the head) you may try ice or heat to this area.  You may need to drink more fluids (try electrolyte drink like Gatorade), rest, or take your usual migraine medications.   **If your headaches do not resolve, worsen, are accompanied by other symptoms, or if your blood pressure is high, please call your primary physician for recommendation and/or examination.    Q:  I am unable to urinate.  What do I do?  A:  A small percentage of people can have difficulty urinating initially after surgery.  This includes being able to urinate only a very small amount at a time and feeling discomfort or pressure in the very low abdomen.  This is called  urinary retention , and is actually an urgent situation.  Proceed to your nearest Emergency department for evaluation (not an Urgent Care Center).  Sometimes the bladder does not work correctly after certain medications you receive during surgery, or related to certain procedures.  You may need to have a catheter placed until your bladder  recovers.  When planning to go to an Emergency department, it may help to call the ER to let them know you are coming in for this problem after a surgery.  This may help you get in quicker to be evaluated.  **If you have symptoms of a urinary tract infection, please contact your primary physician for the proper evaluation and treatment.        If you have other questions, please call the office Monday thru Friday between 8am and 5pm to discuss with the nurse or physician assistant.  #[phone]    There is a surgeon ON CALL on weekday evenings and over the weekend in case of urgent need only, and may be contacted at the same number.    If you are having an emergency, call 911 or proceed to your nearest emergency department.       [facility] ADULT DISCHARGE INSTRUCTIONS   SPECIAL PRECAUTIONS FOR 24 HOURS AFTER SURGERY    IT IS NOT UNUSUAL TO FEEL LIGHT-HEADED OR FAINT, UP TO 24 HOURS AFTER SURGERY OR WHILE TAKING PAIN MEDICATION.  IF YOU HAVE THESE SYMPTOMS; SIT FOR A FEW MINUTES BEFORE STANDING AND HAVE SOMEONE ASSIST YOU WHEN YOU GET UP TO WALK OR USE THE BATHROOM.    YOU SHOULD REST AND RELAX FOR THE NEXT 24 HOURS AND YOU MUST MAKE ARRANGEMENTS TO HAVE SOMEONE STAY WITH YOU FOR AT LEAST 24 HOURS AFTER YOUR DISCHARGE.  AVOID HAZARDOUS AND STRENUOUS ACTIVITIES.  DO NOT MAKE IMPORTANT DECISIONS FOR 24 HOURS.    DO NOT DRIVE ANY VEHICLE OR OPERATE MECHANICAL EQUIPMENT FOR 24 HOURS FOLLOWING THE END OF YOUR SURGERY.  EVEN THOUGH YOU MAY FEEL NORMAL, YOUR REACTIONS MAY BE AFFECTED BY THE MEDICATION YOU HAVE RECEIVED.    DO NOT DRINK ALCOHOLIC BEVERAGES FOR 24 HOURS FOLLOWING YOUR SURGERY.    DRINK CLEAR LIQUIDS (APPLE JUICE, GINGER ALE, 7-UP, BROTH, ETC.).  PROGRESS TO YOUR REGULAR DIET AS YOU FEEL ABLE.    YOU MAY HAVE A DRY MOUTH, A SORE THROAT, MUSCLES ACHES OR TROUBLE SLEEPING.  THESE SHOULD GO AWAY AFTER 24 HOURS.    CALL YOUR DOCTOR FOR ANY OF THE FOLLOWING:  SIGNS OF INFECTION (FEVER, GROWING TENDERNESS AT THE  SURGERY SITE, A LARGE AMOUNT OF DRAINAGE OR BLEEDING, SEVERE PAIN, FOUL-SMELLING DRAINAGE, REDNESS OR SWELLING.    IT HAS BEEN OVER 8 TO 10 HOURS SINCE SURGERY AND YOU ARE STILL NOT ABLE TO URINATE (PASS WATER).

## 2022-01-13 NOTE — OP NOTE
General Surgery Operative Note    Pre-operative diagnosis:  Mass of right thigh [R22.41]   Post-operative diagnosis: same   Procedure:  Excision of 2.5 cm right thigh mass   Surgeon: Salvador Kelly MD   Assistant(s): Tonya Caballero PA-C  - the PA's assistance was medically necessary in providing adequate exposure in the operating field, maintaining hemostasis, cuttting suture, clamping and ligating blood vessels, and visualization of the anatomic structures throughout the surgical procedure.   Anesthesia: Local with MAC    Estimated blood loss: 2 cc's   Drains placed: None   Complications:  None   Findings:   Firm dark-colored lesion without attachment to the underlying musculature fascia.  This appeared to be separate from the overlying skin and the underlying musculoskeletal tissues.  The lesion was removed without obvious residual.     Indications for operation: This is a 71-year-old woman with new onset of several masses in her right leg.  The most notable of these was on the anterior thigh.  Exam revealed a firm mass which was solid by ultrasound.  Excisional biopsy was recommended.  We discussed the procedure, along with its risks and complications, in detail.  The patient agreed to proceed.    Details of the operation: After informed consent, the patient was taken to the operating room where she was sterilely prepped and draped.  A field block was performed by injecting a one-to-one mixture of 1% lidocaine with epinephrine and 0.5% Marcaine around the lesion.  A skin incision was then made and dissection was carried down into the subcutaneous tissue.  Careful dissection was then carried out using electrocautery.  The lesion itself was a dark color raising the possibility of either pigment or heme.  We were able to dissected out underneath this and come across with a plane of fat anterior to the musculature.  The lesion was not adherent to the overlying skin.  The mass was removed in its entirety and  measured 2.5 cm in greatest dimension.  Hemostasis was assured using electrocautery.  Further local anesthetic was injected in the subdermal tissues were approximated using interrupted 3-0 Vicryl sutures.  Skin was closed using skin adhesive.    The patient tolerated the procedure well and was transferred to the recovery room in satisfactory condition.  Sponge and needle counts were correct at the close of the case.    Specimens:   ID Type Source Tests Collected by Time Destination   1 : RIGHT THIGH MASS  Tissue Thigh, Right SURGICAL PATHOLOGY EXAM Salvador Kelly MD 1/13/2022  9:31 AM            Salvador Kelly MD

## 2022-01-17 LAB
INTERPRETATION: NORMAL
SIGNIFICANT RESULTS: NORMAL
SPECIMEN DESCRIPTION: NORMAL
TEST DETAILS, MDL: NORMAL

## 2022-01-17 PROCEDURE — 81445 SO NEO GSAP 5-50DNA/DNA&RNA: CPT | Performed by: SURGERY

## 2022-01-18 LAB
PATH REPORT.COMMENTS IMP SPEC: ABNORMAL
PATH REPORT.COMMENTS IMP SPEC: YES
PATH REPORT.FINAL DX SPEC: ABNORMAL
PATH REPORT.GROSS SPEC: ABNORMAL
PATH REPORT.MICROSCOPIC SPEC OTHER STN: ABNORMAL
PATH REPORT.MICROSCOPIC SPEC OTHER STN: ABNORMAL
PATH REPORT.RELEVANT HX SPEC: ABNORMAL
PATHOLOGY SYNOPTIC REPORT: ABNORMAL
PHOTO IMAGE: ABNORMAL

## 2022-01-18 PROCEDURE — 88342 IMHCHEM/IMCYTCHM 1ST ANTB: CPT | Mod: 26 | Performed by: PATHOLOGY

## 2022-01-18 PROCEDURE — 88341 IMHCHEM/IMCYTCHM EA ADD ANTB: CPT | Mod: 26 | Performed by: PATHOLOGY

## 2022-01-18 PROCEDURE — 88305 TISSUE EXAM BY PATHOLOGIST: CPT | Mod: 26 | Performed by: PATHOLOGY

## 2022-01-19 ENCOUNTER — PREP FOR PROCEDURE (OUTPATIENT)
Dept: GASTROENTEROLOGY | Facility: CLINIC | Age: 72
End: 2022-01-19
Payer: COMMERCIAL

## 2022-01-19 ENCOUNTER — PATIENT OUTREACH (OUTPATIENT)
Dept: GASTROENTEROLOGY | Facility: CLINIC | Age: 72
End: 2022-01-19
Payer: COMMERCIAL

## 2022-01-19 DIAGNOSIS — D13.5 AMPULLARY ADENOMA: Primary | ICD-10-CM

## 2022-01-19 NOTE — PROGRESS NOTES
Called pt to discuss follow up procedure    Procedure/Imaging/Clinic: EGD, ERCP   Physician: Cesar  Timin year  Procedure length: 60 min  Anesthesia: Gen  Dx: ampullary adenoma  Tier: 4  [facility]  Pt opting for 3/24 at [facility]    Pt had a lump removed from leg and may need another procedure if it is melonoma, she may need to work around the date of this possible procedure, will call if she needs to reschedule     Explained they can expect a call from  for date and time of procedure, will need a , someone to stay with them for 24 hours and should stay in town for 24 hours (within 45 min of Hospital) post procedure    Patient needs to get pre-op physical completed. If outside [facility] system will need physical faxed to number [phone]   If you do not get a preop physical, your procedure could be cancelled, patient voiced understanding*    Preop Plan: [facility], understands needed within 30 days of procedure    Med Review    Blood thinner -  none    Diabetic - metformin, glipizide, januvia,   invokana    COVID test discussed: yes, needed within 4 days of procedure    Patient Education r/t procedure: mychart    A pre-op nurse will call 1-2 days prior to the procedure    Verbalized understanding of all instructions. All questions answered.      Case request placed, message routed to OR     Aishwarya Reese, RN, BSN,   [facility]  Care coordinator

## 2022-01-21 ENCOUNTER — TRANSFERRED RECORDS (OUTPATIENT)
Dept: HEALTH INFORMATION MANAGEMENT | Facility: CLINIC | Age: 72
End: 2022-01-21
Payer: COMMERCIAL

## 2022-01-25 ENCOUNTER — HOSPITAL ENCOUNTER (OUTPATIENT)
Dept: MRI IMAGING | Facility: CLINIC | Age: 72
Discharge: HOME | End: 2022-01-25
Attending: INTERNAL MEDICINE | Admitting: INTERNAL MEDICINE
Payer: COMMERCIAL

## 2022-01-25 DIAGNOSIS — C43.71: ICD-10-CM

## 2022-01-25 DIAGNOSIS — R42 DIZZINESS: ICD-10-CM

## 2022-01-25 PROCEDURE — 255N000002 HC RX 255 OP 636: Performed by: INTERNAL MEDICINE

## 2022-01-25 PROCEDURE — 70553 MRI BRAIN STEM W/O & W/DYE: CPT

## 2022-01-25 PROCEDURE — A9585 GADOBUTROL INJECTION: HCPCS | Performed by: INTERNAL MEDICINE

## 2022-01-25 RX ORDER — GADOBUTROL 604.72 MG/ML
10 INJECTION INTRAVENOUS ONCE
Status: COMPLETED | OUTPATIENT
Start: 2022-01-25 | End: 2022-01-25

## 2022-01-25 RX ADMIN — GADOBUTROL 10 ML: 604.72 INJECTION INTRAVENOUS at 12:56

## 2022-01-27 ENCOUNTER — TRANSFERRED RECORDS (OUTPATIENT)
Dept: HEALTH INFORMATION MANAGEMENT | Facility: CLINIC | Age: 72
End: 2022-01-27
Payer: COMMERCIAL

## 2022-02-02 ENCOUNTER — TRANSFERRED RECORDS (OUTPATIENT)
Dept: HEALTH INFORMATION MANAGEMENT | Facility: CLINIC | Age: 72
End: 2022-02-02
Payer: COMMERCIAL

## 2022-02-03 PROCEDURE — G0452 MOLECULAR PATHOLOGY INTERPR: HCPCS | Mod: 26 | Performed by: STUDENT IN AN ORGANIZED HEALTH CARE EDUCATION/TRAINING PROGRAM

## 2022-02-04 ENCOUNTER — TRANSCRIBE ORDERS (OUTPATIENT)
Dept: OTHER | Age: 72
End: 2022-02-04
Payer: COMMERCIAL

## 2022-02-04 DIAGNOSIS — C43.9 MELANOMA (H): Primary | ICD-10-CM

## 2022-02-07 ENCOUNTER — PATIENT OUTREACH (OUTPATIENT)
Dept: SURGERY | Facility: CLINIC | Age: 72
End: 2022-02-07
Payer: COMMERCIAL

## 2022-02-07 ENCOUNTER — DOCUMENTATION ONLY (OUTPATIENT)
Dept: ONCOLOGY | Facility: CLINIC | Age: 72
End: 2022-02-07
Payer: COMMERCIAL

## 2022-02-07 NOTE — PROGRESS NOTES
New Patient Oncology Nurse Navigator Note     Referring provider: Dr. Florez     Referring Clinic/Organization: [facility]      Referred to: Surgical Oncology - Melanoma      Requested provider (if applicable): Dr. Chevy Allison    Referral Received: 02/07/22       Evaluation for : Melanoma      Clinical History (per Nurse review of records provided):      See book marked documents:       Referring MD office note    Pathology report    Imaging reports     Procedure report     Lab Results   Component Value Date/Time    ALBUMIN 3.6 08/10/2021 12:58 PM    ALBUMIN 3.8 03/22/2021 07:58 AM    AST 20 08/10/2021 12:58 PM    AST 19 03/22/2021 07:58 AM    ALT 31 08/10/2021 12:58 PM    ALT 41 03/22/2021 07:58 AM    ALKPHOS 87 08/10/2021 12:58 PM    ALKPHOS 89 03/22/2021 07:58 AM    BILITOTAL 0.5 08/10/2021 12:58 PM    BILITOTAL 0.5 03/22/2021 07:58 AM    WBC 7.0 01/07/2022 11:06 AM    WBC 6.2 03/22/2021 07:58 AM          Past Medical History:   Diagnosis Date     Allergic rhinitis, cause unspecified      Asthma      Asthma, mild intermittent      Cataract      Cellulitis and abscess of leg, except foot 03/00    Bilateral     Eczema      Heart murmur     aortic area     HTN, goal below 140/90      Hyperlipidemia LDL goal < 100      Hyperplastic colon polyp 2008     Infection     bilateral eye infections     Macular hole of left eye      Obesity (BMI 30-39.9)      Osteoarthritis     knees     Other chronic pain     right knee     Sleep apnea     uses c pap     Type 2 diabetes, HbA1C goal < 8% (H)        Past Surgical History:   Procedure Laterality Date     ARTHROPLASTY HIP Right 9/25/2017    Procedure: ARTHROPLASTY HIP;  Right total hip arthroplasty  ;  Surgeon: Ayaan Pena MD;  Location: [facility]     ARTHROPLASTY KNEE  7/12/2013    Procedure: ARTHROPLASTY KNEE;  right total knee arthroplasty ;  Surgeon: Chaparro Wesley MD;  Location: [facility]     ARTHROSCOPY KNEE Right 1/21/2015    Procedure: ARTHROSCOPY KNEE;   Surgeon: Ayaan Pena MD;  Location: [facility]     C INCISION OF HYMEN       CATARACT IOL, RT/LT       COLONOSCOPY  2008     COLONOSCOPY N/A 4/18/2019    Procedure: Colonoscopy with polypectomy;  Surgeon: Shaun Guerrero MD;  [facility]     ENDOSCOPIC RETROGRADE CHOLANGIOPANCREATOGRAM N/A 2/6/2019    Procedure: COMBINED ENDOSCOPIC RETROGRADE CHOLANGIOPANCREATOGRAPHY, BILIARY SPINCTEROTOMY AND DILATION, PLACE BILE DUCT STENT;  Surgeon: Guru Andie Gonzalez MD;  [facility]     ENDOSCOPIC RETROGRADE CHOLANGIOPANCREATOGRAM N/A 2/28/2019    Procedure: Endoscopic Retrograde Cholangiopancreatogram, Bile duct stent removal and placement;  Surgeon: Shaun Guerrero MD;  [facility]     ENDOSCOPIC RETROGRADE CHOLANGIOPANCREATOGRAM N/A 4/18/2019    Procedure: COMBINED ENDOSCOPIC RETROGRADE CHOLANGIOPANCREATOGRAPHY, Bile duct stent exchange and Polypectomy;  Surgeon: Shaun Guerrero MD;  [facility]     ENDOSCOPIC RETROGRADE CHOLANGIOPANCREATOGRAM N/A 10/18/2019    Procedure: Endoscopic Retrograde Cholangiopancreatogram;  Surgeon: Shaun Guerrero MD;  [facility]     ENDOSCOPIC RETROGRADE CHOLANGIOPANCREATOGRAM WITH SPYGLASS N/A 7/26/2019    Procedure: Endoscopic Retrograde Cholangiopancreatogram With Spyglas,l Radiofrequency Ablation, Stent Exchangeand Duodenal Biopsy x2;  Surgeon: Shaun Guerrero MD;  [facility]     ENDOSCOPIC RETROGRADE CHOLANGIOPANCREATOGRAM WITH SPYGLASS N/A 9/10/2020    Procedure: ENDOSCOPIC RETROGRADE CHOLANGIOPANCREATOGRAPHY, WITH DIRECT DUCT VISUALIZATION, USING PANCREATICOBILIARY FIBEROPTIC PROBE;  Surgeon: Shaun Guerrero MD;  [facility]     ENDOSCOPIC RETROGRADE CHOLANGIOPANCREATOGRAM WITH SPYGLASS N/A 3/22/2021    Procedure: ENDOSCOPIC RETROGRADE CHOLANGIOPANCREATOGRAPHY, WITH DIRECT DUCT VISUALIZATION, USING PANCREATICOBILIARY FIBEROPTIC PROBE;  Surgeon: Shaun Guerrero MD;  [facility]     ESOPHAGOSCOPY, GASTROSCOPY, DUODENOSCOPY (EGD) WITH RADIO  FREQUENCY ABLATION, COMBINED N/A 9/10/2020    Procedure: possible repeat ESOPHAGOGASTRODUODENOSCOPY, WITH RADIOFREQUENCY ABLATION and endoscopic mucosal resection;  Surgeon: Shaun Guerrero MD;  [facility]     ESOPHAGOSCOPY, GASTROSCOPY, DUODENOSCOPY (EGD), COMBINED N/A 4/18/2019    Procedure: upper endoscopy with polypectomy;  Surgeon: Shaun Guerrero MD;  [facility]     ESOPHAGOSCOPY, GASTROSCOPY, DUODENOSCOPY (EGD), COMBINED N/A 10/18/2019    Procedure: Upper Endoscopy and ERCP with stent removal, stone removal and biopsy;  Surgeon: Shaun Guerrero MD;  [facility]     ESOPHAGOSCOPY, GASTROSCOPY, DUODENOSCOPY (EGD), RESECT MUCOSA, COMBINED N/A 2/28/2019    Procedure: Upper Endoscopy, Endoscopic Ultrasound, Endoscopic Mucosal Resection,  Ampullectomy, polypectomy.;  Surgeon: Shaun Guerrero MD;  [facility]     ESOPHAGOSCOPY, GASTROSCOPY, DUODENOSCOPY (EGD), RESECT MUCOSA, COMBINED N/A 3/22/2021    Procedure: ESOPHAGOGASTRODUODENOSCOPY (EGD) with  endoscopic mucosal resection/ polypectomy;  Surgeon: Shaun Guerrero MD;  [facility]     EXCISE MASS LOWER EXTREMITY Right 1/13/2022    Procedure: excision of right thigh mass;  Surgeon: Salvador Kelly MD;  Location: [facility]     EYE SURGERY      macular hole repaired left eye     HC KNEE SCOPE, DIAGNOSTIC      - both knees     HEAD & NECK SURGERY      wisdom teeth       Current Outpatient Medications   Medication Sig Dispense Refill     acetaminophen (ACETAMINOPHEN 8 HOUR) 650 MG CR tablet Take 650 mg by mouth every 8 hours as needed for mild pain or fever (Patient not taking: Reported on 1/7/2022)       albuterol (PROAIR HFA/PROVENTIL HFA/VENTOLIN HFA) 108 (90 Base) MCG/ACT inhaler Inhale 2 puffs into the lungs every 4 hours as needed for shortness of breath / dyspnea (Patient not taking: Reported on 1/7/2022) 1 Inhaler 3     aspirin 81 MG EC tablet Take 1 tablet (81 mg) by mouth daily (Patient not taking: Reported on 1/7/2022) 90 tablet 3      atenolol (TENORMIN) 50 MG tablet Take 1 tablet by mouth once daily 90 tablet 0     atorvastatin (LIPITOR) 20 MG tablet Take 1 tablet (20 mg) by mouth daily 90 tablet 1     azelastine (ASTELIN) 0.1 % nasal spray USE 1 SPRAY(S) IN EACH NOSTRIL TWICE DAILY AS NEEDED (Patient not taking: Reported on 1/7/2022) 30 mL 0     azelastine (OPTIVAR) 0.05 % SOLN Place 1 drop into both eyes 2 times daily as needed Reported on 3/22/2017 (Patient not taking: Reported on 1/7/2022)       canagliflozin (INVOKANA) 300 MG tablet Take 1 tablet (300 mg) by mouth every morning (before breakfast) 90 tablet 1     cetirizine (ZYRTEC) 10 MG tablet Take 10 mg by mouth every morning Takes only PRN       CONTOUR NEXT TEST test strip USE 1 STRIP TO CHECK GLUCOSE ONCE DAILY 100 strip 4     diclofenac (VOLTAREN) 1 % topical gel Apply topically 4 times daily as needed        fish oil-omega-3 fatty acids (FISH OIL) 1000 MG capsule Take 1 g by mouth daily Reported on 3/22/2017       glipiZIDE (GLUCOTROL) 5 MG tablet TAKE 2 TABLETS BY MOUTH TWICE DAILY BEFORE MEAL(S) 360 tablet 1     hydrochlorothiazide (HYDRODIURIL) 25 MG tablet TAKE 1 TABLET BY MOUTH ONCE DAILY IN THE MORNING 90 tablet 0     ibuprofen (ADVIL) 200 MG tablet take 4 tablet (200 mg) by oral route every 8 hours as needed with food       latanoprost (XALATAN) 0.005 % ophthalmic solution Place 1 drop into both eyes At Bedtime  2.5 mL 1     lisinopril (ZESTRIL) 20 MG tablet Take 1 tablet (20 mg) by mouth 2 times daily 180 tablet 1     metFORMIN (GLUCOPHAGE-XR) 500 MG 24 hr tablet Take 2 tablets (1,000 mg) by mouth 2 times daily (with meals) 360 tablet 3     MULTIVITAMIN TABS   OR Reported on 3/22/2017       ondansetron (ZOFRAN-ODT) 4 MG ODT tab Take 1-2 tablets (4-8 mg) by mouth every 8 hours as needed for nausea 8 tablet 0     order for DME All diabetic testing supplies including test strips, lancets and solution for testing 2 times per day. Patient is using   no Insulin. Last A1c Lab  "Results       Component                Value               Date                       A1C                      7.9                 08/20/2018 100 each 11     oxyCODONE (ROXICODONE) 5 MG tablet Take 1-2 tablets (5-10 mg) by mouth every 4 hours as needed for moderate to severe pain 16 tablet 0     Semaglutide 3 MG TABS Take 3 mg by mouth daily (Patient not taking: Reported on 1/7/2022) 30 tablet 3     sitagliptin (JANUVIA) 100 MG tablet Take 1 tablet (100 mg) by mouth daily 90 tablet 1     timolol maleate (TIMOPTIC) 0.5 % ophthalmic solution INSTILL 1 DROP INTO EACH EYE TWICE DAILY             Allergies   Allergen Reactions     Bromfenac Visual Disturbance     Sulfites, xibrom  Causes sever eye pain     Codeine      \"NAUSE,HA AND DIZZINESS\"     Codeine Nausea     Other reaction(s): Dizziness, Headache     Sulfites Visual Disturbance     Xibrom (bromfenac opthalmic solution) 0.09%--eye pain          Patient Active Problem List   Diagnosis     Allergic rhinitis     Hyperlipidemia with target LDL less than 100     Asthma, mild intermittent     Cataract     Macular hole of left eye     Advanced directives, counseling/discussion     Obesity (BMI 30-39.9)     Heart murmur     Total knee replacement status     Chronic knee pain, right     HTN goal less than 140/90,     CHERRY (obstructive sleep apnea)     Status post total replacement of right hip     Ampullary adenoma     Polyp of duodenum     HTN, goal below 140/90     Diabetes mellitus, type 2 (H)         Clinical Assessment / Barriers to Care (Per Nurse):    None at this time.     Records Location:     [facility]   Faxed - Media tab/Scanned     Records Needed:     ABDOMINAL IMAGING (CT SCANS, MRI, US)  DATING BACK TO 2022      Additional testing needed prior to consult:     NONE AT THIS TIME    Referral updates and Plan:       Consult with Surgical Oncology       Shirley Uribe, RN, BSN   Surgical Oncology New Patient Nurse Navigator  [facility]  [phone]

## 2022-02-07 NOTE — PROGRESS NOTES
Action February 7, 2022 4:27 PM ABT   Action Taken Image reports from [facility] received and sent to HIM for upload     Action February 8, 2022 8:32 AM ABT   Action Taken Called and spoke to [facility] and requested images to be pushed to [facility] PACS    11:18 AM  Images from [facility] received and resolved to PACS

## 2022-02-10 ENCOUNTER — TRANSFERRED RECORDS (OUTPATIENT)
Dept: HEALTH INFORMATION MANAGEMENT | Facility: CLINIC | Age: 72
End: 2022-02-10
Payer: COMMERCIAL

## 2022-02-17 DIAGNOSIS — Z11.59 ENCOUNTER FOR SCREENING FOR OTHER VIRAL DISEASES: Primary | ICD-10-CM

## 2022-02-21 NOTE — TELEPHONE ENCOUNTER
RECORDS STATUS - ALL OTHER DIAGNOSIS      Action    Action Taken 2/21/22  LVM for pt re: recs call    Spoke w/ Melva @ [facility] - per her, we have most recent notes.        [facility]   DATE RECEIVED:    NOTES STATUS DETAILS   OFFICE NOTE from referring provider [facility]    OFFICE NOTE from medical oncologist [facility] 2/2/22   DISCHARGE SUMMARY from hospital [facility] 1/13/22   OPERATIVE REPORT Epic 1/13/22: Excision of Right Thigh Mass   MEDICATION LIST [facility] 1/13/22   LABS     PATHOLOGY REPORTS [facility] 1/13/22: Surg Path   ANYTHING RELATED TO DIAGNOSIS Epic 1/13/22   GENONOMIC TESTING     TYPE: Epic 1/17/22   IMAGING (NEED IMAGES & REPORT)     CT SCANS PACS 1/27/22: [facility]   MRI PACS 1/25/22: [facility]   PET PACS 1/27/22: [facility]

## 2022-02-25 ENCOUNTER — ONCOLOGY VISIT (OUTPATIENT)
Dept: ONCOLOGY | Facility: CLINIC | Age: 72
End: 2022-02-25
Attending: SURGERY
Payer: COMMERCIAL

## 2022-02-25 ENCOUNTER — PRE VISIT (OUTPATIENT)
Dept: ONCOLOGY | Facility: CLINIC | Age: 72
End: 2022-02-25
Payer: COMMERCIAL

## 2022-02-25 ENCOUNTER — TELEPHONE (OUTPATIENT)
Dept: ONCOLOGY | Facility: CLINIC | Age: 72
End: 2022-02-25

## 2022-02-25 VITALS
HEART RATE: 80 BPM | TEMPERATURE: 98.4 F | DIASTOLIC BLOOD PRESSURE: 73 MMHG | HEIGHT: 67 IN | BODY MASS INDEX: 33.42 KG/M2 | OXYGEN SATURATION: 98 % | WEIGHT: 212.9 LBS | SYSTOLIC BLOOD PRESSURE: 124 MMHG | RESPIRATION RATE: 19 BRPM

## 2022-02-25 DIAGNOSIS — C43.9 MELANOMA OF SKIN (H): Primary | ICD-10-CM

## 2022-02-25 PROCEDURE — 99215 OFFICE O/P EST HI 40 MIN: CPT | Performed by: SURGERY

## 2022-02-25 ASSESSMENT — PAIN SCALES - GENERAL: PAINLEVEL: NO PAIN (0)

## 2022-02-25 NOTE — PROGRESS NOTES
HISTORY OF PRESENT ILLNESS:  Gricelda Sabillon is a 71-year-old woman I was asked to see at the request of Dr. William Florez for evaluation of metastatic melanoma.  The patient noticed a lump in her anterior right thigh.  She underwent an ultrasound on 11/29/2021 which revealed 2 lesions in the proximal thigh.  One was 1.7 cm, the other one was about 1 cm in size.  The ultrasound also showed 3 nodules more distally in the thigh towards the knee.  She saw Dr. Kelly who performed an excisional biopsy of the proximal lesion.  This demonstrated a 2 cm metastatic melanoma with margins negative at less than 1 mm.  There was no cutaneous involvement with the melanoma.  This melanoma was sent off for BRAF and there were no mutations.  She underwent a PET/CT scan, which did not show any evidence of metastatic disease.  There was a nodularity in the thigh, in the subcutaneous tissues and 2 nodules distally.  She saw Dr. Florez who recommended surgical resection followed by immunotherapy.  She subsequently had a brain MRI which showed no evidence of distant metastatic disease.  She saw a dermatologist who found no evidence of a primary tumor.    PAST MEDICAL HISTORY:  Diabetes and hypertension.    PHYSICAL EXAMINATION:  She is a well-appearing woman in no apparent distress.  Examination of her right thigh reveals a transversely oriented incision with no residual palpable mass.  She has no right femoral lymphadenopathy.  Lateral, just proximal to her right knee, she has 2 or 3 small little nodules in a clump.    IMPRESSION:  Stage IV melanoma.    PLAN:  I talked to her about the treatment options.  The primary treatment is immunotherapy with surgery.  I told her the sequence of treatments is probably not that important.  She would like to have the surgery first and then be referred back to Dr. Florez for immunotherapy.  We will go ahead and proceed with getting her scheduled [facility]      Total time 45 minutes which included reviewing her imaging, her biopsies, in-person visit and coordinating her care.    Chevy Allison MD    cc:  Salvador Kelly MD  [facility]  [address], [address]  [place]  [address]    William Florez MD  [facility]  [address], [address]  [place]  [address]

## 2022-02-25 NOTE — TELEPHONE ENCOUNTER
Tier 1 Case Request received to schedule:    Patient Name: Gricelda Sabillon   MRN: [id number]   Case#: [id number]   Surgeon(s) and Role:      * Chevy Allison MD - Primary   Date requested: * No dates entered *   Location: [facility] OR   Procedure(s):   Wide local excision of right knee melanoma (Right)     URGENCY: Please schedule next available    Pre Procedure testing needed:   PCP - PRE OP       Additional Instructions Case Request       Surgeon Procedure time (incision-close in minutes): 30 MIN  H&P to be completed by?: Primary Care   Postop appointment? 2-3 weeks   Surgical assistants?: No  Other\additional comments as needed?:

## 2022-02-25 NOTE — LETTER
2/25/2022    RE: Gricelda Sabillon  [address]  [facility] [address]    Dear Colleague,    Thank you for referring your patient, Gricelda Sabillon, to the [facility]. Please see a copy of my visit note below.    HISTORY OF PRESENT ILLNESS:  Gricelda Sabillon is a 71-year-old woman I was asked to see at the request of Dr. William Florez for evaluation of metastatic melanoma.  The patient noticed a lump in her anterior right thigh.  She underwent an ultrasound on 11/29/2021 which revealed 2 lesions in the proximal thigh.  One was 1.7 cm, the other one was about 1 cm in size.  The ultrasound also showed 3 nodules more distally in the thigh towards the knee.  She saw Dr. Kelly who performed an excisional biopsy of the proximal lesion.  This demonstrated a 2 cm metastatic melanoma with margins negative at less than 1 mm.  There was no cutaneous involvement with the melanoma.  This melanoma was sent off for BRAF and there were no mutations.  She underwent a PET/CT scan, which did not show any evidence of metastatic disease.  There was a nodularity in the thigh, in the subcutaneous tissues and 2 nodules distally.  She saw Dr. Florez who recommended surgical resection followed by immunotherapy.  She subsequently had a brain MRI which showed no evidence of distant metastatic disease.  She saw a dermatologist who found no evidence of a primary tumor.    PAST MEDICAL HISTORY:  Diabetes and hypertension.    PHYSICAL EXAMINATION:  She is a well-appearing woman in no apparent distress.  Examination of her right thigh reveals a transversely oriented incision with no residual palpable mass.  She has no right femoral lymphadenopathy.  Lateral, just proximal to her right knee, she has 2 or 3 small little nodules in a clump.    IMPRESSION:  Stage IV melanoma.    PLAN:  I talked to her about the treatment options.  The primary treatment is immunotherapy with surgery.  I told her the sequence of  treatments is probably not that important.  She would like to have the surgery first and then be referred back to Dr. Florez for immunotherapy.  We will go ahead and proceed with getting her scheduled [facility]      Total time 45 minutes which included reviewing her imaging, her biopsies, in-person visit and coordinating her care.    Chevy Allison MD    cc:  Salvador Kelly MD  [facility]  [address], [address]  [place]  [address]    William Florez MD  [facility]  [address], [address]  [place]  [address]

## 2022-02-25 NOTE — NURSING NOTE
"Oncology Rooming Note    February 25, 2022 10:34 AM   Gricelda Sabillon is a 71 year old female who presents for:    Chief Complaint   Patient presents with     Oncology Clinic Visit     NEW: MELANOMA      Initial Vitals: /73   Pulse 80   Temp 98.4  F (36.9  C) (Oral)   Resp 19   Ht 1.692 m (5' 6.61\")   Wt 96.6 kg (212 lb 14.4 oz)   LMP 12/13/2002 (Exact Date)   SpO2 98%   BMI 33.73 kg/m   Estimated body mass index is 33.73 kg/m  as calculated from the following:    Height as of this encounter: 1.692 m (5' 6.61\").    Weight as of this encounter: 96.6 kg (212 lb 14.4 oz). Body surface area is 2.13 meters squared.  No Pain (0) Comment: Data Unavailable   Patient's last menstrual period was 12/13/2002 (exact date).  Allergies reviewed: Yes  Medications reviewed: Yes    Medications: Medication refills not needed today.  Pharmacy name entered into EPIC:    [facility] - [place], [place] - [address]    Clinical concerns: NEW PATIENT.       Sri Liang CMA            "

## 2022-03-01 DIAGNOSIS — I10 HTN, GOAL BELOW 140/90: ICD-10-CM

## 2022-03-01 DIAGNOSIS — R00.0 SINUS TACHYCARDIA: ICD-10-CM

## 2022-03-01 NOTE — TELEPHONE ENCOUNTER
Pending Prescriptions:                       Disp   Refills    atenolol (TENORMIN) 50 MG tablet [Pharmacy*90 tab*0        Sig: Take 1 tablet by mouth once daily        Susi FUNES RN   [facility]

## 2022-03-02 RX ORDER — ATENOLOL 50 MG/1
TABLET ORAL
Qty: 90 TABLET | Refills: 0 | Status: SHIPPED | OUTPATIENT
Start: 2022-03-02 | End: 2022-07-25

## 2022-03-03 ENCOUNTER — TELEPHONE (OUTPATIENT)
Dept: ONCOLOGY | Facility: CLINIC | Age: 72
End: 2022-03-03
Payer: COMMERCIAL

## 2022-03-03 DIAGNOSIS — Z11.59 ENCOUNTER FOR SCREENING FOR OTHER VIRAL DISEASES: Primary | ICD-10-CM

## 2022-03-03 PROBLEM — C43.9 MELANOMA OF SKIN (H): Status: ACTIVE | Noted: 2022-03-03

## 2022-03-03 NOTE — TELEPHONE ENCOUNTER
Surgery scheduled for 3/28 at [facility].     Patient will have H&P with PCP prior to surgery.     COVID test scheduled for 3/25.    Post-op scheduled for 4/11.    Surg packet sent in the mail.

## 2022-03-04 ENCOUNTER — MYC MEDICAL ADVICE (OUTPATIENT)
Dept: INTERNAL MEDICINE | Facility: CLINIC | Age: 72
End: 2022-03-04
Payer: COMMERCIAL

## 2022-03-08 NOTE — TELEPHONE ENCOUNTER
Call to patient. States Dr. Torres told her she wanted to do the preop herself.    Dear Dr. Torres My surgeries are March 24 and 28. I am not available Tuesday and Wednesday mornings, but otherwise any time is fine.     Please advise. OK to leave a detailed voice mail.

## 2022-03-09 NOTE — TELEPHONE ENCOUNTER
Left message for patient to call back and sent a my chart message  Please offer patient 3/15/22 to arrive in the clinic at 3:10p.

## 2022-03-15 ENCOUNTER — OFFICE VISIT (OUTPATIENT)
Dept: INTERNAL MEDICINE | Facility: CLINIC | Age: 72
End: 2022-03-15
Payer: COMMERCIAL

## 2022-03-15 VITALS
SYSTOLIC BLOOD PRESSURE: 122 MMHG | HEIGHT: 67 IN | TEMPERATURE: 97.6 F | WEIGHT: 213.2 LBS | BODY MASS INDEX: 33.46 KG/M2 | RESPIRATION RATE: 18 BRPM | DIASTOLIC BLOOD PRESSURE: 62 MMHG | HEART RATE: 87 BPM | OXYGEN SATURATION: 97 %

## 2022-03-15 DIAGNOSIS — C43.9 METASTATIC MALIGNANT MELANOMA (H): ICD-10-CM

## 2022-03-15 DIAGNOSIS — Z01.818 PREOP GENERAL PHYSICAL EXAM: Primary | ICD-10-CM

## 2022-03-15 DIAGNOSIS — D13.5 AMPULLARY ADENOMA: ICD-10-CM

## 2022-03-15 DIAGNOSIS — I10 HTN, GOAL BELOW 140/90: ICD-10-CM

## 2022-03-15 DIAGNOSIS — E78.5 HYPERLIPIDEMIA WITH TARGET LDL LESS THAN 100: ICD-10-CM

## 2022-03-15 DIAGNOSIS — E11.9 DIABETES MELLITUS WITHOUT COMPLICATION (H): ICD-10-CM

## 2022-03-15 PROCEDURE — 99214 OFFICE O/P EST MOD 30 MIN: CPT | Performed by: INTERNAL MEDICINE

## 2022-03-15 NOTE — PROGRESS NOTES
[place] [address] [facility] [address]  Phone: [phone]  Primary Provider: Raisa Davidson  Pre-op Performing Provider: RAISA DAVIDSON      PREOPERATIVE EVALUATION:  Today's date: 3/15/2022    Gricelda Sabillon is a 71 year old female who presents for a preoperative evaluation.    Surgical Information:  Surgery/Procedure: Wide local excision of right knee melanoma  Surgery Location: [facility] OR  Surgeon: Chevy Allison MD  Surgery Date: 3/28/2022  Time of Surgery: 7:15 AM  Where patient plans to recover: At home with family  Fax number for surgical facility:     Type of Anesthesia Anticipated: to be determined        Preop Questions 3/15/2022   1. Have you ever had a heart attack or stroke? No   2. Have you ever had surgery on your heart or blood vessels, such as a stent placement, a coronary artery bypass, or surgery on an artery in your head, neck, heart, or legs? No   3. Do you have chest pain with activity? No   4. Do you have a history of  heart failure? No   5. Do you currently have a cold, bronchitis or symptoms of other infection? No   6. Do you have a cough, shortness of breath, or wheezing? No   7. Do you or anyone in your family have previous history of blood clots? No   8. Do you or does anyone in your family have a serious bleeding problem such as prolonged bleeding following surgeries or cuts? No   9. Have you ever had problems with anemia or been told to take iron pills? YES -    10. Have you had any abnormal blood loss such as black, tarry or bloody stools, or abnormal vaginal bleeding? No   11. Have you ever had a blood transfusion? No   12. Are you willing to have a blood transfusion if it is medically needed before, during, or after your surgery? Yes   13. Have you or any of your relatives ever had problems with anesthesia? No   14. Do you have sleep apnea, excessive snoring or daytime drowsiness? -Yes   14a. Do  you have a CPAP machine? -Yes   15. Do you have any artifical heart valves or other implanted medical devices like a pacemaker, defibrillator, or continuous glucose monitor? -No   16. Do you have artificial joints? -Yes right knee and hip   17. Are you allergic to latex? -No   18. Is there any chance that you may be pregnant? -No     Health Care Directive:  Patient does not have a Health Care Directive or Living Will: Discussed advance care planning with patient; however, patient declined at this time.      CC:  Preop for multiple medical problems.    HPI:    The patient is scheduled for skin melanoma and EGD surgery with Dr. Guerrero and dr Allison on  March 24 and 28  No other acute complaints.    Assessment:  1. V72.83H Preop general physical exam _ I do not see any major contraindications for the patient to go through the scheduled surgery.    The proposed surgical procedure is considered  INTERMEDIATE surgery risk.    For above listed surgery and anesthesia, Patient is at  MODERATE risk for surgery/procedure and perioperative/procedure complications.      Cardiovascular risk  Assessment -- low risk   -- No further cardiac work up is needed before this surgery.        ECG:  Jan 2022  sinus rhythm, no changes suggestive for ischemia    Pulmonary Risk Assessment -- low risk   -- The patient doesn't have chronic lung disease nor acute respiratory problems       Obstructive Sleep Apnea (or suspected sleep apnea) -- low risk       Anemia Assessment :  -- no anemia - patient doesn't need further anemia work up      Blood Sugar Assessment  (E11.9) Diabetes mellitus without complication (H)  -- patient has DM, no recent A1c. 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 In the morning of the surgery day you take with a sip of water just these meds: Atenolol The rest of the meds you resume after surgery.  2.  Labs today - [address]     Physical exam:    Blood pressure 122/62, pulse 87, temperature 97.6  F (36.4  C), temperature source Tympanic, resp. rate 18, height 1.692 m (5' 6.61\"), weight 96.7 kg (213 lb 3.2 oz), last menstrual period 12/13/2002, SpO2 97 %, not currently breastfeeding.   NAD, appears comfortable  Skin clear, no rashes  Neck: supple, no JVD,  no thyroidmegaly  Lymph nodes non palpable in the cervical, supraclavicular   Chest: clear to auscultation with good respiratory effort  Cardiac: S1S2, RRR, no mgr appreciated  Abdomen: soft, not tender, not distended, audible bowel sound, no hepatosplenomegaly, no palpable masses, no abdominal bruits  Extremities: no cyanosis, clubbing or edema.   Neuro: A, Ox3, no focal signs.        ROS:   as above     Patient Active Problem List   Diagnosis     Allergic rhinitis     Hyperlipidemia with target LDL less than 100     Asthma, mild intermittent     Cataract     Macular hole of left eye     Advanced directives, counseling/discussion     Obesity (BMI 30-39.9)     Heart murmur     Total knee replacement status     Chronic knee pain, right     HTN goal less than 140/90,     CHERRY (obstructive sleep apnea)     Status post total replacement of right hip     Ampullary adenoma     Polyp of duodenum     HTN, goal below 140/90     Diabetes mellitus, type 2 (H)     Melanoma of skin (H)        Past Medical History:   Diagnosis Date     Allergic rhinitis, cause unspecified      Asthma      Asthma, mild intermittent      Cataract      Cellulitis and abscess of leg, except foot 03/00    Bilateral     Eczema  "     Heart murmur     aortic area     HTN, goal below 140/90      Hyperlipidemia LDL goal < 100      Hyperplastic colon polyp 2008     Infection     bilateral eye infections     Macular hole of left eye      Obesity (BMI 30-39.9)      Osteoarthritis     knees     Other chronic pain     right knee     Sleep apnea     uses c pap     Type 2 diabetes, HbA1C goal < 8% (H)       Past Surgical History:   Procedure Laterality Date     ARTHROPLASTY HIP Right 9/25/2017    Procedure: ARTHROPLASTY HIP;  Right total hip arthroplasty  ;  Surgeon: Ayaan Pena MD;  Location: [facility] OR     ARTHROPLASTY KNEE  7/12/2013    Procedure: ARTHROPLASTY KNEE;  right total knee arthroplasty ;  Surgeon: Chaparro Wesley MD;  Location: [facility] OR     ARTHROSCOPY KNEE Right 1/21/2015    Procedure: ARTHROSCOPY KNEE;  Surgeon: Ayaan Pena MD;  Location: [facility] OR     C INCISION OF HYMEN       CATARACT IOL, RT/LT       COLONOSCOPY  2008     COLONOSCOPY N/A 4/18/2019    Procedure: Colonoscopy with polypectomy;  Surgeon: Shaun Guerrero MD;  [facility]     ENDOSCOPIC RETROGRADE CHOLANGIOPANCREATOGRAM N/A 2/6/2019    Procedure: COMBINED ENDOSCOPIC RETROGRADE CHOLANGIOPANCREATOGRAPHY, BILIARY SPINCTEROTOMY AND DILATION, PLACE BILE DUCT STENT;  Surgeon: Guru Andie Gonzalez MD;  [facility]     ENDOSCOPIC RETROGRADE CHOLANGIOPANCREATOGRAM N/A 2/28/2019    Procedure: Endoscopic Retrograde Cholangiopancreatogram, Bile duct stent removal and placement;  Surgeon: Shaun Guerrero MD;  [facility]     ENDOSCOPIC RETROGRADE CHOLANGIOPANCREATOGRAM N/A 4/18/2019    Procedure: COMBINED ENDOSCOPIC RETROGRADE CHOLANGIOPANCREATOGRAPHY, Bile duct stent exchange and Polypectomy;  Surgeon: Shaun Guerrero MD;  [facility]     ENDOSCOPIC RETROGRADE CHOLANGIOPANCREATOGRAM N/A 10/18/2019    Procedure: Endoscopic Retrograde Cholangiopancreatogram;  Surgeon: Shaun Guerrero MD;  [facility]     ENDOSCOPIC RETROGRADE  CHOLANGIOPANCREATOGRAM WITH SPYGLASS N/A 7/26/2019    Procedure: Endoscopic Retrograde Cholangiopancreatogram With Spyglas,l Radiofrequency Ablation, Stent Exchangeand Duodenal Biopsy x2;  Surgeon: Shaun Guerrero MD;  [facility]     ENDOSCOPIC RETROGRADE CHOLANGIOPANCREATOGRAM WITH SPYGLASS N/A 9/10/2020    Procedure: ENDOSCOPIC RETROGRADE CHOLANGIOPANCREATOGRAPHY, WITH DIRECT DUCT VISUALIZATION, USING PANCREATICOBILIARY FIBEROPTIC PROBE;  Surgeon: Shaun Guerrero MD;  [facility]     ENDOSCOPIC RETROGRADE CHOLANGIOPANCREATOGRAM WITH SPYGLASS N/A 3/22/2021    Procedure: ENDOSCOPIC RETROGRADE CHOLANGIOPANCREATOGRAPHY, WITH DIRECT DUCT VISUALIZATION, USING PANCREATICOBILIARY FIBEROPTIC PROBE;  Surgeon: Shaun Guerrero MD;  [facility]     ESOPHAGOSCOPY, GASTROSCOPY, DUODENOSCOPY (EGD) WITH RADIO FREQUENCY ABLATION, COMBINED N/A 9/10/2020    Procedure: possible repeat ESOPHAGOGASTRODUODENOSCOPY, WITH RADIOFREQUENCY ABLATION and endoscopic mucosal resection;  Surgeon: Shaun Guerrero MD;  [facility]     ESOPHAGOSCOPY, GASTROSCOPY, DUODENOSCOPY (EGD), COMBINED N/A 4/18/2019    Procedure: upper endoscopy with polypectomy;  Surgeon: Shaun Guerrero MD;  [facility]     ESOPHAGOSCOPY, GASTROSCOPY, DUODENOSCOPY (EGD), COMBINED N/A 10/18/2019    Procedure: Upper Endoscopy and ERCP with stent removal, stone removal and biopsy;  Surgeon: Shaun Guerrero MD;  [facility]     ESOPHAGOSCOPY, GASTROSCOPY, DUODENOSCOPY (EGD), RESECT MUCOSA, COMBINED N/A 2/28/2019    Procedure: Upper Endoscopy, Endoscopic Ultrasound, Endoscopic Mucosal Resection,  Ampullectomy, polypectomy.;  Surgeon: Shaun Guerrero MD;  [facility]     ESOPHAGOSCOPY, GASTROSCOPY, DUODENOSCOPY (EGD), RESECT MUCOSA, COMBINED N/A 3/22/2021    Procedure: ESOPHAGOGASTRODUODENOSCOPY (EGD) with  endoscopic mucosal resection/ polypectomy;  Surgeon: Shaun Guerrero MD;  [facility]     EXCISE MASS LOWER EXTREMITY Right 1/13/2022    Procedure:  excision of right thigh mass;  Surgeon: Salvador Kelly MD;  Location: [facility]     EYE SURGERY      macular hole repaired left eye     HC KNEE SCOPE, DIAGNOSTIC      - both knees     HEAD & NECK SURGERY      wisdom teeth        PSHx: No complications with prior surgeries or anesthesia     Soc Hx: No daily alcohol, no smoking       All: reviewed    Meds: reviewed  Current Outpatient Medications   Medication Sig Dispense Refill     acetaminophen (ACETAMINOPHEN 8 HOUR) 650 MG CR tablet Take 650 mg by mouth every 8 hours as needed for mild pain or fever        albuterol (PROAIR HFA/PROVENTIL HFA/VENTOLIN HFA) 108 (90 Base) MCG/ACT inhaler Inhale 2 puffs into the lungs every 4 hours as needed for shortness of breath / dyspnea 1 Inhaler 3     aspirin 81 MG EC tablet Take 1 tablet (81 mg) by mouth daily 90 tablet 3     atenolol (TENORMIN) 50 MG tablet Take 1 tablet by mouth once daily 90 tablet 0     atorvastatin (LIPITOR) 20 MG tablet Take 1 tablet (20 mg) by mouth daily 90 tablet 1     azelastine (ASTELIN) 0.1 % nasal spray USE 1 SPRAY(S) IN EACH NOSTRIL TWICE DAILY AS NEEDED 30 mL 0     azelastine (OPTIVAR) 0.05 % SOLN Place 1 drop into both eyes 2 times daily as needed Reported on 3/22/2017       canagliflozin (INVOKANA) 300 MG tablet Take 1 tablet (300 mg) by mouth every morning (before breakfast) 90 tablet 1     cetirizine (ZYRTEC) 10 MG tablet Take 10 mg by mouth every morning Takes only PRN       CONTOUR NEXT TEST test strip USE 1 STRIP TO CHECK GLUCOSE ONCE DAILY 100 strip 4     diclofenac (VOLTAREN) 1 % topical gel Apply topically 4 times daily as needed        fish oil-omega-3 fatty acids (FISH OIL) 1000 MG capsule Take 1 g by mouth daily Reported on 3/22/2017       glipiZIDE (GLUCOTROL) 5 MG tablet TAKE 2 TABLETS BY MOUTH TWICE DAILY BEFORE MEAL(S) 360 tablet 1     hydrochlorothiazide (HYDRODIURIL) 25 MG tablet TAKE 1 TABLET BY MOUTH ONCE DAILY IN THE MORNING 90 tablet 0     ibuprofen (ADVIL) 200 MG tablet  take 4 tablet (200 mg) by oral route every 8 hours as needed with food       latanoprost (XALATAN) 0.005 % ophthalmic solution Place 1 drop into both eyes At Bedtime  2.5 mL 1     lisinopril (ZESTRIL) 20 MG tablet Take 1 tablet (20 mg) by mouth 2 times daily 180 tablet 1     metFORMIN (GLUCOPHAGE-XR) 500 MG 24 hr tablet Take 2 tablets (1,000 mg) by mouth 2 times daily (with meals) 360 tablet 3     MULTIVITAMIN TABS   OR Reported on 3/22/2017       ondansetron (ZOFRAN-ODT) 4 MG ODT tab Take 1-2 tablets (4-8 mg) by mouth every 8 hours as needed for nausea 8 tablet 0     order for DME All diabetic testing supplies including test strips, lancets and solution for testing 2 times per day. Patient is using   no Insulin. Last A1c Lab Results       Component                Value               Date                       A1C                      7.9                 08/20/2018 100 each 11     oxyCODONE (ROXICODONE) 5 MG tablet Take 1-2 tablets (5-10 mg) by mouth every 4 hours as needed for moderate to severe pain 16 tablet 0     Semaglutide 3 MG TABS Take 3 mg by mouth daily 30 tablet 3     sitagliptin (JANUVIA) 100 MG tablet Take 1 tablet (100 mg) by mouth daily 90 tablet 1     timolol maleate (TIMOPTIC) 0.5 % ophthalmic solution INSTILL 1 DROP INTO EACH EYE TWICE DAILY              Raisa Torres MD  Internal Medicine        Patient Active Problem List    Diagnosis Date Noted     Hyperlipidemia with target LDL less than 100      Priority: High     Diagnosis updated by automated process. Provider to review and confirm.       Asthma, mild intermittent      Priority: High     Melanoma of skin (H) 03/03/2022     Priority: Medium     Added automatically from request for surgery [id number]       Diabetes mellitus, type 2 (H) 01/07/2022     Priority: Medium     HTN, goal below 140/90 05/27/2020     Priority: Medium     Polyp of duodenum 02/18/2020     Priority: Medium     Added automatically from request for surgery [id number]        Ampullary adenoma 02/28/2019     Priority: Medium     Status post total replacement of right hip 02/13/2018     Priority: Medium     CHERRY (obstructive sleep apnea) 09/15/2017     Priority: Medium     HTN goal less than 140/90, 05/22/2016     Priority: Medium     Chronic knee pain, right 08/30/2015     Priority: Medium     Total knee replacement status 07/12/2013     Priority: Medium     Obesity (BMI 30-39.9)      Priority: Medium     Heart murmur      Priority: Medium     aortic area       Advanced directives, counseling/discussion 01/06/2012     Priority: Medium     Advance Directive Problem List Overview:   Name Relationship Phone    Primary Health Care Agent            Alternative Health Care Agent          Discussed advance care planning with patient; information given to patient to review. 1/6/2012          Allergic rhinitis      Priority: Medium     Problem list name updated by automated process. Provider to review       Cataract      Priority: Low     Utility update for deleted IMO code  [employer] Update utility       Macular hole of left eye      Priority: Low      Past Medical History:   Diagnosis Date     Allergic rhinitis, cause unspecified      Asthma      Asthma, mild intermittent      Cataract      Cellulitis and abscess of leg, except foot 03/00    Bilateral     Eczema      Heart murmur     aortic area     HTN, goal below 140/90      Hyperlipidemia LDL goal < 100      Hyperplastic colon polyp 2008     Infection     bilateral eye infections     Macular hole of left eye      Obesity (BMI 30-39.9)      Osteoarthritis     knees     Other chronic pain     right knee     Sleep apnea     uses c pap     Type 2 diabetes, HbA1C goal < 8% (H)      Past Surgical History:   Procedure Laterality Date     ARTHROPLASTY HIP Right 9/25/2017    Procedure: ARTHROPLASTY HIP;  Right total hip arthroplasty  ;  Surgeon: Ayaan Pena MD;  Location: [facility]     ARTHROPLASTY KNEE  7/12/2013    Procedure: ARTHROPLASTY KNEE;   right total knee arthroplasty ;  Surgeon: Chaparro Wesley MD;  [facility]: [facility]     ARTHROSCOPY KNEE Right 1/21/2015    Procedure: ARTHROSCOPY KNEE;  Surgeon: Ayaan Pena MD;  Location: [facility]     C INCISION OF HYMEN       CATARACT IOL, RT/LT       COLONOSCOPY  2008     COLONOSCOPY N/A 4/18/2019    Procedure: Colonoscopy with polypectomy;  Surgeon: Shaun Guerrero MD;  [facility]     ENDOSCOPIC RETROGRADE CHOLANGIOPANCREATOGRAM N/A 2/6/2019    Procedure: COMBINED ENDOSCOPIC RETROGRADE CHOLANGIOPANCREATOGRAPHY, BILIARY SPINCTEROTOMY AND DILATION, PLACE BILE DUCT STENT;  Surgeon: Guru Andie Gonzalez MD;  [facility]     ENDOSCOPIC RETROGRADE CHOLANGIOPANCREATOGRAM N/A 2/28/2019    Procedure: Endoscopic Retrograde Cholangiopancreatogram, Bile duct stent removal and placement;  Surgeon: Shaun Guerrero MD;  [facility]     ENDOSCOPIC RETROGRADE CHOLANGIOPANCREATOGRAM N/A 4/18/2019    Procedure: COMBINED ENDOSCOPIC RETROGRADE CHOLANGIOPANCREATOGRAPHY, Bile duct stent exchange and Polypectomy;  Surgeon: Shaun Guerrero MD;  [facility]     ENDOSCOPIC RETROGRADE CHOLANGIOPANCREATOGRAM N/A 10/18/2019    Procedure: Endoscopic Retrograde Cholangiopancreatogram;  Surgeon: Shaun Guerrero MD;  [facility]     ENDOSCOPIC RETROGRADE CHOLANGIOPANCREATOGRAM WITH SPYGLASS N/A 7/26/2019    Procedure: Endoscopic Retrograde Cholangiopancreatogram With Spyglas,l Radiofrequency Ablation, Stent Exchangeand Duodenal Biopsy x2;  Surgeon: Shaun Guerrero MD;  [facility]     ENDOSCOPIC RETROGRADE CHOLANGIOPANCREATOGRAM WITH SPYGLASS N/A 9/10/2020    Procedure: ENDOSCOPIC RETROGRADE CHOLANGIOPANCREATOGRAPHY, WITH DIRECT DUCT VISUALIZATION, USING PANCREATICOBILIARY FIBEROPTIC PROBE;  Surgeon: Shaun Guerrero MD;  [facility]     ENDOSCOPIC RETROGRADE CHOLANGIOPANCREATOGRAM WITH SPYGLASS N/A 3/22/2021    Procedure: ENDOSCOPIC RETROGRADE CHOLANGIOPANCREATOGRAPHY, WITH DIRECT DUCT  VISUALIZATION, USING PANCREATICOBILIARY FIBEROPTIC PROBE;  Surgeon: Shaun Guerrero MD;  [facility]     ESOPHAGOSCOPY, GASTROSCOPY, DUODENOSCOPY (EGD) WITH RADIO FREQUENCY ABLATION, COMBINED N/A 9/10/2020    Procedure: possible repeat ESOPHAGOGASTRODUODENOSCOPY, WITH RADIOFREQUENCY ABLATION and endoscopic mucosal resection;  Surgeon: Shaun Guerrero MD;  [facility]     ESOPHAGOSCOPY, GASTROSCOPY, DUODENOSCOPY (EGD), COMBINED N/A 4/18/2019    Procedure: upper endoscopy with polypectomy;  Surgeon: Shaun Guerrero MD;  [facility]     ESOPHAGOSCOPY, GASTROSCOPY, DUODENOSCOPY (EGD), COMBINED N/A 10/18/2019    Procedure: Upper Endoscopy and ERCP with stent removal, stone removal and biopsy;  Surgeon: Shaun Guerrero MD;  [facility]     ESOPHAGOSCOPY, GASTROSCOPY, DUODENOSCOPY (EGD), RESECT MUCOSA, COMBINED N/A 2/28/2019    Procedure: Upper Endoscopy, Endoscopic Ultrasound, Endoscopic Mucosal Resection,  Ampullectomy, polypectomy.;  Surgeon: Shaun Guerrero MD;  [facility]     ESOPHAGOSCOPY, GASTROSCOPY, DUODENOSCOPY (EGD), RESECT MUCOSA, COMBINED N/A 3/22/2021    Procedure: ESOPHAGOGASTRODUODENOSCOPY (EGD) with  endoscopic mucosal resection/ polypectomy;  Surgeon: Shaun Guerrero MD;  [facility]     EXCISE MASS LOWER EXTREMITY Right 1/13/2022    Procedure: excision of right thigh mass;  Surgeon: Salvador Kelly MD;  [facility]: [facility]     EYE SURGERY      macular hole repaired left eye     HC KNEE SCOPE, DIAGNOSTIC      - both knees     HEAD & NECK SURGERY      wisdom teeth     Current Outpatient Medications   Medication Sig Dispense Refill     acetaminophen (ACETAMINOPHEN 8 HOUR) 650 MG CR tablet Take 650 mg by mouth every 8 hours as needed for mild pain or fever        albuterol (PROAIR HFA/PROVENTIL HFA/VENTOLIN HFA) 108 (90 Base) MCG/ACT inhaler Inhale 2 puffs into the lungs every 4 hours as needed for shortness of breath / dyspnea 1 Inhaler 3     aspirin 81 MG EC tablet Take 1 tablet (81 mg)  by mouth daily (Patient not taking: Reported on 2/25/2022) 90 tablet 3     atenolol (TENORMIN) 50 MG tablet Take 1 tablet by mouth once daily 90 tablet 0     atorvastatin (LIPITOR) 20 MG tablet Take 1 tablet (20 mg) by mouth daily 90 tablet 1     azelastine (ASTELIN) 0.1 % nasal spray USE 1 SPRAY(S) IN EACH NOSTRIL TWICE DAILY AS NEEDED 30 mL 0     azelastine (OPTIVAR) 0.05 % SOLN Place 1 drop into both eyes 2 times daily as needed Reported on 3/22/2017 (Patient not taking: Reported on 2/25/2022)       canagliflozin (INVOKANA) 300 MG tablet Take 1 tablet (300 mg) by mouth every morning (before breakfast) 90 tablet 1     cetirizine (ZYRTEC) 10 MG tablet Take 10 mg by mouth every morning Takes only PRN       CONTOUR NEXT TEST test strip USE 1 STRIP TO CHECK GLUCOSE ONCE DAILY 100 strip 4     diclofenac (VOLTAREN) 1 % topical gel Apply topically 4 times daily as needed        fish oil-omega-3 fatty acids (FISH OIL) 1000 MG capsule Take 1 g by mouth daily Reported on 3/22/2017       glipiZIDE (GLUCOTROL) 5 MG tablet TAKE 2 TABLETS BY MOUTH TWICE DAILY BEFORE MEAL(S) 360 tablet 1     hydrochlorothiazide (HYDRODIURIL) 25 MG tablet TAKE 1 TABLET BY MOUTH ONCE DAILY IN THE MORNING 90 tablet 0     ibuprofen (ADVIL) 200 MG tablet take 4 tablet (200 mg) by oral route every 8 hours as needed with food       latanoprost (XALATAN) 0.005 % ophthalmic solution Place 1 drop into both eyes At Bedtime  2.5 mL 1     lisinopril (ZESTRIL) 20 MG tablet Take 1 tablet (20 mg) by mouth 2 times daily 180 tablet 1     metFORMIN (GLUCOPHAGE-XR) 500 MG 24 hr tablet Take 2 tablets (1,000 mg) by mouth 2 times daily (with meals) 360 tablet 3     MULTIVITAMIN TABS   OR Reported on 3/22/2017       ondansetron (ZOFRAN-ODT) 4 MG ODT tab Take 1-2 tablets (4-8 mg) by mouth every 8 hours as needed for nausea (Patient not taking: Reported on 2/25/2022) 8 tablet 0     order for DME All diabetic testing supplies including test strips, lancets and solution  "for testing 2 times per day. Patient is using   no Insulin. Last A1c Lab Results       Component                Value               Date                       A1C                      7.9                 08/20/2018 100 each 11     oxyCODONE (ROXICODONE) 5 MG tablet Take 1-2 tablets (5-10 mg) by mouth every 4 hours as needed for moderate to severe pain (Patient not taking: Reported on 2/25/2022) 16 tablet 0     Semaglutide 3 MG TABS Take 3 mg by mouth daily (Patient not taking: Reported on 1/7/2022) 30 tablet 3     sitagliptin (JANUVIA) 100 MG tablet Take 1 tablet (100 mg) by mouth daily 90 tablet 1     timolol maleate (TIMOPTIC) 0.5 % ophthalmic solution INSTILL 1 DROP INTO EACH EYE TWICE DAILY         Allergies   Allergen Reactions     Bromfenac Visual Disturbance     Sulfites, xibrom  Causes sever eye pain     Codeine      \"NAUSE,HA AND DIZZINESS\"     Codeine Nausea     Other reaction(s): Dizziness, Headache     Sulfites Visual Disturbance     Xibrom (bromfenac opthalmic solution) 0.09%--eye pain        Social History     Tobacco Use     Smoking status: Never Smoker     Smokeless tobacco: Never Used   Substance Use Topics     Alcohol use: No     Alcohol/week: 0.0 standard drinks       Recent Labs   Lab Test 01/07/22  1106 11/15/21  0907 08/10/21  1258 03/22/21  0758 03/03/21  0845 09/10/20  0710   HGB 13.9  --   --  14.1   < > 14.2     --   --  285   < > 253   INR  --   --   --  0.98  --  1.00     --  137 138   < > 137   POTASSIUM 3.8  --  4.2 3.9   < > 3.8   CR 0.88  --  0.93 0.82   < > 0.86   A1C  --  7.8* 7.5*  --    < >  --     < > = values in this interval not displayed.          Signed Electronically by: Raisa Davidson MD  Copy of this evaluation report is provided to requesting physician.      "

## 2022-03-15 NOTE — H&P (VIEW-ONLY)
[place] [address] [facility] [address]  Phone: [phone]  Primary Provider: Raisa Davidson  Pre-op Performing Provider: RAISA DAVIDSON      PREOPERATIVE EVALUATION:  Today's date: 3/15/2022    Gricelda Sabillon is a 71 year old female who presents for a preoperative evaluation.    Surgical Information:  Surgery/Procedure: Wide local excision of right knee melanoma  Surgery Location: [facility] OR  Surgeon: Chevy Allison MD  Surgery Date: 3/28/2022  Time of Surgery: 7:15 AM  Where patient plans to recover: At home with family  Fax number for surgical facility:     Type of Anesthesia Anticipated: to be determined        Preop Questions 3/15/2022   1. Have you ever had a heart attack or stroke? No   2. Have you ever had surgery on your heart or blood vessels, such as a stent placement, a coronary artery bypass, or surgery on an artery in your head, neck, heart, or legs? No   3. Do you have chest pain with activity? No   4. Do you have a history of  heart failure? No   5. Do you currently have a cold, bronchitis or symptoms of other infection? No   6. Do you have a cough, shortness of breath, or wheezing? No   7. Do you or anyone in your family have previous history of blood clots? No   8. Do you or does anyone in your family have a serious bleeding problem such as prolonged bleeding following surgeries or cuts? No   9. Have you ever had problems with anemia or been told to take iron pills? YES -    10. Have you had any abnormal blood loss such as black, tarry or bloody stools, or abnormal vaginal bleeding? No   11. Have you ever had a blood transfusion? No   12. Are you willing to have a blood transfusion if it is medically needed before, during, or after your surgery? Yes   13. Have you or any of your relatives ever had problems with anesthesia? No   14. Do you have sleep apnea, excessive snoring or daytime drowsiness? -Yes   14a. Do  you have a CPAP machine? -Yes   15. Do you have any artifical heart valves or other implanted medical devices like a pacemaker, defibrillator, or continuous glucose monitor? -No   16. Do you have artificial joints? -Yes right knee and hip   17. Are you allergic to latex? -No   18. Is there any chance that you may be pregnant? -No     Health Care Directive:  Patient does not have a Health Care Directive or Living Will: Discussed advance care planning with patient; however, patient declined at this time.      CC:  Preop for multiple medical problems.    HPI:    The patient is scheduled for skin melanoma and EGD surgery with Dr. Guerrero and dr Allison on  March 24 and 28  No other acute complaints.    Assessment:  1. V72.83H Preop general physical exam _ I do not see any major contraindications for the patient to go through the scheduled surgery.    The proposed surgical procedure is considered  INTERMEDIATE surgery risk.    For above listed surgery and anesthesia, Patient is at  MODERATE risk for surgery/procedure and perioperative/procedure complications.      Cardiovascular risk  Assessment -- low risk   -- No further cardiac work up is needed before this surgery.        ECG:  Jan 2022  sinus rhythm, no changes suggestive for ischemia    Pulmonary Risk Assessment -- low risk   -- The patient doesn't have chronic lung disease nor acute respiratory problems       Obstructive Sleep Apnea (or suspected sleep apnea) -- low risk       Anemia Assessment :  -- no anemia - patient doesn't need further anemia work up      Blood Sugar Assessment  (E11.9) Diabetes mellitus without complication (H)  -- patient has DM, no recent A1c. 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 In the morning of the surgery day you take with a sip of water just these meds: Atenolol The rest of the meds you resume after surgery.  2.  Labs today - [address]     Physical exam:    Blood pressure 122/62, pulse 87, temperature 97.6  F (36.4  C), temperature source Tympanic, resp. rate 18, height 1.692 m (5' 6.61\"), weight 96.7 kg (213 lb 3.2 oz), last menstrual period 12/13/2002, SpO2 97 %, not currently breastfeeding.   NAD, appears comfortable  Skin clear, no rashes  Neck: supple, no JVD,  no thyroidmegaly  Lymph nodes non palpable in the cervical, supraclavicular   Chest: clear to auscultation with good respiratory effort  Cardiac: S1S2, RRR, no mgr appreciated  Abdomen: soft, not tender, not distended, audible bowel sound, no hepatosplenomegaly, no palpable masses, no abdominal bruits  Extremities: no cyanosis, clubbing or edema.   Neuro: A, Ox3, no focal signs.        ROS:   as above     Patient Active Problem List   Diagnosis     Allergic rhinitis     Hyperlipidemia with target LDL less than 100     Asthma, mild intermittent     Cataract     Macular hole of left eye     Advanced directives, counseling/discussion     Obesity (BMI 30-39.9)     Heart murmur     Total knee replacement status     Chronic knee pain, right     HTN goal less than 140/90,     CHERRY (obstructive sleep apnea)     Status post total replacement of right hip     Ampullary adenoma     Polyp of duodenum     HTN, goal below 140/90     Diabetes mellitus, type 2 (H)     Melanoma of skin (H)        Past Medical History:   Diagnosis Date     Allergic rhinitis, cause unspecified      Asthma      Asthma, mild intermittent      Cataract      Cellulitis and abscess of leg, except foot 03/00    Bilateral     Eczema  "     Heart murmur     aortic area     HTN, goal below 140/90      Hyperlipidemia LDL goal < 100      Hyperplastic colon polyp 2008     Infection     bilateral eye infections     Macular hole of left eye      Obesity (BMI 30-39.9)      Osteoarthritis     knees     Other chronic pain     right knee     Sleep apnea     uses c pap     Type 2 diabetes, HbA1C goal < 8% (H)       Past Surgical History:   Procedure Laterality Date     ARTHROPLASTY HIP Right 9/25/2017    Procedure: ARTHROPLASTY HIP;  Right total hip arthroplasty  ;  Surgeon: Ayaan Pena MD;  Location: [facility] OR     ARTHROPLASTY KNEE  7/12/2013    Procedure: ARTHROPLASTY KNEE;  right total knee arthroplasty ;  Surgeon: Chaparro Wesley MD;  Location: [facility] OR     ARTHROSCOPY KNEE Right 1/21/2015    Procedure: ARTHROSCOPY KNEE;  Surgeon: Ayaan Pena MD;  Location: [facility] OR     C INCISION OF HYMEN       CATARACT IOL, RT/LT       COLONOSCOPY  2008     COLONOSCOPY N/A 4/18/2019    Procedure: Colonoscopy with polypectomy;  Surgeon: Shaun Guerrero MD;  Location: [facility]     ENDOSCOPIC RETROGRADE CHOLANGIOPANCREATOGRAM N/A 2/6/2019    Procedure: COMBINED ENDOSCOPIC RETROGRADE CHOLANGIOPANCREATOGRAPHY, BILIARY SPINCTEROTOMY AND DILATION, PLACE BILE DUCT STENT;  Surgeon: Guru Andie Gonzalez MD;  [facility]: [facility]     ENDOSCOPIC RETROGRADE CHOLANGIOPANCREATOGRAM N/A 2/28/2019    Procedure: Endoscopic Retrograde Cholangiopancreatogram, Bile duct stent removal and placement;  Surgeon: Shaun Guerrero MD;  [facility]     ENDOSCOPIC RETROGRADE CHOLANGIOPANCREATOGRAM N/A 4/18/2019    Procedure: COMBINED ENDOSCOPIC RETROGRADE CHOLANGIOPANCREATOGRAPHY, Bile duct stent exchange and Polypectomy;  Surgeon: Shaun Guerrero MD;  [facility]     ENDOSCOPIC RETROGRADE CHOLANGIOPANCREATOGRAM N/A 10/18/2019    Procedure: Endoscopic Retrograde Cholangiopancreatogram;  Surgeon: Shaun Guerrero MD;  [facility]     ENDOSCOPIC RETROGRADE  CHOLANGIOPANCREATOGRAM WITH SPYGLASS N/A 7/26/2019    Procedure: Endoscopic Retrograde Cholangiopancreatogram With Spyglas,l Radiofrequency Ablation, Stent Exchangeand Duodenal Biopsy x2;  Surgeon: Shaun Guerrero MD;  [facility]     ENDOSCOPIC RETROGRADE CHOLANGIOPANCREATOGRAM WITH SPYGLASS N/A 9/10/2020    Procedure: ENDOSCOPIC RETROGRADE CHOLANGIOPANCREATOGRAPHY, WITH DIRECT DUCT VISUALIZATION, USING PANCREATICOBILIARY FIBEROPTIC PROBE;  Surgeon: Shaun Guerrero MD;  [facility]     ENDOSCOPIC RETROGRADE CHOLANGIOPANCREATOGRAM WITH SPYGLASS N/A 3/22/2021    Procedure: ENDOSCOPIC RETROGRADE CHOLANGIOPANCREATOGRAPHY, WITH DIRECT DUCT VISUALIZATION, USING PANCREATICOBILIARY FIBEROPTIC PROBE;  Surgeon: Shaun Guerrero MD;  [facility]     ESOPHAGOSCOPY, GASTROSCOPY, DUODENOSCOPY (EGD) WITH RADIO FREQUENCY ABLATION, COMBINED N/A 9/10/2020    Procedure: possible repeat ESOPHAGOGASTRODUODENOSCOPY, WITH RADIOFREQUENCY ABLATION and endoscopic mucosal resection;  Surgeon: Shaun Guerrero MD;  [facility]     ESOPHAGOSCOPY, GASTROSCOPY, DUODENOSCOPY (EGD), COMBINED N/A 4/18/2019    Procedure: upper endoscopy with polypectomy;  Surgeon: Shaun Guerrero MD;  [facility]     ESOPHAGOSCOPY, GASTROSCOPY, DUODENOSCOPY (EGD), COMBINED N/A 10/18/2019    Procedure: Upper Endoscopy and ERCP with stent removal, stone removal and biopsy;  Surgeon: Shaun Guerrero MD;  [facility]     ESOPHAGOSCOPY, GASTROSCOPY, DUODENOSCOPY (EGD), RESECT MUCOSA, COMBINED N/A 2/28/2019    Procedure: Upper Endoscopy, Endoscopic Ultrasound, Endoscopic Mucosal Resection,  Ampullectomy, polypectomy.;  Surgeon: Shaun Guerrero MD;  [facility]     ESOPHAGOSCOPY, GASTROSCOPY, DUODENOSCOPY (EGD), RESECT MUCOSA, COMBINED N/A 3/22/2021    Procedure: ESOPHAGOGASTRODUODENOSCOPY (EGD) with  endoscopic mucosal resection/ polypectomy;  Surgeon: Shaun Guerrero MD;  [facility]     EXCISE MASS LOWER EXTREMITY Right 1/13/2022    Procedure:  excision of right thigh mass;  Surgeon: Salvador Kelly MD;  Location: [facility]     EYE SURGERY      macular hole repaired left eye     HC KNEE SCOPE, DIAGNOSTIC      - both knees     HEAD & NECK SURGERY      wisdom teeth        PSHx: No complications with prior surgeries or anesthesia     Soc Hx: No daily alcohol, no smoking       All: reviewed    Meds: reviewed  Current Outpatient Medications   Medication Sig Dispense Refill     acetaminophen (ACETAMINOPHEN 8 HOUR) 650 MG CR tablet Take 650 mg by mouth every 8 hours as needed for mild pain or fever        albuterol (PROAIR HFA/PROVENTIL HFA/VENTOLIN HFA) 108 (90 Base) MCG/ACT inhaler Inhale 2 puffs into the lungs every 4 hours as needed for shortness of breath / dyspnea 1 Inhaler 3     aspirin 81 MG EC tablet Take 1 tablet (81 mg) by mouth daily 90 tablet 3     atenolol (TENORMIN) 50 MG tablet Take 1 tablet by mouth once daily 90 tablet 0     atorvastatin (LIPITOR) 20 MG tablet Take 1 tablet (20 mg) by mouth daily 90 tablet 1     azelastine (ASTELIN) 0.1 % nasal spray USE 1 SPRAY(S) IN EACH NOSTRIL TWICE DAILY AS NEEDED 30 mL 0     azelastine (OPTIVAR) 0.05 % SOLN Place 1 drop into both eyes 2 times daily as needed Reported on 3/22/2017       canagliflozin (INVOKANA) 300 MG tablet Take 1 tablet (300 mg) by mouth every morning (before breakfast) 90 tablet 1     cetirizine (ZYRTEC) 10 MG tablet Take 10 mg by mouth every morning Takes only PRN       CONTOUR NEXT TEST test strip USE 1 STRIP TO CHECK GLUCOSE ONCE DAILY 100 strip 4     diclofenac (VOLTAREN) 1 % topical gel Apply topically 4 times daily as needed        fish oil-omega-3 fatty acids (FISH OIL) 1000 MG capsule Take 1 g by mouth daily Reported on 3/22/2017       glipiZIDE (GLUCOTROL) 5 MG tablet TAKE 2 TABLETS BY MOUTH TWICE DAILY BEFORE MEAL(S) 360 tablet 1     hydrochlorothiazide (HYDRODIURIL) 25 MG tablet TAKE 1 TABLET BY MOUTH ONCE DAILY IN THE MORNING 90 tablet 0     ibuprofen (ADVIL) 200 MG tablet  take 4 tablet (200 mg) by oral route every 8 hours as needed with food       latanoprost (XALATAN) 0.005 % ophthalmic solution Place 1 drop into both eyes At Bedtime  2.5 mL 1     lisinopril (ZESTRIL) 20 MG tablet Take 1 tablet (20 mg) by mouth 2 times daily 180 tablet 1     metFORMIN (GLUCOPHAGE-XR) 500 MG 24 hr tablet Take 2 tablets (1,000 mg) by mouth 2 times daily (with meals) 360 tablet 3     MULTIVITAMIN TABS   OR Reported on 3/22/2017       ondansetron (ZOFRAN-ODT) 4 MG ODT tab Take 1-2 tablets (4-8 mg) by mouth every 8 hours as needed for nausea 8 tablet 0     order for DME All diabetic testing supplies including test strips, lancets and solution for testing 2 times per day. Patient is using   no Insulin. Last A1c Lab Results       Component                Value               Date                       A1C                      7.9                 08/20/2018 100 each 11     oxyCODONE (ROXICODONE) 5 MG tablet Take 1-2 tablets (5-10 mg) by mouth every 4 hours as needed for moderate to severe pain 16 tablet 0     Semaglutide 3 MG TABS Take 3 mg by mouth daily 30 tablet 3     sitagliptin (JANUVIA) 100 MG tablet Take 1 tablet (100 mg) by mouth daily 90 tablet 1     timolol maleate (TIMOPTIC) 0.5 % ophthalmic solution INSTILL 1 DROP INTO EACH EYE TWICE DAILY              Raisa Torres MD  Internal Medicine        Patient Active Problem List    Diagnosis Date Noted     Hyperlipidemia with target LDL less than 100      Priority: High     Diagnosis updated by automated process. Provider to review and confirm.       Asthma, mild intermittent      Priority: High     Melanoma of skin (H) 03/03/2022     Priority: Medium     Added automatically from request for surgery [id number]       Diabetes mellitus, type 2 (H) 01/07/2022     Priority: Medium     HTN, goal below 140/90 05/27/2020     Priority: Medium     Polyp of duodenum 02/18/2020     Priority: Medium     Added automatically from request for surgery [id number]        Ampullary adenoma 02/28/2019     Priority: Medium     Status post total replacement of right hip 02/13/2018     Priority: Medium     CHERRY (obstructive sleep apnea) 09/15/2017     Priority: Medium     HTN goal less than 140/90, 05/22/2016     Priority: Medium     Chronic knee pain, right 08/30/2015     Priority: Medium     Total knee replacement status 07/12/2013     Priority: Medium     Obesity (BMI 30-39.9)      Priority: Medium     Heart murmur      Priority: Medium     aortic area       Advanced directives, counseling/discussion 01/06/2012     Priority: Medium     Advance Directive Problem List Overview:   Name Relationship Phone    Primary Health Care Agent            Alternative Health Care Agent          Discussed advance care planning with patient; information given to patient to review. 1/6/2012          Allergic rhinitis      Priority: Medium     Problem list name updated by automated process. Provider to review       Cataract      Priority: Low     Utility update for deleted IMO code  [employer] Update utility       Macular hole of left eye      Priority: Low      Past Medical History:   Diagnosis Date     Allergic rhinitis, cause unspecified      Asthma      Asthma, mild intermittent      Cataract      Cellulitis and abscess of leg, except foot 03/00    Bilateral     Eczema      Heart murmur     aortic area     HTN, goal below 140/90      Hyperlipidemia LDL goal < 100      Hyperplastic colon polyp 2008     Infection     bilateral eye infections     Macular hole of left eye      Obesity (BMI 30-39.9)      Osteoarthritis     knees     Other chronic pain     right knee     Sleep apnea     uses c pap     Type 2 diabetes, HbA1C goal < 8% (H)      Past Surgical History:   Procedure Laterality Date     ARTHROPLASTY HIP Right 9/25/2017    Procedure: ARTHROPLASTY HIP;  Right total hip arthroplasty  ;  Surgeon: Ayaan Pena MD;  Location: [facility] OR     ARTHROPLASTY KNEE  7/12/2013    Procedure: ARTHROPLASTY KNEE;   right total knee arthroplasty ;  Surgeon: Chaparro Wesley MD;  Location: [facility]     ARTHROSCOPY KNEE Right 1/21/2015    Procedure: ARTHROSCOPY KNEE;  Surgeon: Ayaan Pena MD;  Location: [facility]     C INCISION OF HYMEN       CATARACT IOL, RT/LT       COLONOSCOPY  2008     COLONOSCOPY N/A 4/18/2019    Procedure: Colonoscopy with polypectomy;  Surgeon: Shaun Guerrero MD;  [facility]     ENDOSCOPIC RETROGRADE CHOLANGIOPANCREATOGRAM N/A 2/6/2019    Procedure: COMBINED ENDOSCOPIC RETROGRADE CHOLANGIOPANCREATOGRAPHY, BILIARY SPINCTEROTOMY AND DILATION, PLACE BILE DUCT STENT;  Surgeon: Guru Andie Gonzalez MD;  [facility]     ENDOSCOPIC RETROGRADE CHOLANGIOPANCREATOGRAM N/A 2/28/2019    Procedure: Endoscopic Retrograde Cholangiopancreatogram, Bile duct stent removal and placement;  Surgeon: Shaun Guerrero MD;  [facility]     ENDOSCOPIC RETROGRADE CHOLANGIOPANCREATOGRAM N/A 4/18/2019    Procedure: COMBINED ENDOSCOPIC RETROGRADE CHOLANGIOPANCREATOGRAPHY, Bile duct stent exchange and Polypectomy;  Surgeon: Shaun Guerrero MD;  [facility]     ENDOSCOPIC RETROGRADE CHOLANGIOPANCREATOGRAM N/A 10/18/2019    Procedure: Endoscopic Retrograde Cholangiopancreatogram;  Surgeon: Shaun Guerrero MD;  [facility]     ENDOSCOPIC RETROGRADE CHOLANGIOPANCREATOGRAM WITH SPYGLASS N/A 7/26/2019    Procedure: Endoscopic Retrograde Cholangiopancreatogram With Spyglas,l Radiofrequency Ablation, Stent Exchangeand Duodenal Biopsy x2;  Surgeon: Shaun Guerrero MD;  [facility]     ENDOSCOPIC RETROGRADE CHOLANGIOPANCREATOGRAM WITH SPYGLASS N/A 9/10/2020    Procedure: ENDOSCOPIC RETROGRADE CHOLANGIOPANCREATOGRAPHY, WITH DIRECT DUCT VISUALIZATION, USING PANCREATICOBILIARY FIBEROPTIC PROBE;  Surgeon: Shaun Guerrero MD;  [facility]     ENDOSCOPIC RETROGRADE CHOLANGIOPANCREATOGRAM WITH SPYGLASS N/A 3/22/2021    Procedure: ENDOSCOPIC RETROGRADE CHOLANGIOPANCREATOGRAPHY, WITH DIRECT DUCT  VISUALIZATION, USING PANCREATICOBILIARY FIBEROPTIC PROBE;  Surgeon: Shaun Guerrero MD;  [facility]     ESOPHAGOSCOPY, GASTROSCOPY, DUODENOSCOPY (EGD) WITH RADIO FREQUENCY ABLATION, COMBINED N/A 9/10/2020    Procedure: possible repeat ESOPHAGOGASTRODUODENOSCOPY, WITH RADIOFREQUENCY ABLATION and endoscopic mucosal resection;  Surgeon: Shaun Guerrero MD;  [facility]     ESOPHAGOSCOPY, GASTROSCOPY, DUODENOSCOPY (EGD), COMBINED N/A 4/18/2019    Procedure: upper endoscopy with polypectomy;  Surgeon: Shaun Guerrero MD;  [facility]     ESOPHAGOSCOPY, GASTROSCOPY, DUODENOSCOPY (EGD), COMBINED N/A 10/18/2019    Procedure: Upper Endoscopy and ERCP with stent removal, stone removal and biopsy;  Surgeon: Shaun Guerrero MD;  [facility]     ESOPHAGOSCOPY, GASTROSCOPY, DUODENOSCOPY (EGD), RESECT MUCOSA, COMBINED N/A 2/28/2019    Procedure: Upper Endoscopy, Endoscopic Ultrasound, Endoscopic Mucosal Resection,  Ampullectomy, polypectomy.;  Surgeon: Shaun Guerrero MD;  [facility]     ESOPHAGOSCOPY, GASTROSCOPY, DUODENOSCOPY (EGD), RESECT MUCOSA, COMBINED N/A 3/22/2021    Procedure: ESOPHAGOGASTRODUODENOSCOPY (EGD) with  endoscopic mucosal resection/ polypectomy;  Surgeon: Shaun Guerrero MD;  [facility]     EXCISE MASS LOWER EXTREMITY Right 1/13/2022    Procedure: excision of right thigh mass;  Surgeon: Salvador Kelly MD;  [facility]: [facility]     EYE SURGERY      macular hole repaired left eye     HC KNEE SCOPE, DIAGNOSTIC      - both knees     HEAD & NECK SURGERY      wisdom teeth     Current Outpatient Medications   Medication Sig Dispense Refill     acetaminophen (ACETAMINOPHEN 8 HOUR) 650 MG CR tablet Take 650 mg by mouth every 8 hours as needed for mild pain or fever        albuterol (PROAIR HFA/PROVENTIL HFA/VENTOLIN HFA) 108 (90 Base) MCG/ACT inhaler Inhale 2 puffs into the lungs every 4 hours as needed for shortness of breath / dyspnea 1 Inhaler 3     aspirin 81 MG EC tablet Take 1 tablet (81 mg)  by mouth daily (Patient not taking: Reported on 2/25/2022) 90 tablet 3     atenolol (TENORMIN) 50 MG tablet Take 1 tablet by mouth once daily 90 tablet 0     atorvastatin (LIPITOR) 20 MG tablet Take 1 tablet (20 mg) by mouth daily 90 tablet 1     azelastine (ASTELIN) 0.1 % nasal spray USE 1 SPRAY(S) IN EACH NOSTRIL TWICE DAILY AS NEEDED 30 mL 0     azelastine (OPTIVAR) 0.05 % SOLN Place 1 drop into both eyes 2 times daily as needed Reported on 3/22/2017 (Patient not taking: Reported on 2/25/2022)       canagliflozin (INVOKANA) 300 MG tablet Take 1 tablet (300 mg) by mouth every morning (before breakfast) 90 tablet 1     cetirizine (ZYRTEC) 10 MG tablet Take 10 mg by mouth every morning Takes only PRN       CONTOUR NEXT TEST test strip USE 1 STRIP TO CHECK GLUCOSE ONCE DAILY 100 strip 4     diclofenac (VOLTAREN) 1 % topical gel Apply topically 4 times daily as needed        fish oil-omega-3 fatty acids (FISH OIL) 1000 MG capsule Take 1 g by mouth daily Reported on 3/22/2017       glipiZIDE (GLUCOTROL) 5 MG tablet TAKE 2 TABLETS BY MOUTH TWICE DAILY BEFORE MEAL(S) 360 tablet 1     hydrochlorothiazide (HYDRODIURIL) 25 MG tablet TAKE 1 TABLET BY MOUTH ONCE DAILY IN THE MORNING 90 tablet 0     ibuprofen (ADVIL) 200 MG tablet take 4 tablet (200 mg) by oral route every 8 hours as needed with food       latanoprost (XALATAN) 0.005 % ophthalmic solution Place 1 drop into both eyes At Bedtime  2.5 mL 1     lisinopril (ZESTRIL) 20 MG tablet Take 1 tablet (20 mg) by mouth 2 times daily 180 tablet 1     metFORMIN (GLUCOPHAGE-XR) 500 MG 24 hr tablet Take 2 tablets (1,000 mg) by mouth 2 times daily (with meals) 360 tablet 3     MULTIVITAMIN TABS   OR Reported on 3/22/2017       ondansetron (ZOFRAN-ODT) 4 MG ODT tab Take 1-2 tablets (4-8 mg) by mouth every 8 hours as needed for nausea (Patient not taking: Reported on 2/25/2022) 8 tablet 0     order for DME All diabetic testing supplies including test strips, lancets and solution  "for testing 2 times per day. Patient is using   no Insulin. Last A1c Lab Results       Component                Value               Date                       A1C                      7.9                 08/20/2018 100 each 11     oxyCODONE (ROXICODONE) 5 MG tablet Take 1-2 tablets (5-10 mg) by mouth every 4 hours as needed for moderate to severe pain (Patient not taking: Reported on 2/25/2022) 16 tablet 0     Semaglutide 3 MG TABS Take 3 mg by mouth daily (Patient not taking: Reported on 1/7/2022) 30 tablet 3     sitagliptin (JANUVIA) 100 MG tablet Take 1 tablet (100 mg) by mouth daily 90 tablet 1     timolol maleate (TIMOPTIC) 0.5 % ophthalmic solution INSTILL 1 DROP INTO EACH EYE TWICE DAILY         Allergies   Allergen Reactions     Bromfenac Visual Disturbance     Sulfites, xibrom  Causes sever eye pain     Codeine      \"NAUSE,HA AND DIZZINESS\"     Codeine Nausea     Other reaction(s): Dizziness, Headache     Sulfites Visual Disturbance     Xibrom (bromfenac opthalmic solution) 0.09%--eye pain        Social History     Tobacco Use     Smoking status: Never Smoker     Smokeless tobacco: Never Used   Substance Use Topics     Alcohol use: No     Alcohol/week: 0.0 standard drinks       Recent Labs   Lab Test 01/07/22  1106 11/15/21  0907 08/10/21  1258 03/22/21  0758 03/03/21  0845 09/10/20  0710   HGB 13.9  --   --  14.1   < > 14.2     --   --  285   < > 253   INR  --   --   --  0.98  --  1.00     --  137 138   < > 137   POTASSIUM 3.8  --  4.2 3.9   < > 3.8   CR 0.88  --  0.93 0.82   < > 0.86   A1C  --  7.8* 7.5*  --    < >  --     < > = values in this interval not displayed.          Signed Electronically by: Raisa Davidson MD  Copy of this evaluation report is provided to requesting physician.      "

## 2022-03-15 NOTE — PATIENT INSTRUCTIONS
Plan:  1. In the morning of the surgery day you take with a sip of water just these meds: Atenolol The rest of the meds you resume after surgery.  2.  Labs today - [address]

## 2022-03-21 ENCOUNTER — LAB (OUTPATIENT)
Dept: LAB | Facility: CLINIC | Age: 72
End: 2022-03-21
Payer: COMMERCIAL

## 2022-03-21 DIAGNOSIS — Z11.59 ENCOUNTER FOR SCREENING FOR OTHER VIRAL DISEASES: ICD-10-CM

## 2022-03-21 LAB
ALBUMIN SERPL-MCNC: 3.5 G/DL (ref 3.4–5)
ALP SERPL-CCNC: 94 U/L (ref 40–150)
ALT SERPL W P-5'-P-CCNC: 34 U/L (ref 0–50)
ANION GAP SERPL CALCULATED.3IONS-SCNC: 7 MMOL/L (ref 3–14)
AST SERPL W P-5'-P-CCNC: 22 U/L (ref 0–45)
BILIRUB SERPL-MCNC: 0.4 MG/DL (ref 0.2–1.3)
BUN SERPL-MCNC: 19 MG/DL (ref 7–30)
CALCIUM SERPL-MCNC: 10.4 MG/DL (ref 8.5–10.1)
CHLORIDE BLD-SCNC: 107 MMOL/L (ref 94–109)
CK SERPL-CCNC: 77 U/L (ref 30–225)
CO2 SERPL-SCNC: 25 MMOL/L (ref 20–32)
CREAT SERPL-MCNC: 0.96 MG/DL (ref 0.52–1.04)
CREAT UR-MCNC: 89 MG/DL
ERYTHROCYTE [DISTWIDTH] IN BLOOD BY AUTOMATED COUNT: 13.8 % (ref 10–15)
GFR SERPL CREATININE-BSD FRML MDRD: 62 ML/MIN/1.73M2
GLUCOSE BLD-MCNC: 181 MG/DL (ref 70–99)
HBA1C MFR BLD: 7.7 % (ref 0–5.6)
HCT VFR BLD AUTO: 44.9 % (ref 35–47)
HGB BLD-MCNC: 14.2 G/DL (ref 11.7–15.7)
MCH RBC QN AUTO: 27.6 PG (ref 26.5–33)
MCHC RBC AUTO-ENTMCNC: 31.6 G/DL (ref 31.5–36.5)
MCV RBC AUTO: 87 FL (ref 78–100)
MICROALBUMIN UR-MCNC: 8 MG/L
MICROALBUMIN/CREAT UR: 8.99 MG/G CR (ref 0–25)
PLATELET # BLD AUTO: 266 10E3/UL (ref 150–450)
POTASSIUM BLD-SCNC: 4.4 MMOL/L (ref 3.4–5.3)
PROT SERPL-MCNC: 7.6 G/DL (ref 6.8–8.8)
RBC # BLD AUTO: 5.15 10E6/UL (ref 3.8–5.2)
SARS-COV-2 RNA RESP QL NAA+PROBE: NEGATIVE
SODIUM SERPL-SCNC: 139 MMOL/L (ref 133–144)
TSH SERPL DL<=0.005 MIU/L-ACNC: 3.08 MU/L (ref 0.4–4)
WBC # BLD AUTO: 5.9 10E3/UL (ref 4–11)

## 2022-03-21 PROCEDURE — 36415 COLL VENOUS BLD VENIPUNCTURE: CPT | Performed by: INTERNAL MEDICINE

## 2022-03-21 PROCEDURE — U0005 INFEC AGEN DETEC AMPLI PROBE: HCPCS

## 2022-03-21 PROCEDURE — U0003 INFECTIOUS AGENT DETECTION BY NUCLEIC ACID (DNA OR RNA); SEVERE ACUTE RESPIRATORY SYNDROME CORONAVIRUS 2 (SARS-COV-2) (CORONAVIRUS DISEASE [COVID-19]), AMPLIFIED PROBE TECHNIQUE, MAKING USE OF HIGH THROUGHPUT TECHNOLOGIES AS DESCRIBED BY CMS-2020-01-R: HCPCS

## 2022-03-21 PROCEDURE — 82550 ASSAY OF CK (CPK): CPT | Performed by: INTERNAL MEDICINE

## 2022-03-21 PROCEDURE — 84443 ASSAY THYROID STIM HORMONE: CPT | Performed by: INTERNAL MEDICINE

## 2022-03-21 PROCEDURE — 82043 UR ALBUMIN QUANTITATIVE: CPT | Performed by: INTERNAL MEDICINE

## 2022-03-21 PROCEDURE — 83036 HEMOGLOBIN GLYCOSYLATED A1C: CPT | Performed by: INTERNAL MEDICINE

## 2022-03-21 PROCEDURE — 85027 COMPLETE CBC AUTOMATED: CPT | Performed by: INTERNAL MEDICINE

## 2022-03-21 PROCEDURE — 80053 COMPREHEN METABOLIC PANEL: CPT | Performed by: INTERNAL MEDICINE

## 2022-03-23 ENCOUNTER — ANESTHESIA EVENT (OUTPATIENT)
Dept: SURGERY | Facility: CLINIC | Age: 72
End: 2022-03-23
Payer: COMMERCIAL

## 2022-03-23 ASSESSMENT — LIFESTYLE VARIABLES: TOBACCO_USE: 0

## 2022-03-24 ENCOUNTER — APPOINTMENT (OUTPATIENT)
Dept: GENERAL RADIOLOGY | Facility: CLINIC | Age: 72
End: 2022-03-24
Attending: INTERNAL MEDICINE
Payer: COMMERCIAL

## 2022-03-24 ENCOUNTER — HOSPITAL ENCOUNTER (OUTPATIENT)
Facility: CLINIC | Age: 72
Discharge: HOME | End: 2022-03-24
Attending: INTERNAL MEDICINE | Admitting: INTERNAL MEDICINE
Payer: COMMERCIAL

## 2022-03-24 ENCOUNTER — ANESTHESIA (OUTPATIENT)
Dept: SURGERY | Facility: CLINIC | Age: 72
End: 2022-03-24
Payer: COMMERCIAL

## 2022-03-24 VITALS
WEIGHT: 211 LBS | SYSTOLIC BLOOD PRESSURE: 114 MMHG | HEART RATE: 69 BPM | DIASTOLIC BLOOD PRESSURE: 64 MMHG | OXYGEN SATURATION: 96 % | BODY MASS INDEX: 33.91 KG/M2 | TEMPERATURE: 98 F | RESPIRATION RATE: 16 BRPM | HEIGHT: 66 IN

## 2022-03-24 LAB — ERCP: NORMAL

## 2022-03-24 PROCEDURE — 272N000002 HC OR SUPPLY OTHER OPNP: Performed by: INTERNAL MEDICINE

## 2022-03-24 PROCEDURE — 370N000017 HC ANESTHESIA TECHNICAL FEE, PER MIN: Performed by: INTERNAL MEDICINE

## 2022-03-24 PROCEDURE — 250N000025 HC SEVOFLURANE, PER MIN: Performed by: INTERNAL MEDICINE

## 2022-03-24 PROCEDURE — 999N000141 HC STATISTIC PRE-PROCEDURE NURSING ASSESSMENT: Performed by: INTERNAL MEDICINE

## 2022-03-24 PROCEDURE — 710N000009 HC RECOVERY PHASE 1, LEVEL 1, PER MIN: Performed by: INTERNAL MEDICINE

## 2022-03-24 PROCEDURE — 710N000012 HC RECOVERY PHASE 2, PER MINUTE: Performed by: INTERNAL MEDICINE

## 2022-03-24 PROCEDURE — 258N000003 HC RX IP 258 OP 636: Performed by: NURSE ANESTHETIST, CERTIFIED REGISTERED

## 2022-03-24 PROCEDURE — 258N000003 HC RX IP 258 OP 636: Performed by: ANESTHESIOLOGY

## 2022-03-24 PROCEDURE — 250N000009 HC RX 250: Performed by: NURSE ANESTHETIST, CERTIFIED REGISTERED

## 2022-03-24 PROCEDURE — 88305 TISSUE EXAM BY PATHOLOGIST: CPT | Mod: TC | Performed by: INTERNAL MEDICINE

## 2022-03-24 PROCEDURE — 272N000001 HC OR GENERAL SUPPLY STERILE: Performed by: INTERNAL MEDICINE

## 2022-03-24 PROCEDURE — 250N000009 HC RX 250: Performed by: INTERNAL MEDICINE

## 2022-03-24 PROCEDURE — 360N000076 HC SURGERY LEVEL 3, PER MIN: Performed by: INTERNAL MEDICINE

## 2022-03-24 PROCEDURE — 250N000011 HC RX IP 250 OP 636: Performed by: NURSE ANESTHETIST, CERTIFIED REGISTERED

## 2022-03-24 PROCEDURE — 99207 PR NO BILLABLE SERVICE THIS VISIT: CPT | Performed by: INTERNAL MEDICINE

## 2022-03-24 PROCEDURE — C1769 GUIDE WIRE: HCPCS | Performed by: INTERNAL MEDICINE

## 2022-03-24 PROCEDURE — 74330 X-RAY BILE/PANC ENDOSCOPY: CPT

## 2022-03-24 RX ORDER — SODIUM CHLORIDE, SODIUM LACTATE, POTASSIUM CHLORIDE, CALCIUM CHLORIDE 600; 310; 30; 20 MG/100ML; MG/100ML; MG/100ML; MG/100ML
INJECTION, SOLUTION INTRAVENOUS CONTINUOUS
Status: DISCONTINUED | OUTPATIENT
Start: 2022-03-24 | End: 2022-03-24 | Stop reason: HOSPADM

## 2022-03-24 RX ORDER — NALOXONE HYDROCHLORIDE 0.4 MG/ML
0.4 INJECTION, SOLUTION INTRAMUSCULAR; INTRAVENOUS; SUBCUTANEOUS
Status: DISCONTINUED | OUTPATIENT
Start: 2022-03-24 | End: 2022-03-24 | Stop reason: HOSPADM

## 2022-03-24 RX ORDER — FENTANYL CITRATE 0.05 MG/ML
25 INJECTION, SOLUTION INTRAMUSCULAR; INTRAVENOUS
Status: DISCONTINUED | OUTPATIENT
Start: 2022-03-24 | End: 2022-03-24 | Stop reason: HOSPADM

## 2022-03-24 RX ORDER — ONDANSETRON 2 MG/ML
INJECTION INTRAMUSCULAR; INTRAVENOUS PRN
Status: DISCONTINUED | OUTPATIENT
Start: 2022-03-24 | End: 2022-03-24

## 2022-03-24 RX ORDER — HYDROMORPHONE HCL IN WATER/PF 6 MG/30 ML
0.2 PATIENT CONTROLLED ANALGESIA SYRINGE INTRAVENOUS EVERY 5 MIN PRN
Status: DISCONTINUED | OUTPATIENT
Start: 2022-03-24 | End: 2022-03-24 | Stop reason: HOSPADM

## 2022-03-24 RX ORDER — FLUMAZENIL 0.1 MG/ML
0.2 INJECTION, SOLUTION INTRAVENOUS
Status: DISCONTINUED | OUTPATIENT
Start: 2022-03-24 | End: 2022-03-24 | Stop reason: HOSPADM

## 2022-03-24 RX ORDER — PROPOFOL 10 MG/ML
INJECTION, EMULSION INTRAVENOUS PRN
Status: DISCONTINUED | OUTPATIENT
Start: 2022-03-24 | End: 2022-03-24

## 2022-03-24 RX ORDER — FENTANYL CITRATE 0.05 MG/ML
50 INJECTION, SOLUTION INTRAMUSCULAR; INTRAVENOUS
Status: DISCONTINUED | OUTPATIENT
Start: 2022-03-24 | End: 2022-03-24 | Stop reason: HOSPADM

## 2022-03-24 RX ORDER — NALOXONE HYDROCHLORIDE 0.4 MG/ML
0.2 INJECTION, SOLUTION INTRAMUSCULAR; INTRAVENOUS; SUBCUTANEOUS
Status: DISCONTINUED | OUTPATIENT
Start: 2022-03-24 | End: 2022-03-24 | Stop reason: HOSPADM

## 2022-03-24 RX ORDER — LIDOCAINE HYDROCHLORIDE 20 MG/ML
INJECTION, SOLUTION INFILTRATION; PERINEURAL PRN
Status: DISCONTINUED | OUTPATIENT
Start: 2022-03-24 | End: 2022-03-24

## 2022-03-24 RX ORDER — ONDANSETRON 2 MG/ML
4 INJECTION INTRAMUSCULAR; INTRAVENOUS
Status: DISCONTINUED | OUTPATIENT
Start: 2022-03-24 | End: 2022-03-24 | Stop reason: HOSPADM

## 2022-03-24 RX ORDER — ONDANSETRON 2 MG/ML
4 INJECTION INTRAMUSCULAR; INTRAVENOUS EVERY 30 MIN PRN
Status: DISCONTINUED | OUTPATIENT
Start: 2022-03-24 | End: 2022-03-24 | Stop reason: HOSPADM

## 2022-03-24 RX ORDER — PROPOFOL 10 MG/ML
INJECTION, EMULSION INTRAVENOUS CONTINUOUS PRN
Status: DISCONTINUED | OUTPATIENT
Start: 2022-03-24 | End: 2022-03-24

## 2022-03-24 RX ORDER — FENTANYL CITRATE 50 UG/ML
INJECTION, SOLUTION INTRAMUSCULAR; INTRAVENOUS PRN
Status: DISCONTINUED | OUTPATIENT
Start: 2022-03-24 | End: 2022-03-24

## 2022-03-24 RX ORDER — LIDOCAINE 40 MG/G
CREAM TOPICAL
Status: DISCONTINUED | OUTPATIENT
Start: 2022-03-24 | End: 2022-03-24 | Stop reason: HOSPADM

## 2022-03-24 RX ORDER — ONDANSETRON 2 MG/ML
4 INJECTION INTRAMUSCULAR; INTRAVENOUS EVERY 6 HOURS PRN
Status: DISCONTINUED | OUTPATIENT
Start: 2022-03-24 | End: 2022-03-24 | Stop reason: HOSPADM

## 2022-03-24 RX ORDER — EPHEDRINE SULFATE 50 MG/ML
INJECTION, SOLUTION INTRAMUSCULAR; INTRAVENOUS; SUBCUTANEOUS PRN
Status: DISCONTINUED | OUTPATIENT
Start: 2022-03-24 | End: 2022-03-24

## 2022-03-24 RX ORDER — INDOMETHACIN 50 MG/1
SUPPOSITORY RECTAL PRN
Status: DISCONTINUED | OUTPATIENT
Start: 2022-03-24 | End: 2022-03-24 | Stop reason: HOSPADM

## 2022-03-24 RX ORDER — ONDANSETRON 4 MG/1
4 TABLET, ORALLY DISINTEGRATING ORAL EVERY 30 MIN PRN
Status: DISCONTINUED | OUTPATIENT
Start: 2022-03-24 | End: 2022-03-24 | Stop reason: HOSPADM

## 2022-03-24 RX ORDER — FENTANYL CITRATE 50 UG/ML
50 INJECTION, SOLUTION INTRAMUSCULAR; INTRAVENOUS EVERY 5 MIN PRN
Status: DISCONTINUED | OUTPATIENT
Start: 2022-03-24 | End: 2022-03-24 | Stop reason: HOSPADM

## 2022-03-24 RX ORDER — ONDANSETRON 4 MG/1
4 TABLET, ORALLY DISINTEGRATING ORAL EVERY 6 HOURS PRN
Status: DISCONTINUED | OUTPATIENT
Start: 2022-03-24 | End: 2022-03-24 | Stop reason: HOSPADM

## 2022-03-24 RX ORDER — MEPERIDINE HYDROCHLORIDE 25 MG/ML
12.5 INJECTION INTRAMUSCULAR; INTRAVENOUS; SUBCUTANEOUS
Status: DISCONTINUED | OUTPATIENT
Start: 2022-03-24 | End: 2022-03-24 | Stop reason: HOSPADM

## 2022-03-24 RX ADMIN — LIDOCAINE HYDROCHLORIDE 100 MG: 20 INJECTION, SOLUTION INFILTRATION; PERINEURAL at 07:33

## 2022-03-24 RX ADMIN — FENTANYL CITRATE 100 MCG: 50 INJECTION, SOLUTION INTRAMUSCULAR; INTRAVENOUS at 07:33

## 2022-03-24 RX ADMIN — Medication 5 MG: at 07:43

## 2022-03-24 RX ADMIN — SODIUM CHLORIDE, POTASSIUM CHLORIDE, SODIUM LACTATE AND CALCIUM CHLORIDE: 600; 310; 30; 20 INJECTION, SOLUTION INTRAVENOUS at 07:30

## 2022-03-24 RX ADMIN — ONDANSETRON 4 MG: 2 INJECTION INTRAMUSCULAR; INTRAVENOUS at 08:10

## 2022-03-24 RX ADMIN — PHENYLEPHRINE HYDROCHLORIDE 100 MCG: 10 INJECTION INTRAVENOUS at 07:51

## 2022-03-24 RX ADMIN — PHENYLEPHRINE HYDROCHLORIDE 100 MCG: 10 INJECTION INTRAVENOUS at 08:03

## 2022-03-24 RX ADMIN — PHENYLEPHRINE HYDROCHLORIDE 100 MCG: 10 INJECTION INTRAVENOUS at 07:43

## 2022-03-24 RX ADMIN — ROCURONIUM BROMIDE 50 MG: 50 INJECTION, SOLUTION INTRAVENOUS at 07:34

## 2022-03-24 RX ADMIN — Medication 5 MG: at 07:51

## 2022-03-24 RX ADMIN — PROPOFOL 20 MCG/KG/MIN: 10 INJECTION, EMULSION INTRAVENOUS at 07:50

## 2022-03-24 RX ADMIN — PROPOFOL 200 MG: 10 INJECTION, EMULSION INTRAVENOUS at 07:33

## 2022-03-24 RX ADMIN — SUGAMMADEX 200 MG: 100 INJECTION, SOLUTION INTRAVENOUS at 08:17

## 2022-03-24 ASSESSMENT — ENCOUNTER SYMPTOMS
DYSRHYTHMIAS: 1
ORTHOPNEA: 0
SEIZURES: 0

## 2022-03-24 NOTE — ANESTHESIA PREPROCEDURE EVALUATION
Anesthesia Pre-Procedure Evaluation    Patient: Gricelda Sabillon   MRN: [id number] : 1950        Procedure : Procedure(s):  ESOPHAGOGASTRODUODENOSCOPY (EGD)  ENDOSCOPIC RETROGRADE CHOLANGIOPANCREATOGRAPHY          Past Medical History:   Diagnosis Date     Allergic rhinitis, cause unspecified      Asthma      Asthma, mild intermittent      Cataract      Cellulitis and abscess of leg, except foot     Bilateral     Eczema      Heart murmur     aortic area     HTN, goal below 140/90      Hyperlipidemia LDL goal < 100      Hyperplastic colon polyp      Infection     bilateral eye infections     Macular hole of left eye      Melanoma (H)     RIGHT KNEE     Obesity (BMI 30-39.9)      Osteoarthritis     knees     Other chronic pain     right knee     Sleep apnea     uses c pap     Type 2 diabetes, HbA1C goal < 8% (H)       Past Surgical History:   Procedure Laterality Date     ARTHROPLASTY HIP Right 2017    Procedure: ARTHROPLASTY HIP;  Right total hip arthroplasty  ;  Surgeon: Ayaan Pena MD;  [facility]: [facility]     ARTHROPLASTY KNEE  2013    Procedure: ARTHROPLASTY KNEE;  right total knee arthroplasty ;  Surgeon: Chaparro Wesley MD;  Location: [facility]     ARTHROSCOPY KNEE Right 2015    Procedure: ARTHROSCOPY KNEE;  Surgeon: Ayaan Pena MD;  Location: [facility]     C INCISION OF HYMEN       CATARACT IOL, RT/LT       COLONOSCOPY       COLONOSCOPY N/A 2019    Procedure: Colonoscopy with polypectomy;  Surgeon: Shaun Guerrero MD;  [facility]     ENDOSCOPIC RETROGRADE CHOLANGIOPANCREATOGRAM N/A 2019    Procedure: COMBINED ENDOSCOPIC RETROGRADE CHOLANGIOPANCREATOGRAPHY, BILIARY SPINCTEROTOMY AND DILATION, PLACE BILE DUCT STENT;  Surgeon: Guru Andie Gonzalez MD;  [facility]     ENDOSCOPIC RETROGRADE CHOLANGIOPANCREATOGRAM N/A 2019    Procedure: Endoscopic Retrograde Cholangiopancreatogram, Bile duct stent removal and placement;   Surgeon: Shaun Guerrero MD;  [facility]     ENDOSCOPIC RETROGRADE CHOLANGIOPANCREATOGRAM N/A 4/18/2019    Procedure: COMBINED ENDOSCOPIC RETROGRADE CHOLANGIOPANCREATOGRAPHY, Bile duct stent exchange and Polypectomy;  Surgeon: Shaun Guerrero MD;  [facility]     ENDOSCOPIC RETROGRADE CHOLANGIOPANCREATOGRAM N/A 10/18/2019    Procedure: Endoscopic Retrograde Cholangiopancreatogram;  Surgeon: Shaun Guerrero MD;  [facility]     ENDOSCOPIC RETROGRADE CHOLANGIOPANCREATOGRAM WITH SPYGLASS N/A 7/26/2019    Procedure: Endoscopic Retrograde Cholangiopancreatogram With Spyglas,l Radiofrequency Ablation, Stent Exchangeand Duodenal Biopsy x2;  Surgeon: Shaun Guerrero MD;  [facility]     ENDOSCOPIC RETROGRADE CHOLANGIOPANCREATOGRAM WITH SPYGLASS N/A 9/10/2020    Procedure: ENDOSCOPIC RETROGRADE CHOLANGIOPANCREATOGRAPHY, WITH DIRECT DUCT VISUALIZATION, USING PANCREATICOBILIARY FIBEROPTIC PROBE;  Surgeon: Shaun Guerrero MD;  [facility]     ENDOSCOPIC RETROGRADE CHOLANGIOPANCREATOGRAM WITH SPYGLASS N/A 3/22/2021    Procedure: ENDOSCOPIC RETROGRADE CHOLANGIOPANCREATOGRAPHY, WITH DIRECT DUCT VISUALIZATION, USING PANCREATICOBILIARY FIBEROPTIC PROBE;  Surgeon: Shaun Guerrero MD;  [facility]     ESOPHAGOSCOPY, GASTROSCOPY, DUODENOSCOPY (EGD) WITH RADIO FREQUENCY ABLATION, COMBINED N/A 9/10/2020    Procedure: possible repeat ESOPHAGOGASTRODUODENOSCOPY, WITH RADIOFREQUENCY ABLATION and endoscopic mucosal resection;  Surgeon: Shaun Guerrero MD;  [facility]     ESOPHAGOSCOPY, GASTROSCOPY, DUODENOSCOPY (EGD), COMBINED N/A 4/18/2019    Procedure: upper endoscopy with polypectomy;  Surgeon: Shaun Guerrero MD;  [facility]     ESOPHAGOSCOPY, GASTROSCOPY, DUODENOSCOPY (EGD), COMBINED N/A 10/18/2019    Procedure: Upper Endoscopy and ERCP with stent removal, stone removal and biopsy;  Surgeon: Shaun Guerrero MD;  [facility]     ESOPHAGOSCOPY, GASTROSCOPY, DUODENOSCOPY (EGD), RESECT MUCOSA, COMBINED N/A  "2/28/2019    Procedure: Upper Endoscopy, Endoscopic Ultrasound, Endoscopic Mucosal Resection,  Ampullectomy, polypectomy.;  Surgeon: Shaun Guerrero MD;  [facility]     ESOPHAGOSCOPY, GASTROSCOPY, DUODENOSCOPY (EGD), RESECT MUCOSA, COMBINED N/A 3/22/2021    Procedure: ESOPHAGOGASTRODUODENOSCOPY (EGD) with  endoscopic mucosal resection/ polypectomy;  Surgeon: Shaun Guerrero MD;  [facility]     EXCISE MASS LOWER EXTREMITY Right 1/13/2022    Procedure: excision of right thigh mass;  Surgeon: Salvador Kelly MD;  Location: [facility]     EYE SURGERY      macular hole repaired left eye     HC KNEE SCOPE, DIAGNOSTIC      - both knees     HEAD & NECK SURGERY      wisdom teeth      Allergies   Allergen Reactions     Bromfenac Visual Disturbance     Sulfites, xibrom  Causes sever eye pain     Codeine      \"NAUSE,HA AND DIZZINESS\"     Codeine Nausea     Other reaction(s): Dizziness, Headache     Sulfites Visual Disturbance     Xibrom (bromfenac opthalmic solution) 0.09%--eye pain      Social History     Tobacco Use     Smoking status: Never Smoker     Smokeless tobacco: Never Used   Substance Use Topics     Alcohol use: No     Alcohol/week: 0.0 standard drinks      Wt Readings from Last 1 Encounters:   03/15/22 96.7 kg (213 lb 3.2 oz)        Prior to Admission medications    Medication Sig Start Date End Date Taking? Authorizing Provider   acetaminophen (ACETAMINOPHEN 8 HOUR) 650 MG CR tablet Take 650 mg by mouth every 8 hours as needed for mild pain or fever     Reported, Patient   albuterol (PROAIR HFA/PROVENTIL HFA/VENTOLIN HFA) 108 (90 Base) MCG/ACT inhaler Inhale 2 puffs into the lungs every 4 hours as needed for shortness of breath / dyspnea 3/20/20   Raisa Davidson MD   aspirin 81 MG EC tablet Take 1 tablet (81 mg) by mouth daily    Clara Corado MD   atenolol (TENORMIN) 50 MG tablet Take 1 tablet by mouth once daily 3/2/22   Raisa Davidson MD   atorvastatin (LIPITOR) 20 " MG tablet Take 1 tablet (20 mg) by mouth daily 8/17/21   Raisa Davidson MD   azelastine (ASTELIN) 0.1 % nasal spray USE 1 SPRAY(S) IN EACH NOSTRIL TWICE DAILY AS NEEDED 4/6/21   Raisa Davidson MD   azelastine (OPTIVAR) 0.05 % SOLN Place 1 drop into both eyes 2 times daily as needed Reported on 3/22/2017    Unknown, Entered By History   canagliflozin (INVOKANA) 300 MG tablet Take 1 tablet (300 mg) by mouth every morning (before breakfast) 11/22/21   Raisa Davidson MD   cetirizine (ZYRTEC) 10 MG tablet Take 10 mg by mouth every morning Takes only PRN    Unknown, Entered By History   CONTOUR NEXT TEST test strip USE 1 STRIP TO CHECK GLUCOSE ONCE DAILY 3/29/21   Raisa Davidson MD   diclofenac (VOLTAREN) 1 % topical gel Apply topically 4 times daily as needed     Reported, Patient   fish oil-omega-3 fatty acids (FISH OIL) 1000 MG capsule Take 1 g by mouth daily Reported on 3/22/2017    Unknown, Entered By History   glipiZIDE (GLUCOTROL) 5 MG tablet TAKE 2 TABLETS BY MOUTH TWICE DAILY BEFORE MEAL(S) 8/17/21   Raisa Davidson MD   hydrochlorothiazide (HYDRODIURIL) 25 MG tablet TAKE 1 TABLET BY MOUTH ONCE DAILY IN THE MORNING 12/14/21   Raisa Davidson MD   ibuprofen (ADVIL) 200 MG tablet take 4 tablet (200 mg) by oral route every 8 hours as needed with food    Reported, Patient   latanoprost (XALATAN) 0.005 % ophthalmic solution Place 1 drop into both eyes At Bedtime  8/26/14   Raisa Davidson MD   lisinopril (ZESTRIL) 20 MG tablet Take 1 tablet (20 mg) by mouth 2 times daily 8/17/21   Raisa Davidson MD   metFORMIN (GLUCOPHAGE-XR) 500 MG 24 hr tablet Take 2 tablets (1,000 mg) by mouth 2 times daily (with meals) 3/23/21   Raisa Davidson MD   MULTIVITAMIN TABS   OR Reported on 3/22/2017    Reported, Patient   ondansetron (ZOFRAN-ODT) 4 MG ODT tab Take 1-2 tablets (4-8 mg) by  mouth every 8 hours as needed for nausea 1/13/22   Salvador Kelly MD   order for DME All diabetic testing supplies including test strips, lancets and solution for testing 2 times per day. Patient is using   no Insulin. Last A1c Lab Results       Component                Value               Date                       A1C                      7.9                 08/20/2018 8/27/18   Raisa Davidson MD   oxyCODONE (ROXICODONE) 5 MG tablet Take 1-2 tablets (5-10 mg) by mouth every 4 hours as needed for moderate to severe pain 1/13/22   Salvador Kelly MD   Semaglutide 3 MG TABS Take 3 mg by mouth daily 11/22/21   Raisa Davidson MD   sitagliptin (JANUVIA) 100 MG tablet Take 1 tablet (100 mg) by mouth daily 8/17/21   Raisa Davidson MD   timolol maleate (TIMOPTIC) 0.5 % ophthalmic solution INSTILL 1 DROP INTO EACH EYE TWICE DAILY 4/30/21   Reported, Patient     Anesthesia Evaluation   Pt has had prior anesthetic. Type: General (Mullen 2 = Grade 1 View previously).    No history of anesthetic complications       ROS/MED HX  ENT/Pulmonary:     (+) sleep apnea, uses CPAP, allergic rhinitis, Intermittent, asthma  (-) tobacco use and recent URI   Neurologic:    (-) no seizures and no CVA   Cardiovascular:     (+) Dyslipidemia hypertension-----dysrhythmias, Other, valvular problems/murmurs no significant valvular dz on prior available echo exams. Previous cardiac testing   Echo: Date: Results:    Stress Test: Date: 3/2012 Results:  II.  Myocardial Perfusion Scintigraphy    1.  Myocardial perfusion using single isotope technique demonstrated   no evidence for ischemia or infarction.   2.  Gating demonstrate a normal LV cavity size with mildly reduced LV   systolic function with septal wall motion abnormality.   3.  The ejection fraction is measured at 51%.   4.  I do not have a previous available for comparison    ECG Reviewed: Date: Results:  NSR, LAD, downward QRS complex  leads II & III - ? Old Inf MI. no change from older ECGs  Cath: Date: Results:   (-) angina, CAD, orthopnea/PND, syncope, angina, murmur and wheezes   METS/Exercise Tolerance:     Hematologic:       Musculoskeletal:       GI/Hepatic: Comment: GI Polyp   (-) GERD and liver disease   Renal/Genitourinary:    (-) renal disease   Endo:     (+) type II DM, Not using insulin, Obesity,  (-) thyroid disease   Psychiatric/Substance Use:       Infectious Disease:       Malignancy:   (+) Malignancy, History of Skin.    Other:      (+) , H/O Chronic Pain,        Physical Exam    Airway        Mallampati: II   TM distance: > 3 FB   Neck ROM: full   Mouth opening: > 3 cm    Respiratory Devices and Support         Dental  no notable dental history         Cardiovascular          Rhythm and rate: regular and normal (-) no murmur    Pulmonary           breath sounds clear to auscultation   (-) no rhonchi and no wheezes        OUTSIDE LABS:  CBC:   Lab Results   Component Value Date    WBC 5.9 03/21/2022    WBC 7.0 01/07/2022    HGB 14.2 03/21/2022    HGB 13.9 01/07/2022    HCT 44.9 03/21/2022    HCT 43.2 01/07/2022     03/21/2022     01/07/2022     BMP:   Lab Results   Component Value Date     03/21/2022     01/07/2022    POTASSIUM 4.4 03/21/2022    POTASSIUM 3.8 01/07/2022    CHLORIDE 107 03/21/2022    CHLORIDE 107 01/07/2022    CO2 25 03/21/2022    CO2 23 01/07/2022    BUN 19 03/21/2022    BUN 18 01/07/2022    CR 0.96 03/21/2022    CR 0.88 01/07/2022     (H) 03/21/2022     (H) 01/13/2022     COAGS:   Lab Results   Component Value Date    INR 0.98 03/22/2021     POC:   Lab Results   Component Value Date     (H) 03/22/2021     HEPATIC:   Lab Results   Component Value Date    ALBUMIN 3.5 03/21/2022    PROTTOTAL 7.6 03/21/2022    ALT 34 03/21/2022    AST 22 03/21/2022    ALKPHOS 94 03/21/2022    BILITOTAL 0.4 03/21/2022     OTHER:   Lab Results   Component Value Date    A1C 7.7 (H)  03/21/2022    KATHRYN 10.4 (H) 03/21/2022    LIPASE 347 03/22/2021    AMYLASE 73 03/22/2021    TSH 3.08 03/21/2022    T4 1.17 02/04/2016    CRP <5.0 01/07/2014    SED 16 01/07/2014       Anesthesia Plan    ASA Status:  3   NPO Status:  NPO Appropriate    Anesthesia Type: General.     - Airway: ETT   Induction: Propofol, Intravenous.   Maintenance: Balanced.        Consents    Anesthesia Plan(s) and associated risks, benefits, and realistic alternatives discussed. Questions answered and patient/representative(s) expressed understanding.    - Discussed:     - Discussed with:  Patient         Postoperative Care    Pain management: Multi-modal analgesia.   PONV prophylaxis: Ondansetron (or other 5HT-3), Dexamethasone or Solumedrol, Background Propofol Infusion     Comments:                Santo Hubbard MD

## 2022-03-24 NOTE — INTERVAL H&P NOTE
"I have reviewed the surgical (or preoperative) H&P that is linked to this encounter, and examined the patient. There are no significant changes    Clinical Conditions Present on Arrival:  SECTIONPRESENTONADMISSIONBEGIN@           # Hypercalcemia: Ca = 10.4 mg/dL (Ref range: 8.5 - 10.1 mg/dL) and/or iCa = N/A on admission, will monitor as appropriate       # Platelet Defect: home medication list includes an antiplatelet medication  # Diabetes, type II: last A1C 7.7 % (Ref range: 0.0 - 5.6 %)  # Obesity: Estimated body mass index is 34.06 kg/m  as calculated from the following:    Height as of this encounter: 1.676 m (5' 6\").    Weight as of this encounter: 95.7 kg (211 lb).       "

## 2022-03-24 NOTE — ANESTHESIA PROCEDURE NOTES
Airway       Patient location: [facility] - Operating Room or Procedural Area.       Procedure Start/Stop Times: 3/24/2022 7:38 AM  Staff -        Anesthesiologist:  Santo uHbbard MD       CRNA: Ayaan Valadez APRN CRNA       Performed By: CRNAIndications and Patient Condition       Indications for airway management: rita-procedural and airway protection       Induction type:intravenous       Mask difficulty assessment: 2 - vent by mask + OA or adjuvant +/- NMBA    Final Airway Details       Final airway type: endotracheal airway       Successful airway: ETT - single  Endotracheal Airway Details        ETT size (mm): 7.0       Cuffed: yes       Cuff volume (mL): 10       Successful intubation technique: direct laryngoscopy       DL Blade Type: Mullen 2       Grade View of Cords: 1       Adjucts: stylet       Position: Right       Measured from: lips       Secured at (cm): 21       Bite block used: None    Post intubation assessment        Placement verified by: capnometry, equal breath sounds and chest rise        Number of attempts at approach: 1       Number of other approaches attempted: 0       Secured with: pink tape       Ease of procedure: easy       Dentition: Intact and Unchanged

## 2022-03-24 NOTE — DISCHARGE INSTRUCTIONS
Same Day Surgery Discharge Instructions for  Sedation and General Anesthesia       It's not unusual to feel dizzy, light-headed or faint for up to 24 hours after surgery or while taking pain medication.  If you have these symptoms: sit for a few minutes before standing and have someone assist you when you get up to walk or use the bathroom.      You should rest and relax for the next 24 hours. We recommend you make arrangements to have an adult stay with you for at least 24 hours after your discharge.  Avoid hazardous and strenuous activity.      DO NOT DRIVE any vehicle or operate mechanical equipment for 24 hours following the end of your surgery.  Even though you may feel normal, your reactions may be affected by the medication you have received.      Do not drink alcoholic beverages for 24 hours following surgery.       Slowly progress to your regular diet as you feel able. It's not unusual to feel nauseated and/or vomit after receiving anesthesia.  If you develop these symptoms, drink clear liquids (apple juice, ginger ale, broth, 7-up, etc. ) until you feel better.  If your nausea and vomiting persists for 24 hours, please notify your surgeon.        All narcotic pain medications, along with inactivity and anesthesia, can cause constipation. Drinking plenty of liquids and increasing fiber intake will help.      For any questions of a medical nature, call your surgeon.      Do not make important decisions for 24 hours.      If you had general anesthesia, you may have a sore throat for a couple of days related to the breathing tube used during surgery.  You may use Cepacol lozenges to help with this discomfort.  If it worsens or if you develop a fever, contact your surgeon.       If you feel your pain is not well managed with the pain medications prescribed by your surgeon, please contact your surgeon's office to let them know so they can address your concerns.       CoVid 19 Information    We want to give you  information regarding Covid. Please consult your primary care provider with any questions you might have.     Patient who have symptoms (cough, fever, or shortness of breath), need to isolate for 7 days from when symptoms started OR 72 hours after fever resolves (without fever reducing medications) AND improvement of respiratory symptoms (whichever is longer).      Isolate yourself at home (in own room/own bathroom if possible)    Do Not allow any visitors    Do Not go to work or school    Do Not go to [place],  centers, shopping, or other public places.    Do Not shake hands.    Avoid close and intimate contact with others (hugging, kissing).    Follow CDC recommendations for household cleaning of frequently touched services.     After the initial 7 days, continue to isolate yourself from household members as much as possible. To continue decrease the risk of community spread and exposure, you and any members of your household should limit activities in public for 14 days after starting home isolation.     You can reference the following CDC link for helpful home isolation/care tips:  https://www.cdc.gov/coronavirus/2019-ncov/downloads/10Things.pdf    Protect Others:    Cover Your Mouth and Nose with a mask, disposable tissue or wash cloth to avoid spreading germs to others.    Wash your hands and face frequently with soap and water    Call Your Primary Doctor If: Breathing difficulty develops or you become worse.    For more information about COVID19 and options for caring for yourself at home, please visit the CDC website at https://www.cdc.gov/coronavirus/2019-ncov/about/steps-when-sick.html  For more options for care at [facility], please visit our website at [email or web address]        **If you have questions or concerns about your procedure,   call Dr. Guerrero at [phone]**

## 2022-03-24 NOTE — ANESTHESIA CARE TRANSFER NOTE
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    Anesthesia Type:   General     Note:    Oropharynx: oropharynx clear of all foreign objects and spontaneously breathing  Level of Consciousness: awake  Oxygen Supplementation: face mask  Level of Supplemental Oxygen (L/min / FiO2): 6  Independent Airway: airway patency satisfactory and stable  Dentition: dentition unchanged  Vital Signs Stable: post-procedure vital signs reviewed and stable  Report to RN Given: handoff report given  Patient transferred to: PACU    Handoff Report: Identifed the Patient, Identified the Reponsible Provider, Reviewed the pertinent medical history, Discussed the surgical course, Reviewed Intra-OP anesthesia mangement and issues during anesthesia, Set expectations for post-procedure period and Allowed opportunity for questions and acknowledgement of understanding      Vitals:  Vitals Value Taken Time   BP     Temp     Pulse     Resp 12 03/24/22 0826   SpO2 99 % 03/24/22 0826   Vitals shown include unvalidated device data.    Electronically Signed By: DENYS Hardin CRNA  March 24, 2022  8:27 AM

## 2022-03-24 NOTE — ANESTHESIA POSTPROCEDURE EVALUATION
Patient: Gricelda Sabillon    Procedure: Procedure(s):  ESOPHAGOGASTRODUODENOSCOPY (EGD), Duodenal  polyp removal  ENDOSCOPIC RETROGRADE CHOLANGIOPANCREATOGRAPHY       Anesthesia Type:  General    Note:  Disposition: Outpatient   Postop Pain Control: Uneventful            Sign Out: Well controlled pain   PONV: No   Neuro/Psych: Uneventful            Sign Out: Acceptable/Baseline neuro status   Airway/Respiratory: Uneventful            Sign Out: Acceptable/Baseline resp. status   CV/Hemodynamics: Uneventful            Sign Out: Acceptable CV status   Other NRE: NONE   DID A NON-ROUTINE EVENT OCCUR? No    Event details/Postop Comments:  Pt doing well prior to discharge home.             Last vitals:  Vitals Value Taken Time   /61 03/24/22 0915   Temp 36.8  C (98.2  F) 03/24/22 0915   Pulse 70 03/24/22 0917   Resp 19 03/24/22 0917   SpO2 96 % 03/24/22 0916   Vitals shown include unvalidated device data.    Electronically Signed By: Santo Hubbard MD  March 24, 2022  11:58 AM

## 2022-03-24 NOTE — OP NOTE
ERCP 03/24/2022  7:05 AM [address] [address] [place]  [address]   _______________________________________________________________________________   Patient Name: Gricelda Sabillon         Procedure Date: 3/24/2022 7:05 AM   MRN: [id number]                       Account Number: [id number]   YOB: 1950              Admit Type: Outpatient   Age: 71                               Room: [place] [address]   Note Status: Supervisor Override      Attending MD: LAVINIA DRAKE MD   Instrument Name: 409 TJF-Q180V ERCP,402 GIF- Gastroscope   _______________________________________________________________________________       Procedure:                ERCP   Indications:              Follow-up of periampullary tumor, h/o ampullary                             adenoma with large periampullary spread and                             intraductal polyp extension into the CBD s/p                             papillectomy, EMR, and intraductal?RFA. Here for                             surveillance. Last surveillance ~1 year ago. 4 mm                             area of residual noted last time and resected but                             pathology just showed peptic injury and no adenoma.   Providers:                LAVINIA DRAKE MD, Ileana Amos RN, [facility],                             Technician   Referring MD:               Requesting Provider:      Raisa Davidson MD   Medicines:                General Anesthesia, Indomethacin 100 mg ME   Complications:            No immediate complications. Estimated blood loss:                             Minimal.   _______________________________________________________________________________   Procedure:                Pre-Anesthesia Assessment:                             - Prior to the procedure, a History and Physical                             was performed, and patient medications and                             allergies were  reviewed. The patient is competent.                             The risks and benefits of the procedure and the                             sedation options and risks were discussed with the                             patient. All questions were answered and informed                             consent was obtained. Patient identification and                             proposed procedure were verified by the physician,                             the nurse, the anesthesiologist and the anesthetist                             in the procedure room. Mental Status Examination:                             alert and oriented. Airway Examination: normal                             oropharyngeal airway and neck mobility. Respiratory                             Examination: clear to auscultation. CV Examination:                             normal. Prophylactic Antibiotics: The patient does                             not require prophylactic antibiotics. Prior                             Anticoagulants: The patient has taken no                             anticoagulant or antiplatelet agents. ASA Grade                             Assessment: II - A patient with mild systemic                             disease. After reviewing the risks and benefits,                             the patient was deemed in satisfactory condition to                             undergo the procedure. The anesthesia plan was to                             use general anesthesia. Immediately prior to                             administration of medications, the patient was                             re-assessed for adequacy to receive sedatives. The                             heart rate, respiratory rate, oxygen saturations,                             blood pressure, adequacy of pulmonary ventilation,                             and response to care were monitored throughout the                             procedure. The physical status of  the patient was                             re-assessed after the procedure.                             After obtaining informed consent, the scope was                             passed under direct vision. Throughout the                             procedure, the patient's blood pressure, pulse, and                             oxygen saturations were monitored continuously. The                             Duodenoscope was introduced through the mouth, and                             used to inject contrast into and used to inject                             contrast into the bile duct. The Endoscope was                             introduced through the mouth, and used to inject                             contrast into and used to inject contrast into the                             bile duct. The ERCP was accomplished without                             difficulty. The patient tolerated the procedure                             well.                                                                                     Findings:        The  film was normal. The esophagus was successfully intubated        under direct vision. The scope was advanced from the mouth to the        duodenum. The pharynx, larynx and associated structures, as well as the        upper GI tract, were normal. Inspection of the major papilla revealed        that an ampullectomy (papillectomy) had been performed previously. The        major papilla was normal otherwise. The bile duct was deeply cannulated        with the 12 mm balloon. Contrast was injected. I personally interpreted        the bile duct images. There was brisk flow of contrast through the        ducts. Image quality was excellent. Contrast extended to the main bile        duct. The main bile duct was normal. Visiglide wire was passed into the        biliary tree. The biliary tree was swept with a 12 mm balloon starting        at the bifurcation. Nothing was found. The  scope was passed under direct        vision through the upper GI tract. A single 5 mm sessile polyp was found        in the second portion of the duodenum. The polyp was removed with a cold        snare. The polypectomy was performed through the        esophagogastroduodenoscope. Resection and retrieval were complete.                                                                                     Impression:               - 5 mm possible residual duodenal polyp versus scar                             tissue seen at D2 on posterior wall at the same                             level of major papilla. Same location as seen on                             last procedure but with that pathology showing just                             peptic injury last time. Resected with cold snare                             and retrieved out of abundance of caution.                             - Prior endoscopic papillectomy                             - No other residual polyp seen with either the                             forward viewing gastroscope or the side viewing                             duodenoscope                             - The cholangiogram was normal. Normal balloon                             sweep.   Recommendation:           - Discharge patient to home (ambulatory).                             - Await path results.                             - Repeat ERCP in 1 year for surveillance. Tentative                             plan will be to continue surveillance for a total                             of 5 years from index procedure assuming no                             residual is found (until 2024).                             - Resume diet and medications

## 2022-03-25 ENCOUNTER — ANESTHESIA EVENT (OUTPATIENT)
Dept: SURGERY | Facility: AMBULATORY SURGERY CENTER | Age: 72
End: 2022-03-25
Payer: COMMERCIAL

## 2022-03-25 ENCOUNTER — LAB (OUTPATIENT)
Dept: LAB | Facility: CLINIC | Age: 72
End: 2022-03-25
Payer: COMMERCIAL

## 2022-03-25 DIAGNOSIS — Z11.59 ENCOUNTER FOR SCREENING FOR OTHER VIRAL DISEASES: ICD-10-CM

## 2022-03-25 LAB
PATH REPORT.COMMENTS IMP SPEC: NORMAL
PATH REPORT.COMMENTS IMP SPEC: NORMAL
PATH REPORT.FINAL DX SPEC: NORMAL
PATH REPORT.GROSS SPEC: NORMAL
PATH REPORT.MICROSCOPIC SPEC OTHER STN: NORMAL
PATH REPORT.RELEVANT HX SPEC: NORMAL
PHOTO IMAGE: NORMAL

## 2022-03-25 PROCEDURE — 88305 TISSUE EXAM BY PATHOLOGIST: CPT | Mod: 26 | Performed by: PATHOLOGY

## 2022-03-25 PROCEDURE — U0005 INFEC AGEN DETEC AMPLI PROBE: HCPCS

## 2022-03-25 PROCEDURE — U0003 INFECTIOUS AGENT DETECTION BY NUCLEIC ACID (DNA OR RNA); SEVERE ACUTE RESPIRATORY SYNDROME CORONAVIRUS 2 (SARS-COV-2) (CORONAVIRUS DISEASE [COVID-19]), AMPLIFIED PROBE TECHNIQUE, MAKING USE OF HIGH THROUGHPUT TECHNOLOGIES AS DESCRIBED BY CMS-2020-01-R: HCPCS

## 2022-03-26 LAB — SARS-COV-2 RNA RESP QL NAA+PROBE: NEGATIVE

## 2022-03-28 ENCOUNTER — ANESTHESIA (OUTPATIENT)
Dept: SURGERY | Facility: AMBULATORY SURGERY CENTER | Age: 72
End: 2022-03-28
Payer: COMMERCIAL

## 2022-03-28 ENCOUNTER — HOSPITAL ENCOUNTER (OUTPATIENT)
Facility: AMBULATORY SURGERY CENTER | Age: 72
Discharge: HOME | End: 2022-03-28
Attending: SURGERY
Payer: COMMERCIAL

## 2022-03-28 VITALS
DIASTOLIC BLOOD PRESSURE: 54 MMHG | HEIGHT: 66 IN | OXYGEN SATURATION: 95 % | BODY MASS INDEX: 33.91 KG/M2 | WEIGHT: 211 LBS | HEART RATE: 58 BPM | TEMPERATURE: 97.2 F | SYSTOLIC BLOOD PRESSURE: 102 MMHG | RESPIRATION RATE: 16 BRPM

## 2022-03-28 DIAGNOSIS — C43.9 MELANOMA OF SKIN (H): ICD-10-CM

## 2022-03-28 LAB
GLUCOSE BLDC GLUCOMTR-MCNC: 168 MG/DL (ref 70–99)
GLUCOSE BLDC GLUCOMTR-MCNC: 183 MG/DL (ref 70–99)

## 2022-03-28 PROCEDURE — 82962 GLUCOSE BLOOD TEST: CPT | Mod: GZ

## 2022-03-28 PROCEDURE — 12034 INTMD RPR S/TR/EXT 7.6-12.5: CPT | Performed by: SURGERY

## 2022-03-28 PROCEDURE — 82962 GLUCOSE BLOOD TEST: CPT | Performed by: PATHOLOGY

## 2022-03-28 PROCEDURE — 12034 INTMD RPR S/TR/EXT 7.6-12.5: CPT

## 2022-03-28 PROCEDURE — 11606 EXC TR-EXT MAL+MARG >4 CM: CPT | Performed by: SURGERY

## 2022-03-28 PROCEDURE — 88305 TISSUE EXAM BY PATHOLOGIST: CPT | Mod: TC | Performed by: SURGERY

## 2022-03-28 PROCEDURE — 11606 EXC TR-EXT MAL+MARG >4 CM: CPT

## 2022-03-28 RX ORDER — ACETAMINOPHEN 325 MG/1
975 TABLET ORAL ONCE
Status: COMPLETED | OUTPATIENT
Start: 2022-03-28 | End: 2022-03-28

## 2022-03-28 RX ORDER — SODIUM CHLORIDE, SODIUM LACTATE, POTASSIUM CHLORIDE, CALCIUM CHLORIDE 600; 310; 30; 20 MG/100ML; MG/100ML; MG/100ML; MG/100ML
INJECTION, SOLUTION INTRAVENOUS CONTINUOUS
Status: DISCONTINUED | OUTPATIENT
Start: 2022-03-28 | End: 2022-03-28 | Stop reason: HOSPADM

## 2022-03-28 RX ORDER — PROPOFOL 10 MG/ML
INJECTION, EMULSION INTRAVENOUS CONTINUOUS PRN
Status: DISCONTINUED | OUTPATIENT
Start: 2022-03-28 | End: 2022-03-28

## 2022-03-28 RX ORDER — ONDANSETRON 4 MG/1
4 TABLET, ORALLY DISINTEGRATING ORAL EVERY 30 MIN PRN
Status: DISCONTINUED | OUTPATIENT
Start: 2022-03-28 | End: 2022-03-29 | Stop reason: HOSPADM

## 2022-03-28 RX ORDER — METOPROLOL TARTRATE 1 MG/ML
1-2 INJECTION, SOLUTION INTRAVENOUS EVERY 5 MIN PRN
Status: DISCONTINUED | OUTPATIENT
Start: 2022-03-28 | End: 2022-03-28 | Stop reason: HOSPADM

## 2022-03-28 RX ORDER — ONDANSETRON 2 MG/ML
4 INJECTION INTRAMUSCULAR; INTRAVENOUS EVERY 30 MIN PRN
Status: DISCONTINUED | OUTPATIENT
Start: 2022-03-28 | End: 2022-03-29 | Stop reason: HOSPADM

## 2022-03-28 RX ORDER — FENTANYL CITRATE 50 UG/ML
25 INJECTION, SOLUTION INTRAMUSCULAR; INTRAVENOUS
Status: DISCONTINUED | OUTPATIENT
Start: 2022-03-28 | End: 2022-03-29 | Stop reason: HOSPADM

## 2022-03-28 RX ORDER — FENTANYL CITRATE 50 UG/ML
25 INJECTION, SOLUTION INTRAMUSCULAR; INTRAVENOUS EVERY 5 MIN PRN
Status: DISCONTINUED | OUTPATIENT
Start: 2022-03-28 | End: 2022-03-28 | Stop reason: HOSPADM

## 2022-03-28 RX ORDER — LIDOCAINE HYDROCHLORIDE AND EPINEPHRINE 10; 10 MG/ML; UG/ML
INJECTION, SOLUTION INFILTRATION; PERINEURAL PRN
Status: DISCONTINUED | OUTPATIENT
Start: 2022-03-28 | End: 2022-03-28 | Stop reason: HOSPADM

## 2022-03-28 RX ORDER — HYDRALAZINE HYDROCHLORIDE 20 MG/ML
2.5-5 INJECTION INTRAMUSCULAR; INTRAVENOUS EVERY 10 MIN PRN
Status: DISCONTINUED | OUTPATIENT
Start: 2022-03-28 | End: 2022-03-28 | Stop reason: HOSPADM

## 2022-03-28 RX ORDER — SODIUM CHLORIDE, SODIUM LACTATE, POTASSIUM CHLORIDE, CALCIUM CHLORIDE 600; 310; 30; 20 MG/100ML; MG/100ML; MG/100ML; MG/100ML
INJECTION, SOLUTION INTRAVENOUS CONTINUOUS
Status: DISCONTINUED | OUTPATIENT
Start: 2022-03-28 | End: 2022-03-29 | Stop reason: HOSPADM

## 2022-03-28 RX ORDER — HYDROMORPHONE HYDROCHLORIDE 1 MG/ML
0.2 INJECTION, SOLUTION INTRAMUSCULAR; INTRAVENOUS; SUBCUTANEOUS EVERY 5 MIN PRN
Status: DISCONTINUED | OUTPATIENT
Start: 2022-03-28 | End: 2022-03-28 | Stop reason: HOSPADM

## 2022-03-28 RX ORDER — OXYCODONE HYDROCHLORIDE 5 MG/1
5 TABLET ORAL EVERY 4 HOURS PRN
Status: DISCONTINUED | OUTPATIENT
Start: 2022-03-28 | End: 2022-03-29 | Stop reason: HOSPADM

## 2022-03-28 RX ORDER — BUPIVACAINE HYDROCHLORIDE 5 MG/ML
INJECTION, SOLUTION EPIDURAL; INTRACAUDAL PRN
Status: DISCONTINUED | OUTPATIENT
Start: 2022-03-28 | End: 2022-03-28 | Stop reason: HOSPADM

## 2022-03-28 RX ORDER — ONDANSETRON 2 MG/ML
INJECTION INTRAMUSCULAR; INTRAVENOUS PRN
Status: DISCONTINUED | OUTPATIENT
Start: 2022-03-28 | End: 2022-03-28

## 2022-03-28 RX ORDER — LIDOCAINE 40 MG/G
CREAM TOPICAL
Status: DISCONTINUED | OUTPATIENT
Start: 2022-03-28 | End: 2022-03-28 | Stop reason: HOSPADM

## 2022-03-28 RX ADMIN — SODIUM CHLORIDE, SODIUM LACTATE, POTASSIUM CHLORIDE, CALCIUM CHLORIDE: 600; 310; 30; 20 INJECTION, SOLUTION INTRAVENOUS at 06:49

## 2022-03-28 RX ADMIN — Medication 100 MCG: at 07:40

## 2022-03-28 RX ADMIN — Medication 100 MCG: at 07:44

## 2022-03-28 RX ADMIN — ONDANSETRON 4 MG: 2 INJECTION INTRAMUSCULAR; INTRAVENOUS at 07:20

## 2022-03-28 RX ADMIN — PROPOFOL 150 MCG/KG/MIN: 10 INJECTION, EMULSION INTRAVENOUS at 07:20

## 2022-03-28 RX ADMIN — ACETAMINOPHEN 975 MG: 325 TABLET ORAL at 06:41

## 2022-03-28 RX ADMIN — Medication 100 MCG: at 07:28

## 2022-03-28 RX ADMIN — Medication 100 MCG: at 07:36

## 2022-03-28 NOTE — DISCHARGE INSTRUCTIONS
[facility]  Home Care Following Anesthesia  For 24 hours after surgery:  1. Get plenty of rest.  A responsible adult must stay with you for at least 24 hours after you leave the surgery center.  2. Do not drive or use heavy equipment.  If you have weakness or tingling, don't drive or use heavy equipment until this feeling goes away.   3. Do not drink alcohol.   4. Avoid strenuous or risky activities.  Ask for help when climbing stairs.  5. You may feel lightheaded.  IF so, sit for a few minutes before standing.  Have someone help you get up.   6. If you have nausea (feel sick to your stomach): Drink only clear liquids such as apple juice, ginger ale, broth or 7-Up.  Rest may also help.  Be sure to drink enough fluids.  Move to a regular diet as you feel able.   7. You may have a slight fever.  Call the doctor if your fever is over 100 F (37.7 C) (taken under the tongue) or lasts longer than 24 hours.  8. You may have a dry mouth, a sore throat, muscle aches or trouble sleeping. These should go away after 24 hours.  9. Do not make important or legal decisions.   10. It is recommended to avoid smoking.               Tips for taking pain medications  To get the best pain relief possible, remember these points:    Take pain medications as directed, before pain becomes severe.    Pain medication can upset your stomach: taking it with food may help.    Constipation is a common side effect of pain medication. Drink plenty of  fluids.    Eat foods high in fiber. Take a stool softener if recommended by your doctor or pharmacist.    Do not drink alcohol, drive or operate machinery while taking pain medications.    Ask about other ways to control pain, such as with heat, ice or relaxation.    Tylenol/Acetaminophen Consumption  To help encourage the safe use of acetaminophen, the makers of TYLENOL  have lowered the maximum daily dose for single-ingredient Extra Strength TYLENOL   (acetaminophen) products sold in the [place] from 8 pills per day (4,000 mg) to 6 pills per day (3,000 mg). The dosing interval has also changed from 2 pills every 4-6 hours to 2 pills every 6 hours.    If you feel your pain relief is insufficient, you may take Tylenol/Acetaminophen in addition to your narcotic pain medication.     Be careful not to exceed 3,000 mg of Tylenol/Acetaminophen in a 24 hour period from all sources.    If you are taking extra strength Tylenol/acetaminophen (500 mg), the maximum dose is 6 tablets in 24 hours.    If you are taking regular strength acetaminophen (325 mg), the maximum dose is 9 tablets in 24 hours.    Tylenol 975 mg given at 6:41AM. Next dose available at 12:41PM, then follow package directions.    Call a doctor for any of the followin. Signs of infection (fever, growing tenderness at the surgery site, a large amount of drainage or bleeding, severe pain, foul-smelling drainage, redness, swelling).  2. It has been over 8 to 10 hours since surgery and you are still not able to urinate (pass water).  3. Headache for over 24 hours.  4. Signs of Covid-19 infection (temperature over 100 degrees, shortness of breath, cough, loss of taste/smell, generalized body aches, persistent headache, chills, sore throat, nausea/vomiting/diarrhea)  Your doctor is:       Dr. Chevy Allison, General Surgery: [phone]               Or dial [phone] and ask for the resident on call for:  General Surgery  For emergency care, call the:  [facility]:  [phone] (TTY for hearing impaired: [phone])

## 2022-03-28 NOTE — OP NOTE
Procedure Date: 2022    PREOPERATIVE DIAGNOSIS:  Melanoma, right leg.    POSTOPERATIVE DIAGNOSIS:  Melanoma, right leg.    PROCEDURE:  Wide local excision of right leg melanoma.  Defect size 10 x 4 cm.    ATTENDING SURGEON:  Chevy Allison MD.    ANESTHESIA:  Local with IV sedation.    INDICATIONS FOR PROCEDURE:  The patient is a 71-year-old woman who has melanoma of unknown primary.  She had a metastasis in the subcutaneous tissues of her right thigh and she has 3 small presumed metastases just lateral to her right knee.  She now presents for surgical excision.    DESCRIPTION OF PROCEDURE:  After informed consent, the patient was brought to the operating room and placed in the supine position with her leg bumped and prepped and draped in the usual fashion.  I drew out the elliptical skin incision to ensure at least a 1 centimeter margin around the palpable disease.  There were 3 small little subcutaneous nodules there.  A local anesthetic was administered and an elliptical skin incision was made with a scalpel.  The Bovie cautery was used to incise subcutaneous tissues.  The dissection went down to the underlying fascia, which was left intact.  The specimen was removed, oriented and sent to Pathology.  Strict hemostasis was obtained with the Bovie cautery.  The defect size was 10 x 4 cm.  The incision was closed with interrupted 3-0 Vicryl suture for the dermal layer and a running 4-0 PDS subcuticular stitch for the skin.  I placed 3-0 nylon sutures in a horizontal mattress fashion to secure the incision.  Dermabond was placed and the patient was taken to the recovery room in stable condition.    Chevy Allison MD        D: 2022   T: 2022   MT: [id number]    Name:     MOLINA KO  MRN:      [id number]        Account:        [id number]   :      1950           Procedure Date: 2022     Document: [id number]

## 2022-03-28 NOTE — ANESTHESIA PREPROCEDURE EVALUATION
Anesthesia Pre-Procedure Evaluation    Patient: Gricelda Sabillon   MRN: [id number] : 1950        Procedure : Procedure(s):  Wide local excision of right knee melanoma          Past Medical History:   Diagnosis Date     Allergic rhinitis, cause unspecified      Asthma      Asthma, mild intermittent      Cataract      Cellulitis and abscess of leg, except foot     Bilateral     Eczema      Heart murmur     aortic area     HTN, goal below 140/90      Hyperlipidemia LDL goal < 100      Hyperplastic colon polyp      Infection     bilateral eye infections     Macular hole of left eye      Melanoma (H)     RIGHT KNEE     Obesity (BMI 30-39.9)      Osteoarthritis     knees     Other chronic pain     right knee     Sleep apnea     uses c pap     Type 2 diabetes, HbA1C goal < 8% (H)       Past Surgical History:   Procedure Laterality Date     ARTHROPLASTY HIP Right 2017    Procedure: ARTHROPLASTY HIP;  Right total hip arthroplasty  ;  Surgeon: Ayaan Pena MD;  Location: [facility]     ARTHROPLASTY KNEE  2013    Procedure: ARTHROPLASTY KNEE;  right total knee arthroplasty ;  Surgeon: Chaparro Wesley MD;  Location: [facility]     ARTHROSCOPY KNEE Right 2015    Procedure: ARTHROSCOPY KNEE;  Surgeon: Ayaan Pena MD;  Location: [facility]     C INCISION OF HYMEN       CATARACT IOL, RT/LT       COLONOSCOPY       COLONOSCOPY N/A 2019    Procedure: Colonoscopy with polypectomy;  Surgeon: Shaun Guerrero MD;  [facility]     ENDOSCOPIC RETROGRADE CHOLANGIOPANCREATOGRAM N/A 2019    Procedure: COMBINED ENDOSCOPIC RETROGRADE CHOLANGIOPANCREATOGRAPHY, BILIARY SPINCTEROTOMY AND DILATION, PLACE BILE DUCT STENT;  Surgeon: Guru Andie Gonzalez MD;  [facility]     ENDOSCOPIC RETROGRADE CHOLANGIOPANCREATOGRAM N/A 2019    Procedure: Endoscopic Retrograde Cholangiopancreatogram, Bile duct stent removal and placement;  Surgeon: Shaun Guerrero MD;  [facility]     ENDOSCOPIC RETROGRADE CHOLANGIOPANCREATOGRAM N/A 4/18/2019    Procedure: COMBINED ENDOSCOPIC RETROGRADE CHOLANGIOPANCREATOGRAPHY, Bile duct stent exchange and Polypectomy;  Surgeon: Shaun Guerrero MD;  [facility]     ENDOSCOPIC RETROGRADE CHOLANGIOPANCREATOGRAM N/A 10/18/2019    Procedure: Endoscopic Retrograde Cholangiopancreatogram;  Surgeon: Shaun Guerrero MD;  [facility]     ENDOSCOPIC RETROGRADE CHOLANGIOPANCREATOGRAM N/A 3/24/2022    Procedure: ENDOSCOPIC RETROGRADE CHOLANGIOPANCREATOGRAPHY;  Surgeon: Shaun Guerrero MD;  Location: [facility]     ENDOSCOPIC RETROGRADE CHOLANGIOPANCREATOGRAM WITH SPYGLASS N/A 7/26/2019    Procedure: Endoscopic Retrograde Cholangiopancreatogram With Spyglas,l Radiofrequency Ablation, Stent Exchangeand Duodenal Biopsy x2;  Surgeon: Shaun Guerrero MD;  [facility]     ENDOSCOPIC RETROGRADE CHOLANGIOPANCREATOGRAM WITH SPYGLASS N/A 9/10/2020    Procedure: ENDOSCOPIC RETROGRADE CHOLANGIOPANCREATOGRAPHY, WITH DIRECT DUCT VISUALIZATION, USING PANCREATICOBILIARY FIBEROPTIC PROBE;  Surgeon: Shaun Guerrero MD;  [facility]     ENDOSCOPIC RETROGRADE CHOLANGIOPANCREATOGRAM WITH SPYGLASS N/A 3/22/2021    Procedure: ENDOSCOPIC RETROGRADE CHOLANGIOPANCREATOGRAPHY, WITH DIRECT DUCT VISUALIZATION, USING PANCREATICOBILIARY FIBEROPTIC PROBE;  Surgeon: Shaun Guerrero MD;  [facility]     ESOPHAGOSCOPY, GASTROSCOPY, DUODENOSCOPY (EGD) WITH RADIO FREQUENCY ABLATION, COMBINED N/A 9/10/2020    Procedure: possible repeat ESOPHAGOGASTRODUODENOSCOPY, WITH RADIOFREQUENCY ABLATION and endoscopic mucosal resection;  Surgeon: Shaun Guerrero MD;  [facility]     ESOPHAGOSCOPY, GASTROSCOPY, DUODENOSCOPY (EGD), COMBINED N/A 4/18/2019    Procedure: upper endoscopy with polypectomy;  Surgeon: Shaun Guerrero MD;  [facility]     ESOPHAGOSCOPY, GASTROSCOPY, DUODENOSCOPY (EGD), COMBINED N/A 10/18/2019    Procedure: Upper Endoscopy and ERCP with stent removal, stone removal and biopsy;   "Surgeon: Shaun Guerrero MD;  [facility]     ESOPHAGOSCOPY, GASTROSCOPY, DUODENOSCOPY (EGD), COMBINED N/A 3/24/2022    Procedure: ESOPHAGOGASTRODUODENOSCOPY (EGD), Duodenal  polyp removal;  Surgeon: Shaun Guerrero MD;  [facility]: [facility]     ESOPHAGOSCOPY, GASTROSCOPY, DUODENOSCOPY (EGD), RESECT MUCOSA, COMBINED N/A 2/28/2019    Procedure: Upper Endoscopy, Endoscopic Ultrasound, Endoscopic Mucosal Resection,  Ampullectomy, polypectomy.;  Surgeon: Shaun Guerrero MD;  [facility]     ESOPHAGOSCOPY, GASTROSCOPY, DUODENOSCOPY (EGD), RESECT MUCOSA, COMBINED N/A 3/22/2021    Procedure: ESOPHAGOGASTRODUODENOSCOPY (EGD) with  endoscopic mucosal resection/ polypectomy;  Surgeon: Shaun Guerrero MD;  [facility]     EXCISE MASS LOWER EXTREMITY Right 1/13/2022    Procedure: excision of right thigh mass;  Surgeon: Salvador Kelly MD;  Location: [facility]     EYE SURGERY      macular hole repaired left eye     HC KNEE SCOPE, DIAGNOSTIC      - both knees     HEAD & NECK SURGERY      wisdom teeth      Allergies   Allergen Reactions     Bromfenac Visual Disturbance     Sulfites, xibrom  Causes sever eye pain     Codeine      \"NAUSE,HA AND DIZZINESS\"     Codeine Nausea     Other reaction(s): Dizziness, Headache     Sulfites Visual Disturbance     Xibrom (bromfenac opthalmic solution) 0.09%--eye pain      Social History     Tobacco Use     Smoking status: Never Smoker     Smokeless tobacco: Never Used   Substance Use Topics     Alcohol use: No     Alcohol/week: 0.0 standard drinks      Wt Readings from Last 1 Encounters:   03/24/22 95.7 kg (211 lb)              OUTSIDE LABS:  CBC:   Lab Results   Component Value Date    WBC 5.9 03/21/2022    WBC 7.0 01/07/2022    HGB 14.2 03/21/2022    HGB 13.9 01/07/2022    HCT 44.9 03/21/2022    HCT 43.2 01/07/2022     03/21/2022     01/07/2022     BMP:   Lab Results   Component Value Date     03/21/2022     01/07/2022    POTASSIUM 4.4 03/21/2022    POTASSIUM 3.8 " 01/07/2022    CHLORIDE 107 03/21/2022    CHLORIDE 107 01/07/2022    CO2 25 03/21/2022    CO2 23 01/07/2022    BUN 19 03/21/2022    BUN 18 01/07/2022    CR 0.96 03/21/2022    CR 0.88 01/07/2022     (H) 03/21/2022     (H) 01/13/2022     COAGS:   Lab Results   Component Value Date    INR 0.98 03/22/2021     POC:   Lab Results   Component Value Date     (H) 03/22/2021     HEPATIC:   Lab Results   Component Value Date    ALBUMIN 3.5 03/21/2022    PROTTOTAL 7.6 03/21/2022    ALT 34 03/21/2022    AST 22 03/21/2022    ALKPHOS 94 03/21/2022    BILITOTAL 0.4 03/21/2022     OTHER:   Lab Results   Component Value Date    A1C 7.7 (H) 03/21/2022    KATHRYN 10.4 (H) 03/21/2022    LIPASE 347 03/22/2021    AMYLASE 73 03/22/2021    TSH 3.08 03/21/2022    T4 1.17 02/04/2016    CRP <5.0 01/07/2014    SED 16 01/07/2014       Anesthesia Plan    ASA Status:  2      Anesthesia Type: MAC.     - Reason for MAC: straight local not clinically adequate      Maintenance: TIVA.        Consents            Postoperative Care    Pain management: Multi-modal analgesia.   PONV prophylaxis: Background Propofol Infusion, Ondansetron (or other 5HT-3)     Comments:                Angelina Toth MD

## 2022-03-28 NOTE — ANESTHESIA CARE TRANSFER NOTE
Patient: Gricelda Sabillon    Procedure: Procedure(s):  Wide local excision of right knee melanoma       Diagnosis: Melanoma of skin (H) [C43.9]  Diagnosis Additional Information: No value filed.    Anesthesia Type:   MAC     Note:    Oropharynx: oropharynx clear of all foreign objects and spontaneously breathing  Level of Consciousness: awake  Oxygen Supplementation: room air    Independent Airway: airway patency satisfactory and stable  Dentition: dentition unchanged  Vital Signs Stable: post-procedure vital signs reviewed and stable  Report to RN Given: handoff report given  Patient transferred to: Phase II  Comments: Uneventful transport   Report to RN - Nancy Rosario  Exchanging well; color natl  Pt responds appropriately to command  IV patent  Lips/teeth/dentition as preop status  Questions answered    Handoff Report: Identifed the Patient, Identified the Reponsible Provider, Reviewed the pertinent medical history, Discussed the surgical course, Reviewed Intra-OP anesthesia mangement and issues during anesthesia, Set expectations for post-procedure period and Allowed opportunity for questions and acknowledgement of understanding      Vitals:  Vitals Value Taken Time   BP 93/52    Temp 96.9    Pulse 62    Resp 16    SpO2 95%        Electronically Signed By: DENYS KAPLAN CRNA  March 28, 2022  8:04 AM

## 2022-03-28 NOTE — ANESTHESIA POSTPROCEDURE EVALUATION
Patient: Gricelda Sabillon    Procedure: Procedure(s):  Wide local excision of right knee melanoma       Anesthesia Type:  MAC    Note:  Disposition: Outpatient   Postop Pain Control: Uneventful            Sign Out: Well controlled pain   PONV: No   Neuro/Psych: Uneventful            Sign Out: Acceptable/Baseline neuro status   Airway/Respiratory: Uneventful            Sign Out: Acceptable/Baseline resp. status   CV/Hemodynamics: Uneventful            Sign Out: Acceptable CV status; No obvious hypovolemia; No obvious fluid overload   Other NRE: NONE   DID A NON-ROUTINE EVENT OCCUR? No           Last vitals:  Vitals Value Taken Time   /54 03/28/22 0830   Temp 36.2  C (97.2  F) 03/28/22 0830   Pulse 58 03/28/22 0830   Resp 16 03/28/22 0830   SpO2 95 % 03/28/22 0830       Electronically Signed By: Angelina Toth MD  March 28, 2022  1:31 PM

## 2022-03-28 NOTE — RESULT ENCOUNTER NOTE
Pathology from ERCP/EGD reviewed. Residual adenoma removed. Plan remains the same which is to repeat surveillance in 1 year. Of note patient was recently found to have melanoma of unknown primary. Depending on how this evolves, we will take this into account regarding risks/benefits of future surveillance endoscopy.    Ingrid, can you set a reminder for a repeat procedure in 1 year.    Shaun Guerrero MD  [facility]  Division of Gastroenterology and Hepatology  [facility] [address]  [address] [place], [place] [id number]

## 2022-03-29 ENCOUNTER — PATIENT OUTREACH (OUTPATIENT)
Dept: ONCOLOGY | Facility: CLINIC | Age: 72
End: 2022-03-29

## 2022-03-29 NOTE — TELEPHONE ENCOUNTER
POST-OP CALL  Mar 29, 2022    Gricelda Sabillon is a 71 year old female s/p wide local excision of right leg melanoma with Dr. Allison 3/28.   Called to see how she is doing post op there was no answer and a message was left.   Follow up appointment scheduled on 4/11 with Dr. Allison.    June Blanco PA-C

## 2022-03-30 LAB
PATH REPORT.COMMENTS IMP SPEC: ABNORMAL
PATH REPORT.COMMENTS IMP SPEC: YES
PATH REPORT.FINAL DX SPEC: ABNORMAL
PATH REPORT.GROSS SPEC: ABNORMAL
PATH REPORT.MICROSCOPIC SPEC OTHER STN: ABNORMAL
PATH REPORT.RELEVANT HX SPEC: ABNORMAL
PHOTO IMAGE: ABNORMAL

## 2022-03-30 PROCEDURE — 88305 TISSUE EXAM BY PATHOLOGIST: CPT | Mod: 26 | Performed by: DERMATOLOGY

## 2022-04-11 ENCOUNTER — ONCOLOGY VISIT (OUTPATIENT)
Dept: ONCOLOGY | Facility: CLINIC | Age: 72
End: 2022-04-11
Attending: SURGERY
Payer: COMMERCIAL

## 2022-04-11 VITALS
SYSTOLIC BLOOD PRESSURE: 106 MMHG | TEMPERATURE: 98.2 F | WEIGHT: 211.7 LBS | OXYGEN SATURATION: 98 % | BODY MASS INDEX: 34.17 KG/M2 | DIASTOLIC BLOOD PRESSURE: 70 MMHG | HEART RATE: 90 BPM

## 2022-04-11 DIAGNOSIS — C43.9 MELANOMA OF SKIN (H): Primary | ICD-10-CM

## 2022-04-11 ASSESSMENT — PAIN SCALES - GENERAL: PAINLEVEL: NO PAIN (0)

## 2022-04-11 NOTE — NURSING NOTE
"Oncology Rooming Note    April 11, 2022 3:47 PM   Gricelda Sabillon is a 71 year old female who presents for:    Chief Complaint   Patient presents with     Oncology Clinic Visit     Rtn Melanoma of Skin     Initial Vitals: /70   Pulse 90   Temp 98.2  F (36.8  C) (Oral)   Wt 96 kg (211 lb 11.2 oz)   LMP 12/13/2002 (Exact Date)   SpO2 98%   BMI 34.17 kg/m   Estimated body mass index is 34.17 kg/m  as calculated from the following:    Height as of 3/28/22: 1.676 m (5' 6\").    Weight as of this encounter: 96 kg (211 lb 11.2 oz). Body surface area is 2.11 meters squared.  No Pain (0) Comment: Data Unavailable   Patient's last menstrual period was 12/13/2002 (exact date).  Allergies reviewed: Yes  Medications reviewed: Yes    Medications: Medication refills not needed today.  Pharmacy name entered into EPIC:    [id number] - [facility] [id number] [address]    Clinical concerns: None, Wants to discuss healing and dissolving stitches       Ember Manning, EMT on April 11, 2022 at 3:48 PM          "

## 2022-04-11 NOTE — PROGRESS NOTES
HISTORY OF PRESENT ILLNESS:  Gricelda Sabillon is here after undergoing an excision of metastatic melanoma of her right lateral thigh on 03/28/2022.  Her pathology demonstrated that the melanoma was completely excised.    PHYSICAL EXAMINATION:  Her incision is healing well.  Her remaining sutures are removed.    IMPRESSION:  Postoperative check.    PLAN:  She has a followup appointment tomorrow with Dr. Florez to discuss systemic therapy.  I will see her in the future if any problems arise.

## 2022-04-11 NOTE — LETTER
4/11/2022         RE: Gricelda Sabillon  [address]  [facility] [address]        Dear Colleague,    Thank you for referring your patient, Gricedla Sabillon, to the [facility]. Please see a copy of my visit note below.    HISTORY OF PRESENT ILLNESS:  Gricelda Sabillon is here after undergoing an excision of metastatic melanoma of her right lateral thigh on 03/28/2022.  Her pathology demonstrated that the melanoma was completely excised.    PHYSICAL EXAMINATION:  Her incision is healing well.  Her remaining sutures are removed.    IMPRESSION:  Postoperative check.    PLAN:  She has a followup appointment tomorrow with Dr. Florez to discuss systemic therapy.  I will see her in the future if any problems arise.          Again, thank you for allowing me to participate in the care of your patient.      Sincerely,    Chevy Allison MD

## 2022-04-12 ENCOUNTER — TRANSFERRED RECORDS (OUTPATIENT)
Dept: HEALTH INFORMATION MANAGEMENT | Facility: CLINIC | Age: 72
End: 2022-04-12
Payer: COMMERCIAL

## 2022-04-14 ENCOUNTER — TRANSFERRED RECORDS (OUTPATIENT)
Dept: HEALTH INFORMATION MANAGEMENT | Facility: CLINIC | Age: 72
End: 2022-04-14
Payer: COMMERCIAL

## 2022-04-14 DIAGNOSIS — E11.65 TYPE 2 DIABETES MELLITUS WITH HYPERGLYCEMIA, WITHOUT LONG-TERM CURRENT USE OF INSULIN (H): ICD-10-CM

## 2022-04-14 DIAGNOSIS — E11.9 DIABETES MELLITUS WITHOUT COMPLICATION (H): Chronic | ICD-10-CM

## 2022-04-14 NOTE — TELEPHONE ENCOUNTER
Pending Prescriptions:                       Disp   Refills    glipiZIDE (GLUCOTROL) 5 MG tablet [Pharmac*360 ta*0        Sig: TAKE 2 TABLETS BY MOUTH TWICE DAILY BEFORE MEAL(S)    JANUVIA 100 MG tablet [Pharmacy Med Name: *90 tab*0        Sig: Take 1 tablet by mouth once daily    metFORMIN (GLUCOPHAGE-XR) 500 MG 24 hr tab*360 ta*0        Sig: TAKE 2 TABLETS BY MOUTH TWICE DAILY WITH MEALS

## 2022-04-15 ASSESSMENT — ENCOUNTER SYMPTOMS
DYSURIA: 0
ABDOMINAL PAIN: 0
BREAST MASS: 0
JOINT SWELLING: 0
NERVOUS/ANXIOUS: 0
EYE PAIN: 0
HEMATOCHEZIA: 0
FEVER: 0
SORE THROAT: 0
HEMATURIA: 0
ARTHRALGIAS: 1
WEAKNESS: 0
NAUSEA: 0
CONSTIPATION: 0
CHILLS: 0
DIZZINESS: 0
PARESTHESIAS: 0
HEARTBURN: 0
DIARRHEA: 1
HEADACHES: 0
MYALGIAS: 0
COUGH: 0
FREQUENCY: 0

## 2022-04-15 ASSESSMENT — ACTIVITIES OF DAILY LIVING (ADL): CURRENT_FUNCTION: NO ASSISTANCE NEEDED

## 2022-04-18 ENCOUNTER — OFFICE VISIT (OUTPATIENT)
Dept: INTERNAL MEDICINE | Facility: CLINIC | Age: 72
End: 2022-04-18
Payer: COMMERCIAL

## 2022-04-18 ENCOUNTER — MYC MEDICAL ADVICE (OUTPATIENT)
Dept: INTERNAL MEDICINE | Facility: CLINIC | Age: 72
End: 2022-04-18

## 2022-04-18 VITALS
TEMPERATURE: 97.2 F | OXYGEN SATURATION: 100 % | HEIGHT: 66 IN | DIASTOLIC BLOOD PRESSURE: 61 MMHG | BODY MASS INDEX: 33.91 KG/M2 | WEIGHT: 211 LBS | HEART RATE: 72 BPM | RESPIRATION RATE: 16 BRPM | SYSTOLIC BLOOD PRESSURE: 110 MMHG

## 2022-04-18 DIAGNOSIS — E11.9 TYPE 2 DIABETES MELLITUS WITHOUT COMPLICATION, WITHOUT LONG-TERM CURRENT USE OF INSULIN (H): Primary | ICD-10-CM

## 2022-04-18 DIAGNOSIS — D13.5 AMPULLARY ADENOMA: ICD-10-CM

## 2022-04-18 DIAGNOSIS — E83.52 HYPERCALCEMIA: ICD-10-CM

## 2022-04-18 DIAGNOSIS — C43.9 METASTATIC MALIGNANT MELANOMA (H): ICD-10-CM

## 2022-04-18 DIAGNOSIS — Z00.00 ROUTINE GENERAL MEDICAL EXAMINATION AT A HEALTH CARE FACILITY: Primary | ICD-10-CM

## 2022-04-18 DIAGNOSIS — I10 ESSENTIAL HYPERTENSION WITH GOAL BLOOD PRESSURE LESS THAN 140/90: Chronic | ICD-10-CM

## 2022-04-18 DIAGNOSIS — E11.9 TYPE 2 DIABETES MELLITUS WITHOUT COMPLICATION, WITHOUT LONG-TERM CURRENT USE OF INSULIN (H): ICD-10-CM

## 2022-04-18 DIAGNOSIS — I10 HTN, GOAL BELOW 140/90: ICD-10-CM

## 2022-04-18 PROCEDURE — 99397 PER PM REEVAL EST PAT 65+ YR: CPT | Performed by: INTERNAL MEDICINE

## 2022-04-18 PROCEDURE — 99214 OFFICE O/P EST MOD 30 MIN: CPT | Mod: 25 | Performed by: INTERNAL MEDICINE

## 2022-04-18 ASSESSMENT — ENCOUNTER SYMPTOMS
NAUSEA: 0
BREAST MASS: 0
DYSURIA: 0
ABDOMINAL PAIN: 0
COUGH: 0
ARTHRALGIAS: 1
NERVOUS/ANXIOUS: 0
PARESTHESIAS: 0
FEVER: 0
MYALGIAS: 0
JOINT SWELLING: 0
CHILLS: 0
FREQUENCY: 0
WEAKNESS: 0
HEMATOCHEZIA: 0
CONSTIPATION: 0
HEADACHES: 0
EYE PAIN: 0
DIZZINESS: 0
SORE THROAT: 0
HEARTBURN: 0
DIARRHEA: 1
HEMATURIA: 0

## 2022-04-18 ASSESSMENT — ASTHMA QUESTIONNAIRES
QUESTION_5 LAST FOUR WEEKS HOW WOULD YOU RATE YOUR ASTHMA CONTROL: COMPLETELY CONTROLLED
ACT_TOTALSCORE: 25
QUESTION_3 LAST FOUR WEEKS HOW OFTEN DID YOUR ASTHMA SYMPTOMS (WHEEZING, COUGHING, SHORTNESS OF BREATH, CHEST TIGHTNESS OR PAIN) WAKE YOU UP AT NIGHT OR EARLIER THAN USUAL IN THE MORNING: NOT AT ALL
ACT_TOTALSCORE: 25
QUESTION_2 LAST FOUR WEEKS HOW OFTEN HAVE YOU HAD SHORTNESS OF BREATH: NOT AT ALL
QUESTION_4 LAST FOUR WEEKS HOW OFTEN HAVE YOU USED YOUR RESCUE INHALER OR NEBULIZER MEDICATION (SUCH AS ALBUTEROL): NOT AT ALL
QUESTION_1 LAST FOUR WEEKS HOW MUCH OF THE TIME DID YOUR ASTHMA KEEP YOU FROM GETTING AS MUCH DONE AT WORK, SCHOOL OR AT HOME: NONE OF THE TIME

## 2022-04-18 ASSESSMENT — ACTIVITIES OF DAILY LIVING (ADL): CURRENT_FUNCTION: NO ASSISTANCE NEEDED

## 2022-04-18 NOTE — PROGRESS NOTES
Dr Torres's note    Patient's instructions / PLAN:                                                        Plan:  1. For Diabetes Semaglutide 3 m g daily - instead of Januvia   2. Continue the other meds, same doses for now.  3.  Mammogram ( please call [phone] to schedule it)   4.Please make a lab appointment for non fasting labs in 3 months  5. Please make an appointment few days after the labs to discuss about the results.         ASSESSMENT & PLAN:                                                      (Z00.00) Routine general medical examination at a health care facility  (primary encounter diagnosis)  Comment:   Plan:     (I10) HTN goal less than 140/90,  Comment: stable  Plan: Hemoglobin A1c, Comprehensive metabolic panel,         Vitamin D Deficiency, Parathyroid Hormone         Intact        Continue same meds, same doses for now     (E11.9) Type 2 diabetes mellitus without complication, without long-term current use of insulin (H)  Comment: stable  We discussed about the new meds, advantages and potential side effects. The patient will read also the info from the pharmacy and call back if questions.   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   I reviewed the recent tests results in Epic       DM  -- A1c in the same  range    .ddm    ROS:     See below        PMHx: - reviewed  Past Medical History:   Diagnosis Date     Allergic rhinitis, cause unspecified      Asthma      Asthma, mild intermittent      Cataract      Cellulitis and abscess of leg, except foot 03/00    Bilateral     Eczema      Heart murmur     aortic area     HTN, goal below 140/90      Hyperlipidemia LDL goal < 100      Hyperplastic colon polyp 2008     Infection     bilateral eye infections     Macular hole of left eye      Melanoma (H)     RIGHT KNEE     Obesity (BMI 30-39.9)      Osteoarthritis     knees     Other chronic pain     right knee     Sleep apnea     uses c pap     Type 2 diabetes, HbA1C goal < 8% (H)        PSHx: reviewed  Past Surgical History:   Procedure Laterality Date     ARTHROPLASTY HIP Right 9/25/2017    Procedure: ARTHROPLASTY HIP;  Right total hip arthroplasty  ;  Surgeon: Ayaan Pena MD;  Location: [facility]     ARTHROPLASTY KNEE  7/12/2013    Procedure: ARTHROPLASTY KNEE;  right total knee arthroplasty ;  Surgeon: Chaparro Wesley MD;  Location: [facility]     ARTHROSCOPY KNEE Right 1/21/2015    Procedure: ARTHROSCOPY KNEE;  Surgeon: Ayaan Pena MD;  Location: [facility]     C INCISION OF HYMEN       CATARACT IOL, RT/LT       COLONOSCOPY  2008     COLONOSCOPY N/A 4/18/2019    Procedure: Colonoscopy with polypectomy;  Surgeon: Shaun Guerrero MD;  [facility]     ENDOSCOPIC RETROGRADE CHOLANGIOPANCREATOGRAM N/A 2/6/2019    Procedure: COMBINED ENDOSCOPIC RETROGRADE CHOLANGIOPANCREATOGRAPHY, BILIARY SPINCTEROTOMY AND DILATION, PLACE BILE DUCT STENT;  Surgeon: Guru Andie Gonzalez MD;  [facility]     ENDOSCOPIC RETROGRADE CHOLANGIOPANCREATOGRAM N/A 2/28/2019    Procedure: Endoscopic Retrograde Cholangiopancreatogram, Bile duct stent removal and placement;  Surgeon: Shaun Guerrero MD;  [facility]     ENDOSCOPIC RETROGRADE CHOLANGIOPANCREATOGRAM N/A 4/18/2019    Procedure: COMBINED ENDOSCOPIC  RETROGRADE CHOLANGIOPANCREATOGRAPHY, Bile duct stent exchange and Polypectomy;  Surgeon: Shaun Guerrero MD;  [facility]     ENDOSCOPIC RETROGRADE CHOLANGIOPANCREATOGRAM N/A 10/18/2019    Procedure: Endoscopic Retrograde Cholangiopancreatogram;  Surgeon: Shaun Guerrero MD;  [facility]     ENDOSCOPIC RETROGRADE CHOLANGIOPANCREATOGRAM N/A 3/24/2022    Procedure: ENDOSCOPIC RETROGRADE CHOLANGIOPANCREATOGRAPHY;  Surgeon: Shaun Guerrero MD;  Location: [facility]     ENDOSCOPIC RETROGRADE CHOLANGIOPANCREATOGRAM WITH SPYGLASS N/A 7/26/2019    Procedure: Endoscopic Retrograde Cholangiopancreatogram With Spyglas,l Radiofrequency Ablation, Stent Exchangeand Duodenal Biopsy x2;  Surgeon: Shaun Guerrero MD;  [facility]     ENDOSCOPIC RETROGRADE CHOLANGIOPANCREATOGRAM WITH SPYGLASS N/A 9/10/2020    Procedure: ENDOSCOPIC RETROGRADE CHOLANGIOPANCREATOGRAPHY, WITH DIRECT DUCT VISUALIZATION, USING PANCREATICOBILIARY FIBEROPTIC PROBE;  Surgeon: Shaun Guerrero MD;  [facility]     ENDOSCOPIC RETROGRADE CHOLANGIOPANCREATOGRAM WITH SPYGLASS N/A 3/22/2021    Procedure: ENDOSCOPIC RETROGRADE CHOLANGIOPANCREATOGRAPHY, WITH DIRECT DUCT VISUALIZATION, USING PANCREATICOBILIARY FIBEROPTIC PROBE;  Surgeon: Shaun Guerrero MD;  [facility]     ESOPHAGOSCOPY, GASTROSCOPY, DUODENOSCOPY (EGD) WITH RADIO FREQUENCY ABLATION, COMBINED N/A 9/10/2020    Procedure: possible repeat ESOPHAGOGASTRODUODENOSCOPY, WITH RADIOFREQUENCY ABLATION and endoscopic mucosal resection;  Surgeon: Shaun Guerrero MD;  [facility]     ESOPHAGOSCOPY, GASTROSCOPY, DUODENOSCOPY (EGD), COMBINED N/A 4/18/2019    Procedure: upper endoscopy with polypectomy;  Surgeon: Shaun Guerrero MD;  [facility]     ESOPHAGOSCOPY, GASTROSCOPY, DUODENOSCOPY (EGD), COMBINED N/A 10/18/2019    Procedure: Upper Endoscopy and ERCP with stent removal, stone removal and biopsy;  Surgeon: Shaun Guerrero MD;  [facility]     ESOPHAGOSCOPY, GASTROSCOPY, DUODENOSCOPY (EGD),  COMBINED N/A 3/24/2022    Procedure: ESOPHAGOGASTRODUODENOSCOPY (EGD), Duodenal  polyp removal;  Surgeon: Shaun Guerrero MD;  [facility]: [facility]     ESOPHAGOSCOPY, GASTROSCOPY, DUODENOSCOPY (EGD), RESECT MUCOSA, COMBINED N/A 2/28/2019    Procedure: Upper Endoscopy, Endoscopic Ultrasound, Endoscopic Mucosal Resection,  Ampullectomy, polypectomy.;  Surgeon: Shaun Guerrero MD;  [facility]     ESOPHAGOSCOPY, GASTROSCOPY, DUODENOSCOPY (EGD), RESECT MUCOSA, COMBINED N/A 3/22/2021    Procedure: ESOPHAGOGASTRODUODENOSCOPY (EGD) with  endoscopic mucosal resection/ polypectomy;  Surgeon: Shaun Guerrero MD;  [facility]     EXCISE LESION LOWER EXTREMITY Right 3/28/2022    Procedure: Wide local excision of right knee melanoma;  Surgeon: Chevy Allison MD;  Location: [facility] OR     EXCISE MASS LOWER EXTREMITY Right 1/13/2022    Procedure: excision of right thigh mass;  Surgeon: Salvador Kelly MD;  Location: [facility]     EYE SURGERY      macular hole repaired left eye     HC KNEE SCOPE, DIAGNOSTIC      - both knees     HEAD & NECK SURGERY      wisdom teeth        Soc Hx: No daily alcohol, no smoking  Social History     Socioeconomic History     Marital status:      Spouse name: Allen     Number of children: 3     Years of education: 15     Highest education level: Not on file   Occupational History     Not on file   Tobacco Use     Smoking status: Never Smoker     Smokeless tobacco: Never Used   Vaping Use     Vaping Use: Never used   Substance and Sexual Activity     Alcohol use: No     Alcohol/week: 0.0 standard drinks     Drug use: No     Sexual activity: Not Currently     Partners: Male   Other Topics Concern     Parent/sibling w/ CABG, MI or angioplasty before 65F 55M? Not Asked   Social History Narrative     Not on file     Social Determinants of Health     Financial Resource Strain: Not on file   Food Insecurity: Not on file   Transportation Needs: Not on file   Physical Activity: Not on file   Stress: Not on  file   Social Connections: Not on file   Intimate Partner Violence: Not on file   Housing Stability: Not on file        Fam Hx: reviewed  Family History   Problem Relation Age of Onset     Hypertension Mother         diabetes,hypoythryoidism, stroke     Diabetes Mother      Heart Disease Father         , cancer lip     Heart Disease Paternal Grandfather              Cancer Paternal Grandmother              Cerebrovascular Disease Maternal Grandfather              Cerebrovascular Disease Maternal Grandmother         , diabetes     Connective Tissue Disorder Sister         fibromyalgia     Diabetes Sister      Hypertension Brother      Cancer Paternal Aunt         ovarian         Screening: reviewed      All: reviewed    Meds: reviewed  Current Outpatient Medications   Medication Sig Dispense Refill     acetaminophen (TYLENOL) 650 MG CR tablet Take 650 mg by mouth every 8 hours as needed for mild pain or fever        albuterol (PROAIR HFA/PROVENTIL HFA/VENTOLIN HFA) 108 (90 Base) MCG/ACT inhaler Inhale 2 puffs into the lungs every 4 hours as needed for shortness of breath / dyspnea 1 Inhaler 3     aspirin 81 MG EC tablet Take 1 tablet (81 mg) by mouth daily 90 tablet 3     atenolol (TENORMIN) 50 MG tablet Take 1 tablet by mouth once daily 90 tablet 0     atorvastatin (LIPITOR) 20 MG tablet Take 1 tablet (20 mg) by mouth daily 90 tablet 1     azelastine (ASTELIN) 0.1 % nasal spray USE 1 SPRAY(S) IN EACH NOSTRIL TWICE DAILY AS NEEDED 30 mL 0     azelastine (OPTIVAR) 0.05 % SOLN Place 1 drop into both eyes 2 times daily as needed Reported on 3/22/2017       canagliflozin (INVOKANA) 300 MG tablet Take 1 tablet (300 mg) by mouth every morning (before breakfast) 90 tablet 1     cetirizine (ZYRTEC) 10 MG tablet Take 10 mg by mouth every morning Takes only PRN       CONTOUR NEXT TEST test strip USE 1 STRIP TO CHECK GLUCOSE ONCE DAILY 100 strip 4     diclofenac (VOLTAREN) 1 % topical gel  "Apply topically 4 times daily as needed        fish oil-omega-3 fatty acids 1000 MG capsule Take 1 g by mouth daily Reported on 3/22/2017       glipiZIDE (GLUCOTROL) 5 MG tablet TAKE 2 TABLETS BY MOUTH TWICE DAILY BEFORE MEAL(S) 360 tablet 1     hydrochlorothiazide (HYDRODIURIL) 25 MG tablet TAKE 1 TABLET BY MOUTH ONCE DAILY IN THE MORNING 90 tablet 0     ibuprofen (ADVIL/MOTRIN) 200 MG tablet take 4 tablet (200 mg) by oral route every 8 hours as needed with food       latanoprost (XALATAN) 0.005 % ophthalmic solution Place 1 drop into both eyes At Bedtime  2.5 mL 1     lisinopril (ZESTRIL) 20 MG tablet Take 1 tablet (20 mg) by mouth 2 times daily 180 tablet 1     metFORMIN (GLUCOPHAGE-XR) 500 MG 24 hr tablet Take 2 tablets (1,000 mg) by mouth 2 times daily (with meals) 360 tablet 3     MULTIVITAMIN TABS   OR Reported on 3/22/2017       ondansetron (ZOFRAN-ODT) 4 MG ODT tab Take 1-2 tablets (4-8 mg) by mouth every 8 hours as needed for nausea 8 tablet 0     order for DME All diabetic testing supplies including test strips, lancets and solution for testing 2 times per day. Patient is using   no Insulin. Last A1c Lab Results       Component                Value               Date                       A1C                      7.9                 08/20/2018 100 each 11     oxyCODONE (ROXICODONE) 5 MG tablet Take 1-2 tablets (5-10 mg) by mouth every 4 hours as needed for moderate to severe pain 16 tablet 0     Semaglutide 3 MG TABS Take 3 mg by mouth daily 30 tablet 3     sitagliptin (JANUVIA) 100 MG tablet Take 1 tablet (100 mg) by mouth daily 90 tablet 1     timolol maleate (TIMOPTIC) 0.5 % ophthalmic solution INSTILL 1 DROP INTO EACH EYE TWICE DAILY         OBJECTIVE:                                                    Physical Exam :  Blood pressure 110/61, pulse 72, temperature 97.2  F (36.2  C), temperature source Tympanic, resp. rate 16, height 1.676 m (5' 6\"), weight 95.7 kg (211 lb), last menstrual period " "12/13/2002, SpO2 100 %, not currently breastfeeding.     NAD, appears comfortable  Skin clear, no rashes  Neck: supple, no JVD,  no thyroidmegaly  Lymph nodes non palpable in the cervical, supraclavicular axillaries,   Chest: clear to auscultation with good respiratory effort  Cardiac: S1S2, RRR, no mgr appreciated  Abdomen: soft, not tender, not distended, audible bowel sound, no hepatosplenomegaly, no palpable masses, no abdominal bruits  Extremities: no cyanosis, clubbing or edema.   Neuro: A, Ox3, no focal signs.  Breast exam in supine and erect position: they are symmetrical, no skin changes, no tenderness or nodes on palpation. Nipples are erect, no skin lesions, no discharge on pressure.    Pelvic exam: deferred, s/p menopause, no symptoms, no hx of abnormal pap         Raisa Torres MD  Internal Medicine       SUBJECTIVE:   Gricelda Sabillon is a 71 year old female who presents for Preventive Visit.      Patient has been advised of split billing requirements and indicates understanding: Yes  Are you in the first 12 months of your Medicare coverage?  No    Healthy Habits:     In general, how would you rate your overall health?  Good    Frequency of exercise:  1 day/week    Duration of exercise:  15-30 minutes    Do you usually eat at least 4 servings of fruit and vegetables a day, include whole grains    & fiber and avoid regularly eating high fat or \"junk\" foods?  Yes    Medication side effects:  Other    Ability to successfully perform activities of daily living:  No assistance needed    Home Safety:  No safety concerns identified    Hearing Impairment:  No hearing concerns    In the past 6 months, have you been bothered by leaking of urine? Yes    In general, how would you rate your overall mental or emotional health?  Excellent      PHQ-2 Total Score: 0    Additional concerns today:  No    Do you feel safe in your environment? Yes    Have you ever done Advance Care Planning? (For example, a Health " Directive, POLST, or a discussion with a medical provider or your loved ones about your wishes): No, advance care planning information given to patient to review.  Patient declined advance care planning discussion at this time.       Fall risk  Fallen 2 or more times in the past year?: No  Any fall with injury in the past year?: No    Cognitive Screening   1) Repeat 3 items (Leader, Season, Table)    2) Clock draw: NORMAL  3) 3 item recall: Recalls 3 objects  Results: 3 items recalled: COGNITIVE IMPAIRMENT LESS LIKELY    Mini-CogTM Copyright S Libia. Licensed by the author for use in [facility]; reprinted with permission (sarah@[facility]). All rights reserved.      Do you have sleep apnea, excessive snoring or daytime drowsiness?: sleep apnea    Reviewed and updated as needed this visit by clinical staff   Tobacco  Allergies  Meds   Med Hx  Surg Hx  Fam Hx  Soc Hx          Reviewed and updated as needed this visit by Provider                   Social History     Tobacco Use     Smoking status: Never Smoker     Smokeless tobacco: Never Used   Substance Use Topics     Alcohol use: No     Alcohol/week: 0.0 standard drinks         Alcohol Use 4/15/2022   Prescreen: >3 drinks/day or >7 drinks/week? Not Applicable   Prescreen: >3 drinks/day or >7 drinks/week? -           Hyperlipidemia Follow-Up      Are you regularly taking any medication or supplement to lower your cholesterol?   Yes- Atorvastatin    Are you having muscle aches or other side effects that you think could be caused by your cholesterol lowering medication?  No    Hypertension Follow-up      Do you check your blood pressure regularly outside of the clinic? Yes     Are you following a low salt diet? No    Are your blood pressures ever more than 140 on the top number (systolic) OR more   than 90 on the bottom number (diastolic), for example 140/90? No      Current providers sharing in care for this patient include:   Patient Care  Team:  Raisa Davidson MD as PCP - General (Internal Medicine)  Raisa Davidson MD as Assigned PCP  Tanya Sheehan RD as Diabetes Educator (Dietitian, Registered)  Chevy Allison MD as MD (Surgery)  Chevy Allison MD as Assigned Surgical Provider    The following health maintenance items are reviewed in Epic and correct as of today:  Health Maintenance Due   Topic Date Due     URINE DRUG SCREEN  Never done     ANNUAL REVIEW OF  ORDERS  Never done     Pneumococcal Vaccine: 65+ Years (3 of 3 - PCV13) 09/28/2018     ASTHMA ACTION PLAN  08/27/2019     DIABETIC FOOT EXAM  03/25/2020     EYE EXAM  11/21/2020     COVID-19 Vaccine (3 - Moderna risk 4-dose series) 01/24/2022     ASTHMA CONTROL TEST  02/17/2022     LIPID  03/03/2022     MEDICARE ANNUAL WELLNESS VISIT  03/23/2022     Labs reviewed in EPIC          Review of Systems   Constitutional: Negative for chills and fever.   HENT: Negative for congestion, ear pain, hearing loss and sore throat.    Eyes: Negative for pain and visual disturbance.   Respiratory: Negative for cough.    Cardiovascular: Positive for peripheral edema. Negative for chest pain.   Gastrointestinal: Positive for diarrhea. Negative for abdominal pain, constipation, heartburn, hematochezia and nausea.   Breasts:  Negative for tenderness, breast mass and discharge.   Genitourinary: Negative for dysuria, frequency, genital sores, hematuria, pelvic pain, urgency, vaginal bleeding and vaginal discharge.   Musculoskeletal: Positive for arthralgias. Negative for joint swelling and myalgias.   Neurological: Negative for dizziness, weakness, headaches and paresthesias.   Psychiatric/Behavioral: Negative for mood changes. The patient is not nervous/anxious.          Patient has been advised of split billing requirements and indicates understanding: Yes At the check in, at the      COUNSELING:  Reviewed preventive health counseling, as reflected in patient  "instructions       Regular exercise       Healthy diet/nutrition    Estimated body mass index is 34.17 kg/m  as calculated from the following:    Height as of this encounter: 1.676 m (5' 6\").    Weight as of 4/11/22: 96 kg (211 lb 11.2 oz).    Weight management plan: Discussed healthy diet and exercise guidelines    She reports that she has never smoked. She has never used smokeless tobacco.      Appropriate preventive services were discussed with this patient, including applicable screening as appropriate for cardiovascular disease, diabetes, osteopenia/osteoporosis, and glaucoma.  As appropriate for age/gender, discussed screening for colorectal cancer, prostate cancer, breast cancer, and cervical cancer. Checklist reviewing preventive services available has been given to the patient.    Reviewed patients plan of care and provided an AVS. The Basic Care Plan (routine screening as documented in Health Maintenance) for Gricelda meets the Care Plan requirement. This Care Plan has been established and reviewed with the Patient.    Counseling Resources:  ATP IV Guidelines  Pooled Cohorts Equation Calculator  Breast Cancer Risk Calculator  Breast Cancer: Medication to Reduce Risk  FRAX Risk Assessment  ICSI Preventive Guidelines  Dietary Guidelines for Americans, 2010  USDA's MyPlate  ASA Prophylaxis  Lung CA Screening    Raisa Davidson MD  [facility]    Identified Health Risks:  "

## 2022-04-18 NOTE — PROGRESS NOTES
"SUBJECTIVE:   Gricelda Sabillon is a 71 year old female who presents for Preventive Visit.    {Split Bill scripting  The purpose of this visit is to discuss your medical history and prevent health problems before you are sick. You may be responsible for a co-pay, coinsurance, or deductible if your visit today includes services such as checking on a sore throat, having an x-ray or lab test, or treating and evaluating a new or existing condition :291089}  Patient has been advised of split billing requirements and indicates understanding: Yes  Are you in the first 12 months of your Medicare coverage?  No    Healthy Habits:     In general, how would you rate your overall health?  Good    Frequency of exercise:  1 day/week    Duration of exercise:  15-30 minutes    Do you usually eat at least 4 servings of fruit and vegetables a day, include whole grains    & fiber and avoid regularly eating high fat or \"junk\" foods?  Yes    Medication side effects:  Other    Ability to successfully perform activities of daily living:  No assistance needed    Home Safety:  No safety concerns identified    Hearing Impairment:  No hearing concerns    In the past 6 months, have you been bothered by leaking of urine? Yes    In general, how would you rate your overall mental or emotional health?  Excellent      PHQ-2 Total Score: 0    Additional concerns today:  No    Do you feel safe in your environment? Yes    Have you ever done Advance Care Planning? (For example, a Health Directive, POLST, or a discussion with a medical provider or your loved ones about your wishes): No, advance care planning information given to patient to review.  Patient declined advance care planning discussion at this time.       Fall risk  Fallen 2 or more times in the past year?: No  Any fall with injury in the past year?: No    Cognitive Screening   1) Repeat 3 items (Leader, Season, Table)  {Get patient's attention, then say, \"I am going to say three words that " "I want you to remember now and later.  The words are Leader, Season, and Table.  Please say them for me now.\"  If pt. unable after 3 tries, go to next item}  2) Clock draw: {Say the following phrases in order: \"Please draw a clock.  Start by drawing a large [place].  Put all the numbers in the [place] and set the hands to show 11:10 (10 past 11).\"  If pt cannot complete in 3 minutes, stop and ask for recall items.  \"NORMAL\" test = all twelve numbers in correct location, and clock hands correctly designating 11:10}{ :47197::\"NORMAL\"}  3) 3 item recall: {Say: \"What were the three words I asked you to remember?\"}{ :195065}  Results: {MINICOG RESULTS:614882}    Mini-CogTM Copyright S [employer]. Licensed by the author for use in [facility]; reprinted with permission (soob@[facility]). All rights reserved.      {Do you have sleep apnea, excessive snoring or daytime drowsiness? (Optional):327941}    Reviewed and updated as needed this visit by clinical staff   Tobacco  Allergies  Meds   Med Hx  Surg Hx  Fam Hx  Soc Hx          Reviewed and updated as needed this visit by Provider                   Social History     Tobacco Use     Smoking status: Never Smoker     Smokeless tobacco: Never Used   Substance Use Topics     Alcohol use: No     Alcohol/week: 0.0 standard drinks         Alcohol Use 4/15/2022   Prescreen: >3 drinks/day or >7 drinks/week? Not Applicable   Prescreen: >3 drinks/day or >7 drinks/week? -       {Outside tests to abstract? :949107}    Hypertension Follow-up      Do you check your blood pressure regularly outside of the clinic? { :073706}     Are you following a low salt diet? { :061953}    Are your blood pressures ever more than 140 on the top number (systolic) OR more   than 90 on the bottom number (diastolic), for example 140/90? { :442732}      Current providers sharing in care for this patient include:   Patient Care Team:  Raisa Davidson MD as PCP - General (Internal " "Medicine)  Raisa Davidson MD as Assigned PCP  Tanya Sheehan RD as Diabetes Educator (Dietitian, Registered)  Chevy Allison MD as MD (Surgery)  Chevy Allison MD as Assigned Surgical Provider    The following health maintenance items are reviewed in Epic and correct as of today:  Health Maintenance Due   Topic Date Due     URINE DRUG SCREEN  Never done     ANNUAL REVIEW OF HM ORDERS  Never done     Pneumococcal Vaccine: 65+ Years (3 of 3 - PCV13) 09/28/2018     ASTHMA ACTION PLAN  08/27/2019     DIABETIC FOOT EXAM  03/25/2020     EYE EXAM  11/21/2020     COVID-19 Vaccine (3 - Moderna risk 4-dose series) 01/24/2022     ASTHMA CONTROL TEST  02/17/2022     LIPID  03/03/2022     FALL RISK ASSESSMENT  03/23/2022     MEDICARE ANNUAL WELLNESS VISIT  03/23/2022     {Chronicprobdata (optional):305627}  {Decision Support (Optional):931073}        Review of Systems   Constitutional: Negative for chills and fever.   HENT: Negative for congestion, ear pain, hearing loss and sore throat.    Eyes: Negative for pain and visual disturbance.   Respiratory: Negative for cough.    Cardiovascular: Positive for peripheral edema. Negative for chest pain.   Gastrointestinal: Positive for diarrhea. Negative for abdominal pain, constipation, heartburn, hematochezia and nausea.   Breasts:  Negative for tenderness, breast mass and discharge.   Genitourinary: Negative for dysuria, frequency, genital sores, hematuria, pelvic pain, urgency, vaginal bleeding and vaginal discharge.   Musculoskeletal: Positive for arthralgias. Negative for joint swelling and myalgias.   Neurological: Negative for dizziness, weakness, headaches and paresthesias.   Psychiatric/Behavioral: Negative for mood changes. The patient is not nervous/anxious.      {ROS COMP (Optional):493020}    OBJECTIVE:   Ht 1.676 m (5' 6\")   LMP 12/13/2002 (Exact Date)   BMI 34.17 kg/m   Estimated body mass index is 34.17 kg/m  as calculated from the following:    " "Height as of this encounter: 1.676 m (5' 6\").    Weight as of 22: 96 kg (211 lb 11.2 oz).  Physical Exam  {Exam (Optional) :096375}    {Diagnostic Test Results (Optional):559359::\"Diagnostic Test Results:\",\"Labs reviewed in Epic\"}    ASSESSMENT / PLAN:   {Diag Picklist:411418}    {Patient advised of split billing (Optional):837180}    COUNSELING:  {Medicare Counselin}    Estimated body mass index is 34.17 kg/m  as calculated from the following:    Height as of this encounter: 1.676 m (5' 6\").    Weight as of 22: 96 kg (211 lb 11.2 oz).    {Weight Management Plan (ACO) Complete if BMI is abnormal-  Ages 18-64  BMI >24.9.  Age 65+ with BMI <23 or >30 (Optional):770109}    She reports that she has never smoked. She has never used smokeless tobacco.      Appropriate preventive services were discussed with this patient, including applicable screening as appropriate for cardiovascular disease, diabetes, osteopenia/osteoporosis, and glaucoma.  As appropriate for age/gender, discussed screening for colorectal cancer, prostate cancer, breast cancer, and cervical cancer. Checklist reviewing preventive services available has been given to the patient.    Reviewed patients plan of care and provided an AVS. The {CarePlan:063034} for Gricelda meets the Care Plan requirement. This Care Plan has been established and reviewed with the {PATIENT, FAMILY MEMBER, CAREGIVER:785764}.    Counseling Resources:  ATP IV Guidelines  Pooled Cohorts Equation Calculator  Breast Cancer Risk Calculator  Breast Cancer: Medication to Reduce Risk  FRAX Risk Assessment  ICSI Preventive Guidelines  Dietary Guidelines for Americans, 2010  [employer] MyPlate  ASA Prophylaxis  Lung CA Screening    Raisa Davidson MD  [facility]    Identified Health Risks:  "

## 2022-04-18 NOTE — PATIENT INSTRUCTIONS
Plan:  1. For Diabetes Semaglutide 3 m g daily - instead of Januvia   2. Continue the other meds, same doses for now.  3.  Mammogram ( please call [phone] to schedule it)   4.Please make a lab appointment for non fasting labs in 3 months  5. Please make an appointment few days after the labs to discuss about the results.

## 2022-04-18 NOTE — TELEPHONE ENCOUNTER
Please see "[employer] message.  Patient is requesting  Semaglutide (Rybelsus 3) MG TABS.

## 2022-04-20 PROBLEM — C43.9 METASTATIC MALIGNANT MELANOMA (H): Status: ACTIVE | Noted: 2022-04-20

## 2022-04-20 RX ORDER — SITAGLIPTIN 100 MG/1
TABLET, FILM COATED ORAL
Qty: 90 TABLET | Refills: 0 | OUTPATIENT
Start: 2022-04-20

## 2022-04-20 RX ORDER — METFORMIN HCL 500 MG
TABLET, EXTENDED RELEASE 24 HR ORAL
Qty: 360 TABLET | Refills: 0 | Status: SHIPPED | OUTPATIENT
Start: 2022-04-20 | End: 2022-07-19

## 2022-04-20 RX ORDER — GLIPIZIDE 5 MG/1
TABLET ORAL
Qty: 360 TABLET | Refills: 0 | Status: SHIPPED | OUTPATIENT
Start: 2022-04-20 | End: 2022-08-03

## 2022-04-25 DIAGNOSIS — Z11.59 ENCOUNTER FOR SCREENING FOR OTHER VIRAL DISEASES: Primary | ICD-10-CM

## 2022-04-25 NOTE — PROGRESS NOTES
Ordering Clinic:  MARIKA TRIANA procedure #:  Procedure: Port Placement  Reason for procedure: melanoma  Arrival date: 5/2/22  Arrival time: 0900  Covid-19 testing requirements explained: y  NPO explained: y                 Does patient have transportation available pre and post procedure?   y  Check-in procedure explained: y  Allergies reviewed: y  Blood thinners: n  Labs: need  Results:_____________________________________  H&P:  MARIKA 4/12/22  H&P requested?:   Letter sent/email?: My Chart  Does patient require /language?        PMH:

## 2022-04-28 DIAGNOSIS — E78.5 HYPERLIPIDEMIA WITH TARGET LDL LESS THAN 100: Chronic | ICD-10-CM

## 2022-04-28 DIAGNOSIS — E11.9 DIABETES MELLITUS WITHOUT COMPLICATION (H): Chronic | ICD-10-CM

## 2022-04-29 ENCOUNTER — LAB (OUTPATIENT)
Dept: LAB | Facility: CLINIC | Age: 72
End: 2022-04-29
Attending: INTERNAL MEDICINE
Payer: COMMERCIAL

## 2022-04-29 DIAGNOSIS — Z11.59 ENCOUNTER FOR SCREENING FOR OTHER VIRAL DISEASES: ICD-10-CM

## 2022-04-29 PROCEDURE — U0003 INFECTIOUS AGENT DETECTION BY NUCLEIC ACID (DNA OR RNA); SEVERE ACUTE RESPIRATORY SYNDROME CORONAVIRUS 2 (SARS-COV-2) (CORONAVIRUS DISEASE [COVID-19]), AMPLIFIED PROBE TECHNIQUE, MAKING USE OF HIGH THROUGHPUT TECHNOLOGIES AS DESCRIBED BY CMS-2020-01-R: HCPCS

## 2022-04-29 RX ORDER — ATORVASTATIN CALCIUM 20 MG/1
TABLET, FILM COATED ORAL
Qty: 90 TABLET | Refills: 0 | Status: ON HOLD | OUTPATIENT
Start: 2022-04-29 | End: 2022-08-14

## 2022-04-30 LAB — SARS-COV-2 RNA RESP QL NAA+PROBE: NEGATIVE

## 2022-05-02 ENCOUNTER — HOSPITAL ENCOUNTER (OUTPATIENT)
Facility: CLINIC | Age: 72
Discharge: HOME | End: 2022-05-02
Attending: RADIOLOGY | Admitting: RADIOLOGY
Payer: COMMERCIAL

## 2022-05-02 ENCOUNTER — APPOINTMENT (OUTPATIENT)
Dept: INTERVENTIONAL RADIOLOGY/VASCULAR | Facility: CLINIC | Age: 72
End: 2022-05-02
Attending: INTERNAL MEDICINE
Payer: COMMERCIAL

## 2022-05-02 VITALS
OXYGEN SATURATION: 96 % | RESPIRATION RATE: 16 BRPM | DIASTOLIC BLOOD PRESSURE: 66 MMHG | SYSTOLIC BLOOD PRESSURE: 127 MMHG | HEART RATE: 67 BPM | TEMPERATURE: 98.2 F

## 2022-05-02 DIAGNOSIS — C43.71 MALIGNANT MELANOMA OF RIGHT LOWER EXTREMITY INCLUDING HIP (H): ICD-10-CM

## 2022-05-02 LAB
ANION GAP SERPL CALCULATED.3IONS-SCNC: 4 MMOL/L (ref 3–14)
APTT PPP: 25 SECONDS (ref 22–38)
BUN SERPL-MCNC: 24 MG/DL (ref 7–30)
CALCIUM SERPL-MCNC: 9.8 MG/DL (ref 8.5–10.1)
CHLORIDE BLD-SCNC: 107 MMOL/L (ref 94–109)
CO2 SERPL-SCNC: 24 MMOL/L (ref 20–32)
CREAT SERPL-MCNC: 0.87 MG/DL (ref 0.52–1.04)
ERYTHROCYTE [DISTWIDTH] IN BLOOD BY AUTOMATED COUNT: 13.2 % (ref 10–15)
GFR SERPL CREATININE-BSD FRML MDRD: 70 ML/MIN/1.73M2
GLUCOSE BLD-MCNC: 213 MG/DL (ref 70–99)
HCT VFR BLD AUTO: 43.9 % (ref 35–47)
HGB BLD-MCNC: 13.8 G/DL (ref 11.7–15.7)
INR PPP: 0.96 (ref 0.85–1.15)
MCH RBC QN AUTO: 27.3 PG (ref 26.5–33)
MCHC RBC AUTO-ENTMCNC: 31.4 G/DL (ref 31.5–36.5)
MCV RBC AUTO: 87 FL (ref 78–100)
PLATELET # BLD AUTO: 290 10E3/UL (ref 150–450)
POTASSIUM BLD-SCNC: 4 MMOL/L (ref 3.4–5.3)
RBC # BLD AUTO: 5.06 10E6/UL (ref 3.8–5.2)
SODIUM SERPL-SCNC: 135 MMOL/L (ref 133–144)
WBC # BLD AUTO: 5.9 10E3/UL (ref 4–11)

## 2022-05-02 PROCEDURE — 80048 BASIC METABOLIC PNL TOTAL CA: CPT | Performed by: RADIOLOGY

## 2022-05-02 PROCEDURE — 99152 MOD SED SAME PHYS/QHP 5/>YRS: CPT

## 2022-05-02 PROCEDURE — 85610 PROTHROMBIN TIME: CPT | Performed by: RADIOLOGY

## 2022-05-02 PROCEDURE — 250N000011 HC RX IP 250 OP 636

## 2022-05-02 PROCEDURE — 85014 HEMATOCRIT: CPT | Performed by: RADIOLOGY

## 2022-05-02 PROCEDURE — 36561 INSERT TUNNELED CV CATH: CPT

## 2022-05-02 PROCEDURE — 36415 COLL VENOUS BLD VENIPUNCTURE: CPT | Performed by: RADIOLOGY

## 2022-05-02 PROCEDURE — 250N000009 HC RX 250: Performed by: RADIOLOGY

## 2022-05-02 PROCEDURE — 250N000009 HC RX 250

## 2022-05-02 PROCEDURE — 85730 THROMBOPLASTIN TIME PARTIAL: CPT | Mod: GZ | Performed by: RADIOLOGY

## 2022-05-02 PROCEDURE — 77001 FLUOROGUIDE FOR VEIN DEVICE: CPT

## 2022-05-02 PROCEDURE — 76937 US GUIDE VASCULAR ACCESS: CPT

## 2022-05-02 PROCEDURE — 272N000604 HC WOUND GLUE CR3

## 2022-05-02 PROCEDURE — C1788 PORT, INDWELLING, IMP: HCPCS

## 2022-05-02 PROCEDURE — 272N000504 HC NEEDLE CR4

## 2022-05-02 RX ORDER — NALOXONE HYDROCHLORIDE 0.4 MG/ML
0.2 INJECTION, SOLUTION INTRAMUSCULAR; INTRAVENOUS; SUBCUTANEOUS
Status: DISCONTINUED | OUTPATIENT
Start: 2022-05-02 | End: 2022-05-02 | Stop reason: HOSPADM

## 2022-05-02 RX ORDER — CEFAZOLIN SODIUM 2 G/100ML
INJECTION, SOLUTION INTRAVENOUS
Status: COMPLETED
Start: 2022-05-02 | End: 2022-05-02

## 2022-05-02 RX ORDER — FLUMAZENIL 0.1 MG/ML
0.2 INJECTION, SOLUTION INTRAVENOUS
Status: DISCONTINUED | OUTPATIENT
Start: 2022-05-02 | End: 2022-05-02 | Stop reason: HOSPADM

## 2022-05-02 RX ORDER — LIDOCAINE 40 MG/G
CREAM TOPICAL
Status: DISCONTINUED | OUTPATIENT
Start: 2022-05-02 | End: 2022-05-02 | Stop reason: HOSPADM

## 2022-05-02 RX ORDER — FENTANYL CITRATE 50 UG/ML
25-50 INJECTION, SOLUTION INTRAMUSCULAR; INTRAVENOUS EVERY 5 MIN PRN
Status: DISCONTINUED | OUTPATIENT
Start: 2022-05-02 | End: 2022-05-02 | Stop reason: HOSPADM

## 2022-05-02 RX ORDER — LIDOCAINE HYDROCHLORIDE 10 MG/ML
INJECTION, SOLUTION EPIDURAL; INFILTRATION; INTRACAUDAL; PERINEURAL
Status: COMPLETED
Start: 2022-05-02 | End: 2022-05-02

## 2022-05-02 RX ORDER — HEPARIN SODIUM (PORCINE) LOCK FLUSH IV SOLN 100 UNIT/ML 100 UNIT/ML
SOLUTION INTRAVENOUS
Status: COMPLETED
Start: 2022-05-02 | End: 2022-05-02

## 2022-05-02 RX ORDER — NALOXONE HYDROCHLORIDE 0.4 MG/ML
0.4 INJECTION, SOLUTION INTRAMUSCULAR; INTRAVENOUS; SUBCUTANEOUS
Status: DISCONTINUED | OUTPATIENT
Start: 2022-05-02 | End: 2022-05-02 | Stop reason: HOSPADM

## 2022-05-02 RX ORDER — LIDOCAINE HYDROCHLORIDE AND EPINEPHRINE 10; 10 MG/ML; UG/ML
1-30 INJECTION, SOLUTION INFILTRATION; PERINEURAL
Status: COMPLETED | OUTPATIENT
Start: 2022-05-02 | End: 2022-05-02

## 2022-05-02 RX ORDER — FENTANYL CITRATE 50 UG/ML
INJECTION, SOLUTION INTRAMUSCULAR; INTRAVENOUS
Status: COMPLETED
Start: 2022-05-02 | End: 2022-05-02

## 2022-05-02 RX ORDER — CEFAZOLIN SODIUM 2 G/100ML
2 INJECTION, SOLUTION INTRAVENOUS
Status: COMPLETED | OUTPATIENT
Start: 2022-05-02 | End: 2022-05-02

## 2022-05-02 RX ADMIN — LIDOCAINE HYDROCHLORIDE AND EPINEPHRINE 10 ML: 10; 10 INJECTION, SOLUTION INFILTRATION; PERINEURAL at 10:17

## 2022-05-02 RX ADMIN — HEPARIN SODIUM (PORCINE) LOCK FLUSH IV SOLN 100 UNIT/ML 500 UNITS: 100 SOLUTION at 10:16

## 2022-05-02 RX ADMIN — MIDAZOLAM 2 MG: 1 INJECTION INTRAMUSCULAR; INTRAVENOUS at 10:09

## 2022-05-02 RX ADMIN — CEFAZOLIN SODIUM 2 G: 2 INJECTION, SOLUTION INTRAVENOUS at 09:54

## 2022-05-02 RX ADMIN — LIDOCAINE HYDROCHLORIDE 9 ML: 10 INJECTION, SOLUTION EPIDURAL; INFILTRATION; INTRACAUDAL; PERINEURAL at 10:16

## 2022-05-02 RX ADMIN — FENTANYL CITRATE 50 MCG: 50 INJECTION, SOLUTION INTRAMUSCULAR; INTRAVENOUS at 10:09

## 2022-05-02 NOTE — PRE-PROCEDURE
GENERAL PRE-PROCEDURE:   Procedure:  Port  Date/Time:  5/2/2022 9:49 AM    Verbal consent obtained?: Yes    Written consent obtained?: Yes    Risks and benefits: Risks, benefits and alternatives were discussed    Consent given by:  Patient  Patient states understanding of procedure being performed: Yes    Patient's understanding of procedure matches consent: Yes    Procedure consent matches procedure scheduled: Yes    Expected level of sedation:  Moderate  Appropriately NPO:  Yes  ASA Class:  2  Mallampati  :  Grade 2- soft palate, base of uvula, tonsillar pillars, and portion of posterior pharyngeal wall visible  Lungs:  Lungs clear with good breath sounds bilaterally  Heart:  Normal heart sounds and rate  History & Physical reviewed:  History and physical reviewed and no updates needed  Statement of review:  I have reviewed the lab findings, diagnostic data, medications, and the plan for sedation

## 2022-05-02 NOTE — IP AVS SNAPSHOT
[facility]  [address]  [facility] [address]  Phone: [phone]                                      After Visit Summary   5/2/2022    Gricelda Sabillon   MRN: [id number]           After Visit Summary Signature Page    I have received my discharge instructions, and my questions have been answered. I have discussed any challenges I see with this plan with the nurse or doctor.    ..........................................................................................................................................  Patient/Patient Representative Signature      ..........................................................................................................................................  Patient Representative Print Name and Relationship to Patient    ..................................................               ................................................  Date                                   Time    ..........................................................................................................................................  Reviewed by Signature/Title    ...................................................              ..............................................  Date                                               Time          22EPIC Rev 08/18

## 2022-05-02 NOTE — PLAN OF CARE
Goal Outcome Evaluation:    Pt resting comfortably. VSS on RA. A&O. Denies pain. Dressing DCI. Tolerated PO. PIV removed. Pt and spouse received and verbalized understanding of discharge instructions. All belongings returned to pt at discharge. Spouse providing transportation home. Pt taken out of hospital via wheelchair with RN.

## 2022-05-02 NOTE — PROCEDURES
[facility]    Procedure: Port placement.     Date/Time: 5/2/2022 4:56 PM  Performed by: Anika Hubbard DO  Authorized by: Anika Hubbard DO       UNIVERSAL PROTOCOL   Site Marked: Yes  Prior Images Obtained and Reviewed:  Yes  Required items: Required blood products, implants, devices and special equipment available    Patient identity confirmed:  Verbally with patient, arm band, provided demographic data and hospital-assigned identification number  Patient was reevaluated immediately before administering moderate or deep sedation or anesthesia  Confirmation Checklist:  Patient's identity using two indicators, relevant allergies, procedure was appropriate and matched the consent or emergent situation and correct equipment/implants were available  Time out: Immediately prior to the procedure a time out was called    Universal Protocol: the Joint Commission Universal Protocol was followed    Preparation: Patient was prepped and draped in usual sterile fashion       ANESTHESIA    Anesthesia: Local infiltration  Local Anesthetic:  Lidocaine 1% without epinephrine      SEDATION  Patient Sedated: Yes    Sedation Type:  Moderate (conscious) sedation  Vital signs: Vital signs monitored during sedation    See dictated procedure note for full details.  Findings: Port placement.     Specimens: none    Complications: None    Condition: Stable    Plan: Ok to use.       PROCEDURE    Patient Tolerance:  Patient tolerated the procedure well with no immediate complications  Length of time physician/provider present for 1:1 monitoring during sedation: 20

## 2022-05-02 NOTE — DISCHARGE INSTRUCTIONS
Going Home after Your Port Is Inserted  _______________________________________    Patient Name: Gricelda Sabillon  Today's Date: May 2, 2022    The doctor who inserted your port was:  Dr. Hubbard at [facility])      Have your port site and/or neck wound checked on:  Next Appt.       Go to:  Interventional Radiology    Your port may be accessed right away. A nurse needs to flush your port every 30 days or after each use.     When you get home:  You should have an adult with you for the first 6 hours.  No driving or drinking alcohol until tomorrow. You may still have side effects from the medicine you received today (you may feel drowsy, unsteady or forgetful).   You may go back to your regular diet today.   If you take aspirin or Plavix, you may begin taking it again tomorrow. You may restart all other medicines today. Use pain medicine as directed.  Avoid heavy lifting or the overuse of your shoulder for three days.  Caring for the port site and/or neck wound:  Keep the port site clean and dry. Cover the site with plastic before taking a shower. Do this until the site has healed.   Keep the bandage on your port site for three days. Change it if it gets wet or dirty. After three days, you may use Band-Aids until the wound has healed.  For a neck wound, leave the tape on for three days. You may cover it with a Band-Aid for comfort.   If you have oozing or bleeding from the port site or from the cut in your neck:   Put direct pressure on the wound for 5 to 10 minutes with a gauze pad.  If you still have bleeding after 10 minutes, call your doctor.  If you are bleeding a lot and can't control it with direct pressure, call 911.    Call your doctor at  if you have:  Bleeding from a wound after 10 minutes of direct pressure.  Swelling in your neck or over your port site.  Signs of infection: a fever over 100 F (37.8 C) under the tongue; the site is red, tender or draining.

## 2022-05-02 NOTE — IP AVS SNAPSHOT
After Visit Summary Template Not Found    This Print Group is only intended to be used in the After Visit Summary and can only be used in a report that uses a released After Visit Summary Template.                       MRN:[id number]                          After Visit Summary   5/2/2022    Gricelda Sabillon   MRN: [id number]           Visit Information        Department      5/2/2022  8:49 AM [facility]          Review of your medicines      UNREVIEWED medicines. Ask your doctor about these medicines       Dose / Directions   acetaminophen 650 MG CR tablet  Commonly known as: TYLENOL      Dose: 650 mg  Take 650 mg by mouth every 8 hours as needed for mild pain or fever  Refills: 0     albuterol 108 (90 Base) MCG/ACT inhaler  Commonly known as: PROAIR HFA/PROVENTIL HFA/VENTOLIN HFA  Used for: Acute bronchitis      Dose: 2 puff  Inhale 2 puffs into the lungs every 4 hours as needed for shortness of breath / dyspnea  Quantity: 1 Inhaler  Refills: 3     aspirin 81 MG EC tablet  Commonly known as: ASA      Dose: 81 mg  Take 1 tablet (81 mg) by mouth daily  Quantity: 90 tablet  Refills: 3     atenolol 50 MG tablet  Commonly known as: TENORMIN  Used for: HTN, goal below 140/90, Sinus tachycardia      Take 1 tablet by mouth once daily  Quantity: 90 tablet  Refills: 0     atorvastatin 20 MG tablet  Commonly known as: LIPITOR  Used for: Hyperlipidemia with target LDL less than 100      Take 1 tablet by mouth once daily  Quantity: 90 tablet  Refills: 0     azelastine 0.05 % ophthalmic solution  Commonly known as: OPTIVAR      Dose: 1 drop  Place 1 drop into both eyes 2 times daily as needed Reported on 3/22/2017  Refills: 0     azelastine 0.1 % nasal spray  Commonly known as: ASTELIN  Used for: Seasonal allergic rhinitis, unspecified trigger      USE 1 SPRAY(S) IN EACH NOSTRIL TWICE DAILY AS NEEDED  Quantity: 30 mL  Refills: 0     canagliflozin 300 MG tablet  Commonly known as: INVOKANA  Used for:  Diabetes mellitus without complication (H)      Dose: 300 mg  Take 1 tablet (300 mg) by mouth every morning (before breakfast)  Quantity: 90 tablet  Refills: 1     cetirizine 10 MG tablet  Commonly known as: zyrTEC      Dose: 10 mg  Take 10 mg by mouth every morning Takes only PRN  Refills: 0     diclofenac 1 % topical gel  Commonly known as: VOLTAREN      Apply topically 4 times daily as needed  Refills: 0     glipiZIDE 5 MG tablet  Commonly known as: GLUCOTROL  Used for: DM2 no complication      TAKE 2 TABLETS BY MOUTH TWICE DAILY BEFORE MEAL(S)  Quantity: 360 tablet  Refills: 0     hydrochlorothiazide 25 MG tablet  Commonly known as: HYDRODIURIL  Used for: HTN, goal below 140/90      TAKE 1 TABLET BY MOUTH ONCE DAILY IN THE MORNING  Quantity: 90 tablet  Refills: 0     ibuprofen 200 MG tablet  Commonly known as: ADVIL/MOTRIN      take 4 tablet (200 mg) by oral route every 8 hours as needed with food  Refills: 0     latanoprost 0.005 % ophthalmic solution  Commonly known as: XALATAN      Dose: 1 drop  Place 1 drop into both eyes At Bedtime  Quantity: 2.5 mL  Refills: 1     lisinopril 20 MG tablet  Commonly known as: ZESTRIL  Used for: HTN, goal below 140/90      Dose: 20 mg  Take 1 tablet (20 mg) by mouth 2 times daily  Quantity: 180 tablet  Refills: 1     metFORMIN 500 MG 24 hr tablet  Commonly known as: GLUCOPHAGE-XR  Used for: DM 2 with hyperglycemia (H)      TAKE 2 TABLETS BY MOUTH TWICE DAILY WITH MEALS  Quantity: 360 tablet  Refills: 0     Multivitamin Tabs      Reported on 3/22/2017  Refills: 0     ondansetron 4 MG ODT tab  Commonly known as: ZOFRAN-ODT  Used for: Mass of right thigh      Dose: 4-8 mg  Take 1-2 tablets (4-8 mg) by mouth every 8 hours as needed for nausea  Quantity: 8 tablet  Refills: 0     oxyCODONE 5 MG tablet  Commonly known as: ROXICODONE  Used for: Mass of right thigh      Dose: 5-10 mg  Take 1-2 tablets (5-10 mg) by mouth every 4 hours as needed for moderate to severe pain  Quantity: 16  tablet  Refills: 0     * Semaglutide 3 MG Tabs  Used for: Diabetes mellitus without complication (H)      Dose: 3 mg  Take 3 mg by mouth daily  Quantity: 30 tablet  Refills: 3     * Semaglutide 3 MG Tabs  Used for: Type 2 diabetes mellitus without complication, without long-term current use of insulin (H)      Dose: 3 mg  Take 3 mg by mouth daily  Quantity: 30 tablet  Refills: 3     sitagliptin 100 MG tablet  Commonly known as: Januvia  Used for: DM2 no complication      Dose: 100 mg  Take 1 tablet (100 mg) by mouth daily  Quantity: 90 tablet  Refills: 1     timolol maleate 0.5 % ophthalmic solution  Commonly known as: TIMOPTIC      INSTILL 1 DROP INTO EACH EYE TWICE DAILY  Refills: 0         * This list has 2 medication(s) that are the same as other medications prescribed for you. Read the directions carefully, and ask your doctor or other care provider to review them with you.            CONTINUE these medicines which have NOT CHANGED       Dose / Directions   Contour Next Test test strip  Used for: DM2 no complication  Generic drug: blood glucose      USE 1 STRIP TO CHECK GLUCOSE ONCE DAILY  Quantity: 100 strip  Refills: 4     fish oil-omega-3 fatty acids 1000 MG capsule      Dose: 1 g  Take 1 g by mouth daily Reported on 3/22/2017  Refills: 0     order for DME  Used for: DM2 no complication, Hyperglycemia, Mild intermittent asthma without complication, LFT elevation      All diabetic testing supplies including test strips, lancets and solution for testing 2 times per day. Patient is using   no Insulin. Last A1c Lab Results       Component                Value               Date                       A1C                      7.9                 08/20/2018  Quantity: 100 each  Refills: 11              Protect others around you: Learn how to safely use, store and throw away your medicines at www.disposemymeds.org.       Follow-ups after your visit       Your next 10 appointments already scheduled    May 02, 2022  10:00 AM  (Arrive by 9:00 AM)  IR CHEST PORT PLACEMENT > 5 YRS OF AGE with RHIR11, JONATHAN, RH IR RAD  [facility] Interventional Radiology ([facility] ) [address]  [facility] [address]  [phone]   How do I prepare for my exam? (Food and drink instructions)  Adults and Children over 2 years old: No eating for 8 hours before your procedure. You may have clear liquids up until 2 hours beforehand. These include water, clear tea, black coffee and fruit juice without pulp.  Children under 2 years old:  No eating or formula for 6 hours before your procedure. You may have clear liquids up until 2 hours beforehand. No breast milk for 4 hours before your procedure.    How do I prepare for my exam? (Other instructions)  We will call you to talk about your procedure and answer your questions. We will tell you what time to arrive. We will also ask about any medicines you are taking.  You will need to have a history and physical within 30 days before this procedure.    What should I wear: Please wear loose clothing, such as a sweat suit or jogging clothes. You will be asked to undress and put on a hospital gown.    How long does the exam take: You should expect to be at the facility for approximately 4 hours    What should I bring: Please bring any scans or X-rays taken at other hospitals, if similar tests were done. Also bring a list of your medicines, including vitamins, minerals and over-the-counter drugs. It is safest to leave personal items at home.    Do I need a :  Yes, you may not drive or take a bus or taxi by yourself. You will need an adult to take you home. It is recommended that a responsible adult remain with you for 6 hours.    What do I need to tell my doctor:  Tell your doctor if:  * You have ever had an allergic reaction to X-ray dye (contrast fluid).  * If there's any chance you are pregnant.    What should I do after the exam:  Rest and do quiet  activities for 24 hours. Avoid any heavy activity (lifting, vacuuming, exercise) on the day of the exam.  Do not make any major decisions and ensure you have a responsible adult with you for the remainder of the day.   Do not drive or use dangerous machines or tools for 24 hours.  Eat small, frequent meals to prevent nausea and vomiting.   Drink liquids as directed. Do not drink alcohol or take medicines that make you drowsy.  You should be able to return to your everyday activities the next day.    Who should I call with questions: If you have any questions, please call the Imaging Department where you will have your exam. Directions, parking instructions, and other information are available on our website, [email or web address].       May 16, 2022  1:00 PM  MA SCREENING BILATERAL W/ NAT with [id number]  [facility] ([facility] ) [address] [facility], [address]  [facility] [address] [phone]   How do I prepare for my exam? (Food and drink instructions)  No Food and Drink Restrictions.    How do I prepare for my exam? (Other instructions)  Do not use any powder, lotion or deodorant under your arms or on your breast. If you do, we will ask you to remove it before your exam. Do not wear any scented perfume or cologne to your appointment.    What should I wear: Wear comfortable, two-piece clothing.    How long does the exam take: Most exams will take 15 minutes. If you are having any additional imaging, please allow extra time.    What should I bring: Bring any previous mammograms from other facilities or have them mailed to medical records.    Do I need a :  No  is needed.    What do I need to tell my doctor: If you have any allergies, tell your care team.    What should I do after the exam: No restrictions, you may resume normal activities.    What is this test: This test is an x-ray of the breast to look for breast disease. The breast is pressed  "between two plates to flatten and spread the tissue. An X-ray is taken of the breast from different angles.    Who should I call with questions: If you have any questions, please call the Imaging Department where you will have your exam. Directions, parking instructions, and other information are available on our website, [email or web address].    Other information about my exam    Three-Dimensional (3D) mammograms are available at all [facility] locations except [facility] and [place].   3D is covered by all insurance companies, 3D may be subject to copay and deductible. Check with your insurance company before your appointment for your specific coverage information.  If you are requesting to schedule a screening mammogram and it is less than ONE year and ONE day from your last mammogram,  please verify your mammography benefits with your health insurance as you may be responsible for all charges associated with this exam.     Benefits of 3D mammograms include:  * Improved rate of cancer detection  * Decreases your chance of having to go back for more tests, which means fewer:  * \"False-positive\" results (This means that there is an abnormal area but it isn't cancer.)  * Invasive testing procedures, such as a biopsy or surgery  * Can provide clearer images of the breast if you have dense breast tissue.  *3D mammography is an optional exam that anyone can have with a 2D mammogram. It doesn't replace or take the place of a 2D mammogram. 2D mammograms remain an effective screening test for all women.       Jul 12, 2022  9:30 AM  LAB with RI LAB  [facility] ([facility] ) [address]  [facility] [address] [phone]   Please do not eat 10-12 hours before your appointment if you are coming in fasting for labs on lipids, cholesterol, or glucose (sugar). Does not apply to pregnant women. Water, tea and black coffee (with nothing added) " "is okay. Do not drink other fluids, diet soda or gum. If you have concerns about taking your medications, please send a message by clicking on Secure Messaging, Message Your Care Team.       Jul 19, 2022  1:30 PM  (Arrive by 1:10 PM)  Provider Visit with Raisa Davidson MD  [facility] ([facility] ) [address] [facility] [address]  [phone]   Please plan to arrive at the clinic at your \"Arrive By\" time for your appointment. Our late policy will be enforced based on this time.          Care Instructions       Further instructions from your care team         Going Home after Your Port Is Inserted  _______________________________________    Patient Name: Gricelda Sabillon  Today's Date: May 2, 2022    The doctor who inserted your port was:  Dr. Hubbard at [facility])      Have your port site and/or neck wound checked on:  Next Appt.       Go to:  Interventional Radiology    Your port may be accessed right away. A nurse needs to flush your port every 30 days or after each use.     When you get home:  You should have an adult with you for the first 6 hours.  No driving or drinking alcohol until tomorrow. You may still have side effects from the medicine you received today (you may feel drowsy, unsteady or forgetful).   You may go back to your regular diet today.   If you take aspirin or Plavix, you may begin taking it again tomorrow. You may restart all other medicines today. Use pain medicine as directed.  Avoid heavy lifting or the overuse of your shoulder for three days.  Caring for the port site and/or neck wound:  Keep the port site clean and dry. Cover the site with plastic before taking a shower. Do this until the site has healed.   Keep the bandage on your port site for three days. Change it if it gets wet or dirty. After three days, you may use Band-Aids until the wound has healed.  For a neck wound, leave " the tape on for three days. You may cover it with a Band-Aid for comfort.   If you have oozing or bleeding from the port site or from the cut in your neck:   Put direct pressure on the wound for 5 to 10 minutes with a gauze pad.  If you still have bleeding after 10 minutes, call your doctor.  If you are bleeding a lot and can't control it with direct pressure, call 911.    Call your doctor at  if you have:  Bleeding from a wound after 10 minutes of direct pressure.  Swelling in your neck or over your port site.  Signs of infection: a fever over 100 F (37.8 C) under the tongue; the site is red, tender or draining.      Additional Information About Your Visit       [employer] Information    [facility] gives you secure access to your electronic health record. If you see a primary care provider, you can also send messages to your care team and make appointments. If you have questions, please call your primary care clinic.  If you do not have a primary care provider, please call [phone] and they will assist you.       Care EveryWhere ID    This is your Care EveryWhere ID. This could be used by other organizations to access your [facility] medical records  [id number]       Your Vitals Were  Most recent update: 5/2/2022  9:20 AM    Blood Pressure   118/86 (BP Location: Left arm)          Pulse   63          Temperature   98.4  F (36.9  C) (Temporal)          Respirations   18          Last Period   12/13/2002 (Exact Date)             Pulse Oximetry   97%          Primary Care Provider  Raisa Davidson MD Office Phone #  [phone] Fax #  [phone]      Equal Access to Services    YUE TREVINO : Hadii elisabeth ashley hadasho Soomaali, waaxda luqadaha, qaybta kaalmada adeegyada, thomas perez. So [facility] [phone].    ATENCIÓN: Si habla español, tiene a vail disposición servicios gratuitos de asistencia lingüística. Llame al [phone].    We comply with applicable federal and state  civil rights laws, including the [place] Human Rights Act. We do not discriminate on the basis of race, color, creed, Hinduism, national origin, marital status, age, disability, sex, sexual orientation, or gender identity.    If you would like an itemization of your charges they will now be available in [employer] 30 days after discharge. To access the itemized statements in [employer] go to billing/billing summary. From there select view account. There will be multiple tabs showing an overview of your account, detail, payments, and communications. From the communications tab you can see your monthly statements, your itemized statements, and any billing letters generated for your account. If you do not have a [employer] account and need help getting access please contact [employer] support at [phone].  If you would prefer to have your itemized statements mailed please contact our automated itemized bill request line at [phone] option  2.       Thank you!    Thank you for choosing [facility] for your care. Our goal is always to provide you with excellent care. Hearing back from our patients is one way we can continue to improve our services. Please take a few minutes to complete the written survey that you may receive in the mail after you visit. If you would like to speak to someone directly about your visit please contact Patient Relations at [phone]. Thank you!              Medication List      Medications          Morning Afternoon Evening Bedtime As Needed    Contour Next Test test strip  INSTRUCTIONS: USE 1 STRIP TO CHECK GLUCOSE ONCE DAILY  Generic drug: blood glucose                     fish oil-omega-3 fatty acids 1000 MG capsule  INSTRUCTIONS: Take 1 g by mouth daily Reported on 3/22/2017                     order for DME  INSTRUCTIONS: All diabetic testing supplies including test strips, lancets and solution for testing 2 times per day. Patient is using   no Insulin. Last A1c Lab  Results       Component                Value               Date                       A1C                      7.9                 08/20/2018                       ASK your doctor about these medications          Morning Afternoon Evening Bedtime As Needed    acetaminophen 650 MG CR tablet  Also known as: TYLENOL  INSTRUCTIONS: Take 650 mg by mouth every 8 hours as needed for mild pain or fever                     albuterol 108 (90 Base) MCG/ACT inhaler  Also known as: PROAIR HFA/PROVENTIL HFA/VENTOLIN HFA  INSTRUCTIONS: Inhale 2 puffs into the lungs every 4 hours as needed for shortness of breath / dyspnea  Doctor's comments: Pharmacy may dispense brand covered by insurance (Proair, or proventil or ventolin or generic albuterol inhaler)                     aspirin 81 MG EC tablet  Also known as: ASA  INSTRUCTIONS: Take 1 tablet (81 mg) by mouth daily                     atenolol 50 MG tablet  Also known as: TENORMIN  INSTRUCTIONS: Take 1 tablet by mouth once daily                     atorvastatin 20 MG tablet  Also known as: LIPITOR  INSTRUCTIONS: Take 1 tablet by mouth once daily                     azelastine 0.05 % ophthalmic solution  Also known as: OPTIVAR  INSTRUCTIONS: Place 1 drop into both eyes 2 times daily as needed Reported on 3/22/2017                     azelastine 0.1 % nasal spray  Also known as: ASTELIN  INSTRUCTIONS: USE 1 SPRAY(S) IN EACH NOSTRIL TWICE DAILY AS NEEDED                     canagliflozin 300 MG tablet  Also known as: INVOKANA  INSTRUCTIONS: Take 1 tablet (300 mg) by mouth every morning (before breakfast)                     cetirizine 10 MG tablet  Also known as: zyrTEC  INSTRUCTIONS: Take 10 mg by mouth every morning Takes only PRN                     diclofenac 1 % topical gel  Also known as: VOLTAREN  INSTRUCTIONS: Apply topically 4 times daily as needed                     glipiZIDE 5 MG tablet  Also known as: GLUCOTROL  INSTRUCTIONS: TAKE 2 TABLETS BY MOUTH TWICE DAILY BEFORE  MEAL(S)                     hydrochlorothiazide 25 MG tablet  Also known as: HYDRODIURIL  INSTRUCTIONS: TAKE 1 TABLET BY MOUTH ONCE DAILY IN THE MORNING                     ibuprofen 200 MG tablet  Also known as: ADVIL/MOTRIN  INSTRUCTIONS: take 4 tablet (200 mg) by oral route every 8 hours as needed with food                     latanoprost 0.005 % ophthalmic solution  Also known as: XALATAN  INSTRUCTIONS: Place 1 drop into both eyes At Bedtime                     lisinopril 20 MG tablet  Also known as: ZESTRIL  INSTRUCTIONS: Take 1 tablet (20 mg) by mouth 2 times daily                     metFORMIN 500 MG 24 hr tablet  Also known as: GLUCOPHAGE-XR  INSTRUCTIONS: TAKE 2 TABLETS BY MOUTH TWICE DAILY WITH MEALS                     Multivitamin Tabs  INSTRUCTIONS: Reported on 3/22/2017                     ondansetron 4 MG ODT tab  Also known as: ZOFRAN-ODT  INSTRUCTIONS: Take 1-2 tablets (4-8 mg) by mouth every 8 hours as needed for nausea                     oxyCODONE 5 MG tablet  Also known as: ROXICODONE  INSTRUCTIONS: Take 1-2 tablets (5-10 mg) by mouth every 4 hours as needed for moderate to severe pain                     * Semaglutide 3 MG Tabs  INSTRUCTIONS: Take 3 mg by mouth daily                     * Semaglutide 3 MG Tabs  INSTRUCTIONS: Take 3 mg by mouth daily                     sitagliptin 100 MG tablet  Also known as: Januvia  INSTRUCTIONS: Take 1 tablet (100 mg) by mouth daily                     timolol maleate 0.5 % ophthalmic solution  Also known as: TIMOPTIC  INSTRUCTIONS: INSTILL 1 DROP INTO EACH EYE TWICE DAILY                        * This list has 2 medication(s) that are the same as other medications prescribed for you. Read the directions carefully, and ask your doctor or other care provider to review them with you.

## 2022-05-10 ENCOUNTER — TRANSFERRED RECORDS (OUTPATIENT)
Dept: HEALTH INFORMATION MANAGEMENT | Facility: CLINIC | Age: 72
End: 2022-05-10
Payer: COMMERCIAL

## 2022-05-16 ENCOUNTER — MYC MEDICAL ADVICE (OUTPATIENT)
Dept: INTERNAL MEDICINE | Facility: CLINIC | Age: 72
End: 2022-05-16
Payer: COMMERCIAL

## 2022-05-16 ENCOUNTER — HOSPITAL ENCOUNTER (OUTPATIENT)
Dept: MAMMOGRAPHY | Facility: CLINIC | Age: 72
Discharge: HOME | End: 2022-05-16
Attending: INTERNAL MEDICINE | Admitting: INTERNAL MEDICINE
Payer: COMMERCIAL

## 2022-05-16 DIAGNOSIS — Z12.31 VISIT FOR SCREENING MAMMOGRAM: ICD-10-CM

## 2022-05-16 PROCEDURE — 77067 SCR MAMMO BI INCL CAD: CPT

## 2022-05-18 NOTE — TELEPHONE ENCOUNTER
I called Melva and scheduled her to see Dr. Noriega Wed 5/25/22 for possible endometrial biopsy.    Then we had a cancellation for today I called her back, but she prefers to wait until next week.    Evon Genao CMA

## 2022-05-20 ENCOUNTER — TRANSFERRED RECORDS (OUTPATIENT)
Dept: HEALTH INFORMATION MANAGEMENT | Facility: CLINIC | Age: 72
End: 2022-05-20
Payer: COMMERCIAL

## 2022-05-25 ENCOUNTER — OFFICE VISIT (OUTPATIENT)
Dept: OBGYN | Facility: CLINIC | Age: 72
End: 2022-05-25
Payer: COMMERCIAL

## 2022-05-25 VITALS — SYSTOLIC BLOOD PRESSURE: 114 MMHG | WEIGHT: 210 LBS | DIASTOLIC BLOOD PRESSURE: 68 MMHG | BODY MASS INDEX: 33.89 KG/M2

## 2022-05-25 DIAGNOSIS — R93.89 ENDOMETRIAL THICKENING ON ULTRASOUND: Primary | ICD-10-CM

## 2022-05-25 PROCEDURE — 88341 IMHCHEM/IMCYTCHM EA ADD ANTB: CPT | Performed by: PATHOLOGY

## 2022-05-25 PROCEDURE — 88305 TISSUE EXAM BY PATHOLOGIST: CPT | Performed by: PATHOLOGY

## 2022-05-25 PROCEDURE — 58100 BIOPSY OF UTERUS LINING: CPT | Performed by: OBSTETRICS & GYNECOLOGY

## 2022-05-25 PROCEDURE — 88342 IMHCHEM/IMCYTCHM 1ST ANTB: CPT | Performed by: PATHOLOGY

## 2022-05-25 NOTE — PROGRESS NOTES
INDICATIONS:  Thickened endometrium                                                    71 yo P3 being treated for malignant melanoma.  CT scan and U/S (5/10/22) reviewed.  U/S shows endometrium distended up to 11.2 mm with a focal filling defect measuring 1.7 cm.    Patient has had some mucousy discharge but no bleeding.    Need for endometrial sampling explained    Was a consent obtained?  Yes    Having endometrial biopsy for thickened endometrium        PROCEDURE;                                                      A speculum was placed in the vagina and cervix prepped with betadine. A tenaculum was not attached to the cervix. A small plastic 5 mm Pipelle syringe curette was inserted into the cervical canal. The uterus was sounded to 7 cm's. A vigorous four quadrant biopsy was performed, removing amount moderate  of tissue and mucous. The speculum was removed. This tissue was placed in [place] and sent to pathology.    The patient tolerated the procedure  fairly well and she reported there was  cramping.      POST PROCEDURE;                                                      There  was no cramping at the time of discharge. She  tolerated the procedure well with minimal discomfort. There were no complications. Patient was discharged in stable condition.    Patient advised to call the clinic if severe pelvic pain, fever or heavy bleeding.    Patricio Noriega MD

## 2022-05-31 ENCOUNTER — TELEPHONE (OUTPATIENT)
Dept: OBGYN | Facility: CLINIC | Age: 72
End: 2022-05-31
Payer: COMMERCIAL

## 2022-05-31 ENCOUNTER — PATIENT OUTREACH (OUTPATIENT)
Dept: ONCOLOGY | Facility: CLINIC | Age: 72
End: 2022-05-31
Payer: COMMERCIAL

## 2022-05-31 DIAGNOSIS — C54.1 PRIMARY ENDOMETRIOID CARCINOMA OF ENDOMETRIUM OF UTERINE BODY (H): Primary | ICD-10-CM

## 2022-05-31 LAB
PATH REPORT.COMMENTS IMP SPEC: ABNORMAL
PATH REPORT.COMMENTS IMP SPEC: YES
PATH REPORT.FINAL DX SPEC: ABNORMAL
PATH REPORT.GROSS SPEC: ABNORMAL
PATH REPORT.MICROSCOPIC SPEC OTHER STN: ABNORMAL
PATH REPORT.RELEVANT HX SPEC: ABNORMAL
PATHOLOGY SYNOPTIC REPORT: ABNORMAL
PHOTO IMAGE: ABNORMAL

## 2022-05-31 NOTE — PROGRESS NOTES
"New Patient Hematology / Oncology Nurse Navigator Note     Referral Date: 5/31/22    Referring provider: Patricio Noriega MD    OB/Gyn    Referring Clinic/Organization: [facility]     Referred to: GynOnc    Requested provider (if applicable): First available - did not specify     Evaluation for : Endometrial cancer, grade 1     Clinical History (per Nurse review of records provided):       Office Visit OBGYN. 73 yo P3 being treated for malignant melanoma.  CT scan and U/S (5/10/22) reviewed.  U/S shows endometrium distended up to 11.2 mm with a focal filling defect measuring 1.7 cm.-- BOOKMARKED     5/10 Pelvic US-- BOOKMARKED     5/25 path showing:  Final Diagnosis   A.  Endometrium, biopsy:  -Endometrioid carcinoma, endometrioid type, FIGO grade 1, arising in a background of atypical hyperplasia, with focal mucinous features.  -See comment.       Clinical Assessment / Barriers to Care (Per Nurse):    Per referral note, \"Dr. 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 Introduced self and role as nurse navigator. Patient does not have any questions at this time. Discussed appointment with Dr. Herrera at [place] location 6/14 1PM which pt accepts. Will route to scheduling to complete registration and schedule appointment.     Ada Rojas, CALVINN, RN, PHN, OCN  Hematology/Oncology Nurse Navigator  [facility]  [phone]      "

## 2022-06-01 ENCOUNTER — TELEPHONE (OUTPATIENT)
Dept: INTERNAL MEDICINE | Facility: CLINIC | Age: 72
End: 2022-06-01
Payer: COMMERCIAL

## 2022-06-01 NOTE — TELEPHONE ENCOUNTER
Please let Melva know I have her scheduled with Dr Torres for a preop on 6/16/22 at 11am and she should plan to arrive at 10:40am.

## 2022-06-06 ENCOUNTER — DOCUMENTATION ONLY (OUTPATIENT)
Dept: ONCOLOGY | Facility: CLINIC | Age: 72
End: 2022-06-06
Payer: COMMERCIAL

## 2022-06-06 NOTE — PROGRESS NOTES
Action June 7, 2022 9:46 AM ABT   Action Taken Image from Sub img 05/10/22 US Pel received and resolved to PACS     Action June 6, 2022 3:43 PM ABT   Action Taken Image request sent to Sub [facility] 05/10/22: US pelvic and Lifescan 01/27/22: PET CT Skull & CT Chest Abd Pel

## 2022-06-09 NOTE — PROGRESS NOTES
"RECORDS STATUS - ALL OTHER DIAGNOSIS      Action    Action Taken 6/9/2022 9:45AM KEB     I called pt Melva - unavailable.     I tried calling Melva again - still no luck, her phone is \"Busy\"     RECORDS RECEIVED FROM: [facility]/ [facility]    DATE RECEIVED: 6/14/2022   NOTES STATUS DETAILS   OFFICE NOTE from referring provider Complete Epic   Patricio Noriega MD   OFFICE NOTE from medical oncologist Unrelated oncology notes are in [facility] [place] [facility] Records are in Epic- hx of melanoma    DISCHARGE SUMMARY from hospital     DISCHARGE REPORT from the ER     OPERATIVE REPORT Complete See Endometrium Biopsy in EPIC   MEDICATION LIST Complete [facility]   CLINICAL TRIAL TREATMENTS TO DATE     LABS     PATHOLOGY REPORTS Complete 5/25/2022   A.  Endometrium, biopsy:  -Endometrioid carcinoma, endometrioid type, FIGO grade 1, arising in a background of atypical hyperplasia, with focal mucinous features.   ANYTHING RELATED TO DIAGNOSIS Complete Labs last updated on 5/2/2022   GENONOMIC TESTING     TYPE:     IMAGING (NEED IMAGES & REPORT)     CT SCANS     MRI     MAMMO     ULTRASOUND Complete- [facility]  US Pelvis ([facility]) 5/10/2022   PET         "

## 2022-06-13 ENCOUNTER — TRANSFERRED RECORDS (OUTPATIENT)
Dept: HEALTH INFORMATION MANAGEMENT | Facility: CLINIC | Age: 72
End: 2022-06-13

## 2022-06-13 RX ORDER — HEPARIN SODIUM 10000 [USP'U]/ML
5000 INJECTION, SOLUTION INTRAVENOUS; SUBCUTANEOUS
Status: CANCELLED | OUTPATIENT
Start: 2022-06-13

## 2022-06-13 RX ORDER — PHENAZOPYRIDINE HYDROCHLORIDE 100 MG/1
200 TABLET, FILM COATED ORAL ONCE
Status: CANCELLED | OUTPATIENT
Start: 2022-06-13 | End: 2022-06-13

## 2022-06-13 RX ORDER — METRONIDAZOLE 500 MG/100ML
500 INJECTION, SOLUTION INTRAVENOUS
Status: CANCELLED | OUTPATIENT
Start: 2022-06-13

## 2022-06-14 ENCOUNTER — ONCOLOGY VISIT (OUTPATIENT)
Dept: ONCOLOGY | Facility: CLINIC | Age: 72
End: 2022-06-14
Attending: OBSTETRICS & GYNECOLOGY
Payer: COMMERCIAL

## 2022-06-14 ENCOUNTER — LAB (OUTPATIENT)
Dept: INFUSION THERAPY | Facility: CLINIC | Age: 72
End: 2022-06-14
Attending: INTERNAL MEDICINE
Payer: COMMERCIAL

## 2022-06-14 ENCOUNTER — PRE VISIT (OUTPATIENT)
Dept: ONCOLOGY | Facility: CLINIC | Age: 72
End: 2022-06-14

## 2022-06-14 VITALS
OXYGEN SATURATION: 96 % | RESPIRATION RATE: 16 BRPM | WEIGHT: 205 LBS | HEIGHT: 66 IN | BODY MASS INDEX: 32.95 KG/M2 | DIASTOLIC BLOOD PRESSURE: 68 MMHG | HEART RATE: 73 BPM | SYSTOLIC BLOOD PRESSURE: 116 MMHG | TEMPERATURE: 98.8 F

## 2022-06-14 DIAGNOSIS — C54.1 ENDOMETRIAL CANCER (H): ICD-10-CM

## 2022-06-14 DIAGNOSIS — C54.1 PRIMARY ENDOMETRIOID CARCINOMA OF ENDOMETRIUM OF UTERINE BODY (H): ICD-10-CM

## 2022-06-14 DIAGNOSIS — C54.1 ENDOMETRIAL CANCER (H): Primary | ICD-10-CM

## 2022-06-14 LAB
ABO/RH(D): NORMAL
ALBUMIN SERPL-MCNC: 3.3 G/DL (ref 3.4–5)
ALP SERPL-CCNC: 115 U/L (ref 40–150)
ALT SERPL W P-5'-P-CCNC: 20 U/L (ref 0–50)
ANION GAP SERPL CALCULATED.3IONS-SCNC: 5 MMOL/L (ref 3–14)
ANTIBODY SCREEN: NEGATIVE
AST SERPL W P-5'-P-CCNC: 11 U/L (ref 0–45)
BASOPHILS # BLD AUTO: 0.1 10E3/UL (ref 0–0.2)
BASOPHILS NFR BLD AUTO: 1 %
BILIRUB SERPL-MCNC: 0.5 MG/DL (ref 0.2–1.3)
BUN SERPL-MCNC: 20 MG/DL (ref 7–30)
CALCIUM SERPL-MCNC: 11 MG/DL (ref 8.5–10.1)
CHLORIDE BLD-SCNC: 106 MMOL/L (ref 94–109)
CO2 SERPL-SCNC: 27 MMOL/L (ref 20–32)
CREAT SERPL-MCNC: 0.81 MG/DL (ref 0.52–1.04)
EOSINOPHIL # BLD AUTO: 0.2 10E3/UL (ref 0–0.7)
EOSINOPHIL NFR BLD AUTO: 2 %
ERYTHROCYTE [DISTWIDTH] IN BLOOD BY AUTOMATED COUNT: 13.3 % (ref 10–15)
GFR SERPL CREATININE-BSD FRML MDRD: 76 ML/MIN/1.73M2
GLUCOSE BLD-MCNC: 172 MG/DL (ref 70–99)
HCT VFR BLD AUTO: 43.8 % (ref 35–47)
HGB BLD-MCNC: 13.5 G/DL (ref 11.7–15.7)
IMM GRANULOCYTES # BLD: 0 10E3/UL
IMM GRANULOCYTES NFR BLD: 1 %
LYMPHOCYTES # BLD AUTO: 1.3 10E3/UL (ref 0.8–5.3)
LYMPHOCYTES NFR BLD AUTO: 16 %
MCH RBC QN AUTO: 27.3 PG (ref 26.5–33)
MCHC RBC AUTO-ENTMCNC: 30.8 G/DL (ref 31.5–36.5)
MCV RBC AUTO: 89 FL (ref 78–100)
MONOCYTES # BLD AUTO: 0.6 10E3/UL (ref 0–1.3)
MONOCYTES NFR BLD AUTO: 8 %
NEUTROPHILS # BLD AUTO: 6 10E3/UL (ref 1.6–8.3)
NEUTROPHILS NFR BLD AUTO: 72 %
NRBC # BLD AUTO: 0 10E3/UL
NRBC BLD AUTO-RTO: 0 /100
PLATELET # BLD AUTO: 330 10E3/UL (ref 150–450)
POTASSIUM BLD-SCNC: 4.1 MMOL/L (ref 3.4–5.3)
PROT SERPL-MCNC: 7.5 G/DL (ref 6.8–8.8)
RBC # BLD AUTO: 4.94 10E6/UL (ref 3.8–5.2)
SODIUM SERPL-SCNC: 138 MMOL/L (ref 133–144)
SPECIMEN EXPIRATION DATE: NORMAL
WBC # BLD AUTO: 8.2 10E3/UL (ref 4–11)

## 2022-06-14 PROCEDURE — 80053 COMPREHEN METABOLIC PANEL: CPT | Performed by: OBSTETRICS & GYNECOLOGY

## 2022-06-14 PROCEDURE — 85025 COMPLETE CBC W/AUTO DIFF WBC: CPT | Performed by: OBSTETRICS & GYNECOLOGY

## 2022-06-14 PROCEDURE — G0463 HOSPITAL OUTPT CLINIC VISIT: HCPCS

## 2022-06-14 PROCEDURE — 86901 BLOOD TYPING SEROLOGIC RH(D): CPT | Performed by: OBSTETRICS & GYNECOLOGY

## 2022-06-14 PROCEDURE — 250N000011 HC RX IP 250 OP 636: Performed by: INTERNAL MEDICINE

## 2022-06-14 PROCEDURE — 36591 DRAW BLOOD OFF VENOUS DEVICE: CPT

## 2022-06-14 PROCEDURE — 86850 RBC ANTIBODY SCREEN: CPT | Performed by: OBSTETRICS & GYNECOLOGY

## 2022-06-14 PROCEDURE — 99205 OFFICE O/P NEW HI 60 MIN: CPT | Performed by: OBSTETRICS & GYNECOLOGY

## 2022-06-14 RX ORDER — HEPARIN SODIUM (PORCINE) LOCK FLUSH IV SOLN 100 UNIT/ML 100 UNIT/ML
500 SOLUTION INTRAVENOUS EVERY 8 HOURS
Status: DISCONTINUED | OUTPATIENT
Start: 2022-06-14 | End: 2022-06-14 | Stop reason: HOSPADM

## 2022-06-14 RX ORDER — TRIAMCINOLONE ACETONIDE 1 MG/G
CREAM TOPICAL
COMMUNITY
Start: 2022-02-10 | End: 2022-07-25

## 2022-06-14 RX ORDER — LIDOCAINE/PRILOCAINE 2.5 %-2.5%
CREAM (GRAM) TOPICAL
COMMUNITY
Start: 2022-05-11 | End: 2024-03-11

## 2022-06-14 RX ADMIN — Medication 500 UNITS: at 14:07

## 2022-06-14 ASSESSMENT — PAIN SCALES - GENERAL: PAINLEVEL: NO PAIN (0)

## 2022-06-14 NOTE — PROGRESS NOTES
Consult Notes on Referred Patient        Dr. Patricio Noriega MD  [address] [place] [address]       RE: Gricelda Sabillon  : 1950  CRAIG: 2022    Dear Dr. Patricio Noriega:    I had the pleasure of seeing your patient Gricelda Sabillon here at the Gynecologic Cancer Clinic at the [facility] on 2022.  As you know she is a very pleasant 72 year old woman with a recent diagnosis of grade 1 endometrial adenocarcinoma.  Given these findings she was subsequently sent to the Gynecologic Cancer Clinic for new patient consultation.  She is accompanied today by her  via telephone.    Patient was undergoing work up for melanoma without a cutaneous primary identified which showed thickened endometrium.  She is undergoing treatment with Keytruda every 3 weeks for a year.  She is tolerating this very well without major side effects. She reports for about 6 months she has had a mucous discharge with very light spotting after.    5/10/22:  US pelvis (Personally reviewed):  Uterus measures 8 x 3.7 x 4.2 cm with EMS of 11.2 mm.  Normal appearing adnexa    22:  EMB:  Grade 1 endometrial adenocarcinoma, MMR intact      Obstetrics and Gynecology History:  ,  x 3  Menopause around 48, no HRT use      Past Medical History:  Past Medical History:   Diagnosis Date     Asthma, mild intermittent      Cataract      HTN, goal below 140/90      Hyperlipidemia LDL goal < 100      Macular hole of left eye      Melanoma (H)     RIGHT KNEE     Sleep apnea     uses c pap     Type 2 diabetes, HbA1C goal < 8% (H)        Past Surgical History:  Past Surgical History:   Procedure Laterality Date     ARTHROPLASTY HIP Right 2017    Procedure: ARTHROPLASTY HIP;  Right total hip arthroplasty  ;  Surgeon: Ayaan Pena MD;  Location: [facility]     ARTHROPLASTY KNEE  2013    Procedure: ARTHROPLASTY KNEE;  right total knee arthroplasty ;  Surgeon: Chaparro Wesley,  MD;  Location: [facility] OR     ARTHROSCOPY KNEE Right 1/21/2015    Procedure: ARTHROSCOPY KNEE;  Surgeon: Ayaan Pena MD;  Location: [facility] OR     C INCISION OF HYMEN       CATARACT IOL, RT/LT       COLONOSCOPY  2008     COLONOSCOPY N/A 4/18/2019    Procedure: Colonoscopy with polypectomy;  Surgeon: Shaun Guerrero MD;  [facility]     ENDOSCOPIC RETROGRADE CHOLANGIOPANCREATOGRAM N/A 2/6/2019    Procedure: COMBINED ENDOSCOPIC RETROGRADE CHOLANGIOPANCREATOGRAPHY, BILIARY SPINCTEROTOMY AND DILATION, PLACE BILE DUCT STENT;  Surgeon: Guru Andie Gonzalez MD;  [facility]     ENDOSCOPIC RETROGRADE CHOLANGIOPANCREATOGRAM N/A 2/28/2019    Procedure: Endoscopic Retrograde Cholangiopancreatogram, Bile duct stent removal and placement;  Surgeon: Shaun Guerrero MD;  [facility]     ENDOSCOPIC RETROGRADE CHOLANGIOPANCREATOGRAM N/A 4/18/2019    Procedure: COMBINED ENDOSCOPIC RETROGRADE CHOLANGIOPANCREATOGRAPHY, Bile duct stent exchange and Polypectomy;  Surgeon: Shaun Guerrero MD;  [facility]     ENDOSCOPIC RETROGRADE CHOLANGIOPANCREATOGRAM N/A 10/18/2019    Procedure: Endoscopic Retrograde Cholangiopancreatogram;  Surgeon: Shaun Guerrero MD;  [facility]     ENDOSCOPIC RETROGRADE CHOLANGIOPANCREATOGRAM N/A 3/24/2022    Procedure: ENDOSCOPIC RETROGRADE CHOLANGIOPANCREATOGRAPHY;  Surgeon: Shaun Guerrero MD;  Location:  OR     ENDOSCOPIC RETROGRADE CHOLANGIOPANCREATOGRAM WITH SPYGLASS N/A 7/26/2019    Procedure: Endoscopic Retrograde Cholangiopancreatogram With Spyglas,l Radiofrequency Ablation, Stent Exchangeand Duodenal Biopsy x2;  Surgeon: Shaun Guerrero MD;  [facility]     ENDOSCOPIC RETROGRADE CHOLANGIOPANCREATOGRAM WITH SPYGLASS N/A 9/10/2020    Procedure: ENDOSCOPIC RETROGRADE CHOLANGIOPANCREATOGRAPHY, WITH DIRECT DUCT VISUALIZATION, USING PANCREATICOBILIARY FIBEROPTIC PROBE;  Surgeon: Shaun Guerrero MD;  [facility]     ENDOSCOPIC RETROGRADE CHOLANGIOPANCREATOGRAM WITH  JOSE MYGANI N/A 3/22/2021    Procedure: ENDOSCOPIC RETROGRADE CHOLANGIOPANCREATOGRAPHY, WITH DIRECT DUCT VISUALIZATION, USING PANCREATICOBILIARY FIBEROPTIC PROBE;  Surgeon: Shaun Guerrero MD;  [facility]     ESOPHAGOSCOPY, GASTROSCOPY, DUODENOSCOPY (EGD) WITH RADIO FREQUENCY ABLATION, COMBINED N/A 9/10/2020    Procedure: possible repeat ESOPHAGOGASTRODUODENOSCOPY, WITH RADIOFREQUENCY ABLATION and endoscopic mucosal resection;  Surgeon: Shaun Guerrero MD;  [facility]     ESOPHAGOSCOPY, GASTROSCOPY, DUODENOSCOPY (EGD), COMBINED N/A 4/18/2019    Procedure: upper endoscopy with polypectomy;  Surgeon: Shaun Guerrero MD;  [facility]     ESOPHAGOSCOPY, GASTROSCOPY, DUODENOSCOPY (EGD), COMBINED N/A 10/18/2019    Procedure: Upper Endoscopy and ERCP with stent removal, stone removal and biopsy;  Surgeon: Shaun Guerrero MD;  [facility]     ESOPHAGOSCOPY, GASTROSCOPY, DUODENOSCOPY (EGD), COMBINED N/A 3/24/2022    Procedure: ESOPHAGOGASTRODUODENOSCOPY (EGD), Duodenal  polyp removal;  Surgeon: Shaun Guerrero MD;  Location: [facility]     ESOPHAGOSCOPY, GASTROSCOPY, DUODENOSCOPY (EGD), RESECT MUCOSA, COMBINED N/A 2/28/2019    Procedure: Upper Endoscopy, Endoscopic Ultrasound, Endoscopic Mucosal Resection,  Ampullectomy, polypectomy.;  Surgeon: Shaun Guerrero MD;  [facility]     ESOPHAGOSCOPY, GASTROSCOPY, DUODENOSCOPY (EGD), RESECT MUCOSA, COMBINED N/A 3/22/2021    Procedure: ESOPHAGOGASTRODUODENOSCOPY (EGD) with  endoscopic mucosal resection/ polypectomy;  Surgeon: Shaun Guerrero MD;  [facility]     EXCISE LESION LOWER EXTREMITY Right 3/28/2022    Procedure: Wide local excision of right knee melanoma;  Surgeon: Chevy Allison MD;  Location: [facility] OR     EXCISE MASS LOWER EXTREMITY Right 1/13/2022    Procedure: excision of right thigh mass;  Surgeon: Salvador Kelly MD;  Location: [facility] OR     EYE SURGERY      macular hole repaired left eye     HC KNEE SCOPE, DIAGNOSTIC      - both knees     HEAD & NECK  "SURGERY      wisdom teeth     IR CHEST PORT PLACEMENT > 5 YRS OF AGE  2022       Health Maintenance:  Last Pap Smear: 3/5/13              Result: normal, no HPV done  She has not had a history of abnormal Pap smears.    Last Mammogram: 20              Result: normal      She has not had a history of abnormal mammograms.    Last Colonoscopy: 17              Result: Polyps-repeat 5 years       Social History:  Lives with , feels safe at home.  Retired nurse.  Enjoys reading, bird watching, [place] time [place] the [place].  Does not have an advanced directive on file and would like her  to be her POA.  Would like full resuscitation if reversible cause is identified, however would not like to be kept on life sustaining measures long-term.     Family History:   The patient's family history is significant for.  Family History   Problem Relation Age of Onset     Hypertension Mother         diabetes,hypoythryoidism, stroke     Diabetes Mother      Breast Cancer Mother      Heart Disease Father         , cancer lip     Uterine Cancer Sister      Connective Tissue Disorder Sister         fibromyalgia     Diabetes Sister      Hypertension Brother      Cerebrovascular Disease Maternal Grandmother         , diabetes     Cerebrovascular Disease Maternal Grandfather              Cancer Paternal Grandmother              Heart Disease Paternal Grandfather              Cancer Paternal Aunt         ovarian     Breast Cancer Niece          Physical Exam:   /68   Pulse 73   Temp 98.8  F (37.1  C) (Tympanic)   Resp 16   Ht 1.676 m (5' 6\")   Wt 93 kg (205 lb)   LMP 2002 (Exact Date)   SpO2 96%   BMI 33.09 kg/m    Body mass index is 33.09 kg/m .    General Appearance: healthy and alert, no distress        Cardiovascular: regular rate and rhythm    Respiratory: lungs clear     Gastrointestinal:       abdomen soft, non-tender, non-distended, no organomegaly or " masses    Genitourinary: External genitalia and urethral meatus appears normal.  Vagina is smooth without nodularity or masses.  Cervix appears normal and without lesions.  Bimanual exam reveal no masses, nodularity or fullness.  Recto-vaginal exam confirms these findings.    Labs:  WBC 8.2 with ANC 6.  Hemoglobin 13.5.  Platelets 330.  Creatinine 0.81.  Potassium 4.1.  Magnesium -.  Remainder of electrolytes within normal limits.  AST 11, ALT 20, alkaline phosphatase 115, total bilirubin 0.5.  Albumin 3.3.      Assessment:    Gricelda Sabillon is a 72 year old woman with a new diagnosis of grade 1 endometrial adenocarcinoma.     A total of 60 minutes was spent on patient care today.       Plan:     1.)    Grade 1 endometrial adenocarcinoma.  Discussed natural history and progression of disease.  Discussed standard surgical staging procedure.  Discussed the role of sentinel lymph node dissection and that if sentinel lymph node could not be identified on one or both sides then we would use frozen section analysis to determine if full lymph node dissection was necessary.  Discussed role of frozen section analysis and that further surgical decisions would be based on these findings.  Discussed the small possibility of adjuvant post-operative therapy.  Risks, benefits, indications and alternatives discussed with the patient.  All her questions were answered and consents were signed for laparoscopic removal of uterus, cervix, both ovaries and fallopian tubes, sentinel lymph node dissection, possible full lymph node dissection, possible open, cystoscopy on 6/23 at [facility].     2.) Genetic risk factors were assessed and the patient does meet the qualifications for a referral.      3.) Labs and/or tests ordered include:  CBC, CMP, T/S     4.) Health maintenance issues addressed today include pt is up to date.    5.) Pre-op teaching was completed today.      6.) Melanoma-currently on keytruda.  Discussed with primary oncologist  Dr William Florez and ok to proceed with surgery as planned.      Thank you for allowing us to participate in the care of your patient.         Sincerely,    Eli Herrera MD  Gynecologic Oncology  [facility]       KATIE REESE

## 2022-06-14 NOTE — PATIENT INSTRUCTIONS
You have been scheduled for surgery on: 6/23 at [facility]    Diagnosis:  Endometrial cancer    The surgical procedure is: laparoscopic removal of uterus, cervix, both ovaries and fallopian tubes, sentinel lymph node dissection, possible full lymph node dissection, possible open, cystoscopy    Anticipated length of surgery: 2-3 hrs.  You will be in the recovery room for approximately 2-3 hrs after surgery.  Your family will not be able to see you until after you leave the recovery room.    Length of hospital stay:  This is an outpatient, extended stay surgery.  You will be discharged same day if you meet criteria for discharge.  If you have a larger surgical incision, you will need to be in the hospital 2-3 days.  ______________________________________________________________________    Preparation for Surgery:    To Schedule   1.  Labs:  CBC, CMP, T/S, orders placed, please perform today   2.  Post-operative exam with me 1-2 weeks following surgery      Postoperative Restrictions:  No heavy lifting >20lbs for six weeks, nothing in the vagina (no tampons, no intercourse, no douching) for eight weeks.     Postoperative visit:  Return to clinic 1-2 weeks after surgery for post operative visit.      Please contact the clinic with any questions or concerns.    Eli Herrera MD  Gynecologic Oncology  [facility]

## 2022-06-14 NOTE — PROGRESS NOTES
"Oncology Rooming Note    June 14, 2022 1:07 PM   Gricelda Sabillon is a 72 year old female who presents for:    Chief Complaint   Patient presents with     Oncology Clinic Visit     New Patient     Initial Vitals: /68   Pulse 73   Temp 98.8  F (37.1  C) (Tympanic)   Resp 16   Ht 1.676 m (5' 6\")   Wt 93 kg (205 lb)   LMP 12/13/2002 (Exact Date)   SpO2 96%   BMI 33.09 kg/m   Estimated body mass index is 33.09 kg/m  as calculated from the following:    Height as of this encounter: 1.676 m (5' 6\").    Weight as of this encounter: 93 kg (205 lb). Body surface area is 2.08 meters squared.  No Pain (0) Comment: Data Unavailable   Patient's last menstrual period was 12/13/2002 (exact date).  Allergies reviewed: Yes  Medications reviewed: Yes    Medications: Medication refills not needed today.  Pharmacy name entered into EPIC:    [facility]  [id number] - [place], [place] - [address] ROBY Corona CMA              "

## 2022-06-14 NOTE — LETTER
2022         RE: Gricelda HAMPTON Ramandeep  [address]  [facility] [address]        Dear Colleague,    Thank you for referring your patient, Gricelda Sabillon, to the [facility]. Please see a copy of my visit note below.    Consult Notes on Referred Patient        Dr. Patricio Noriega MD  [address] [place] [address]       RE: Gricelda Avaloslino  : 1950  CRAIG: 2022    Dear Dr. Patricio Noriega:    I had the pleasure of seeing your patient Gricelda Sabillon here at the Gynecologic Cancer Clinic at the [facility] on 2022.  As you know she is a very pleasant 72 year old woman with a recent diagnosis of grade 1 endometrial adenocarcinoma.  Given these findings she was subsequently sent to the Gynecologic Cancer Clinic for new patient consultation.  She is accompanied today by her  via telephone.    Patient was undergoing work up for melanoma without a cutaneous primary identified which showed thickened endometrium.  She is undergoing treatment with Keytruda every 3 weeks for a year.  She is tolerating this very well without major side effects. She reports for about 6 months she has had a mucous discharge with very light spotting after.    5/10/22:  US pelvis (Personally reviewed):  Uterus measures 8 x 3.7 x 4.2 cm with EMS of 11.2 mm.  Normal appearing adnexa    22:  EMB:  Grade 1 endometrial adenocarcinoma, MMR intact      Obstetrics and Gynecology History:  ,  x 3  Menopause around 48, no HRT use      Past Medical History:  Past Medical History:   Diagnosis Date     Asthma, mild intermittent      Cataract      HTN, goal below 140/90      Hyperlipidemia LDL goal < 100      Macular hole of left eye      Melanoma (H)     RIGHT KNEE     Sleep apnea     uses c pap     Type 2 diabetes, HbA1C goal < 8% (H)        Past Surgical History:  Past Surgical History:   Procedure Laterality Date     ARTHROPLASTY HIP  Right 9/25/2017    Procedure: ARTHROPLASTY HIP;  Right total hip arthroplasty  ;  Surgeon: Ayaan Pena MD;  Location: [facility] OR     ARTHROPLASTY KNEE  7/12/2013    Procedure: ARTHROPLASTY KNEE;  right total knee arthroplasty ;  Surgeon: Chaparro Wesley MD;  Location: [facility] OR     ARTHROSCOPY KNEE Right 1/21/2015    Procedure: ARTHROSCOPY KNEE;  Surgeon: Ayaan Pena MD;  Location: [facility] OR     C INCISION OF HYMEN       CATARACT IOL, RT/LT       COLONOSCOPY  2008     COLONOSCOPY N/A 4/18/2019    Procedure: Colonoscopy with polypectomy;  Surgeon: Shaun Guerrero MD;  [facility]     ENDOSCOPIC RETROGRADE CHOLANGIOPANCREATOGRAM N/A 2/6/2019    Procedure: COMBINED ENDOSCOPIC RETROGRADE CHOLANGIOPANCREATOGRAPHY, BILIARY SPINCTEROTOMY AND DILATION, PLACE BILE DUCT STENT;  Surgeon: Guru Andie Gonzalez MD;  [facility]     ENDOSCOPIC RETROGRADE CHOLANGIOPANCREATOGRAM N/A 2/28/2019    Procedure: Endoscopic Retrograde Cholangiopancreatogram, Bile duct stent removal and placement;  Surgeon: Shaun Guerrero MD;  [facility]     ENDOSCOPIC RETROGRADE CHOLANGIOPANCREATOGRAM N/A 4/18/2019    Procedure: COMBINED ENDOSCOPIC RETROGRADE CHOLANGIOPANCREATOGRAPHY, Bile duct stent exchange and Polypectomy;  Surgeon: Shaun Guerrero MD;  [facility]     ENDOSCOPIC RETROGRADE CHOLANGIOPANCREATOGRAM N/A 10/18/2019    Procedure: Endoscopic Retrograde Cholangiopancreatogram;  Surgeon: Shaun Guerrero MD;  [facility]     ENDOSCOPIC RETROGRADE CHOLANGIOPANCREATOGRAM N/A 3/24/2022    Procedure: ENDOSCOPIC RETROGRADE CHOLANGIOPANCREATOGRAPHY;  Surgeon: Shaun Guerrero MD;  Location:  OR     ENDOSCOPIC RETROGRADE CHOLANGIOPANCREATOGRAM WITH SPYGLASS N/A 7/26/2019    Procedure: Endoscopic Retrograde Cholangiopancreatogram With Spyglas,l Radiofrequency Ablation, Stent Exchangeand Duodenal Biopsy x2;  Surgeon: Shaun Guerrero MD;  [facility]     ENDOSCOPIC RETROGRADE CHOLANGIOPANCREATOGRAM WITH  SPYGLASS N/A 9/10/2020    Procedure: ENDOSCOPIC RETROGRADE CHOLANGIOPANCREATOGRAPHY, WITH DIRECT DUCT VISUALIZATION, USING PANCREATICOBILIARY FIBEROPTIC PROBE;  Surgeon: Shaun Guerrero MD;  [facility]     ENDOSCOPIC RETROGRADE CHOLANGIOPANCREATOGRAM WITH SPYGLASS N/A 3/22/2021    Procedure: ENDOSCOPIC RETROGRADE CHOLANGIOPANCREATOGRAPHY, WITH DIRECT DUCT VISUALIZATION, USING PANCREATICOBILIARY FIBEROPTIC PROBE;  Surgeon: Shaun Guerrero MD;  [facility]     ESOPHAGOSCOPY, GASTROSCOPY, DUODENOSCOPY (EGD) WITH RADIO FREQUENCY ABLATION, COMBINED N/A 9/10/2020    Procedure: possible repeat ESOPHAGOGASTRODUODENOSCOPY, WITH RADIOFREQUENCY ABLATION and endoscopic mucosal resection;  Surgeon: Shaun Guerrero MD;  [facility]     ESOPHAGOSCOPY, GASTROSCOPY, DUODENOSCOPY (EGD), COMBINED N/A 4/18/2019    Procedure: upper endoscopy with polypectomy;  Surgeon: Shaun Guerrero MD;  [facility]     ESOPHAGOSCOPY, GASTROSCOPY, DUODENOSCOPY (EGD), COMBINED N/A 10/18/2019    Procedure: Upper Endoscopy and ERCP with stent removal, stone removal and biopsy;  Surgeon: Shaun Guerrero MD;  [facility]     ESOPHAGOSCOPY, GASTROSCOPY, DUODENOSCOPY (EGD), COMBINED N/A 3/24/2022    Procedure: ESOPHAGOGASTRODUODENOSCOPY (EGD), Duodenal  polyp removal;  Surgeon: Shaun Guerrero MD;  Location: [facility] OR     ESOPHAGOSCOPY, GASTROSCOPY, DUODENOSCOPY (EGD), RESECT MUCOSA, COMBINED N/A 2/28/2019    Procedure: Upper Endoscopy, Endoscopic Ultrasound, Endoscopic Mucosal Resection,  Ampullectomy, polypectomy.;  Surgeon: Shaun Guerrero MD;  [facility]     ESOPHAGOSCOPY, GASTROSCOPY, DUODENOSCOPY (EGD), RESECT MUCOSA, COMBINED N/A 3/22/2021    Procedure: ESOPHAGOGASTRODUODENOSCOPY (EGD) with  endoscopic mucosal resection/ polypectomy;  Surgeon: Shaun Guerrero MD;  [facility]     EXCISE LESION LOWER EXTREMITY Right 3/28/2022    Procedure: Wide local excision of right knee melanoma;  Surgeon: Chevy Allison MD;  Location: [facility]      "EXCISE MASS LOWER EXTREMITY Right 2022    Procedure: excision of right thigh mass;  Surgeon: Salvador Kelly MD;  Location: [facility] OR     EYE SURGERY      macular hole repaired left eye     HC KNEE SCOPE, DIAGNOSTIC      - both knees     HEAD & NECK SURGERY      wisdom teeth     IR CHEST PORT PLACEMENT > 5 YRS OF AGE  2022       Health Maintenance:  Last Pap Smear: 3/5/13              Result: normal, no HPV done  She has not had a history of abnormal Pap smears.    Last Mammogram: 20              Result: normal      She has not had a history of abnormal mammograms.    Last Colonoscopy: 17              Result: Polyps-repeat 5 years       Social History:  Lives with , feels safe at home.  Retired nurse.  Enjoys reading, bird watching, [place] time [place] the [place].  Does not have an advanced directive on file and would like her  to be her POA.  Would like full resuscitation if reversible cause is identified, however would not like to be kept on life sustaining measures long-term.     Family History:   The patient's family history is significant for.  Family History   Problem Relation Age of Onset     Hypertension Mother         diabetes,hypoythryoidism, stroke     Diabetes Mother      Breast Cancer Mother      Heart Disease Father         , cancer lip     Uterine Cancer Sister      Connective Tissue Disorder Sister         fibromyalgia     Diabetes Sister      Hypertension Brother      Cerebrovascular Disease Maternal Grandmother         , diabetes     Cerebrovascular Disease Maternal Grandfather              Cancer Paternal Grandmother              Heart Disease Paternal Grandfather              Cancer Paternal Aunt         ovarian     Breast Cancer Niece          Physical Exam:   /68   Pulse 73   Temp 98.8  F (37.1  C) (Tympanic)   Resp 16   Ht 1.676 m (5' 6\")   Wt 93 kg (205 lb)   LMP 2002 (Exact Date)   SpO2 96%   BMI 33.09 " kg/m    Body mass index is 33.09 kg/m .    General Appearance: healthy and alert, no distress        Cardiovascular: regular rate and rhythm    Respiratory: lungs clear     Gastrointestinal:       abdomen soft, non-tender, non-distended, no organomegaly or masses    Genitourinary: External genitalia and urethral meatus appears normal.  Vagina is smooth without nodularity or masses.  Cervix appears normal and without lesions.  Bimanual exam reveal no masses, nodularity or fullness.  Recto-vaginal exam confirms these findings.    Labs:  WBC 8.2 with ANC 6.  Hemoglobin 13.5.  Platelets 330.  Creatinine 0.81.  Potassium 4.1.  Magnesium -.  Remainder of electrolytes within normal limits.  AST 11, ALT 20, alkaline phosphatase 115, total bilirubin 0.5.  Albumin 3.3.      Assessment:    Gricelda Sabillon is a 72 year old woman with a new diagnosis of grade 1 endometrial adenocarcinoma.     A total of 60 minutes was spent on patient care today.       Plan:     1.)    Grade 1 endometrial adenocarcinoma.  Discussed natural history and progression of disease.  Discussed standard surgical staging procedure.  Discussed the role of sentinel lymph node dissection and that if sentinel lymph node could not be identified on one or both sides then we would use frozen section analysis to determine if full lymph node dissection was necessary.  Discussed role of frozen section analysis and that further surgical decisions would be based on these findings.  Discussed the small possibility of adjuvant post-operative therapy.  Risks, benefits, indications and alternatives discussed with the patient.  All her questions were answered and consents were signed for laparoscopic removal of uterus, cervix, both ovaries and fallopian tubes, sentinel lymph node dissection, possible full lymph node dissection, possible open, cystoscopy on 6/23 at [facility].     2.) Genetic risk factors were assessed and the patient does meet the qualifications for a  "referral.      3.) Labs and/or tests ordered include:  CBC, CMP, T/S     4.) Health maintenance issues addressed today include pt is up to date.    5.) Pre-op teaching was completed today.      6.) Melanoma-currently on keytruda.  Discussed with primary oncologist Dr William Florez and ok to proceed with surgery as planned.      Thank you for allowing us to participate in the care of your patient.         Sincerely,    Eli Herrera MD  Gynecologic Oncology  [facility]       KATIE REESE      Oncology Rooming Note    June 14, 2022 1:07 PM   Gricelda Sabillon is a 72 year old female who presents for:    Chief Complaint   Patient presents with     Oncology Clinic Visit     New Patient     Initial Vitals: /68   Pulse 73   Temp 98.8  F (37.1  C) (Tympanic)   Resp 16   Ht 1.676 m (5' 6\")   Wt 93 kg (205 lb)   LMP 12/13/2002 (Exact Date)   SpO2 96%   BMI 33.09 kg/m   Estimated body mass index is 33.09 kg/m  as calculated from the following:    Height as of this encounter: 1.676 m (5' 6\").    Weight as of this encounter: 93 kg (205 lb). Body surface area is 2.08 meters squared.  No Pain (0) Comment: Data Unavailable   Patient's last menstrual period was 12/13/2002 (exact date).  Allergies reviewed: Yes  Medications reviewed: Yes    Medications: Medication refills not needed today.  Pharmacy name entered into EPIC:    [employer] [address] [address] [facility]                  Again, thank you for allowing me to participate in the care of your patient.        Sincerely,        Rivera Herrera MD    "

## 2022-06-14 NOTE — PROGRESS NOTES
Infusion Nursing Note:  Gricelda Sabillon presents today for port labs.     present during visit today: Not Applicable.    Note: N/A.    Intravenous Access:  Implanted Port.    Treatment Conditions:  NA    Post Lab Assessment:   Patient tolerated blood collection   Blood return noted pre and post infusion.  Site patent and intact, free from redness, edema or discomfort.  No evidence of extravasations.  Access discontinued)     Discharge Plan:   Patient and/or family verbalized understanding of  instructions and all questions answered.  Patient  to clinic in stable condition accompanied by: self.  Patient to see provider today: Yes: Javier  Departure Mode: Ambulatory.  Tonya Selby, RN, RN

## 2022-06-15 ENCOUNTER — TELEPHONE (OUTPATIENT)
Dept: ONCOLOGY | Facility: CLINIC | Age: 72
End: 2022-06-15
Payer: COMMERCIAL

## 2022-06-15 NOTE — TELEPHONE ENCOUNTER
RN Care Coordinator: Yarelis Mitchell; [phone]    Surgery is scheduled with Dr. Herrera on 6/23 at the [facility]  Scheduled per orders    H&P to be completed by Surgeon     COVID-19 test: patient to complete an at-home test 1-2 days prior to scheduled date and bring a picture of negative results or call [phone] option # 7 to schedule a PCR test within 4 days of the scheduled date     Post-op: will be scheduled by the clinic    Patient will receive a phone call from pre-admission nurses 1-2 days prior to surgery with arrival and start time.    I left a detailed voicemail regarding the scheduled information and requested a call back. Provided direct line.    Surgery packet was provided by the RNCC during appointment

## 2022-06-16 ENCOUNTER — OFFICE VISIT (OUTPATIENT)
Dept: INTERNAL MEDICINE | Facility: CLINIC | Age: 72
End: 2022-06-16
Payer: COMMERCIAL

## 2022-06-16 VITALS
TEMPERATURE: 97.7 F | HEIGHT: 67 IN | BODY MASS INDEX: 32.33 KG/M2 | WEIGHT: 206 LBS | SYSTOLIC BLOOD PRESSURE: 126 MMHG | OXYGEN SATURATION: 95 % | HEART RATE: 85 BPM | DIASTOLIC BLOOD PRESSURE: 60 MMHG | RESPIRATION RATE: 16 BRPM

## 2022-06-16 DIAGNOSIS — E78.5 HYPERLIPIDEMIA WITH TARGET LDL LESS THAN 100: Chronic | ICD-10-CM

## 2022-06-16 DIAGNOSIS — E11.9 DIABETES MELLITUS WITHOUT COMPLICATION (H): ICD-10-CM

## 2022-06-16 DIAGNOSIS — I10 ESSENTIAL HYPERTENSION WITH GOAL BLOOD PRESSURE LESS THAN 140/90: Chronic | ICD-10-CM

## 2022-06-16 DIAGNOSIS — I10 HTN, GOAL BELOW 140/90: ICD-10-CM

## 2022-06-16 DIAGNOSIS — Z01.818 PRE-OP EXAM: Primary | ICD-10-CM

## 2022-06-16 DIAGNOSIS — C43.9 METASTATIC MALIGNANT MELANOMA (H): ICD-10-CM

## 2022-06-16 DIAGNOSIS — C54.1 ENDOMETRIAL CARCINOMA (H): ICD-10-CM

## 2022-06-16 DIAGNOSIS — D13.5 AMPULLARY ADENOMA: ICD-10-CM

## 2022-06-16 PROCEDURE — 93000 ELECTROCARDIOGRAM COMPLETE: CPT | Performed by: INTERNAL MEDICINE

## 2022-06-16 PROCEDURE — 99214 OFFICE O/P EST MOD 30 MIN: CPT | Performed by: INTERNAL MEDICINE

## 2022-06-16 RX ORDER — LISINOPRIL 20 MG/1
20 TABLET ORAL 2 TIMES DAILY
Qty: 180 TABLET | Refills: 1 | Status: SHIPPED | OUTPATIENT
Start: 2022-06-16 | End: 2022-07-12

## 2022-06-16 NOTE — TELEPHONE ENCOUNTER
Routing refill request to provider for review/approval because:  Appointment today  Next 5 appointments (look out 90 days)      Jun 16, 2022 11:00 AM  (Arrive by 10:40 AM)  Pre-Operative Physical with Raisa Davidson MD  [facility] [address] [facility] [address]  [phone]

## 2022-06-16 NOTE — PROGRESS NOTES
[place] [address] [facility] [address]  Phone: [phone]  Primary Provider: Raisa Davidson        PREOPERATIVE EVALUATION:  Today's date: 6/16/2022    Gricelda Sabillon is a 72 year old female who presents for a preoperative evaluation.    Surgical Information:  Surgery/Procedure: Total Hysterectomy / Lap   Surgery Location: [facility]   Surgeon: Dr Herrera   Surgery Date: 6/23/22  Time of Surgery: TBD  Where patient plans to recover: At home with family  Fax number for surgical facility: Note does not need to be faxed, will be available electronically in Epic.    Type of Anesthesia Anticipated: to be determined        Preop Questions 6/15/2022   1. Have you ever had a heart attack or stroke? No   2. Have you ever had surgery on your heart or blood vessels, such as a stent placement, a coronary artery bypass, or surgery on an artery in your head, neck, heart, or legs? No   3. Do you have chest pain with activity? No   4. Do you have a history of  heart failure? No   5. Do you currently have a cold, bronchitis or symptoms of other infection? No   6. Do you have a cough, shortness of breath, or wheezing? No   7. Do you or anyone in your family have previous history of blood clots? No   8. Do you or does anyone in your family have a serious bleeding problem such as prolonged bleeding following surgeries or cuts? No   9. Have you ever had problems with anemia or been told to take iron pills? YES -    10. Have you had any abnormal blood loss such as black, tarry or bloody stools, or abnormal vaginal bleeding? No   11. Have you ever had a blood transfusion? No   12. Are you willing to have a blood transfusion if it is medically needed before, during, or after your surgery? Yes   13. Have you or any of your relatives ever had problems with anesthesia? No   14. Do you have sleep apnea, excessive snoring or daytime drowsiness? YES -    14a. Do you have a CPAP  machine? Yes   15. Do you have any artifical heart valves or other implanted medical devices like a pacemaker, defibrillator, or continuous glucose monitor? No   16. Do you have artificial joints? YES - R hip, R knee   17. Are you allergic to latex? No   18. Is there any chance that you may be pregnant? -       CC:  Preop for multiple medical problems.    HPI:    The patient is scheduled for hysterectomy  surgery with Dr. Herrera on  June 23, 2022  Sometimes when she is out shopping  Or standing for a while in [place] she feels lightheaded. No palpitations   No other acute complaints.    Assessment:  1. V72.83H Preop general physical exam _ I do not see any major contraindications for the patient to go through the scheduled surgery.    Assessment:  1. V72.83H Preop general physical exam _ I do not see any major contraindications for the patient to go through the scheduled surgery.     The proposed surgical procedure is considered  INTERMEDIATE surgery risk.     For above listed surgery and anesthesia, Patient is at  MODERATE risk for surgery/procedure and perioperative/procedure complications.        Cardiovascular risk  Assessment -- low risk   -- No further cardiac work up is needed before this surgery.         ECG:  Jan 2022  sinus rhythm, no changes suggestive for ischemia     Pulmonary Risk Assessment -- low risk   -- The patient doesn't have chronic lung disease nor acute respiratory problems         Obstructive Sleep Apnea (or suspected sleep apnea) -- low risk         Anemia Assessment :  -- no anemia - patient doesn't need further anemia work up        Blood Sugar Assessment  (E11.9) Diabetes mellitus without complication (H)  -- patient has DM, no recent A1c. 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 Healing well   Plan:     (C54.1) Endometrial carcinoma (H)  Comment:   Plan: surgery, as above     Plan:  1. In the morning of the surgery day you take with a sip of water just these meds: Atenolol The rest of the meds you resume after surgery.  2. Increase Semaglutide to 7 mg daily   3. Keep the appointments in [place]   4. Check blood pressure sitting and standing  5. Make sure you drink plenty of water    Physical exam:    Blood pressure 126/60, pulse 85, temperature 97.7  F (36.5  C), temperature source Oral, resp. rate 16, height 1.702 m (5' 7\"), weight 93.4 kg (206 lb), last menstrual period 12/13/2002, SpO2 95 %, not currently breastfeeding.   NAD, appears comfortable  Skin clear, no rashes  Neck: supple, no JVD,  no thyroidmegaly  Lymph nodes non palpable in the cervical, supraclavicular   Chest: clear to auscultation with good respiratory effort  Cardiac: S1S2, RRR, no mgr appreciated  Abdomen: soft, not tender,   Extremities: no cyanosis, clubbing or edema.   Neuro: A, Ox3, no focal signs.        ROS:   as above     Patient Active Problem List   Diagnosis     Allergic rhinitis     Hyperlipidemia with target LDL less than 100     Asthma, mild intermittent     Cataract     Macular hole of left eye     Advanced directives, counseling/discussion     Obesity (BMI 30-39.9)     Heart murmur     Total knee replacement status     Chronic knee pain, right     HTN goal less than 140/90,     CHERRY (obstructive sleep apnea)     Status post total replacement of right hip     Ampullary adenoma     Polyp of duodenum     HTN, goal below 140/90     Diabetes mellitus, type 2 (H)     Melanoma of skin (H)     Metastatic malignant melanoma (H)  -- R thigh         Past " Medical History:   Diagnosis Date     Asthma, mild intermittent      Cataract      HTN, goal below 140/90      Hyperlipidemia LDL goal < 100      Macular hole of left eye      Melanoma (H)     RIGHT KNEE     Sleep apnea     uses c pap     Type 2 diabetes, HbA1C goal < 8% (H)       Past Surgical History:   Procedure Laterality Date     ARTHROPLASTY HIP Right 9/25/2017    Procedure: ARTHROPLASTY HIP;  Right total hip arthroplasty  ;  Surgeon: Ayaan Pena MD;  [facility]     ARTHROPLASTY KNEE  7/12/2013    Procedure: ARTHROPLASTY KNEE;  right total knee arthroplasty ;  Surgeon: Chaparro Wesley MD;  [facility]: [facility]     ARTHROSCOPY KNEE Right 1/21/2015    Procedure: ARTHROSCOPY KNEE;  Surgeon: Ayaan Pena MD;  Location: [facility]     C INCISION OF HYMEN       CATARACT IOL, RT/LT       COLONOSCOPY  2008     COLONOSCOPY N/A 4/18/2019    Procedure: Colonoscopy with polypectomy;  Surgeon: Shaun Guerrero MD;  [facility]     ENDOSCOPIC RETROGRADE CHOLANGIOPANCREATOGRAM N/A 2/6/2019    Procedure: COMBINED ENDOSCOPIC RETROGRADE CHOLANGIOPANCREATOGRAPHY, BILIARY SPINCTEROTOMY AND DILATION, PLACE BILE DUCT STENT;  Surgeon: Guru Andie Gonzalez MD;  [facility]     ENDOSCOPIC RETROGRADE CHOLANGIOPANCREATOGRAM N/A 2/28/2019    Procedure: Endoscopic Retrograde Cholangiopancreatogram, Bile duct stent removal and placement;  Surgeon: Shaun Guerrero MD;  [facility]     ENDOSCOPIC RETROGRADE CHOLANGIOPANCREATOGRAM N/A 4/18/2019    Procedure: COMBINED ENDOSCOPIC RETROGRADE CHOLANGIOPANCREATOGRAPHY, Bile duct stent exchange and Polypectomy;  Surgeon: Shaun Guerrero MD;  [facility]     ENDOSCOPIC RETROGRADE CHOLANGIOPANCREATOGRAM N/A 10/18/2019    Procedure: Endoscopic Retrograde Cholangiopancreatogram;  Surgeon: Shaun Guerrero MD;  [facility]     ENDOSCOPIC RETROGRADE CHOLANGIOPANCREATOGRAM N/A 3/24/2022    Procedure: ENDOSCOPIC RETROGRADE CHOLANGIOPANCREATOGRAPHY;   Surgeon: Shaun Guerrero MD;  [facility]     ENDOSCOPIC RETROGRADE CHOLANGIOPANCREATOGRAM WITH SPYGLASS N/A 7/26/2019    Procedure: Endoscopic Retrograde Cholangiopancreatogram With Spyglas,l Radiofrequency Ablation, Stent Exchangeand Duodenal Biopsy x2;  Surgeon: Shaun Guerrero MD;  [facility]     ENDOSCOPIC RETROGRADE CHOLANGIOPANCREATOGRAM WITH SPYGLASS N/A 9/10/2020    Procedure: ENDOSCOPIC RETROGRADE CHOLANGIOPANCREATOGRAPHY, WITH DIRECT DUCT VISUALIZATION, USING PANCREATICOBILIARY FIBEROPTIC PROBE;  Surgeon: Shaun Guerrreo MD;  [facility]     ENDOSCOPIC RETROGRADE CHOLANGIOPANCREATOGRAM WITH SPYGLASS N/A 3/22/2021    Procedure: ENDOSCOPIC RETROGRADE CHOLANGIOPANCREATOGRAPHY, WITH DIRECT DUCT VISUALIZATION, USING PANCREATICOBILIARY FIBEROPTIC PROBE;  Surgeon: Shaun Guerrero MD;  [facility]     ESOPHAGOSCOPY, GASTROSCOPY, DUODENOSCOPY (EGD) WITH RADIO FREQUENCY ABLATION, COMBINED N/A 9/10/2020    Procedure: possible repeat ESOPHAGOGASTRODUODENOSCOPY, WITH RADIOFREQUENCY ABLATION and endoscopic mucosal resection;  Surgeon: Shaun Guerrero MD;  [facility]     ESOPHAGOSCOPY, GASTROSCOPY, DUODENOSCOPY (EGD), COMBINED N/A 4/18/2019    Procedure: upper endoscopy with polypectomy;  Surgeon: Shaun Guerrero MD;  [facility]     ESOPHAGOSCOPY, GASTROSCOPY, DUODENOSCOPY (EGD), COMBINED N/A 10/18/2019    Procedure: Upper Endoscopy and ERCP with stent removal, stone removal and biopsy;  Surgeon: Shaun Guerrero MD;  [facility]     ESOPHAGOSCOPY, GASTROSCOPY, DUODENOSCOPY (EGD), COMBINED N/A 3/24/2022    Procedure: ESOPHAGOGASTRODUODENOSCOPY (EGD), Duodenal  polyp removal;  Surgeon: Shaun Guerrero MD;  Location:  OR     ESOPHAGOSCOPY, GASTROSCOPY, DUODENOSCOPY (EGD), RESECT MUCOSA, COMBINED N/A 2/28/2019    Procedure: Upper Endoscopy, Endoscopic Ultrasound, Endoscopic Mucosal Resection,  Ampullectomy, polypectomy.;  Surgeon: Shaun Guerrero MD;  [facility]     ESOPHAGOSCOPY, GASTROSCOPY,  DUODENOSCOPY (EGD), RESECT MUCOSA, COMBINED N/A 3/22/2021    Procedure: ESOPHAGOGASTRODUODENOSCOPY (EGD) with  endoscopic mucosal resection/ polypectomy;  Surgeon: Shaun Guerrero MD;  [facility]     EXCISE LESION LOWER EXTREMITY Right 3/28/2022    Procedure: Wide local excision of right knee melanoma;  Surgeon: Chevy Allison MD;  Location: [facility]     EXCISE MASS LOWER EXTREMITY Right 2022    Procedure: excision of right thigh mass;  Surgeon: Salvador Kelly MD;  [facility]: [facility]     EYE SURGERY      macular hole repaired left eye     HC KNEE SCOPE, DIAGNOSTIC      - both knees     HEAD & NECK SURGERY      wisdom teeth     IR CHEST PORT PLACEMENT > 5 YRS OF AGE  2022        PSHx: No complications with prior surgeries or anesthesia     Soc Hx: No daily alcohol, no smoking     Family History   Problem Relation Age of Onset     Hypertension Mother         diabetes,hypoythryoidism, stroke     Diabetes Mother      Breast Cancer Mother      Heart Disease Father         , cancer lip     Uterine Cancer Sister      Connective Tissue Disorder Sister         fibromyalgia     Diabetes Sister      Hypertension Brother      Cerebrovascular Disease Maternal Grandmother         , diabetes     Cerebrovascular Disease Maternal Grandfather              Cancer Paternal Grandmother              Heart Disease Paternal Grandfather              Cancer Paternal Aunt         ovarian     Breast Cancer Niece         All: reviewed    Meds: reviewed  Current Outpatient Medications   Medication Sig Dispense Refill     aspirin 81 MG EC tablet Take 1 tablet (81 mg) by mouth daily 90 tablet 3     atenolol (TENORMIN) 50 MG tablet Take 1 tablet by mouth once daily 90 tablet 0     atorvastatin (LIPITOR) 20 MG tablet Take 1 tablet by mouth once daily 90 tablet 0     azelastine (ASTELIN) 0.1 % nasal spray USE 1 SPRAY(S) IN EACH NOSTRIL TWICE DAILY AS NEEDED 30 mL 0     azelastine (OPTIVAR) 0.05 %  SOLN Place 1 drop into both eyes 2 times daily as needed Reported on 3/22/2017       canagliflozin (INVOKANA) 300 MG tablet Take 1 tablet (300 mg) by mouth every morning (before breakfast) 90 tablet 1     cetirizine (ZYRTEC) 10 MG tablet Take 10 mg by mouth every morning Takes only PRN       CONTOUR NEXT TEST test strip USE 1 STRIP TO CHECK GLUCOSE ONCE DAILY 100 strip 4     diclofenac (VOLTAREN) 1 % topical gel Apply topically 4 times daily as needed        fish oil-omega-3 fatty acids 1000 MG capsule Take 1 g by mouth daily Reported on 3/22/2017       glipiZIDE (GLUCOTROL) 5 MG tablet TAKE 2 TABLETS BY MOUTH TWICE DAILY BEFORE MEAL(S) 360 tablet 0     hydrochlorothiazide (HYDRODIURIL) 25 MG tablet TAKE 1 TABLET BY MOUTH ONCE DAILY IN THE MORNING 90 tablet 3     latanoprost (XALATAN) 0.005 % ophthalmic solution Place 1 drop into both eyes At Bedtime  2.5 mL 1     lidocaine-prilocaine (EMLA) 2.5-2.5 % external cream APPLY CREAM TOPICALLY ONCE DAILY AS NEEDED FOR PAIN APPLY A PEA SIZED AMOUNT OVER PORT SITE 60 MINUTES PRIOR TO USE COVER WITH PLASTIC WRAP       lisinopril (ZESTRIL) 20 MG tablet Take 1 tablet (20 mg) by mouth 2 times daily 180 tablet 1     metFORMIN (GLUCOPHAGE-XR) 500 MG 24 hr tablet TAKE 2 TABLETS BY MOUTH TWICE DAILY WITH MEALS 360 tablet 0     Semaglutide 3 MG TABS Take 3 mg by mouth daily 30 tablet 3     timolol maleate (TIMOPTIC) 0.5 % ophthalmic solution INSTILL 1 DROP INTO EACH EYE TWICE DAILY       triamcinolone (KENALOG) 0.1 % external cream APPLY CREAM EXTERNALLY TO AFFECTED AREA TWICE DAILY ON THE LEGS UNTIL FULLY CLEAR AND THEN FOR FUTURE FLARES       acetaminophen (TYLENOL) 650 MG CR tablet Take 650 mg by mouth every 8 hours as needed for mild pain or fever        albuterol (PROAIR HFA/PROVENTIL HFA/VENTOLIN HFA) 108 (90 Base) MCG/ACT inhaler Inhale 2 puffs into the lungs every 4 hours as needed for shortness of breath / dyspnea 1 Inhaler 3     ibuprofen (ADVIL/MOTRIN) 200 MG tablet take  4 tablet (200 mg) by oral route every 8 hours as needed with food       ondansetron (ZOFRAN-ODT) 4 MG ODT tab Take 1-2 tablets (4-8 mg) by mouth every 8 hours as needed for nausea 8 tablet 0     order for DME All diabetic testing supplies including test strips, lancets and solution for testing 2 times per day. Patient is using   no Insulin. Last A1c Lab Results       Component                Value               Date                       A1C                      7.9                 08/20/2018 100 each 11      And Ketruda iv every 3 week ruthann Torres MD  Internal Medicine       Patient Active Problem List    Diagnosis Date Noted     Hyperlipidemia with target LDL less than 100      Priority: High     Diagnosis updated by automated process. Provider to review and confirm.       Asthma, mild intermittent      Priority: High     Metastatic malignant melanoma (H)  -- R thigh  04/20/2022     Priority: Medium     Melanoma of skin (H) 03/03/2022     Priority: Medium     Added automatically from request for surgery [id number]       Diabetes mellitus, type 2 (H) 01/07/2022     Priority: Medium     HTN, goal below 140/90 05/27/2020     Priority: Medium     Polyp of duodenum 02/18/2020     Priority: Medium     Added automatically from request for surgery [id number]       Ampullary adenoma 02/28/2019     Priority: Medium     Status post total replacement of right hip 02/13/2018     Priority: Medium     CHERRY (obstructive sleep apnea) 09/15/2017     Priority: Medium     HTN goal less than 140/90, 05/22/2016     Priority: Medium     Chronic knee pain, right 08/30/2015     Priority: Medium     Total knee replacement status 07/12/2013     Priority: Medium     Obesity (BMI 30-39.9)      Priority: Medium     Heart murmur      Priority: Medium     aortic area       Advanced directives, counseling/discussion 01/06/2012     Priority: Medium     Advance Directive Problem List Overview:   Name Relationship Phone    Primary Health Care  Agent            Alternative Health Care Agent          Discussed advance care planning with patient; information given to patient to review. 1/6/2012          Allergic rhinitis      Priority: Medium     Problem list name updated by automated process. Provider to review       Cataract      Priority: Low     Utility update for deleted IMO code  [employer] Update utility       Macular hole of left eye      Priority: Low      Past Medical History:   Diagnosis Date     Asthma, mild intermittent      Cataract      HTN, goal below 140/90      Hyperlipidemia LDL goal < 100      Macular hole of left eye      Melanoma (H)     RIGHT KNEE     Sleep apnea     uses c pap     Type 2 diabetes, HbA1C goal < 8% (H)      Past Surgical History:   Procedure Laterality Date     ARTHROPLASTY HIP Right 9/25/2017    Procedure: ARTHROPLASTY HIP;  Right total hip arthroplasty  ;  Surgeon: Ayaan Pena MD;  [facility]     ARTHROPLASTY KNEE  7/12/2013    Procedure: ARTHROPLASTY KNEE;  right total knee arthroplasty ;  Surgeon: Chaparro Wesley MD;  [facility]     ARTHROSCOPY KNEE Right 1/21/2015    Procedure: ARTHROSCOPY KNEE;  Surgeon: Ayaan Pena MD;  [facility]     C INCISION OF HYMEN       CATARACT IOL, RT/LT       COLONOSCOPY  2008     COLONOSCOPY N/A 4/18/2019    Procedure: Colonoscopy with polypectomy;  Surgeon: Shaun Guerrero MD;  [facility]     ENDOSCOPIC RETROGRADE CHOLANGIOPANCREATOGRAM N/A 2/6/2019    Procedure: COMBINED ENDOSCOPIC RETROGRADE CHOLANGIOPANCREATOGRAPHY, BILIARY SPINCTEROTOMY AND DILATION, PLACE BILE DUCT STENT;  Surgeon: Guru Andie Gonzalez MD;  [facility]     ENDOSCOPIC RETROGRADE CHOLANGIOPANCREATOGRAM N/A 2/28/2019    Procedure: Endoscopic Retrograde Cholangiopancreatogram, Bile duct stent removal and placement;  Surgeon: Shaun Guerrero MD;  [facility]     ENDOSCOPIC RETROGRADE CHOLANGIOPANCREATOGRAM N/A 4/18/2019    Procedure: COMBINED ENDOSCOPIC  RETROGRADE CHOLANGIOPANCREATOGRAPHY, Bile duct stent exchange and Polypectomy;  Surgeon: Shaun Guerrero MD;  [facility]     ENDOSCOPIC RETROGRADE CHOLANGIOPANCREATOGRAM N/A 10/18/2019    Procedure: Endoscopic Retrograde Cholangiopancreatogram;  Surgeon: Shaun Guerrero MD;  [facility]     ENDOSCOPIC RETROGRADE CHOLANGIOPANCREATOGRAM N/A 3/24/2022    Procedure: ENDOSCOPIC RETROGRADE CHOLANGIOPANCREATOGRAPHY;  Surgeon: Shaun Guerrero MD;  [facility]     ENDOSCOPIC RETROGRADE CHOLANGIOPANCREATOGRAM WITH SPYGLASS N/A 7/26/2019    Procedure: Endoscopic Retrograde Cholangiopancreatogram With Spyglas,l Radiofrequency Ablation, Stent Exchangeand Duodenal Biopsy x2;  Surgeon: Shaun Guerrero MD;  [facility]     ENDOSCOPIC RETROGRADE CHOLANGIOPANCREATOGRAM WITH SPYGLASS N/A 9/10/2020    Procedure: ENDOSCOPIC RETROGRADE CHOLANGIOPANCREATOGRAPHY, WITH DIRECT DUCT VISUALIZATION, USING PANCREATICOBILIARY FIBEROPTIC PROBE;  Surgeon: Shaun Guerrero MD;  [facility]     ENDOSCOPIC RETROGRADE CHOLANGIOPANCREATOGRAM WITH SPYGLASS N/A 3/22/2021    Procedure: ENDOSCOPIC RETROGRADE CHOLANGIOPANCREATOGRAPHY, WITH DIRECT DUCT VISUALIZATION, USING PANCREATICOBILIARY FIBEROPTIC PROBE;  Surgeon: Shaun Guerrero MD;  [facility]     ESOPHAGOSCOPY, GASTROSCOPY, DUODENOSCOPY (EGD) WITH RADIO FREQUENCY ABLATION, COMBINED N/A 9/10/2020    Procedure: possible repeat ESOPHAGOGASTRODUODENOSCOPY, WITH RADIOFREQUENCY ABLATION and endoscopic mucosal resection;  Surgeon: Shaun Guerrero MD;  [facility]     ESOPHAGOSCOPY, GASTROSCOPY, DUODENOSCOPY (EGD), COMBINED N/A 4/18/2019    Procedure: upper endoscopy with polypectomy;  Surgeon: Shaun Guerrero MD;  [facility]     ESOPHAGOSCOPY, GASTROSCOPY, DUODENOSCOPY (EGD), COMBINED N/A 10/18/2019    Procedure: Upper Endoscopy and ERCP with stent removal, stone removal and biopsy;  Surgeon: Shaun Guerrero MD;  [facility]     ESOPHAGOSCOPY, GASTROSCOPY, DUODENOSCOPY (EGD),  COMBINED N/A 3/24/2022    Procedure: ESOPHAGOGASTRODUODENOSCOPY (EGD), Duodenal  polyp removal;  Surgeon: Shaun Guerrero MD;  [facility]     ESOPHAGOSCOPY, GASTROSCOPY, DUODENOSCOPY (EGD), RESECT MUCOSA, COMBINED N/A 2/28/2019    Procedure: Upper Endoscopy, Endoscopic Ultrasound, Endoscopic Mucosal Resection,  Ampullectomy, polypectomy.;  Surgeon: Shaun Guerrero MD;  [facility]     ESOPHAGOSCOPY, GASTROSCOPY, DUODENOSCOPY (EGD), RESECT MUCOSA, COMBINED N/A 3/22/2021    Procedure: ESOPHAGOGASTRODUODENOSCOPY (EGD) with  endoscopic mucosal resection/ polypectomy;  Surgeon: Shaun Guerrero MD;  [facility]     EXCISE LESION LOWER EXTREMITY Right 3/28/2022    Procedure: Wide local excision of right knee melanoma;  Surgeon: Chevy Allison MD;  Location: [facility]     EXCISE MASS LOWER EXTREMITY Right 1/13/2022    Procedure: excision of right thigh mass;  Surgeon: Salvador Kelly MD;  [facility]     EYE SURGERY      macular hole repaired left eye     HC KNEE SCOPE, DIAGNOSTIC      - both knees     HEAD & NECK SURGERY      wisdom teeth     IR CHEST PORT PLACEMENT > 5 YRS OF AGE  5/2/2022     Current Outpatient Medications   Medication Sig Dispense Refill     aspirin 81 MG EC tablet Take 1 tablet (81 mg) by mouth daily 90 tablet 3     atenolol (TENORMIN) 50 MG tablet Take 1 tablet by mouth once daily 90 tablet 0     atorvastatin (LIPITOR) 20 MG tablet Take 1 tablet by mouth once daily 90 tablet 0     azelastine (ASTELIN) 0.1 % nasal spray USE 1 SPRAY(S) IN EACH NOSTRIL TWICE DAILY AS NEEDED 30 mL 0     azelastine (OPTIVAR) 0.05 % SOLN Place 1 drop into both eyes 2 times daily as needed Reported on 3/22/2017       canagliflozin (INVOKANA) 300 MG tablet Take 1 tablet (300 mg) by mouth every morning (before breakfast) 90 tablet 1     cetirizine (ZYRTEC) 10 MG tablet Take 10 mg by mouth every morning Takes only PRN       CONTOUR NEXT TEST test strip USE 1 STRIP TO CHECK GLUCOSE ONCE DAILY 100 strip 4      diclofenac (VOLTAREN) 1 % topical gel Apply topically 4 times daily as needed        fish oil-omega-3 fatty acids 1000 MG capsule Take 1 g by mouth daily Reported on 3/22/2017       glipiZIDE (GLUCOTROL) 5 MG tablet TAKE 2 TABLETS BY MOUTH TWICE DAILY BEFORE MEAL(S) 360 tablet 0     hydrochlorothiazide (HYDRODIURIL) 25 MG tablet TAKE 1 TABLET BY MOUTH ONCE DAILY IN THE MORNING 90 tablet 3     latanoprost (XALATAN) 0.005 % ophthalmic solution Place 1 drop into both eyes At Bedtime  2.5 mL 1     lidocaine-prilocaine (EMLA) 2.5-2.5 % external cream APPLY CREAM TOPICALLY ONCE DAILY AS NEEDED FOR PAIN APPLY A PEA SIZED AMOUNT OVER PORT SITE 60 MINUTES PRIOR TO USE COVER WITH PLASTIC WRAP       lisinopril (ZESTRIL) 20 MG tablet Take 1 tablet (20 mg) by mouth 2 times daily 180 tablet 1     metFORMIN (GLUCOPHAGE-XR) 500 MG 24 hr tablet TAKE 2 TABLETS BY MOUTH TWICE DAILY WITH MEALS 360 tablet 0     Semaglutide 3 MG TABS Take 3 mg by mouth daily 30 tablet 3     timolol maleate (TIMOPTIC) 0.5 % ophthalmic solution INSTILL 1 DROP INTO EACH EYE TWICE DAILY       triamcinolone (KENALOG) 0.1 % external cream APPLY CREAM EXTERNALLY TO AFFECTED AREA TWICE DAILY ON THE LEGS UNTIL FULLY CLEAR AND THEN FOR FUTURE FLARES       acetaminophen (TYLENOL) 650 MG CR tablet Take 650 mg by mouth every 8 hours as needed for mild pain or fever        albuterol (PROAIR HFA/PROVENTIL HFA/VENTOLIN HFA) 108 (90 Base) MCG/ACT inhaler Inhale 2 puffs into the lungs every 4 hours as needed for shortness of breath / dyspnea 1 Inhaler 3     ibuprofen (ADVIL/MOTRIN) 200 MG tablet take 4 tablet (200 mg) by oral route every 8 hours as needed with food       ondansetron (ZOFRAN-ODT) 4 MG ODT tab Take 1-2 tablets (4-8 mg) by mouth every 8 hours as needed for nausea 8 tablet 0     order for DME All diabetic testing supplies including test strips, lancets and solution for testing 2 times per day. Patient is using   no Insulin. Last A1c Lab Results        "Component                Value               Date                       A1C                      7.9                 08/20/2018 100 each 11       Allergies   Allergen Reactions     Bromfenac Visual Disturbance     Sulfites, xibrom  Causes sever eye pain     Codeine      \"NAUSE,HA AND DIZZINESS\"     Codeine Nausea     Other reaction(s): Dizziness, Headache     Sulfites Visual Disturbance     Xibrom (bromfenac opthalmic solution) 0.09%--eye pain        Social History     Tobacco Use     Smoking status: Never Smoker     Smokeless tobacco: Never Used   Substance Use Topics     Alcohol use: No     Alcohol/week: 0.0 standard drinks         Recent Labs   Lab Test 06/14/22  1354 05/02/22  0912 03/21/22  0829 01/07/22  1106 11/15/21  0907 08/10/21  1258 03/22/21  0758   HGB 13.5 13.8 14.2   < >  --   --  14.1    290 266   < >  --   --  285   INR  --  0.96  --   --   --   --  0.98    135 139   < >  --    < > 138   POTASSIUM 4.1 4.0 4.4   < >  --    < > 3.9   CR 0.81 0.87 0.96   < >  --    < > 0.82   A1C  --   --  7.7*  --  7.8*   < >  --     < > = values in this interval not displayed.        Signed Electronically by: Raisa Davidson MD  Copy of this evaluation report is provided to requesting physician.      "

## 2022-06-17 RX ORDER — CANAGLIFLOZIN 300 MG/1
TABLET, FILM COATED ORAL
Qty: 90 TABLET | Refills: 0 | Status: ON HOLD | OUTPATIENT
Start: 2022-06-17 | End: 2022-08-03

## 2022-06-22 ENCOUNTER — ANESTHESIA EVENT (OUTPATIENT)
Dept: SURGERY | Facility: CLINIC | Age: 72
End: 2022-06-22
Payer: COMMERCIAL

## 2022-06-22 ASSESSMENT — LIFESTYLE VARIABLES: TOBACCO_USE: 0

## 2022-06-22 NOTE — ANESTHESIA PREPROCEDURE EVALUATION
Anesthesia Pre-Procedure Evaluation    Patient: Gricelda Sabillon   MRN: [id number] : 1950        Procedure : Procedure(s):  laparoscopic removal of uterus, cervix, both ovaries and fallopian tubes, sentinel lymph node dissection, possible full lymph node dissection  possible Open  Cystoscopy          Past Medical History:   Diagnosis Date     Asthma, mild intermittent      Cataract      HTN, goal below 140/90      Hyperlipidemia LDL goal < 100      Macular hole of left eye      Melanoma (H)     RIGHT KNEE     Sleep apnea     uses c pap     Type 2 diabetes, HbA1C goal < 8% (H)       Past Surgical History:   Procedure Laterality Date     ARTHROPLASTY HIP Right 2017    Procedure: ARTHROPLASTY HIP;  Right total hip arthroplasty  ;  Surgeon: Ayaan Pena MD;  Location: [facility]     ARTHROPLASTY KNEE  2013    Procedure: ARTHROPLASTY KNEE;  right total knee arthroplasty ;  Surgeon: Chaparro Wesley MD;  Location: [facility]     ARTHROSCOPY KNEE Right 2015    Procedure: ARTHROSCOPY KNEE;  Surgeon: Ayaan Pena MD;  Location: [facility]     C INCISION OF HYMEN       CATARACT IOL, RT/LT       COLONOSCOPY       COLONOSCOPY N/A 2019    Procedure: Colonoscopy with polypectomy;  Surgeon: Shaun Guerrero MD;  [facility]     ENDOSCOPIC RETROGRADE CHOLANGIOPANCREATOGRAM N/A 2019    Procedure: COMBINED ENDOSCOPIC RETROGRADE CHOLANGIOPANCREATOGRAPHY, BILIARY SPINCTEROTOMY AND DILATION, PLACE BILE DUCT STENT;  Surgeon: Guru Andie Gonzalez MD;  [facility]     ENDOSCOPIC RETROGRADE CHOLANGIOPANCREATOGRAM N/A 2019    Procedure: Endoscopic Retrograde Cholangiopancreatogram, Bile duct stent removal and placement;  Surgeon: Shaun Guerrero MD;  [facility]     ENDOSCOPIC RETROGRADE CHOLANGIOPANCREATOGRAM N/A 2019    Procedure: COMBINED ENDOSCOPIC RETROGRADE CHOLANGIOPANCREATOGRAPHY, Bile duct stent exchange and Polypectomy;  Surgeon: Shaun Guerrero MD;   [facility]     ENDOSCOPIC RETROGRADE CHOLANGIOPANCREATOGRAM N/A 10/18/2019    Procedure: Endoscopic Retrograde Cholangiopancreatogram;  Surgeon: Shaun Guerrero MD;  [facility]     ENDOSCOPIC RETROGRADE CHOLANGIOPANCREATOGRAM N/A 3/24/2022    Procedure: ENDOSCOPIC RETROGRADE CHOLANGIOPANCREATOGRAPHY;  Surgeon: Shaun Guerrero MD;  Location: [facility]     ENDOSCOPIC RETROGRADE CHOLANGIOPANCREATOGRAM WITH SPYGLASS N/A 7/26/2019    Procedure: Endoscopic Retrograde Cholangiopancreatogram With Spyglas,l Radiofrequency Ablation, Stent Exchangeand Duodenal Biopsy x2;  Surgeon: Shaun Guerrero MD;  [facility]     ENDOSCOPIC RETROGRADE CHOLANGIOPANCREATOGRAM WITH SPYGLASS N/A 9/10/2020    Procedure: ENDOSCOPIC RETROGRADE CHOLANGIOPANCREATOGRAPHY, WITH DIRECT DUCT VISUALIZATION, USING PANCREATICOBILIARY FIBEROPTIC PROBE;  Surgeon: Shaun Guerrero MD;  [facility]     ENDOSCOPIC RETROGRADE CHOLANGIOPANCREATOGRAM WITH SPYGLASS N/A 3/22/2021    Procedure: ENDOSCOPIC RETROGRADE CHOLANGIOPANCREATOGRAPHY, WITH DIRECT DUCT VISUALIZATION, USING PANCREATICOBILIARY FIBEROPTIC PROBE;  Surgeon: Shaun Guerrero MD;  [facility]     ESOPHAGOSCOPY, GASTROSCOPY, DUODENOSCOPY (EGD) WITH RADIO FREQUENCY ABLATION, COMBINED N/A 9/10/2020    Procedure: possible repeat ESOPHAGOGASTRODUODENOSCOPY, WITH RADIOFREQUENCY ABLATION and endoscopic mucosal resection;  Surgeon: Shaun Guerrero MD;  [facility]     ESOPHAGOSCOPY, GASTROSCOPY, DUODENOSCOPY (EGD), COMBINED N/A 4/18/2019    Procedure: upper endoscopy with polypectomy;  Surgeon: Shaun Guerrero MD;  [facility]     ESOPHAGOSCOPY, GASTROSCOPY, DUODENOSCOPY (EGD), COMBINED N/A 10/18/2019    Procedure: Upper Endoscopy and ERCP with stent removal, stone removal and biopsy;  Surgeon: Shaun Guerrero MD;  [facility]     ESOPHAGOSCOPY, GASTROSCOPY, DUODENOSCOPY (EGD), COMBINED N/A 3/24/2022    Procedure: ESOPHAGOGASTRODUODENOSCOPY (EGD), Duodenal  polyp removal;  Surgeon: Cesar  "MD Shaun;  Location: [facility]     ESOPHAGOSCOPY, GASTROSCOPY, DUODENOSCOPY (EGD), RESECT MUCOSA, COMBINED N/A 2/28/2019    Procedure: Upper Endoscopy, Endoscopic Ultrasound, Endoscopic Mucosal Resection,  Ampullectomy, polypectomy.;  Surgeon: Shaun Guerrero MD;  [facility]     ESOPHAGOSCOPY, GASTROSCOPY, DUODENOSCOPY (EGD), RESECT MUCOSA, COMBINED N/A 3/22/2021    Procedure: ESOPHAGOGASTRODUODENOSCOPY (EGD) with  endoscopic mucosal resection/ polypectomy;  Surgeon: Shaun Guerrero MD;  [facility]     EXCISE LESION LOWER EXTREMITY Right 3/28/2022    Procedure: Wide local excision of right knee melanoma;  Surgeon: Chevy Allison MD;  Location: [facility]     EXCISE MASS LOWER EXTREMITY Right 1/13/2022    Procedure: excision of right thigh mass;  Surgeon: Salvador Kelly MD;  Location: [facility]     EYE SURGERY      macular hole repaired left eye     HC KNEE SCOPE, DIAGNOSTIC      - both knees     HEAD & NECK SURGERY      wisdom teeth     IR CHEST PORT PLACEMENT > 5 YRS OF AGE  5/2/2022      Allergies   Allergen Reactions     Bromfenac Visual Disturbance     Sulfites, xibrom  Causes sever eye pain     Codeine      \"NAUSE,HA AND DIZZINESS\"     Codeine Nausea     Other reaction(s): Dizziness, Headache     Sulfites Visual Disturbance     Xibrom (bromfenac opthalmic solution) 0.09%--eye pain      Social History     Tobacco Use     Smoking status: Never Smoker     Smokeless tobacco: Never Used   Substance Use Topics     Alcohol use: No     Alcohol/week: 0.0 standard drinks      Wt Readings from Last 1 Encounters:   06/16/22 93.4 kg (206 lb)        Anesthesia Evaluation   Pt has had prior anesthetic. Type: General.    No history of anesthetic complications       ROS/MED HX  ENT/Pulmonary:     (+) sleep apnea, uses CPAP, Intermittent, asthma  (-) tobacco use   Neurologic:       Cardiovascular:     (+) Dyslipidemia hypertension-----Previous cardiac testing   Echo: Date: Results:    Stress Test: Date: Results:    ECG " Reviewed: Date: 1/2022 Results:  Sinus rhythm, no changes suggestive for ischemia  Cath: Date: Results:      METS/Exercise Tolerance:     Hematologic: Comments: Hb:13.5      Musculoskeletal:       GI/Hepatic:       Renal/Genitourinary:     (+) renal disease, type: CRI, Pt does not require dialysis,     Endo:     (+) type II DM, Not using insulin, - not using insulin pump. Obesity,     Psychiatric/Substance Use:       Infectious Disease:       Malignancy: Comment: Metastatic malignant melanoma (H)  -- R thigh   Endometrial carcinoma   (+) Malignancy, History of Skin.    Other:            Physical Exam    Airway        Mallampati: III   TM distance: > 3 FB   Neck ROM: full   Mouth opening: > 3 cm    Respiratory Devices and Support         Dental  no notable dental history         Cardiovascular          Rhythm and rate: regular and normal     Pulmonary           breath sounds clear to auscultation           OUTSIDE LABS:  CBC:   Lab Results   Component Value Date    WBC 8.2 06/14/2022    WBC 5.9 05/02/2022    HGB 13.5 06/14/2022    HGB 13.8 05/02/2022    HCT 43.8 06/14/2022    HCT 43.9 05/02/2022     06/14/2022     05/02/2022     BMP:   Lab Results   Component Value Date     06/14/2022     05/02/2022    POTASSIUM 4.1 06/14/2022    POTASSIUM 4.0 05/02/2022    CHLORIDE 106 06/14/2022    CHLORIDE 107 05/02/2022    CO2 27 06/14/2022    CO2 24 05/02/2022    BUN 20 06/14/2022    BUN 24 05/02/2022    CR 0.81 06/14/2022    CR 0.87 05/02/2022     (H) 06/14/2022     (H) 05/02/2022     COAGS:   Lab Results   Component Value Date    PTT 25 05/02/2022    INR 0.96 05/02/2022     POC:   Lab Results   Component Value Date     (H) 03/22/2021     HEPATIC:   Lab Results   Component Value Date    ALBUMIN 3.3 (L) 06/14/2022    PROTTOTAL 7.5 06/14/2022    ALT 20 06/14/2022    AST 11 06/14/2022    ALKPHOS 115 06/14/2022    BILITOTAL 0.5 06/14/2022     OTHER:   Lab Results   Component Value Date     A1C 7.7 (H) 03/21/2022    KATHRYN 11.0 (H) 06/14/2022    LIPASE 347 03/22/2021    AMYLASE 73 03/22/2021    TSH 3.08 03/21/2022    T4 1.17 02/04/2016    CRP <5.0 01/07/2014    SED 16 01/07/2014       Anesthesia Plan    ASA Status:  3   NPO Status:  NPO Appropriate    Anesthesia Type: General.     - Airway: ETT   Induction: Intravenous.   Maintenance: Balanced.   Techniques and Equipment:     - Lines/Monitors: BIS, 2nd IV     Consents    Anesthesia Plan(s) and associated risks, benefits, and realistic alternatives discussed. Questions answered and patient/representative(s) expressed understanding.    - Discussed:     - Discussed with:  Patient         Postoperative Care    Pain management: IV analgesics, Oral pain medications.   PONV prophylaxis: Ondansetron (or other 5HT-3), Dexamethasone or Solumedrol     Comments:                Geovany Victoria MD     I have seen and evaluated the patient myself and agree with the above assessment and plan.  I have explained the risks/benefits of anesthesia to the patient who understands and agrees to proceed.    Gabriella Cueva MD  Staff Anesthesiologist  Pager [phone]

## 2022-06-23 ENCOUNTER — HOSPITAL ENCOUNTER (OUTPATIENT)
Facility: CLINIC | Age: 72
Discharge: HOME | End: 2022-06-23
Attending: OBSTETRICS & GYNECOLOGY | Admitting: OBSTETRICS & GYNECOLOGY
Payer: COMMERCIAL

## 2022-06-23 ENCOUNTER — ANESTHESIA (OUTPATIENT)
Dept: SURGERY | Facility: CLINIC | Age: 72
End: 2022-06-23
Payer: COMMERCIAL

## 2022-06-23 VITALS
TEMPERATURE: 97.7 F | DIASTOLIC BLOOD PRESSURE: 70 MMHG | SYSTOLIC BLOOD PRESSURE: 131 MMHG | HEART RATE: 72 BPM | OXYGEN SATURATION: 96 % | RESPIRATION RATE: 16 BRPM | HEIGHT: 67 IN | WEIGHT: 204.37 LBS | BODY MASS INDEX: 32.08 KG/M2

## 2022-06-23 DIAGNOSIS — Z90.710 S/P HYSTERECTOMY: Primary | ICD-10-CM

## 2022-06-23 LAB
GLUCOSE BLDC GLUCOMTR-MCNC: 247 MG/DL (ref 70–99)
GLUCOSE BLDC GLUCOMTR-MCNC: 254 MG/DL (ref 70–99)

## 2022-06-23 PROCEDURE — 250N000009 HC RX 250: Performed by: OBSTETRICS & GYNECOLOGY

## 2022-06-23 PROCEDURE — 250N000013 HC RX MED GY IP 250 OP 250 PS 637: Performed by: OBSTETRICS & GYNECOLOGY

## 2022-06-23 PROCEDURE — 250N000011 HC RX IP 250 OP 636: Performed by: OBSTETRICS & GYNECOLOGY

## 2022-06-23 PROCEDURE — 370N000017 HC ANESTHESIA TECHNICAL FEE, PER MIN: Performed by: OBSTETRICS & GYNECOLOGY

## 2022-06-23 PROCEDURE — 88311 DECALCIFY TISSUE: CPT | Mod: TC | Performed by: OBSTETRICS & GYNECOLOGY

## 2022-06-23 PROCEDURE — 999N000141 HC STATISTIC PRE-PROCEDURE NURSING ASSESSMENT: Performed by: OBSTETRICS & GYNECOLOGY

## 2022-06-23 PROCEDURE — 250N000013 HC RX MED GY IP 250 OP 250 PS 637: Performed by: ANESTHESIOLOGY

## 2022-06-23 PROCEDURE — 250N000011 HC RX IP 250 OP 636: Performed by: ANESTHESIOLOGY

## 2022-06-23 PROCEDURE — 710N000010 HC RECOVERY PHASE 1, LEVEL 2, PER MIN: Performed by: OBSTETRICS & GYNECOLOGY

## 2022-06-23 PROCEDURE — 88112 CYTOPATH CELL ENHANCE TECH: CPT | Mod: TC | Performed by: OBSTETRICS & GYNECOLOGY

## 2022-06-23 PROCEDURE — 250N000009 HC RX 250: Performed by: STUDENT IN AN ORGANIZED HEALTH CARE EDUCATION/TRAINING PROGRAM

## 2022-06-23 PROCEDURE — 250N000011 HC RX IP 250 OP 636: Performed by: STUDENT IN AN ORGANIZED HEALTH CARE EDUCATION/TRAINING PROGRAM

## 2022-06-23 PROCEDURE — 710N000012 HC RECOVERY PHASE 2, PER MINUTE: Performed by: OBSTETRICS & GYNECOLOGY

## 2022-06-23 PROCEDURE — 120N000002 HC R&B MED SURG/OB UMMC

## 2022-06-23 PROCEDURE — 250N000025 HC SEVOFLURANE, PER MIN: Performed by: OBSTETRICS & GYNECOLOGY

## 2022-06-23 PROCEDURE — 258N000003 HC RX IP 258 OP 636: Performed by: STUDENT IN AN ORGANIZED HEALTH CARE EDUCATION/TRAINING PROGRAM

## 2022-06-23 PROCEDURE — 272N000001 HC OR GENERAL SUPPLY STERILE: Performed by: OBSTETRICS & GYNECOLOGY

## 2022-06-23 PROCEDURE — 360N000077 HC SURGERY LEVEL 4, PER MIN: Performed by: OBSTETRICS & GYNECOLOGY

## 2022-06-23 PROCEDURE — 250N000013 HC RX MED GY IP 250 OP 250 PS 637: Performed by: STUDENT IN AN ORGANIZED HEALTH CARE EDUCATION/TRAINING PROGRAM

## 2022-06-23 RX ORDER — DEXAMETHASONE SODIUM PHOSPHATE 4 MG/ML
INJECTION, SOLUTION INTRA-ARTICULAR; INTRALESIONAL; INTRAMUSCULAR; INTRAVENOUS; SOFT TISSUE PRN
Status: DISCONTINUED | OUTPATIENT
Start: 2022-06-23 | End: 2022-06-23

## 2022-06-23 RX ORDER — SODIUM CHLORIDE, SODIUM LACTATE, POTASSIUM CHLORIDE, CALCIUM CHLORIDE 600; 310; 30; 20 MG/100ML; MG/100ML; MG/100ML; MG/100ML
INJECTION, SOLUTION INTRAVENOUS CONTINUOUS
Status: DISCONTINUED | OUTPATIENT
Start: 2022-06-23 | End: 2022-06-23 | Stop reason: HOSPADM

## 2022-06-23 RX ORDER — PHENAZOPYRIDINE HYDROCHLORIDE 200 MG/1
200 TABLET, FILM COATED ORAL ONCE
Status: COMPLETED | OUTPATIENT
Start: 2022-06-23 | End: 2022-06-23

## 2022-06-23 RX ORDER — CEFAZOLIN SODIUM/WATER 2 G/20 ML
2 SYRINGE (ML) INTRAVENOUS
Status: DISCONTINUED | OUTPATIENT
Start: 2022-06-23 | End: 2022-06-23 | Stop reason: HOSPADM

## 2022-06-23 RX ORDER — LIDOCAINE 40 MG/G
CREAM TOPICAL
Status: DISCONTINUED | OUTPATIENT
Start: 2022-06-23 | End: 2022-06-23 | Stop reason: HOSPADM

## 2022-06-23 RX ORDER — FENTANYL CITRATE 50 UG/ML
INJECTION, SOLUTION INTRAMUSCULAR; INTRAVENOUS PRN
Status: DISCONTINUED | OUTPATIENT
Start: 2022-06-23 | End: 2022-06-23

## 2022-06-23 RX ORDER — SODIUM CHLORIDE, SODIUM LACTATE, POTASSIUM CHLORIDE, CALCIUM CHLORIDE 600; 310; 30; 20 MG/100ML; MG/100ML; MG/100ML; MG/100ML
INJECTION, SOLUTION INTRAVENOUS CONTINUOUS PRN
Status: DISCONTINUED | OUTPATIENT
Start: 2022-06-23 | End: 2022-06-23

## 2022-06-23 RX ORDER — OXYCODONE HYDROCHLORIDE 5 MG/1
5 TABLET ORAL ONCE
Status: COMPLETED | OUTPATIENT
Start: 2022-06-23 | End: 2022-06-23

## 2022-06-23 RX ORDER — HEPARIN SODIUM 5000 [USP'U]/.5ML
5000 INJECTION, SOLUTION INTRAVENOUS; SUBCUTANEOUS
Status: COMPLETED | OUTPATIENT
Start: 2022-06-23 | End: 2022-06-23

## 2022-06-23 RX ORDER — METHOCARBAMOL 100 MG/ML
1000 INJECTION, SOLUTION INTRAMUSCULAR; INTRAVENOUS ONCE
Status: COMPLETED | OUTPATIENT
Start: 2022-06-23 | End: 2022-06-23

## 2022-06-23 RX ORDER — ACETAMINOPHEN 325 MG/1
975 TABLET ORAL ONCE
Status: DISCONTINUED | OUTPATIENT
Start: 2022-06-23 | End: 2022-06-23 | Stop reason: HOSPADM

## 2022-06-23 RX ORDER — EPHEDRINE SULFATE 50 MG/ML
INJECTION, SOLUTION INTRAMUSCULAR; INTRAVENOUS; SUBCUTANEOUS PRN
Status: DISCONTINUED | OUTPATIENT
Start: 2022-06-23 | End: 2022-06-23

## 2022-06-23 RX ORDER — METRONIDAZOLE 500 MG/100ML
500 INJECTION, SOLUTION INTRAVENOUS
Status: COMPLETED | OUTPATIENT
Start: 2022-06-23 | End: 2022-06-23

## 2022-06-23 RX ORDER — FENTANYL CITRATE 50 UG/ML
25 INJECTION, SOLUTION INTRAMUSCULAR; INTRAVENOUS EVERY 5 MIN PRN
Status: DISCONTINUED | OUTPATIENT
Start: 2022-06-23 | End: 2022-06-23 | Stop reason: HOSPADM

## 2022-06-23 RX ORDER — IPRATROPIUM BROMIDE AND ALBUTEROL SULFATE 2.5; .5 MG/3ML; MG/3ML
3 SOLUTION RESPIRATORY (INHALATION) ONCE
Status: COMPLETED | OUTPATIENT
Start: 2022-06-23 | End: 2022-06-23

## 2022-06-23 RX ORDER — ONDANSETRON 4 MG/1
4 TABLET, ORALLY DISINTEGRATING ORAL EVERY 30 MIN PRN
Status: DISCONTINUED | OUTPATIENT
Start: 2022-06-23 | End: 2022-06-23 | Stop reason: HOSPADM

## 2022-06-23 RX ORDER — INDOCYANINE GREEN AND WATER 25 MG
KIT INJECTION PRN
Status: DISCONTINUED | OUTPATIENT
Start: 2022-06-23 | End: 2022-06-23 | Stop reason: HOSPADM

## 2022-06-23 RX ORDER — HEPARIN SODIUM,PORCINE 10 UNIT/ML
5 VIAL (ML) INTRAVENOUS ONCE
Status: DISCONTINUED | OUTPATIENT
Start: 2022-06-23 | End: 2022-06-23 | Stop reason: HOSPADM

## 2022-06-23 RX ORDER — ONDANSETRON 2 MG/ML
INJECTION INTRAMUSCULAR; INTRAVENOUS PRN
Status: DISCONTINUED | OUTPATIENT
Start: 2022-06-23 | End: 2022-06-23

## 2022-06-23 RX ORDER — HYDROMORPHONE HCL IN WATER/PF 6 MG/30 ML
0.2 PATIENT CONTROLLED ANALGESIA SYRINGE INTRAVENOUS EVERY 5 MIN PRN
Status: DISCONTINUED | OUTPATIENT
Start: 2022-06-23 | End: 2022-06-23 | Stop reason: HOSPADM

## 2022-06-23 RX ORDER — HYDROMORPHONE HYDROCHLORIDE 1 MG/ML
0.5 INJECTION, SOLUTION INTRAMUSCULAR; INTRAVENOUS; SUBCUTANEOUS ONCE
Status: COMPLETED | OUTPATIENT
Start: 2022-06-23 | End: 2022-06-23

## 2022-06-23 RX ORDER — ONDANSETRON 2 MG/ML
4 INJECTION INTRAMUSCULAR; INTRAVENOUS EVERY 30 MIN PRN
Status: DISCONTINUED | OUTPATIENT
Start: 2022-06-23 | End: 2022-06-23 | Stop reason: HOSPADM

## 2022-06-23 RX ORDER — AMOXICILLIN 250 MG
1-2 CAPSULE ORAL 2 TIMES DAILY
Qty: 30 TABLET | Refills: 0 | Status: SHIPPED | OUTPATIENT
Start: 2022-06-23 | End: 2022-07-25

## 2022-06-23 RX ORDER — ONDANSETRON 4 MG/1
4 TABLET, ORALLY DISINTEGRATING ORAL EVERY 8 HOURS PRN
Qty: 4 TABLET | Refills: 0 | Status: SHIPPED | OUTPATIENT
Start: 2022-06-23 | End: 2022-07-25

## 2022-06-23 RX ORDER — PROPOFOL 10 MG/ML
INJECTION, EMULSION INTRAVENOUS PRN
Status: DISCONTINUED | OUTPATIENT
Start: 2022-06-23 | End: 2022-06-23

## 2022-06-23 RX ORDER — ACETAMINOPHEN 325 MG/1
975 TABLET ORAL ONCE
Status: COMPLETED | OUTPATIENT
Start: 2022-06-23 | End: 2022-06-23

## 2022-06-23 RX ORDER — OXYCODONE HYDROCHLORIDE 5 MG/1
5 TABLET ORAL
Status: COMPLETED | OUTPATIENT
Start: 2022-06-23 | End: 2022-06-23

## 2022-06-23 RX ORDER — CEFAZOLIN SODIUM/WATER 2 G/20 ML
2 SYRINGE (ML) INTRAVENOUS SEE ADMIN INSTRUCTIONS
Status: DISCONTINUED | OUTPATIENT
Start: 2022-06-23 | End: 2022-06-23 | Stop reason: HOSPADM

## 2022-06-23 RX ORDER — OXYCODONE HYDROCHLORIDE 5 MG/1
5 TABLET ORAL EVERY 6 HOURS PRN
Qty: 6 TABLET | Refills: 0 | Status: SHIPPED | OUTPATIENT
Start: 2022-06-23 | End: 2022-06-26

## 2022-06-23 RX ORDER — HEPARIN SODIUM (PORCINE) LOCK FLUSH IV SOLN 100 UNIT/ML 100 UNIT/ML
500 SOLUTION INTRAVENOUS EVERY 8 HOURS
Status: DISCONTINUED | OUTPATIENT
Start: 2022-06-23 | End: 2022-06-23 | Stop reason: HOSPADM

## 2022-06-23 RX ORDER — ACETAMINOPHEN 325 MG/1
975 TABLET ORAL EVERY 6 HOURS PRN
Qty: 50 TABLET | Refills: 0 | Status: ON HOLD | OUTPATIENT
Start: 2022-06-23 | End: 2022-08-11

## 2022-06-23 RX ADMIN — Medication 5 MG: at 11:50

## 2022-06-23 RX ADMIN — HEPARIN SODIUM (PORCINE) LOCK FLUSH IV SOLN 100 UNIT/ML 500 UNITS: 100 SOLUTION at 18:11

## 2022-06-23 RX ADMIN — Medication 5 MG: at 10:33

## 2022-06-23 RX ADMIN — Medication 20 MG: at 11:09

## 2022-06-23 RX ADMIN — SODIUM CHLORIDE, POTASSIUM CHLORIDE, SODIUM LACTATE AND CALCIUM CHLORIDE: 600; 310; 30; 20 INJECTION, SOLUTION INTRAVENOUS at 10:12

## 2022-06-23 RX ADMIN — DEXAMETHASONE SODIUM PHOSPHATE 4 MG: 4 INJECTION, SOLUTION INTRA-ARTICULAR; INTRALESIONAL; INTRAMUSCULAR; INTRAVENOUS; SOFT TISSUE at 10:31

## 2022-06-23 RX ADMIN — ONDANSETRON 4 MG: 2 INJECTION INTRAMUSCULAR; INTRAVENOUS at 11:47

## 2022-06-23 RX ADMIN — METHOCARBAMOL 1000 MG: 100 INJECTION, SOLUTION INTRAMUSCULAR; INTRAVENOUS at 16:23

## 2022-06-23 RX ADMIN — OXYCODONE HYDROCHLORIDE 5 MG: 5 TABLET ORAL at 16:00

## 2022-06-23 RX ADMIN — METRONIDAZOLE 500 MG: 500 INJECTION, SOLUTION INTRAVENOUS at 09:36

## 2022-06-23 RX ADMIN — HYDROMORPHONE HYDROCHLORIDE 0.5 MG: 1 INJECTION, SOLUTION INTRAMUSCULAR; INTRAVENOUS; SUBCUTANEOUS at 16:02

## 2022-06-23 RX ADMIN — FENTANYL CITRATE 25 MCG: 50 INJECTION, SOLUTION INTRAMUSCULAR; INTRAVENOUS at 13:30

## 2022-06-23 RX ADMIN — SUGAMMADEX 200 MG: 100 INJECTION, SOLUTION INTRAVENOUS at 11:59

## 2022-06-23 RX ADMIN — HEPARIN SODIUM 5000 UNITS: 5000 INJECTION, SOLUTION INTRAVENOUS; SUBCUTANEOUS at 09:02

## 2022-06-23 RX ADMIN — Medication 50 MG: at 10:19

## 2022-06-23 RX ADMIN — IPRATROPIUM BROMIDE AND ALBUTEROL SULFATE 3 ML: 2.5; .5 SOLUTION RESPIRATORY (INHALATION) at 09:23

## 2022-06-23 RX ADMIN — PROPOFOL 100 MG: 10 INJECTION, EMULSION INTRAVENOUS at 10:19

## 2022-06-23 RX ADMIN — FENTANYL CITRATE 25 MCG: 50 INJECTION, SOLUTION INTRAMUSCULAR; INTRAVENOUS at 12:35

## 2022-06-23 RX ADMIN — FENTANYL CITRATE 50 MCG: 50 INJECTION, SOLUTION INTRAMUSCULAR; INTRAVENOUS at 10:19

## 2022-06-23 RX ADMIN — FENTANYL CITRATE 50 MCG: 50 INJECTION, SOLUTION INTRAMUSCULAR; INTRAVENOUS at 10:41

## 2022-06-23 RX ADMIN — HYDROMORPHONE HYDROCHLORIDE 0.3 MG: 1 INJECTION, SOLUTION INTRAMUSCULAR; INTRAVENOUS; SUBCUTANEOUS at 10:52

## 2022-06-23 RX ADMIN — HYDROMORPHONE HYDROCHLORIDE 0.2 MG: 1 INJECTION, SOLUTION INTRAMUSCULAR; INTRAVENOUS; SUBCUTANEOUS at 11:37

## 2022-06-23 RX ADMIN — FENTANYL CITRATE 50 MCG: 50 INJECTION, SOLUTION INTRAMUSCULAR; INTRAVENOUS at 12:08

## 2022-06-23 RX ADMIN — ACETAMINOPHEN 975 MG: 325 TABLET ORAL at 09:00

## 2022-06-23 RX ADMIN — PROPOFOL 10 MG: 10 INJECTION, EMULSION INTRAVENOUS at 11:45

## 2022-06-23 RX ADMIN — PHENAZOPYRIDINE HYDROCHLORIDE 200 MG: 200 TABLET, FILM COATED ORAL at 09:00

## 2022-06-23 RX ADMIN — Medication 10 MG: at 11:54

## 2022-06-23 RX ADMIN — OXYCODONE HYDROCHLORIDE 5 MG: 5 TABLET ORAL at 12:57

## 2022-06-23 RX ADMIN — FENTANYL CITRATE 50 MCG: 50 INJECTION, SOLUTION INTRAMUSCULAR; INTRAVENOUS at 12:21

## 2022-06-23 RX ADMIN — Medication 2 G: at 10:16

## 2022-06-23 ASSESSMENT — ACTIVITIES OF DAILY LIVING (ADL)
ADLS_ACUITY_SCORE: 18
ADLS_ACUITY_SCORE: 20

## 2022-06-23 NOTE — ANESTHESIA CARE TRANSFER NOTE
Patient: Gricelda Sabillon    Procedure: Procedure(s):  Total Laparoscopic Hyserectomy, Bilateral Salpibgo-oophorectomy, Bilateral Calvert Lymph Node Dissection  Cystoscopy       Diagnosis: Endometrial cancer (H) [C54.1]  Diagnosis Additional Information: No value filed.    Anesthesia Type:   General     Note:    Oropharynx: spontaneously breathing  Level of Consciousness: awake  Oxygen Supplementation: face mask  Level of Supplemental Oxygen (L/min / FiO2): 6  Independent Airway: airway patency satisfactory and stable  Dentition: dentition unchanged  Vital Signs Stable: post-procedure vital signs reviewed and stable  Report to RN Given: handoff report given  Patient transferred to: PACU    Handoff Report: Identifed the Patient, Identified the Reponsible Provider, Reviewed the pertinent medical history, Discussed the surgical course, Reviewed Intra-OP anesthesia mangement and issues during anesthesia, Set expectations for post-procedure period and Allowed opportunity for questions and acknowledgement of understanding      Vitals:  Vitals Value Taken Time   /72    Temp 96.2    Pulse 70    Resp 16    SpO2 99%        Electronically Signed By: Geovany Victoria MD  June 23, 2022  12:17 PM

## 2022-06-23 NOTE — TELEPHONE ENCOUNTER
"ED/Discharge Protocol    \"Hi, my name is Susi Mendiola, a registered nurse, and I am calling on behalf of Dr. Torres's office at [place].  I am calling to follow up and see how things are going for you after your recent visit.\"    \"I see that you were in the (ER/UC/IP) on 9/25-9/28.    How are you doing now that you are home?\" Patient stated she is doing fine and is managing pain well. Reviewed all the newly prescribed medications.. Patient stated that she understands all the new medications. No concerns at this time.     Is patient experiencing symptoms that may require a hospital visit?  N/A    Discharge Instructions    \"Let's review your discharge instructions.  What is/are the follow-up recommendations?  Pt. Response: see above note    \"Were you instructed to make a follow-up appointment?\"  Pt. Response: Yes.  Has appointment been made?   Yes      \"When you see the provider, I would recommend that you bring your discharge instructions with you.    Medications    \"How many new medications are you on since your hospitalization/ED visit?\"    0-1  \"How many of your current medicines changed (dose, timing, name, etc.) while you were in the hospital/ED visit?\"   0-1  \"Do you have questions about your medications?\"   No  \"Were you newly diagnosed with heart failure, COPD, diabetes or did you have a heart attack?\"   No  For patients on insulin: \"Did you start on insulin in the hospital or did you have your insulin dose changed?\"   No    Medication reconciliation completed? Yes    Was MTM referral placed (*Make sure to put transitions as reason for referral)?   No    Call Summary    \"Do you have any questions or concerns about your condition or care plan at the moment?\"    No  Triage nurse advice given: see top note    Patient was in ER 0 in the past year (assess appropriateness of ER visits.)      \"If you have questions or things don't continue to improve, we encourage you contact us through the main clinic number,  " "[phone].  Even if the clinic is not open, triage nurses are available 24/7 to help you.     We would like you to know that our clinic has extended hours (provide information).  We also have urgent care (provide details on closest location and hours/contact info)\"      \"Thank you for your time and take care!\"        " Griseofulvin Counseling:  I discussed with the patient the risks of griseofulvin including but not limited to photosensitivity, cytopenia, liver damage, nausea/vomiting and severe allergy.  The patient understands that this medication is best absorbed when taken with a fatty meal (e.g., ice cream or french fries).

## 2022-06-23 NOTE — OP NOTE
Gynecologic Oncology Operative Report    6/23/2022  Gricelda Sabillon  [id number]    PREOPERATIVE DIAGNOSIS: Grade 1 endometrial adenocarcinoma    POSTOPERATIVE DIAGNOSIS: Same, final pathology pending    PROCEDURES: Total laparoscopic hysterectomy, bilateral salpingo-oophorectomy, bilateral pelvic sentinel lymph node dissection, cystoscopy    SURGEON: Eli Herrera MD     ASSISTANTS:  Ioana Lincoln MD, PGY-3.     ANESTHESIA: General endotracheal    ESTIMATED BLOOD LOSS: 100 cc      IV FLUIDS: 1400 cc crystalloid    URINE OUTPUT: 600 cc clear urine     INDICATIONS: Gricelda Sabillon is a 72 year old who was undergoing a work up for her melanoma in which a primary lesion could not be identified and imaging revealed a thickened endometrium.  Upon further questioning she did report a very light spotting for approximately 6 months.  US showed a normal sized uterus with EMS of 11.2 cm.  She underwent an EMB which revealed a grade 1 endometrial adenocarcinoma.  She is currently undergoing treatment for her melanoma with Keytruda every 3 weeks which she is tolerating very well.  Following a thorough discussion of the risks, benefits, indications and alternatives she consented to the above procedure.    FINDINGS: On EUA the patient had normal external genitalia and a normal sized, mobile uterus without palpable adnexal masses.  On laparoscopy the uterus and bilateral adnexa were grossly normal in appearance.  There were easily identifiable sentinel lymph nodes in the external iliac on the right and on the left.  The remainder of the abdominal and pelvic surveys revealed no damage to underlying structures.  On cystoscopy there was no evidence of bladder injury or foreign body and there was brisk efflux noted from the bilateral ureteral orifices.    SPECIMENS:   1.  Pelvic washings  2.  Right external iliac sentinel lymph node  3.  Left external iliac sentinel lymph node  4.  Uterus, cervix, bilateral ovaries and fallopian  tubes    COMPLICATIONS: None.     CONDITION: Stable to PACU.     PROCEDURE:   Consent was reviewed with the patient in the preoperative setting and confirmed. She received prophylactic antibiotics. In addition, she received heparin for venous thrombosis prevention. The patient was transferred to the operating room and placed in dorsal supine position. General anesthetic was obtained in the usual manner without noted difficulties. The patient was then positioned onto Koffi stirrups and an exam under anesthesia was performed with findings as described above.  The patient was prepped and draped for the above-mentioned procedure and Meza catheter was then placed under sterile techniques.  Timeout was called at which point the patient's name, procedure and operative site was confirmed by the operative team. Initially, the instruments for the vaginal manipulator were positioned, a speculum was placed in the vagina. The anterior lip of the cervix was grasped, the uterus sounded to 6 cm and cervix was serially dilated. The cervix was injected with 4 cc of ICG at the 3 and 9 o'clock positions.  The VCare vaginal manipulator was then inserted and the vaginal balloon was then inserted to obtain intraabdominal pressure.     Attention was then turned to the upper abdomen. Initially, an incision was made at the umbilicus and the Veress needle introduced through this stab incision. The abdomen was insufflated with an opening pressure of 2 mmHg. The Veress needle was removed. The initial midline 5 mm port was inserted without difficulties and initial survey revealed no damage to underlying structures.  The left lateral 12 mm and right lateral 5 mm ports were then placed under direct visualization without any injury noted to underlying structures. At this point, the patient was placed into steep Trendelenburg. The pelvis was inspected as well as the upper abdomen with findings as noted above. Pelvic washings were obtained and sent  for cytology. Bowels were packed up into the upper abdomen with gentle traction.     Attention was then turned to the pelvis. The round ligaments were identified bilaterally. They were divided using cautery and the retroperitoneal space was entered by dissecting the peritoneum lateral and cephalad to the IP ligaments. The ureters were identified and a defect was made underneath the IP ligament and above the ureter. The IP ligament was serially cauterized and then divided sharply. The spaces of the bilateral pelvic lymph node dissections were then opened.  We were able to easily identify a sentinel lymph node in the external iliac region bilaterally and these were carefully dissected off the surrounding structures and sent for pathology.  The rest of the peritoneum was skeletonized down to the level of the uterine arteries which were then cauterized and divided.  The bladder flap was created using cautery down to just below the level of the uterine manipulator cup. The rest of the parametrial tissue was dissected off of the uterus serially cauterized and divided sharply. A colpotomy was made on the anterior side and carried around to the posterior side of the uterine manipulator cup using the electrocautery. The uterus was removed through the vagina and sent for pathology.      The vaginal cuff was then closed using the V-Lock suture and the EndoStitch device with excellent reapproximation and good hemostasis.   Next, we performed cystoscopy using 30-degree angled scope and noted normal bladder mucosa, no evidence of foreign body and brisk efflux from the bilateral ureteral orifices. At this point, the vaginal balloon was removed from the vagina. We closed the fascia on the 12 mm incision using the Orlando-Llamas device. All laparoscopic instruments and ports were now removed and CO2 allowed to escape from the abdomen. Skin was reapproximated with 4-0 Vicryl sutures and Dermabond applied. The patient tolerated the  procedure well and was taken to the recovery room in stable condition.    Sponge, lap and needle counts were reported as correct x2 and instrument count was correct x1.     Eli Herrera MD  Gynecologic Oncology  [facility]

## 2022-06-23 NOTE — DISCHARGE INSTRUCTIONS
[facility]   Adult Discharge Orders & Instructions     For 24 hours after surgery    Get plenty of rest.  A responsible adult must stay with you for at least 24 hours after you leave the hospital.   Do not drive or use heavy equipment.  If you have weakness or tingling, don't drive or use heavy equipment until this feeling goes away.  Do not drink alcohol.  Avoid strenuous or risky activities.  Ask for help when climbing stairs.   You may feel lightheaded.  IF so, sit for a few minutes before standing.  Have someone help you get up.   If you have nausea (feel sick to your stomach): Drink only clear liquids such as apple juice, ginger ale, broth or 7-Up.  Rest may also help.  Be sure to drink enough fluids.  Move to a regular diet as you feel able.  You may have a slight fever. Call the doctor if your fever is over 100 F (37.7 C) (taken under the tongue) or lasts longer than 24 hours.  You may have a dry mouth, a sore throat, muscle aches or trouble sleeping.  These should go away after 24 hours.  Do not make important or legal decisions.   Patient must use CPAP machine for the next few days. This is VERY important post-surgically.  Call your doctor for any of the followin.  Signs of infection (fever, growing tenderness at the surgery site, a large amount of drainage or bleeding, severe pain, foul-smelling drainage, redness, swelling).    2. It has been over 8 to 10 hours since surgery and you are still not able to urinate (pass water).    3.  Headache for over 24 hours.    To contact a doctor, call Dr. Ventura @ [phone] Gynecology/Oncology Clinic

## 2022-06-23 NOTE — OR NURSING
PACU blood sugar 247, notified Dr. Ronald Olivas, no new orders at this time.    Kendra MCKINNEY RN

## 2022-06-23 NOTE — ANESTHESIA PROCEDURE NOTES
Airway       Patient location during procedure: OR       Procedure Start/Stop Times: 6/23/2022 10:27 AM  Staff -        Anesthesiologist:  Gabriella Cueva MD       Resident/Fellow: Geovany Victoria MD       Performed By: resident  Consent for Airway        Urgency: elective  Indications and Patient Condition       Indications for airway management: rita-procedural       Induction type:intravenous       Mask difficulty assessment: 1 - vent by mask    Final Airway Details       Final airway type: endotracheal airway       Successful airway: ETT - single  Endotracheal Airway Details        ETT size (mm): 7.0       Cuffed: yes       Successful intubation technique: video laryngoscopy       VL Blade Size: MAC 3       Grade View of Cords: 2       Adjucts: stylet       Position: Right       Measured from: lips       Secured at (cm): 21       Bite block used: Molar    Post intubation assessment        Placement verified by: capnometry, equal breath sounds and chest rise        Number of attempts at approach: 2       Secured with: silk tape       Ease of procedure: easy       Dentition: Intact    Medication(s) Administered   Medication Administration Time: 6/23/2022 10:27 AM

## 2022-06-23 NOTE — PROGRESS NOTES
Late entry from 6/14/2022.  Met with pt after clinic visit with Dr Herrera to discuss surgical education.  Labs completed in the clinic and pt saw PCP on 6/16/2922 for preop clearance appt and review of medications.  See note from [facility] surgery scheduler from 6/15/2022. Pt will have at home Covid test 1-2 days prior to surgery.      GYN ONC Pre-Op Education - Nursing     Relevant Diagnosis: Endometrial Cancer    Teaching Topic: Laparoscopic removal of uterus, cervix, both ovaries and fallopian tubes, sentinel LND, possible full LND, possible open procedure with Dr Herrera on 6/23/2022.    Teaching Concerns addressed: Yes    Patient demonstrates understanding of the following:   Reason for the appointment, diagnosis and treatment plan:   Yes   Knowledge of proper use of medications and conditions for which they are ordered (with special attention to potential side effects or drug interactions): Yes   Which situations necessitate calling provider and whom to contact: Yes       Nutritional needs and diet plan:  Yes      Pain management techniques:  Yes  Diet:  Yes     Infection Prevention:  Patient demonstrates understanding of the following:   Pre-Op CHG Bathing Instructions: Yes  Surgical procedure site care taught:   Yes   Signs and symptoms of infection taught: Yes     Instructional Materials Used/Given:  [facility] Preparing for Your Surgery  Showering or Bathing before Surgery Instructions & CHG Product (2 bottles)  Home Care after Major Abdominal or Vaginal Surgery  Map  Tips to Increase the Protein in Your Diet  Phone number for [facility]      Comments:  Met with pt for pre-op teaching for surgery on 6/14/2022.  Reviewed NPO restrictions prior to surgery with pt (No food or milk products 8 hours prior to surgery; Only clear liquids up to 2 hours prior to surgery i.e., water, black coffee, tea, apple juice).  Also reviewed medications that must be held for at least 7 days prior to surgery  (Aspirin, Ibuprofen, Naproxen, Fish oil/Flax seed oil, Multivitamin/Vit. E, or any other product containing aspirin, or NSAIDs).  Per Dr Herrera, pt should follow instructions with medications on day of surgery as [facility].  Pt will need to see Dr Herrera, 1-2 weeks after her surgery. Pt will need someone to drive her home after surgery, and someone to stay with her for at least 24 hours after she arrives home. Reviewed with pt signs and symptoms that would warrant contacting provider immediately.  Also reviewed lifting restrictions after surgery with pt.and all other instructions per MD YOUSSEF.  Pt had the opportunity to ask questions, and all questions were answered to her satisfaction.  Patient verbalized understanding and agreement with plan. Pt was instructed to call the clinic with any questions, concerns, or worsening symptoms.     Yarelis Mitchell, RN, BSN, OCN

## 2022-06-23 NOTE — ANESTHESIA POSTPROCEDURE EVALUATION
Patient: Gricelda Sabillon    Procedure: Procedure(s):  Total Laparoscopic Hyserectomy, Bilateral Salpibgo-oophorectomy, Bilateral [place] Lymph Node Dissection  Cystoscopy       Anesthesia Type:  General    Note:     Postop Pain Control: Uneventful            Sign Out: Well controlled pain   PONV: No   Neuro/Psych: Uneventful            Sign Out: Acceptable/Baseline neuro status   Airway/Respiratory: Uneventful            Sign Out: Acceptable/Baseline resp. status   CV/Hemodynamics: Uneventful            Sign Out: Acceptable CV status; No obvious hypovolemia; No obvious fluid overload   Other NRE: NONE   DID A NON-ROUTINE EVENT OCCUR? No           Last vitals:  Vitals Value Taken Time   /66 06/23/22 1345   Temp 36.3  C (97.4  F) 06/23/22 1230   Pulse 72 06/23/22 1346   Resp 14 06/23/22 1330   SpO2 90 % 06/23/22 1346   Vitals shown include unvalidated device data.    Electronically Signed By: Gabriella Cueva MD  June 23, 2022  1:47 PM

## 2022-06-23 NOTE — BRIEF OP NOTE
[facility]  Brief Operative Note    Pre-operative diagnosis: Endometrial cancer (H) [C54.1]  Post-operative diagnosis: Same as above, s/p procedure below    Procedure: Procedure(s):  Total Laparoscopic Hyserectomy, Bilateral Salpibgo-oophorectomy, Bilateral [place] Lymph Node Dissection  Cystoscopy  Surgeon: Surgeon(s) and Role:     * Eli Guidry MD - Primary     * Ioana Lincoln MD - Resident - Assisting  Anesthesia: General   Estimated Blood Loss: 100 mLs  IVF: 1400 mLs  UOP: 600 mLs pyridium stained in kohli at end of case    Drains: None  Specimens:   ID Type Source Tests Collected by Time Destination   1 :  [place] Pelvis NON-GYNECOLOGIC CYTOLOGY Eli Guidry MD 6/23/2022 10:49 AM    2 : LEFT EXTERNAL ILIAC SENTINEL LYMPH NNODE Tissue Lymph Node(s), Iliac, Left SURGICAL PATHOLOGY EXAM Eli Guidry MD 6/23/2022 11:09 AM    3 : RIGHT EXTERNAL ILIAC SENTINEL LYMPH NODE  Tissue Lymph Node(s), Iliac, Right SURGICAL PATHOLOGY EXAM Eli Guidry MD 6/23/2022 11:16 AM    4 : UTERUS, CERVIX, BILATERAL FALLOPIAN TUBES & OVARIES Tissue Uterus, Cervix, Bilateral Fallopian Tubes & Ovaries SURGICAL PATHOLOGY EXAM Eli Guidry MD 6/23/2022 11:25 AM      Findings:   Atraumatic abdominal entry. Normal upper abdominal survey. Normal appearing uterus, bilateral fallopian tubes and ovaries. Hemostatic surgical pedicles at end of case. Brisk bilateral efflux from bilateral ureteral orifices on cystoscopy.        Complications: None apparent    Implants: * No implants in log *     Ioana Lincoln MD  Obstetrics & Gynecology PGY-4  12:12 PM 6/23/2022

## 2022-06-23 NOTE — PROGRESS NOTES
"[facility] Post-Op Check      S: Pt doing well. Pain is 3/10. Has voided and is tolerating PO  without nausea or vomiting.  Desires discharge home    O:    /62   Temp 97.7  F (36.5  C) (Oral)   Resp 15   Ht 1.702 m (5' 7.01\")   Wt 92.7 kg (204 lb 5.9 oz)   LMP 12/13/2002 (Exact Date)   SpO2 99%   BMI 32.00 kg/m    General:   NAD  CV:  Regular rate  Pulm: nonlabored breathing, good inspiratory effort  Abd: soft, appropriately tender to palpation, nondistended.  No rebound or guarding  Incision: c/d/i, bruising around left lateral port site  LE: no edema, no calf tenderness      A:  72 year old F, POD#0 s/p TLH-BSO, sLND, cysto    P:   Dz: grade 1 EMCA    FEN: reg diet  Pain: acetaminophen, prn oxycodone   Heme:  mLs, VSS  CV: No issues.  Pulm: asthma. On RA  GI: prn antiemetics  : s/p kohli, voiding   ID: Afebrile.   Endo: type II DM, continue home meds  Psych/Neuro: No issues  PPX: SCDs, encourage ambulation    Dispo: Discharge home     Ioana Lincoln MD  Obstetrics & Gynecology PGY-4  5:52 PM 6/23/2022      "

## 2022-06-24 ENCOUNTER — PATIENT OUTREACH (OUTPATIENT)
Dept: CARE COORDINATION | Facility: CLINIC | Age: 72
End: 2022-06-24

## 2022-06-24 DIAGNOSIS — Z71.89 OTHER SPECIFIED COUNSELING: ICD-10-CM

## 2022-06-24 LAB
PATH REPORT.COMMENTS IMP SPEC: NORMAL
PATH REPORT.FINAL DX SPEC: NORMAL
PATH REPORT.GROSS SPEC: NORMAL
PATH REPORT.MICROSCOPIC SPEC OTHER STN: NORMAL
PATH REPORT.RELEVANT HX SPEC: NORMAL

## 2022-06-24 PROCEDURE — 88112 CYTOPATH CELL ENHANCE TECH: CPT | Mod: 26 | Performed by: PATHOLOGY

## 2022-06-24 NOTE — PROGRESS NOTES
[facility]: Post-Discharge Note  SITUATION                                                      Admission:    Admission Date: 06/22/22   Reason for Admission: laparoscopic removal of uterus, cervix, both ovaries and fallopian tubes, sentinel lymph node dissection, possible full lymph node dissection  Discharge:   Discharge Date: 06/23/22  Discharge Diagnosis: Grade 1 endometrial adenocarcinoma    BACKGROUND                                                      Per hospital discharge summary and inpatient provider notes:  No discharge summary.    ASSESSMENT      Enrollment  Primary Care Care Coordination Status: Declined    Discharge Assessment  How are you doing now that you are home?: Patient said that she she feels okay; not much of an appetite.  He  is going to get her some Boost.  She will reach out to PCP if her appetite doesn't return.  How are your symptoms? (Red Flag symptoms escalate to triage hotline per guidelines): Improved  Do you feel your condition is stable enough to be safe at home until your provider visit?: Yes  Does the patient have their discharge instructions? : Yes  Does the patient have questions regarding their discharge instructions? : No  Were you started on any new medications or were there changes to any of your previous medications? : No  Does the patient have all of their medications?: Yes  Do you have questions regarding any of your medications? : No  Do you have all of your needed medical supplies or equipment (DME)?  (i.e. oxygen tank, CPAP, cane, etc.): Yes  Discharge follow-up appointment scheduled within 14 calendar days? : No  Is patient agreeable to assistance with scheduling? : No (Patient said she wants to wait until her July appt.)    Post-op ([facility] CTA Only)  If the patient had a surgery or procedure, do they have any questions for a nurse?: No      PLAN                                                      Outpatient Plan:  No discharge summary.    Future  Appointments   Date Time Provider Department Center   7/12/2022  9:30 AM [facility]   7/12/2022 10:30 AM Eli Guidry MD [facility]   7/19/2022  1:30 PM Raisa Davidson MD [facility]         For any urgent concerns, please contact our 24 hour nurse triage line: [phone] ([phone])         ALINA Tadeo  [phone]  [facility]

## 2022-06-27 ENCOUNTER — TELEPHONE (OUTPATIENT)
Dept: INTERNAL MEDICINE | Facility: CLINIC | Age: 72
End: 2022-06-27

## 2022-06-27 NOTE — TELEPHONE ENCOUNTER
Patient Quality Outreach    Patient is due for the following:   Diabetes -  Eye Exam    NEXT STEPS:   Patient has upcoming appointment, these items will be addressed at that time.    Type of outreach:    Chart review performed, no outreach needed.      Questions for provider review:    None     Thais Cadet

## 2022-06-28 PROCEDURE — 88311 DECALCIFY TISSUE: CPT | Mod: 26 | Performed by: PATHOLOGY

## 2022-06-28 PROCEDURE — 81351 TP53 GENE FULL GENE SEQUENCE: CPT | Performed by: OBSTETRICS & GYNECOLOGY

## 2022-06-28 PROCEDURE — 81479 UNLISTED MOLECULAR PATHOLOGY: CPT | Performed by: OBSTETRICS & GYNECOLOGY

## 2022-06-28 PROCEDURE — 81403 MOPATH PROCEDURE LEVEL 4: CPT | Performed by: OBSTETRICS & GYNECOLOGY

## 2022-06-28 PROCEDURE — 88307 TISSUE EXAM BY PATHOLOGIST: CPT | Mod: 26 | Performed by: PATHOLOGY

## 2022-06-28 PROCEDURE — 88309 TISSUE EXAM BY PATHOLOGIST: CPT | Mod: 26 | Performed by: PATHOLOGY

## 2022-06-29 ENCOUNTER — INFUSION THERAPY VISIT (OUTPATIENT)
Dept: ONCOLOGY | Facility: CLINIC | Age: 72
End: 2022-06-29
Attending: NURSE PRACTITIONER
Payer: COMMERCIAL

## 2022-06-29 ENCOUNTER — TELEPHONE (OUTPATIENT)
Dept: INTERNAL MEDICINE | Facility: CLINIC | Age: 72
End: 2022-06-29

## 2022-06-29 VITALS
OXYGEN SATURATION: 94 % | TEMPERATURE: 97.8 F | SYSTOLIC BLOOD PRESSURE: 117 MMHG | HEART RATE: 84 BPM | RESPIRATION RATE: 16 BRPM | DIASTOLIC BLOOD PRESSURE: 68 MMHG

## 2022-06-29 DIAGNOSIS — C54.1 ENDOMETRIAL CANCER (H): ICD-10-CM

## 2022-06-29 DIAGNOSIS — I95.1 ORTHOSTATIC HYPOTENSION: Primary | ICD-10-CM

## 2022-06-29 DIAGNOSIS — R30.0 DYSURIA: ICD-10-CM

## 2022-06-29 DIAGNOSIS — E11.9 TYPE 2 DIABETES MELLITUS WITHOUT COMPLICATION, WITHOUT LONG-TERM CURRENT USE OF INSULIN (H): ICD-10-CM

## 2022-06-29 PROBLEM — E86.0 DEHYDRATION: Status: ACTIVE | Noted: 2022-06-29

## 2022-06-29 LAB
ALBUMIN SERPL-MCNC: 2.7 G/DL (ref 3.4–5)
ALBUMIN UR-MCNC: NEGATIVE MG/DL
ALP SERPL-CCNC: 118 U/L (ref 40–150)
ALT SERPL W P-5'-P-CCNC: 20 U/L (ref 0–50)
ANION GAP SERPL CALCULATED.3IONS-SCNC: 10 MMOL/L (ref 3–14)
APPEARANCE UR: ABNORMAL
AST SERPL W P-5'-P-CCNC: 14 U/L (ref 0–45)
BACTERIA #/AREA URNS HPF: ABNORMAL /HPF
BASOPHILS # BLD AUTO: 0.1 10E3/UL (ref 0–0.2)
BASOPHILS NFR BLD AUTO: 1 %
BILIRUB SERPL-MCNC: 0.3 MG/DL (ref 0.2–1.3)
BILIRUB UR QL STRIP: NEGATIVE
BUN SERPL-MCNC: 31 MG/DL (ref 7–30)
CALCIUM SERPL-MCNC: 10.2 MG/DL (ref 8.5–10.1)
CHLORIDE BLD-SCNC: 99 MMOL/L (ref 94–109)
CO2 SERPL-SCNC: 27 MMOL/L (ref 20–32)
COLOR UR AUTO: ABNORMAL
CREAT SERPL-MCNC: 0.81 MG/DL (ref 0.52–1.04)
EOSINOPHIL # BLD AUTO: 0.2 10E3/UL (ref 0–0.7)
EOSINOPHIL NFR BLD AUTO: 2 %
ERYTHROCYTE [DISTWIDTH] IN BLOOD BY AUTOMATED COUNT: 13.3 % (ref 10–15)
GFR SERPL CREATININE-BSD FRML MDRD: 76 ML/MIN/1.73M2
GLUCOSE BLD-MCNC: 253 MG/DL (ref 70–99)
GLUCOSE UR STRIP-MCNC: >499 MG/DL
HCT VFR BLD AUTO: 38.9 % (ref 35–47)
HGB BLD-MCNC: 12.1 G/DL (ref 11.7–15.7)
HGB UR QL STRIP: ABNORMAL
IMM GRANULOCYTES # BLD: 0.1 10E3/UL
IMM GRANULOCYTES NFR BLD: 1 %
KETONES UR STRIP-MCNC: NEGATIVE MG/DL
LEUKOCYTE ESTERASE UR QL STRIP: ABNORMAL
LYMPHOCYTES # BLD AUTO: 1.4 10E3/UL (ref 0.8–5.3)
LYMPHOCYTES NFR BLD AUTO: 12 %
MCH RBC QN AUTO: 26.9 PG (ref 26.5–33)
MCHC RBC AUTO-ENTMCNC: 31.1 G/DL (ref 31.5–36.5)
MCV RBC AUTO: 86 FL (ref 78–100)
MONOCYTES # BLD AUTO: 0.8 10E3/UL (ref 0–1.3)
MONOCYTES NFR BLD AUTO: 7 %
NEUTROPHILS # BLD AUTO: 8.9 10E3/UL (ref 1.6–8.3)
NEUTROPHILS NFR BLD AUTO: 77 %
NITRATE UR QL: NEGATIVE
NRBC # BLD AUTO: 0 10E3/UL
NRBC BLD AUTO-RTO: 0 /100
PH UR STRIP: 5 [PH] (ref 5–7)
PLATELET # BLD AUTO: 444 10E3/UL (ref 150–450)
POTASSIUM BLD-SCNC: 4 MMOL/L (ref 3.4–5.3)
PROT SERPL-MCNC: 7.4 G/DL (ref 6.8–8.8)
RBC # BLD AUTO: 4.5 10E6/UL (ref 3.8–5.2)
RBC URINE: 9 /HPF
SODIUM SERPL-SCNC: 136 MMOL/L (ref 133–144)
SP GR UR STRIP: 1.02 (ref 1–1.03)
SQUAMOUS EPITHELIAL: 2 /HPF
UROBILINOGEN UR STRIP-MCNC: NORMAL MG/DL
WBC # BLD AUTO: 11.4 10E3/UL (ref 4–11)
WBC URINE: 16 /HPF
YEAST #/AREA URNS HPF: ABNORMAL /HPF

## 2022-06-29 PROCEDURE — 82040 ASSAY OF SERUM ALBUMIN: CPT | Performed by: NURSE PRACTITIONER

## 2022-06-29 PROCEDURE — 99214 OFFICE O/P EST MOD 30 MIN: CPT | Performed by: NURSE PRACTITIONER

## 2022-06-29 PROCEDURE — 258N000003 HC RX IP 258 OP 636: Performed by: NURSE PRACTITIONER

## 2022-06-29 PROCEDURE — 96360 HYDRATION IV INFUSION INIT: CPT

## 2022-06-29 PROCEDURE — 250N000011 HC RX IP 250 OP 636: Performed by: NURSE PRACTITIONER

## 2022-06-29 PROCEDURE — 80053 COMPREHEN METABOLIC PANEL: CPT | Performed by: NURSE PRACTITIONER

## 2022-06-29 PROCEDURE — 85025 COMPLETE CBC W/AUTO DIFF WBC: CPT

## 2022-06-29 PROCEDURE — 36591 DRAW BLOOD OFF VENOUS DEVICE: CPT

## 2022-06-29 PROCEDURE — 87088 URINE BACTERIA CULTURE: CPT

## 2022-06-29 PROCEDURE — 81001 URINALYSIS AUTO W/SCOPE: CPT

## 2022-06-29 RX ORDER — HEPARIN SODIUM,PORCINE 10 UNIT/ML
5 VIAL (ML) INTRAVENOUS
Status: CANCELLED | OUTPATIENT
Start: 2022-06-29

## 2022-06-29 RX ORDER — EPINEPHRINE 1 MG/ML
0.3 INJECTION, SOLUTION INTRAMUSCULAR; SUBCUTANEOUS EVERY 5 MIN PRN
Status: CANCELLED | OUTPATIENT
Start: 2022-06-29

## 2022-06-29 RX ORDER — MEPERIDINE HYDROCHLORIDE 25 MG/ML
25 INJECTION INTRAMUSCULAR; INTRAVENOUS; SUBCUTANEOUS EVERY 30 MIN PRN
Status: CANCELLED | OUTPATIENT
Start: 2022-06-29

## 2022-06-29 RX ORDER — METHYLPREDNISOLONE SODIUM SUCCINATE 125 MG/2ML
125 INJECTION, POWDER, LYOPHILIZED, FOR SOLUTION INTRAMUSCULAR; INTRAVENOUS
Status: CANCELLED
Start: 2022-06-29

## 2022-06-29 RX ORDER — ALBUTEROL SULFATE 90 UG/1
1-2 AEROSOL, METERED RESPIRATORY (INHALATION)
Status: CANCELLED
Start: 2022-06-29

## 2022-06-29 RX ORDER — DIPHENHYDRAMINE HYDROCHLORIDE 50 MG/ML
50 INJECTION INTRAMUSCULAR; INTRAVENOUS
Status: CANCELLED
Start: 2022-06-29

## 2022-06-29 RX ORDER — HEPARIN SODIUM (PORCINE) LOCK FLUSH IV SOLN 100 UNIT/ML 100 UNIT/ML
5 SOLUTION INTRAVENOUS
Status: CANCELLED | OUTPATIENT
Start: 2022-06-29

## 2022-06-29 RX ORDER — NALOXONE HYDROCHLORIDE 0.4 MG/ML
0.2 INJECTION, SOLUTION INTRAMUSCULAR; INTRAVENOUS; SUBCUTANEOUS
Status: CANCELLED | OUTPATIENT
Start: 2022-06-29

## 2022-06-29 RX ORDER — ALBUTEROL SULFATE 0.83 MG/ML
2.5 SOLUTION RESPIRATORY (INHALATION)
Status: CANCELLED | OUTPATIENT
Start: 2022-06-29

## 2022-06-29 RX ORDER — HEPARIN SODIUM (PORCINE) LOCK FLUSH IV SOLN 100 UNIT/ML 100 UNIT/ML
5 SOLUTION INTRAVENOUS
Status: DISCONTINUED | OUTPATIENT
Start: 2022-06-29 | End: 2022-06-29 | Stop reason: HOSPADM

## 2022-06-29 RX ADMIN — Medication 5 ML: at 16:12

## 2022-06-29 RX ADMIN — SODIUM CHLORIDE 1000 ML: 9 INJECTION, SOLUTION INTRAVENOUS at 15:02

## 2022-06-29 NOTE — TELEPHONE ENCOUNTER
Patient had recent surgery. Not on any pain pills. Patient complains of feeling light headed. Recent blood pressure readings  88/62  116/60  98/50  Patient has not taking her blood pressure medications yesterday or today.  Please advise.   Ok to call and lm [phone] or [phone]

## 2022-06-29 NOTE — TELEPHONE ENCOUNTER
Patient had a hysterectomy.   Pain is managed.     Does not feel lightheaded when sitting.  Didn't take hydrochlorothiazide and lisinopril today or lisinopril yesterday evening.     Patient to see NP with surgical team today.    Patient blood pressure was 117/76 when sitting and 83/62 when standing. Patient did feel lightheaded when standing. Patient stated that they have had a bowel movement since surgery. Patient stated they are not taking pain medications. Patient stated they are not eating or drinking enough.    Patient to call back if unable to get appointment with NP.     Please advise if further action is needed.

## 2022-06-29 NOTE — LETTER
2022         RE: Gricelda Sabillon  [address]  [facility] [address]        Dear Colleague,    Thank you for referring your patient, Gricelda Sabillon, to the [facility]. Please see a copy of my visit note below.                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  She is here today for an acute visit.     Treatment history:  Patient was undergoing work up for melanoma without a cutaneous primary identified which showed thickened endometrium.  She is undergoing treatment with Keytruda every 3 weeks for a year.  She is tolerating this very well without major side effects. She reports for about 6 months she has had a mucous discharge with very light spotting after.     5/10/22:  US pelvis  Uterus measures 8 x 3.7 x 4.2 cm with EMS of 11.2 mm.  Normal appearing adnexa     22:  EMB:  Grade 1 endometrial adenocarcinoma, MMR intact    22: Total Laparoscopic Hyserectomy, Bilateral Salpibgo-oophorectomy, Bilateral [place] Lymph Node Dissection. Cystoscopy.          Today she reports she has not been eating or drinking very much since surgery; not because she is nauseated but because she has not been hungry or food does not seem appealing. She ate oatmeal for breakfast this morning and had an apple and some left over hotdish for lunch; she has drank some water and also consumed a low sugar nutritional supplement. (she also had one on ). She checked her BG this morning and reports it was 127. She does have HTN and has not taken her medications today or last night; she did check her BP this morning and it was 117/76 (this is a normal reading for her); and after standing it did decrease to 83/62; she was not lightheaded until after standing. She reports she has had  lightheadedness in the past and would drink 16 oz water and put her feet up and it resolved. She is passing gas, denies any bleeding, but does feel as if she is not urinating as much as she typically does (she does feel as though she is going as often as before). She is not taking any pain medication. She took a stool softener on Saturday and then had soft stools on Sunday into Monday so she did not take any additional stool softernes; she did take one last night. She reports her last BM was on Monday. She has been getting treatment for her melanoma; getting Pembrolizumab and last got this 6/14 and is due on 7/5 for her next cycle and will be having labs done.          Review of Systems:    Systemic           no weight changes; no fever; no chills; no night sweats; no appetite changes  Skin           no rashes, or lesions  Eye           no irritation; no changes in vision  Edll-Laryngeal           no dysphagia; no hoarseness   Pulmonary    no cough; no shortness of breath  Cardiovascular    no chest pain; no palpitations; + HTN  Gastrointestinal    no diarrhea; no constipation; no abdominal pain; no changes in bowel habits; no blood in stool  Genitourinary   no urinary frequency; no urinary urgency; no dysuria; no pain; no abnormal vaginal discharge; no abnormal vaginal bleeding  Breast    no breast discharge; no breast changes; no breast pain  Musculoskeletal    no myalgias; no arthralgias; no back pain  Psychiatric           no depressed mood; no anxiety    Hematologic              no tender lymph nodes; no noticeable swellings or lumps   Endocrine    no hot flashes; no heat/cold intolerance; + DM        Neurological   no tremor; no numbness and tingling; no headaches; no difficulty sleeping      Past Medical History:    Past Medical History:   Diagnosis Date     Asthma, mild intermittent      Cataract      Endometrial cancer (H)      HTN, goal below 140/90      Hyperlipidemia LDL goal < 100      Macular hole of  left eye      Melanoma (H)     RIGHT KNEE     Sleep apnea     uses c pap     Type 2 diabetes, HbA1C goal < 8% (H)          Past Surgical History:    Past Surgical History:   Procedure Laterality Date     ARTHROPLASTY HIP Right 9/25/2017    Procedure: ARTHROPLASTY HIP;  Right total hip arthroplasty  ;  Surgeon: Ayaan Pena MD;  Location: [facility]     ARTHROPLASTY KNEE  7/12/2013    Procedure: ARTHROPLASTY KNEE;  right total knee arthroplasty ;  Surgeon: Chaparro Wesley MD;  Location: [facility]     ARTHROSCOPY KNEE Right 1/21/2015    Procedure: ARTHROSCOPY KNEE;  Surgeon: Ayaan Pena MD;  Location: [facility]     C INCISION OF HYMEN       CATARACT IOL, RT/LT       COLONOSCOPY  2008     COLONOSCOPY N/A 4/18/2019    Procedure: Colonoscopy with polypectomy;  Surgeon: Shaun Guerrero MD;  [facility]     CYSTOSCOPY N/A 6/23/2022    Procedure: Cystoscopy;  Surgeon: Eli Guidry MD;  [facility]     ENDOSCOPIC RETROGRADE CHOLANGIOPANCREATOGRAM N/A 2/6/2019    Procedure: COMBINED ENDOSCOPIC RETROGRADE CHOLANGIOPANCREATOGRAPHY, BILIARY SPINCTEROTOMY AND DILATION, PLACE BILE DUCT STENT;  Surgeon: Guru Andie Gonzalez MD;  [facility]     ENDOSCOPIC RETROGRADE CHOLANGIOPANCREATOGRAM N/A 2/28/2019    Procedure: Endoscopic Retrograde Cholangiopancreatogram, Bile duct stent removal and placement;  Surgeon: Shaun Guerrero MD;  [facility]     ENDOSCOPIC RETROGRADE CHOLANGIOPANCREATOGRAM N/A 4/18/2019    Procedure: COMBINED ENDOSCOPIC RETROGRADE CHOLANGIOPANCREATOGRAPHY, Bile duct stent exchange and Polypectomy;  Surgeon: Shaun Guerrero MD;  [facility]     ENDOSCOPIC RETROGRADE CHOLANGIOPANCREATOGRAM N/A 10/18/2019    Procedure: Endoscopic Retrograde Cholangiopancreatogram;  Surgeon: Shaun Guerrero MD;  [facility]     ENDOSCOPIC RETROGRADE CHOLANGIOPANCREATOGRAM N/A 3/24/2022    Procedure: ENDOSCOPIC RETROGRADE CHOLANGIOPANCREATOGRAPHY;  Surgeon: Shaun Guerrero,  MD;  Location: [facility]     ENDOSCOPIC RETROGRADE CHOLANGIOPANCREATOGRAM WITH SPYGLASS N/A 7/26/2019    Procedure: Endoscopic Retrograde Cholangiopancreatogram With Spyglas,l Radiofrequency Ablation, Stent Exchangeand Duodenal Biopsy x2;  Surgeon: Shaun Guerrero MD;  [facility]     ENDOSCOPIC RETROGRADE CHOLANGIOPANCREATOGRAM WITH SPYGLASS N/A 9/10/2020    Procedure: ENDOSCOPIC RETROGRADE CHOLANGIOPANCREATOGRAPHY, WITH DIRECT DUCT VISUALIZATION, USING PANCREATICOBILIARY FIBEROPTIC PROBE;  Surgeon: Shaun Guerrero MD;  [facility]     ENDOSCOPIC RETROGRADE CHOLANGIOPANCREATOGRAM WITH SPYGLASS N/A 3/22/2021    Procedure: ENDOSCOPIC RETROGRADE CHOLANGIOPANCREATOGRAPHY, WITH DIRECT DUCT VISUALIZATION, USING PANCREATICOBILIARY FIBEROPTIC PROBE;  Surgeon: Shaun Guerrero MD;  [facility]     ESOPHAGOSCOPY, GASTROSCOPY, DUODENOSCOPY (EGD) WITH RADIO FREQUENCY ABLATION, COMBINED N/A 9/10/2020    Procedure: possible repeat ESOPHAGOGASTRODUODENOSCOPY, WITH RADIOFREQUENCY ABLATION and endoscopic mucosal resection;  Surgeon: Shaun Guerrero MD;  [facility]     ESOPHAGOSCOPY, GASTROSCOPY, DUODENOSCOPY (EGD), COMBINED N/A 4/18/2019    Procedure: upper endoscopy with polypectomy;  Surgeon: Shaun Guerrero MD;  [facility]     ESOPHAGOSCOPY, GASTROSCOPY, DUODENOSCOPY (EGD), COMBINED N/A 10/18/2019    Procedure: Upper Endoscopy and ERCP with stent removal, stone removal and biopsy;  Surgeon: Shaun Guerrero MD;  [facility]     ESOPHAGOSCOPY, GASTROSCOPY, DUODENOSCOPY (EGD), COMBINED N/A 3/24/2022    Procedure: ESOPHAGOGASTRODUODENOSCOPY (EGD), Duodenal  polyp removal;  Surgeon: Shaun Guerrero MD;  [facility]     ESOPHAGOSCOPY, GASTROSCOPY, DUODENOSCOPY (EGD), RESECT MUCOSA, COMBINED N/A 2/28/2019    Procedure: Upper Endoscopy, Endoscopic Ultrasound, Endoscopic Mucosal Resection,  Ampullectomy, polypectomy.;  Surgeon: Shaun Guerrero MD;  [facility]     ESOPHAGOSCOPY, GASTROSCOPY, DUODENOSCOPY (EGD),  RESECT MUCOSA, COMBINED N/A 3/22/2021    Procedure: ESOPHAGOGASTRODUODENOSCOPY (EGD) with  endoscopic mucosal resection/ polypectomy;  Surgeon: Shaun Guerrero MD;  [facility]     EXCISE LESION LOWER EXTREMITY Right 3/28/2022    Procedure: Wide local excision of right knee melanoma;  Surgeon: Chevy Allison MD;  Location: [facility] OR     EXCISE MASS LOWER EXTREMITY Right 1/13/2022    Procedure: excision of right thigh mass;  Surgeon: Salvador Kelly MD;  Location: [facility]     EYE SURGERY      macular hole repaired left eye     HC KNEE SCOPE, DIAGNOSTIC      - both knees     HEAD & NECK SURGERY      wisdom teeth     IR CHEST PORT PLACEMENT > 5 YRS OF AGE  5/2/2022     LAPAROSCOPIC HYSTERECTOMY TOTAL, BILATERAL SALPINGO-OOPHORECTOMY, NODE DISSECTION, COMBINED Bilateral 6/23/2022    Procedure: Total Laparoscopic Hyserectomy, Bilateral Salpibgo-oophorectomy, Bilateral [place] Lymph Node Dissection;  Surgeon: Eli Guidry MD;  [facility]         Health Maintenance Due   Topic Date Due     URINE DRUG SCREEN  Never done     Pneumococcal Vaccine: 65+ Years (3 - PCV) 09/28/2018     ASTHMA ACTION PLAN  08/27/2019     DIABETIC FOOT EXAM  03/25/2020     EYE EXAM  11/21/2020     LIPID  03/03/2022       Current Medications:     Current Outpatient Medications   Medication Sig Dispense Refill     acetaminophen (TYLENOL) 325 MG tablet Take 3 tablets (975 mg) by mouth every 6 hours as needed for mild pain 50 tablet 0     albuterol (PROAIR HFA/PROVENTIL HFA/VENTOLIN HFA) 108 (90 Base) MCG/ACT inhaler Inhale 2 puffs into the lungs every 4 hours as needed for shortness of breath / dyspnea 1 Inhaler 3     aspirin 81 MG EC tablet Take 1 tablet (81 mg) by mouth daily 90 tablet 3     atenolol (TENORMIN) 50 MG tablet Take 1 tablet by mouth once daily 90 tablet 0     atorvastatin (LIPITOR) 20 MG tablet Take 1 tablet by mouth once daily 90 tablet 0     azelastine (ASTELIN) 0.1 % nasal spray USE 1 SPRAY(S) IN EACH NOSTRIL  TWICE DAILY AS NEEDED 30 mL 0     azelastine (OPTIVAR) 0.05 % SOLN Place 1 drop into both eyes 2 times daily as needed Reported on 3/22/2017       cetirizine (ZYRTEC) 10 MG tablet Take 10 mg by mouth every morning Takes only PRN       CONTOUR NEXT TEST test strip USE 1 STRIP TO CHECK GLUCOSE ONCE DAILY 100 strip 4     diclofenac (VOLTAREN) 1 % topical gel Apply topically 4 times daily as needed        fish oil-omega-3 fatty acids 1000 MG capsule Take 1 g by mouth daily Reported on 3/22/2017       glipiZIDE (GLUCOTROL) 5 MG tablet TAKE 2 TABLETS BY MOUTH TWICE DAILY BEFORE MEAL(S) 360 tablet 0     hydrochlorothiazide (HYDRODIURIL) 25 MG tablet TAKE 1 TABLET BY MOUTH ONCE DAILY IN THE MORNING 90 tablet 3     ibuprofen (ADVIL/MOTRIN) 200 MG tablet take 4 tablet (200 mg) by oral route every 8 hours as needed with food       INVOKANA 300 MG tablet TAKE 1 TABLET BY MOUTH IN THE MORNING BEFORE BREAKFAST 90 tablet 0     latanoprost (XALATAN) 0.005 % ophthalmic solution Place 1 drop into both eyes At Bedtime  2.5 mL 1     lidocaine-prilocaine (EMLA) 2.5-2.5 % external cream APPLY CREAM TOPICALLY ONCE DAILY AS NEEDED FOR PAIN APPLY A PEA SIZED AMOUNT OVER PORT SITE 60 MINUTES PRIOR TO USE COVER WITH PLASTIC WRAP       lisinopril (ZESTRIL) 20 MG tablet Take 1 tablet (20 mg) by mouth 2 times daily 180 tablet 1     metFORMIN (GLUCOPHAGE-XR) 500 MG 24 hr tablet TAKE 2 TABLETS BY MOUTH TWICE DAILY WITH MEALS 360 tablet 0     ondansetron (ZOFRAN ODT) 4 MG ODT tab Take 1 tablet (4 mg) by mouth every 8 hours as needed for nausea 4 tablet 0     ondansetron (ZOFRAN-ODT) 4 MG ODT tab Take 1-2 tablets (4-8 mg) by mouth every 8 hours as needed for nausea 8 tablet 0     order for DME All diabetic testing supplies including test strips, lancets and solution for testing 2 times per day. Patient is using   no Insulin. Last A1c Lab Results       Component                Value               Date                       A1C                       "7.9                 2018 100 each 11     pembrolizumab Inject into the vein once Every 3 weeks at [facility] office       Semaglutide 7 MG TABS Take 7 mg by mouth daily 90 tablet 1     senna-docusate (SENOKOT-S/PERICOLACE) 8.6-50 MG tablet Take 1-2 tablets by mouth 2 times daily 30 tablet 0     timolol maleate (TIMOPTIC) 0.5 % ophthalmic solution INSTILL 1 DROP INTO EACH EYE TWICE DAILY       triamcinolone (KENALOG) 0.1 % external cream APPLY CREAM EXTERNALLY TO AFFECTED AREA TWICE DAILY ON THE LEGS UNTIL FULLY CLEAR AND THEN FOR FUTURE FLARES           Allergies:        Allergies   Allergen Reactions     Bromfenac Visual Disturbance     Sulfites, xibrom  Causes sever eye pain     Codeine      \"NAUSE,HA AND DIZZINESS\"     Codeine Nausea     Other reaction(s): Dizziness, Headache     Sulfites Visual Disturbance     Xibrom (bromfenac opthalmic solution) 0.09%--eye pain        Social History:     Social History     Tobacco Use     Smoking status: Never Smoker     Smokeless tobacco: Never Used   Substance Use Topics     Alcohol use: No     Alcohol/week: 0.0 standard drinks       History   Drug Use No         Family History:     The patient's family history is notable for:    Family History   Problem Relation Age of Onset     Hypertension Mother         diabetes,hypoythryoidism, stroke     Diabetes Mother      Breast Cancer Mother      Heart Disease Father         , cancer lip     Uterine Cancer Sister      Connective Tissue Disorder Sister         fibromyalgia     Diabetes Sister      Hypertension Brother      Cerebrovascular Disease Maternal Grandmother         , diabetes     Cerebrovascular Disease Maternal Grandfather              Cancer Paternal Grandmother              Heart Disease Paternal Grandfather              Cancer Paternal Aunt         ovarian     Breast Cancer Niece          Physical Exam:     LMP 2002 (Exact Date)   There is no height or weight on file to " calculate BMI.    General Appearance: healthy and alert, no distress     HEENT: no thyromegaly, no palpable nodules or masses        Cardiovascular: regular rate and rhythm, no gallops, rubs or murmurs     Respiratory: lungs clear, no rales, rhonchi or wheezes, normal diaphragmatic excursion    Musculoskeletal: extremities non tender and without edema    Skin: no lesions or rashes     Neurological: normal gait, no gross defects     Psychiatric: appropriate mood and affect                               Hematological: normal cervical, supraclavicular lymph nodes     Gastrointestinal:       abdomen soft, non-tender, non-distended; lap sites with small amount of eccymosis; no drainage or erythema; no tenderness to palpation    Genitourinary:deferred      Assessment:    Gricelda Sabillon is a 72 year old woman with a diagnosis of 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  She is here today for an acute visit.     33 minutes spent on the date of the encounter doing chart review, history and exam, documentation and further activities as noted above      Plan:     1.)        Will give her IL NS today and check a CMP and CBC. Discussed importance of staying adequately hydrated and trying to eat smaller meals throughout the day (to help with her BG).. she can consume a low sugar nutritional supplement/day. Given her urinary symptom and that her WBC is very slightly elevated, will obtain a UAUC today presuming she is able to provide a sample. She is scheduled for her post op visit with Dr. Herrera 7/12. Orthostatic BP 99/62 (from 117/68). Reviewed slow position changes, keeping her eyes open and exhaling when standing.Reviewed signs and symptoms for when she should contact the clinic or seek additional care. Patient to contact the clinic with any questions or concerns in the interim.  Answered all of her questions to the best of my ability.    2.) Genetic risk factors were assessed and her MMR proteins are  intact.    3.) Labs and/or tests ordered include:  CMP. CBC. Generic infusion plan. UAUC.     4.) Health maintenance issues addressed today include annual health maintenance and non-gynecologic issues with PCP.    DENYS Fernandez, WHNP-BC, ANP-BC  Women's Health Nurse Practitioner  Adult Nurse Practitioner  Division of Gynecologic Oncology    CC  Patient Care Team:  Raisa Davidson MD as PCP - General (Internal Medicine)  Tanya Sheehan RD as Diabetes Educator (Dietitian, Registered)  Chevy Allison MD as MD (Surgery)  Patricio Noriega MD as Assigned OBGYN Provider  Yarelis Mitchell, RN as Specialty Care Coordinator (Gynecologic Oncology)  Giovanna Heller [facility] as Assigned MTM Pharmacist  Eli Guidry MD as Assigned Cancer Care Provider

## 2022-06-29 NOTE — PROGRESS NOTES
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  She is here today for an acute visit.     Treatment history:  Patient was undergoing work up for melanoma without a cutaneous primary identified which showed thickened endometrium.  She is undergoing treatment with Keytruda every 3 weeks for a year.  She is tolerating this very well without major side effects. She reports for about 6 months she has had a mucous discharge with very light spotting after.     5/10/22:  US pelvis  Uterus measures 8 x 3.7 x 4.2 cm with EMS of 11.2 mm.  Normal appearing adnexa     22:  EMB:  Grade 1 endometrial adenocarcinoma, MMR intact    22: Total Laparoscopic Hyserectomy, Bilateral Salpibgo-oophorectomy, Bilateral [place] Lymph Node Dissection. Cystoscopy.          Today she reports she has not been eating or drinking very much since surgery; not because she is nauseated but because she has not been hungry or food does not seem appealing. She ate oatmeal for breakfast this morning and had an apple and some left over hotdish for lunch; she has drank some water and also consumed a low sugar nutritional supplement. (she also had one on ). She checked her BG this morning and reports it was 127. She does have HTN and has not taken her medications today or last night; she did check her BP this morning and it was 117/76 (this is a normal reading for her); and after standing it did decrease to 83/62; she was not lightheaded until after standing. She reports she has had lightheadedness in the past and would drink 16 oz water and put her feet up and it resolved. She is passing gas, denies any bleeding, but does feel as if she is not urinating as much as she typically does (she does feel as though she is going as often as before). She  is not taking any pain medication. She took a stool softener on Saturday and then had soft stools on Sunday into Monday so she did not take any additional stool softernes; she did take one last night. She reports her last BM was on Monday. She has been getting treatment for her melanoma; getting Pembrolizumab and last got this 6/14 and is due on 7/5 for her next cycle and will be having labs done.          Review of Systems:    Systemic           no weight changes; no fever; no chills; no night sweats; no appetite changes  Skin           no rashes, or lesions  Eye           no irritation; no changes in vision  Dell-Laryngeal           no dysphagia; no hoarseness   Pulmonary    no cough; no shortness of breath  Cardiovascular    no chest pain; no palpitations; + HTN  Gastrointestinal    no diarrhea; no constipation; no abdominal pain; no changes in bowel habits; no blood in stool  Genitourinary   no urinary frequency; no urinary urgency; no dysuria; no pain; no abnormal vaginal discharge; no abnormal vaginal bleeding  Breast    no breast discharge; no breast changes; no breast pain  Musculoskeletal    no myalgias; no arthralgias; no back pain  Psychiatric           no depressed mood; no anxiety    Hematologic              no tender lymph nodes; no noticeable swellings or lumps   Endocrine    no hot flashes; no heat/cold intolerance; + DM        Neurological   no tremor; no numbness and tingling; no headaches; no difficulty sleeping      Past Medical History:    Past Medical History:   Diagnosis Date     Asthma, mild intermittent      Cataract      Endometrial cancer (H)      HTN, goal below 140/90      Hyperlipidemia LDL goal < 100      Macular hole of left eye      Melanoma (H)     RIGHT KNEE     Sleep apnea     uses c pap     Type 2 diabetes, HbA1C goal < 8% (H)          Past Surgical History:    Past Surgical History:   Procedure Laterality Date     ARTHROPLASTY HIP Right 9/25/2017    Procedure: ARTHROPLASTY HIP;   Right total hip arthroplasty  ;  Surgeon: Ayaan Pena MD;  [facility]     ARTHROPLASTY KNEE  7/12/2013    Procedure: ARTHROPLASTY KNEE;  right total knee arthroplasty ;  Surgeon: Chaparro Wesley MD;  Location: [facility]     ARTHROSCOPY KNEE Right 1/21/2015    Procedure: ARTHROSCOPY KNEE;  Surgeon: Ayaan Pena MD;  Location: [facility]     C INCISION OF HYMEN       CATARACT IOL, RT/LT       COLONOSCOPY  2008     COLONOSCOPY N/A 4/18/2019    Procedure: Colonoscopy with polypectomy;  Surgeon: Shaun Guerrero MD;  [facility]     CYSTOSCOPY N/A 6/23/2022    Procedure: Cystoscopy;  Surgeon: Eli Guidry MD;  [facility]     ENDOSCOPIC RETROGRADE CHOLANGIOPANCREATOGRAM N/A 2/6/2019    Procedure: COMBINED ENDOSCOPIC RETROGRADE CHOLANGIOPANCREATOGRAPHY, BILIARY SPINCTEROTOMY AND DILATION, PLACE BILE DUCT STENT;  Surgeon: Guru Andie Gonzalez MD;  [facility]     ENDOSCOPIC RETROGRADE CHOLANGIOPANCREATOGRAM N/A 2/28/2019    Procedure: Endoscopic Retrograde Cholangiopancreatogram, Bile duct stent removal and placement;  Surgeon: Shaun Guerrero MD;  [facility]     ENDOSCOPIC RETROGRADE CHOLANGIOPANCREATOGRAM N/A 4/18/2019    Procedure: COMBINED ENDOSCOPIC RETROGRADE CHOLANGIOPANCREATOGRAPHY, Bile duct stent exchange and Polypectomy;  Surgeon: Shaun Guerrero MD;  [facility]     ENDOSCOPIC RETROGRADE CHOLANGIOPANCREATOGRAM N/A 10/18/2019    Procedure: Endoscopic Retrograde Cholangiopancreatogram;  Surgeon: Shaun Guerrero MD;  [facility]     ENDOSCOPIC RETROGRADE CHOLANGIOPANCREATOGRAM N/A 3/24/2022    Procedure: ENDOSCOPIC RETROGRADE CHOLANGIOPANCREATOGRAPHY;  Surgeon: Shaun Guerrero MD;  Location:  OR     ENDOSCOPIC RETROGRADE CHOLANGIOPANCREATOGRAM WITH SPYGLASS N/A 7/26/2019    Procedure: Endoscopic Retrograde Cholangiopancreatogram With Spyglas,l Radiofrequency Ablation, Stent Exchangeand Duodenal Biopsy x2;  Surgeon: Shaun Guerrero MD;   [facility]     ENDOSCOPIC RETROGRADE CHOLANGIOPANCREATOGRAM WITH SPYGLASS N/A 9/10/2020    Procedure: ENDOSCOPIC RETROGRADE CHOLANGIOPANCREATOGRAPHY, WITH DIRECT DUCT VISUALIZATION, USING PANCREATICOBILIARY FIBEROPTIC PROBE;  Surgeon: Shaun Guerrero MD;  [facility]     ENDOSCOPIC RETROGRADE CHOLANGIOPANCREATOGRAM WITH SPYGLASS N/A 3/22/2021    Procedure: ENDOSCOPIC RETROGRADE CHOLANGIOPANCREATOGRAPHY, WITH DIRECT DUCT VISUALIZATION, USING PANCREATICOBILIARY FIBEROPTIC PROBE;  Surgeon: Shaun Guerrero MD;  [facility]     ESOPHAGOSCOPY, GASTROSCOPY, DUODENOSCOPY (EGD) WITH RADIO FREQUENCY ABLATION, COMBINED N/A 9/10/2020    Procedure: possible repeat ESOPHAGOGASTRODUODENOSCOPY, WITH RADIOFREQUENCY ABLATION and endoscopic mucosal resection;  Surgeon: Shaun Guerrero MD;  [facility]     ESOPHAGOSCOPY, GASTROSCOPY, DUODENOSCOPY (EGD), COMBINED N/A 4/18/2019    Procedure: upper endoscopy with polypectomy;  Surgeon: Shaun Guerrero MD;  [facility]     ESOPHAGOSCOPY, GASTROSCOPY, DUODENOSCOPY (EGD), COMBINED N/A 10/18/2019    Procedure: Upper Endoscopy and ERCP with stent removal, stone removal and biopsy;  Surgeon: Shaun Guerrero MD;  [facility]     ESOPHAGOSCOPY, GASTROSCOPY, DUODENOSCOPY (EGD), COMBINED N/A 3/24/2022    Procedure: ESOPHAGOGASTRODUODENOSCOPY (EGD), Duodenal  polyp removal;  Surgeon: Shaun Guerrero MD;  [facility]     ESOPHAGOSCOPY, GASTROSCOPY, DUODENOSCOPY (EGD), RESECT MUCOSA, COMBINED N/A 2/28/2019    Procedure: Upper Endoscopy, Endoscopic Ultrasound, Endoscopic Mucosal Resection,  Ampullectomy, polypectomy.;  Surgeon: Shaun Guerrero MD;  [facility]     ESOPHAGOSCOPY, GASTROSCOPY, DUODENOSCOPY (EGD), RESECT MUCOSA, COMBINED N/A 3/22/2021    Procedure: ESOPHAGOGASTRODUODENOSCOPY (EGD) with  endoscopic mucosal resection/ polypectomy;  Surgeon: Shaun Guerrero MD;  [facility]     EXCISE LESION LOWER EXTREMITY Right 3/28/2022    Procedure: Wide local excision of  right knee melanoma;  Surgeon: Chevy Allison MD;  Location: [facility] OR     EXCISE MASS LOWER EXTREMITY Right 1/13/2022    Procedure: excision of right thigh mass;  Surgeon: Salvador Kelly MD;  [facility]: [facility]     EYE SURGERY      macular hole repaired left eye     HC KNEE SCOPE, DIAGNOSTIC      - both knees     HEAD & NECK SURGERY      wisdom teeth     IR CHEST PORT PLACEMENT > 5 YRS OF AGE  5/2/2022     LAPAROSCOPIC HYSTERECTOMY TOTAL, BILATERAL SALPINGO-OOPHORECTOMY, NODE DISSECTION, COMBINED Bilateral 6/23/2022    Procedure: Total Laparoscopic Hyserectomy, Bilateral Salpibgo-oophorectomy, Bilateral [place] Lymph Node Dissection;  Surgeon: Eli Guidry MD;  [facility]         Health Maintenance Due   Topic Date Due     URINE DRUG SCREEN  Never done     Pneumococcal Vaccine: 65+ Years (3 - PCV) 09/28/2018     ASTHMA ACTION PLAN  08/27/2019     DIABETIC FOOT EXAM  03/25/2020     EYE EXAM  11/21/2020     LIPID  03/03/2022       Current Medications:     Current Outpatient Medications   Medication Sig Dispense Refill     acetaminophen (TYLENOL) 325 MG tablet Take 3 tablets (975 mg) by mouth every 6 hours as needed for mild pain 50 tablet 0     albuterol (PROAIR HFA/PROVENTIL HFA/VENTOLIN HFA) 108 (90 Base) MCG/ACT inhaler Inhale 2 puffs into the lungs every 4 hours as needed for shortness of breath / dyspnea 1 Inhaler 3     aspirin 81 MG EC tablet Take 1 tablet (81 mg) by mouth daily 90 tablet 3     atenolol (TENORMIN) 50 MG tablet Take 1 tablet by mouth once daily 90 tablet 0     atorvastatin (LIPITOR) 20 MG tablet Take 1 tablet by mouth once daily 90 tablet 0     azelastine (ASTELIN) 0.1 % nasal spray USE 1 SPRAY(S) IN EACH NOSTRIL TWICE DAILY AS NEEDED 30 mL 0     azelastine (OPTIVAR) 0.05 % SOLN Place 1 drop into both eyes 2 times daily as needed Reported on 3/22/2017       cetirizine (ZYRTEC) 10 MG tablet Take 10 mg by mouth every morning Takes only PRN       CONTOUR NEXT TEST test strip USE  1 STRIP TO CHECK GLUCOSE ONCE DAILY 100 strip 4     diclofenac (VOLTAREN) 1 % topical gel Apply topically 4 times daily as needed        fish oil-omega-3 fatty acids 1000 MG capsule Take 1 g by mouth daily Reported on 3/22/2017       glipiZIDE (GLUCOTROL) 5 MG tablet TAKE 2 TABLETS BY MOUTH TWICE DAILY BEFORE MEAL(S) 360 tablet 0     hydrochlorothiazide (HYDRODIURIL) 25 MG tablet TAKE 1 TABLET BY MOUTH ONCE DAILY IN THE MORNING 90 tablet 3     ibuprofen (ADVIL/MOTRIN) 200 MG tablet take 4 tablet (200 mg) by oral route every 8 hours as needed with food       INVOKANA 300 MG tablet TAKE 1 TABLET BY MOUTH IN THE MORNING BEFORE BREAKFAST 90 tablet 0     latanoprost (XALATAN) 0.005 % ophthalmic solution Place 1 drop into both eyes At Bedtime  2.5 mL 1     lidocaine-prilocaine (EMLA) 2.5-2.5 % external cream APPLY CREAM TOPICALLY ONCE DAILY AS NEEDED FOR PAIN APPLY A PEA SIZED AMOUNT OVER PORT SITE 60 MINUTES PRIOR TO USE COVER WITH PLASTIC WRAP       lisinopril (ZESTRIL) 20 MG tablet Take 1 tablet (20 mg) by mouth 2 times daily 180 tablet 1     metFORMIN (GLUCOPHAGE-XR) 500 MG 24 hr tablet TAKE 2 TABLETS BY MOUTH TWICE DAILY WITH MEALS 360 tablet 0     ondansetron (ZOFRAN ODT) 4 MG ODT tab Take 1 tablet (4 mg) by mouth every 8 hours as needed for nausea 4 tablet 0     ondansetron (ZOFRAN-ODT) 4 MG ODT tab Take 1-2 tablets (4-8 mg) by mouth every 8 hours as needed for nausea 8 tablet 0     order for DME All diabetic testing supplies including test strips, lancets and solution for testing 2 times per day. Patient is using   no Insulin. Last A1c Lab Results       Component                Value               Date                       A1C                      7.9                 08/20/2018 100 each 11     pembrolizumab Inject into the vein once Every 3 weeks at [facility] office       Semaglutide 7 MG TABS Take 7 mg by mouth daily 90 tablet 1     senna-docusate (SENOKOT-S/PERICOLACE) 8.6-50 MG tablet Take 1-2 tablets by mouth 2  "times daily 30 tablet 0     timolol maleate (TIMOPTIC) 0.5 % ophthalmic solution INSTILL 1 DROP INTO EACH EYE TWICE DAILY       triamcinolone (KENALOG) 0.1 % external cream APPLY CREAM EXTERNALLY TO AFFECTED AREA TWICE DAILY ON THE LEGS UNTIL FULLY CLEAR AND THEN FOR FUTURE FLARES           Allergies:        Allergies   Allergen Reactions     Bromfenac Visual Disturbance     Sulfites, xibrom  Causes sever eye pain     Codeine      \"NAUSE,HA AND DIZZINESS\"     Codeine Nausea     Other reaction(s): Dizziness, Headache     Sulfites Visual Disturbance     Xibrom (bromfenac opthalmic solution) 0.09%--eye pain        Social History:     Social History     Tobacco Use     Smoking status: Never Smoker     Smokeless tobacco: Never Used   Substance Use Topics     Alcohol use: No     Alcohol/week: 0.0 standard drinks       History   Drug Use No         Family History:     The patient's family history is notable for:    Family History   Problem Relation Age of Onset     Hypertension Mother         diabetes,hypoythryoidism, stroke     Diabetes Mother      Breast Cancer Mother      Heart Disease Father         , cancer lip     Uterine Cancer Sister      Connective Tissue Disorder Sister         fibromyalgia     Diabetes Sister      Hypertension Brother      Cerebrovascular Disease Maternal Grandmother         , diabetes     Cerebrovascular Disease Maternal Grandfather              Cancer Paternal Grandmother              Heart Disease Paternal Grandfather              Cancer Paternal Aunt         ovarian     Breast Cancer Niece          Physical Exam:     LMP 2002 (Exact Date)   There is no height or weight on file to calculate BMI.    General Appearance: healthy and alert, no distress     HEENT: no thyromegaly, no palpable nodules or masses        Cardiovascular: regular rate and rhythm, no gallops, rubs or murmurs     Respiratory: lungs clear, no rales, rhonchi or wheezes, normal " diaphragmatic excursion    Musculoskeletal: extremities non tender and without edema    Skin: no lesions or rashes     Neurological: normal gait, no gross defects     Psychiatric: appropriate mood and affect                               Hematological: normal cervical, supraclavicular lymph nodes     Gastrointestinal:       abdomen soft, non-tender, non-distended; lap sites with small amount of eccymosis; no drainage or erythema; no tenderness to palpation    Genitourinary:deferred      Assessment:    Gricelda Sabillon is a 72 year old woman with a diagnosis of 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  She is here today for an acute visit.     33 minutes spent on the date of the encounter doing chart review, history and exam, documentation and further activities as noted above      Plan:     1.)        Will give her IL NS today and check a CMP and CBC. Discussed importance of staying adequately hydrated and trying to eat smaller meals throughout the day (to help with her BG).. she can consume a low sugar nutritional supplement/day. Given her urinary symptom and that her WBC is very slightly elevated, will obtain a UAUC today presuming she is able to provide a sample. She is scheduled for her post op visit with Dr. Herrera 7/12. Orthostatic BP 99/62 (from 117/68). Reviewed slow position changes, keeping her eyes open and exhaling when standing.Reviewed signs and symptoms for when she should contact the clinic or seek additional care. Patient to contact the clinic with any questions or concerns in the interim.  Answered all of her questions to the best of my ability.    2.) Genetic risk factors were assessed and her MMR proteins are intact.    3.) Labs and/or tests ordered include:  CMP. CBC. Generic infusion plan. UAUC.     4.) Health maintenance issues addressed today include annual health maintenance and non-gynecologic issues with PCP.    DENYS Fernandez, WHNP-BC, ANP-BC  Women's Health Nurse  Practitioner  Adult Nurse Practitioner  Division of Gynecologic Oncology          CC  Patient Care Team:  Raisa Davidson MD as PCP - General (Internal Medicine)  Raisa Davidson MD as Assigned PCP  Tanya Sheehan RD as Diabetes Educator (Dietitian, Registered)  Chevy Allison MD as MD (Surgery)  Chevy Allison MD as Assigned Surgical Provider  Patricio Noriega MD as Assigned OBGYN Provider  Yarelis Mitchell RN as Specialty Care Coordinator (Gynecologic Oncology)  Giovanna Heller [facility] as Assigned MTM Pharmacist  Eli Guidry MD as Assigned Cancer Care Provider

## 2022-06-29 NOTE — PROGRESS NOTES
Infusion Nursing Note:  Gricelda Sabillon presents today for IV Fluids.    Patient seen by provider today: Yes: Meme Walls NP in infusion   present during visit today: Not Applicable.    Note: Patient presents to infusion today for IV fluids after reporting dizziness over the last day. Orthostatic vital signs obtained per Meme Walls NP. Patient denies any new questions or concerns following her visit with Meme.     ADAM @ 9340 Meme Walls NP/ Deanne Andrade RN:  - Give 1L IV Fluids  - Collect UAUC    Patient denied feeling dizzy or lightheaded after finishing the IV fluids.    Intravenous Access:  Implanted Port.    Treatment Conditions:  Lab Results   Component Value Date    HGB 12.1 06/29/2022    WBC 11.4 (H) 06/29/2022    ANEU 6.2 03/01/2019    ANEUTAUTO 8.9 (H) 06/29/2022     06/29/2022      Lab Results   Component Value Date     06/29/2022    POTASSIUM 4.0 06/29/2022    CR 0.81 06/29/2022    KATHRYN 10.2 (H) 06/29/2022    BILITOTAL 0.3 06/29/2022    ALBUMIN 2.7 (L) 06/29/2022    ALT 20 06/29/2022    AST 14 06/29/2022     Results reviewed, labs MET treatment parameters, ok to proceed with treatment.    Post Infusion Assessment:  Patient tolerated infusion without incident.  Blood return noted pre and post infusion.  Site patent and intact, free from redness, edema or discomfort.  No evidence of extravasations.  Access discontinued per protocol.     Discharge Plan:   Patient declined prescription refills.  Discharge instructions reviewed with: Patient.  Patient and/or family verbalized understanding of discharge instructions and all questions answered.  Copy of AVS reviewed with patient and/or family.  Patient will return 7/12 for next appointment with Dr. Herrera.  Patient discharged in stable condition accompanied by: self.  Departure Mode: Wheelchair.      Deanne Andrade RN

## 2022-06-29 NOTE — PATIENT INSTRUCTIONS
Contact Numbers  [facility]: [phone] (for symptom and scheduling needs)    Please call the [facility] Triage line if you experience a temperature greater than or equal to 100.4, shaking chills, have uncontrolled nausea, vomiting and/or diarrhea, dizziness, shortness of breath, chest pain, bleeding, unexplained bruising, or if you have any other new/concerning symptoms, questions or concerns.     If you are having any concerning symptoms or wish to speak to a provider before your next infusion visit, please call your care coordinator or triage to notify them so we can adequately serve you.     If you need a refill on a narcotic prescription or other medication, please call triage before your infusion appointment.           June 2022 Sunday Monday Tuesday Wednesday Thursday Friday Saturday                  1     2     3     4       5     6     7     8     9     10     11       12     13     14    NEW   1:00 PM   (30 min.)   Eli Guidry MD   [facility]    LAB CENTRAL   2:00 PM   (15 min.)    LAB DRAW [address] [place] [address] 15     16    PRE-OPERATIVE  10:40 AM   (30 min.)   Raisa Davidson MD   [facility] 17     18       19     20     21     22     23    Admission   8:11 AM   Eli Guidry MD   [facility] [place]   (Discharge: 6/23/2022)    HYSTERECTOMY, TOTAL, LAPAROSCOPIC, WITH SALPINGO-OOPHORECTOMY AND LYMPHADENECTOMY  10:15 AM   Eli Guidry MD   [facility] 24     25       26     27     28     29    RETURN   2:25 PM   (40 min.)   Meme Walls APRN CNP   [facility]    ONC INFUSION 2 HR (120 MIN)   2:30 PM   (120 min.)    ONC INFUSION NURSE   [facility] 30 July 2022 Sunday Monday Tuesday Wednesday Thursday Friday Saturday                             1     2       3     4     5     6     7     8     9       10     11     12    LAB   9:30 AM   (15 min.)   [facility]   [facility]    RETURN  10:30 AM   (30 min.)   Eli Guidry MD   [facility] 13     14     15     16       17     18     19    OFFICE VISIT   1:10 PM   (30 min.)   Raisa Davidson MD   [facility] 20     21     22     23       24     25     26     27     28     29     30       31                                                     Lab Results:  Recent Results (from the past 12 hour(s))   Comprehensive metabolic panel    Collection Time: 06/29/22  2:45 PM   Result Value Ref Range    Sodium 136 133 - 144 mmol/L    Potassium 4.0 3.4 - 5.3 mmol/L    Chloride 99 94 - 109 mmol/L    Carbon Dioxide (CO2) 27 20 - 32 mmol/L    Anion Gap 10 3 - 14 mmol/L    Urea Nitrogen 31 (H) 7 - 30 mg/dL    Creatinine 0.81 0.52 - 1.04 mg/dL    Calcium 10.2 (H) 8.5 - 10.1 mg/dL    Glucose 253 (H) 70 - 99 mg/dL    Alkaline Phosphatase 118 40 - 150 U/L    AST 14 0 - 45 U/L    ALT 20 0 - 50 U/L    Protein Total 7.4 6.8 - 8.8 g/dL    Albumin 2.7 (L) 3.4 - 5.0 g/dL    Bilirubin Total 0.3 0.2 - 1.3 mg/dL    GFR Estimate 76 >60 mL/min/1.73m2   CBC with platelets and differential    Collection Time: 06/29/22  2:45 PM   Result Value Ref Range    WBC Count 11.4 (H) 4.0 - 11.0 10e3/uL    RBC Count 4.50 3.80 - 5.20 10e6/uL    Hemoglobin 12.1 11.7 - 15.7 g/dL    Hematocrit 38.9 35.0 - 47.0 %    MCV 86 78 - 100 fL    MCH 26.9 26.5 - 33.0 pg    MCHC 31.1 (L) 31.5 - 36.5 g/dL    RDW 13.3 10.0 - 15.0 %    Platelet Count 444 150 - 450 10e3/uL    % Neutrophils 77 %    % Lymphocytes 12 %    % Monocytes 7 %    % Eosinophils 2 %    % Basophils 1 %    % Immature Granulocytes 1 %    NRBCs per 100 WBC 0 <1 /100    Absolute Neutrophils 8.9 (H) 1.6 - 8.3 10e3/uL    Absolute Lymphocytes 1.4 0.8 - 5.3 10e3/uL    Absolute Monocytes 0.8  0.0 - 1.3 10e3/uL    Absolute Eosinophils 0.2 0.0 - 0.7 10e3/uL    Absolute Basophils 0.1 0.0 - 0.2 10e3/uL    Absolute Immature Granulocytes 0.1 <=0.4 10e3/uL    Absolute NRBCs 0.0 10e3/uL

## 2022-06-29 NOTE — PROGRESS NOTES
Consult Notes on Referred Patient        Dr. 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 She is accompanied today by her  via telephone.  She is doing well post-operatively.  She has had significant nausea and decreased appetite since surgery.  No bowel/bladder concerns. Minimal pain.  no vaginal bleeding.  She also reports vulvar itching and urinary frequency since surgery.    Cancer Course:  Patient was undergoing work up for melanoma without a cutaneous primary identified which showed thickened endometrium.  She is undergoing treatment with Keytruda every 3 weeks for a year.  She is tolerating this very well without major side effects. She reports for about 6 months she has had a mucous discharge with very light spotting after.    5/10/22:  US pelvis:  Uterus measures 8 x 3.7 x 4.2 cm with EMS of 11.2 mm.  Normal appearing adnexa    22:  EMB:  Grade 1 endometrial adenocarcinoma, MMR intact    22:  Total laparoscopic hysterectomy, bilateral salpingo-oophorectomy, bilateral pelvic sentinel lymph node dissection, cystoscopy   Pathology:  Grade 1 endometrial adenocarcinoma, tumor size 2.2 cm, 10/12 mm myometrial invasion, no LVSI,  0/8 sentinel LN, lymphangioleiomyomatosis      Obstetrics and Gynecology History:  ,  x 3  Menopause around 48, no HRT use      Past Medical History:  Past Medical History:   Diagnosis Date     Asthma, mild intermittent      Cataract      Endometrial cancer (H)      HTN, goal below 140/90      Hyperlipidemia LDL goal < 100      Macular hole of left eye      Melanoma (H)     RIGHT KNEE     Sleep apnea     uses c pap     Type 2 diabetes, HbA1C goal < 8% (H)        Past Surgical History:  Past Surgical History:   Procedure Laterality Date     ARTHROPLASTY HIP  Right 9/25/2017    Procedure: ARTHROPLASTY HIP;  Right total hip arthroplasty  ;  Surgeon: Ayaan Pena MD;  Location: [facility]     ARTHROPLASTY KNEE  7/12/2013    Procedure: ARTHROPLASTY KNEE;  right total knee arthroplasty ;  Surgeon: Chaparro Wesley MD;  Location: [facility]     ARTHROSCOPY KNEE Right 1/21/2015    Procedure: ARTHROSCOPY KNEE;  Surgeon: Ayaan Pena MD;  Location: [facility]     C INCISION OF HYMEN       CATARACT IOL, RT/LT       COLONOSCOPY  2008     COLONOSCOPY N/A 4/18/2019    Procedure: Colonoscopy with polypectomy;  Surgeon: Shaun Guerrero MD;  [facility]     CYSTOSCOPY N/A 6/23/2022    Procedure: Cystoscopy;  Surgeon: Eli Guidry MD;  [facility]     ENDOSCOPIC RETROGRADE CHOLANGIOPANCREATOGRAM N/A 2/6/2019    Procedure: COMBINED ENDOSCOPIC RETROGRADE CHOLANGIOPANCREATOGRAPHY, BILIARY SPINCTEROTOMY AND DILATION, PLACE BILE DUCT STENT;  Surgeon: Guru Andie Gonzalez MD;  [facility]     ENDOSCOPIC RETROGRADE CHOLANGIOPANCREATOGRAM N/A 2/28/2019    Procedure: Endoscopic Retrograde Cholangiopancreatogram, Bile duct stent removal and placement;  Surgeon: Shaun Guerrero MD;  [facility]     ENDOSCOPIC RETROGRADE CHOLANGIOPANCREATOGRAM N/A 4/18/2019    Procedure: COMBINED ENDOSCOPIC RETROGRADE CHOLANGIOPANCREATOGRAPHY, Bile duct stent exchange and Polypectomy;  Surgeon: Shaun Guerrero MD;  [facility]     ENDOSCOPIC RETROGRADE CHOLANGIOPANCREATOGRAM N/A 10/18/2019    Procedure: Endoscopic Retrograde Cholangiopancreatogram;  Surgeon: Shaun Guerrero MD;  [facility]     ENDOSCOPIC RETROGRADE CHOLANGIOPANCREATOGRAM N/A 3/24/2022    Procedure: ENDOSCOPIC RETROGRADE CHOLANGIOPANCREATOGRAPHY;  Surgeon: Shaun Guerrero MD;  Location:  OR     ENDOSCOPIC RETROGRADE CHOLANGIOPANCREATOGRAM WITH SPYGLASS N/A 7/26/2019    Procedure: Endoscopic Retrograde Cholangiopancreatogram With Spyglas,l Radiofrequency Ablation, Stent Exchangeand Duodenal  Biopsy x2;  Surgeon: Shaun Guerrero MD;  [facility]     ENDOSCOPIC RETROGRADE CHOLANGIOPANCREATOGRAM WITH SPYGLASS N/A 9/10/2020    Procedure: ENDOSCOPIC RETROGRADE CHOLANGIOPANCREATOGRAPHY, WITH DIRECT DUCT VISUALIZATION, USING PANCREATICOBILIARY FIBEROPTIC PROBE;  Surgeon: Shaun Guerrero MD;  [facility]     ENDOSCOPIC RETROGRADE CHOLANGIOPANCREATOGRAM WITH SPYGLASS N/A 3/22/2021    Procedure: ENDOSCOPIC RETROGRADE CHOLANGIOPANCREATOGRAPHY, WITH DIRECT DUCT VISUALIZATION, USING PANCREATICOBILIARY FIBEROPTIC PROBE;  Surgeon: Shaun Guerrero MD;  [facility]     ESOPHAGOSCOPY, GASTROSCOPY, DUODENOSCOPY (EGD) WITH RADIO FREQUENCY ABLATION, COMBINED N/A 9/10/2020    Procedure: possible repeat ESOPHAGOGASTRODUODENOSCOPY, WITH RADIOFREQUENCY ABLATION and endoscopic mucosal resection;  Surgeon: Shaun Guerrero MD;  [facility]     ESOPHAGOSCOPY, GASTROSCOPY, DUODENOSCOPY (EGD), COMBINED N/A 4/18/2019    Procedure: upper endoscopy with polypectomy;  Surgeon: Shaun Guerrero MD;  [facility]     ESOPHAGOSCOPY, GASTROSCOPY, DUODENOSCOPY (EGD), COMBINED N/A 10/18/2019    Procedure: Upper Endoscopy and ERCP with stent removal, stone removal and biopsy;  Surgeon: Shaun Guerrero MD;  [facility]     ESOPHAGOSCOPY, GASTROSCOPY, DUODENOSCOPY (EGD), COMBINED N/A 3/24/2022    Procedure: ESOPHAGOGASTRODUODENOSCOPY (EGD), Duodenal  polyp removal;  Surgeon: Shaun Guerrero MD;  [facility]: [facility]     ESOPHAGOSCOPY, GASTROSCOPY, DUODENOSCOPY (EGD), RESECT MUCOSA, COMBINED N/A 2/28/2019    Procedure: Upper Endoscopy, Endoscopic Ultrasound, Endoscopic Mucosal Resection,  Ampullectomy, polypectomy.;  Surgeon: Shaun Guerrero MD;  [facility]     ESOPHAGOSCOPY, GASTROSCOPY, DUODENOSCOPY (EGD), RESECT MUCOSA, COMBINED N/A 3/22/2021    Procedure: ESOPHAGOGASTRODUODENOSCOPY (EGD) with  endoscopic mucosal resection/ polypectomy;  Surgeon: Shaun Guerrero MD;  [facility]     EXCISE LESION LOWER EXTREMITY Right 3/28/2022     Procedure: Wide local excision of right knee melanoma;  Surgeon: Chevy Allison MD;  Location: [facility] OR     EXCISE MASS LOWER EXTREMITY Right 2022    Procedure: excision of right thigh mass;  Surgeon: Salvador Kelly MD;  Location: [facility]     EYE SURGERY      macular hole repaired left eye     HC KNEE SCOPE, DIAGNOSTIC      - both knees     HEAD & NECK SURGERY      wisdom teeth     IR CHEST PORT PLACEMENT > 5 YRS OF AGE  2022     LAPAROSCOPIC HYSTERECTOMY TOTAL, BILATERAL SALPINGO-OOPHORECTOMY, NODE DISSECTION, COMBINED Bilateral 2022    Procedure: Total Laparoscopic Hyserectomy, Bilateral Salpibgo-oophorectomy, Bilateral Mountain Lymph Node Dissection;  Surgeon: Eli Guidry MD;  Location:  OR       Health Maintenance:  Last Pap Smear: 3/5/13              Result: normal, no HPV done  She has not had a history of abnormal Pap smears.    Last Mammogram: 20              Result: normal      She has not had a history of abnormal mammograms.    Last Colonoscopy: 17              Result: Polyps-repeat 5 years       Social History:  Lives with , feels safe at home.  Retired nurse.  Enjoys reading, bird watching, [place] time [place].  Does not have an advanced directive on file and would like her  to be her POA.  Would like full resuscitation if reversible cause is identified, however would not like to be kept on life sustaining measures long-term.     Family History:   The patient's family history is significant for.  Family History   Problem Relation Age of Onset     Hypertension Mother         diabetes,hypoythryoidism, stroke     Diabetes Mother      Breast Cancer Mother      Heart Disease Father         , cancer lip     Uterine Cancer Sister      Connective Tissue Disorder Sister         fibromyalgia     Diabetes Sister      Hypertension Brother      Cerebrovascular Disease Maternal Grandmother         , diabetes     Cerebrovascular Disease  "Maternal Grandfather              Cancer Paternal Grandmother              Heart Disease Paternal Grandfather              Cancer Paternal Aunt         ovarian     Breast Cancer Niece          Physical Exam:   /69   Pulse 90   Temp 97.8  F (36.6  C) (Tympanic)   Resp 16   Ht 1.702 m (5' 7\")   Wt 89.3 kg (196 lb 14.4 oz)   LMP 2002 (Exact Date)   SpO2 94%   BMI 30.84 kg/m    Body mass index is 30.84 kg/m .    General Appearance: healthy and alert, no distress     Gastrointestinal:       abdomen soft, non-tender, non-distended, no organomegaly or masses, oprt sites without erythema/induration or drainage    Genitourinary: External genitalia and urethral meatus appears normal with the exception of an eczematoid rash on her bilateral inner thighs and folds.  Vagina is smooth with a well healing vaginal cuff.    Labs:  None    Assessment:    Gricelda Sabillon is a 72 year old woman with stage 1B, grade 1 endometrial adenocarcinoma.     A total of 30 minutes was spent on patient care today.       Plan:     1.)    Stage 1B, grade 1 endometrial adenocarcinoma. Pathology and surgical findings were reviewed.  Discussed the option of adjuvant vaginal brachytherapy, the risks and benefits of this and the fact that it is not associated with a survival advantage but does lead to lower risk of vaginal cuff recurrence. After this discussion she would like to meet with radiation oncology to discuss this option further. Discussed signs and symptoms of recurrence and to call if these should occur.  Discussed role of surveillance with history and physical exam and that labs/imaging would only be done based on symptoms but not routinely.  Discussed no need for further pap smear testing.  Discussed visits every 6 months for up to 5 years () then annually thereafter.  Discussed that these visits would be with the NP unless concerns arise. She will return in 6 months. "     2.) Lymphangioleiomyomatosis-This finding was reviewed and we discussed that this can affect the lungs.  Will plan referral to pulmonary for consult to discuss need for further evaluation.     3.) Genetic risk factors were assessed and the patient does meet the qualifications for a referral.      4.) Labs and/or tests ordered include:  Pulmonary, CT C/A/P, Rad Onc     5.) Health maintenance issues addressed today include pt is up to date.    6.) Persistent nausea and appetite changes-this is likely related to changes to her diabetic medications which were made just prior to surgery, however, will obtain CT to ensure no post-operative complications    7.) Eczematoid rash -will use topical steroid for 1 week.  If no improvement, she will call.        Thank you for allowing us to participate in the care of your patient.         Sincerely,    Eli Herrera MD  Gynecologic Oncology  [facility]       KATIE REESE

## 2022-06-30 ENCOUNTER — PATIENT OUTREACH (OUTPATIENT)
Dept: ONCOLOGY | Facility: CLINIC | Age: 72
End: 2022-06-30

## 2022-06-30 ENCOUNTER — E-VISIT (OUTPATIENT)
Dept: INTERNAL MEDICINE | Facility: CLINIC | Age: 72
End: 2022-06-30
Payer: COMMERCIAL

## 2022-06-30 DIAGNOSIS — I15.9 SECONDARY HYPERTENSION: Primary | ICD-10-CM

## 2022-06-30 PROCEDURE — 99207 PR NON-BILLABLE SERV PER CHARTING: CPT | Performed by: INTERNAL MEDICINE

## 2022-06-30 NOTE — TELEPHONE ENCOUNTER
I called the patient.  She feels better after she received 1 L of IV fluids  I advised her to hold the lisinopril and hydrochlorothiazide  She will monitor the blood pressure  If the blood pressure is above 110 tomorrow morning, she will take atenolol 25 mg  She will send me the blood pressure results and will decide about further blood pressure meds

## 2022-06-30 NOTE — PROGRESS NOTES
Late Entry from 6/29/2022:  Received call from pt and she states she talked to Dr Herrera who called with pathology results.  Pt reported dizziness and hypotension and Dr Herrera recommended pt to see NP today.  Pt s/p TLH, BSO, Blateral sentinel LND, cystoscopy with Dr Herrera on 6/23/2022.  Pt states she takes lisinopril twice daily but has has not taken her dose last evening or today.  Blood glucose this morning was 127.  Pt also states she does not have an appetite since surgery but was able to have oatmeal, a protein drink, and 16 oz of flavored water.      Writer notified Meme Walls NP at [facility] and she can see pt today at 2:40 pm and will also see if she can add IV fluids to visit.  Pt called and updated with appt time and location and pt states she has been at this location before.  Support given to pt and she verbalized understanding of plan.    Yarelis Mitchell RN, BSN, OCN

## 2022-06-30 NOTE — TELEPHONE ENCOUNTER
Provider E-Visit time total (minutes): 3    /71 sitting , 110/64 standing Pulse   Took atenolol 25 this am  She will cont only Atenolol 25 for now and if BP > 130 she will reintroduce the other BP meds slowly as per discussion

## 2022-07-03 LAB
BACTERIA UR CULT: ABNORMAL
BACTERIA UR CULT: ABNORMAL

## 2022-07-05 ENCOUNTER — VIRTUAL VISIT (OUTPATIENT)
Dept: ONCOLOGY | Facility: CLINIC | Age: 72
End: 2022-07-05
Attending: NURSE PRACTITIONER
Payer: COMMERCIAL

## 2022-07-05 DIAGNOSIS — C54.1 ENDOMETRIAL CANCER (H): ICD-10-CM

## 2022-07-05 DIAGNOSIS — N30.01 ACUTE CYSTITIS WITH HEMATURIA: Primary | ICD-10-CM

## 2022-07-05 DIAGNOSIS — I10 ESSENTIAL HYPERTENSION WITH GOAL BLOOD PRESSURE LESS THAN 140/90: Chronic | ICD-10-CM

## 2022-07-05 PROCEDURE — 99441 PR PHYSICIAN TELEPHONE EVALUATION 5-10 MIN: CPT | Mod: 95 | Performed by: NURSE PRACTITIONER

## 2022-07-05 RX ORDER — SULFAMETHOXAZOLE/TRIMETHOPRIM 800-160 MG
1 TABLET ORAL 2 TIMES DAILY
Qty: 10 TABLET | Refills: 0 | Status: SHIPPED | OUTPATIENT
Start: 2022-07-05 | End: 2022-07-25

## 2022-07-05 NOTE — LETTER
7/5/2022         RE: Gricelda Sabillon  [address]  [facility] [address]        Dear Colleague,    Thank you for referring your patient, Gricelda Sabillon, to the [facility]. Please see a copy of my visit note below.    Called patient to follow up on her UC results from the weekend which do show an infection. She states she is urinating without issues (and often). She denies any vaginal spotting or bleeding or any issues with her bowels. She is drinking a 33 oz water bottle plus milk/juice. She is consuming a premier protein daily as well as yogurt. She spoke with her PCP and her Lisinopril, hydrochlorothiazide have been held as well as her Atenolol dose was cut in half. She states she will have some lightheadedness still but attributes this to moving too quickly. Her home BPs have been running in the 120's/60's; she states it was 128/62 this morning. She does endorse having a decreased appetite; not due to nausea, but to food not seeming appealing. She also has noticed that when her and her  went grocery shopping she was tired after wards and unable to go to the second store. She has her post op visit with Dr. Herrera next week. Discussed sending a Rx to her pharmacy for Bactrim DS. She verbalized understanding of the plan.     Telephone call 10 minutes    DENYS Fernandez, WHNP-BC, ANP-BC  Women's Health Nurse Practitioner  Adult Nurse Practitioner  Division of Gynecologic Oncology

## 2022-07-05 NOTE — PROGRESS NOTES
Called patient to follow up on her UC results from the weekend which do show an infection. She states she is urinating without issues (and often). She denies any vaginal spotting or bleeding or any issues with her bowels. She is drinking a 33 oz water bottle plus milk/juice. She is consuming a premier protein daily as well as yogurt. She spoke with her PCP and her Lisinopril, hydrochlorothiazide have been held as well as her Atenolol dose was cut in half. She states she will have some lightheadedness still but attributes this to moving too quickly. Her home BPs have been running in the 120's/60's; she states it was 128/62 this morning. She does endorse having a decreased appetite; not due to nausea, but to food not seeming appealing. She also has noticed that when her and her  went grocery shopping she was tired after wards and unable to go to the second store. She has her post op visit with Dr. Herrera next week. Discussed sending a Rx to her pharmacy for Bactrim DS. She verbalized understanding of the plan.     Telephone call 10 minutes    DENYS Fernandez, WHNP-BC, ANP-BC  Women's Health Nurse Practitioner  Adult Nurse Practitioner  Division of Gynecologic Oncology

## 2022-07-12 ENCOUNTER — LAB (OUTPATIENT)
Dept: LAB | Facility: CLINIC | Age: 72
End: 2022-07-12
Payer: COMMERCIAL

## 2022-07-12 ENCOUNTER — ONCOLOGY VISIT (OUTPATIENT)
Dept: ONCOLOGY | Facility: CLINIC | Age: 72
End: 2022-07-12
Attending: OBSTETRICS & GYNECOLOGY
Payer: COMMERCIAL

## 2022-07-12 ENCOUNTER — HOSPITAL ENCOUNTER (OUTPATIENT)
Dept: CT IMAGING | Facility: CLINIC | Age: 72
Discharge: HOME | End: 2022-07-12
Attending: OBSTETRICS & GYNECOLOGY | Admitting: OBSTETRICS & GYNECOLOGY
Payer: COMMERCIAL

## 2022-07-12 VITALS
SYSTOLIC BLOOD PRESSURE: 111 MMHG | DIASTOLIC BLOOD PRESSURE: 69 MMHG | BODY MASS INDEX: 30.9 KG/M2 | HEART RATE: 90 BPM | OXYGEN SATURATION: 94 % | RESPIRATION RATE: 16 BRPM | TEMPERATURE: 97.8 F | WEIGHT: 196.9 LBS | HEIGHT: 67 IN

## 2022-07-12 DIAGNOSIS — E11.9 TYPE 2 DIABETES MELLITUS WITHOUT COMPLICATION, WITHOUT LONG-TERM CURRENT USE OF INSULIN (H): ICD-10-CM

## 2022-07-12 DIAGNOSIS — I10 HTN, GOAL BELOW 140/90: ICD-10-CM

## 2022-07-12 DIAGNOSIS — L30.9 VULVAR DERMATITIS: ICD-10-CM

## 2022-07-12 DIAGNOSIS — E83.52 HYPERCALCEMIA: ICD-10-CM

## 2022-07-12 DIAGNOSIS — C54.1 ENDOMETRIAL CANCER (H): ICD-10-CM

## 2022-07-12 DIAGNOSIS — I10 ESSENTIAL HYPERTENSION WITH GOAL BLOOD PRESSURE LESS THAN 140/90: Chronic | ICD-10-CM

## 2022-07-12 DIAGNOSIS — C54.1 ENDOMETRIAL CANCER (H): Primary | ICD-10-CM

## 2022-07-12 LAB
HBA1C MFR BLD: 8.3 % (ref 0–5.6)
PTH-INTACT SERPL-MCNC: 29 PG/ML (ref 15–65)

## 2022-07-12 PROCEDURE — 36415 COLL VENOUS BLD VENIPUNCTURE: CPT

## 2022-07-12 PROCEDURE — 83970 ASSAY OF PARATHORMONE: CPT

## 2022-07-12 PROCEDURE — G0463 HOSPITAL OUTPT CLINIC VISIT: HCPCS

## 2022-07-12 PROCEDURE — 250N000011 HC RX IP 250 OP 636: Performed by: OBSTETRICS & GYNECOLOGY

## 2022-07-12 PROCEDURE — 82306 VITAMIN D 25 HYDROXY: CPT

## 2022-07-12 PROCEDURE — 99214 OFFICE O/P EST MOD 30 MIN: CPT | Mod: 24 | Performed by: OBSTETRICS & GYNECOLOGY

## 2022-07-12 PROCEDURE — 250N000009 HC RX 250: Performed by: OBSTETRICS & GYNECOLOGY

## 2022-07-12 PROCEDURE — 74177 CT ABD & PELVIS W/CONTRAST: CPT

## 2022-07-12 PROCEDURE — 250N000011 HC RX IP 250 OP 636: Performed by: RADIOLOGY

## 2022-07-12 PROCEDURE — 80053 COMPREHEN METABOLIC PANEL: CPT

## 2022-07-12 PROCEDURE — 83036 HEMOGLOBIN GLYCOSYLATED A1C: CPT

## 2022-07-12 RX ORDER — HEPARIN SODIUM (PORCINE) LOCK FLUSH IV SOLN 100 UNIT/ML 100 UNIT/ML
5 SOLUTION INTRAVENOUS ONCE
Status: COMPLETED | OUTPATIENT
Start: 2022-07-12 | End: 2022-07-12

## 2022-07-12 RX ORDER — IOPAMIDOL 755 MG/ML
500 INJECTION, SOLUTION INTRAVASCULAR ONCE
Status: COMPLETED | OUTPATIENT
Start: 2022-07-12 | End: 2022-07-12

## 2022-07-12 RX ADMIN — IOPAMIDOL 90 ML: 755 INJECTION, SOLUTION INTRAVENOUS at 16:05

## 2022-07-12 RX ADMIN — SODIUM CHLORIDE 65 ML: 9 INJECTION, SOLUTION INTRAVENOUS at 16:05

## 2022-07-12 RX ADMIN — HEPARIN SODIUM (PORCINE) LOCK FLUSH IV SOLN 100 UNIT/ML 5 ML: 100 SOLUTION at 16:14

## 2022-07-12 ASSESSMENT — PAIN SCALES - GENERAL: PAINLEVEL: NO PAIN (0)

## 2022-07-12 NOTE — LETTER
2022         RE: Gricelda Sabillon  [address]  [facility] [address]        Dear Colleague,    Thank you for referring your patient, Gricelda Sabillon, to the [facility]. Please see a copy of my visit note below.    Consult Notes on Referred Patient        Dr. 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 She is accompanied today by her  via telephone.  She is doing well post-operatively.  She has had significant nausea and decreased appetite since surgery.  No bowel/bladder concerns. Minimal pain.  no vaginal bleeding.  She also reports vulvar itching and urinary frequency since surgery.    Cancer Course:  Patient was undergoing work up for melanoma without a cutaneous primary identified which showed thickened endometrium.  She is undergoing treatment with Keytruda every 3 weeks for a year.  She is tolerating this very well without major side effects. She reports for about 6 months she has had a mucous discharge with very light spotting after.    5/10/22:  US pelvis:  Uterus measures 8 x 3.7 x 4.2 cm with EMS of 11.2 mm.  Normal appearing adnexa    22:  EMB:  Grade 1 endometrial adenocarcinoma, MMR intact    22:  Total laparoscopic hysterectomy, bilateral salpingo-oophorectomy, bilateral pelvic sentinel lymph node dissection, cystoscopy   Pathology:  Grade 1 endometrial adenocarcinoma, tumor size 2.2 cm, 10/12 mm myometrial invasion, no LVSI,  0/8 sentinel LN, lymphangioleiomyomatosis      Obstetrics and Gynecology History:  ,  x 3  Menopause around 48, no HRT use      Past Medical History:  Past Medical History:   Diagnosis Date     Asthma, mild intermittent      Cataract      Endometrial cancer (H)      HTN, goal below 140/90       Hyperlipidemia LDL goal < 100      Macular hole of left eye      Melanoma (H)     RIGHT KNEE     Sleep apnea     uses c pap     Type 2 diabetes, HbA1C goal < 8% (H)        Past Surgical History:  Past Surgical History:   Procedure Laterality Date     ARTHROPLASTY HIP Right 9/25/2017    Procedure: ARTHROPLASTY HIP;  Right total hip arthroplasty  ;  Surgeon: Ayaan Pena MD;  [facility]: [facility]     ARTHROPLASTY KNEE  7/12/2013    Procedure: ARTHROPLASTY KNEE;  right total knee arthroplasty ;  Surgeon: Chaparro Wesley MD;  Location: [facility]     ARTHROSCOPY KNEE Right 1/21/2015    Procedure: ARTHROSCOPY KNEE;  Surgeon: Ayaan Pnea MD;  Location: [facility]     C INCISION OF HYMEN       CATARACT IOL, RT/LT       COLONOSCOPY  2008     COLONOSCOPY N/A 4/18/2019    Procedure: Colonoscopy with polypectomy;  Surgeon: Shaun Guerrero MD;  [facility]     CYSTOSCOPY N/A 6/23/2022    Procedure: Cystoscopy;  Surgeon: Eli Guidry MD;  [facility]     ENDOSCOPIC RETROGRADE CHOLANGIOPANCREATOGRAM N/A 2/6/2019    Procedure: COMBINED ENDOSCOPIC RETROGRADE CHOLANGIOPANCREATOGRAPHY, BILIARY SPINCTEROTOMY AND DILATION, PLACE BILE DUCT STENT;  Surgeon: Guru Andie Gonzalez MD;  [facility]     ENDOSCOPIC RETROGRADE CHOLANGIOPANCREATOGRAM N/A 2/28/2019    Procedure: Endoscopic Retrograde Cholangiopancreatogram, Bile duct stent removal and placement;  Surgeon: Shaun Guerrero MD;  [facility]     ENDOSCOPIC RETROGRADE CHOLANGIOPANCREATOGRAM N/A 4/18/2019    Procedure: COMBINED ENDOSCOPIC RETROGRADE CHOLANGIOPANCREATOGRAPHY, Bile duct stent exchange and Polypectomy;  Surgeon: Shaun Guerrero MD;  [facility]     ENDOSCOPIC RETROGRADE CHOLANGIOPANCREATOGRAM N/A 10/18/2019    Procedure: Endoscopic Retrograde Cholangiopancreatogram;  Surgeon: Shaun Guerrero MD;  [facility]     ENDOSCOPIC RETROGRADE CHOLANGIOPANCREATOGRAM N/A 3/24/2022    Procedure: ENDOSCOPIC RETROGRADE  CHOLANGIOPANCREATOGRAPHY;  Surgeon: Shaun Guerrero MD;  Location: [facility]     ENDOSCOPIC RETROGRADE CHOLANGIOPANCREATOGRAM WITH SPYGLASS N/A 7/26/2019    Procedure: Endoscopic Retrograde Cholangiopancreatogram With Spyglas,l Radiofrequency Ablation, Stent Exchangeand Duodenal Biopsy x2;  Surgeon: Shaun Guerrero MD;  [facility]     ENDOSCOPIC RETROGRADE CHOLANGIOPANCREATOGRAM WITH SPYGLASS N/A 9/10/2020    Procedure: ENDOSCOPIC RETROGRADE CHOLANGIOPANCREATOGRAPHY, WITH DIRECT DUCT VISUALIZATION, USING PANCREATICOBILIARY FIBEROPTIC PROBE;  Surgeon: Shaun Guerrero MD;  [facility]     ENDOSCOPIC RETROGRADE CHOLANGIOPANCREATOGRAM WITH SPYGLASS N/A 3/22/2021    Procedure: ENDOSCOPIC RETROGRADE CHOLANGIOPANCREATOGRAPHY, WITH DIRECT DUCT VISUALIZATION, USING PANCREATICOBILIARY FIBEROPTIC PROBE;  Surgeon: Shaun Guerrero MD;  [facility]     ESOPHAGOSCOPY, GASTROSCOPY, DUODENOSCOPY (EGD) WITH RADIO FREQUENCY ABLATION, COMBINED N/A 9/10/2020    Procedure: possible repeat ESOPHAGOGASTRODUODENOSCOPY, WITH RADIOFREQUENCY ABLATION and endoscopic mucosal resection;  Surgeon: Shaun Guerrero MD;  [facility]     ESOPHAGOSCOPY, GASTROSCOPY, DUODENOSCOPY (EGD), COMBINED N/A 4/18/2019    Procedure: upper endoscopy with polypectomy;  Surgeon: Shaun Guerrero MD;  [facility]     ESOPHAGOSCOPY, GASTROSCOPY, DUODENOSCOPY (EGD), COMBINED N/A 10/18/2019    Procedure: Upper Endoscopy and ERCP with stent removal, stone removal and biopsy;  Surgeon: Shaun Guerrero MD;  [facility]     ESOPHAGOSCOPY, GASTROSCOPY, DUODENOSCOPY (EGD), COMBINED N/A 3/24/2022    Procedure: ESOPHAGOGASTRODUODENOSCOPY (EGD), Duodenal  polyp removal;  Surgeon: Shaun Guerrero MD;  Location:  OR     ESOPHAGOSCOPY, GASTROSCOPY, DUODENOSCOPY (EGD), RESECT MUCOSA, COMBINED N/A 2/28/2019    Procedure: Upper Endoscopy, Endoscopic Ultrasound, Endoscopic Mucosal Resection,  Ampullectomy, polypectomy.;  Surgeon: Shaun Guerrero MD;  [facility]      ESOPHAGOSCOPY, GASTROSCOPY, DUODENOSCOPY (EGD), RESECT MUCOSA, COMBINED N/A 3/22/2021    Procedure: ESOPHAGOGASTRODUODENOSCOPY (EGD) with  endoscopic mucosal resection/ polypectomy;  Surgeon: Shaun Guerrero MD;  [facility]     EXCISE LESION LOWER EXTREMITY Right 3/28/2022    Procedure: Wide local excision of right knee melanoma;  Surgeon: Chevy Allison MD;  Location: [facility] OR     EXCISE MASS LOWER EXTREMITY Right 1/13/2022    Procedure: excision of right thigh mass;  Surgeon: Salvador Kelly MD;  Location: [facility]     EYE SURGERY      macular hole repaired left eye     HC KNEE SCOPE, DIAGNOSTIC      - both knees     HEAD & NECK SURGERY      wisdom teeth     IR CHEST PORT PLACEMENT > 5 YRS OF AGE  5/2/2022     LAPAROSCOPIC HYSTERECTOMY TOTAL, BILATERAL SALPINGO-OOPHORECTOMY, NODE DISSECTION, COMBINED Bilateral 6/23/2022    Procedure: Total Laparoscopic Hyserectomy, Bilateral Salpibgo-oophorectomy, Bilateral [place] Lymph Node Dissection;  Surgeon: Eli Guidry MD;  [facility]       Health Maintenance:  Last Pap Smear: 3/5/13              Result: normal, no HPV done  She has not had a history of abnormal Pap smears.    Last Mammogram: 5/16/22              Result: normal      She has not had a history of abnormal mammograms.    Last Colonoscopy: 4/8/19              Result: Polyps-repeat 5 years       Social History:  Lives with , feels safe at home.  Retired nurse.  Enjoys reading, bird watching, [place] time [place].  Does not have an advanced directive on file and would like her  to be her POA.  Would like full resuscitation if reversible cause is identified, however would not like to be kept on life sustaining measures long-term.     Family History:   The patient's family history is significant for.  Family History   Problem Relation Age of Onset     Hypertension Mother         diabetes,hypoythryoidism, stroke     Diabetes Mother      Breast Cancer Mother      Heart  "Disease Father         , cancer lip     Uterine Cancer Sister      Connective Tissue Disorder Sister         fibromyalgia     Diabetes Sister      Hypertension Brother      Cerebrovascular Disease Maternal Grandmother         , diabetes     Cerebrovascular Disease Maternal Grandfather              Cancer Paternal Grandmother              Heart Disease Paternal Grandfather              Cancer Paternal Aunt         ovarian     Breast Cancer Niece          Physical Exam:   /69   Pulse 90   Temp 97.8  F (36.6  C) (Tympanic)   Resp 16   Ht 1.702 m (5' 7\")   Wt 89.3 kg (196 lb 14.4 oz)   LMP 2002 (Exact Date)   SpO2 94%   BMI 30.84 kg/m    Body mass index is 30.84 kg/m .    General Appearance: healthy and alert, no distress     Gastrointestinal:       abdomen soft, non-tender, non-distended, no organomegaly or masses, oprt sites without erythema/induration or drainage    Genitourinary: External genitalia and urethral meatus appears normal with the exception of an eczematoid rash on her bilateral inner thighs and folds.  Vagina is smooth with a well healing vaginal cuff.    Labs:  None    Assessment:    Gricelda Sabillon is a 72 year old woman with stage 1B, grade 1 endometrial adenocarcinoma.     A total of 30 minutes was spent on patient care today.       Plan:     1.)    Stage 1B, grade 1 endometrial adenocarcinoma. Pathology and surgical findings were reviewed.  Discussed the option of adjuvant vaginal brachytherapy, the risks and benefits of this and the fact that it is not associated with a survival advantage but does lead to lower risk of vaginal cuff recurrence. After this discussion she would like to meet with radiation oncology to discuss this option further. Discussed signs and symptoms of recurrence and to call if these should occur.  Discussed role of surveillance with history and physical exam and that labs/imaging would only be done based on " symptoms but not routinely.  Discussed no need for further pap smear testing.  Discussed visits every 6 months for up to 5 years (6/27) then annually thereafter.  Discussed that these visits would be with the NP unless concerns arise. She will return in 6 months.     2.) Lymphangioleiomyomatosis-This finding was reviewed and we discussed that this can affect the lungs.  Will plan referral to pulmonary for consult to discuss need for further evaluation.     3.) Genetic risk factors were assessed and the patient does meet the qualifications for a referral.      4.) Labs and/or tests ordered include:  Pulmonary, CT C/A/P, Rad Onc     5.) Health maintenance issues addressed today include pt is up to date.    6.) Persistent nausea and appetite changes-this is likely related to changes to her diabetic medications which were made just prior to surgery, however, will obtain CT to ensure no post-operative complications    7.) Eczematoid rash -will use topical steroid for 1 week.  If no improvement, she will call.        Thank you for allowing us to participate in the care of your patient.         Sincerely,    Eli Herrera MD  Gynecologic Oncology  [facility]       KATIE REESE        Again, thank you for allowing me to participate in the care of your patient.        Sincerely,        Rivera Herrera MD

## 2022-07-12 NOTE — NURSING NOTE
"Oncology Rooming Note    July 12, 2022 10:41 AM   Gricelda Sabillon is a 72 year old female who presents for:    Chief Complaint   Patient presents with     Oncology Clinic Visit     Endometrial cancer (H)         Initial Vitals: /69   Pulse 90   Temp 97.8  F (36.6  C) (Tympanic)   Resp 16   Ht 1.702 m (5' 7\")   Wt 89.3 kg (196 lb 14.4 oz)   LMP 12/13/2002 (Exact Date)   SpO2 94%   BMI 30.84 kg/m   Estimated body mass index is 30.84 kg/m  as calculated from the following:    Height as of this encounter: 1.702 m (5' 7\").    Weight as of this encounter: 89.3 kg (196 lb 14.4 oz). Body surface area is 2.05 meters squared.  No Pain (0) Comment: Data Unavailable   Patient's last menstrual period was 12/13/2002 (exact date).  Allergies reviewed: Yes  Medications reviewed: Yes    Medications: Medication refills not needed today.  Pharmacy name entered into EPIC:    [facility] - A MAIL ORDER [id number] - [place], [place] - [address]    Clinical concerns: follow up       Lili Forte, MARSHALL              "

## 2022-07-13 ENCOUNTER — TELEPHONE (OUTPATIENT)
Dept: ONCOLOGY | Facility: CLINIC | Age: 72
End: 2022-07-13

## 2022-07-13 LAB
ALBUMIN SERPL-MCNC: 2.4 G/DL (ref 3.4–5)
ALP SERPL-CCNC: 143 U/L (ref 40–150)
ALT SERPL W P-5'-P-CCNC: 44 U/L (ref 0–50)
ANION GAP SERPL CALCULATED.3IONS-SCNC: 7 MMOL/L (ref 3–14)
AST SERPL W P-5'-P-CCNC: 27 U/L (ref 0–45)
BILIRUB SERPL-MCNC: 0.4 MG/DL (ref 0.2–1.3)
BUN SERPL-MCNC: 21 MG/DL (ref 7–30)
CALCIUM SERPL-MCNC: 10.6 MG/DL (ref 8.5–10.1)
CHLORIDE BLD-SCNC: 101 MMOL/L (ref 94–109)
CO2 SERPL-SCNC: 24 MMOL/L (ref 20–32)
CREAT SERPL-MCNC: 0.72 MG/DL (ref 0.52–1.04)
DEPRECATED CALCIDIOL+CALCIFEROL SERPL-MC: 43 UG/L (ref 20–75)
GFR SERPL CREATININE-BSD FRML MDRD: 87 ML/MIN/1.73M2
GLUCOSE BLD-MCNC: 207 MG/DL (ref 70–99)
POTASSIUM BLD-SCNC: 4.5 MMOL/L (ref 3.4–5.3)
PROT SERPL-MCNC: 7.8 G/DL (ref 6.8–8.8)
SODIUM SERPL-SCNC: 132 MMOL/L (ref 133–144)

## 2022-07-13 RX ORDER — TRIAMCINOLONE ACETONIDE 5 MG/G
1 OINTMENT TOPICAL DAILY
Qty: 15 G | Refills: 1 | Status: SHIPPED | OUTPATIENT
Start: 2022-07-13 | End: 2023-01-10

## 2022-07-13 NOTE — TELEPHONE ENCOUNTER
Eli Guidry MD  You 4 minutes ago (4:18 PM)     CR    Her CT from yesterday did not show any concerning findings in her abdomen so I think most likely this nausea is related to her diabetes medications or treatment for her melanoma and she should proceed with PCP to work on changing diabetic meds    Message text       Called Melva and gave her Dr. Herrera's recommendations. She verbalized understanding and will wait to hear back from PCP's office.

## 2022-07-13 NOTE — TELEPHONE ENCOUNTER
Patient called Dr Torres's Office. Routing to care team. Please call patient.     Phone#:   [phone]

## 2022-07-13 NOTE — TELEPHONE ENCOUNTER
Patient called in to report nausea and vomiting. Patient vomited twice in the past 24 hours. Yesterday morning it was just a small amount of bile. This morning it was undigested food that came back up after eating breakfast, and possibly her morning pills. Currently not feeling nauseous, but feeling weak/lightheaded. Has been having symptoms since the week after her surgery on 6/23. BP this morning 124/68. Urinating normally, urine pale yellow. Last bowel movement was Sunday and will probably have one today. States it is normal for her to go 3 days without having one. Ate one meal yesterday and drank 3-4 cups of water. Has not had any intake since vomiting this morning. Food doesn't taste good and she's having trouble eating. States that taking Zofran seems to help, but the taste of the Zofran itself also contributes to her nausea.     Patient states that she is also going to call her PCP's office to discuss diabetes medications as a possible cause of the nausea.    Will route to Dr. Herrera for recommendations.    aMcy Ramirez, EDY  Triage Nurse Advisor  [facility]

## 2022-07-18 DIAGNOSIS — R91.8 PULMONARY NODULES: Primary | ICD-10-CM

## 2022-07-18 PROCEDURE — G0452 MOLECULAR PATHOLOGY INTERPR: HCPCS | Mod: 26 | Performed by: PATHOLOGY

## 2022-07-19 ENCOUNTER — OFFICE VISIT (OUTPATIENT)
Dept: INTERNAL MEDICINE | Facility: CLINIC | Age: 72
End: 2022-07-19
Payer: COMMERCIAL

## 2022-07-19 VITALS
DIASTOLIC BLOOD PRESSURE: 70 MMHG | WEIGHT: 192.4 LBS | RESPIRATION RATE: 16 BRPM | BODY MASS INDEX: 30.2 KG/M2 | SYSTOLIC BLOOD PRESSURE: 117 MMHG | TEMPERATURE: 98.4 F | HEART RATE: 91 BPM | OXYGEN SATURATION: 99 % | HEIGHT: 67 IN

## 2022-07-19 DIAGNOSIS — C43.9 METASTATIC MALIGNANT MELANOMA (H): ICD-10-CM

## 2022-07-19 DIAGNOSIS — E11.65 TYPE 2 DIABETES MELLITUS WITH HYPERGLYCEMIA, WITHOUT LONG-TERM CURRENT USE OF INSULIN (H): ICD-10-CM

## 2022-07-19 DIAGNOSIS — R53.83 OTHER FATIGUE: ICD-10-CM

## 2022-07-19 DIAGNOSIS — Z79.899 ENCOUNTER FOR LONG-TERM (CURRENT) USE OF MEDICATIONS: ICD-10-CM

## 2022-07-19 DIAGNOSIS — N30.00 ACUTE CYSTITIS WITHOUT HEMATURIA: ICD-10-CM

## 2022-07-19 DIAGNOSIS — I10 HTN, GOAL BELOW 140/90: ICD-10-CM

## 2022-07-19 DIAGNOSIS — R11.2 NAUSEA AND VOMITING, INTRACTABILITY OF VOMITING NOT SPECIFIED, UNSPECIFIED VOMITING TYPE: Primary | ICD-10-CM

## 2022-07-19 DIAGNOSIS — I10 ESSENTIAL HYPERTENSION WITH GOAL BLOOD PRESSURE LESS THAN 140/90: ICD-10-CM

## 2022-07-19 LAB
ALBUMIN UR-MCNC: NEGATIVE MG/DL
APPEARANCE UR: ABNORMAL
BACTERIA #/AREA URNS HPF: ABNORMAL /HPF
BASOPHILS # BLD AUTO: 0 10E3/UL (ref 0–0.2)
BASOPHILS NFR BLD AUTO: 0 %
BILIRUB UR QL STRIP: ABNORMAL
COLOR UR AUTO: YELLOW
EOSINOPHIL # BLD AUTO: 0.1 10E3/UL (ref 0–0.7)
EOSINOPHIL NFR BLD AUTO: 1 %
ERYTHROCYTE [DISTWIDTH] IN BLOOD BY AUTOMATED COUNT: 13.5 % (ref 10–15)
GLUCOSE UR STRIP-MCNC: >=1000 MG/DL
HCT VFR BLD AUTO: 39.1 % (ref 35–47)
HGB BLD-MCNC: 12.2 G/DL (ref 11.7–15.7)
HGB UR QL STRIP: ABNORMAL
IMM GRANULOCYTES # BLD: 0.1 10E3/UL
IMM GRANULOCYTES NFR BLD: 1 %
KETONES UR STRIP-MCNC: ABNORMAL MG/DL
LEUKOCYTE ESTERASE UR QL STRIP: NEGATIVE
LYMPHOCYTES # BLD AUTO: 1 10E3/UL (ref 0.8–5.3)
LYMPHOCYTES NFR BLD AUTO: 7 %
MCH RBC QN AUTO: 26.4 PG (ref 26.5–33)
MCHC RBC AUTO-ENTMCNC: 31.2 G/DL (ref 31.5–36.5)
MCV RBC AUTO: 85 FL (ref 78–100)
MONOCYTES # BLD AUTO: 0.7 10E3/UL (ref 0–1.3)
MONOCYTES NFR BLD AUTO: 5 %
NEUTROPHILS # BLD AUTO: 11.5 10E3/UL (ref 1.6–8.3)
NEUTROPHILS NFR BLD AUTO: 86 %
NITRATE UR QL: NEGATIVE
PH UR STRIP: 5.5 [PH] (ref 5–7)
PLATELET # BLD AUTO: 560 10E3/UL (ref 150–450)
RBC # BLD AUTO: 4.62 10E6/UL (ref 3.8–5.2)
RBC #/AREA URNS AUTO: ABNORMAL /HPF
SP GR UR STRIP: 1.01 (ref 1–1.03)
SQUAMOUS #/AREA URNS AUTO: ABNORMAL /LPF
UROBILINOGEN UR STRIP-ACNC: 0.2 E.U./DL
WBC # BLD AUTO: 13.4 10E3/UL (ref 4–11)
WBC #/AREA URNS AUTO: ABNORMAL /HPF
YEAST #/AREA URNS HPF: ABNORMAL /HPF

## 2022-07-19 PROCEDURE — 82728 ASSAY OF FERRITIN: CPT | Performed by: INTERNAL MEDICINE

## 2022-07-19 PROCEDURE — 85025 COMPLETE CBC W/AUTO DIFF WBC: CPT | Performed by: INTERNAL MEDICINE

## 2022-07-19 PROCEDURE — 82746 ASSAY OF FOLIC ACID SERUM: CPT | Performed by: INTERNAL MEDICINE

## 2022-07-19 PROCEDURE — 87086 URINE CULTURE/COLONY COUNT: CPT | Performed by: INTERNAL MEDICINE

## 2022-07-19 PROCEDURE — 82607 VITAMIN B-12: CPT | Performed by: INTERNAL MEDICINE

## 2022-07-19 PROCEDURE — 84443 ASSAY THYROID STIM HORMONE: CPT | Performed by: INTERNAL MEDICINE

## 2022-07-19 PROCEDURE — 99215 OFFICE O/P EST HI 40 MIN: CPT | Performed by: INTERNAL MEDICINE

## 2022-07-19 PROCEDURE — 83550 IRON BINDING TEST: CPT | Performed by: INTERNAL MEDICINE

## 2022-07-19 PROCEDURE — 81001 URINALYSIS AUTO W/SCOPE: CPT | Performed by: INTERNAL MEDICINE

## 2022-07-19 PROCEDURE — 36415 COLL VENOUS BLD VENIPUNCTURE: CPT | Performed by: INTERNAL MEDICINE

## 2022-07-19 PROCEDURE — 80053 COMPREHEN METABOLIC PANEL: CPT | Performed by: INTERNAL MEDICINE

## 2022-07-19 RX ORDER — METFORMIN HCL 500 MG
TABLET, EXTENDED RELEASE 24 HR ORAL
Qty: 360 TABLET | Refills: 0 | Status: SHIPPED | OUTPATIENT
Start: 2022-07-19 | End: 2022-08-18

## 2022-07-19 RX ORDER — ONDANSETRON 8 MG/1
8 TABLET, FILM COATED ORAL EVERY 8 HOURS PRN
Qty: 30 TABLET | Refills: 1 | Status: SHIPPED | OUTPATIENT
Start: 2022-07-19 | End: 2024-03-11

## 2022-07-19 RX ORDER — CIPROFLOXACIN 500 MG/1
500 TABLET, FILM COATED ORAL 2 TIMES DAILY
Qty: 14 TABLET | Refills: 0 | Status: SHIPPED | OUTPATIENT
Start: 2022-07-19 | End: 2022-08-10

## 2022-07-19 NOTE — PROGRESS NOTES
Dr Torres's note      Patient's instructions / PLAN:                                                        Plan:  1. Stop Semaglutide  2.  Labs today - [address]   3. Follow up next week        ASSESSMENT & PLAN:                                                      72-year-old woman with a lot of recent medical events: Metastatic malignant melanoma to the thigh, recent hysterectomy for endometrial carcinoma in early stages, DM, HTN, hyperlipidemia.  She has constant nausea for the last 2 to 3 weeks, she feels very tired.  She does not tolerate Zofran ODT.  I prescribed the regular Zofran she was recently treated for UTI with Bactrim.  The cause for nausea might be related with semaglutide and we will stop it.  The labs today showed possible UTI and increased white blood cells.  The rest of the results are pending at the time of dictation.  I called her and we agreed to start antibiotic, ciprofloxacin for urine infection.  Her A1c has been higher than before.  She was reluctant in the past to start insulin.  We will see how she does but I think I am came to start insulin.  The newer CT shows lung nodules, worrisome for metastatic malignant melanoma.  She is scheduled for PET CT.       (R11.2) Nausea and vomiting, intractability of vomiting not specified, unspecified vomiting type  (primary encounter diagnosis)  Comment:   Plan: UA with Microscopic reflex to Culture, CBC with        Platelets & Differential, Comprehensive         metabolic panel, Iron & Iron Binding Capacity,         Folate, Ferritin, Vitamin B12, TSH with free T4        reflex, Urine Microscopic, Urine Culture,         ondansetron (ZOFRAN) 8 MG tablet            (R53.83) Other fatigue  Comment:   Plan: UA with Microscopic reflex to Culture, CBC with        Platelets & Differential, Comprehensive         metabolic panel, Iron & Iron Binding Capacity,         Folate, Ferritin, Vitamin B12, TSH with free T4        reflex, Urine Microscopic, Urine  Culture            (I10) HTN goal less than 140/90,  Comment:   Plan: UA with Microscopic reflex to Culture, CBC with        Platelets & Differential, Comprehensive         metabolic panel, Iron & Iron Binding Capacity,         Folate, Ferritin, Vitamin B12, TSH with free T4        reflex, Urine Microscopic, Urine Culture            (I10) HTN, goal below 140/90  Comment:   Plan: UA with Microscopic reflex to Culture, CBC with        Platelets & Differential, Comprehensive         metabolic panel, Iron & Iron Binding Capacity,         Folate, Ferritin, Vitamin B12, TSH with free T4        reflex, Urine Microscopic, Urine Culture            (C79.9) Metastatic malignant melanoma (H)  -- R thigh   Comment:   Plan: UA with Microscopic reflex to Culture, CBC with        Platelets & Differential, Comprehensive         metabolic panel, Iron & Iron Binding Capacity,         Folate, Ferritin, Vitamin B12, TSH with free T4        reflex, Urine Microscopic, Urine Culture            (Z79.899) Encounter for long-term (current) use of medications  Comment:   Plan: UA with Microscopic reflex to Culture, CBC with        Platelets & Differential, Comprehensive         metabolic panel, Iron & Iron Binding Capacity,         Folate, Ferritin, Vitamin B12, TSH with free T4        reflex, Urine Microscopic, Urine Culture            (N30.00) Acute cystitis without hematuria  Comment:   Plan: ciprofloxacin (CIPRO) 500 MG tablet           .      Chief complaint:                                                      Nausea, not feeling well    SUBJECTIVE:                                                    History of present illness:    HTN  --  at home: she takes Atenolol 25 mg. She hasn't been taken lisinopril, hydrochlorothiazide    Nausea  -- since surgery  -- no appetite      Dark urine     Hyperlipidemia Follow-Up      Are you regularly taking any medication or supplement to lower your cholesterol?   Yes- atorvastatin    Are you  "having muscle aches or other side effects that you think could be caused by your cholesterol lowering medication?  No    Hypertension Follow-up      Do you check your blood pressure regularly outside of the clinic? Yes     Are you following a low salt diet? No    Are your blood pressures ever more than 140 on the top number (systolic) OR more   than 90 on the bottom number (diastolic), for example 140/90? No      How many servings of fruits and vegetables do you eat daily?  2-3    On average, how many sweetened beverages do you drink each day (Examples: soda, juice, sweet tea, etc.  Do NOT count diet or artificially sweetened beverages)?   0    How many days per week do you exercise enough to make your heart beat faster? 3 or less    How many minutes a day do you exercise enough to make your heart beat faster? 9 or less    How many days per week do you miss taking your medication? 0      Review of Systems:                                                      ROS: negative for fever, chills, cough, wheezes, chest pain, shortness of breath, vomiting, abdominal pain, leg swelling         OBJECTIVE:             Physical exam:  Blood pressure 117/70, pulse 91, temperature 98.4  F (36.9  C), temperature source Tympanic, resp. rate 16, height 1.702 m (5' 7\"), weight 87.3 kg (192 lb 6.4 oz), last menstrual period 12/13/2002, SpO2 99 %, not currently breastfeeding.     NAD, appears ill, pale  Skin: no rashes   Neck: supple, no JVD,  No thyroidmegaly. Lymph nodes nonpalpable cervical and supraclavicular.  Chest: clear to auscultation bilaterally, good respiratory effort  Heart: S1 S2, RRR, no mgr appreciated  Abdomen: soft, not tender, no hepatosplenomegaly or masses appreciated, no abdominal bruit, present bowel sounds  Extremities: no edema,   Neurologic: A, Ox3, no focal signs appreciated    PMHx: reviewed  Past Medical History:   Diagnosis Date     Asthma, mild intermittent      Cataract      Endometrial cancer (H)      " HTN, goal below 140/90      Hyperlipidemia LDL goal < 100      Macular hole of left eye      Melanoma (H)     RIGHT KNEE     Sleep apnea     uses c pap     Type 2 diabetes, HbA1C goal < 8% (H)       PSHx: reviewed  Past Surgical History:   Procedure Laterality Date     ARTHROPLASTY HIP Right 9/25/2017    Procedure: ARTHROPLASTY HIP;  Right total hip arthroplasty  ;  Surgeon: Ayaan Pena MD;  Location: [facility]     ARTHROPLASTY KNEE  7/12/2013    Procedure: ARTHROPLASTY KNEE;  right total knee arthroplasty ;  Surgeon: Chaparro Wesley MD;  Location: [facility]     ARTHROSCOPY KNEE Right 1/21/2015    Procedure: ARTHROSCOPY KNEE;  Surgeon: Ayaan Pena MD;  Location: [facility]     C INCISION OF HYMEN       CATARACT IOL, RT/LT       COLONOSCOPY  2008     COLONOSCOPY N/A 4/18/2019    Procedure: Colonoscopy with polypectomy;  Surgeon: Shaun Guerrero MD;  [facility]     CYSTOSCOPY N/A 6/23/2022    Procedure: Cystoscopy;  Surgeon: Eli Guidry MD;  [facility]     ENDOSCOPIC RETROGRADE CHOLANGIOPANCREATOGRAM N/A 2/6/2019    Procedure: COMBINED ENDOSCOPIC RETROGRADE CHOLANGIOPANCREATOGRAPHY, BILIARY SPINCTEROTOMY AND DILATION, PLACE BILE DUCT STENT;  Surgeon: Guru Andie Gonzalez MD;  [facility]     ENDOSCOPIC RETROGRADE CHOLANGIOPANCREATOGRAM N/A 2/28/2019    Procedure: Endoscopic Retrograde Cholangiopancreatogram, Bile duct stent removal and placement;  Surgeon: Shaun Guerrero MD;  [facility]     ENDOSCOPIC RETROGRADE CHOLANGIOPANCREATOGRAM N/A 4/18/2019    Procedure: COMBINED ENDOSCOPIC RETROGRADE CHOLANGIOPANCREATOGRAPHY, Bile duct stent exchange and Polypectomy;  Surgeon: Shaun Guerrero MD;  [facility]     ENDOSCOPIC RETROGRADE CHOLANGIOPANCREATOGRAM N/A 10/18/2019    Procedure: Endoscopic Retrograde Cholangiopancreatogram;  Surgeon: Shaun Guerrero MD;  [facility]     ENDOSCOPIC RETROGRADE CHOLANGIOPANCREATOGRAM N/A 3/24/2022    Procedure:  ENDOSCOPIC RETROGRADE CHOLANGIOPANCREATOGRAPHY;  Surgeon: Shaun Guerrero MD;  Location[facility]     ENDOSCOPIC RETROGRADE CHOLANGIOPANCREATOGRAM WITH SPYGLASS N/A 7/26/2019    Procedure: Endoscopic Retrograde Cholangiopancreatogram With Spyglas,l Radiofrequency Ablation, Stent Exchangeand Duodenal Biopsy x2;  Surgeon: Shaun Guerrero MD;  [facility]     ENDOSCOPIC RETROGRADE CHOLANGIOPANCREATOGRAM WITH SPYGLASS N/A 9/10/2020    Procedure: ENDOSCOPIC RETROGRADE CHOLANGIOPANCREATOGRAPHY, WITH DIRECT DUCT VISUALIZATION, USING PANCREATICOBILIARY FIBEROPTIC PROBE;  Surgeon: Shaun Guerrero MD;  [facility]     ENDOSCOPIC RETROGRADE CHOLANGIOPANCREATOGRAM WITH SPYGLASS N/A 3/22/2021    Procedure: ENDOSCOPIC RETROGRADE CHOLANGIOPANCREATOGRAPHY, WITH DIRECT DUCT VISUALIZATION, USING PANCREATICOBILIARY FIBEROPTIC PROBE;  Surgeon: Shaun Guerrero MD;  [facility]     ESOPHAGOSCOPY, GASTROSCOPY, DUODENOSCOPY (EGD) WITH RADIO FREQUENCY ABLATION, COMBINED N/A 9/10/2020    Procedure: possible repeat ESOPHAGOGASTRODUODENOSCOPY, WITH RADIOFREQUENCY ABLATION and endoscopic mucosal resection;  Surgeon: Shaun Guerrero MD;  [facility]     ESOPHAGOSCOPY, GASTROSCOPY, DUODENOSCOPY (EGD), COMBINED N/A 4/18/2019    Procedure: upper endoscopy with polypectomy;  Surgeon: Shaun Guerrero MD;  [facility]     ESOPHAGOSCOPY, GASTROSCOPY, DUODENOSCOPY (EGD), COMBINED N/A 10/18/2019    Procedure: Upper Endoscopy and ERCP with stent removal, stone removal and biopsy;  Surgeon: Shaun Guerrero MD;  [facility]     ESOPHAGOSCOPY, GASTROSCOPY, DUODENOSCOPY (EGD), COMBINED N/A 3/24/2022    Procedure: ESOPHAGOGASTRODUODENOSCOPY (EGD), Duodenal  polyp removal;  Surgeon: Shaun Guerrero MD;  [facility]     ESOPHAGOSCOPY, GASTROSCOPY, DUODENOSCOPY (EGD), RESECT MUCOSA, COMBINED N/A 2/28/2019    Procedure: Upper Endoscopy, Endoscopic Ultrasound, Endoscopic Mucosal Resection,  Ampullectomy, polypectomy.;  Surgeon: Shaun Guerrero MD;   [facility]     ESOPHAGOSCOPY, GASTROSCOPY, DUODENOSCOPY (EGD), RESECT MUCOSA, COMBINED N/A 3/22/2021    Procedure: ESOPHAGOGASTRODUODENOSCOPY (EGD) with  endoscopic mucosal resection/ polypectomy;  Surgeon: Shaun Guerrero MD;  [facility]     EXCISE LESION LOWER EXTREMITY Right 3/28/2022    Procedure: Wide local excision of right knee melanoma;  Surgeon: Chevy Allison MD;  Location: [facility]     EXCISE MASS LOWER EXTREMITY Right 1/13/2022    Procedure: excision of right thigh mass;  Surgeon: Salvador Kelly MD;  Location: [facility]     EYE SURGERY      macular hole repaired left eye     HC KNEE SCOPE, DIAGNOSTIC      - both knees     HEAD & NECK SURGERY      wisdom teeth     IR CHEST PORT PLACEMENT > 5 YRS OF AGE  5/2/2022     LAPAROSCOPIC HYSTERECTOMY TOTAL, BILATERAL SALPINGO-OOPHORECTOMY, NODE DISSECTION, COMBINED Bilateral 6/23/2022    Procedure: Total Laparoscopic Hyserectomy, Bilateral Salpibgo-oophorectomy, Bilateral Bushnell Lymph Node Dissection;  Surgeon: Eli Guidry MD;  [facility]        Meds: reviewed  Current Outpatient Medications   Medication Sig Dispense Refill     acetaminophen (TYLENOL) 325 MG tablet Take 3 tablets (975 mg) by mouth every 6 hours as needed for mild pain 50 tablet 0     albuterol (PROAIR HFA/PROVENTIL HFA/VENTOLIN HFA) 108 (90 Base) MCG/ACT inhaler Inhale 2 puffs into the lungs every 4 hours as needed for shortness of breath / dyspnea 1 Inhaler 3     aspirin 81 MG EC tablet Take 1 tablet (81 mg) by mouth daily 90 tablet 3     atenolol (TENORMIN) 50 MG tablet Take 1 tablet by mouth once daily (Patient taking differently: Takes 1/2 tablet =25 mg) 90 tablet 0     atorvastatin (LIPITOR) 20 MG tablet Take 1 tablet by mouth once daily 90 tablet 0     azelastine (ASTELIN) 0.1 % nasal spray USE 1 SPRAY(S) IN EACH NOSTRIL TWICE DAILY AS NEEDED 30 mL 0     azelastine (OPTIVAR) 0.05 % SOLN Place 1 drop into both eyes 2 times daily as needed Reported on 3/22/2017        cetirizine (ZYRTEC) 10 MG tablet Take 10 mg by mouth every morning Takes only PRN       CONTOUR NEXT TEST test strip USE 1 STRIP TO CHECK GLUCOSE ONCE DAILY 100 strip 4     diclofenac (VOLTAREN) 1 % topical gel Apply topically 4 times daily as needed        fish oil-omega-3 fatty acids 1000 MG capsule Take 1 g by mouth daily Reported on 3/22/2017       glipiZIDE (GLUCOTROL) 5 MG tablet TAKE 2 TABLETS BY MOUTH TWICE DAILY BEFORE MEAL(S) 360 tablet 0     ibuprofen (ADVIL/MOTRIN) 200 MG tablet take 4 tablet (200 mg) by oral route every 8 hours as needed with food       INVOKANA 300 MG tablet TAKE 1 TABLET BY MOUTH IN THE MORNING BEFORE BREAKFAST 90 tablet 0     latanoprost (XALATAN) 0.005 % ophthalmic solution Place 1 drop into both eyes At Bedtime  2.5 mL 1     lidocaine-prilocaine (EMLA) 2.5-2.5 % external cream APPLY CREAM TOPICALLY ONCE DAILY AS NEEDED FOR PAIN APPLY A PEA SIZED AMOUNT OVER PORT SITE 60 MINUTES PRIOR TO USE COVER WITH PLASTIC WRAP       metFORMIN (GLUCOPHAGE-XR) 500 MG 24 hr tablet TAKE 2 TABLETS BY MOUTH TWICE DAILY WITH MEALS 360 tablet 0     ondansetron (ZOFRAN ODT) 4 MG ODT tab Take 1 tablet (4 mg) by mouth every 8 hours as needed for nausea 4 tablet 0     ondansetron (ZOFRAN-ODT) 4 MG ODT tab Take 1-2 tablets (4-8 mg) by mouth every 8 hours as needed for nausea 8 tablet 0     order for DME All diabetic testing supplies including test strips, lancets and solution for testing 2 times per day. Patient is using   no Insulin. Last A1c Lab Results       Component                Value               Date                       A1C                      7.9                 08/20/2018 100 each 11     pembrolizumab Inject into the vein once Every 3 weeks at [facility] office       Semaglutide 7 MG TABS Take 7 mg by mouth daily 90 tablet 1     senna-docusate (SENOKOT-S/PERICOLACE) 8.6-50 MG tablet Take 1-2 tablets by mouth 2 times daily 30 tablet 0     sulfamethoxazole-trimethoprim (BACTRIM DS) 800-160 MG  tablet Take 1 tablet by mouth 2 times daily 10 tablet 0     timolol maleate (TIMOPTIC) 0.5 % ophthalmic solution INSTILL 1 DROP INTO EACH EYE TWICE DAILY       triamcinolone (KENALOG) 0.1 % external cream APPLY CREAM EXTERNALLY TO AFFECTED AREA TWICE DAILY ON THE LEGS UNTIL FULLY CLEAR AND THEN FOR FUTURE FLARES       triamcinolone (KENALOG) 0.5 % external ointment Apply 1 g topically daily 15 g 1       Soc Hx: reviewed  Fam Hx: reviewed      40 minutes spent on the date of the encounter doing   chart review,   review of outside records,   review of test results,   interpretation of tests,   patient visit,   documentation,       Raisa Torres MD  Internal Medicine

## 2022-07-19 NOTE — TELEPHONE ENCOUNTER
Pending Prescriptions:                       Disp   Refills    metFORMIN (GLUCOPHAGE XR) 500 MG 24 hr tab*360 ta*0        Sig: TAKE 2 TABLETS BY MOUTH TWICE DAILY WITH MEALS  Routing refill request to provider for review/approval because:  Appointment today please approve if no changes  Thanks  Next 5 appointments (look out 90 days)      Jul 19, 2022  1:30 PM  (Arrive by 1:10 PM)  Provider Visit with Raisa Davidson MD  [facility] ([facility] ) [address] [facility] [id number] [phone]

## 2022-07-20 LAB
ALBUMIN SERPL-MCNC: 2.2 G/DL (ref 3.4–5)
ALP SERPL-CCNC: 139 U/L (ref 40–150)
ALT SERPL W P-5'-P-CCNC: 33 U/L (ref 0–50)
ANION GAP SERPL CALCULATED.3IONS-SCNC: 9 MMOL/L (ref 3–14)
AST SERPL W P-5'-P-CCNC: 25 U/L (ref 0–45)
BILIRUB SERPL-MCNC: 0.4 MG/DL (ref 0.2–1.3)
BUN SERPL-MCNC: 18 MG/DL (ref 7–30)
CALCIUM SERPL-MCNC: 10.8 MG/DL (ref 8.5–10.1)
CHLORIDE BLD-SCNC: 99 MMOL/L (ref 94–109)
CO2 SERPL-SCNC: 25 MMOL/L (ref 20–32)
CREAT SERPL-MCNC: 0.71 MG/DL (ref 0.52–1.04)
FERRITIN SERPL-MCNC: 622 NG/ML (ref 8–252)
FOLATE SERPL-MCNC: 10 NG/ML (ref 4.6–34.8)
GFR SERPL CREATININE-BSD FRML MDRD: 89 ML/MIN/1.73M2
GLUCOSE BLD-MCNC: 146 MG/DL (ref 70–99)
POTASSIUM BLD-SCNC: 4.2 MMOL/L (ref 3.4–5.3)
PROT SERPL-MCNC: 8 G/DL (ref 6.8–8.8)
SODIUM SERPL-SCNC: 133 MMOL/L (ref 133–144)
VIT B12 SERPL-MCNC: 322 PG/ML (ref 232–1245)

## 2022-07-21 ENCOUNTER — TELEPHONE (OUTPATIENT)
Dept: INTERNAL MEDICINE | Facility: CLINIC | Age: 72
End: 2022-07-21

## 2022-07-21 LAB
BACTERIA UR CULT: NORMAL
TSH SERPL DL<=0.005 MIU/L-ACNC: 1.99 MU/L (ref 0.4–4)

## 2022-07-21 NOTE — TELEPHONE ENCOUNTER
Central Prior Authorization Team   Phone: [phone]    PA Initiation    Medication: ONDANSETRON  Insurance Company[facility] - Phone [phone] Fax [phone]  Pharmacy Filling the Rx: [facility] [address] [place] [id number] [address] [facility]  Filling Pharmacy Phone: [phone]  Filling Pharmacy Fax:    Start Date: 7/21/2022

## 2022-07-21 NOTE — TELEPHONE ENCOUNTER
Prior Authorization Retail Medication Request    Medication/Dose: ONDANSETRON CHL 8 MG TABLETS  ICD code (if different than what is on RX):    Previously Tried and Failed:    Rationale:      Insurance Name:  [employer] MED[employer]ARE ADVANTAGE  Insurance ID:  [id number]       Pharmacy Information (if different than what is on RX)  Name:  [facility]  Phone:  [phone]

## 2022-07-22 ENCOUNTER — MEDICAL CORRESPONDENCE (OUTPATIENT)
Dept: HEALTH INFORMATION MANAGEMENT | Facility: CLINIC | Age: 72
End: 2022-07-22

## 2022-07-23 LAB
IRON SATN MFR SERPL: 12 % (ref 15–46)
IRON SERPL-MCNC: 26 UG/DL (ref 35–180)
TIBC SERPL-MCNC: 209 UG/DL (ref 240–430)

## 2022-07-25 ENCOUNTER — HOSPITAL ENCOUNTER (INPATIENT)
Facility: CLINIC | Age: 72
LOS: 9 days | Discharge: HOME HEALTH | DRG: 175 | End: 2022-08-03
Attending: EMERGENCY MEDICINE | Admitting: INTERNAL MEDICINE
Payer: COMMERCIAL

## 2022-07-25 ENCOUNTER — OFFICE VISIT (OUTPATIENT)
Dept: INTERNAL MEDICINE | Facility: CLINIC | Age: 72
End: 2022-07-25
Payer: COMMERCIAL

## 2022-07-25 ENCOUNTER — APPOINTMENT (OUTPATIENT)
Dept: CT IMAGING | Facility: CLINIC | Age: 72
DRG: 175 | End: 2022-07-25
Attending: EMERGENCY MEDICINE
Payer: COMMERCIAL

## 2022-07-25 VITALS
SYSTOLIC BLOOD PRESSURE: 110 MMHG | WEIGHT: 193.6 LBS | DIASTOLIC BLOOD PRESSURE: 62 MMHG | OXYGEN SATURATION: 84 % | HEART RATE: 86 BPM | HEIGHT: 67 IN | BODY MASS INDEX: 30.39 KG/M2 | TEMPERATURE: 100.1 F | RESPIRATION RATE: 18 BRPM

## 2022-07-25 DIAGNOSIS — R06.02 SOB (SHORTNESS OF BREATH): Primary | ICD-10-CM

## 2022-07-25 DIAGNOSIS — R91.8 BILATERAL PULMONARY INFILTRATES ON CHEST X-RAY: ICD-10-CM

## 2022-07-25 DIAGNOSIS — C78.02 MALIGNANT NEOPLASM METASTATIC TO BOTH LUNGS (H): ICD-10-CM

## 2022-07-25 DIAGNOSIS — C78.01 MALIGNANT NEOPLASM METASTATIC TO BOTH LUNGS (H): ICD-10-CM

## 2022-07-25 DIAGNOSIS — C43.9 METASTATIC MALIGNANT MELANOMA (H): Primary | ICD-10-CM

## 2022-07-25 DIAGNOSIS — E86.0 DEHYDRATION: ICD-10-CM

## 2022-07-25 DIAGNOSIS — I26.99 BILATERAL PULMONARY EMBOLISM (H): ICD-10-CM

## 2022-07-25 DIAGNOSIS — Z71.89 ADVANCED DIRECTIVES, COUNSELING/DISCUSSION: ICD-10-CM

## 2022-07-25 DIAGNOSIS — J96.01 ACUTE RESPIRATORY FAILURE WITH HYPOXIA (H): ICD-10-CM

## 2022-07-25 DIAGNOSIS — C43.9 METASTATIC MALIGNANT MELANOMA (H): ICD-10-CM

## 2022-07-25 DIAGNOSIS — I10 HTN, GOAL BELOW 140/90: ICD-10-CM

## 2022-07-25 DIAGNOSIS — R50.9 FEVER, UNSPECIFIED FEVER CAUSE: ICD-10-CM

## 2022-07-25 LAB
ALBUMIN SERPL-MCNC: 1.9 G/DL (ref 3.4–5)
ALP SERPL-CCNC: 129 U/L (ref 40–150)
ALT SERPL W P-5'-P-CCNC: 31 U/L (ref 0–50)
ANION GAP SERPL CALCULATED.3IONS-SCNC: 9 MMOL/L (ref 3–14)
AST SERPL W P-5'-P-CCNC: 21 U/L (ref 0–45)
ATRIAL RATE - MUSE: 82 BPM
BASOPHILS # BLD AUTO: 0.1 10E3/UL (ref 0–0.2)
BASOPHILS NFR BLD AUTO: 1 %
BILIRUB SERPL-MCNC: 0.4 MG/DL (ref 0.2–1.3)
BUN SERPL-MCNC: 16 MG/DL (ref 7–30)
CALCIUM SERPL-MCNC: 9.7 MG/DL (ref 8.5–10.1)
CHLORIDE BLD-SCNC: 99 MMOL/L (ref 94–109)
CO2 SERPL-SCNC: 25 MMOL/L (ref 20–32)
CREAT SERPL-MCNC: 0.79 MG/DL (ref 0.52–1.04)
DIASTOLIC BLOOD PRESSURE - MUSE: NORMAL MMHG
EOSINOPHIL # BLD AUTO: 0.1 10E3/UL (ref 0–0.7)
EOSINOPHIL NFR BLD AUTO: 1 %
ERYTHROCYTE [DISTWIDTH] IN BLOOD BY AUTOMATED COUNT: 13.9 % (ref 10–15)
FLUAV RNA SPEC QL NAA+PROBE: NEGATIVE
FLUBV RNA RESP QL NAA+PROBE: NEGATIVE
GFR SERPL CREATININE-BSD FRML MDRD: 78 ML/MIN/1.73M2
GLUCOSE BLD-MCNC: 347 MG/DL (ref 70–99)
HCT VFR BLD AUTO: 38.2 % (ref 35–47)
HGB BLD-MCNC: 11.3 G/DL (ref 11.7–15.7)
HOLD SPECIMEN: NORMAL
IMM GRANULOCYTES # BLD: 0.1 10E3/UL
IMM GRANULOCYTES NFR BLD: 1 %
INTERPRETATION ECG - MUSE: NORMAL
LACTATE SERPL-SCNC: 1 MMOL/L (ref 0.7–2)
LACTATE SERPL-SCNC: 2.6 MMOL/L (ref 0.7–2)
LYMPHOCYTES # BLD AUTO: 0.7 10E3/UL (ref 0.8–5.3)
LYMPHOCYTES NFR BLD AUTO: 5 %
MCH RBC QN AUTO: 26 PG (ref 26.5–33)
MCHC RBC AUTO-ENTMCNC: 29.6 G/DL (ref 31.5–36.5)
MCV RBC AUTO: 88 FL (ref 78–100)
MONOCYTES # BLD AUTO: 0.8 10E3/UL (ref 0–1.3)
MONOCYTES NFR BLD AUTO: 5 %
NEUTROPHILS # BLD AUTO: 12.9 10E3/UL (ref 1.6–8.3)
NEUTROPHILS NFR BLD AUTO: 87 %
NRBC # BLD AUTO: 0 10E3/UL
NRBC BLD AUTO-RTO: 0 /100
P AXIS - MUSE: 45 DEGREES
PATH REPORT.ADDENDUM SPEC: NORMAL
PATH REPORT.ADDENDUM SPEC: NORMAL
PATH REPORT.COMMENTS IMP SPEC: NORMAL
PATH REPORT.FINAL DX SPEC: NORMAL
PATH REPORT.GROSS SPEC: NORMAL
PATH REPORT.MICROSCOPIC SPEC OTHER STN: NORMAL
PATH REPORT.RELEVANT HX SPEC: NORMAL
PATHOLOGY SYNOPTIC REPORT: NORMAL
PHOTO IMAGE: NORMAL
PLATELET # BLD AUTO: 395 10E3/UL (ref 150–450)
POTASSIUM BLD-SCNC: 4.4 MMOL/L (ref 3.4–5.3)
PR INTERVAL - MUSE: 172 MS
PROCALCITONIN SERPL-MCNC: 0.26 NG/ML
PROT SERPL-MCNC: 7 G/DL (ref 6.8–8.8)
QRS DURATION - MUSE: 88 MS
QT - MUSE: 382 MS
QTC - MUSE: 446 MS
R AXIS - MUSE: -25 DEGREES
RADIOLOGIST FLAGS: ABNORMAL
RBC # BLD AUTO: 4.34 10E6/UL (ref 3.8–5.2)
RSV RNA SPEC NAA+PROBE: NEGATIVE
SARS-COV-2 RNA RESP QL NAA+PROBE: NEGATIVE
SODIUM SERPL-SCNC: 133 MMOL/L (ref 133–144)
SYSTOLIC BLOOD PRESSURE - MUSE: NORMAL MMHG
T AXIS - MUSE: 3 DEGREES
TROPONIN I SERPL HS-MCNC: 7 NG/L
UFH PPP CHRO-ACNC: 0.43 IU/ML
VENTRICULAR RATE- MUSE: 82 BPM
WBC # BLD AUTO: 14.7 10E3/UL (ref 4–11)

## 2022-07-25 PROCEDURE — 82374 ASSAY BLOOD CARBON DIOXIDE: CPT | Performed by: EMERGENCY MEDICINE

## 2022-07-25 PROCEDURE — 83605 ASSAY OF LACTIC ACID: CPT | Performed by: EMERGENCY MEDICINE

## 2022-07-25 PROCEDURE — 99222 1ST HOSP IP/OBS MODERATE 55: CPT | Mod: AI | Performed by: INTERNAL MEDICINE

## 2022-07-25 PROCEDURE — 99214 OFFICE O/P EST MOD 30 MIN: CPT | Performed by: INTERNAL MEDICINE

## 2022-07-25 PROCEDURE — 84145 PROCALCITONIN (PCT): CPT | Performed by: EMERGENCY MEDICINE

## 2022-07-25 PROCEDURE — 96365 THER/PROPH/DIAG IV INF INIT: CPT

## 2022-07-25 PROCEDURE — 250N000011 HC RX IP 250 OP 636: Performed by: EMERGENCY MEDICINE

## 2022-07-25 PROCEDURE — 74177 CT ABD & PELVIS W/CONTRAST: CPT

## 2022-07-25 PROCEDURE — 88342 IMHCHEM/IMCYTCHM 1ST ANTB: CPT | Mod: TC | Performed by: OBSTETRICS & GYNECOLOGY

## 2022-07-25 PROCEDURE — 99285 EMERGENCY DEPT VISIT HI MDM: CPT | Mod: 25

## 2022-07-25 PROCEDURE — 250N000009 HC RX 250: Performed by: EMERGENCY MEDICINE

## 2022-07-25 PROCEDURE — 93005 ELECTROCARDIOGRAM TRACING: CPT

## 2022-07-25 PROCEDURE — 96375 TX/PRO/DX INJ NEW DRUG ADDON: CPT

## 2022-07-25 PROCEDURE — 84484 ASSAY OF TROPONIN QUANT: CPT | Performed by: EMERGENCY MEDICINE

## 2022-07-25 PROCEDURE — 85025 COMPLETE CBC W/AUTO DIFF WBC: CPT | Performed by: EMERGENCY MEDICINE

## 2022-07-25 PROCEDURE — 96367 TX/PROPH/DG ADDL SEQ IV INF: CPT

## 2022-07-25 PROCEDURE — 88342 IMHCHEM/IMCYTCHM 1ST ANTB: CPT | Mod: 26 | Performed by: PATHOLOGY

## 2022-07-25 PROCEDURE — C9803 HOPD COVID-19 SPEC COLLECT: HCPCS

## 2022-07-25 PROCEDURE — 96366 THER/PROPH/DIAG IV INF ADDON: CPT

## 2022-07-25 PROCEDURE — 87040 BLOOD CULTURE FOR BACTERIA: CPT | Performed by: EMERGENCY MEDICINE

## 2022-07-25 PROCEDURE — 85520 HEPARIN ASSAY: CPT | Performed by: EMERGENCY MEDICINE

## 2022-07-25 PROCEDURE — 87637 SARSCOV2&INF A&B&RSV AMP PRB: CPT | Performed by: EMERGENCY MEDICINE

## 2022-07-25 PROCEDURE — 36415 COLL VENOUS BLD VENIPUNCTURE: CPT | Performed by: EMERGENCY MEDICINE

## 2022-07-25 PROCEDURE — 258N000003 HC RX IP 258 OP 636: Performed by: EMERGENCY MEDICINE

## 2022-07-25 PROCEDURE — 120N000001 HC R&B MED SURG/OB

## 2022-07-25 RX ORDER — ATENOLOL 50 MG/1
25 TABLET ORAL DAILY
COMMUNITY
End: 2023-02-25

## 2022-07-25 RX ORDER — HEPARIN SODIUM 10000 [USP'U]/100ML
0-5000 INJECTION, SOLUTION INTRAVENOUS CONTINUOUS
Status: DISCONTINUED | OUTPATIENT
Start: 2022-07-25 | End: 2022-07-25

## 2022-07-25 RX ORDER — AZITHROMYCIN 500 MG/5ML
500 INJECTION, POWDER, LYOPHILIZED, FOR SOLUTION INTRAVENOUS EVERY 24 HOURS
Status: DISCONTINUED | OUTPATIENT
Start: 2022-07-26 | End: 2022-07-28

## 2022-07-25 RX ORDER — CEFTRIAXONE 1 G/1
1 INJECTION, POWDER, FOR SOLUTION INTRAMUSCULAR; INTRAVENOUS EVERY 24 HOURS
Status: DISCONTINUED | OUTPATIENT
Start: 2022-07-26 | End: 2022-07-28

## 2022-07-25 RX ORDER — HEPARIN SODIUM 10000 [USP'U]/100ML
0-5000 INJECTION, SOLUTION INTRAVENOUS CONTINUOUS
Status: DISPENSED | OUTPATIENT
Start: 2022-07-25 | End: 2022-07-28

## 2022-07-25 RX ORDER — LIDOCAINE 40 MG/G
CREAM TOPICAL
Status: DISCONTINUED | OUTPATIENT
Start: 2022-07-25 | End: 2022-08-03 | Stop reason: HOSPADM

## 2022-07-25 RX ORDER — CEFTRIAXONE 2 G/1
2 INJECTION, POWDER, FOR SOLUTION INTRAMUSCULAR; INTRAVENOUS ONCE
Status: COMPLETED | OUTPATIENT
Start: 2022-07-25 | End: 2022-07-25

## 2022-07-25 RX ORDER — IOPAMIDOL 755 MG/ML
500 INJECTION, SOLUTION INTRAVASCULAR ONCE
Status: COMPLETED | OUTPATIENT
Start: 2022-07-25 | End: 2022-07-25

## 2022-07-25 RX ORDER — AMOXICILLIN 250 MG
1 CAPSULE ORAL DAILY PRN
COMMUNITY
End: 2022-08-23

## 2022-07-25 RX ORDER — AZITHROMYCIN 500 MG/5ML
500 INJECTION, POWDER, LYOPHILIZED, FOR SOLUTION INTRAVENOUS ONCE
Status: COMPLETED | OUTPATIENT
Start: 2022-07-25 | End: 2022-07-25

## 2022-07-25 RX ORDER — ONDANSETRON 4 MG/1
4 TABLET, ORALLY DISINTEGRATING ORAL EVERY 6 HOURS PRN
Status: DISCONTINUED | OUTPATIENT
Start: 2022-07-25 | End: 2022-08-03 | Stop reason: HOSPADM

## 2022-07-25 RX ORDER — ONDANSETRON 2 MG/ML
4 INJECTION INTRAMUSCULAR; INTRAVENOUS EVERY 6 HOURS PRN
Status: DISCONTINUED | OUTPATIENT
Start: 2022-07-25 | End: 2022-08-03 | Stop reason: HOSPADM

## 2022-07-25 RX ADMIN — SODIUM CHLORIDE 95 ML: 9 INJECTION, SOLUTION INTRAVENOUS at 10:34

## 2022-07-25 RX ADMIN — CEFTRIAXONE 2 G: 2 INJECTION, POWDER, FOR SOLUTION INTRAMUSCULAR; INTRAVENOUS at 11:18

## 2022-07-25 RX ADMIN — SODIUM CHLORIDE 2634 ML: 9 INJECTION, SOLUTION INTRAVENOUS at 10:53

## 2022-07-25 RX ADMIN — AZITHROMYCIN MONOHYDRATE 500 MG: 500 INJECTION, POWDER, LYOPHILIZED, FOR SOLUTION INTRAVENOUS at 11:22

## 2022-07-25 RX ADMIN — HEPARIN SODIUM AND DEXTROSE 1600 UNITS/HR: 10000; 5 INJECTION INTRAVENOUS at 12:03

## 2022-07-25 RX ADMIN — HEPARIN SODIUM AND DEXTROSE 1600 UNITS/HR: 10000; 5 INJECTION INTRAVENOUS at 21:41

## 2022-07-25 RX ADMIN — IOPAMIDOL 70 ML: 755 INJECTION, SOLUTION INTRAVENOUS at 10:34

## 2022-07-25 ASSESSMENT — ENCOUNTER SYMPTOMS
LIGHT-HEADEDNESS: 1
DIZZINESS: 1
COUGH: 1
SHORTNESS OF BREATH: 1
ABDOMINAL PAIN: 0
FEVER: 1
VOMITING: 0

## 2022-07-25 ASSESSMENT — ACTIVITIES OF DAILY LIVING (ADL)
ADLS_ACUITY_SCORE: 20
ADLS_ACUITY_SCORE: 35

## 2022-07-25 NOTE — ED TRIAGE NOTES
Patient sent to the ED from clinic with shortness of breath and hypoxia. Provider reports a CXR showing bilateral infiltrates. Patient states intermittent shortness of breath the past few days that got worse this morning.

## 2022-07-25 NOTE — TELEPHONE ENCOUNTER
Prior Authorization Approval    Authorization Effective Date: 4/23/2022  Authorization Expiration Date: 7/22/2023  Medication: ONDANSETRON  Approved Dose/Quantity:    Reference #:     Insurance Company: [employer] [place] - Phone [phone] Fax [phone]  Expected CoPay:       CoPay Card Available:      Foundation Assistance Needed:    Which Pharmacy is filling the prescription (Not needed for infusion/clinic administered): [facility] [address] [place] [address] [id number] [place] [address]  Pharmacy Notified: Yes  Patient Notified: Yes  **Instructed pharmacy to notify patient when script is ready to /ship.**

## 2022-07-25 NOTE — PROGRESS NOTES
Dr Torres's note      ASSESSMENT & PLAN:                                                        72-year-old woman with a lot of recent medical events: Metastatic malignant melanoma to the thigh, recent hysterectomy for endometrial carcinoma in early stages, DM, HTN, hyperlipidemia.  I saw her last week for nausea and vomiting, possible related with semaglutide.  Possible UTI and I prescribed ciprofloxacin  Today she comes for follow-up, not feeling much better.  She has fever hypoxemia with pulse ox 84% in room air.  She looks very weak.  The chest x-ray shows bilateral patchy infiltrates.   The patient needs evaluation in emergency room.  She agrees.  I went to emergency room with the patient and talk to the ER triage nurse.    (R06.02) SOB (shortness of breath)  (primary encounter diagnosis)  Comment:   Plan: XR Chest 2 Views          (R91.8) Bilateral pulmonary infiltrates on chest x-ray  Comment:   Plan:     (J96.01) Acute respiratory failure with hypoxia (H)  Comment:   Plan:     (C79.9) Metastatic malignant melanoma (H)  -- R thigh   Comment:   Plan:     (R50.9) Fever, unspecified fever cause  Comment:   Plan:        Chief complaint:                                                      Fever, sob     SUBJECTIVE:                                                    History of present illness:      Hyperlipidemia Follow-Up      Are you regularly taking any medication or supplement to lower your cholesterol?   Yes- Atorvastatin    Are you having muscle aches or other side effects that you think could be caused by your cholesterol lowering medication?  No    Hypertension Follow-up      Do you check your blood pressure regularly outside of the clinic? Yes     Are you following a low salt diet? No    Are your blood pressures ever more than 140 on the top number (systolic) OR more   than 90 on the bottom number (diastolic), for example 140/90? No      How many servings of fruits and vegetables do you eat daily?   "2-3    On average, how many sweetened beverages do you drink each day (Examples: soda, juice, sweet tea, etc.  Do NOT count diet or artificially sweetened beverages)?   0    How many days per week do you exercise enough to make your heart beat faster? 3 or less    How many minutes a day do you exercise enough to make your heart beat faster? 9 or less    How many days per week do you miss taking your medication? 0      Review of Systems:                                                      ROS: negative for fever, chills, cough, wheezes, chest pain, shortness of breath, vomiting, abdominal pain, leg swelling     OBJECTIVE:             Physical exam:  Blood pressure 110/62, pulse 86, temperature 100.1  F (37.8  C), temperature source Tympanic, resp. rate 18, height 1.702 m (5' 7\"), weight 87.8 kg (193 lb 9.6 oz), last menstrual period 12/13/2002, SpO2 (!) 84 %, not currently breastfeeding.     NAD, appears very weak   Skin: no rashes     Neck: supple, no JVD,  No thyroidmegaly. Lymph nodes nonpalpable cervical and supraclavicular.  Chest: clear to auscultation bilaterally, good respiratory effort  Heart: S1 S2, RRR, no mgr appreciated  Abdomen: soft, not tender, no hepatosplenomegaly or masses appreciated, no abdominal bruit, present bowel sounds  Extremities: no edema,   Neurologic: A, Ox3, no focal signs appreciated    PMHx: reviewed  Past Medical History:   Diagnosis Date     Asthma, mild intermittent      Cataract      Endometrial cancer (H)      HTN, goal below 140/90      Hyperlipidemia LDL goal < 100      Macular hole of left eye      Melanoma (H)     RIGHT KNEE     Sleep apnea     uses c pap     Type 2 diabetes, HbA1C goal < 8% (H)       PSHx: reviewed  Past Surgical History:   Procedure Laterality Date     ARTHROPLASTY HIP Right 9/25/2017    Procedure: ARTHROPLASTY HIP;  Right total hip arthroplasty  ;  Surgeon: Ayaan Pena MD;  Location: [facility]     ARTHROPLASTY KNEE  7/12/2013    Procedure: " ARTHROPLASTY KNEE;  right total knee arthroplasty ;  Surgeon: Chaparro Wesley MD;  Location: [facility]     ARTHROSCOPY KNEE Right 1/21/2015    Procedure: ARTHROSCOPY KNEE;  Surgeon: Ayaan Pena MD;  Location: [facility]     C INCISION OF HYMEN       CATARACT IOL, RT/LT       COLONOSCOPY  2008     COLONOSCOPY N/A 4/18/2019    Procedure: Colonoscopy with polypectomy;  Surgeon: Shaun Guerrero MD;  Location: [facility]     CYSTOSCOPY N/A 6/23/2022    Procedure: Cystoscopy;  Surgeon: Eli Guidry MD;  Location: [facility]     ENDOSCOPIC RETROGRADE CHOLANGIOPANCREATOGRAM N/A 2/6/2019    Procedure: COMBINED ENDOSCOPIC RETROGRADE CHOLANGIOPANCREATOGRAPHY, BILIARY SPINCTEROTOMY AND DILATION, PLACE BILE DUCT STENT;  Surgeon: Guru Andie Gonzalez MD;  Location: [facility]     ENDOSCOPIC RETROGRADE CHOLANGIOPANCREATOGRAM N/A 2/28/2019    Procedure: Endoscopic Retrograde Cholangiopancreatogram, Bile duct stent removal and placement;  Surgeon: Shaun Guerrero MD;  Location: [facility]     ENDOSCOPIC RETROGRADE CHOLANGIOPANCREATOGRAM N/A 4/18/2019    Procedure: COMBINED ENDOSCOPIC RETROGRADE CHOLANGIOPANCREATOGRAPHY, Bile duct stent exchange and Polypectomy;  Surgeon: Shaun Guerrero MD;  [facility]: [facility]     ENDOSCOPIC RETROGRADE CHOLANGIOPANCREATOGRAM N/A 10/18/2019    Procedure: Endoscopic Retrograde Cholangiopancreatogram;  Surgeon: Shaun Guerrero MD;  [facility]     ENDOSCOPIC RETROGRADE CHOLANGIOPANCREATOGRAM N/A 3/24/2022    Procedure: ENDOSCOPIC RETROGRADE CHOLANGIOPANCREATOGRAPHY;  Surgeon: Shaun Guerrero MD;  Location:  OR     ENDOSCOPIC RETROGRADE CHOLANGIOPANCREATOGRAM WITH SPYGLASS N/A 7/26/2019    Procedure: Endoscopic Retrograde Cholangiopancreatogram With Spyglas,l Radiofrequency Ablation, Stent Exchangeand Duodenal Biopsy x2;  Surgeon: Shaun Guerrero MD;  [facility]     ENDOSCOPIC RETROGRADE CHOLANGIOPANCREATOGRAM WITH SPYGLASS N/A 9/10/2020    Procedure: ENDOSCOPIC RETROGRADE  CHOLANGIOPANCREATOGRAPHY, WITH DIRECT DUCT VISUALIZATION, USING PANCREATICOBILIARY FIBEROPTIC PROBE;  Surgeon: Shaun Guerrero MD;  [facility]     ENDOSCOPIC RETROGRADE CHOLANGIOPANCREATOGRAM WITH SPYGLASS N/A 3/22/2021    Procedure: ENDOSCOPIC RETROGRADE CHOLANGIOPANCREATOGRAPHY, WITH DIRECT DUCT VISUALIZATION, USING PANCREATICOBILIARY FIBEROPTIC PROBE;  Surgeon: Shaun Guerrero MD;  [facility]     ESOPHAGOSCOPY, GASTROSCOPY, DUODENOSCOPY (EGD) WITH RADIO FREQUENCY ABLATION, COMBINED N/A 9/10/2020    Procedure: possible repeat ESOPHAGOGASTRODUODENOSCOPY, WITH RADIOFREQUENCY ABLATION and endoscopic mucosal resection;  Surgeon: Shaun Guerrero MD;  [facility]     ESOPHAGOSCOPY, GASTROSCOPY, DUODENOSCOPY (EGD), COMBINED N/A 4/18/2019    Procedure: upper endoscopy with polypectomy;  Surgeon: Shaun Guerrero MD;  [facility]     ESOPHAGOSCOPY, GASTROSCOPY, DUODENOSCOPY (EGD), COMBINED N/A 10/18/2019    Procedure: Upper Endoscopy and ERCP with stent removal, stone removal and biopsy;  Surgeon: Shaun Guerrero MD;  [facility]     ESOPHAGOSCOPY, GASTROSCOPY, DUODENOSCOPY (EGD), COMBINED N/A 3/24/2022    Procedure: ESOPHAGOGASTRODUODENOSCOPY (EGD), Duodenal  polyp removal;  Surgeon: Shaun Guerrero MD;  Location: [facility]     ESOPHAGOSCOPY, GASTROSCOPY, DUODENOSCOPY (EGD), RESECT MUCOSA, COMBINED N/A 2/28/2019    Procedure: Upper Endoscopy, Endoscopic Ultrasound, Endoscopic Mucosal Resection,  Ampullectomy, polypectomy.;  Surgeon: Shaun Guerrero MD;  [facility]     ESOPHAGOSCOPY, GASTROSCOPY, DUODENOSCOPY (EGD), RESECT MUCOSA, COMBINED N/A 3/22/2021    Procedure: ESOPHAGOGASTRODUODENOSCOPY (EGD) with  endoscopic mucosal resection/ polypectomy;  Surgeon: Shaun Guerrero MD;  [facility]     EXCISE LESION LOWER EXTREMITY Right 3/28/2022    Procedure: Wide local excision of right knee melanoma;  Surgeon: Chevy Allison MD;  Location: [facility] OR     EXCISE MASS LOWER EXTREMITY Right 1/13/2022    Procedure:  excision of right thigh mass;  Surgeon: Salvador Kelly MD;  Location: [facility]     EYE SURGERY      macular hole repaired left eye     HC KNEE SCOPE, DIAGNOSTIC      - both knees     HEAD & NECK SURGERY      wisdom teeth     IR CHEST PORT PLACEMENT > 5 YRS OF AGE  5/2/2022     LAPAROSCOPIC HYSTERECTOMY TOTAL, BILATERAL SALPINGO-OOPHORECTOMY, NODE DISSECTION, COMBINED Bilateral 6/23/2022    Procedure: Total Laparoscopic Hyserectomy, Bilateral Salpibgo-oophorectomy, Bilateral [place] Lymph Node Dissection;  Surgeon: Eli Guidry MD;  Location[facility]        Meds: reviewed  Current Outpatient Medications   Medication Sig Dispense Refill     acetaminophen (TYLENOL) 325 MG tablet Take 3 tablets (975 mg) by mouth every 6 hours as needed for mild pain 50 tablet 0     albuterol (PROAIR HFA/PROVENTIL HFA/VENTOLIN HFA) 108 (90 Base) MCG/ACT inhaler Inhale 2 puffs into the lungs every 4 hours as needed for shortness of breath / dyspnea 1 Inhaler 3     aspirin 81 MG EC tablet Take 1 tablet (81 mg) by mouth daily 90 tablet 3     atenolol (TENORMIN) 50 MG tablet Take 1 tablet by mouth once daily (Patient taking differently: Takes 1/2 tablet =25 mg) 90 tablet 0     atorvastatin (LIPITOR) 20 MG tablet Take 1 tablet by mouth once daily 90 tablet 0     azelastine (ASTELIN) 0.1 % nasal spray USE 1 SPRAY(S) IN EACH NOSTRIL TWICE DAILY AS NEEDED 30 mL 0     azelastine (OPTIVAR) 0.05 % SOLN Place 1 drop into both eyes 2 times daily as needed Reported on 3/22/2017       cetirizine (ZYRTEC) 10 MG tablet Take 10 mg by mouth every morning Takes only PRN       ciprofloxacin (CIPRO) 500 MG tablet Take 1 tablet (500 mg) by mouth 2 times daily 14 tablet 0     CONTOUR NEXT TEST test strip USE 1 STRIP TO CHECK GLUCOSE ONCE DAILY 100 strip 4     diclofenac (VOLTAREN) 1 % topical gel Apply topically 4 times daily as needed        fish oil-omega-3 fatty acids 1000 MG capsule Take 1 g by mouth daily Reported on 3/22/2017       glipiZIDE  (GLUCOTROL) 5 MG tablet TAKE 2 TABLETS BY MOUTH TWICE DAILY BEFORE MEAL(S) 360 tablet 0     ibuprofen (ADVIL/MOTRIN) 200 MG tablet take 4 tablet (200 mg) by oral route every 8 hours as needed with food       INVOKANA 300 MG tablet TAKE 1 TABLET BY MOUTH IN THE MORNING BEFORE BREAKFAST 90 tablet 0     latanoprost (XALATAN) 0.005 % ophthalmic solution Place 1 drop into both eyes At Bedtime  2.5 mL 1     lidocaine-prilocaine (EMLA) 2.5-2.5 % external cream APPLY CREAM TOPICALLY ONCE DAILY AS NEEDED FOR PAIN APPLY A PEA SIZED AMOUNT OVER PORT SITE 60 MINUTES PRIOR TO USE COVER WITH PLASTIC WRAP       metFORMIN (GLUCOPHAGE XR) 500 MG 24 hr tablet TAKE 2 TABLETS BY MOUTH TWICE DAILY WITH MEALS 360 tablet 0     ondansetron (ZOFRAN ODT) 4 MG ODT tab Take 1 tablet (4 mg) by mouth every 8 hours as needed for nausea 4 tablet 0     ondansetron (ZOFRAN) 8 MG tablet Take 1 tablet (8 mg) by mouth every 8 hours as needed for nausea 30 tablet 1     ondansetron (ZOFRAN-ODT) 4 MG ODT tab Take 1-2 tablets (4-8 mg) by mouth every 8 hours as needed for nausea 8 tablet 0     order for DME All diabetic testing supplies including test strips, lancets and solution for testing 2 times per day. Patient is using   no Insulin. Last A1c Lab Results       Component                Value               Date                       A1C                      7.9                 08/20/2018 100 each 11     pembrolizumab Inject into the vein once Every 3 weeks at [facility] office       Semaglutide 7 MG TABS Take 7 mg by mouth daily 90 tablet 1     senna-docusate (SENOKOT-S/PERICOLACE) 8.6-50 MG tablet Take 1-2 tablets by mouth 2 times daily 30 tablet 0     sulfamethoxazole-trimethoprim (BACTRIM DS) 800-160 MG tablet Take 1 tablet by mouth 2 times daily 10 tablet 0     timolol maleate (TIMOPTIC) 0.5 % ophthalmic solution INSTILL 1 DROP INTO EACH EYE TWICE DAILY       triamcinolone (KENALOG) 0.1 % external cream APPLY CREAM EXTERNALLY TO AFFECTED AREA TWICE DAILY  ON THE LEGS UNTIL FULLY CLEAR AND THEN FOR FUTURE FLARES       triamcinolone (KENALOG) 0.5 % external ointment Apply 1 g topically daily 15 g 1       Soc Hx: reviewed  Fam Hx: reviewed          Raisa Torres MD  Internal Medicine

## 2022-07-25 NOTE — H&P
[facility]    History and Physical  Hospitalist       Date of Admission:  7/25/2022  Date of Service (when I saw the patient): 07/25/22    Assessment & Plan   Gricelda Sabillon is a 72 year old female patient with past medical history of diabetes mellitus type 2, hypertension, hyperlipidemia, metastatic melanoma, recent hysterectomy for endometrial cancer, obstructive sleep apnea uses CPAP, was recently treated for UTI with ciprofloxacin, came to emergency room for evaluation for shortness of breath.  She states that she has been having significant shortness of breath this morning.  She had no associated cough or fever.  She also denies chest pain.  She states that she is feeling very weak.  She also complains of fever.  She went to the clinic today and had her vital signs checked and was noted to have oxygen saturation at 84% on room air.  Chest x-ray was done and showed bilateral infiltrates.  She was sent to emergency room for further evaluation.  Her vitals were stable on arrival to emergency room.  Laboratory work-up showed sodium 133, potassium 4.4, creatinine 0.79, ALT 31, AST 21, procalcitonin 0.26, troponin I 7.  WBC 14.7, hemoglobin 11.3.  CT of the chest with contrast was also done and revealed occlusive and nonocclusive pulmonary emboli noted within multiple pulmonary artery branches bilaterally.  There is mild prominence of right ventricle suggestive of right heart strain.  Patient was started on heparin drip in the ER.  She was also given IV ceftriaxone in the azithromycin for suspected pneumonia.  She was admitted to the hospital for further management.     Acute hypoxic respiratory failure secondary to likely multifactorial bilateral pulmonary embolism, possible pneumonia  Patient was noted to be hypoxic in the clinic.  Currently on oxygen supplement.  CT of the chest with contrast showed bilateral occlusive and nonocclusive pulmonary emboli multiple pulmonary artery branches.   She was started on heparin drip.  We will continue medication.  Hematocrit elevated at 0.26.  Chest x-ray showed bilateral infiltrates.  CT of the chest revealed metastatic masses and bilateral pulmonary embolism.  Given her subjective fever, leukocytosis, elevated procalcitonin, we will continue antibiotic for now for possible superimposed bacterial pneumonia    History of metastatic melanoma  Patient follows with Dr. Florez at [facility]. On Keytruda. CT of the chest shows numerous ill-defined masses in both lungs have increased in size, and are suspicious for progression of metastatic disease.   We will consult oncology for comanagement    History of ureteric cancer requiring hysterectomy    Diabetes mellitus type 2  On glipizide and metformin.  We will put her on insulin sliding scale.  Due to contrast administration.  Will resume glipizide.    Hypertension, blood pressure stable.  Will resume atenolol.  Will monitor blood pressure    Hyperlipidemia: We will resume atorvastatin    DVT Prophylaxis: heparin drip  Code Status: Full Code. Code status confirmed with patient    Disposition: Expected discharge in 1-2 days     Rivas Gandhi MD    Primary Care Physician   Raisa Davidson    Chief Complaint     History is obtained from the patient    History of Present Illness   Gricelda Sabillon is a 72 year old female patient with past medical history of diabetes mellitus type 2, hypertension, hyperlipidemia, metastatic melanoma, recent hysterectomy for endometrial cancer, obstructive sleep apnea uses CPAP, was recently treated for UTI with ciprofloxacin, came to emergency room for evaluation for shortness of breath.  She states that she has been having significant shortness of breath this morning.  She had no associated cough or fever.  She also denies chest pain.  She states that she is feeling very weak.  She also complains of fever.  She went to the clinic today and had her vital signs checked and was  noted to have oxygen saturation at 84% on room air.  Chest x-ray was done and showed bilateral infiltrates.  She was sent to emergency room for further evaluation.   On arrival to emergency room, her vital signs were checked and showed temperature 98.3, pulse 90, blood pressure 121/67, oxygen saturation 91% on room air.  Laboratory work-up showed sodium 133, potassium 4.4, creatinine 0.79, ALT 31, AST 21, procalcitonin 0.26, troponin I 7.  WBC 14.7, hemoglobin 11.3.  CT of the chest with contrast was also done and revealed occlusive and nonocclusive pulmonary emboli noted within multiple pulmonary artery branches bilaterally.  There is mild prominence of right ventricle suggestive of right heart strain.  There are numerous ill-defined masses in both lungs suspicious for progression of metastatic disease.  There is thrombus within the bilateral ovarian veins which has not changed significantly from previous study.  Patient was started on heparin drip in emergency room. She was also given IV ceftriaxone and azithromycin for possible pneumonia. She was admitted to the hospital for further management.    Past Medical History    I have reviewed this patient's medical history and updated it with pertinent information if needed.   Past Medical History:   Diagnosis Date     Asthma, mild intermittent      Cataract      Endometrial cancer (H)      HTN, goal below 140/90      Hyperlipidemia LDL goal < 100      Macular hole of left eye      Melanoma (H)     RIGHT KNEE     Sleep apnea     uses c pap     Type 2 diabetes, HbA1C goal < 8% (H)        Past Surgical History   I have reviewed this patient's surgical history and updated it with pertinent information if needed.  Past Surgical History:   Procedure Laterality Date     ARTHROPLASTY HIP Right 9/25/2017    Procedure: ARTHROPLASTY HIP;  Right total hip arthroplasty  ;  Surgeon: Ayaan Pena MD;  Location: [facility] OR     ARTHROPLASTY KNEE  7/12/2013    Procedure:  ARTHROPLASTY KNEE;  right total knee arthroplasty ;  Surgeon: Chaparro Wesley MD;  Location: [facility] OR     ARTHROSCOPY KNEE Right 1/21/2015    Procedure: ARTHROSCOPY KNEE;  Surgeon: Ayaan Pena MD;  Location: [facility] OR     C INCISION OF HYMEN       CATARACT IOL, RT/LT       COLONOSCOPY  2008     COLONOSCOPY N/A 4/18/2019    Procedure: Colonoscopy with polypectomy;  Surgeon: Shaun Guerrero MD;  [facility]: [facility]     CYSTOSCOPY N/A 6/23/2022    Procedure: Cystoscopy;  Surgeon: Eli Guidry MD;  [facility]     ENDOSCOPIC RETROGRADE CHOLANGIOPANCREATOGRAM N/A 2/6/2019    Procedure: COMBINED ENDOSCOPIC RETROGRADE CHOLANGIOPANCREATOGRAPHY, BILIARY SPINCTEROTOMY AND DILATION, PLACE BILE DUCT STENT;  Surgeon: Guru Andie Gonzalez MD;  [facility]     ENDOSCOPIC RETROGRADE CHOLANGIOPANCREATOGRAM N/A 2/28/2019    Procedure: Endoscopic Retrograde Cholangiopancreatogram, Bile duct stent removal and placement;  Surgeon: Shaun Guerrero MD;  [facility]     ENDOSCOPIC RETROGRADE CHOLANGIOPANCREATOGRAM N/A 4/18/2019    Procedure: COMBINED ENDOSCOPIC RETROGRADE CHOLANGIOPANCREATOGRAPHY, Bile duct stent exchange and Polypectomy;  Surgeon: Shaun Guerrero MD;  [facility]     ENDOSCOPIC RETROGRADE CHOLANGIOPANCREATOGRAM N/A 10/18/2019    Procedure: Endoscopic Retrograde Cholangiopancreatogram;  Surgeon: Shaun Guerrero MD;  [facility]     ENDOSCOPIC RETROGRADE CHOLANGIOPANCREATOGRAM N/A 3/24/2022    Procedure: ENDOSCOPIC RETROGRADE CHOLANGIOPANCREATOGRAPHY;  Surgeon: Shaun Guerrero MD;  Location:  OR     ENDOSCOPIC RETROGRADE CHOLANGIOPANCREATOGRAM WITH SPYGLASS N/A 7/26/2019    Procedure: Endoscopic Retrograde Cholangiopancreatogram With Spyglas,l Radiofrequency Ablation, Stent Exchangeand Duodenal Biopsy x2;  Surgeon: Shaun Guerrero MD;  [facility]     ENDOSCOPIC RETROGRADE CHOLANGIOPANCREATOGRAM WITH SPYGLASS N/A 9/10/2020    Procedure: ENDOSCOPIC RETROGRADE  CHOLANGIOPANCREATOGRAPHY, WITH DIRECT DUCT VISUALIZATION, USING PANCREATICOBILIARY FIBEROPTIC PROBE;  Surgeon: Shaun Guerrero MD;  [facility]     ENDOSCOPIC RETROGRADE CHOLANGIOPANCREATOGRAM WITH SPYGLASS N/A 3/22/2021    Procedure: ENDOSCOPIC RETROGRADE CHOLANGIOPANCREATOGRAPHY, WITH DIRECT DUCT VISUALIZATION, USING PANCREATICOBILIARY FIBEROPTIC PROBE;  Surgeon: Shaun Guerrero MD;  [facility]     ESOPHAGOSCOPY, GASTROSCOPY, DUODENOSCOPY (EGD) WITH RADIO FREQUENCY ABLATION, COMBINED N/A 9/10/2020    Procedure: possible repeat ESOPHAGOGASTRODUODENOSCOPY, WITH RADIOFREQUENCY ABLATION and endoscopic mucosal resection;  Surgeon: Shaun Guerrero MD;  [facility]     ESOPHAGOSCOPY, GASTROSCOPY, DUODENOSCOPY (EGD), COMBINED N/A 4/18/2019    Procedure: upper endoscopy with polypectomy;  Surgeon: Shaun Guerrero MD;  [facility]     ESOPHAGOSCOPY, GASTROSCOPY, DUODENOSCOPY (EGD), COMBINED N/A 10/18/2019    Procedure: Upper Endoscopy and ERCP with stent removal, stone removal and biopsy;  Surgeon: Shaun Guerrero MD;  [facility]     ESOPHAGOSCOPY, GASTROSCOPY, DUODENOSCOPY (EGD), COMBINED N/A 3/24/2022    Procedure: ESOPHAGOGASTRODUODENOSCOPY (EGD), Duodenal  polyp removal;  Surgeon: Shaun Guerrero MD;  Location: [facility]     ESOPHAGOSCOPY, GASTROSCOPY, DUODENOSCOPY (EGD), RESECT MUCOSA, COMBINED N/A 2/28/2019    Procedure: Upper Endoscopy, Endoscopic Ultrasound, Endoscopic Mucosal Resection,  Ampullectomy, polypectomy.;  Surgeon: Shaun Guerrero MD;  [facility]     ESOPHAGOSCOPY, GASTROSCOPY, DUODENOSCOPY (EGD), RESECT MUCOSA, COMBINED N/A 3/22/2021    Procedure: ESOPHAGOGASTRODUODENOSCOPY (EGD) with  endoscopic mucosal resection/ polypectomy;  Surgeon: Shaun Guerrero MD;  [facility]     EXCISE LESION LOWER EXTREMITY Right 3/28/2022    Procedure: Wide local excision of right knee melanoma;  Surgeon: Chevy Allison MD;  Location: [facility] OR     EXCISE MASS LOWER EXTREMITY Right 1/13/2022    Procedure:  excision of right thigh mass;  Surgeon: Salvador Kelly MD;  Location: [facility]     EYE SURGERY      macular hole repaired left eye     HC KNEE SCOPE, DIAGNOSTIC      - both knees     HEAD & NECK SURGERY      wisdom teeth     IR CHEST PORT PLACEMENT > 5 YRS OF AGE  5/2/2022     LAPAROSCOPIC HYSTERECTOMY TOTAL, BILATERAL SALPINGO-OOPHORECTOMY, NODE DISSECTION, COMBINED Bilateral 6/23/2022    Procedure: Total Laparoscopic Hyserectomy, Bilateral Salpibgo-oophorectomy, Bilateral [place] Lymph Node Dissection;  Surgeon: Eli Guidry MD;  [facility]       Prior to Admission Medications   Prior to Admission Medications   Prescriptions Last Dose Informant Patient Reported? Taking?   CONTOUR NEXT TEST test strip   No No   Sig: USE 1 STRIP TO CHECK GLUCOSE ONCE DAILY   INVOKANA 300 MG tablet 7/25/2022 at Unknown time  No Yes   Sig: TAKE 1 TABLET BY MOUTH IN THE MORNING BEFORE BREAKFAST   acetaminophen (TYLENOL) 325 MG tablet   No Yes   Sig: Take 3 tablets (975 mg) by mouth every 6 hours as needed for mild pain   albuterol (PROAIR HFA/PROVENTIL HFA/VENTOLIN HFA) 108 (90 Base) MCG/ACT inhaler   No Yes   Sig: Inhale 2 puffs into the lungs every 4 hours as needed for shortness of breath / dyspnea   aspirin 81 MG EC tablet 7/25/2022 at Unknown time  Yes Yes   Sig: Take 1 tablet (81 mg) by mouth daily   atenolol (TENORMIN) 50 MG tablet 7/25/2022 at Unknown time  Yes Yes   Sig: Take 25 mg by mouth daily   atorvastatin (LIPITOR) 20 MG tablet 7/24/2022 at Unknown time  No Yes   Sig: Take 1 tablet by mouth once daily   azelastine (ASTELIN) 0.1 % nasal spray   No Yes   Sig: USE 1 SPRAY(S) IN EACH NOSTRIL TWICE DAILY AS NEEDED   azelastine (OPTIVAR) 0.05 % SOLN  Self Yes Yes   Sig: Place 1 drop into both eyes 2 times daily as needed Reported on 3/22/2017   cetirizine (ZYRTEC) 10 MG tablet 7/25/2022 at Unknown time Self Yes Yes   Sig: Take 10 mg by mouth every morning   ciprofloxacin (CIPRO) 500 MG tablet 7/25/2022 at  am  No Yes   Sig: Take 1 tablet (500 mg) by mouth 2 times daily   fish oil-omega-3 fatty acids 1000 MG capsule  Self Yes No   Sig: Take 1 g by mouth daily Reported on 3/22/2017   glipiZIDE (GLUCOTROL) 5 MG tablet 7/25/2022 at am  No Yes   Sig: TAKE 2 TABLETS BY MOUTH TWICE DAILY BEFORE MEAL(S)   ibuprofen (ADVIL/MOTRIN) 200 MG tablet   Yes Yes   Sig: take 4 tablet (200 mg) by oral route every 8 hours as needed with food   latanoprost (XALATAN) 0.005 % ophthalmic solution 7/24/2022 at Unknown time  Yes Yes   Sig: Place 1 drop into both eyes At Bedtime    lidocaine-prilocaine (EMLA) 2.5-2.5 % external cream   Yes No   Sig: APPLY CREAM TOPICALLY ONCE DAILY AS NEEDED FOR PAIN APPLY A PEA SIZED AMOUNT OVER PORT SITE 60 MINUTES PRIOR TO USE COVER WITH PLASTIC WRAP   metFORMIN (GLUCOPHAGE XR) 500 MG 24 hr tablet 7/25/2022 at am  No Yes   Sig: TAKE 2 TABLETS BY MOUTH TWICE DAILY WITH MEALS   ondansetron (ZOFRAN) 8 MG tablet   No Yes   Sig: Take 1 tablet (8 mg) by mouth every 8 hours as needed for nausea   order for DME   No No   Sig: All diabetic testing supplies including test strips, lancets and solution for testing 2 times per day. Patient is using   no Insulin. Last A1c Lab Results       Component                Value               Date                       A1C                      7.9                 08/20/2018   pembrolizumab   Yes No   Sig: Inject into the vein once Every 3 weeks at [facility] office   senna-docusate (SENOKOT-S/PERICOLACE) 8.6-50 MG tablet   Yes Yes   Sig: Take 1 tablet by mouth daily as needed for constipation   timolol maleate (TIMOPTIC) 0.5 % ophthalmic solution 7/25/2022 at am  Yes Yes   Sig: INSTILL 1 DROP INTO EACH EYE TWICE DAILY   triamcinolone (KENALOG) 0.5 % external ointment prn  No Yes   Sig: Apply 1 g topically daily   Patient taking differently: Apply 1 applicator topically daily as needed      Facility-Administered Medications: None     Allergies   Allergies   Allergen Reactions      "Bromfenac Visual Disturbance     Sulfites, xibrom  Causes sever eye pain     Codeine      \"NAUSE,HA AND DIZZINESS\"     Codeine Nausea     Other reaction(s): Dizziness, Headache     Sulfites Visual Disturbance     Xibrom (bromfenac opthalmic solution) 0.09%--eye pain       Social History   I have reviewed this patient's social history and updated it with pertinent information if needed. Gricelda Sabillon  reports that she has never smoked. She has never used smokeless tobacco. She reports that she does not drink alcohol and does not use drugs.    Family History   I have reviewed this patient's family history and updated it with pertinent information if needed.   Family History   Problem Relation Age of Onset     Hypertension Mother         diabetes,hypoythryoidism, stroke     Diabetes Mother      Breast Cancer Mother      Heart Disease Father         , cancer lip     Uterine Cancer Sister      Connective Tissue Disorder Sister         fibromyalgia     Diabetes Sister      Hypertension Brother      Cerebrovascular Disease Maternal Grandmother         , diabetes     Cerebrovascular Disease Maternal Grandfather              Cancer Paternal Grandmother              Heart Disease Paternal Grandfather              Cancer Paternal Aunt         ovarian     Breast Cancer Niece        Review of Systems   The 10 point Review of Systems is negative other than noted in the HPI or here. Shortness of breath    Physical Exam   Temp: 98.3  F (36.8  C)   BP: 114/67 Pulse: 81   Resp: 18 SpO2: 95 % O2 Device: None (Room air)    Vital Signs with Ranges  Temp:  [98.3  F (36.8  C)-100.1  F (37.8  C)] 98.3  F (36.8  C)  Pulse:  [81-90] 81  Resp:  [18-30] 18  BP: (110-121)/(58-67) 114/67  SpO2:  [83 %-96 %] 95 %  0 lbs 0 oz    GEN:  Alert, oriented x 3, appears comfortable, NAD.  HEENT:  Normocephalic/atraumatic, no scleral icterus, no nasal discharge, mouth moist.  CV:  Regular rate and rhythm, no " murmur or JVD.  S1 + S2 noted, no S3 or S4.  LUNGS:  Clear to auscultation bilaterally without rales/rhonchi/wheezing/retractions.  Symmetric chest rise on inhalation noted.  ABD:  Active bowel sounds, soft, non-tender/non-distended.  No rebound/guarding/rigidity.  EXT:  No edema or cyanosis.  Hands/feet warm to touch with good signs of peripheral perfusion.  No joint synovitis noted.  SKIN:  Dry to touch, no exanthems noted in the visualized areas.  NEURO:  Symmetric muscle strength, sensation to touch grossly intact.  No new focal deficits appreciated.    Data   Data reviewed today:  I personally reviewed   Recent Labs   Lab 07/25/22  1012 07/19/22  1407   WBC 14.7* 13.4*   HGB 11.3* 12.2   MCV 88 85    560*    133   POTASSIUM 4.4 4.2   CHLORIDE 99 99   CO2 25 25   BUN 16 18   CR 0.79 0.71   ANIONGAP 9 9   KATHRYN 9.7 10.8*   * 146*   ALBUMIN 1.9* 2.2*   PROTTOTAL 7.0 8.0   BILITOTAL 0.4 0.4   ALKPHOS 129 139   ALT 31 33   AST 21 25       Recent Results (from the past 24 hour(s))   CT Chest (PE) Abdomen Pelvis w Contrast   Result Value    Radiologist flags Pulmonary embolism (AA)    Narrative    CT CHEST PULMONARY EMBOLUS, ABDOMEN PELVIS WITH CONTRAST 7/25/2022  10:50 AM    CLINICAL HISTORY: Shortness of breath. Hypoxia. Vomiting. History of  melanoma with lung metastases. Additional history of uterine cancer.    TECHNIQUE: CT angiogram chest and routine CT abdomen pelvis with IV  contrast. Arterial phase through the chest and venous phase through  the abdomen and pelvis. 2D and 3D MIP reconstructions were preformed  by the CT technologist. Dose reduction techniques were used.   CONTRAST: 70mL [id number]-[id number]    COMPARISON: CT of the abdomen and pelvis performed 7/12/2022. PET/CT  performed 1/27/2022.    FINDINGS:  ANGIOGRAM CHEST: Few areas of occlusive and nonocclusive pulmonary  embolism within pulmonary artery branches bilaterally. No large  central emboli or saddle embolus. Thoracic aorta is  negative for  dissection. Mild prominence of the right ventricle suggests some  degree of associated right heart strain.     LUNGS AND PLEURA: No pleural effusions. Numerous ill-defined masses in  both lungs with a few areas of central cavitation, increased compared  to 7/12/2022, and new compared to 1/27/2022. For example,  consolidation in the left lower lobe posteriorly and medially (series  10 image 106) measures 7.2 x 5.1 cm.    MEDIASTINUM/AXILLAE: No enlarged lymph nodes in the chest. Few mildly  prominent mediastinal and bilateral hilar lymph nodes have increased  compared to 1/27/2022. No pericardial effusion.    CORONARY ARTERY CALCIFICATION: Mild.    HEPATOBILIARY: Mild pneumobilia has increased in prominence. No  hepatic masses are identified.    PANCREAS: Normal.    SPLEEN: Normal.    ADRENAL GLANDS: Normal.    KIDNEYS/BLADDER: Right renal cyst would require no specific follow-up.  The kidneys are otherwise unremarkable. No hydronephrosis.    BOWEL: No bowel obstruction. No convincing evidence for colitis or  diverticulitis. Unremarkable appendix.    PELVIC ORGANS: Hysterectomy. Small amount of free fluid in the pelvis.    LYMPH NODES: No enlarged lymph nodes are identified in the abdomen or  pelvis.    VASCULATURE: Thrombus within the bilateral ovarian veins has a similar  appearance to the previous exam. Moderate atherosclerotic aortoiliac  calcification.    ADDITIONAL FINDINGS: None.    MUSCULOSKELETAL: Degenerative changes throughout the thoracolumbar  spine and within both sacroiliac joints. Right hip arthroplasty.  Nondisplaced right 6th rib fracture is likely subacute or chronic No  destructive bony lesions are identified.      Impression    IMPRESSION:  1.  Occlusive and nonocclusive pulmonary emboli are noted within  multiple pulmonary artery branches bilaterally.  2.  Mild prominence of the right ventricle suggests some degree of  associated right heart strain.  3.  Numerous ill-defined masses  in both lungs have increased in size,  and are suspicious for progression of metastatic disease.  4.  Thrombus within the bilateral ovarian veins is not significantly  changed.    [Critical Result: Pulmonary embolism]    Finding was identified on 7/25/2022 11:14 AM.     Dr. Casey was contacted by me on 7/25/2022 11:33 AM and verbalized  understanding of the critical result.

## 2022-07-25 NOTE — ED PROVIDER NOTES
History   Chief Complaint:  Shortness of Breath     The history is provided by the patient.      Gricelda Sabillon is a 72 year old female who presents with shortness of breath and hypoxia. The patient was seen in clinic today and was found to have hypoxia with an oxygen saturation of 84% on room air. Chest x-ray was performed which showed bilateral infiltrates so she was sent here for evaluation. She states that she came to clinic due having 1 week of shortness of breath which worsened the last 2 days. She also has been feeling lightheaded and dizzy. She has a fever today. She was seen last week in clinic for nausea and vomiting and was prescribed medication which has helped with it. She has intermittent diarrhea for the past 5 days. She has been having an intermittent cough since her hysterectomy for endometrial carcinoma on 6/23. She denies Covid exposure. Patient does not have chest pain or abdominal pain. No increased leg swelling or pain. She is not vomiting anymore. Denies loss of consciousness. She has metastatic malignant melanoma to the thigh. She is not chemotherapy.     Review of Systems   Constitutional: Positive for fever.   Respiratory: Positive for cough and shortness of breath.    Cardiovascular: Negative for chest pain.   Gastrointestinal: Negative for abdominal pain and vomiting.   Neurological: Positive for dizziness and light-headedness. Negative for syncope.   All other systems reviewed and are negative.    Allergies:  Bromfenac  Codeine  Codeine  Sulfites    Medications:  Aspirin 81 mg  Atenolol  Atorvastatin  Cetirizine  fish oil-omega-3 fatty acids   Glipizide   Metformin   pembrolizumab  Semaglutide   senna-docusate   sulfamethoxazole-trimethoprim     Past Medical History:     Endometrial cancer  Orthostatic hypotension  Metastatic malignant melanoma  Type 2 diabetes  Hypertension  Ampullary adenoma  CHERRY  Asthma, mild intermittent  Hyperlipidemia  Heart murmer      Past Surgical History:     ARTHROPLASTY HIP  LAPAROSCOPIC HYSTERECTOMY TOTAL, BILATERAL SALPINGO-OOPHORECTOMY, NODE DISSECTION, COMBINED  IR CHEST PORT PLACEMENT > 5 YRS OF AGE  HEAD & NECK SURGERY  ESOPHAGOSCOPY, GASTROSCOPY, DUODENOSCOPY (EGD), RESECT MUCOSA, COMBINED x2  ESOPHAGOSCOPY, GASTROSCOPY, DUODENOSCOPY (EGD), COMBINED x3  ENDOSCOPIC RETROGRADE CHOLANGIOPANCREATOGRAM    Family History:    Hypertension  Heart disease  Breast cancer  Uterine cancer  Diabetes    Social History:  Presents to the  ED alone via EMS  PCP: Raisa Davidson    Physical Exam     Patient Vitals for the past 24 hrs:   BP Temp Pulse Resp SpO2   07/25/22 0957 121/67 -- 90 -- 91 %   07/25/22 0950 -- -- -- -- (!) 83 %   07/25/22 0949 116/58 98.3  F (36.8  C) 86 30 (!) 86 %       Physical Exam  Nursing note and vitals reviewed.  Constitutional:  Appears well-developed and well-nourished.   HENT:   Head:    Atraumatic.   Mouth/Throat:   Oropharynx is clear and moist. No oropharyngeal exudate.   Eyes:    Pupils are equal, round, and reactive to light.   Neck:    Normal range of motion. Neck supple.      No tracheal deviation present. No thyromegaly present.   Cardiovascular:  Normal rate, regular rhythm, no murmur   Pulmonary/Chest: Breath sounds are clear and equal without wheezes or crackles. Breath sounds bilaterally. Port right upper chest.  Abdominal:   Soft. Bowel sounds are normal. Exhibits no distension and      no mass. There is no tenderness.      There is no rebound and no guarding.   Musculoskeletal:  Exhibits no edema.   Lymphadenopathy:  No cervical adenopathy.   Neurological:   Alert and oriented to person, place, and time.   Skin:    Skin is warm and dry. No rash noted. No pallor.     Emergency Department Course   ECG  ECG taken at 1053, ECG read at 1055  Normal sinus rhythm  Normal ECG   Rate 82 bpm. DC interval 172 ms. QRS duration 88 ms. QT/QTc 382/446 ms. P-R-T axes 45 -25 3.     Imaging:  CT Chest (PE) Abdomen Pelvis w Contrast    Preliminary Result   Abnormal   IMPRESSION:   1.  Occlusive and nonocclusive pulmonary emboli are noted within   multiple pulmonary artery branches bilaterally.   2.  Mild prominence of the right ventricle suggests some degree of   associated right heart strain.   3.  Numerous ill-defined masses in both lungs have increased in size,   and are suspicious for progression of metastatic disease.   4.  Thrombus within the bilateral ovarian veins is not significantly   changed.      [Critical Result: Pulmonary embolism]      Finding was identified on 7/25/2022 11:14 AM.       Dr. Casey was contacted by me on 7/25/2022 11:33 AM and verbalized   understanding of the critical result.          Report per radiology    Laboratory:  Labs Ordered and Resulted from Time of ED Arrival to Time of ED Departure   COMPREHENSIVE METABOLIC PANEL - Abnormal       Result Value    Sodium 133      Potassium 4.4      Chloride 99      Carbon Dioxide (CO2) 25      Anion Gap 9      Urea Nitrogen 16      Creatinine 0.79      Calcium 9.7      Glucose 347 (*)     Alkaline Phosphatase 129      AST 21      ALT 31      Protein Total 7.0      Albumin 1.9 (*)     Bilirubin Total 0.4      GFR Estimate 78     LACTIC ACID WHOLE BLOOD - Abnormal    Lactic Acid 2.6 (*)    CBC WITH PLATELETS AND DIFFERENTIAL - Abnormal    WBC Count 14.7 (*)     RBC Count 4.34      Hemoglobin 11.3 (*)     Hematocrit 38.2      MCV 88      MCH 26.0 (*)     MCHC 29.6 (*)     RDW 13.9      Platelet Count 395      % Neutrophils 87      % Lymphocytes 5      % Monocytes 5      % Eosinophils 1      % Basophils 1      % Immature Granulocytes 1      NRBCs per 100 WBC 0      Absolute Neutrophils 12.9 (*)     Absolute Lymphocytes 0.7 (*)     Absolute Monocytes 0.8      Absolute Eosinophils 0.1      Absolute Basophils 0.1      Absolute Immature Granulocytes 0.1      Absolute NRBCs 0.0     INFLUENZA A/B & SARS-COV2 PCR MULTIPLEX - Normal    Influenza A PCR Negative      Influenza B PCR  Negative      RSV PCR Negative      SARS CoV2 PCR Negative     TROPONIN I - Normal    Troponin I High Sensitivity 7     PROCALCITONIN   LACTIC ACID WHOLE BLOOD   BLOOD CULTURE   BLOOD CULTURE        Emergency Department Course:    Reviewed:  I reviewed nursing notes, vitals, past medical history and Care Everywhere    Assessments:  0951 I obtained history and examined the patient as noted above.   1148 I rechecked the patient and explained findings.     Consults:  1213 I spoke with Dr. Gandhi of the hospitalist service who is in agreement to accept the patient for admission.     Interventions:  Medications   azithromycin 500 mg (ZITHROMAX) in 0.9% NaCl 250 mL intermittent infusion 500 mg (500 mg Intravenous New Bag 7/25/22 1122)   heparin infusion 25,000 units in D5W 250 mL ANTICOAGULANT (1,600 Units/hr Intravenous New Bag 7/25/22 1203)   CT SCAN FLUSH (95 mLs Intravenous Given 7/25/22 1034)   iopamidol (ISOVUE-370) solution 500 mL (70 mLs Intravenous Given 7/25/22 1034)   0.9% sodium chloride BOLUS (2,634 mLs Intravenous New Bag 7/25/22 1053)   cefTRIAXone (ROCEPHIN) 2 g vial to attach to  ml bag for ADULTS or NS 50 ml bag for PEDS (2 g Intravenous New Bag 7/25/22 1118)   heparin ANTICOAGULANT Loading dose for HIGH INTENSITY TREATMENT * Give BEFORE starting heparin infusion (7,000 Units Intravenous Given 7/25/22 1201)     CMS Diagnoses:   The patient has signs of Severe Sepsis     If one the following conditions is present, a 30 mL/kg bolus is recommended as part of the 6 hour bundle (IBW can be used for BMI >30, or document refusal/contraindication):      1.   Initial hypotension  defined as 2 bps < 90 or map < 65 in the 6hrs before or 3hrs after time zero.     2.  Lactate >4.      The patient has signs of Severe Sepsis as evidenced by:    1. 2 SIRS criteria, AND  2. Suspected infection, AND   3. Organ dysfunction: Lactic Acidosis with value >2.0    Time severe sepsis diagnosis confirmed: 10:12 on  07/25/22  as this was the time when Lactate resulted, and the level was > 2.0    3 Hour Severe Sepsis Bundle Completion:    1. Initial Lactic Acid Result:   Recent Labs   Lab Test 07/25/22  1314 07/25/22  1012   LACT 1.0 2.6*     2. Blood Cultures before Antibiotics: Yes  3. Broad Spectrum Antibiotics Administered:  yes       Anti-infectives (From admission through now)    Start     Dose/Rate Route Frequency Ordered Stop    07/25/22 1045  cefTRIAXone (ROCEPHIN) 2 g vial to attach to  ml bag for ADULTS or NS 50 ml bag for PEDS         2 g  over 30 Minutes Intravenous ONCE 07/25/22 1041 07/25/22 1247    07/25/22 1045  azithromycin 500 mg (ZITHROMAX) in 0.9% NaCl 250 mL intermittent infusion 500 mg         500 mg  over 1 Hours Intravenous ONCE 07/25/22 1041 07/25/22 1246          4. Is initial hypotension present?     Yes. (Definition - 2 SBPs <90, MAP <65, or decrease > 40 from baseline due to infection w/in 3 hrs of each other during the time period of 6 hrs before and 3 hrs after time zero)   Full 30 mL/kg bolus given (see amount below).    BMI Readings from Last 1 Encounters:   07/25/22 30.32 kg/m      30 mL/kg fluids based on weight: 2,630 mL  30 mL/kg fluids based on IBW (must be >= 60 inches tall): 1,850 mL      Severe Sepsis reassessment:  1. Repeat Lactic Acid Level within 6 hours of time zero: 13:14 was 1.0    Disposition:  The patient was admitted to the hospital under the care of Dr. Gandhi.     Impression & Plan       Medical Decision Making:  I found this patient to have bilateral pulmonary emboli which I feel is the cause of her hypoxic respiratory failure and shortness of breath.  She was placed on heparin bolus and drip per PE protocol and admitted to the medical telemetry floor under the care of the hospitalist.  There was possible mild heart strain seen on CT imaging so she will need an echocardiogram performed. Her troponin is normal without sign of myocardial infarction. 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  Is concerned about the possibly of pneumonia because of her elevated white blood cell count and lactic acid so she was given ceftriaxone and Zithromax IV and blood cultures x2 were drawn.    Diagnosis:    ICD-10-CM    1. Bilateral pulmonary embolism (H)  I26.99    2. Acute respiratory failure with hypoxia (H)  J96.01    3. Malignant neoplasm metastatic to both lungs (H)  C78.01     C78.02        Discharge Medications:  New Prescriptions    No medications on file       Scribe Disclosure:  Dulce MIRANDA, am serving as a scribe at 9:51 AM on 7/25/2022 to document services personally performed by Natividad Casey MD based on my observations and the provider's statements to me.            Natividad Casey MD  07/25/22 1424       Natividad Casey MD  07/25/22 1432

## 2022-07-25 NOTE — PHARMACY-ADMISSION MEDICATION HISTORY
Admission medication history interview status for this patient is complete. See [facility] admission navigator for allergy information, prior to admission medications and immunization status.     Medication history interview done, indicate source(s): Patient  Medication history resources (including written lists, pill bottles, clinic record):None    Changes made to PTA medication list:  Added: none  Changed: atenolol, senokot-s, triamcinolone  Reported as Not Taking: fish oil  Removed: diclofenac gel, semaglutide    Actions taken by pharmacist (provider contacted, etc):None     Additional medication history information:None    Medication reconciliation/reorder completed by provider prior to medication history?  N   (Y/N)     Prior to Admission medications    Medication Sig Last Dose Taking? Auth Provider Long Term End Date   acetaminophen (TYLENOL) 325 MG tablet Take 3 tablets (975 mg) by mouth every 6 hours as needed for mild pain  Yes Eli Guidry MD     albuterol (PROAIR HFA/PROVENTIL HFA/VENTOLIN HFA) 108 (90 Base) MCG/ACT inhaler Inhale 2 puffs into the lungs every 4 hours as needed for shortness of breath / dyspnea  Yes Raisa Davidson MD Yes    aspirin 81 MG EC tablet Take 1 tablet (81 mg) by mouth daily 7/25/2022 at Unknown time Yes Clara Corado MD     atenolol (TENORMIN) 50 MG tablet Take 25 mg by mouth daily 7/25/2022 at Unknown time Yes Unknown, Entered By History Yes    atorvastatin (LIPITOR) 20 MG tablet Take 1 tablet by mouth once daily 7/24/2022 at Unknown time Yes Raisa Davidson MD Yes    azelastine (ASTELIN) 0.1 % nasal spray USE 1 SPRAY(S) IN EACH NOSTRIL TWICE DAILY AS NEEDED  Yes Raisa Davidson MD     azelastine (OPTIVAR) 0.05 % SOLN Place 1 drop into both eyes 2 times daily as needed Reported on 3/22/2017  Yes Unknown, Entered By History     cetirizine (ZYRTEC) 10 MG tablet Take 10 mg by mouth every morning 7/25/2022 at Unknown  time Yes Unknown, Entered By History     ciprofloxacin (CIPRO) 500 MG tablet Take 1 tablet (500 mg) by mouth 2 times daily 7/25/2022 at am Yes Raisa Davidson MD     glipiZIDE (GLUCOTROL) 5 MG tablet TAKE 2 TABLETS BY MOUTH TWICE DAILY BEFORE MEAL(S) 7/25/2022 at am Yes Raisa Davidson MD Yes    ibuprofen (ADVIL/MOTRIN) 200 MG tablet take 4 tablet (200 mg) by oral route every 8 hours as needed with food  Yes Reported, Patient No    INVOKANA 300 MG tablet TAKE 1 TABLET BY MOUTH IN THE MORNING BEFORE BREAKFAST 7/25/2022 at Unknown time Yes Raisa Davidson MD     latanoprost (XALATAN) 0.005 % ophthalmic solution Place 1 drop into both eyes At Bedtime  7/24/2022 at Unknown time Yes Raisa Davidson MD Yes    metFORMIN (GLUCOPHAGE XR) 500 MG 24 hr tablet TAKE 2 TABLETS BY MOUTH TWICE DAILY WITH MEALS 7/25/2022 at am Yes Raisa Davidson MD Yes    ondansetron (ZOFRAN) 8 MG tablet Take 1 tablet (8 mg) by mouth every 8 hours as needed for nausea  Yes Raisa Davidson MD     senna-docusate (SENOKOT-S/PERICOLACE) 8.6-50 MG tablet Take 1 tablet by mouth daily as needed for constipation  Yes Unknown, Entered By History     timolol maleate (TIMOPTIC) 0.5 % ophthalmic solution INSTILL 1 DROP INTO EACH EYE TWICE DAILY 7/25/2022 at am Yes Reported, Patient     triamcinolone (KENALOG) 0.5 % external ointment Apply 1 g topically daily  Patient taking differently: Apply 1 applicator topically daily as needed prn Yes Eli Guidry MD     CONTOUR NEXT TEST test strip USE 1 STRIP TO CHECK GLUCOSE ONCE DAILY   Raisa Davidson MD     fish oil-omega-3 fatty acids 1000 MG capsule Take 1 g by mouth daily Reported on 3/22/2017   Unknown, Entered By History     lidocaine-prilocaine (EMLA) 2.5-2.5 % external cream APPLY CREAM TOPICALLY ONCE DAILY AS NEEDED FOR PAIN APPLY A PEA SIZED AMOUNT OVER PORT SITE 60 MINUTES PRIOR TO  USE COVER WITH PLASTIC WRAP   Reported, Patient Yes    order for DME All diabetic testing supplies including test strips, lancets and solution for testing 2 times per day. Patient is using   no Insulin. Last A1c Lab Results       Component                Value               Date                       A1C                      7.9                 08/20/2018   Raisa Davidson MD     pembrolizumab Inject into the vein once Every 3 weeks at [facility] office   Reported, Patient

## 2022-07-26 LAB
ANION GAP SERPL CALCULATED.3IONS-SCNC: 5 MMOL/L (ref 3–14)
BASOPHILS # BLD AUTO: 0.1 10E3/UL (ref 0–0.2)
BASOPHILS NFR BLD AUTO: 1 %
BUN SERPL-MCNC: 9 MG/DL (ref 7–30)
CALCIUM SERPL-MCNC: 9.3 MG/DL (ref 8.5–10.1)
CHLORIDE BLD-SCNC: 107 MMOL/L (ref 94–109)
CO2 SERPL-SCNC: 26 MMOL/L (ref 20–32)
CREAT SERPL-MCNC: 0.67 MG/DL (ref 0.52–1.04)
EOSINOPHIL # BLD AUTO: 0.3 10E3/UL (ref 0–0.7)
EOSINOPHIL NFR BLD AUTO: 3 %
ERYTHROCYTE [DISTWIDTH] IN BLOOD BY AUTOMATED COUNT: 14.3 % (ref 10–15)
GFR SERPL CREATININE-BSD FRML MDRD: >90 ML/MIN/1.73M2
GLUCOSE BLD-MCNC: 122 MG/DL (ref 70–99)
GLUCOSE BLDC GLUCOMTR-MCNC: 109 MG/DL (ref 70–99)
GLUCOSE BLDC GLUCOMTR-MCNC: 119 MG/DL (ref 70–99)
GLUCOSE BLDC GLUCOMTR-MCNC: 135 MG/DL (ref 70–99)
GLUCOSE BLDC GLUCOMTR-MCNC: 70 MG/DL (ref 70–99)
GLUCOSE BLDC GLUCOMTR-MCNC: 85 MG/DL (ref 70–99)
GLUCOSE BLDC GLUCOMTR-MCNC: 92 MG/DL (ref 70–99)
HCT VFR BLD AUTO: 32.6 % (ref 35–47)
HGB BLD-MCNC: 9.5 G/DL (ref 11.7–15.7)
IMM GRANULOCYTES # BLD: 0.1 10E3/UL
IMM GRANULOCYTES NFR BLD: 1 %
LYMPHOCYTES # BLD AUTO: 1 10E3/UL (ref 0.8–5.3)
LYMPHOCYTES NFR BLD AUTO: 10 %
MCH RBC QN AUTO: 25.8 PG (ref 26.5–33)
MCHC RBC AUTO-ENTMCNC: 29.1 G/DL (ref 31.5–36.5)
MCV RBC AUTO: 89 FL (ref 78–100)
MONOCYTES # BLD AUTO: 0.8 10E3/UL (ref 0–1.3)
MONOCYTES NFR BLD AUTO: 8 %
NEUTROPHILS # BLD AUTO: 7.7 10E3/UL (ref 1.6–8.3)
NEUTROPHILS NFR BLD AUTO: 77 %
NRBC # BLD AUTO: 0 10E3/UL
NRBC BLD AUTO-RTO: 0 /100
PLATELET # BLD AUTO: 328 10E3/UL (ref 150–450)
POTASSIUM BLD-SCNC: 3.8 MMOL/L (ref 3.4–5.3)
RBC # BLD AUTO: 3.68 10E6/UL (ref 3.8–5.2)
SODIUM SERPL-SCNC: 138 MMOL/L (ref 133–144)
UFH PPP CHRO-ACNC: 0.19 IU/ML
UFH PPP CHRO-ACNC: 0.22 IU/ML
UFH PPP CHRO-ACNC: 0.3 IU/ML
WBC # BLD AUTO: 9.9 10E3/UL (ref 4–11)

## 2022-07-26 PROCEDURE — 250N000011 HC RX IP 250 OP 636: Performed by: INTERNAL MEDICINE

## 2022-07-26 PROCEDURE — 258N000003 HC RX IP 258 OP 636: Performed by: INTERNAL MEDICINE

## 2022-07-26 PROCEDURE — 85520 HEPARIN ASSAY: CPT | Performed by: INTERNAL MEDICINE

## 2022-07-26 PROCEDURE — 85025 COMPLETE CBC W/AUTO DIFF WBC: CPT | Performed by: INTERNAL MEDICINE

## 2022-07-26 PROCEDURE — 120N000001 HC R&B MED SURG/OB

## 2022-07-26 PROCEDURE — 99233 SBSQ HOSP IP/OBS HIGH 50: CPT | Performed by: INTERNAL MEDICINE

## 2022-07-26 PROCEDURE — 250N000011 HC RX IP 250 OP 636: Performed by: EMERGENCY MEDICINE

## 2022-07-26 PROCEDURE — 82310 ASSAY OF CALCIUM: CPT | Performed by: INTERNAL MEDICINE

## 2022-07-26 PROCEDURE — 36415 COLL VENOUS BLD VENIPUNCTURE: CPT | Performed by: INTERNAL MEDICINE

## 2022-07-26 PROCEDURE — 250N000013 HC RX MED GY IP 250 OP 250 PS 637: Performed by: INTERNAL MEDICINE

## 2022-07-26 RX ORDER — AZELASTINE 1 MG/ML
1 SPRAY, METERED NASAL 2 TIMES DAILY
Status: DISCONTINUED | OUTPATIENT
Start: 2022-07-26 | End: 2022-07-28

## 2022-07-26 RX ORDER — ATENOLOL 25 MG/1
25 TABLET ORAL DAILY
Status: DISCONTINUED | OUTPATIENT
Start: 2022-07-26 | End: 2022-08-03 | Stop reason: HOSPADM

## 2022-07-26 RX ORDER — ATORVASTATIN CALCIUM 20 MG/1
20 TABLET, FILM COATED ORAL DAILY
Status: DISCONTINUED | OUTPATIENT
Start: 2022-07-26 | End: 2022-08-03 | Stop reason: HOSPADM

## 2022-07-26 RX ORDER — AMOXICILLIN 250 MG
1 CAPSULE ORAL DAILY PRN
Status: DISCONTINUED | OUTPATIENT
Start: 2022-07-26 | End: 2022-08-03 | Stop reason: HOSPADM

## 2022-07-26 RX ORDER — LATANOPROST 50 UG/ML
1 SOLUTION/ DROPS OPHTHALMIC AT BEDTIME
Status: DISCONTINUED | OUTPATIENT
Start: 2022-07-26 | End: 2022-08-03 | Stop reason: HOSPADM

## 2022-07-26 RX ORDER — ALBUTEROL SULFATE 90 UG/1
2 AEROSOL, METERED RESPIRATORY (INHALATION) EVERY 4 HOURS PRN
Status: DISCONTINUED | OUTPATIENT
Start: 2022-07-26 | End: 2022-08-03 | Stop reason: HOSPADM

## 2022-07-26 RX ORDER — ASPIRIN 81 MG/1
81 TABLET ORAL DAILY
Status: DISCONTINUED | OUTPATIENT
Start: 2022-07-26 | End: 2022-07-28

## 2022-07-26 RX ORDER — GLIPIZIDE 5 MG/1
10 TABLET ORAL
Status: DISCONTINUED | OUTPATIENT
Start: 2022-07-26 | End: 2022-07-28

## 2022-07-26 RX ORDER — DEXTROSE MONOHYDRATE 25 G/50ML
25-50 INJECTION, SOLUTION INTRAVENOUS
Status: DISCONTINUED | OUTPATIENT
Start: 2022-07-26 | End: 2022-08-03 | Stop reason: HOSPADM

## 2022-07-26 RX ORDER — NICOTINE POLACRILEX 4 MG
15-30 LOZENGE BUCCAL
Status: DISCONTINUED | OUTPATIENT
Start: 2022-07-26 | End: 2022-08-03 | Stop reason: HOSPADM

## 2022-07-26 RX ADMIN — AZITHROMYCIN MONOHYDRATE 500 MG: 500 INJECTION, POWDER, LYOPHILIZED, FOR SOLUTION INTRAVENOUS at 12:46

## 2022-07-26 RX ADMIN — GLIPIZIDE 10 MG: 5 TABLET ORAL at 17:22

## 2022-07-26 RX ADMIN — ATENOLOL 25 MG: 25 TABLET ORAL at 09:01

## 2022-07-26 RX ADMIN — ATORVASTATIN CALCIUM 20 MG: 20 TABLET, FILM COATED ORAL at 08:57

## 2022-07-26 RX ADMIN — CEFTRIAXONE SODIUM 1 G: 1 INJECTION, POWDER, FOR SOLUTION INTRAMUSCULAR; INTRAVENOUS at 09:35

## 2022-07-26 RX ADMIN — GLIPIZIDE 10 MG: 5 TABLET ORAL at 08:56

## 2022-07-26 RX ADMIN — ASPIRIN 81 MG: 81 TABLET, COATED ORAL at 08:56

## 2022-07-26 RX ADMIN — HEPARIN SODIUM AND DEXTROSE 1750 UNITS/HR: 10000; 5 INJECTION INTRAVENOUS at 02:04

## 2022-07-26 RX ADMIN — HEPARIN SODIUM AND DEXTROSE 1750 UNITS/HR: 10000; 5 INJECTION INTRAVENOUS at 09:42

## 2022-07-26 RX ADMIN — LATANOPROST 1 DROP: 50 SOLUTION/ DROPS OPHTHALMIC at 21:26

## 2022-07-26 RX ADMIN — HEPARIN SODIUM AND DEXTROSE 1900 UNITS/HR: 10000; 5 INJECTION INTRAVENOUS at 20:47

## 2022-07-26 RX ADMIN — HEPARIN SODIUM AND DEXTROSE 1900 UNITS/HR: 10000; 5 INJECTION INTRAVENOUS at 17:27

## 2022-07-26 ASSESSMENT — ACTIVITIES OF DAILY LIVING (ADL)
ADLS_ACUITY_SCORE: 20

## 2022-07-26 NOTE — PLAN OF CARE
"Goal Outcome Evaluation:        Pt admitted from ED for Acute hypoxic respiratory failure secondary to likely multifactorial bilateral pulmonary embolism, possible pneumonia. LS clear but diminished. 15L oxy mask. Refused Cpap. Arrived to unit on heparin gtt. 0100 Hep XA 0.19. Bolus and increased rate per heparin protocol. Currently running at 1750 Units/hr. Next Hep X A is scheduled for 0745. Up SBA. Tele shows NSR. Denies pain. Hem/oncology consulted.     /45 (BP Location: Right arm)   Pulse 78   Temp 98.2  F (36.8  C) (Axillary)   Resp 16   Ht 1.702 m (5' 7\")   Wt 89.9 kg (198 lb 4.8 oz)   LMP 12/13/2002 (Exact Date)   SpO2 93%   BMI 31.06 kg/m                  "

## 2022-07-26 NOTE — ED NOTES
"[facility]  ED Nurse Handoff Report    Gricelda Sabillon is a 72 year old female   ED Chief complaint: Shortness of Breath  . ED Diagnosis:   Final diagnoses:   Bilateral pulmonary embolism (H)   Acute respiratory failure with hypoxia (H)   Malignant neoplasm metastatic to both lungs (H)     Allergies:   Allergies   Allergen Reactions     Bromfenac Visual Disturbance     Sulfites, xibrom  Causes sever eye pain     Codeine      \"NAUSE,HA AND DIZZINESS\"     Codeine Nausea     Other reaction(s): Dizziness, Headache     Sulfites Visual Disturbance     Xibrom (bromfenac opthalmic solution) 0.09%--eye pain       Code Status: Full Code  Activity level - Baseline/Home:  Independent. Activity Level - Current:   Stand by Assist. Lift room needed: No. Bariatric: No   Needed: No   Isolation: No. Infection: Not Applicable.     Vital Signs:   Vitals:    07/25/22 1505 07/25/22 1510 07/25/22 1515 07/25/22 1735   BP:  102/60  111/57   Pulse: 73 71 70 73   Resp: 15   16   Temp:    98.1  F (36.7  C)   TempSrc:    Oral   SpO2: 93%  95%        Cardiac Rhythm:  ,      Pain level:    Patient confused: No. Patient Falls Risk: Yes.   Elimination Status: Has voided   Patient Report - Initial Complaint: SOB. Focused Assessment: A&O x4, SBA with ambulation. Pt comes in for SOB. LS clear bilaterally and throughout. Pt oxygen saturations originally 83% in triage. 2.5L oxygen via NC started and pt oxygen sats increased to 92-94%. Tele is NSR   Tests Performed: lab, imaging. Abnormal Results:   Labs Ordered and Resulted from Time of ED Arrival to Time of ED Departure   COMPREHENSIVE METABOLIC PANEL - Abnormal       Result Value    Sodium 133      Potassium 4.4      Chloride 99      Carbon Dioxide (CO2) 25      Anion Gap 9      Urea Nitrogen 16      Creatinine 0.79      Calcium 9.7      Glucose 347 (*)     Alkaline Phosphatase 129      AST 21      ALT 31      Protein Total 7.0      Albumin 1.9 (*)     Bilirubin Total 0.4   "    GFR Estimate 78     LACTIC ACID WHOLE BLOOD - Abnormal    Lactic Acid 2.6 (*)    PROCALCITONIN - Abnormal    Procalcitonin 0.26 (*)    CBC WITH PLATELETS AND DIFFERENTIAL - Abnormal    WBC Count 14.7 (*)     RBC Count 4.34      Hemoglobin 11.3 (*)     Hematocrit 38.2      MCV 88      MCH 26.0 (*)     MCHC 29.6 (*)     RDW 13.9      Platelet Count 395      % Neutrophils 87      % Lymphocytes 5      % Monocytes 5      % Eosinophils 1      % Basophils 1      % Immature Granulocytes 1      NRBCs per 100 WBC 0      Absolute Neutrophils 12.9 (*)     Absolute Lymphocytes 0.7 (*)     Absolute Monocytes 0.8      Absolute Eosinophils 0.1      Absolute Basophils 0.1      Absolute Immature Granulocytes 0.1      Absolute NRBCs 0.0     INFLUENZA A/B & SARS-COV2 PCR MULTIPLEX - Normal    Influenza A PCR Negative      Influenza B PCR Negative      RSV PCR Negative      SARS CoV2 PCR Negative     TROPONIN I - Normal    Troponin I High Sensitivity 7     LACTIC ACID WHOLE BLOOD - Normal    Lactic Acid 1.0     HEPARIN UNFRACTIONATED ANTI XA LEVEL - Normal    Anti Xa Unfractionated Heparin 0.43     BLOOD CULTURE   BLOOD CULTURE     CT Chest (PE) Abdomen Pelvis w Contrast   Final Result   Abnormal   IMPRESSION:   1.  Occlusive and nonocclusive pulmonary emboli are noted within   multiple pulmonary artery branches bilaterally.   2.  Mild prominence of the right ventricle suggests some degree of   associated right heart strain.   3.  Numerous ill-defined masses in both lungs have increased in size,   and are suspicious for progression of metastatic disease.   4.  Thrombus within the bilateral ovarian veins is not significantly   changed.      [Critical Result: Pulmonary embolism]      Finding was identified on 7/25/2022 11:14 AM.       Dr. Casey was contacted by me on 7/25/2022 11:33 AM and verbalized   understanding of the critical result.        BRAD REYES MD            SYSTEM ID:  [id number]        .   Treatments provided: See  MAR  Family Comments: None  OBS brochure/video discussed/provided to patient:  N/A  ED Medications:   Medications   heparin infusion 25,000 units in D5W 250 mL ANTICOAGULANT (1,600 Units/hr Intravenous Rate/Dose Verify 7/25/22 1858)   lidocaine 1 % 0.1-1 mL (has no administration in time range)   lidocaine (LMX4) cream (has no administration in time range)   sodium chloride (PF) 0.9% PF flush 3 mL (3 mLs Intracatheter Given 7/25/22 1900)   sodium chloride (PF) 0.9% PF flush 3 mL (has no administration in time range)   melatonin tablet 1 mg (has no administration in time range)   Patient is already receiving anticoagulation with heparin, enoxaparin (LOVENOX), warfarin (COUMADIN)  or other anticoagulant medication (has no administration in time range)   ondansetron (ZOFRAN ODT) ODT tab 4 mg (has no administration in time range)     Or   ondansetron (ZOFRAN) injection 4 mg (has no administration in time range)   cefTRIAXone (ROCEPHIN) 1 g vial to attach to  mL bag for ADULTS or NS 50 mL bag for PEDS (has no administration in time range)   azithromycin 500 mg (ZITHROMAX) in 0.9% NaCl 250 mL intermittent infusion 500 mg (has no administration in time range)   CT SCAN FLUSH (95 mLs Intravenous Given 7/25/22 1034)   iopamidol (ISOVUE-370) solution 500 mL (70 mLs Intravenous Given 7/25/22 1034)   0.9% sodium chloride BOLUS (0 mL/kg × 87.8 kg Intravenous Stopped 7/25/22 1251)   cefTRIAXone (ROCEPHIN) 2 g vial to attach to  ml bag for ADULTS or NS 50 ml bag for PEDS (0 g Intravenous Stopped 7/25/22 1247)   azithromycin 500 mg (ZITHROMAX) in 0.9% NaCl 250 mL intermittent infusion 500 mg (0 mg Intravenous Stopped 7/25/22 1246)   heparin ANTICOAGULANT Loading dose for HIGH INTENSITY TREATMENT * Give BEFORE starting heparin infusion (7,000 Units Intravenous Given 7/25/22 1201)     Drips infusing:  Yes  For the majority of the shift, the patient's behavior Green. Interventions performed were None.    Sepsis treatment  initiated: No     Patient tested for COVID 19 prior to admission: YES    ED Nurse Name/Phone Number: Chevy Moralez RN,   7:01 PM  RECEIVING UNIT ED HANDOFF REVIEW    Above ED Nurse Handoff Report was reviewed: Yes  Reviewed by: Gillian Grady RN on July 25, 2022 at 8:53 PM

## 2022-07-26 NOTE — PROGRESS NOTES
Met with patient regarding PAP therapy. Patient is not interested in using hospital equipment at this time. Sats on 15L mask 91-92%.    Allen Allen, RT on 7/26/2022 at 12:24 AM

## 2022-07-26 NOTE — CONSULTS
Gricelda Sabillon is a 72-year-old woman with an recent medical history of Metastatic melanoma to the thigh with lung mets, endometrial cancer s/p recent hysterectomy and recent UTI admitted with weakness, hypoxia and fevers found to have bilateral patchy infiltrates on CXR and bilateral PE on CT.    IR consult request for consideration of pulmonary thrombectomy.    Reviewed with IR Dr Anika Hubbard. The PE are small and very peripheral. IR thrombectomy procedure would not be able to reach these clots. Hypoxia symptoms could also be related to bilateral infiltrates, possible pneumonia.     In terms of the mild prominence of the right ventricle Dr Hubbard felt that could be due to possible right heart failure or timing of contrast. Troponin I is WNL.     Patient not a candidate and no IR intervention is indicated. Recommend IV Heparin or anticoagulation.     [facility] Interventional Radiology CNP ([phone]) (phone [phone])     CT CHEST PULMONARY EMBOLUS, ABDOMEN PELVIS WITH CONTRAST 7/25/2022 10:50 AM     CLINICAL HISTORY: Shortness of breath. Hypoxia. Vomiting. History of  melanoma with lung metastases. Additional history of uterine cancer.     TECHNIQUE: CT angiogram chest and routine CT abdomen pelvis with IV  contrast. Arterial phase through the chest and venous phase through  the abdomen and pelvis. 2D and 3D MIP reconstructions were preformed  by the CT technologist. Dose reduction techniques were used.   CONTRAST: 70mL [id number]-[id number]     COMPARISON: CT of the abdomen and pelvis performed 7/12/2022. PET/CT  performed 1/27/2022.     FINDINGS:  ANGIOGRAM CHEST: Few areas of occlusive and nonocclusive pulmonary  embolism within pulmonary artery branches bilaterally. No large  central emboli or saddle embolus. Thoracic aorta is negative for  dissection. Mild prominence of the right ventricle suggests some  degree of associated right heart strain.       LUNGS AND PLEURA: No pleural effusions.  Numerous ill-defined masses in  both lungs with a few areas of central cavitation, increased compared  to 7/12/2022, and new compared to 1/27/2022. For example,  consolidation in the left lower lobe posteriorly and medially (series  10 image 106) measures 7.2 x 5.1 cm.     MEDIASTINUM/AXILLAE: No enlarged lymph nodes in the chest. Few mildly  prominent mediastinal and bilateral hilar lymph nodes have increased  compared to 1/27/2022. No pericardial effusion.     CORONARY ARTERY CALCIFICATION: Mild.     HEPATOBILIARY: Mild pneumobilia has increased in prominence. No  hepatic masses are identified.     PANCREAS: Normal.     SPLEEN: Normal.     ADRENAL GLANDS: Normal.     KIDNEYS/BLADDER: Right renal cyst would require no specific follow-up.  The kidneys are otherwise unremarkable. No hydronephrosis.     BOWEL: No bowel obstruction. No convincing evidence for colitis or  diverticulitis. Unremarkable appendix.     PELVIC ORGANS: Hysterectomy. Small amount of free fluid in the pelvis.     LYMPH NODES: No enlarged lymph nodes are identified in the abdomen or  pelvis.     VASCULATURE: Thrombus within the bilateral ovarian veins has a similar  appearance to the previous exam. Moderate atherosclerotic aortoiliac  calcification.     ADDITIONAL FINDINGS: None.     MUSCULOSKELETAL: Degenerative changes throughout the thoracolumbar  spine and within both sacroiliac joints. Right hip arthroplasty.  Nondisplaced right 6th rib fracture is likely subacute or chronic No  destructive bony lesions are identified.                                                                 IMPRESSION:  1.  Occlusive and nonocclusive pulmonary emboli are noted within  multiple pulmonary artery branches bilaterally.  2.  Mild prominence of the right ventricle suggests some degree of  associated right heart strain.  3.  Numerous ill-defined masses in both lungs have increased in size,  and are suspicious for progression of metastatic disease.  4.   Thrombus within the bilateral ovarian veins is not significantly  changed.     [Critical Result: Pulmonary embolism]     Finding was identified on 7/25/2022 11:14 AM.      Dr. Casey was contacted by me on 7/25/2022 11:33 AM and verbalized  understanding of the critical result.       BRAD REYES MD     Thanks, [facility] Interventional Radiology CNP ([phone]) (phone [phone])

## 2022-07-26 NOTE — PLAN OF CARE
VSS. AOx4. Pt has denied SOB, CP, n/v, pain throughout the shift; breathing is unlabored.SBA; continent; regular diet; good appetite today; pt has a port that is not accessed and is not being used.    7 LPM oxymask; oxygen has been weaned down throughout the shift; pt has tolerated well; stable SpO2.    Heparin gtt bolus administered and adjusted to 1900 ut/hr per HepXA out of range (0.22).    Tele: SR.    B, 119, 109    See MD note from this afternoon about the possibility of a lung biopsy; timing still unknown at the moment but may be outpatient/after Hep gtt is completed.    Pt is calm and comfortable resting in bed and reports no further requests.

## 2022-07-26 NOTE — PROGRESS NOTES
[facility] Follow Up Note:    Assessment and Plan:  Gricelda Sabillon is a 72 year old female patient, admitted for acute PE    1.Occlusive and nonocclusive pulmonary emboli, with some evidence of right heart strain  - Hypercoagulability of malignancy  - No hypercoagulable work-up  - Continue IV heparin    PLAN: IV heparin      2. 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 Allison at [facility]  - Subsequent to that has been on adjuvant pembrolizumab, 7/5/2022    PLAN:  - Now with rapidly growing lung mass, plan was for biopsy we will discuss further tomorrow based on overall status and goals    3.Stage I endometrial cancer, resected  - Pulmonary nodules not thought to be related to that    3. CODE : FULL    - I reviewed images personally and there is increased quite a bit in size of lung nodes, this is very concerning for rapidly progressive malignancy  - I recommend DNR/DNI and spoke to her  Little regarding the same.I do not think resuscitation will provide any meaningful benefit especially with rapidly growing malignancy.  - He will discuss with Melva further  - They will visit with me again tomorrow morning to discuss.      D/w Dr. Gandhi and  Little  Subjective:     Worsening shortness of breath, nause better,    Scheduled Medications:  Reviewed active medications    Labs:  CBC RESULTS: Recent Labs   Lab Test 07/26/22  0738 07/25/22  1012 07/19/22  1407   WBC 9.9 14.7* 13.4*   HGB 9.5* 11.3* 12.2   HCT 32.6* 38.2 39.1   MCV 89 88 85    395 560*       CMP  Recent Labs   Lab 07/26/22  1721 07/26/22  1205 07/26/22  0835 07/26/22  0738 07/25/22  1012   NA  --   --   --  138 133   POTASSIUM  --   --   --  3.8 4.4   CHLORIDE  --   --   --  107 99   CO2  --   --   --  26 25   ANIONGAP  --   --   --  5 9   * 119* 135* 122* 347*  "  BUN  --   --   --  9 16   CR  --   --   --  0.67 0.79   GFRESTIMATED  --   --   --  >90 78   KATHRYN  --   --   --  9.3 9.7   PROTTOTAL  --   --   --   --  7.0   ALBUMIN  --   --   --   --  1.9*   BILITOTAL  --   --   --   --  0.4   ALKPHOS  --   --   --   --  129   AST  --   --   --   --  21   ALT  --   --   --   --  31       INRNo lab results found in last 7 days.    Objective/Physical Exam:  Blood pressure 103/58, pulse 77, temperature 100  F (37.8  C), temperature source Axillary, resp. rate 18, height 1.702 m (5' 7\"), weight 89.9 kg (198 lb 4.8 oz), last menstrual period 12/13/2002, SpO2 95 %, not currently breastfeeding.  General appearance:alert, oriented, no acute distress  Weak, tired since my last visit, is on oxygen  William Florez MD  [facility]  7/26/2022 5:55 PM    Time: 35 minutes with patient , 30 minutes in counseling and coordination of care  "

## 2022-07-26 NOTE — PROGRESS NOTES
[facility]    Hospitalist Progress Note  Provider : Rivas Gandhi MD, MD  Date of Service (when I saw the patient): 07/26/2022    Assessment & Plan   Gricelda Sabillon is a 72 year old female patient with past medical history of diabetes mellitus type 2, hypertension, hyperlipidemia, metastatic melanoma, recent hysterectomy for endometrial cancer, obstructive sleep apnea uses CPAP, was recently treated for UTI with ciprofloxacin, came to emergency room for evaluation for shortness of breath.  She states that she has been having significant shortness of breath this morning.  She had no associated cough or fever.  She also denies chest pain.  She states that she is feeling very weak.  She also complains of fever.  She went to the clinic today and had her vital signs checked and was noted to have oxygen saturation at 84% on room air.  Chest x-ray was done and showed bilateral infiltrates.  She was sent to emergency room for further evaluation.  Her vitals were stable on arrival to emergency room.  Laboratory work-up showed sodium 133, potassium 4.4, creatinine 0.79, ALT 31, AST 21, procalcitonin 0.26, troponin I 7.  WBC 14.7, hemoglobin 11.3.  CT of the chest with contrast was also done and revealed occlusive and nonocclusive pulmonary emboli noted within multiple pulmonary artery branches bilaterally.  There is mild prominence of right ventricle suggestive of right heart strain.  Patient was started on heparin drip in the ER.  She was also given IV ceftriaxone in the azithromycin for suspected pneumonia.  She was admitted to the hospital for further management.      Acute hypoxic respiratory failure secondary to likely multifactorial bilateral pulmonary embolism, possible pneumonia  -Patient was noted to be hypoxic in the clinic.  Currently on oxygen supplement.  CT of the chest with contrast showed bilateral occlusive and nonocclusive pulmonary emboli multiple pulmonary artery  branches.    -She was started on heparin drip.  We will continue medication.  -Hematocrit elevated at 0.26.  Chest x-ray showed bilateral infiltrates.  CT of the chest revealed metastatic masses and bilateral pulmonary embolism.  Given her subjective fever, leukocytosis, elevated procalcitonin, we will continue antibiotic for now for possible superimposed bacterial pneumonia     History of metastatic melanoma  Patient follows with Dr. Florez at [facility]. On Keytruda. CT of the chest shows numerous ill-defined masses in both lungs have increased in size, and are suspicious for progression of metastatic disease. I discussed with Dr. Florez and she recommended IR consult for lung biopsy given progression of the masses and also history of endometrial cancer. Consult placed for IR for timing of lung biopsy. Currently patient is on heparin drip for pulmonary embolism.   -Appreciate oncology input  Addendum: Discussed with Dr. Escobedo of interventional radiology and he recommended to wait for couple of days for the biopsy until respiratory status is more stable.      History of ureteric cancer requiring hysterectomy     Diabetes mellitus type 2  On glipizide and metformin.  We will put her on insulin sliding scale.  Due to contrast administration. Resumed  Glipizide. May need addition of Lantus insulin. Will monitor blood sugaar     Hypertension, blood pressure stable.  Will resume atenolol.  Will monitor blood pressure     Hyperlipidemia: We will resume atorvastatin     DVT Prophylaxis: heparin drip  Code Status: Full Code. Code status confirmed with patient     Disposition: Expected discharge in 1-2 days     DVT Prophylaxis: Pneumatic Compression Devices  Code Status: Full Code    Disposition: Expected discharge: anticipate more than 2 nights of hospital course    Rivas Gandhi MD    Interval History   Patient stated that she is feeling a little better. She has no cough or fever. Shortness of breath improving. She denies  nausea or vomiting.     -Data reviewed today: I reviewed all new labs and imaging results over the last 24 hours. I personally reviewed     Physical Exam   Temp: 98.4  F (36.9  C) Temp src: Oral BP: 117/61 Pulse: 80   Resp: 18 SpO2: 97 % O2 Device: Oxymask Oxygen Delivery: 15 LPM  Vitals:    07/25/22 2116   Weight: 89.9 kg (198 lb 4.8 oz)     Vital Signs with Ranges  Temp:  [98  F (36.7  C)-98.4  F (36.9  C)] 98.4  F (36.9  C)  Pulse:  [70-88] 80  Resp:  [11-18] 18  BP: (101-126)/(45-69) 117/61  SpO2:  [92 %-97 %] 97 %  I/O last 3 completed shifts:  In: -   Out: 825 [Urine:825]    GEN:  Alert, oriented x 3, appears comfortable, NAD.  HEENT:  Normocephalic/atraumatic, no scleral icterus, no nasal discharge, mouth moist.  CV:  Regular rate and rhythm, no murmur or JVD.  S1 + S2 noted, no S3 or S4.  LUNGS:  Clear to auscultation bilaterally without rales/rhonchi/wheezing/retractions.  Symmetric chest rise on inhalation noted.  ABD:  Active bowel sounds, soft, non-tender/non-distended.  No rebound/guarding/rigidity.  EXT:  No edema or cyanosis.  Hands/feet warm to touch with good signs of peripheral perfusion.  No joint synovitis noted.  SKIN:  Dry to touch, no exanthems noted in the visualized areas.  NEURO:  Symmetric muscle strength, sensation to touch grossly intact.  No new focal deficits appreciated.    Medications     heparin 1,750 Units/hr (07/26/22 0942)     - MEDICATION INSTRUCTIONS -         aspirin  81 mg Oral Daily     atenolol  25 mg Oral Daily     atorvastatin  20 mg Oral Daily     azelastine  1 spray Both Nostrils BID     azithromycin  500 mg Intravenous Q24H     cefTRIAXone  1 g Intravenous Q24H     glipiZIDE  10 mg Oral BID AC     insulin aspart  1-7 Units Subcutaneous TID AC     insulin aspart  1-5 Units Subcutaneous At Bedtime     latanoprost  1 drop Both Eyes At Bedtime     sodium chloride (PF)  3 mL Intracatheter Q8H       Data   Recent Labs   Lab 07/26/22  0835 07/26/22  0738 07/25/22  1012  07/19/22  1407   WBC  --  9.9 14.7* 13.4*   HGB  --  9.5* 11.3* 12.2   MCV  --  89 88 85   PLT  --  328 395 560*   NA  --  138 133 133   POTASSIUM  --  3.8 4.4 4.2   CHLORIDE  --  107 99 99   CO2  --  26 25 25   BUN  --  9 16 18   CR  --  0.67 0.79 0.71   ANIONGAP  --  5 9 9   KATHRYN  --  9.3 9.7 10.8*   * 122* 347* 146*   ALBUMIN  --   --  1.9* 2.2*   PROTTOTAL  --   --  7.0 8.0   BILITOTAL  --   --  0.4 0.4   ALKPHOS  --   --  129 139   ALT  --   --  31 33   AST  --   --  21 25       No results found for this or any previous visit (from the past 24 hour(s)).

## 2022-07-27 LAB
ANION GAP SERPL CALCULATED.3IONS-SCNC: 10 MMOL/L (ref 7–15)
BUN SERPL-MCNC: 6.4 MG/DL (ref 8–23)
CALCIUM SERPL-MCNC: 9.5 MG/DL (ref 8.8–10.2)
CHLORIDE SERPL-SCNC: 101 MMOL/L (ref 98–107)
CREAT SERPL-MCNC: 0.58 MG/DL (ref 0.51–0.95)
DEPRECATED HCO3 PLAS-SCNC: 25 MMOL/L (ref 22–29)
ERYTHROCYTE [DISTWIDTH] IN BLOOD BY AUTOMATED COUNT: 14 % (ref 10–15)
GFR SERPL CREATININE-BSD FRML MDRD: >90 ML/MIN/1.73M2
GLUCOSE BLDC GLUCOMTR-MCNC: 102 MG/DL (ref 70–99)
GLUCOSE BLDC GLUCOMTR-MCNC: 132 MG/DL (ref 70–99)
GLUCOSE BLDC GLUCOMTR-MCNC: 138 MG/DL (ref 70–99)
GLUCOSE BLDC GLUCOMTR-MCNC: 148 MG/DL (ref 70–99)
GLUCOSE BLDC GLUCOMTR-MCNC: 223 MG/DL (ref 70–99)
GLUCOSE BLDC GLUCOMTR-MCNC: 94 MG/DL (ref 70–99)
GLUCOSE SERPL-MCNC: 121 MG/DL (ref 70–99)
HCT VFR BLD AUTO: 34.7 % (ref 35–47)
HGB BLD-MCNC: 10.1 G/DL (ref 11.7–15.7)
HOLD SPECIMEN: NORMAL
MCH RBC QN AUTO: 25.6 PG (ref 26.5–33)
MCHC RBC AUTO-ENTMCNC: 29.1 G/DL (ref 31.5–36.5)
MCV RBC AUTO: 88 FL (ref 78–100)
PLATELET # BLD AUTO: 374 10E3/UL (ref 150–450)
POTASSIUM SERPL-SCNC: 4 MMOL/L (ref 3.4–5.3)
RBC # BLD AUTO: 3.94 10E6/UL (ref 3.8–5.2)
SODIUM SERPL-SCNC: 136 MMOL/L (ref 136–145)
UFH PPP CHRO-ACNC: 0.21 IU/ML
UFH PPP CHRO-ACNC: 0.35 IU/ML
UFH PPP CHRO-ACNC: 0.38 IU/ML
UFH PPP CHRO-ACNC: 0.43 IU/ML
WBC # BLD AUTO: 10.2 10E3/UL (ref 4–11)

## 2022-07-27 PROCEDURE — 80048 BASIC METABOLIC PNL TOTAL CA: CPT | Performed by: INTERNAL MEDICINE

## 2022-07-27 PROCEDURE — 250N000013 HC RX MED GY IP 250 OP 250 PS 637: Performed by: INTERNAL MEDICINE

## 2022-07-27 PROCEDURE — 250N000012 HC RX MED GY IP 250 OP 636 PS 637: Performed by: INTERNAL MEDICINE

## 2022-07-27 PROCEDURE — 250N000011 HC RX IP 250 OP 636: Performed by: INTERNAL MEDICINE

## 2022-07-27 PROCEDURE — 250N000011 HC RX IP 250 OP 636: Performed by: EMERGENCY MEDICINE

## 2022-07-27 PROCEDURE — 258N000003 HC RX IP 258 OP 636: Performed by: INTERNAL MEDICINE

## 2022-07-27 PROCEDURE — 99233 SBSQ HOSP IP/OBS HIGH 50: CPT | Performed by: INTERNAL MEDICINE

## 2022-07-27 PROCEDURE — 36415 COLL VENOUS BLD VENIPUNCTURE: CPT | Performed by: INTERNAL MEDICINE

## 2022-07-27 PROCEDURE — 85520 HEPARIN ASSAY: CPT | Performed by: INTERNAL MEDICINE

## 2022-07-27 PROCEDURE — 85027 COMPLETE CBC AUTOMATED: CPT | Performed by: INTERNAL MEDICINE

## 2022-07-27 PROCEDURE — 120N000001 HC R&B MED SURG/OB

## 2022-07-27 RX ADMIN — CEFTRIAXONE SODIUM 1 G: 1 INJECTION, POWDER, FOR SOLUTION INTRAMUSCULAR; INTRAVENOUS at 11:29

## 2022-07-27 RX ADMIN — GLIPIZIDE 10 MG: 5 TABLET ORAL at 16:47

## 2022-07-27 RX ADMIN — GLIPIZIDE 10 MG: 5 TABLET ORAL at 09:27

## 2022-07-27 RX ADMIN — INSULIN ASPART 2 UNITS: 100 INJECTION, SOLUTION INTRAVENOUS; SUBCUTANEOUS at 12:52

## 2022-07-27 RX ADMIN — AZITHROMYCIN MONOHYDRATE 500 MG: 500 INJECTION, POWDER, LYOPHILIZED, FOR SOLUTION INTRAVENOUS at 12:19

## 2022-07-27 RX ADMIN — ASPIRIN 81 MG: 81 TABLET, COATED ORAL at 09:27

## 2022-07-27 RX ADMIN — HEPARIN SODIUM AND DEXTROSE 2050 UNITS/HR: 10000; 5 INJECTION INTRAVENOUS at 09:25

## 2022-07-27 RX ADMIN — ATENOLOL 25 MG: 25 TABLET ORAL at 09:27

## 2022-07-27 RX ADMIN — LATANOPROST 1 DROP: 50 SOLUTION/ DROPS OPHTHALMIC at 21:33

## 2022-07-27 RX ADMIN — HEPARIN SODIUM AND DEXTROSE 2050 UNITS/HR: 10000; 5 INJECTION INTRAVENOUS at 06:50

## 2022-07-27 RX ADMIN — HEPARIN SODIUM AND DEXTROSE 2050 UNITS/HR: 10000; 5 INJECTION INTRAVENOUS at 21:29

## 2022-07-27 ASSESSMENT — ACTIVITIES OF DAILY LIVING (ADL)
ADLS_ACUITY_SCORE: 22
ADLS_ACUITY_SCORE: 20
ADLS_ACUITY_SCORE: 22
ADLS_ACUITY_SCORE: 20
ADLS_ACUITY_SCORE: 22
ADLS_ACUITY_SCORE: 20

## 2022-07-27 NOTE — PROGRESS NOTES
[facility]    Hospitalist Progress Note  Provider : Rivas Gandhi MD, MD  Date of Service (when I saw the patient): 07/27/2022    Assessment & Plan   Gricelda Sabillon is a 72 year old female patient with past medical history of diabetes mellitus type 2, hypertension, hyperlipidemia, metastatic melanoma, recent hysterectomy for endometrial cancer, obstructive sleep apnea uses CPAP, was recently treated for UTI with ciprofloxacin, came to emergency room for evaluation for shortness of breath.  She states that she has been having significant shortness of breath this morning.  She had no associated cough or fever.  She also denies chest pain.  She states that she is feeling very weak.  She also complains of fever.  She went to the clinic today and had her vital signs checked and was noted to have oxygen saturation at 84% on room air.  Chest x-ray was done and showed bilateral infiltrates.  She was sent to emergency room for further evaluation.  Her vitals were stable on arrival to emergency room.  Laboratory work-up showed sodium 133, potassium 4.4, creatinine 0.79, ALT 31, AST 21, procalcitonin 0.26, troponin I 7.  WBC 14.7, hemoglobin 11.3.  CT of the chest with contrast was also done and revealed occlusive and nonocclusive pulmonary emboli noted within multiple pulmonary artery branches bilaterally.  There is mild prominence of right ventricle suggestive of right heart strain.  Patient was started on heparin drip in the ER. She was also given IV ceftriaxone in the azithromycin for suspected pneumonia. She was admitted to the hospital for further management.      Acute hypoxic respiratory failure secondary to likely multifactorial bilateral pulmonary embolism, possible pneumonia  -Patient was noted to be hypoxic in the clinic.  Currently on oxygen supplement.  CT of the chest with contrast showed bilateral occlusive and nonocclusive pulmonary emboli multiple pulmonary artery branches.     -She was started on heparin drip.  We will continue medication.  -Hematocrit elevated at 0.26.  Chest x-ray showed bilateral infiltrates. CT of the chest revealed metastatic masses and bilateral pulmonary embolism.    -Given her subjective fever, leukocytosis, elevated procalcitonin, we will continue antibiotic for now for possible superimposed bacterial pneumonia     History of metastatic melanoma  -Patient follows with Dr. Florez at [facility]. On Keytruda. CT of the chest shows numerous ill-defined masses in both lungs have increased in size, and are suspicious for progression of metastatic disease. I discussed with Dr. Florez and she recommended IR consult for lung biopsy given progression of the masses and also history of endometrial cancer. Consult placed for IR for timing of lung biopsy. Currently patient is on heparin drip for pulmonary embolism.   -Discussed with Dr. Escobedo of interventional radiology on 7/26 and he recommended to wait for couple of days for the biopsy until respiratory status is more stable.   -Patient currently on oxygen 3L. Paged IR for plan and awaiting call back  Addendum discussed with Dr. Arita and he is planning to do lung biopsy tomorrow.  He recommended to keep her n.p.o. after midnight, hold heparin drip at 7 AM. Updated RN.     History of ureteric cancer requiring hysterectomy 3.     Diabetes mellitus type 2  -On glipizide and metformin.  We will put her on insulin sliding scale.  Metformin on hold due to contrast administration on 7/25. Will resume metformin tonight. Resumed Glipizide. May need addition of Lantus insulin. Will monitor blood sugar     Hypertension, blood pressure stable.  -Continue atenolol.  Will monitor blood pressure     Hyperlipidemia: continue atorvastatin     DVT Prophylaxis: heparin drip    Code Status: No CPR- Do NOT Intubate    Disposition: Expected discharge: anticipate more than 2 nights of hospital course    Rivas Gnadhi MD    Interval History    Patient seen and examined today. She stated that she is feeling better today. Shortness of breath improving.  She denies cough or fever. She has no nausea or vomiting. Has no abdominal pain. Has no dysuria, urgency or frequency.     -Data reviewed today: I reviewed all new labs and imaging results over the last 24 hours. I personally reviewed     Physical Exam   Temp: 98.4  F (36.9  C) Temp src: Oral BP: 119/63 Pulse: 73   Resp: 18 SpO2: 95 % O2 Device: Nasal cannula Oxygen Delivery: 3 LPM  Vitals:    07/25/22 2116   Weight: 89.9 kg (198 lb 4.8 oz)     Vital Signs with Ranges  Temp:  [98.4  F (36.9  C)-100  F (37.8  C)] 98.4  F (36.9  C)  Pulse:  [73-83] 73  Resp:  [18-20] 18  BP: (103-126)/(51-68) 119/63  SpO2:  [93 %-95 %] 95 %  I/O last 3 completed shifts:  In: 240 [P.O.:240]  Out: 2350 [Urine:2350]    GEN:  Alert, oriented x 3, appears comfortable, NAD.  HEENT:  Normocephalic/atraumatic, no scleral icterus, no nasal discharge, mouth moist.  CV:  Regular rate and rhythm, no murmur or JVD.  S1 + S2 noted, no S3 or S4.  LUNGS:  Clear to auscultation bilaterally without rales/rhonchi/wheezing/retractions.  Symmetric chest rise on inhalation noted.  ABD:  Active bowel sounds, soft, non-tender/non-distended.  No rebound/guarding/rigidity.  EXT:  No edema or cyanosis.  Hands/feet warm to touch with good signs of peripheral perfusion.  No joint synovitis noted.  SKIN:  Dry to touch, no exanthems noted in the visualized areas.  NEURO:  Symmetric muscle strength, sensation to touch grossly intact.  No new focal deficits appreciated.    Medications     heparin 2,050 Units/hr (07/27/22 0914)     - MEDICATION INSTRUCTIONS -         aspirin  81 mg Oral Daily     atenolol  25 mg Oral Daily     atorvastatin  20 mg Oral Daily     azelastine  1 spray Both Nostrils BID     azithromycin  500 mg Intravenous Q24H     cefTRIAXone  1 g Intravenous Q24H     glipiZIDE  10 mg Oral BID AC     insulin aspart  1-7 Units Subcutaneous TID AC      insulin aspart  1-5 Units Subcutaneous At Bedtime     latanoprost  1 drop Both Eyes At Bedtime     sodium chloride (PF)  3 mL Intracatheter Q8H       Data   Recent Labs   Lab 07/27/22  1200 07/27/22  0730 07/27/22  0536 07/26/22  0835 07/26/22  0738 07/25/22  1012   WBC  --   --  10.2  --  9.9 14.7*   HGB  --   --  10.1*  --  9.5* 11.3*   MCV  --   --  88  --  89 88   PLT  --   --  374  --  328 395   NA  --   --  136  --  138 133   POTASSIUM  --   --  4.0  --  3.8 4.4   CHLORIDE  --   --  101  --  107 99   CO2  --   --  25  --  26 25   BUN  --   --  6.4*  --  9 16   CR  --   --  0.58  --  0.67 0.79   ANIONGAP  --   --  10  --  5 9   KATHRYN  --   --  9.5  --  9.3 9.7   * 148* 121*   < > 122* 347*   ALBUMIN  --   --   --   --   --  1.9*   PROTTOTAL  --   --   --   --   --  7.0   BILITOTAL  --   --   --   --   --  0.4   ALKPHOS  --   --   --   --   --  129   ALT  --   --   --   --   --  31   AST  --   --   --   --   --  21    < > = values in this interval not displayed.       No results found for this or any previous visit (from the past 24 hour(s)).

## 2022-07-27 NOTE — PLAN OF CARE
Goal Outcome Evaluation:    Alert and oriented. No c/o of pain or SOB. O2 weaned to 5L oxy mask. Heparin gtt running at 1900 Units/hour. 2330 therapeutic goal range Anti Xa 0.35 and recheck at 0630 is ......  Possible lung bx today, but per oncology note they will discuss more this am. No orders to stop Heparin.      Tele: SR.     B, 85, 92, 138, 94. Juice and crackers given.

## 2022-07-27 NOTE — PLAN OF CARE
Goal Outcome Evaluation:    Problem: Malnutrition  Goal: Improved Nutritional Intake  7/27/2022 1038 by Milagros Mcpherson, DANTE, LD  Outcome: Ongoing, Progressing    Ongoing decreased appetite, taste changes, see other note.

## 2022-07-27 NOTE — PROGRESS NOTES
[facility] Follow Up Note:    Assessment and Plan:  Gricelda Sabillon is a 72 year old female patient, admitted for acute PE     1.Occlusive and nonocclusive pulmonary emboli, with some evidence of right heart strain  - Hypercoagulability of malignancy  - No hypercoagulable work-up  - Continue IV heparin     PLAN: IV heparin        2. 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 Allison at [facility]  - Subsequent to that has been on adjuvant pembrolizumab, 7/5/2022     PLAN:  - Now with rapidly growing lung mass, plan was for biopsy we will discuss further tomorrow based on overall status and goals--Reviewed plan for lung biopsy tomorrow     3.Stage I endometrial cancer, resected  - Pulmonary nodules not thought to be related to that     3. CODE : DNR/DNI( changed today after d/w Melva)    - Long discussion with patient in the presence of  Allen today.    - Also discussed and showed them rapidly growing pulmonary malignancy.  We also discussed that patient may not be in a condition for further treatment based on overall status.  Patient is really hoping to get biopsy to have closure understand neck steps.    Subjective:    Feeling so much better now on 2 to 3 L of oxygen, nausea has come back a little bit.    Scheduled Medications:  Reviewed active medications    Labs:  CBC RESULTS: Recent Labs   Lab Test 07/27/22  0536 07/26/22  0738 07/25/22  1012   WBC 10.2 9.9 14.7*   HGB 10.1* 9.5* 11.3*   HCT 34.7* 32.6* 38.2   MCV 88 89 88    328 395       CMP  Recent Labs   Lab 07/27/22  1714 07/27/22  1200 07/27/22  0730 07/27/22  0536 07/26/22  0835 07/26/22  0738 07/25/22  1012   NA  --   --   --  136  --  138 133   POTASSIUM  --   --   --  4.0  --  3.8 4.4   CHLORIDE  --   --   --  101  --  107 99   CO2  --   --   --  25  --  26 25  "  ANIONGAP  --   --   --  10  --  5 9   * 223* 148* 121*   < > 122* 347*   BUN  --   --   --  6.4*  --  9 16   CR  --   --   --  0.58  --  0.67 0.79   GFRESTIMATED  --   --   --  >90  --  >90 78   KATHRYN  --   --   --  9.5  --  9.3 9.7   PROTTOTAL  --   --   --   --   --   --  7.0   ALBUMIN  --   --   --   --   --   --  1.9*   BILITOTAL  --   --   --   --   --   --  0.4   ALKPHOS  --   --   --   --   --   --  129   AST  --   --   --   --   --   --  21   ALT  --   --   --   --   --   --  31    < > = values in this interval not displayed.       INRNo lab results found in last 7 days.    Objective/Physical Exam:  Blood pressure 119/63, pulse 73, temperature 98.4  F (36.9  C), temperature source Oral, resp. rate 18, height 1.702 m (5' 7\"), weight 89.9 kg (198 lb 4.8 oz), last menstrual period 12/13/2002, SpO2 95 %, not currently breastfeeding.  General appearance:alert, oriented, no acute distress   tired, weak    Time: 35 minutes with patient , 30 minutes in counseling and coordination of care    D/w Dr. Oksana Florez MD  [facility]  7/27/2022 5:39 PM      "

## 2022-07-27 NOTE — PLAN OF CARE
Goal Outcome Evaluation:  Up in room with minimal assist (due to IV pole), A & O, x4, and calm and cooperative. See flowsheets for assessments. IV patient (L) peripheral with Heparin drip at 20.5 units/hr. Medicated with subcutaneous insulin x1 for  at 12Noon. Slight dyspnea with exertion, and O2 at 3L/Nasal cannula with X3Uzx=68%.  Has (R) peripheral IV, capped & flushed, but c/o burning with azithromycin infusing - no redness, swelling or edema noted at site, but medication switched to IV line with Heparin instead.  Next AntiXA due at 20:00.

## 2022-07-27 NOTE — CONSULTS
CLINICAL NUTRITION SERVICES  -  ASSESSMENT NOTE      Recommendations:   - Diet per MD.  Small/frequent meals as needed, consistent self-selection of protein as able.  Food from home as needed.  - Discussed ok to order prn [employer], Ensure, or high protein Gelatein.     MALNUTRITION:  % Weight Loss:  Weight loss does not meet criteria for malnutrition   % Intake:  </= 50% for >/= 5 days (severe malnutrition)  Subcutaneous Fat Loss:  Orbital region mild depletion and Upper arm region moderate depletion  Muscle Loss:  Temporal region mild depletion, Clavicle bone region mild depletion and Acromion bone region mild depletion  Fluid Retention: None documented    Malnutrition Diagnosis: Moderate malnutrition  In Context of:  Acute illness or injury with underlying chronic illness or disease          REASON FOR ASSESSMENT  Gricelda Sabillon is a 72 year old female seen by Registered Dietitian for Admission Nutrition Risk Screen for positive.    PMH of: DMII, metastatic melanoma to the thigh with lung mets (concern for progression of metastatic disease), recent hysterectomy for endometrial CA, recent UTI.    Admit 2/2: Acute respiratory failure, PE, possible PNA.    NUTRITION HISTORY  - Information obtained from patient, family in room, and chart.  - Diet at home: Regular, aiming for whatever wounds good in the moment.  - Usual intakes: Meals BID-TID, though PO intakes have been significantly decreased over the past 3 weeks, <50% of usual during this time.  - Barriers to PO intakes: Decreased appetite, taste changes, and nausea.  - Use of oral supplements: Has tried a variety of supplements and doesn't care for the sweetness, even with lower CHO options.  Turned off by taste changes.  - Chewing/swallowing issues: Denied.  - Allergies: NKFA.      CURRENT NUTRITION ORDERS  Diet Order:     Regular    Current Intake/Tolerance:  Limited timeframe of admit.  Reports she's not fond of the menu.  We discussed supplement options  "that can be ordered prn via room service such as [employer], Ensure, or high protein Gelatein.  She's not sure she would like the flavor.  We did discuss popular food items on the menu and encouraged variety + protein, or food from home as needed.       Obtained from Chart/Interdisciplinary Team:  - Oncology recommended lung biopsy, awaiting improvement in respiratory status  - Weaned to 3 L oxymask  - No documentation of PI  - Stooling patterns reviewed    ANTHROPOMETRICS  Height: 5' 7\"  Weight: 198 lbs 4.8 oz  Body mass index is 31.06 kg/m .  Weight Status:  Obesity Grade I BMI 30-34.9  Weight History:  Wt Readings from Last 10 Encounters:   07/25/22 89.9 kg (198 lb 4.8 oz)   07/25/22 87.8 kg (193 lb 9.6 oz)   07/19/22 87.3 kg (192 lb 6.4 oz)   07/12/22 89.3 kg (196 lb 14.4 oz)   06/23/22 92.7 kg (204 lb 5.9 oz)   06/16/22 93.4 kg (206 lb)   06/14/22 93 kg (205 lb)   05/25/22 95.3 kg (210 lb)   04/18/22 95.7 kg (211 lb)   04/11/22 96 kg (211 lb 11.2 oz)   - 6% wt loss in the past ~2 months.  Confirmed with patient.    LABS  Labs reviewed.      MEDICATIONS  Medications reviewed.      ASSESSED NUTRITION NEEDS PER APPROVED PRACTICE GUIDELINES:    Dosing Weight 90 kg   Estimated Energy Needs: 20-25 Kcal/Kg  Justification: maintenance  Estimated Protein Needs: 1-1.2 g pro/Kg  Justification: preservation of lean body mass  Estimated Fluid Needs: per MD      NUTRITION DIAGNOSIS:  Malnutrition related to inadequate oral intake with nausea, decreased appetite and taste changes, acutely impacted by respiratory needs as well as evidenced by meeting <50% needs orally for ~3 weeks, weight loss that doesn't meet criteria during that time, fat/muscle loss.     NUTRITION INTERVENTIONS  Recommendations / Nutrition Prescription  See above.      Implementation  Nutrition education: Provided education on above.    Medical Food Supplement: As above.     Nutrition Goals  Patient to consume at least 50% of meals or supplements TID to " show improvement in PO trending.      MONITORING AND EVALUATION:  Progress towards goals will be monitored and evaluated per protocol and Practice Guidelines          Milagros Mcpherson RDN, LD  Clinical Dietitian  3rd floor/ICU: [phone]  All other floors: [phone]  Weekend/holiday: [phone]  Office: [phone]

## 2022-07-28 ENCOUNTER — APPOINTMENT (OUTPATIENT)
Dept: PHYSICAL THERAPY | Facility: CLINIC | Age: 72
DRG: 175 | End: 2022-07-28
Attending: INTERNAL MEDICINE
Payer: COMMERCIAL

## 2022-07-28 LAB
GLUCOSE BLDC GLUCOMTR-MCNC: 124 MG/DL (ref 70–99)
GLUCOSE BLDC GLUCOMTR-MCNC: 134 MG/DL (ref 70–99)
GLUCOSE BLDC GLUCOMTR-MCNC: 151 MG/DL (ref 70–99)
GLUCOSE BLDC GLUCOMTR-MCNC: 197 MG/DL (ref 70–99)
GLUCOSE BLDC GLUCOMTR-MCNC: 94 MG/DL (ref 70–99)
UFH PPP CHRO-ACNC: 0.46 IU/ML

## 2022-07-28 PROCEDURE — 97530 THERAPEUTIC ACTIVITIES: CPT | Mod: GP | Performed by: PHYSICAL THERAPIST

## 2022-07-28 PROCEDURE — 250N000013 HC RX MED GY IP 250 OP 250 PS 637: Performed by: INTERNAL MEDICINE

## 2022-07-28 PROCEDURE — 250N000011 HC RX IP 250 OP 636: Performed by: INTERNAL MEDICINE

## 2022-07-28 PROCEDURE — 97161 PT EVAL LOW COMPLEX 20 MIN: CPT | Mod: GP | Performed by: PHYSICAL THERAPIST

## 2022-07-28 PROCEDURE — 120N000001 HC R&B MED SURG/OB

## 2022-07-28 PROCEDURE — 258N000003 HC RX IP 258 OP 636: Performed by: INTERNAL MEDICINE

## 2022-07-28 PROCEDURE — 99233 SBSQ HOSP IP/OBS HIGH 50: CPT | Performed by: INTERNAL MEDICINE

## 2022-07-28 PROCEDURE — 250N000011 HC RX IP 250 OP 636: Performed by: EMERGENCY MEDICINE

## 2022-07-28 PROCEDURE — 250N000009 HC RX 250: Performed by: INTERNAL MEDICINE

## 2022-07-28 PROCEDURE — 85520 HEPARIN ASSAY: CPT | Performed by: INTERNAL MEDICINE

## 2022-07-28 RX ORDER — TIMOLOL MALEATE 5 MG/ML
1 SOLUTION/ DROPS OPHTHALMIC 2 TIMES DAILY
Status: DISCONTINUED | OUTPATIENT
Start: 2022-07-28 | End: 2022-08-03 | Stop reason: HOSPADM

## 2022-07-28 RX ORDER — HEPARIN SODIUM (PORCINE) LOCK FLUSH IV SOLN 100 UNIT/ML 100 UNIT/ML
5-10 SOLUTION INTRAVENOUS
Status: DISCONTINUED | OUTPATIENT
Start: 2022-07-28 | End: 2022-08-03 | Stop reason: HOSPADM

## 2022-07-28 RX ORDER — HEPARIN SODIUM (PORCINE) LOCK FLUSH IV SOLN 100 UNIT/ML 100 UNIT/ML
5 SOLUTION INTRAVENOUS DAILY PRN
Status: DISCONTINUED | OUTPATIENT
Start: 2022-07-28 | End: 2022-08-03 | Stop reason: HOSPADM

## 2022-07-28 RX ORDER — HEPARIN SODIUM,PORCINE 10 UNIT/ML
5-10 VIAL (ML) INTRAVENOUS EVERY 24 HOURS
Status: DISCONTINUED | OUTPATIENT
Start: 2022-07-28 | End: 2022-08-03 | Stop reason: HOSPADM

## 2022-07-28 RX ORDER — CEFUROXIME AXETIL 250 MG/1
500 TABLET ORAL EVERY 12 HOURS SCHEDULED
Status: COMPLETED | OUTPATIENT
Start: 2022-07-29 | End: 2022-08-01

## 2022-07-28 RX ORDER — HEPARIN SODIUM,PORCINE 10 UNIT/ML
5-10 VIAL (ML) INTRAVENOUS
Status: DISCONTINUED | OUTPATIENT
Start: 2022-07-28 | End: 2022-08-03 | Stop reason: HOSPADM

## 2022-07-28 RX ORDER — ENOXAPARIN SODIUM 100 MG/ML
90 INJECTION SUBCUTANEOUS EVERY 12 HOURS
Status: DISCONTINUED | OUTPATIENT
Start: 2022-07-28 | End: 2022-07-31

## 2022-07-28 RX ORDER — METFORMIN HCL 500 MG
1000 TABLET, EXTENDED RELEASE 24 HR ORAL 2 TIMES DAILY WITH MEALS
Status: DISCONTINUED | OUTPATIENT
Start: 2022-07-28 | End: 2022-08-03 | Stop reason: HOSPADM

## 2022-07-28 RX ORDER — AZITHROMYCIN 250 MG/1
250 TABLET, FILM COATED ORAL DAILY
Status: COMPLETED | OUTPATIENT
Start: 2022-07-29 | End: 2022-07-30

## 2022-07-28 RX ORDER — CETIRIZINE HYDROCHLORIDE 10 MG/1
10 TABLET ORAL EVERY MORNING
Status: DISCONTINUED | OUTPATIENT
Start: 2022-07-29 | End: 2022-08-03 | Stop reason: HOSPADM

## 2022-07-28 RX ADMIN — TIMOLOL MALEATE 1 DROP: 5 SOLUTION/ DROPS OPHTHALMIC at 20:34

## 2022-07-28 RX ADMIN — ONDANSETRON 4 MG: 2 INJECTION INTRAMUSCULAR; INTRAVENOUS at 22:41

## 2022-07-28 RX ADMIN — HEPARIN, PORCINE (PF) 10 UNIT/ML INTRAVENOUS SYRINGE 5 ML: at 20:25

## 2022-07-28 RX ADMIN — HEPARIN SODIUM AND DEXTROSE 2050 UNITS/HR: 10000; 5 INJECTION INTRAVENOUS at 13:56

## 2022-07-28 RX ADMIN — METFORMIN HYDROCHLORIDE 1000 MG: 500 TABLET, EXTENDED RELEASE ORAL at 17:42

## 2022-07-28 RX ADMIN — AZITHROMYCIN MONOHYDRATE 500 MG: 500 INJECTION, POWDER, LYOPHILIZED, FOR SOLUTION INTRAVENOUS at 11:32

## 2022-07-28 RX ADMIN — ATORVASTATIN CALCIUM 20 MG: 20 TABLET, FILM COATED ORAL at 20:25

## 2022-07-28 RX ADMIN — Medication 5 ML: at 10:00

## 2022-07-28 RX ADMIN — ATENOLOL 25 MG: 25 TABLET ORAL at 08:38

## 2022-07-28 RX ADMIN — Medication 2700 UNITS: at 11:47

## 2022-07-28 RX ADMIN — SENNOSIDES AND DOCUSATE SODIUM 1 TABLET: 50; 8.6 TABLET ORAL at 22:50

## 2022-07-28 RX ADMIN — INSULIN ASPART 2 UNITS: 100 INJECTION, SOLUTION INTRAVENOUS; SUBCUTANEOUS at 18:36

## 2022-07-28 RX ADMIN — LATANOPROST 1 DROP: 50 SOLUTION/ DROPS OPHTHALMIC at 22:55

## 2022-07-28 RX ADMIN — CEFTRIAXONE SODIUM 1 G: 1 INJECTION, POWDER, FOR SOLUTION INTRAMUSCULAR; INTRAVENOUS at 10:29

## 2022-07-28 RX ADMIN — ENOXAPARIN SODIUM 90 MG: 100 INJECTION SUBCUTANEOUS at 20:26

## 2022-07-28 RX ADMIN — TIMOLOL MALEATE 1 DROP: 5 SOLUTION/ DROPS OPHTHALMIC at 14:07

## 2022-07-28 ASSESSMENT — ACTIVITIES OF DAILY LIVING (ADL)
ADLS_ACUITY_SCORE: 20

## 2022-07-28 NOTE — PLAN OF CARE
Goal Outcome Evaluation:    Plan of Care Reviewed With: patient  Patient initially had lung biopsy scheduled for today but was canceled due to pt. Being still on ASA (despite it being only 81 mg/day).  Patient had been NPO for the biopsy and was very disappointed when informed that the biopsy would have to be done as outpt. Otherwise, continues A&O, see flowsheets.  IV antibiotics to be given orally for now.  Heparin drip IV restarted (turned off at 07:00 pending biopsy) at 2050u/hr after a 2700units of Heparin IV bolus.  See flowsheets. Up with minimal assistance.

## 2022-07-28 NOTE — PROGRESS NOTES
ONCOLOGY CC NOTE:    Plan for lung biopsy today. Will continue ongoing d/w Allen and Melva based on status. Is now DNR/ DNI.  Will be visiting with them tomorrow morning.    Gricelda Sabillon is a 72 year old female patient, admitted for acute PE     1.Occlusive and nonocclusive pulmonary emboli, with some evidence of right heart strain  - Hypercoagulability of malignancy  - No hypercoagulable work-up  - Continue IV heparin     PLAN: IV heparin        2. 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 Allison at [facility]  - Subsequent to that has been on adjuvant pembrolizumab, 7/5/2022     PLAN:  - Now with rapidly growing lung mass, plan was for biopsy we will discuss further tomorrow based on overall status and goals--Reviewed plan for lung biopsy tomorrow     3.Stage I endometrial cancer, resected  - Pulmonary nodules not thought to be related to that     3. CODE : DNR/DNI( changed today after d/w Melva)     - Long discussion with patient in the presence of  Allen today.     - Also discussed and showed them rapidly growing pulmonary malignancy.  We also discussed that patient may not be in a condition for further treatment based on overall status.  Patient is really hoping to get biopsy to have closure understand neck steps.

## 2022-07-28 NOTE — PROGRESS NOTES
[facility]    Medicine Progress Note - Hospitalist Service    Date of Admission:  7/25/2022    Assessment & Plan          Gricelda Sabillon is a 72 year old female with history of diabetes mellitus type 2, hypertension, hyperlipidemia, metastatic melanoma, recent hysterectomy for endometrial cancer, obstructive sleep apnea uses CPAP, and was recent UTI treated with ciprofloxacin.  She presented to the [facility] emergency department on 7/25/2022 for evaluation for shortness of breath.  She had no associated chest pain, cough, or fever. She went to clinic initially and was noted to have oxygen saturation of 84% on room air.  Chest x-ray was done and showed bilateral infiltrates.  She was sent to emergency room for further evaluation.  Her vitals were stable on arrival to emergency room. Laboratory work-up showed sodium 133, potassium 4.4, creatinine 0.79, ALT 31, AST 21, procalcitonin 0.26, troponin I 7.  WBC 14.7, hemoglobin 11.3. CT of the chest with contrast showed occlusive and nonocclusive pulmonary emboli noted within multiple pulmonary artery branches bilaterally. There was mild prominence of right ventricle suggestive of right heart strain. There were numerous ill-defined masses in both lungs that had increased in size, and were suspicious for progression of metastatic disease. She was started on a heparin drip in the ED. She was also given IV ceftriaxone in the azithromycin for suspected pneumonia. She was admitted to the hospital for further management.     Problem List:    Acute hypoxic respiratory failure, multifactorial, due to below  Bilateral pulmonary embolism  Suspected metastatic melanoma to lung  Possible pneumonia  -Patient was noted to be hypoxic in the clinic.  Currently on oxygen supplement.  CT of the chest with contrast showed bilateral occlusive and nonocclusive pulmonary emboli multiple pulmonary artery branches.    -Patient has been on heparin drip in  anticipation of liver biopsy.  Unfortunately, lung biopsy cannot be done until next week due to aspirin.  Continue heparin drip until 7 PM.  At 8 PM start Lovenox 1 mg/kg every 12 hours.  Lovenox teaching.  -Plan on outpatient biopsy next week  -Change antibiotics to oral (Ceftin and Zithromax).  Plan 7 days total of ceftriaxone/Ceftin and 5 days total of Zithromax     History of metastatic melanoma  -Patient follows with Dr. Florez at [facility]. On Keytruda. CT of the chest shows numerous ill-defined masses in both lungs have increased in size, and are suspicious for progression of metastatic disease.   -We had hoped lung mass to be biopsied today but patient has been on aspirin so this had to be postponed  -Convert heparin drip to Lovenox injections and plan on outpatient biopsy next week.     History of ureteric cancer requiring hysterectomy     Diabetes mellitus type 2  -PTA was on glipizide, metformin, and Invokana.    -Here has been on glipizide and as needed NovoLog insulin.  -I discussed with her primary care provider today.  She is concerned about hypoglycemia.  -Start glipizide  -Resume prior to admission metformin  -Likely discharge home on just metformin (as a single agent should not cause hypoglycemia) with outpatient follow-up     Hypertension  -Continue prior to admission atenolol     Hyperlipidemia  -Continue  prior to admission atorvastatin    Weakness  -PT consult  -Increase ambulation       Diet: Snacks/Supplements Adult: Other; Ok to order any diet appropriate supplement prn; With Meals  Regular Diet Adult    DVT Prophylaxis: Heparin drip, changed to treatment dose Lovenox this evening  Meza Catheter: Not present  Central Lines: PRESENT     Cardiac Monitoring: None  Code Status: No CPR- Do NOT Intubate      Disposition Plan      Expected Discharge Date: 07/30/2022      Destination: home with family  Discharge Comments: New admit w/bilateral PEs, Still on Heparin Gtt, no IR candidate for Lung bx,  "spouse to discuss changing code status w/pt        The patient's care was discussed with the Bedside Nurse, Patient and Patient's Family.    Gallo Houser MD  Hospitalist Service  [facility]  Securely message with the Vocera Web Console (learn more here)  Text page via [employer] Paging/Directory         Clinically Significant Risk Factors Present on Admission               # DMII: A1C = N/A within past 3 months  # Obesity: Estimated body mass index is 31.06 kg/m  as calculated from the following:    Height as of this encounter: 1.702 m (5' 7\").    Weight as of this encounter: 89.9 kg (198 lb 4.8 oz).    # Moderate Malnutrition: based on nutrition assessment     ______________________________________________________________________    Interval History   No new problems.  Disappointed the biopsy could not be done.  A little apprehensive about discharging home and doing insulin injections.    Data reviewed today: I reviewed all medications, new labs and imaging results over the last 24 hours. I personally reviewed no images or EKG's today.    Physical Exam   Vital Signs: Temp: 98.7  F (37.1  C) Temp src: Oral BP: 110/61 Pulse: 78   Resp: 18 SpO2: 95 % O2 Device: None (Room air) Oxygen Delivery: 2 LPM  Weight: 198 lbs 4.8 oz  GENERAL:  Comfortable. Cooperative.  PSYCH: pleasant, oriented, No acute distress.  EYES: PERRLA, Normal conjunctiva.  HEART:  Regular rate and rhythm. No JVD. Pulses normal. No edema.  LUNGS:  Clear to auscultation, normal Respiratory effort.  ABDOMEN:  Soft, no hepatosplenomegaly, normal bowel sounds.  EXTREMETIES: No clubbing, cyanosis or ischemia  SKIN:  Dry to touch, No rash.      Data   Recent Labs   Lab 07/28/22  1215 07/28/22  0808 07/28/22  0209 07/27/22  0730 07/27/22  0536 07/26/22  0835 07/26/22  0738 07/25/22  1012   WBC  --   --   --   --  10.2  --  9.9 14.7*   HGB  --   --   --   --  10.1*  --  9.5* 11.3*   MCV  --   --   --   --  88  --  89 88   PLT  --   --  "  --   --  374  --  328 395   NA  --   --   --   --  136  --  138 133   POTASSIUM  --   --   --   --  4.0  --  3.8 4.4   CHLORIDE  --   --   --   --  101  --  107 99   CO2  --   --   --   --  25  --  26 25   BUN  --   --   --   --  6.4*  --  9 16   CR  --   --   --   --  0.58  --  0.67 0.79   ANIONGAP  --   --   --   --  10  --  5 9   KATHRYN  --   --   --   --  9.5  --  9.3 9.7   * 151* 94   < > 121*   < > 122* 347*   ALBUMIN  --   --   --   --   --   --   --  1.9*   PROTTOTAL  --   --   --   --   --   --   --  7.0   BILITOTAL  --   --   --   --   --   --   --  0.4   ALKPHOS  --   --   --   --   --   --   --  129   ALT  --   --   --   --   --   --   --  31   AST  --   --   --   --   --   --   --  21    < > = values in this interval not displayed.     No results found for this or any previous visit (from the past 24 hour(s)).

## 2022-07-28 NOTE — PROGRESS NOTES
RN reviewed chart with bedside RN.  Patient had been taking aspirin the past few days.  Per Radiology protocol and Dr Hubbard there is a 5 day hold for aspirin due to high risk of bleeding.  Bedside RN informed.

## 2022-07-28 NOTE — PLAN OF CARE
VSS, AOx4. Pt denies SOB, CP, n/v, pain throughout shift. SBA, regular diet.    Port accessed this afternoon; pt tolerated well; Hep gtt is infusing through this site now.    Right PIV removed this afternoon due to c/o burning in previous shift; see RN note from previous shift; no signs of redness/swelling/edema; removal was tolerated well by pt.    On 2 LPM NC SpO2 95%; stable SpO2 throughout shift.    HepXA at 20:00 resulted as 0.43; this is the second result in range; AM redraw scheduled; running at  ut/hr.    Lung biopsy scheduled for tomorrow ; NPO at midnight; Hep gtt must be stopped at 7am per MD verbal order.    B, 132.

## 2022-07-28 NOTE — PLAN OF CARE
"Goal Outcome Evaluation:        Alert and oriented. No c/o of pain or SOB. 2L NC. Heparin gtt running at 2050 Units/hour and will stop heparin at 0700 per MD order. Anti Xa therapeutic and will be checked this am. BX planned for today.   Tele shows NSR.     /54 (BP Location: Left arm)   Pulse 80   Temp 98.3  F (36.8  C) (Oral)   Resp 16   Ht 1.702 m (5' 7\")   Wt 89.9 kg (198 lb 4.8 oz)   LMP 12/13/2002 (Exact Date)   SpO2 94%   BMI 31.06 kg/m              "

## 2022-07-28 NOTE — PROGRESS NOTES
07/28/22 1615   Quick Adds   Type of Visit Initial PT Evaluation   Living Environment   People in Home spouse   Current Living Arrangements house   Home Accessibility stairs to enter home;stairs within home   Number of Stairs, Main Entrance 2   Stair Railings, Main Entrance railings safe and in good condition   Number of Stairs, Within Home, Primary greater than 10 stairs  (steps down to the basement)   Stair Railings, Within Home, Primary railings safe and in good condition   Transportation Anticipated family or friend will provide   Self-Care   Usual Activity Tolerance moderate   Current Activity Tolerance fair   Regular Exercise No   Equipment Currently Used at Home none   Fall history within last six months yes   Number of times patient has fallen within last six months 1   Activity/Exercise/Self-Care Comment Pt normally independent with all self-cares and mobility, has not driven for awhile   General Information   Onset of Illness/Injury or Date of Surgery 07/25/22   Referring Physician Gallo Houser MD   Patient/Family Therapy Goals Statement (PT) return home   Pertinent History of Current Problem (include personal factors and/or comorbidities that impact the POC) 72 year old female patient, admitted for acute PE and concern for rapidly growing pulmonary malignancy, hx metastatic melanoma   Existing Precautions/Restrictions oxygen therapy device and L/min  (room air to 2 L O2)   Cognition   Affect/Mental Status (Cognition) WFL   Orientation Status (Cognition) oriented x 4   Follows Commands (Cognition) WFL   Bed Mobility   Comment, (Bed Mobility) SBA supine <> sit   Transfers   Comment, (Transfers) SBA sit <> stand   Gait/Stairs (Locomotion)   Oscar Level (Gait) contact guard   Assistive Device (Gait)   (pushing IV pole)   Distance in Feet (Required for LE Total Joints) 10' eval + 50' + 25'   Comment, (Gait/Stairs) short of breath, unable to ambulate back to room at one point need wheelchair  follow d/t dizziness/lightheadedness with O2 dropping to 86% on room air   Sensory Examination   Sensory Perception patient reports no sensory changes   Clinical Impression   Criteria for Skilled Therapeutic Intervention Yes, treatment indicated   PT Diagnosis (PT) impaired mobility   Influenced by the following impairments weakness, shortness of breath, impaired endurance   Functional limitations due to impairments decreased activity tolerance   Clinical Presentation (PT Evaluation Complexity) Stable/Uncomplicated   Clinical Presentation Rationale clinical judgement   Clinical Decision Making (Complexity) low complexity   Planned Therapy Interventions (PT) transfer training;strengthening;stair training;patient/family education;gait training;bed mobility training;balance training   Anticipated Equipment Needs at Discharge (PT)   (cane vs. FWW vs. none)   Risk & Benefits of therapy have been explained evaluation/treatment results reviewed;care plan/treatment goals reviewed;risks/benefits reviewed;current/potential barriers reviewed;patient;participants included;participants voiced agreement with care plan   PT Discharge Planning   PT Discharge Recommendation (DC Rec) home with assist;home with home care physical therapy;Transitional Care Facility   PT Rationale for DC Rec Pt currently having difficulty with activity, only able to ambulate about 50 feet at a time but becomes dizzy and lightheaded with O2 desatting to high 80s on room air. Pt may benefit from supplemental O2 upon return home pending progress. Will likely need spouse assist and HHPT recommended, if pt unable to navigate stairs into house may need to consider TCU pending pt's plan of care goals in setting of metastatic cancer diagnosis.   PT Brief overview of current status CGA ambulation in hallways, limited to 50 feet pushing IV pole, desat to 86% on room air needing 2 L O2 to recover > 90%   Plan of Care Review   Plan of Care Reviewed With patient    Physical Therapy Goals   PT Frequency Daily   PT Predicted Duration/Target Date for Goal Attainment 08/05/22   PT Goals Bed Mobility;Transfers;Gait;Stairs   PT: Bed Mobility Supine to/from sit;Independent   PT: Transfers Supervision/stand-by assist;Sit to/from stand;Bed to/from chair;Assistive device   PT: Gait Supervision/stand-by assist;Rolling walker;100 feet   PT: Stairs Supervision/stand-by assist;3 stairs;Rail on right

## 2022-07-29 ENCOUNTER — APPOINTMENT (OUTPATIENT)
Dept: PHYSICAL THERAPY | Facility: CLINIC | Age: 72
DRG: 175 | End: 2022-07-29
Payer: COMMERCIAL

## 2022-07-29 ENCOUNTER — APPOINTMENT (OUTPATIENT)
Dept: MRI IMAGING | Facility: CLINIC | Age: 72
DRG: 175 | End: 2022-07-29
Attending: INTERNAL MEDICINE
Payer: COMMERCIAL

## 2022-07-29 LAB
GLUCOSE BLDC GLUCOMTR-MCNC: 127 MG/DL (ref 70–99)
GLUCOSE BLDC GLUCOMTR-MCNC: 135 MG/DL (ref 70–99)
GLUCOSE BLDC GLUCOMTR-MCNC: 155 MG/DL (ref 70–99)
GLUCOSE BLDC GLUCOMTR-MCNC: 189 MG/DL (ref 70–99)
GLUCOSE BLDC GLUCOMTR-MCNC: 196 MG/DL (ref 70–99)
UFH PPP CHRO-ACNC: 0.24 IU/ML

## 2022-07-29 PROCEDURE — 120N000001 HC R&B MED SURG/OB

## 2022-07-29 PROCEDURE — 250N000011 HC RX IP 250 OP 636: Performed by: INTERNAL MEDICINE

## 2022-07-29 PROCEDURE — 255N000002 HC RX 255 OP 636: Performed by: INTERNAL MEDICINE

## 2022-07-29 PROCEDURE — 99233 SBSQ HOSP IP/OBS HIGH 50: CPT | Performed by: INTERNAL MEDICINE

## 2022-07-29 PROCEDURE — 97530 THERAPEUTIC ACTIVITIES: CPT | Mod: GP

## 2022-07-29 PROCEDURE — A9585 GADOBUTROL INJECTION: HCPCS | Performed by: INTERNAL MEDICINE

## 2022-07-29 PROCEDURE — 250N000013 HC RX MED GY IP 250 OP 250 PS 637: Performed by: INTERNAL MEDICINE

## 2022-07-29 PROCEDURE — 70553 MRI BRAIN STEM W/O & W/DYE: CPT

## 2022-07-29 PROCEDURE — 85520 HEPARIN ASSAY: CPT | Performed by: INTERNAL MEDICINE

## 2022-07-29 RX ORDER — GADOBUTROL 604.72 MG/ML
10 INJECTION INTRAVENOUS ONCE
Status: COMPLETED | OUTPATIENT
Start: 2022-07-29 | End: 2022-07-29

## 2022-07-29 RX ORDER — POLYETHYLENE GLYCOL 3350 17 G/17G
17 POWDER, FOR SOLUTION ORAL 2 TIMES DAILY PRN
Status: DISCONTINUED | OUTPATIENT
Start: 2022-07-29 | End: 2022-08-03 | Stop reason: HOSPADM

## 2022-07-29 RX ORDER — LORAZEPAM 2 MG/ML
0.5 INJECTION INTRAMUSCULAR ONCE
Status: COMPLETED | OUTPATIENT
Start: 2022-07-29 | End: 2022-07-29

## 2022-07-29 RX ADMIN — ENOXAPARIN SODIUM 90 MG: 100 INJECTION SUBCUTANEOUS at 20:19

## 2022-07-29 RX ADMIN — HEPARIN, PORCINE (PF) 10 UNIT/ML INTRAVENOUS SYRINGE 5 ML: at 06:36

## 2022-07-29 RX ADMIN — AZITHROMYCIN MONOHYDRATE 250 MG: 250 TABLET ORAL at 09:16

## 2022-07-29 RX ADMIN — POLYETHYLENE GLYCOL 3350 17 G: 17 POWDER, FOR SOLUTION ORAL at 09:15

## 2022-07-29 RX ADMIN — GADOBUTROL 9 ML: 604.72 INJECTION INTRAVENOUS at 15:03

## 2022-07-29 RX ADMIN — INSULIN ASPART 2 UNITS: 100 INJECTION, SOLUTION INTRAVENOUS; SUBCUTANEOUS at 13:17

## 2022-07-29 RX ADMIN — CEFUROXIME AXETIL 500 MG: 250 TABLET, FILM COATED ORAL at 20:19

## 2022-07-29 RX ADMIN — ATORVASTATIN CALCIUM 20 MG: 20 TABLET, FILM COATED ORAL at 20:19

## 2022-07-29 RX ADMIN — METFORMIN HYDROCHLORIDE 1000 MG: 500 TABLET, EXTENDED RELEASE ORAL at 09:15

## 2022-07-29 RX ADMIN — LATANOPROST 1 DROP: 50 SOLUTION/ DROPS OPHTHALMIC at 22:10

## 2022-07-29 RX ADMIN — ENOXAPARIN SODIUM 90 MG: 100 INJECTION SUBCUTANEOUS at 09:17

## 2022-07-29 RX ADMIN — CEFUROXIME AXETIL 500 MG: 250 TABLET, FILM COATED ORAL at 09:15

## 2022-07-29 RX ADMIN — TIMOLOL MALEATE 1 DROP: 5 SOLUTION/ DROPS OPHTHALMIC at 09:27

## 2022-07-29 RX ADMIN — TIMOLOL MALEATE 1 DROP: 5 SOLUTION/ DROPS OPHTHALMIC at 22:10

## 2022-07-29 RX ADMIN — INSULIN ASPART 1 UNITS: 100 INJECTION, SOLUTION INTRAVENOUS; SUBCUTANEOUS at 09:26

## 2022-07-29 RX ADMIN — LORAZEPAM 0.5 MG: 2 INJECTION INTRAMUSCULAR; INTRAVENOUS at 14:32

## 2022-07-29 RX ADMIN — HEPARIN, PORCINE (PF) 10 UNIT/ML INTRAVENOUS SYRINGE 5 ML: at 11:54

## 2022-07-29 RX ADMIN — METFORMIN HYDROCHLORIDE 1000 MG: 500 TABLET, EXTENDED RELEASE ORAL at 18:05

## 2022-07-29 RX ADMIN — ATENOLOL 25 MG: 25 TABLET ORAL at 09:16

## 2022-07-29 ASSESSMENT — ACTIVITIES OF DAILY LIVING (ADL)
ADLS_ACUITY_SCORE: 20

## 2022-07-29 NOTE — PROGRESS NOTES
[facility]    Medicine Progress Note - Hospitalist Service    Date of Admission:  7/25/2022    Assessment & Plan          Gricelda Sabillon is a 72 year old female with history of diabetes mellitus type 2, hypertension, hyperlipidemia, metastatic melanoma, recent hysterectomy for endometrial cancer, obstructive sleep apnea uses CPAP, and was recent UTI treated with ciprofloxacin.  She presented to the [facility] emergency department on 7/25/2022 for evaluation for shortness of breath.  She had no associated chest pain, cough, or fever. She went to clinic initially and was noted to have oxygen saturation of 84% on room air.  Chest x-ray was done and showed bilateral infiltrates.  She was sent to emergency room for further evaluation.  Her vitals were stable on arrival to emergency room. Laboratory work-up showed sodium 133, potassium 4.4, creatinine 0.79, ALT 31, AST 21, procalcitonin 0.26, troponin I 7.  WBC 14.7, hemoglobin 11.3. CT of the chest with contrast showed occlusive and nonocclusive pulmonary emboli noted within multiple pulmonary artery branches bilaterally. There was mild prominence of right ventricle suggestive of right heart strain. There were numerous ill-defined masses in both lungs that had increased in size, and were suspicious for progression of metastatic disease. She was started on a heparin drip in the ED. She was also given IV ceftriaxone in the azithromycin for suspected pneumonia. She was admitted to the hospital for further management. After admission she was seen by Oncology and lung biopsy was recommended. This was scheduled for 7/28/22 once O2 needs had decreased but could not be done due to aspirin being given within 5 days. Heparin drip was changed to Lovenox injections. Oxygen weaning was attempted.      Problem List:     Acute hypoxic respiratory failure, multifactorial, due to below  Bilateral pulmonary embolism  Suspected metastatic melanoma  to lung  Possible pneumonia  -Patient was noted to be hypoxic in the clinic.  CT of the chest with contrast showed bilateral occlusive and nonocclusive pulmonary emboli multiple pulmonary artery branches.    -Patient was on heparin drip in anticipation of liver biopsy.  Unfortunately, lung biopsy could not be done until next week due to aspirin use.  Heparin drip was changed to Lovenox on 7/28/22 which will be continued until biopsy done and then DOAC started.   -Plan on outpatient biopsy next week unless patient is still here for other reasons.   -Was initially treated with Rocephin and Zithromax- changed to Ceftin and oral Zithromax on 7/28/22.  Plan 7 days total of ceftriaxone/Ceftin and 5 days total of Zithromax  -Weaning off of oxygen as able. Was on room air yesterday but put back on O2 overnight. Continue to try weaning. With lung masses may not be able to come off of O2 completely.      History of metastatic melanoma  -Patient follows with Dr. Florez at [facility]. On Keytruda. CT of the chest shows numerous ill-defined masses in both lungs have increased in size, and are suspicious for progression of metastatic disease.   -We had hoped lung mass to be biopsied 7/28/22 but patient had been on aspirin so this had to be postponed  -Converted heparin drip to Lovenox injections on 7/28/22 until biopsy done then change to DOAC.   -Will ask Pharmacy Liason to assess DOAC coverage.   -With nausea last HS Oncology has ordered MRI of brain.      History of ureteric cancer requiring hysterectomy     Diabetes mellitus type 2  -PTA was on glipizide, metformin, and Invokana.    -Here has been on glipizide and as needed NovoLog insulin.  -I discussed with her primary care provider today.  She is concerned about hypoglycemia.  -Stopped glipizide due to risk of hypoglycemia  -Resumed prior to admission metformin  -PTA Invokana remains on hold.   -Likely discharge home on just metformin (as a single agent should not cause  "hypoglycemia) with outpatient follow-up  -Continue Novolog sliding scale insulin for now     Hypertension  -Continue prior to admission atenolol     Hyperlipidemia  -Continue  prior to admission atorvastatin     Weakness  -PT consult appreciated  -Increase ambulation       Diet: Snacks/Supplements Adult: Other; Ok to order any diet appropriate supplement prn; With Meals  Regular Diet Adult    DVT Prophylaxis: Enoxaparin (Lovenox) subcutaneous (treatment dose)   Meza Catheter: Not present  Central Lines: PRESENT     Cardiac Monitoring: None  Code Status: No CPR- Do NOT Intubate      Disposition Plan           Unsure. Possibly home over weekend if improvement continues, stronger, etc.   The patient's care was discussed with the Bedside Nurse, Patient and Patient's Family.    Gallo Houser MD  Hospitalist Service  [facility]  Securely message with the [employer]oc[employer] Web Console (learn more here)  Text page via [employer] Paging/Directory         Clinically Significant Risk Factors Present on Admission               # DMII: A1C = N/A within past 3 months  # Obesity: Estimated body mass index is 31.06 kg/m  as calculated from the following:    Height as of this encounter: 1.702 m (5' 7\").    Weight as of this encounter: 89.9 kg (198 lb 4.8 oz).    # Moderate Malnutrition: based on nutrition assessment     ______________________________________________________________________    Interval History   Some nausea overnight. No pain really. No chest pain, abdominal pain, diarrhea, or dysuria.     Data reviewed today: I reviewed all medications, new labs and imaging results over the last 24 hours. I personally reviewed no images or EKG's today.    Physical Exam   Vital Signs: Temp: 98  F (36.7  C) Temp src: Oral BP: 107/61 Pulse: 73   Resp: 20 SpO2: 93 % O2 Device: Nasal cannula Oxygen Delivery: 2 LPM  Weight: 198 lbs 4.8 oz  GENERAL:  Comfortable. Cooperative.  PSYCH: pleasant, oriented, No acute " distress.  EYES: PERRLA, Normal conjunctiva.  HEART:  Regular rate and rhythm. No JVD. Pulses normal. No edema.  LUNGS:  Clear to auscultation, normal Respiratory effort.  ABDOMEN:  Soft, no hepatosplenomegaly, normal bowel sounds.  EXTREMETIES: No clubbing, cyanosis or ischemia  SKIN:  Dry to touch, No rash.      Data   Recent Labs   Lab 07/29/22  0909 07/29/22  0214 07/28/22  2156 07/27/22  0730 07/27/22  0536 07/26/22  0835 07/26/22  0738 07/25/22  1012   WBC  --   --   --   --  10.2  --  9.9 14.7*   HGB  --   --   --   --  10.1*  --  9.5* 11.3*   MCV  --   --   --   --  88  --  89 88   PLT  --   --   --   --  374  --  328 395   NA  --   --   --   --  136  --  138 133   POTASSIUM  --   --   --   --  4.0  --  3.8 4.4   CHLORIDE  --   --   --   --  101  --  107 99   CO2  --   --   --   --  25  --  26 25   BUN  --   --   --   --  6.4*  --  9 16   CR  --   --   --   --  0.58  --  0.67 0.79   ANIONGAP  --   --   --   --  10  --  5 9   KATHRYN  --   --   --   --  9.5  --  9.3 9.7   * 135* 124*   < > 121*   < > 122* 347*   ALBUMIN  --   --   --   --   --   --   --  1.9*   PROTTOTAL  --   --   --   --   --   --   --  7.0   BILITOTAL  --   --   --   --   --   --   --  0.4   ALKPHOS  --   --   --   --   --   --   --  129   ALT  --   --   --   --   --   --   --  31   AST  --   --   --   --   --   --   --  21    < > = values in this interval not displayed.     No results found for this or any previous visit (from the past 24 hour(s)).

## 2022-07-29 NOTE — PROGRESS NOTES
[facility] Follow Up Note:    Assessment and Plan:    Gricelda Sabillon is a 72 year old female patient, admitted for acute PE     1.Occlusive and nonocclusive pulmonary emboli, with some evidence of right heart strain  - Hypercoagulability of malignancy  - No hypercoagulable work-up  - Continue IV heparin     PLAN: IV heparin( now transitioned to enoxaparin)        2. 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 Allison at [facility]  - Subsequent to that has been on adjuvant pembrolizumab, 7/5/2022     PLAN:  - Now with rapidly growing lung mass, plan  for biopsy   --Reviewed plan for lung biopsy as OP/ early next week due to ASA     3.Stage I endometrial cancer, resected  - Pulmonary nodules not thought to be related to that    4. Ongoing nausea since the past few weeks  -- Now not thought to be from surgery  -- Brain MRI done 7/29, shows no e/o mets( I called  tonight)     3. CODE : DNR/DNI( changed  after d/w Melva)     - Long discussion with patient in the presence of  Allen today.     - Also discussed and showed them rapidly growing pulmonary malignancy.  We also discussed that patient may not be in a condition for further treatment based on overall status.  Patient is really hoping to get biopsy to have closure understand next steps.              D/w Dr. Houser    Subjective:    Feeling nauseous, weak, tired. But breathing is better      Labs:  All labs reviewed    CBCRecent Labs   Lab 07/27/22  0536 07/26/22  0738 07/25/22  1012   WBC 10.2 9.9 14.7*   HGB 10.1* 9.5* 11.3*   MCV 88 89 88    328 395       CMP  Recent Labs   Lab 07/29/22  0214 07/28/22  2156 07/28/22  1741 07/28/22  1215 07/27/22  0730 07/27/22  0536 07/26/22  0835 07/26/22  0738 07/25/22  1012   NA  --   --   --   --   --  136  --  138 133   POTASSIUM  --    --   --   --   --  4.0  --  3.8 4.4   CHLORIDE  --   --   --   --   --  101  --  107 99   CO2  --   --   --   --   --  25  --  26 25   ANIONGAP  --   --   --   --   --  10  --  5 9   * 124* 197* 134*   < > 121*   < > 122* 347*   BUN  --   --   --   --   --  6.4*  --  9 16   CR  --   --   --   --   --  0.58  --  0.67 0.79   GFRESTIMATED  --   --   --   --   --  >90  --  >90 78   KATHRYN  --   --   --   --   --  9.5  --  9.3 9.7   PROTTOTAL  --   --   --   --   --   --   --   --  7.0   ALBUMIN  --   --   --   --   --   --   --   --  1.9*   BILITOTAL  --   --   --   --   --   --   --   --  0.4   ALKPHOS  --   --   --   --   --   --   --   --  129   AST  --   --   --   --   --   --   --   --  21   ALT  --   --   --   --   --   --   --   --  31    < > = values in this interval not displayed.       INRNo lab results found in last 7 days.    Blood CultureNo results for input(s): CULT in the last 168 hours.    Time: 35 minutes with patient , 30 minutes in counseling and coordination of care  William Florez MD  [facility]  7/29/2022 7:02 AM

## 2022-07-29 NOTE — PLAN OF CARE
Goal Outcome Evaluation:    Plan of Care Reviewed With: patient     Overall Patient Progress: improving    Attempted to wean patient from O2. 88 % on room air. One liter applied with sat 92%. Lungs decreased with crackles in right base. Tele SR Heparin drip stopped at 1920. Plan is to switch patient over to lovenox in one hour. Ambulated in the halls with walker T belt and SBA.

## 2022-07-29 NOTE — PLAN OF CARE
"Goal Outcome Evaluation:      Alert and oriented, no complaints of pain. Heparin gtt stopped last night at 1920. Lovenox administered an hour later. Lovenox teaching and demonstration completed. Will need reinforcement. IS demonstrated and will also need reinforcement teaching. [place] nauseated and pt states she has not had a bm since Sunday. IV zofran and senna provided. Lungs decreased with crackles in right base. 2L O2 overnight.    BP 92/52 (BP Location: Left arm)   Pulse 79   Temp 98.1  F (36.7  C) (Oral)   Resp 18   Ht 1.702 m (5' 7\")   Wt 89.9 kg (198 lb 4.8 oz)   LMP 12/13/2002 (Exact Date)   SpO2 92%   BMI 31.06 kg/m                  "

## 2022-07-30 ENCOUNTER — APPOINTMENT (OUTPATIENT)
Dept: PHYSICAL THERAPY | Facility: CLINIC | Age: 72
DRG: 175 | End: 2022-07-30
Payer: COMMERCIAL

## 2022-07-30 LAB
BACTERIA BLD CULT: NO GROWTH
BACTERIA BLD CULT: NO GROWTH
GLUCOSE BLDC GLUCOMTR-MCNC: 136 MG/DL (ref 70–99)
GLUCOSE BLDC GLUCOMTR-MCNC: 150 MG/DL (ref 70–99)
GLUCOSE BLDC GLUCOMTR-MCNC: 164 MG/DL (ref 70–99)
GLUCOSE BLDC GLUCOMTR-MCNC: 165 MG/DL (ref 70–99)
GLUCOSE BLDC GLUCOMTR-MCNC: 189 MG/DL (ref 70–99)

## 2022-07-30 PROCEDURE — 120N000001 HC R&B MED SURG/OB

## 2022-07-30 PROCEDURE — 250N000013 HC RX MED GY IP 250 OP 250 PS 637: Performed by: INTERNAL MEDICINE

## 2022-07-30 PROCEDURE — 99233 SBSQ HOSP IP/OBS HIGH 50: CPT | Performed by: INTERNAL MEDICINE

## 2022-07-30 PROCEDURE — 250N000011 HC RX IP 250 OP 636: Performed by: INTERNAL MEDICINE

## 2022-07-30 PROCEDURE — 97530 THERAPEUTIC ACTIVITIES: CPT | Mod: GP

## 2022-07-30 RX ADMIN — AZITHROMYCIN MONOHYDRATE 250 MG: 250 TABLET ORAL at 08:25

## 2022-07-30 RX ADMIN — TIMOLOL MALEATE 1 DROP: 5 SOLUTION/ DROPS OPHTHALMIC at 21:26

## 2022-07-30 RX ADMIN — CEFUROXIME AXETIL 500 MG: 250 TABLET, FILM COATED ORAL at 08:25

## 2022-07-30 RX ADMIN — INSULIN ASPART 1 UNITS: 100 INJECTION, SOLUTION INTRAVENOUS; SUBCUTANEOUS at 08:27

## 2022-07-30 RX ADMIN — HEPARIN, PORCINE (PF) 10 UNIT/ML INTRAVENOUS SYRINGE 5 ML: at 12:22

## 2022-07-30 RX ADMIN — ATORVASTATIN CALCIUM 20 MG: 20 TABLET, FILM COATED ORAL at 21:25

## 2022-07-30 RX ADMIN — LATANOPROST 1 DROP: 50 SOLUTION/ DROPS OPHTHALMIC at 21:35

## 2022-07-30 RX ADMIN — ONDANSETRON 4 MG: 2 INJECTION INTRAMUSCULAR; INTRAVENOUS at 00:33

## 2022-07-30 RX ADMIN — MAGNESIUM HYDROXIDE 30 ML: 400 SUSPENSION ORAL at 08:25

## 2022-07-30 RX ADMIN — ENOXAPARIN SODIUM 90 MG: 100 INJECTION SUBCUTANEOUS at 08:26

## 2022-07-30 RX ADMIN — INSULIN ASPART 1 UNITS: 100 INJECTION, SOLUTION INTRAVENOUS; SUBCUTANEOUS at 12:53

## 2022-07-30 RX ADMIN — ENOXAPARIN SODIUM 90 MG: 100 INJECTION SUBCUTANEOUS at 21:35

## 2022-07-30 RX ADMIN — ATENOLOL 25 MG: 25 TABLET ORAL at 08:25

## 2022-07-30 RX ADMIN — HEPARIN, PORCINE (PF) 10 UNIT/ML INTRAVENOUS SYRINGE 5 ML: at 00:35

## 2022-07-30 RX ADMIN — TIMOLOL MALEATE 1 DROP: 5 SOLUTION/ DROPS OPHTHALMIC at 08:28

## 2022-07-30 RX ADMIN — METFORMIN HYDROCHLORIDE 1000 MG: 500 TABLET, EXTENDED RELEASE ORAL at 08:25

## 2022-07-30 RX ADMIN — METFORMIN HYDROCHLORIDE 1000 MG: 500 TABLET, EXTENDED RELEASE ORAL at 17:56

## 2022-07-30 RX ADMIN — CEFUROXIME AXETIL 500 MG: 250 TABLET, FILM COATED ORAL at 21:25

## 2022-07-30 ASSESSMENT — ACTIVITIES OF DAILY LIVING (ADL)
ADLS_ACUITY_SCORE: 20
DEPENDENT_IADLS:: INDEPENDENT
ADLS_ACUITY_SCORE: 20

## 2022-07-30 NOTE — CONSULTS
Care Management Initial Consult    General Information  Assessment completed with: Patient,    Type of CM/SW Visit: Initial Assessment    Primary Care Provider verified and updated as needed: Yes   Readmission within the last 30 days:        Reason for Consult: discharge planning  Advance Care Planning:            Communication Assessment  Patient's communication style: spoken language (English or Bilingual)    Hearing Difficulty or Deaf: no   Wear Glasses or Blind: yes    Cognitive  Cognitive/Neuro/Behavioral: WDL                      Living Environment:   People in home: spouse     Current living Arrangements: house      Able to return to prior arrangements:         Family/Social Support:  Care provided by:    Provides care for: no one  Marital Status:   , Children          Description of Support System: Supportive, Involved       x1 son (daughter-in-law), x2 daughters- reports family support and assist as able.   Current Resources:   Patient receiving home care services: No     Community Resources: None  Equipment currently used at home: none  Supplies currently used at home: None    Employment/Financial:  Employment Status: retired        Financial Concerns: No concerns identified           Lifestyle & Psychosocial Needs:  Social Determinants of Health     Tobacco Use: Low Risk      Smoking Tobacco Use: Never Smoker     Smokeless Tobacco Use: Never Used   Alcohol Use: Not on file   Financial Resource Strain: Not on file   Food Insecurity: Not on file   Transportation Needs: Not on file   Physical Activity: Not on file   Stress: Not on file   Social Connections: Not on file   Intimate Partner Violence: Not At Risk     Fear of Current or Ex-Partner: No     Emotionally Abused: No     Physically Abused: No     Sexually Abused: No   Depression: Not at risk     PHQ-2 Score: 0   Housing Stability: Not on file       Functional Status:  Prior to admission patient needed assistance:   Dependent ADLs::  Independent, Ambulation-cane  Dependent IADLs:: Independent       Mental Health Status:  Mental Health Status: No Current Concerns       Chemical Dependency Status:  Chemical Dependency Status: No Current Concerns             Values/Beliefs:  Spiritual, Cultural Beliefs, [employer]al Practices, Values that affect care: no               Additional Information:  CTS consulted for discharge planning. Patient admitted with SOB, bilateral pulmonary embolism. Met with patient at bedside. She lives at home with her . Patient independent at baseline, uses no walker or cane, no oxygen or equipment. Patient does own meals, grooming, medications. She expressed being weaker in last few days and  providing assistance.  does laundry ~7 stairs to basement/laundry area. 2 stairs to enter home. Discussed discharge options of home vs TCU. Patient would prefer to discharge home.     She is interested in home care or TCU if appropriate for discharge. Patient willing to continue with therapy services while inpatient. She prefers surrounding area TCU if recommended. Home care referral sent to [facility].     Will continue to follow therapy recommendation. Will follow for discharge plan of care.       Tia Andrade RN Case Manager  Inpatient Care Coordination   [facility]   [phone]      Tia Andrade RN

## 2022-07-30 NOTE — PLAN OF CARE
A&O. VSS. Remains on 2L O2 NC. LS dim/ crackles to bases. Denies pain. C/o nausea- IV Zofran given.  and 150. Tele SR. Treating PNA w/ PO Zithromax and Ceftin. Port heparin locked. Up SBA.

## 2022-07-30 NOTE — PROGRESS NOTES
[facility]    Medicine Progress Note - Hospitalist Service    Date of Admission:  7/25/2022    Assessment & Plan          Gricelda Sabillon is a 72 year old female with history of diabetes mellitus type 2, hypertension, hyperlipidemia, metastatic melanoma, recent hysterectomy for endometrial cancer, obstructive sleep apnea uses CPAP, and was recent UTI treated with ciprofloxacin.  She presented to the [facility] emergency department on 7/25/2022 for evaluation for shortness of breath.  She had no associated chest pain, cough, or fever. She went to clinic initially and was noted to have oxygen saturation of 84% on room air.  Chest x-ray was done and showed bilateral infiltrates.  She was sent to emergency room for further evaluation.  Her vitals were stable on arrival to emergency room. Laboratory work-up showed sodium 133, potassium 4.4, creatinine 0.79, ALT 31, AST 21, procalcitonin 0.26, troponin I 7.  WBC 14.7, hemoglobin 11.3. CT of the chest with contrast showed occlusive and nonocclusive pulmonary emboli noted within multiple pulmonary artery branches bilaterally. There was mild prominence of right ventricle suggestive of right heart strain. There were numerous ill-defined masses in both lungs that had increased in size, and were suspicious for progression of metastatic disease. She was started on a heparin drip in the ED. She was also given IV ceftriaxone in the azithromycin for suspected pneumonia. She was admitted to the hospital for further management. After admission she was seen by Oncology and lung biopsy was recommended. This was scheduled for 7/28/22 once O2 needs had decreased but could not be done due to aspirin being given within 5 days. Heparin drip was changed to Lovenox injections. Oxygen weaning was attempted.      Problem List:     Acute hypoxic respiratory failure, multifactorial, due to below  Bilateral pulmonary embolism  Suspected metastatic melanoma  to lung  Possible pneumonia  -Patient was noted to be hypoxic in the clinic.  CT of the chest with contrast showed bilateral occlusive and nonocclusive pulmonary emboli multiple pulmonary artery branches.    -Patient was on heparin drip in anticipation of liver biopsy.  Unfortunately, lung biopsy could not be done until next week due to aspirin use.  Heparin drip was changed to Lovenox on 7/28/22 which will be continued until biopsy done and then DOAC started.   -Request pharmacy liason to check DOAC coverage  -Plan on biopsy next week-Monday or Tuesday per IR- likely as inpatient given persistent weakness and hypoxia with desaturation with activity.  -Was initially treated with Rocephin and Zithromax- changed to Ceftin and oral Zithromax on 7/28/22.  Plan 7 days total of ceftriaxone/Ceftin (through 8/1) and 5 days total of Zithromax (completed this am).  -Weaning off of oxygen as able. Still needing O2 most of the time. Desaturates and dizzy with activity.     History of metastatic melanoma  -Patient follows with Dr. Florez at [facility]. On Keytruda. CT of the chest shows numerous ill-defined masses in both lungs have increased in size, and are suspicious for progression of metastatic disease.   -We had hoped lung mass to be biopsied 7/28/22 but patient had been on aspirin so this had to be postponed  -Converted heparin drip to Lovenox injections on 7/28/22 until biopsy done then change to DOAC.   -Will ask Pharmacy Liason to assess DOAC coverage.   -With nausea Oncology ordered MRI of brain which was unremarkable (results discussed with patient).      History of ureteric cancer requiring hysterectomy     Diabetes mellitus type 2  -PTA was on glipizide, metformin, and Invokana.    -Here has been on glipizide and as needed NovoLog insulin.  -I discussed with her primary care provider today.  She is concerned about hypoglycemia.  -Stopped glipizide due to risk of hypoglycemia  -Resumed prior to admission metformin  -PTA  Invokana remains on hold.   -Likely discharge home on just metformin (as a single agent should not cause hypoglycemia) with outpatient follow-up  -Continue Novolog sliding scale insulin for now     Hypertension  -Continue prior to admission atenolol     Hyperlipidemia  -Continue  prior to admission atorvastatin     Weakness  -PT consult appreciated  -Desaturates and gets dizzy with ambulation; may need TCU on discharge  -Increase ambulation  -I will ask SW to see to start looking for TCU- anticipate discharge Tues or Wed- hopefully after biopsy on Mon or Tues       Diet: Snacks/Supplements Adult: Other; Ok to order any diet appropriate supplement prn; With Meals  Regular Diet Adult    DVT Prophylaxis: Enoxaparin (Lovenox) subcutaneous (treatment dosed for PE)  Meza Catheter: Not present  Central Lines: PRESENT     Cardiac Monitoring: None  Code Status: No CPR- Do NOT Intubate      Disposition Plan     Expected Discharge Date: 07/30/2022,  3:00 PM    Destination: home with family  Discharge Comments: New admit w/bilateral PEs, Still on Heparin Gtt, no IR candidate for Lung bx, spouse to discuss changing code status w/pt        The patient's care was discussed with the Bedside Nurse and Patient.    Gallo Houser MD  Hospitalist Service  [facility]  Securely message with the [employer] Web Console (learn more here)  Text page via [employer] Paging/Directory         Clinically Significant Risk Factors Present on Admission                      ______________________________________________________________________    Interval History   No new problems. Continues to feel dizzy and desaturate with mobility. No pain really. No more nausea. No dysuria or cough.     Data reviewed today: I reviewed all medications, new labs and imaging results over the last 24 hours. I personally reviewed no images or EKG's today.    Physical Exam   Vital Signs: Temp: 98.5  F (36.9  C) Temp src: Oral BP: 114/60 Pulse: 73    Resp: 20 SpO2: 92 % O2 Device: Nasal cannula Oxygen Delivery: 2 LPM  Weight: 198 lbs 4.8 oz  GENERAL:  Comfortable. Cooperative.  PSYCH: pleasant, oriented, No acute distress.  EYES: PERRLA, Normal conjunctiva.  HEART:  Regular rate and rhythm. No JVD. Pulses normal. No edema.  LUNGS:  Clear to auscultation, normal Respiratory effort.  ABDOMEN:  Soft, no hepatosplenomegaly, normal bowel sounds.  EXTREMETIES: No clubbing, cyanosis or ischemia  SKIN:  Dry to touch, No rash.      Data

## 2022-07-30 NOTE — PLAN OF CARE
Vital signs stable, O2 92% on 2 L NC. Denies pain. Alert and orientated x4. Up with SBA in room. Tele: SR. B, 189. New order placed for social work. Pt refused scheduled Zyrtec. PRN milk of magnesia given due to no BM in several days. Pt had 2 BM's this shift. Discharge TBD could be Monday or Tuesday due to pt may requiring TCU. Will continue to monitor and follow POC.

## 2022-07-30 NOTE — PROGRESS NOTES
[facility] Onc Chart Check    No change in recommendations  Continue anticoagulation   Plan for outpatient biopsy next week     Patrick Dreyer,    [facility]

## 2022-07-30 NOTE — PROGRESS NOTES
"/62 (BP Location: Right arm)   Pulse 73   Temp 98.6  F (37  C) (Oral)   Resp 20   Ht 1.702 m (5' 7\")   Wt 89.9 kg (198 lb 4.8 oz)   LMP 12/13/2002 (Exact Date)   SpO2 93%   BMI 31.06 kg/m      Pt alert and oriented X 4; VSS; Afebrile. Denies pain and nausea. Attempted to wean pt off oxygen - pt not able to sustain saturation greater than 90% on 1 1/2 L of oxygen.  Continues on 2 L of oxygen. Pt's oxygen desaturates after activity but is able to recover quickly. PRN Miralax given to promote bowel movement. Tolerating diet with little appetite. Pt ambulating with SBA. Call light appropriate  "

## 2022-07-31 ENCOUNTER — APPOINTMENT (OUTPATIENT)
Dept: PHYSICAL THERAPY | Facility: CLINIC | Age: 72
DRG: 175 | End: 2022-07-31
Payer: COMMERCIAL

## 2022-07-31 LAB
CREAT SERPL-MCNC: 0.58 MG/DL (ref 0.51–0.95)
GFR SERPL CREATININE-BSD FRML MDRD: >90 ML/MIN/1.73M2
GLUCOSE BLDC GLUCOMTR-MCNC: 144 MG/DL (ref 70–99)
GLUCOSE BLDC GLUCOMTR-MCNC: 169 MG/DL (ref 70–99)
GLUCOSE BLDC GLUCOMTR-MCNC: 178 MG/DL (ref 70–99)
PLATELET # BLD AUTO: 481 10E3/UL (ref 150–450)

## 2022-07-31 PROCEDURE — 82565 ASSAY OF CREATININE: CPT | Performed by: INTERNAL MEDICINE

## 2022-07-31 PROCEDURE — 97530 THERAPEUTIC ACTIVITIES: CPT | Mod: GP

## 2022-07-31 PROCEDURE — 85049 AUTOMATED PLATELET COUNT: CPT | Performed by: INTERNAL MEDICINE

## 2022-07-31 PROCEDURE — 250N000011 HC RX IP 250 OP 636: Performed by: INTERNAL MEDICINE

## 2022-07-31 PROCEDURE — 250N000013 HC RX MED GY IP 250 OP 250 PS 637: Performed by: INTERNAL MEDICINE

## 2022-07-31 PROCEDURE — 120N000001 HC R&B MED SURG/OB

## 2022-07-31 PROCEDURE — 99233 SBSQ HOSP IP/OBS HIGH 50: CPT | Performed by: INTERNAL MEDICINE

## 2022-07-31 RX ORDER — HEPARIN SODIUM 10000 [USP'U]/100ML
0-5000 INJECTION, SOLUTION INTRAVENOUS CONTINUOUS
Status: DISPENSED | OUTPATIENT
Start: 2022-07-31 | End: 2022-08-02

## 2022-07-31 RX ADMIN — HEPARIN, PORCINE (PF) 10 UNIT/ML INTRAVENOUS SYRINGE 5 ML: at 09:05

## 2022-07-31 RX ADMIN — INSULIN ASPART 1 UNITS: 100 INJECTION, SOLUTION INTRAVENOUS; SUBCUTANEOUS at 18:35

## 2022-07-31 RX ADMIN — CEFUROXIME AXETIL 500 MG: 250 TABLET, FILM COATED ORAL at 09:04

## 2022-07-31 RX ADMIN — CEFUROXIME AXETIL 500 MG: 250 TABLET, FILM COATED ORAL at 20:29

## 2022-07-31 RX ADMIN — METFORMIN HYDROCHLORIDE 1000 MG: 500 TABLET, EXTENDED RELEASE ORAL at 09:04

## 2022-07-31 RX ADMIN — ATORVASTATIN CALCIUM 20 MG: 20 TABLET, FILM COATED ORAL at 20:29

## 2022-07-31 RX ADMIN — TIMOLOL MALEATE 1 DROP: 5 SOLUTION/ DROPS OPHTHALMIC at 21:59

## 2022-07-31 RX ADMIN — ENOXAPARIN SODIUM 90 MG: 100 INJECTION SUBCUTANEOUS at 09:05

## 2022-07-31 RX ADMIN — ATENOLOL 25 MG: 25 TABLET ORAL at 09:04

## 2022-07-31 RX ADMIN — INSULIN ASPART 1 UNITS: 100 INJECTION, SOLUTION INTRAVENOUS; SUBCUTANEOUS at 13:10

## 2022-07-31 RX ADMIN — LATANOPROST 1 DROP: 50 SOLUTION/ DROPS OPHTHALMIC at 22:52

## 2022-07-31 RX ADMIN — INSULIN ASPART 1 UNITS: 100 INJECTION, SOLUTION INTRAVENOUS; SUBCUTANEOUS at 09:12

## 2022-07-31 RX ADMIN — TIMOLOL MALEATE 1 DROP: 5 SOLUTION/ DROPS OPHTHALMIC at 09:08

## 2022-07-31 RX ADMIN — METFORMIN HYDROCHLORIDE 1000 MG: 500 TABLET, EXTENDED RELEASE ORAL at 18:35

## 2022-07-31 RX ADMIN — ONDANSETRON 4 MG: 4 TABLET, ORALLY DISINTEGRATING ORAL at 09:12

## 2022-07-31 RX ADMIN — HEPARIN SODIUM AND DEXTROSE 1600 UNITS/HR: 10000; 5 INJECTION INTRAVENOUS at 20:35

## 2022-07-31 ASSESSMENT — ACTIVITIES OF DAILY LIVING (ADL)
ADLS_ACUITY_SCORE: 22
ADLS_ACUITY_SCORE: 20
ADLS_ACUITY_SCORE: 22
ADLS_ACUITY_SCORE: 20
ADLS_ACUITY_SCORE: 20
ADLS_ACUITY_SCORE: 22
ADLS_ACUITY_SCORE: 22
ADLS_ACUITY_SCORE: 20

## 2022-07-31 NOTE — PLAN OF CARE
Vital signs stable, O2 94% on 2 L NC. Denies pain. Alert and orientated x4. Up with SBA in room. Tele: SR. B, 144. Pt refused scheduled Zyrtec. Pt had 2 BM's this shift. New order received to discontinue lovenox and heparin gtt to begin at 2100 this evening. Plan for biopsy on Tuesday. Pt may need to discharge to TCU due to dizziness, weakness, has not passed stairs with PT and still requiring oxygen. Will continue to monitor and follow POC.

## 2022-07-31 NOTE — PLAN OF CARE
Goal Outcome Evaluation:      Vitals stable and afebrile. Family visiting and supportive. Gave self own lovenox with good technique demonstrated. Denies pain. Ate well. No sliding scale insulin required.

## 2022-07-31 NOTE — PROGRESS NOTES
[facility]    Medicine Progress Note - Hospitalist Service    Date of Admission:  7/25/2022    Assessment & Plan            Gricelad Sabillon is a 72 year old female with history of diabetes mellitus type 2, hypertension, hyperlipidemia, metastatic melanoma, recent hysterectomy for endometrial cancer, obstructive sleep apnea uses CPAP, and was recent UTI treated with ciprofloxacin.  She presented to the [facility] emergency department on 7/25/2022 for evaluation for shortness of breath.  She had no associated chest pain, cough, or fever. She went to clinic initially and was noted to have oxygen saturation of 84% on room air.  Chest x-ray was done and showed bilateral infiltrates.  She was sent to emergency room for further evaluation.  Her vitals were stable on arrival to emergency room. Laboratory work-up showed sodium 133, potassium 4.4, creatinine 0.79, ALT 31, AST 21, procalcitonin 0.26, troponin I 7.  WBC 14.7, hemoglobin 11.3. CT of the chest with contrast showed occlusive and nonocclusive pulmonary emboli noted within multiple pulmonary artery branches bilaterally. There was mild prominence of right ventricle suggestive of right heart strain. There were numerous ill-defined masses in both lungs that had increased in size, and were suspicious for progression of metastatic disease. She was started on a heparin drip in the ED. She was also given IV ceftriaxone in the azithromycin for suspected pneumonia. She was admitted to the hospital for further management. After admission she was seen by Oncology and lung biopsy was recommended. This was scheduled for 7/28/22 once O2 needs had decreased but could not be done due to aspirin being given within 5 days. Aspirin was stopped. Heparin drip was changed to Lovenox injections in anticipation of discharge home on Lovenox and return for biopsy the following week. Oxygen weaning was attempted but patient continued to need oxygen. She  was also weak and became dyspneic, dizzy, and hypoxic with ambulation. It was not clear if patient would be able to discharge to home or need TCU. Ultimately, it was decided to pursue biopsy while in the hospital. Lovenox was changed back to heparin drip.       Problem List:     Acute hypoxic respiratory failure, multifactorial, due to below  Bilateral pulmonary embolism  Suspected metastatic melanoma to lung  Possible pneumonia  -Patient was noted to be hypoxic in the clinic.  CT of the chest with contrast showed bilateral occlusive and nonocclusive pulmonary emboli multiple pulmonary artery branches.    -Patient was on heparin drip in anticipation of liver biopsy.  Unfortunately, lung biopsy could not be done until next week due to aspirin use.  Heparin drip was changed to Lovenox on 7/28/22 which will be continued until biopsy done and then DOAC started.   -Request pharmacy liason to check DOAC coverage  -Plan on biopsy next week-Monday or Tuesday per IR- likely as inpatient given persistent weakness and hypoxia with desaturation with activity.  -Was initially treated with Rocephin and Zithromax- changed to Ceftin and oral Zithromax on 7/28/22.  Plan 7 days total of ceftriaxone/Ceftin (through 8/1) and 5 days total of Zithromax (completed 7/30).  -Weaning off of oxygen as able. Still needing O2 most of the time. Desaturates and dizzy with activity.     History of metastatic melanoma  -Patient follows with Dr. Florez at [facility]. On Keytruda. CT of the chest shows numerous ill-defined masses in both lungs have increased in size, and are suspicious for progression of metastatic disease.   -With nausea Oncology ordered MRI of brain which was unremarkable (results discussed with patient).  -We had hoped lung mass to be biopsied 7/28/22 but patient had been on aspirin so this had to be postponed  -Converted heparin drip to Lovenox injections on 7/28/22 with tentative plan to discharge home on Lovenox and return for biopsy  as an outpatient the following week.   -Patient remains on oxygen and is still weak and dizzy. She has not passed stairs with PT. She may need TCU on discharge.  -Plan to do biopsy while in the hospital on Tuesday. I will coordinate with IR tomorrow. Change Lovenox back to heparin drip this evening. Plan biopsy for Tuesday after holding heparin for 6 hours. Resume heparin drip after procedure without bolus. If no sign of bleeding start DOAC Wednesday.   -Pharmacy Liason consult to assess DOAC coverage.       History of ureteric cancer requiring hysterectomy  -Not thought to be a current issue    Diabetes mellitus type 2  -PTA was on glipizide, metformin, and Invokana.    -Here has been on glipizide and as needed NovoLog insulin.  -I discussed with her primary care provider who is concerned about hypoglycemia.  -We agreed to stop glipizide due to risk of hypoglycemia  -I resumed prior to admission metformin  -PTA Invokana remains on hold.   -Likely discharge home on just metformin (as a single agent should not cause hypoglycemia) with outpatient follow-up  -Continue Novolog sliding scale insulin for now- not needing much     Hypertension  -Continue prior to admission atenolol     Hyperlipidemia  -Continue  prior to admission atorvastatin     Weakness  -PT consult appreciated  -Desaturates and gets dizzy with ambulation; may need TCU on discharge  -Increase ambulation  -I have asked SW to see to start looking for TCU- anticipate discharge Wed- hopefully after biopsy on Tues         Diet: Snacks/Supplements Adult: Other; Ok to order any diet appropriate supplement prn; With Meals  Regular Diet Adult    DVT Prophylaxis: changing Lovenox injection to heparin drip  Meza Catheter: Not present  Central Lines: PRESENT     Cardiac Monitoring: None  Code Status: No CPR- Do NOT Intubate      Disposition Plan      Expected Discharge Date: 08/02/2022,  3:00 PM    Destination: home with family  Discharge Comments: New admit  w/bilateral PEs, Still on Heparin Gtt, no IR candidate for Lung bx, spouse to discuss changing code status w/pt        The patient's care was discussed with the Bedside Nurse, Patient and Patient's Family.    Gallo Houser MD  Hospitalist Service  [facility]  Securely message with the Vocera Web Console (learn more here)  Text page via [employer] Paging/Directory         Clinically Significant Risk Factors Present on Admission                      ______________________________________________________________________    Interval History   No new problems. Still weak and needing oxygen. Still a bit dizzy and short or breath with ambulation.     Data reviewed today: I reviewed all medications, new labs and imaging results over the last 24 hours. I personally reviewed no images or EKG's today.    Physical Exam   Vital Signs: Temp: 97.5  F (36.4  C) Temp src: Oral BP: 131/61 Pulse: 79   Resp: 17 SpO2: 94 % O2 Device: Nasal cannula Oxygen Delivery: 2 LPM  Weight: 198 lbs 4.8 oz  GENERAL:  Comfortable. Cooperative.  PSYCH: pleasant, oriented, No acute distress.  EYES: PERRLA, Normal conjunctiva.  HEART:  Regular rate and rhythm. No JVD. Pulses normal. No edema.  LUNGS:  Clear to auscultation, normal Respiratory effort.  ABDOMEN:  Soft, no hepatosplenomegaly, normal bowel sounds.  EXTREMETIES: No clubbing, cyanosis or ischemia  SKIN:  Dry to touch, No rash.      Data   Recent Labs   Lab 07/31/22  1308 07/31/22  0855 07/31/22  0655 07/31/22  0420 07/27/22  0730 07/27/22  0536 07/26/22  0835 07/26/22  0738 07/25/22  1012   WBC  --   --   --   --   --  10.2  --  9.9 14.7*   HGB  --   --   --   --   --  10.1*  --  9.5* 11.3*   MCV  --   --   --   --   --  88  --  89 88   PLT  --   --  481*  --   --  374  --  328 395   NA  --   --   --   --   --  136  --  138 133   POTASSIUM  --   --   --   --   --  4.0  --  3.8 4.4   CHLORIDE  --   --   --   --   --  101  --  107 99   CO2  --   --   --   --   --  25  --   26 25   BUN  --   --   --   --   --  6.4*  --  9 16   CR  --   --  0.58  --   --  0.58  --  0.67 0.79   ANIONGAP  --   --   --   --   --  10  --  5 9   KATHRNY  --   --   --   --   --  9.5  --  9.3 9.7   * 178*  --  144*   < > 121*   < > 122* 347*   ALBUMIN  --   --   --   --   --   --   --   --  1.9*   PROTTOTAL  --   --   --   --   --   --   --   --  7.0   BILITOTAL  --   --   --   --   --   --   --   --  0.4   ALKPHOS  --   --   --   --   --   --   --   --  129   ALT  --   --   --   --   --   --   --   --  31   AST  --   --   --   --   --   --   --   --  21    < > = values in this interval not displayed.     No results found for this or any previous visit (from the past 24 hour(s)).

## 2022-07-31 NOTE — PROGRESS NOTES
[facility] Onc Chart Check    No change in recommendations  Continue anticoagulation   Plan for outpatient biopsy next week     Patrick Dreyer,    [facility]

## 2022-08-01 ENCOUNTER — APPOINTMENT (OUTPATIENT)
Dept: PHYSICAL THERAPY | Facility: CLINIC | Age: 72
DRG: 175 | End: 2022-08-01
Payer: COMMERCIAL

## 2022-08-01 ENCOUNTER — APPOINTMENT (OUTPATIENT)
Dept: GENERAL RADIOLOGY | Facility: CLINIC | Age: 72
DRG: 175 | End: 2022-08-01
Attending: INTERNAL MEDICINE
Payer: COMMERCIAL

## 2022-08-01 LAB
ALBUMIN SERPL BCG-MCNC: 2.8 G/DL (ref 3.5–5.2)
ANION GAP SERPL CALCULATED.3IONS-SCNC: 6 MMOL/L (ref 7–15)
ANION GAP SERPL CALCULATED.3IONS-SCNC: 7 MMOL/L (ref 7–15)
BUN SERPL-MCNC: 5.2 MG/DL (ref 8–23)
BUN SERPL-MCNC: 8.9 MG/DL (ref 8–23)
CA-I BLD-MCNC: 5.1 MG/DL (ref 4.4–5.2)
CALCIUM SERPL-MCNC: 5.6 MG/DL (ref 8.8–10.2)
CALCIUM SERPL-MCNC: 9.9 MG/DL (ref 8.8–10.2)
CHLORIDE SERPL-SCNC: 117 MMOL/L (ref 98–107)
CHLORIDE SERPL-SCNC: 98 MMOL/L (ref 98–107)
CREAT SERPL-MCNC: 0.35 MG/DL (ref 0.51–0.95)
CREAT SERPL-MCNC: 0.59 MG/DL (ref 0.51–0.95)
DEPRECATED HCO3 PLAS-SCNC: 18 MMOL/L (ref 22–29)
DEPRECATED HCO3 PLAS-SCNC: 28 MMOL/L (ref 22–29)
ERYTHROCYTE [DISTWIDTH] IN BLOOD BY AUTOMATED COUNT: 14.2 % (ref 10–15)
GFR SERPL CREATININE-BSD FRML MDRD: >90 ML/MIN/1.73M2
GFR SERPL CREATININE-BSD FRML MDRD: >90 ML/MIN/1.73M2
GLUCOSE BLDC GLUCOMTR-MCNC: 143 MG/DL (ref 70–99)
GLUCOSE BLDC GLUCOMTR-MCNC: 162 MG/DL (ref 70–99)
GLUCOSE BLDC GLUCOMTR-MCNC: 186 MG/DL (ref 70–99)
GLUCOSE BLDC GLUCOMTR-MCNC: 188 MG/DL (ref 70–99)
GLUCOSE SERPL-MCNC: 122 MG/DL (ref 70–99)
GLUCOSE SERPL-MCNC: 197 MG/DL (ref 70–99)
HCT VFR BLD AUTO: 35.6 % (ref 35–47)
HGB BLD-MCNC: 10.4 G/DL (ref 11.7–15.7)
MAGNESIUM SERPL-MCNC: 1.7 MG/DL (ref 1.7–2.3)
MCH RBC QN AUTO: 25.4 PG (ref 26.5–33)
MCHC RBC AUTO-ENTMCNC: 29.2 G/DL (ref 31.5–36.5)
MCV RBC AUTO: 87 FL (ref 78–100)
PLATELET # BLD AUTO: 556 10E3/UL (ref 150–450)
POTASSIUM SERPL-SCNC: 2.2 MMOL/L (ref 3.4–5.3)
POTASSIUM SERPL-SCNC: 3.9 MMOL/L (ref 3.4–5.3)
RBC # BLD AUTO: 4.09 10E6/UL (ref 3.8–5.2)
SARS-COV-2 RNA RESP QL NAA+PROBE: NEGATIVE
SODIUM SERPL-SCNC: 133 MMOL/L (ref 136–145)
SODIUM SERPL-SCNC: 141 MMOL/L (ref 136–145)
UFH PPP CHRO-ACNC: 0.48 IU/ML
UFH PPP CHRO-ACNC: 0.67 IU/ML
WBC # BLD AUTO: 11.4 10E3/UL (ref 4–11)

## 2022-08-01 PROCEDURE — 83735 ASSAY OF MAGNESIUM: CPT | Performed by: INTERNAL MEDICINE

## 2022-08-01 PROCEDURE — 120N000001 HC R&B MED SURG/OB

## 2022-08-01 PROCEDURE — 99207 PR NO CHARGE LOS: CPT | Performed by: STUDENT IN AN ORGANIZED HEALTH CARE EDUCATION/TRAINING PROGRAM

## 2022-08-01 PROCEDURE — 82040 ASSAY OF SERUM ALBUMIN: CPT | Performed by: INTERNAL MEDICINE

## 2022-08-01 PROCEDURE — 85520 HEPARIN ASSAY: CPT | Performed by: INTERNAL MEDICINE

## 2022-08-01 PROCEDURE — 85027 COMPLETE CBC AUTOMATED: CPT | Performed by: INTERNAL MEDICINE

## 2022-08-01 PROCEDURE — 97530 THERAPEUTIC ACTIVITIES: CPT | Mod: GP | Performed by: PHYSICAL THERAPIST

## 2022-08-01 PROCEDURE — 82330 ASSAY OF CALCIUM: CPT | Performed by: INTERNAL MEDICINE

## 2022-08-01 PROCEDURE — 71045 X-RAY EXAM CHEST 1 VIEW: CPT

## 2022-08-01 PROCEDURE — 250N000013 HC RX MED GY IP 250 OP 250 PS 637: Performed by: INTERNAL MEDICINE

## 2022-08-01 PROCEDURE — U0003 INFECTIOUS AGENT DETECTION BY NUCLEIC ACID (DNA OR RNA); SEVERE ACUTE RESPIRATORY SYNDROME CORONAVIRUS 2 (SARS-COV-2) (CORONAVIRUS DISEASE [COVID-19]), AMPLIFIED PROBE TECHNIQUE, MAKING USE OF HIGH THROUGHPUT TECHNOLOGIES AS DESCRIBED BY CMS-2020-01-R: HCPCS | Performed by: INTERNAL MEDICINE

## 2022-08-01 PROCEDURE — 80048 BASIC METABOLIC PNL TOTAL CA: CPT | Performed by: INTERNAL MEDICINE

## 2022-08-01 PROCEDURE — 99233 SBSQ HOSP IP/OBS HIGH 50: CPT | Performed by: INTERNAL MEDICINE

## 2022-08-01 RX ADMIN — INSULIN ASPART 1 UNITS: 100 INJECTION, SOLUTION INTRAVENOUS; SUBCUTANEOUS at 17:53

## 2022-08-01 RX ADMIN — ATENOLOL 25 MG: 25 TABLET ORAL at 09:15

## 2022-08-01 RX ADMIN — ATORVASTATIN CALCIUM 20 MG: 20 TABLET, FILM COATED ORAL at 20:06

## 2022-08-01 RX ADMIN — TIMOLOL MALEATE 1 DROP: 5 SOLUTION/ DROPS OPHTHALMIC at 09:17

## 2022-08-01 RX ADMIN — TIMOLOL MALEATE 1 DROP: 5 SOLUTION/ DROPS OPHTHALMIC at 20:08

## 2022-08-01 RX ADMIN — INSULIN ASPART 1 UNITS: 100 INJECTION, SOLUTION INTRAVENOUS; SUBCUTANEOUS at 13:40

## 2022-08-01 RX ADMIN — CEFUROXIME AXETIL 500 MG: 250 TABLET, FILM COATED ORAL at 09:15

## 2022-08-01 RX ADMIN — LATANOPROST 1 DROP: 50 SOLUTION/ DROPS OPHTHALMIC at 21:55

## 2022-08-01 RX ADMIN — CEFUROXIME AXETIL 500 MG: 250 TABLET, FILM COATED ORAL at 20:06

## 2022-08-01 RX ADMIN — INSULIN ASPART 1 UNITS: 100 INJECTION, SOLUTION INTRAVENOUS; SUBCUTANEOUS at 09:16

## 2022-08-01 ASSESSMENT — ACTIVITIES OF DAILY LIVING (ADL)
ADLS_ACUITY_SCORE: 26
ADLS_ACUITY_SCORE: 22

## 2022-08-01 NOTE — PROGRESS NOTES
[facility]    Medicine Progress Note - Hospitalist Service    Date of Admission:  7/25/2022    Assessment & Plan            Gricelda Sabillon is a 72 year old female with history of diabetes mellitus type 2, hypertension, hyperlipidemia, metastatic melanoma, recent hysterectomy for endometrial cancer, obstructive sleep apnea uses CPAP, and was recent UTI treated with ciprofloxacin.  She presented to the [facility] emergency department on 7/25/2022 for evaluation for shortness of breath.  She had no associated chest pain, cough, or fever. She went to clinic initially and was noted to have oxygen saturation of 84% on room air.  Chest x-ray was done and showed bilateral infiltrates.  She was sent to emergency room for further evaluation.  Her vitals were stable on arrival to emergency room. Laboratory work-up showed sodium 133, potassium 4.4, creatinine 0.79, ALT 31, AST 21, procalcitonin 0.26, troponin I 7.  WBC 14.7, hemoglobin 11.3. CT of the chest with contrast showed occlusive and nonocclusive pulmonary emboli noted within multiple pulmonary artery branches bilaterally. There was mild prominence of right ventricle suggestive of right heart strain. There were numerous ill-defined masses in both lungs that had increased in size, and were suspicious for progression of metastatic disease. She was started on a heparin drip in the ED. She was also given IV ceftriaxone in the azithromycin for suspected pneumonia. She was admitted to the hospital for further management. After admission she was seen by Oncology and lung biopsy was recommended. This was scheduled for 7/28/22 once O2 needs had decreased but could not be done due to aspirin being given within 5 days. Aspirin was stopped. Heparin drip was changed to Lovenox injections in anticipation of discharge home on Lovenox and return for biopsy the following week. Oxygen weaning was attempted but patient continued to need oxygen. She  was also weak and became dyspneic, dizzy, and hypoxic with ambulation. It was not clear if patient would be able to discharge to home or need TCU. Ultimately, it was decided to pursue biopsy while in the hospital. Lovenox was changed back to heparin drip.       Problem List:     Acute hypoxic respiratory failure, multifactorial, due to below  Bilateral pulmonary embolism  Suspected metastatic melanoma to lung  Possible pneumonia  -Patient was noted to be hypoxic in the clinic.  CT of the chest with contrast showed bilateral occlusive and nonocclusive pulmonary emboli multiple pulmonary artery branches.    -Patient was on heparin drip in anticipation of liver biopsy.  Unfortunately, lung biopsy could not be done until next week due to aspirin use.  Heparin drip was changed to Lovenox on 7/28/22 which will be continued until biopsy done and then DOAC started.   -Request pharmacy liason to check DOAC coverage  -Plan on biopsy next week-Monday or Tuesday per IR- likely as inpatient given persistent weakness and hypoxia with desaturation with activity.  -Was initially treated with Rocephin and Zithromax- changed to Ceftin and oral Zithromax on 7/28/22.  Plan 7 days total of ceftriaxone/Ceftin (to be completed this pm) and 5 days total of Zithromax (completed 7/30).  -Weaning off of oxygen as able. Still needing O2 most of the time. Desaturates and dizzy with activity.  -Had some hypoxia this am with increased O2 needs. CXR negative. Improved without specific intervention.      History of metastatic melanoma  -Patient follows with Dr. Florez at [facility]. On Keytruda. CT of the chest shows numerous ill-defined masses in both lungs have increased in size, and are suspicious for progression of metastatic disease.   -With nausea Oncology ordered MRI of brain which was unremarkable (results discussed with patient).  -We had hoped lung mass to be biopsied 7/28/22 but patient had been on aspirin so this had to be  postponed  -Converted heparin drip to Lovenox injections on 7/28/22 with tentative plan to discharge home on Lovenox and return for biopsy as an outpatient the following week.   -Patient remains on oxygen and is still weak and dizzy. She has not passed stairs with PT- was to do today. She may need TCU on discharge.  -Biopsy is set for tomorrow (discussed with IR nurse and doc). NPO after midnight. Stop heparin drip at 0700 tomorrow. Resume heparin drip after after procedure if no bleeding. Start DOAC 8/3/22 in am if no signs of bleeding complication (see pharmacy liason note from earlier today regarding coverage and cost for DOAC- looks like Xarelto might be a potentially less expensive option)  -Pharmacy Liason consult apprecuated       History of ureteric cancer requiring hysterectomy  -Not thought to be a current issue    Diabetes mellitus type 2  -PTA was on glipizide, metformin, and Invokana.    -Here has been on glipizide and as needed NovoLog insulin.  -I discussed with her primary care provider who is concerned about hypoglycemia.  -We agreed to stop glipizide due to risk of hypoglycemia  -I resumed prior to admission metformin- this was held last night for unclear reasons. Leave on hold for now with NPO after midnight and plan to resume tomorrow after procedure when diet resumed.  -PTA Invokana remains on hold.   -Likely discharge home on just metformin (as a single agent should not cause hypoglycemia) with outpatient follow-up  -Continue Novolog sliding scale insulin for now- not needing much     Hypertension  -Continue prior to admission atenolol     Hyperlipidemia  -Continue  prior to admission atorvastatin     Weakness  -PT consult appreciated  -Desaturates and gets dizzy with ambulation; may need TCU on discharge  -Increase ambulation  -I have asked SW to see to start looking for TCU- anticipate discharge to home or TCU as soon as Wed- hopefully after biopsy on Tues           Diet: Snacks/Supplements  Adult: Other; Ok to order any diet appropriate supplement prn; With Meals  Regular Diet Adult  NPO for Medical/Clinical Reasons Except for: Meds    DVT Prophylaxis: heparin drip for now  Meza Catheter: Not present  Central Lines: PRESENT     Cardiac Monitoring: None  Code Status: No CPR- Do NOT Intubate      Disposition Plan      Expected Discharge Date: 08/02/2022,  3:00 PM    Destination: home with family  Discharge Comments: New admit w/bilateral PEs, Still on Heparin Gtt, no IR candidate for Lung bx, spouse to discuss changing code status w/pt        The patient's care was discussed with the Bedside Nurse, Patient, Patient's Family and IR nurse, and IR doc.    Gallo Houser MD  Hospitalist Service  [facility]  Securely message with the Vocera Web Console (learn more here)  Text page via [employer] Paging/Directory         Clinically Significant Risk Factors Present on Admission                      ______________________________________________________________________    Interval History   Feeling ok. No new problems. No pain. Some SOB with activity. O2 needs increased overnight but have now improved.     Data reviewed today: I reviewed all medications, new labs and imaging results over the last 24 hours. I personally reviewed no images or EKG's today.    Physical Exam   Vital Signs: Temp: 98.5  F (36.9  C) Temp src: Oral BP: (!) 166/60 Pulse: 87   Resp: 20 SpO2: 95 % O2 Device: Nasal cannula Oxygen Delivery: 1.5 LPM  Weight: 198 lbs 4.8 oz  GENERAL:  Comfortable. Cooperative.  PSYCH: pleasant, oriented, No acute distress.  EYES: PERRLA, Normal conjunctiva.  HEART:  Regular rate and rhythm. No JVD. Pulses normal. No edema.  LUNGS:  Clear to auscultation, normal Respiratory effort.  ABDOMEN:  Soft, no hepatosplenomegaly, normal bowel sounds.  EXTREMETIES: No clubbing, cyanosis or ischemia  SKIN:  Dry to touch, No rash.      Data   Recent Labs   Lab 08/01/22  1559 08/01/22  1258  08/01/22  1127 08/01/22  0938 07/31/22  0855 07/31/22  0655 07/27/22  0730 07/27/22  0536 07/26/22  0835 07/26/22  0738   WBC  --   --   --  11.4*  --   --   --  10.2  --  9.9   HGB  --   --   --  10.4*  --   --   --  10.1*  --  9.5*   MCV  --   --   --  87  --   --   --  88  --  89   PLT  --   --   --  556*  --  481*  --  374  --  328   NA  --   --  133* 141  --   --   --  136  --  138   POTASSIUM  --   --  3.9 2.2*  --   --   --  4.0  --  3.8   CHLORIDE  --   --  98 117*  --   --   --  101  --  107   CO2  --   --  28 18*  --   --   --  25  --  26   BUN  --   --  8.9 5.2*  --   --   --  6.4*  --  9   CR  --   --  0.59 0.35*  --  0.58  --  0.58  --  0.67   ANIONGAP  --   --  7 6*  --   --   --  10  --  5   KATHRYN  --   --  9.9 5.6*  --   --   --  9.5  --  9.3   * 162* 197* 122*   < >  --    < > 121*   < > 122*   ALBUMIN  --   --  2.8*  --   --   --   --   --   --   --     < > = values in this interval not displayed.     Recent Results (from the past 24 hour(s))   XR Chest Port 1 View    Narrative    EXAM: XR CHEST PORT 1 VIEW  LOCATION: [facility]  DATE/TIME: 8/1/2022 1:06 AM    INDICATION: worsening hypoxia  COMPARISON: CT from 07/25/2022.      Impression    IMPRESSION: Right chest port catheter tip near cavoatrial junction. No significant change in bilateral consolidative pulmonary opacities. No visible pneumothorax or pleural effusion.

## 2022-08-01 NOTE — CONSULTS
Patient is on [facility] CT schedule Tuesday 8/2/22 for a lung biopsy.     -Labs WNL for procedure.    -Orders for NPO have been entered.   -Consent will be done prior to procedure.      IV Heparin will be held for 6 hours pre procedure.     Please contact the [facility] Radiology office at  for procedural related questions.     Discussed with Ana Radiology RN and IR Dr Stephens today.    Thanks, Gill [facility] Interventional Radiology CNP ([phone]) (phone [phone])

## 2022-08-01 NOTE — PROGRESS NOTES
[facility] Follow Up Note:    Assessment and Plan:  Gricelda Sabillon is a 72 year old female patient, admitted for acute PE     1.Occlusive and nonocclusive pulmonary emboli, with some evidence of right heart strain  - Hypercoagulability of malignancy  - No hypercoagulable work-up  - Continue IV heparin     PLAN: IV heparin( now transitioned to enoxaparin)        2. 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 Allison at [facility]  - Subsequent to that has been on adjuvant pembrolizumab, 7/5/2022     PLAN:  - Now with rapidly growing lung mass, plan  for biopsy   --Reviewed plan for lung biopsy as OP/ early next week due to ASA     3.Stage I endometrial cancer, resected  - Pulmonary nodules not thought to be related to that     4. Ongoing nausea since the past few weeks  -- Now not thought to be from surgery  -- Brain MRI done 7/29, shows no e/o mets( I called  tonight)     3. CODE : DNR/DNI( changed  after d/w Melva)     - Long discussion with patient in the presence of  Allen today.     - Also discussed and showed them rapidly growing pulmonary malignancy.  We also discussed that patient may not be in a condition for further treatment based on overall status.  Patient is really hoping to get biopsy to have closure understand next steps.  Biopsy is tomorrow                  D/w Dr. Houser        Subjective:    Nausea, weakness, fatigue persists.      Labs:  All labs reviewed    CBCRecent Labs   Lab 08/01/22  0938 07/31/22  0655 07/27/22  0536 07/26/22  0738   WBC 11.4*  --  10.2 9.9   HGB 10.4*  --  10.1* 9.5*   MCV 87  --  88 89   * 481* 374 328       CMP  Recent Labs   Lab 08/01/22  1559 08/01/22  1258 08/01/22  1127 08/01/22  0938 07/31/22  0855 07/31/22  0655 07/27/22  0730 07/27/22  0536 07/26/22  0835 07/26/22  0738   NA   --   --  133* 141  --   --   --  136  --  138   POTASSIUM  --   --  3.9 2.2*  --   --   --  4.0  --  3.8   CHLORIDE  --   --  98 117*  --   --   --  101  --  107   CO2  --   --  28 18*  --   --   --  25  --  26   ANIONGAP  --   --  7 6*  --   --   --  10  --  5   * 162* 197* 122*   < >  --    < > 121*   < > 122*   BUN  --   --  8.9 5.2*  --   --   --  6.4*  --  9   CR  --   --  0.59 0.35*  --  0.58  --  0.58  --  0.67   GFRESTIMATED  --   --  >90 >90  --  >90  --  >90  --  >90   KATHRYN  --   --  9.9 5.6*  --   --   --  9.5  --  9.3   MAG  --   --  1.7  --   --   --   --   --   --   --    ALBUMIN  --   --  2.8*  --   --   --   --   --   --   --     < > = values in this interval not displayed.       INRNo lab results found in last 7 days.    Blood CultureNo results for input(s): CULT in the last 168 hours.      William Florez MD  [facility]  8/1/2022 6:29 PM

## 2022-08-01 NOTE — PROVIDER NOTIFICATION
0100: Pt is requiring increased O2 needs- now on 15  L oxymask from 2 L NC. O2 sats 89-92%. denies SOB. pls advise. Thanks!    Chest xray ordered      0145: Pt chest xray results in chart. Able to wean pt down to 5 L oxymask. O2 sats @ 93%. thanks!

## 2022-08-01 NOTE — PROGRESS NOTES
XC    Increased O2 requirement. 2l -> 5 -> 10 -> 15 -> 10 -> 5LPM  CXR ordered, no new change. Patient denies any SoB.  No new orders.

## 2022-08-01 NOTE — PLAN OF CARE
Goal Outcome Evaluation:    Plan of Care Reviewed With: patient     Overall Patient Progress: no change    Patient VSS. Heparin running at 16 mL/hr through port. No IV access. Report given to Lin.  Pt denies pain.  A&Ox4 on O2 at 1.5 LPM via Nasal cannula.  Biopsy in morning.  NPO at  0000 on 08/02.  Metformin autoheld.  Report to nurse taking over provided.  Continue with POC.

## 2022-08-01 NOTE — CONSULTS
Patient has Medicare D through [facility].  She is currently in the coverage gap.       Xarelto/Eliquis:  --Patient's total total drug costs have exceeded $4,430 in 2022, and as such she will pay a 25% coinsurance on covered drugs.  This is equivalent to $141/mo for Xarelto/Eliquis.     The  of Xarelto offers a mail order program that provides Xarelto for $85/mo (or $240 for 3 mo), regardless of income.  Information can be found at [employer] or by calling [phone].    Please reconsult if income-based resources for free or discounted medication is desired.     Roya Blood  Pharmacy Technician/Liaison, Discharge Pharmacy   [phone]  [email or web address]

## 2022-08-01 NOTE — PLAN OF CARE
A&O. VSS. Pt had increased O2 needs at start of shift-weaned down to 5L Oxymask. LS crackles to bases. Chest xray done. . Port. Heparin gtt infusing @ 1600 units/hr. Hep 10A due @ 0900. Tele SR. Up SBA. Plan for lung biopsy Tuesday.

## 2022-08-01 NOTE — CARE PLAN
Provider Notified: Received a critical lab result pt's potassium 2.2 and calcium 5.6    Provider Notified: FYI: new lab results for BMP re-check potassium 3.9 and calcium 9.9    Vital signs stable, O2 95% on 1.5 L NC. Denies pain. Alert and orientated x4. Up with SBA in room. Tele: SR. B, 162. Pt refused scheduled Zyrtec. Pt had 2 BM's this shift. Heparin gtt infusing at 1600 units/hr.  Plan for biopsy on Tuesday. NPO at midnight. 0900 Hep10a 0.67 and within range this is the 2nd within range and scheduled for re-check in the morning. Consults placed for pulmonary and intervential radiology. Anticipate discharge home or TCU as soon as Wednesday. Will continue to monitor and follow POC.

## 2022-08-01 NOTE — PROGRESS NOTES
"SPIRITUAL HEALTH SERVICES Progress Note    Med Surg 3    I saw pt Melva Sabillon per length of stay at . Melva was oriented to [facility]. She was eating breakfast and visiting with her  Allen.     Distress:  Melva was scheduled for a biopsy 5 days ago and \"due to a technicality\" she has been waiting. She did say that a biopsy was scheduled for tomorrow.    Coping:  Melva old me that her  had just visited her on 7/31. She told me that she didn't need a visit at this time.    Plan:  Melva was informed how to request further  support. This author and other chaplains remain available per pt/family request.    Yordy Hanks MDIV  Intern   Phone: [phone]   "

## 2022-08-01 NOTE — PLAN OF CARE
"Goal Outcome Evaluation:    Plan of Care Reviewed With: patient     Overall Patient Progress: no change     Blood pressure 107/58, pulse 82, temperature 98  F (36.7  C), temperature source Oral, resp. rate 18, height 1.702 m (5' 7\"), weight 89.9 kg (198 lb 4.8 oz), last menstrual period 12/13/2002, SpO2 94 %, not currently breastfeeding.    VSS stable.  PT A&Ox4 on O2 at 2 Lpm via Nasal cannula.  Heparin started through single lumen port running at 1600 units per hour. PIV on left SL. Tele SR.  Denies pain, CP, and SOB.  Plan for Biopsy on Tuesday.  Possible discharge to TCU.  SW to consult.  Pt requiring oxygen and is MILLER. Continue with POC.          "

## 2022-08-02 ENCOUNTER — APPOINTMENT (OUTPATIENT)
Dept: GENERAL RADIOLOGY | Facility: CLINIC | Age: 72
DRG: 175 | End: 2022-08-02
Attending: RADIOLOGY
Payer: COMMERCIAL

## 2022-08-02 ENCOUNTER — APPOINTMENT (OUTPATIENT)
Dept: CT IMAGING | Facility: CLINIC | Age: 72
DRG: 175 | End: 2022-08-02
Attending: INTERNAL MEDICINE
Payer: COMMERCIAL

## 2022-08-02 ENCOUNTER — APPOINTMENT (OUTPATIENT)
Dept: PHYSICAL THERAPY | Facility: CLINIC | Age: 72
DRG: 175 | End: 2022-08-02
Payer: COMMERCIAL

## 2022-08-02 LAB
ANION GAP SERPL CALCULATED.3IONS-SCNC: 11 MMOL/L (ref 7–15)
BUN SERPL-MCNC: 6.5 MG/DL (ref 8–23)
CALCIUM SERPL-MCNC: 9.7 MG/DL (ref 8.8–10.2)
CHLORIDE SERPL-SCNC: 98 MMOL/L (ref 98–107)
CREAT SERPL-MCNC: 0.55 MG/DL (ref 0.51–0.95)
DEPRECATED HCO3 PLAS-SCNC: 25 MMOL/L (ref 22–29)
ERYTHROCYTE [DISTWIDTH] IN BLOOD BY AUTOMATED COUNT: 14.2 % (ref 10–15)
GFR SERPL CREATININE-BSD FRML MDRD: >90 ML/MIN/1.73M2
GLUCOSE BLDC GLUCOMTR-MCNC: 137 MG/DL (ref 70–99)
GLUCOSE BLDC GLUCOMTR-MCNC: 141 MG/DL (ref 70–99)
GLUCOSE BLDC GLUCOMTR-MCNC: 152 MG/DL (ref 70–99)
GLUCOSE BLDC GLUCOMTR-MCNC: 175 MG/DL (ref 70–99)
GLUCOSE BLDC GLUCOMTR-MCNC: 176 MG/DL (ref 70–99)
GLUCOSE SERPL-MCNC: 158 MG/DL (ref 70–99)
HCT VFR BLD AUTO: 33 % (ref 35–47)
HGB BLD-MCNC: 9.7 G/DL (ref 11.7–15.7)
INR PPP: 1.08 (ref 0.85–1.15)
MAGNESIUM SERPL-MCNC: 1.7 MG/DL (ref 1.7–2.3)
MCH RBC QN AUTO: 25.4 PG (ref 26.5–33)
MCHC RBC AUTO-ENTMCNC: 29.4 G/DL (ref 31.5–36.5)
MCV RBC AUTO: 86 FL (ref 78–100)
PLATELET # BLD AUTO: 505 10E3/UL (ref 150–450)
POTASSIUM SERPL-SCNC: 3.9 MMOL/L (ref 3.4–5.3)
RBC # BLD AUTO: 3.82 10E6/UL (ref 3.8–5.2)
SODIUM SERPL-SCNC: 134 MMOL/L (ref 136–145)
UFH PPP CHRO-ACNC: 0.27 IU/ML
WBC # BLD AUTO: 9.8 10E3/UL (ref 4–11)

## 2022-08-02 PROCEDURE — 272N000431 CT LUNG MEDIASTINUM BIOPSY

## 2022-08-02 PROCEDURE — 85520 HEPARIN ASSAY: CPT | Performed by: HOSPITALIST

## 2022-08-02 PROCEDURE — 999N000065 XR CHEST 1 VIEW

## 2022-08-02 PROCEDURE — 80048 BASIC METABOLIC PNL TOTAL CA: CPT | Performed by: INTERNAL MEDICINE

## 2022-08-02 PROCEDURE — 97530 THERAPEUTIC ACTIVITIES: CPT | Mod: GP | Performed by: PHYSICAL THERAPIST

## 2022-08-02 PROCEDURE — 85520 HEPARIN ASSAY: CPT | Performed by: INTERNAL MEDICINE

## 2022-08-02 PROCEDURE — 250N000013 HC RX MED GY IP 250 OP 250 PS 637: Performed by: INTERNAL MEDICINE

## 2022-08-02 PROCEDURE — 85027 COMPLETE CBC AUTOMATED: CPT | Performed by: INTERNAL MEDICINE

## 2022-08-02 PROCEDURE — 85610 PROTHROMBIN TIME: CPT | Performed by: RADIOLOGY

## 2022-08-02 PROCEDURE — 0BBJ3ZX EXCISION OF LEFT LOWER LUNG LOBE, PERCUTANEOUS APPROACH, DIAGNOSTIC: ICD-10-PCS | Performed by: RADIOLOGY

## 2022-08-02 PROCEDURE — 250N000009 HC RX 250: Performed by: RADIOLOGY

## 2022-08-02 PROCEDURE — 120N000001 HC R&B MED SURG/OB

## 2022-08-02 PROCEDURE — 250N000011 HC RX IP 250 OP 636: Performed by: INTERNAL MEDICINE

## 2022-08-02 PROCEDURE — 250N000011 HC RX IP 250 OP 636: Performed by: RADIOLOGY

## 2022-08-02 PROCEDURE — 250N000011 HC RX IP 250 OP 636: Performed by: HOSPITALIST

## 2022-08-02 PROCEDURE — 99233 SBSQ HOSP IP/OBS HIGH 50: CPT | Performed by: HOSPITALIST

## 2022-08-02 PROCEDURE — 88305 TISSUE EXAM BY PATHOLOGIST: CPT | Mod: TC | Performed by: INTERNAL MEDICINE

## 2022-08-02 PROCEDURE — 83735 ASSAY OF MAGNESIUM: CPT | Performed by: INTERNAL MEDICINE

## 2022-08-02 RX ORDER — FENTANYL CITRATE 50 UG/ML
INJECTION, SOLUTION INTRAMUSCULAR; INTRAVENOUS PRN
Status: DISCONTINUED | OUTPATIENT
Start: 2022-08-02 | End: 2022-08-03 | Stop reason: HOSPADM

## 2022-08-02 RX ORDER — NALOXONE HYDROCHLORIDE 0.4 MG/ML
0.4 INJECTION, SOLUTION INTRAMUSCULAR; INTRAVENOUS; SUBCUTANEOUS
Status: DISCONTINUED | OUTPATIENT
Start: 2022-08-02 | End: 2022-08-03 | Stop reason: HOSPADM

## 2022-08-02 RX ORDER — HEPARIN SODIUM 10000 [USP'U]/100ML
0-5000 INJECTION, SOLUTION INTRAVENOUS CONTINUOUS
Status: DISCONTINUED | OUTPATIENT
Start: 2022-08-02 | End: 2022-08-03

## 2022-08-02 RX ORDER — FLUMAZENIL 0.1 MG/ML
0.2 INJECTION, SOLUTION INTRAVENOUS
Status: DISCONTINUED | OUTPATIENT
Start: 2022-08-02 | End: 2022-08-03 | Stop reason: HOSPADM

## 2022-08-02 RX ORDER — NALOXONE HYDROCHLORIDE 0.4 MG/ML
0.2 INJECTION, SOLUTION INTRAMUSCULAR; INTRAVENOUS; SUBCUTANEOUS
Status: DISCONTINUED | OUTPATIENT
Start: 2022-08-02 | End: 2022-08-03 | Stop reason: HOSPADM

## 2022-08-02 RX ORDER — FENTANYL CITRATE 50 UG/ML
25-50 INJECTION, SOLUTION INTRAMUSCULAR; INTRAVENOUS EVERY 5 MIN PRN
Status: DISCONTINUED | OUTPATIENT
Start: 2022-08-02 | End: 2022-08-03 | Stop reason: HOSPADM

## 2022-08-02 RX ADMIN — Medication 5 ML: at 06:53

## 2022-08-02 RX ADMIN — HEPARIN SODIUM AND DEXTROSE 1600 UNITS/HR: 10000; 5 INJECTION INTRAVENOUS at 04:11

## 2022-08-02 RX ADMIN — FENTANYL CITRATE 25 MCG: 50 INJECTION, SOLUTION INTRAMUSCULAR; INTRAVENOUS at 12:32

## 2022-08-02 RX ADMIN — TIMOLOL MALEATE 1 DROP: 5 SOLUTION/ DROPS OPHTHALMIC at 20:56

## 2022-08-02 RX ADMIN — MIDAZOLAM 1 MG: 1 INJECTION INTRAMUSCULAR; INTRAVENOUS at 12:32

## 2022-08-02 RX ADMIN — ATENOLOL 25 MG: 25 TABLET ORAL at 08:18

## 2022-08-02 RX ADMIN — LIDOCAINE HYDROCHLORIDE 15 ML: 10 INJECTION, SOLUTION EPIDURAL; INFILTRATION; INTRACAUDAL; PERINEURAL at 12:43

## 2022-08-02 RX ADMIN — HEPARIN SODIUM AND DEXTROSE 1600 UNITS/HR: 10000; 5 INJECTION INTRAVENOUS at 17:26

## 2022-08-02 RX ADMIN — LATANOPROST 1 DROP: 50 SOLUTION/ DROPS OPHTHALMIC at 21:36

## 2022-08-02 RX ADMIN — FENTANYL CITRATE 25 MCG: 50 INJECTION, SOLUTION INTRAMUSCULAR; INTRAVENOUS at 12:35

## 2022-08-02 RX ADMIN — ATORVASTATIN CALCIUM 20 MG: 20 TABLET, FILM COATED ORAL at 20:55

## 2022-08-02 RX ADMIN — TIMOLOL MALEATE 1 DROP: 5 SOLUTION/ DROPS OPHTHALMIC at 08:19

## 2022-08-02 ASSESSMENT — ACTIVITIES OF DAILY LIVING (ADL)
ADLS_ACUITY_SCORE: 26
ADLS_ACUITY_SCORE: 22
ADLS_ACUITY_SCORE: 26
ADLS_ACUITY_SCORE: 22
ADLS_ACUITY_SCORE: 26
ADLS_ACUITY_SCORE: 26
ADLS_ACUITY_SCORE: 22
ADLS_ACUITY_SCORE: 26
ADLS_ACUITY_SCORE: 22
ADLS_ACUITY_SCORE: 22

## 2022-08-02 NOTE — PROGRESS NOTES
Care Management Follow Up    Length of Stay (days): 8    Expected Discharge Date: 08/03/2022     Concerns to be Addressed:     Discharge planning  Patient plan of care discussed at interdisciplinary rounds: Yes    Anticipated Discharge Disposition: Home, Home care     Anticipated Discharge Services:  Home Care RN PT OT DENYS  Anticipated Discharge DME:      Patient/family educated on Medicare website which has current facility and service quality ratings:  yes  Education Provided on the Discharge Plan:  yes  Patient/Family in Agreement with the Plan:  yes    Referrals Placed by CM/SW:  Home care  Private pay costs discussed: Not applicable    Additional Information:  Patient plans to discharge home with home care services  RN PT MOHIT LI. [facility] accepted referral to follow patient for home care. AVS updated.     Spoke with patient , Allen, at bedside. He is agreeable with the discharge plan for home with homecare services. States he has friends and family that can assist if needed.  would like order for wheelchair. MD notified.     Pt has f/u with oncology.  prefers to schedule own f/u with PCP. OP CC referral sent.     Family will be available to provide transportation.     Tia Andrade RN Case Manager  Inpatient Care Coordination   [facility]   [phone]    Tia Andrade RN

## 2022-08-02 NOTE — PROGRESS NOTES
[facility] Follow Up Note:    Assessment and Plan:  Gricelda Sabillon is a 72 year old female patient, admitted for acute PE     1.Occlusive and nonocclusive pulmonary emboli, with some evidence of right heart strain  - Hypercoagulability of malignancy  - No hypercoagulable work-up  - Continue IV heparin     PLAN: IV heparin( now transitioned to enoxaparin)        2. 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 Allison at [facility]  - Subsequent to that has been on adjuvant pembrolizumab, 7/5/2022     PLAN:  - Now with rapidly growing lung mass, plan  for biopsy   --Reviewed plan for lung biopsy as OP/ early next week due to ASA     3.Stage I endometrial cancer, resected  - Pulmonary nodules not thought to be related to that     4. Ongoing nausea since the past few weeks  -- Now not thought to be from surgery  -- Brain MRI done 7/29, shows no e/o mets( I called  tonight)     3. CODE : DNR/DNI( changed  after d/w Melva)     - Long discussion with patient in the presence of  Allen today.     - Also discussed and showed them rapidly growing pulmonary malignancy.  We also discussed that patient may not be in a condition for further treatment based on overall status.  Patient is really hoping to get biopsy to have closure understand next steps.  Biopsy is today    D/w Dr. Mac, disposition pending TCU v/s home    Subjective:     Feeling ok, no pain anxious for biopsy      Labs:  All labs reviewed    CBCRecent Labs   Lab 08/02/22  0652 08/01/22  0938 07/31/22  0655 07/27/22  0536   WBC 9.8 11.4*  --  10.2   HGB 9.7* 10.4*  --  10.1*   MCV 86 87  --  88   * 556* 481* 374       CMP  Recent Labs   Lab 08/02/22  0742 08/02/22  0652 08/02/22  0250 08/01/22  2153 08/01/22  1258 08/01/22  1127 08/01/22  0938 07/31/22  0855 07/31/22  0655  07/27/22  0730 07/27/22  0536   NA  --  134*  --   --   --  133* 141  --   --   --  136   POTASSIUM  --  3.9  --   --   --  3.9 2.2*  --   --   --  4.0   CHLORIDE  --  98  --   --   --  98 117*  --   --   --  101   CO2  --  25  --   --   --  28 18*  --   --   --  25   ANIONGAP  --  11  --   --   --  7 6*  --   --   --  10   * 158* 152* 162*   < > 197* 122*   < >  --    < > 121*   BUN  --  6.5*  --   --   --  8.9 5.2*  --   --   --  6.4*   CR  --  0.55  --   --   --  0.59 0.35*  --  0.58  --  0.58   GFRESTIMATED  --  >90  --   --   --  >90 >90  --  >90  --  >90   KATHRYN  --  9.7  --   --   --  9.9 5.6*  --   --   --  9.5   MAG  --  1.7  --   --   --  1.7  --   --   --   --   --    ALBUMIN  --   --   --   --   --  2.8*  --   --   --   --   --     < > = values in this interval not displayed.       INRNo lab results found in last 7 days.    Blood CultureNo results for input(s): CULT in the last 168 hours.      William Florez MD  [facility]  8/2/2022 9:55 AM

## 2022-08-02 NOTE — PLAN OF CARE
"/63 (BP Location: Right arm)   Pulse 79   Temp 98  F (36.7  C) (Oral)   Resp 18   Ht 1.702 m (5' 7\")   Wt 89.9 kg (198 lb 4.8 oz)   LMP 12/13/2002 (Exact Date)   SpO2 90%   BMI 31.06 kg/m      A/O x 4. Denies pain. Heparin running overnight paused this morning for procedure. NPO since 0000. Port WDL. Plan Biopsy today and eventual discharge to TCU. Continue with POC.     "

## 2022-08-02 NOTE — DISCHARGE INSTRUCTIONS
Oxygen Provider:  Arranged through [facility] [employer] [facility], contact number [phone]. If you have any questions for concerns please call the oxygen company directly.     Your home care referral was sent to [facility]  If you haven't heard from them within the next 24-48 hours,  Please call them at [phone]

## 2022-08-02 NOTE — PLAN OF CARE
Pt alert and oriented X 4, tearful today after Oncology discussion. Apical pulse regular. Lung sounds diminished. Pt on 2L-3L nasal cannula. Pt reports small loose BM 8/2, voiding without difficulty. Post procedure pt reporting dizziness, resolved with sitting down and rest. Pt had lung biopsy today. Band-Aid clean dry and intact, pt denies pain. Heparin restarted at 1730, Hep 10A ordered for 2330.   Plan: Transition to PO anticoagulant when able, TCU vs home with home care. Oncology following.

## 2022-08-02 NOTE — PROGRESS NOTES
Patient tolerated left lung biopsy well by Dr Joseph.  Patient received 1 mg of versed and 50 mcg of fentanyl.  15 minutes of total sedation time. Bandage clean dry and intact at time of transfer to x ray for post x-ray.  Patient to have 1 hour post chest x ray.  Ok to restart heparin drip using pharmacy protocol if 1 hour post chest x ray is negative for pneumothorax per Dr Joseph  Report called to bedside RN.

## 2022-08-02 NOTE — PROGRESS NOTES
[facility]    Hospitalist Progress Note      Assessment & Plan   Gricelda Sabillon is a 72 year old female with history of diabetes mellitus type 2, hypertension, hyperlipidemia, metastatic melanoma, recent hysterectomy for endometrial cancer, obstructive sleep apnea uses CPAP, and was recent UTI treated with ciprofloxacin who presented with shortness of breath.    #Acute hypoxemic respiratory failure secondary to pulmonary emboli: On admission, patient hypoxemic to the low to mid 80s on room air.  She is hemodynamically stable.  CT of the chest showed occlusive and nonocclusive PE within multiple pulmonary artery branches bilaterally.  Mild prominence of the right ventricle suggestive of RV strain.  There were also numerous ill-defined masses in both lungs that had increased in size and suspicious for progression of metastatic disease.  -Patient had been started on a heparin drip.  She was treated with ceftriaxone and azithromycin for possible pneumonia.  She has completed her course of antibiotics.  -Heparin drip on pause on 8/2.  Planning for IR guided biopsy today for suspected metastatic melanoma to the lung.  We will plan to resume heparin drip without bolus when okay with interventional radiology.  Patient is agreeable to DOAC therapy on discharge.  Cost and alternative discussed.  -Patient continues to require supplemental oxygen.  She will require this on discharge.  Patient is hoping to go home with home therapies and home oxygen.  Therapies consulted.  Appreciate social work assistance.  -We will need to perform oxygen requirement study prior to discharge.    #History of metastatic melanoma: Patient follows with Dr. Florez at [facility]. On Keytruda. CT of the chest shows numerous ill-defined masses in both lungs have increased in size, and are suspicious for progression of metastatic disease.   -With nausea Oncology ordered MRI of brain which was unremarkable (results discussed with  patient).  -Planning for IR guided biopsy on 8/2.    -Resume heparin gtt without bolus after bx when OK with IR  -Will discharge on xarelto therapy.      #History of ureteric cancer requiring hysterectomy  -Not thought to be a current issue     #Diabetes mellitus type 2: PTA was on glipizide, metformin, and Invokana.   Here has been on glipizide and as needed NovoLog insulin.  -Mukund Houser MD did discuss with primary care physician.  Concern for hypoglycemia.  Stopping glipizide on discharge.  Okay to resume metformin alone and follow-up with PCP.     #Hypertension: Continue prior to admission atenolol     #Hyperlipidemia: Continue  prior to admission atorvastatin     #Weakness: PT consult appreciated.  Desaturates and gets dizzy with ambulation; may need TCU on discharge  -Increase ambulation  -I have asked SW to see to start looking for TCU- anticipate discharge to home or TCU as soon as Wed- hopefully after biopsy on Tues    DVT Prophylaxis: Hep gtt  Code Status: No CPR- Do NOT Intubate  Dispo: Possibly tomorrow.  TCU versus home with home cares and home O2.  Appreciate social work assistance.    Mandeep Mac MD    Interval History   Assumed care.  No events.  Patient notes she feels well today.  Still requiring 2 to 3 L via nasal cannula.  No chest pain or worsening shortness of breath.  No nausea or vomiting.  She voices that she is hoping to go home with home cares.    -Data reviewed today: I reviewed all new labs and imaging results over the last 24 hours.    Physical Exam   Temp: 98  F (36.7  C) Temp src: Oral BP: 123/63 Pulse: 79   Resp: 18 SpO2: 90 % O2 Device: Nasal cannula Oxygen Delivery: 3 LPM  Vitals:    07/25/22 2116   Weight: 89.9 kg (198 lb 4.8 oz)     Vital Signs with Ranges  Temp:  [98  F (36.7  C)-98.5  F (36.9  C)] 98  F (36.7  C)  Pulse:  [79-87] 79  Resp:  [18-20] 18  BP: (109-148)/(58-83) 123/63  SpO2:  [89 %-95 %] 90 %  No intake/output data recorded.    Constitutional: Deconditioned.   Answers appropriately.  Nasal cannula in place.  HEENT: Normocephalic. MMM, No elevation of JVD noted.   Respiratory: Nl WOB, Clear bilaterally, some crackles at the bases.  No wheezes  Cardiovascular: Regular, no murmur  GI: BS+, NT, ND  Skin/Integumen: WWP, no rash. No edema  Neuro: CNII-XII intact. Moves all extremities. No tremor. A&Ox3.    Medications     - MEDICATION INSTRUCTIONS -         atenolol  25 mg Oral Daily     atorvastatin  20 mg Oral Daily     cetirizine  10 mg Oral QAM     heparin  5-10 mL Intracatheter Q28 Days     heparin lock flush  5-10 mL Intracatheter Q24H     insulin aspart  1-7 Units Subcutaneous TID AC     insulin aspart  1-5 Units Subcutaneous At Bedtime     latanoprost  1 drop Both Eyes At Bedtime     [Held by provider] metFORMIN  1,000 mg Oral BID w/meals     sodium chloride (PF)  10-20 mL Intracatheter Q28 Days     sodium chloride (PF)  10-20 mL Intracatheter Q28 Days     sodium chloride (PF)  3 mL Intracatheter Q8H     timolol maleate  1 drop Both Eyes BID       Data   Recent Labs   Lab 08/02/22  0742 08/02/22  0652 08/02/22  0250 08/01/22  1258 08/01/22  1127 08/01/22  0938 07/31/22  0855 07/31/22  0655 07/27/22  0730 07/27/22  0536   WBC  --  9.8  --   --   --  11.4*  --   --   --  10.2   HGB  --  9.7*  --   --   --  10.4*  --   --   --  10.1*   MCV  --  86  --   --   --  87  --   --   --  88   PLT  --  505*  --   --   --  556*  --  481*  --  374   NA  --  134*  --   --  133* 141  --   --   --  136   POTASSIUM  --  3.9  --   --  3.9 2.2*  --   --   --  4.0   CHLORIDE  --  98  --   --  98 117*  --   --   --  101   CO2  --  25  --   --  28 18*  --   --   --  25   BUN  --  6.5*  --   --  8.9 5.2*  --   --   --  6.4*   CR  --  0.55  --   --  0.59 0.35*  --  0.58  --  0.58   ANIONGAP  --  11  --   --  7 6*  --   --   --  10   KATHRYN  --  9.7  --   --  9.9 5.6*  --   --   --  9.5   * 158* 152*   < > 197* 122*   < >  --    < > 121*   ALBUMIN  --   --   --   --  2.8*  --   --   --   --    --     < > = values in this interval not displayed.       No results found for this or any previous visit (from the past 24 hour(s)).

## 2022-08-02 NOTE — IR NOTE
Interventional Radiology Pre-Procedure Sedation Assessment   Time of Assessment: 12:12 PM    Expected Level: Moderate Sedation    Indication: Sedation is required for the following type of Procedure: Biopsy    Sedation and procedural consent: Risks, benefits and alternatives were discussed with Patient    PO Intake: Appropriately NPO for procedure    ASA Class: Class 3 - SEVERE SYSTEMIC DISEASE, DEFINITE FUNCTIONAL LIMITATIONS.    Mallampati: Grade 3:  Soft palate visible, posterior pharyngeal wall not visible    Lungs: Lungs Clear with good breath sounds bilaterally    Heart: Normal heart sounds and rate    History and physical reviewed and no updates needed. I have reviewed the lab findings, diagnostic data, medications, and the plan for sedation. I have determined this patient to be an appropriate candidate for the planned sedation and procedure and have reassessed the patient IMMEDIATELY PRIOR to sedation and procedure.    Miah Joseph MD

## 2022-08-02 NOTE — CONSULTS
Brief Pulmonary Note    Discussed case with hospitalist, Dr. Houser, IR planning for biopsy 8/2. Pulmonary consult no longer required.     Ayaan Velez MD  Pulmonary Disease and Critical Care Medicine  [facility]

## 2022-08-02 NOTE — IR NOTE
RADIOLOGY POST PROCEDURE NOTE    Patient name: Gricelda Sabillon  MRN: [id number]  : 1950    Pre-procedure diagnosis: Long masses/opacties.  H/O of melanoma and endometrial malignancy  Post-procedure diagnosis: Same    Procedure Date/Time: 2022  12:55 PM  Procedure: CT guided biopsy of left posterior lung mass/opacity.  Four 18 g samples obtained and sent to lab.  Mild coughing during procedure, suspect blood irritation in bronchi/bronchioles.  May have small amount of hemoptysis.  No PTX or complication at final imaging.  Will obtain CXR now and at 1 hour.  May restart heparin after second CXR if no PTX and no significant hemoptysis.  Estimated blood loss: 5 ml  Specimen(s) collected with description: 18 g core biopsy samples    The patient tolerated the procedure well with no immediate complications.  Significant findings:  Please see above.    See imaging dictation for procedural details.    Provider name: Miah Joseph MD  Assistant(s):None

## 2022-08-03 ENCOUNTER — DOCUMENTATION ONLY (OUTPATIENT)
Dept: MEDSURG UNIT | Facility: CLINIC | Age: 72
End: 2022-08-03

## 2022-08-03 ENCOUNTER — APPOINTMENT (OUTPATIENT)
Dept: PHYSICAL THERAPY | Facility: CLINIC | Age: 72
DRG: 175 | End: 2022-08-03
Payer: COMMERCIAL

## 2022-08-03 ENCOUNTER — TELEPHONE (OUTPATIENT)
Dept: INTERNAL MEDICINE | Facility: CLINIC | Age: 72
End: 2022-08-03

## 2022-08-03 VITALS
DIASTOLIC BLOOD PRESSURE: 67 MMHG | TEMPERATURE: 98.3 F | BODY MASS INDEX: 30.05 KG/M2 | HEIGHT: 66 IN | WEIGHT: 187 LBS | HEART RATE: 79 BPM | OXYGEN SATURATION: 93 % | RESPIRATION RATE: 18 BRPM | SYSTOLIC BLOOD PRESSURE: 137 MMHG

## 2022-08-03 LAB
ANION GAP SERPL CALCULATED.3IONS-SCNC: 9 MMOL/L (ref 7–15)
BUN SERPL-MCNC: 6.6 MG/DL (ref 8–23)
CALCIUM SERPL-MCNC: 9.8 MG/DL (ref 8.8–10.2)
CHLORIDE SERPL-SCNC: 100 MMOL/L (ref 98–107)
CREAT SERPL-MCNC: 0.51 MG/DL (ref 0.51–0.95)
DEPRECATED HCO3 PLAS-SCNC: 26 MMOL/L (ref 22–29)
ERYTHROCYTE [DISTWIDTH] IN BLOOD BY AUTOMATED COUNT: 14.1 % (ref 10–15)
GFR SERPL CREATININE-BSD FRML MDRD: >90 ML/MIN/1.73M2
GLUCOSE BLDC GLUCOMTR-MCNC: 147 MG/DL (ref 70–99)
GLUCOSE BLDC GLUCOMTR-MCNC: 214 MG/DL (ref 70–99)
GLUCOSE SERPL-MCNC: 183 MG/DL (ref 70–99)
HCT VFR BLD AUTO: 34.6 % (ref 35–47)
HGB BLD-MCNC: 10.1 G/DL (ref 11.7–15.7)
MAGNESIUM SERPL-MCNC: 1.9 MG/DL (ref 1.7–2.3)
MCH RBC QN AUTO: 25.4 PG (ref 26.5–33)
MCHC RBC AUTO-ENTMCNC: 29.2 G/DL (ref 31.5–36.5)
MCV RBC AUTO: 87 FL (ref 78–100)
PLATELET # BLD AUTO: 498 10E3/UL (ref 150–450)
POTASSIUM SERPL-SCNC: 4 MMOL/L (ref 3.4–5.3)
RBC # BLD AUTO: 3.97 10E6/UL (ref 3.8–5.2)
SODIUM SERPL-SCNC: 135 MMOL/L (ref 136–145)
UFH PPP CHRO-ACNC: 0.14 IU/ML
UFH PPP CHRO-ACNC: 0.35 IU/ML
WBC # BLD AUTO: 9.6 10E3/UL (ref 4–11)

## 2022-08-03 PROCEDURE — 97530 THERAPEUTIC ACTIVITIES: CPT | Mod: GP

## 2022-08-03 PROCEDURE — 250N000011 HC RX IP 250 OP 636: Performed by: HOSPITALIST

## 2022-08-03 PROCEDURE — 80048 BASIC METABOLIC PNL TOTAL CA: CPT | Performed by: HOSPITALIST

## 2022-08-03 PROCEDURE — 85520 HEPARIN ASSAY: CPT | Performed by: HOSPITALIST

## 2022-08-03 PROCEDURE — 99239 HOSP IP/OBS DSCHRG MGMT >30: CPT | Performed by: HOSPITALIST

## 2022-08-03 PROCEDURE — 250N000011 HC RX IP 250 OP 636: Performed by: INTERNAL MEDICINE

## 2022-08-03 PROCEDURE — 250N000013 HC RX MED GY IP 250 OP 250 PS 637: Performed by: INTERNAL MEDICINE

## 2022-08-03 PROCEDURE — 85027 COMPLETE CBC AUTOMATED: CPT | Performed by: HOSPITALIST

## 2022-08-03 PROCEDURE — 83735 ASSAY OF MAGNESIUM: CPT | Performed by: HOSPITALIST

## 2022-08-03 RX ORDER — ENOXAPARIN SODIUM 100 MG/ML
80 INJECTION SUBCUTANEOUS 2 TIMES DAILY
Qty: 48 ML | Refills: 0 | Status: SHIPPED | OUTPATIENT
Start: 2022-08-03 | End: 2022-09-02

## 2022-08-03 RX ORDER — ENOXAPARIN SODIUM 100 MG/ML
1 INJECTION SUBCUTANEOUS EVERY 12 HOURS
Status: DISCONTINUED | OUTPATIENT
Start: 2022-08-03 | End: 2022-08-03 | Stop reason: HOSPADM

## 2022-08-03 RX ADMIN — ATENOLOL 25 MG: 25 TABLET ORAL at 08:06

## 2022-08-03 RX ADMIN — TIMOLOL MALEATE 1 DROP: 5 SOLUTION/ DROPS OPHTHALMIC at 08:07

## 2022-08-03 RX ADMIN — HEPARIN, PORCINE (PF) 10 UNIT/ML INTRAVENOUS SYRINGE 10 ML: at 11:10

## 2022-08-03 RX ADMIN — INSULIN ASPART 2 UNITS: 100 INJECTION, SOLUTION INTRAVENOUS; SUBCUTANEOUS at 09:19

## 2022-08-03 RX ADMIN — HEPARIN SODIUM AND DEXTROSE 1900 UNITS/HR: 10000; 5 INJECTION INTRAVENOUS at 06:48

## 2022-08-03 RX ADMIN — ENOXAPARIN SODIUM 80 MG: 80 INJECTION SUBCUTANEOUS at 11:08

## 2022-08-03 ASSESSMENT — ACTIVITIES OF DAILY LIVING (ADL)
ADLS_ACUITY_SCORE: 22

## 2022-08-03 NOTE — PROGRESS NOTES
I certify that this patient, Gricelda Sabillon has been under my care (or a nurse practitioner or physican's assistant working with me). This is the face-to-face encounter for oxygen medical necessity.      Gricelda Sabillon is now in a chronic stable state and continues to require supplemental oxygen. Patient has continued oxygen desaturation due to pulmonary embolism    Alternative treatment(s) tried or considered and deemed clinically infective for treatment of pulmonary embolism in setting of malignancy include inhalers and pulmonary toileting.  If portability is ordered, is the patient mobile within the home? yes    Mandeep Mac MD

## 2022-08-03 NOTE — PROGRESS NOTES
Received intake call for home oxygen at 11:15AM. Reviewed patient's chart; Patient qualifies under insurance guidelines and all documentation is in the chart including a good order.   11:25AM- Called to offer choice and patient is okay with [facility] setting them up. Discussed equipment with patient and informed them that we would be to bedside with oxygen in the next 2 hours.   11:32AM- Spoke with care coordinator, [facility], confirmed we received the order, and provided them with ETA of oxygen.

## 2022-08-03 NOTE — PROGRESS NOTES
Home Oxygen Assessment Tools  Patient's 02 sat on RA at rest 89-90% for 4  minutes. Patient is on 4 LPM/%  (02 device) Sp02 96 %, after 3 minutes of standing on the side of the bed. Patient 02 85%  on  RA with activity after 2 minutes. Patient's Sp02  92% on 4 LPM after 4 minutes of (walking activity).

## 2022-08-03 NOTE — PROGRESS NOTES
Care Management Discharge Note    Discharge Date: 08/03/2022       Discharge Disposition: Home    Discharge Transportation: family or friend will provide    Private pay costs discussed: Not applicable    Patient/Family in Agreement with the Plan:  Yes    Handoff Referral Completed: No    Additional Information:  Care management following for discharge planning.  Plan discharge home with home care RN/PT/OT/HHA. [facility] has accepted for services. AVS updated with contact information.  Nursing to assess and arrange for home oxygen as needed.       Danisha Haynes RN      Danisha Haynes RN Case Manager  Inpatient Care Coordination  [facility]   [phone]

## 2022-08-03 NOTE — PLAN OF CARE
Pt alert and oriented X 4. Apical pulse regular. Lung sounds diminished, dyspnea on exertion noted. Pt on 3-4L of oxygen, home oxygen ordered. Heparin gtt stopped, pt demonstrated Lovenox injection- self administered Lovenox. Port heparin locked.   Plan: Discharge to home today.

## 2022-08-03 NOTE — TELEPHONE ENCOUNTER
Date: 8/3/2022  Diagnosis: Metastatic Malignant Melanoma  Is patient active in care coordination? No  Was patient in TCU? No      Appointments in Next Year    Aug 04, 2022  8:00 AM  (Arrive by 7:30 AM)  PET ONCOLOGY WHOLE BODY with [facility]UP[facility]1  [facility] ([facility] ) [phone]   Aug 12, 2022  8:30 AM  (Arrive by 8:15 AM)  ED/Hospital Follow Up with Raisa Davidson MD  [facility] ([facility] ) [phone]        Call placed to patient. Left message advising of appointment. Ok to switch to video visit if it works better for patient.

## 2022-08-03 NOTE — DISCHARGE SUMMARY
[facility]    Discharge Summary  Hospitalist    Date of Admission:  7/25/2022  Date of Discharge:  8/3/2022  Discharging Provider: Mandeep Mac MD  Date of Service (when I saw the patient): 08/03/22    Discharge Diagnoses   #Acute hypoxemic respiratory failure secondary to pulmonary embolism in the setting of malignancy  #Metastatic melanoma  #Lung mass - biopsy result negative for malignancy (resulted after discharge)  #History of ureteric cancer requiring hysterectomy  #Diabetes mellitus type 2  #Hypertension  #Hyperlipidemia  #Generalized Weakness    Hospital Course   Gricelda Sabillon is a 72 year old female with history of diabetes mellitus type 2, hypertension, hyperlipidemia, metastatic melanoma, recent hysterectomy for endometrial cancer, obstructive sleep apnea uses CPAP, and was recent UTI treated with ciprofloxacin who presented with shortness of breath.     #Acute hypoxemic respiratory failure secondary to pulmonary emboli: On admission, patient hypoxemic to the low to mid 80s on room air.  She is hemodynamically stable.  CT of the chest showed occlusive and nonocclusive PE within multiple pulmonary artery branches bilaterally.  Mild prominence of the right ventricle suggestive of RV strain.  There were also numerous ill-defined masses in both lungs that had increased in size and suspicious for progression of metastatic disease.  -Patient had been started on a heparin drip.  She was treated with ceftriaxone and azithromycin for possible pneumonia.  She has completed her course of antibiotics.  -Patient had heparin drip paused for biopsy done on 8/2.  Heparin drip was resumed that afternoon and tolerated.  Blood counts stable.  -Discussed with hematology/oncology on the day of discharge.  She will discharge on Lovenox injections for PE.  -Patient will discharge home with home cares per strong patient preferences.  She strongly prefers to go home versus TCU.  -She had walking oxygen  study done on the day of discharge.  She will require 4 L oxygen via nasal cannula to maintain saturations.     #History of metastatic melanoma: Patient follows with Dr. Florez at [facility]. On Keytruda. CT of the chest shows numerous ill-defined masses in both lungs have increased in size, and are suspicious for progression of metastatic disease.   -With nausea Oncology ordered MRI of brain which was unremarkable (results discussed with patient).  -IR guided biopsy done on 8/2.  Tolerated.  Biopsy results in process.  She will follow-up with oncology in the coming weeks.  -We will discharge on Lovenox as above.     #History of ureteric cancer requiring hysterectomy  -Not thought to be a current issue     #Diabetes mellitus type 2: PTA was on glipizide, metformin, and Invokana.   Here has been on glipizide and as needed NovoLog insulin.  -Mukund Houser MD did discuss with primary care physician.  Concern for hypoglycemia.  Stopping glipizide on discharge.  Okay to resume metformin alone and follow-up with PCP.     #Hypertension: Continue prior to admission atenolol     #Hyperlipidemia: Continue  prior to admission atorvastatin     #Weakness: PT consult appreciated.  Desaturates and gets dizzy with ambulation.  Patient prefers to discharge home with home support.  Home PT, nursing ordered with discharge.  She will also discharge on home oxygen.    Mandeep Mac MD    Pending Results   These results will be followed up by oncology  Unresulted Labs Ordered in the Past 30 Days of this Admission     Date and Time Order Name Status Description    8/2/2022 12:38 PM Surgical Pathology Exam In process           Code Status   DNR / DNI       Primary Care Physician   Raisa Davidson    Physical Exam   Temp: 98.3  F (36.8  C) Temp src: Oral BP: 137/67 Pulse: 79   Resp: 18 SpO2: 93 % O2 Device: Nasal cannula Oxygen Delivery: 4 LPM  Vitals:    07/25/22 2116 08/02/22 1721   Weight: 89.9 kg (198 lb 4.8 oz) 84.8 kg (187 lb)      Vital Signs with Ranges  Temp:  [98  F (36.7  C)-98.3  F (36.8  C)] 98.3  F (36.8  C)  Pulse:  [68-82] 79  Resp:  [16-20] 18  BP: (102-141)/(48-80) 137/67  SpO2:  [90 %-99 %] 93 %  No intake/output data recorded.    Constitutional: Deconditioned.  Answers appropriately.  Nasal cannula in place.  HEENT: Normocephalic. MMM, No elevation of JVD noted.   Respiratory: Nl WOB, Clear bilaterally, some crackles at the bases.  No wheezes  Cardiovascular: Regular, no murmur  GI: BS+, NT, ND  Skin/Integumen: WWP, no rash. No edema  Neuro: CNII-XII intact. Moves all extremities. No tremor. A&Ox3.    Discharge Disposition   Discharged to home  Condition at discharge: Stable    Consultations This Hospital Stay   PHARMACY IP CONSULT  PHARMACY IP CONSULT  PHARMACY IP CONSULT  PHARMACY IP CONSULT  HEMATOLOGY & ONCOLOGY IP CONSULT  INTERVENTIONAL RADIOLOGY ADULT/PEDS IP CONSULT  PHYSICAL THERAPY ADULT IP CONSULT  PHARMACY LIAISON FOR MEDICATION COVERAGE CONSULT  SOCIAL WORK IP CONSULT  PHARMACY IP CONSULT  PHARMACY IP CONSULT  INTERVENTIONAL RADIOLOGY ADULT/PEDS IP CONSULT  PULMONARY IP CONSULT  PHARMACY IP CONSULT  PHARMACY IP CONSULT    Time Spent on this Encounter   I, Mnadeep Mac MD, personally saw the patient today and spent greater than 30 minutes discharging this patient.    Discharge Orders      Primary Care - Care Coordination Referral      Home Care Referral      Reason for your hospital stay    You were hospitalized for pulmonary embolism in the setting of malignancy.  Lung masses were found on CT scan.  You underwent IR guided biopsy.  You were seen by oncology.  You will discharge home on Lovenox injections.  You will also discharge on home oxygen.  You will follow-up with oncology in the coming week.     Follow-up and recommended labs and tests     Follow up with primary care provider, Raisa Davidson, or oncology within 7 days for hospital follow- up.  The following labs/tests are recommended: CBC and  BMP.     Activity    Your activity upon discharge: activity as tolerated     No CPR- Do NOT Intubate     Oxygen Adult/Peds    Oxygen Documentation:   I certify that this patient, Gricelda Sabillon has been under my care (or a nurse practitioner or physican's assistant working with me). This is the face-to-face encounter for oxygen medical necessity.      Gricelda Sabillon is now in a chronic stable state and continues to require supplemental oxygen. Patient has continued oxygen desaturation due to Pulmonary Embolism     Alternative treatment(s) tried or considered and deemed clinically infective for treatment of Pulmonary embolism include inhalers and pulmonary toileting.  If portability is ordered, is the patient mobile within the home? yes    **Patients who qualify for home O2 coverage under the CMS guidelines require ABG tests or O2 sat readings obtained closest to, but no earlier than 2 days prior to the discharge, as evidence of the need for home oxygen therapy. Testing must be performed while patient is in the chronic stable state. See notes for O2 sats.**     Diet    Follow this diet upon discharge: Orders Placed This Encounter      Snacks/Supplements Adult: Other; Ok to order any diet appropriate supplement prn; With Meals      Regular Diet Adult     Discharge Medications   Current Discharge Medication List      START taking these medications    Details   enoxaparin ANTICOAGULANT (LOVENOX) 80 MG/0.8ML syringe Inject 0.8 mLs (80 mg) Subcutaneous 2 times daily for 30 days  Qty: 48 mL, Refills: 0    Associated Diagnoses: Bilateral pulmonary embolism (H)         CONTINUE these medications which have NOT CHANGED    Details   acetaminophen (TYLENOL) 325 MG tablet Take 3 tablets (975 mg) by mouth every 6 hours as needed for mild pain  Qty: 50 tablet, Refills: 0    Associated Diagnoses: S/P hysterectomy      albuterol (PROAIR HFA/PROVENTIL HFA/VENTOLIN HFA) 108 (90 Base) MCG/ACT inhaler Inhale 2 puffs into the lungs  every 4 hours as needed for shortness of breath / dyspnea  Qty: 1 Inhaler, Refills: 3    Comments: Pharmacy may dispense brand covered by insurance (Proair, or proventil or ventolin or generic albuterol inhaler)  Associated Diagnoses: Acute bronchitis      atenolol (TENORMIN) 50 MG tablet Take 25 mg by mouth daily      atorvastatin (LIPITOR) 20 MG tablet Take 1 tablet by mouth once daily  Qty: 90 tablet, Refills: 0    Associated Diagnoses: Hyperlipidemia with target LDL less than 100      azelastine (ASTELIN) 0.1 % nasal spray USE 1 SPRAY(S) IN EACH NOSTRIL TWICE DAILY AS NEEDED  Qty: 30 mL, Refills: 0    Associated Diagnoses: Seasonal allergic rhinitis, unspecified trigger      azelastine (OPTIVAR) 0.05 % SOLN Place 1 drop into both eyes 2 times daily as needed Reported on 3/22/2017      cetirizine (ZYRTEC) 10 MG tablet Take 10 mg by mouth every morning      ciprofloxacin (CIPRO) 500 MG tablet Take 1 tablet (500 mg) by mouth 2 times daily  Qty: 14 tablet, Refills: 0    Associated Diagnoses: Acute cystitis without hematuria      latanoprost (XALATAN) 0.005 % ophthalmic solution Place 1 drop into both eyes At Bedtime   Qty: 2.5 mL, Refills: 1      metFORMIN (GLUCOPHAGE XR) 500 MG 24 hr tablet TAKE 2 TABLETS BY MOUTH TWICE DAILY WITH MEALS  Qty: 360 tablet, Refills: 0    Associated Diagnoses: Type 2 diabetes mellitus with hyperglycemia, without long-term current use of insulin (H)      ondansetron (ZOFRAN) 8 MG tablet Take 1 tablet (8 mg) by mouth every 8 hours as needed for nausea  Qty: 30 tablet, Refills: 1    Associated Diagnoses: Nausea and vomiting, intractability of vomiting not specified, unspecified vomiting type      senna-docusate (SENOKOT-S/PERICOLACE) 8.6-50 MG tablet Take 1 tablet by mouth daily as needed for constipation      timolol maleate (TIMOPTIC) 0.5 % ophthalmic solution INSTILL 1 DROP INTO EACH EYE TWICE DAILY      triamcinolone (KENALOG) 0.5 % external ointment Apply 1 g topically daily  Qty: 15  "g, Refills: 1    Associated Diagnoses: Vulvar dermatitis      CONTOUR NEXT TEST test strip USE 1 STRIP TO CHECK GLUCOSE ONCE DAILY  Qty: 100 strip, Refills: 4    Associated Diagnoses: Diabetes mellitus without complication (H)      fish oil-omega-3 fatty acids 1000 MG capsule Take 1 g by mouth daily Reported on 3/22/2017      lidocaine-prilocaine (EMLA) 2.5-2.5 % external cream APPLY CREAM TOPICALLY ONCE DAILY AS NEEDED FOR PAIN APPLY A PEA SIZED AMOUNT OVER PORT SITE 60 MINUTES PRIOR TO USE COVER WITH PLASTIC WRAP      order for DME All diabetic testing supplies including test strips, lancets and solution for testing 2 times per day. Patient is using   no Insulin. Last A1c Lab Results       Component                Value               Date                       A1C                      7.9                 08/20/2018  Qty: 100 each, Refills: 11    Associated Diagnoses: Diabetes mellitus without complication (H); Hyperglycemia; Mild intermittent asthma without complication; LFT elevation         STOP taking these medications       aspirin 81 MG EC tablet Comments:   Reason for Stopping:         glipiZIDE (GLUCOTROL) 5 MG tablet Comments:   Reason for Stopping:         ibuprofen (ADVIL/MOTRIN) 200 MG tablet Comments:   Reason for Stopping:         INVOKANA 300 MG tablet Comments:   Reason for Stopping:         pembrolizumab Comments:   Reason for Stopping:             Allergies   Allergies   Allergen Reactions     Bromfenac Visual Disturbance      xibrom  Causes sever eye pain     Codeine      \"NAUSE,HA AND DIZZINESS\"     Codeine Nausea     Other reaction(s): Dizziness, Headache     Data   Most Recent 3 CBC's:Recent Labs   Lab Test 08/03/22  0655 08/02/22  0652 08/01/22  0938   WBC 9.6 9.8 11.4*   HGB 10.1* 9.7* 10.4*   MCV 87 86 87   * 505* 556*      Most Recent 3 BMP's:  Recent Labs   Lab Test 08/03/22  0834 08/03/22  0655 08/03/22  0202 08/02/22  0742 08/02/22  0652 08/01/22  1258 08/01/22  1127   NA  --  " 135*  --   --  134*  --  133*   POTASSIUM  --  4.0  --   --  3.9  --  3.9   CHLORIDE  --  100  --   --  98  --  98   CO2  --  26  --   --  25  --  28   BUN  --  6.6*  --   --  6.5*  --  8.9   CR  --  0.51  --   --  0.55  --  0.59   ANIONGAP  --  9  --   --  11  --  7   KATHRYN  --  9.8  --   --  9.7  --  9.9   * 183* 147*   < > 158*   < > 197*    < > = values in this interval not displayed.     Most Recent 2 LFT's:  Recent Labs   Lab Test 07/25/22  1012 07/19/22  1407   AST 21 25   ALT 31 33   ALKPHOS 129 139   BILITOTAL 0.4 0.4     Most Recent INR's and Anticoagulation Dosing History:  Anticoagulation Dose History     Recent Dosing and Labs Latest Ref Rng & Units 1/17/2019 3/1/2019 9/10/2020 3/22/2021 5/2/2022 8/2/2022    INR 0.85 - 1.15 1.02 1.07 1.00 0.98 0.96 1.08        Most Recent 3 Troponin's:No lab results found.  Most Recent Cholesterol Panel:  Recent Labs   Lab Test 03/03/21  0845   CHOL 177   LDL 91   HDL 40*   TRIG 231*     Most Recent 6 Bacteria Isolates From Any Culture (See EPIC Reports for Culture Details):No lab results found.  Most Recent TSH, T4 and A1c Labs:  Recent Labs   Lab Test 07/19/22  1407 07/12/22  0942 05/03/16  0817 02/04/16  0846   TSH 1.99  --    < > 1.90   T4  --   --   --  1.17   A1C  --  8.3*   < > 7.4*    < > = values in this interval not displayed.

## 2022-08-03 NOTE — PLAN OF CARE
Physical Therapy Discharge Summary     Reason for therapy discharge:    Discharged to home with home therapy.     Progress towards therapy goal(s). See goals on Care Plan in [facility] electronic health record for goal details.  Goals partially met.  Barriers to achieving goals:   discharge from facility.     Therapy recommendation(s):    Continued therapy is recommended.  Rationale/Recommendations:  Patient would benefit from home PT in order to increase strength, activity tolerance and independence with mobility.  Patient requires home PT at this time as attending PT in a clinic setting would be a considerable and significantly taxing effort, limiting her ability to participate in therapy session.

## 2022-08-03 NOTE — PROGRESS NOTES
[facility] Follow Up Note:    Assessment and Plan:    Gricelda Sabillon is a 72 year old female patient, admitted for acute PE     1.Occlusive and nonocclusive pulmonary emboli, with some evidence of right heart strain  - Hypercoagulability of malignancy  - No hypercoagulable work-up  - Continue IV heparin     PLAN: IV heparin( now transitioned to enoxaparin)        2. 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 Allison at [facility]  - Subsequent to that has been on adjuvant pembrolizumab, 7/5/2022     PLAN:  - Now with rapidly growing lung mass, plan  for biopsy   --Reviewed plan for lung biopsy as OP/ early next week due to ASA     3.Stage I endometrial cancer, resected  - Pulmonary nodules not thought to be related to that     4. Ongoing nausea since the past few weeks  -- Now not thought to be from surgery  -- Brain MRI done 7/29, shows no e/o mets( I called  tonight)     3. CODE : DNR/DNI( changed  after d/w Melva)     - Long discussion with patient in the presence of  Allen today.     - Also discussed and showed them rapidly growing pulmonary malignancy.  We also discussed that patient may not be in a condition for further treatment based on overall status.  Patient is really hoping to get biopsy to have closure understand next steps.  Biopsy done     D/w Dr. Mac, Going home on home oxygen and home PT     Subjective:    Feeling a lot better, happy that biopsy has been done, anxious to go home.    Labs:  All labs reviewed    CBCRecent Labs   Lab 08/03/22  0655 08/02/22  0652 08/01/22  0938   WBC 9.6 9.8 11.4*   HGB 10.1* 9.7* 10.4*   MCV 87 86 87   * 505* 556*       CMP  Recent Labs   Lab 08/03/22  0834 08/03/22  0655 08/03/22  0202 08/02/22  2129 08/02/22  0742 08/02/22  0652 08/01/22  1258 08/01/22  1127 08/01/22  0938    NA  --  135*  --   --   --  134*  --  133* 141   POTASSIUM  --  4.0  --   --   --  3.9  --  3.9 2.2*   CHLORIDE  --  100  --   --   --  98  --  98 117*   CO2  --  26  --   --   --  25  --  28 18*   ANIONGAP  --  9  --   --   --  11  --  7 6*   * 183* 147* 175*   < > 158*   < > 197* 122*   BUN  --  6.6*  --   --   --  6.5*  --  8.9 5.2*   CR  --  0.51  --   --   --  0.55  --  0.59 0.35*   GFRESTIMATED  --  >90  --   --   --  >90  --  >90 >90   KATHRYN  --  9.8  --   --   --  9.7  --  9.9 5.6*   MAG  --  1.9  --   --   --  1.7  --  1.7  --    ALBUMIN  --   --   --   --   --   --   --  2.8*  --     < > = values in this interval not displayed.       INR  Recent Labs   Lab 08/02/22  1119   INR 1.08       Blood CultureNo results for input(s): CULT in the last 168 hours.      William Florez MD  [facility]  8/3/2022 6:11 PM

## 2022-08-03 NOTE — PLAN OF CARE
Goal Outcome Evaluation:    Plan of Care Reviewed With: patient   Oxygen saturation dropped while patient sleeping, currently is 93% on 4L nasal cannula.  Heparin gtt is now infusing at 1900 units per hour.  Recheck Hep 10 is at 0700.  Lung Bx site is C/D/I.  Tele is SR.  Oncology is following.  Plan is to continue with Heparin gtt.  Continue with POC.

## 2022-08-04 ENCOUNTER — PATIENT OUTREACH (OUTPATIENT)
Dept: CARE COORDINATION | Facility: CLINIC | Age: 72
End: 2022-08-04

## 2022-08-04 PROCEDURE — 88305 TISSUE EXAM BY PATHOLOGIST: CPT | Mod: 26 | Performed by: PATHOLOGY

## 2022-08-04 PROCEDURE — 88342 IMHCHEM/IMCYTCHM 1ST ANTB: CPT | Mod: 26 | Performed by: PATHOLOGY

## 2022-08-04 PROCEDURE — 88341 IMHCHEM/IMCYTCHM EA ADD ANTB: CPT | Mod: 26 | Performed by: PATHOLOGY

## 2022-08-04 NOTE — PROGRESS NOTES
[facility]    Background: Care Coordination referral placed from [facility] discharge report for reason of patient meeting criteria for a TCM outreach call by [facility] team.    Assessment: Upon chart review, [facility] Team member will cancel/close the referral for TCM outreach due to reason below:    Primary care team staff has reached out to patient for hospital follow up    Plan: Care Coordination referral for TCM outreach canceled.      Nia Krishnamurthy RN  [facility] [place], [facility]    *Connected [facility] Resource Team does NOT follow patient ongoing. Referrals are identified based on internal discharge reports and the outreach is to ensure patient has an understanding of their discharge instructions.

## 2022-08-05 ENCOUNTER — TELEPHONE (OUTPATIENT)
Dept: INTERNAL MEDICINE | Facility: CLINIC | Age: 72
End: 2022-08-05

## 2022-08-05 DIAGNOSIS — R91.8 MULTIPLE LUNG NODULES ON CT: Primary | ICD-10-CM

## 2022-08-05 NOTE — TELEPHONE ENCOUNTER
I talked to the patient.  She feels tired, she feels short of breath  The biopsy is negative for malignancy  I talked briefly with Dr. Florez, her oncologist  I placed a referral to be seen in pulmonary

## 2022-08-07 NOTE — TELEPHONE ENCOUNTER
Chest CT -- To schedule this test you may call Scheduling center at [phone]      She will pre-aprove by insurance   I recommended the chest CT to follow up on the nodule since the biopsy was neg for cancer and she is still not feeling well.     If she is not feeling well, I advised [facility] to [facility]

## 2022-08-09 NOTE — PROGRESS NOTES
RECORDS STATUS - ALL OTHER DIAGNOSIS    pulmonary nodules   RECORDS RECEIVED FROM:[facility]/ [facility] in [place] /    DATE RECEIVED: 8/10/2022    Action    Action Taken 8/9/2022 7:26AM KEB   [phone]    I called the [facility] in [place] - they open at 8am Phone: [phone]    8/9/2022 2:56PM KEASTER   I called the [facility] again  [phone] #[phone] - I left a detailed vm.     I called pt Melva- I asked if she had a PET scan done recently- she said yes but can't remember.     I called [facility]/ [facility] PH: [phone]- unavailable.     I Called [facility] to request the PET image report - they will send the report over soon!      NOTES STATUS DETAILS   OFFICE NOTE from referring provider Complete Epic   Raisa Davidson MD   OPERATIVE REPORT Complete 3/24/2022 Endoscopic Retrograde    MEDICATION LIST Complete [facility]   CLINICAL TRIAL TREATMENTS TO DATE     LABS     PATHOLOGY REPORTS Complete- internal lung biopsy in EPIC 8/2/2022   Lung, left, needle core biopsy:  - Negative for malignancy, granulomas, vasculitis, or lymphangioleiomyomatosis.  - Lung and bronchial mucosa with chronic inflammation and fibroblastic foci; cannot exclude an underlying interstitial lung disease or inflammatory/infectious process.   ANYTHING RELATED TO DIAGNOSIS Complete Labs last updated on 8/3/2022   GENONOMIC TESTING     TYPE:     IMAGING (NEED IMAGES & REPORT)     CT SCANS Scheduled CT Chest (Scheduled) 8/12/2022     CT Lung 8/2/2022   Xray Chest Complete    Complete- [facility]  8/2/2022 more in PACS      2019 and 2017   MRI Complete MRI Brain 7/29/2022   MAMMO     ULTRASOUND     PET Complete 1/27/2022

## 2022-08-10 ENCOUNTER — ONCOLOGY VISIT (OUTPATIENT)
Dept: ONCOLOGY | Facility: CLINIC | Age: 72
End: 2022-08-10
Attending: INTERNAL MEDICINE
Payer: COMMERCIAL

## 2022-08-10 ENCOUNTER — PRE VISIT (OUTPATIENT)
Dept: ONCOLOGY | Facility: CLINIC | Age: 72
End: 2022-08-10

## 2022-08-10 VITALS
TEMPERATURE: 97 F | RESPIRATION RATE: 18 BRPM | HEIGHT: 67 IN | OXYGEN SATURATION: 93 % | BODY MASS INDEX: 28.88 KG/M2 | DIASTOLIC BLOOD PRESSURE: 59 MMHG | SYSTOLIC BLOOD PRESSURE: 108 MMHG | HEART RATE: 86 BPM | WEIGHT: 184 LBS

## 2022-08-10 DIAGNOSIS — J84.9 ILD (INTERSTITIAL LUNG DISEASE) (H): Primary | ICD-10-CM

## 2022-08-10 DIAGNOSIS — R91.8 MULTIPLE LUNG NODULES ON CT: ICD-10-CM

## 2022-08-10 PROCEDURE — 99204 OFFICE O/P NEW MOD 45 MIN: CPT | Performed by: INTERNAL MEDICINE

## 2022-08-10 PROCEDURE — G0463 HOSPITAL OUTPT CLINIC VISIT: HCPCS

## 2022-08-10 RX ORDER — LIDOCAINE 40 MG/G
CREAM TOPICAL
Status: CANCELLED | OUTPATIENT
Start: 2022-08-10

## 2022-08-10 ASSESSMENT — PAIN SCALES - GENERAL: PAINLEVEL: NO PAIN (0)

## 2022-08-10 NOTE — NURSING NOTE
"Oncology Rooming Note    August 10, 2022 11:59 AM   Gricelda Sabillon is a 72 year old female who presents for:    Chief Complaint   Patient presents with     Lung Nodule     Initial Vitals: /59 (Cuff Size: Adult Regular)   Pulse 86   Temp 97  F (36.1  C) (Tympanic)   Resp 18   Ht 1.702 m (5' 7\")   Wt 83.5 kg (184 lb)   LMP 12/13/2002 (Exact Date)   SpO2 93%   BMI 28.82 kg/m   Estimated body mass index is 28.82 kg/m  as calculated from the following:    Height as of this encounter: 1.702 m (5' 7\").    Weight as of this encounter: 83.5 kg (184 lb). Body surface area is 1.99 meters squared.  No Pain (0) Comment: Data Unavailable   Patient's last menstrual period was 12/13/2002 (exact date).  Allergies reviewed: Yes  Medications reviewed: Yes    Medications: Medication refills not needed today.  Pharmacy name entered into EPIC:    [facility] ORDER [id number] [id number] [address]    Clinical concerns: new patient       Eli Schneider, MARSHALL              "

## 2022-08-10 NOTE — PROGRESS NOTES
"[facility] Initial Visit    Reason for Visit  Gricelda Sabillon is a 72 year old female who is referred by Rosibel Florez and Melissa for abnormal chest imaging  Pulmonary HPI    - Earlier this year she noted a lump on her thigh, it was indeterminate and removed, found to be melanoma. During the workup, abnormal imaging found in the uterus so she had hysterectomy. Last week she had a percutaneous lung biopsy and the week before she was diagnosed with pulmonary embolism  - she was coughing before the hospitalization, improved since treatment for PE, breathing is improved, can \"almost\" take a deep breath  - she is taking lovenox injections  - unclear if the PE was from malignancy vs postoperative inactivity      Timeline of medical care this year:   - Jan 2022 leg mass resection; March 2022 extended surgical resection for margins   Jan 2022 CT showing lung nodule, not PET FDG avid  May port placed - April 29 first immunotherapy Keytruda, every 3 weeks May 20, Neha 10, July 5 last treatment  - June 2022 hysterectomy, lymph nodes negative for malignancy, low grade endometrial cancer  After the hysterectomy, she had some issues with nausea, poor appetite. Thought it might be semaglutide so it was stopped. No other new medications.  Routine visit on 7/25/22 with Dr Torres showed hypoxemia, she went to ED and noted to have a PE. She didn't feel that bad but became short of breath at the clinic - discharged with 4 lpm supplemental oxygen, she is on 2 lpm now, usually 92-93%  She continues to experience lightheadedness, nausea, chills every morning. No appetite, nothing tastes good.   - Aug 2022 lung biopsy percutaneous   - history of 1-2 episodes of bronchitis annually, family history of asthma. Has used albuterol in the past for these episodes  - she has been hanging about the house, no camping or outdoor activities, no pets      ROS Pulmonary  Dyspnea: Yes, Cough: Yes, Chest pain: No, " "Wheezing: No, Sputum Production: No, Hemoptysis: No  A complete ROS was otherwise negative except as noted in the HPI.  The patient was seen and examined by Roselia Sosa MD   Current Outpatient Medications   Medication     acetaminophen (TYLENOL) 325 MG tablet     albuterol (PROAIR HFA/PROVENTIL HFA/VENTOLIN HFA) 108 (90 Base) MCG/ACT inhaler     atenolol (TENORMIN) 50 MG tablet     atorvastatin (LIPITOR) 20 MG tablet     azelastine (ASTELIN) 0.1 % nasal spray     azelastine (OPTIVAR) 0.05 % SOLN     cetirizine (ZYRTEC) 10 MG tablet     CONTOUR NEXT TEST test strip     enoxaparin ANTICOAGULANT (LOVENOX) 80 MG/0.8ML syringe     fish oil-omega-3 fatty acids 1000 MG capsule     latanoprost (XALATAN) 0.005 % ophthalmic solution     lidocaine-prilocaine (EMLA) 2.5-2.5 % external cream     metFORMIN (GLUCOPHAGE XR) 500 MG 24 hr tablet     ondansetron (ZOFRAN) 8 MG tablet     order for DME     senna-docusate (SENOKOT-S/PERICOLACE) 8.6-50 MG tablet     timolol maleate (TIMOPTIC) 0.5 % ophthalmic solution     triamcinolone (KENALOG) 0.5 % external ointment     No current facility-administered medications for this visit.     Allergies   Allergen Reactions     Bromfenac Visual Disturbance      xibrom  Causes sever eye pain     Codeine      \"NAUSE,HA AND DIZZINESS\"     Codeine Nausea     Other reaction(s): Dizziness, Headache     Social History     Socioeconomic History     Marital status:      Spouse name: Allen     Number of children: 3     Years of education: 15     Highest education level: Not on file   Occupational History     Not on file   Tobacco Use     Smoking status: Never Smoker     Smokeless tobacco: Never Used   Vaping Use     Vaping Use: Never used   Substance and Sexual Activity     Alcohol use: No     Alcohol/week: 0.0 standard drinks     Drug use: No     Sexual activity: Not Currently     Partners: Male   Other Topics Concern     Parent/sibling w/ CABG, MI or angioplasty before 65F 55M? Not Asked "   Social History Narrative     Not on file     Social Determinants of Health     Financial Resource Strain: Not on file   Food Insecurity: Not on file   Transportation Needs: Not on file   Physical Activity: Not on file   Stress: Not on file   Social Connections: Not on file   Intimate Partner Violence: Not At Risk     Fear of Current or Ex-Partner: No     Emotionally Abused: No     Physically Abused: No     Sexually Abused: No   Housing Stability: Not on file     Past Medical History:   Diagnosis Date     Asthma, mild intermittent      Cataract      Endometrial cancer (H)      HTN, goal below 140/90      Hyperlipidemia LDL goal < 100      Macular hole of left eye      Melanoma (H)     RIGHT KNEE     Sleep apnea     uses c pap     Type 2 diabetes, HbA1C goal < 8% (H)      Past Surgical History:   Procedure Laterality Date     ARTHROPLASTY HIP Right 9/25/2017    Procedure: ARTHROPLASTY HIP;  Right total hip arthroplasty  ;  Surgeon: Ayaan Pena MD;  Location: [facility]     ARTHROPLASTY KNEE  7/12/2013    Procedure: ARTHROPLASTY KNEE;  right total knee arthroplasty ;  Surgeon: Chaparro Wesley MD;  Location: [facility]     ARTHROSCOPY KNEE Right 1/21/2015    Procedure: ARTHROSCOPY KNEE;  Surgeon: Ayaan Pena MD;  Location: [facility]     C INCISION OF HYMEN       CATARACT IOL, RT/LT       COLONOSCOPY  2008     COLONOSCOPY N/A 4/18/2019    Procedure: Colonoscopy with polypectomy;  Surgeon: Shaun Guerrero MD;  Location: [facility]     CYSTOSCOPY N/A 6/23/2022    Procedure: Cystoscopy;  Surgeon: Eli Guidry MD;  [facility]     ENDOSCOPIC RETROGRADE CHOLANGIOPANCREATOGRAM N/A 2/6/2019    Procedure: COMBINED ENDOSCOPIC RETROGRADE CHOLANGIOPANCREATOGRAPHY, BILIARY SPINCTEROTOMY AND DILATION, PLACE BILE DUCT STENT;  Surgeon: Guru Andie Gonzalez MD;  [facility]: [facility]     ENDOSCOPIC RETROGRADE CHOLANGIOPANCREATOGRAM N/A 2/28/2019    Procedure: Endoscopic Retrograde  Cholangiopancreatogram, Bile duct stent removal and placement;  Surgeon: Shaun Guerrero MD;  [facility]     ENDOSCOPIC RETROGRADE CHOLANGIOPANCREATOGRAM N/A 4/18/2019    Procedure: COMBINED ENDOSCOPIC RETROGRADE CHOLANGIOPANCREATOGRAPHY, Bile duct stent exchange and Polypectomy;  Surgeon: Shaun Guerrero MD;  [facility]     ENDOSCOPIC RETROGRADE CHOLANGIOPANCREATOGRAM N/A 10/18/2019    Procedure: Endoscopic Retrograde Cholangiopancreatogram;  Surgeon: Shuan Guerrero MD;  [facility]     ENDOSCOPIC RETROGRADE CHOLANGIOPANCREATOGRAM N/A 3/24/2022    Procedure: ENDOSCOPIC RETROGRADE CHOLANGIOPANCREATOGRAPHY;  Surgeon: Shaun Guerrero MD;  Location: [facility]     ENDOSCOPIC RETROGRADE CHOLANGIOPANCREATOGRAM WITH SPYGLASS N/A 7/26/2019    Procedure: Endoscopic Retrograde Cholangiopancreatogram With Spyglas,l Radiofrequency Ablation, Stent Exchangeand Duodenal Biopsy x2;  Surgeon: Shaun Guerrero MD;  [facility]     ENDOSCOPIC RETROGRADE CHOLANGIOPANCREATOGRAM WITH SPYGLASS N/A 9/10/2020    Procedure: ENDOSCOPIC RETROGRADE CHOLANGIOPANCREATOGRAPHY, WITH DIRECT DUCT VISUALIZATION, USING PANCREATICOBILIARY FIBEROPTIC PROBE;  Surgeon: Shaun Guerrero MD;  [facility]     ENDOSCOPIC RETROGRADE CHOLANGIOPANCREATOGRAM WITH SPYGLASS N/A 3/22/2021    Procedure: ENDOSCOPIC RETROGRADE CHOLANGIOPANCREATOGRAPHY, WITH DIRECT DUCT VISUALIZATION, USING PANCREATICOBILIARY FIBEROPTIC PROBE;  Surgeon: Shaun Guerrero MD;  [facility]     ESOPHAGOSCOPY, GASTROSCOPY, DUODENOSCOPY (EGD) WITH RADIO FREQUENCY ABLATION, COMBINED N/A 9/10/2020    Procedure: possible repeat ESOPHAGOGASTRODUODENOSCOPY, WITH RADIOFREQUENCY ABLATION and endoscopic mucosal resection;  Surgeon: Shaun Guerrero MD;  [facility]     ESOPHAGOSCOPY, GASTROSCOPY, DUODENOSCOPY (EGD), COMBINED N/A 4/18/2019    Procedure: upper endoscopy with polypectomy;  Surgeon: Shaun Guerrero MD;  [facility]     ESOPHAGOSCOPY, GASTROSCOPY, DUODENOSCOPY (EGD),  COMBINED N/A 10/18/2019    Procedure: Upper Endoscopy and ERCP with stent removal, stone removal and biopsy;  Surgeon: Shaun Guerrero MD;  [facility]     ESOPHAGOSCOPY, GASTROSCOPY, DUODENOSCOPY (EGD), COMBINED N/A 3/24/2022    Procedure: ESOPHAGOGASTRODUODENOSCOPY (EGD), Duodenal  polyp removal;  Surgeon: Shaun Guerrero MD;  Location: [facility]     ESOPHAGOSCOPY, GASTROSCOPY, DUODENOSCOPY (EGD), RESECT MUCOSA, COMBINED N/A 2019    Procedure: Upper Endoscopy, Endoscopic Ultrasound, Endoscopic Mucosal Resection,  Ampullectomy, polypectomy.;  Surgeon: Shaun Guerrero MD;  [facility]     ESOPHAGOSCOPY, GASTROSCOPY, DUODENOSCOPY (EGD), RESECT MUCOSA, COMBINED N/A 3/22/2021    Procedure: ESOPHAGOGASTRODUODENOSCOPY (EGD) with  endoscopic mucosal resection/ polypectomy;  Surgeon: Shaun Guerrero MD;  [facility]     EXCISE LESION LOWER EXTREMITY Right 3/28/2022    Procedure: Wide local excision of right knee melanoma;  Surgeon: Chevy Allison MD;  Location: [facility] OR     EXCISE MASS LOWER EXTREMITY Right 2022    Procedure: excision of right thigh mass;  Surgeon: Salvador Kelly MD;  Location: [facility]     EYE SURGERY      macular hole repaired left eye     HC KNEE SCOPE, DIAGNOSTIC      - both knees     HEAD & NECK SURGERY      wisdom teeth     IR CHEST PORT PLACEMENT > 5 YRS OF AGE  2022     LAPAROSCOPIC HYSTERECTOMY TOTAL, BILATERAL SALPINGO-OOPHORECTOMY, NODE DISSECTION, COMBINED Bilateral 2022    Procedure: Total Laparoscopic Hyserectomy, Bilateral Salpibgo-oophorectomy, Bilateral [place] Lymph Node Dissection;  Surgeon: Eli Guidry MD;  [facility]     Family History   Problem Relation Age of Onset     Hypertension Mother         diabetes,hypoythryoidism, stroke     Diabetes Mother      Breast Cancer Mother      Heart Disease Father         , cancer lip     Uterine Cancer Sister      Connective Tissue Disorder Sister         fibromyalgia     Diabetes Sister       "Hypertension Brother      Cerebrovascular Disease Maternal Grandmother         , diabetes     Cerebrovascular Disease Maternal Grandfather              Cancer Paternal Grandmother              Heart Disease Paternal Grandfather              Cancer Paternal Aunt         ovarian     Breast Cancer Niece      Exam:   /59 (Cuff Size: Adult Regular)   Pulse 86   Temp 97  F (36.1  C) (Tympanic)   Resp 18   Ht 1.702 m (5' 7\")   Wt 83.5 kg (184 lb)   LMP 2002 (Exact Date)   SpO2 93%   BMI 28.82 kg/m    GENERAL APPEARANCE: Well developed, well nourished, alert, and in no apparent distress.  EYES: PERRL, EOMI  HENT: Nasal mucosa with no edema and no hyperemia. No nasal polyps.  EARS: Canals clear, TMs normal  MOUTH: Oral mucosa is moist, without any lesions, no tonsillar enlargement, no oropharyngeal exudate.  NECK: supple, no masses, no thyromegaly.  LYMPHATICS: No significant axillary, cervical, or supraclavicular nodes.  RESP: normal percussion, good air flow throughout.  Bilateral inspiratory crackles. No rhonchi. No wheezes.  CV: Normal S1, S2, regular rhythm, normal rate. No murmur.  No rub. No gallop. No LE edema.   ABDOMEN:  Bowel sounds normal, soft, nontender, no HSM or masses.   MS: extremities normal. No clubbing. No cyanosis.  SKIN: no rash on limited exam, healed right thigh surgical incisions  NEURO: Mentation intact, speech normal, normal strength and tone, normal gait and stance  PSYCH: mentation appears normal. and affect normal/bright  Results:  - My interpretation of the images relevant for this visit includes: chest images reviewed 2022, 2022, Aug 2022, newly noted bilateral patchy ground glass opacity and consolidations in the bases  - My interpretation of the PFT's relevant for this visit includes: None   - lung biopsy 22 lung and bronchial mucosa with chronic inflammation and scattered fibroblastic foci plugging airspaces.  There is no " malignancy.        Assessment and plan: Melva is a 73 yo female with melanoma, pulmonary embolism, diabetes, unexplained lung abnormalities and hypoxic respiratory failure  Hypoxic respiratory failure - multifactorial with recent pulmonary embolism. Currently on 2 lpm    Oxygen Rx goes through [facility], currently Sept expiration.   Unexplained lung imaging abnormalities - pathology is consistent with organizing pneumonia    differential diagnosis includes immunotherapy related pneumonitis, cryptogenic organizing pneumonia, infection   Bronchoscopy tomorrow for bronchoalveolar lavage to evaluate for infection   Anticipate starting steroids after bronchoscopy 45 mg daily (0.75mg/kg) and prolonged taper   PFT as soon as possible   Plan for repeat imaging in 4-6 weeks   Check CRP tomorrow    Pulmonary embolism - acute, related to malignancy? Vs inactivity   She is on lovenox BID for treatment    RTC 4-6 weeks

## 2022-08-10 NOTE — PATIENT INSTRUCTIONS
Tomorrow morning (day of bronchoscopy) please skip your lovenox injection (blood thinner) you should be able to resume it the following evening after the bronchoscopy.    Please do a home covid test tonight or tomorrow morning.     Arrive to the hospital at 7:45AM - nothing to eat or drink after midnight. Ok to take atenolol with sips of water.     Schedule pulmonary function tests     Cancel CT for now, we can repeat in the future

## 2022-08-10 NOTE — LETTER
"    8/10/2022         RE: Gricelda Sabillon  [address]  [facility] [address]        Dear Colleague,    Thank you for referring your patient, Gricelda Sabillon, to the [facility]. Please see a copy of my visit note below.    [facility] Initial Visit    Reason for Visit  Gricelda Sabillon is a 72 year old female who is referred by Rosibel Florez and Melissa for abnormal chest imaging  Pulmonary HPI    - Earlier this year she noted a lump on her thigh, it was indeterminate and removed, found to be melanoma. During the workup, abnormal imaging found in the uterus so she had hysterectomy. Last week she had a percutaneous lung biopsy and the week before she was diagnosed with pulmonary embolism  - she was coughing before the hospitalization, improved since treatment for PE, breathing is improved, can \"almost\" take a deep breath  - she is taking lovenox injections  - unclear if the PE was from malignancy vs postoperative inactivity      Timeline of medical care this year:   - Jan 2022 leg mass resection; March 2022 extended surgical resection for margins   Jan 2022 CT showing lung nodule, not PET FDG avid  May port placed - April 29 first immunotherapy Keytruda, every 3 weeks May 20, Neha 10, July 5 last treatment  - June 2022 hysterectomy, lymph nodes negative for malignancy, low grade endometrial cancer  After the hysterectomy, she had some issues with nausea, poor appetite. Thought it might be semaglutide so it was stopped. No other new medications.  Routine visit on 7/25/22 with Dr Torres showed hypoxemia, she went to ED and noted to have a PE. She didn't feel that bad but became short of breath at the clinic - discharged with 4 lpm supplemental oxygen, she is on 2 lpm now, usually 92-93%  She continues to experience lightheadedness, nausea, chills every morning. No appetite, nothing tastes good.   - Aug 2022 lung biopsy percutaneous   - history of " "1-2 episodes of bronchitis annually, family history of asthma. Has used albuterol in the past for these episodes  - she has been hanging about the house, no camping or outdoor activities, no pets      ROS Pulmonary  Dyspnea: Yes, Cough: Yes, Chest pain: No, Wheezing: No, Sputum Production: No, Hemoptysis: No  A complete ROS was otherwise negative except as noted in the HPI.  The patient was seen and examined by Roselia Sosa MD   Current Outpatient Medications   Medication     acetaminophen (TYLENOL) 325 MG tablet     albuterol (PROAIR HFA/PROVENTIL HFA/VENTOLIN HFA) 108 (90 Base) MCG/ACT inhaler     atenolol (TENORMIN) 50 MG tablet     atorvastatin (LIPITOR) 20 MG tablet     azelastine (ASTELIN) 0.1 % nasal spray     azelastine (OPTIVAR) 0.05 % SOLN     cetirizine (ZYRTEC) 10 MG tablet     CONTOUR NEXT TEST test strip     enoxaparin ANTICOAGULANT (LOVENOX) 80 MG/0.8ML syringe     fish oil-omega-3 fatty acids 1000 MG capsule     latanoprost (XALATAN) 0.005 % ophthalmic solution     lidocaine-prilocaine (EMLA) 2.5-2.5 % external cream     metFORMIN (GLUCOPHAGE XR) 500 MG 24 hr tablet     ondansetron (ZOFRAN) 8 MG tablet     order for DME     senna-docusate (SENOKOT-S/PERICOLACE) 8.6-50 MG tablet     timolol maleate (TIMOPTIC) 0.5 % ophthalmic solution     triamcinolone (KENALOG) 0.5 % external ointment     No current facility-administered medications for this visit.     Allergies   Allergen Reactions     Bromfenac Visual Disturbance      xibrom  Causes sever eye pain     Codeine      \"NAUSE,HA AND DIZZINESS\"     Codeine Nausea     Other reaction(s): Dizziness, Headache     Social History     Socioeconomic History     Marital status:      Spouse name: Allen     Number of children: 3     Years of education: 15     Highest education level: Not on file   Occupational History     Not on file   Tobacco Use     Smoking status: Never Smoker     Smokeless tobacco: Never Used   Vaping Use     Vaping Use: Never used "   Substance and Sexual Activity     Alcohol use: No     Alcohol/week: 0.0 standard drinks     Drug use: No     Sexual activity: Not Currently     Partners: Male   Other Topics Concern     Parent/sibling w/ CABG, MI or angioplasty before 65F 55M? Not Asked   Social History Narrative     Not on file     Social Determinants of Health     Financial Resource Strain: Not on file   Food Insecurity: Not on file   Transportation Needs: Not on file   Physical Activity: Not on file   Stress: Not on file   Social Connections: Not on file   Intimate Partner Violence: Not At Risk     Fear of Current or Ex-Partner: No     Emotionally Abused: No     Physically Abused: No     Sexually Abused: No   Housing Stability: Not on file     Past Medical History:   Diagnosis Date     Asthma, mild intermittent      Cataract      Endometrial cancer (H)      HTN, goal below 140/90      Hyperlipidemia LDL goal < 100      Macular hole of left eye      Melanoma (H)     RIGHT KNEE     Sleep apnea     uses c pap     Type 2 diabetes, HbA1C goal < 8% (H)      Past Surgical History:   Procedure Laterality Date     ARTHROPLASTY HIP Right 9/25/2017    Procedure: ARTHROPLASTY HIP;  Right total hip arthroplasty  ;  Surgeon: Ayaan Pena MD;  [facility]     ARTHROPLASTY KNEE  7/12/2013    Procedure: ARTHROPLASTY KNEE;  right total knee arthroplasty ;  Surgeon: Chaparro Wesley MD;  [facility]: [facility]     ARTHROSCOPY KNEE Right 1/21/2015    Procedure: ARTHROSCOPY KNEE;  Surgeon: Ayaan Pena MD;  [facility]     C INCISION OF HYMEN       CATARACT IOL, RT/LT       COLONOSCOPY  2008     COLONOSCOPY N/A 4/18/2019    Procedure: Colonoscopy with polypectomy;  Surgeon: Shaun Guerrero MD;  [facility]     CYSTOSCOPY N/A 6/23/2022    Procedure: Cystoscopy;  Surgeon: Eli Guidry MD;  [facility]     ENDOSCOPIC RETROGRADE CHOLANGIOPANCREATOGRAM N/A 2/6/2019    Procedure: COMBINED ENDOSCOPIC RETROGRADE  CHOLANGIOPANCREATOGRAPHY, BILIARY SPINCTEROTOMY AND DILATION, PLACE BILE DUCT STENT;  Surgeon: Guru Andie Gonzalez MD;  [facility]     ENDOSCOPIC RETROGRADE CHOLANGIOPANCREATOGRAM N/A 2/28/2019    Procedure: Endoscopic Retrograde Cholangiopancreatogram, Bile duct stent removal and placement;  Surgeon: Shaun Guerrero MD;  [facility]     ENDOSCOPIC RETROGRADE CHOLANGIOPANCREATOGRAM N/A 4/18/2019    Procedure: COMBINED ENDOSCOPIC RETROGRADE CHOLANGIOPANCREATOGRAPHY, Bile duct stent exchange and Polypectomy;  Surgeon: Shaun Guerrero MD;  [facility]     ENDOSCOPIC RETROGRADE CHOLANGIOPANCREATOGRAM N/A 10/18/2019    Procedure: Endoscopic Retrograde Cholangiopancreatogram;  Surgeon: Shaun Guerrero MD;  [facility]     ENDOSCOPIC RETROGRADE CHOLANGIOPANCREATOGRAM N/A 3/24/2022    Procedure: ENDOSCOPIC RETROGRADE CHOLANGIOPANCREATOGRAPHY;  Surgeon: Shaun Guerrero MD;  Location:  OR     ENDOSCOPIC RETROGRADE CHOLANGIOPANCREATOGRAM WITH SPYGLASS N/A 7/26/2019    Procedure: Endoscopic Retrograde Cholangiopancreatogram With Spyglas,l Radiofrequency Ablation, Stent Exchangeand Duodenal Biopsy x2;  Surgeon: Shaun Guerrero MD;  [facility]     ENDOSCOPIC RETROGRADE CHOLANGIOPANCREATOGRAM WITH SPYGLASS N/A 9/10/2020    Procedure: ENDOSCOPIC RETROGRADE CHOLANGIOPANCREATOGRAPHY, WITH DIRECT DUCT VISUALIZATION, USING PANCREATICOBILIARY FIBEROPTIC PROBE;  Surgeon: Shaun Guerrero MD;  [facility]     ENDOSCOPIC RETROGRADE CHOLANGIOPANCREATOGRAM WITH SPYGLASS N/A 3/22/2021    Procedure: ENDOSCOPIC RETROGRADE CHOLANGIOPANCREATOGRAPHY, WITH DIRECT DUCT VISUALIZATION, USING PANCREATICOBILIARY FIBEROPTIC PROBE;  Surgeon: Shaun Guerrero MD;  [facility]     ESOPHAGOSCOPY, GASTROSCOPY, DUODENOSCOPY (EGD) WITH RADIO FREQUENCY ABLATION, COMBINED N/A 9/10/2020    Procedure: possible repeat ESOPHAGOGASTRODUODENOSCOPY, WITH RADIOFREQUENCY ABLATION and endoscopic mucosal resection;  Surgeon: Cesar  MD Shaun;  [facility]     ESOPHAGOSCOPY, GASTROSCOPY, DUODENOSCOPY (EGD), COMBINED N/A 4/18/2019    Procedure: upper endoscopy with polypectomy;  Surgeon: Shaun Guerrero MD;  [facility]     ESOPHAGOSCOPY, GASTROSCOPY, DUODENOSCOPY (EGD), COMBINED N/A 10/18/2019    Procedure: Upper Endoscopy and ERCP with stent removal, stone removal and biopsy;  Surgeon: Shaun Guerrero MD;  [facility]     ESOPHAGOSCOPY, GASTROSCOPY, DUODENOSCOPY (EGD), COMBINED N/A 3/24/2022    Procedure: ESOPHAGOGASTRODUODENOSCOPY (EGD), Duodenal  polyp removal;  Surgeon: Shaun Guerrero MD;  [facility]     ESOPHAGOSCOPY, GASTROSCOPY, DUODENOSCOPY (EGD), RESECT MUCOSA, COMBINED N/A 2/28/2019    Procedure: Upper Endoscopy, Endoscopic Ultrasound, Endoscopic Mucosal Resection,  Ampullectomy, polypectomy.;  Surgeon: Shaun Guerrero MD;  [facility]     ESOPHAGOSCOPY, GASTROSCOPY, DUODENOSCOPY (EGD), RESECT MUCOSA, COMBINED N/A 3/22/2021    Procedure: ESOPHAGOGASTRODUODENOSCOPY (EGD) with  endoscopic mucosal resection/ polypectomy;  Surgeon: Shaun Guerrero MD;  [facility]     EXCISE LESION LOWER EXTREMITY Right 3/28/2022    Procedure: Wide local excision of right knee melanoma;  Surgeon: Chevy Allison MD;  Location: [facility] OR     EXCISE MASS LOWER EXTREMITY Right 1/13/2022    Procedure: excision of right thigh mass;  Surgeon: Salvador Kelly MD;  [facility]     EYE SURGERY      macular hole repaired left eye     HC KNEE SCOPE, DIAGNOSTIC      - both knees     HEAD & NECK SURGERY      wisdom teeth     IR CHEST PORT PLACEMENT > 5 YRS OF AGE  5/2/2022     LAPAROSCOPIC HYSTERECTOMY TOTAL, BILATERAL SALPINGO-OOPHORECTOMY, NODE DISSECTION, COMBINED Bilateral 6/23/2022    Procedure: Total Laparoscopic Hyserectomy, Bilateral Salpibgo-oophorectomy, Bilateral [place] Lymph Node Dissection;  Surgeon: Eli Guidry MD;  [facility]     Family History   Problem Relation Age of Onset     Hypertension Mother         " diabetes,hypoythryoidism, stroke     Diabetes Mother      Breast Cancer Mother      Heart Disease Father         , cancer lip     Uterine Cancer Sister      Connective Tissue Disorder Sister         fibromyalgia     Diabetes Sister      Hypertension Brother      Cerebrovascular Disease Maternal Grandmother         , diabetes     Cerebrovascular Disease Maternal Grandfather              Cancer Paternal Grandmother              Heart Disease Paternal Grandfather              Cancer Paternal Aunt         ovarian     Breast Cancer Niece      Exam:   /59 (Cuff Size: Adult Regular)   Pulse 86   Temp 97  F (36.1  C) (Tympanic)   Resp 18   Ht 1.702 m (5' 7\")   Wt 83.5 kg (184 lb)   LMP 2002 (Exact Date)   SpO2 93%   BMI 28.82 kg/m    GENERAL APPEARANCE: Well developed, well nourished, alert, and in no apparent distress.  EYES: PERRL, EOMI  HENT: Nasal mucosa with no edema and no hyperemia. No nasal polyps.  EARS: Canals clear, TMs normal  MOUTH: Oral mucosa is moist, without any lesions, no tonsillar enlargement, no oropharyngeal exudate.  NECK: supple, no masses, no thyromegaly.  LYMPHATICS: No significant axillary, cervical, or supraclavicular nodes.  RESP: normal percussion, good air flow throughout.  Bilateral inspiratory crackles. No rhonchi. No wheezes.  CV: Normal S1, S2, regular rhythm, normal rate. No murmur.  No rub. No gallop. No LE edema.   ABDOMEN:  Bowel sounds normal, soft, nontender, no HSM or masses.   MS: extremities normal. No clubbing. No cyanosis.  SKIN: no rash on limited exam, healed right thigh surgical incisions  NEURO: Mentation intact, speech normal, normal strength and tone, normal gait and stance  PSYCH: mentation appears normal. and affect normal/bright  Results:  - My interpretation of the images relevant for this visit includes: chest images reviewed 2022, 2022, Aug 2022, newly noted bilateral patchy ground glass opacity " and consolidations in the bases  - My interpretation of the PFT's relevant for this visit includes: None   - lung biopsy 8/2/22 lung and bronchial mucosa with chronic inflammation and scattered fibroblastic foci plugging airspaces.  There is no malignancy.        Assessment and plan: Melva is a 71 yo female with melanoma, pulmonary embolism, diabetes, unexplained lung abnormalities and hypoxic respiratory failure  Hypoxic respiratory failure - multifactorial with recent pulmonary embolism. Currently on 2 lpm    Oxygen Rx goes through [facility], currently Sept expiration.   Unexplained lung imaging abnormalities - pathology is consistent with organizing pneumonia    differential diagnosis includes immunotherapy related pneumonitis, cryptogenic organizing pneumonia, infection   Bronchoscopy tomorrow for bronchoalveolar lavage to evaluate for infection   Anticipate starting steroids after bronchoscopy 45 mg daily (0.75mg/kg) and prolonged taper   PFT as soon as possible   Plan for repeat imaging in 4-6 weeks   Check CRP tomorrow    Pulmonary embolism - acute, related to malignancy? Vs inactivity   She is on lovenox BID for treatment    RTC 4-6 weeks        Again, thank you for allowing me to participate in the care of your patient.        Sincerely,        Roselia Sosa MD

## 2022-08-11 ENCOUNTER — HOSPITAL ENCOUNTER (OUTPATIENT)
Facility: CLINIC | Age: 72
Discharge: HOME | End: 2022-08-11
Attending: INTERNAL MEDICINE | Admitting: INTERNAL MEDICINE
Payer: COMMERCIAL

## 2022-08-11 ENCOUNTER — TELEPHONE (OUTPATIENT)
Dept: INTERNAL MEDICINE | Facility: CLINIC | Age: 72
End: 2022-08-11

## 2022-08-11 VITALS
OXYGEN SATURATION: 93 % | RESPIRATION RATE: 18 BRPM | HEART RATE: 74 BPM | DIASTOLIC BLOOD PRESSURE: 70 MMHG | SYSTOLIC BLOOD PRESSURE: 114 MMHG | TEMPERATURE: 98.3 F

## 2022-08-11 DIAGNOSIS — R91.8 MULTIPLE LUNG NODULES ON CT: ICD-10-CM

## 2022-08-11 DIAGNOSIS — J84.9 ILD (INTERSTITIAL LUNG DISEASE) (H): ICD-10-CM

## 2022-08-11 LAB
BRONCHOSCOPY: NORMAL
C PNEUM DNA SPEC QL NAA+PROBE: NOT DETECTED
CD19 CELLS NFR BRONCH: 1 %
CD3 CELLS NFR BRONCH: 97 %
CD3+CD4+ CELLS NFR BRONCH: 59 %
CD3+CD4+ CELLS/CD3+CD8+ CLL BRONCH: 1.51 %
CD3+CD8+ CELLS NFR BRONCH: 39 %
CD3-CD16+CD56+ CELLS NFR SPEC: 2 %
CRP SERPL-MCNC: 58.99 MG/L
FLUAV H1 2009 PAND RNA SPEC QL NAA+PROBE: NOT DETECTED
FLUAV H1 RNA SPEC QL NAA+PROBE: NOT DETECTED
FLUAV H3 RNA SPEC QL NAA+PROBE: NOT DETECTED
FLUAV RNA SPEC QL NAA+PROBE: NOT DETECTED
FLUBV RNA SPEC QL NAA+PROBE: NOT DETECTED
GRAM STAIN RESULT: NORMAL
GRAM STAIN RESULT: NORMAL
HADV DNA SPEC QL NAA+PROBE: NOT DETECTED
HCOV PNL SPEC NAA+PROBE: NOT DETECTED
HMPV RNA SPEC QL NAA+PROBE: NOT DETECTED
HPIV1 RNA SPEC QL NAA+PROBE: NOT DETECTED
HPIV2 RNA SPEC QL NAA+PROBE: NOT DETECTED
HPIV3 RNA SPEC QL NAA+PROBE: NOT DETECTED
HPIV4 RNA SPEC QL NAA+PROBE: NOT DETECTED
LYMPHOCYTE SUBSET BRONCHIAL EXTERNAL COMMENT: NORMAL
M PNEUMO DNA SPEC QL NAA+PROBE: NOT DETECTED
RSV RNA SPEC QL NAA+PROBE: NOT DETECTED
RSV RNA SPEC QL NAA+PROBE: NOT DETECTED
RV+EV RNA SPEC QL NAA+PROBE: NOT DETECTED

## 2022-08-11 PROCEDURE — 250N000011 HC RX IP 250 OP 636: Performed by: INTERNAL MEDICINE

## 2022-08-11 PROCEDURE — 89051 BODY FLUID CELL COUNT: CPT | Performed by: INTERNAL MEDICINE

## 2022-08-11 PROCEDURE — 87486 CHLMYD PNEUM DNA AMP PROBE: CPT | Performed by: INTERNAL MEDICINE

## 2022-08-11 PROCEDURE — 87116 MYCOBACTERIA CULTURE: CPT | Performed by: INTERNAL MEDICINE

## 2022-08-11 PROCEDURE — 36415 COLL VENOUS BLD VENIPUNCTURE: CPT | Performed by: INTERNAL MEDICINE

## 2022-08-11 PROCEDURE — 999N000127 HC STATISTIC PERIPHERAL IV START W US GUIDANCE

## 2022-08-11 PROCEDURE — 87633 RESP VIRUS 12-25 TARGETS: CPT | Performed by: INTERNAL MEDICINE

## 2022-08-11 PROCEDURE — 87101 SKIN FUNGI CULTURE: CPT | Performed by: INTERNAL MEDICINE

## 2022-08-11 PROCEDURE — G0500 MOD SEDAT ENDO SERVICE >5YRS: HCPCS | Performed by: INTERNAL MEDICINE

## 2022-08-11 PROCEDURE — 87305 ASPERGILLUS AG IA: CPT | Performed by: INTERNAL MEDICINE

## 2022-08-11 PROCEDURE — 86140 C-REACTIVE PROTEIN: CPT | Performed by: INTERNAL MEDICINE

## 2022-08-11 PROCEDURE — 88312 SPECIAL STAINS GROUP 1: CPT | Mod: TC | Performed by: INTERNAL MEDICINE

## 2022-08-11 PROCEDURE — 250N000009 HC RX 250: Performed by: INTERNAL MEDICINE

## 2022-08-11 PROCEDURE — 87206 SMEAR FLUORESCENT/ACID STAI: CPT | Performed by: INTERNAL MEDICINE

## 2022-08-11 PROCEDURE — 31624 DX BRONCHOSCOPE/LAVAGE: CPT | Performed by: INTERNAL MEDICINE

## 2022-08-11 PROCEDURE — 86356 MONONUCLEAR CELL ANTIGEN: CPT | Performed by: INTERNAL MEDICINE

## 2022-08-11 PROCEDURE — 87070 CULTURE OTHR SPECIMN AEROBIC: CPT | Performed by: INTERNAL MEDICINE

## 2022-08-11 PROCEDURE — 87205 SMEAR GRAM STAIN: CPT | Performed by: INTERNAL MEDICINE

## 2022-08-11 RX ORDER — MAGNESIUM HYDROXIDE 1200 MG/15ML
LIQUID ORAL PRN
Status: COMPLETED | OUTPATIENT
Start: 2022-08-11 | End: 2022-08-11

## 2022-08-11 RX ORDER — FENTANYL CITRATE 0.05 MG/ML
25 INJECTION, SOLUTION INTRAMUSCULAR; INTRAVENOUS EVERY 5 MIN PRN
Status: DISCONTINUED | OUTPATIENT
Start: 2022-08-11 | End: 2022-08-12 | Stop reason: HOSPADM

## 2022-08-11 RX ORDER — FENTANYL CITRATE 0.05 MG/ML
50 INJECTION, SOLUTION INTRAMUSCULAR; INTRAVENOUS
Status: DISCONTINUED | OUTPATIENT
Start: 2022-08-11 | End: 2022-08-12 | Stop reason: HOSPADM

## 2022-08-11 RX ORDER — ONDANSETRON 2 MG/ML
4 INJECTION INTRAMUSCULAR; INTRAVENOUS EVERY 6 HOURS PRN
Status: DISCONTINUED | OUTPATIENT
Start: 2022-08-11 | End: 2022-08-15 | Stop reason: HOSPADM

## 2022-08-11 RX ORDER — NALOXONE HYDROCHLORIDE 0.4 MG/ML
0.4 INJECTION, SOLUTION INTRAMUSCULAR; INTRAVENOUS; SUBCUTANEOUS
Status: DISCONTINUED | OUTPATIENT
Start: 2022-08-11 | End: 2022-08-15 | Stop reason: HOSPADM

## 2022-08-11 RX ORDER — ALBUTEROL SULFATE 0.83 MG/ML
SOLUTION RESPIRATORY (INHALATION) PRN
Status: COMPLETED | OUTPATIENT
Start: 2022-08-11 | End: 2022-08-11

## 2022-08-11 RX ORDER — LIDOCAINE HYDROCHLORIDE 20 MG/ML
INJECTION, SOLUTION INFILTRATION; PERINEURAL PRN
Status: COMPLETED | OUTPATIENT
Start: 2022-08-11 | End: 2022-08-11

## 2022-08-11 RX ORDER — ONDANSETRON 4 MG/1
4 TABLET, ORALLY DISINTEGRATING ORAL EVERY 6 HOURS PRN
Status: DISCONTINUED | OUTPATIENT
Start: 2022-08-11 | End: 2022-08-15 | Stop reason: HOSPADM

## 2022-08-11 RX ORDER — LIDOCAINE 40 MG/G
CREAM TOPICAL
Status: DISCONTINUED | OUTPATIENT
Start: 2022-08-11 | End: 2022-08-15 | Stop reason: HOSPADM

## 2022-08-11 RX ORDER — FLUMAZENIL 0.1 MG/ML
0.2 INJECTION, SOLUTION INTRAVENOUS
Status: DISCONTINUED | OUTPATIENT
Start: 2022-08-11 | End: 2022-08-11 | Stop reason: HOSPADM

## 2022-08-11 RX ORDER — NALOXONE HYDROCHLORIDE 0.4 MG/ML
0.2 INJECTION, SOLUTION INTRAMUSCULAR; INTRAVENOUS; SUBCUTANEOUS
Status: DISCONTINUED | OUTPATIENT
Start: 2022-08-11 | End: 2022-08-15 | Stop reason: HOSPADM

## 2022-08-11 RX ORDER — LIDOCAINE HYDROCHLORIDE 40 MG/ML
INJECTION, SOLUTION RETROBULBAR PRN
Status: COMPLETED | OUTPATIENT
Start: 2022-08-11 | End: 2022-08-11

## 2022-08-11 RX ORDER — LIDOCAINE HYDROCHLORIDE 10 MG/ML
INJECTION, SOLUTION INFILTRATION; PERINEURAL PRN
Status: COMPLETED | OUTPATIENT
Start: 2022-08-11 | End: 2022-08-11

## 2022-08-11 RX ORDER — FENTANYL CITRATE 0.05 MG/ML
INJECTION, SOLUTION INTRAMUSCULAR; INTRAVENOUS PRN
Status: COMPLETED | OUTPATIENT
Start: 2022-08-11 | End: 2022-08-11

## 2022-08-11 RX ADMIN — LIDOCAINE HYDROCHLORIDE 6 ML: 10 INJECTION, SOLUTION INFILTRATION; PERINEURAL at 09:14

## 2022-08-11 RX ADMIN — FENTANYL CITRATE 50 MCG: 0.05 INJECTION, SOLUTION INTRAMUSCULAR; INTRAVENOUS at 09:09

## 2022-08-11 RX ADMIN — HYDROCODONE BITARTRATE AND ACETAMINOPHEN 150 ML: 500; 5 TABLET ORAL at 09:18

## 2022-08-11 RX ADMIN — MIDAZOLAM 1 MG: 1 INJECTION INTRAMUSCULAR; INTRAVENOUS at 09:18

## 2022-08-11 RX ADMIN — LIDOCAINE HYDROCHLORIDE 3 ML: 40 INJECTION, SOLUTION RETROBULBAR; TOPICAL at 08:55

## 2022-08-11 RX ADMIN — ALBUTEROL SULFATE 2.5 MG: 2.5 SOLUTION RESPIRATORY (INHALATION) at 08:55

## 2022-08-11 RX ADMIN — LIDOCAINE HYDROCHLORIDE 10 ML: 20 INJECTION, SOLUTION INFILTRATION; PERINEURAL at 09:11

## 2022-08-11 RX ADMIN — FENTANYL CITRATE 25 MCG: 0.05 INJECTION, SOLUTION INTRAMUSCULAR; INTRAVENOUS at 09:25

## 2022-08-11 RX ADMIN — MIDAZOLAM 1 MG: 1 INJECTION INTRAMUSCULAR; INTRAVENOUS at 09:10

## 2022-08-11 RX ADMIN — LIDOCAINE HYDROCHLORIDE 9 ML: 40 INJECTION, SOLUTION RETROBULBAR; TOPICAL at 09:14

## 2022-08-11 RX ADMIN — FENTANYL CITRATE 25 MCG: 0.05 INJECTION, SOLUTION INTRAMUSCULAR; INTRAVENOUS at 09:16

## 2022-08-11 ASSESSMENT — ACTIVITIES OF DAILY LIVING (ADL)
ADLS_ACUITY_SCORE: 35

## 2022-08-11 NOTE — TELEPHONE ENCOUNTER
08/04-10/02 - HOME HEALTH CERTIFICATION; received via fax. Orders/Forms in your mailbox to be signed.

## 2022-08-11 NOTE — OR NURSING
Pt felt light headed when she got up to discharge.  Had her lie down for 20 minutes, gave water and crackers  She felt better and blood pressure was good. Discharged via wheelchair

## 2022-08-12 ENCOUNTER — OFFICE VISIT (OUTPATIENT)
Dept: INTERNAL MEDICINE | Facility: CLINIC | Age: 72
End: 2022-08-12
Payer: COMMERCIAL

## 2022-08-12 ENCOUNTER — PATIENT OUTREACH (OUTPATIENT)
Dept: ONCOLOGY | Facility: CLINIC | Age: 72
End: 2022-08-12

## 2022-08-12 ENCOUNTER — E-VISIT (OUTPATIENT)
Dept: INTERNAL MEDICINE | Facility: CLINIC | Age: 72
End: 2022-08-12

## 2022-08-12 VITALS
OXYGEN SATURATION: 93 % | TEMPERATURE: 98.3 F | HEART RATE: 74 BPM | DIASTOLIC BLOOD PRESSURE: 70 MMHG | HEIGHT: 68 IN | RESPIRATION RATE: 18 BRPM | SYSTOLIC BLOOD PRESSURE: 114 MMHG | WEIGHT: 184 LBS | BODY MASS INDEX: 27.89 KG/M2

## 2022-08-12 DIAGNOSIS — Z79.4 TYPE 2 DIABETES MELLITUS WITH DIABETIC AUTONOMIC NEUROPATHY, WITH LONG-TERM CURRENT USE OF INSULIN (H): ICD-10-CM

## 2022-08-12 DIAGNOSIS — C43.9 METASTATIC MALIGNANT MELANOMA (H): ICD-10-CM

## 2022-08-12 DIAGNOSIS — E11.43 TYPE 2 DIABETES MELLITUS WITH DIABETIC AUTONOMIC NEUROPATHY, WITH LONG-TERM CURRENT USE OF INSULIN (H): ICD-10-CM

## 2022-08-12 DIAGNOSIS — E11.65 TYPE 2 DIABETES MELLITUS WITH HYPERGLYCEMIA, WITHOUT LONG-TERM CURRENT USE OF INSULIN (H): Primary | ICD-10-CM

## 2022-08-12 DIAGNOSIS — I26.99 BILATERAL PULMONARY EMBOLISM (H): ICD-10-CM

## 2022-08-12 DIAGNOSIS — Z09 HOSPITAL DISCHARGE FOLLOW-UP: Primary | ICD-10-CM

## 2022-08-12 DIAGNOSIS — R19.7 DIARRHEA, UNSPECIFIED TYPE: ICD-10-CM

## 2022-08-12 DIAGNOSIS — J98.4 PNEUMONITIS: ICD-10-CM

## 2022-08-12 DIAGNOSIS — J96.01 ACUTE RESPIRATORY FAILURE WITH HYPOXIA (H): ICD-10-CM

## 2022-08-12 DIAGNOSIS — E11.65 TYPE 2 DIABETES MELLITUS WITH HYPERGLYCEMIA, WITHOUT LONG-TERM CURRENT USE OF INSULIN (H): ICD-10-CM

## 2022-08-12 DIAGNOSIS — E78.5 HYPERLIPIDEMIA WITH TARGET LDL LESS THAN 100: Chronic | ICD-10-CM

## 2022-08-12 LAB
% LINING CELLS, BODY FLUID: 30 %
APPEARANCE FLD: CLEAR
CELL COUNT BODY FLUID SOURCE: NORMAL
COLOR FLD: COLORLESS
EOSINOPHIL NFR FLD MANUAL: 2 %
GALACTOMANNAN AG BAL QL: NEGATIVE
GALACTOMANNAN AG SPEC IA-ACNC: 0.06
LYMPHOCYTES NFR FLD MANUAL: 25 %
MONOS+MACROS NFR FLD MANUAL: 28 %
NEUTS BAND NFR FLD MANUAL: 15 %
RBC # FLD: 169 /UL
WBC # FLD AUTO: 228 /UL

## 2022-08-12 PROCEDURE — 99214 OFFICE O/P EST MOD 30 MIN: CPT | Performed by: INTERNAL MEDICINE

## 2022-08-12 PROCEDURE — 87493 C DIFF AMPLIFIED PROBE: CPT | Performed by: INTERNAL MEDICINE

## 2022-08-12 PROCEDURE — 99207 PR NON-BILLABLE SERV PER CHARTING: CPT | Performed by: INTERNAL MEDICINE

## 2022-08-12 RX ORDER — FLASH GLUCOSE SENSOR
1 KIT MISCELLANEOUS
Qty: 2 EACH | Refills: 5 | Status: SHIPPED | OUTPATIENT
Start: 2022-08-12 | End: 2023-01-30

## 2022-08-12 RX ORDER — FLASH GLUCOSE SCANNING READER
1 EACH MISCELLANEOUS ONCE
Qty: 1 EACH | Refills: 0 | Status: SHIPPED | OUTPATIENT
Start: 2022-08-12 | End: 2022-08-12

## 2022-08-12 ASSESSMENT — ACTIVITIES OF DAILY LIVING (ADL)
ADLS_ACUITY_SCORE: 35

## 2022-08-12 NOTE — PATIENT INSTRUCTIONS
Plan:  1. Stop Metformin  2. Resume Glipizide  3. Lantus insulin - start 6 units daily at bed time  4. Goal for blood sugar less than 130 in the morning  5. Stool test -- [address]  6. Referral to  Shellie Bradley    7. Follow Sept 1

## 2022-08-12 NOTE — PROGRESS NOTES
Referral placed per Dr. Davidson.    Regis Mandujano, PharmD, [facility]  Medication Therapy Management Pharmacist  Pager: [phone]

## 2022-08-12 NOTE — PROGRESS NOTES
Patient's instructions / PLAN:                                                        Plan:  1. Stop Metformin  2. Resume Glipizide  3. Lantus insulin - start 6 units daily at bed time  4. Goal for blood sugar less than 130 in the morning  5. Stool test -- [address]  6. Referral to  Arlet Harding, Pharm D    7. Follow Sept 1       ASSESSMENT & PLAN:                                                        (Z09) Hospital discharge follow-up  (primary encounter diagnosis)  (J96.01) Acute respiratory failure with hypoxia (H)  (I26.99) Bilateral pulmonary embolism (H)  (J18.9) Pneumonitis  Comment: She reports feeling well, still short of breath.   Plan: Continue same meds, same doses for now.  Follow-up on the biopsy results    (E11.65) Type 2 diabetes mellitus with hyperglycemia, without long-term current use of insulin (H)  (E11.43,  Z79.4) Type 2 diabetes mellitus with diabetic autonomic neuropathy, with long-term current use of insulin (H)   Comment: Not controlled   Plan: as above     (R19.7) Diarrhea, unspecified type  Comment: Likely secondary to metformin  Plan: Enteric Bacteria and Virus Panel by MATILDA Stool,         Clostridium difficile Toxin B PCR            (C79.9) Metastatic malignant melanoma (H)  -- R thigh   Comment:   Plan: Follow-up with oncology       Chief Complaint:                                                        Hospital follow up    SUBJECTIVE:                                                    History of present illness     Most of the BS are 170s sometimes 200    She will be started soon on Prednisone     DcDx: #Acute hypoxemic respiratory failure secondary to pulmonary embolism in the setting of malignancy  #Metastatic melanoma  #Lung mass - biopsy pending  #History of ureteric cancer requiring hysterectomy  #Diabetes mellitus type 2  #Hypertension  #Hyperlipidemia  #Generalized Weakness    ROS:                                                      ROS: negative for fever, chills, cough,  "wheezes, chest pain, shortness of breath, vomiting, abdominal pain, leg swelling       OBJECTIVE:                                                    Physical Exam :   Blood pressure 114/70, pulse 74, temperature 98.3  F (36.8  C), temperature source Tympanic, resp. rate 18, height 1.727 m (5' 8\"), weight 83.5 kg (184 lb), last menstrual period 12/13/2002, SpO2 93 %, not currently breastfeeding.     Last menstrual period 12/13/2002, not currently breastfeeding.   NAD, appears comfortable, but she looks tired  Skin: no rashes   Neck: supple, no JVD, No thyroidmegaly. Lymph nodes nonpalpable cervical and supraclavicular.  Chest: clear to auscultation bilaterally, good respiratory effort  Heart: S1 S2, RRR, no mgr appreciated  Abdomen: soft, not tender,   Extremities: no edema  Neurologic: A, Ox3, no focal signs appreciated    PMHx: reviewed  Past Medical History:   Diagnosis Date     Asthma, mild intermittent      Cataract      Endometrial cancer (H)      HTN, goal below 140/90      Hyperlipidemia LDL goal < 100      Macular hole of left eye      Melanoma (H)     RIGHT KNEE     Sleep apnea     uses c pap     Type 2 diabetes, HbA1C goal < 8% (H)       PSHx: reviewed  Past Surgical History:   Procedure Laterality Date     ARTHROPLASTY HIP Right 9/25/2017    Procedure: ARTHROPLASTY HIP;  Right total hip arthroplasty  ;  Surgeon: Ayaan Pena MD;  [facility]     ARTHROPLASTY KNEE  7/12/2013    Procedure: ARTHROPLASTY KNEE;  right total knee arthroplasty ;  Surgeon: Chaparro Wesley MD;  [facility]     ARTHROSCOPY KNEE Right 1/21/2015    Procedure: ARTHROSCOPY KNEE;  Surgeon: Ayaan Pena MD;  [facility]     C INCISION OF HYMEN       CATARACT IOL, RT/LT       COLONOSCOPY  2008     COLONOSCOPY N/A 4/18/2019    Procedure: Colonoscopy with polypectomy;  Surgeon: Shaun Guerrero MD;  [facility]     CYSTOSCOPY N/A 6/23/2022    Procedure: Cystoscopy;  Surgeon: Eli Guidry, " MD;  [facility]     ENDOSCOPIC RETROGRADE CHOLANGIOPANCREATOGRAM N/A 2/6/2019    Procedure: COMBINED ENDOSCOPIC RETROGRADE CHOLANGIOPANCREATOGRAPHY, BILIARY SPINCTEROTOMY AND DILATION, PLACE BILE DUCT STENT;  Surgeon: Guru Andie Gonzalez MD;  [facility]     ENDOSCOPIC RETROGRADE CHOLANGIOPANCREATOGRAM N/A 2/28/2019    Procedure: Endoscopic Retrograde Cholangiopancreatogram, Bile duct stent removal and placement;  Surgeon: Shaun Guerrero MD;  [facility]     ENDOSCOPIC RETROGRADE CHOLANGIOPANCREATOGRAM N/A 4/18/2019    Procedure: COMBINED ENDOSCOPIC RETROGRADE CHOLANGIOPANCREATOGRAPHY, Bile duct stent exchange and Polypectomy;  Surgeon: Shaun Guerrero MD;  [facility]     ENDOSCOPIC RETROGRADE CHOLANGIOPANCREATOGRAM N/A 10/18/2019    Procedure: Endoscopic Retrograde Cholangiopancreatogram;  Surgeon: Shaun Guerrero MD;  [facility]     ENDOSCOPIC RETROGRADE CHOLANGIOPANCREATOGRAM N/A 3/24/2022    Procedure: ENDOSCOPIC RETROGRADE CHOLANGIOPANCREATOGRAPHY;  Surgeon: Shaun Guerrero MD;  Location:  OR     ENDOSCOPIC RETROGRADE CHOLANGIOPANCREATOGRAM WITH SPYGLASS N/A 7/26/2019    Procedure: Endoscopic Retrograde Cholangiopancreatogram With Spyglas,l Radiofrequency Ablation, Stent Exchangeand Duodenal Biopsy x2;  Surgeon: Shaun Guerrero MD;  [facility]     ENDOSCOPIC RETROGRADE CHOLANGIOPANCREATOGRAM WITH SPYGLASS N/A 9/10/2020    Procedure: ENDOSCOPIC RETROGRADE CHOLANGIOPANCREATOGRAPHY, WITH DIRECT DUCT VISUALIZATION, USING PANCREATICOBILIARY FIBEROPTIC PROBE;  Surgeon: Shaun Guerrero MD;  [facility]     ENDOSCOPIC RETROGRADE CHOLANGIOPANCREATOGRAM WITH SPYGLASS N/A 3/22/2021    Procedure: ENDOSCOPIC RETROGRADE CHOLANGIOPANCREATOGRAPHY, WITH DIRECT DUCT VISUALIZATION, USING PANCREATICOBILIARY FIBEROPTIC PROBE;  Surgeon: Shaun Guerrero MD;  [facility]     ESOPHAGOSCOPY, GASTROSCOPY, DUODENOSCOPY (EGD) WITH RADIO FREQUENCY ABLATION, COMBINED N/A 9/10/2020     Procedure: possible repeat ESOPHAGOGASTRODUODENOSCOPY, WITH RADIOFREQUENCY ABLATION and endoscopic mucosal resection;  Surgeon: Shaun Guerrero MD;  [facility]     ESOPHAGOSCOPY, GASTROSCOPY, DUODENOSCOPY (EGD), COMBINED N/A 4/18/2019    Procedure: upper endoscopy with polypectomy;  Surgeon: Shaun Guerrero MD;  [facility]     ESOPHAGOSCOPY, GASTROSCOPY, DUODENOSCOPY (EGD), COMBINED N/A 10/18/2019    Procedure: Upper Endoscopy and ERCP with stent removal, stone removal and biopsy;  Surgeon: Shaun Guerrero MD;  [facility]     ESOPHAGOSCOPY, GASTROSCOPY, DUODENOSCOPY (EGD), COMBINED N/A 3/24/2022    Procedure: ESOPHAGOGASTRODUODENOSCOPY (EGD), Duodenal  polyp removal;  Surgeon: Shaun Guerrero MD;  [facility]     ESOPHAGOSCOPY, GASTROSCOPY, DUODENOSCOPY (EGD), RESECT MUCOSA, COMBINED N/A 2/28/2019    Procedure: Upper Endoscopy, Endoscopic Ultrasound, Endoscopic Mucosal Resection,  Ampullectomy, polypectomy.;  Surgeon: Shaun Guerrero MD;  [facility]     ESOPHAGOSCOPY, GASTROSCOPY, DUODENOSCOPY (EGD), RESECT MUCOSA, COMBINED N/A 3/22/2021    Procedure: ESOPHAGOGASTRODUODENOSCOPY (EGD) with  endoscopic mucosal resection/ polypectomy;  Surgeon: Shaun Guerrero MD;  [facility]     EXCISE LESION LOWER EXTREMITY Right 3/28/2022    Procedure: Wide local excision of right knee melanoma;  Surgeon: Chevy Allisno MD;  Location: [facility] OR     EXCISE MASS LOWER EXTREMITY Right 1/13/2022    Procedure: excision of right thigh mass;  Surgeon: Salvador Kelly MD;  [facility]     EYE SURGERY      macular hole repaired left eye     HC KNEE SCOPE, DIAGNOSTIC      - both knees     HEAD & NECK SURGERY      wisdom teeth     IR CHEST PORT PLACEMENT > 5 YRS OF AGE  5/2/2022     LAPAROSCOPIC HYSTERECTOMY TOTAL, BILATERAL SALPINGO-OOPHORECTOMY, NODE DISSECTION, COMBINED Bilateral 6/23/2022    Procedure: Total Laparoscopic Hyserectomy, Bilateral Salpibgo-oophorectomy, Bilateral [place] Lymph Node Dissection;   Surgeon: Eli Guidry MD;  [facility]        Meds: reviewed  No current outpatient medications on file.       Soc Hx: reviewed  Fam Hx: reviewed      Hospital Follow-up Visit:    Hospital/Nursing Home/IP Rehab Facility: [facility]  Date of Admission: July 25, 2022  Date of Discharge: August 3, 2022  Reason(s) for Admission: Acute respiratory failure    Was your hospitalization related to COVID-19? No   Problems taking medications regularly:  None  Medication changes since discharge: as above   Problems adhering to non-medication therapy:  None    Summary of hospitalization:  [facility] discharge summary reviewed  Diagnostic Tests/Treatments reviewed.  Follow up needed: as above   Other Healthcare Providers Involved in Patient s Care:         Onc, Pulm  Update since discharge: stable.         Post Medication Reconciliation Status:        Plan of care communicated with patient and family       Raisa Torres MD  Internal Medicine

## 2022-08-12 NOTE — PROGRESS NOTES
[facility]: Cancer Care Short Note                                    Discussion with Patient:                                                      Writer called Melva for post-bronchoscopy follow up. Melva is s/p bronchoscopy, with bronchoalveolar lavage on 8/11/22 with Dr. Sosa.     Assessment:                                                      Assessment completed with:: Patient    Constitutional  Fever: Absent or within normal limits    Respiratory  Cough: Mild symptoms OR nonprescription intervention indicated  Dyspnea: Absent or within normal limits    Pain  Patient Reported Pain?: No    Patient Coping  Accepting    Other Patient Concerns  Other Patient Reported Concerns: No    Melva reports she has been coughing a little more since the bronch yesterday but she denies any bloody sputum or shortness of breath. She is not eating and drinking normally but reports this has been going on since before the bronch.     Intervention/Education provided during outreach:                                                       Melva was instructed to use throat lozenges for any throat irritation she may have still from the bronch. She is aware our clinic will be working on her follow up with Dr. Sosa with CT scan in 4-6 weeks. Melva had no further questions or concerns at this time.     Patient to follow up as scheduled at next appt  Confirmed patient has clinic and triage numbers    Winnie Liao, RN, BSN, STEPHAN  RN Care Coordinator  [facility]  Ph: [phone]  F: [phone]

## 2022-08-13 LAB — BACTERIA BRONCH: NORMAL

## 2022-08-13 ASSESSMENT — ACTIVITIES OF DAILY LIVING (ADL)
ADLS_ACUITY_SCORE: 35

## 2022-08-13 NOTE — TELEPHONE ENCOUNTER
Pending Prescriptions:                       Disp   Refills    atorvastatin (LIPITOR) 20 MG tablet [Pharm*90 tab*0        Sig: Take 1 tablet by mouth once daily    Routing refill request to provider for review/approval because:  LDL Cholesterol Calculated   Date Value Ref Range Status   03/03/2021 91 <100 mg/dL Final     Comment:     Desirable:       <100 mg/dl

## 2022-08-14 ENCOUNTER — E-VISIT (OUTPATIENT)
Dept: INTERNAL MEDICINE | Facility: CLINIC | Age: 72
End: 2022-08-14
Payer: COMMERCIAL

## 2022-08-14 DIAGNOSIS — F19.982 DRUG-INDUCED INSOMNIA (H): Primary | ICD-10-CM

## 2022-08-14 PROCEDURE — 99207 PR NON-BILLABLE SERV PER CHARTING: CPT | Performed by: INTERNAL MEDICINE

## 2022-08-14 RX ORDER — ATORVASTATIN CALCIUM 20 MG/1
TABLET, FILM COATED ORAL
Qty: 90 TABLET | Refills: 0 | Status: SHIPPED | OUTPATIENT
Start: 2022-08-14 | End: 2022-11-04

## 2022-08-14 RX ORDER — LORAZEPAM 0.5 MG/1
0.5 TABLET ORAL EVERY 6 HOURS PRN
Qty: 30 TABLET | Refills: 0 | Status: SHIPPED | OUTPATIENT
Start: 2022-08-14 | End: 2022-08-15

## 2022-08-14 ASSESSMENT — ACTIVITIES OF DAILY LIVING (ADL)
ADLS_ACUITY_SCORE: 35

## 2022-08-15 ENCOUNTER — TELEPHONE (OUTPATIENT)
Dept: INTERNAL MEDICINE | Facility: CLINIC | Age: 72
End: 2022-08-15

## 2022-08-15 ENCOUNTER — E-VISIT (OUTPATIENT)
Dept: INTERNAL MEDICINE | Facility: CLINIC | Age: 72
End: 2022-08-15
Payer: COMMERCIAL

## 2022-08-15 DIAGNOSIS — F19.982 DRUG-INDUCED INSOMNIA (H): ICD-10-CM

## 2022-08-15 LAB
C DIFF TOX B STL QL: NEGATIVE
PATH REPORT.COMMENTS IMP SPEC: NORMAL
PATH REPORT.COMMENTS IMP SPEC: NORMAL
PATH REPORT.FINAL DX SPEC: NORMAL
PATH REPORT.GROSS SPEC: NORMAL
PATH REPORT.MICROSCOPIC SPEC OTHER STN: NORMAL
PATH REPORT.RELEVANT HX SPEC: NORMAL

## 2022-08-15 PROCEDURE — 88108 CYTOPATH CONCENTRATE TECH: CPT | Mod: 26 | Performed by: PATHOLOGY

## 2022-08-15 PROCEDURE — 99207 PR NON-BILLABLE SERV PER CHARTING: CPT | Performed by: INTERNAL MEDICINE

## 2022-08-15 PROCEDURE — 88312 SPECIAL STAINS GROUP 1: CPT | Mod: 26 | Performed by: PATHOLOGY

## 2022-08-15 RX ORDER — LORAZEPAM 0.5 MG/1
0.5 TABLET ORAL
Qty: 30 TABLET | Refills: 0 | Status: SHIPPED | OUTPATIENT
Start: 2022-08-15

## 2022-08-15 ASSESSMENT — ACTIVITIES OF DAILY LIVING (ADL)
ADLS_ACUITY_SCORE: 35

## 2022-08-15 NOTE — TELEPHONE ENCOUNTER
Provider E-Visit time total (minutes): 13      I called the patient.  She started high-dose of prednisone  She has not the insulin yet.  I advised her today to take the glipizide up to total 20 mg  She will buy the insulin tomorrow and start with 5 units twice a day  She decided to self pay for CGM which is great considering her very high blood sugars.    Since high-dose of prednisone, she does not sleep well.  We discussed the pros and cons of lorazepam.  She agrees.  Prescription sent

## 2022-08-16 ENCOUNTER — E-VISIT (OUTPATIENT)
Dept: INTERNAL MEDICINE | Facility: CLINIC | Age: 72
End: 2022-08-16
Payer: COMMERCIAL

## 2022-08-16 ENCOUNTER — TELEPHONE (OUTPATIENT)
Dept: PHARMACY | Facility: CLINIC | Age: 72
End: 2022-08-16

## 2022-08-16 ENCOUNTER — MEDICAL CORRESPONDENCE (OUTPATIENT)
Dept: HEALTH INFORMATION MANAGEMENT | Facility: CLINIC | Age: 72
End: 2022-08-16

## 2022-08-16 DIAGNOSIS — E11.65 TYPE 2 DIABETES MELLITUS WITH HYPERGLYCEMIA, WITHOUT LONG-TERM CURRENT USE OF INSULIN (H): Primary | ICD-10-CM

## 2022-08-16 PROCEDURE — 99207 PR NON-BILLABLE SERV PER CHARTING: CPT | Performed by: INTERNAL MEDICINE

## 2022-08-16 NOTE — TELEPHONE ENCOUNTER
Called patient per Dr. Torres to follow-up on blood sugars.    106, then 98 and ate and it was 203 after breakfast.  Has not dosed Lantus yet today.    Yesterday dosed Lantus 5 units at 8pm.   No lows overnight.       Plan:  1.  Continue Lantus 5 units twice daily.   2.  Sent invite to link with [employer]  3.  Follow-up Thursday    Shellie Bradley  [phone] (phone)  Medication Therapy Management Pharmacist

## 2022-08-16 NOTE — TELEPHONE ENCOUNTER
Provider E-Visit time total (minutes):     I called the patient   20:35 -- 20:47    BS  -- am 128,   -- pm 377  -- 355 now     Insulin she hasn't started it yet. I advised her to start 5 units bid     She will susi tomorrow if BS < 130

## 2022-08-18 ENCOUNTER — TELEPHONE (OUTPATIENT)
Dept: INTERNAL MEDICINE | Facility: CLINIC | Age: 72
End: 2022-08-18

## 2022-08-18 ENCOUNTER — OFFICE VISIT (OUTPATIENT)
Dept: PHARMACY | Facility: CLINIC | Age: 72
End: 2022-08-18
Payer: COMMERCIAL

## 2022-08-18 DIAGNOSIS — I10 HTN, GOAL BELOW 140/90: ICD-10-CM

## 2022-08-18 DIAGNOSIS — H40.053 BORDERLINE GLAUCOMA WITH OCULAR HYPERTENSION, BILATERAL: ICD-10-CM

## 2022-08-18 DIAGNOSIS — I26.99 OTHER PULMONARY EMBOLISM WITHOUT ACUTE COR PULMONALE, UNSPECIFIED CHRONICITY (H): ICD-10-CM

## 2022-08-18 DIAGNOSIS — J30.9 ALLERGIC RHINITIS, UNSPECIFIED SEASONALITY, UNSPECIFIED TRIGGER: ICD-10-CM

## 2022-08-18 DIAGNOSIS — G47.00 INSOMNIA, UNSPECIFIED TYPE: ICD-10-CM

## 2022-08-18 DIAGNOSIS — C43.9 MELANOMA OF SKIN (H): ICD-10-CM

## 2022-08-18 DIAGNOSIS — E11.65 TYPE 2 DIABETES MELLITUS WITH HYPERGLYCEMIA, WITHOUT LONG-TERM CURRENT USE OF INSULIN (H): Primary | ICD-10-CM

## 2022-08-18 PROCEDURE — 99605 MTMS BY PHARM NP 15 MIN: CPT | Performed by: PHARMACIST

## 2022-08-18 PROCEDURE — 99607 MTMS BY PHARM ADDL 15 MIN: CPT | Performed by: PHARMACIST

## 2022-08-18 RX ORDER — GLIPIZIDE 10 MG/1
20 TABLET, FILM COATED, EXTENDED RELEASE ORAL DAILY
Qty: 60 TABLET | Refills: 1 | Status: SHIPPED | OUTPATIENT
Start: 2022-08-18 | End: 2022-09-12

## 2022-08-18 NOTE — PROGRESS NOTES
Medication Therapy Management (MTM) Encounter    ASSESSMENT:                            Medication Adherence/Access: No issues identified    Type 2 Diabetes:  Home readings at goal 42%.  Will adjust Lantus in the morning and glipizide dose.   If we can't get her readings down with the Lantus may need to consider meal-time insulin.  Ideally she would be on NPH in the morning but she just purchased a supply of the Lantus so will try to work with that.    Hypertension: stable    Allergic Rhinitis: stable    Glaucoma: stable    Insomnia: stable    Melanoma/PE: unable to get the autoinjectors to work.  Provided her number to call and get replacements.  Bactrim prescription written for 15 days- she will clarify the recommended duration with prescriber.    PLAN:                            1. Lantus 8 units every morning and 5 units at night  2. Glipizide 20 mg (2 tablets) every morning, if your readings are <80 then reduce to 1 tablet daily  3. Call [employer] about autoinjectors that will not work  - [phone]  4. Clarify plan for the Bactrim with your lung doctor    Follow-up: Return in about 5 days (around 8/23/2022) for Medication Therapy Management.    SUBJECTIVE/OBJECTIVE:                          Gricelda Sabillon is a 72 year old female coming in for an initial visit. She was referred to me from Dr. Torres.  Her  Allen joined us today.    Reason for visit: blood sugar review    Allergies/ADRs: Reviewed in chart  Tobacco: She reports that she has never smoked. She has never used smokeless tobacco.  Alcohol: not currently using      Medication Adherence/Access: Patient uses pill box(es).    Type 2 Diabetes:  Currently taking Lantus 5 units twice daily and glipizide 10 mg daily. Patient is not experiencing side effects.  Blood sugar monitoring: Continuous Glucose Monitor.               Symptoms of low blood sugar? none  Symptoms of high blood sugar? none  Eye exam: due  Foot exam: up to date  Aspirin: No  Urine  Albumin:   Lab Results   Component Value Date    UMALCR 8.99 03/21/2022      Lab Results   Component Value Date    A1C 8.3 07/12/2022    A1C 7.7 03/21/2022    A1C 7.8 11/15/2021    A1C 7.5 08/10/2021    A1C 7.1 03/03/2021    A1C 7.2 08/24/2020    A1C 7.3 05/21/2020    A1C 7.0 02/20/2020    A1C 6.5 10/31/2019         Hypertension: Current medications include atenolol 25 mg daily.  Patient has blood pressure monitored by physical therapy. Home BP monitoring in range 'good'.  Patient reports no current medication side effects.    BP Readings from Last 3 Encounters:   08/12/22 114/70   08/11/22 114/70   08/10/22 108/59     Allergic Rhinitis: Current medications include cetirizine 10 mg as needed, azelastine nasal spray as needed and azelastine eye drops as needed - needed in the spring. Primary symptoms are nasal congestion and itchy/watery eyes. Patient feels that current therapy is effective.     Glaucoma: Using Xalatan at bedtime and timolol twice daily.  No concerns at this time.    Insomnia: Current medications include: lorazepam as needed; not needed at this time.. Patient reports no issues at this time.     Melanoma/PE: Followed by Dr. Florez.  Using Lovenox twice daily as directed.  Experience bruises with dose.  Most recent fill was filled with two autoinjectors that are not working.  Devices seem to have been 'sprung' and are unusable.  She did bring back to retail pharmacy and they told her to contact the manufactor.    Uses oxygen 24- hours.  Increased to 3 L today because she was having a hard time getting her stats up with physical therapy this morning.  O2 was 91% in the clinic.    Also taking Bactrim twice daily as directed for possible pneumonitis and prednisone 60 mg daily with directions to taper every 2 weeks.      Timeline of medical care this year:   - Jan 2022 leg mass resection; March 2022 extended surgical resection for margins   Jan 2022 CT showing lung nodule, not PET FDG avid  May port placed  - April 29 first immunotherapy Keytruda, every 3 weeks May 20, Neha 10, July 5 last treatment  - June 2022 hysterectomy, lymph nodes negative for malignancy, low grade endometrial cancer  After the hysterectomy, she had some issues with nausea, poor appetite. Thought it might be semaglutide so it was stopped. No other new medications.  Routine visit on 7/25/22 with Dr Torres showed hypoxemia, she went to ED and noted to have a PE. She didn't feel that bad but became short of breath at the clinic - discharged with 4 lpm supplemental oxygen, she is on 2 lpm now, usually 92-93%  She continues to experience lightheadedness, nausea, chills every morning. No appetite, nothing tastes good.   - Aug 2022 lung biopsy percutaneous   - history of 1-2 episodes of bronchitis annually, family history of asthma. Has used albuterol in the past for these episodes  - she has been hanging about the house, no camping or outdoor activities, no pets      Today's Vitals: LMP 12/13/2002 (Exact Date)   ----------------  Post Discharge Medication Reconciliation Status: discharge medications reconciled, continue medications without change.    I spent 60 minutes with this patient today. All changes were made via collaborative practice agreement with Raisa Davidson MD. A copy of the visit note was provided to the patient's provider(s).    The patient was given a summary of these recommendations.     Arlet Harding , Pharm D  [phone] (phone)  Medication Therapy Management Pharmacist        Medication Therapy Recommendations  Diabetes mellitus, type 2 (H)    Current Medication: glipiZIDE (GLUCOTROL XL) 10 MG 24 hr tablet   Rationale: Dose too low - Dosage too low - Effectiveness   Recommendation: Increase Dose   Status: Accepted per CPA          Current Medication: insulin glargine (LANTUS PEN) 100 UNIT/ML pen   Rationale: Dose too low - Dosage too low - Effectiveness   Recommendation: Increase Dose   Status: Accepted per CPA          Melanoma of skin (H)    Current Medication: sulfamethoxazole-trimethoprim (BACTRIM) 400-80 MG tablet   Rationale: Does not understand instructions - Adherence - Adherence   Recommendation: Provide Education   Status: Patient Agreed - Adherence/Education

## 2022-08-18 NOTE — LETTER
"Recommended To-Do List      Prepared on: Aug 18, 2022       You can get the best results from your medications by completing the items on this \"To-Do List.\"      Bring your To-Do List when you go to your doctor. And, share it with your family or caregivers.    My To-Do List:  What we talked about: What I should do:   Your medication dosage being too low    Increase your dosage of glipiZIDE (GLUCOTROL XL) 20 mg (2 tablets) daily          What we talked about: What I should do:   Your medication dosage being too low    Increase your dosage of insulin glargine (LANTUS PEN) 8 units every morning and 5 units at night          What we talked about: What I should do:   The importance of taking your medication as intended    Education: clarify with your lung provider how long you should be taking the Bactrim.            What we talked about: What I should do:                       "

## 2022-08-18 NOTE — TELEPHONE ENCOUNTER
Call to below. Verbal OK given for physical therapy.    States the information about O2 was only an FYI. States patient had a busy morning and wanted to rest but was aware she needed to call or be seen if unable to get sats back up.

## 2022-08-18 NOTE — PATIENT INSTRUCTIONS
"Recommendations from today's MTM visit:                                                    MTM (medication therapy management) is a service provided by a clinical pharmacist designed to help you get the most of out of your medicines.   Today we reviewed what your medicines are for, how to know if they are working, that your medicines are safe and how to make your medicine regimen as easy as possible.      Lantus 8 units every morning and 5 units at night  Glipizide 20 mg (2 tablets) every morning, if your readings are <80 then reduce to 1 tablet daily  Call [facility] about autoinjectors that will not work  - [phone]  Clarify plan for the Bactrim with your lung doctor    Follow-up: Return in about 5 days (around 8/23/2022) for Medication Therapy Management.    It was great speaking with you today.  I value your experience and would be very thankful for your time in providing feedback in our clinic survey. In the next few days, you may receive an email or text message from [employer] with a link to a survey related to your  clinical pharmacist.\"     To schedule another MTM appointment, please call the clinic directly or you may call the MTM scheduling line at [phone] or toll-free at [phone].     My Clinical Pharmacist's contact information:                                                      Please feel free to contact me with any questions or concerns you have.      Arlet Harding , Pharm D  [phone] (phone)  Medication Therapy Management Pharmacist     "

## 2022-08-18 NOTE — LETTER
August 18, 2022  Gricelda Sabillon  [address] [address]    Dear Ms. Sabillon, [facility]     Thank you for talking with me on Aug 18, 2022 about your health and medications. As a follow-up to our conversation, I have included two documents:      1. Your Recommended To-Do List has steps you should take to get the best results from your medications.  2. Your Medication List will help you keep track of your medications and how to take them.    If you want to talk about these documents, please call Arlet Harding RPH at phone: [phone], Monday-Friday 8-4:30pm.    I look forward to working with you and your doctors to make sure your medications work well for you.    Sincerely,  Arlet Harding RPH  [facility] Pharmacist, [facility]

## 2022-08-18 NOTE — TELEPHONE ENCOUNTER
[facility] with  [facility]  calls for verbal order        Physical Therapy   1x4wks    PATIENT CONCERN to pass on to PCP: O2 on 3 liters went down to 81%. Turned up to 4 liters could not get her past 86%.  Patient was instructed to call Dr office this afternoon if it did not increase or to go to ER.       Best number to call back [phone]

## 2022-08-19 ENCOUNTER — TELEPHONE (OUTPATIENT)
Dept: INTERNAL MEDICINE | Facility: CLINIC | Age: 72
End: 2022-08-19

## 2022-08-19 NOTE — TELEPHONE ENCOUNTER
[facility] calling for orders. OCCUPATIONAL THERAPY 1/week for 2 weeks and once every other week for 4 weeks.    Lashell [phone]

## 2022-08-19 NOTE — TELEPHONE ENCOUNTER
"Call to patient. States she is doing well. States she doesn't breathe deeply during the night. They had turned her O2 down to 2 L during the night the night before just to \"try it\", States she was also talking a lot while physical therapy was there telling all of history so her O2 sats were dropping. Patient used 3L O2 last night instead of 2L and this morning her sats were 92%.   "

## 2022-08-19 NOTE — TELEPHONE ENCOUNTER
Central Prior Authorization Team   Phone: [phone]      PRIOR AUTHORIZATION DENIED    Medication: LORazepam (ATIVAN) 0.5 MG tablet - EPA DENIED    Denial Date: 8/19/2022    Denial Rational:       Appeal Information:

## 2022-08-22 ENCOUNTER — HOSPITAL ENCOUNTER (OUTPATIENT)
Dept: RESPIRATORY THERAPY | Facility: CLINIC | Age: 72
Discharge: HOME | End: 2022-08-22
Attending: INTERNAL MEDICINE
Payer: COMMERCIAL

## 2022-08-22 ENCOUNTER — TELEPHONE (OUTPATIENT)
Dept: INTERNAL MEDICINE | Facility: CLINIC | Age: 72
End: 2022-08-22

## 2022-08-22 DIAGNOSIS — J84.9 ILD (INTERSTITIAL LUNG DISEASE) (H): ICD-10-CM

## 2022-08-22 PROCEDURE — 999N000157 HC STATISTIC RCP TIME EA 10 MIN

## 2022-08-22 NOTE — PROGRESS NOTES
Patient arrived for complete pulmonary function testing. Unfortunately we were unable to perform testing. Patient arrived on 3 lpm NC sats=87%, HR 60. I placed patient on 6 lpm in lab sats=87-89%.Spirometry was attempted, patient became dizzy and sats decreased to 84-85%. Patient stated that mornings are her most difficult time of the day. I suggested making an appointment later in the day,our last appt is at 5pm and we could try again. Unfortunately I do not have an opening in today's schedule . I also informed her that for one of our test (DLCO) she will need to be off oxygen for 15-20 min. before testing.  Marianna Robins, RT on 8/22/2022 at 11:15 AM

## 2022-08-23 ENCOUNTER — OFFICE VISIT (OUTPATIENT)
Dept: PHARMACY | Facility: CLINIC | Age: 72
End: 2022-08-23
Payer: COMMERCIAL

## 2022-08-23 ENCOUNTER — TELEPHONE (OUTPATIENT)
Dept: INTERNAL MEDICINE | Facility: CLINIC | Age: 72
End: 2022-08-23

## 2022-08-23 VITALS — HEART RATE: 67 BPM | OXYGEN SATURATION: 93 % | DIASTOLIC BLOOD PRESSURE: 64 MMHG | SYSTOLIC BLOOD PRESSURE: 108 MMHG

## 2022-08-23 DIAGNOSIS — K59.00 CONSTIPATION: ICD-10-CM

## 2022-08-23 DIAGNOSIS — K59.00 CONSTIPATION, UNSPECIFIED CONSTIPATION TYPE: ICD-10-CM

## 2022-08-23 DIAGNOSIS — E11.65 TYPE 2 DIABETES MELLITUS WITH HYPERGLYCEMIA, WITHOUT LONG-TERM CURRENT USE OF INSULIN (H): Primary | ICD-10-CM

## 2022-08-23 DIAGNOSIS — I26.99 OTHER PULMONARY EMBOLISM WITHOUT ACUTE COR PULMONALE, UNSPECIFIED CHRONICITY (H): ICD-10-CM

## 2022-08-23 DIAGNOSIS — C43.9 MELANOMA OF SKIN (H): ICD-10-CM

## 2022-08-23 PROCEDURE — 99606 MTMS BY PHARM EST 15 MIN: CPT | Performed by: PHARMACIST

## 2022-08-23 PROCEDURE — 99607 MTMS BY PHARM ADDL 15 MIN: CPT | Performed by: PHARMACIST

## 2022-08-23 RX ORDER — SYRINGE-NEEDLE,INSULIN,0.5 ML 27GX1/2"
SYRINGE, EMPTY DISPOSABLE MISCELLANEOUS
Qty: 100 EACH | Refills: 11 | Status: SHIPPED | OUTPATIENT
Start: 2022-08-23 | End: 2022-09-06

## 2022-08-23 RX ORDER — AMOXICILLIN 250 MG
2 CAPSULE ORAL DAILY PRN
Qty: 60 TABLET | Refills: 11 | Status: SHIPPED | OUTPATIENT
Start: 2022-08-23

## 2022-08-23 NOTE — TELEPHONE ENCOUNTER
Received phone call regarding patient's MTM appointment today to verify dosing of NPH insulin is 10 units daily and glipizide is 20 mg daily. Confirmed these are the correct doses for medication.    Lili Millan RN  [facility]

## 2022-08-23 NOTE — PATIENT INSTRUCTIONS
"Recommendations from today's MTM visit:                                                       Stop Lantus insulin. Take Humulin NPH insulin 10 units daily before breakfast. We will try to get you the Humulin NPH KwikPen, which you can dial to your dose. If your insurance does not cover the pen then you will use the Humulin NPH vial like we reviewed at today's visit. We sent this prescription to [facility].    a refill that we sent of your stool softener to [facility].   Schedule a Pulmonology visit - try calling them.     Follow-up: Return in 1 week (on 8/30/2022) for Follow up, in person at 1:30pm .    It was great speaking with you today.  I value your experience and would be very thankful for your time in providing feedback in our clinic survey. In the next few days, you may receive an email or text message from [employer] with a link to a survey related to your  clinical pharmacist.\"     To schedule another MTM appointment, please call the clinic directly or you may call the MTM scheduling line at [phone] or toll-free at [phone].     My Clinical Pharmacist's contact information:                                                      Please feel free to contact me with any questions or concerns you have.      Arlet Harding , Pharm D  [phone] (phone)  Medication Therapy Management Pharmacist    "

## 2022-08-23 NOTE — TELEPHONE ENCOUNTER
Left detailed voicemail message advising Lashell that OT orders have been approved. TOAN Ferguson R.N.

## 2022-08-23 NOTE — PROGRESS NOTES
Medication Therapy Management (MTM) Encounter    ASSESSMENT:                            Medication Adherence/Access: No issues identified    Type 2 Diabetes: home readings remain above goal.  Patient would benefit from changing to NPH insulin that would help cover steroid blood glucose spike.    Will order pens but reviewed how to dose with vials as well today if the pens should not be covered.  After the visit called the pharmacy - pens were >$100 and vial was $25.  Melva prefers to use the vials.    Melanoma/PE: follow-up with Pulmonary as recommended    Constipation:  Stable  PLAN:                            1. Stop Lantus insulin. Take Humulin NPH insulin 10 units daily before breakfast. We will try to get you the Humulin NPH KwikPen, which you can dial to your dose. If your insurance does not cover the pen then you will use the Humulin NPH vial like we reviewed at today's visit. We sent this prescription to [facility].   2.  a refill that we sent of your stool softener to [facility].   3. Schedule a Pulmonology visit - try calling them.     Follow-up: Return in 1 week (on 8/30/2022) for Follow up, in person at 1:30pm .    SUBJECTIVE/OBJECTIVE:                          Gricelda Sabillon is a 72 year old female coming in for a follow-up visit.  Today's visit is a follow-up MTM visit from 8/18/22.   Her  Allen joined us today.    Reason for visit: blood sugar review.     Allergies/ADRs: Reviewed in chart  Tobacco: She reports that she has never smoked. She has never used smokeless tobacco.  Alcohol: not currently using    Medication Adherence/Access: no issues reported    Type 2 Diabetes:  Currently taking Glipizide 20 mg daily and Lantus 8 units daily in AM and 5 units daily in PM. Patient is not experiencing side effects.   Blood sugar monitoring: Continuous Glucose Monitor.               Symptoms of low blood sugar? none  Symptoms of high blood sugar? none  Eye exam: due  Foot exam:  up to date  Aspirin: No  Urine Albumin:   Lab Results   Component Value Date    UMALCR 8.99 03/21/2022      Lab Results   Component Value Date    A1C 8.3 07/12/2022    A1C 7.7 03/21/2022    A1C 7.8 11/15/2021    A1C 7.5 08/10/2021    A1C 7.1 03/03/2021    A1C 7.2 08/24/2020    A1C 7.3 05/21/2020    A1C 7.0 02/20/2020    A1C 6.5 10/31/2019     Melanoma/PE: Followed by Dr. Florez and Respiratory Therapy.   Using Lovenox twice daily as directed. Discussed that labs are not required for monitoring this medication.   Reports she was disappointed that she could not physically perform her lung function test at yesterdays Respiratory Therapy visit. This is something she wants to improve. Reports this mornings O2 was 90%. O2 was 92% in clinic.   Also taking Bactrim twice daily as directed for possible pneumonitis and prednisone 60 mg daily with directions to taper every 2 weeks (Saturday start of Week 3, will decrease to 40mg daily).     Constipation  Taking senna once daily.  Feels this is effective and higher enough dose.  Asking for refill today.    Today's Vitals: /64   Pulse 67   LMP 12/13/2002 (Exact Date)   SpO2 93%   ----------------  Post Discharge Medication Reconciliation Status: medication reconcilation previously completed during another office visit.    I spent 30 minutes with this patient today. All changes were made via collaborative practice agreement with Raisa Davidson MD. A copy of the visit note was provided to the patient's provider(s).    The patient was given a summary of these recommendations.     Arlet Harding , Pharm D  [phone] (phone)  Medication Therapy Management Pharmacist   Carie Webb PharmD  Medication Therapy Management Resident     Medication Therapy Recommendations  Diabetes mellitus, type 2 (H)    Current Medication: insulin glargine (LANTUS PEN) 100 UNIT/ML pen (Discontinued)   Rationale: More effective medication available - Ineffective medication -  Effectiveness   Recommendation: Change Medication   Status: Accepted per CPA                 I have personally seen and examined this patient.  I have fully participated in the care of this patient. I have reviewed all pertinent clinical information, including history, physical exam, plan and the Resident’s note and agree except as noted.

## 2022-08-24 ENCOUNTER — TELEPHONE (OUTPATIENT)
Dept: INTERNAL MEDICINE | Facility: CLINIC | Age: 72
End: 2022-08-24

## 2022-08-25 ENCOUNTER — TELEPHONE (OUTPATIENT)
Dept: INTERNAL MEDICINE | Facility: CLINIC | Age: 72
End: 2022-08-25

## 2022-08-26 ENCOUNTER — MEDICAL CORRESPONDENCE (OUTPATIENT)
Dept: HEALTH INFORMATION MANAGEMENT | Facility: CLINIC | Age: 72
End: 2022-08-26

## 2022-08-29 ENCOUNTER — APPOINTMENT (OUTPATIENT)
Dept: GENERAL RADIOLOGY | Facility: CLINIC | Age: 72
End: 2022-08-29
Attending: EMERGENCY MEDICINE
Payer: COMMERCIAL

## 2022-08-29 ENCOUNTER — HOSPITAL ENCOUNTER (EMERGENCY)
Facility: CLINIC | Age: 72
Discharge: HOME | End: 2022-08-29
Attending: EMERGENCY MEDICINE | Admitting: EMERGENCY MEDICINE
Payer: COMMERCIAL

## 2022-08-29 ENCOUNTER — TELEPHONE (OUTPATIENT)
Dept: PHARMACY | Facility: CLINIC | Age: 72
End: 2022-08-29

## 2022-08-29 VITALS
HEART RATE: 55 BPM | RESPIRATION RATE: 18 BRPM | SYSTOLIC BLOOD PRESSURE: 141 MMHG | DIASTOLIC BLOOD PRESSURE: 75 MMHG | OXYGEN SATURATION: 95 % | TEMPERATURE: 98.6 F

## 2022-08-29 DIAGNOSIS — S20.212A CHEST WALL CONTUSION, LEFT, INITIAL ENCOUNTER: ICD-10-CM
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
## 2022-08-29 DIAGNOSIS — E11.65 TYPE 2 DIABETES MELLITUS WITH HYPERGLYCEMIA, WITHOUT LONG-TERM CURRENT USE OF INSULIN (H): ICD-10-CM

## 2022-08-29 PROCEDURE — 71101 X-RAY EXAM UNILAT RIBS/CHEST: CPT | Mod: LT

## 2022-08-29 PROCEDURE — 99283 EMERGENCY DEPT VISIT LOW MDM: CPT

## 2022-08-29 PROCEDURE — 250N000013 HC RX MED GY IP 250 OP 250 PS 637: Performed by: EMERGENCY MEDICINE

## 2022-08-29 RX ORDER — OXYCODONE HYDROCHLORIDE 5 MG/1
5 TABLET ORAL ONCE
Status: COMPLETED | OUTPATIENT
Start: 2022-08-29 | End: 2022-08-29

## 2022-08-29 RX ADMIN — OXYCODONE HYDROCHLORIDE 5 MG: 5 TABLET ORAL at 21:48

## 2022-08-29 ASSESSMENT — ENCOUNTER SYMPTOMS
MYALGIAS: 1
ARTHRALGIAS: 1

## 2022-08-29 NOTE — ED TRIAGE NOTES
Tripped over cords. Landed on left side. C/o left sided rib pain. Chronically on 2 liters O2 via nasal cannula. Hurts to take a deep breath. VSS.

## 2022-08-30 ENCOUNTER — PATIENT OUTREACH (OUTPATIENT)
Dept: ONCOLOGY | Facility: CLINIC | Age: 72
End: 2022-08-30

## 2022-08-30 NOTE — PROGRESS NOTES
Patient called in today to let Dr. Sosa that she had a fall last night.  She said she was walking and got tangled in her oxygen tubing and fell, hitting her chest wall.  She went to ED to be checked out yesterday camryn.  Per ED notes, no acute injuries were found.  Chest xray was done with no acute findings.  No apparent rib fracture or pneumothorax.  Suspect chest wall contusion and recommended tylenol, ice/heat, lidocaine patches to affected area.  Pt wanted Dr. Sosa to be updated on her fall.  She said she is feeling better this morning.  She said her oxygen sats have been holding steady at 93% on 1.5LPM O2 per Nasal Cannula.  Pt said her chest wall pain is mild and controlled with above measures.  She is not concerned about her status, just wanted to update Dr. Sosa.  Writer will route message to Dr. Sosa to update.    Fay Meza, RN, BSN    RN Care Coordinator  [facility]  [phone]

## 2022-08-30 NOTE — ED PROVIDER NOTES
"  History     Chief Complaint:  Fall    HPI   Gricelda Sabillon is a 72 year old female with a history of hypertension who presents to the emergency department for evaluation of a mechanical fall. The patient tripped over her O2 cords and landed on her left side on the floor. She notes she did not have any loss of consciousness at that time. She notes pain with deep inhalation, and with concern of bleeding due to use of Lovenox she presented to the emergency department for further evaluation. Here, she is sitting up right. She notes significant discomfort at her left ribs.     Review of Systems   Musculoskeletal: Positive for arthralgias and myalgias.   Neurological:        No LOC   All other systems reviewed and are negative.    Allergies:  Bromfenac  Codeine  Sulfites    Medications:    albuterol (PROAIR HFA/PROVENTIL HFA/VENTOLIN HFA) 108 (90 Base) MCG/ACT inhaler  atenolol (TENORMIN) 50 MG tablet  atorvastatin (LIPITOR) 20 MG tablet  azelastine (ASTELIN) 0.1 % nasal spray  cetirizine (ZYRTEC) 10 MG tablet  enoxaparin ANTICOAGULANT (LOVENOX) 80 MG/0.8ML syringe  glipiZIDE (GLUCOTROL XL) 10 MG 24 hr tablet  insulin  UNIT/ML vial  insulin syringe-needle U-100 (BD INSULIN SYRINGE ULTRAFINE) 31G X 5/16\" 0.5 ML miscellaneous  latanoprost (XALATAN) 0.005 % ophthalmic solution  lidocaine-prilocaine (EMLA) 2.5-2.5 % external cream  LORazepam (ATIVAN) 0.5 MG tablet  ondansetron (ZOFRAN) 8 MG tablet  predniSONE (DELTASONE) 20 MG tablet  senna-docusate (SENOKOT-S/PERICOLACE) 8.6-50 MG tablet  sulfamethoxazole-trimethoprim (BACTRIM) 400-80 MG tablet  timolol maleate (TIMOPTIC) 0.5 % ophthalmic solution  triamcinolone (KENALOG) 0.5 % external ointment    Past Medical History:    Endometrial cancer  Orthostatic hypotension  Metastatic malignant melanoma  Type 2 diabetes  Hypertension  Ampullary adenoma  CHERRY  Asthma, mild intermittent  Hyperlipidemia  Heart murmer     Past Surgical History:    Arthroplasty hip, knee "   Colonoscopy x2  Incision of hymen  Cataract, rt/lt   Cystoscopy  Endoscopic retrograde cholangiopancreatogram x7  EGD, combined x6  Excise lesion lower extremity   Excise mass lower extremity   Eye surgery - macular hole repaired left eye  [place] teeth removal     Family History:    Hypertension (mother, brother)      Diabetes (mother, sister)    Cancer - breast (mother)    Heart Disease (father)    Uterine Cancer (sister)    Connective Tissue Disorder (sister)      Social History:  Primary care provider: Raisa Davidson    Physical Exam     Patient Vitals for the past 24 hrs:   BP Temp Temp src Pulse Resp SpO2   08/29/22 2130 121/65 -- -- 60 16 100 %   08/29/22 2115 128/76 -- -- 60 -- 100 %   08/29/22 1749 122/64 98.6  F (37  C) Temporal 75 18 96 %     Physical Exam  General: the patient is awake and interactive  HEENT:  Moist mucous membranes, conjunctiva normal. Atraumatic.   Pulmonary:  Lungs CTAB, no wheezing/ronchi/rales.  Normal respiratory effort  Cardiovascular:  RRR, no m/r/g. Skin well perfused  Musculoskeletal:  Moving 4 extremities grossly wnl, no deformities; left posterior/lateral chest wall tenderness. No cervical midline tenderness. No chest wall crepitus or bruising.  Neuro:  Speech normal, no focal deficits; GCS 15.   Psych:  Normal affect     Emergency Department Course     Imaging:  Ribs XR, unilat 3 views + PA chest,  left   Final Result   IMPRESSION: No definite rib fracture is identified. Osteopenia. Normal heart size. Right IJ Port-A-Cath. Similar to minimally improved patchy opacities in the bilateral lungs.        Report per radiology    Emergency Department Course:    Reviewed:  I reviewed nursing notes and vitals    Assessments:  2128 I obtained history and examined the patient as noted above.   2133 I rechecked the patient and explained findings.     Interventions:  2148  oxyCODONE (ROXICODONE) tablet 5 mg, PO    Disposition:  The patient was discharged to home.      Impression & Plan      Medical Decision Making:   Gricelda Sabillon is a 72 year old female who presents to the ER for left chest wall painwho presents to the ER for evaluation of left chest wall pain after mechanical fall at home.  There was no head injury or loss of consciousness or prodromal symptoms leading to her fall.  She exhibits tenderness to the posterior lateral aspect of the left chest wall without crepitus.  There is no respiratory distress.   Discussed sensitivity for evaluating rib fractures with patient and  at bedside.  Chest x-ray shows no acute findings.  No apparent rib fracture or pneumothorax.  There is no intra-abdominal pain or tenderness concerning for solid organ injury requiring imaging.  Suspect chest wall contusion.  Recommend Tylenol, ice/heat, lidocaine patches to the affected area.  The rest of the patient's head to toe trauma exam was unremarkable for other injury or pain. Discussed reasons to return to the ER.  All questions were answered prior to discharge    Critical Care time:  none    Diagnosis:    ICD-10-CM    1. Chest wall contusion, left, initial encounter  S20.212A    2. Fall at home, initial encounter  W19.XXXA     Y92.009      Discharge Medications:  New Prescriptions    No medications on file     Scribe Disclosure:  Deana MIRANDA, am serving as a scribe at 9:28 PM on 8/29/2022 to document services personally performed by Vincent Velasquez MD based on my observations and the provider's statements to me.      Vincent Velasquez MD  08/30/22 0222

## 2022-08-31 ENCOUNTER — TELEPHONE (OUTPATIENT)
Dept: PHARMACY | Facility: CLINIC | Age: 72
End: 2022-08-31

## 2022-08-31 NOTE — TELEPHONE ENCOUNTER
Called Melva to follow-up on blood sugars.  Has been dosing NPH 14 units daily.   Reports feeling confident with syringe/vial.     Plan:  Increase dose to 20 units QAM - doses at 9am.  Will follow-up on Friday morning.

## 2022-09-01 ENCOUNTER — TELEPHONE (OUTPATIENT)
Dept: INTERNAL MEDICINE | Facility: CLINIC | Age: 72
End: 2022-09-01

## 2022-09-01 ENCOUNTER — OFFICE VISIT (OUTPATIENT)
Dept: INTERNAL MEDICINE | Facility: CLINIC | Age: 72
End: 2022-09-01
Payer: COMMERCIAL

## 2022-09-01 VITALS
BODY MASS INDEX: 27.89 KG/M2 | HEART RATE: 60 BPM | DIASTOLIC BLOOD PRESSURE: 68 MMHG | RESPIRATION RATE: 18 BRPM | OXYGEN SATURATION: 95 % | HEIGHT: 68 IN | TEMPERATURE: 95.8 F | WEIGHT: 184 LBS | SYSTOLIC BLOOD PRESSURE: 123 MMHG

## 2022-09-01 DIAGNOSIS — Z79.4 TYPE 2 DIABETES MELLITUS WITHOUT COMPLICATION, WITH LONG-TERM CURRENT USE OF INSULIN (H): Primary | ICD-10-CM

## 2022-09-01 DIAGNOSIS — E11.9 TYPE 2 DIABETES MELLITUS WITHOUT COMPLICATION, WITH LONG-TERM CURRENT USE OF INSULIN (H): Primary | ICD-10-CM

## 2022-09-01 DIAGNOSIS — J84.89 ORGANIZING PNEUMONIA (H): ICD-10-CM

## 2022-09-01 DIAGNOSIS — R31.0 GROSS HEMATURIA: ICD-10-CM

## 2022-09-01 DIAGNOSIS — E78.5 HYPERLIPIDEMIA WITH TARGET LDL LESS THAN 100: ICD-10-CM

## 2022-09-01 DIAGNOSIS — I26.99 BILATERAL PULMONARY EMBOLISM (H): ICD-10-CM

## 2022-09-01 DIAGNOSIS — I10 ESSENTIAL HYPERTENSION WITH GOAL BLOOD PRESSURE LESS THAN 140/90: ICD-10-CM

## 2022-09-01 LAB
ALBUMIN UR-MCNC: 100 MG/DL
APPEARANCE UR: ABNORMAL
BACTERIA #/AREA URNS HPF: ABNORMAL /HPF
BACTERIA #/AREA URNS HPF: ABNORMAL /HPF
BILIRUB UR QL STRIP: ABNORMAL
COLOR UR AUTO: ABNORMAL
GLUCOSE UR STRIP-MCNC: 100 MG/DL
HBA1C MFR BLD: 8.8 % (ref 0–5.6)
HGB UR QL STRIP: ABNORMAL
KETONES UR STRIP-MCNC: ABNORMAL MG/DL
LEUKOCYTE ESTERASE UR QL STRIP: ABNORMAL
NITRATE UR QL: NEGATIVE
PH UR STRIP: 6 [PH] (ref 5–7)
RBC #/AREA URNS AUTO: >100 /HPF
RBC #/AREA URNS AUTO: >100 /HPF
SP GR UR STRIP: >=1.03 (ref 1–1.03)
UROBILINOGEN UR STRIP-ACNC: 0.2 E.U./DL
WBC #/AREA URNS AUTO: >100 /HPF
WBC #/AREA URNS AUTO: >100 /HPF

## 2022-09-01 PROCEDURE — 81015 MICROSCOPIC EXAM OF URINE: CPT | Performed by: INTERNAL MEDICINE

## 2022-09-01 PROCEDURE — 99214 OFFICE O/P EST MOD 30 MIN: CPT | Performed by: INTERNAL MEDICINE

## 2022-09-01 PROCEDURE — 80053 COMPREHEN METABOLIC PANEL: CPT | Performed by: INTERNAL MEDICINE

## 2022-09-01 PROCEDURE — 36415 COLL VENOUS BLD VENIPUNCTURE: CPT | Performed by: INTERNAL MEDICINE

## 2022-09-01 PROCEDURE — 82550 ASSAY OF CK (CPK): CPT | Performed by: INTERNAL MEDICINE

## 2022-09-01 PROCEDURE — 81001 URINALYSIS AUTO W/SCOPE: CPT | Performed by: INTERNAL MEDICINE

## 2022-09-01 PROCEDURE — 83036 HEMOGLOBIN GLYCOSYLATED A1C: CPT | Performed by: INTERNAL MEDICINE

## 2022-09-01 PROCEDURE — 87086 URINE CULTURE/COLONY COUNT: CPT | Performed by: INTERNAL MEDICINE

## 2022-09-01 NOTE — PATIENT INSTRUCTIONS
Plan:  1. Continue same meds, same doses for now   2. Urine test today   3. Urology referral  4. Schedule in office follow up appointment Nov 4 ( SD - ok)  5. schedule nonfasting labs few days prior to the appointment

## 2022-09-01 NOTE — TELEPHONE ENCOUNTER
Reason for Call:  Other Expedited Urology Referral    Detailed comments: Melva mentioned that when the Urology department called to schedule an appointment, they were booked out until November. She was told to send a message to her PCP and request for an expedited referral so that the Urology department may be able to get her a sooner appointment.    Phone Number Patient can be reached at: Home number on file [phone] (home)    Best Time: Anytime    Can we leave a detailed message on this number? YES    Call taken on 9/1/2022 at 2:44 PM by Waylon Uribe

## 2022-09-01 NOTE — TELEPHONE ENCOUNTER
November appointment is okay for now  She does not to be seen on an emergent basis.  She is on blood thinner medication and the hematuria a few hours after a fall.  She cannot stop the Lovenox at this time.   If she notices again blood in urine, she is to let us know and we will try to look for a sooner appointment in urology

## 2022-09-02 ENCOUNTER — TELEPHONE (OUTPATIENT)
Dept: PHARMACY | Facility: CLINIC | Age: 72
End: 2022-09-02

## 2022-09-02 DIAGNOSIS — E11.65 TYPE 2 DIABETES MELLITUS WITH HYPERGLYCEMIA, WITHOUT LONG-TERM CURRENT USE OF INSULIN (H): ICD-10-CM

## 2022-09-02 LAB
ALBUMIN SERPL-MCNC: 2.8 G/DL (ref 3.4–5)
ALP SERPL-CCNC: 97 U/L (ref 40–150)
ALT SERPL W P-5'-P-CCNC: 38 U/L (ref 0–50)
ANION GAP SERPL CALCULATED.3IONS-SCNC: 9 MMOL/L (ref 3–14)
AST SERPL W P-5'-P-CCNC: 17 U/L (ref 0–45)
BILIRUB SERPL-MCNC: 0.3 MG/DL (ref 0.2–1.3)
BUN SERPL-MCNC: 16 MG/DL (ref 7–30)
CALCIUM SERPL-MCNC: 9.7 MG/DL (ref 8.5–10.1)
CHLORIDE BLD-SCNC: 100 MMOL/L (ref 94–109)
CK SERPL-CCNC: 18 U/L (ref 30–225)
CO2 SERPL-SCNC: 24 MMOL/L (ref 20–32)
CREAT SERPL-MCNC: 0.77 MG/DL (ref 0.52–1.04)
GFR SERPL CREATININE-BSD FRML MDRD: 80 ML/MIN/1.73M2
GLUCOSE BLD-MCNC: 244 MG/DL (ref 70–99)
POTASSIUM BLD-SCNC: 4.5 MMOL/L (ref 3.4–5.3)
PROT SERPL-MCNC: 6.6 G/DL (ref 6.8–8.8)
SODIUM SERPL-SCNC: 133 MMOL/L (ref 133–144)

## 2022-09-02 NOTE — TELEPHONE ENCOUNTER
Melva has been dosing NPH 20 units every morning.  Will increase to 24 units today.  If readings still >200 from 10am-10pm on Sunday will increase to 26 units.    Started having blood in urine - saw Dr. Torres yesterday.  UA completed and referred to Urology.

## 2022-09-03 ENCOUNTER — TELEPHONE (OUTPATIENT)
Dept: INTERNAL MEDICINE | Facility: CLINIC | Age: 72
End: 2022-09-03

## 2022-09-03 DIAGNOSIS — N30.01 ACUTE CYSTITIS WITH HEMATURIA: Primary | ICD-10-CM

## 2022-09-03 LAB — BACTERIA UR CULT: NORMAL

## 2022-09-03 RX ORDER — CEFDINIR 300 MG/1
300 CAPSULE ORAL 2 TIMES DAILY
Qty: 20 CAPSULE | Refills: 0 | Status: SHIPPED | OUTPATIENT
Start: 2022-09-03 | End: 2022-09-19

## 2022-09-03 NOTE — TELEPHONE ENCOUNTER
I called the patient and discussed UA    Omnicef Ab Rx done    She will recheck UA a week after she finishes the Abx

## 2022-09-05 DIAGNOSIS — Z79.899 HIGH RISK MEDICATION USE: ICD-10-CM

## 2022-09-05 PROBLEM — J84.89 ORGANIZING PNEUMONIA (H): Status: ACTIVE | Noted: 2022-09-05

## 2022-09-06 ENCOUNTER — TELEPHONE (OUTPATIENT)
Dept: PHARMACY | Facility: CLINIC | Age: 72
End: 2022-09-06

## 2022-09-06 ENCOUNTER — PATIENT OUTREACH (OUTPATIENT)
Dept: ONCOLOGY | Facility: CLINIC | Age: 72
End: 2022-09-06

## 2022-09-06 DIAGNOSIS — E11.65 TYPE 2 DIABETES MELLITUS WITH HYPERGLYCEMIA, WITHOUT LONG-TERM CURRENT USE OF INSULIN (H): ICD-10-CM

## 2022-09-06 RX ORDER — SYRINGE-NEEDLE,INSULIN,0.5 ML 27GX1/2"
SYRINGE, EMPTY DISPOSABLE MISCELLANEOUS
Qty: 100 EACH | Refills: 11 | Status: SHIPPED | OUTPATIENT
Start: 2022-09-06 | End: 2024-08-19

## 2022-09-06 RX ORDER — SULFAMETHOXAZOLE AND TRIMETHOPRIM 400; 80 MG/1; MG/1
TABLET ORAL
Qty: 30 TABLET | Refills: 0 | Status: SHIPPED | OUTPATIENT
Start: 2022-09-06 | End: 2022-11-21

## 2022-09-06 NOTE — TELEPHONE ENCOUNTER
Increased NPH to 26 units on Sunday morning.  Will increase to 30 units today (syringe prescriptions was filled for 30 units syringes and not 50 units that was ordered).  I will call [facility] to see why the discrepancy.  Follow-up on Thursday.  Started treatment for UTI on Saturday - still has bright red urine. No new symptoms.

## 2022-09-06 NOTE — TELEPHONE ENCOUNTER
[facility] is not sure why the syringes were filled with 0.3 ml vs 0.5 ml.  They requested another script be sent today and patient will not need to pay.  Notified Roland

## 2022-09-06 NOTE — PROGRESS NOTES
Writer received call from Melva explaining that her current oxygen prescription expires on 9/9/22. She is wondering what needs to be done for recertification. Melva receives her oxygen through [facility]. Writer will route to Dr. Sosa's RNCC, Winnie, for appropriate follow-up. Melva can be reached on her home phone with updates.    Tonya Guidry, RN, BSN, OCN  Nurse Care Coordinator  [facility] [place]  P: [phone]     F: [phone]

## 2022-09-06 NOTE — PROGRESS NOTES
Writer called [facility] and confirmed they do not need anything for the oxygen renewal. Per chart review, pt completed her PFTs on 8/22/22 and had her office visit with Dr. Sosa on 8/10/22. Per [facility] they do not see anything in pt's chart flagging that they need anything at this point for her oxygen renewal.     Writer called Melva back and updated her. Melva verbalized understanding and had no further questions or concerns at this time.     Winnie Liao, RN, BSN, STEPHAN  RN Care Coordinator  [facility]  Ph: [phone]  F: [phone]

## 2022-09-06 NOTE — TELEPHONE ENCOUNTER
Signed Prescriptions:                        Disp   Refills    sulfamethoxazole-trimethoprim (BACTRIM) 40*30 tab*0        Sig: Take 1 tablet by mouth twice daily  Authorizing Provider: DELORIS CALDERA, RN, BSN, STEPHAN  RN Care Coordinator  [facility]  Ph: [phone]  F: [phone]

## 2022-09-08 ENCOUNTER — TELEPHONE (OUTPATIENT)
Dept: PHARMACY | Facility: CLINIC | Age: 72
End: 2022-09-08

## 2022-09-08 ENCOUNTER — VIRTUAL VISIT (OUTPATIENT)
Dept: PHARMACY | Facility: CLINIC | Age: 72
End: 2022-09-08
Payer: COMMERCIAL

## 2022-09-08 DIAGNOSIS — N39.0 URINARY TRACT INFECTION WITH HEMATURIA, SITE UNSPECIFIED: ICD-10-CM

## 2022-09-08 DIAGNOSIS — C43.9 MELANOMA OF SKIN (H): ICD-10-CM

## 2022-09-08 DIAGNOSIS — R31.9 URINARY TRACT INFECTION WITH HEMATURIA, SITE UNSPECIFIED: ICD-10-CM

## 2022-09-08 DIAGNOSIS — I26.99 OTHER PULMONARY EMBOLISM WITHOUT ACUTE COR PULMONALE, UNSPECIFIED CHRONICITY (H): ICD-10-CM

## 2022-09-08 DIAGNOSIS — E11.65 TYPE 2 DIABETES MELLITUS WITH HYPERGLYCEMIA, WITHOUT LONG-TERM CURRENT USE OF INSULIN (H): Primary | ICD-10-CM

## 2022-09-08 LAB — BACTERIA BRONCH: NO GROWTH

## 2022-09-08 PROCEDURE — 99607 MTMS BY PHARM ADDL 15 MIN: CPT | Performed by: PHARMACIST

## 2022-09-08 PROCEDURE — 99606 MTMS BY PHARM EST 15 MIN: CPT | Performed by: PHARMACIST

## 2022-09-08 NOTE — PROGRESS NOTES
Medication Therapy Management (MTM) Encounter    ASSESSMENT:                            Medication Adherence/Access: No issues identified    Type 2 Diabetes:  Home readings improved.  Will increase NPH with goal to keep readings consistently <200 without having lows.    Melanoma/PE: discussed trying to wear mittens overnight, she may be scratching herself in her sleep and have some skin tears.  If she continues to have these 'dots' would recommend visit with primary care provider to assess.      UTI:  Will notify primary care provider that she continues to have blood in her urine    PLAN:                            1.  Increase NPH to 34 units today.  If afternoon/evening readings remain >200 then increase dose Saturday morning to 36 units.    2.  I will let primary care provider know that patient continues to have blood in urine    3. Try wearing mittens overnight to see if that prevents you from scratching yourself and causing the black dots.  If the dots continue would recommend you see Dr. Torres to have her look at them.    Follow-up: Return in about 4 days (around 9/12/2022) for Medication Therapy Management.    SUBJECTIVE/OBJECTIVE:                          Gricelda Sabillon is a 72 year old female called for a follow-up visit.  Today's visit is a follow-up MTM visit from 8/23/22     Reason for visit: blood sugars    Allergies/ADRs: Reviewed in chart  Tobacco: She reports that she has never smoked. She has never used smokeless tobacco.  Alcohol: not currently using    Medication Adherence/Access: no issues reported    Type 2 Diabetes:  Currently taking Glipizide 20 mg daily and NPH 32 units daily (increased dose yesterday; she was able to get the larger syringes to dose more than 30 units at a time). Patient is not experiencing side effects.   Blood sugar monitoring: Continuous Glucose Monitor.           Symptoms of low blood sugar? none  Symptoms of high blood sugar? none  Eye exam: due  Foot exam: up to  date  Aspirin: No  Urine Albumin:   Lab Results   Component Value Date    UMALCR 8.99 03/21/2022      Lab Results   Component Value Date    A1C 8.3 07/12/2022    A1C 7.7 03/21/2022    A1C 7.8 11/15/2021    A1C 7.5 08/10/2021    A1C 7.1 03/03/2021     Melanoma/PE: Followed by Dr. Florez and Respiratory Therapy.   Using Lovenox twice daily as directed.   Also taking Bactrim twice daily as directed for possible pneumonitis and prednisone 60 mg daily with directions to taper every 2 weeks (Saturday start of Week 3, will decrease to 40mg daily).     Getting weird, black dots on her leg/hand that she scratched and it bled and bled.  Shows up in her sleep.  Doesn't recall scratching herself.  Sleeping in the chair this past week. No signs of infection.    UTI:  Continues to have blood in her urine, was a little bit better but then resumed about the same.  Continues on the cefdinir as prescribed - started 9/3/22.  No new symptoms.  Scheduled with Urology in Nov.      Today's Vitals: LMP 12/13/2002 (Exact Date)   ----------------  Post Discharge Medication Reconciliation Status: discharge medications reconciled, continue medications without change.    I spent 15 minutes with this patient today. All changes were made via collaborative practice agreement with Raisa Davidson MD. A copy of the visit note was provided to the patient's provider(s).    The patient was mailed a summary of these recommendations.     Arlet Harding , Pharm D  [phone] (phone)  Medication Therapy Management Pharmacist     Telemedicine Visit Details  Type of service:  Telephone visit  Start Time: 830am  End Time: 845am  Originating Location (patient location): Home  Distant Location (provider location):  [facility]     Medication Therapy Recommendations  Diabetes mellitus, type 2 (H)    Current Medication: insulin  UNIT/ML vial   Rationale: Dose too low - Dosage too low - Effectiveness   Recommendation:  Increase Dose   Status: Accepted per CPA         Urinary tract infection with hematuria, site unspecified    Current Medication: sulfamethoxazole-trimethoprim (BACTRIM) 400-80 MG tablet   Rationale: Condition refractory to medication - Ineffective medication - Effectiveness   Recommendation: Referral to Service    Status: Patient Agreed - Adherence/Education

## 2022-09-08 NOTE — PATIENT INSTRUCTIONS
"Recommendations from today's MTM visit:                                                       .  Increase NPH to 34 units today.  If afternoon/evening readings remain >200 then increase dose Saturday morning to 36 units.    2.  I will let primary care provider know that patient continues to have blood in urine    3. Try wearing mittens overnight to see if that prevents you from scratching yourself and causing the black dots.  If the dots continue would recommend you see Dr. Torres to have her look at them.    Follow-up: Return in about 4 days (around 9/12/2022) for Medication Therapy Management.    It was great speaking with you today.  I value your experience and would be very thankful for your time in providing feedback in our clinic survey. In the next few days, you may receive an email or text message from [employer] with a link to a survey related to your  clinical pharmacist.\"     To schedule another MTM appointment, please call the clinic directly or you may call the MTM scheduling line at [phone] or toll-free at [phone].     My Clinical Pharmacist's contact information:                                                      Please feel free to contact me with any questions or concerns you have.      Arlet Harding , Pharm D  [phone] (phone)  Medication Therapy Management Pharmacist     "

## 2022-09-08 NOTE — TELEPHONE ENCOUNTER
Melva has been dosing NPH 32 units (increased yesterday)    Getting weird, black dots on her leg/hand that she scratched and it bled and bled.  Shows up in her sleep.  Doesn't recall scratching herself.  Sleeping in the chair this past week. No signs of infection.    Continues to have blood in her urine, was a little bit better but then resumed about the same.  Continues on the cefidinr as prescribed.  No new symptoms.  Scheduled with Urology in Nov.      Plan:  Increase NPH to 34 units today.  If afternoon/evening readings remain >200 then increase dose Saturday morning to 36 units.    See MTM note for full documentation

## 2022-09-12 ENCOUNTER — TELEPHONE (OUTPATIENT)
Dept: PHARMACY | Facility: CLINIC | Age: 72
End: 2022-09-12

## 2022-09-12 DIAGNOSIS — E11.65 TYPE 2 DIABETES MELLITUS WITH HYPERGLYCEMIA, WITHOUT LONG-TERM CURRENT USE OF INSULIN (H): ICD-10-CM

## 2022-09-12 RX ORDER — GLIPIZIDE 10 MG/1
10 TABLET, FILM COATED, EXTENDED RELEASE ORAL DAILY
Qty: 60 TABLET | Refills: 1 | COMMUNITY
Start: 2022-09-12 | End: 2022-09-15

## 2022-09-12 NOTE — TELEPHONE ENCOUNTER
Ribs started hurting this weekend.  Also experienced visual disturbances (per patient like modern art) three times this weekend that lasted about 15 minutes.  Closed her eyes and it resolved.  Has reduced prednisone to 20 mg daily per taper schedule.  Will be on this for 2 weeks.    Almost finished with antibiotic for UTI.  Still having blood in urine but not as dark. No new symptoms.      Has been dosing NPH 36 units every morning.  Continues on glipizide 20 mg daily.  Having some lows early morning.    Plan:  Reduce glipizide 10 mg daily to prevent overnight lows.  NPH 38 units every morning.    Will notify provider about visual disturbance - possible bactrim/prednisone side effect?

## 2022-09-12 NOTE — TELEPHONE ENCOUNTER
Not sure about the cause for the vision changes.  It can be secondary to medications, it can be secondary high fluctuations in the blood sugars.  I suggest to see her eye doctor

## 2022-09-15 ENCOUNTER — TELEPHONE (OUTPATIENT)
Dept: INTERNAL MEDICINE | Facility: CLINIC | Age: 72
End: 2022-09-15

## 2022-09-15 ENCOUNTER — TELEPHONE (OUTPATIENT)
Dept: PHARMACY | Facility: CLINIC | Age: 72
End: 2022-09-15

## 2022-09-15 DIAGNOSIS — E11.65 TYPE 2 DIABETES MELLITUS WITH HYPERGLYCEMIA, WITHOUT LONG-TERM CURRENT USE OF INSULIN (H): ICD-10-CM

## 2022-09-15 RX ORDER — GLIPIZIDE 10 MG/1
10 TABLET, FILM COATED, EXTENDED RELEASE ORAL DAILY
Qty: 30 TABLET | Refills: 3 | Status: SHIPPED | OUTPATIENT
Start: 2022-09-15 | End: 2022-09-29

## 2022-09-15 NOTE — TELEPHONE ENCOUNTER
Ember at [facility] ([phone]) calls for verbal orders for PT 1 add visit the wk of 09/26/22.  Finalize at home exercise program for strengthening and endurance.  Ok to leave a detailed message.    MARISSA from patient that she is on 1 loiter of O2 during the day and 1 1/2 at night.  This is less than previously noted.

## 2022-09-15 NOTE — TELEPHONE ENCOUNTER
Last spoke to patient on Monday 9/12, plan at that time reduce glipizide 10 mg daily to prevent overnight lows.  NPH 38 units every morning.    Finally has clear urine, no bright red blood.  Plans to recheck UA as recommended.  No more visual disturbances since we spoke.  Questions if that was related to being dehydrated.      Plant today:  Increase NPH 40 units daily and glipizide 10 mg daily.  If she continues to have lows over-night, early morning then stop glipizide.  Will follow-up on Monday.

## 2022-09-16 ENCOUNTER — TELEPHONE (OUTPATIENT)
Dept: INTERNAL MEDICINE | Facility: CLINIC | Age: 72
End: 2022-09-16

## 2022-09-16 ENCOUNTER — MEDICAL CORRESPONDENCE (OUTPATIENT)
Dept: HEALTH INFORMATION MANAGEMENT | Facility: CLINIC | Age: 72
End: 2022-09-16

## 2022-09-19 ENCOUNTER — ONCOLOGY VISIT (OUTPATIENT)
Dept: ONCOLOGY | Facility: CLINIC | Age: 72
End: 2022-09-19
Attending: INTERNAL MEDICINE
Payer: COMMERCIAL

## 2022-09-19 ENCOUNTER — HOSPITAL ENCOUNTER (OUTPATIENT)
Dept: CT IMAGING | Facility: CLINIC | Age: 72
Discharge: HOME | End: 2022-09-19
Attending: INTERNAL MEDICINE | Admitting: INTERNAL MEDICINE
Payer: COMMERCIAL

## 2022-09-19 ENCOUNTER — VIRTUAL VISIT (OUTPATIENT)
Dept: PHARMACY | Facility: CLINIC | Age: 72
End: 2022-09-19
Payer: COMMERCIAL

## 2022-09-19 VITALS
SYSTOLIC BLOOD PRESSURE: 115 MMHG | BODY MASS INDEX: 28.34 KG/M2 | HEIGHT: 68 IN | RESPIRATION RATE: 18 BRPM | OXYGEN SATURATION: 99 % | DIASTOLIC BLOOD PRESSURE: 59 MMHG | WEIGHT: 187 LBS | TEMPERATURE: 98 F | HEART RATE: 61 BPM

## 2022-09-19 DIAGNOSIS — E11.65 TYPE 2 DIABETES MELLITUS WITH HYPERGLYCEMIA, WITHOUT LONG-TERM CURRENT USE OF INSULIN (H): Primary | ICD-10-CM

## 2022-09-19 DIAGNOSIS — N39.0 URINARY TRACT INFECTION WITH HEMATURIA, SITE UNSPECIFIED: ICD-10-CM

## 2022-09-19 DIAGNOSIS — C43.9 MELANOMA OF SKIN (H): ICD-10-CM

## 2022-09-19 DIAGNOSIS — R31.9 URINARY TRACT INFECTION WITH HEMATURIA, SITE UNSPECIFIED: ICD-10-CM

## 2022-09-19 DIAGNOSIS — J84.9 ILD (INTERSTITIAL LUNG DISEASE) (H): ICD-10-CM

## 2022-09-19 DIAGNOSIS — Z79.899 HIGH RISK MEDICATION USE: Primary | ICD-10-CM

## 2022-09-19 DIAGNOSIS — I26.99 OTHER PULMONARY EMBOLISM WITHOUT ACUTE COR PULMONALE, UNSPECIFIED CHRONICITY (H): ICD-10-CM

## 2022-09-19 DIAGNOSIS — J84.89 ORGANIZING PNEUMONIA (H): ICD-10-CM

## 2022-09-19 PROCEDURE — 99606 MTMS BY PHARM EST 15 MIN: CPT | Performed by: PHARMACIST

## 2022-09-19 PROCEDURE — 99607 MTMS BY PHARM ADDL 15 MIN: CPT | Performed by: PHARMACIST

## 2022-09-19 PROCEDURE — 71250 CT THORAX DX C-: CPT

## 2022-09-19 PROCEDURE — G0463 HOSPITAL OUTPT CLINIC VISIT: HCPCS

## 2022-09-19 PROCEDURE — 99214 OFFICE O/P EST MOD 30 MIN: CPT | Performed by: INTERNAL MEDICINE

## 2022-09-19 RX ORDER — PREDNISONE 5 MG/1
TABLET ORAL
Qty: 42 TABLET | Refills: 0 | Status: SHIPPED | OUTPATIENT
Start: 2022-09-19 | End: 2022-10-06

## 2022-09-19 ASSESSMENT — PAIN SCALES - GENERAL: PAINLEVEL: MILD PAIN (2)

## 2022-09-19 NOTE — PROGRESS NOTES
"[facility] Initial Visit    Reason for Visit  Gricelda Sabillon is a 72 year old female who is referred by Rosibel Florez and Melissa for abnormal chest imaging  Pulmonary HPI    - Earlier this year she noted a lump on her thigh, it was indeterminate and removed, found to be melanoma. During the workup, abnormal imaging found in the uterus so she had hysterectomy. Last week she had a percutaneous lung biopsy and the week before she was diagnosed with pulmonary embolism  - she was coughing before the hospitalization, improved since treatment for PE, breathing is improved, can \"almost\" take a deep breath  - she is taking lovenox injections  - unclear if the PE was from malignancy vs postoperative inactivity      Timeline of medical care this year:   - Jan 2022 leg mass resection; March 2022 extended surgical resection for margins   Jan 2022 CT showing lung nodule, not PET FDG avid  May port placed - April 29 first immunotherapy Keytruda, every 3 weeks May 20, Neha 10, July 5 last treatment  - June 2022 hysterectomy, lymph nodes negative for malignancy, low grade endometrial cancer  After the hysterectomy, she had some issues with nausea, poor appetite. Thought it might be semaglutide so it was stopped. No other new medications.  Routine visit on 7/25/22 with Dr Torres showed hypoxemia, she went to ED and noted to have a PE. She didn't feel that bad but became short of breath at the clinic - discharged with 4 lpm supplemental oxygen, she is on 2 lpm now, usually 92-93%  She continues to experience lightheadedness, nausea, chills every morning. No appetite, nothing tastes good.   - Aug 2022 lung biopsy percutaneous   - history of 1-2 episodes of bronchitis annually, family history of asthma. Has used albuterol in the past for these episodes  - she has been hanging about the house, no camping or outdoor activities, no pets      ROS Pulmonary  Dyspnea: Yes, Cough: Yes, Chest pain: No, " "Wheezing: No, Sputum Production: No, Hemoptysis: No  A complete ROS was otherwise negative except as noted in the HPI.  The patient was seen and examined by Roselia Sosa MD   Current Outpatient Medications   Medication     albuterol (PROAIR HFA/PROVENTIL HFA/VENTOLIN HFA) 108 (90 Base) MCG/ACT inhaler     atenolol (TENORMIN) 50 MG tablet     atorvastatin (LIPITOR) 20 MG tablet     azelastine (ASTELIN) 0.1 % nasal spray     azelastine (OPTIVAR) 0.05 % SOLN     cetirizine (ZYRTEC) 10 MG tablet     Continuous Blood Gluc Sensor (FREESTYLE DAVID 14 DAY SENSOR) MISC     CONTOUR NEXT TEST test strip     glipiZIDE (GLUCOTROL XL) 10 MG 24 hr tablet     insulin  UNIT/ML vial     insulin syringe-needle U-100 (BD INSULIN SYRINGE ULTRAFINE) 31G X 5/16\" 0.5 ML miscellaneous     latanoprost (XALATAN) 0.005 % ophthalmic solution     lidocaine-prilocaine (EMLA) 2.5-2.5 % external cream     LORazepam (ATIVAN) 0.5 MG tablet     ondansetron (ZOFRAN) 8 MG tablet     predniSONE (DELTASONE) 20 MG tablet     senna-docusate (SENOKOT-S/PERICOLACE) 8.6-50 MG tablet     sulfamethoxazole-trimethoprim (BACTRIM) 400-80 MG tablet     timolol maleate (TIMOPTIC) 0.5 % ophthalmic solution     triamcinolone (KENALOG) 0.5 % external ointment     No current facility-administered medications for this visit.     Allergies   Allergen Reactions     Bromfenac Visual Disturbance      xibrom  Causes sever eye pain     Codeine      \"NAUSE,HA AND DIZZINESS\"     Codeine Nausea     Other reaction(s): Dizziness, Headache     Social History     Socioeconomic History     Marital status:      Spouse name: Allen     Number of children: 3     Years of education: 15     Highest education level: Not on file   Occupational History     Not on file   Tobacco Use     Smoking status: Never Smoker     Smokeless tobacco: Never Used   Vaping Use     Vaping Use: Never used   Substance and Sexual Activity     Alcohol use: No     Alcohol/week: 0.0 standard drinks "     Drug use: No     Sexual activity: Not Currently     Partners: Male   Other Topics Concern     Parent/sibling w/ CABG, MI or angioplasty before 65F 55M? Not Asked   Social History Narrative     Not on file     Social Determinants of Health     Financial Resource Strain: Not on file   Food Insecurity: Not on file   Transportation Needs: Not on file   Physical Activity: Not on file   Stress: Not on file   Social Connections: Not on file   Intimate Partner Violence: Not At Risk     Fear of Current or Ex-Partner: No     Emotionally Abused: No     Physically Abused: No     Sexually Abused: No   Housing Stability: Not on file     Past Medical History:   Diagnosis Date     Asthma, mild intermittent      Cataract      Endometrial cancer (H)      HTN, goal below 140/90      Hyperlipidemia LDL goal < 100      Macular hole of left eye      Melanoma (H)     RIGHT KNEE     Sleep apnea     uses c pap     Type 2 diabetes, HbA1C goal < 8% (H)      Past Surgical History:   Procedure Laterality Date     ARTHROPLASTY HIP Right 9/25/2017    Procedure: ARTHROPLASTY HIP;  Right total hip arthroplasty  ;  Surgeon: Ayaan Pena MD;  Location:  OR     ARTHROPLASTY KNEE  7/12/2013    Procedure: ARTHROPLASTY KNEE;  right total knee arthroplasty ;  Surgeon: Chaparro Wesley MD;  Location:  OR     ARTHROSCOPY KNEE Right 1/21/2015    Procedure: ARTHROSCOPY KNEE;  Surgeon: Ayaan Pena MD;  Location:  OR     BRONCHOSCOPY (RIGID OR FLEXIBLE), DIAGNOSTIC N/A 8/11/2022    Procedure: BRONCHOSCOPY, WITH BRONCHOALVEOLAR LAVAGE;  Surgeon: Roselia Soas MD;  Location:  GI     C INCISION OF HYMEN       CATARACT IOL, RT/LT       COLONOSCOPY  2008     COLONOSCOPY N/A 4/18/2019    Procedure: Colonoscopy with polypectomy;  Surgeon: Shaun Guerrero MD;  Location: [facility]     CYSTOSCOPY N/A 6/23/2022    Procedure: Cystoscopy;  Surgeon: Eli Guidry MD;  Location: [facility]     ENDOSCOPIC RETROGRADE  CHOLANGIOPANCREATOGRAM N/A 2/6/2019    Procedure: COMBINED ENDOSCOPIC RETROGRADE CHOLANGIOPANCREATOGRAPHY, BILIARY SPINCTEROTOMY AND DILATION, PLACE BILE DUCT STENT;  Surgeon: Guru Andie Gonzalez MD;  Location: [facility]     ENDOSCOPIC RETROGRADE CHOLANGIOPANCREATOGRAM N/A 2/28/2019    Procedure: Endoscopic Retrograde Cholangiopancreatogram, Bile duct stent removal and placement;  Surgeon: Shaun Guerrero MD;  Location: [facility]     ENDOSCOPIC RETROGRADE CHOLANGIOPANCREATOGRAM N/A 4/18/2019    Procedure: COMBINED ENDOSCOPIC RETROGRADE CHOLANGIOPANCREATOGRAPHY, Bile duct stent exchange and Polypectomy;  Surgeon: Shaun Guerrero MD;  [facility]: [facility]     ENDOSCOPIC RETROGRADE CHOLANGIOPANCREATOGRAM N/A 10/18/2019    Procedure: Endoscopic Retrograde Cholangiopancreatogram;  Surgeon: Shaun Guerrero MD;  Location:  OR     ENDOSCOPIC RETROGRADE CHOLANGIOPANCREATOGRAM N/A 3/24/2022    Procedure: ENDOSCOPIC RETROGRADE CHOLANGIOPANCREATOGRAPHY;  Surgeon: Shaun Guerrero MD;  Location:  OR     ENDOSCOPIC RETROGRADE CHOLANGIOPANCREATOGRAM WITH SPYGLASS N/A 7/26/2019    Procedure: Endoscopic Retrograde Cholangiopancreatogram With Spyglas,l Radiofrequency Ablation, Stent Exchangeand Duodenal Biopsy x2;  Surgeon: Shaun Guerrero MD;  Location:  OR     ENDOSCOPIC RETROGRADE CHOLANGIOPANCREATOGRAM WITH SPYGLASS N/A 9/10/2020    Procedure: ENDOSCOPIC RETROGRADE CHOLANGIOPANCREATOGRAPHY, WITH DIRECT DUCT VISUALIZATION, USING PANCREATICOBILIARY FIBEROPTIC PROBE;  Surgeon: Shaun Guerrero MD;  [facility]: [facility]     ENDOSCOPIC RETROGRADE CHOLANGIOPANCREATOGRAM WITH SPYGLASS N/A 3/22/2021    Procedure: ENDOSCOPIC RETROGRADE CHOLANGIOPANCREATOGRAPHY, WITH DIRECT DUCT VISUALIZATION, USING PANCREATICOBILIARY FIBEROPTIC PROBE;  Surgeon: Shaun Guerrero MD;  Location:  OR     ESOPHAGOSCOPY, GASTROSCOPY, DUODENOSCOPY (EGD) WITH RADIO FREQUENCY ABLATION, COMBINED N/A 9/10/2020    Procedure: possible repeat ESOPHAGOGASTRODUODENOSCOPY,  WITH RADIOFREQUENCY ABLATION and endoscopic mucosal resection;  Surgeon: Shaun Guerrero MD;  Location: [facility]     ESOPHAGOSCOPY, GASTROSCOPY, DUODENOSCOPY (EGD), COMBINED N/A 4/18/2019    Procedure: upper endoscopy with polypectomy;  Surgeon: Shaun Guerrero MD;  [facility]: [facility]     ESOPHAGOSCOPY, GASTROSCOPY, DUODENOSCOPY (EGD), COMBINED N/A 10/18/2019    Procedure: Upper Endoscopy and ERCP with stent removal, stone removal and biopsy;  Surgeon: Shaun Guerrero MD;  Location: [facility]     ESOPHAGOSCOPY, GASTROSCOPY, DUODENOSCOPY (EGD), COMBINED N/A 3/24/2022    Procedure: ESOPHAGOGASTRODUODENOSCOPY (EGD), Duodenal  polyp removal;  Surgeon: Shaun Guerrero MD;  Location: [facility] OR     ESOPHAGOSCOPY, GASTROSCOPY, DUODENOSCOPY (EGD), RESECT MUCOSA, COMBINED N/A 2/28/2019    Procedure: Upper Endoscopy, Endoscopic Ultrasound, Endoscopic Mucosal Resection,  Ampullectomy, polypectomy.;  Surgeon: Shaun Guerrero MD;  Location: [facility]     ESOPHAGOSCOPY, GASTROSCOPY, DUODENOSCOPY (EGD), RESECT MUCOSA, COMBINED N/A 3/22/2021    Procedure: ESOPHAGOGASTRODUODENOSCOPY (EGD) with  endoscopic mucosal resection/ polypectomy;  Surgeon: Shaun Guerrero MD;  Location: [facility]     EXCISE LESION LOWER EXTREMITY Right 3/28/2022    Procedure: Wide local excision of right knee melanoma;  Surgeon: Chevy Allison MD;  Location: [facility] OR     EXCISE MASS LOWER EXTREMITY Right 1/13/2022    Procedure: excision of right thigh mass;  Surgeon: Salvador Kelly MD;  Location: [facility] OR     EYE SURGERY      macular hole repaired left eye     HC KNEE SCOPE, DIAGNOSTIC      - both knees     HEAD & NECK SURGERY      wisdom teeth     IR CHEST PORT PLACEMENT > 5 YRS OF AGE  5/2/2022     LAPAROSCOPIC HYSTERECTOMY TOTAL, BILATERAL SALPINGO-OOPHORECTOMY, NODE DISSECTION, COMBINED Bilateral 6/23/2022    Procedure: Total Laparoscopic Hyserectomy, Bilateral Salpibgo-oophorectomy, Bilateral [place] Lymph Node Dissection;  Surgeon: Eli Guidry MD;  Location: [facility]  "    Family History   Problem Relation Age of Onset     Hypertension Mother         diabetes,hypoythryoidism, stroke     Diabetes Mother      Breast Cancer Mother      Heart Disease Father         , cancer lip     Uterine Cancer Sister      Connective Tissue Disorder Sister         fibromyalgia     Diabetes Sister      Hypertension Brother      Cerebrovascular Disease Maternal Grandmother         , diabetes     Cerebrovascular Disease Maternal Grandfather              Cancer Paternal Grandmother              Heart Disease Paternal Grandfather              Cancer Paternal Aunt         ovarian     Breast Cancer Niece      Asthma Maternal Aunt      Exam:   /59 (Cuff Size: Adult Regular)   Pulse 61   Temp 98  F (36.7  C) (Tympanic)   Resp 18   Ht 1.727 m (5' 8\")   Wt 84.8 kg (187 lb)   LMP 2002 (Exact Date)   SpO2 99%   BMI 28.43 kg/m    GENERAL APPEARANCE: Well developed, well nourished, alert, and in no apparent distress.  EYES: PERRL, EOMI  HENT: Nasal mucosa with no edema and no hyperemia. No nasal polyps.  EARS: Canals clear, TMs normal  MOUTH: Oral mucosa is moist, without any lesions, no tonsillar enlargement, no oropharyngeal exudate.  NECK: supple, no masses, no thyromegaly.  LYMPHATICS: No significant axillary, cervical, or supraclavicular nodes.  RESP: normal percussion, good air flow throughout.  Bilateral inspiratory crackles. No rhonchi. No wheezes.  CV: Normal S1, S2, regular rhythm, normal rate. No murmur.  No rub. No gallop. No LE edema.   ABDOMEN:  Bowel sounds normal, soft, nontender, no HSM or masses.   MS: extremities normal. No clubbing. No cyanosis.  SKIN: no rash on limited exam, healed right thigh surgical incisions  NEURO: Mentation intact, speech normal, normal strength and tone, normal gait and stance  PSYCH: mentation appears normal. and affect normal/bright  Results:  - My interpretation of the images relevant for this visit " includes: chest images reviewed Jan 2022, July 2022, Aug 2022, newly noted bilateral patchy ground glass opacity and consolidations in the bases  - My interpretation of the PFT's relevant for this visit includes: None   - lung biopsy 8/2/22 lung and bronchial mucosa with chronic inflammation and scattered fibroblastic foci plugging airspaces.  There is no malignancy.        Assessment and plan: Melva is a 71 yo female with melanoma, pulmonary embolism, diabetes, unexplained lung abnormalities and hypoxic respiratory failure  Hypoxic respiratory failure - multifactorial with recent pulmonary embolism. Currently on 2 lpm    Oxygen Rx goes through [facility], currently Sept expiration.   Unexplained lung imaging abnormalities - pathology is consistent with organizing pneumonia    differential diagnosis includes immunotherapy related pneumonitis, cryptogenic organizing pneumonia, infection   Bronchoscopy did not show signs of infection (negative    Anticipate starting steroids after bronchoscopy 45 mg daily (0.75mg/kg) and prolonged taper   PFT as soon as possible   Plan for repeat imaging in 4-6 weeks   Check CRP tomorrow    Pulmonary embolism - acute, related to malignancy? Vs inactivity   She is on lovenox BID for treatment    RTC 4-6 weeks

## 2022-09-19 NOTE — PROGRESS NOTES
Medication Therapy Management (MTM) Encounter    ASSESSMENT:                            Medication Adherence/Access: No issues identified    Type 2 Diabetes:  Home readings improved.  Patient would prefer to stay on 40 units of insulin today and see if her readings are as good as yesterday.  If they should spike again, will increase to 42 units tomorrow morning.    Melanoma/PE: follow-up scheduled today    UTI:  Resolved.  Will repeat UA as planned.    PLAN:                            Continue current regimen - NPH 40 units every morning.  If blood sugars should spike this afternoon then increase to 42 units.    Follow-up: Return in about 3 days (around 9/22/2022) for Medication Therapy Management.      SUBJECTIVE/OBJECTIVE:                          Gricelda Sabillon is a 72 year old female called for a follow-up visit.  Today's visit is a follow-up MTM visit from 9/8/22.     Reason for visit: blood sugar follow-up    Allergies/ADRs: Reviewed in chart  Tobacco: She reports that she has never smoked. She has never used smokeless tobacco.  Alcohol: not currently using    Medication Adherence/Access: no issues reported    Type 2 Diabetes:  Currently taking Glipizide 10 mg daily and NPH 40 units daily (increased dose yesterday; she was able to get the larger syringes to dose more than 30 units at a time). Patient is not experiencing side effects.   Blood sugar monitoring: Continuous Glucose Monitor.               Symptoms of low blood sugar? none  Symptoms of high blood sugar? none  Eye exam: due  Foot exam: up to date  Aspirin: No  Urine Albumin:   Lab Results   Component Value Date    UMALCR 8.99 03/21/2022      Lab Results   Component Value Date    A1C 8.3 07/12/2022    A1C 7.7 03/21/2022    A1C 7.8 11/15/2021    A1C 7.5 08/10/2021    A1C 7.1 03/03/2021     Melanoma/PE: Followed by Dr. Florez and Respiratory Therapy.   Also taking Bactrim twice daily as directed for possible pneumonitis and prednisone 20 mg daily (has  been tapering off and scheduled to be done Saturday).   Follow-up scheduled today so will confirm the taper/schedule then.  .        UTI:  Completed course of cefdinir.  No blood in her urine at this time.  No new symptoms.  Scheduled for repeat UA on Wednesday.      Today's Vitals: LMP 12/13/2002 (Exact Date)   ----------------      I spent 10 minutes with this patient today. All changes were made via collaborative practice agreement with Raisa Davidson MD. A copy of the visit note was provided to the patient's provider(s).    The patient was sent via [employer] a summary of these recommendations.     Arlet Harding , Pharm D  [phone] (phone)  Medication Therapy Management Pharmacist     Telemedicine Visit Details  Type of service:  Telephone visit  Start Time: 825am  End Time: 8:35 AM  Originating Location (patient location): [place]  Distant Location (provider location):  [facility]     Medication Therapy Recommendations  No medication therapy recommendations to display

## 2022-09-19 NOTE — NURSING NOTE
"Oncology Rooming Note    September 19, 2022 11:55 AM   Gricelda Sabillon is a 72 year old female who presents for:    Chief Complaint   Patient presents with     Lung Nodule     Initial Vitals: /59 (Cuff Size: Adult Regular)   Pulse 61   Temp 98  F (36.7  C) (Tympanic)   Resp 18   Ht 1.727 m (5' 8\")   Wt 84.8 kg (187 lb)   LMP 12/13/2002 (Exact Date)   SpO2 99%   BMI 28.43 kg/m   Estimated body mass index is 28.43 kg/m  as calculated from the following:    Height as of this encounter: 1.727 m (5' 8\").    Weight as of this encounter: 84.8 kg (187 lb). Body surface area is 2.02 meters squared.  Mild Pain (2) Comment: Data Unavailable   Patient's last menstrual period was 12/13/2002 (exact date).  Allergies reviewed: Yes  Medications reviewed: Yes    Medications: Medication refills not needed today.  Pharmacy name entered into EPIC:    [facility] - A MAIL ORDER [id number] - [facility] [id number] [address]    Clinical concerns: f/u       Eli Schneider, MARSHALL              "

## 2022-09-19 NOTE — LETTER
"    9/19/2022         RE: Gricelda Sabillon  [address]  [facility] [address]        Dear Colleague,    Thank you for referring your patient, Gricelda Sabillon, to the [facility]. Please see a copy of my visit note below.    [facility] Follow Up Visit    Reason for Visit  Gricelda Sabillon is a 72 year old female who is referred by Rosibel Florez and Melissa for abnormal chest imaging  Pulmonary HPI    - Since our last visit, her  notes she seems more like herself, she is laughing and eating. Breathing is better she is using 1 - 1.5Lpm oxygen. She has been on prednisone nearly 6 weeks now, recently 20mg daily. She has had hyperglycemia related to prednisone and working actively with pharmacy to manage it      Earlier this year she noted a lump on her thigh, it was indeterminate and removed, found to be melanoma. During the workup, abnormal imaging found in the uterus so she had hysterectomy. Last week she had a percutaneous lung biopsy and the week before she was diagnosed with pulmonary embolism  - she was coughing before the hospitalization, improved since treatment for PE, breathing is improved, can \"almost\" take a deep breath  - she is taking lovenox injections  - unclear if the PE was from malignancy vs postoperative inactivity      Timeline of medical care this year:   - Jan 2022 leg mass resection; March 2022 extended surgical resection for margins   Jan 2022 CT showing lung nodule, not PET FDG avid  May port placed - April 29 first immunotherapy Keytruda, every 3 weeks May 20, Neha 10, July 5 last treatment  - June 2022 hysterectomy, lymph nodes negative for malignancy, low grade endometrial cancer  After the hysterectomy, she had some issues with nausea, poor appetite. Thought it might be semaglutide so it was stopped. No other new medications.  Routine visit on 7/25/22 with Dr Torres showed hypoxemia, she went to ED and noted to " "have a PE. She didn't feel that bad but became short of breath at the clinic - discharged with 4 lpm supplemental oxygen, she is on 2 lpm now, usually 92-93%  She continues to experience lightheadedness, nausea, chills every morning. No appetite, nothing tastes good.   - Aug 2022 lung biopsy percutaneous   - history of 1-2 episodes of bronchitis annually, family history of asthma. Has used albuterol in the past for these episodes  - she has been hanging about the house, no camping or outdoor activities, no pets    ROS Pulmonary  Dyspnea: Yes, Cough: Yes, Chest pain: No, Wheezing: No, Sputum Production: No, Hemoptysis: No  A complete ROS was otherwise negative except as noted in the HPI.  The patient was seen and examined by Roselia Sosa MD   Current Outpatient Medications   Medication     albuterol (PROAIR HFA/PROVENTIL HFA/VENTOLIN HFA) 108 (90 Base) MCG/ACT inhaler     atenolol (TENORMIN) 50 MG tablet     atorvastatin (LIPITOR) 20 MG tablet     azelastine (ASTELIN) 0.1 % nasal spray     azelastine (OPTIVAR) 0.05 % SOLN     cetirizine (ZYRTEC) 10 MG tablet     Continuous Blood Gluc Sensor (FREESTYLE DAVID 14 DAY SENSOR) MIS     CONTOUR NEXT TEST test strip     glipiZIDE (GLUCOTROL XL) 10 MG 24 hr tablet     insulin  UNIT/ML vial     insulin syringe-needle U-100 (BD INSULIN SYRINGE ULTRAFINE) 31G X 5/16\" 0.5 ML miscellaneous     latanoprost (XALATAN) 0.005 % ophthalmic solution     lidocaine-prilocaine (EMLA) 2.5-2.5 % external cream     LORazepam (ATIVAN) 0.5 MG tablet     ondansetron (ZOFRAN) 8 MG tablet     predniSONE (DELTASONE) 20 MG tablet     senna-docusate (SENOKOT-S/PERICOLACE) 8.6-50 MG tablet     sulfamethoxazole-trimethoprim (BACTRIM) 400-80 MG tablet     timolol maleate (TIMOPTIC) 0.5 % ophthalmic solution     triamcinolone (KENALOG) 0.5 % external ointment     No current facility-administered medications for this visit.     Allergies   Allergen Reactions     Bromfenac Visual Disturbance " "     xibrom  Causes sever eye pain     Codeine      \"NAUSE,HA AND DIZZINESS\"     Codeine Nausea     Other reaction(s): Dizziness, Headache     Social History     Socioeconomic History     Marital status:      Spouse name: Allen     Number of children: 3     Years of education: 15     Highest education level: Not on file   Occupational History     Not on file   Tobacco Use     Smoking status: Never Smoker     Smokeless tobacco: Never Used   Vaping Use     Vaping Use: Never used   Substance and Sexual Activity     Alcohol use: No     Alcohol/week: 0.0 standard drinks     Drug use: No     Sexual activity: Not Currently     Partners: Male   Other Topics Concern     Parent/sibling w/ CABG, MI or angioplasty before 65F 55M? Not Asked   Social History Narrative     Not on file     Social Determinants of Health     Financial Resource Strain: Not on file   Food Insecurity: Not on file   Transportation Needs: Not on file   Physical Activity: Not on file   Stress: Not on file   Social Connections: Not on file   Intimate Partner Violence: Not At Risk     Fear of Current or Ex-Partner: No     Emotionally Abused: No     Physically Abused: No     Sexually Abused: No   Housing Stability: Not on file     Past Medical History:   Diagnosis Date     Asthma, mild intermittent      Cataract      Endometrial cancer (H)      HTN, goal below 140/90      Hyperlipidemia LDL goal < 100      Macular hole of left eye      Melanoma (H)     RIGHT KNEE     Sleep apnea     uses c pap     Type 2 diabetes, HbA1C goal < 8% (H)      Past Surgical History:   Procedure Laterality Date     ARTHROPLASTY HIP Right 9/25/2017    Procedure: ARTHROPLASTY HIP;  Right total hip arthroplasty  ;  Surgeon: Ayaan Pena MD;  [facility]: [facility]     ARTHROPLASTY KNEE  7/12/2013    Procedure: ARTHROPLASTY KNEE;  right total knee arthroplasty ;  Surgeon: Chaparro Wesley MD;  Location:  OR     ARTHROSCOPY KNEE Right 1/21/2015    Procedure: ARTHROSCOPY " KNEE;  Surgeon: Ayaan Pena MD;  Location: [facility]     BRONCHOSCOPY (RIGID OR FLEXIBLE), DIAGNOSTIC N/A 8/11/2022    Procedure: BRONCHOSCOPY, WITH BRONCHOALVEOLAR LAVAGE;  Surgeon: Roselia Sosa MD;  Location: [facility]     C INCISION OF HYMEN       CATARACT IOL, RT/LT       COLONOSCOPY  2008     COLONOSCOPY N/A 4/18/2019    Procedure: Colonoscopy with polypectomy;  Surgeon: Shaun Guerrero MD;  [facility]     CYSTOSCOPY N/A 6/23/2022    Procedure: Cystoscopy;  Surgeon: Eli Guidry MD;  [facility]     ENDOSCOPIC RETROGRADE CHOLANGIOPANCREATOGRAM N/A 2/6/2019    Procedure: COMBINED ENDOSCOPIC RETROGRADE CHOLANGIOPANCREATOGRAPHY, BILIARY SPINCTEROTOMY AND DILATION, PLACE BILE DUCT STENT;  Surgeon: Guru Andie Gonzalez MD;  [facility]     ENDOSCOPIC RETROGRADE CHOLANGIOPANCREATOGRAM N/A 2/28/2019    Procedure: Endoscopic Retrograde Cholangiopancreatogram, Bile duct stent removal and placement;  Surgeon: Shaun Guerrero MD;  [facility]     ENDOSCOPIC RETROGRADE CHOLANGIOPANCREATOGRAM N/A 4/18/2019    Procedure: COMBINED ENDOSCOPIC RETROGRADE CHOLANGIOPANCREATOGRAPHY, Bile duct stent exchange and Polypectomy;  Surgeon: Shaun Guerrero MD;  [facility]     ENDOSCOPIC RETROGRADE CHOLANGIOPANCREATOGRAM N/A 10/18/2019    Procedure: Endoscopic Retrograde Cholangiopancreatogram;  Surgeon: Shaun Guerrero MD;  [facility]     ENDOSCOPIC RETROGRADE CHOLANGIOPANCREATOGRAM N/A 3/24/2022    Procedure: ENDOSCOPIC RETROGRADE CHOLANGIOPANCREATOGRAPHY;  Surgeon: Shaun Guerrero MD;  Location:  OR     ENDOSCOPIC RETROGRADE CHOLANGIOPANCREATOGRAM WITH SPYGLASS N/A 7/26/2019    Procedure: Endoscopic Retrograde Cholangiopancreatogram With Spyglas,l Radiofrequency Ablation, Stent Exchangeand Duodenal Biopsy x2;  Surgeon: Shaun Guerrero MD;  [facility]     ENDOSCOPIC RETROGRADE CHOLANGIOPANCREATOGRAM WITH SPYGLASS N/A 9/10/2020    Procedure: ENDOSCOPIC RETROGRADE  CHOLANGIOPANCREATOGRAPHY, WITH DIRECT DUCT VISUALIZATION, USING PANCREATICOBILIARY FIBEROPTIC PROBE;  Surgeon: Shaun Guerrero MD;  [facility]     ENDOSCOPIC RETROGRADE CHOLANGIOPANCREATOGRAM WITH SPYGLASS N/A 3/22/2021    Procedure: ENDOSCOPIC RETROGRADE CHOLANGIOPANCREATOGRAPHY, WITH DIRECT DUCT VISUALIZATION, USING PANCREATICOBILIARY FIBEROPTIC PROBE;  Surgeon: Shaun Guerrero MD;  [facility]     ESOPHAGOSCOPY, GASTROSCOPY, DUODENOSCOPY (EGD) WITH RADIO FREQUENCY ABLATION, COMBINED N/A 9/10/2020    Procedure: possible repeat ESOPHAGOGASTRODUODENOSCOPY, WITH RADIOFREQUENCY ABLATION and endoscopic mucosal resection;  Surgeon: Shaun Guerrero MD;  [facility]     ESOPHAGOSCOPY, GASTROSCOPY, DUODENOSCOPY (EGD), COMBINED N/A 4/18/2019    Procedure: upper endoscopy with polypectomy;  Surgeon: Shaun Guerrero MD;  [facility]     ESOPHAGOSCOPY, GASTROSCOPY, DUODENOSCOPY (EGD), COMBINED N/A 10/18/2019    Procedure: Upper Endoscopy and ERCP with stent removal, stone removal and biopsy;  Surgeon: Shaun Guerrero MD;  [facility]     ESOPHAGOSCOPY, GASTROSCOPY, DUODENOSCOPY (EGD), COMBINED N/A 3/24/2022    Procedure: ESOPHAGOGASTRODUODENOSCOPY (EGD), Duodenal  polyp removal;  Surgeon: Shaun Guerrero MD;  [facility]     ESOPHAGOSCOPY, GASTROSCOPY, DUODENOSCOPY (EGD), RESECT MUCOSA, COMBINED N/A 2/28/2019    Procedure: Upper Endoscopy, Endoscopic Ultrasound, Endoscopic Mucosal Resection,  Ampullectomy, polypectomy.;  Surgeon: Shaun Guerrero MD;  [facility]     ESOPHAGOSCOPY, GASTROSCOPY, DUODENOSCOPY (EGD), RESECT MUCOSA, COMBINED N/A 3/22/2021    Procedure: ESOPHAGOGASTRODUODENOSCOPY (EGD) with  endoscopic mucosal resection/ polypectomy;  Surgeon: Shaun Guerrero MD;  [facility]     EXCISE LESION LOWER EXTREMITY Right 3/28/2022    Procedure: Wide local excision of right knee melanoma;  Surgeon: Chevy Allison MD;  Location: [facility] OR     EXCISE MASS LOWER EXTREMITY Right 1/13/2022    Procedure:  "excision of right thigh mass;  Surgeon: Salvador Kelly MD;  [facility]: [facility]     EYE SURGERY      macular hole repaired left eye     HC KNEE SCOPE, DIAGNOSTIC      - both knees     HEAD & NECK SURGERY      wisdom teeth     IR CHEST PORT PLACEMENT > 5 YRS OF AGE  2022     LAPAROSCOPIC HYSTERECTOMY TOTAL, BILATERAL SALPINGO-OOPHORECTOMY, NODE DISSECTION, COMBINED Bilateral 2022    Procedure: Total Laparoscopic Hyserectomy, Bilateral Salpibgo-oophorectomy, Bilateral [place] Lymph Node Dissection;  Surgeon: Eli Guidry MD;  [facility]     Family History   Problem Relation Age of Onset     Hypertension Mother         diabetes,hypoythryoidism, stroke     Diabetes Mother      Breast Cancer Mother      Heart Disease Father         , cancer lip     Uterine Cancer Sister      Connective Tissue Disorder Sister         fibromyalgia     Diabetes Sister      Hypertension Brother      Cerebrovascular Disease Maternal Grandmother         , diabetes     Cerebrovascular Disease Maternal Grandfather              Cancer Paternal Grandmother              Heart Disease Paternal Grandfather              Cancer Paternal Aunt         ovarian     Breast Cancer Niece      Asthma Maternal Aunt      Exam:   /59 (Cuff Size: Adult Regular)   Pulse 61   Temp 98  F (36.7  C) (Tympanic)   Resp 18   Ht 1.727 m (5' 8\")   Wt 84.8 kg (187 lb)   LMP 2002 (Exact Date)   SpO2 99%   BMI 28.43 kg/m    GENERAL APPEARANCE: Well developed, well nourished, alert, and in no apparent distress.  RESP: normal percussion, good air flow throughout.  Left basilar inspiratory crackles. No rhonchi. No wheezes.  CV: Normal S1, S2, regular rhythm, normal rate. No murmur.  No rub. No gallop. No LE edema.   ABDOMEN:  Bowel sounds normal, soft, nontender, no HSM or masses.   MS: extremities normal. No clubbing. No cyanosis.  SKIN: no rash on limited exam, healed right thigh surgical " incisions  NEURO: Mentation intact, speech normal, normal strength and tone, normal gait and stance  PSYCH: mentation appears normal. and affect normal/bright  Results:  - My interpretation of the images relevant for this visit includes: chest images reviewed today compared to priors. Improved density/consolidation compared to July 2022,  bilateral patchy ground glass opacity and consolidations in the bases  - My interpretation of the PFT's relevant for this visit includes: None  (ordered but she was unable to complete)  - lung biopsy 8/2/22 lung and bronchial mucosa with chronic inflammation and scattered fibroblastic foci plugging airspaces.  There is no malignancy.    - bronchoscopy 8/11 cell count and diff was 15% neutropenia and 25% lymphocytes    Assessment and plan: Melva is a 71 yo female with melanoma, pulmonary embolism, diabetes, unexplained lung abnormalities and hypoxic respiratory failure  Hypoxic respiratory failure - multifactorial with recent pulmonary embolism. 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    Let's repeat a CT in 2 months   Official guidance for this Grade 3 pneumonitis is not to rechallenge. I think it would be reasonable, depending on how pivotal the therapy is to her treatment.        Pulmonary embolism - acute, related to malignancy? Vs inactivity   She is on lovenox BID for treatment    RTC 2 months with CT         Journal of Clinical Oncology 36, no. 17 (June 2018)  "[id number].  [email or web address]      [facility] Initial Visit    Reason for Visit  Gricelda Sabillon is a 72 year old female who is referred by Rosibel Florez and Melissa for abnormal chest imaging  Pulmonary HPI    - Earlier this year she noted a lump on her thigh, it was indeterminate and removed, found to be melanoma. During the workup, abnormal imaging found in the uterus so she had hysterectomy. Last week she had a percutaneous lung biopsy and the week before she was diagnosed with pulmonary embolism  - she was coughing before the hospitalization, improved since treatment for PE, breathing is improved, can \"almost\" take a deep breath  - she is taking lovenox injections  - unclear if the PE was from malignancy vs postoperative inactivity      Timeline of medical care this year:   - Jan 2022 leg mass resection; March 2022 extended surgical resection for margins   Jan 2022 CT showing lung nodule, not PET FDG avid  May port placed - April 29 first immunotherapy Keytruda, every 3 weeks May 20, Neha 10, July 5 last treatment  - June 2022 hysterectomy, lymph nodes negative for malignancy, low grade endometrial cancer  After the hysterectomy, she had some issues with nausea, poor appetite. Thought it might be semaglutide so it was stopped. No other new medications.  Routine visit on 7/25/22 with Dr Torres showed hypoxemia, she went to ED and noted to have a PE. She didn't feel that bad but became short of breath at the clinic - discharged with 4 lpm supplemental oxygen, she is on 2 lpm now, usually 92-93%  She continues to experience lightheadedness, nausea, chills every morning. No appetite, nothing tastes good.   - Aug 2022 lung biopsy percutaneous   - history of 1-2 episodes of bronchitis annually, family history of asthma. Has used albuterol in the past for these episodes  - she has been hanging about the house, no camping or outdoor activities, no " "pets      ROS Pulmonary  Dyspnea: Yes, Cough: Yes, Chest pain: No, Wheezing: No, Sputum Production: No, Hemoptysis: No  A complete ROS was otherwise negative except as noted in the HPI.  The patient was seen and examined by Roselia Sosa MD   Current Outpatient Medications   Medication     albuterol (PROAIR HFA/PROVENTIL HFA/VENTOLIN HFA) 108 (90 Base) MCG/ACT inhaler     atenolol (TENORMIN) 50 MG tablet     atorvastatin (LIPITOR) 20 MG tablet     azelastine (ASTELIN) 0.1 % nasal spray     azelastine (OPTIVAR) 0.05 % SOLN     cetirizine (ZYRTEC) 10 MG tablet     Continuous Blood Gluc Sensor (FREESTYLE DAVID 14 DAY SENSOR) MISC     CONTOUR NEXT TEST test strip     glipiZIDE (GLUCOTROL XL) 10 MG 24 hr tablet     insulin  UNIT/ML vial     insulin syringe-needle U-100 (BD INSULIN SYRINGE ULTRAFINE) 31G X 5/16\" 0.5 ML miscellaneous     latanoprost (XALATAN) 0.005 % ophthalmic solution     lidocaine-prilocaine (EMLA) 2.5-2.5 % external cream     LORazepam (ATIVAN) 0.5 MG tablet     ondansetron (ZOFRAN) 8 MG tablet     predniSONE (DELTASONE) 20 MG tablet     senna-docusate (SENOKOT-S/PERICOLACE) 8.6-50 MG tablet     sulfamethoxazole-trimethoprim (BACTRIM) 400-80 MG tablet     timolol maleate (TIMOPTIC) 0.5 % ophthalmic solution     triamcinolone (KENALOG) 0.5 % external ointment     No current facility-administered medications for this visit.     Allergies   Allergen Reactions     Bromfenac Visual Disturbance      xibrom  Causes sever eye pain     Codeine      \"NAUSE,HA AND DIZZINESS\"     Codeine Nausea     Other reaction(s): Dizziness, Headache     Social History     Socioeconomic History     Marital status:      Spouse name: Allen     Number of children: 3     Years of education: 15     Highest education level: Not on file   Occupational History     Not on file   Tobacco Use     Smoking status: Never Smoker     Smokeless tobacco: Never Used   Vaping Use     Vaping Use: Never used   Substance and Sexual " Activity     Alcohol use: No     Alcohol/week: 0.0 standard drinks     Drug use: No     Sexual activity: Not Currently     Partners: Male   Other Topics Concern     Parent/sibling w/ CABG, MI or angioplasty before 65F 55M? Not Asked   Social History Narrative     Not on file     Social Determinants of Health     Financial Resource Strain: Not on file   Food Insecurity: Not on file   Transportation Needs: Not on file   Physical Activity: Not on file   Stress: Not on file   Social Connections: Not on file   Intimate Partner Violence: Not At Risk     Fear of Current or Ex-Partner: No     Emotionally Abused: No     Physically Abused: No     Sexually Abused: No   Housing Stability: Not on file     Past Medical History:   Diagnosis Date     Asthma, mild intermittent      Cataract      Endometrial cancer (H)      HTN, goal below 140/90      Hyperlipidemia LDL goal < 100      Macular hole of left eye      Melanoma (H)     RIGHT KNEE     Sleep apnea     uses c pap     Type 2 diabetes, HbA1C goal < 8% (H)      Past Surgical History:   Procedure Laterality Date     ARTHROPLASTY HIP Right 9/25/2017    Procedure: ARTHROPLASTY HIP;  Right total hip arthroplasty  ;  Surgeon: Ayaan Pena MD;  Location:  OR     ARTHROPLASTY KNEE  7/12/2013    Procedure: ARTHROPLASTY KNEE;  right total knee arthroplasty ;  Surgeon: Chaparro Wesley MD;  Location:  OR     ARTHROSCOPY KNEE Right 1/21/2015    Procedure: ARTHROSCOPY KNEE;  Surgeon: Ayaan Pena MD;  Location:  OR     BRONCHOSCOPY (RIGID OR FLEXIBLE), DIAGNOSTIC N/A 8/11/2022    Procedure: BRONCHOSCOPY, WITH BRONCHOALVEOLAR LAVAGE;  Surgeon: Roselia Sosa MD;  Location:  GI     C INCISION OF HYMEN       CATARACT IOL, RT/LT       COLONOSCOPY  2008     COLONOSCOPY N/A 4/18/2019    Procedure: Colonoscopy with polypectomy;  Surgeon: Shaun Guerrero MD;  [facility]     CYSTOSCOPY N/A 6/23/2022    Procedure: Cystoscopy;  Surgeon: Eli Guidry  MD Rodrigo;  [facility]     ENDOSCOPIC RETROGRADE CHOLANGIOPANCREATOGRAM N/A 2/6/2019    Procedure: COMBINED ENDOSCOPIC RETROGRADE CHOLANGIOPANCREATOGRAPHY, BILIARY SPINCTEROTOMY AND DILATION, PLACE BILE DUCT STENT;  Surgeon: Guru Andie Gonzalez MD;  [facility]     ENDOSCOPIC RETROGRADE CHOLANGIOPANCREATOGRAM N/A 2/28/2019    Procedure: Endoscopic Retrograde Cholangiopancreatogram, Bile duct stent removal and placement;  Surgeon: Shaun Guerrero MD;  [facility]     ENDOSCOPIC RETROGRADE CHOLANGIOPANCREATOGRAM N/A 4/18/2019    Procedure: COMBINED ENDOSCOPIC RETROGRADE CHOLANGIOPANCREATOGRAPHY, Bile duct stent exchange and Polypectomy;  Surgeon: Shaun Guerrero MD;  [facility]     ENDOSCOPIC RETROGRADE CHOLANGIOPANCREATOGRAM N/A 10/18/2019    Procedure: Endoscopic Retrograde Cholangiopancreatogram;  Surgeon: Shaun Guerrero MD;  [facility]     ENDOSCOPIC RETROGRADE CHOLANGIOPANCREATOGRAM N/A 3/24/2022    Procedure: ENDOSCOPIC RETROGRADE CHOLANGIOPANCREATOGRAPHY;  Surgeon: Shaun Guerrero MD;  Location:  OR     ENDOSCOPIC RETROGRADE CHOLANGIOPANCREATOGRAM WITH SPYGLASS N/A 7/26/2019    Procedure: Endoscopic Retrograde Cholangiopancreatogram With Spyglas,l Radiofrequency Ablation, Stent Exchangeand Duodenal Biopsy x2;  Surgeon: Shaun Guerrero MD;  [facility]     ENDOSCOPIC RETROGRADE CHOLANGIOPANCREATOGRAM WITH SPYGLASS N/A 9/10/2020    Procedure: ENDOSCOPIC RETROGRADE CHOLANGIOPANCREATOGRAPHY, WITH DIRECT DUCT VISUALIZATION, USING PANCREATICOBILIARY FIBEROPTIC PROBE;  Surgeon: Shaun Guerrero MD;  [facility]     ENDOSCOPIC RETROGRADE CHOLANGIOPANCREATOGRAM WITH SPYGLASS N/A 3/22/2021    Procedure: ENDOSCOPIC RETROGRADE CHOLANGIOPANCREATOGRAPHY, WITH DIRECT DUCT VISUALIZATION, USING PANCREATICOBILIARY FIBEROPTIC PROBE;  Surgeon: Shaun Guerrero MD;  [facility]     ESOPHAGOSCOPY, GASTROSCOPY, DUODENOSCOPY (EGD) WITH RADIO FREQUENCY ABLATION, COMBINED N/A 9/10/2020     Procedure: possible repeat ESOPHAGOGASTRODUODENOSCOPY, WITH RADIOFREQUENCY ABLATION and endoscopic mucosal resection;  Surgeon: Shaun Guerrero MD;  [facility]     ESOPHAGOSCOPY, GASTROSCOPY, DUODENOSCOPY (EGD), COMBINED N/A 4/18/2019    Procedure: upper endoscopy with polypectomy;  Surgeon: Shaun Guerrero MD;  [facility]     ESOPHAGOSCOPY, GASTROSCOPY, DUODENOSCOPY (EGD), COMBINED N/A 10/18/2019    Procedure: Upper Endoscopy and ERCP with stent removal, stone removal and biopsy;  Surgeon: Shaun Guerrero MD;  [facility]     ESOPHAGOSCOPY, GASTROSCOPY, DUODENOSCOPY (EGD), COMBINED N/A 3/24/2022    Procedure: ESOPHAGOGASTRODUODENOSCOPY (EGD), Duodenal  polyp removal;  Surgeon: Shaun Guerrero MD;  [facility]     ESOPHAGOSCOPY, GASTROSCOPY, DUODENOSCOPY (EGD), RESECT MUCOSA, COMBINED N/A 2/28/2019    Procedure: Upper Endoscopy, Endoscopic Ultrasound, Endoscopic Mucosal Resection,  Ampullectomy, polypectomy.;  Surgeon: Shaun Guerrero MD;  [facility]     ESOPHAGOSCOPY, GASTROSCOPY, DUODENOSCOPY (EGD), RESECT MUCOSA, COMBINED N/A 3/22/2021    Procedure: ESOPHAGOGASTRODUODENOSCOPY (EGD) with  endoscopic mucosal resection/ polypectomy;  Surgeon: Shaun Guerrero MD;  [facility]     EXCISE LESION LOWER EXTREMITY Right 3/28/2022    Procedure: Wide local excision of right knee melanoma;  Surgeon: Chevy Allison MD;  Location: [facility] OR     EXCISE MASS LOWER EXTREMITY Right 1/13/2022    Procedure: excision of right thigh mass;  Surgeon: Salvador Kelly MD;  [facility]     EYE SURGERY      macular hole repaired left eye     HC KNEE SCOPE, DIAGNOSTIC      - both knees     HEAD & NECK SURGERY      wisdom teeth     IR CHEST PORT PLACEMENT > 5 YRS OF AGE  5/2/2022     LAPAROSCOPIC HYSTERECTOMY TOTAL, BILATERAL SALPINGO-OOPHORECTOMY, NODE DISSECTION, COMBINED Bilateral 6/23/2022    Procedure: Total Laparoscopic Hyserectomy, Bilateral Salpibgo-oophorectomy, Bilateral [place] Lymph Node Dissection;   "Surgeon: Eli Guidry MD;  Location:  OR     Family History   Problem Relation Age of Onset     Hypertension Mother         diabetes,hypoythryoidism, stroke     Diabetes Mother      Breast Cancer Mother      Heart Disease Father         , cancer lip     Uterine Cancer Sister      Connective Tissue Disorder Sister         fibromyalgia     Diabetes Sister      Hypertension Brother      Cerebrovascular Disease Maternal Grandmother         , diabetes     Cerebrovascular Disease Maternal Grandfather              Cancer Paternal Grandmother              Heart Disease Paternal Grandfather              Cancer Paternal Aunt         ovarian     Breast Cancer Niece      Asthma Maternal Aunt      Exam:   /59 (Cuff Size: Adult Regular)   Pulse 61   Temp 98  F (36.7  C) (Tympanic)   Resp 18   Ht 1.727 m (5' 8\")   Wt 84.8 kg (187 lb)   LMP 2002 (Exact Date)   SpO2 99%   BMI 28.43 kg/m    GENERAL APPEARANCE: Well developed, well nourished, alert, and in no apparent distress.  EYES: PERRL, EOMI  HENT: Nasal mucosa with no edema and no hyperemia. No nasal polyps.  EARS: Canals clear, TMs normal  MOUTH: Oral mucosa is moist, without any lesions, no tonsillar enlargement, no oropharyngeal exudate.  NECK: supple, no masses, no thyromegaly.  LYMPHATICS: No significant axillary, cervical, or supraclavicular nodes.  RESP: normal percussion, good air flow throughout.  Bilateral inspiratory crackles. No rhonchi. No wheezes.  CV: Normal S1, S2, regular rhythm, normal rate. No murmur.  No rub. No gallop. No LE edema.   ABDOMEN:  Bowel sounds normal, soft, nontender, no HSM or masses.   MS: extremities normal. No clubbing. No cyanosis.  SKIN: no rash on limited exam, healed right thigh surgical incisions  NEURO: Mentation intact, speech normal, normal strength and tone, normal gait and stance  PSYCH: mentation appears normal. and affect normal/bright  Results:  - My " interpretation of the images relevant for this visit includes: chest images reviewed Jan 2022, July 2022, Aug 2022, newly noted bilateral patchy ground glass opacity and consolidations in the bases  - My interpretation of the PFT's relevant for this visit includes: None   - lung biopsy 8/2/22 lung and bronchial mucosa with chronic inflammation and scattered fibroblastic foci plugging airspaces.  There is no malignancy.        Assessment and plan: Melva is a 73 yo female with melanoma, pulmonary embolism, diabetes, unexplained lung abnormalities and hypoxic respiratory failure  Hypoxic respiratory failure - multifactorial with recent pulmonary embolism. Currently on 2 lpm    Oxygen Rx goes through [facility], currently Sept expiration.   Unexplained lung imaging abnormalities - pathology is consistent with organizing pneumonia    differential diagnosis includes immunotherapy related pneumonitis, cryptogenic organizing pneumonia, infection   Bronchoscopy did not show signs of infection (negative    Anticipate starting steroids after bronchoscopy 45 mg daily (0.75mg/kg) and prolonged taper   PFT as soon as possible   Plan for repeat imaging in 4-6 weeks   Check CRP tomorrow    Pulmonary embolism - acute, related to malignancy? Vs inactivity   She is on lovenox BID for treatment    RTC 4-6 weeks      Again, thank you for allowing me to participate in the care of your patient.        Sincerely,        Roselia Sosa MD

## 2022-09-19 NOTE — PROGRESS NOTES
"[facility] Follow Up Visit    Reason for Visit  Gricelda Sabillon is a 72 year old female who is referred by Rosibel Florez and Melissa for abnormal chest imaging  Pulmonary HPI    - Since our last visit, her  notes she seems more like herself, she is laughing and eating. Breathing is better she is using 1 - 1.5Lpm oxygen. She has been on prednisone nearly 6 weeks now, recently 20mg daily. She has had hyperglycemia related to prednisone and working actively with pharmacy to manage it      Earlier this year she noted a lump on her thigh, it was indeterminate and removed, found to be melanoma. During the workup, abnormal imaging found in the uterus so she had hysterectomy. Last week she had a percutaneous lung biopsy and the week before she was diagnosed with pulmonary embolism  - she was coughing before the hospitalization, improved since treatment for PE, breathing is improved, can \"almost\" take a deep breath  - she is taking lovenox injections  - unclear if the PE was from malignancy vs postoperative inactivity      Timeline of medical care this year:   - Jan 2022 leg mass resection; March 2022 extended surgical resection for margins   Jan 2022 CT showing lung nodule, not PET FDG avid  May port placed - April 29 first immunotherapy Keytruda, every 3 weeks May 20, Neha 10, July 5 last treatment  - June 2022 hysterectomy, lymph nodes negative for malignancy, low grade endometrial cancer  After the hysterectomy, she had some issues with nausea, poor appetite. Thought it might be semaglutide so it was stopped. No other new medications.  Routine visit on 7/25/22 with Dr Torres showed hypoxemia, she went to ED and noted to have a PE. She didn't feel that bad but became short of breath at the clinic - discharged with 4 lpm supplemental oxygen, she is on 2 lpm now, usually 92-93%  She continues to experience lightheadedness, nausea, chills every morning. No appetite, nothing tastes " "good.   - Aug 2022 lung biopsy percutaneous   - history of 1-2 episodes of bronchitis annually, family history of asthma. Has used albuterol in the past for these episodes  - she has been hanging about the house, no camping or outdoor activities, no pets    ROS Pulmonary  Dyspnea: Yes, Cough: Yes, Chest pain: No, Wheezing: No, Sputum Production: No, Hemoptysis: No  A complete ROS was otherwise negative except as noted in the HPI.  The patient was seen and examined by Roselia Sosa MD   Current Outpatient Medications   Medication     albuterol (PROAIR HFA/PROVENTIL HFA/VENTOLIN HFA) 108 (90 Base) MCG/ACT inhaler     atenolol (TENORMIN) 50 MG tablet     atorvastatin (LIPITOR) 20 MG tablet     azelastine (ASTELIN) 0.1 % nasal spray     azelastine (OPTIVAR) 0.05 % SOLN     cetirizine (ZYRTEC) 10 MG tablet     Continuous Blood Gluc Sensor (FREESTYLE DAVID 14 DAY SENSOR) MISC     CONTOUR NEXT TEST test strip     glipiZIDE (GLUCOTROL XL) 10 MG 24 hr tablet     insulin  UNIT/ML vial     insulin syringe-needle U-100 (BD INSULIN SYRINGE ULTRAFINE) 31G X 5/16\" 0.5 ML miscellaneous     latanoprost (XALATAN) 0.005 % ophthalmic solution     lidocaine-prilocaine (EMLA) 2.5-2.5 % external cream     LORazepam (ATIVAN) 0.5 MG tablet     ondansetron (ZOFRAN) 8 MG tablet     predniSONE (DELTASONE) 20 MG tablet     senna-docusate (SENOKOT-S/PERICOLACE) 8.6-50 MG tablet     sulfamethoxazole-trimethoprim (BACTRIM) 400-80 MG tablet     timolol maleate (TIMOPTIC) 0.5 % ophthalmic solution     triamcinolone (KENALOG) 0.5 % external ointment     No current facility-administered medications for this visit.     Allergies   Allergen Reactions     Bromfenac Visual Disturbance      xibrom  Causes sever eye pain     Codeine      \"NAUSE,HA AND DIZZINESS\"     Codeine Nausea     Other reaction(s): Dizziness, Headache     Social History     Socioeconomic History     Marital status:      Spouse name: Allen     Number of children: 3 "     Years of education: 15     Highest education level: Not on file   Occupational History     Not on file   Tobacco Use     Smoking status: Never Smoker     Smokeless tobacco: Never Used   Vaping Use     Vaping Use: Never used   Substance and Sexual Activity     Alcohol use: No     Alcohol/week: 0.0 standard drinks     Drug use: No     Sexual activity: Not Currently     Partners: Male   Other Topics Concern     Parent/sibling w/ CABG, MI or angioplasty before 65F 55M? Not Asked   Social History Narrative     Not on file     Social Determinants of Health     Financial Resource Strain: Not on file   Food Insecurity: Not on file   Transportation Needs: Not on file   Physical Activity: Not on file   Stress: Not on file   Social Connections: Not on file   Intimate Partner Violence: Not At Risk     Fear of Current or Ex-Partner: No     Emotionally Abused: No     Physically Abused: No     Sexually Abused: No   Housing Stability: Not on file     Past Medical History:   Diagnosis Date     Asthma, mild intermittent      Cataract      Endometrial cancer (H)      HTN, goal below 140/90      Hyperlipidemia LDL goal < 100      Macular hole of left eye      Melanoma (H)     RIGHT KNEE     Sleep apnea     uses c pap     Type 2 diabetes, HbA1C goal < 8% (H)      Past Surgical History:   Procedure Laterality Date     ARTHROPLASTY HIP Right 9/25/2017    Procedure: ARTHROPLASTY HIP;  Right total hip arthroplasty  ;  Surgeon: Ayaan Pena MD;  Location:  OR     ARTHROPLASTY KNEE  7/12/2013    Procedure: ARTHROPLASTY KNEE;  right total knee arthroplasty ;  Surgeon: Chaparro Wesley MD;  Location:  OR     ARTHROSCOPY KNEE Right 1/21/2015    Procedure: ARTHROSCOPY KNEE;  Surgeon: Ayaan Pena MD;  Location:  OR     BRONCHOSCOPY (RIGID OR FLEXIBLE), DIAGNOSTIC N/A 8/11/2022    Procedure: BRONCHOSCOPY, WITH BRONCHOALVEOLAR LAVAGE;  Surgeon: Roselia Sosa MD;  Location:  GI     C INCISION OF HYMEN        CATARACT IOL, RT/LT       COLONOSCOPY  2008     COLONOSCOPY N/A 4/18/2019    Procedure: Colonoscopy with polypectomy;  Surgeon: Shaun Guerrero MD;  [facility]: [facility]     CYSTOSCOPY N/A 6/23/2022    Procedure: Cystoscopy;  Surgeon: Eli Guidry MD;  [facility]     ENDOSCOPIC RETROGRADE CHOLANGIOPANCREATOGRAM N/A 2/6/2019    Procedure: COMBINED ENDOSCOPIC RETROGRADE CHOLANGIOPANCREATOGRAPHY, BILIARY SPINCTEROTOMY AND DILATION, PLACE BILE DUCT STENT;  Surgeon: Guru Andie Gonzalez MD;  [facility]     ENDOSCOPIC RETROGRADE CHOLANGIOPANCREATOGRAM N/A 2/28/2019    Procedure: Endoscopic Retrograde Cholangiopancreatogram, Bile duct stent removal and placement;  Surgeon: Shaun Guerrero MD;  [facility]     ENDOSCOPIC RETROGRADE CHOLANGIOPANCREATOGRAM N/A 4/18/2019    Procedure: COMBINED ENDOSCOPIC RETROGRADE CHOLANGIOPANCREATOGRAPHY, Bile duct stent exchange and Polypectomy;  Surgeon: Shaun Guerrero MD;  [facility]     ENDOSCOPIC RETROGRADE CHOLANGIOPANCREATOGRAM N/A 10/18/2019    Procedure: Endoscopic Retrograde Cholangiopancreatogram;  Surgeon: Shaun Guerrero MD;  [facility]     ENDOSCOPIC RETROGRADE CHOLANGIOPANCREATOGRAM N/A 3/24/2022    Procedure: ENDOSCOPIC RETROGRADE CHOLANGIOPANCREATOGRAPHY;  Surgeon: Shaun Guerrero MD;  Location:  OR     ENDOSCOPIC RETROGRADE CHOLANGIOPANCREATOGRAM WITH SPYGLASS N/A 7/26/2019    Procedure: Endoscopic Retrograde Cholangiopancreatogram With Spyglas,l Radiofrequency Ablation, Stent Exchangeand Duodenal Biopsy x2;  Surgeon: Shaun Guerrero MD;  [facility]     ENDOSCOPIC RETROGRADE CHOLANGIOPANCREATOGRAM WITH SPYGLASS N/A 9/10/2020    Procedure: ENDOSCOPIC RETROGRADE CHOLANGIOPANCREATOGRAPHY, WITH DIRECT DUCT VISUALIZATION, USING PANCREATICOBILIARY FIBEROPTIC PROBE;  Surgeon: Shaun Guerrero MD;  [facility]     ENDOSCOPIC RETROGRADE CHOLANGIOPANCREATOGRAM WITH SPYGLASS N/A 3/22/2021    Procedure: ENDOSCOPIC RETROGRADE  CHOLANGIOPANCREATOGRAPHY, WITH DIRECT DUCT VISUALIZATION, USING PANCREATICOBILIARY FIBEROPTIC PROBE;  Surgeon: Shaun Guerrero MD;  [facility]     ESOPHAGOSCOPY, GASTROSCOPY, DUODENOSCOPY (EGD) WITH RADIO FREQUENCY ABLATION, COMBINED N/A 9/10/2020    Procedure: possible repeat ESOPHAGOGASTRODUODENOSCOPY, WITH RADIOFREQUENCY ABLATION and endoscopic mucosal resection;  Surgeon: Shaun Guerrero MD;  [facility]     ESOPHAGOSCOPY, GASTROSCOPY, DUODENOSCOPY (EGD), COMBINED N/A 4/18/2019    Procedure: upper endoscopy with polypectomy;  Surgeon: Shaun Guerrero MD;  [facility]     ESOPHAGOSCOPY, GASTROSCOPY, DUODENOSCOPY (EGD), COMBINED N/A 10/18/2019    Procedure: Upper Endoscopy and ERCP with stent removal, stone removal and biopsy;  Surgeon: Shaun Guerrero MD;  [facility]     ESOPHAGOSCOPY, GASTROSCOPY, DUODENOSCOPY (EGD), COMBINED N/A 3/24/2022    Procedure: ESOPHAGOGASTRODUODENOSCOPY (EGD), Duodenal  polyp removal;  Surgeon: Shaun Guerrero MD;  Location: [facility] OR     ESOPHAGOSCOPY, GASTROSCOPY, DUODENOSCOPY (EGD), RESECT MUCOSA, COMBINED N/A 2/28/2019    Procedure: Upper Endoscopy, Endoscopic Ultrasound, Endoscopic Mucosal Resection,  Ampullectomy, polypectomy.;  Surgeon: Shaun Guerrero MD;  [facility]     ESOPHAGOSCOPY, GASTROSCOPY, DUODENOSCOPY (EGD), RESECT MUCOSA, COMBINED N/A 3/22/2021    Procedure: ESOPHAGOGASTRODUODENOSCOPY (EGD) with  endoscopic mucosal resection/ polypectomy;  Surgeon: Shaun Guerrero MD;  [facility]     EXCISE LESION LOWER EXTREMITY Right 3/28/2022    Procedure: Wide local excision of right knee melanoma;  Surgeon: Chevy Allison MD;  Location: [facility] OR     EXCISE MASS LOWER EXTREMITY Right 1/13/2022    Procedure: excision of right thigh mass;  Surgeon: Salvador Kelly MD;  Location: [facility] OR     EYE SURGERY      macular hole repaired left eye     HC KNEE SCOPE, DIAGNOSTIC      - both knees     HEAD & NECK SURGERY      wisdom teeth     IR CHEST PORT PLACEMENT > 5 YRS OF  "AGE  2022     LAPAROSCOPIC HYSTERECTOMY TOTAL, BILATERAL SALPINGO-OOPHORECTOMY, NODE DISSECTION, COMBINED Bilateral 2022    Procedure: Total Laparoscopic Hyserectomy, Bilateral Salpibgo-oophorectomy, Bilateral Max Lymph Node Dissection;  Surgeon: Eli Guidry MD;  Location:  OR     Family History   Problem Relation Age of Onset     Hypertension Mother         diabetes,hypoythryoidism, stroke     Diabetes Mother      Breast Cancer Mother      Heart Disease Father         , cancer lip     Uterine Cancer Sister      Connective Tissue Disorder Sister         fibromyalgia     Diabetes Sister      Hypertension Brother      Cerebrovascular Disease Maternal Grandmother         , diabetes     Cerebrovascular Disease Maternal Grandfather              Cancer Paternal Grandmother              Heart Disease Paternal Grandfather              Cancer Paternal Aunt         ovarian     Breast Cancer Niece      Asthma Maternal Aunt      Exam:   /59 (Cuff Size: Adult Regular)   Pulse 61   Temp 98  F (36.7  C) (Tympanic)   Resp 18   Ht 1.727 m (5' 8\")   Wt 84.8 kg (187 lb)   LMP 2002 (Exact Date)   SpO2 99%   BMI 28.43 kg/m    GENERAL APPEARANCE: Well developed, well nourished, alert, and in no apparent distress.  RESP: normal percussion, good air flow throughout.  Left basilar inspiratory crackles. No rhonchi. No wheezes.  CV: Normal S1, S2, regular rhythm, normal rate. No murmur.  No rub. No gallop. No LE edema.   ABDOMEN:  Bowel sounds normal, soft, nontender, no HSM or masses.   MS: extremities normal. No clubbing. No cyanosis.  SKIN: no rash on limited exam, healed right thigh surgical incisions  NEURO: Mentation intact, speech normal, normal strength and tone, normal gait and stance  PSYCH: mentation appears normal. and affect normal/bright  Results:  - My interpretation of the images relevant for this visit includes: chest images reviewed " today compared to priors. Improved density/consolidation compared to July 2022,  bilateral patchy ground glass opacity and consolidations in the bases  - My interpretation of the PFT's relevant for this visit includes: None  (ordered but she was unable to complete)  - lung biopsy 8/2/22 lung and bronchial mucosa with chronic inflammation and scattered fibroblastic foci plugging airspaces.  There is no malignancy.    - bronchoscopy 8/11 cell count and diff was 15% neutropenia and 25% lymphocytes    Assessment and plan: Melva is a 73 yo female with melanoma, pulmonary embolism, diabetes, unexplained lung abnormalities and hypoxic respiratory failure  Hypoxic respiratory failure - multifactorial with recent pulmonary embolism. 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    Let's repeat a CT in 2 months   Official guidance for this Grade 3 pneumonitis is not to rechallenge. I think it would be reasonable, depending on how pivotal the therapy is to her treatment.        Pulmonary embolism - acute, related to malignancy? Vs inactivity   She is on lovenox BID for treatment    RTC 2 months with CT         Journal of Clinical Oncology 36, no. 17 (June 2018) 1797-8556.  http://[email or web address]/doi/[email or web address]

## 2022-09-19 NOTE — PATIENT INSTRUCTIONS
Reduce the dose prednisone to 10mg daily for 2 weeks then 5 mg daily for 2 weeks then stop    Okay to stop the Bactrim now as well.     Your body will respond to the vaccines better off of prednisone so it would be better to wait until you're off the steroids    I will confer with Dr Florez about whether it's a good idea to restart the Keytruda    Keep the oxygen for now, use as you need it monitoring your oxygen at home, when you haven't needed it for a while and then we can get it taken away.         CT and appt with Dr. Sosa scheduled 11/21/22  Winnie Liao, RN, BSN, STEPHAN  RN Care Coordinator  [facility]  Ph: [phone]  F: [phone]

## 2022-09-19 NOTE — PATIENT INSTRUCTIONS
"Recommendations from today's MTM visit:                                                         Continue current regimen - NPH 40 units every morning.  If blood sugars should spike this afternoon then increase to 42 units.    Follow-up: Return in about 3 days (around 9/22/2022) for Medication Therapy Management.    It was great speaking with you today.  I value your experience and would be very thankful for your time in providing feedback in our clinic survey. In the next few days, you may receive an email or text message from [employer] with a link to a survey related to your  clinical pharmacist.\"     To schedule another MTM appointment, please call the clinic directly or you may call the MTM scheduling line at [phone] or toll-free at [phone].     My Clinical Pharmacist's contact information:                                                      Please feel free to contact me with any questions or concerns you have.      Arlte Harding , Pharm D  [phone] (phone)  Medication Therapy Management Pharmacist     "

## 2022-09-20 ENCOUNTER — TELEPHONE (OUTPATIENT)
Dept: INTERNAL MEDICINE | Facility: CLINIC | Age: 72
End: 2022-09-20

## 2022-09-20 ENCOUNTER — TRANSFERRED RECORDS (OUTPATIENT)
Dept: HEALTH INFORMATION MANAGEMENT | Facility: CLINIC | Age: 72
End: 2022-09-20

## 2022-09-21 ENCOUNTER — LAB (OUTPATIENT)
Dept: LAB | Facility: CLINIC | Age: 72
End: 2022-09-21
Payer: COMMERCIAL

## 2022-09-21 ENCOUNTER — MEDICAL CORRESPONDENCE (OUTPATIENT)
Dept: HEALTH INFORMATION MANAGEMENT | Facility: CLINIC | Age: 72
End: 2022-09-21

## 2022-09-21 DIAGNOSIS — N30.01 ACUTE CYSTITIS WITH HEMATURIA: ICD-10-CM

## 2022-09-21 LAB
ALBUMIN UR-MCNC: NEGATIVE MG/DL
APPEARANCE UR: CLEAR
BACTERIA #/AREA URNS HPF: ABNORMAL /HPF
BILIRUB UR QL STRIP: NEGATIVE
COLOR UR AUTO: YELLOW
GLUCOSE UR STRIP-MCNC: 500 MG/DL
HGB UR QL STRIP: NEGATIVE
KETONES UR STRIP-MCNC: NEGATIVE MG/DL
LEUKOCYTE ESTERASE UR QL STRIP: ABNORMAL
NITRATE UR QL: NEGATIVE
PH UR STRIP: 7 [PH] (ref 5–7)
RBC #/AREA URNS AUTO: ABNORMAL /HPF
SP GR UR STRIP: 1.02 (ref 1–1.03)
SQUAMOUS #/AREA URNS AUTO: ABNORMAL /LPF
UROBILINOGEN UR STRIP-ACNC: 0.2 E.U./DL
WBC #/AREA URNS AUTO: ABNORMAL /HPF

## 2022-09-21 PROCEDURE — 81001 URINALYSIS AUTO W/SCOPE: CPT

## 2022-09-22 ENCOUNTER — TELEPHONE (OUTPATIENT)
Dept: PHARMACY | Facility: CLINIC | Age: 72
End: 2022-09-22

## 2022-09-22 DIAGNOSIS — E11.65 TYPE 2 DIABETES MELLITUS WITH HYPERGLYCEMIA, WITHOUT LONG-TERM CURRENT USE OF INSULIN (H): ICD-10-CM

## 2022-09-22 NOTE — TELEPHONE ENCOUNTER
Patient has been dosing NPH 40 units every morning.  Has instructions to reduce prednisone to 10 mg daily starting this Saturday for 14 days.    Will continue on 40 units of insulin daily.  Follow-up on Monday.

## 2022-09-26 ENCOUNTER — TELEPHONE (OUTPATIENT)
Dept: PHARMACY | Facility: CLINIC | Age: 72
End: 2022-09-26

## 2022-09-26 NOTE — TELEPHONE ENCOUNTER
Called Melva to follow-up.  Prednisone reduced to 10 mg on Saturday.  Continues to on NPH 40 units and glipizide 10 mg daily.  She was on glipizide prior to starting the prednisone.  She is comfortable where her blood sugars are, doesn't want to change anything at this time.  Prefers follow-up on Thursday.

## 2022-09-29 ENCOUNTER — TELEPHONE (OUTPATIENT)
Dept: INTERNAL MEDICINE | Facility: CLINIC | Age: 72
End: 2022-09-29

## 2022-09-29 ENCOUNTER — VIRTUAL VISIT (OUTPATIENT)
Dept: PHARMACY | Facility: CLINIC | Age: 72
End: 2022-09-29
Payer: COMMERCIAL

## 2022-09-29 DIAGNOSIS — C43.9 MELANOMA OF SKIN (H): ICD-10-CM

## 2022-09-29 DIAGNOSIS — N39.0 URINARY TRACT INFECTION WITH HEMATURIA, SITE UNSPECIFIED: ICD-10-CM

## 2022-09-29 DIAGNOSIS — E11.65 TYPE 2 DIABETES MELLITUS WITH HYPERGLYCEMIA, WITHOUT LONG-TERM CURRENT USE OF INSULIN (H): Primary | ICD-10-CM

## 2022-09-29 DIAGNOSIS — R31.9 URINARY TRACT INFECTION WITH HEMATURIA, SITE UNSPECIFIED: ICD-10-CM

## 2022-09-29 DIAGNOSIS — J30.9 ALLERGIC RHINITIS, UNSPECIFIED SEASONALITY, UNSPECIFIED TRIGGER: ICD-10-CM

## 2022-09-29 DIAGNOSIS — I26.99 OTHER PULMONARY EMBOLISM WITHOUT ACUTE COR PULMONALE, UNSPECIFIED CHRONICITY (H): ICD-10-CM

## 2022-09-29 PROCEDURE — 99606 MTMS BY PHARM EST 15 MIN: CPT | Performed by: PHARMACIST

## 2022-09-29 PROCEDURE — 99607 MTMS BY PHARM ADDL 15 MIN: CPT | Performed by: PHARMACIST

## 2022-09-29 NOTE — TELEPHONE ENCOUNTER
Patient Quality Outreach    Patient is due for the following:   Diabetes -  Diabetic Follow-Up Visit    Next Steps:   Schedule a office visit for DIABETES FOLLOW UP.    Type of outreach:    Phone, left message for patient/parent to call back.      Questions for provider review:         Thais Cadet

## 2022-09-29 NOTE — PATIENT INSTRUCTIONS
"Recommendations from today's MTM visit:                                                       1.  Hold glipizide.  If readings still low in the early morning this weekend, then reduce NPH to 35 units.    Follow-up: on Monday    It was great speaking with you today.  I value your experience and would be very thankful for your time in providing feedback in our clinic survey. In the next few days, you may receive an email or text message from [facility] [employer] with a link to a survey related to your  clinical pharmacist.\"     To schedule another MTM appointment, please call the clinic directly or you may call the MTM scheduling line at [phone] or toll-free at [phone].     My Clinical Pharmacist's contact information:                                                      Please feel free to contact me with any questions or concerns you have.      Arlet Harding , Pharm D  [phone] (phone)  Medication Therapy Management Pharmacist     "

## 2022-09-29 NOTE — PROGRESS NOTES
Medication Therapy Management (MTM) Encounter    ASSESSMENT:                            Medication Adherence/Access: No issues identified    Type 2 Diabetes:  Home readings have improved.  Will hold glipizide due to overnight lows.  If readings continue to be low, then reduce NPH.    Melanoma/PE: stable    UTI:  Resolved.      Allergic Rhinitis: just restarted therapy    PLAN:                            1.  Hold glipizide.  If readings still low early morning this weekend, then reduce NPH to 35 units.    Follow-up: No follow-ups on file.    SUBJECTIVE/OBJECTIVE:                          Gricelda Sabillon is a 72 year old female called for a follow-up visit.  Today's visit is a follow-up MTM visit from 9/19/22.     Reason for visit: blood sugars    Allergies/ADRs: Reviewed in chart  Tobacco: She reports that she has never smoked. She has never used smokeless tobacco.  Alcohol: not currently using    Medication Adherence/Access: no issues reported    Type 2 Diabetes:  Currently taking Glipizide 10 mg daily and NPH 40 units daily (increased dose yesterday; she was able to get the larger syringes to dose more than 30 units at a time). Patient is not experiencing side effects.   Blood sugar monitoring: Continuous Glucose Monitor.               Symptoms of low blood sugar? none  Symptoms of high blood sugar? none  Eye exam: due  Foot exam: up to date  Aspirin: No  Urine Albumin:   Lab Results   Component Value Date    UMALCR 8.99 03/21/2022      Lab Results   Component Value Date    A1C 8.3 07/12/2022    A1C 7.7 03/21/2022    A1C 7.8 11/15/2021    A1C 7.5 08/10/2021    A1C 7.1 03/03/2021     Melanoma/PE: Followed by Dr. Florez and Respiratory Therapy.   Also taking Bactrim twice daily as directed for possible pneumonitis and prednisone 10 mg daily (has been tapering off, last reduced Saturday).     Continues to wear oxygen at 0.5 L overnight; goes without during the day if able.  Trying to walk more and use the wheelchair  less.  No new concerns. No recurrent visual hallucinations.    UTI:  No symptoms at this time. Completed course of antibiotic.      Allergic Rhinitis: Current medications include cetirizine 10 mg once daily - started today. Optiva as needed, Astelin nasal spray as needed. Primary symptoms are nasal congestion and raspy voice. Patient feels that current therapy is effective.       Today's Vitals: LMP 12/13/2002 (Exact Date)   ----------------      I spent 15 minutes with this patient today. All changes were made via collaborative practice agreement with Raisa Davidson MD. A copy of the visit note was provided to the patient's provider(s).    The patient was sent via [employer] a summary of these recommendations.     Arlet Harding , Pharm D  [phone] (phone)  Medication Therapy Management Pharmacist     Telemedicine Visit Details  Type of service:  Telephone visit  Start Time: 830am  End Time: 845am  Originating Location (patient location): Home  Distant Location (provider location):  [facility]     Medication Therapy Recommendations  Diabetes mellitus, type 2 (H)    Current Medication: glipiZIDE (GLUCOTROL XL) 10 MG 24 hr tablet (Discontinued)   Rationale: Undesirable effect - Adverse medication event - Safety   Recommendation: Discontinue Medication   Status: Accepted per CPA

## 2022-10-01 ENCOUNTER — TRANSFERRED RECORDS (OUTPATIENT)
Dept: HEALTH INFORMATION MANAGEMENT | Facility: CLINIC | Age: 72
End: 2022-10-01

## 2022-10-01 LAB — RETINOPATHY: NORMAL

## 2022-10-03 ENCOUNTER — TELEPHONE (OUTPATIENT)
Dept: PHARMACY | Facility: CLINIC | Age: 72
End: 2022-10-03

## 2022-10-03 NOTE — TELEPHONE ENCOUNTER
Called to review her blood sugars.  Four more days of prednisone 10 mg and then reduce to 5 mg.  Has been holding glipizide and continuing with NPH 40 units daily. No symptoms of hypoglycemia.  Will continue current regimen and follow-up next week.

## 2022-10-06 ENCOUNTER — PATIENT OUTREACH (OUTPATIENT)
Dept: ONCOLOGY | Facility: CLINIC | Age: 72
End: 2022-10-06

## 2022-10-06 DIAGNOSIS — J84.9 ILD (INTERSTITIAL LUNG DISEASE) (H): Primary | ICD-10-CM

## 2022-10-06 DIAGNOSIS — J84.89 ORGANIZING PNEUMONIA (H): ICD-10-CM

## 2022-10-06 LAB
ACID FAST STAIN (ARUP): NORMAL

## 2022-10-06 RX ORDER — PREDNISONE 5 MG/1
TABLET ORAL
Qty: 84 TABLET | Refills: 0 | Status: SHIPPED | OUTPATIENT
Start: 2022-10-06 | End: 2022-11-17

## 2022-10-06 NOTE — PROGRESS NOTES
Per Dr. Sosa, pt should increase PO prednisone dose to 20 mg daily x 2 weeks, then taper to 15 mg daily x 2 weeks, then 10 mg daily x 2 weeks.     Melva will then see Dr. Sosa for follow up in clinic as scheduled Nov 21, 2022.     Writer called Melva and discussed Dr. Sosa's recommendations. Melva reports she has:     13 tablets left of 20 mg prednisone  9 tablets left of 5 mg prednisone    Writer instructed Melva to take the remaining 20 mg tablets and then do 1 day of four 5 mg tablets to complete the 2 weeks of daily 20 mg prednisone.     A new script for prednisone was sent to pt's prescription.     Melva was encouraged to call the triage nurse if her symptoms do not improve or get worse. Melva has our clinic phone number and is aware triage nurses are available 24/7. Melva had no further questions or concerns at this time.     Winnie Liao, RN, BSN, STEPHAN  RN Care Coordinator  [facility]  Ph: [phone]  F: [phone]

## 2022-10-06 NOTE — PROGRESS NOTES
[facility]: Cancer Care Short Note                                    Discussion with Patient:                                                      Melva called in to clinic reporting increased dyspnea with activity and increased oxygen use starting 10/4/22 when she had been improving.       Assessment:                                                      Assessment completed with:: Patient    Constitutional  Fever: Absent or within normal limits    Respiratory  Cough: Mild symptoms OR nonprescription intervention indicated  Dyspnea: Shortness of breath with minimal exertion OR limiting instrumental ADL    Melva reports she is still on 10 mg Prednisone and is due to decrease her dose to 5 mg on Saturday, 10/8/22.     Melva had been weaning down her oxygen use so she was able to take it off completely during the day with light activity and keep her sats > 90% and would sometimes continue 1L NC during walking. Then on Tuesday, 10/4/22 she starting having more dyspnea and is now having to keep 2L NC on at all times. With activity and 2L NC on her sats are 82-88%. It is now taking approximately 5 min of rest for her sats to recover to 91%. She is now using 2L around the clock.    She does report slight chest heaviness. Melva also reports having Spring allergies. When she gets allergy symptoms she feels heaviness in her chest when her allergies are active. She will also get increased coughing and nasal congestion but she has not had much coughing or congestion recently.     Melva has not been using her albuterol inhaler. She reports the last time she used that was 2 years ago. She denies any wheezing at this time.     Patient Coping  Accepting    Other Patient Concerns  Other Patient Reported Concerns: No      Intervention/Education provided during outreach:                                                       Writer encouraged Melva to keep exertion to a minimum if possible and continue with the 2L NC. Writer will  notify Dr. Sosa and call Melva back with recommendations.     Route to provider to further advise    Winnie Liao, RN, BSN, STEPHAN  RN Care Coordinator  [facility]  Ph: [phone]  F: [phone]

## 2022-10-09 NOTE — PROGRESS NOTES
Department of Radiation Oncology                   [place] [address]  [address] [place]  [address]  Office:  [phone]  Fax:  [phone]   Radiation Oncology Clinic  [address] [place] [address]  Phone:  [phone]  Fax:  [phone]     PATIENT NAME Gricelda Sabillon : 1950   MRN: [id number] CRAIG: 10/11/2022     NEW PATIENT VISIT NOTE       REFERRING PROVIDER: Eli Stafford MD  [address] [place] [id number]    CANCER TYPE: Endometrial adenocarcinoma,   STAGE:FIGO grade 1, MMR intact  ECOG PS: {ECOG SCALE:115156:::1}     HISTORY OF PRESENT ILLNESS    was seen for initial consultation in the Dept of Radiation Oncology on 10/9/2022 at the request of Dr. Eli Stafford MD  [address] [place] [address]  ***   Gricelda Sabillon is a 72-year-old female who was found to have grade 1 endometrial adenocarcinoma subsequent to work-up for melanoma.  In the 2021 patient noticed a lump on her anterior right thigh, and ultrasound on 2021 revealed 2 lesions in the proximal thigh.  A subsequent excisional biopsy by Dr. Salvador Kelly MD, surgery, on 2022 revealed a 2 cm metastatic melanoma with negative margins.  Subsequent PET scan on 2022 did not show any evidence of metastatic disease.  There was nodularity in the thigh, with 2 subcutaneous nodules distally.  No evidence of a primary tumor was seen.  In 2021 she established care with Dr. William Florez MD, medical oncology at [facility], and Dr. Chevy Allison MD, surgery at the [facility], and proceeded with surgical resection followed by immunotherapy. She underwent wide local excision of her right knee melanoma with sentinel lymph node biopsy on 3/28/22.  Pathology returned metastatic melanoma and no positive sentinel lymph nodes.  Staged as M1 disease.  She began immunotherapy with pembrolizumab every 3 weeks which was  continued until 7/5/2022.    Her case was discussed at [facility]'s multidisciplinary care conference.  Review of prior PET scan noticed some FDG avidity at the uterine fundus and subsequent ultrasound was ordered. Pelvic ultrasound performed on 5/10/2022 revealed a thickened endometrial stripe 1.7 cm.  At this time, the patient reported having brownish vaginal discharge for prior 6 months.    She underwent endometrial biopsy on 5/25/2022 which returned endometrioid carcinoma, endometrioid type, FIGO grade 1, arising in a background of atypical hyperplasia, with focal mucinous features. MMR was intact.    She established care with Dr. Eli Herrera MD, gynecologic oncology at the [facility], subsequently underwent total laparoscopic hysterectomy, with BSO, and SLN biopsy on 6/23/22.  Pathology found endometrial endometrioid adenocarcinoma, FIGO grade 1, MMR intact, with lymphangioleiomyomatosis (CAMARENA), with samplings from left external iliac sentinel lymph nodes and right external iliac sentinel lymph nodes negative, (0/3) and (0/5) respectively.  Tumor cells are L1 CAM-negative.  Depth of myometrial invasion was 10 mm, myometrial thickness 12 mm.  Final pathology was [id number], FIGO 1B.     Follow-up with Dr. Stafford on 7/12/2022, discussed adjuvant vaginal brachytherapy, and the patient was referred to our clinic.    However on 7/25/2022 the patient had an episode of dyspnea and hypoxia, and upon arrival to ED chest x-ray showed bilateral infiltrates.  She was found to have acute PE on CT, as well as numerous ill-defined masses in both lungs that were suspicious for progression of metastatic disease.  MR brain conducted on 7/25/2022 found no intracranial metastases.  Subsequent lung biopsy conducted on 8/2/2022 was negative for malignancy, granulomas, vasculitis, or lymphangioleiomyomatosis.       PAST MEDICAL HISTORY   Lymphangioleiomyomatosis  Hyperlipidemia  Hypertension  Diabetes mellitus  Ampullary  adenoma  Metastatic malignant melanoma  Eczema  Seborrheic keratoses  Allergic rhinitis  Asthma, mild intermittent  History of cataract  History of macular hole of left eye  Obesity  Heart murmur  Pulmonary embolism requiring lifelong anticoagulation  Pneumonitis requiring prednisone  Total knee replacement  Chronic knee pain, right  Obstructive sleep apnea  Status post total replacement of right hip  Menopause at age 48, no HRT    CHEMOTHERAPY HISTORY:    Pembrolizumab    PAST RADIATION THERAPY HISTORY:    NONE: none    Electronic Implants: No    Pregnancy Status: No     SURGICAL HISTORY   Wide local resection of melanoma primary on right knee in March 2022  TLH, BSO, SLNB in June 2022  Right hip arthroplasty, 2017  Right knee arthroplasty 2013  Raven teeth removal, remote, macular hole repair of left thigh        CURRENT OUTPATIENT MEDICATIONS     Current Outpatient Medications   Medication Sig Dispense Refill     albuterol (PROAIR HFA/PROVENTIL HFA/VENTOLIN HFA) 108 (90 Base) MCG/ACT inhaler Inhale 2 puffs into the lungs every 4 hours as needed for shortness of breath / dyspnea 1 Inhaler 3     atenolol (TENORMIN) 50 MG tablet Take 25 mg by mouth daily       atorvastatin (LIPITOR) 20 MG tablet Take 1 tablet by mouth once daily 90 tablet 0     azelastine (ASTELIN) 0.1 % nasal spray USE 1 SPRAY(S) IN EACH NOSTRIL TWICE DAILY AS NEEDED 30 mL 0     azelastine (OPTIVAR) 0.05 % SOLN Place 1 drop into both eyes 2 times daily as needed Reported on 3/22/2017       cetirizine (ZYRTEC) 10 MG tablet Take 10 mg by mouth every morning       Continuous Blood Gluc Sensor (FREESTYLE DAVID 14 DAY SENSOR) [facility] [place] [facility] 1 each every 14 days Use 1 Sensor every 14 days. Use to read blood sugars per 's instructions. 2 each 5     CONTOUR NEXT TEST test strip USE 1 STRIP TO CHECK GLUCOSE ONCE DAILY 100 strip 4     insulin  UNIT/ML vial Inject 44 Units Subcutaneous every morning 10 mL 3     insulin syringe-needle U-100 (BD  "INSULIN SYRINGE ULTRAFINE) 31G X 5/16\" 0.5 ML miscellaneous Use daily with NPH insulin 100 each 11     latanoprost (XALATAN) 0.005 % ophthalmic solution Place 1 drop into both eyes At Bedtime  2.5 mL 1     lidocaine-prilocaine (EMLA) 2.5-2.5 % external cream APPLY CREAM TOPICALLY ONCE DAILY AS NEEDED FOR PAIN APPLY A PEA SIZED AMOUNT OVER PORT SITE 60 MINUTES PRIOR TO USE COVER WITH PLASTIC WRAP       LORazepam (ATIVAN) 0.5 MG tablet Take 1 tablet (0.5 mg) by mouth nightly as needed for anxiety or sleep 30 tablet 0     ondansetron (ZOFRAN) 8 MG tablet Take 1 tablet (8 mg) by mouth every 8 hours as needed for nausea 30 tablet 1     predniSONE (DELTASONE) 5 MG tablet Take 3 tablets (15 mg) by mouth daily for 14 days, THEN 2 tablets (10 mg) daily for 14 days, THEN 1 tablet (5 mg) daily for 14 days. Start with 20 mg by mouth daily for 14 days (pt has previous supply of 20 mg tablets) then start taper of 15 mg. 84 tablet 0     senna-docusate (SENOKOT-S/PERICOLACE) 8.6-50 MG tablet Take 2 tablets by mouth daily as needed for constipation 60 tablet 11     sulfamethoxazole-trimethoprim (BACTRIM) 400-80 MG tablet Take 1 tablet by mouth twice daily 30 tablet 0     timolol maleate (TIMOPTIC) 0.5 % ophthalmic solution INSTILL 1 DROP INTO EACH EYE TWICE DAILY       triamcinolone (KENALOG) 0.5 % external ointment Apply 1 g topically daily (Patient taking differently: Apply 1 applicator topically daily as needed) 15 g 1        ALLERGIES     Allergies   Allergen Reactions     Bromfenac Visual Disturbance      xibrom  Causes sever eye pain     Codeine      \"NAUSE,HA AND DIZZINESS\"     Codeine Nausea     Other reaction(s): Dizziness, Headache          SOCIAL HISTORY     Social History     Tobacco Use     Smoking status: Never     Smokeless tobacco: Never   Vaping Use     Vaping Use: Never used   Substance Use Topics     Alcohol use: No     Alcohol/week: 0.0 standard drinks     Drug use: No     She is a retired nurse. She lives with " her  in [place].  She has 3 children she does not drink she does not smoke.     FAMILY HISTORY     Family History   Problem Relation Age of Onset     Hypertension Mother         diabetes,hypoythryoidism, stroke     Diabetes Mother      Breast Cancer Mother      Heart Disease Father         , cancer lip     Uterine Cancer Sister      Connective Tissue Disorder Sister         fibromyalgia     Diabetes Sister      Hypertension Brother      Cerebrovascular Disease Maternal Grandmother         , diabetes     Cerebrovascular Disease Maternal Grandfather              Cancer Paternal Grandmother              Heart Disease Paternal Grandfather              Cancer Paternal Aunt         ovarian     Breast Cancer Niece      Asthma Maternal Aunt         REVIEW OF SYSTEMS   As above in the HPI     PHYSICAL EXAM   B/P: Data Unavailable, T: Data Unavailable, P: Data Unavailable, R: Data Unavailable  Wt Readings from Last 3 Encounters:   22 84.8 kg (187 lb)   22 83.5 kg (184 lb)   22 83.5 kg (184 lb)     GEN: NAD ***  HEENT: PERRL, EOMI, no icterus, injection or pallor. Oropharynx is clear.  NECK: no cervical or supraclavicular lymphadenopathy  LUNGS: no respiratory distress  ABDOMEN: soft, non-tender, non-distended  EXT: warm, well perfused, no edema  NEURO: alert  SKIN: no rashes  ***     LABORATORY, PATHOLOGY AND IMAGING STUDIES   LABORATORY:   {lab options:2040866}    PATHOLOGY: ***     IMAGING:         ASSESSMENT AND PLAN   ***  Thank you for allowing us to participate in this patient's care.  Please feel free to call with any questions or concerns.       Miah Easley MD  PGY-2 Radiation Oncology  Department of Radiation Oncology  [facility]  Phone: [phone]     ***

## 2022-10-10 ENCOUNTER — TELEPHONE (OUTPATIENT)
Dept: PHARMACY | Facility: CLINIC | Age: 72
End: 2022-10-10

## 2022-10-10 DIAGNOSIS — E11.65 TYPE 2 DIABETES MELLITUS WITH HYPERGLYCEMIA, WITHOUT LONG-TERM CURRENT USE OF INSULIN (H): ICD-10-CM

## 2022-10-10 NOTE — PROGRESS NOTES
MEDICAL RECORDS REQUEST   Radiation Oncology  [address] [place] [address]  PHONE: [phone]  Fax: [phone]        FUTURE VISIT INFORMATION                                                   Gricelda Sabillon, : 1950 scheduled for future visit at [facility] Radiation Oncology    APPOINTMENT INFORMATION:    Date: 10/12/2022    Provider:  Dr. Arnett     Reason for Visit/Diagnosis: Endometrial Cancer     REFERRAL INFORMATION:    Referring provider:  Javier Stafford, Eli Wallace MD    RECORDS REQUESTED FOR VISIT                                                     Action    Action Taken 10/10/2022 2:05pm MAURICIO     I called pt Melva- uri hx of rad onc. She has outside records at [facility].     I called [facility] Ph: [phone] - I spoke to Loreto in med recs dept - she will fax over recent records.     10/11/2022 8:12AM MAURICIO   I faxed the additional [facility] records to HIM yesterday- records are now viewable in [facility]     LUNG/CHEST WALL    CHEST CT AND REPORTS yes     CT Chest 2022 more in PACS    Xray Chest 2022 more in PACS   PRE & POST-OP SCANS AND REPORT yes   ANY RECENT LABS yes   SURGICAL REPORT yes     2022 Lung Biopsy     Endometrial biopsy reports are in [facility]   PATHOLOGY REPORT yes   BRONCHOSCOPY REPORT yes     2022    CHEMO & MED ONC REPORTS  yes   *Send all radiation Prior radiation records for both [facility] and [facility] Radiation Oncology patients to [facility] with  ATTN: LIU/Natasha Dosi/Physics

## 2022-10-11 ENCOUNTER — PATIENT OUTREACH (OUTPATIENT)
Dept: ONCOLOGY | Facility: CLINIC | Age: 72
End: 2022-10-11

## 2022-10-11 NOTE — PROGRESS NOTES
Department of Radiation Oncology                   [facility] [address]  [address] [place]  [address]  Office:  [phone]  Fax:  [phone]   Radiation Oncology Clinic  [address] [place] [address]  Phone:  [phone]  Fax:  [phone]     RE: Gricelda Sabillon : 1950   MRN: [id number] CRAIG: 10/12/2022     OUTPATIENT VISIT NOTE       PROBLEM: endometrial adenocarcinoma, FIGO IB, grade 1     was seen for initial consultation in the Dept of Therapeutic Radiology on 10/10/2022 at the request of Dr. Eli Herrera MD.     HISTORY OF PRESENT ILLNESS: Ms. Vargas is a 73 yo female with a newly diagnosed  endometrial cancer.     She noticed a lump in her anterior right thigh in the fall of . Workup revealed 2 lesions in the proximal thigh and 3 additional nodules more distally towards the knee. Excisional biopsy of the proximal lesion was consistent with metastatic melanoma, without classic skin changes of melanoma, BRAF-negative. PET/CT on 2022 showed no clear evidence of primary. Brain MRI was negative. She was referred to Dr. Allison and underwent wide local excision of the 3 subcutaneous nodules on 3/28/2022 with negative margins. She then began Immunotherapy with Pembrolizumab under the care of Dr. William Florez at [facility] on 2022.     Her case was presented at the [facility] multidisciplinary conference. Review of her prior PET/CT noted some FDG avidity within the uterine fundus. She was thus evaluated with a pelvic US on 5/10/2022 which showed a thickened endometrium of 11.2 mm and a focal filling defect up to 1.7 cm. In retrospect, she had 6 month history of brown mucous discharge. Endometrial biopsy on 2022 showed grade 1 endometroid adenocarcinoma, arising in a background of atypical hyperplasia, with focal mucinous features.     She was referred to Dr. Herrera and underwent TLH/BSO/SLN biopsy on 2022. There were no concerning  intraoperative findings. Final pathology again showed endometrioid adenocarcinoma, FIGO grade 1, measuring 2.2 cm in greatest dimension, with myometrial invasion of 10 mm / 12 mm (83.3%), but no RIN, cervical stromal, serosal, adnexal involvement. LVSI was not identified. A total of 8 SLN (3 on the left and 5 on the right) were all negative. 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 FIGO IB. L1-CAM was negative.     Dr. Herrera recommended consideration of adjuvant brachytherapy given deep myometrial invasion. However, the referral has been delayed due to multiple medical issues. One month post-op, she developed hypoxemia and was found to have bilateral pulmonary emboli. CT also showed numerous ill-defined masses in both lungs. IR biopsy on 8/2/2022 showed chronic inflammatory and fibroblastic foci; negative for malignancy, granulomas, vasculitis, or lymphangioleiomyomatosis. 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 She was treated with Prednisone with taper with plan to repeat CT. Her Pembrolizmab was put on hold.     On interview today, she states that her oxygen requirement has gone up from 1L in the daytime to 1.75L. She monitors her pulse ox and will decompensate to low 80s with extended walking. She denies pain or pelvic pain, but endorses swelling that is worse in her Right leg more than her left. She attributes this swelling to a chronic condition associated with a remote cellulitis episode more than 15 years ago. She states that she has healed well from her resection surgery of the leg and ALYSSA. She has been prescribed prednisone 20mg daily by her pulmonologist following her recent diagnosis of cryptogenic organizing pneumonia. She will be tapering. She has also been receiving 80mg bid lovenox treatment for PE prophylaxis. She does not know for how long she will remain on these medications and she expresses  concern for her bleeding risk should there be a complication of radiation treatment, namely a perforation. She and her  Allen asked many good questions today.       PAST MEDICAL HISTORY:   Past Medical History:   Diagnosis Date     Asthma, mild intermittent      Cataract      Endometrial cancer (H)      HTN, goal below 140/90      Hyperlipidemia LDL goal < 100      Macular hole of left eye      Melanoma (H)     RIGHT KNEE     Sleep apnea     uses c pap     Type 2 diabetes, HbA1C goal < 8% (H)    Asthma  Cataract  HTN  Hyperlipidemia  Macular hole of the left eye  Melanoma of the right knee  Sleep apnea  Type II DM   Periampullary tumor, h/o ampullary adenoma with large periampullary spread and intraductal polyp extension into the common bile duct s/p papillectomy, endomucosal resection, intraductal RFA   Intra-and extrahepatic biliary ductal dilalation   Cholelithiasis and choledocholithiasis     Past Surgical History:   Procedure Laterality Date     ARTHROPLASTY HIP Right 9/25/2017    Procedure: ARTHROPLASTY HIP;  Right total hip arthroplasty  ;  Surgeon: Ayaan Pena MD;  Location: [facility] OR     ARTHROPLASTY KNEE  7/12/2013    Procedure: ARTHROPLASTY KNEE;  right total knee arthroplasty ;  Surgeon: Chaparro Wesley MD;  Location:  OR     ARTHROSCOPY KNEE Right 1/21/2015    Procedure: ARTHROSCOPY KNEE;  Surgeon: Ayaan Pena MD;  Location: [facility] OR     BRONCHOSCOPY (RIGID OR FLEXIBLE), DIAGNOSTIC N/A 8/11/2022    Procedure: BRONCHOSCOPY, WITH BRONCHOALVEOLAR LAVAGE;  Surgeon: Roselia Sosa MD;  Location: [facility] GI     C INCISION OF HYMEN       CATARACT IOL, RT/LT       COLONOSCOPY  2008     COLONOSCOPY N/A 4/18/2019    Procedure: Colonoscopy with polypectomy;  Surgeon: Shaun Guerrero MD;  [facility]     CYSTOSCOPY N/A 6/23/2022    Procedure: Cystoscopy;  Surgeon: Eli Guidry MD;  [facility]     ENDOSCOPIC RETROGRADE CHOLANGIOPANCREATOGRAM N/A 2/6/2019     Procedure: COMBINED ENDOSCOPIC RETROGRADE CHOLANGIOPANCREATOGRAPHY, BILIARY SPINCTEROTOMY AND DILATION, PLACE BILE DUCT STENT;  Surgeon: Guru Andie Gonzalez MD;  [facility]     ENDOSCOPIC RETROGRADE CHOLANGIOPANCREATOGRAM N/A 2/28/2019    Procedure: Endoscopic Retrograde Cholangiopancreatogram, Bile duct stent removal and placement;  Surgeon: Shaun Guerrero MD;  [facility]     ENDOSCOPIC RETROGRADE CHOLANGIOPANCREATOGRAM N/A 4/18/2019    Procedure: COMBINED ENDOSCOPIC RETROGRADE CHOLANGIOPANCREATOGRAPHY, Bile duct stent exchange and Polypectomy;  Surgeon: Shaun Guerrero MD;  [facility]     ENDOSCOPIC RETROGRADE CHOLANGIOPANCREATOGRAM N/A 10/18/2019    Procedure: Endoscopic Retrograde Cholangiopancreatogram;  Surgeon: Shaun Guerrero MD;  [facility]     ENDOSCOPIC RETROGRADE CHOLANGIOPANCREATOGRAM N/A 3/24/2022    Procedure: ENDOSCOPIC RETROGRADE CHOLANGIOPANCREATOGRAPHY;  Surgeon: Shaun Guerrero MD;  Location:  OR     ENDOSCOPIC RETROGRADE CHOLANGIOPANCREATOGRAM WITH SPYGLASS N/A 7/26/2019    Procedure: Endoscopic Retrograde Cholangiopancreatogram With Spyglas,l Radiofrequency Ablation, Stent Exchangeand Duodenal Biopsy x2;  Surgeon: Shaun Guerrero MD;  Location:  OR     ENDOSCOPIC RETROGRADE CHOLANGIOPANCREATOGRAM WITH SPYGLASS N/A 9/10/2020    Procedure: ENDOSCOPIC RETROGRADE CHOLANGIOPANCREATOGRAPHY, WITH DIRECT DUCT VISUALIZATION, USING PANCREATICOBILIARY FIBEROPTIC PROBE;  Surgeon: Shaun Guerrero MD;  [facility]     ENDOSCOPIC RETROGRADE CHOLANGIOPANCREATOGRAM WITH SPYGLASS N/A 3/22/2021    Procedure: ENDOSCOPIC RETROGRADE CHOLANGIOPANCREATOGRAPHY, WITH DIRECT DUCT VISUALIZATION, USING PANCREATICOBILIARY FIBEROPTIC PROBE;  Surgeon: Shaun Guerrero MD;  Location:  OR     ESOPHAGOSCOPY, GASTROSCOPY, DUODENOSCOPY (EGD) WITH RADIO FREQUENCY ABLATION, COMBINED N/A 9/10/2020    Procedure: possible repeat ESOPHAGOGASTRODUODENOSCOPY, WITH RADIOFREQUENCY ABLATION and  endoscopic mucosal resection;  Surgeon: Shaun Guerrero MD;  [facility]     ESOPHAGOSCOPY, GASTROSCOPY, DUODENOSCOPY (EGD), COMBINED N/A 4/18/2019    Procedure: upper endoscopy with polypectomy;  Surgeon: Shaun Guerrero MD;  [facility]     ESOPHAGOSCOPY, GASTROSCOPY, DUODENOSCOPY (EGD), COMBINED N/A 10/18/2019    Procedure: Upper Endoscopy and ERCP with stent removal, stone removal and biopsy;  Surgeon: Shaun Guerrero MD;  [facility]     ESOPHAGOSCOPY, GASTROSCOPY, DUODENOSCOPY (EGD), COMBINED N/A 3/24/2022    Procedure: ESOPHAGOGASTRODUODENOSCOPY (EGD), Duodenal  polyp removal;  Surgeon: Shaun Guerrero MD;  Location: [facility] OR     ESOPHAGOSCOPY, GASTROSCOPY, DUODENOSCOPY (EGD), RESECT MUCOSA, COMBINED N/A 2/28/2019    Procedure: Upper Endoscopy, Endoscopic Ultrasound, Endoscopic Mucosal Resection,  Ampullectomy, polypectomy.;  Surgeon: Shaun Guerrero MD;  [facility]     ESOPHAGOSCOPY, GASTROSCOPY, DUODENOSCOPY (EGD), RESECT MUCOSA, COMBINED N/A 3/22/2021    Procedure: ESOPHAGOGASTRODUODENOSCOPY (EGD) with  endoscopic mucosal resection/ polypectomy;  Surgeon: Shaun Guerrero MD;  [facility]     EXCISE LESION LOWER EXTREMITY Right 3/28/2022    Procedure: Wide local excision of right knee melanoma;  Surgeon: Chevy Allison MD;  Location: [facility] OR     EXCISE MASS LOWER EXTREMITY Right 1/13/2022    Procedure: excision of right thigh mass;  Surgeon: Salvador Kelly MD;  Location: [facility] OR     EYE SURGERY      macular hole repaired left eye     HC KNEE SCOPE, DIAGNOSTIC      - both knees     HEAD & NECK SURGERY      wisdom teeth     IR CHEST PORT PLACEMENT > 5 YRS OF AGE  5/2/2022     LAPAROSCOPIC HYSTERECTOMY TOTAL, BILATERAL SALPINGO-OOPHORECTOMY, NODE DISSECTION, COMBINED Bilateral 6/23/2022    Procedure: Total Laparoscopic Hyserectomy, Bilateral Salpibgo-oophorectomy, Bilateral [place] Lymph Node Dissection;  Surgeon: Eli Guidry MD;  [facility]     S/p R total hip arthroplasty,  "2017  S/p right total knee arthroplasty 2013  S/p knee arthroscopy 2015  S/p cataract surgery  S/p colonoscopy  S/p ERCP 2019 -2021  S/p EGD 2019  S/p WLE of right knee melanoma (Dr. Allison) 03/2022  S/p  Excision of right knee mass  S/p wisdom teeth removal    CHEMOTHERAPY HISTORY: Pembrolizumab, currently being held in setting of organizing pneumonia.      PAST RADIATION THERAPY HISTORY: none      MEDICATIONS:   Current Outpatient Medications   Medication Sig Dispense Refill     albuterol (PROAIR HFA/PROVENTIL HFA/VENTOLIN HFA) 108 (90 Base) MCG/ACT inhaler Inhale 2 puffs into the lungs every 4 hours as needed for shortness of breath / dyspnea 1 Inhaler 3     atenolol (TENORMIN) 50 MG tablet Take 25 mg by mouth daily       atorvastatin (LIPITOR) 20 MG tablet Take 1 tablet by mouth once daily 90 tablet 0     azelastine (ASTELIN) 0.1 % nasal spray USE 1 SPRAY(S) IN EACH NOSTRIL TWICE DAILY AS NEEDED 30 mL 0     azelastine (OPTIVAR) 0.05 % SOLN Place 1 drop into both eyes 2 times daily as needed Reported on 3/22/2017       cetirizine (ZYRTEC) 10 MG tablet Take 10 mg by mouth every morning       Continuous Blood Gluc Sensor (RoundrateYLE DAVID 14 DAY SENSOR) [facility] 1 each every 14 days Use 1 Sensor every 14 days. Use to read blood sugars per 's instructions. 2 each 5     CONTOUR NEXT TEST test strip USE 1 STRIP TO CHECK GLUCOSE ONCE DAILY 100 strip 4     insulin  UNIT/ML vial Inject 44 Units Subcutaneous every morning 10 mL 3     insulin syringe-needle U-100 (BD INSULIN SYRINGE ULTRAFINE) 31G X 5/16\" 0.5 ML miscellaneous Use daily with NPH insulin 100 each 11     latanoprost (XALATAN) 0.005 % ophthalmic solution Place 1 drop into both eyes At Bedtime  2.5 mL 1     lidocaine-prilocaine (EMLA) 2.5-2.5 % external cream APPLY CREAM TOPICALLY ONCE DAILY AS NEEDED FOR PAIN APPLY A PEA SIZED AMOUNT OVER PORT SITE 60 MINUTES PRIOR TO USE COVER WITH PLASTIC WRAP       LORazepam (ATIVAN) 0.5 MG tablet Take 1 " tablet (0.5 mg) by mouth nightly as needed for anxiety or sleep 30 tablet 0     ondansetron (ZOFRAN) 8 MG tablet Take 1 tablet (8 mg) by mouth every 8 hours as needed for nausea 30 tablet 1     predniSONE (DELTASONE) 5 MG tablet Take 3 tablets (15 mg) by mouth daily for 14 days, THEN 2 tablets (10 mg) daily for 14 days, THEN 1 tablet (5 mg) daily for 14 days. Start with 20 mg by mouth daily for 14 days (pt has previous supply of 20 mg tablets) then start taper of 15 mg. 84 tablet 0     senna-docusate (SENOKOT-S/PERICOLACE) 8.6-50 MG tablet Take 2 tablets by mouth daily as needed for constipation 60 tablet 11     sulfamethoxazole-trimethoprim (BACTRIM) 400-80 MG tablet Take 1 tablet by mouth twice daily 30 tablet 0     timolol maleate (TIMOPTIC) 0.5 % ophthalmic solution INSTILL 1 DROP INTO EACH EYE TWICE DAILY       triamcinolone (KENALOG) 0.5 % external ointment Apply 1 g topically daily (Patient taking differently: Apply 1 applicator topically daily as needed) 15 g 1       ALLERGIES:  allergic to bromfenac, codeine, and codeine.    SOCIAL HISTORY:   Lives with   A retired nurse  Enjoys reading, spending time at the [employer], bird watching.     FAMILY HISTORY:   family history includes Asthma in her maternal aunt; Breast Cancer in her mother and niece; Cancer in her paternal aunt and paternal grandmother; Cerebrovascular Disease in her maternal grandfather and maternal grandmother; Connective Tissue Disorder in her sister; Diabetes in her mother and sister; Heart Disease in her father and paternal grandfather; Hypertension in her brother and mother; Uterine Cancer in her sister.   Mother: breast cancer  Father: cancer of the lip  Sister: uterine cancer  Paternal grandmother: cancer  Paternal aunt: Ovarian cancer  Niece: breast cancer      REVIEW OF SYMPTOMS:  A full 14-point review of systems was performed.   , NVSD x 3  Menopause around age 48  No h/p hormone replacement therapy    PHYSICAL  EXAMINATION:    LMP 12/13/2002 (Exact Date)       ASSESSMENT AND PLAN:   Melva Sabillon is a 72 year old female who presents for consultation of adjuvant vaginal brachytherapy for endometrial adenocarcinoma, FIGO stage IB status post hysterectomy, BSO, and sentinel lymph node biopsy. She had surgery in June and her consultation was delayed due to pulmonary complications, likely secondary to immunotherapy for her melanoma.    We reviewed her pathologic findings and discussed risk factors for local recurrence for early stage endometrial cancer. Her primary risk factors are her age (> 70) and deep myometrial invasion. She otherwise has a low grade tumor, with no LVSI and no robert involvement. We thus recommend adjuvant therapy in the form of vaginal brachytherapy as opposed to pelvic external beam radiotherapy.     In light of the patient's many comorbid medical conditions that have presented over the past year, she expressed concern that she may not be healthy enough to proceed with radiation.  At this time explained the acute and late term toxicities associated with radiation, emphasizing that with this therapy the greatest risk would be vaginal stenosis, vaginal drying, and fatigue, noting that bone marrow suppression, secondary malignancy, cystitis, proctitis would not be likely.  We also discussed that after therapy she would benefit from the continued use of a vaginal dilator to prevent involution of the vagina, which would be necessary for future gynecology oncology evaluations.  Though I assured her we would be in discussion with her other oncologist and pulmonologist concerning to start of treatment.      We discussed the options of 7 Gy prescribed to the 0.5 cm depth x 3 vs. 6 Gy prescribed to the vaginal surface x 5. The efficacies are the same, and the 5 fraction course may be associated with a lower risk of vaginal stenosis. She is interested in the shorter fractionation schedule due to her ongoing  comorbidities.     All the patient's questions were answered.  Informed consent was obtained. We will arrange her brachytherapy in the next couple of weeks.    Miah Easley MD  PGY-2 Radiation Oncology  Department of Radiation Oncology  [facility]  Phone: [phone]      I saw and examined the patient with the resident.  I have reviewed and edited the resident's note and agree with the plan of care.      I reviewed patient's chart, internal/external medical records, imaging studies (including actual images), labs and pathology reports.  I interviewed and counseled the patient face to face.  I additionally  discussed the case with patient's referring physicians and care team.      80 minutes were spent on the date of the encounter doing chart review, history and exam, documentation and further activities as noted above.       Rolo Arnett MD    Thank you for allowing us to participate in this patient's care.  Please feel free to call with any questions or concerns.       Rolo Arnett M.D./Ph.D.  Radiation Oncologist   Department of Radiation Oncology  [facility]  Phone: [phone]

## 2022-10-11 NOTE — PROGRESS NOTES
"Writer called Melva for follow up. She reports she is doing a little bit better since going up to 20 mg of Prednisone daily. She was able to  her new prescription without issues.     Melva is still on 2L NC around the clock. At rest on 2L she is 95% and with activity she drops to 89-90%.     She reports she does not have the \"heaviness\" on her chest like she did last week. She was thinking about starting to try to wean down on her oxygen flow.     Writer advised Melva that if she does start weaning make sure to go slowly and keep sats > 90%. Melva verbalized understanding and is in agreement with the plan.     Melva also reports she got a denial letter from her insurance company for the CT scan that is currently scheduled 11/21/22. She also reports the diagnosis listed is skin cancer. Per chart review, Dr. Sosa's order lists organizing pneumonia as the associated diagnosis. Writer sent an email to financial securing to verify coverage of upcoming CT.     Will keep Melva updated.     STEPHAN Mtz, AJ, RN, LAc, OCN  RN Care Coordinator  [facility]  Ph: [phone]  F: [phone]      "

## 2022-10-11 NOTE — PROGRESS NOTES
Writer discussed with financial securing department. They reported they have not checked insurance yet this far out from the 11/21 CT scan.     Writer advised Melva to call her insurance company and see if the denial is for a future or past CT scan but from what recent CTs that Ian has ordered, all of the diagnosis codes were related to lung issues and not skin cancer.     Melva verbalized understanding and will call us if she continues to have issues with CT coverage.     STEPHAN Mtz, CALVINN, RN, LAc, OCN  RN Care Coordinator  [facility]  Ph: [phone]  F: [phone]

## 2022-10-12 ENCOUNTER — OFFICE VISIT (OUTPATIENT)
Dept: RADIATION ONCOLOGY | Facility: CLINIC | Age: 72
End: 2022-10-12
Attending: RADIOLOGY
Payer: COMMERCIAL

## 2022-10-12 ENCOUNTER — PRE VISIT (OUTPATIENT)
Dept: RADIATION ONCOLOGY | Facility: CLINIC | Age: 72
End: 2022-10-12

## 2022-10-12 VITALS — HEART RATE: 71 BPM | DIASTOLIC BLOOD PRESSURE: 46 MMHG | OXYGEN SATURATION: 96 % | SYSTOLIC BLOOD PRESSURE: 142 MMHG

## 2022-10-12 DIAGNOSIS — C54.1 ENDOMETRIAL CANCER (H): ICD-10-CM

## 2022-10-12 PROCEDURE — G0463 HOSPITAL OUTPT CLINIC VISIT: HCPCS

## 2022-10-12 ASSESSMENT — ENCOUNTER SYMPTOMS
DIZZINESS: 0
BRUISES/BLEEDS EASILY: 0
SORE THROAT: 0
VOMITING: 0
FALLS: 0
DIAPHORESIS: 0
HEADACHES: 1
DIARRHEA: 0
NERVOUS/ANXIOUS: 0
CHILLS: 0
CONSTIPATION: 0
BLOOD IN STOOL: 0
SHORTNESS OF BREATH: 1
COUGH: 0
FEVER: 0
DEPRESSION: 0
SEIZURES: 0
HEMATURIA: 0
INSOMNIA: 0
NAUSEA: 0
WEIGHT LOSS: 0
NECK PAIN: 0
TINGLING: 0
DOUBLE VISION: 0
BACK PAIN: 0
FREQUENCY: 0
BLURRED VISION: 0
EYE PAIN: 0
DYSURIA: 0

## 2022-10-12 NOTE — PROGRESS NOTES
HPI  INITIAL PATIENT ASSESSMENT    Diagnosis: Endometrial cancer, hysterectomy 6/23/22    Prior radiation therapy: None    Prior chemotherapy: None    Prior hormonal therapy:No    Pain Eval:  Denies    Psychosocial  Living arrangements: with , Allen  Fall Risk: wheelchair/stretcher for distance   referral needs: Not needed    Advanced Directive: No  Implantable Cardiac Device? No    Onset of menarche: @ age 11  LMP: Patient's last menstrual period was 12/13/2002 (exact date).  Onset of menopause: @ age late 50's  Abnormal vaginal bleeding/discharge: No  Are you pregnant? No  Reproductive note: 3 children    Nurse face-to-face time: Level 4:  15 min face to face time    Review of Systems   Constitutional: Positive for malaise/fatigue. Negative for chills, diaphoresis, fever and weight loss.   HENT: Negative for ear pain, nosebleeds and sore throat.    Eyes: Negative for blurred vision, double vision and pain.   Respiratory: Positive for shortness of breath (O2 per NC 24/7). Negative for cough.    Cardiovascular: Positive for leg swelling (Rt > Lt, increased this last week). Negative for chest pain.   Gastrointestinal: Negative for blood in stool, constipation, diarrhea, nausea and vomiting.   Genitourinary: Negative for dysuria, frequency, hematuria and urgency.   Musculoskeletal: Negative for back pain, falls (Uses WC for distances), joint pain and neck pain.   Skin: Negative for rash.   Neurological: Positive for headaches (a few HA's last couple weeks). Negative for dizziness, tingling and seizures.   Endo/Heme/Allergies: Does not bruise/bleed easily.   Psychiatric/Behavioral: Negative for depression. The patient is not nervous/anxious and does not have insomnia.

## 2022-10-12 NOTE — LETTER
10/12/2022         RE: Gricelda Sabillon  [address]  [facility] [address]        Dear Colleague,    Thank you for referring your patient, Gricelda Sabillon, to the [facility] RADIATION ONCOLOGY. Please see a copy of my visit note below.    Department of Radiation Oncology                   [facility] Mail [address]  [address] [place]  [address]  Office:  [phone]  Fax:  [phone]   Radiation Oncology Clinic  [address] [place] [address]  Phone:  [phone]  Fax:  [phone]     RE: Gricelda Sabillon : 1950   MRN: [id number] CRAIG: 10/12/2022     OUTPATIENT VISIT NOTE       PROBLEM: endometrial adenocarcinoma, FIGO IB, grade 1     was seen for initial consultation in the Dept of Therapeutic Radiology on 10/10/2022 at the request of Dr. Eli Herrera MD.     HISTORY OF PRESENT ILLNESS: Ms. Vargas is a 71 yo female with a newly diagnosed  endometrial cancer.     She noticed a lump in her anterior right thigh in the fall of . Workup revealed 2 lesions in the proximal thigh and 3 additional nodules more distally towards the knee. Excisional biopsy of the proximal lesion was consistent with metastatic melanoma, without classic skin changes of melanoma, BRAF-negative. PET/CT on 2022 showed no clear evidence of primary. Brain MRI was negative. She was referred to Dr. Allison and underwent wide local excision of the 3 subcutaneous nodules on 3/28/2022 with negative margins. She then began Immunotherapy with Pembrolizumab under the care of Dr. William Florez at [facility] on 2022.     Her case was presented at the [facility] multidisciplinary conference. Review of her prior PET/CT noted some FDG avidity within the uterine fundus. She was thus evaluated with a pelvic US on 5/10/2022 which showed a thickened endometrium of 11.2 mm and a focal filling defect up to 1.7 cm. In retrospect, she had 6 month history of brown mucous discharge.  Endometrial biopsy on 5/25/2022 showed grade 1 endometroid adenocarcinoma, arising in a background of atypical hyperplasia, with focal mucinous features.     She was referred to Dr. Herrera and underwent TLH/BSO/SLN biopsy on 6/23/2022. There were no concerning intraoperative findings. Final pathology again showed endometrioid adenocarcinoma, FIGO grade 1, measuring 2.2 cm in greatest dimension, with myometrial invasion of 10 mm / 12 mm (83.3%), but no RIN, cervical stromal, serosal, adnexal involvement. LVSI was not identified. A total of 8 SLN (3 on the left and 5 on the right) were all negative. 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 FIGO IB. L1-CAM was negative.     Dr. Herrera recommended consideration of adjuvant brachytherapy given deep myometrial invasion. However, the referral has been delayed due to multiple medical issues. One month post-op, she developed hypoxemia and was found to have bilateral pulmonary emboli. CT also showed numerous ill-defined masses in both lungs. IR biopsy on 8/2/2022 showed chronic inflammatory and fibroblastic foci; negative for malignancy, granulomas, vasculitis, or lymphangioleiomyomatosis. 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 She was treated with Prednisone with taper with plan to repeat CT. Her Pembrolizmab was put on hold.     On interview today, she states that her oxygen requirement has gone up from 1L in the daytime to 1.75L. She monitors her pulse ox and will decompensate to low 80s with extended walking. She denies pain or pelvic pain, but endorses swelling that is worse in her Right leg more than her left. She attributes this swelling to a chronic condition associated with a remote cellulitis episode more than 15 years ago. She states that she has healed well from her resection surgery of the leg and ALYSSA. She has been prescribed prednisone 20mg daily by her  pulmonologist following her recent diagnosis of cryptogenic organizing pneumonia. She will be tapering. She has also been receiving 80mg bid lovenox treatment for PE prophylaxis. She does not know for how long she will remain on these medications and she expresses concern for her bleeding risk should there be a complication of radiation treatment, namely a perforation. She and her  Allen asked many good questions today.       PAST MEDICAL HISTORY:   Past Medical History:   Diagnosis Date     Asthma, mild intermittent      Cataract      Endometrial cancer (H)      HTN, goal below 140/90      Hyperlipidemia LDL goal < 100      Macular hole of left eye      Melanoma (H)     RIGHT KNEE     Sleep apnea     uses c pap     Type 2 diabetes, HbA1C goal < 8% (H)    Asthma  Cataract  HTN  Hyperlipidemia  Macular hole of the left eye  Melanoma of the right knee  Sleep apnea  Type II DM   Periampullary tumor, h/o ampullary adenoma with large periampullary spread and intraductal polyp extension into the common bile duct s/p papillectomy, endomucosal resection, intraductal RFA   Intra-and extrahepatic biliary ductal dilalation   Cholelithiasis and choledocholithiasis     Past Surgical History:   Procedure Laterality Date     ARTHROPLASTY HIP Right 9/25/2017    Procedure: ARTHROPLASTY HIP;  Right total hip arthroplasty  ;  Surgeon: Ayaan Pena MD;  Location:  OR     ARTHROPLASTY KNEE  7/12/2013    Procedure: ARTHROPLASTY KNEE;  right total knee arthroplasty ;  Surgeon: Chaparro Wesley MD;  Location:  OR     ARTHROSCOPY KNEE Right 1/21/2015    Procedure: ARTHROSCOPY KNEE;  Surgeon: Ayaan Pena MD;  Location:  OR     BRONCHOSCOPY (RIGID OR FLEXIBLE), DIAGNOSTIC N/A 8/11/2022    Procedure: BRONCHOSCOPY, WITH BRONCHOALVEOLAR LAVAGE;  Surgeon: Roselia Sosa MD;  Location:  GI     C INCISION OF HYMEN       CATARACT IOL, RT/LT       COLONOSCOPY  2008     COLONOSCOPY N/A 4/18/2019     Procedure: Colonoscopy with polypectomy;  Surgeon: Shaun Guerrero MD;  Location: [facility] OR     CYSTOSCOPY N/A 6/23/2022    Procedure: Cystoscopy;  Surgeon: Eli Guidry MD;  [facility]: [facility]     ENDOSCOPIC RETROGRADE CHOLANGIOPANCREATOGRAM N/A 2/6/2019    Procedure: COMBINED ENDOSCOPIC RETROGRADE CHOLANGIOPANCREATOGRAPHY, BILIARY SPINCTEROTOMY AND DILATION, PLACE BILE DUCT STENT;  Surgeon: Guru Andie Gonzalez MD;  Location: [facility]     ENDOSCOPIC RETROGRADE CHOLANGIOPANCREATOGRAM N/A 2/28/2019    Procedure: Endoscopic Retrograde Cholangiopancreatogram, Bile duct stent removal and placement;  Surgeon: Shaun Guerrero MD;  [facility]: [facility]     ENDOSCOPIC RETROGRADE CHOLANGIOPANCREATOGRAM N/A 4/18/2019    Procedure: COMBINED ENDOSCOPIC RETROGRADE CHOLANGIOPANCREATOGRAPHY, Bile duct stent exchange and Polypectomy;  Surgeon: Shaun Guerrero MD;  [facility]     ENDOSCOPIC RETROGRADE CHOLANGIOPANCREATOGRAM N/A 10/18/2019    Procedure: Endoscopic Retrograde Cholangiopancreatogram;  Surgeon: Shaun Guerrero MD;  [facility]     ENDOSCOPIC RETROGRADE CHOLANGIOPANCREATOGRAM N/A 3/24/2022    Procedure: ENDOSCOPIC RETROGRADE CHOLANGIOPANCREATOGRAPHY;  Surgeon: Shaun Guerrero MD;  Location:  OR     ENDOSCOPIC RETROGRADE CHOLANGIOPANCREATOGRAM WITH SPYGLASS N/A 7/26/2019    Procedure: Endoscopic Retrograde Cholangiopancreatogram With Spyglas,l Radiofrequency Ablation, Stent Exchangeand Duodenal Biopsy x2;  Surgeon: Shaun Guerrero MD;  [facility]     ENDOSCOPIC RETROGRADE CHOLANGIOPANCREATOGRAM WITH SPYGLASS N/A 9/10/2020    Procedure: ENDOSCOPIC RETROGRADE CHOLANGIOPANCREATOGRAPHY, WITH DIRECT DUCT VISUALIZATION, USING PANCREATICOBILIARY FIBEROPTIC PROBE;  Surgeon: Shaun Guerrero MD;  [facility]     ENDOSCOPIC RETROGRADE CHOLANGIOPANCREATOGRAM WITH SPYGLASS N/A 3/22/2021    Procedure: ENDOSCOPIC RETROGRADE CHOLANGIOPANCREATOGRAPHY, WITH DIRECT DUCT VISUALIZATION, USING PANCREATICOBILIARY  FIBEROPTIC PROBE;  Surgeon: Shaun Guerrero MD;  [facility]     ESOPHAGOSCOPY, GASTROSCOPY, DUODENOSCOPY (EGD) WITH RADIO FREQUENCY ABLATION, COMBINED N/A 9/10/2020    Procedure: possible repeat ESOPHAGOGASTRODUODENOSCOPY, WITH RADIOFREQUENCY ABLATION and endoscopic mucosal resection;  Surgeon: Shaun Guerrero MD;  [facility]     ESOPHAGOSCOPY, GASTROSCOPY, DUODENOSCOPY (EGD), COMBINED N/A 4/18/2019    Procedure: upper endoscopy with polypectomy;  Surgeon: Shaun Guerrero MD;  [facility]     ESOPHAGOSCOPY, GASTROSCOPY, DUODENOSCOPY (EGD), COMBINED N/A 10/18/2019    Procedure: Upper Endoscopy and ERCP with stent removal, stone removal and biopsy;  Surgeon: Shaun Guerrero MD;  [facility]     ESOPHAGOSCOPY, GASTROSCOPY, DUODENOSCOPY (EGD), COMBINED N/A 3/24/2022    Procedure: ESOPHAGOGASTRODUODENOSCOPY (EGD), Duodenal  polyp removal;  Surgeon: Shaun Guerrero MD;  Location: [facility] OR     ESOPHAGOSCOPY, GASTROSCOPY, DUODENOSCOPY (EGD), RESECT MUCOSA, COMBINED N/A 2/28/2019    Procedure: Upper Endoscopy, Endoscopic Ultrasound, Endoscopic Mucosal Resection,  Ampullectomy, polypectomy.;  Surgeon: Shaun Guerrero MD;  [facility]     ESOPHAGOSCOPY, GASTROSCOPY, DUODENOSCOPY (EGD), RESECT MUCOSA, COMBINED N/A 3/22/2021    Procedure: ESOPHAGOGASTRODUODENOSCOPY (EGD) with  endoscopic mucosal resection/ polypectomy;  Surgeon: Shaun Guerrero MD;  [facility]     EXCISE LESION LOWER EXTREMITY Right 3/28/2022    Procedure: Wide local excision of right knee melanoma;  Surgeon: Chevy Allison MD;  Location: [facility] OR     EXCISE MASS LOWER EXTREMITY Right 1/13/2022    Procedure: excision of right thigh mass;  Surgeon: Salvador Kelly MD;  Location: [facility] OR     EYE SURGERY      macular hole repaired left eye     HC KNEE SCOPE, DIAGNOSTIC      - both knees     HEAD & NECK SURGERY      wisdom teeth     IR CHEST PORT PLACEMENT > 5 YRS OF AGE  5/2/2022     LAPAROSCOPIC HYSTERECTOMY TOTAL, BILATERAL  "SALPINGO-OOPHORECTOMY, NODE DISSECTION, COMBINED Bilateral 6/23/2022    Procedure: Total Laparoscopic Hyserectomy, Bilateral Salpibgo-oophorectomy, Bilateral [place] Lymph Node Dissection;  Surgeon: Eli Guidry MD;  Location: [facility]     S/p R total hip arthroplasty, 2017  S/p right total knee arthroplasty 2013  S/p knee arthroscopy 2015  S/p cataract surgery  S/p colonoscopy  S/p ERCP 2019 -2021  S/p EGD 2019  S/p WLE of right knee melanoma (Dr. Allison) 03/2022  S/p  Excision of right knee mass  S/p wisdom teeth removal    CHEMOTHERAPY HISTORY: Pembrolizumab, currently being held in setting of organizing pneumonia.      PAST RADIATION THERAPY HISTORY: none      MEDICATIONS:   Current Outpatient Medications   Medication Sig Dispense Refill     albuterol (PROAIR HFA/PROVENTIL HFA/VENTOLIN HFA) 108 (90 Base) MCG/ACT inhaler Inhale 2 puffs into the lungs every 4 hours as needed for shortness of breath / dyspnea 1 Inhaler 3     atenolol (TENORMIN) 50 MG tablet Take 25 mg by mouth daily       atorvastatin (LIPITOR) 20 MG tablet Take 1 tablet by mouth once daily 90 tablet 0     azelastine (ASTELIN) 0.1 % nasal spray USE 1 SPRAY(S) IN EACH NOSTRIL TWICE DAILY AS NEEDED 30 mL 0     azelastine (OPTIVAR) 0.05 % SOLN Place 1 drop into both eyes 2 times daily as needed Reported on 3/22/2017       cetirizine (ZYRTEC) 10 MG tablet Take 10 mg by mouth every morning       Continuous Blood Gluc Sensor (FREESTYLE DAVID 14 DAY SENSOR) MIS 1 each every 14 days Use 1 Sensor every 14 days. Use to read blood sugars per 's instructions. 2 each 5     CONTOUR NEXT TEST test strip USE 1 STRIP TO CHECK GLUCOSE ONCE DAILY 100 strip 4     insulin  UNIT/ML vial Inject 44 Units Subcutaneous every morning 10 mL 3     insulin syringe-needle U-100 (BD INSULIN SYRINGE ULTRAFINE) 31G X 5/16\" 0.5 ML miscellaneous Use daily with NPH insulin 100 each 11     latanoprost (XALATAN) 0.005 % ophthalmic solution Place 1 drop " into both eyes At Bedtime  2.5 mL 1     lidocaine-prilocaine (EMLA) 2.5-2.5 % external cream APPLY CREAM TOPICALLY ONCE DAILY AS NEEDED FOR PAIN APPLY A PEA SIZED AMOUNT OVER PORT SITE 60 MINUTES PRIOR TO USE COVER WITH PLASTIC WRAP       LORazepam (ATIVAN) 0.5 MG tablet Take 1 tablet (0.5 mg) by mouth nightly as needed for anxiety or sleep 30 tablet 0     ondansetron (ZOFRAN) 8 MG tablet Take 1 tablet (8 mg) by mouth every 8 hours as needed for nausea 30 tablet 1     predniSONE (DELTASONE) 5 MG tablet Take 3 tablets (15 mg) by mouth daily for 14 days, THEN 2 tablets (10 mg) daily for 14 days, THEN 1 tablet (5 mg) daily for 14 days. Start with 20 mg by mouth daily for 14 days (pt has previous supply of 20 mg tablets) then start taper of 15 mg. 84 tablet 0     senna-docusate (SENOKOT-S/PERICOLACE) 8.6-50 MG tablet Take 2 tablets by mouth daily as needed for constipation 60 tablet 11     sulfamethoxazole-trimethoprim (BACTRIM) 400-80 MG tablet Take 1 tablet by mouth twice daily 30 tablet 0     timolol maleate (TIMOPTIC) 0.5 % ophthalmic solution INSTILL 1 DROP INTO EACH EYE TWICE DAILY       triamcinolone (KENALOG) 0.5 % external ointment Apply 1 g topically daily (Patient taking differently: Apply 1 applicator topically daily as needed) 15 g 1       ALLERGIES:  allergic to bromfenac, codeine, and codeine.    SOCIAL HISTORY:   Lives with   A retired nurse  Enjoys reading, spending time at the [employer], bird watching.     FAMILY HISTORY:   family history includes Asthma in her maternal aunt; Breast Cancer in her mother and niece; Cancer in her paternal aunt and paternal grandmother; Cerebrovascular Disease in her maternal grandfather and maternal grandmother; Connective Tissue Disorder in her sister; Diabetes in her mother and sister; Heart Disease in her father and paternal grandfather; Hypertension in her brother and mother; Uterine Cancer in her sister.   Mother: breast cancer  Father: cancer of the  lip  Sister: uterine cancer  Paternal grandmother: cancer  Paternal aunt: Ovarian cancer  Niece: breast cancer      REVIEW OF SYMPTOMS:  A full 14-point review of systems was performed.   , NVSD x 3  Menopause around age 48  No h/p hormone replacement therapy    PHYSICAL EXAMINATION:    LMP 2002 (Exact Date)       ASSESSMENT AND PLAN:   Melva Sabillon is a 72 year old female who presents for consultation of adjuvant vaginal brachytherapy for endometrial adenocarcinoma, FIGO stage IB status post hysterectomy, BSO, and sentinel lymph node biopsy. She had surgery in  and her consultation was delayed due to pulmonary complications, likely secondary to immunotherapy for her melanoma.    We reviewed her pathologic findings and discussed risk factors for local recurrence for early stage endometrial cancer. Her primary risk factors are her age (> 70) and deep myometrial invasion. She otherwise has a low grade tumor, with no LVSI and no robert involvement. We thus recommend adjuvant therapy in the form of vaginal brachytherapy as opposed to pelvic external beam radiotherapy.     In light of the patient's many comorbid medical conditions that have presented over the past year, she expressed concern that she may not be healthy enough to proceed with radiation.  At this time explained the acute and late term toxicities associated with radiation, emphasizing that with this therapy the greatest risk would be vaginal stenosis, vaginal drying, and fatigue, noting that bone marrow suppression, secondary malignancy, cystitis, proctitis would not be likely.  We also discussed that after therapy she would benefit from the continued use of a vaginal dilator to prevent involution of the vagina, which would be necessary for future gynecology oncology evaluations.  Though I assured her we would be in discussion with her other oncologist and pulmonologist concerning to start of treatment.      We discussed the options of 7 Gy  prescribed to the 0.5 cm depth x 3 vs. 6 Gy prescribed to the vaginal surface x 5. The efficacies are the same, and the 5 fraction course may be associated with a lower risk of vaginal stenosis. She is interested in the shorter fractionation schedule due to her ongoing comorbidities.     All the patient's questions were answered.  Informed consent was obtained. We will arrange her brachytherapy in the next couple of weeks.    Miah Easley MD  PGY-2 Radiation Oncology  Department of Radiation Oncology  [facility]  Phone: [phone]      I saw and examined the patient with the resident.  I have reviewed and edited the resident's note and agree with the plan of care.      I reviewed patient's chart, internal/external medical records, imaging studies (including actual images), labs and pathology reports.  I interviewed and counseled the patient face to face.  I additionally  discussed the case with patient's referring physicians and care team.      80 minutes were spent on the date of the encounter doing chart review, history and exam, documentation and further activities as noted above.       Rolo Arnett MD    Thank you for allowing us to participate in this patient's care.  Please feel free to call with any questions or concerns.       Rolo Arnett M.D./Ph.D.  Radiation Oncologist   Department of Radiation Oncology  [facility]  Phone: [phone]             HPI  INITIAL PATIENT ASSESSMENT    Diagnosis: Endometrial cancer, hysterectomy 6/23/22    Prior radiation therapy: None    Prior chemotherapy: None    Prior hormonal therapy:No    Pain Eval:  Denies    Psychosocial  Living arrangements: with Allen  Fall Risk: wheelchair/stretcher for distance   referral needs: Not needed    Advanced Directive: No  Implantable Cardiac Device? No    Onset of menarche: @ age 11  LMP: Patient's last menstrual period was 12/13/2002 (exact date).  Onset of  menopause: @ age late 50's  Abnormal vaginal bleeding/discharge: No  Are you pregnant? No  Reproductive note: 3 children    Nurse face-to-face time: Level 4:  15 min face to face time    Review of Systems   Constitutional: Positive for malaise/fatigue. Negative for chills, diaphoresis, fever and weight loss.   HENT: Negative for ear pain, nosebleeds and sore throat.    Eyes: Negative for blurred vision, double vision and pain.   Respiratory: Positive for shortness of breath (O2 per NC 24/7). Negative for cough.    Cardiovascular: Positive for leg swelling (Rt > Lt, increased this last week). Negative for chest pain.   Gastrointestinal: Negative for blood in stool, constipation, diarrhea, nausea and vomiting.   Genitourinary: Negative for dysuria, frequency, hematuria and urgency.   Musculoskeletal: Negative for back pain, falls (Uses WC for distances), joint pain and neck pain.   Skin: Negative for rash.   Neurological: Positive for headaches (a few HA's last couple weeks). Negative for dizziness, tingling and seizures.   Endo/Heme/Allergies: Does not bruise/bleed easily.   Psychiatric/Behavioral: Negative for depression. The patient is not nervous/anxious and does not have insomnia.        Again, thank you for allowing me to participate in the care of your patient.        Sincerely,        Rolo Arnett MD

## 2022-10-13 ENCOUNTER — TELEPHONE (OUTPATIENT)
Dept: PHARMACY | Facility: CLINIC | Age: 72
End: 2022-10-13

## 2022-10-13 DIAGNOSIS — E11.65 TYPE 2 DIABETES MELLITUS WITH HYPERGLYCEMIA, WITHOUT LONG-TERM CURRENT USE OF INSULIN (H): Primary | ICD-10-CM

## 2022-10-13 RX ORDER — GLIPIZIDE 5 MG/1
5 TABLET ORAL
Qty: 60 TABLET | Refills: 1 | Status: SHIPPED | OUTPATIENT
Start: 2022-10-13 | End: 2022-10-17

## 2022-10-13 NOTE — TELEPHONE ENCOUNTER
Melva has been dosing NPH 44 units every morning.  She stopped glipizide ER because she was having lows in the morning.      Plan:  Will restart glipizide IR 5 mg every morning with breakfast to see if this helps lower her readings during the day but not cause early morning lows.

## 2022-10-15 ENCOUNTER — HEALTH MAINTENANCE LETTER (OUTPATIENT)
Age: 72
End: 2022-10-15

## 2022-10-17 ENCOUNTER — TELEPHONE (OUTPATIENT)
Dept: PHARMACY | Facility: CLINIC | Age: 72
End: 2022-10-17

## 2022-10-17 DIAGNOSIS — E11.65 TYPE 2 DIABETES MELLITUS WITH HYPERGLYCEMIA, WITHOUT LONG-TERM CURRENT USE OF INSULIN (H): ICD-10-CM

## 2022-10-17 RX ORDER — GLIPIZIDE 5 MG/1
TABLET ORAL
Qty: 60 TABLET | Refills: 1 | COMMUNITY
Start: 2022-10-17 | End: 2022-10-27

## 2022-10-17 NOTE — TELEPHONE ENCOUNTER
Called to follow-up on blood sugars.  Has been dosing NPH 44 units and glipizide 5 mg daily (started Friday).  Reduced prednisone to 15 mg on Friday.      No hypoglycemia or hyperglycemia symptoms.        A/P:  Discussed starting Insulin R with meals, she would rather increase glipizide to see if that would help.   Will increase glipizide to 10 mg with breakfast and 5 mg with dinner.

## 2022-10-20 ENCOUNTER — TELEPHONE (OUTPATIENT)
Dept: PHARMACY | Facility: CLINIC | Age: 72
End: 2022-10-20

## 2022-10-20 NOTE — TELEPHONE ENCOUNTER
Melva has been dosing NPH 44 units every morning and glipizide 10 mg every morning (dose increased earlier this week) and 5 mg at dinner.  Her dose of prednisone is being tapered down, next dose reduction this weekend.      No changes in regimen since she is reducing her prednisone dose this weekend.  Will follow-up on Tuesday; has radiation scheduled for Monday morning.

## 2022-10-24 ENCOUNTER — DOCUMENTATION ONLY (OUTPATIENT)
Dept: LAB | Facility: CLINIC | Age: 72
End: 2022-10-24

## 2022-10-24 ENCOUNTER — OFFICE VISIT (OUTPATIENT)
Dept: RADIATION ONCOLOGY | Facility: CLINIC | Age: 72
End: 2022-10-24
Attending: RADIOLOGY
Payer: COMMERCIAL

## 2022-10-24 ENCOUNTER — TELEPHONE (OUTPATIENT)
Dept: PHARMACY | Facility: CLINIC | Age: 72
End: 2022-10-24

## 2022-10-24 VITALS — SYSTOLIC BLOOD PRESSURE: 132 MMHG | HEART RATE: 76 BPM | DIASTOLIC BLOOD PRESSURE: 71 MMHG | OXYGEN SATURATION: 91 %

## 2022-10-24 DIAGNOSIS — E78.5 HYPERLIPIDEMIA WITH TARGET LDL LESS THAN 100: ICD-10-CM

## 2022-10-24 DIAGNOSIS — C54.1 ENDOMETRIAL CANCER (H): Primary | ICD-10-CM

## 2022-10-24 DIAGNOSIS — I10 HTN, GOAL BELOW 140/90: Primary | ICD-10-CM

## 2022-10-24 DIAGNOSIS — E11.9 TYPE 2 DIABETES MELLITUS WITHOUT COMPLICATION, WITHOUT LONG-TERM CURRENT USE OF INSULIN (H): ICD-10-CM

## 2022-10-24 PROCEDURE — 77263 THER RADIOLOGY TX PLNG CPLX: CPT | Performed by: RADIOLOGY

## 2022-10-24 PROCEDURE — 77295 3-D RADIOTHERAPY PLAN: CPT | Mod: 26 | Performed by: RADIOLOGY

## 2022-10-24 PROCEDURE — 77770 HDR RDNCL NTRSTL/ICAV BRCHTX: CPT | Performed by: RADIOLOGY

## 2022-10-24 PROCEDURE — 57156 INS VAG BRACHYTX DEVICE: CPT | Performed by: RADIOLOGY

## 2022-10-24 PROCEDURE — 77770 HDR RDNCL NTRSTL/ICAV BRCHTX: CPT | Mod: 26 | Performed by: RADIOLOGY

## 2022-10-24 PROCEDURE — 77470 SPECIAL RADIATION TREATMENT: CPT | Performed by: RADIOLOGY

## 2022-10-24 PROCEDURE — 77332 RADIATION TREATMENT AID(S): CPT | Performed by: RADIOLOGY

## 2022-10-24 PROCEDURE — 77470 SPECIAL RADIATION TREATMENT: CPT | Mod: 26 | Performed by: RADIOLOGY

## 2022-10-24 PROCEDURE — 77295 3-D RADIOTHERAPY PLAN: CPT | Performed by: RADIOLOGY

## 2022-10-24 PROCEDURE — 271N000005 HC IMPLANT RADIATION BRIEF: Performed by: RADIOLOGY

## 2022-10-24 PROCEDURE — C1717 BRACHYTX, NON-STR,HDR IR-192: HCPCS | Performed by: RADIOLOGY

## 2022-10-24 NOTE — PROGRESS NOTES
Gricelda Sabillon is here for scheduled HDR brachytherapy    HDR Single Channel Cylinder  1    Pre-procedure-  Patient identified, arm band applied, signed consent available   Medications taken by patient prior to procedure, none  Pain assessment: 0/10  /71   Pulse 76   LMP 12/13/2002 (Exact Date)   SpO2 91%      Waiting-  Pt resting quietly in room. O2 per NC. Call light in reach, Bed low and locked.    Post-procedure-  Pain assessment: 0/10  Device removed without difficulty, Education for possible side effects and management and Discharged to home with .

## 2022-10-24 NOTE — TELEPHONE ENCOUNTER
Prednisone dose 15 mg reduced Saturday.  Started radiation this week.  Didn't take glipizide Friday night until 730pm; may be why she went lower Saturday morning.  Will continue current regimen through Wed of this week and follow-up Thursday.  If evening readings are still elevated will consider increasing insulin dose.

## 2022-10-24 NOTE — LETTER
10/24/2022         RE: Gricelda Sabillon  [address]  [facility] [address]        Dear Colleague,    Thank you for referring your patient, Gricelda Sabillon, to the [facility] RADIATION ONCOLOGY. Please see a copy of my visit note below.    Gricelda Sabillon is here for scheduled HDR brachytherapy    HDR Single Channel Cylinder  1    Pre-procedure-  Patient identified, arm band applied, signed consent available   Medications taken by patient prior to procedure, none  Pain assessment: 0/10  /71   Pulse 76   LMP 12/13/2002 (Exact Date)   SpO2 91%      Waiting-  Pt resting quietly in room. O2 per NC. Call light in reach, Bed low and locked.    Post-procedure-  Pain assessment: 0/10  Device removed without difficulty, Education for possible side effects and management and Discharged to home with .          Again, thank you for allowing me to participate in the care of your patient.        Sincerely,        Rolo Arnett MD

## 2022-10-26 ENCOUNTER — OFFICE VISIT (OUTPATIENT)
Dept: RADIATION ONCOLOGY | Facility: CLINIC | Age: 72
End: 2022-10-26
Attending: RADIOLOGY
Payer: COMMERCIAL

## 2022-10-26 VITALS — OXYGEN SATURATION: 96 % | HEART RATE: 70 BPM | SYSTOLIC BLOOD PRESSURE: 156 MMHG | DIASTOLIC BLOOD PRESSURE: 65 MMHG

## 2022-10-26 DIAGNOSIS — C54.1 ENDOMETRIAL CANCER (H): Primary | ICD-10-CM

## 2022-10-26 PROCEDURE — 77770 HDR RDNCL NTRSTL/ICAV BRCHTX: CPT | Performed by: RADIOLOGY

## 2022-10-26 PROCEDURE — 271N000005 HC IMPLANT RADIATION BRIEF: Performed by: RADIOLOGY

## 2022-10-26 PROCEDURE — 77770 HDR RDNCL NTRSTL/ICAV BRCHTX: CPT | Mod: 26 | Performed by: RADIOLOGY

## 2022-10-26 PROCEDURE — 57156 INS VAG BRACHYTX DEVICE: CPT | Performed by: RADIOLOGY

## 2022-10-26 PROCEDURE — C1717 BRACHYTX, NON-STR,HDR IR-192: HCPCS | Performed by: RADIOLOGY

## 2022-10-26 PROCEDURE — 77280 THER RAD SIMULAJ FIELD SMPL: CPT | Performed by: RADIOLOGY

## 2022-10-26 PROCEDURE — 77280 THER RAD SIMULAJ FIELD SMPL: CPT | Mod: 26 | Performed by: RADIOLOGY

## 2022-10-26 NOTE — LETTER
10/26/2022         RE: Gricelda Sabillon  [address]  [facility] [address]        Dear Colleague,    Thank you for referring your patient, Gricelda Sabillon, to the [facility] RADIATION ONCOLOGY. Please see a copy of my visit note below.    Gricelda Sabillon is here for scheduled HDR brachytherapy    HDR Single Channel Cylinder  1    Pre-procedure-  Patient identified, arm band applied, signed consent available   Medications taken by patient prior to procedure, none.  Pain assessment: 0/10  LMP 12/13/2002 (Exact Date)    BP (!) 156/65   Pulse 70   LMP 12/13/2002 (Exact Date)   SpO2 96%    2L O2 per NC    Post-procedure-  Pain assessment: 0/10  Device removed without difficulty, Education for possible side effects and management and Discharged to home with .          Again, thank you for allowing me to participate in the care of your patient.        Sincerely,        Princess Rodrigez MD

## 2022-10-26 NOTE — PROGRESS NOTES
Gricelda Sabillon is here for scheduled HDR brachytherapy    HDR Single Channel Cylinder  1    Pre-procedure-  Patient identified, arm band applied, signed consent available   Medications taken by patient prior to procedure, none.  Pain assessment: 0/10  LMP 12/13/2002 (Exact Date)    BP (!) 156/65   Pulse 70   LMP 12/13/2002 (Exact Date)   SpO2 96%    2L O2 per NC    Post-procedure-  Pain assessment: 0/10  Device removed without difficulty, Education for possible side effects and management and Discharged to home with .

## 2022-10-27 ENCOUNTER — TELEPHONE (OUTPATIENT)
Dept: INTERNAL MEDICINE | Facility: CLINIC | Age: 72
End: 2022-10-27

## 2022-10-27 ENCOUNTER — TELEPHONE (OUTPATIENT)
Dept: PHARMACY | Facility: CLINIC | Age: 72
End: 2022-10-27

## 2022-10-27 DIAGNOSIS — E11.65 TYPE 2 DIABETES MELLITUS WITH HYPERGLYCEMIA, WITHOUT LONG-TERM CURRENT USE OF INSULIN (H): ICD-10-CM

## 2022-10-27 DIAGNOSIS — E11.65 TYPE 2 DIABETES MELLITUS WITH HYPERGLYCEMIA, WITHOUT LONG-TERM CURRENT USE OF INSULIN (H): Primary | ICD-10-CM

## 2022-10-27 RX ORDER — GLIPIZIDE 5 MG/1
10 TABLET ORAL
Qty: 120 TABLET | Refills: 1 | Status: SHIPPED | OUTPATIENT
Start: 2022-10-27 | End: 2022-11-07

## 2022-10-27 RX ORDER — METFORMIN HCL 500 MG
500 TABLET, EXTENDED RELEASE 24 HR ORAL
Qty: 30 TABLET | Refills: 1 | Status: SHIPPED | OUTPATIENT
Start: 2022-10-27 | End: 2023-01-03 | Stop reason: SINTOL

## 2022-10-27 NOTE — TELEPHONE ENCOUNTER
[facility] pharmacy calls stating they do not have new GLipizide Rx for pt. This was discontinued in Sept due to low BS.   Please send in new Rx. It is historical in Epic.     Per MTM on 10/27:    Arlet Harding, [facility]     [place]     9:04 AM  Note  Melva has been dosing NPH 44 units every morning, glipizide 10 mg every morning and 5 mg with evening meal.  Dose of prednisone 15 mg reduced last Saturday.     She was having horrible diarrhea from high dose metformin in the past.       Plan:  Glipizide 10mg twice daily before and restart Metformin  mg once daily with a meal.

## 2022-10-27 NOTE — TELEPHONE ENCOUNTER
Melva has been dosing NPH 44 units every morning, glipizide 10 mg every morning and 5 mg with evening meal.  Dose of prednisone 15 mg reduced last Saturday.    She was having horrible diarrhea from high dose metformin in the past.      Plan:  Glipizide 10mg twice daily before and restart Metformin  mg once daily with a meal.

## 2022-10-28 ENCOUNTER — OFFICE VISIT (OUTPATIENT)
Dept: RADIATION ONCOLOGY | Facility: CLINIC | Age: 72
End: 2022-10-28
Attending: RADIOLOGY
Payer: COMMERCIAL

## 2022-10-28 VITALS — OXYGEN SATURATION: 97 % | SYSTOLIC BLOOD PRESSURE: 143 MMHG | DIASTOLIC BLOOD PRESSURE: 76 MMHG | HEART RATE: 86 BPM

## 2022-10-28 DIAGNOSIS — C54.1 ENDOMETRIAL CANCER (H): Primary | ICD-10-CM

## 2022-10-28 PROCEDURE — 271N000005 HC IMPLANT RADIATION BRIEF: Performed by: RADIOLOGY

## 2022-10-28 PROCEDURE — 57156 INS VAG BRACHYTX DEVICE: CPT | Performed by: RADIOLOGY

## 2022-10-28 PROCEDURE — 77280 THER RAD SIMULAJ FIELD SMPL: CPT | Performed by: RADIOLOGY

## 2022-10-28 PROCEDURE — 77336 RADIATION PHYSICS CONSULT: CPT | Performed by: RADIOLOGY

## 2022-10-28 PROCEDURE — 77770 HDR RDNCL NTRSTL/ICAV BRCHTX: CPT | Mod: 26 | Performed by: RADIOLOGY

## 2022-10-28 PROCEDURE — 77770 HDR RDNCL NTRSTL/ICAV BRCHTX: CPT | Performed by: RADIOLOGY

## 2022-10-28 PROCEDURE — 77280 THER RAD SIMULAJ FIELD SMPL: CPT | Mod: 26 | Performed by: RADIOLOGY

## 2022-10-28 PROCEDURE — C1717 BRACHYTX, NON-STR,HDR IR-192: HCPCS | Performed by: RADIOLOGY

## 2022-10-28 NOTE — PROGRESS NOTES
Gricelda Sabillon is here for scheduled HDR brachytherapy    HDR Single Channel Cylinder  3    Pre-procedure-  Patient identified, arm band applied, signed consent available   Medications taken by patient prior to procedure, none  Pain assessment: 0/10  Pt states she has had some diarrhea since last treatment, RN instructed pt to try some imodium OTC.  LMP 12/13/2002 (Exact Date)    BP (!) 143/76   Pulse 86   LMP 12/13/2002 (Exact Date)   SpO2 97%     Post-procedure-  Pain assessment: 0/10  Device removed without difficulty, Education for possible side effects and management, Discharge teaching reviewed, questions answered, Vaginal dilator use reviewed, Follow-up discussed and Discharged to home.

## 2022-10-28 NOTE — LETTER
10/28/2022         RE: Gricelda Sabillon  [address]  [facility] [address]        Dear Colleague,    Thank you for referring your patient, Gricelda Sabillon, to the [facility] RADIATION ONCOLOGY. Please see a copy of my visit note below.    Gricelda Sabillon is here for scheduled HDR brachytherapy    HDR Single Channel Cylinder  3    Pre-procedure-  Patient identified, arm band applied, signed consent available   Medications taken by patient prior to procedure, none  Pain assessment: 0/10  Pt states she has had some diarrhea since last treatment, RN instructed pt to try some imodium OTC.  LMP 12/13/2002 (Exact Date)    BP (!) 143/76   Pulse 86   LMP 12/13/2002 (Exact Date)   SpO2 97%     Post-procedure-  Pain assessment: 0/10  Device removed without difficulty, Education for possible side effects and management, Discharge teaching reviewed, questions answered, Vaginal dilator use reviewed, Follow-up discussed and Discharged to home.        A radiation therapy treatment planning simulation was performed.  HDR brachytherapy vaginal cylinder 3 of 3.  Please see the [employer] record for documentation.    Chasidy Jacobs MD  Radiation Oncology      Again, thank you for allowing me to participate in the care of your patient.        Sincerely,        Chasidy Jacobs MD

## 2022-10-28 NOTE — PATIENT INSTRUCTIONS
Continuing Management of the Effects of Radiation Treatment    The side effects of radiation therapy should gradually decrease in 2 to 3 weeks after you have finished radiation.  Some effects take longer to resolve.    Skin reactions:  Skin changes (such as redness or irritation) should begin to get better gradually.  Some people will have a permanent change in skin color.  Their skin may be more pink or  tan  than the untreated skin.  The skin may be thinner or more fragile than before treatment.  Continue to use a gentle moisturizing lotion for several months.  You should always protect the skin in the area that was treated by using sunscreen of spf 30 or higher.      Other skin care instructions:    Fatigue caused by radiation therapy will decrease and your energy will improve.    For concerns or questions call Department of Therapeutic Radiology [phone]

## 2022-10-28 NOTE — PROGRESS NOTES
A radiation therapy treatment planning simulation was performed.  HDR brachytherapy vaginal cylinder 3 of 3.  Please see the [employer] record for documentation.    Chasidy Jacobs MD  Radiation Oncology

## 2022-10-31 ENCOUNTER — TELEPHONE (OUTPATIENT)
Dept: PHARMACY | Facility: CLINIC | Age: 72
End: 2022-10-31

## 2022-10-31 NOTE — TELEPHONE ENCOUNTER
Called Melva to follow-up.  She did start Metformin one tablet - tolerating it okay.  Concerned with getting diarrhea from radiation so does not want to take more.  Continues on NPH 44 units every morning and glipizide 10 mg twice daily.   No changes today.    Follow-up next week.

## 2022-11-01 ENCOUNTER — LAB (OUTPATIENT)
Dept: LAB | Facility: CLINIC | Age: 72
End: 2022-11-01
Payer: COMMERCIAL

## 2022-11-01 DIAGNOSIS — E78.5 HYPERLIPIDEMIA WITH TARGET LDL LESS THAN 100: ICD-10-CM

## 2022-11-01 DIAGNOSIS — I10 HTN, GOAL BELOW 140/90: ICD-10-CM

## 2022-11-01 DIAGNOSIS — E11.9 TYPE 2 DIABETES MELLITUS WITHOUT COMPLICATION, WITHOUT LONG-TERM CURRENT USE OF INSULIN (H): ICD-10-CM

## 2022-11-01 DIAGNOSIS — E78.5 HYPERLIPIDEMIA WITH TARGET LDL LESS THAN 100: Chronic | ICD-10-CM

## 2022-11-01 LAB
ERYTHROCYTE [DISTWIDTH] IN BLOOD BY AUTOMATED COUNT: 15.8 % (ref 10–15)
HCT VFR BLD AUTO: 40.7 % (ref 35–47)
HGB BLD-MCNC: 12.5 G/DL (ref 11.7–15.7)
MCH RBC QN AUTO: 26.8 PG (ref 26.5–33)
MCHC RBC AUTO-ENTMCNC: 30.7 G/DL (ref 31.5–36.5)
MCV RBC AUTO: 87 FL (ref 78–100)
PLATELET # BLD AUTO: 253 10E3/UL (ref 150–450)
RBC # BLD AUTO: 4.67 10E6/UL (ref 3.8–5.2)
WBC # BLD AUTO: 8.8 10E3/UL (ref 4–11)

## 2022-11-01 PROCEDURE — 80061 LIPID PANEL: CPT

## 2022-11-01 PROCEDURE — 80053 COMPREHEN METABOLIC PANEL: CPT

## 2022-11-01 PROCEDURE — 36415 COLL VENOUS BLD VENIPUNCTURE: CPT

## 2022-11-01 PROCEDURE — 85027 COMPLETE CBC AUTOMATED: CPT

## 2022-11-01 PROCEDURE — 84443 ASSAY THYROID STIM HORMONE: CPT

## 2022-11-02 LAB
ALBUMIN SERPL-MCNC: 3.2 G/DL (ref 3.4–5)
ALP SERPL-CCNC: 95 U/L (ref 40–150)
ALT SERPL W P-5'-P-CCNC: 27 U/L (ref 0–50)
ANION GAP SERPL CALCULATED.3IONS-SCNC: 5 MMOL/L (ref 3–14)
AST SERPL W P-5'-P-CCNC: 12 U/L (ref 0–45)
BILIRUB SERPL-MCNC: 0.3 MG/DL (ref 0.2–1.3)
BUN SERPL-MCNC: 20 MG/DL (ref 7–30)
CALCIUM SERPL-MCNC: 9.8 MG/DL (ref 8.5–10.1)
CHLORIDE BLD-SCNC: 105 MMOL/L (ref 94–109)
CHOLEST SERPL-MCNC: 216 MG/DL
CO2 SERPL-SCNC: 27 MMOL/L (ref 20–32)
CREAT SERPL-MCNC: 0.7 MG/DL (ref 0.52–1.04)
FASTING STATUS PATIENT QL REPORTED: NO
GFR SERPL CREATININE-BSD FRML MDRD: 90 ML/MIN/1.73M2
GLUCOSE BLD-MCNC: 273 MG/DL (ref 70–99)
HDLC SERPL-MCNC: 45 MG/DL
LDLC SERPL CALC-MCNC: 115 MG/DL
NONHDLC SERPL-MCNC: 171 MG/DL
POTASSIUM BLD-SCNC: 4.2 MMOL/L (ref 3.4–5.3)
PROT SERPL-MCNC: 6.5 G/DL (ref 6.8–8.8)
SODIUM SERPL-SCNC: 137 MMOL/L (ref 133–144)
TRIGL SERPL-MCNC: 282 MG/DL
TSH SERPL DL<=0.005 MIU/L-ACNC: 1.09 MU/L (ref 0.4–4)

## 2022-11-03 ASSESSMENT — ASTHMA QUESTIONNAIRES
ACT_TOTALSCORE: 25
QUESTION_4 LAST FOUR WEEKS HOW OFTEN HAVE YOU USED YOUR RESCUE INHALER OR NEBULIZER MEDICATION (SUCH AS ALBUTEROL): NOT AT ALL
QUESTION_3 LAST FOUR WEEKS HOW OFTEN DID YOUR ASTHMA SYMPTOMS (WHEEZING, COUGHING, SHORTNESS OF BREATH, CHEST TIGHTNESS OR PAIN) WAKE YOU UP AT NIGHT OR EARLIER THAN USUAL IN THE MORNING: NOT AT ALL
ACT_TOTALSCORE: 25
QUESTION_2 LAST FOUR WEEKS HOW OFTEN HAVE YOU HAD SHORTNESS OF BREATH: NOT AT ALL
QUESTION_1 LAST FOUR WEEKS HOW MUCH OF THE TIME DID YOUR ASTHMA KEEP YOU FROM GETTING AS MUCH DONE AT WORK, SCHOOL OR AT HOME: NONE OF THE TIME
QUESTION_5 LAST FOUR WEEKS HOW WOULD YOU RATE YOUR ASTHMA CONTROL: COMPLETELY CONTROLLED

## 2022-11-03 NOTE — TELEPHONE ENCOUNTER
Pending Prescriptions:                       Disp   Refills    atorvastatin (LIPITOR) 20 MG tablet [Pharm*90 tab*0        Sig: Take 1 tablet by mouth once daily  Next 5 appointments (look out 90 days)      Nov 04, 2022  9:00 AM  (Arrive by 8:40 AM)  [facility] [address] [facility] [address]  [phone]     Nov 21, 2022  9:30 AM  Return Visit with Roselia Sosa MD  [facility] ([facility] ) [address]  [facility] [address]  [phone]     Nov 28, 2022 11:00 AM  Return Visit with DENYS Grant CNP  [facility] Radiation Oncology ([facility] ) [address] [facility] [address]  [facility] [address]  [phone]     Jan 12, 2023  1:40 PM  Return Visit with DENYS Guerra CNP  [facility] ([facility] ) [address]  [facility] [address]  [phone]

## 2022-11-04 ENCOUNTER — OFFICE VISIT (OUTPATIENT)
Dept: INTERNAL MEDICINE | Facility: CLINIC | Age: 72
End: 2022-11-04
Payer: COMMERCIAL

## 2022-11-04 VITALS
OXYGEN SATURATION: 96 % | BODY MASS INDEX: 30.72 KG/M2 | WEIGHT: 202.7 LBS | SYSTOLIC BLOOD PRESSURE: 118 MMHG | RESPIRATION RATE: 18 BRPM | HEART RATE: 75 BPM | TEMPERATURE: 97.1 F | DIASTOLIC BLOOD PRESSURE: 63 MMHG | HEIGHT: 68 IN

## 2022-11-04 DIAGNOSIS — Z79.4 TYPE 2 DIABETES MELLITUS WITHOUT COMPLICATION, WITH LONG-TERM CURRENT USE OF INSULIN (H): Primary | ICD-10-CM

## 2022-11-04 DIAGNOSIS — C54.1 ENDOMETRIAL CANCER (H): ICD-10-CM

## 2022-11-04 DIAGNOSIS — I10 ESSENTIAL HYPERTENSION WITH GOAL BLOOD PRESSURE LESS THAN 140/90: Chronic | ICD-10-CM

## 2022-11-04 DIAGNOSIS — E78.5 HYPERLIPIDEMIA WITH TARGET LDL LESS THAN 100: ICD-10-CM

## 2022-11-04 DIAGNOSIS — I26.99 BILATERAL PULMONARY EMBOLISM (H): ICD-10-CM

## 2022-11-04 DIAGNOSIS — E11.9 TYPE 2 DIABETES MELLITUS WITHOUT COMPLICATION, WITH LONG-TERM CURRENT USE OF INSULIN (H): Primary | ICD-10-CM

## 2022-11-04 DIAGNOSIS — C43.9 METASTATIC MALIGNANT MELANOMA (H): ICD-10-CM

## 2022-11-04 DIAGNOSIS — J84.9 ILD (INTERSTITIAL LUNG DISEASE) (H): ICD-10-CM

## 2022-11-04 DIAGNOSIS — J96.01 ACUTE RESPIRATORY FAILURE WITH HYPOXIA (H): ICD-10-CM

## 2022-11-04 PROCEDURE — 99214 OFFICE O/P EST MOD 30 MIN: CPT | Performed by: INTERNAL MEDICINE

## 2022-11-04 RX ORDER — ATORVASTATIN CALCIUM 20 MG/1
TABLET, FILM COATED ORAL
Qty: 90 TABLET | Refills: 0 | Status: SHIPPED | OUTPATIENT
Start: 2022-11-04 | End: 2022-11-21 | Stop reason: ALTCHOICE

## 2022-11-04 RX ORDER — ATORVASTATIN CALCIUM 40 MG/1
40 TABLET, FILM COATED ORAL DAILY
Qty: 90 TABLET | Refills: 1 | Status: SHIPPED | OUTPATIENT
Start: 2022-11-04 | End: 2023-01-25

## 2022-11-04 RX ORDER — ENOXAPARIN SODIUM 100 MG/ML
INJECTION SUBCUTANEOUS
COMMUNITY
Start: 2022-10-27 | End: 2023-01-05

## 2022-11-04 ASSESSMENT — PAIN SCALES - GENERAL: PAINLEVEL: NO PAIN (0)

## 2022-11-04 NOTE — PATIENT INSTRUCTIONS
Plan:  1. Decrease the Glipizide to 10 mg only in the morning  2. Decrease the NPH dose to 40 units and if no low blood sugar, increase slowly back to 44  3. Continue the other meds, same doses for now.  4. Follow up appointment January in the office ( SD or 7:30 ok)  5. Please make a lab appointment for fasting labs  Few days before  6. Increase Atorvastatin to 40 mg daily  7. Continue the other meds, same doses for now.

## 2022-11-04 NOTE — PROGRESS NOTES
Dr Torres's note      Patient's instructions / PLAN:                                                        Plan:  1. Decrease the Glipizide to 10 mg only in the morning  2. Decrease the NPH dose to 40 units and if no low blood sugar, increase slowly back to 44  3. Continue the other meds, same doses for now.  4. Follow up appointment January in the office ( SD or 7:30 ok)  5. Please make a lab appointment for fasting labs  Few days before  6. Increase Atorvastatin to 40 mg daily  7. Continue the other meds, same doses for now.          ASSESSMENT & PLAN:                                                      Mrs Madison has complex medical problems: She was diagnosed with metastatic malignant melanoma to the right thigh January 2022, then she was diagnosed with endometrial cancer in May 2022.  She also has chronic diseases: Diabetes, hypertension, hyperlipidemia.  During summer 2022 she was diagnosed with multiple bilateral PEs and lung nodules which were thought being metastases.  The metastases were ruled out and she was diagnosed with ILD.  She was started on high-dose steroids which of course increased her blood sugars.  Insulin has been adjusted.  Now seen she takes lower dose of steroids, she experiences low blood sugar and we adjusted her meds again.    (E11.9,  Z79.4) Type 2 diabetes mellitus without complication, with long-term current use of insulin (H)  (primary encounter diagnosis)  Comment: Not controlled   Plan: Hemoglobin A1c, CK total, Lipid panel reflex to        direct LDL Fasting, Comprehensive metabolic         panel, CBC with platelets            (C43.9) Metastatic malignant melanoma (H)  -- R thigh   Comment:   Plan: Follow-up with oncology    (E78.5) Hyperlipidemia with target LDL less than 100  Comment: Not controlled  Plan: atorvastatin (LIPITOR) 40 MG tablet, Hemoglobin        A1c, CK total, Lipid panel reflex to direct LDL        Fasting, Comprehensive metabolic panel, CBC         with  platelets        As above    (I10) HTN goal less than 140/90,  Comment: Controlled  Plan: Hemoglobin A1c, CK total, Lipid panel reflex to        direct LDL Fasting, Comprehensive metabolic         panel, CBC with platelets        Continue same meds, same doses for now     (J96.01) Acute respiratory failure with hypoxia (H)  Comment: She still requires oxygen  Plan: Hemoglobin A1c, CK total, Lipid panel reflex to        direct LDL Fasting, Comprehensive metabolic         panel, CBC with platelets            (C54.1) Endometrial cancer (H)  Comment:   Plan: Hemoglobin A1c, CK total, Lipid panel reflex to        direct LDL Fasting, Comprehensive metabolic         panel, CBC with platelets        Follow-up with OB/GYN    (I26.99) Bilateral pulmonary embolism (H)  Comment:   Plan: Continue anticoagulation    (J84.9) ILD (interstitial lung disease) (H)  Comment:   Plan: Continue steroids as per pulmonary       Chief complaint:                                                      Follow up chronic medical problems      SUBJECTIVE:                                                    History of present illness:    Prednisone  -- dose will be decreased today to 10 mg    Resp failure   -- she still drops down to 87% w exercise   -- uses O2    Uterine Ca  -- finished radiation last week    Hlip  -- Not controlled w lipitor 20    DM  -- metformin 500, glipizide 10 bid, NPH 44  -- BS: 63 this am and last night it was 100s        Subjective   Melva is a 72 year old, presenting for the following health issues:  Patient is being seen for an follow up.    History of Present Illness       Diabetes:   She presents for follow up of diabetes.  She is checking home blood glucose with a continuous glucose monitor.  She checks blood glucose before meals, after meals and at bedtime.  Blood glucose is sometimes over 200 and sometimes under 70. When her blood glucose is low, the patient is asymptomatic for confusion, blurred vision, lethargy and  "reports not feeling dizzy, shaky, or weak.  She is concerned about other. She is having weight gain. The patient has had a diabetic eye exam in the last 12 months. Eye exam performed on 10/2022. Location of last eye exam With   dr. Alison Angulo.        Reason for visit:  Follow-up    She eats 2-3 servings of fruits and vegetables daily.She consumes 0 sweetened beverage(s) daily.She exercises with enough effort to increase her heart rate 30 to 60 minutes per day.  She exercises with enough effort to increase her heart rate 5 days per week.   She is taking medications regularly.       Hyperlipidemia Follow-Up      Are you regularly taking any medication or supplement to lower your cholesterol?   Yes- Atorvastatin    Are you having muscle aches or other side effects that you think could be caused by your cholesterol lowering medication?  No    Hypertension Follow-up      Do you check your blood pressure regularly outside of the clinic? Yes     Are you following a low salt diet? Yes    Are your blood pressures ever more than 140 on the top number (systolic) OR more   than 90 on the bottom number (diastolic), for example 140/90? No        Review of Systems:                                                      ROS: negative for fever, chills, cough, wheezes, chest pain, vomiting, abdominal pain, leg swelling positive for shortness of breath, as above      OBJECTIVE:             Physical exam:  Blood pressure 118/63, pulse 75, temperature 97.1  F (36.2  C), temperature source Tympanic, resp. rate 18, height 1.727 m (5' 8\"), weight 91.9 kg (202 lb 11.2 oz), last menstrual period 12/13/2002, SpO2 96 %, not currently breastfeeding.     NAD, appears comfortable in wheelchair  Skin: no rashes   Neck: supple, no JVD,  No thyroidmegaly. Lymph nodes nonpalpable cervical and supraclavicular.  Chest: clear to auscultation bilaterally, good respiratory effort  Heart: S1 S2, RRR, no mgr appreciated  Abdomen: soft, not " tender,  Extremities: no edema,   Neurologic: A, Ox3, no focal signs appreciated    PMHx: reviewed  Past Medical History:   Diagnosis Date     Asthma, mild intermittent      Cataract      Endometrial cancer (H) 06/2022     HTN, goal below 140/90      Hyperlipidemia LDL goal < 100      Macular hole of left eye      Melanoma (H)     RIGHT KNEE     Sleep apnea     uses c pap     Type 2 diabetes, HbA1C goal < 8% (H)       PSHx: reviewed  Past Surgical History:   Procedure Laterality Date     ARTHROPLASTY HIP Right 9/25/2017    Procedure: ARTHROPLASTY HIP;  Right total hip arthroplasty  ;  Surgeon: Ayaan Pena MD;  Location: [facility]     ARTHROPLASTY KNEE  7/12/2013    Procedure: ARTHROPLASTY KNEE;  right total knee arthroplasty ;  Surgeon: Chaparro Wesley MD;  Location: [facility] OR     ARTHROSCOPY KNEE Right 1/21/2015    Procedure: ARTHROSCOPY KNEE;  Surgeon: Ayaan Pena MD;  Location: [facility] OR     BRONCHOSCOPY (RIGID OR FLEXIBLE), DIAGNOSTIC N/A 8/11/2022    Procedure: BRONCHOSCOPY, WITH BRONCHOALVEOLAR LAVAGE;  Surgeon: Roselia Sosa MD;  Location:  GI     C INCISION OF HYMEN       CATARACT IOL, RT/LT       COLONOSCOPY  2008     COLONOSCOPY N/A 4/18/2019    Procedure: Colonoscopy with polypectomy;  Surgeon: Shaun Guerrero MD;  [facility]: [facility]     CYSTOSCOPY N/A 6/23/2022    Procedure: Cystoscopy;  Surgeon: Eli Guidry MD;  [facility]     ENDOSCOPIC RETROGRADE CHOLANGIOPANCREATOGRAM N/A 2/6/2019    Procedure: COMBINED ENDOSCOPIC RETROGRADE CHOLANGIOPANCREATOGRAPHY, BILIARY SPINCTEROTOMY AND DILATION, PLACE BILE DUCT STENT;  Surgeon: Guru Andie Gonzalez MD;  [facility]     ENDOSCOPIC RETROGRADE CHOLANGIOPANCREATOGRAM N/A 2/28/2019    Procedure: Endoscopic Retrograde Cholangiopancreatogram, Bile duct stent removal and placement;  Surgeon: Shaun Guerrero MD;  [facility]     ENDOSCOPIC RETROGRADE CHOLANGIOPANCREATOGRAM N/A 4/18/2019    Procedure:  COMBINED ENDOSCOPIC RETROGRADE CHOLANGIOPANCREATOGRAPHY, Bile duct stent exchange and Polypectomy;  Surgeon: Shaun Guerrero MD;  [facility]     ENDOSCOPIC RETROGRADE CHOLANGIOPANCREATOGRAM N/A 10/18/2019    Procedure: Endoscopic Retrograde Cholangiopancreatogram;  Surgeon: Shaun Guerrero MD;  [facility]     ENDOSCOPIC RETROGRADE CHOLANGIOPANCREATOGRAM N/A 3/24/2022    Procedure: ENDOSCOPIC RETROGRADE CHOLANGIOPANCREATOGRAPHY;  Surgeon: Shaun Guerrero MD;  Location: [facility] OR     ENDOSCOPIC RETROGRADE CHOLANGIOPANCREATOGRAM WITH SPYGLASS N/A 7/26/2019    Procedure: Endoscopic Retrograde Cholangiopancreatogram With Spyglas,l Radiofrequency Ablation, Stent Exchangeand Duodenal Biopsy x2;  Surgeon: Shaun Guerrero MD;  [facility]     ENDOSCOPIC RETROGRADE CHOLANGIOPANCREATOGRAM WITH SPYGLASS N/A 9/10/2020    Procedure: ENDOSCOPIC RETROGRADE CHOLANGIOPANCREATOGRAPHY, WITH DIRECT DUCT VISUALIZATION, USING PANCREATICOBILIARY FIBEROPTIC PROBE;  Surgeon: Shaun Guerrero MD;  [facility]     ENDOSCOPIC RETROGRADE CHOLANGIOPANCREATOGRAM WITH SPYGLASS N/A 3/22/2021    Procedure: ENDOSCOPIC RETROGRADE CHOLANGIOPANCREATOGRAPHY, WITH DIRECT DUCT VISUALIZATION, USING PANCREATICOBILIARY FIBEROPTIC PROBE;  Surgeon: Shaun Guerrero MD;  [facility]     ESOPHAGOSCOPY, GASTROSCOPY, DUODENOSCOPY (EGD) WITH RADIO FREQUENCY ABLATION, COMBINED N/A 9/10/2020    Procedure: possible repeat ESOPHAGOGASTRODUODENOSCOPY, WITH RADIOFREQUENCY ABLATION and endoscopic mucosal resection;  Surgeon: Shaun Guerrero MD;  [facility]     ESOPHAGOSCOPY, GASTROSCOPY, DUODENOSCOPY (EGD), COMBINED N/A 4/18/2019    Procedure: upper endoscopy with polypectomy;  Surgeon: Shaun Guerrero MD;  [facility]     ESOPHAGOSCOPY, GASTROSCOPY, DUODENOSCOPY (EGD), COMBINED N/A 10/18/2019    Procedure: Upper Endoscopy and ERCP with stent removal, stone removal and biopsy;  Surgeon: Shaun Guerrero MD;  [facility]     ESOPHAGOSCOPY, GASTROSCOPY,  DUODENOSCOPY (EGD), COMBINED N/A 3/24/2022    Procedure: ESOPHAGOGASTRODUODENOSCOPY (EGD), Duodenal  polyp removal;  Surgeon: Shaun Guerrero MD;  Location: [facility]     ESOPHAGOSCOPY, GASTROSCOPY, DUODENOSCOPY (EGD), RESECT MUCOSA, COMBINED N/A 2/28/2019    Procedure: Upper Endoscopy, Endoscopic Ultrasound, Endoscopic Mucosal Resection,  Ampullectomy, polypectomy.;  Surgeon: Shaun Guerrero MD;  [facility]     ESOPHAGOSCOPY, GASTROSCOPY, DUODENOSCOPY (EGD), RESECT MUCOSA, COMBINED N/A 3/22/2021    Procedure: ESOPHAGOGASTRODUODENOSCOPY (EGD) with  endoscopic mucosal resection/ polypectomy;  Surgeon: Shaun Guerrero MD;  [facility]     EXCISE LESION LOWER EXTREMITY Right 3/28/2022    Procedure: Wide local excision of right knee melanoma;  Surgeon: Chevy Allison MD;  Location: [facility] OR     EXCISE MASS LOWER EXTREMITY Right 1/13/2022    Procedure: excision of right thigh mass;  Surgeon: Salvador Kelly MD;  Location: [facility] OR     EYE SURGERY      macular hole repaired left eye     HC KNEE SCOPE, DIAGNOSTIC      - both knees     HEAD & NECK SURGERY      wisdom teeth     IR CHEST PORT PLACEMENT > 5 YRS OF AGE  5/2/2022     LAPAROSCOPIC HYSTERECTOMY TOTAL, BILATERAL SALPINGO-OOPHORECTOMY, NODE DISSECTION, COMBINED Bilateral 6/23/2022    Procedure: Total Laparoscopic Hyserectomy, Bilateral Salpibgo-oophorectomy, Bilateral [place] Lymph Node Dissection;  Surgeon: Eli Guidry MD;  [facility]        Meds: reviewed  Current Outpatient Medications   Medication Sig Dispense Refill     albuterol (PROAIR HFA/PROVENTIL HFA/VENTOLIN HFA) 108 (90 Base) MCG/ACT inhaler Inhale 2 puffs into the lungs every 4 hours as needed for shortness of breath / dyspnea 1 Inhaler 3     atenolol (TENORMIN) 50 MG tablet Take 25 mg by mouth daily       atorvastatin (LIPITOR) 20 MG tablet Take 1 tablet by mouth once daily 90 tablet 0     azelastine (ASTELIN) 0.1 % nasal spray USE 1 SPRAY(S) IN EACH NOSTRIL TWICE DAILY AS NEEDED  "30 mL 0     azelastine (OPTIVAR) 0.05 % SOLN Place 1 drop into both eyes 2 times daily as needed Reported on 3/22/2017       cetirizine (ZYRTEC) 10 MG tablet Take 10 mg by mouth every morning       Continuous Blood Gluc Sensor (FREESTYLE DAVID 14 DAY SENSOR) MISC 1 each every 14 days Use 1 Sensor every 14 days. Use to read blood sugars per 's instructions. 2 each 5     CONTOUR NEXT TEST test strip USE 1 STRIP TO CHECK GLUCOSE ONCE DAILY 100 strip 4     enoxaparin ANTICOAGULANT (LOVENOX) 80 MG/0.8ML syringe INJECT 80MG SUBCUTANEOUSLY EVERY 12 HOURS       glipiZIDE (GLUCOTROL) 5 MG tablet Take 2 tablets (10 mg) by mouth 2 times daily (before meals) 120 tablet 1     insulin  UNIT/ML vial Inject 44 Units Subcutaneous every morning 10 mL 3     insulin syringe-needle U-100 (BD INSULIN SYRINGE ULTRAFINE) 31G X 5/16\" 0.5 ML miscellaneous Use daily with NPH insulin 100 each 11     latanoprost (XALATAN) 0.005 % ophthalmic solution Place 1 drop into both eyes At Bedtime  2.5 mL 1     lidocaine-prilocaine (EMLA) 2.5-2.5 % external cream APPLY CREAM TOPICALLY ONCE DAILY AS NEEDED FOR PAIN APPLY A PEA SIZED AMOUNT OVER PORT SITE 60 MINUTES PRIOR TO USE COVER WITH PLASTIC WRAP       LORazepam (ATIVAN) 0.5 MG tablet Take 1 tablet (0.5 mg) by mouth nightly as needed for anxiety or sleep 30 tablet 0     metFORMIN (GLUCOPHAGE XR) 500 MG 24 hr tablet Take 1 tablet (500 mg) by mouth daily (with dinner) 30 tablet 1     ondansetron (ZOFRAN) 8 MG tablet Take 1 tablet (8 mg) by mouth every 8 hours as needed for nausea 30 tablet 1     predniSONE (DELTASONE) 5 MG tablet Take 3 tablets (15 mg) by mouth daily for 14 days, THEN 2 tablets (10 mg) daily for 14 days, THEN 1 tablet (5 mg) daily for 14 days. Start with 20 mg by mouth daily for 14 days (pt has previous supply of 20 mg tablets) then start taper of 15 mg. 84 tablet 0     senna-docusate (SENOKOT-S/PERICOLACE) 8.6-50 MG tablet Take 2 tablets by mouth daily as needed for " constipation 60 tablet 11     sulfamethoxazole-trimethoprim (BACTRIM) 400-80 MG tablet Take 1 tablet by mouth twice daily 30 tablet 0     timolol maleate (TIMOPTIC) 0.5 % ophthalmic solution INSTILL 1 DROP INTO EACH EYE TWICE DAILY       triamcinolone (KENALOG) 0.5 % external ointment Apply 1 g topically daily (Patient taking differently: Apply 1 applicator topically daily as needed) 15 g 1       Soc Hx: reviewed  Fam Hx: reviewed      Chart documentation was completed, in part, with [employer] voice-recognition software. Even though reviewed, some grammatical, spelling, and word errors may remain.      Raisa Torres MD  Internal Medicine

## 2022-11-07 ENCOUNTER — TELEPHONE (OUTPATIENT)
Dept: PHARMACY | Facility: CLINIC | Age: 72
End: 2022-11-07

## 2022-11-07 DIAGNOSIS — E11.65 TYPE 2 DIABETES MELLITUS WITH HYPERGLYCEMIA, WITHOUT LONG-TERM CURRENT USE OF INSULIN (H): ICD-10-CM

## 2022-11-07 RX ORDER — GLIPIZIDE 5 MG/1
10 TABLET ORAL EVERY MORNING
Qty: 120 TABLET | Refills: 1 | COMMUNITY
Start: 2022-11-07 | End: 2022-12-23

## 2022-11-07 NOTE — TELEPHONE ENCOUNTER
Called Melva to follow-up on blood sugars.  At OV with Dr. Torres last week, Glipizide was reduced to 10 mg daily and NPH reduced to 40 units daily.  Has instructions to increase NPH back to 44 if needed.  She did restart metformin; taking at noon but forgot yesterday.  No side effects at this time.    Left side of belly, had hard spots from Lovenox started hurting.  Hurt so bad it limited her activity.  It is better today.  No other symptoms.      Prednisone reduced to 10 mg daily on Friday; will be on this dose for two weeks.    Will continue current regimen.  Follow-up in 2 weeks.

## 2022-11-08 ENCOUNTER — TELEPHONE (OUTPATIENT)
Dept: INTERNAL MEDICINE | Facility: CLINIC | Age: 72
End: 2022-11-08

## 2022-11-08 NOTE — TELEPHONE ENCOUNTER
Patient Quality Outreach    Patient is due for the following:   Diabetes -  A1C    Next Steps:   Schedule a lab only visit for a1c check.    Type of outreach:    Phone, left message for patient/parent to call back.      Questions for provider review:         Thais Cadet

## 2022-11-15 ENCOUNTER — OFFICE VISIT (OUTPATIENT)
Dept: UROLOGY | Facility: CLINIC | Age: 72
End: 2022-11-15
Attending: INTERNAL MEDICINE
Payer: COMMERCIAL

## 2022-11-15 VITALS
HEIGHT: 66 IN | DIASTOLIC BLOOD PRESSURE: 70 MMHG | BODY MASS INDEX: 32.47 KG/M2 | WEIGHT: 202 LBS | SYSTOLIC BLOOD PRESSURE: 132 MMHG

## 2022-11-15 DIAGNOSIS — E11.65 TYPE 2 DIABETES MELLITUS WITH HYPERGLYCEMIA, WITHOUT LONG-TERM CURRENT USE OF INSULIN (H): ICD-10-CM

## 2022-11-15 DIAGNOSIS — R31.0 GROSS HEMATURIA: ICD-10-CM

## 2022-11-15 LAB
ALBUMIN UR-MCNC: NEGATIVE MG/DL
APPEARANCE UR: CLEAR
BILIRUB UR QL STRIP: NEGATIVE
COLOR UR AUTO: YELLOW
GLUCOSE UR STRIP-MCNC: >=1000 MG/DL
HGB UR QL STRIP: ABNORMAL
KETONES UR STRIP-MCNC: NEGATIVE MG/DL
LEUKOCYTE ESTERASE UR QL STRIP: NEGATIVE
NITRATE UR QL: NEGATIVE
PH UR STRIP: 5.5 [PH] (ref 5–7)
SP GR UR STRIP: 1.02 (ref 1–1.03)
UROBILINOGEN UR STRIP-ACNC: 0.2 E.U./DL

## 2022-11-15 PROCEDURE — 81003 URINALYSIS AUTO W/O SCOPE: CPT | Mod: QW | Performed by: PHYSICIAN ASSISTANT

## 2022-11-15 PROCEDURE — 99203 OFFICE O/P NEW LOW 30 MIN: CPT | Performed by: PHYSICIAN ASSISTANT

## 2022-11-15 PROCEDURE — 88112 CYTOPATH CELL ENHANCE TECH: CPT | Performed by: PATHOLOGY

## 2022-11-15 ASSESSMENT — PAIN SCALES - GENERAL: PAINLEVEL: NO PAIN (0)

## 2022-11-15 NOTE — PATIENT INSTRUCTIONS
- Urine cytology to look for abnormal cells.  - CT Urogram scan: Please call [phone] to schedule this at the [facility] ([place] or [facility] ([place]).   - Cystoscopy with the urologist to evaluate the interior of the bladder. Follow up as recommended by the urologist.

## 2022-11-15 NOTE — LETTER
11/15/2022       RE: Gricelda Sabillon  [address] [facility] [address]     Dear Colleague,    Thank you for referring your patient, Gricelda Sabillon, to the [facility] [place] [facility]. Please see a copy of my visit note below.    Subjective       REQUESTING PROVIDER   Raisa Davidson     REASON FOR CONSULT   Gross hematuria    HISTORY OF PRESENT ILLNESS   Ms. Sabillon is a 72 year old female seen today in the urology clinic in consultation for evaluation of gross hematuria. This was first detected in September after a recent fall.  She was on Lovenox at that time and continues on this.    Patient denies any gross hematuria since that time.  She does note that she has nocturia x1 and denies dysuria.  Now and then she will get urgency and frequency.  She endorses urinary incontinence if she waits too long to urinate.  She was treated for possible UTI in September.  Urine culture at that time showed 50,000-100,000 CFU per mL of mixed palmira.  She also had a course of cefdinir as well as Bactrim.  At the time of the gross hematuria she denies any other symptoms.  She denies any current issues with urination.    She will get a rare urinary tract infection.  Denies any history of nephrolithiasis.  Patient had not yet started pelvic radiation therapy.  She started this approximately 3 to 4 weeks after her fall and hematuria.  She denies any smoking history of significant chemical exposure.    Blood thinners: Lovenox    Retention or clots: Neither    Hematuria Risk Factors:  Age >40: Yes   Smoking history: No  Occupational exposure to chemicals or dyes (ie, benzenes, aromatic amines): no  History of urologic disorder or disease: no  History of irritative voiding symptoms: no  History of urinary tract infection: yes, but rarely  Analgesic abuse: no  History of pelvic irradiation: Yes, but not until after gross hematuria    The  following portions of the patient's history were reviewed and updated as appropriate: allergies, current medications, past family history, past medical history, past social history, past surgical history and problem list.     REVIEW OF SYSTEMS   Review of Systems   Constitutional: Negative for chills and fever.   Respiratory: Negative for shortness of breath.    Cardiovascular: Negative for chest pain.   Gastrointestinal: Negative for nausea and vomiting.   Genitourinary: Positive for hematuria. Negative for dysuria.      Per HPI.     Patient Active Problem List   Diagnosis     Allergic rhinitis     Hyperlipidemia with target LDL less than 100     Asthma, mild intermittent     Cataract     Macular hole of left eye     Obesity (BMI 30-39.9)     Heart murmur     Total knee replacement status     Chronic knee pain, right     HTN goal less than 140/90,     CHERRY (obstructive sleep apnea)     Status post total replacement of right hip     Ampullary adenoma     Polyp of duodenum     HTN, goal below 140/90     Diabetes mellitus, type 2 (H)     Melanoma of skin (H)     Metastatic malignant melanoma (H)  -- R thigh      Dehydration     Orthostatic hypotension     Endometrial cancer (H)     Bilateral pulmonary embolism (H)     Organizing pneumonia (H) -- Aug 2022      Past Medical History:   Diagnosis Date     Asthma, mild intermittent      Cataract      Endometrial cancer (H) 06/2022     HTN, goal below 140/90      Hyperlipidemia LDL goal < 100      Macular hole of left eye      Melanoma (H)     RIGHT KNEE     Sleep apnea     uses c pap     Type 2 diabetes, HbA1C goal < 8% (H)       Past Surgical History:   Procedure Laterality Date     ARTHROPLASTY HIP Right 9/25/2017    Procedure: ARTHROPLASTY HIP;  Right total hip arthroplasty  ;  Surgeon: Ayaan Pena MD;  [facility]     ARTHROPLASTY KNEE  7/12/2013    Procedure: ARTHROPLASTY KNEE;  right total knee arthroplasty ;  Surgeon: Chaparro Wesley MD;   [facility]: [facility]     ARTHROSCOPY KNEE Right 1/21/2015    Procedure: ARTHROSCOPY KNEE;  Surgeon: Ayaan Pena MD;  [facility]: [facility]     BRONCHOSCOPY (RIGID OR FLEXIBLE), DIAGNOSTIC N/A 8/11/2022    Procedure: BRONCHOSCOPY, WITH BRONCHOALVEOLAR LAVAGE;  Surgeon: Roselia Sosa MD;  Location: [facility]     C INCISION OF HYMEN       CATARACT IOL, RT/LT       COLONOSCOPY  2008     COLONOSCOPY N/A 4/18/2019    Procedure: Colonoscopy with polypectomy;  Surgeon: Shaun Guerrero MD;  [facility]     CYSTOSCOPY N/A 6/23/2022    Procedure: Cystoscopy;  Surgeon: Eli Guidry MD;  [facility]     ENDOSCOPIC RETROGRADE CHOLANGIOPANCREATOGRAM N/A 2/6/2019    Procedure: COMBINED ENDOSCOPIC RETROGRADE CHOLANGIOPANCREATOGRAPHY, BILIARY SPINCTEROTOMY AND DILATION, PLACE BILE DUCT STENT;  Surgeon: Guru Andie Gonzalez MD;  [facility]     ENDOSCOPIC RETROGRADE CHOLANGIOPANCREATOGRAM N/A 2/28/2019    Procedure: Endoscopic Retrograde Cholangiopancreatogram, Bile duct stent removal and placement;  Surgeon: Shaun Guerrero MD;  [facility]     ENDOSCOPIC RETROGRADE CHOLANGIOPANCREATOGRAM N/A 4/18/2019    Procedure: COMBINED ENDOSCOPIC RETROGRADE CHOLANGIOPANCREATOGRAPHY, Bile duct stent exchange and Polypectomy;  Surgeon: Shaun Guerrero MD;  [facility]     ENDOSCOPIC RETROGRADE CHOLANGIOPANCREATOGRAM N/A 10/18/2019    Procedure: Endoscopic Retrograde Cholangiopancreatogram;  Surgeon: Shaun Guerrero MD;  [facility]     ENDOSCOPIC RETROGRADE CHOLANGIOPANCREATOGRAM N/A 3/24/2022    Procedure: ENDOSCOPIC RETROGRADE CHOLANGIOPANCREATOGRAPHY;  Surgeon: Shaun Guerrero MD;  Location:  OR     ENDOSCOPIC RETROGRADE CHOLANGIOPANCREATOGRAM WITH SPYGLASS N/A 7/26/2019    Procedure: Endoscopic Retrograde Cholangiopancreatogram With Spyglas,l Radiofrequency Ablation, Stent Exchangeand Duodenal Biopsy x2;  Surgeon: Shaun Guerrero MD;  [facility]     ENDOSCOPIC RETROGRADE  CHOLANGIOPANCREATOGRAM WITH SPYGLASS N/A 9/10/2020    Procedure: ENDOSCOPIC RETROGRADE CHOLANGIOPANCREATOGRAPHY, WITH DIRECT DUCT VISUALIZATION, USING PANCREATICOBILIARY FIBEROPTIC PROBE;  Surgeon: Shaun Guerrero MD;  [facility]     ENDOSCOPIC RETROGRADE CHOLANGIOPANCREATOGRAM WITH SPYGLASS N/A 3/22/2021    Procedure: ENDOSCOPIC RETROGRADE CHOLANGIOPANCREATOGRAPHY, WITH DIRECT DUCT VISUALIZATION, USING PANCREATICOBILIARY FIBEROPTIC PROBE;  Surgeon: Shaun Guerrero MD;  [facility]     ESOPHAGOSCOPY, GASTROSCOPY, DUODENOSCOPY (EGD) WITH RADIO FREQUENCY ABLATION, COMBINED N/A 9/10/2020    Procedure: possible repeat ESOPHAGOGASTRODUODENOSCOPY, WITH RADIOFREQUENCY ABLATION and endoscopic mucosal resection;  Surgeon: Shaun Guerrero MD;  [facility]     ESOPHAGOSCOPY, GASTROSCOPY, DUODENOSCOPY (EGD), COMBINED N/A 4/18/2019    Procedure: upper endoscopy with polypectomy;  Surgeon: Shaun Guerrero MD;  [facility]     ESOPHAGOSCOPY, GASTROSCOPY, DUODENOSCOPY (EGD), COMBINED N/A 10/18/2019    Procedure: Upper Endoscopy and ERCP with stent removal, stone removal and biopsy;  Surgeon: Shaun Guerrero MD;  [facility]     ESOPHAGOSCOPY, GASTROSCOPY, DUODENOSCOPY (EGD), COMBINED N/A 3/24/2022    Procedure: ESOPHAGOGASTRODUODENOSCOPY (EGD), Duodenal  polyp removal;  Surgeon: Shaun Guerrero MD;  [facility]: [facility]     ESOPHAGOSCOPY, GASTROSCOPY, DUODENOSCOPY (EGD), RESECT MUCOSA, COMBINED N/A 2/28/2019    Procedure: Upper Endoscopy, Endoscopic Ultrasound, Endoscopic Mucosal Resection,  Ampullectomy, polypectomy.;  Surgeon: Shaun Guerrero MD;  [facility]     ESOPHAGOSCOPY, GASTROSCOPY, DUODENOSCOPY (EGD), RESECT MUCOSA, COMBINED N/A 3/22/2021    Procedure: ESOPHAGOGASTRODUODENOSCOPY (EGD) with  endoscopic mucosal resection/ polypectomy;  Surgeon: Shaun Guerrero MD;  [facility]     EXCISE LESION LOWER EXTREMITY Right 3/28/2022    Procedure: Wide local excision of right knee melanoma;  Surgeon: Chevy Allison  "MD CHAPARRITA;  Location: [facility] OR     EXCISE MASS LOWER EXTREMITY Right 1/13/2022    Procedure: excision of right thigh mass;  Surgeon: Salvador Kelly MD;  [facility]: [facility]     EYE SURGERY      macular hole repaired left eye     HC KNEE SCOPE, DIAGNOSTIC      - both knees     HEAD & NECK SURGERY      wisdom teeth     IR CHEST PORT PLACEMENT > 5 YRS OF AGE  5/2/2022     LAPAROSCOPIC HYSTERECTOMY TOTAL, BILATERAL SALPINGO-OOPHORECTOMY, NODE DISSECTION, COMBINED Bilateral 6/23/2022    Procedure: Total Laparoscopic Hyserectomy, Bilateral Salpibgo-oophorectomy, Bilateral [place] Lymph Node Dissection;  Surgeon: Eli Guidry MD;  [facility]      Social History:   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    Family History:   No known family history of nephrolithiasis or  malignancy.    Objective       PHYSICAL EXAM   /70   Ht 1.676 m (5' 6\")   Wt 91.6 kg (202 lb)   LMP 12/13/2002 (Exact Date)   BMI 32.60 kg/m     Physical Exam  Constitutional:       Appearance: Normal appearance.   HENT:      Head: Normocephalic.      Nose: Nose normal.   Eyes:      General: No scleral icterus.  Pulmonary:      Effort: Pulmonary effort is normal.   Abdominal:      General: There is no distension.   Musculoskeletal:         General: Normal range of motion.      Cervical back: Normal range of motion.   Skin:     General: Skin is warm and dry.   Neurological:      General: No focal deficit present.      Mental Status: She is alert and oriented to person, place, and time.   Psychiatric:         Mood and Affect: Mood normal.         Behavior: Behavior normal.          LABORATORY   Recent Labs   Lab Test 11/15/22  1426 09/21/22  1348   COLOR Yellow Yellow   APPEARANCE Clear Clear   URINEGLC >=1000* 500*   URINEBILI Negative Negative   URINEKETONE Negative Negative   SG 1.020 1.020   UBLD Trace* Negative   URINEPH 5.5 7.0   PROTEIN Negative Negative   UROBILINOGEN 0.2 0.2   NITRITE Negative Negative   LEUKEST Negative Trace*   RBCU "  --  0-2   WBCU  --  0-5     Hemoglobin 12.5    IMAGING     I personally reviewed the images.     *CT CHEST PULMONARY EMBOLUS, ABDOMEN PELVIS WITH CONTRAST 7/25/2022  10:50 AM     CLINICAL HISTORY: Shortness of breath. Hypoxia. Vomiting. History of  melanoma with lung metastases. Additional history of uterine cancer.     TECHNIQUE: CT angiogram chest and routine CT abdomen pelvis with IV  contrast. Arterial phase through the chest and venous phase through  the abdomen and pelvis. 2D and 3D MIP reconstructions were preformed  by the CT technologist. Dose reduction techniques were used.   CONTRAST: 70mL Isovue-370     COMPARISON: CT of the abdomen and pelvis performed 7/12/2022. PET/CT  ADRENAL GLANDS: Normal.     KIDNEYS/BLADDER: Right renal cyst would require no specific follow-up. The kidneys are otherwise unremarkable. No hydronephrosis.                                        IMPRESSION:  1.  Occlusive and nonocclusive pulmonary emboli are noted within  multiple pulmonary artery branches bilaterally.  2.  Mild prominence of the right ventricle suggests some degree of  associated right heart strain.  3.  Numerous ill-defined masses in both lungs have increased in size,  and are suspicious for progression of metastatic disease.  4.  Thrombus within the bilateral ovarian veins is not significantly  changed.    Assessment & Plan    1. Gross hematuria      I had the pleasure today of meeting with Ms. Sabillon to discuss her gross hematuria.  The differential diagnosis at this point includes stone disease, infection, vaginal atrophy, urothelial malignancy, renal disorder versus another yet unknown diagnosis.  Many times, we do not find a cause, but we should rule out bad causes.    At this time, recommend proceeding with comprehensive hematuria evaluation to include:    - Urine cytology to look for cells concerning for malignancy.  - CT urogram for upper tract imaging.  - Cystoscopy with the first available urologist to  evaluate the interior of the bladder. Follow up for hematuria as recommended by urologist performing cystoscopic evaluation.    We discussed that it radiation can cause gross hematuria, but this would not be the case in her setting as she did not start radiation until after she had gross hematuria.  We did discuss that it could just be a combination of the significant fall in conjunction with blood thinning medication, but we do need to rule out dangerous causes.    Patient voiced understanding.    Signed by:     Samreen Moran PA-C 11/15/2022 2:57 PM

## 2022-11-15 NOTE — PROGRESS NOTES
Subjective      REQUESTING PROVIDER   Raisa Davidson     REASON FOR CONSULT   Gross hematuria    HISTORY OF PRESENT ILLNESS   Ms. Sabillon is a 72 year old female seen today in the urology clinic in consultation for evaluation of gross hematuria. This was first detected in September after a recent fall.  She was on Lovenox at that time and continues on this.    Patient denies any gross hematuria since that time.  She does note that she has nocturia x1 and denies dysuria.  Now and then she will get urgency and frequency.  She endorses urinary incontinence if she waits too long to urinate.  She was treated for possible UTI in September.  Urine culture at that time showed 50,000-100,000 CFU per mL of mixed palmira.  She also had a course of cefdinir as well as Bactrim.  At the time of the gross hematuria she denies any other symptoms.  She denies any current issues with urination.    She will get a rare urinary tract infection.  Denies any history of nephrolithiasis.  Patient had not yet started pelvic radiation therapy.  She started this approximately 3 to 4 weeks after her fall and hematuria.  She denies any smoking history of significant chemical exposure.    Blood thinners: Lovenox    Retention or clots: Neither    Hematuria Risk Factors:  Age >40: Yes   Smoking history: No  Occupational exposure to chemicals or dyes (ie, benzenes, aromatic amines): no  History of urologic disorder or disease: no  History of irritative voiding symptoms: no  History of urinary tract infection: yes, but rarely  Analgesic abuse: no  History of pelvic irradiation: Yes, but not until after gross hematuria    The following portions of the patient's history were reviewed and updated as appropriate: allergies, current medications, past family history, past medical history, past social history, past surgical history and problem list.     REVIEW OF SYSTEMS   Review of Systems   Constitutional: Negative for chills and fever.    Respiratory: Negative for shortness of breath.    Cardiovascular: Negative for chest pain.   Gastrointestinal: Negative for nausea and vomiting.   Genitourinary: Positive for hematuria. Negative for dysuria.      Per HPI.     Patient Active Problem List   Diagnosis     Allergic rhinitis     Hyperlipidemia with target LDL less than 100     Asthma, mild intermittent     Cataract     Macular hole of left eye     Obesity (BMI 30-39.9)     Heart murmur     Total knee replacement status     Chronic knee pain, right     HTN goal less than 140/90,     CHERRY (obstructive sleep apnea)     Status post total replacement of right hip     Ampullary adenoma     Polyp of duodenum     HTN, goal below 140/90     Diabetes mellitus, type 2 (H)     Melanoma of skin (H)     Metastatic malignant melanoma (H)  -- R thigh      Dehydration     Orthostatic hypotension     Endometrial cancer (H)     Bilateral pulmonary embolism (H)     Organizing pneumonia (H) -- Aug 2022      Past Medical History:   Diagnosis Date     Asthma, mild intermittent      Cataract      Endometrial cancer (H) 06/2022     HTN, goal below 140/90      Hyperlipidemia LDL goal < 100      Macular hole of left eye      Melanoma (H)     RIGHT KNEE     Sleep apnea     uses c pap     Type 2 diabetes, HbA1C goal < 8% (H)       Past Surgical History:   Procedure Laterality Date     ARTHROPLASTY HIP Right 9/25/2017    Procedure: ARTHROPLASTY HIP;  Right total hip arthroplasty  ;  Surgeon: Ayaan Pena MD;  Location:  OR     ARTHROPLASTY KNEE  7/12/2013    Procedure: ARTHROPLASTY KNEE;  right total knee arthroplasty ;  Surgeon: Chaparro Wesley MD;  Location:  OR     ARTHROSCOPY KNEE Right 1/21/2015    Procedure: ARTHROSCOPY KNEE;  Surgeon: Ayaan Pena MD;  Location:  OR     BRONCHOSCOPY (RIGID OR FLEXIBLE), DIAGNOSTIC N/A 8/11/2022    Procedure: BRONCHOSCOPY, WITH BRONCHOALVEOLAR LAVAGE;  Surgeon: Roselia Sosa MD;  Location:  GI     C  INCISION OF HYMEN       CATARACT IOL, RT/LT       COLONOSCOPY  2008     COLONOSCOPY N/A 4/18/2019    Procedure: Colonoscopy with polypectomy;  Surgeon: Shaun Guerrero MD;  [facility]     CYSTOSCOPY N/A 6/23/2022    Procedure: Cystoscopy;  Surgeon: Eli Guidry MD;  [facility]     ENDOSCOPIC RETROGRADE CHOLANGIOPANCREATOGRAM N/A 2/6/2019    Procedure: COMBINED ENDOSCOPIC RETROGRADE CHOLANGIOPANCREATOGRAPHY, BILIARY SPINCTEROTOMY AND DILATION, PLACE BILE DUCT STENT;  Surgeon: Guru Andie Gonzalez MD;  [facility]     ENDOSCOPIC RETROGRADE CHOLANGIOPANCREATOGRAM N/A 2/28/2019    Procedure: Endoscopic Retrograde Cholangiopancreatogram, Bile duct stent removal and placement;  Surgeon: Shaun Guerrero MD;  [facility]     ENDOSCOPIC RETROGRADE CHOLANGIOPANCREATOGRAM N/A 4/18/2019    Procedure: COMBINED ENDOSCOPIC RETROGRADE CHOLANGIOPANCREATOGRAPHY, Bile duct stent exchange and Polypectomy;  Surgeon: Shaun Guerrero MD;  [facility]     ENDOSCOPIC RETROGRADE CHOLANGIOPANCREATOGRAM N/A 10/18/2019    Procedure: Endoscopic Retrograde Cholangiopancreatogram;  Surgeon: Shaun Guerrero MD;  [facility]     ENDOSCOPIC RETROGRADE CHOLANGIOPANCREATOGRAM N/A 3/24/2022    Procedure: ENDOSCOPIC RETROGRADE CHOLANGIOPANCREATOGRAPHY;  Surgeon: Shaun Guerrero MD;  Location:  OR     ENDOSCOPIC RETROGRADE CHOLANGIOPANCREATOGRAM WITH SPYGLASS N/A 7/26/2019    Procedure: Endoscopic Retrograde Cholangiopancreatogram With Spyglas,l Radiofrequency Ablation, Stent Exchangeand Duodenal Biopsy x2;  Surgeon: Shaun Guerrero MD;  [facility]     ENDOSCOPIC RETROGRADE CHOLANGIOPANCREATOGRAM WITH SPYGLASS N/A 9/10/2020    Procedure: ENDOSCOPIC RETROGRADE CHOLANGIOPANCREATOGRAPHY, WITH DIRECT DUCT VISUALIZATION, USING PANCREATICOBILIARY FIBEROPTIC PROBE;  Surgeon: Shaun Guerrero MD;  [facility]     ENDOSCOPIC RETROGRADE CHOLANGIOPANCREATOGRAM WITH SPYGLASS N/A 3/22/2021    Procedure:  ENDOSCOPIC RETROGRADE CHOLANGIOPANCREATOGRAPHY, WITH DIRECT DUCT VISUALIZATION, USING PANCREATICOBILIARY FIBEROPTIC PROBE;  Surgeon: Shaun Guerrero MD;  [facility]     ESOPHAGOSCOPY, GASTROSCOPY, DUODENOSCOPY (EGD) WITH RADIO FREQUENCY ABLATION, COMBINED N/A 9/10/2020    Procedure: possible repeat ESOPHAGOGASTRODUODENOSCOPY, WITH RADIOFREQUENCY ABLATION and endoscopic mucosal resection;  Surgeon: Shaun Guerrero MD;  [facility]     ESOPHAGOSCOPY, GASTROSCOPY, DUODENOSCOPY (EGD), COMBINED N/A 4/18/2019    Procedure: upper endoscopy with polypectomy;  Surgeon: Shaun Guerrero MD;  [facility]     ESOPHAGOSCOPY, GASTROSCOPY, DUODENOSCOPY (EGD), COMBINED N/A 10/18/2019    Procedure: Upper Endoscopy and ERCP with stent removal, stone removal and biopsy;  Surgeon: Shaun Guerrero MD;  [facility]     ESOPHAGOSCOPY, GASTROSCOPY, DUODENOSCOPY (EGD), COMBINED N/A 3/24/2022    Procedure: ESOPHAGOGASTRODUODENOSCOPY (EGD), Duodenal  polyp removal;  Surgeon: Shaun Guerrero MD;  [facility]     ESOPHAGOSCOPY, GASTROSCOPY, DUODENOSCOPY (EGD), RESECT MUCOSA, COMBINED N/A 2/28/2019    Procedure: Upper Endoscopy, Endoscopic Ultrasound, Endoscopic Mucosal Resection,  Ampullectomy, polypectomy.;  Surgeon: Shaun Guerrero MD;  [facility]     ESOPHAGOSCOPY, GASTROSCOPY, DUODENOSCOPY (EGD), RESECT MUCOSA, COMBINED N/A 3/22/2021    Procedure: ESOPHAGOGASTRODUODENOSCOPY (EGD) with  endoscopic mucosal resection/ polypectomy;  Surgeon: Shaun Guerrero MD;  [facility]     EXCISE LESION LOWER EXTREMITY Right 3/28/2022    Procedure: Wide local excision of right knee melanoma;  Surgeon: Chevy Allison MD;  Location: [facility] OR     EXCISE MASS LOWER EXTREMITY Right 1/13/2022    Procedure: excision of right thigh mass;  Surgeon: Salvador Kelly MD;  [facility]: [facility]     EYE SURGERY      macular hole repaired left eye     HC KNEE SCOPE, DIAGNOSTIC      - both knees     HEAD & NECK SURGERY      wisdom teeth     IR CHEST  "PORT PLACEMENT > 5 YRS OF AGE  5/2/2022     LAPAROSCOPIC HYSTERECTOMY TOTAL, BILATERAL SALPINGO-OOPHORECTOMY, NODE DISSECTION, COMBINED Bilateral 6/23/2022    Procedure: Total Laparoscopic Hyserectomy, Bilateral Salpibgo-oophorectomy, Bilateral [place] Lymph Node Dissection;  Surgeon: Eli Guidry MD;  Location:  OR      Social History:   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    Family History:   No known family history of nephrolithiasis or  malignancy.    Objective      PHYSICAL EXAM   /70   Ht 1.676 m (5' 6\")   Wt 91.6 kg (202 lb)   LMP 12/13/2002 (Exact Date)   BMI 32.60 kg/m     Physical Exam  Constitutional:       Appearance: Normal appearance.   HENT:      Head: Normocephalic.      Nose: Nose normal.   Eyes:      General: No scleral icterus.  Pulmonary:      Effort: Pulmonary effort is normal.   Abdominal:      General: There is no distension.   Musculoskeletal:         General: Normal range of motion.      Cervical back: Normal range of motion.   Skin:     General: Skin is warm and dry.   Neurological:      General: No focal deficit present.      Mental Status: She is alert and oriented to person, place, and time.   Psychiatric:         Mood and Affect: Mood normal.         Behavior: Behavior normal.          LABORATORY   Recent Labs   Lab Test 11/15/22  1426 09/21/22  1348   COLOR Yellow Yellow   APPEARANCE Clear Clear   URINEGLC >=1000* 500*   URINEBILI Negative Negative   URINEKETONE Negative Negative   SG 1.020 1.020   UBLD Trace* Negative   URINEPH 5.5 7.0   PROTEIN Negative Negative   UROBILINOGEN 0.2 0.2   NITRITE Negative Negative   LEUKEST Negative Trace*   RBCU  --  0-2   WBCU  --  0-5     Hemoglobin 12.5    IMAGING     I personally reviewed the images.     *CT CHEST PULMONARY EMBOLUS, ABDOMEN PELVIS WITH CONTRAST 7/25/2022  10:50 AM     CLINICAL HISTORY: Shortness of breath. Hypoxia. Vomiting. History of  melanoma with lung metastases. Additional history of uterine " cancer.     TECHNIQUE: CT angiogram chest and routine CT abdomen pelvis with IV  contrast. Arterial phase through the chest and venous phase through  the abdomen and pelvis. 2D and 3D MIP reconstructions were preformed  by the CT technologist. Dose reduction techniques were used.   CONTRAST: 70mL Isovue-370     COMPARISON: CT of the abdomen and pelvis performed 7/12/2022. PET/CT  ADRENAL GLANDS: Normal.     KIDNEYS/BLADDER: Right renal cyst would require no specific follow-up. The kidneys are otherwise unremarkable. No hydronephrosis.                                        IMPRESSION:  1.  Occlusive and nonocclusive pulmonary emboli are noted within  multiple pulmonary artery branches bilaterally.  2.  Mild prominence of the right ventricle suggests some degree of  associated right heart strain.  3.  Numerous ill-defined masses in both lungs have increased in size,  and are suspicious for progression of metastatic disease.  4.  Thrombus within the bilateral ovarian veins is not significantly  changed.    Assessment & Plan    1. Gross hematuria      I had the pleasure today of meeting with Ms. Sabillon to discuss her gross hematuria.  The differential diagnosis at this point includes stone disease, infection, vaginal atrophy, urothelial malignancy, renal disorder versus another yet unknown diagnosis.  Many times, we do not find a cause, but we should rule out bad causes.    At this time, recommend proceeding with comprehensive hematuria evaluation to include:    - Urine cytology to look for cells concerning for malignancy.  - CT urogram for upper tract imaging.  - Cystoscopy with the first available urologist to evaluate the interior of the bladder. Follow up for hematuria as recommended by urologist performing cystoscopic evaluation.    We discussed that it radiation can cause gross hematuria, but this would not be the case in her setting as she did not start radiation until after she had gross hematuria.  We did  discuss that it could just be a combination of the significant fall in conjunction with blood thinning medication, but we do need to rule out dangerous causes.    Patient voiced understanding.    Signed by:     Samreen Moran PA-C 11/15/2022 2:57 PM

## 2022-11-15 NOTE — NURSING NOTE
Chief Complaint   Patient presents with     Hematuria     Saw, blood in urine 3-4 days after a fall at home.  Urine was burgundy for about 2 weeks.  No UTI symptoms at the time or pain.      Currently no issues with urination.    Lauren Flowers, EMT

## 2022-11-18 NOTE — TELEPHONE ENCOUNTER
Routing refill request to provider for review/approval because:  Medication is reported/historical  Pauline FUNES RN, BSN

## 2022-11-21 ENCOUNTER — TELEPHONE (OUTPATIENT)
Dept: PHARMACY | Facility: CLINIC | Age: 72
End: 2022-11-21

## 2022-11-21 ENCOUNTER — ONCOLOGY VISIT (OUTPATIENT)
Dept: ONCOLOGY | Facility: CLINIC | Age: 72
End: 2022-11-21
Attending: INTERNAL MEDICINE
Payer: COMMERCIAL

## 2022-11-21 ENCOUNTER — HOSPITAL ENCOUNTER (OUTPATIENT)
Dept: CT IMAGING | Facility: CLINIC | Age: 72
Discharge: HOME | End: 2022-11-21
Attending: INTERNAL MEDICINE | Admitting: INTERNAL MEDICINE
Payer: COMMERCIAL

## 2022-11-21 ENCOUNTER — INFUSION THERAPY VISIT (OUTPATIENT)
Dept: INFUSION THERAPY | Facility: CLINIC | Age: 72
End: 2022-11-21
Attending: INTERNAL MEDICINE
Payer: COMMERCIAL

## 2022-11-21 VITALS
RESPIRATION RATE: 16 BRPM | DIASTOLIC BLOOD PRESSURE: 66 MMHG | BODY MASS INDEX: 33.01 KG/M2 | HEIGHT: 66 IN | HEART RATE: 72 BPM | OXYGEN SATURATION: 96 % | TEMPERATURE: 97.2 F | WEIGHT: 205.4 LBS | SYSTOLIC BLOOD PRESSURE: 127 MMHG

## 2022-11-21 DIAGNOSIS — I95.1 ORTHOSTATIC HYPOTENSION: ICD-10-CM

## 2022-11-21 DIAGNOSIS — J84.89 ORGANIZING PNEUMONIA (H): Primary | ICD-10-CM

## 2022-11-21 DIAGNOSIS — J84.89 ORGANIZING PNEUMONIA (H): ICD-10-CM

## 2022-11-21 LAB — CRP SERPL-MCNC: <3 MG/L

## 2022-11-21 PROCEDURE — 99214 OFFICE O/P EST MOD 30 MIN: CPT | Performed by: INTERNAL MEDICINE

## 2022-11-21 PROCEDURE — 71250 CT THORAX DX C-: CPT

## 2022-11-21 PROCEDURE — 36591 DRAW BLOOD OFF VENOUS DEVICE: CPT

## 2022-11-21 PROCEDURE — 250N000011 HC RX IP 250 OP 636: Performed by: NURSE PRACTITIONER

## 2022-11-21 PROCEDURE — 86140 C-REACTIVE PROTEIN: CPT | Performed by: INTERNAL MEDICINE

## 2022-11-21 PROCEDURE — G0463 HOSPITAL OUTPT CLINIC VISIT: HCPCS

## 2022-11-21 RX ORDER — HEPARIN SODIUM (PORCINE) LOCK FLUSH IV SOLN 100 UNIT/ML 100 UNIT/ML
5 SOLUTION INTRAVENOUS
Status: DISCONTINUED | OUTPATIENT
Start: 2022-11-21 | End: 2022-11-21 | Stop reason: HOSPADM

## 2022-11-21 RX ORDER — NALOXONE HYDROCHLORIDE 0.4 MG/ML
0.2 INJECTION, SOLUTION INTRAMUSCULAR; INTRAVENOUS; SUBCUTANEOUS
Status: CANCELLED | OUTPATIENT
Start: 2022-11-21

## 2022-11-21 RX ORDER — ALBUTEROL SULFATE 90 UG/1
1-2 AEROSOL, METERED RESPIRATORY (INHALATION)
Status: CANCELLED
Start: 2022-11-21

## 2022-11-21 RX ORDER — PREDNISONE 5 MG/1
TABLET ORAL
Qty: 45 TABLET | Refills: 1 | Status: SHIPPED | OUTPATIENT
Start: 2022-11-21 | End: 2023-01-10

## 2022-11-21 RX ORDER — MEPERIDINE HYDROCHLORIDE 25 MG/ML
25 INJECTION INTRAMUSCULAR; INTRAVENOUS; SUBCUTANEOUS EVERY 30 MIN PRN
Status: CANCELLED | OUTPATIENT
Start: 2022-11-21

## 2022-11-21 RX ORDER — ALBUTEROL SULFATE 0.83 MG/ML
2.5 SOLUTION RESPIRATORY (INHALATION)
Status: CANCELLED | OUTPATIENT
Start: 2022-11-21

## 2022-11-21 RX ORDER — DIPHENHYDRAMINE HYDROCHLORIDE 50 MG/ML
50 INJECTION INTRAMUSCULAR; INTRAVENOUS
Status: CANCELLED
Start: 2022-11-21

## 2022-11-21 RX ORDER — EPINEPHRINE 1 MG/ML
0.3 INJECTION, SOLUTION INTRAMUSCULAR; SUBCUTANEOUS EVERY 5 MIN PRN
Status: CANCELLED | OUTPATIENT
Start: 2022-11-21

## 2022-11-21 RX ORDER — HEPARIN SODIUM,PORCINE 10 UNIT/ML
5 VIAL (ML) INTRAVENOUS
Status: CANCELLED | OUTPATIENT
Start: 2022-11-21

## 2022-11-21 RX ORDER — METHYLPREDNISOLONE SODIUM SUCCINATE 125 MG/2ML
125 INJECTION, POWDER, LYOPHILIZED, FOR SOLUTION INTRAMUSCULAR; INTRAVENOUS
Status: CANCELLED
Start: 2022-11-21

## 2022-11-21 RX ORDER — HEPARIN SODIUM (PORCINE) LOCK FLUSH IV SOLN 100 UNIT/ML 100 UNIT/ML
5 SOLUTION INTRAVENOUS
Status: CANCELLED | OUTPATIENT
Start: 2022-11-21

## 2022-11-21 RX ADMIN — Medication 5 ML: at 10:36

## 2022-11-21 ASSESSMENT — PAIN SCALES - GENERAL: PAINLEVEL: NO PAIN (0)

## 2022-11-21 NOTE — PROGRESS NOTES
"[facility] Follow Up Visit    Reason for Visit  Gricelda Sabillon is a 72 year old female who is seen in follow up for immune checkpoint inhibitor pneumonitis   She sees Rosibel Florez and Melissa   Pulmonary HPI    - She is down to 5 mg prednisone in the last week, has noticed a subsequent decline in energy  - she is not using oxygen most of the time but occasionally she is running around and stops to check and she is SpO2 86% and 1 lpm at night.       Earlier this year she noted a lump on her thigh, it was indeterminate and removed, found to be melanoma. During the workup, abnormal imaging found in the uterus so she had hysterectomy. Last week she had a percutaneous lung biopsy and the week before she was diagnosed with pulmonary embolism  - she was coughing before the hospitalization, improved since treatment for PE, breathing is improved, can \"almost\" take a deep breath  - she is taking lovenox injections  - unclear if the PE was from malignancy vs postoperative inactivity      Timeline of medical care this year:   - Jan 2022 leg mass resection; March 2022 extended surgical resection for margins   Jan 2022 CT showing lung nodule, not PET FDG avid  May port placed - April 29 first immunotherapy Keytruda, every 3 weeks May 20, Neha 10, July 5 last treatment  - June 2022 hysterectomy, lymph nodes negative for malignancy, low grade endometrial cancer  After the hysterectomy, she had some issues with nausea, poor appetite. Thought it might be semaglutide so it was stopped. No other new medications.  Routine visit on 7/25/22 with Dr Torres showed hypoxemia, she went to ED and noted to have a PE. She didn't feel that bad but became short of breath at the clinic - discharged with 4 lpm supplemental oxygen, she is on 2 lpm now, usually 92-93%  She continues to experience lightheadedness, nausea, chills every morning. No appetite, nothing tastes good.   - Aug 2022 lung biopsy " "percutaneous   - history of 1-2 episodes of bronchitis annually, family history of asthma. Has used albuterol in the past for these episodes  - she has been hanging about the house, no camping or outdoor activities, no pets    ROS Pulmonary  Dyspnea: Yes, Cough: Yes, Chest pain: No, Wheezing: No, Sputum Production: No, Hemoptysis: No  A complete ROS was otherwise negative except as noted in the HPI.  The patient was seen and examined by Roselia Sosa MD   Current Outpatient Medications   Medication     albuterol (PROAIR HFA/PROVENTIL HFA/VENTOLIN HFA) 108 (90 Base) MCG/ACT inhaler     atenolol (TENORMIN) 50 MG tablet     atorvastatin (LIPITOR) 40 MG tablet     cetirizine (ZYRTEC) 10 MG tablet     Continuous Blood Gluc Sensor (FREESTYLE DAVID 14 DAY SENSOR) MISC     CONTOUR NEXT TEST test strip     enoxaparin ANTICOAGULANT (LOVENOX) 80 MG/0.8ML syringe     glipiZIDE (GLUCOTROL) 5 MG tablet     insulin  UNIT/ML vial     insulin syringe-needle U-100 (BD INSULIN SYRINGE ULTRAFINE) 31G X 5/16\" 0.5 ML miscellaneous     latanoprost (XALATAN) 0.005 % ophthalmic solution     lidocaine-prilocaine (EMLA) 2.5-2.5 % external cream     LORazepam (ATIVAN) 0.5 MG tablet     metFORMIN (GLUCOPHAGE XR) 500 MG 24 hr tablet     ondansetron (ZOFRAN) 8 MG tablet     senna-docusate (SENOKOT-S/PERICOLACE) 8.6-50 MG tablet     timolol maleate (TIMOPTIC) 0.5 % ophthalmic solution     triamcinolone (KENALOG) 0.5 % external ointment     azelastine (ASTELIN) 0.1 % nasal spray     azelastine (OPTIVAR) 0.05 % SOLN     No current facility-administered medications for this visit.     Allergies   Allergen Reactions     Bromfenac Visual Disturbance      xibrom  Causes sever eye pain     Codeine      \"NAUSE,HA AND DIZZINESS\"     Codeine Nausea     Other reaction(s): Dizziness, Headache     Social History     Socioeconomic History     Marital status:      Spouse name: Allen     Number of children: 3     Years of education: 15     " Highest education level: Not on file   Occupational History     Not on file   Tobacco Use     Smoking status: Never     Passive exposure: Never     Smokeless tobacco: Never   Vaping Use     Vaping Use: Never used   Substance and Sexual Activity     Alcohol use: No     Alcohol/week: 0.0 standard drinks     Drug use: No     Sexual activity: Not Currently     Partners: Male   Other Topics Concern     Parent/sibling w/ CABG, MI or angioplasty before 65F 55M? Not Asked   Social History Narrative     Not on file     Social Determinants of Health     Financial Resource Strain: Not on file   Food Insecurity: Not on file   Transportation Needs: Not on file   Physical Activity: Not on file   Stress: Not on file   Social Connections: Not on file   Intimate Partner Violence: Not At Risk     Fear of Current or Ex-Partner: No     Emotionally Abused: No     Physically Abused: No     Sexually Abused: No   Housing Stability: Not on file     Past Medical History:   Diagnosis Date     Asthma, mild intermittent      Cataract      Endometrial cancer (H) 06/2022     HTN, goal below 140/90      Hyperlipidemia LDL goal < 100      Macular hole of left eye      Melanoma (H)     RIGHT KNEE     Sleep apnea     uses c pap     Type 2 diabetes, HbA1C goal < 8% (H)      Past Surgical History:   Procedure Laterality Date     ARTHROPLASTY HIP Right 9/25/2017    Procedure: ARTHROPLASTY HIP;  Right total hip arthroplasty  ;  Surgeon: Ayaan Pena MD;  Location:  OR     ARTHROPLASTY KNEE  7/12/2013    Procedure: ARTHROPLASTY KNEE;  right total knee arthroplasty ;  Surgeon: Chaparro Wesley MD;  Location:  OR     ARTHROSCOPY KNEE Right 1/21/2015    Procedure: ARTHROSCOPY KNEE;  Surgeon: Ayaan Pena MD;  Location:  OR     BRONCHOSCOPY (RIGID OR FLEXIBLE), DIAGNOSTIC N/A 8/11/2022    Procedure: BRONCHOSCOPY, WITH BRONCHOALVEOLAR LAVAGE;  Surgeon: Roselia Sosa MD;  Location:  GI     C INCISION OF HYMEN        CATARACT IOL, RT/LT       COLONOSCOPY  2008     COLONOSCOPY N/A 4/18/2019    Procedure: Colonoscopy with polypectomy;  Surgeon: Shaun Guerrero MD;  Location: [facility]     CYSTOSCOPY N/A 6/23/2022    Procedure: Cystoscopy;  Surgeon: Eli Guidry MD;  [facility]: [facility]     ENDOSCOPIC RETROGRADE CHOLANGIOPANCREATOGRAM N/A 2/6/2019    Procedure: COMBINED ENDOSCOPIC RETROGRADE CHOLANGIOPANCREATOGRAPHY, BILIARY SPINCTEROTOMY AND DILATION, PLACE BILE DUCT STENT;  Surgeon: Guru Andie Gonzalez MD;  Location: [facility]     ENDOSCOPIC RETROGRADE CHOLANGIOPANCREATOGRAM N/A 2/28/2019    Procedure: Endoscopic Retrograde Cholangiopancreatogram, Bile duct stent removal and placement;  Surgeon: Shaun Guerrero MD;  Location: [facility]     ENDOSCOPIC RETROGRADE CHOLANGIOPANCREATOGRAM N/A 4/18/2019    Procedure: COMBINED ENDOSCOPIC RETROGRADE CHOLANGIOPANCREATOGRAPHY, Bile duct stent exchange and Polypectomy;  Surgeon: Shaun Guerrero MD;  [facility]: [facility]     ENDOSCOPIC RETROGRADE CHOLANGIOPANCREATOGRAM N/A 10/18/2019    Procedure: Endoscopic Retrograde Cholangiopancreatogram;  Surgeon: Shaun Guerrero MD;  [facility]     ENDOSCOPIC RETROGRADE CHOLANGIOPANCREATOGRAM N/A 3/24/2022    Procedure: ENDOSCOPIC RETROGRADE CHOLANGIOPANCREATOGRAPHY;  Surgeon: Shaun Guerrero MD;  Location:  OR     ENDOSCOPIC RETROGRADE CHOLANGIOPANCREATOGRAM WITH SPYGLASS N/A 7/26/2019    Procedure: Endoscopic Retrograde Cholangiopancreatogram With Spyglas,l Radiofrequency Ablation, Stent Exchangeand Duodenal Biopsy x2;  Surgeon: Shaun Guerrero MD;  [facility]     ENDOSCOPIC RETROGRADE CHOLANGIOPANCREATOGRAM WITH SPYGLASS N/A 9/10/2020    Procedure: ENDOSCOPIC RETROGRADE CHOLANGIOPANCREATOGRAPHY, WITH DIRECT DUCT VISUALIZATION, USING PANCREATICOBILIARY FIBEROPTIC PROBE;  Surgeon: Shaun Guerrero MD;  [facility]     ENDOSCOPIC RETROGRADE CHOLANGIOPANCREATOGRAM WITH SPYGLASS N/A 3/22/2021    Procedure: ENDOSCOPIC RETROGRADE  CHOLANGIOPANCREATOGRAPHY, WITH DIRECT DUCT VISUALIZATION, USING PANCREATICOBILIARY FIBEROPTIC PROBE;  Surgeon: Shaun Guerrero MD;  [facility]: [facility]     ESOPHAGOSCOPY, GASTROSCOPY, DUODENOSCOPY (EGD) WITH RADIO FREQUENCY ABLATION, COMBINED N/A 9/10/2020    Procedure: possible repeat ESOPHAGOGASTRODUODENOSCOPY, WITH RADIOFREQUENCY ABLATION and endoscopic mucosal resection;  Surgeon: Shaun Guerrero MD;  [facility]: [facility]     ESOPHAGOSCOPY, GASTROSCOPY, DUODENOSCOPY (EGD), COMBINED N/A 4/18/2019    Procedure: upper endoscopy with polypectomy;  Surgeon: Shaun Guerrero MD;  [facility]     ESOPHAGOSCOPY, GASTROSCOPY, DUODENOSCOPY (EGD), COMBINED N/A 10/18/2019    Procedure: Upper Endoscopy and ERCP with stent removal, stone removal and biopsy;  Surgeon: Shaun Guerrero MD;  [facility]: [facility]     ESOPHAGOSCOPY, GASTROSCOPY, DUODENOSCOPY (EGD), COMBINED N/A 3/24/2022    Procedure: ESOPHAGOGASTRODUODENOSCOPY (EGD), Duodenal  polyp removal;  Surgeon: Shaun Guerrero MD;  Location: [facility] OR     ESOPHAGOSCOPY, GASTROSCOPY, DUODENOSCOPY (EGD), RESECT MUCOSA, COMBINED N/A 2/28/2019    Procedure: Upper Endoscopy, Endoscopic Ultrasound, Endoscopic Mucosal Resection,  Ampullectomy, polypectomy.;  Surgeon: Shaun Guerrero MD;  [facility]     ESOPHAGOSCOPY, GASTROSCOPY, DUODENOSCOPY (EGD), RESECT MUCOSA, COMBINED N/A 3/22/2021    Procedure: ESOPHAGOGASTRODUODENOSCOPY (EGD) with  endoscopic mucosal resection/ polypectomy;  Surgeon: Shaun Guerrero MD;  Location: [facility]     EXCISE LESION LOWER EXTREMITY Right 3/28/2022    Procedure: Wide local excision of right knee melanoma;  Surgeon: Chevy Allison MD;  Location: [facility] OR     EXCISE MASS LOWER EXTREMITY Right 1/13/2022    Procedure: excision of right thigh mass;  Surgeon: Salvador Kelly MD;  Location: [facility] OR     EYE SURGERY      macular hole repaired left eye     HC KNEE SCOPE, DIAGNOSTIC      - both knees     HEAD & NECK SURGERY      wisdom teeth     IR CHEST PORT PLACEMENT > 5 YRS OF  "AGE  2022     LAPAROSCOPIC HYSTERECTOMY TOTAL, BILATERAL SALPINGO-OOPHORECTOMY, NODE DISSECTION, COMBINED Bilateral 2022    Procedure: Total Laparoscopic Hyserectomy, Bilateral Salpibgo-oophorectomy, Bilateral [place] Lymph Node Dissection;  Surgeon: Eli Guidry MD;  Location:  OR     Family History   Problem Relation Age of Onset     Hypertension Mother         diabetes,hypoythryoidism, stroke     Diabetes Mother      Breast Cancer Mother      Heart Disease Father         , cancer lip     Uterine Cancer Sister      Connective Tissue Disorder Sister         fibromyalgia     Diabetes Sister      Hypertension Brother      Cerebrovascular Disease Maternal Grandmother         , diabetes     Cerebrovascular Disease Maternal Grandfather              Cancer Paternal Grandmother              Heart Disease Paternal Grandfather              Cancer Paternal Aunt         ovarian     Breast Cancer Niece      Asthma Maternal Aunt      Exam:   /66   Pulse 72   Temp 97.2  F (36.2  C) (Tympanic)   Resp 16   Ht 1.676 m (5' 6\")   Wt 93.2 kg (205 lb 6.4 oz)   LMP 2002 (Exact Date)   SpO2 96%   BMI 33.15 kg/m    GENERAL APPEARANCE: Well developed, well nourished, alert, and in no apparent distress.  RESP: normal percussion, good air flow throughout.  Left basilar inspiratory crackles. No rhonchi. No wheezes.  CV: Normal S1, S2, regular rhythm, normal rate. No murmur.  No rub. No gallop. No LE edema.   ABDOMEN:  Bowel sounds normal, soft, nontender, no HSM or masses.   MS: extremities normal. No clubbing. No cyanosis.  SKIN: no rash on limited exam, healed right thigh surgical incisions  NEURO: Mentation intact, speech normal, normal strength and tone, normal gait and stance  PSYCH: mentation appears normal. and affect normal/bright  Results:  - My interpretation of the images relevant for this visit includes: chest images reviewed today compared to " priors. Continued improvement in density/consolidation compared to July 2022,  bilateral patchy ground glass opacity and consolidations in the bases  - My interpretation of the PFT's relevant for this visit includes: None  (ordered but she was unable to complete)  - lung biopsy 8/2/22 lung and bronchial mucosa with chronic inflammation and scattered fibroblastic foci plugging airspaces.  There is no malignancy.    - bronchoscopy 8/11 cell count and diff was 15% neutropenia and 25% lymphocytes    Assessment and plan: Melva is a 71 yo female with melanoma, pulmonary embolism, diabetes, unexplained lung abnormalities and hypoxic respiratory failure  Hypoxic respiratory failure - multifactorial with recent pulmonary embolism. 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 I think it would be reasonable, depending on how pivotal the therapy is to her treatment.    CRP today to monitor therapy, I am asking her to go back up to prednisone 10mg/5 mg alternating until I see the results of the upcoming PET. Based on the PET activity in the lungs and CRP I will determine taper from here.     Pulmonary embolism - acute, related to malignancy? Vs inactivity   She is on lovenox BID for treatment    RTC 3 months, next imaging TBD based on what Dr Florez (oncology) is planning

## 2022-11-21 NOTE — PATIENT INSTRUCTIONS
I recommend prednisone alternating 10mg and 5mg every day and plan to stay there for now    I will look at the PET next week and see how hot/active the lungs are to decide the taper. I anticipate tapering over the next few weeks.     If you are scheduled to get a scan from Dr Florez around the same time as a scan I have ordered, please let me know so we can try to consolidate and avoid redundant scans.     11/29/22: PET Scan.  12/30/22: Labs (CRP Inflammation).  2/6/23:  Appointment with Dr. Sosa.  Klaudia Sharma, RN, BSN, OCN on 11/29/2022 at 3:06 PM

## 2022-11-21 NOTE — LETTER
"    11/21/2022         RE: Gricelda Sabillon  [address]  [facility] [address]        Dear Colleague,    Thank you for referring your patient, Gricelda Sabillon, to the [facility]. Please see a copy of my visit note below.    [facility] Follow Up Visit    Reason for Visit  Gricelda Sabillon is a 72 year old female who is seen in follow up for immune checkpoint inhibitor pneumonitis   She sees Rosibel Florez and Melissa   Pulmonary HPI    - She is down to 5 mg prednisone in the last week, has noticed a subsequent decline in energy  - she is not using oxygen most of the time but occasionally she is running around and stops to check and she is SpO2 86% and 1 lpm at night.       Earlier this year she noted a lump on her thigh, it was indeterminate and removed, found to be melanoma. During the workup, abnormal imaging found in the uterus so she had hysterectomy. Last week she had a percutaneous lung biopsy and the week before she was diagnosed with pulmonary embolism  - she was coughing before the hospitalization, improved since treatment for PE, breathing is improved, can \"almost\" take a deep breath  - she is taking lovenox injections  - unclear if the PE was from malignancy vs postoperative inactivity      Timeline of medical care this year:   - Jan 2022 leg mass resection; March 2022 extended surgical resection for margins   Jan 2022 CT showing lung nodule, not PET FDG avid  May port placed - April 29 first immunotherapy Keytruda, every 3 weeks May 20, Neha 10, July 5 last treatment  - June 2022 hysterectomy, lymph nodes negative for malignancy, low grade endometrial cancer  After the hysterectomy, she had some issues with nausea, poor appetite. Thought it might be semaglutide so it was stopped. No other new medications.  Routine visit on 7/25/22 with Dr Torres showed hypoxemia, she went to ED and noted to have a PE. She didn't feel that bad but " "became short of breath at the clinic - discharged with 4 lpm supplemental oxygen, she is on 2 lpm now, usually 92-93%  She continues to experience lightheadedness, nausea, chills every morning. No appetite, nothing tastes good.   - Aug 2022 lung biopsy percutaneous   - history of 1-2 episodes of bronchitis annually, family history of asthma. Has used albuterol in the past for these episodes  - she has been hanging about the house, no camping or outdoor activities, no pets    ROS Pulmonary  Dyspnea: Yes, Cough: Yes, Chest pain: No, Wheezing: No, Sputum Production: No, Hemoptysis: No  A complete ROS was otherwise negative except as noted in the HPI.  The patient was seen and examined by Roselia Sosa MD   Current Outpatient Medications   Medication     albuterol (PROAIR HFA/PROVENTIL HFA/VENTOLIN HFA) 108 (90 Base) MCG/ACT inhaler     atenolol (TENORMIN) 50 MG tablet     atorvastatin (LIPITOR) 40 MG tablet     cetirizine (ZYRTEC) 10 MG tablet     Continuous Blood Gluc Sensor (ExecNoteYLE DAVID 14 DAY SENSOR) [facility] [place]     CONTOUR NEXT TEST test strip     enoxaparin ANTICOAGULANT (LOVENOX) 80 MG/0.8ML syringe     glipiZIDE (GLUCOTROL) 5 MG tablet     insulin  UNIT/ML vial     insulin syringe-needle U-100 (BD INSULIN SYRINGE ULTRAFINE) 31G X 5/16\" 0.5 ML miscellaneous     latanoprost (XALATAN) 0.005 % ophthalmic solution     lidocaine-prilocaine (EMLA) 2.5-2.5 % external cream     LORazepam (ATIVAN) 0.5 MG tablet     metFORMIN (GLUCOPHAGE XR) 500 MG 24 hr tablet     ondansetron (ZOFRAN) 8 MG tablet     senna-docusate (SENOKOT-S/PERICOLACE) 8.6-50 MG tablet     timolol maleate (TIMOPTIC) 0.5 % ophthalmic solution     triamcinolone (KENALOG) 0.5 % external ointment     azelastine (ASTELIN) 0.1 % nasal spray     azelastine (OPTIVAR) 0.05 % SOLN     No current facility-administered medications for this visit.     Allergies   Allergen Reactions     Bromfenac Visual Disturbance      xibrom  Causes sever eye pain     " "Codeine      \"NAUSE,HA AND DIZZINESS\"     Codeine Nausea     Other reaction(s): Dizziness, Headache     Social History     Socioeconomic History     Marital status:      Spouse name: Allen     Number of children: 3     Years of education: 15     Highest education level: Not on file   Occupational History     Not on file   Tobacco Use     Smoking status: Never     Passive exposure: Never     Smokeless tobacco: Never   Vaping Use     Vaping Use: Never used   Substance and Sexual Activity     Alcohol use: No     Alcohol/week: 0.0 standard drinks     Drug use: No     Sexual activity: Not Currently     Partners: Male   Other Topics Concern     Parent/sibling w/ CABG, MI or angioplasty before 65F 55M? Not Asked   Social History Narrative     Not on file     Social Determinants of Health     Financial Resource Strain: Not on file   Food Insecurity: Not on file   Transportation Needs: Not on file   Physical Activity: Not on file   Stress: Not on file   Social Connections: Not on file   Intimate Partner Violence: Not At Risk     Fear of Current or Ex-Partner: No     Emotionally Abused: No     Physically Abused: No     Sexually Abused: No   Housing Stability: Not on file     Past Medical History:   Diagnosis Date     Asthma, mild intermittent      Cataract      Endometrial cancer (H) 06/2022     HTN, goal below 140/90      Hyperlipidemia LDL goal < 100      Macular hole of left eye      Melanoma (H)     RIGHT KNEE     Sleep apnea     uses c pap     Type 2 diabetes, HbA1C goal < 8% (H)      Past Surgical History:   Procedure Laterality Date     ARTHROPLASTY HIP Right 9/25/2017    Procedure: ARTHROPLASTY HIP;  Right total hip arthroplasty  ;  Surgeon: Ayaan Pena MD;  Location: [facility] OR     ARTHROPLASTY KNEE  7/12/2013    Procedure: ARTHROPLASTY KNEE;  right total knee arthroplasty ;  Surgeon: Chaparro Wesley MD;  Location:  OR     ARTHROSCOPY KNEE Right 1/21/2015    Procedure: ARTHROSCOPY KNEE;  Surgeon: " Ayaan Pena MD;  Location: [facility] OR     BRONCHOSCOPY (RIGID OR FLEXIBLE), DIAGNOSTIC N/A 8/11/2022    Procedure: BRONCHOSCOPY, WITH BRONCHOALVEOLAR LAVAGE;  Surgeon: Roselia Sosa MD;  Location:  GI     C INCISION OF HYMEN       CATARACT IOL, RT/LT       COLONOSCOPY  2008     COLONOSCOPY N/A 4/18/2019    Procedure: Colonoscopy with polypectomy;  Surgeon: Shaun Guerrero MD;  [facility]     CYSTOSCOPY N/A 6/23/2022    Procedure: Cystoscopy;  Surgeon: Eli Guidry MD;  [facility]     ENDOSCOPIC RETROGRADE CHOLANGIOPANCREATOGRAM N/A 2/6/2019    Procedure: COMBINED ENDOSCOPIC RETROGRADE CHOLANGIOPANCREATOGRAPHY, BILIARY SPINCTEROTOMY AND DILATION, PLACE BILE DUCT STENT;  Surgeon: Guru Andie Gonzalez MD;  [facility]     ENDOSCOPIC RETROGRADE CHOLANGIOPANCREATOGRAM N/A 2/28/2019    Procedure: Endoscopic Retrograde Cholangiopancreatogram, Bile duct stent removal and placement;  Surgeon: Shaun Guerrero MD;  [facility]     ENDOSCOPIC RETROGRADE CHOLANGIOPANCREATOGRAM N/A 4/18/2019    Procedure: COMBINED ENDOSCOPIC RETROGRADE CHOLANGIOPANCREATOGRAPHY, Bile duct stent exchange and Polypectomy;  Surgeon: Shaun Guerrero MD;  [facility]     ENDOSCOPIC RETROGRADE CHOLANGIOPANCREATOGRAM N/A 10/18/2019    Procedure: Endoscopic Retrograde Cholangiopancreatogram;  Surgeon: Shaun Guerrero MD;  [facility]     ENDOSCOPIC RETROGRADE CHOLANGIOPANCREATOGRAM N/A 3/24/2022    Procedure: ENDOSCOPIC RETROGRADE CHOLANGIOPANCREATOGRAPHY;  Surgeon: Shaun Guerrero MD;  Location:  OR     ENDOSCOPIC RETROGRADE CHOLANGIOPANCREATOGRAM WITH SPYGLASS N/A 7/26/2019    Procedure: Endoscopic Retrograde Cholangiopancreatogram With Spyglas,l Radiofrequency Ablation, Stent Exchangeand Duodenal Biopsy x2;  Surgeon: Shaun Guerrero MD;  [facility]     ENDOSCOPIC RETROGRADE CHOLANGIOPANCREATOGRAM WITH SPYGLASS N/A 9/10/2020    Procedure: ENDOSCOPIC RETROGRADE  CHOLANGIOPANCREATOGRAPHY, WITH DIRECT DUCT VISUALIZATION, USING PANCREATICOBILIARY FIBEROPTIC PROBE;  Surgeon: Shaun Guerrero MD;  [facility]     ENDOSCOPIC RETROGRADE CHOLANGIOPANCREATOGRAM WITH SPYGLASS N/A 3/22/2021    Procedure: ENDOSCOPIC RETROGRADE CHOLANGIOPANCREATOGRAPHY, WITH DIRECT DUCT VISUALIZATION, USING PANCREATICOBILIARY FIBEROPTIC PROBE;  Surgeon: Shaun Guerrero MD;  [facility]     ESOPHAGOSCOPY, GASTROSCOPY, DUODENOSCOPY (EGD) WITH RADIO FREQUENCY ABLATION, COMBINED N/A 9/10/2020    Procedure: possible repeat ESOPHAGOGASTRODUODENOSCOPY, WITH RADIOFREQUENCY ABLATION and endoscopic mucosal resection;  Surgeon: Shaun Guerrero MD;  [facility]     ESOPHAGOSCOPY, GASTROSCOPY, DUODENOSCOPY (EGD), COMBINED N/A 4/18/2019    Procedure: upper endoscopy with polypectomy;  Surgeon: Shaun Guerrero MD;  [facility]     ESOPHAGOSCOPY, GASTROSCOPY, DUODENOSCOPY (EGD), COMBINED N/A 10/18/2019    Procedure: Upper Endoscopy and ERCP with stent removal, stone removal and biopsy;  Surgeon: Shaun Guerrero MD;  [facility]     ESOPHAGOSCOPY, GASTROSCOPY, DUODENOSCOPY (EGD), COMBINED N/A 3/24/2022    Procedure: ESOPHAGOGASTRODUODENOSCOPY (EGD), Duodenal  polyp removal;  Surgeon: Shaun Guerrero MD;  Location: [facility] OR     ESOPHAGOSCOPY, GASTROSCOPY, DUODENOSCOPY (EGD), RESECT MUCOSA, COMBINED N/A 2/28/2019    Procedure: Upper Endoscopy, Endoscopic Ultrasound, Endoscopic Mucosal Resection,  Ampullectomy, polypectomy.;  Surgeon: Shaun Guerrero MD;  [facility]     ESOPHAGOSCOPY, GASTROSCOPY, DUODENOSCOPY (EGD), RESECT MUCOSA, COMBINED N/A 3/22/2021    Procedure: ESOPHAGOGASTRODUODENOSCOPY (EGD) with  endoscopic mucosal resection/ polypectomy;  Surgeon: Shaun Guerrero MD;  [facility]     EXCISE LESION LOWER EXTREMITY Right 3/28/2022    Procedure: Wide local excision of right knee melanoma;  Surgeon: Chevy Allison MD;  Location: [facility] OR     EXCISE MASS LOWER EXTREMITY Right 1/13/2022    Procedure:  "excision of right thigh mass;  Surgeon: Salvador Kelly MD;  Location: [facility] OR     EYE SURGERY      macular hole repaired left eye     HC KNEE SCOPE, DIAGNOSTIC      - both knees     HEAD & NECK SURGERY      wisdom teeth     IR CHEST PORT PLACEMENT > 5 YRS OF AGE  2022     LAPAROSCOPIC HYSTERECTOMY TOTAL, BILATERAL SALPINGO-OOPHORECTOMY, NODE DISSECTION, COMBINED Bilateral 2022    Procedure: Total Laparoscopic Hyserectomy, Bilateral Salpibgo-oophorectomy, Bilateral [place] Lymph Node Dissection;  Surgeon: Eli Guidry MD;  [facility]     Family History   Problem Relation Age of Onset     Hypertension Mother         diabetes,hypoythryoidism, stroke     Diabetes Mother      Breast Cancer Mother      Heart Disease Father         , cancer lip     Uterine Cancer Sister      Connective Tissue Disorder Sister         fibromyalgia     Diabetes Sister      Hypertension Brother      Cerebrovascular Disease Maternal Grandmother         , diabetes     Cerebrovascular Disease Maternal Grandfather              Cancer Paternal Grandmother              Heart Disease Paternal Grandfather              Cancer Paternal Aunt         ovarian     Breast Cancer Niece      Asthma Maternal Aunt      Exam:   /66   Pulse 72   Temp 97.2  F (36.2  C) (Tympanic)   Resp 16   Ht 1.676 m (5' 6\")   Wt 93.2 kg (205 lb 6.4 oz)   LMP 2002 (Exact Date)   SpO2 96%   BMI 33.15 kg/m    GENERAL APPEARANCE: Well developed, well nourished, alert, and in no apparent distress.  RESP: normal percussion, good air flow throughout.  Left basilar inspiratory crackles. No rhonchi. No wheezes.  CV: Normal S1, S2, regular rhythm, normal rate. No murmur.  No rub. No gallop. No LE edema.   ABDOMEN:  Bowel sounds normal, soft, nontender, no HSM or masses.   MS: extremities normal. No clubbing. No cyanosis.  SKIN: no rash on limited exam, healed right thigh surgical incisions  NEURO: " Mentation intact, speech normal, normal strength and tone, normal gait and stance  PSYCH: mentation appears normal. and affect normal/bright  Results:  - My interpretation of the images relevant for this visit includes: chest images reviewed today compared to priors. Continued improvement in density/consolidation compared to July 2022,  bilateral patchy ground glass opacity and consolidations in the bases  - My interpretation of the PFT's relevant for this visit includes: None  (ordered but she was unable to complete)  - lung biopsy 8/2/22 lung and bronchial mucosa with chronic inflammation and scattered fibroblastic foci plugging airspaces.  There is no malignancy.    - bronchoscopy 8/11 cell count and diff was 15% neutropenia and 25% lymphocytes    Assessment and plan: Melva is a 71 yo female with melanoma, pulmonary embolism, diabetes, unexplained lung abnormalities and hypoxic respiratory failure  Hypoxic respiratory failure - multifactorial with recent pulmonary embolism. 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 I think it would be reasonable, depending on how pivotal the therapy is to her treatment.    CRP today to monitor therapy, I am asking her to go back up to prednisone 10mg/5 mg alternating until I see the results of the upcoming PET. Based on the PET activity in the lungs and CRP I will determine taper from here.     Pulmonary embolism - acute, related to malignancy? Vs inactivity   She is on lovenox BID for treatment    RTC 3  months, next imaging TBD based on what Dr Florez (oncology) is planning        Again, thank you for allowing me to participate in the care of your patient.        Sincerely,        Roselia Sosa MD

## 2022-11-21 NOTE — PROGRESS NOTES
Nursing Note:  Gricelda Sabillon presents today for port labs.    Patient seen by provider today: Yes: Dr Sosa   present during visit today: Not Applicable.    Note: N/A.    Intravenous Access:  Labs drawn without difficulty.  Implanted Port.    Discharge Plan:   Patient was discharged home.    Danisha Ferguson RN

## 2022-11-21 NOTE — NURSING NOTE
"Oncology Rooming Note    November 21, 2022 9:46 AM   Gricelda Sabillon is a 72 year old female who presents for:    Chief Complaint   Patient presents with     Oncology Clinic Visit     Malignant neoplasm metastatic to both lungs      Initial Vitals: /66   Pulse 72   Temp 97.2  F (36.2  C) (Tympanic)   Resp 16   Ht 1.676 m (5' 6\")   Wt 93.2 kg (205 lb 6.4 oz)   LMP 12/13/2002 (Exact Date)   SpO2 96%   BMI 33.15 kg/m   Estimated body mass index is 33.15 kg/m  as calculated from the following:    Height as of this encounter: 1.676 m (5' 6\").    Weight as of this encounter: 93.2 kg (205 lb 6.4 oz). Body surface area is 2.08 meters squared.  No Pain (0) Comment: Data Unavailable   Patient's last menstrual period was 12/13/2002 (exact date).  Allergies reviewed: Yes  Medications reviewed: Yes    Medications: Medication refills not needed today.  Pharmacy name entered into EPIC:    [facility] ORDER [id number] - [place], [place] - [address]    Clinical concerns: f/u       Clau Caicedo CMA              "

## 2022-11-23 ENCOUNTER — PATIENT OUTREACH (OUTPATIENT)
Dept: ONCOLOGY | Facility: CLINIC | Age: 72
End: 2022-11-23

## 2022-11-23 NOTE — PROGRESS NOTES
"Melva called in to clinic reporting she had 2 questions:     1. She is wondering when she can do her flu vaccine and covid vaccine. Dr. Sosa had told her to hold off for now and she forgot to ask her at her appt on Monday 11/21 if she can do those now yet. Per chart review, Dr. Sosa had gone up on Melva's prednisone slightly is waiting for the PET scan results to determine further taper. Writer will check in with Dr. Sosa to see if she would like to wait for PET scan results to give approval for pt to get her vaccinations done.     2. Melva reports she just picked up her prednisone prescription from her pharmacy and the sig that is on the bottle is different than what she had discussed with Dr. Sosa. Melva reports the sig on the bottle she picked up yesterday states: take 2.5 tablets every 48 hours.     Per Dr. Sosa's note from 11/21/22: \"I am asking her to go back up to prednisone 10mg/5mg alternating until I see the results of the upcoming PET.\" Writer instructed Melva to follow those instructions of alternating 10 mg and 5 mg every other day. Writer also called [facility] pharmacy and clarified the rx sig with them so the refill's sig will be correct.     STEPHAN Mtz, CALVINN, RN, LAc, OCN  RN Care Coordinator  [employer]  Ph: [phone]  F: [phone]      "

## 2022-11-23 NOTE — TELEPHONE ENCOUNTER
Continues to dose insulin 40 units, glipizide 2 tablets every morning and metformin  mg daily.      Started to get diarrhea every 2 -3 days.  Will hold her metformin dose if she has recurrent days of diarrhea.  Symptoms do resolve when she holds the metformin.   Noon meal is not biggest.       Plan:  Discussed holding metformin - she doesn't want to stop at this time because it is helping.  Will try to dose after evening meal which is her biggest meal of the day or increase her food intake at noon.

## 2022-11-25 NOTE — PROGRESS NOTES
Per Dr. Sosa, ok for Melva to get her COVID and Flu vaccinations any time. Writer called Melva and spoke with her. Melva reports she will get them separately and maybe wait a few weeks between each vaccine. Melva had no further questions or concerns at this time.     STEPHAN Mtz, BSN, RN, LAc, OCN  RN Care Coordinator  [facility]  Ph: [phone]  F: [phone]

## 2022-11-28 ENCOUNTER — OFFICE VISIT (OUTPATIENT)
Dept: RADIATION ONCOLOGY | Facility: CLINIC | Age: 72
End: 2022-11-28
Attending: RADIOLOGY
Payer: COMMERCIAL

## 2022-11-28 VITALS
OXYGEN SATURATION: 96 % | BODY MASS INDEX: 33.65 KG/M2 | DIASTOLIC BLOOD PRESSURE: 61 MMHG | WEIGHT: 208.5 LBS | HEART RATE: 76 BPM | SYSTOLIC BLOOD PRESSURE: 147 MMHG

## 2022-11-28 DIAGNOSIS — C54.1 ENDOMETRIAL CANCER (H): Primary | ICD-10-CM

## 2022-11-28 PROCEDURE — 99024 POSTOP FOLLOW-UP VISIT: CPT | Performed by: NURSE PRACTITIONER

## 2022-11-28 PROCEDURE — G0463 HOSPITAL OUTPT CLINIC VISIT: HCPCS

## 2022-11-28 NOTE — PROGRESS NOTES
FOLLOW-UP VISIT    Patient Name: Gricelda Sabillon      : 1950     Age: 72 year old        ______________________________________________________________________________     Chief Complaint   Patient presents with     Cancer     Radiation follow up     BP (!) 147/61   Pulse 76   Wt 94.6 kg (208 lb 8 oz)   LMP 2002 (Exact Date)   SpO2 96%   BMI 33.65 kg/m       Date Radiation Completed: HDR, cylinder x3, 10/24,10/26,10/28.    Pain  Denies    Labs  Other Labs: No    Imaging  None      Diarrhea: Loose stools r/t metformin    Constipation: 0- None    Nausea: 0- None    Vomitin- No vomiting    Genitourinary system: 0- Normal    Dysuria: 0- None    Vaginal dilator use: Dilator without difficulty    Other Appointments: No     Residual Radiation side effect: Pt tolerated radiation well and feeling well.      Additional Instructions: RN will send a message to Dr. Herrera's clinic re: follow up    Nurse face-to-face time: Level 3:  10 min face to face time

## 2022-11-28 NOTE — PROGRESS NOTES
"Radiation Oncology Follow-up Visit  2022    Gricelda Sabillon  MRN: [id number]   : 1950     DISEASE TREATED:   Endometrial adenocarcinoma, FIGO 1B, grade 1    RADIATION THERAPY DELIVERED:   2100 cGy HDR brachytherapy completed 10/28/2022    INTERVAL SINCE COMPLETION OF RADIATION THERAPY:   1 month    SUBJECTIVE:   Gricelda Sabillon is a 72 year old female who is here today for routine 1 month follow up after completing radiation therapy.  Overall she states she really did not have any side effects from the radiation.  Bowels are normal.  No bleeding.  No urinary symptoms.  No swelling.  No pain.  She is using her dilator every day.  She does have fatigue but has a lot of pulmonary issues that she is dealing with.  She does not feel like the radiation made her fatigued.  Her fatigue is slowly improving as well.    ROS:  Complete review of systems is negative except for symptoms discussed in subjective above.    Current Outpatient Medications   Medication     albuterol (PROAIR HFA/PROVENTIL HFA/VENTOLIN HFA) 108 (90 Base) MCG/ACT inhaler     atenolol (TENORMIN) 50 MG tablet     atorvastatin (LIPITOR) 40 MG tablet     azelastine (ASTELIN) 0.1 % nasal spray     azelastine (OPTIVAR) 0.05 % SOLN     cetirizine (ZYRTEC) 10 MG tablet     Continuous Blood Gluc Sensor (FREESTYLE DAVID 14 DAY SENSOR) [facility] [place]     CONTOUR NEXT TEST test strip     enoxaparin ANTICOAGULANT (LOVENOX) 80 MG/0.8ML syringe     glipiZIDE (GLUCOTROL) 5 MG tablet     insulin  UNIT/ML vial     insulin syringe-needle U-100 (BD INSULIN SYRINGE ULTRAFINE) 31G X 16\" 0.5 ML miscellaneous     latanoprost (XALATAN) 0.005 % ophthalmic solution     lidocaine-prilocaine (EMLA) 2.5-2.5 % external cream     LORazepam (ATIVAN) 0.5 MG tablet     metFORMIN (GLUCOPHAGE XR) 500 MG 24 hr tablet     ondansetron (ZOFRAN) 8 MG tablet     predniSONE (DELTASONE) 5 MG tablet     senna-docusate (SENOKOT-S/PERICOLACE) 8.6-50 MG tablet     timolol maleate " "(TIMOPTIC) 0.5 % ophthalmic solution     triamcinolone (KENALOG) 0.5 % external ointment     No current facility-administered medications for this visit.          Allergies   Allergen Reactions     Bromfenac Visual Disturbance      xibrom  Causes sever eye pain     Codeine      \"NAUSE,HA AND DIZZINESS\"     Codeine Nausea     Other reaction(s): Dizziness, Headache       Past Medical History:   Diagnosis Date     Asthma, mild intermittent      Cataract      Endometrial cancer (H) 06/2022     HTN, goal below 140/90      Hyperlipidemia LDL goal < 100      Macular hole of left eye      Melanoma (H)     RIGHT KNEE     Sleep apnea     uses c pap     Type 2 diabetes, HbA1C goal < 8% (H)          PHYSICAL EXAM:  BP (!) 147/61   Pulse 76   Wt 94.6 kg (208 lb 8 oz)   LMP 12/13/2002 (Exact Date)   SpO2 96%   BMI 33.65 kg/m    Gen: Alert, in NAD  Skin: Normal color and turgor  Psychiatric: Appropriate mood and affect      LABS AND IMAGING:  PET scan tomorrow.    IMPRESSION:   Ms. Sabillon is a 72 year old female with endometrial adenocarcinoma status post brachytherapy 1 month ago and doing well    PLAN:   Patient has recovered nicely from acute side effects of radiation therapy.   PET scan tomorrow.  Follow up with GYN onc-nothing scheduled, we will notify them.  Follow up here as needed.  Continue to use the dilator for 1 year.        Kaitlin Pryor NP  Radiation Oncology  [facility]      "

## 2022-11-28 NOTE — LETTER
"    2022         RE: Gricelda Sabillon  [address]  [facility] [address]        Dear Colleague,    Thank you for referring your patient, Gricelda Sabillon, to the [facility] RADIATION ONCOLOGY. Please see a copy of my visit note below.    Radiation Oncology Follow-up Visit  2022    Gricelda Sabillon  MRN: [id number]   : 1950     DISEASE TREATED:   Endometrial adenocarcinoma, FIGO 1B, grade 1    RADIATION THERAPY DELIVERED:   2100 cGy HDR brachytherapy completed 10/28/2022    INTERVAL SINCE COMPLETION OF RADIATION THERAPY:   1 month    SUBJECTIVE:   Gricelda Sabillon is a 72 year old female who is here today for routine 1 month follow up after completing radiation therapy.  Overall she states she really did not have any side effects from the radiation.  Bowels are normal.  No bleeding.  No urinary symptoms.  No swelling.  No pain.  She is using her dilator every day.  She does have fatigue but has a lot of pulmonary issues that she is dealing with.  She does not feel like the radiation made her fatigued.  Her fatigue is slowly improving as well.    ROS:  Complete review of systems is negative except for symptoms discussed in subjective above.    Current Outpatient Medications   Medication     albuterol (PROAIR HFA/PROVENTIL HFA/VENTOLIN HFA) 108 (90 Base) MCG/ACT inhaler     atenolol (TENORMIN) 50 MG tablet     atorvastatin (LIPITOR) 40 MG tablet     azelastine (ASTELIN) 0.1 % nasal spray     azelastine (OPTIVAR) 0.05 % SOLN     cetirizine (ZYRTEC) 10 MG tablet     Continuous Blood Gluc Sensor (FREESTYLE DAVID 14 DAY SENSOR) MISC     CONTOUR NEXT TEST test strip     enoxaparin ANTICOAGULANT (LOVENOX) 80 MG/0.8ML syringe     glipiZIDE (GLUCOTROL) 5 MG tablet     insulin  UNIT/ML vial     insulin syringe-needle U-100 (BD INSULIN SYRINGE ULTRAFINE) 31G X 16\" 0.5 ML miscellaneous     latanoprost (XALATAN) 0.005 % ophthalmic solution     lidocaine-prilocaine (EMLA) " "2.5-2.5 % external cream     LORazepam (ATIVAN) 0.5 MG tablet     metFORMIN (GLUCOPHAGE XR) 500 MG 24 hr tablet     ondansetron (ZOFRAN) 8 MG tablet     predniSONE (DELTASONE) 5 MG tablet     senna-docusate (SENOKOT-S/PERICOLACE) 8.6-50 MG tablet     timolol maleate (TIMOPTIC) 0.5 % ophthalmic solution     triamcinolone (KENALOG) 0.5 % external ointment     No current facility-administered medications for this visit.          Allergies   Allergen Reactions     Bromfenac Visual Disturbance      xibrom  Causes sever eye pain     Codeine      \"NAUSE,HA AND DIZZINESS\"     Codeine Nausea     Other reaction(s): Dizziness, Headache       Past Medical History:   Diagnosis Date     Asthma, mild intermittent      Cataract      Endometrial cancer (H) 2022     HTN, goal below 140/90      Hyperlipidemia LDL goal < 100      Macular hole of left eye      Melanoma (H)     RIGHT KNEE     Sleep apnea     uses c pap     Type 2 diabetes, HbA1C goal < 8% (H)          PHYSICAL EXAM:  BP (!) 147/61   Pulse 76   Wt 94.6 kg (208 lb 8 oz)   LMP 2002 (Exact Date)   SpO2 96%   BMI 33.65 kg/m    Gen: Alert, in NAD  Skin: Normal color and turgor  Psychiatric: Appropriate mood and affect      LABS AND IMAGING:  PET scan tomorrow.    IMPRESSION:   Ms. Sabillon is a 72 year old female with endometrial adenocarcinoma status post brachytherapy 1 month ago and doing well    PLAN:   Patient has recovered nicely from acute side effects of radiation therapy.   PET scan tomorrow.  Follow up with GYN onc-nothing scheduled, we will notify them.  Follow up here as needed.  Continue to use the dilator for 1 year.        Kaitlin Pryor NP  Radiation Oncology  [facility]        FOLLOW-UP VISIT    Patient Name: Gricelda Sabillon      : 1950     Age: 72 year old        ______________________________________________________________________________     Chief Complaint   Patient presents with     Cancer     Radiation " follow up     BP (!) 147/61   Pulse 76   Wt 94.6 kg (208 lb 8 oz)   LMP 2002 (Exact Date)   SpO2 96%   BMI 33.65 kg/m       Date Radiation Completed: HDR, cylinder x3, 10/24,10/26,10/28.    Pain  Denies    Labs  Other Labs: No    Imaging  None      Diarrhea: Loose stools r/t metformin    Constipation: 0- None    Nausea: 0- None    Vomitin- No vomiting    Genitourinary system: 0- Normal    Dysuria: 0- None    Vaginal dilator use: Dilator without difficulty    Other Appointments: No     Residual Radiation side effect: Pt tolerated radiation well and feeling well.      Additional Instructions: RN will send a message to Dr. Herrera's clinic re: follow up    Nurse face-to-face time: Level 3:  10 min face to face time        Again, thank you for allowing me to participate in the care of your patient.        Sincerely,        DENYS Grant CNP

## 2022-11-29 ENCOUNTER — HOSPITAL ENCOUNTER (OUTPATIENT)
Dept: PET IMAGING | Facility: CLINIC | Age: 72
Discharge: HOME | End: 2022-11-29
Attending: INTERNAL MEDICINE | Admitting: INTERNAL MEDICINE
Payer: COMMERCIAL

## 2022-11-29 DIAGNOSIS — N85.8 UTERINE MASS: ICD-10-CM

## 2022-11-29 DIAGNOSIS — C43.71 MALIGNANT MELANOMA OF RIGHT LOWER EXTREMITY INCLUDING HIP (H): ICD-10-CM

## 2022-11-29 PROCEDURE — A9552 F18 FDG: HCPCS | Performed by: INTERNAL MEDICINE

## 2022-11-29 PROCEDURE — 343N000001 HC RX 343: Performed by: INTERNAL MEDICINE

## 2022-11-29 PROCEDURE — 999N000130 PET ONCOLOGY WHOLE BODY: Mod: PS

## 2022-11-29 PROCEDURE — 250N000011 HC RX IP 250 OP 636: Performed by: INTERNAL MEDICINE

## 2022-11-29 RX ORDER — HEPARIN SODIUM (PORCINE) LOCK FLUSH IV SOLN 100 UNIT/ML 100 UNIT/ML
500 SOLUTION INTRAVENOUS ONCE
Status: COMPLETED | OUTPATIENT
Start: 2022-11-29 | End: 2022-11-29

## 2022-11-29 RX ADMIN — FLUDEOXYGLUCOSE F-18 12.9 MCI.: 500 INJECTION, SOLUTION INTRAVENOUS at 13:08

## 2022-11-29 RX ADMIN — Medication 500 UNITS: at 13:08

## 2022-12-02 ENCOUNTER — TRANSFERRED RECORDS (OUTPATIENT)
Dept: INTERNAL MEDICINE | Facility: CLINIC | Age: 72
End: 2022-12-02

## 2022-12-05 ENCOUNTER — TELEPHONE (OUTPATIENT)
Dept: PHARMACY | Facility: CLINIC | Age: 72
End: 2022-12-05

## 2022-12-05 DIAGNOSIS — E11.65 TYPE 2 DIABETES MELLITUS WITH HYPERGLYCEMIA, WITHOUT LONG-TERM CURRENT USE OF INSULIN (H): ICD-10-CM

## 2022-12-05 NOTE — TELEPHONE ENCOUNTER
Called to follow-up on blood sugars.  She has been dosing NPH 40 units every morning, glipizide 10 mg every morning and metformin 500 mg daily at noon.   Will hold metformin if having loss stools; last needed to hold on 11/29.    On prednisone 5 mg daily alternating with 10 mg daily; this started Friday.  Today was a 5 mg predisone dose.  Unsure how long she will be on this regimen.    Patient prefers not to add another injection with her current Lovenox dosing.  Has a lot of painful lumps from the Lovenox.      Plan:  NPH 42 units daily, if reading >200 then increase to 44 units on Wednesday.  Will have Dr. Torres review blood sugars as well and offer any suggestions.   Will follow-up next week.

## 2022-12-12 ENCOUNTER — TELEPHONE (OUTPATIENT)
Dept: PHARMACY | Facility: CLINIC | Age: 72
End: 2022-12-12

## 2022-12-12 NOTE — TELEPHONE ENCOUNTER
Called Melva to follow-up on your blood sugars.  She wasn't feeling very well this weekend - cold like symptoms.  Has been dosing NPH 42 units every morning, metformin  mg daily and glipizide 10 mg every morning.  No concerns with side effects at this time.  Continues on prednisone regimen 5 mg alternating with 10 mg every other day.  Has CT scan and follow-up in January - no prednisone changes until that visit.  Scheduled for repeat labs on 12/30.    Will be finishing Lovenox supply and changing to oral anticoagulant.      Plan: Continue current regimen.  She can increase NPH to 44 units every morning if she notices her afternoon readings are elevated - she wants to hold off on this for now.

## 2022-12-13 ENCOUNTER — ONCOLOGY VISIT (OUTPATIENT)
Dept: ONCOLOGY | Facility: CLINIC | Age: 72
End: 2022-12-13
Attending: OBSTETRICS & GYNECOLOGY
Payer: COMMERCIAL

## 2022-12-13 VITALS
WEIGHT: 212.4 LBS | SYSTOLIC BLOOD PRESSURE: 140 MMHG | OXYGEN SATURATION: 96 % | BODY MASS INDEX: 34.13 KG/M2 | HEART RATE: 76 BPM | HEIGHT: 66 IN | TEMPERATURE: 98.5 F | DIASTOLIC BLOOD PRESSURE: 71 MMHG | RESPIRATION RATE: 18 BRPM

## 2022-12-13 DIAGNOSIS — C54.1 ENDOMETRIAL CANCER (H): Primary | ICD-10-CM

## 2022-12-13 PROCEDURE — G0463 HOSPITAL OUTPT CLINIC VISIT: HCPCS

## 2022-12-13 PROCEDURE — 99212 OFFICE O/P EST SF 10 MIN: CPT | Performed by: OBSTETRICS & GYNECOLOGY

## 2022-12-13 PROCEDURE — 99213 OFFICE O/P EST LOW 20 MIN: CPT | Performed by: OBSTETRICS & GYNECOLOGY

## 2022-12-13 ASSESSMENT — PAIN SCALES - GENERAL: PAINLEVEL: NO PAIN (0)

## 2022-12-13 NOTE — PROGRESS NOTES
Consult Notes on Referred Patient        Dr. 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 She is unaccompanied today.  She is doing well from a gynecologic perspective and has no significant side effects from her radiation therapy.    Cancer Course:  Patient was undergoing work up for melanoma without a cutaneous primary identified which showed thickened endometrium.  She is undergoing treatment with Keytruda every 3 weeks for a year.  She is tolerating this very well without major side effects. She reports for about 6 months she has had a mucous discharge with very light spotting after.    5/10/22:  US pelvis:  Uterus measures 8 x 3.7 x 4.2 cm with EMS of 11.2 mm.  Normal appearing adnexa    22:  EMB:  Grade 1 endometrial adenocarcinoma, MMR intact    22:  Total laparoscopic hysterectomy, bilateral salpingo-oophorectomy, bilateral pelvic sentinel lymph node dissection, cystoscopy   Pathology:  Grade 1 endometrial adenocarcinoma, tumor size 2.2 cm, 10/12 mm myometrial invasion, no LVSI,  0/8 sentinel LN, lymphangioleiomyomatosis    10/28/22:  Completed vaginal brachytherapy      Obstetrics and Gynecology History:  ,  x 3  Menopause around 48, no HRT use      Past Medical History:  Past Medical History:   Diagnosis Date     Asthma, mild intermittent      Cataract      Endometrial cancer (H) 2022     HTN, goal below 140/90      Hyperlipidemia LDL goal < 100      Macular hole of left eye      Melanoma (H)     RIGHT KNEE     Sleep apnea     uses c pap     Type 2 diabetes, HbA1C goal < 8% (H)        Past Surgical History:  Past Surgical History:   Procedure Laterality Date     ARTHROPLASTY HIP Right 2017    Procedure: ARTHROPLASTY HIP;  Right total hip arthroplasty  ;  Surgeon: Becky  Ayaan Scales MD;  Location: [facility] OR     ARTHROPLASTY KNEE  7/12/2013    Procedure: ARTHROPLASTY KNEE;  right total knee arthroplasty ;  Surgeon: Chaparro Wesley MD;  Location: [facility] OR     ARTHROSCOPY KNEE Right 1/21/2015    Procedure: ARTHROSCOPY KNEE;  Surgeon: Ayaan Pena MD;  Location: [facility] OR     BRONCHOSCOPY (RIGID OR FLEXIBLE), DIAGNOSTIC N/A 8/11/2022    Procedure: BRONCHOSCOPY, WITH BRONCHOALVEOLAR LAVAGE;  Surgeon: Roselia Sosa MD;  Location:  GI     C INCISION OF HYMEN       CATARACT IOL, RT/LT       COLONOSCOPY  2008     COLONOSCOPY N/A 4/18/2019    Procedure: Colonoscopy with polypectomy;  Surgeon: Shaun Guerrero MD;  [facility]: [facility]     CYSTOSCOPY N/A 6/23/2022    Procedure: Cystoscopy;  Surgeon: Eli Guidry MD;  [facility]     ENDOSCOPIC RETROGRADE CHOLANGIOPANCREATOGRAM N/A 2/6/2019    Procedure: COMBINED ENDOSCOPIC RETROGRADE CHOLANGIOPANCREATOGRAPHY, BILIARY SPINCTEROTOMY AND DILATION, PLACE BILE DUCT STENT;  Surgeon: Guru Andie Gonzalez MD;  [facility]     ENDOSCOPIC RETROGRADE CHOLANGIOPANCREATOGRAM N/A 2/28/2019    Procedure: Endoscopic Retrograde Cholangiopancreatogram, Bile duct stent removal and placement;  Surgeon: Shaun Guerrero MD;  [facility]     ENDOSCOPIC RETROGRADE CHOLANGIOPANCREATOGRAM N/A 4/18/2019    Procedure: COMBINED ENDOSCOPIC RETROGRADE CHOLANGIOPANCREATOGRAPHY, Bile duct stent exchange and Polypectomy;  Surgeon: Shaun Guerrero MD;  [facility]     ENDOSCOPIC RETROGRADE CHOLANGIOPANCREATOGRAM N/A 10/18/2019    Procedure: Endoscopic Retrograde Cholangiopancreatogram;  Surgeon: Shaun Guerrero MD;  [facility]     ENDOSCOPIC RETROGRADE CHOLANGIOPANCREATOGRAM N/A 3/24/2022    Procedure: ENDOSCOPIC RETROGRADE CHOLANGIOPANCREATOGRAPHY;  Surgeon: Shaun Guerrero MD;  Location:  OR     ENDOSCOPIC RETROGRADE CHOLANGIOPANCREATOGRAM WITH SPYGLASS N/A 7/26/2019    Procedure: Endoscopic Retrograde  Cholangiopancreatogram With Spyglas,l Radiofrequency Ablation, Stent Exchangeand Duodenal Biopsy x2;  Surgeon: Shaun Guerrero MD;  [facility]     ENDOSCOPIC RETROGRADE CHOLANGIOPANCREATOGRAM WITH SPYGLASS N/A 9/10/2020    Procedure: ENDOSCOPIC RETROGRADE CHOLANGIOPANCREATOGRAPHY, WITH DIRECT DUCT VISUALIZATION, USING PANCREATICOBILIARY FIBEROPTIC PROBE;  Surgeon: Shaun Guerrero MD;  [facility]     ENDOSCOPIC RETROGRADE CHOLANGIOPANCREATOGRAM WITH SPYGLASS N/A 3/22/2021    Procedure: ENDOSCOPIC RETROGRADE CHOLANGIOPANCREATOGRAPHY, WITH DIRECT DUCT VISUALIZATION, USING PANCREATICOBILIARY FIBEROPTIC PROBE;  Surgeon: Shaun Guerrero MD;  [facility]     ESOPHAGOSCOPY, GASTROSCOPY, DUODENOSCOPY (EGD) WITH RADIO FREQUENCY ABLATION, COMBINED N/A 9/10/2020    Procedure: possible repeat ESOPHAGOGASTRODUODENOSCOPY, WITH RADIOFREQUENCY ABLATION and endoscopic mucosal resection;  Surgeon: Shaun Guerrero MD;  [facility]     ESOPHAGOSCOPY, GASTROSCOPY, DUODENOSCOPY (EGD), COMBINED N/A 4/18/2019    Procedure: upper endoscopy with polypectomy;  Surgeon: Shaun Guerrero MD;  [facility]     ESOPHAGOSCOPY, GASTROSCOPY, DUODENOSCOPY (EGD), COMBINED N/A 10/18/2019    Procedure: Upper Endoscopy and ERCP with stent removal, stone removal and biopsy;  Surgeon: Shaun Guerrero MD;  [facility]     ESOPHAGOSCOPY, GASTROSCOPY, DUODENOSCOPY (EGD), COMBINED N/A 3/24/2022    Procedure: ESOPHAGOGASTRODUODENOSCOPY (EGD), Duodenal  polyp removal;  Surgeon: Shaun Guerrero MD;  Location: [facility] OR     ESOPHAGOSCOPY, GASTROSCOPY, DUODENOSCOPY (EGD), RESECT MUCOSA, COMBINED N/A 2/28/2019    Procedure: Upper Endoscopy, Endoscopic Ultrasound, Endoscopic Mucosal Resection,  Ampullectomy, polypectomy.;  Surgeon: Shaun Guerrero MD;  [facility]     ESOPHAGOSCOPY, GASTROSCOPY, DUODENOSCOPY (EGD), RESECT MUCOSA, COMBINED N/A 3/22/2021    Procedure: ESOPHAGOGASTRODUODENOSCOPY (EGD) with  endoscopic mucosal resection/ polypectomy;   Surgeon: Shaun Guerrero MD;  [facility]     EXCISE LESION LOWER EXTREMITY Right 3/28/2022    Procedure: Wide local excision of right knee melanoma;  Surgeon: Chevy Allison MD;  Location: [facility] OR     EXCISE MASS LOWER EXTREMITY Right 2022    Procedure: excision of right thigh mass;  Surgeon: Salvador Kelly MD;  Location: [facility] OR     EYE SURGERY      macular hole repaired left eye     HC KNEE SCOPE, DIAGNOSTIC      - both knees     HEAD & NECK SURGERY      wisdom teeth     IR CHEST PORT PLACEMENT > 5 YRS OF AGE  2022     LAPAROSCOPIC HYSTERECTOMY TOTAL, BILATERAL SALPINGO-OOPHORECTOMY, NODE DISSECTION, COMBINED Bilateral 2022    Procedure: Total Laparoscopic Hyserectomy, Bilateral Salpibgo-oophorectomy, Bilateral [place] Lymph Node Dissection;  Surgeon: Eli Guidry MD;  [facility]       Health Maintenance:  Last Pap Smear: No need for further pap smear exams s/p hysterectomy  She has not had a history of abnormal Pap smears.    Last Mammogram: 20              Result: normal      She has not had a history of abnormal mammograms.    Last Colonoscopy: 17              Result: Polyps-repeat 5 years       Social History:  Lives with , feels safe at home.  Retired nurse.  Enjoys reading, bird watching, [place] time [place].  Does not have an advanced directive on file and would like her  to be her POA.  Would like full resuscitation if reversible cause is identified, however would not like to be kept on life sustaining measures long-term.     Family History:   The patient's family history is significant for.  Family History   Problem Relation Age of Onset     Hypertension Mother         diabetes,hypoythryoidism, stroke     Diabetes Mother      Breast Cancer Mother      Heart Disease Father         , cancer lip     Uterine Cancer Sister      Connective Tissue Disorder Sister         fibromyalgia     Diabetes Sister      Hypertension Brother       "Cerebrovascular Disease Maternal Grandmother         , diabetes     Cerebrovascular Disease Maternal Grandfather              Cancer Paternal Grandmother              Heart Disease Paternal Grandfather              Cancer Paternal Aunt         ovarian     Breast Cancer Niece      Asthma Maternal Aunt          Physical Exam:   BP (!) 140/71   Pulse 76   Temp 98.5  F (36.9  C) (Tympanic)   Resp 18   Ht 1.676 m (5' 6\")   Wt 96.3 kg (212 lb 6.4 oz)   LMP 2002 (Exact Date)   SpO2 96%   BMI 34.28 kg/m    Body mass index is 34.28 kg/m .  General Appearance: healthy and alert, no distress     Gastrointestinal:       abdomen soft, non-tender, non-distended, no organomegaly or masses    Genitourinary: External genitalia and urethral meatus appears normal.  Vagina is smooth without nodularity or masses.  Cervix surgically absent.  Bimanual exam reveal no masses, nodularity or fullness.  Recto-vaginal exam confirms these findings.     Labs:  None    Assessment:    Gricelda Sabillon is a 72 year old woman with stage 1B, grade 1 endometrial adenocarcinoma.     A total of 20 minutes was spent on patient care today.       Plan:     1.)    Stage 1B, grade 1 endometrial adenocarcinoma. Reviewed signs and symptoms of recurrence and to call if these should occur.  Plan surveillance visits every 6 months for up to 5 years () then annually thereafter.  Discussed that these visits would be with the NP unless concerns arise. She will return in 6 months.     2.) Lymphangioleiomyomatosis-Following with pulomnology     3.) Genetic risk factors were assessed and the patient does meet the qualifications for a referral.      4.) Labs and/or tests ordered include:  None     5.) Health maintenance issues addressed today include pt is up to date.          Thank you for allowing us to participate in the care of your patient.         Sincerely,    Eli Herrera MD  Gynecologic Oncology  [facility]       KATIE REESE

## 2022-12-13 NOTE — NURSING NOTE
"Oncology Rooming Note    December 13, 2022 3:07 PM   Gricelda Sabillon is a 72 year old female who presents for:    Chief Complaint   Patient presents with     Oncology Clinic Visit     Malignant neoplasm metastatic to both lungs      Initial Vitals: BP (!) 140/71   Pulse 76   Temp 98.5  F (36.9  C) (Tympanic)   Resp 18   Ht 1.676 m (5' 6\")   Wt 96.3 kg (212 lb 6.4 oz)   LMP 12/13/2002 (Exact Date)   SpO2 96%   BMI 34.28 kg/m   Estimated body mass index is 34.28 kg/m  as calculated from the following:    Height as of this encounter: 1.676 m (5' 6\").    Weight as of this encounter: 96.3 kg (212 lb 6.4 oz). Body surface area is 2.12 meters squared.  No Pain (0) Comment: Data Unavailable   Patient's last menstrual period was 12/13/2002 (exact date).  Allergies reviewed: Yes  Medications reviewed: Yes    Medications: Medication refills not needed today.  Pharmacy name entered into EPIC:    [id number] - [place], [place] - [address] [address]    Clinical concerns: f/u       Clau Caicedo CMA              "

## 2022-12-13 NOTE — LETTER
2022         RE: Gricelda Sabillon  [address]  [facility] [address]        Dear Colleague,    Thank you for referring your patient, Gricelda Sabillon, to the [facility]. Please see a copy of my visit note below.    Consult Notes on Referred Patient        Dr. 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 She is unaccompanied today.  She is doing well from a gynecologic perspective and has no significant side effects from her radiation therapy.    Cancer Course:  Patient was undergoing work up for melanoma without a cutaneous primary identified which showed thickened endometrium.  She is undergoing treatment with Keytruda every 3 weeks for a year.  She is tolerating this very well without major side effects. She reports for about 6 months she has had a mucous discharge with very light spotting after.    5/10/22:  US pelvis:  Uterus measures 8 x 3.7 x 4.2 cm with EMS of 11.2 mm.  Normal appearing adnexa    22:  EMB:  Grade 1 endometrial adenocarcinoma, MMR intact    22:  Total laparoscopic hysterectomy, bilateral salpingo-oophorectomy, bilateral pelvic sentinel lymph node dissection, cystoscopy   Pathology:  Grade 1 endometrial adenocarcinoma, tumor size 2.2 cm, 10/12 mm myometrial invasion, no LVSI,  0/8 sentinel LN, lymphangioleiomyomatosis    10/28/22:  Completed vaginal brachytherapy      Obstetrics and Gynecology History:  ,  x 3  Menopause around 48, no HRT use      Past Medical History:  Past Medical History:   Diagnosis Date     Asthma, mild intermittent      Cataract      Endometrial cancer (H) 2022     HTN, goal below 140/90      Hyperlipidemia LDL goal < 100      Macular hole of left eye      Melanoma (H)     RIGHT KNEE     Sleep apnea      uses c pap     Type 2 diabetes, HbA1C goal < 8% (H)        Past Surgical History:  Past Surgical History:   Procedure Laterality Date     ARTHROPLASTY HIP Right 9/25/2017    Procedure: ARTHROPLASTY HIP;  Right total hip arthroplasty  ;  Surgeon: Ayaan Pena MD;  Location: [facility]     ARTHROPLASTY KNEE  7/12/2013    Procedure: ARTHROPLASTY KNEE;  right total knee arthroplasty ;  Surgeon: Chaparro Wesley MD;  Location: [facility]     ARTHROSCOPY KNEE Right 1/21/2015    Procedure: ARTHROSCOPY KNEE;  Surgeon: Ayaan Pena MD;  Location: [facility] OR     BRONCHOSCOPY (RIGID OR FLEXIBLE), DIAGNOSTIC N/A 8/11/2022    Procedure: BRONCHOSCOPY, WITH BRONCHOALVEOLAR LAVAGE;  Surgeon: Roselia Sosa MD;  Location:  GI     C INCISION OF HYMEN       CATARACT IOL, RT/LT       COLONOSCOPY  2008     COLONOSCOPY N/A 4/18/2019    Procedure: Colonoscopy with polypectomy;  Surgeon: Shaun Guerrero MD;  [facility]     CYSTOSCOPY N/A 6/23/2022    Procedure: Cystoscopy;  Surgeon: Eli Guidry MD;  [facility]     ENDOSCOPIC RETROGRADE CHOLANGIOPANCREATOGRAM N/A 2/6/2019    Procedure: COMBINED ENDOSCOPIC RETROGRADE CHOLANGIOPANCREATOGRAPHY, BILIARY SPINCTEROTOMY AND DILATION, PLACE BILE DUCT STENT;  Surgeon: Guru Andie Gonzalez MD;  [facility]     ENDOSCOPIC RETROGRADE CHOLANGIOPANCREATOGRAM N/A 2/28/2019    Procedure: Endoscopic Retrograde Cholangiopancreatogram, Bile duct stent removal and placement;  Surgeon: Shaun Guerrero MD;  [facility]     ENDOSCOPIC RETROGRADE CHOLANGIOPANCREATOGRAM N/A 4/18/2019    Procedure: COMBINED ENDOSCOPIC RETROGRADE CHOLANGIOPANCREATOGRAPHY, Bile duct stent exchange and Polypectomy;  Surgeon: Shaun Guerrero MD;  [facility]     ENDOSCOPIC RETROGRADE CHOLANGIOPANCREATOGRAM N/A 10/18/2019    Procedure: Endoscopic Retrograde Cholangiopancreatogram;  Surgeon: Shaun Guerrero MD;  [facility]     ENDOSCOPIC RETROGRADE  CHOLANGIOPANCREATOGRAM N/A 3/24/2022    Procedure: ENDOSCOPIC RETROGRADE CHOLANGIOPANCREATOGRAPHY;  Surgeon: Shaun Guerrero MD;  Location: [facility]     ENDOSCOPIC RETROGRADE CHOLANGIOPANCREATOGRAM WITH SPYGLASS N/A 7/26/2019    Procedure: Endoscopic Retrograde Cholangiopancreatogram With Spyglas,l Radiofrequency Ablation, Stent Exchangeand Duodenal Biopsy x2;  Surgeon: Shaun Guerrero MD;  [facility]     ENDOSCOPIC RETROGRADE CHOLANGIOPANCREATOGRAM WITH SPYGLASS N/A 9/10/2020    Procedure: ENDOSCOPIC RETROGRADE CHOLANGIOPANCREATOGRAPHY, WITH DIRECT DUCT VISUALIZATION, USING PANCREATICOBILIARY FIBEROPTIC PROBE;  Surgeon: Shaun Guerrero MD;  [facility]     ENDOSCOPIC RETROGRADE CHOLANGIOPANCREATOGRAM WITH SPYGLASS N/A 3/22/2021    Procedure: ENDOSCOPIC RETROGRADE CHOLANGIOPANCREATOGRAPHY, WITH DIRECT DUCT VISUALIZATION, USING PANCREATICOBILIARY FIBEROPTIC PROBE;  Surgeon: Shaun Guerrero MD;  [facility]     ESOPHAGOSCOPY, GASTROSCOPY, DUODENOSCOPY (EGD) WITH RADIO FREQUENCY ABLATION, COMBINED N/A 9/10/2020    Procedure: possible repeat ESOPHAGOGASTRODUODENOSCOPY, WITH RADIOFREQUENCY ABLATION and endoscopic mucosal resection;  Surgeon: Shaun Guerrero MD;  [facility]     ESOPHAGOSCOPY, GASTROSCOPY, DUODENOSCOPY (EGD), COMBINED N/A 4/18/2019    Procedure: upper endoscopy with polypectomy;  Surgeon: Shaun Guerrero MD;  [facility]     ESOPHAGOSCOPY, GASTROSCOPY, DUODENOSCOPY (EGD), COMBINED N/A 10/18/2019    Procedure: Upper Endoscopy and ERCP with stent removal, stone removal and biopsy;  Surgeon: Shaun Guerrero MD;  [facility]     ESOPHAGOSCOPY, GASTROSCOPY, DUODENOSCOPY (EGD), COMBINED N/A 3/24/2022    Procedure: ESOPHAGOGASTRODUODENOSCOPY (EGD), Duodenal  polyp removal;  Surgeon: Shaun Guerrero MD;  Location:  OR     ESOPHAGOSCOPY, GASTROSCOPY, DUODENOSCOPY (EGD), RESECT MUCOSA, COMBINED N/A 2/28/2019    Procedure: Upper Endoscopy, Endoscopic Ultrasound, Endoscopic Mucosal Resection,   Ampullectomy, polypectomy.;  Surgeon: Shaun Guerrero MD;  [facility]     ESOPHAGOSCOPY, GASTROSCOPY, DUODENOSCOPY (EGD), RESECT MUCOSA, COMBINED N/A 3/22/2021    Procedure: ESOPHAGOGASTRODUODENOSCOPY (EGD) with  endoscopic mucosal resection/ polypectomy;  Surgeon: Shaun Guerrero MD;  [facility]     EXCISE LESION LOWER EXTREMITY Right 3/28/2022    Procedure: Wide local excision of right knee melanoma;  Surgeon: Chevy Allison MD;  Location: [facility]     EXCISE MASS LOWER EXTREMITY Right 1/13/2022    Procedure: excision of right thigh mass;  Surgeon: Salvador Kelly MD;  Location: [facility] OR     EYE SURGERY      macular hole repaired left eye     HC KNEE SCOPE, DIAGNOSTIC      - both knees     HEAD & NECK SURGERY      wisdom teeth     IR CHEST PORT PLACEMENT > 5 YRS OF AGE  5/2/2022     LAPAROSCOPIC HYSTERECTOMY TOTAL, BILATERAL SALPINGO-OOPHORECTOMY, NODE DISSECTION, COMBINED Bilateral 6/23/2022    Procedure: Total Laparoscopic Hyserectomy, Bilateral Salpibgo-oophorectomy, Bilateral Coolin Lymph Node Dissection;  Surgeon: Eli Guidry MD;  [facility]       Health Maintenance:  Last Pap Smear: No need for further pap smear exams s/p hysterectomy  She has not had a history of abnormal Pap smears.    Last Mammogram: 5/16/22              Result: normal      She has not had a history of abnormal mammograms.    Last Colonoscopy: 4/8/19              Result: Polyps-repeat 5 years       Social History:  Lives with , feels safe at home.  Retired nurse.  Enjoys reading, bird watching, [place] time [place].  Does not have an advanced directive on file and would like her  to be her POA.  Would like full resuscitation if reversible cause is identified, however would not like to be kept on life sustaining measures long-term.     Family History:   The patient's family history is significant for.  Family History   Problem Relation Age of Onset     Hypertension Mother          "diabetes,hypoythryoidism, stroke     Diabetes Mother      Breast Cancer Mother      Heart Disease Father         , cancer lip     Uterine Cancer Sister      Connective Tissue Disorder Sister         fibromyalgia     Diabetes Sister      Hypertension Brother      Cerebrovascular Disease Maternal Grandmother         , diabetes     Cerebrovascular Disease Maternal Grandfather              Cancer Paternal Grandmother              Heart Disease Paternal Grandfather              Cancer Paternal Aunt         ovarian     Breast Cancer Niece      Asthma Maternal Aunt          Physical Exam:   BP (!) 140/71   Pulse 76   Temp 98.5  F (36.9  C) (Tympanic)   Resp 18   Ht 1.676 m (5' 6\")   Wt 96.3 kg (212 lb 6.4 oz)   LMP 2002 (Exact Date)   SpO2 96%   BMI 34.28 kg/m    Body mass index is 34.28 kg/m .  General Appearance: healthy and alert, no distress     Gastrointestinal:       abdomen soft, non-tender, non-distended, no organomegaly or masses    Genitourinary: External genitalia and urethral meatus appears normal.  Vagina is smooth without nodularity or masses.  Cervix surgically absent.  Bimanual exam reveal no masses, nodularity or fullness.  Recto-vaginal exam confirms these findings.     Labs:  None    Assessment:    Gricelda Sabillon is a 72 year old woman with stage 1B, grade 1 endometrial adenocarcinoma.     A total of 20 minutes was spent on patient care today.       Plan:     1.)    Stage 1B, grade 1 endometrial adenocarcinoma. Reviewed signs and symptoms of recurrence and to call if these should occur.  Plan surveillance visits every 6 months for up to 5 years () then annually thereafter.  Discussed that these visits would be with the NP unless concerns arise. She will return in 6 months.     2.) Lymphangioleiomyomatosis-Following with pulomnology     3.) Genetic risk factors were assessed and the patient does meet the qualifications for a referral.  "     4.) Labs and/or tests ordered include:  None     5.) Health maintenance issues addressed today include pt is up to date.          Thank you for allowing us to participate in the care of your patient.         Sincerely,    Eli Herrera MD  Gynecologic Oncology  [facility]       KATIE REESE        Again, thank you for allowing me to participate in the care of your patient.        Sincerely,        Rivera Herrera MD

## 2022-12-19 DIAGNOSIS — J20.9 ACUTE BRONCHITIS: ICD-10-CM

## 2022-12-20 RX ORDER — ALBUTEROL SULFATE 90 UG/1
AEROSOL, METERED RESPIRATORY (INHALATION)
Qty: 18 G | Refills: 0 | Status: SHIPPED | OUTPATIENT
Start: 2022-12-20 | End: 2024-03-11

## 2022-12-20 NOTE — TELEPHONE ENCOUNTER
Medication is being filled for 1 time refill only due to:  has an upcoming appt        Next 5 appointments (look out 90 days)    Jan 06, 2023  9:00 AM  (Arrive by 8:40 AM)  Provider Visit with Raisa Davidson MD  [facility] ([facility] ) [address]  [address]  [facility] [address]  [phone]   Feb 06, 2023 10:00 AM  Return Visit with Roselia Sosa MD  [facility] ([facility] ) [address]  [address]  [facility] [address]  [phone]

## 2022-12-21 DIAGNOSIS — E11.65 TYPE 2 DIABETES MELLITUS WITH HYPERGLYCEMIA, WITHOUT LONG-TERM CURRENT USE OF INSULIN (H): ICD-10-CM

## 2022-12-21 NOTE — TELEPHONE ENCOUNTER
Glipizide 5 mg  Routing refill request to provider for review/approval because:  Labs out of range:  Hgb A1C  Lab Results   Component Value Date    A1C 8.8 09/01/2022    A1C 8.3 07/12/2022    A1C 7.7 03/21/2022                                               Appointments in Next Year      Dec 30, 2022  8:45 AM  LAB with RI LAB  [facility] ([facility] ) [phone]     Dec 30, 2022 12:00 PM  (Arrive by 11:45 AM)  CT UROGRAM WO & W CONTRAST with SHCT1  [facility] ([facility] ) [phone]     Jan 05, 2023 10:30 AM  (Arrive by 10:15 AM)  Cystoscopy with Riley Lyons MD, UB CYF  [facility] ([facility] ) [phone]     Jan 06, 2023  9:00 AM  (Arrive by 8:40 AM)  Provider Visit with Raisa Davidson MD  [facility] ([facility] ) [phone]     Feb 06, 2023 10:00 AM  Return Visit with Roselia Sosa MD  [facility] ([facility] ) [phone]     May 16, 2023  1:30 PM  Return Visit with Eli Stafford MD  [facility] ([facility] ) [phone]

## 2022-12-23 DIAGNOSIS — E11.65 TYPE 2 DIABETES MELLITUS WITH HYPERGLYCEMIA, WITHOUT LONG-TERM CURRENT USE OF INSULIN (H): ICD-10-CM

## 2022-12-23 RX ORDER — GLIPIZIDE 5 MG/1
10 TABLET ORAL EVERY MORNING
Qty: 120 TABLET | Refills: 1 | Status: SHIPPED | OUTPATIENT
Start: 2022-12-23 | End: 2023-01-03

## 2022-12-26 RX ORDER — GLIPIZIDE 5 MG/1
TABLET ORAL
Qty: 360 TABLET | Refills: 0 | Status: SHIPPED | OUTPATIENT
Start: 2022-12-26 | End: 2023-01-03

## 2022-12-27 ENCOUNTER — TELEPHONE (OUTPATIENT)
Dept: MEDSURG UNIT | Facility: CLINIC | Age: 72
End: 2022-12-27

## 2022-12-30 ENCOUNTER — HOSPITAL ENCOUNTER (OUTPATIENT)
Facility: CLINIC | Age: 72
Discharge: HOME | End: 2022-12-30
Admitting: RADIOLOGY
Payer: COMMERCIAL

## 2022-12-30 ENCOUNTER — HOSPITAL ENCOUNTER (OUTPATIENT)
Dept: CT IMAGING | Facility: CLINIC | Age: 72
Discharge: HOME | End: 2022-12-30
Attending: PHYSICIAN ASSISTANT
Payer: COMMERCIAL

## 2022-12-30 ENCOUNTER — LAB (OUTPATIENT)
Dept: LAB | Facility: CLINIC | Age: 72
End: 2022-12-30
Payer: COMMERCIAL

## 2022-12-30 DIAGNOSIS — I95.1 ORTHOSTATIC HYPOTENSION: Primary | ICD-10-CM

## 2022-12-30 DIAGNOSIS — R31.0 GROSS HEMATURIA: ICD-10-CM

## 2022-12-30 DIAGNOSIS — J84.89 ORGANIZING PNEUMONIA (H): ICD-10-CM

## 2022-12-30 LAB
ALBUMIN SERPL BCG-MCNC: 4.1 G/DL (ref 3.5–5.2)
ALP SERPL-CCNC: 115 U/L (ref 35–104)
ALT SERPL W P-5'-P-CCNC: 20 U/L (ref 10–35)
ANION GAP SERPL CALCULATED.3IONS-SCNC: 13 MMOL/L (ref 7–15)
AST SERPL W P-5'-P-CCNC: 22 U/L (ref 10–35)
BILIRUB SERPL-MCNC: 0.4 MG/DL
BUN SERPL-MCNC: 15.5 MG/DL (ref 8–23)
CALCIUM SERPL-MCNC: 10.2 MG/DL (ref 8.8–10.2)
CHLORIDE SERPL-SCNC: 102 MMOL/L (ref 98–107)
CHOLEST SERPL-MCNC: 193 MG/DL
CK SERPL-CCNC: 48 U/L (ref 26–192)
CREAT BLD-MCNC: 0.7 MG/DL (ref 0.5–1)
CREAT SERPL-MCNC: 0.81 MG/DL (ref 0.51–0.95)
CRP SERPL-MCNC: <3 MG/L
DEPRECATED HCO3 PLAS-SCNC: 24 MMOL/L (ref 22–29)
ERYTHROCYTE [DISTWIDTH] IN BLOOD BY AUTOMATED COUNT: 14.1 % (ref 10–15)
GFR SERPL CREATININE-BSD FRML MDRD: 76 ML/MIN/1.73M2
GFR SERPL CREATININE-BSD FRML MDRD: >60 ML/MIN/1.73M2
GLUCOSE SERPL-MCNC: 181 MG/DL (ref 70–99)
HBA1C MFR BLD: 8.9 % (ref 0–5.6)
HCT VFR BLD AUTO: 42.3 % (ref 35–47)
HDLC SERPL-MCNC: 38 MG/DL
HGB BLD-MCNC: 13.1 G/DL (ref 11.7–15.7)
LDLC SERPL CALC-MCNC: 105 MG/DL
MCH RBC QN AUTO: 26.5 PG (ref 26.5–33)
MCHC RBC AUTO-ENTMCNC: 31 G/DL (ref 31.5–36.5)
MCV RBC AUTO: 86 FL (ref 78–100)
NONHDLC SERPL-MCNC: 155 MG/DL
PLATELET # BLD AUTO: 351 10E3/UL (ref 150–450)
POTASSIUM SERPL-SCNC: 3.9 MMOL/L (ref 3.4–5.3)
PROT SERPL-MCNC: 7.4 G/DL (ref 6.4–8.3)
RBC # BLD AUTO: 4.95 10E6/UL (ref 3.8–5.2)
SODIUM SERPL-SCNC: 139 MMOL/L (ref 136–145)
TRIGL SERPL-MCNC: 249 MG/DL
WBC # BLD AUTO: 6.4 10E3/UL (ref 4–11)

## 2022-12-30 PROCEDURE — 999N000154 HC STATISTIC RADIOLOGY XRAY, US, CT, MAR, NM

## 2022-12-30 PROCEDURE — 36415 COLL VENOUS BLD VENIPUNCTURE: CPT | Performed by: INTERNAL MEDICINE

## 2022-12-30 PROCEDURE — 80061 LIPID PANEL: CPT | Performed by: INTERNAL MEDICINE

## 2022-12-30 PROCEDURE — 85027 COMPLETE CBC AUTOMATED: CPT | Performed by: INTERNAL MEDICINE

## 2022-12-30 PROCEDURE — 250N000011 HC RX IP 250 OP 636: Performed by: NURSE PRACTITIONER

## 2022-12-30 PROCEDURE — 250N000011 HC RX IP 250 OP 636: Performed by: PHYSICIAN ASSISTANT

## 2022-12-30 PROCEDURE — 83036 HEMOGLOBIN GLYCOSYLATED A1C: CPT | Performed by: INTERNAL MEDICINE

## 2022-12-30 PROCEDURE — 86140 C-REACTIVE PROTEIN: CPT

## 2022-12-30 PROCEDURE — 82550 ASSAY OF CK (CPK): CPT | Performed by: INTERNAL MEDICINE

## 2022-12-30 PROCEDURE — 250N000009 HC RX 250: Performed by: PHYSICIAN ASSISTANT

## 2022-12-30 PROCEDURE — 82565 ASSAY OF CREATININE: CPT

## 2022-12-30 PROCEDURE — 74178 CT ABD&PLV WO CNTR FLWD CNTR: CPT

## 2022-12-30 PROCEDURE — 82565 ASSAY OF CREATININE: CPT | Performed by: INTERNAL MEDICINE

## 2022-12-30 RX ORDER — MEPERIDINE HYDROCHLORIDE 25 MG/ML
25 INJECTION INTRAMUSCULAR; INTRAVENOUS; SUBCUTANEOUS EVERY 30 MIN PRN
Status: DISCONTINUED | OUTPATIENT
Start: 2022-12-30 | End: 2022-12-30 | Stop reason: HOSPADM

## 2022-12-30 RX ORDER — DIPHENHYDRAMINE HYDROCHLORIDE 50 MG/ML
50 INJECTION INTRAMUSCULAR; INTRAVENOUS
Status: DISCONTINUED | OUTPATIENT
Start: 2022-12-30 | End: 2022-12-30 | Stop reason: HOSPADM

## 2022-12-30 RX ORDER — ALBUTEROL SULFATE 0.83 MG/ML
2.5 SOLUTION RESPIRATORY (INHALATION)
Status: DISCONTINUED | OUTPATIENT
Start: 2022-12-30 | End: 2022-12-30 | Stop reason: HOSPADM

## 2022-12-30 RX ORDER — IOPAMIDOL 755 MG/ML
120 INJECTION, SOLUTION INTRAVASCULAR ONCE
Status: COMPLETED | OUTPATIENT
Start: 2022-12-30 | End: 2022-12-30

## 2022-12-30 RX ORDER — HEPARIN SODIUM (PORCINE) LOCK FLUSH IV SOLN 100 UNIT/ML 100 UNIT/ML
5 SOLUTION INTRAVENOUS
Status: DISCONTINUED | OUTPATIENT
Start: 2022-12-30 | End: 2022-12-30 | Stop reason: HOSPADM

## 2022-12-30 RX ORDER — HEPARIN SODIUM,PORCINE 10 UNIT/ML
5 VIAL (ML) INTRAVENOUS
Status: DISCONTINUED | OUTPATIENT
Start: 2022-12-30 | End: 2022-12-30 | Stop reason: HOSPADM

## 2022-12-30 RX ORDER — METHYLPREDNISOLONE SODIUM SUCCINATE 125 MG/2ML
125 INJECTION, POWDER, LYOPHILIZED, FOR SOLUTION INTRAMUSCULAR; INTRAVENOUS
Status: DISCONTINUED | OUTPATIENT
Start: 2022-12-30 | End: 2022-12-30 | Stop reason: HOSPADM

## 2022-12-30 RX ORDER — MEPERIDINE HYDROCHLORIDE 25 MG/ML
25 INJECTION INTRAMUSCULAR; INTRAVENOUS; SUBCUTANEOUS EVERY 30 MIN PRN
Status: CANCELLED | OUTPATIENT
Start: 2022-12-30

## 2022-12-30 RX ORDER — EPINEPHRINE 1 MG/ML
0.3 INJECTION, SOLUTION, CONCENTRATE INTRAVENOUS EVERY 5 MIN PRN
Status: DISCONTINUED | OUTPATIENT
Start: 2022-12-30 | End: 2022-12-30 | Stop reason: HOSPADM

## 2022-12-30 RX ORDER — NALOXONE HYDROCHLORIDE 0.4 MG/ML
0.2 INJECTION, SOLUTION INTRAMUSCULAR; INTRAVENOUS; SUBCUTANEOUS
Status: CANCELLED | OUTPATIENT
Start: 2022-12-30

## 2022-12-30 RX ORDER — DIPHENHYDRAMINE HYDROCHLORIDE 50 MG/ML
50 INJECTION INTRAMUSCULAR; INTRAVENOUS
Status: CANCELLED
Start: 2022-12-30

## 2022-12-30 RX ORDER — EPINEPHRINE 1 MG/ML
0.3 INJECTION, SOLUTION, CONCENTRATE INTRAVENOUS EVERY 5 MIN PRN
Status: CANCELLED | OUTPATIENT
Start: 2022-12-30

## 2022-12-30 RX ORDER — HEPARIN SODIUM,PORCINE 10 UNIT/ML
5 VIAL (ML) INTRAVENOUS
Status: CANCELLED | OUTPATIENT
Start: 2022-12-30

## 2022-12-30 RX ORDER — ALBUTEROL SULFATE 0.83 MG/ML
2.5 SOLUTION RESPIRATORY (INHALATION)
Status: CANCELLED | OUTPATIENT
Start: 2022-12-30

## 2022-12-30 RX ORDER — ALBUTEROL SULFATE 90 UG/1
1-2 AEROSOL, METERED RESPIRATORY (INHALATION)
Status: DISCONTINUED | OUTPATIENT
Start: 2022-12-30 | End: 2022-12-30 | Stop reason: HOSPADM

## 2022-12-30 RX ORDER — NALOXONE HYDROCHLORIDE 0.4 MG/ML
0.2 INJECTION, SOLUTION INTRAMUSCULAR; INTRAVENOUS; SUBCUTANEOUS
Status: DISCONTINUED | OUTPATIENT
Start: 2022-12-30 | End: 2022-12-30 | Stop reason: HOSPADM

## 2022-12-30 RX ORDER — ALBUTEROL SULFATE 90 UG/1
1-2 AEROSOL, METERED RESPIRATORY (INHALATION)
Status: CANCELLED
Start: 2022-12-30

## 2022-12-30 RX ORDER — HEPARIN SODIUM (PORCINE) LOCK FLUSH IV SOLN 100 UNIT/ML 100 UNIT/ML
5 SOLUTION INTRAVENOUS
Status: CANCELLED | OUTPATIENT
Start: 2022-12-30

## 2022-12-30 RX ORDER — METHYLPREDNISOLONE SODIUM SUCCINATE 125 MG/2ML
125 INJECTION, POWDER, LYOPHILIZED, FOR SOLUTION INTRAMUSCULAR; INTRAVENOUS
Status: CANCELLED
Start: 2022-12-30

## 2022-12-30 RX ADMIN — SODIUM CHLORIDE 100 ML: 9 INJECTION, SOLUTION INTRAVENOUS at 12:22

## 2022-12-30 RX ADMIN — IOPAMIDOL 120 ML: 755 INJECTION, SOLUTION INTRAVENOUS at 12:22

## 2022-12-30 RX ADMIN — Medication 5 ML: at 12:32

## 2023-01-03 ENCOUNTER — TELEPHONE (OUTPATIENT)
Dept: PHARMACY | Facility: CLINIC | Age: 73
End: 2023-01-03

## 2023-01-03 DIAGNOSIS — E11.65 TYPE 2 DIABETES MELLITUS WITH HYPERGLYCEMIA, WITHOUT LONG-TERM CURRENT USE OF INSULIN (H): ICD-10-CM

## 2023-01-03 RX ORDER — GLIPIZIDE 5 MG/1
TABLET ORAL
Qty: 270 TABLET | Refills: 1 | Status: SHIPPED | OUTPATIENT
Start: 2023-01-03 | End: 2023-01-10

## 2023-01-03 NOTE — TELEPHONE ENCOUNTER
Called patient to follow-up on blood sugars.  She has been dosing NPH 44 units every morning, metformin once daily, glipizide 10 mg every morning.  As of this Saturday, her dose of prednisone was reduced to 5 mg daily with plans to continue to taper down every 2 weeks.  She is having diarrhea again and pretty consistent from the metformin.        Plan:  Discontinue metformin.  Increase glipizide to 10 mg every morning and 5 mg every evening.  Follow-up scheduled next week.

## 2023-01-05 ENCOUNTER — OFFICE VISIT (OUTPATIENT)
Dept: UROLOGY | Facility: CLINIC | Age: 73
End: 2023-01-05
Payer: COMMERCIAL

## 2023-01-05 VITALS — BODY MASS INDEX: 32.96 KG/M2 | HEIGHT: 67 IN | WEIGHT: 210 LBS

## 2023-01-05 DIAGNOSIS — R81 GLUCOSURIA: ICD-10-CM

## 2023-01-05 DIAGNOSIS — R31.0 GROSS HEMATURIA: Primary | ICD-10-CM

## 2023-01-05 LAB
ALBUMIN UR-MCNC: NEGATIVE MG/DL
APPEARANCE UR: CLEAR
BILIRUB UR QL STRIP: NEGATIVE
COLOR UR AUTO: YELLOW
GLUCOSE UR STRIP-MCNC: >=1000 MG/DL
HGB UR QL STRIP: ABNORMAL
KETONES UR STRIP-MCNC: NEGATIVE MG/DL
LEUKOCYTE ESTERASE UR QL STRIP: NEGATIVE
NITRATE UR QL: NEGATIVE
PH UR STRIP: 5 [PH] (ref 5–7)
SP GR UR STRIP: >=1.03 (ref 1–1.03)
UROBILINOGEN UR STRIP-ACNC: 0.2 E.U./DL

## 2023-01-05 PROCEDURE — 99213 OFFICE O/P EST LOW 20 MIN: CPT | Mod: 25 | Performed by: STUDENT IN AN ORGANIZED HEALTH CARE EDUCATION/TRAINING PROGRAM

## 2023-01-05 PROCEDURE — 81003 URINALYSIS AUTO W/O SCOPE: CPT | Mod: QW | Performed by: STUDENT IN AN ORGANIZED HEALTH CARE EDUCATION/TRAINING PROGRAM

## 2023-01-05 PROCEDURE — 52000 CYSTOURETHROSCOPY: CPT | Performed by: STUDENT IN AN ORGANIZED HEALTH CARE EDUCATION/TRAINING PROGRAM

## 2023-01-05 RX ORDER — RIVAROXABAN 20 MG/1
1 TABLET, FILM COATED ORAL DAILY
COMMUNITY
Start: 2022-12-08 | End: 2023-11-28

## 2023-01-05 RX ORDER — LIDOCAINE HYDROCHLORIDE 20 MG/ML
JELLY TOPICAL ONCE
Status: COMPLETED | OUTPATIENT
Start: 2023-01-05 | End: 2023-01-05

## 2023-01-05 RX ADMIN — LIDOCAINE HYDROCHLORIDE 5 ML: 20 JELLY TOPICAL at 10:50

## 2023-01-05 ASSESSMENT — PAIN SCALES - GENERAL: PAINLEVEL: NO PAIN (0)

## 2023-01-05 NOTE — PATIENT INSTRUCTIONS

## 2023-01-05 NOTE — NURSING NOTE
Chief Complaint   Patient presents with     Gross Hematuria     Pt here for in office cystoscopy      Prior to the start of the procedure and with procedural staff participation, I verbally confirmed the patient s identity using two indicators, relevant allergies, that the procedure was appropriate and matched the consent or emergent situation, and that the correct equipment/implants were available. Immediately prior to starting the procedure I conducted the Time Out with the procedural staff and re-confirmed the patient s name, procedure, and site/side. I have wiped the patient off with the povidone-Iodine solution, draped them,  used Lidocaine hydrochloride jelly, and instilled sterile water into the bladder. (The Joint Commission universal protocol was followed.)  Yes    Sedation (Moderate or Deep): None    5mL 2% lidocaine hydrochloride Urojet instilled into urethra.    NDC# [address]-5  Lot #: [id number]  Expiration Date:  08/24    Cyn Montes CMA

## 2023-01-05 NOTE — PROGRESS NOTES
"CHIEF COMPLAINT   Gricelda Sabillon who is a 72 year old female returns today for follow-up of gross hematuria.      HPI   Gricelda Sabillon is a 72 year old female who presents with a history of gross hematuria.  She relates a history of trauma to her left side after a fall in August of last year. she is on anticoagulation. Has not had further gross hematuria    PHYSICAL EXAM  Patient is a 72 year old  female   Vitals: Height 1.702 m (5' 7\"), weight 95.3 kg (210 lb), last menstrual period 12/13/2002, not currently breastfeeding.  Body mass index is 32.89 kg/m .  General Appearance Adult:   Alert, no acute distress, oriented  HENT: throat/mouth:normal, good dentition  Lungs: no respiratory distress, or pursed lip breathing  Heart: No obvious jugular venous distension present  Abdomen: nondistended  Musculoskeltal: extremities normal, no peripheral edema  Skin: no suspicious lesions or rashes  Neuro: Alert, oriented, speech and mentation normal  Psych: affect and mood normal  Gait: Normal  : normal female external genitalia    All pertinent imaging reviewed:    Ct urogram 12/30/2022  IMPRESSION:   1. No evidence for renal, ureteral, or bladder calculi.  2. No masses or suspicious filling defects within the opacified  urinary collecting system.    Urine cytology 11/15/2022  Final Diagnosis   Specimen A                 Interpretation:                  Negative for High Grade Urothelial Carcinoma.                 Other Findings:                  Crystals present.                 Adequacy:                 Satisfactory for evaluation.       Component      Latest Ref Rng & Units 1/5/2023   Color Urine      Colorless, Straw, Light Yellow, Yellow Yellow   Appearance Urine      Clear Clear   Glucose Urine      Negative mg/dL >=1000 (A)   Bilirubin Urine      Negative Negative   Ketones Urine      Negative mg/dL Negative   Specific Gravity Urine      1.003 - 1.035 >=1.030   Blood Urine      Negative Small (A)   pH Urine      " 5.0 - 7.0 5.0   Protein Albumin Urine      Negative mg/dL Negative   Urobilinogen Urine      0.2, 1.0 E.U./dL 0.2   Nitrite Urine      Negative Negative   Leukocyte Esterase Urine      Negative Negative       PRE-PROCEDURE DIAGNOSIS: gross hematuria    POST-PROCEDURE DIAGNOSIS: gross hematuria    PROCEDURE: Cystoscopy     DESCRIPTION OF PROCEDURE: After informed consent was obtained, the patient was brought to the procedure room where she was placed in the lithotomy position with all pressure points well padded.  The vulva was prepped and draped in sterile fashion. A flexible cystoscope was introduced through a well-lubricated urethra.  Unremarkable bladder with no concerning urothelial lesions, no stones    The flexible cystoscope was removed and the findings were described to the patient.     ASSESSMENT and PLAN   72 year old female who presents with a history of gross hematuria, single episode after a fall, while on anticoagulation. Possible she had a subclinical kidney contusion. Ct urogram and cystoscopy unremarkable. Negative cytology. Significant glucosuria in setting of known DM, she should see primary and ensure that her glucose control is optimized    Follow up 1 year PANATALIE with urinalysis with microscopic prior, or earlier if has recurrent gross hematuria    Riley Lyons MD   [facility]  [facility] Phone: [phone]

## 2023-01-05 NOTE — LETTER
"1/5/2023       RE: Gricelda Sabillon  [address]  [facility] [address]     Dear Colleague,    Thank you for referring your patient, Gricelda Sabillon, to the [facility] [place] [facility]. Please see a copy of my visit note below.    CHIEF COMPLAINT   Gricelda Sabillon who is a 72 year old female returns today for follow-up of gross hematuria.      HPI   Gricelda Sabillon is a 72 year old female who presents with a history of gross hematuria.  She relates a history of trauma to her left side after a fall in August of last year. she is on anticoagulation. Has not had further gross hematuria    PHYSICAL EXAM  Patient is a 72 year old  female   Vitals: Height 1.702 m (5' 7\"), weight 95.3 kg (210 lb), last menstrual period 12/13/2002, not currently breastfeeding.  Body mass index is 32.89 kg/m .  General Appearance Adult:   Alert, no acute distress, oriented  HENT: throat/mouth:normal, good dentition  Lungs: no respiratory distress, or pursed lip breathing  Heart: No obvious jugular venous distension present  Abdomen: nondistended  Musculoskeltal: extremities normal, no peripheral edema  Skin: no suspicious lesions or rashes  Neuro: Alert, oriented, speech and mentation normal  Psych: affect and mood normal  Gait: Normal  : normal female external genitalia    All pertinent imaging reviewed:    Ct urogram 12/30/2022  IMPRESSION:   1. No evidence for renal, ureteral, or bladder calculi.  2. No masses or suspicious filling defects within the opacified  urinary collecting system.    Urine cytology 11/15/2022  Final Diagnosis   Specimen A                 Interpretation:                  Negative for High Grade Urothelial Carcinoma.                 Other Findings:                  Crystals present.                 Adequacy:                 Satisfactory for evaluation.       Component      Latest Ref Rng & Units 1/5/2023   Color Urine      " Colorless, Straw, Light Yellow, Yellow Yellow   Appearance Urine      Clear Clear   Glucose Urine      Negative mg/dL >=1000 (A)   Bilirubin Urine      Negative Negative   Ketones Urine      Negative mg/dL Negative   Specific Gravity Urine      1.003 - 1.035 >=1.030   Blood Urine      Negative Small (A)   pH Urine      5.0 - 7.0 5.0   Protein Albumin Urine      Negative mg/dL Negative   Urobilinogen Urine      0.2, 1.0 E.U./dL 0.2   Nitrite Urine      Negative Negative   Leukocyte Esterase Urine      Negative Negative       PRE-PROCEDURE DIAGNOSIS: gross hematuria    POST-PROCEDURE DIAGNOSIS: gross hematuria    PROCEDURE: Cystoscopy     DESCRIPTION OF PROCEDURE: After informed consent was obtained, the patient was brought to the procedure room where she was placed in the lithotomy position with all pressure points well padded.  The vulva was prepped and draped in sterile fashion. A flexible cystoscope was introduced through a well-lubricated urethra.  Unremarkable bladder with no concerning urothelial lesions, no stones    The flexible cystoscope was removed and the findings were described to the patient.     ASSESSMENT and PLAN   72 year old female who presents with a history of gross hematuria, single episode after a fall, while on anticoagulation. Possible she had a subclinical kidney contusion. Ct urogram and cystoscopy unremarkable. Negative cytology. Significant glucosuria in setting of known DM, she should see primary and ensure that her glucose control is optimized    Follow up 1 year PA-FAYE with urinalysis with microscopic prior, or earlier if has recurrent gross hematuria    Riley Lyons MD   [facility]  [facility] Phone: [phone]
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
## 2023-01-06 ENCOUNTER — OFFICE VISIT (OUTPATIENT)
Dept: INTERNAL MEDICINE | Facility: CLINIC | Age: 73
End: 2023-01-06
Payer: COMMERCIAL

## 2023-01-06 VITALS
SYSTOLIC BLOOD PRESSURE: 108 MMHG | BODY MASS INDEX: 32.83 KG/M2 | TEMPERATURE: 97.5 F | DIASTOLIC BLOOD PRESSURE: 55 MMHG | OXYGEN SATURATION: 94 % | WEIGHT: 209.2 LBS | RESPIRATION RATE: 18 BRPM | HEART RATE: 67 BPM | HEIGHT: 67 IN

## 2023-01-06 DIAGNOSIS — C43.9 METASTATIC MALIGNANT MELANOMA (H): ICD-10-CM

## 2023-01-06 DIAGNOSIS — E78.5 HYPERLIPIDEMIA WITH TARGET LDL LESS THAN 100: ICD-10-CM

## 2023-01-06 DIAGNOSIS — E11.65 TYPE 2 DIABETES MELLITUS WITH HYPERGLYCEMIA, WITHOUT LONG-TERM CURRENT USE OF INSULIN (H): ICD-10-CM

## 2023-01-06 DIAGNOSIS — Z79.899 ENCOUNTER FOR LONG-TERM (CURRENT) USE OF MEDICATIONS: Primary | ICD-10-CM

## 2023-01-06 DIAGNOSIS — I26.99 BILATERAL PULMONARY EMBOLISM (H): ICD-10-CM

## 2023-01-06 DIAGNOSIS — C54.1 ENDOMETRIAL CANCER (H): ICD-10-CM

## 2023-01-06 DIAGNOSIS — I10 ESSENTIAL HYPERTENSION WITH GOAL BLOOD PRESSURE LESS THAN 140/90: ICD-10-CM

## 2023-01-06 DIAGNOSIS — J84.89 ORGANIZING PNEUMONIA (H): ICD-10-CM

## 2023-01-06 PROBLEM — J96.01 ACUTE RESPIRATORY FAILURE WITH HYPOXIA (H): Status: RESOLVED | Noted: 2022-07-25 | Resolved: 2023-01-06

## 2023-01-06 PROCEDURE — 99214 OFFICE O/P EST MOD 30 MIN: CPT | Performed by: INTERNAL MEDICINE

## 2023-01-06 ASSESSMENT — PAIN SCALES - GENERAL: PAINLEVEL: NO PAIN (0)

## 2023-01-06 NOTE — PROGRESS NOTES
Dr Torres's note      Patient's instructions / PLAN:                                                        Plan:  1. Glipizide 10 mg in am. No pm dose for now  2. Increase the NPH insulin to 46 - 48 units daily   3. When you start the Prednisone every other day you might need 2 doses of NPH  44 units the days you take Prednisone and 30 units the days you don't take the prednisone. Adjust the dose accordingly to blood sugars   4. schedule ANNUAL EXAM after April 19  5. Please make a lab appointment for fasting labs  Few days before   6.        ASSESSMENT & PLAN:                                                        Mrs Madison has complex medical problems: She was diagnosed with metastatic malignant melanoma to the right thigh January 2022, then she was diagnosed with endometrial cancer in May 2022.  She also has chronic diseases: Diabetes, hypertension, hyperlipidemia.  During summer 2022 she was diagnosed with multiple bilateral PEs and lung nodules,  which were thought being metastases.  The metastases were ruled out and she was diagnosed with ILD.  She was started on high-dose steroids which of course increased her blood sugars.    --The recent PET scan was negative for metastases.  She follows up with Dr. Florez and OB/GYN.  --The organizing pneumonia is getting better.  She was able to wean continuous oxygen supplement.  She is down to 5 mg of prednisone which will be reduced further to 5 mg every other day  -- The A1c is still on the high side.  The blood sugars are higher in the afternoon, and she does have low blood sugars early morning around 5-6 AM.  We will try 2 doses of insulin when she gets to prednisone every other day  -- Since she does not have metastases, her Lovenox was changed to Xarelto for PE treatment      (Z79.899) Encounter for long-term (current) use of medications  (primary encounter diagnosis)  Comment:   Plan: Lipid panel reflex to direct LDL Fasting,         Hemoglobin A1c, CK total,  Comprehensive         metabolic panel            (I10) Essential hypertension with goal blood pressure less than 140/90  Comment: Controlled  Plan: Lipid panel reflex to direct LDL Fasting,         Hemoglobin A1c, CK total, Comprehensive         metabolic panel        Continue same meds, same doses for now     (E11.65) Type 2 diabetes mellitus with hyperglycemia, without long-term current use of insulin (H)  Comment: Not controlled  Plan: Lipid panel reflex to direct LDL Fasting,         Hemoglobin A1c, CK total, Comprehensive         metabolic panel        As above    (E78.5) Hyperlipidemia with target LDL less than 100  Comment: Not controlled  Plan: Lipid panel reflex to direct LDL Fasting,         Hemoglobin A1c, CK total, Comprehensive         metabolic panel        Expect better number after decreasing prednisone    (I26.99) Bilateral pulmonary embolism (H)  Comment: Clinically stable  Plan: Continue Xarelto    (C54.1) Endometrial cancer (H)  Comment: No metastases  Plan: Follow-up with OB/GYN and oncology    (J84.89) Organizing pneumonia (H)  Comment: Getting better  Plan: Prednisone     (C43.9) Metastatic malignant melanoma (H)  -- R thigh   Comment: No metastases on the last PET  Plan: Follow-up with oncology    Chief complaint:                                                      Follow up chronic medical problems      SUBJECTIVE:                                                    History of present illness:    Resp  -- stable  -- able to wean the O2 during the day  -- Prednisone 5 mg and in 1-2 weeks she is scheduled to decrease it to 5 mg every other day  --     DM  -- BS at night in the low 100s  -- around noon the BS can go > 200s  -- she takes NPH 44 units in am  -- takes Glipizide 10 + 5     --  Arlet Harding, Pharm D  Stopped the Metformin because diarrhea and added glipizide 5 mg in the afternoon    Hx of PE  -- now on Xarelto           Subjective   Melva is a 72 year old, presenting for the  following health issues:  Patient is being seen for an follow up.  History of Present Illness       Reason for visit:  Follow up    She eats 4 or more servings of fruits and vegetables daily.She consumes 0 sweetened beverage(s) daily.She exercises with enough effort to increase her heart rate 9 or less minutes per day.  She exercises with enough effort to increase her heart rate 3 or less days per week.   She is taking medications regularly.       Diabetes Follow-up    How often are you checking your blood sugar? Continuous glucose monitor  What time of day are you checking your blood sugars (select all that apply)?  Before and after meals  Have you had any blood sugars above 200?  Yes   Have you had any blood sugars below 70?  No has not been below 70 in a couple of months.    What symptoms do you notice when your blood sugar is low?  None    What concerns do you have today about your diabetes? None and Other: Prednisone dosage are changing in hopes of lower blood sugars.     Do you have any of these symptoms? (Select all that apply)  Numbness in feet              Hyperlipidemia Follow-Up      Are you regularly taking any medication or supplement to lower your cholesterol?   Yes- Atorvastatin    Are you having muscle aches or other side effects that you think could be caused by your cholesterol lowering medication?  Yes- Patient has noticed muscle spasms.    Hypertension Follow-up      Do you check your blood pressure regularly outside of the clinic? Yes     Are you following a low salt diet? Yes    Are your blood pressures ever more than 140 on the top number (systolic) OR more   than 90 on the bottom number (diastolic), for example 140/90? No    BP Readings from Last 2 Encounters:   12/13/22 (!) 140/71   11/28/22 (!) 147/61     Hemoglobin A1C (%)   Date Value   12/30/2022 8.9 (H)   09/01/2022 8.8 (H)   03/03/2021 7.1 (H)   08/24/2020 7.2 (H)     LDL Cholesterol Calculated (mg/dL)   Date Value   12/30/2022 105 (H)  "  11/01/2022 115 (H)   03/03/2021 91   02/20/2020 88         How many servings of fruits and vegetables do you eat daily?  4 or more    On average, how many sweetened beverages do you drink each day (Examples: soda, juice, sweet tea, etc.  Do NOT count diet or artificially sweetened beverages)?   0    How many days per week do you exercise enough to make your heart beat faster? 3 or less    How many minutes a day do you exercise enough to make your heart beat faster? 9 or less    How many days per week do you miss taking your medication? 0        Review of Systems:                                                      ROS: negative for fever, chills, cough, wheezes, chest pain, shortness of breath, vomiting, abdominal pain, leg swelling          OBJECTIVE:             Physical exam:  Blood pressure 108/55, pulse 67, temperature 97.5  F (36.4  C), temperature source Tympanic, resp. rate 18, height 1.702 m (5' 7\"), weight 94.9 kg (209 lb 3.2 oz), last menstrual period 12/13/2002, SpO2 94 %, not currently breastfeeding.     NAD, appears comfortable  Skin: no rashes   Neck: supple, no JVD,  No thyroidmegaly. Lymph nodes nonpalpable cervical and supraclavicular.  Chest: clear to auscultation bilaterally, good respiratory effort  Heart: S1 S2, RRR, no mgr appreciated  Abdomen: soft, not tender, no hepatosplenomegaly or masses appreciated, no abdominal bruit, present bowel sounds  Extremities: no edema, clubbing, cyanosis. Palpable pedal pulses bilaterally, Monofilament skin sensation is intact, no feet skin lesions   Neurologic: A, Ox3, no focal signs appreciated    PMHx: reviewed  Past Medical History:   Diagnosis Date     Asthma, mild intermittent      Cataract      Endometrial cancer (H) 06/2022     HTN, goal below 140/90      Hyperlipidemia LDL goal < 100      Macular hole of left eye      Melanoma (H)     RIGHT KNEE     Sleep apnea     uses c pap     Type 2 diabetes, HbA1C goal < 8% (H)       PSHx: reviewed  Past " Surgical History:   Procedure Laterality Date     ARTHROPLASTY HIP Right 9/25/2017    Procedure: ARTHROPLASTY HIP;  Right total hip arthroplasty  ;  Surgeon: Ayaan Pena MD;  Location: [facility]     ARTHROPLASTY KNEE  7/12/2013    Procedure: ARTHROPLASTY KNEE;  right total knee arthroplasty ;  Surgeon: Chaparro Wesley MD;  Location: [facility]     ARTHROSCOPY KNEE Right 1/21/2015    Procedure: ARTHROSCOPY KNEE;  Surgeon: Ayaan Pena MD;  Location: [facility]     BRONCHOSCOPY (RIGID OR FLEXIBLE), DIAGNOSTIC N/A 8/11/2022    Procedure: BRONCHOSCOPY, WITH BRONCHOALVEOLAR LAVAGE;  Surgeon: Roselia Sosa MD;  Location:  GI     C INCISION OF HYMEN       CATARACT IOL, RT/LT       COLONOSCOPY  2008     COLONOSCOPY N/A 4/18/2019    Procedure: Colonoscopy with polypectomy;  Surgeon: Shaun Guerrero MD;  Location: [facility]     CYSTOSCOPY N/A 6/23/2022    Procedure: Cystoscopy;  Surgeon: Eli Guidry MD;  [facility]: [facility]     ENDOSCOPIC RETROGRADE CHOLANGIOPANCREATOGRAM N/A 2/6/2019    Procedure: COMBINED ENDOSCOPIC RETROGRADE CHOLANGIOPANCREATOGRAPHY, BILIARY SPINCTEROTOMY AND DILATION, PLACE BILE DUCT STENT;  Surgeon: Guru Andie Gonzalez MD;  [facility]: [facility]     ENDOSCOPIC RETROGRADE CHOLANGIOPANCREATOGRAM N/A 2/28/2019    Procedure: Endoscopic Retrograde Cholangiopancreatogram, Bile duct stent removal and placement;  Surgeon: Shaun Guerrero MD;  [facility]: [facility]     ENDOSCOPIC RETROGRADE CHOLANGIOPANCREATOGRAM N/A 4/18/2019    Procedure: COMBINED ENDOSCOPIC RETROGRADE CHOLANGIOPANCREATOGRAPHY, Bile duct stent exchange and Polypectomy;  Surgeon: Shaun Guerrero MD;  [facility]: [facility]     ENDOSCOPIC RETROGRADE CHOLANGIOPANCREATOGRAM N/A 10/18/2019    Procedure: Endoscopic Retrograde Cholangiopancreatogram;  Surgeon: Shaun Guerrero MD;  [facility]     ENDOSCOPIC RETROGRADE CHOLANGIOPANCREATOGRAM N/A 3/24/2022    Procedure: ENDOSCOPIC RETROGRADE CHOLANGIOPANCREATOGRAPHY;  Surgeon:  Shaun Guerrero MD;  Location: [facility]     ENDOSCOPIC RETROGRADE CHOLANGIOPANCREATOGRAM WITH SPYGLASS N/A 7/26/2019    Procedure: Endoscopic Retrograde Cholangiopancreatogram With Spyglas,l Radiofrequency Ablation, Stent Exchangeand Duodenal Biopsy x2;  Surgeon: Shaun Guerrero MD;  [facility]     ENDOSCOPIC RETROGRADE CHOLANGIOPANCREATOGRAM WITH SPYGLASS N/A 9/10/2020    Procedure: ENDOSCOPIC RETROGRADE CHOLANGIOPANCREATOGRAPHY, WITH DIRECT DUCT VISUALIZATION, USING PANCREATICOBILIARY FIBEROPTIC PROBE;  Surgeon: Shaun Guerrero MD;  [facility]     ENDOSCOPIC RETROGRADE CHOLANGIOPANCREATOGRAM WITH SPYGLASS N/A 3/22/2021    Procedure: ENDOSCOPIC RETROGRADE CHOLANGIOPANCREATOGRAPHY, WITH DIRECT DUCT VISUALIZATION, USING PANCREATICOBILIARY FIBEROPTIC PROBE;  Surgeon: Shaun Guerrero MD;  [facility]     ESOPHAGOSCOPY, GASTROSCOPY, DUODENOSCOPY (EGD) WITH RADIO FREQUENCY ABLATION, COMBINED N/A 9/10/2020    Procedure: possible repeat ESOPHAGOGASTRODUODENOSCOPY, WITH RADIOFREQUENCY ABLATION and endoscopic mucosal resection;  Surgeon: Shaun Guerrero MD;  [facility]     ESOPHAGOSCOPY, GASTROSCOPY, DUODENOSCOPY (EGD), COMBINED N/A 4/18/2019    Procedure: upper endoscopy with polypectomy;  Surgeon: Shaun Guerrero MD;  [facility]     ESOPHAGOSCOPY, GASTROSCOPY, DUODENOSCOPY (EGD), COMBINED N/A 10/18/2019    Procedure: Upper Endoscopy and ERCP with stent removal, stone removal and biopsy;  Surgeon: Shaun Guerrero MD;  [facility]     ESOPHAGOSCOPY, GASTROSCOPY, DUODENOSCOPY (EGD), COMBINED N/A 3/24/2022    Procedure: ESOPHAGOGASTRODUODENOSCOPY (EGD), Duodenal  polyp removal;  Surgeon: Shaun Guerrero MD;  Location:  OR     ESOPHAGOSCOPY, GASTROSCOPY, DUODENOSCOPY (EGD), RESECT MUCOSA, COMBINED N/A 2/28/2019    Procedure: Upper Endoscopy, Endoscopic Ultrasound, Endoscopic Mucosal Resection,  Ampullectomy, polypectomy.;  Surgeon: Shaun Guerrero MD;  [facility]     ESOPHAGOSCOPY, GASTROSCOPY,  DUODENOSCOPY (EGD), RESECT MUCOSA, COMBINED N/A 3/22/2021    Procedure: ESOPHAGOGASTRODUODENOSCOPY (EGD) with  endoscopic mucosal resection/ polypectomy;  Surgeon: Shaun Guerrero MD;  [facility]     EXCISE LESION LOWER EXTREMITY Right 3/28/2022    Procedure: Wide local excision of right knee melanoma;  Surgeon: Chevy Allison MD;  Location: [facility] OR     EXCISE MASS LOWER EXTREMITY Right 1/13/2022    Procedure: excision of right thigh mass;  Surgeon: Salvador Kelly MD;  Location: [facility]     EYE SURGERY      macular hole repaired left eye     HC KNEE SCOPE, DIAGNOSTIC      - both knees     HEAD & NECK SURGERY      wisdom teeth     IR CHEST PORT PLACEMENT > 5 YRS OF AGE  5/2/2022     LAPAROSCOPIC HYSTERECTOMY TOTAL, BILATERAL SALPINGO-OOPHORECTOMY, NODE DISSECTION, COMBINED Bilateral 6/23/2022    Procedure: Total Laparoscopic Hyserectomy, Bilateral Salpibgo-oophorectomy, Bilateral [place] Lymph Node Dissection;  Surgeon: Eli Guidry MD;  Location: [facility]        Meds: reviewed  Current Outpatient Medications   Medication Sig Dispense Refill     atenolol (TENORMIN) 50 MG tablet Take 25 mg by mouth daily       atorvastatin (LIPITOR) 40 MG tablet Take 1 tablet (40 mg) by mouth daily 90 tablet 1     azelastine (ASTELIN) 0.1 % nasal spray USE 1 SPRAY(S) IN EACH NOSTRIL TWICE DAILY AS NEEDED 30 mL 0     azelastine (OPTIVAR) 0.05 % SOLN Place 1 drop into both eyes 2 times daily as needed Reported on 3/22/2017       cetirizine (ZYRTEC) 10 MG tablet Take 10 mg by mouth every morning       Continuous Blood Gluc Sensor (FREESTYLE DAVID 14 DAY SENSOR) [facility] 1 each every 14 days Use 1 Sensor every 14 days. Use to read blood sugars per 's instructions. 2 each 5     CONTOUR NEXT TEST test strip USE 1 STRIP TO CHECK GLUCOSE ONCE DAILY 100 strip 4     glipiZIDE (GLUCOTROL) 5 MG tablet Take 2 tablets every morning and 1 tablet every evening before meals. 270 tablet 1     insulin  UNIT/ML vial  "Inject 44 Units Subcutaneous every morning 10 mL 3     insulin syringe-needle U-100 (BD INSULIN SYRINGE ULTRAFINE) 31G X 5/16\" 0.5 ML miscellaneous Use daily with NPH insulin 100 each 11     latanoprost (XALATAN) 0.005 % ophthalmic solution Place 1 drop into both eyes At Bedtime  2.5 mL 1     lidocaine-prilocaine (EMLA) 2.5-2.5 % external cream APPLY CREAM TOPICALLY ONCE DAILY AS NEEDED FOR PAIN APPLY A PEA SIZED AMOUNT OVER PORT SITE 60 MINUTES PRIOR TO USE COVER WITH PLASTIC WRAP       LORazepam (ATIVAN) 0.5 MG tablet Take 1 tablet (0.5 mg) by mouth nightly as needed for anxiety or sleep 30 tablet 0     ondansetron (ZOFRAN) 8 MG tablet Take 1 tablet (8 mg) by mouth every 8 hours as needed for nausea 30 tablet 1     predniSONE (DELTASONE) 5 MG tablet Take 2 tablets (10 mg) by mouth every 48 hours AND 1 tablet (5 mg) every 48 hours. 45 tablet 1     senna-docusate (SENOKOT-S/PERICOLACE) 8.6-50 MG tablet Take 2 tablets by mouth daily as needed for constipation 60 tablet 11     timolol maleate (TIMOPTIC) 0.5 % ophthalmic solution INSTILL 1 DROP INTO EACH EYE TWICE DAILY       triamcinolone (KENALOG) 0.5 % external ointment Apply 1 g topically daily (Patient taking differently: Apply 1 applicator topically daily as needed) 15 g 1     VENTOLIN  (90 Base) MCG/ACT inhaler INHALE 2 PUFFS BY MOUTH EVERY 4 HOURS AS NEEDED FOR SHORTNESS OF BREATH 18 g 0     XARELTO ANTICOAGULANT 20 MG TABS tablet Take 1 tablet by mouth daily         Soc Hx: reviewed  Fam Hx: reviewed    42 minutes spent on the date of the encounter doing   chart review,   review of outside records,   review of test results,   interpretation of tests,   patient visit,   documentation,        Chart documentation was completed, in part, with [employer] voice-recognition software. Even though reviewed, some grammatical, spelling, and word errors may remain.      Raisa Torres MD  Internal Medicine     "

## 2023-01-06 NOTE — PATIENT INSTRUCTIONS
Plan:  1. Glipizide 10 mg in am. No pm dose for now  2. Increase the NPH insulin to 46 - 48 units daily   3. When you start the Prednisone every other day you might need 2 doses of NPH  44 units the days you take Prednisone and 30 units the days you don't take the prednisone. Adjust the dose accordingly to blood sugars   4. schedule ANNUAL EXAM after April 19  5. Please make a lab appointment for fasting labs  Few days before

## 2023-01-10 ENCOUNTER — VIRTUAL VISIT (OUTPATIENT)
Dept: PHARMACY | Facility: CLINIC | Age: 73
End: 2023-01-10
Payer: COMMERCIAL

## 2023-01-10 DIAGNOSIS — C43.9 MELANOMA OF SKIN (H): Primary | ICD-10-CM

## 2023-01-10 DIAGNOSIS — F41.9 ANXIETY: ICD-10-CM

## 2023-01-10 DIAGNOSIS — E78.5 HYPERLIPIDEMIA WITH TARGET LDL LESS THAN 100: ICD-10-CM

## 2023-01-10 DIAGNOSIS — I26.99 OTHER PULMONARY EMBOLISM WITHOUT ACUTE COR PULMONALE, UNSPECIFIED CHRONICITY (H): ICD-10-CM

## 2023-01-10 DIAGNOSIS — J84.89 ORGANIZING PNEUMONIA (H): ICD-10-CM

## 2023-01-10 DIAGNOSIS — J30.9 ALLERGIC RHINITIS, UNSPECIFIED SEASONALITY, UNSPECIFIED TRIGGER: ICD-10-CM

## 2023-01-10 DIAGNOSIS — E11.65 TYPE 2 DIABETES MELLITUS WITH HYPERGLYCEMIA, WITHOUT LONG-TERM CURRENT USE OF INSULIN (H): ICD-10-CM

## 2023-01-10 DIAGNOSIS — I10 HTN, GOAL BELOW 140/90: ICD-10-CM

## 2023-01-10 PROCEDURE — 99605 MTMS BY PHARM NP 15 MIN: CPT | Performed by: PHARMACIST

## 2023-01-10 PROCEDURE — 99607 MTMS BY PHARM ADDL 15 MIN: CPT | Performed by: PHARMACIST

## 2023-01-10 RX ORDER — GLIPIZIDE 5 MG/1
10 TABLET ORAL
Qty: 180 TABLET | Refills: 1 | COMMUNITY
Start: 2023-01-10 | End: 2023-01-10

## 2023-01-10 RX ORDER — PREDNISONE 5 MG/1
5 TABLET ORAL DAILY
Qty: 45 TABLET | Refills: 1 | COMMUNITY
Start: 2023-01-10 | End: 2023-02-09

## 2023-01-10 RX ORDER — GLIPIZIDE 5 MG/1
10 TABLET ORAL
Qty: 180 TABLET | Refills: 1 | COMMUNITY
Start: 2023-01-10 | End: 2023-04-19

## 2023-01-10 RX ORDER — TRIAMCINOLONE ACETONIDE 5 MG/G
1 OINTMENT TOPICAL DAILY PRN
COMMUNITY
Start: 2023-01-10

## 2023-01-10 NOTE — PROGRESS NOTES
Medication Therapy Management (MTM) Encounter    ASSESSMENT:                            Medication Adherence/Access: No issues identified    Type 2 Diabetes:  A1c above goal <8%. Home readings at goal 45% of the time.  Therapy was just adjusted on Friday by primary care provider, no changes today.  Will review readings again next week after prednisone change and follow-up with Melva.    Hyperlipidemia: stable    Hypertension: stable    Melanoma/PE/Pneumonia: stable - tapering off prednisone as recommended    Anxiety:  stable      Allergic Rhinitis: stable    PLAN:                            Continue current regimen  Review blood sugars next week after prednisone change    Follow-up: Return in about 1 week (around 1/17/2023) for Blood Sugar Review.    SUBJECTIVE/OBJECTIVE:                          Gricelda Sabillon is a 72 year old female called for a follow-up visit.  Today's visit is a follow-up MTM visit from 9/29/22.     Reason for visit: medication review    Allergies/ADRs: Reviewed in chart  Tobacco: She reports that she has never smoked. She has never been exposed to tobacco smoke. She has never used smokeless tobacco.  Alcohol: not currently using    Medication Adherence/Access: no issues reported    Type 2 Diabetes:  Currently taking Glipizide 10 mg daily (dose reduced on 1/6 by primary care provide)  and NPH 46 units daily (dose increased on 1/6; with plan to decrease insulin 30 units on days without prednisone). Patient is not experiencing side effects.   Stopped metformin due to GI upset.     Low appetite -eating but nothing looks/sounds good.     Blood sugar monitoring: Continuous Glucose Monitor.                           Symptoms of low blood sugar? none  Symptoms of high blood sugar? none  Eye exam: due  Foot exam: up to date  Aspirin: No  Urine Albumin:   Lab Results   Component Value Date    UMALCR 8.99 03/21/2022      Lab Results   Component Value Date    A1C 8.9 12/30/2022    A1C 8.8 09/01/2022     A1C 8.3 07/12/2022    A1C 7.7 03/21/2022    A1C 7.8 11/15/2021    A1C 7.1 03/03/2021    A1C 7.2 08/24/2020    A1C 7.3 05/21/2020    A1C 7.0 02/20/2020    A1C 6.5 10/31/2019       Hyperlipidemia: Current therapy includes atorvastatin 40 mg daily.  Patient reports some arm cramps - nothing for past 5-6 days.  Reports staying hydrated.      Recent Labs   Lab Test 12/30/22  0838 11/01/22  1318 09/07/16  0821 08/03/15  0835   CHOL 193 216*   < > 132   HDL 38* 45*   < > 42*   * 115*   < > 57   TRIG 249* 282*   < > 167*   CHOLHDLRATIO  --   --   --  3.1    < > = values in this interval not displayed.     Hypertension: Current medications include atenolol 25 mg daily.  Patient does self-monitor blood pressure. Home BP monitoring in range of 110-130's systolic over 60's diastolic.  Pulse at home 60s.  Patient reports no current medication side effects.  BP Readings from Last 3 Encounters:   01/06/23 108/55   12/13/22 (!) 140/71   11/28/22 (!) 147/61       Melanoma/PE//Pneumonia: Followed by Dr. Florez and Respiratory Therapy.  Taking Xarelto 20 mg daily, prednisone 5 mg daily (has been tapering off; starting Sat will change to every other day for 2 weeks).     No concerns with side effects.  Was on Lovenox for 5.5 months prior to starting Xarleto earlier this month.    Continues to wear oxygen at 0.5 L overnight; goes without during the day if able.  Will use Ventolin as needed; not needed at this time.    Uses Emla as needed prior to treatment.    Ondansetron as needed.    Trying to walk more and use the wheelchair less.  No new concerns. No recurrent visual hallucinations.    Anxiety:  Using lorazepam as needed.  Only used 1-2 tablets since Aug.  Doesn't like taking but will if she needs it.          Allergic Rhinitis: Current medications include cetirizine 10 mg once daily, Astelin nasal spray as needed, Optivar eye drops as needed. Primary symptoms are nasal congestion and raspy voice. Patient feels that current  therapy is effective.           Today's Vitals: LMP 12/13/2002 (Exact Date)   ----------------      I spent 25 minutes with this patient today. All changes were made via collaborative practice agreement with Raisa Davidson MD. A copy of the visit note was provided to the patient's provider(s).    A summary of these recommendations was sent via [employer].    Arlet Harding , Pharm D  [phone] (phone)  Medication Therapy Management Pharmacist     Telemedicine Visit Details  Type of service:  Telephone visit  Start Time: 900am  End Time: 925am     Medication Therapy Recommendations  No medication therapy recommendations to display

## 2023-01-10 NOTE — LETTER
_  Medication List        Prepared on: Deepak 10, 2023     Bring your Medication List when you go to the doctor, hospital, or   emergency room. And, share it with your family or caregivers.     Note any changes to how you take your medications.  Cross out medications when you no longer use them.    Medication How I take it Why I use it Prescriber   atenolol (TENORMIN) 50 MG tablet Take 25 mg by mouth daily Blood pressure Dr. Torres   atorvastatin (LIPITOR) 40 MG tablet Take 1 tablet (40 mg) by mouth daily Hyperlipidemia with Target LDL Less than 100 Raisa Davidson MD   azelastine (ASTELIN) 0.1 % nasal spray USE 1 SPRAY(S) IN EACH NOSTRIL TWICE DAILY AS NEEDED Seasonal allergic rhinitis, unspecified trigger Raisa Davidson MD   azelastine (OPTIVAR) 0.05 % SOLN Place 1 drop into both eyes 2 times daily as needed  Allergies Dr. Torres   cetirizine (ZYRTEC) 10 MG tablet Take 10 mg by mouth every morning Allergies Patient Reported   glipiZIDE (GLUCOTROL) 5 MG tablet Take 2 tablets (10 mg) by mouth every morning (before breakfast)  Type 2 diabetes mellitus with hyperglycemia, without long-term current use of insulin (H) Raisa Davidson MD   insulin  UNIT/ML vial Inject 46 Units Subcutaneous every morning Type 2 diabetes mellitus with hyperglycemia, without long-term current use of insulin (H) Raisa Davidson MD   latanoprost (XALATAN) 0.005 % ophthalmic solution Place 1 drop into both eyes At Bedtime  Glaucoma Raisa Davidson MD   lidocaine-prilocaine (EMLA) 2.5-2.5 % external cream APPLY CREAM TOPICALLY ONCE DAILY AS NEEDED FOR PAIN APPLY A PEA SIZED AMOUNT OVER PORT SITE 60 MINUTES PRIOR TO USE COVER WITH PLASTIC WRAP Pain Dr. Torres   LORazepam (ATIVAN) 0.5 MG tablet Take 1 tablet (0.5 mg) by mouth nightly as needed for anxiety or sleep Drug-induced insomnia (H) Raisa Davidson MD   ondansetron (ZOFRAN) 8 MG tablet Take 1  tablet (8 mg) by mouth every 8 hours as needed for nausea Nausea and vomiting, intractability of vomiting not specified, unspecified vomiting type Raisa Davidson MD   predniSONE (DELTASONE) 5 MG tablet Take 1 tablet (5 mg) by mouth daily Organizing Pneumonia (H) Raisa Davidson MD   senna-docusate (SENOKOT-S/PERICOLACE) 8.6-50 MG tablet Take 2 tablets by mouth daily as needed for constipation Constipation Raisa Davidson MD   timolol maleate (TIMOPTIC) 0.5 % ophthalmic solution INSTILL 1 DROP INTO EACH EYE TWICE DAILY Glaucoma Dr. Torres   triamcinolone (KENALOG) 0.5 % external ointment Apply 1 g topically daily as needed for irritation Irritation Raisa Davidson MD   VENTOLIN  (90 Base) MCG/ACT inhaler INHALE 2 PUFFS BY MOUTH EVERY 4 HOURS AS NEEDED FOR SHORTNESS OF BREATH Acute Bronchitis Raisa Davidson MD   XARELTO ANTICOAGULANT 20 MG TABS tablet Take 1 tablet (20 mg) by mouth daily PE Dr. Torres         Add new medications, over-the-counter drugs, herbals, vitamins, or  minerals in the blank rows below.    Medication How I take it Why I use it Prescriber                          Allergies:      bromfenac; codeine; codeine; metformin        Side effects I have had:               Other Information:              My notes and questions:

## 2023-01-10 NOTE — LETTER
January 10, 2023  Gricelda aSbillon  [address] [facility] [address] [facility] [address]    Dear Ms. Sabillon, [facility]     Thank you for talking with me on Deepak 10, 2023 about your health and medications. As a follow-up to our conversation, I have included two documents:      1. Your Recommended To-Do List has steps you should take to get the best results from your medications.  2. Your Medication List will help you keep track of your medications and how to take them.    If you want to talk about these documents, please call Arlet Harding RPH at phone: [phone], Monday-Friday 8-4:30pm.    I look forward to working with you and your doctors to make sure your medications work well for you.    Sincerely,  Arlet Harding RPH  [facility] Pharmacist, [facility]

## 2023-01-10 NOTE — LETTER
"Recommended To-Do List      Prepared on: Deepak 10, 2023       You can get the best results from your medications by completing the items on this \"To-Do List.\"      Bring your To-Do List when you go to your doctor. And, share it with your family or caregivers.    My To-Do List:  What we talked about: What I should do:    What my medicines are for, how to know if my medicines are working, made sure my medicines are safe for me and reviewed how to take my medicines.      Take my medicines every day                  "

## 2023-01-10 NOTE — PATIENT INSTRUCTIONS
"Recommendations from today's MTM visit:                                                         Continue current regimen  Review blood sugars next week after prednisone change    Follow-up: Return in about 1 week (around 1/17/2023) for Blood Sugar Review.    It was great speaking with you today.  I value your experience and would be very thankful for your time in providing feedback in our clinic survey. In the next few days, you may receive an email or text message from [employer] with a link to a survey related to your  clinical pharmacist.\"     To schedule another MTM appointment, please call the clinic directly or you may call the MTM scheduling line at [phone] or toll-free at [phone].     My Clinical Pharmacist's contact information:                                                      Please feel free to contact me with any questions or concerns you have.      Arlet Harding , Pharm D  [phone] (phone)  Medication Therapy Management Pharmacist     "

## 2023-01-16 ENCOUNTER — PATIENT OUTREACH (OUTPATIENT)
Dept: GASTROENTEROLOGY | Facility: CLINIC | Age: 73
End: 2023-01-16

## 2023-01-16 ENCOUNTER — E-VISIT (OUTPATIENT)
Dept: INTERNAL MEDICINE | Facility: CLINIC | Age: 73
End: 2023-01-16
Payer: COMMERCIAL

## 2023-01-16 ENCOUNTER — PREP FOR PROCEDURE (OUTPATIENT)
Dept: GASTROENTEROLOGY | Facility: CLINIC | Age: 73
End: 2023-01-16

## 2023-01-16 DIAGNOSIS — D13.5 AMPULLARY ADENOMA: Primary | ICD-10-CM

## 2023-01-16 DIAGNOSIS — Z02.9 ADMINISTRATIVE ENCOUNTER: Primary | ICD-10-CM

## 2023-01-16 PROCEDURE — 99207 PR NON-BILLABLE SERV PER CHARTING: CPT | Performed by: INTERNAL MEDICINE

## 2023-01-16 NOTE — PROGRESS NOTES
Called pt to discuss follow up procedure:    Procedure/Imaging/Clinic: EGD, ERCP   Physician: Cesar  Timin year (from 3/24/23 procedure)  Procedure length: 60 min  Anesthesia: Gen  Dx: ampullary adenoma  Tier: 4  [facility]: [facility] or [facility]     procedure date, if possible patient would prefer not to be the first case.  has a 6am meeting on .     Explained they can expect a call from  for date and time of procedure, will need a , someone to stay with them for 24 hours and should stay in town for 24 hours (within 45 min of Hospital) post procedure     Patient needs to get pre-op physical completed. If outside  health system will need physical faxed to number [phone]   If you do not get a preop physical, your procedure could be cancelled, patient voiced understanding*     Preop Plan:  within 30 days of procedure     Med Review     Blood thinner -  xarelto, guidelines to hold for 2 days -  for pulmonary emboli, prescribed by Dr Florez. Pt will check with oncologist's office to see if able to come off this medication for procedure.     Diabetic - NPH insulin, metformin, glipizide, januvia, invokana     COVID test discussed: no longer needed, unless symptomatic or positive exposure     Patient Education r/t procedure: ivonhart     A pre-op nurse will call 1-2 days prior to the procedure     Verbalized understanding of all instructions. All questions answered.      Case request placed, message routed to OR      Aishwarya Reese, RN, BSN,   [facility]  Care coordinator

## 2023-01-17 ASSESSMENT — ENCOUNTER SYMPTOMS
NAUSEA: 0
SHORTNESS OF BREATH: 0
CHILLS: 0
VOMITING: 0
FEVER: 0
DYSURIA: 0
HEMATURIA: 1

## 2023-01-24 DIAGNOSIS — E78.5 HYPERLIPIDEMIA WITH TARGET LDL LESS THAN 100: ICD-10-CM

## 2023-01-25 RX ORDER — ATORVASTATIN CALCIUM 40 MG/1
TABLET, FILM COATED ORAL
Qty: 90 TABLET | Refills: 1 | Status: SHIPPED | OUTPATIENT
Start: 2023-01-25 | End: 2023-06-23

## 2023-01-26 DIAGNOSIS — E11.65 TYPE 2 DIABETES MELLITUS WITH HYPERGLYCEMIA, WITHOUT LONG-TERM CURRENT USE OF INSULIN (H): ICD-10-CM

## 2023-01-27 NOTE — TELEPHONE ENCOUNTER
Pending Prescriptions:                       Disp   Refills    Continuous Blood Gluc Sensor (FREESTYLE LI*       0        Sig: USE 1 EVERY 14 DAYS USE TO READ BLOOD SUGARS PER           MANUFACTURERS INSTRUCTIONS      Routing refill request to provider for review/approval because:  Drug not on the [facility] refill protocol     Please advise, thanks.

## 2023-01-29 ENCOUNTER — E-VISIT (OUTPATIENT)
Dept: INTERNAL MEDICINE | Facility: CLINIC | Age: 73
End: 2023-01-29
Payer: COMMERCIAL

## 2023-01-29 DIAGNOSIS — E11.65 TYPE 2 DIABETES MELLITUS WITH HYPERGLYCEMIA, WITHOUT LONG-TERM CURRENT USE OF INSULIN (H): Primary | ICD-10-CM

## 2023-01-29 PROCEDURE — 99207 PR NON-BILLABLE SERV PER CHARTING: CPT | Performed by: INTERNAL MEDICINE

## 2023-01-30 RX ORDER — FLASH GLUCOSE SENSOR
KIT MISCELLANEOUS
Qty: 2 EACH | Refills: 5 | Status: SHIPPED | OUTPATIENT
Start: 2023-01-30 | End: 2023-07-27

## 2023-01-30 NOTE — TELEPHONE ENCOUNTER
Provider E-Visit time total (minutes): 3    Arlet,    I appreciate you if you can find time in your schedule for Melva    Thank you    Raisa Torres MD  Internal Medicine

## 2023-02-02 ENCOUNTER — TELEPHONE (OUTPATIENT)
Dept: PHARMACY | Facility: CLINIC | Age: 73
End: 2023-02-02
Payer: COMMERCIAL

## 2023-02-02 NOTE — TELEPHONE ENCOUNTER
Called Melva to follow-up on blood sugars.  She increased her dose of NPH to 35 units three days ago.    Since then readings have been better without lows.  Will continue current regimen.  Follow-up next week.

## 2023-02-06 ENCOUNTER — PATIENT OUTREACH (OUTPATIENT)
Dept: ONCOLOGY | Facility: CLINIC | Age: 73
End: 2023-02-06

## 2023-02-06 ENCOUNTER — ONCOLOGY VISIT (OUTPATIENT)
Dept: ONCOLOGY | Facility: CLINIC | Age: 73
End: 2023-02-06
Attending: INTERNAL MEDICINE
Payer: COMMERCIAL

## 2023-02-06 ENCOUNTER — TELEPHONE (OUTPATIENT)
Dept: INTERNAL MEDICINE | Facility: CLINIC | Age: 73
End: 2023-02-06
Payer: COMMERCIAL

## 2023-02-06 VITALS
SYSTOLIC BLOOD PRESSURE: 135 MMHG | TEMPERATURE: 96.9 F | OXYGEN SATURATION: 95 % | WEIGHT: 214.6 LBS | RESPIRATION RATE: 16 BRPM | HEART RATE: 72 BPM | DIASTOLIC BLOOD PRESSURE: 67 MMHG | BODY MASS INDEX: 33.68 KG/M2 | HEIGHT: 67 IN

## 2023-02-06 DIAGNOSIS — J96.11 CHRONIC RESPIRATORY FAILURE WITH HYPOXIA (H): ICD-10-CM

## 2023-02-06 DIAGNOSIS — J84.89 ORGANIZING PNEUMONIA (H): Primary | ICD-10-CM

## 2023-02-06 DIAGNOSIS — J84.89 ORGANIZING PNEUMONIA (H): ICD-10-CM

## 2023-02-06 LAB — CRP SERPL-MCNC: <3 MG/L

## 2023-02-06 PROCEDURE — G0463 HOSPITAL OUTPT CLINIC VISIT: HCPCS | Performed by: INTERNAL MEDICINE

## 2023-02-06 PROCEDURE — 86140 C-REACTIVE PROTEIN: CPT | Performed by: INTERNAL MEDICINE

## 2023-02-06 PROCEDURE — 36415 COLL VENOUS BLD VENIPUNCTURE: CPT

## 2023-02-06 PROCEDURE — 36591 DRAW BLOOD OFF VENOUS DEVICE: CPT

## 2023-02-06 PROCEDURE — 99214 OFFICE O/P EST MOD 30 MIN: CPT | Performed by: INTERNAL MEDICINE

## 2023-02-06 ASSESSMENT — PAIN SCALES - GENERAL: PAINLEVEL: NO PAIN (0)

## 2023-02-06 NOTE — TELEPHONE ENCOUNTER
Patient Quality Outreach    Patient is due for the following:   Diabetes -  Diabetic Follow-Up Visit    Next Steps:   Schedule a office visit for diabetes follow up.    Type of outreach:    Phone, left message for patient/parent to call back.    Next Steps:  Reach out within 90 days via Plibber.    Max number of attempts reached: No. Will try again in 90 days if patient still on fail list.    Questions for provider review:    None     Thais Cadet

## 2023-02-06 NOTE — PROGRESS NOTES
Overnight oximetry study order faxed to [facility], fax number [phone]. Receipt confirmed via RightFax.      Kasia Suggs RN on 2/6/2023 at 12:05 PM

## 2023-02-06 NOTE — PROGRESS NOTES
Medical Assistant Note:  Gricelda Sabillon presents today for blood draw.    Patient seen by provider today: Yes: Dr. Sosa   present during visit today: Not Applicable.    Concerns: No Concerns.    Procedure:  Labs drawn    Post Assessment:  Labs drawn without difficulty: Yes.    Discharge Plan:  Departure Mode: Ambulatory.    Face to Face Time: 10 min.    Eliana Corona CMA

## 2023-02-06 NOTE — PROGRESS NOTES
"[facility] Follow Up Visit    Reason for Visit  Gricelda Sabillon is a 72 year old female who is seen in follow up for immune checkpoint inhibitor pneumonitis   She sees Rosibel Florez and Melissa   Pulmonary HPI    - Melva has been off prednisone for about 2 weeks, still with elevated blood sugar despite insulin therapy. She continues to have low energy.   - she is not using oxygen most of the time but occasionally feels poorly (visual changes) and puts it on, hasn't been below 90% in the last month, 95% this morning upon waking on oxygen, down to 93% off oxygen      In 2022 she noted a lump on her thigh, it was indeterminate and removed, found to be melanoma. During the workup, abnormal imaging found in the uterus so she had hysterectomy. Last week she had a percutaneous lung biopsy and the week before she was diagnosed with pulmonary embolism  - she was coughing before the hospitalization, improved since treatment for PE, breathing is improved, can \"almost\" take a deep breath  - she is taking lovenox injections  - unclear if the PE was from malignancy vs postoperative inactivity      Timeline of medical care this year:   - Jan 2022 leg mass resection; March 2022 extended surgical resection for margins   Jan 2022 CT showing lung nodule, not PET FDG avid  May port placed - April 29 first immunotherapy Keytruda, every 3 weeks May 20, Neha 10, July 5 last treatment  - June 2022 hysterectomy, lymph nodes negative for malignancy, low grade endometrial cancer  After the hysterectomy, she had some issues with nausea, poor appetite. Thought it might be semaglutide so it was stopped. No other new medications.  Routine visit on 7/25/22 with Dr Torres showed hypoxemia, she went to ED and noted to have a PE. She didn't feel that bad but became short of breath at the clinic - discharged with 4 lpm supplemental oxygen, she is on 2 lpm now, usually 92-93%  She continues to experience " "lightheadedness, nausea, chills every morning. No appetite, nothing tastes good.   - Aug 2022 lung biopsy percutaneous   - history of 1-2 episodes of bronchitis annually, family history of asthma. Has used albuterol in the past for these episodes  - she has been hanging about the house, no camping or outdoor activities, no pets    ROS Pulmonary  Dyspnea: Yes, Cough: Yes, Chest pain: No, Wheezing: No, Sputum Production: No, Hemoptysis: No  A complete ROS was otherwise negative except as noted in the HPI.  The patient was seen and examined by Roselia Sosa MD   Current Outpatient Medications   Medication     atenolol (TENORMIN) 50 MG tablet     atorvastatin (LIPITOR) 40 MG tablet     azelastine (ASTELIN) 0.1 % nasal spray     azelastine (OPTIVAR) 0.05 % SOLN     cetirizine (ZYRTEC) 10 MG tablet     Continuous Blood Gluc Sensor (FREESTYLE DAVID 14 DAY SENSOR) MISC     CONTOUR NEXT TEST test strip     glipiZIDE (GLUCOTROL) 5 MG tablet     insulin  UNIT/ML vial     insulin syringe-needle U-100 (BD INSULIN SYRINGE ULTRAFINE) 31G X 5/16\" 0.5 ML miscellaneous     latanoprost (XALATAN) 0.005 % ophthalmic solution     lidocaine-prilocaine (EMLA) 2.5-2.5 % external cream     LORazepam (ATIVAN) 0.5 MG tablet     ondansetron (ZOFRAN) 8 MG tablet     senna-docusate (SENOKOT-S/PERICOLACE) 8.6-50 MG tablet     timolol maleate (TIMOPTIC) 0.5 % ophthalmic solution     triamcinolone (KENALOG) 0.5 % external ointment     VENTOLIN  (90 Base) MCG/ACT inhaler     XARELTO ANTICOAGULANT 20 MG TABS tablet     predniSONE (DELTASONE) 5 MG tablet     No current facility-administered medications for this visit.     Allergies   Allergen Reactions     Bromfenac Visual Disturbance      xibrom  Causes sever eye pain     Codeine      \"NAUSE,HA AND DIZZINESS\"     Codeine Nausea     Other reaction(s): Dizziness, Headache     Metformin GI Disturbance     Social History     Socioeconomic History     Marital status:      Spouse " name: Allen     Number of children: 3     Years of education: 15     Highest education level: Not on file   Occupational History     Not on file   Tobacco Use     Smoking status: Never     Passive exposure: Never     Smokeless tobacco: Never   Vaping Use     Vaping Use: Never used   Substance and Sexual Activity     Alcohol use: No     Alcohol/week: 0.0 standard drinks     Drug use: No     Sexual activity: Not Currently     Partners: Male   Other Topics Concern     Parent/sibling w/ CABG, MI or angioplasty before 65F 55M? Not Asked   Social History Narrative     Not on file     Social Determinants of Health     Financial Resource Strain: Not on file   Food Insecurity: Not on file   Transportation Needs: Not on file   Physical Activity: Not on file   Stress: Not on file   Social Connections: Not on file   Intimate Partner Violence: Not At Risk     Fear of Current or Ex-Partner: No     Emotionally Abused: No     Physically Abused: No     Sexually Abused: No   Housing Stability: Not on file     Past Medical History:   Diagnosis Date     Asthma, mild intermittent      Cataract      Endometrial cancer (H) 06/2022     HTN, goal below 140/90      Hyperlipidemia LDL goal < 100      Macular hole of left eye      Melanoma (H)     RIGHT KNEE     Sleep apnea     uses c pap     Type 2 diabetes, HbA1C goal < 8% (H)      Past Surgical History:   Procedure Laterality Date     ARTHROPLASTY HIP Right 9/25/2017    Procedure: ARTHROPLASTY HIP;  Right total hip arthroplasty  ;  Surgeon: Ayaan Pena MD;  Location: [facility] OR     ARTHROPLASTY KNEE  7/12/2013    Procedure: ARTHROPLASTY KNEE;  right total knee arthroplasty ;  Surgeon: Chaparro Wesley MD;  Location:  OR     ARTHROSCOPY KNEE Right 1/21/2015    Procedure: ARTHROSCOPY KNEE;  Surgeon: Ayaan Pena MD;  Location: [facility] OR     BRONCHOSCOPY (RIGID OR FLEXIBLE), DIAGNOSTIC N/A 8/11/2022    Procedure: BRONCHOSCOPY, WITH BRONCHOALVEOLAR LAVAGE;  Surgeon: Ian  Roselia Alfaro MD;  Location:  GI     C INCISION OF HYMEN       CATARACT IOL, RT/LT       COLONOSCOPY  2008     COLONOSCOPY N/A 4/18/2019    Procedure: Colonoscopy with polypectomy;  Surgeon: Shaun Guerrero MD;  [facility]: [facility]     CYSTOSCOPY N/A 6/23/2022    Procedure: Cystoscopy;  Surgeon: Eli Guidry MD;  [facility]     ENDOSCOPIC RETROGRADE CHOLANGIOPANCREATOGRAM N/A 2/6/2019    Procedure: COMBINED ENDOSCOPIC RETROGRADE CHOLANGIOPANCREATOGRAPHY, BILIARY SPINCTEROTOMY AND DILATION, PLACE BILE DUCT STENT;  Surgeon: Guru Andie Gonzalez MD;  [facility]     ENDOSCOPIC RETROGRADE CHOLANGIOPANCREATOGRAM N/A 2/28/2019    Procedure: Endoscopic Retrograde Cholangiopancreatogram, Bile duct stent removal and placement;  Surgeon: Shaun Guerrero MD;  [facility]     ENDOSCOPIC RETROGRADE CHOLANGIOPANCREATOGRAM N/A 4/18/2019    Procedure: COMBINED ENDOSCOPIC RETROGRADE CHOLANGIOPANCREATOGRAPHY, Bile duct stent exchange and Polypectomy;  Surgeon: Shaun Guerrero MD;  [facility]     ENDOSCOPIC RETROGRADE CHOLANGIOPANCREATOGRAM N/A 10/18/2019    Procedure: Endoscopic Retrograde Cholangiopancreatogram;  Surgeon: Shaun Guerrero MD;  [facility]     ENDOSCOPIC RETROGRADE CHOLANGIOPANCREATOGRAM N/A 3/24/2022    Procedure: ENDOSCOPIC RETROGRADE CHOLANGIOPANCREATOGRAPHY;  Surgeon: Shaun Guerrero MD;  Location:  OR     ENDOSCOPIC RETROGRADE CHOLANGIOPANCREATOGRAM WITH SPYGLASS N/A 7/26/2019    Procedure: Endoscopic Retrograde Cholangiopancreatogram With Spyglas,l Radiofrequency Ablation, Stent Exchangeand Duodenal Biopsy x2;  Surgeon: Shaun Guerrero MD;  [facility]     ENDOSCOPIC RETROGRADE CHOLANGIOPANCREATOGRAM WITH SPYGLASS N/A 9/10/2020    Procedure: ENDOSCOPIC RETROGRADE CHOLANGIOPANCREATOGRAPHY, WITH DIRECT DUCT VISUALIZATION, USING PANCREATICOBILIARY FIBEROPTIC PROBE;  Surgeon: Shaun Guerrero MD;  [facility]     ENDOSCOPIC RETROGRADE CHOLANGIOPANCREATOGRAM WITH  JOSE MYGANI N/A 3/22/2021    Procedure: ENDOSCOPIC RETROGRADE CHOLANGIOPANCREATOGRAPHY, WITH DIRECT DUCT VISUALIZATION, USING PANCREATICOBILIARY FIBEROPTIC PROBE;  Surgeon: Shaun Guerrero MD;  [facility]     ESOPHAGOSCOPY, GASTROSCOPY, DUODENOSCOPY (EGD) WITH RADIO FREQUENCY ABLATION, COMBINED N/A 9/10/2020    Procedure: possible repeat ESOPHAGOGASTRODUODENOSCOPY, WITH RADIOFREQUENCY ABLATION and endoscopic mucosal resection;  Surgeon: Shaun Guerrero MD;  [facility]     ESOPHAGOSCOPY, GASTROSCOPY, DUODENOSCOPY (EGD), COMBINED N/A 4/18/2019    Procedure: upper endoscopy with polypectomy;  Surgeon: Shaun Guerrero MD;  [facility]     ESOPHAGOSCOPY, GASTROSCOPY, DUODENOSCOPY (EGD), COMBINED N/A 10/18/2019    Procedure: Upper Endoscopy and ERCP with stent removal, stone removal and biopsy;  Surgeon: Shanu Guerrero MD;  [facility]     ESOPHAGOSCOPY, GASTROSCOPY, DUODENOSCOPY (EGD), COMBINED N/A 3/24/2022    Procedure: ESOPHAGOGASTRODUODENOSCOPY (EGD), Duodenal  polyp removal;  Surgeon: Shaun Guerrero MD;  Location: [facility] OR     ESOPHAGOSCOPY, GASTROSCOPY, DUODENOSCOPY (EGD), RESECT MUCOSA, COMBINED N/A 2/28/2019    Procedure: Upper Endoscopy, Endoscopic Ultrasound, Endoscopic Mucosal Resection,  Ampullectomy, polypectomy.;  Surgeon: Shaun Guerrero MD;  [facility]     ESOPHAGOSCOPY, GASTROSCOPY, DUODENOSCOPY (EGD), RESECT MUCOSA, COMBINED N/A 3/22/2021    Procedure: ESOPHAGOGASTRODUODENOSCOPY (EGD) with  endoscopic mucosal resection/ polypectomy;  Surgeon: Shaun Guerrero MD;  [facility]     EXCISE LESION LOWER EXTREMITY Right 3/28/2022    Procedure: Wide local excision of right knee melanoma;  Surgeon: Chevy Allison MD;  Location: [facility] OR     EXCISE MASS LOWER EXTREMITY Right 1/13/2022    Procedure: excision of right thigh mass;  Surgeon: Salvador Kelly MD;  Location: [facility]     EYE SURGERY      macular hole repaired left eye     HC KNEE SCOPE, DIAGNOSTIC      - both knees     HEAD & NECK  "SURGERY      wisdom teeth     IR CHEST PORT PLACEMENT > 5 YRS OF AGE  2022     LAPAROSCOPIC HYSTERECTOMY TOTAL, BILATERAL SALPINGO-OOPHORECTOMY, NODE DISSECTION, COMBINED Bilateral 2022    Procedure: Total Laparoscopic Hyserectomy, Bilateral Salpibgo-oophorectomy, Bilateral Chester Lymph Node Dissection;  Surgeon: Eli Guidry MD;  Location:  OR     Family History   Problem Relation Age of Onset     Hypertension Mother         diabetes,hypoythryoidism, stroke     Diabetes Mother      Breast Cancer Mother      Heart Disease Father         , cancer lip     Uterine Cancer Sister      Connective Tissue Disorder Sister         fibromyalgia     Diabetes Sister      Hypertension Brother      Cerebrovascular Disease Maternal Grandmother         , diabetes     Cerebrovascular Disease Maternal Grandfather              Cancer Paternal Grandmother              Heart Disease Paternal Grandfather              Cancer Paternal Aunt         ovarian     Breast Cancer Niece      Asthma Maternal Aunt      Exam:   /67   Pulse 72   Temp 96.9  F (36.1  C) (Tympanic)   Resp 16   Ht 1.702 m (5' 7\")   Wt 97.3 kg (214 lb 9.6 oz)   LMP 2002 (Exact Date)   SpO2 95%   BMI 33.61 kg/m    GENERAL APPEARANCE: Well developed, well nourished, alert, and in no apparent distress.  RESP: normal percussion, good air flow throughout.  Left basilar inspiratory crackles. No rhonchi. No wheezes.  CV: Normal S1, S2, regular rhythm, normal rate. No murmur.  No rub. No gallop. No LE edema.   ABDOMEN:  Bowel sounds normal, soft, nontender, no HSM or masses.   MS: extremities normal. No clubbing. No cyanosis.  SKIN: no rash on limited exam, healed right thigh surgical incisions  NEURO: Mentation intact, speech normal, normal strength and tone, normal gait and stance  PSYCH: mentation appears normal. and affect normal/bright  Results:  - My interpretation of the images relevant for " this visit includes: chest images reviewed today compared to priors. Continued improvement in density/consolidation compared to July 2022,  bilateral patchy ground glass opacity and consolidations in the bases 11/29/2022 PET   - My interpretation of the PFT's relevant for this visit includes: None  (ordered but she was unable to complete)  - lung biopsy 8/2/22 lung and bronchial mucosa with chronic inflammation and scattered fibroblastic foci plugging airspaces.  There is no malignancy.    - bronchoscopy 8/11 cell count and diff was 15% neutropenia and 25% lymphocytes    Assessment and plan: Melva is a 71 yo female with melanoma, pulmonary embolism, diabetes, unexplained lung abnormalities and hypoxic respiratory failure  Hypoxic respiratory failure - multifactorial with recent pulmonary embolism. 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 I think it would be reasonable, depending on how pivotal the therapy is to her treatment.          Pulmonary embolism - acute, related to malignancy? Vs inactivity   She is on Xarelto now    RTC 3 months, next imaging ordered by Dr Florez

## 2023-02-06 NOTE — NURSING NOTE
"Oncology Rooming Note    February 6, 2023 10:14 AM   Gricelda Sabillon is a 72 year old female who presents for:    Chief Complaint   Patient presents with     Lung Nodule     Organizing pneumonia     Initial Vitals: /67   Pulse 72   Temp 96.9  F (36.1  C) (Tympanic)   Resp 16   Ht 1.702 m (5' 7\")   Wt 97.3 kg (214 lb 9.6 oz)   LMP 12/13/2002 (Exact Date)   SpO2 95%   BMI 33.61 kg/m   Estimated body mass index is 33.61 kg/m  as calculated from the following:    Height as of this encounter: 1.702 m (5' 7\").    Weight as of this encounter: 97.3 kg (214 lb 9.6 oz). Body surface area is 2.14 meters squared.  No Pain (0) Comment: Data Unavailable   Patient's last menstrual period was 12/13/2002 (exact date).  Allergies reviewed: Yes  Medications reviewed: Yes    Medications: Medication refills not needed today.  Pharmacy name entered into EPIC:    [id number] - [place], [place] - [address]    Clinical concerns: follow up       Lili Forte, MARSHALL              "

## 2023-02-06 NOTE — LETTER
"    2/6/2023         RE: Gricelda Sabillon  [address]  [facility] [address]        Dear Colleague,    Thank you for referring your patient, Gricelda Sabillon, to the [facility] CANCER [facility]. Please see a copy of my visit note below.    [facility] Follow Up Visit    Reason for Visit  Gricelda Sabillon is a 72 year old female who is seen in follow up for immune checkpoint inhibitor pneumonitis   She sees Drs Gautam and Melissa   Pulmonary HPI    - Melva has been off prednisone for about 2 weeks, still with elevated blood sugar despite insulin therapy. She continues to have low energy.   - she is not using oxygen most of the time but occasionally feels poorly (visual changes) and puts it on, hasn't been below 90% in the last month, 95% this morning upon waking on oxygen, down to 93% off oxygen      In 2022 she noted a lump on her thigh, it was indeterminate and removed, found to be melanoma. During the workup, abnormal imaging found in the uterus so she had hysterectomy. Last week she had a percutaneous lung biopsy and the week before she was diagnosed with pulmonary embolism  - she was coughing before the hospitalization, improved since treatment for PE, breathing is improved, can \"almost\" take a deep breath  - she is taking lovenox injections  - unclear if the PE was from malignancy vs postoperative inactivity      Timeline of medical care this year:   - Jan 2022 leg mass resection; March 2022 extended surgical resection for margins   Jan 2022 CT showing lung nodule, not PET FDG avid  May port placed - April 29 first immunotherapy Keytruda, every 3 weeks May 20, Neha 10, July 5 last treatment  - June 2022 hysterectomy, lymph nodes negative for malignancy, low grade endometrial cancer  After the hysterectomy, she had some issues with nausea, poor appetite. Thought it might be semaglutide so it was stopped. No other new medications.  Routine visit on 7/25/22 " "with Dr Torres showed hypoxemia, she went to ED and noted to have a PE. She didn't feel that bad but became short of breath at the clinic - discharged with 4 lpm supplemental oxygen, she is on 2 lpm now, usually 92-93%  She continues to experience lightheadedness, nausea, chills every morning. No appetite, nothing tastes good.   - Aug 2022 lung biopsy percutaneous   - history of 1-2 episodes of bronchitis annually, family history of asthma. Has used albuterol in the past for these episodes  - she has been hanging about the house, no camping or outdoor activities, no pets    ROS Pulmonary  Dyspnea: Yes, Cough: Yes, Chest pain: No, Wheezing: No, Sputum Production: No, Hemoptysis: No  A complete ROS was otherwise negative except as noted in the HPI.  The patient was seen and examined by Roselia Sosa MD   Current Outpatient Medications   Medication     atenolol (TENORMIN) 50 MG tablet     atorvastatin (LIPITOR) 40 MG tablet     azelastine (ASTELIN) 0.1 % nasal spray     azelastine (OPTIVAR) 0.05 % SOLN     cetirizine (ZYRTEC) 10 MG tablet     Continuous Blood Gluc Sensor (FREESTYLE DAVID 14 DAY SENSOR) MISC     CONTOUR NEXT TEST test strip     glipiZIDE (GLUCOTROL) 5 MG tablet     insulin  UNIT/ML vial     insulin syringe-needle U-100 (BD INSULIN SYRINGE ULTRAFINE) 31G X 5/16\" 0.5 ML miscellaneous     latanoprost (XALATAN) 0.005 % ophthalmic solution     lidocaine-prilocaine (EMLA) 2.5-2.5 % external cream     LORazepam (ATIVAN) 0.5 MG tablet     ondansetron (ZOFRAN) 8 MG tablet     senna-docusate (SENOKOT-S/PERICOLACE) 8.6-50 MG tablet     timolol maleate (TIMOPTIC) 0.5 % ophthalmic solution     triamcinolone (KENALOG) 0.5 % external ointment     VENTOLIN  (90 Base) MCG/ACT inhaler     XARELTO ANTICOAGULANT 20 MG TABS tablet     predniSONE (DELTASONE) 5 MG tablet     No current facility-administered medications for this visit.     Allergies   Allergen Reactions     Bromfenac Visual Disturbance     " " xibrom  Causes sever eye pain     Codeine      \"NAUSE,HA AND DIZZINESS\"     Codeine Nausea     Other reaction(s): Dizziness, Headache     Metformin GI Disturbance     Social History     Socioeconomic History     Marital status:      Spouse name: Allen     Number of children: 3     Years of education: 15     Highest education level: Not on file   Occupational History     Not on file   Tobacco Use     Smoking status: Never     Passive exposure: Never     Smokeless tobacco: Never   Vaping Use     Vaping Use: Never used   Substance and Sexual Activity     Alcohol use: No     Alcohol/week: 0.0 standard drinks     Drug use: No     Sexual activity: Not Currently     Partners: Male   Other Topics Concern     Parent/sibling w/ CABG, MI or angioplasty before 65F 55M? Not Asked   Social History Narrative     Not on file     Social Determinants of Health     Financial Resource Strain: Not on file   Food Insecurity: Not on file   Transportation Needs: Not on file   Physical Activity: Not on file   Stress: Not on file   Social Connections: Not on file   Intimate Partner Violence: Not At Risk     Fear of Current or Ex-Partner: No     Emotionally Abused: No     Physically Abused: No     Sexually Abused: No   Housing Stability: Not on file     Past Medical History:   Diagnosis Date     Asthma, mild intermittent      Cataract      Endometrial cancer (H) 06/2022     HTN, goal below 140/90      Hyperlipidemia LDL goal < 100      Macular hole of left eye      Melanoma (H)     RIGHT KNEE     Sleep apnea     uses c pap     Type 2 diabetes, HbA1C goal < 8% (H)      Past Surgical History:   Procedure Laterality Date     ARTHROPLASTY HIP Right 9/25/2017    Procedure: ARTHROPLASTY HIP;  Right total hip arthroplasty  ;  Surgeon: Ayaan Pena MD;  Location:  OR     ARTHROPLASTY KNEE  7/12/2013    Procedure: ARTHROPLASTY KNEE;  right total knee arthroplasty ;  Surgeon: Chaparro Wesley MD;  Location:  OR     ARTHROSCOPY " KNEE Right 1/21/2015    Procedure: ARTHROSCOPY KNEE;  Surgeon: Ayaan Pena MD;  Location: [facility] OR     BRONCHOSCOPY (RIGID OR FLEXIBLE), DIAGNOSTIC N/A 8/11/2022    Procedure: BRONCHOSCOPY, WITH BRONCHOALVEOLAR LAVAGE;  Surgeon: Roselia Sosa MD;  Location: [facility] GI     C INCISION OF HYMEN       CATARACT IOL, RT/LT       COLONOSCOPY  2008     COLONOSCOPY N/A 4/18/2019    Procedure: Colonoscopy with polypectomy;  Surgeon: Shaun Guerrero MD;  Location: [facility] OR     CYSTOSCOPY N/A 6/23/2022    Procedure: Cystoscopy;  Surgeon: Eli Guidry MD;  [facility]     ENDOSCOPIC RETROGRADE CHOLANGIOPANCREATOGRAM N/A 2/6/2019    Procedure: COMBINED ENDOSCOPIC RETROGRADE CHOLANGIOPANCREATOGRAPHY, BILIARY SPINCTEROTOMY AND DILATION, PLACE BILE DUCT STENT;  Surgeon: Guru Andie Gonzalez MD;  [facility]     ENDOSCOPIC RETROGRADE CHOLANGIOPANCREATOGRAM N/A 2/28/2019    Procedure: Endoscopic Retrograde Cholangiopancreatogram, Bile duct stent removal and placement;  Surgeon: Shaun Guerrero MD;  [facility]     ENDOSCOPIC RETROGRADE CHOLANGIOPANCREATOGRAM N/A 4/18/2019    Procedure: COMBINED ENDOSCOPIC RETROGRADE CHOLANGIOPANCREATOGRAPHY, Bile duct stent exchange and Polypectomy;  Surgeon: Shaun Guerrero MD;  [facility]     ENDOSCOPIC RETROGRADE CHOLANGIOPANCREATOGRAM N/A 10/18/2019    Procedure: Endoscopic Retrograde Cholangiopancreatogram;  Surgeon: Shaun Guerrero MD;  [facility]     ENDOSCOPIC RETROGRADE CHOLANGIOPANCREATOGRAM N/A 3/24/2022    Procedure: ENDOSCOPIC RETROGRADE CHOLANGIOPANCREATOGRAPHY;  Surgeon: Shaun Guerrero MD;  Location:  OR     ENDOSCOPIC RETROGRADE CHOLANGIOPANCREATOGRAM WITH SPYGLASS N/A 7/26/2019    Procedure: Endoscopic Retrograde Cholangiopancreatogram With Spyglas,l Radiofrequency Ablation, Stent Exchangeand Duodenal Biopsy x2;  Surgeon: Shaun Guerrero MD;  [facility]     ENDOSCOPIC RETROGRADE CHOLANGIOPANCREATOGRAM WITH SPYGLASS N/A  9/10/2020    Procedure: ENDOSCOPIC RETROGRADE CHOLANGIOPANCREATOGRAPHY, WITH DIRECT DUCT VISUALIZATION, USING PANCREATICOBILIARY FIBEROPTIC PROBE;  Surgeon: Shaun Guerrero MD;  [facility]     ENDOSCOPIC RETROGRADE CHOLANGIOPANCREATOGRAM WITH SPYGLASS N/A 3/22/2021    Procedure: ENDOSCOPIC RETROGRADE CHOLANGIOPANCREATOGRAPHY, WITH DIRECT DUCT VISUALIZATION, USING PANCREATICOBILIARY FIBEROPTIC PROBE;  Surgeon: Shaun Guerrero MD;  [facility]     ESOPHAGOSCOPY, GASTROSCOPY, DUODENOSCOPY (EGD) WITH RADIO FREQUENCY ABLATION, COMBINED N/A 9/10/2020    Procedure: possible repeat ESOPHAGOGASTRODUODENOSCOPY, WITH RADIOFREQUENCY ABLATION and endoscopic mucosal resection;  Surgeon: Shaun Guerrero MD;  [facility]     ESOPHAGOSCOPY, GASTROSCOPY, DUODENOSCOPY (EGD), COMBINED N/A 4/18/2019    Procedure: upper endoscopy with polypectomy;  Surgeon: Shaun Guerrero MD;  [facility]     ESOPHAGOSCOPY, GASTROSCOPY, DUODENOSCOPY (EGD), COMBINED N/A 10/18/2019    Procedure: Upper Endoscopy and ERCP with stent removal, stone removal and biopsy;  Surgeon: Shaun Guerrero MD;  [facility]     ESOPHAGOSCOPY, GASTROSCOPY, DUODENOSCOPY (EGD), COMBINED N/A 3/24/2022    Procedure: ESOPHAGOGASTRODUODENOSCOPY (EGD), Duodenal  polyp removal;  Surgeon: Shaun Guerrero MD;  Location: [facility] OR     ESOPHAGOSCOPY, GASTROSCOPY, DUODENOSCOPY (EGD), RESECT MUCOSA, COMBINED N/A 2/28/2019    Procedure: Upper Endoscopy, Endoscopic Ultrasound, Endoscopic Mucosal Resection,  Ampullectomy, polypectomy.;  Surgeon: Shaun Guerrero MD;  [facility]     ESOPHAGOSCOPY, GASTROSCOPY, DUODENOSCOPY (EGD), RESECT MUCOSA, COMBINED N/A 3/22/2021    Procedure: ESOPHAGOGASTRODUODENOSCOPY (EGD) with  endoscopic mucosal resection/ polypectomy;  Surgeon: Shaun Guerrero MD;  [facility]     EXCISE LESION LOWER EXTREMITY Right 3/28/2022    Procedure: Wide local excision of right knee melanoma;  Surgeon: Chevy Allison MD;  Location: [facility] OR     EXCISE MASS  "LOWER EXTREMITY Right 2022    Procedure: excision of right thigh mass;  Surgeon: Salvador Kelly MD;  Location: [facility] OR     EYE SURGERY      macular hole repaired left eye     HC KNEE SCOPE, DIAGNOSTIC      - both knees     HEAD & NECK SURGERY      wisdom teeth     IR CHEST PORT PLACEMENT > 5 YRS OF AGE  2022     LAPAROSCOPIC HYSTERECTOMY TOTAL, BILATERAL SALPINGO-OOPHORECTOMY, NODE DISSECTION, COMBINED Bilateral 2022    Procedure: Total Laparoscopic Hyserectomy, Bilateral Salpibgo-oophorectomy, Bilateral [place] Lymph Node Dissection;  Surgeon: Eli Guidry MD;  [facility]     Family History   Problem Relation Age of Onset     Hypertension Mother         diabetes,hypoythryoidism, stroke     Diabetes Mother      Breast Cancer Mother      Heart Disease Father         , cancer lip     Uterine Cancer Sister      Connective Tissue Disorder Sister         fibromyalgia     Diabetes Sister      Hypertension Brother      Cerebrovascular Disease Maternal Grandmother         , diabetes     Cerebrovascular Disease Maternal Grandfather              Cancer Paternal Grandmother              Heart Disease Paternal Grandfather              Cancer Paternal Aunt         ovarian     Breast Cancer Niece      Asthma Maternal Aunt      Exam:   /67   Pulse 72   Temp 96.9  F (36.1  C) (Tympanic)   Resp 16   Ht 1.702 m (5' 7\")   Wt 97.3 kg (214 lb 9.6 oz)   LMP 2002 (Exact Date)   SpO2 95%   BMI 33.61 kg/m    GENERAL APPEARANCE: Well developed, well nourished, alert, and in no apparent distress.  RESP: normal percussion, good air flow throughout.  Left basilar inspiratory crackles. No rhonchi. No wheezes.  CV: Normal S1, S2, regular rhythm, normal rate. No murmur.  No rub. No gallop. No LE edema.   ABDOMEN:  Bowel sounds normal, soft, nontender, no HSM or masses.   MS: extremities normal. No clubbing. No cyanosis.  SKIN: no rash on limited exam, " healed right thigh surgical incisions  NEURO: Mentation intact, speech normal, normal strength and tone, normal gait and stance  PSYCH: mentation appears normal. and affect normal/bright  Results:  - My interpretation of the images relevant for this visit includes: chest images reviewed today compared to priors. Continued improvement in density/consolidation compared to July 2022,  bilateral patchy ground glass opacity and consolidations in the bases 11/29/2022 PET   - My interpretation of the PFT's relevant for this visit includes: None  (ordered but she was unable to complete)  - lung biopsy 8/2/22 lung and bronchial mucosa with chronic inflammation and scattered fibroblastic foci plugging airspaces.  There is no malignancy.    - bronchoscopy 8/11 cell count and diff was 15% neutropenia and 25% lymphocytes    Assessment and plan: Melva is a 73 yo female with melanoma, pulmonary embolism, diabetes, unexplained lung abnormalities and hypoxic respiratory failure  Hypoxic respiratory failure - multifactorial with recent pulmonary embolism. 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 I think it would be reasonable, depending on how pivotal the therapy is to her treatment.          Pulmonary embolism - acute, related to malignancy? Vs inactivity   She is on Xarelto now    RTC 3 months, next imaging ordered by Dr Florez       Again, thank you for  allowing me to participate in the care of your patient.        Sincerely,        Roselia Sosa MD

## 2023-02-06 NOTE — PATIENT INSTRUCTIONS
CRP blood test today to see the level of inflammation today off prednisone    Keep monitoring blood oxygen and cough for signs of the lung inflammation returning.     We will do overnight off of any oxygen on CPAP    Order for overnight study faxed  CT: 4/7  RTC: 5/15  Kasia Suggs RN on 3/24/2023 at 2:28 PM

## 2023-02-09 ENCOUNTER — VIRTUAL VISIT (OUTPATIENT)
Dept: PHARMACY | Facility: CLINIC | Age: 73
End: 2023-02-09
Payer: COMMERCIAL

## 2023-02-09 DIAGNOSIS — E11.65 TYPE 2 DIABETES MELLITUS WITH HYPERGLYCEMIA, WITHOUT LONG-TERM CURRENT USE OF INSULIN (H): ICD-10-CM

## 2023-02-09 DIAGNOSIS — C43.9 MELANOMA OF SKIN (H): ICD-10-CM

## 2023-02-09 DIAGNOSIS — F41.9 ANXIETY: ICD-10-CM

## 2023-02-09 DIAGNOSIS — E78.5 HYPERLIPIDEMIA WITH TARGET LDL LESS THAN 100: ICD-10-CM

## 2023-02-09 DIAGNOSIS — J30.9 ALLERGIC RHINITIS, UNSPECIFIED SEASONALITY, UNSPECIFIED TRIGGER: ICD-10-CM

## 2023-02-09 DIAGNOSIS — I26.99 OTHER PULMONARY EMBOLISM WITHOUT ACUTE COR PULMONALE, UNSPECIFIED CHRONICITY (H): ICD-10-CM

## 2023-02-09 DIAGNOSIS — J84.89 ORGANIZING PNEUMONIA (H): ICD-10-CM

## 2023-02-09 DIAGNOSIS — I10 HTN, GOAL BELOW 140/90: Primary | ICD-10-CM

## 2023-02-09 PROCEDURE — 99606 MTMS BY PHARM EST 15 MIN: CPT | Performed by: PHARMACIST

## 2023-02-09 PROCEDURE — 99607 MTMS BY PHARM ADDL 15 MIN: CPT | Performed by: PHARMACIST

## 2023-02-09 NOTE — PROGRESS NOTES
Medication Therapy Management (MTM) Encounter    ASSESSMENT:                            Medication Adherence/Access: No issues identified    Type 2 Diabetes:  A1c not at goal.  Home readings at goal 44%.  Will change NPH 20 units twice daily until vial is gone and then change to Basaglar 40 units daily.    Walked through how to use the Basaglar pen again today.    Hyperlipidemia: stable    Hypertension: home readings up this past week.  Discussed restarting hydrochlorothiazide but she wanted to wait.  Keep feet elevated.      Melanoma/PE//Pneumonia: symptoms of headache may be due to prednisone withdrawal. She will continue to monitor and reach out if symptoms continue or get worse.    Anxiety:  stable      Allergic Rhinitis: stable    PLAN:                            1. NPH 20 units twice daily until vial is gone and then change to Basaglar 40 units once daily.  2.  Continue to monitor blood pressure at home.  Keep feet elevated when sitting.    Follow-up: Review blood sugars in 1 week.  Then follow-up in 2 weeks.    SUBJECTIVE/OBJECTIVE:                          Gricelda Sabillon is a 72 year old female called for a follow-up visit.  Today's visit is a follow-up MTM visit from 1/10/23.     Reason for visit: diabetes    Allergies/ADRs: Reviewed in chart  Tobacco: She reports that she has never smoked. She has never been exposed to tobacco smoke. She has never used smokeless tobacco.  Alcohol: not currently using    Medication Adherence/Access: no issues reported    Type 2 Diabetes:  Currently taking Glipizide 10 mg daily and NPH 35 units daily. Patient is not experiencing side effects.   Past medications- metformin (GI upset). She has a supply of Basaglar pens at home that have not been used.    Stopped prednisone about 2 week ago.  Not feeling well - gets migraine type symptoms with her eyes.  Don't last long, maybe 30 minutes at the longest.  Yesterday really dizzy and 'off' (used the wrong credit card, moved  something and dropped it).  Compared to yesterday feels better today.  Going into mode of not being hungry.      Blood sugar monitoring: Continuous Glucose Monitor.                 Symptoms of low blood sugar? none  Symptoms of high blood sugar? none  Eye exam: due  Foot exam: up to date  Aspirin: No  Urine Albumin:   Lab Results   Component Value Date    UMALCR 8.99 03/21/2022      Lab Results   Component Value Date    A1C 8.9 12/30/2022    A1C 8.8 09/01/2022    A1C 8.3 07/12/2022    A1C 7.7 03/21/2022    A1C 7.8 11/15/2021    A1C 7.1 03/03/2021    A1C 7.2 08/24/2020    A1C 7.3 05/21/2020    A1C 7.0 02/20/2020    A1C 6.5 10/31/2019       Hyperlipidemia: Current therapy includes atorvastatin 40 mg daily.  Patient reports some arm cramps - nothing for past 5-6 days.  Reports staying hydrated.      Recent Labs   Lab Test 12/30/22  0838 11/01/22  1318 09/07/16  0821 08/03/15  0835   CHOL 193 216*   < > 132   HDL 38* 45*   < > 42*   * 115*   < > 57   TRIG 249* 282*   < > 167*   CHOLHDLRATIO  --   --   --  3.1    < > = values in this interval not displayed.     Hypertension: Current medications include atenolol 25 mg daily.    Feet are starting to swelling. Prior to hospitalization was on hydrochlorothiazide 25 mgwhich helped with the swelling.   Patient does self-monitor blood pressure. Home BP monitoring in the past week 140/74, 133/74, which is higher for her.  Prior was 91/65 on 2/3, 120s.  Pulse at home 60,61,62,59.  Patient reports no current medication side effects.  Doesn't think she changed her diet.    BP Readings from Last 3 Encounters:   01/06/23 108/55   12/13/22 (!) 140/71   11/28/22 (!) 147/61       Melanoma/PE//Pneumonia: Followed by Dr. Sosa and Respiratory Therapy.  Taking Xarelto 20 mg daily. Tapered off prednisone about 2 weeks ago.     No concerns with side effects.    Continues to wear oxygen at 0.5 L overnight; goes without during the day if able.  Will use Ventolin as needed; not needed  at this time.    Uses Emla as needed prior to treatment.    Ondansetron as needed.    Trying to walk more and use the wheelchair less.  No new concerns. No recurrent visual hallucinations.    Anxiety:  Using lorazepam as needed.  Only used 1-2 tablets since Aug.  Doesn't like taking but will if she needs it.          Allergic Rhinitis: Current medications include cetirizine 10 mg once daily, Astelin nasal spray as needed, Optivar eye drops as needed. Primary symptoms are nasal congestion and raspy voice. Patient feels that current therapy is effective.     Today's Vitals: [facility] 12/13/2002 (Exact Date)   ----------------      I spent 30 minutes with this patient today. All changes were made via collaborative practice agreement with Raisa Davidson MD. A copy of the visit note was provided to the patient's provider(s).    A summary of these recommendations was sent via [employer].    Arlet Harding , Pharm D  [phone] (phone)  Medication Therapy Management Pharmacist     Telemedicine Visit Details  Type of service:  Telephone visit  Start Time: 830am  End Time: 900am     Medication Therapy Recommendations  Diabetes mellitus, type 2 (H)    Current Medication: insulin  UNIT/ML vial   Rationale: More effective medication available - Ineffective medication - Effectiveness   Recommendation: Change Medication   Status: Accepted per CPA

## 2023-02-09 NOTE — PATIENT INSTRUCTIONS
"Recommendations from today's MTM visit:                                                         1. NPH 20 units twice daily until vial is gone and then change to Basaglar 40 units once daily.  2.  Continue to monitor blood pressure at home.  Keep feet elevated when sitting.    Follow-up: Return in about 2 weeks (around 2/23/2023) for Medication Therapy Management.    It was great speaking with you today.  I value your experience and would be very thankful for your time in providing feedback in our clinic survey. In the next few days, you may receive an email or text message from [employer] with a link to a survey related to your  clinical pharmacist.\"     To schedule another MTM appointment, please call the clinic directly or you may call the MTM scheduling line at [phone] or toll-free at [phone].     My Clinical Pharmacist's contact information:                                                      Please feel free to contact me with any questions or concerns you have.      Arlet Harding , Pharm D  [phone] (phone)  Medication Therapy Management Pharmacist     "

## 2023-02-16 ENCOUNTER — ONCOLOGY VISIT (OUTPATIENT)
Dept: RADIATION ONCOLOGY | Facility: CLINIC | Age: 73
End: 2023-02-16
Payer: COMMERCIAL

## 2023-02-16 NOTE — LETTER
2/16/2023         RE: Gricelda Sabillon  [address]  [facility] [address]        Dear Colleague,    Thank you for referring your patient, Gricelda Sabillon, to the [facility] RADIATION ONCOLOGY. Please see a copy of my visit note below.    No notes on file    Again, thank you for allowing me to participate in the care of your patient.        Sincerely,        Rolo Arnett MD

## 2023-02-16 NOTE — LETTER
2/16/2023      RE: Gricelda Sabillon  [address] [facility] [address]       No notes on file    Rolo Arnett MD

## 2023-02-20 NOTE — PROCEDURES
Radiotherapy Treatment Summary          Date of Report: 2023     PATIENT: MOLINA KO  MEDICAL RECORD NO: [id number]  : 1950     DIAGNOSIS: C55 Malignant neoplasm of uterus, part unspecified  INTENT OF RADIOTHERAPY: Cure  PATHOLOGY:      Endometrioid adenocarcinoma, grade 1                             STAGE: FIGO IB, grade 1  CONCURRENT SYSTEMIC THERAPY: None             Details of the treatments summarized below are found in records kept in the Department of Radiation Oncology at [facility].     Treatment Summary:  Radiation Oncology - Course: 1 Protocol:   Treatment Site Current Dose Modality From To Elapsed Days Fx.  1 Brachy  2,100 cGy  10/24/2022 10/28/2022   4  3                Dose per Fraction: 700cGy  Total Dose:    2100 cGy          COMMENTS:      Ms. Vargas is a 73 yo female with a newly diagnosed endometrial cancer. She was diagnosed with   metastatic melanoma in . As part of the work-up/staging, a PET/CT was performed which noted some FDG avidity   within the uterine fundus, confirmed by pelvic US. In retrospect, she had 6 month history of brown mucous discharge.   Endometrial biopsy on 2022 showed grade 1 endometroid adenocarcinoma, arising in a background of atypical   hyperplasia, with focal mucinous features.      She was referred to Dr. Herrera and underwent TLH/BSO/SLN biopsy on 2022. There were no concerning   intraoperative findings. Final pathology again showed endometrioid adenocarcinoma, FIGO grade 1, measuring 2.2 cm in   greatest dimension, with myometrial invasion of 10 mm / 12 mm (83.3%), but no RIN, cervical stromal, serosal, adnexal   involvement. LVSI was not identified. A total of 8 SLN (3 on the left and 5 on the right) were all negative. 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 FIGO IB. L1-CAM was negative.      She was referred to us for consideration of adjuvant brachytherapy given deep  myometrial invasion. Her referral was   delayed due to multiple medical issues. She ultimately proceeded with the treatment in October. She received 2100 cGy in   3 fractions prescribed to the 0.5 cm depth. She tolerated the treatment well without experiencing any acute toxicities.                                ED visits/hospitalizations: None     Missed treatments: None     Acute Toxicity Profile by CTC v5.0: None     PAIN MANAGEMENT:  Not required.                         FOLLOW UP PLAN:      1. Follow up with Ms. Kaitlin Romo NP in 1 month  2. Follow up with Dr. Herrera as scheduled.                         Staff Physician: Rolo Arnett M.D.     CC:    Eli Stafford MD                 Radiation Oncology:  [facility] [address] [place] [address]

## 2023-02-21 ENCOUNTER — MYC MEDICAL ADVICE (OUTPATIENT)
Dept: INTERNAL MEDICINE | Facility: CLINIC | Age: 73
End: 2023-02-21
Payer: COMMERCIAL

## 2023-02-21 DIAGNOSIS — I10 HTN, GOAL BELOW 140/90: Primary | ICD-10-CM

## 2023-02-22 NOTE — TELEPHONE ENCOUNTER
Atenolol 50 mg Oral Tab  Routing refill request to provider for review/approval because:  Medication is reported/historical    Appointments in Next Year      Mar 01, 2023 10:00 AM  (Arrive by 9:40 AM)  Pre-Operative Physical with Raisa Davidson MD  [facility] ([facility] ) [phone]     Apr 07, 2023  9:00 AM  (Arrive by 8:30 AM)  CT CHEST/ABDOMEN/PELVIS W CONTRAST with [id number]  [facility] ([facility] ) [phone]     Apr 13, 2023  9:30 AM  LAB with [facility]  [facility] ([facility] ) [phone]     Apr 20, 2023  9:30 AM  (Arrive by 9:10 AM)  Annual Wellness Visit with Raisa Davidson MD  [facility] ([facility] ) [phone]

## 2023-02-23 ENCOUNTER — TELEPHONE (OUTPATIENT)
Dept: PHARMACY | Facility: CLINIC | Age: 73
End: 2023-02-23
Payer: COMMERCIAL

## 2023-02-23 DIAGNOSIS — E11.65 TYPE 2 DIABETES MELLITUS WITH HYPERGLYCEMIA, WITHOUT LONG-TERM CURRENT USE OF INSULIN (H): ICD-10-CM

## 2023-02-23 NOTE — TELEPHONE ENCOUNTER
Called patient to follow-up on blood sugars.  Has been taking NPH 22 units twice daily - has about 1/3 of a vial and a whole vial and she should like to use this up.  Plan- NPH 24 units twice daily.  Will discuss pioglitazone with Dr. Torres to help with insulin resistance.  Does not tolerate metformin.

## 2023-02-24 ENCOUNTER — DOCUMENTATION ONLY (OUTPATIENT)
Dept: HOME HEALTH SERVICES | Facility: CLINIC | Age: 73
End: 2023-02-24
Payer: COMMERCIAL

## 2023-02-24 NOTE — PROGRESS NOTES
[facility] RX SIGNED BY: DELORIS CALDERA  DATE: 2/6/2023  STUDY PERFORMED ON (PAP/O2/RA): CURRENT PAP SETTINGS- NO OXYGEN  DOWNLOAD METHOD (BREATHE/NONIN): NONIN  PROVIDER FAX: [phone]    DID YOU DOCUMENT CONTACT ATTEMPTS IN [facility]? YES   AFTER FIRST ATTEMPT PLEASE VERIFY PHONE NUMBER IN "[employer] MATCHES PHONE NUMBER IN EPIC.    2/24/2023ALICIAM- CONTACTED PATIENT, SHE WOULD LIKE TO  THE STUDY IN [place]. I SPOKE WITH HER ABOUT HOW TO COMPLETE THE STUDY ACCORDING TO THE MD ORDER. SHE IS NOT EXCITED ABOUT DOING THE STUDY WITHOUT OXYGEN, AS TO HER THIS MEANS NO SLEEP. SHE STATED SHE WILL DO IT THOUGH AS THIS IS WHAT THE MD ORDERED  - SENDING ASHLYN IN [place] TO [place] [facility], WHO WILL CALL HER WHEN THE STUDY ARRIVES NEXT WEEK.    3/2/2023ALICIAM- RECEIVED EMAIL THAT PATIENT PICKED UP ASHLYN FROM [place] [facility]    3/9/2023ALICIAM- SPOKE WITH PT SHE SAYS SHE HAS STILL NOT DONE THE TEST. SHE ASKED WHETHER SHE NEEDS TO HOOK UP HER OXYGEN TO COMPLETE. I LET HER KNOW THAT THE TEST WAS ORDERED TO BE DONE WITHOUT OXYGEN, AND JUST ON CPAP; SHE UNDERSTOOD. I ASKED WHEN SHE THINKS SHE MIGHT COMPLETE THE STUDY, SHE SAID SHE DOESNT KNOW AND THAT SHE HADNT THOUGHT ABOUT IT. I LET HER KNOW THAT THE LONGER SHE WAITS THE HIGHER CHANCES THERE ARE THAT THE BATTERIES IN THE UNIT WILL DIE. MOLINA THEN SAID SHE LURDES COMPLETE THE STUDY TONIGHT, AND RETURN TOMORROW AFTERNOON TO OUR [place] [facility].    3/14/2023ALICIAM- SPOKE WITH PATIENT, AND SHE SAID SHE DROPPED OFF THE ASHLYN YESTERDAY TO OUR [place] LOCATION. SHE MENTIONED SHE HAS BEEN HAVING SOME SLEEP WALKING EPISODES, AND SHE FINDS THAT WHEN SHE WAKES UP, SHE HAS TUNRED HER OXYGEN OFF, AND WHEN SHE REALIZES, SHE TURNS IT BACK ON AND IT STAYS ON FOR THE REST OF THE NIGHT. SHE DOESNT KNOW IF IT IS BECAUSE OF CHANGES TO HER MEDICATION, OR THE TEST WITH NO OXYGEN ON. I ADVISED HER TO CALL HER DOCTOR AND DISCUSS THIS ONGOING CONCERN WITH THEM.  SHE WILL CALL HER MD TO  DISCUSS THE SLEEP WALKING.

## 2023-02-25 RX ORDER — ATENOLOL 50 MG/1
TABLET ORAL
Qty: 90 TABLET | Refills: 0 | Status: SHIPPED | OUTPATIENT
Start: 2023-02-25 | End: 2023-03-09

## 2023-02-28 NOTE — PROGRESS NOTES
[place] [address]  [address]  [facility] [address]  Phone: [phone]  Primary Provider: Raisa Davidson  Pre-op Performing Provider: RAISA DAVIDSON      PREOPERATIVE EVALUATION:  Today's date: 3/1/2023    Gricelda Saibllon is a 72 year old female who presents for a preoperative evaluation.    Surgical Information:  Surgery/Procedure:   ESOPHAGOGASTRODUODENOSCOPY (EGD) N/A General   ENDOSCOPIC RETROGRADE CHOLANGIOPANCREATOGRAPHY         Surgery Location:  OR  Surgeon: Shaun Guerrero MD  Surgery Date: 3/16/2023  Time of Surgery:  9:10 AM  Where patient plans to recover: At home with family  Fax number for surgical facility:         Preop Questions 2/28/2023   1. Have you ever had a heart attack or stroke? No   2. Have you ever had surgery on your heart or blood vessels, such as a stent placement, a coronary artery bypass, or surgery on an artery in your head, neck, heart, or legs? No   3. Do you have chest pain with activity? No   4. Do you have a history of  heart failure? No   5. Do you currently have a cold, bronchitis or symptoms of other infection? No   6. Do you have a cough, shortness of breath, or wheezing? No   7. Do you or anyone in your family have previous history of blood clots? YES - PE in July 8. Do you or does anyone in your family have a serious bleeding problem such as prolonged bleeding following surgeries or cuts? No   9. Have you ever had problems with anemia or been told to take iron pills? YES -    10. Have you had any abnormal blood loss such as black, tarry or bloody stools, or abnormal vaginal bleeding? No   11. Have you ever had a blood transfusion? No   12. Are you willing to have a blood transfusion if it is medically needed before, during, or after your surgery? Yes   13. Have you or any of your relatives ever had problems with anesthesia? No   14. Do you have sleep apnea, excessive snoring or daytime  drowsiness? YES -    14a. Do you have a CPAP machine? Yes   15. Do you have any artifical heart valves or other implanted medical devices like a pacemaker, defibrillator, or continuous glucose monitor? No   16. Do you have artificial joints? YES -    17. Are you allergic to latex? No   18. Is there any chance that you may be pregnant? -       Health Care Directive:  Patient does not have a Health Care Directive or Living Will: Discussed advance care planning with patient; however, patient declined at this time.    CC:  Preop for multiple medical problems.    HPI:    The patient is scheduled for EGD/ERCP surgery with Dr. Guerrero on  March 16  Takes NPH insulin 24 units twice a day.  Blood sugars on the high side.  Morning blood sugars 1 40-1 70  No other acute complaints.    Assessment:  The patient has complex medical problems.  She was diagnosed with ampullary adenoma few years ago and she requires EGD and ERCP periodically.  In 2022 she was diagnosed with metastatic malignant melanoma to the leg, without primary lesion and she also was diagnosed with endometrial cancer stage I.  In the summer 2022 she was hospitalized for organizing pneumonia and multiple pulmonary embolism.  She responded to high-dose of prednisone which was tapered off.  Blood sugars have been high and the insulin has been adjusted.  Blood pressure was low, medications were held, but now the blood pressure trends up and we resumed the atenolol.      1. V72.83H Preop general physical exam _ I do not see any major contraindications for the patient to go through the scheduled surgery.    The proposed surgical procedure is considered , INTERMEDIATE,  LOW surgery risk.    For above listed surgery and anesthesia, Patient is at  MODERATE, risk for surgery/procedure and perioperative/procedure complications.      Cardiovascular risk  Assessment -- low risk   -- No further cardiac work up is needed before this surgery.      ECG:    sinus rhythm, no changes  "suggestive for ischemia    Pulmonary Risk Assessment -- low risk   -- The patient doesn't have chronic lung disease nor acute respiratory problems   -- pt had organizing pneumonia - resolved    Obstructive Sleep Apnea (or suspected sleep apnea) -- low risk         Anemia Assessment :  -- no anemia - patient doesn't need further anemia work up      Blood Sugar Assessment  (E11.9) Diabetes mellitus without complication (H)  -- BS on the high side  -- plan: increase insulin dose    Anticoagulation assessment    -- patient takes Xarelto Hx of PE/DVT  -- The pulmonary embolism was July 2022.  At that time she was very year, with severe organized pneumonia.  I think we can stop the Xarelto for this procedure without Lovenox bridging.    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 In the morning of the surgery day you take the Atenolol 50 mg and  the morning dose of Insulin   2. Last dose Xarelto -- March 14  3. Resume Xarelto on March 17 or if dr Guerrero agrees - on March 16 after the procedure   4. Increase the Insulin dose as we discussed     Physical exam:    Blood pressure 135/72, pulse 82, temperature 97.9  F (36.6  C), temperature source Tympanic, resp. rate 18, height 1.702 m (5' 7\"), weight 98.3 kg (216 lb 12.8 oz), last menstrual period 12/13/2002, SpO2 96 %, not currently breastfeeding.   NAD, appears comfortable  Skin clear, no rashes  Neck: supple, no JVD,  no thyroidmegaly  Lymph nodes non palpable in the cervical, supraclavicular   Chest: clear to auscultation with good respiratory effort  Cardiac: " S1S2, RRR, no mgr appreciated  Abdomen: soft, not tender, not distended, audible bowel sound, no hepatosplenomegaly, no palpable masses, no abdominal bruits  Extremities: no cyanosis, clubbing or edema.   Neuro: A, Ox3, no focal signs.        ROS:   as above     Patient Active Problem List   Diagnosis     Allergic rhinitis     Hyperlipidemia with target LDL less than 100     Asthma, mild intermittent     Cataract     Macular hole of left eye     Obesity (BMI 30-39.9)     Heart murmur     Total knee replacement status     Chronic knee pain, right     HTN goal less than 140/90,     CHERRY (obstructive sleep apnea)     Status post total replacement of right hip     Ampullary adenoma     Polyp of duodenum     HTN, goal below 140/90     Diabetes mellitus, type 2 (H)     Melanoma of skin (H)     Metastatic malignant melanoma (H)  -- R thigh      Dehydration     Orthostatic hypotension     Endometrial cancer (H)     Bilateral pulmonary embolism (H)     Organizing pneumonia (H) -- Aug 2022        Past Medical History:   Diagnosis Date     Asthma, mild intermittent      Cataract      Endometrial cancer (H) 06/2022     HTN, goal below 140/90      Hyperlipidemia LDL goal < 100      Macular hole of left eye      Melanoma (H)     RIGHT KNEE     Sleep apnea     uses c pap     Type 2 diabetes, HbA1C goal < 8% (H)       Past Surgical History:   Procedure Laterality Date     ARTHROPLASTY HIP Right 9/25/2017    Procedure: ARTHROPLASTY HIP;  Right total hip arthroplasty  ;  Surgeon: Ayaan Pena MD;  Location: [facility]     ARTHROPLASTY KNEE  7/12/2013    Procedure: ARTHROPLASTY KNEE;  right total knee arthroplasty ;  Surgeon: Chaparro Wesley MD;  Location: [facility]     ARTHROSCOPY KNEE Right 1/21/2015    Procedure: ARTHROSCOPY KNEE;  Surgeon: Ayaan Pena MD;  Location: [facility]     BRONCHOSCOPY (RIGID OR FLEXIBLE), DIAGNOSTIC N/A 8/11/2022    Procedure: BRONCHOSCOPY, WITH BRONCHOALVEOLAR LAVAGE;  Surgeon: Roselia Sosa  MD Angelina;  Location: [facility]     C INCISION OF HYMEN       CATARACT IOL, RT/LT       COLONOSCOPY  2008     COLONOSCOPY N/A 4/18/2019    Procedure: Colonoscopy with polypectomy;  Surgeon: Shaun Guerrero MD;  [facility]     CYSTOSCOPY N/A 6/23/2022    Procedure: Cystoscopy;  Surgeon: Eli Guidry MD;  [facility]     ENDOSCOPIC RETROGRADE CHOLANGIOPANCREATOGRAM N/A 2/6/2019    Procedure: COMBINED ENDOSCOPIC RETROGRADE CHOLANGIOPANCREATOGRAPHY, BILIARY SPINCTEROTOMY AND DILATION, PLACE BILE DUCT STENT;  Surgeon: Guru Andie Gonzalez MD;  [facility]     ENDOSCOPIC RETROGRADE CHOLANGIOPANCREATOGRAM N/A 2/28/2019    Procedure: Endoscopic Retrograde Cholangiopancreatogram, Bile duct stent removal and placement;  Surgeon: Shaun Guerrero MD;  [facility]     ENDOSCOPIC RETROGRADE CHOLANGIOPANCREATOGRAM N/A 4/18/2019    Procedure: COMBINED ENDOSCOPIC RETROGRADE CHOLANGIOPANCREATOGRAPHY, Bile duct stent exchange and Polypectomy;  Surgeon: Shaun Guerrero MD;  [facility]     ENDOSCOPIC RETROGRADE CHOLANGIOPANCREATOGRAM N/A 10/18/2019    Procedure: Endoscopic Retrograde Cholangiopancreatogram;  Surgeon: Shaun Guerrero MD;  [facility]     ENDOSCOPIC RETROGRADE CHOLANGIOPANCREATOGRAM N/A 3/24/2022    Procedure: ENDOSCOPIC RETROGRADE CHOLANGIOPANCREATOGRAPHY;  Surgeon: Shaun Guerrero MD;  Location:  OR     ENDOSCOPIC RETROGRADE CHOLANGIOPANCREATOGRAM WITH SPYGLASS N/A 7/26/2019    Procedure: Endoscopic Retrograde Cholangiopancreatogram With Spyglas,l Radiofrequency Ablation, Stent Exchangeand Duodenal Biopsy x2;  Surgeon: Shaun Guerrero MD;  [facility]     ENDOSCOPIC RETROGRADE CHOLANGIOPANCREATOGRAM WITH SPYGLASS N/A 9/10/2020    Procedure: ENDOSCOPIC RETROGRADE CHOLANGIOPANCREATOGRAPHY, WITH DIRECT DUCT VISUALIZATION, USING PANCREATICOBILIARY FIBEROPTIC PROBE;  Surgeon: Shaun Guerrero MD;  [facility]     ENDOSCOPIC RETROGRADE CHOLANGIOPANCREATOGRAM WITH SPYGLASS  N/A 3/22/2021    Procedure: ENDOSCOPIC RETROGRADE CHOLANGIOPANCREATOGRAPHY, WITH DIRECT DUCT VISUALIZATION, USING PANCREATICOBILIARY FIBEROPTIC PROBE;  Surgeon: Shaun Guerrero MD;  [facility]     ESOPHAGOSCOPY, GASTROSCOPY, DUODENOSCOPY (EGD) WITH RADIO FREQUENCY ABLATION, COMBINED N/A 9/10/2020    Procedure: possible repeat ESOPHAGOGASTRODUODENOSCOPY, WITH RADIOFREQUENCY ABLATION and endoscopic mucosal resection;  Surgeon: Shaun Guerrero MD;  [facility]     ESOPHAGOSCOPY, GASTROSCOPY, DUODENOSCOPY (EGD), COMBINED N/A 4/18/2019    Procedure: upper endoscopy with polypectomy;  Surgeon: Shaun Guerrero MD;  [facility]     ESOPHAGOSCOPY, GASTROSCOPY, DUODENOSCOPY (EGD), COMBINED N/A 10/18/2019    Procedure: Upper Endoscopy and ERCP with stent removal, stone removal and biopsy;  Surgeon: Shaun Guerrero MD;  [facility]     ESOPHAGOSCOPY, GASTROSCOPY, DUODENOSCOPY (EGD), COMBINED N/A 3/24/2022    Procedure: ESOPHAGOGASTRODUODENOSCOPY (EGD), Duodenal  polyp removal;  Surgeon: Shaun Guerrero MD;  [facility]: [facility]     ESOPHAGOSCOPY, GASTROSCOPY, DUODENOSCOPY (EGD), RESECT MUCOSA, COMBINED N/A 2/28/2019    Procedure: Upper Endoscopy, Endoscopic Ultrasound, Endoscopic Mucosal Resection,  Ampullectomy, polypectomy.;  Surgeon: Shaun Guerrero MD;  [facility]     ESOPHAGOSCOPY, GASTROSCOPY, DUODENOSCOPY (EGD), RESECT MUCOSA, COMBINED N/A 3/22/2021    Procedure: ESOPHAGOGASTRODUODENOSCOPY (EGD) with  endoscopic mucosal resection/ polypectomy;  Surgeon: Shaun Guerrero MD;  [facility]     EXCISE LESION LOWER EXTREMITY Right 3/28/2022    Procedure: Wide local excision of right knee melanoma;  Surgeon: Chevy Allison MD;  Location: [facility] OR     EXCISE MASS LOWER EXTREMITY Right 1/13/2022    Procedure: excision of right thigh mass;  Surgeon: Salvador Kelly MD;  Location: [facility]     EYE SURGERY      macular hole repaired left eye     HC KNEE SCOPE, DIAGNOSTIC      - both knees     HEAD & NECK SURGERY       "wisdom teeth     IR CHEST PORT PLACEMENT > 5 YRS OF AGE  5/2/2022     LAPAROSCOPIC HYSTERECTOMY TOTAL, BILATERAL SALPINGO-OOPHORECTOMY, NODE DISSECTION, COMBINED Bilateral 6/23/2022    Procedure: Total Laparoscopic Hyserectomy, Bilateral Salpibgo-oophorectomy, Bilateral [place] Lymph Node Dissection;  Surgeon: Eli Guidry MD;  [facility]        PSHx: No complications with prior surgeries or anesthesia     Soc Hx: No daily alcohol, no smoking         All: reviewed    Meds: reviewed  Current Outpatient Medications   Medication Sig Dispense Refill     atenolol (TENORMIN) 50 MG tablet Take 1 tablet by mouth once daily 90 tablet 0     atorvastatin (LIPITOR) 40 MG tablet Take 1 tablet by mouth once daily 90 tablet 1     azelastine (ASTELIN) 0.1 % nasal spray USE 1 SPRAY(S) IN EACH NOSTRIL TWICE DAILY AS NEEDED 30 mL 0     azelastine (OPTIVAR) 0.05 % SOLN Place 1 drop into both eyes 2 times daily as needed Reported on 3/22/2017       cetirizine (ZYRTEC) 10 MG tablet Take 10 mg by mouth every morning       Continuous Blood Gluc Sensor (FREESTYLE DAVID 14 DAY SENSOR) MISC USE 1 EVERY 14 DAYS USE TO READ BLOOD SUGARS PER MANUFACTURERS INSTRUCTIONS 2 each 5     CONTOUR NEXT TEST test strip USE 1 STRIP TO CHECK GLUCOSE ONCE DAILY 100 strip 4     glipiZIDE (GLUCOTROL) 5 MG tablet Take 2 tablets (10 mg) by mouth every morning (before breakfast) 180 tablet 1     insulin  UNIT/ML vial Inject 24 Units Subcutaneous 2 times daily 10 mL 0     insulin syringe-needle U-100 (BD INSULIN SYRINGE ULTRAFINE) 31G X 5/16\" 0.5 ML miscellaneous Use daily with NPH insulin 100 each 11     latanoprost (XALATAN) 0.005 % ophthalmic solution Place 1 drop into both eyes At Bedtime  2.5 mL 1     lidocaine-prilocaine (EMLA) 2.5-2.5 % external cream APPLY CREAM TOPICALLY ONCE DAILY AS NEEDED FOR PAIN APPLY A PEA SIZED AMOUNT OVER PORT SITE 60 MINUTES PRIOR TO USE COVER WITH PLASTIC WRAP       LORazepam (ATIVAN) 0.5 MG tablet Take " 1 tablet (0.5 mg) by mouth nightly as needed for anxiety or sleep 30 tablet 0     ondansetron (ZOFRAN) 8 MG tablet Take 1 tablet (8 mg) by mouth every 8 hours as needed for nausea 30 tablet 1     senna-docusate (SENOKOT-S/PERICOLACE) 8.6-50 MG tablet Take 2 tablets by mouth daily as needed for constipation 60 tablet 11     timolol maleate (TIMOPTIC) 0.5 % ophthalmic solution INSTILL 1 DROP INTO EACH EYE TWICE DAILY       triamcinolone (KENALOG) 0.5 % external ointment Apply 1 g topically daily as needed for irritation       VENTOLIN  (90 Base) MCG/ACT inhaler INHALE 2 PUFFS BY MOUTH EVERY 4 HOURS AS NEEDED FOR SHORTNESS OF BREATH 18 g 0     XARELTO ANTICOAGULANT 20 MG TABS tablet Take 1 tablet by mouth daily              Raisa Torres MD  Internal Medicine       Patient Active Problem List    Diagnosis Date Noted     Hyperlipidemia with target LDL less than 100      Priority: High     Diagnosis updated by automated process. Provider to review and confirm.       Asthma, mild intermittent      Priority: High     Organizing pneumonia (H) -- Aug 2022 09/05/2022     Priority: Medium     Bilateral pulmonary embolism (H) 07/25/2022     Priority: Medium     Dehydration 06/29/2022     Priority: Medium     Orthostatic hypotension 06/29/2022     Priority: Medium     Endometrial cancer (H)      Priority: Medium     Check 5.23 for f/u Javier       Metastatic malignant melanoma (H)  -- R thigh  04/20/2022     Priority: Medium     Melanoma of skin (H) 03/03/2022     Priority: Medium     Added automatically from request for surgery [id number]       Diabetes mellitus, type 2 (H) 01/07/2022     Priority: Medium     HTN, goal below 140/90 05/27/2020     Priority: Medium     Polyp of duodenum 02/18/2020     Priority: Medium     Added automatically from request for surgery [id number]       Ampullary adenoma 02/28/2019     Priority: Medium     Status post total replacement of right hip 02/13/2018     Priority: Medium     CHRERY  (obstructive sleep apnea) 09/15/2017     Priority: Medium     HTN goal less than 140/90, 05/22/2016     Priority: Medium     Chronic knee pain, right 08/30/2015     Priority: Medium     Total knee replacement status 07/12/2013     Priority: Medium     Obesity (BMI 30-39.9)      Priority: Medium     Heart murmur      Priority: Medium     aortic area       Allergic rhinitis      Priority: Medium     Problem list name updated by automated process. Provider to review       Cataract      Priority: Low     Utility update for deleted IMO code  Imo Update utility       Macular hole of left eye      Priority: Low      Past Medical History:   Diagnosis Date     Asthma, mild intermittent      Cataract      Endometrial cancer (H) 06/2022     HTN, goal below 140/90      Hyperlipidemia LDL goal < 100      Macular hole of left eye      Melanoma (H)     RIGHT KNEE     Sleep apnea     uses c pap     Type 2 diabetes, HbA1C goal < 8% (H)      Past Surgical History:   Procedure Laterality Date     ARTHROPLASTY HIP Right 9/25/2017    Procedure: ARTHROPLASTY HIP;  Right total hip arthroplasty  ;  Surgeon: Ayaan Pena MD;  [facility]: [facility]     ARTHROPLASTY KNEE  7/12/2013    Procedure: ARTHROPLASTY KNEE;  right total knee arthroplasty ;  Surgeon: Chaparro Wesley MD;  Location:  OR     ARTHROSCOPY KNEE Right 1/21/2015    Procedure: ARTHROSCOPY KNEE;  Surgeon: Ayaan Pena MD;  Location:  OR     BRONCHOSCOPY (RIGID OR FLEXIBLE), DIAGNOSTIC N/A 8/11/2022    Procedure: BRONCHOSCOPY, WITH BRONCHOALVEOLAR LAVAGE;  Surgeon: Roselia Sosa MD;  Location:  GI     C INCISION OF HYMEN       CATARACT IOL, RT/LT       COLONOSCOPY  2008     COLONOSCOPY N/A 4/18/2019    Procedure: Colonoscopy with polypectomy;  Surgeon: Shaun Guerrero MD;  [facility]     CYSTOSCOPY N/A 6/23/2022    Procedure: Cystoscopy;  Surgeon: Eli Guidry MD;  [facility]     ENDOSCOPIC RETROGRADE CHOLANGIOPANCREATOGRAM  N/A 2/6/2019    Procedure: COMBINED ENDOSCOPIC RETROGRADE CHOLANGIOPANCREATOGRAPHY, BILIARY SPINCTEROTOMY AND DILATION, PLACE BILE DUCT STENT;  Surgeon: Guru Andie Gonzalez MD;  [facility]     ENDOSCOPIC RETROGRADE CHOLANGIOPANCREATOGRAM N/A 2/28/2019    Procedure: Endoscopic Retrograde Cholangiopancreatogram, Bile duct stent removal and placement;  Surgeon: Shaun Guerrero MD;  [facility]     ENDOSCOPIC RETROGRADE CHOLANGIOPANCREATOGRAM N/A 4/18/2019    Procedure: COMBINED ENDOSCOPIC RETROGRADE CHOLANGIOPANCREATOGRAPHY, Bile duct stent exchange and Polypectomy;  Surgeon: Shaun Guerrero MD;  [facility]     ENDOSCOPIC RETROGRADE CHOLANGIOPANCREATOGRAM N/A 10/18/2019    Procedure: Endoscopic Retrograde Cholangiopancreatogram;  Surgeon: Shaun Guerrero MD;  Location:  OR     ENDOSCOPIC RETROGRADE CHOLANGIOPANCREATOGRAM N/A 3/24/2022    Procedure: ENDOSCOPIC RETROGRADE CHOLANGIOPANCREATOGRAPHY;  Surgeon: Shaun Guerrero MD;  Location:  OR     ENDOSCOPIC RETROGRADE CHOLANGIOPANCREATOGRAM WITH SPYGLASS N/A 7/26/2019    Procedure: Endoscopic Retrograde Cholangiopancreatogram With Spyglas,l Radiofrequency Ablation, Stent Exchangeand Duodenal Biopsy x2;  Surgeon: Shaun Guerrero MD;  [facility]     ENDOSCOPIC RETROGRADE CHOLANGIOPANCREATOGRAM WITH SPYGLASS N/A 9/10/2020    Procedure: ENDOSCOPIC RETROGRADE CHOLANGIOPANCREATOGRAPHY, WITH DIRECT DUCT VISUALIZATION, USING PANCREATICOBILIARY FIBEROPTIC PROBE;  Surgeon: Shaun Guerrero MD;  [facility]     ENDOSCOPIC RETROGRADE CHOLANGIOPANCREATOGRAM WITH SPYGLASS N/A 3/22/2021    Procedure: ENDOSCOPIC RETROGRADE CHOLANGIOPANCREATOGRAPHY, WITH DIRECT DUCT VISUALIZATION, USING PANCREATICOBILIARY FIBEROPTIC PROBE;  Surgeon: Shaun Guerrero MD;  Location:  OR     ESOPHAGOSCOPY, GASTROSCOPY, DUODENOSCOPY (EGD) WITH RADIO FREQUENCY ABLATION, COMBINED N/A 9/10/2020    Procedure: possible repeat ESOPHAGOGASTRODUODENOSCOPY, WITH RADIOFREQUENCY  ABLATION and endoscopic mucosal resection;  Surgeon: Shaun Guerrero MD;  [facility]     ESOPHAGOSCOPY, GASTROSCOPY, DUODENOSCOPY (EGD), COMBINED N/A 4/18/2019    Procedure: upper endoscopy with polypectomy;  Surgeon: Shaun Guerrero MD;  [facility]     ESOPHAGOSCOPY, GASTROSCOPY, DUODENOSCOPY (EGD), COMBINED N/A 10/18/2019    Procedure: Upper Endoscopy and ERCP with stent removal, stone removal and biopsy;  Surgeon: Shaun Guerrero MD;  [facility]     ESOPHAGOSCOPY, GASTROSCOPY, DUODENOSCOPY (EGD), COMBINED N/A 3/24/2022    Procedure: ESOPHAGOGASTRODUODENOSCOPY (EGD), Duodenal  polyp removal;  Surgeon: Shaun Guerrero MD;  [facility]     ESOPHAGOSCOPY, GASTROSCOPY, DUODENOSCOPY (EGD), RESECT MUCOSA, COMBINED N/A 2/28/2019    Procedure: Upper Endoscopy, Endoscopic Ultrasound, Endoscopic Mucosal Resection,  Ampullectomy, polypectomy.;  Surgeon: Shaun Guerrero MD;  [facility]     ESOPHAGOSCOPY, GASTROSCOPY, DUODENOSCOPY (EGD), RESECT MUCOSA, COMBINED N/A 3/22/2021    Procedure: ESOPHAGOGASTRODUODENOSCOPY (EGD) with  endoscopic mucosal resection/ polypectomy;  Surgeon: Shaun Guerrero MD;  [facility]     EXCISE LESION LOWER EXTREMITY Right 3/28/2022    Procedure: Wide local excision of right knee melanoma;  Surgeon: Chevy Allison MD;  Location: [facility] OR     EXCISE MASS LOWER EXTREMITY Right 1/13/2022    Procedure: excision of right thigh mass;  Surgeon: Salvador Kelly MD;  [facility]: [facility]     EYE SURGERY      macular hole repaired left eye     HC KNEE SCOPE, DIAGNOSTIC      - both knees     HEAD & NECK SURGERY      wisdom teeth     IR CHEST PORT PLACEMENT > 5 YRS OF AGE  5/2/2022     LAPAROSCOPIC HYSTERECTOMY TOTAL, BILATERAL SALPINGO-OOPHORECTOMY, NODE DISSECTION, COMBINED Bilateral 6/23/2022    Procedure: Total Laparoscopic Hyserectomy, Bilateral Salpibgo-oophorectomy, Bilateral [place] Lymph Node Dissection;  Surgeon: Eli Guidry MD;  [facility]     Current  "Outpatient Medications   Medication Sig Dispense Refill     atenolol (TENORMIN) 50 MG tablet Take 1 tablet by mouth once daily 90 tablet 0     atorvastatin (LIPITOR) 40 MG tablet Take 1 tablet by mouth once daily 90 tablet 1     azelastine (ASTELIN) 0.1 % nasal spray USE 1 SPRAY(S) IN EACH NOSTRIL TWICE DAILY AS NEEDED 30 mL 0     azelastine (OPTIVAR) 0.05 % SOLN Place 1 drop into both eyes 2 times daily as needed Reported on 3/22/2017       cetirizine (ZYRTEC) 10 MG tablet Take 10 mg by mouth every morning       Continuous Blood Gluc Sensor (FREESTYLE DAVID 14 DAY SENSOR) MISC USE 1 EVERY 14 DAYS USE TO READ BLOOD SUGARS PER MANUFACTURERS INSTRUCTIONS 2 each 5     CONTOUR NEXT TEST test strip USE 1 STRIP TO CHECK GLUCOSE ONCE DAILY 100 strip 4     glipiZIDE (GLUCOTROL) 5 MG tablet Take 2 tablets (10 mg) by mouth every morning (before breakfast) 180 tablet 1     insulin  UNIT/ML vial Inject 24 Units Subcutaneous 2 times daily 10 mL 0     insulin syringe-needle U-100 (BD INSULIN SYRINGE ULTRAFINE) 31G X 5/16\" 0.5 ML miscellaneous Use daily with NPH insulin 100 each 11     latanoprost (XALATAN) 0.005 % ophthalmic solution Place 1 drop into both eyes At Bedtime  2.5 mL 1     lidocaine-prilocaine (EMLA) 2.5-2.5 % external cream APPLY CREAM TOPICALLY ONCE DAILY AS NEEDED FOR PAIN APPLY A PEA SIZED AMOUNT OVER PORT SITE 60 MINUTES PRIOR TO USE COVER WITH PLASTIC WRAP       LORazepam (ATIVAN) 0.5 MG tablet Take 1 tablet (0.5 mg) by mouth nightly as needed for anxiety or sleep 30 tablet 0     ondansetron (ZOFRAN) 8 MG tablet Take 1 tablet (8 mg) by mouth every 8 hours as needed for nausea 30 tablet 1     senna-docusate (SENOKOT-S/PERICOLACE) 8.6-50 MG tablet Take 2 tablets by mouth daily as needed for constipation 60 tablet 11     timolol maleate (TIMOPTIC) 0.5 % ophthalmic solution INSTILL 1 DROP INTO EACH EYE TWICE DAILY       triamcinolone (KENALOG) 0.5 % external ointment Apply 1 g topically daily as needed for " "irritation       VENTOLIN  (90 Base) MCG/ACT inhaler INHALE 2 PUFFS BY MOUTH EVERY 4 HOURS AS NEEDED FOR SHORTNESS OF BREATH 18 g 0     XARELTO ANTICOAGULANT 20 MG TABS tablet Take 1 tablet by mouth daily         Allergies   Allergen Reactions     Bromfenac Visual Disturbance      xibrom  Causes sever eye pain     Codeine      \"NAUSE,HA AND DIZZINESS\"     Codeine Nausea     Other reaction(s): Dizziness, Headache     Metformin GI Disturbance        Social History     Tobacco Use     Smoking status: Never     Passive exposure: Never     Smokeless tobacco: Never   Substance Use Topics     Alcohol use: No     Alcohol/week: 0.0 standard drinks       History   Drug Use No         Recent Labs   Lab Test 12/30/22  1220 12/30/22  0838 11/01/22  1318 09/01/22  0937 08/03/22  0655 08/02/22  1119 06/14/22  1354 05/02/22  [id number]   HGB  --  13.1 12.5  --    < >  --    < > 13.8   PLT  --  351 253  --    < >  --    < > 290   INR  --   --   --   --   --  1.08  --  0.96   NA  --  139 137 133   < >  --    < > 135   POTASSIUM  --  3.9 4.2 4.5   < >  --    < > 4.0   CR 0.7 0.81 0.70 0.77   < >  --    < > 0.87   A1C  --  8.9*  --  8.8*  --   --    < >  --     < > = values in this interval not displayed.                   Signed Electronically by: Raisa Davidson MD  Copy of this evaluation report is provided to requesting physician.      "

## 2023-02-28 NOTE — H&P (VIEW-ONLY)
[place] [address]  [facility] [address]  Phone: [phone]  Primary Provider: Raisa Davidson  Pre-op Performing Provider: RAISA DAVIDSON      PREOPERATIVE EVALUATION:  Today's date: 3/1/2023    Gricelda Sabillon is a 72 year old female who presents for a preoperative evaluation.    Surgical Information:  Surgery/Procedure:   ESOPHAGOGASTRODUODENOSCOPY (EGD) N/A General   ENDOSCOPIC RETROGRADE CHOLANGIOPANCREATOGRAPHY         Surgery Location:  OR  Surgeon: Shaun Guerrero MD  Surgery Date: 3/16/2023  Time of Surgery:  9:10 AM  Where patient plans to recover: At home with family  Fax number for surgical facility:         Preop Questions 2/28/2023   1. Have you ever had a heart attack or stroke? No   2. Have you ever had surgery on your heart or blood vessels, such as a stent placement, a coronary artery bypass, or surgery on an artery in your head, neck, heart, or legs? No   3. Do you have chest pain with activity? No   4. Do you have a history of  heart failure? No   5. Do you currently have a cold, bronchitis or symptoms of other infection? No   6. Do you have a cough, shortness of breath, or wheezing? No   7. Do you or anyone in your family have previous history of blood clots? YES - PE in July 8. Do you or does anyone in your family have a serious bleeding problem such as prolonged bleeding following surgeries or cuts? No   9. Have you ever had problems with anemia or been told to take iron pills? YES -    10. Have you had any abnormal blood loss such as black, tarry or bloody stools, or abnormal vaginal bleeding? No   11. Have you ever had a blood transfusion? No   12. Are you willing to have a blood transfusion if it is medically needed before, during, or after your surgery? Yes   13. Have you or any of your relatives ever had problems with anesthesia? No   14. Do you have sleep apnea, excessive snoring or daytime  drowsiness? YES -    14a. Do you have a CPAP machine? Yes   15. Do you have any artifical heart valves or other implanted medical devices like a pacemaker, defibrillator, or continuous glucose monitor? No   16. Do you have artificial joints? YES -    17. Are you allergic to latex? No   18. Is there any chance that you may be pregnant? -       Health Care Directive:  Patient does not have a Health Care Directive or Living Will: Discussed advance care planning with patient; however, patient declined at this time.    CC:  Preop for multiple medical problems.    HPI:    The patient is scheduled for EGD/ERCP surgery with Dr. Guerrero on  March 16  Takes NPH insulin 24 units twice a day.  Blood sugars on the high side.  Morning blood sugars 1 40-1 70  No other acute complaints.    Assessment:  The patient has complex medical problems.  She was diagnosed with ampullary adenoma few years ago and she requires EGD and ERCP periodically.  In 2022 she was diagnosed with metastatic malignant melanoma to the leg, without primary lesion and she also was diagnosed with endometrial cancer stage I.  In the summer 2022 she was hospitalized for organizing pneumonia and multiple pulmonary embolism.  She responded to high-dose of prednisone which was tapered off.  Blood sugars have been high and the insulin has been adjusted.  Blood pressure was low, medications were held, but now the blood pressure trends up and we resumed the atenolol.      1. V72.83H Preop general physical exam _ I do not see any major contraindications for the patient to go through the scheduled surgery.    The proposed surgical procedure is considered , INTERMEDIATE,  LOW surgery risk.    For above listed surgery and anesthesia, Patient is at  MODERATE, risk for surgery/procedure and perioperative/procedure complications.      Cardiovascular risk  Assessment -- low risk   -- No further cardiac work up is needed before this surgery.      ECG:    sinus rhythm, no changes  "suggestive for ischemia    Pulmonary Risk Assessment -- low risk   -- The patient doesn't have chronic lung disease nor acute respiratory problems   -- pt had organizing pneumonia - resolved    Obstructive Sleep Apnea (or suspected sleep apnea) -- low risk         Anemia Assessment :  -- no anemia - patient doesn't need further anemia work up      Blood Sugar Assessment  (E11.9) Diabetes mellitus without complication (H)  -- BS on the high side  -- plan: increase insulin dose    Anticoagulation assessment    -- patient takes Xarelto Hx of PE/DVT  -- The pulmonary embolism was July 2022.  At that time she was very year, with severe organized pneumonia.  I think we can stop the Xarelto for this procedure without Lovenox bridging.    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 In the morning of the surgery day you take the Atenolol 50 mg and  the morning dose of Insulin   2. Last dose Xarelto -- March 14  3. Resume Xarelto on March 17 or if dr Guerrero agrees - on March 16 after the procedure   4. Increase the Insulin dose as we discussed     Physical exam:    Blood pressure 135/72, pulse 82, temperature 97.9  F (36.6  C), temperature source Tympanic, resp. rate 18, height 1.702 m (5' 7\"), weight 98.3 kg (216 lb 12.8 oz), last menstrual period 12/13/2002, SpO2 96 %, not currently breastfeeding.   NAD, appears comfortable  Skin clear, no rashes  Neck: supple, no JVD,  no thyroidmegaly  Lymph nodes non palpable in the cervical, supraclavicular   Chest: clear to auscultation with good respiratory effort  Cardiac: " S1S2, RRR, no mgr appreciated  Abdomen: soft, not tender, not distended, audible bowel sound, no hepatosplenomegaly, no palpable masses, no abdominal bruits  Extremities: no cyanosis, clubbing or edema.   Neuro: A, Ox3, no focal signs.        ROS:   as above     Patient Active Problem List   Diagnosis     Allergic rhinitis     Hyperlipidemia with target LDL less than 100     Asthma, mild intermittent     Cataract     Macular hole of left eye     Obesity (BMI 30-39.9)     Heart murmur     Total knee replacement status     Chronic knee pain, right     HTN goal less than 140/90,     CHERRY (obstructive sleep apnea)     Status post total replacement of right hip     Ampullary adenoma     Polyp of duodenum     HTN, goal below 140/90     Diabetes mellitus, type 2 (H)     Melanoma of skin (H)     Metastatic malignant melanoma (H)  -- R thigh      Dehydration     Orthostatic hypotension     Endometrial cancer (H)     Bilateral pulmonary embolism (H)     Organizing pneumonia (H) -- Aug 2022        Past Medical History:   Diagnosis Date     Asthma, mild intermittent      Cataract      Endometrial cancer (H) 06/2022     HTN, goal below 140/90      Hyperlipidemia LDL goal < 100      Macular hole of left eye      Melanoma (H)     RIGHT KNEE     Sleep apnea     uses c pap     Type 2 diabetes, HbA1C goal < 8% (H)       Past Surgical History:   Procedure Laterality Date     ARTHROPLASTY HIP Right 9/25/2017    Procedure: ARTHROPLASTY HIP;  Right total hip arthroplasty  ;  Surgeon: Ayaan Pena MD;  Location: [facility]     ARTHROPLASTY KNEE  7/12/2013    Procedure: ARTHROPLASTY KNEE;  right total knee arthroplasty ;  Surgeon: Chaparro Wesley MD;  Location: [facility]     ARTHROSCOPY KNEE Right 1/21/2015    Procedure: ARTHROSCOPY KNEE;  Surgeon: Ayaan Pena MD;  Location: [facility]     BRONCHOSCOPY (RIGID OR FLEXIBLE), DIAGNOSTIC N/A 8/11/2022    Procedure: BRONCHOSCOPY, WITH BRONCHOALVEOLAR LAVAGE;  Surgeon: Roselia Sosa  MD Angelina;  Location: [facility]     C INCISION OF HYMEN       CATARACT IOL, RT/LT       COLONOSCOPY  2008     COLONOSCOPY N/A 4/18/2019    Procedure: Colonoscopy with polypectomy;  Surgeon: Shaun Guerrero MD;  [facility]     CYSTOSCOPY N/A 6/23/2022    Procedure: Cystoscopy;  Surgeon: Eli Guidry MD;  [facility]     ENDOSCOPIC RETROGRADE CHOLANGIOPANCREATOGRAM N/A 2/6/2019    Procedure: COMBINED ENDOSCOPIC RETROGRADE CHOLANGIOPANCREATOGRAPHY, BILIARY SPINCTEROTOMY AND DILATION, PLACE BILE DUCT STENT;  Surgeon: Guru Andie Gonzalez MD;  [facility]     ENDOSCOPIC RETROGRADE CHOLANGIOPANCREATOGRAM N/A 2/28/2019    Procedure: Endoscopic Retrograde Cholangiopancreatogram, Bile duct stent removal and placement;  Surgeon: Shaun Guerrero MD;  [facility]     ENDOSCOPIC RETROGRADE CHOLANGIOPANCREATOGRAM N/A 4/18/2019    Procedure: COMBINED ENDOSCOPIC RETROGRADE CHOLANGIOPANCREATOGRAPHY, Bile duct stent exchange and Polypectomy;  Surgeon: Shaun Guerrero MD;  [facility]     ENDOSCOPIC RETROGRADE CHOLANGIOPANCREATOGRAM N/A 10/18/2019    Procedure: Endoscopic Retrograde Cholangiopancreatogram;  Surgeon: Shaun Guerrero MD;  [facility]     ENDOSCOPIC RETROGRADE CHOLANGIOPANCREATOGRAM N/A 3/24/2022    Procedure: ENDOSCOPIC RETROGRADE CHOLANGIOPANCREATOGRAPHY;  Surgeon: Shaun Guerrero MD;  Location:  OR     ENDOSCOPIC RETROGRADE CHOLANGIOPANCREATOGRAM WITH SPYGLASS N/A 7/26/2019    Procedure: Endoscopic Retrograde Cholangiopancreatogram With Spyglas,l Radiofrequency Ablation, Stent Exchangeand Duodenal Biopsy x2;  Surgeon: Shaun Guerrero MD;  [facility]     ENDOSCOPIC RETROGRADE CHOLANGIOPANCREATOGRAM WITH SPYGLASS N/A 9/10/2020    Procedure: ENDOSCOPIC RETROGRADE CHOLANGIOPANCREATOGRAPHY, WITH DIRECT DUCT VISUALIZATION, USING PANCREATICOBILIARY FIBEROPTIC PROBE;  Surgeon: Shaun Guerrero MD;  [facility]     ENDOSCOPIC RETROGRADE CHOLANGIOPANCREATOGRAM WITH SPYGLASS  N/A 3/22/2021    Procedure: ENDOSCOPIC RETROGRADE CHOLANGIOPANCREATOGRAPHY, WITH DIRECT DUCT VISUALIZATION, USING PANCREATICOBILIARY FIBEROPTIC PROBE;  Surgeon: Shaun Guerrero MD;  [facility]     ESOPHAGOSCOPY, GASTROSCOPY, DUODENOSCOPY (EGD) WITH RADIO FREQUENCY ABLATION, COMBINED N/A 9/10/2020    Procedure: possible repeat ESOPHAGOGASTRODUODENOSCOPY, WITH RADIOFREQUENCY ABLATION and endoscopic mucosal resection;  Surgeon: Shaun Guerrero MD;  [facility]     ESOPHAGOSCOPY, GASTROSCOPY, DUODENOSCOPY (EGD), COMBINED N/A 4/18/2019    Procedure: upper endoscopy with polypectomy;  Surgeon: Shaun Guerrero MD;  [facility]     ESOPHAGOSCOPY, GASTROSCOPY, DUODENOSCOPY (EGD), COMBINED N/A 10/18/2019    Procedure: Upper Endoscopy and ERCP with stent removal, stone removal and biopsy;  Surgeon: Shaun Guerrero MD;  [facility]     ESOPHAGOSCOPY, GASTROSCOPY, DUODENOSCOPY (EGD), COMBINED N/A 3/24/2022    Procedure: ESOPHAGOGASTRODUODENOSCOPY (EGD), Duodenal  polyp removal;  Surgeon: Shaun Guerrero MD;  [facility]: [facility]     ESOPHAGOSCOPY, GASTROSCOPY, DUODENOSCOPY (EGD), RESECT MUCOSA, COMBINED N/A 2/28/2019    Procedure: Upper Endoscopy, Endoscopic Ultrasound, Endoscopic Mucosal Resection,  Ampullectomy, polypectomy.;  Surgeon: Shaun Guerrero MD;  [facility]     ESOPHAGOSCOPY, GASTROSCOPY, DUODENOSCOPY (EGD), RESECT MUCOSA, COMBINED N/A 3/22/2021    Procedure: ESOPHAGOGASTRODUODENOSCOPY (EGD) with  endoscopic mucosal resection/ polypectomy;  Surgeon: Shaun Guerrero MD;  [facility]     EXCISE LESION LOWER EXTREMITY Right 3/28/2022    Procedure: Wide local excision of right knee melanoma;  Surgeon: Chevy Allison MD;  Location: [facility] OR     EXCISE MASS LOWER EXTREMITY Right 1/13/2022    Procedure: excision of right thigh mass;  Surgeon: Salvador Kelly MD;  Location: [facility]     EYE SURGERY      macular hole repaired left eye     HC KNEE SCOPE, DIAGNOSTIC      - both knees     HEAD & NECK SURGERY       "wisdom teeth     IR CHEST PORT PLACEMENT > 5 YRS OF AGE  5/2/2022     LAPAROSCOPIC HYSTERECTOMY TOTAL, BILATERAL SALPINGO-OOPHORECTOMY, NODE DISSECTION, COMBINED Bilateral 6/23/2022    Procedure: Total Laparoscopic Hyserectomy, Bilateral Salpibgo-oophorectomy, Bilateral [place] Lymph Node Dissection;  Surgeon: Eli Guidry MD;  [facility]        PSHx: No complications with prior surgeries or anesthesia     Soc Hx: No daily alcohol, no smoking         All: reviewed    Meds: reviewed  Current Outpatient Medications   Medication Sig Dispense Refill     atenolol (TENORMIN) 50 MG tablet Take 1 tablet by mouth once daily 90 tablet 0     atorvastatin (LIPITOR) 40 MG tablet Take 1 tablet by mouth once daily 90 tablet 1     azelastine (ASTELIN) 0.1 % nasal spray USE 1 SPRAY(S) IN EACH NOSTRIL TWICE DAILY AS NEEDED 30 mL 0     azelastine (OPTIVAR) 0.05 % SOLN Place 1 drop into both eyes 2 times daily as needed Reported on 3/22/2017       cetirizine (ZYRTEC) 10 MG tablet Take 10 mg by mouth every morning       Continuous Blood Gluc Sensor (FREESTYLE DAVID 14 DAY SENSOR) MISC USE 1 EVERY 14 DAYS USE TO READ BLOOD SUGARS PER MANUFACTURERS INSTRUCTIONS 2 each 5     CONTOUR NEXT TEST test strip USE 1 STRIP TO CHECK GLUCOSE ONCE DAILY 100 strip 4     glipiZIDE (GLUCOTROL) 5 MG tablet Take 2 tablets (10 mg) by mouth every morning (before breakfast) 180 tablet 1     insulin  UNIT/ML vial Inject 24 Units Subcutaneous 2 times daily 10 mL 0     insulin syringe-needle U-100 (BD INSULIN SYRINGE ULTRAFINE) 31G X 5/16\" 0.5 ML miscellaneous Use daily with NPH insulin 100 each 11     latanoprost (XALATAN) 0.005 % ophthalmic solution Place 1 drop into both eyes At Bedtime  2.5 mL 1     lidocaine-prilocaine (EMLA) 2.5-2.5 % external cream APPLY CREAM TOPICALLY ONCE DAILY AS NEEDED FOR PAIN APPLY A PEA SIZED AMOUNT OVER PORT SITE 60 MINUTES PRIOR TO USE COVER WITH PLASTIC WRAP       LORazepam (ATIVAN) 0.5 MG tablet Take " 1 tablet (0.5 mg) by mouth nightly as needed for anxiety or sleep 30 tablet 0     ondansetron (ZOFRAN) 8 MG tablet Take 1 tablet (8 mg) by mouth every 8 hours as needed for nausea 30 tablet 1     senna-docusate (SENOKOT-S/PERICOLACE) 8.6-50 MG tablet Take 2 tablets by mouth daily as needed for constipation 60 tablet 11     timolol maleate (TIMOPTIC) 0.5 % ophthalmic solution INSTILL 1 DROP INTO EACH EYE TWICE DAILY       triamcinolone (KENALOG) 0.5 % external ointment Apply 1 g topically daily as needed for irritation       VENTOLIN  (90 Base) MCG/ACT inhaler INHALE 2 PUFFS BY MOUTH EVERY 4 HOURS AS NEEDED FOR SHORTNESS OF BREATH 18 g 0     XARELTO ANTICOAGULANT 20 MG TABS tablet Take 1 tablet by mouth daily              Raisa Torres MD  Internal Medicine       Patient Active Problem List    Diagnosis Date Noted     Hyperlipidemia with target LDL less than 100      Priority: High     Diagnosis updated by automated process. Provider to review and confirm.       Asthma, mild intermittent      Priority: High     Organizing pneumonia (H) -- Aug 2022 09/05/2022     Priority: Medium     Bilateral pulmonary embolism (H) 07/25/2022     Priority: Medium     Dehydration 06/29/2022     Priority: Medium     Orthostatic hypotension 06/29/2022     Priority: Medium     Endometrial cancer (H)      Priority: Medium     Check 5.23 for f/u Javier       Metastatic malignant melanoma (H)  -- R thigh  04/20/2022     Priority: Medium     Melanoma of skin (H) 03/03/2022     Priority: Medium     Added automatically from request for surgery [id number]       Diabetes mellitus, type 2 (H) 01/07/2022     Priority: Medium     HTN, goal below 140/90 05/27/2020     Priority: Medium     Polyp of duodenum 02/18/2020     Priority: Medium     Added automatically from request for surgery [id number]       Ampullary adenoma 02/28/2019     Priority: Medium     Status post total replacement of right hip 02/13/2018     Priority: Medium     CHERRY  (obstructive sleep apnea) 09/15/2017     Priority: Medium     HTN goal less than 140/90, 05/22/2016     Priority: Medium     Chronic knee pain, right 08/30/2015     Priority: Medium     Total knee replacement status 07/12/2013     Priority: Medium     Obesity (BMI 30-39.9)      Priority: Medium     Heart murmur      Priority: Medium     aortic area       Allergic rhinitis      Priority: Medium     Problem list name updated by automated process. Provider to review       Cataract      Priority: Low     Utility update for deleted IMO code  Imo Update utility       Macular hole of left eye      Priority: Low      Past Medical History:   Diagnosis Date     Asthma, mild intermittent      Cataract      Endometrial cancer (H) 06/2022     HTN, goal below 140/90      Hyperlipidemia LDL goal < 100      Macular hole of left eye      Melanoma (H)     RIGHT KNEE     Sleep apnea     uses c pap     Type 2 diabetes, HbA1C goal < 8% (H)      Past Surgical History:   Procedure Laterality Date     ARTHROPLASTY HIP Right 9/25/2017    Procedure: ARTHROPLASTY HIP;  Right total hip arthroplasty  ;  Surgeon: Ayaan Pena MD;  [facility]: [facility]     ARTHROPLASTY KNEE  7/12/2013    Procedure: ARTHROPLASTY KNEE;  right total knee arthroplasty ;  Surgeon: Chaparro Wesley MD;  Location:  OR     ARTHROSCOPY KNEE Right 1/21/2015    Procedure: ARTHROSCOPY KNEE;  Surgeon: Ayaan Pena MD;  Location:  OR     BRONCHOSCOPY (RIGID OR FLEXIBLE), DIAGNOSTIC N/A 8/11/2022    Procedure: BRONCHOSCOPY, WITH BRONCHOALVEOLAR LAVAGE;  Surgeon: Roselia Sosa MD;  Location:  GI     C INCISION OF HYMEN       CATARACT IOL, RT/LT       COLONOSCOPY  2008     COLONOSCOPY N/A 4/18/2019    Procedure: Colonoscopy with polypectomy;  Surgeon: Shaun Guerrero MD;  [facility]     CYSTOSCOPY N/A 6/23/2022    Procedure: Cystoscopy;  Surgeon: Eli Guidry MD;  [facility]     ENDOSCOPIC RETROGRADE CHOLANGIOPANCREATOGRAM  N/A 2/6/2019    Procedure: COMBINED ENDOSCOPIC RETROGRADE CHOLANGIOPANCREATOGRAPHY, BILIARY SPINCTEROTOMY AND DILATION, PLACE BILE DUCT STENT;  Surgeon: Guru Andie Gonzalez MD;  [facility]     ENDOSCOPIC RETROGRADE CHOLANGIOPANCREATOGRAM N/A 2/28/2019    Procedure: Endoscopic Retrograde Cholangiopancreatogram, Bile duct stent removal and placement;  Surgeon: Shaun Guerrero MD;  [facility]     ENDOSCOPIC RETROGRADE CHOLANGIOPANCREATOGRAM N/A 4/18/2019    Procedure: COMBINED ENDOSCOPIC RETROGRADE CHOLANGIOPANCREATOGRAPHY, Bile duct stent exchange and Polypectomy;  Surgeon: Shaun Guerrero MD;  [facility]     ENDOSCOPIC RETROGRADE CHOLANGIOPANCREATOGRAM N/A 10/18/2019    Procedure: Endoscopic Retrograde Cholangiopancreatogram;  Surgeon: Shaun Guerrero MD;  Location:  OR     ENDOSCOPIC RETROGRADE CHOLANGIOPANCREATOGRAM N/A 3/24/2022    Procedure: ENDOSCOPIC RETROGRADE CHOLANGIOPANCREATOGRAPHY;  Surgeon: Shaun Guerrero MD;  Location:  OR     ENDOSCOPIC RETROGRADE CHOLANGIOPANCREATOGRAM WITH SPYGLASS N/A 7/26/2019    Procedure: Endoscopic Retrograde Cholangiopancreatogram With Spyglas,l Radiofrequency Ablation, Stent Exchangeand Duodenal Biopsy x2;  Surgeon: Shaun Guerrero MD;  [facility]     ENDOSCOPIC RETROGRADE CHOLANGIOPANCREATOGRAM WITH SPYGLASS N/A 9/10/2020    Procedure: ENDOSCOPIC RETROGRADE CHOLANGIOPANCREATOGRAPHY, WITH DIRECT DUCT VISUALIZATION, USING PANCREATICOBILIARY FIBEROPTIC PROBE;  Surgeon: Shaun Guerrero MD;  [facility]     ENDOSCOPIC RETROGRADE CHOLANGIOPANCREATOGRAM WITH SPYGLASS N/A 3/22/2021    Procedure: ENDOSCOPIC RETROGRADE CHOLANGIOPANCREATOGRAPHY, WITH DIRECT DUCT VISUALIZATION, USING PANCREATICOBILIARY FIBEROPTIC PROBE;  Surgeon: Shaun Guerrero MD;  Location:  OR     ESOPHAGOSCOPY, GASTROSCOPY, DUODENOSCOPY (EGD) WITH RADIO FREQUENCY ABLATION, COMBINED N/A 9/10/2020    Procedure: possible repeat ESOPHAGOGASTRODUODENOSCOPY, WITH RADIOFREQUENCY  ABLATION and endoscopic mucosal resection;  Surgeon: Shaun Guerrero MD;  [facility]     ESOPHAGOSCOPY, GASTROSCOPY, DUODENOSCOPY (EGD), COMBINED N/A 4/18/2019    Procedure: upper endoscopy with polypectomy;  Surgeon: Shaun Guerrero MD;  [facility]     ESOPHAGOSCOPY, GASTROSCOPY, DUODENOSCOPY (EGD), COMBINED N/A 10/18/2019    Procedure: Upper Endoscopy and ERCP with stent removal, stone removal and biopsy;  Surgeon: Shaun Guerrero MD;  [facility]     ESOPHAGOSCOPY, GASTROSCOPY, DUODENOSCOPY (EGD), COMBINED N/A 3/24/2022    Procedure: ESOPHAGOGASTRODUODENOSCOPY (EGD), Duodenal  polyp removal;  Surgeon: Shaun Guerrero MD;  [facility]     ESOPHAGOSCOPY, GASTROSCOPY, DUODENOSCOPY (EGD), RESECT MUCOSA, COMBINED N/A 2/28/2019    Procedure: Upper Endoscopy, Endoscopic Ultrasound, Endoscopic Mucosal Resection,  Ampullectomy, polypectomy.;  Surgeon: Shaun Guerrero MD;  [facility]     ESOPHAGOSCOPY, GASTROSCOPY, DUODENOSCOPY (EGD), RESECT MUCOSA, COMBINED N/A 3/22/2021    Procedure: ESOPHAGOGASTRODUODENOSCOPY (EGD) with  endoscopic mucosal resection/ polypectomy;  Surgeon: Shaun Guerrero MD;  [facility]     EXCISE LESION LOWER EXTREMITY Right 3/28/2022    Procedure: Wide local excision of right knee melanoma;  Surgeon: Chevy Allison MD;  Location: [facility] OR     EXCISE MASS LOWER EXTREMITY Right 1/13/2022    Procedure: excision of right thigh mass;  Surgeon: Salvador Kelly MD;  [facility]: [facility]     EYE SURGERY      macular hole repaired left eye     HC KNEE SCOPE, DIAGNOSTIC      - both knees     HEAD & NECK SURGERY      wisdom teeth     IR CHEST PORT PLACEMENT > 5 YRS OF AGE  5/2/2022     LAPAROSCOPIC HYSTERECTOMY TOTAL, BILATERAL SALPINGO-OOPHORECTOMY, NODE DISSECTION, COMBINED Bilateral 6/23/2022    Procedure: Total Laparoscopic Hyserectomy, Bilateral Salpibgo-oophorectomy, Bilateral [place] Lymph Node Dissection;  Surgeon: Eli Guidry MD;  [facility]     Current  "Outpatient Medications   Medication Sig Dispense Refill     atenolol (TENORMIN) 50 MG tablet Take 1 tablet by mouth once daily 90 tablet 0     atorvastatin (LIPITOR) 40 MG tablet Take 1 tablet by mouth once daily 90 tablet 1     azelastine (ASTELIN) 0.1 % nasal spray USE 1 SPRAY(S) IN EACH NOSTRIL TWICE DAILY AS NEEDED 30 mL 0     azelastine (OPTIVAR) 0.05 % SOLN Place 1 drop into both eyes 2 times daily as needed Reported on 3/22/2017       cetirizine (ZYRTEC) 10 MG tablet Take 10 mg by mouth every morning       Continuous Blood Gluc Sensor (FREESTYLE DAVID 14 DAY SENSOR) MISC USE 1 EVERY 14 DAYS USE TO READ BLOOD SUGARS PER MANUFACTURERS INSTRUCTIONS 2 each 5     CONTOUR NEXT TEST test strip USE 1 STRIP TO CHECK GLUCOSE ONCE DAILY 100 strip 4     glipiZIDE (GLUCOTROL) 5 MG tablet Take 2 tablets (10 mg) by mouth every morning (before breakfast) 180 tablet 1     insulin  UNIT/ML vial Inject 24 Units Subcutaneous 2 times daily 10 mL 0     insulin syringe-needle U-100 (BD INSULIN SYRINGE ULTRAFINE) 31G X 5/16\" 0.5 ML miscellaneous Use daily with NPH insulin 100 each 11     latanoprost (XALATAN) 0.005 % ophthalmic solution Place 1 drop into both eyes At Bedtime  2.5 mL 1     lidocaine-prilocaine (EMLA) 2.5-2.5 % external cream APPLY CREAM TOPICALLY ONCE DAILY AS NEEDED FOR PAIN APPLY A PEA SIZED AMOUNT OVER PORT SITE 60 MINUTES PRIOR TO USE COVER WITH PLASTIC WRAP       LORazepam (ATIVAN) 0.5 MG tablet Take 1 tablet (0.5 mg) by mouth nightly as needed for anxiety or sleep 30 tablet 0     ondansetron (ZOFRAN) 8 MG tablet Take 1 tablet (8 mg) by mouth every 8 hours as needed for nausea 30 tablet 1     senna-docusate (SENOKOT-S/PERICOLACE) 8.6-50 MG tablet Take 2 tablets by mouth daily as needed for constipation 60 tablet 11     timolol maleate (TIMOPTIC) 0.5 % ophthalmic solution INSTILL 1 DROP INTO EACH EYE TWICE DAILY       triamcinolone (KENALOG) 0.5 % external ointment Apply 1 g topically daily as needed for " "irritation       VENTOLIN  (90 Base) MCG/ACT inhaler INHALE 2 PUFFS BY MOUTH EVERY 4 HOURS AS NEEDED FOR SHORTNESS OF BREATH 18 g 0     XARELTO ANTICOAGULANT 20 MG TABS tablet Take 1 tablet by mouth daily         Allergies   Allergen Reactions     Bromfenac Visual Disturbance      xibrom  Causes sever eye pain     Codeine      \"NAUSE,HA AND DIZZINESS\"     Codeine Nausea     Other reaction(s): Dizziness, Headache     Metformin GI Disturbance        Social History     Tobacco Use     Smoking status: Never     Passive exposure: Never     Smokeless tobacco: Never   Substance Use Topics     Alcohol use: No     Alcohol/week: 0.0 standard drinks       History   Drug Use No         Recent Labs   Lab Test 12/30/22  1220 12/30/22  0838 11/01/22  1318 09/01/22  0937 08/03/22  0655 08/02/22  1119 06/14/22  1354 05/02/22  0912   HGB  --  13.1 12.5  --    < >  --    < > 13.8   PLT  --  351 253  --    < >  --    < > 290   INR  --   --   --   --   --  1.08  --  0.96   NA  --  139 137 133   < >  --    < > 135   POTASSIUM  --  3.9 4.2 4.5   < >  --    < > 4.0   CR 0.7 0.81 0.70 0.77   < >  --    < > 0.87   A1C  --  8.9*  --  8.8*  --   --    < >  --     < > = values in this interval not displayed.                   Signed Electronically by: Raisa Davidson MD  Copy of this evaluation report is provided to requesting physician.      "

## 2023-03-01 ENCOUNTER — OFFICE VISIT (OUTPATIENT)
Dept: INTERNAL MEDICINE | Facility: CLINIC | Age: 73
End: 2023-03-01
Payer: COMMERCIAL

## 2023-03-01 VITALS
SYSTOLIC BLOOD PRESSURE: 135 MMHG | RESPIRATION RATE: 18 BRPM | HEIGHT: 67 IN | DIASTOLIC BLOOD PRESSURE: 72 MMHG | TEMPERATURE: 97.9 F | WEIGHT: 216.8 LBS | OXYGEN SATURATION: 96 % | BODY MASS INDEX: 34.03 KG/M2 | HEART RATE: 82 BPM

## 2023-03-01 DIAGNOSIS — I10 HTN, GOAL BELOW 140/90: ICD-10-CM

## 2023-03-01 DIAGNOSIS — E78.5 HYPERLIPIDEMIA WITH TARGET LDL LESS THAN 100: Chronic | ICD-10-CM

## 2023-03-01 DIAGNOSIS — K31.7 POLYP OF DUODENUM: ICD-10-CM

## 2023-03-01 DIAGNOSIS — Z01.818 PRE-OP EXAM: Primary | ICD-10-CM

## 2023-03-01 DIAGNOSIS — C43.9 METASTATIC MALIGNANT MELANOMA (H): ICD-10-CM

## 2023-03-01 DIAGNOSIS — E11.65 TYPE 2 DIABETES MELLITUS WITH HYPERGLYCEMIA, WITHOUT LONG-TERM CURRENT USE OF INSULIN (H): ICD-10-CM

## 2023-03-01 DIAGNOSIS — I10 ESSENTIAL HYPERTENSION WITH GOAL BLOOD PRESSURE LESS THAN 140/90: Chronic | ICD-10-CM

## 2023-03-01 PROCEDURE — 93000 ELECTROCARDIOGRAM COMPLETE: CPT | Performed by: INTERNAL MEDICINE

## 2023-03-01 PROCEDURE — 99214 OFFICE O/P EST MOD 30 MIN: CPT | Performed by: INTERNAL MEDICINE

## 2023-03-01 RX ORDER — ATENOLOL 50 MG/1
50 TABLET ORAL DAILY
Qty: 90 TABLET | Refills: 1 | Status: SHIPPED | OUTPATIENT
Start: 2023-03-01 | End: 2023-06-23

## 2023-03-01 ASSESSMENT — PAIN SCALES - GENERAL: PAINLEVEL: NO PAIN (0)

## 2023-03-01 NOTE — PATIENT INSTRUCTIONS
Plan:  1. In the morning of the surgery day you take the Atenolol 50 mg and  the morning dose of Insulin   2. Last dose Xarelto -- March 14  3. Resume Xarelto on March 17 or if dr Guerrero agrees - on March 16 after the procedure   4. Increase the Insulin dose as we discussed

## 2023-03-03 ENCOUNTER — TELEPHONE (OUTPATIENT)
Dept: INTERNAL MEDICINE | Facility: CLINIC | Age: 73
End: 2023-03-03

## 2023-03-03 NOTE — TELEPHONE ENCOUNTER
Diabetes Education Scheduling Outreach #1:    Call to patient to schedule. Left message with phone number to call to schedule.    Plan for 2nd outreach attempt within 1 week.    Lashell Oakes OnCall  Diabetes and Nutrition Scheduling

## 2023-03-06 ENCOUNTER — TRANSFERRED RECORDS (OUTPATIENT)
Dept: HEALTH INFORMATION MANAGEMENT | Facility: CLINIC | Age: 73
End: 2023-03-06

## 2023-03-06 LAB — RETINOPATHY: NEGATIVE

## 2023-03-09 ENCOUNTER — TELEPHONE (OUTPATIENT)
Dept: PHARMACY | Facility: CLINIC | Age: 73
End: 2023-03-09
Payer: COMMERCIAL

## 2023-03-09 DIAGNOSIS — E11.65 TYPE 2 DIABETES MELLITUS WITH HYPERGLYCEMIA, WITHOUT LONG-TERM CURRENT USE OF INSULIN (H): Primary | ICD-10-CM

## 2023-03-09 DIAGNOSIS — I10 HTN, GOAL BELOW 140/90: ICD-10-CM

## 2023-03-09 RX ORDER — HYDROCHLOROTHIAZIDE 25 MG/1
25 TABLET ORAL DAILY
Qty: 90 TABLET | Refills: 1
Start: 2023-03-09 | End: 2023-05-19

## 2023-03-09 NOTE — TELEPHONE ENCOUNTER
Taking NPH 26 units twice daily (increased 3/5) and glipizide 10 mg with breakfast. Has about 1/2 vial of NPH left at home and then plans to change to Lantus which she has at home.  Reviewed converting 1:1 dosing.  Metformin was not tolerated.  Discussed pioglitazone with Dr. Torres but she would prefer not to use this.  Discussed Endocrinology referral with Melva, she's not sure she needs this but willing to have a referral placed if her readings don't continue to respond.    Having a lot of swelling in her feet and lower legs, pitting per patient and having trouble getting shoes on. Was on hydrochlorothiazide in the past but that was stopped after last discharge.  No changes in diet.  Doesn't have compression stocking.  Tries to keep feet elevated. Blood pressure at home 140s/60-80s.  Has an appointment with Dr. Torres in a couple of weeks.           Plan:  1.  Will ask Dr. Torres to place referral to Endocrinology  2.  Will ask if she wants to restart hydrochlorothiazide for edema and elevated blood pressure, or wait to see patient as scheduled in April.      BP Readings from Last 3 Encounters:   03/01/23 135/72   02/06/23 135/67   01/06/23 108/55

## 2023-03-10 ENCOUNTER — TRANSFERRED RECORDS (OUTPATIENT)
Dept: HEALTH INFORMATION MANAGEMENT | Facility: CLINIC | Age: 73
End: 2023-03-10
Payer: COMMERCIAL

## 2023-03-16 ENCOUNTER — HOSPITAL ENCOUNTER (OUTPATIENT)
Facility: CLINIC | Age: 73
Discharge: HOME | End: 2023-03-16
Attending: INTERNAL MEDICINE | Admitting: INTERNAL MEDICINE
Payer: COMMERCIAL

## 2023-03-16 ENCOUNTER — ANESTHESIA EVENT (OUTPATIENT)
Dept: SURGERY | Facility: CLINIC | Age: 73
End: 2023-03-16
Payer: COMMERCIAL

## 2023-03-16 ENCOUNTER — APPOINTMENT (OUTPATIENT)
Dept: GENERAL RADIOLOGY | Facility: CLINIC | Age: 73
End: 2023-03-16
Attending: INTERNAL MEDICINE
Payer: COMMERCIAL

## 2023-03-16 ENCOUNTER — ANESTHESIA (OUTPATIENT)
Dept: SURGERY | Facility: CLINIC | Age: 73
End: 2023-03-16
Payer: COMMERCIAL

## 2023-03-16 VITALS
RESPIRATION RATE: 16 BRPM | DIASTOLIC BLOOD PRESSURE: 75 MMHG | OXYGEN SATURATION: 91 % | BODY MASS INDEX: 33.62 KG/M2 | HEART RATE: 70 BPM | TEMPERATURE: 97.3 F | WEIGHT: 214.2 LBS | SYSTOLIC BLOOD PRESSURE: 154 MMHG | HEIGHT: 67 IN

## 2023-03-16 LAB
ERCP: NORMAL
GLUCOSE BLDC GLUCOMTR-MCNC: 153 MG/DL (ref 70–99)
GLUCOSE BLDC GLUCOMTR-MCNC: 160 MG/DL (ref 70–99)

## 2023-03-16 PROCEDURE — 370N000017 HC ANESTHESIA TECHNICAL FEE, PER MIN: Performed by: INTERNAL MEDICINE

## 2023-03-16 PROCEDURE — 255N000002 HC RX 255 OP 636: Performed by: INTERNAL MEDICINE

## 2023-03-16 PROCEDURE — 272N000001 HC OR GENERAL SUPPLY STERILE: Performed by: INTERNAL MEDICINE

## 2023-03-16 PROCEDURE — 74330 X-RAY BILE/PANC ENDOSCOPY: CPT

## 2023-03-16 PROCEDURE — C1769 GUIDE WIRE: HCPCS | Performed by: INTERNAL MEDICINE

## 2023-03-16 PROCEDURE — 88305 TISSUE EXAM BY PATHOLOGIST: CPT | Mod: TC | Performed by: INTERNAL MEDICINE

## 2023-03-16 PROCEDURE — 250N000011 HC RX IP 250 OP 636: Performed by: NURSE ANESTHETIST, CERTIFIED REGISTERED

## 2023-03-16 PROCEDURE — 250N000025 HC SEVOFLURANE, PER MIN: Performed by: INTERNAL MEDICINE

## 2023-03-16 PROCEDURE — 258N000003 HC RX IP 258 OP 636: Performed by: NURSE ANESTHETIST, CERTIFIED REGISTERED

## 2023-03-16 PROCEDURE — 710N000012 HC RECOVERY PHASE 2, PER MINUTE: Performed by: INTERNAL MEDICINE

## 2023-03-16 PROCEDURE — C1726 CATH, BAL DIL, NON-VASCULAR: HCPCS | Performed by: INTERNAL MEDICINE

## 2023-03-16 PROCEDURE — 250N000009 HC RX 250: Performed by: NURSE ANESTHETIST, CERTIFIED REGISTERED

## 2023-03-16 PROCEDURE — 82962 GLUCOSE BLOOD TEST: CPT

## 2023-03-16 PROCEDURE — 258N000003 HC RX IP 258 OP 636: Performed by: ANESTHESIOLOGY

## 2023-03-16 PROCEDURE — 360N000083 HC SURGERY LEVEL 3 W/ FLUORO, PER MIN: Performed by: INTERNAL MEDICINE

## 2023-03-16 PROCEDURE — 710N000009 HC RECOVERY PHASE 1, LEVEL 1, PER MIN: Performed by: INTERNAL MEDICINE

## 2023-03-16 PROCEDURE — 999N000141 HC STATISTIC PRE-PROCEDURE NURSING ASSESSMENT: Performed by: INTERNAL MEDICINE

## 2023-03-16 RX ORDER — IOPAMIDOL 612 MG/ML
INJECTION, SOLUTION INTRAVASCULAR PRN
Status: DISCONTINUED | OUTPATIENT
Start: 2023-03-16 | End: 2023-03-16 | Stop reason: HOSPADM

## 2023-03-16 RX ORDER — FENTANYL CITRATE 50 UG/ML
25 INJECTION, SOLUTION INTRAMUSCULAR; INTRAVENOUS EVERY 5 MIN PRN
Status: DISCONTINUED | OUTPATIENT
Start: 2023-03-16 | End: 2023-03-16 | Stop reason: HOSPADM

## 2023-03-16 RX ORDER — ONDANSETRON 2 MG/ML
4 INJECTION INTRAMUSCULAR; INTRAVENOUS EVERY 6 HOURS PRN
Status: CANCELLED | OUTPATIENT
Start: 2023-03-16

## 2023-03-16 RX ORDER — NALOXONE HYDROCHLORIDE 0.4 MG/ML
0.4 INJECTION, SOLUTION INTRAMUSCULAR; INTRAVENOUS; SUBCUTANEOUS
Status: CANCELLED | OUTPATIENT
Start: 2023-03-16

## 2023-03-16 RX ORDER — FENTANYL CITRATE 50 UG/ML
50 INJECTION, SOLUTION INTRAMUSCULAR; INTRAVENOUS EVERY 5 MIN PRN
Status: DISCONTINUED | OUTPATIENT
Start: 2023-03-16 | End: 2023-03-16 | Stop reason: HOSPADM

## 2023-03-16 RX ORDER — EPHEDRINE SULFATE 50 MG/ML
INJECTION, SOLUTION INTRAMUSCULAR; INTRAVENOUS; SUBCUTANEOUS PRN
Status: DISCONTINUED | OUTPATIENT
Start: 2023-03-16 | End: 2023-03-16

## 2023-03-16 RX ORDER — PROPOFOL 10 MG/ML
INJECTION, EMULSION INTRAVENOUS PRN
Status: DISCONTINUED | OUTPATIENT
Start: 2023-03-16 | End: 2023-03-16

## 2023-03-16 RX ORDER — LABETALOL HYDROCHLORIDE 5 MG/ML
10 INJECTION, SOLUTION INTRAVENOUS
Status: DISCONTINUED | OUTPATIENT
Start: 2023-03-16 | End: 2023-03-16 | Stop reason: HOSPADM

## 2023-03-16 RX ORDER — HYDROMORPHONE HCL IN WATER/PF 6 MG/30 ML
0.4 PATIENT CONTROLLED ANALGESIA SYRINGE INTRAVENOUS EVERY 5 MIN PRN
Status: DISCONTINUED | OUTPATIENT
Start: 2023-03-16 | End: 2023-03-16 | Stop reason: HOSPADM

## 2023-03-16 RX ORDER — SODIUM CHLORIDE, SODIUM LACTATE, POTASSIUM CHLORIDE, CALCIUM CHLORIDE 600; 310; 30; 20 MG/100ML; MG/100ML; MG/100ML; MG/100ML
INJECTION, SOLUTION INTRAVENOUS CONTINUOUS
Status: DISCONTINUED | OUTPATIENT
Start: 2023-03-16 | End: 2023-03-16 | Stop reason: HOSPADM

## 2023-03-16 RX ORDER — ONDANSETRON 2 MG/ML
4 INJECTION INTRAMUSCULAR; INTRAVENOUS EVERY 30 MIN PRN
Status: DISCONTINUED | OUTPATIENT
Start: 2023-03-16 | End: 2023-03-16 | Stop reason: HOSPADM

## 2023-03-16 RX ORDER — NEOSTIGMINE METHYLSULFATE 1 MG/ML
VIAL (ML) INJECTION PRN
Status: DISCONTINUED | OUTPATIENT
Start: 2023-03-16 | End: 2023-03-16

## 2023-03-16 RX ORDER — FLUMAZENIL 0.1 MG/ML
0.2 INJECTION, SOLUTION INTRAVENOUS
Status: CANCELLED | OUTPATIENT
Start: 2023-03-16 | End: 2023-03-16

## 2023-03-16 RX ORDER — PROPOFOL 10 MG/ML
INJECTION, EMULSION INTRAVENOUS CONTINUOUS PRN
Status: DISCONTINUED | OUTPATIENT
Start: 2023-03-16 | End: 2023-03-16

## 2023-03-16 RX ORDER — ONDANSETRON 2 MG/ML
4 INJECTION INTRAMUSCULAR; INTRAVENOUS
Status: DISCONTINUED | OUTPATIENT
Start: 2023-03-16 | End: 2023-03-16 | Stop reason: HOSPADM

## 2023-03-16 RX ORDER — DEXAMETHASONE SODIUM PHOSPHATE 4 MG/ML
INJECTION, SOLUTION INTRA-ARTICULAR; INTRALESIONAL; INTRAMUSCULAR; INTRAVENOUS; SOFT TISSUE PRN
Status: DISCONTINUED | OUTPATIENT
Start: 2023-03-16 | End: 2023-03-16

## 2023-03-16 RX ORDER — HYDRALAZINE HYDROCHLORIDE 20 MG/ML
2.5-5 INJECTION INTRAMUSCULAR; INTRAVENOUS EVERY 10 MIN PRN
Status: DISCONTINUED | OUTPATIENT
Start: 2023-03-16 | End: 2023-03-16 | Stop reason: HOSPADM

## 2023-03-16 RX ORDER — GLYCOPYRROLATE 0.2 MG/ML
INJECTION, SOLUTION INTRAMUSCULAR; INTRAVENOUS PRN
Status: DISCONTINUED | OUTPATIENT
Start: 2023-03-16 | End: 2023-03-16

## 2023-03-16 RX ORDER — NALOXONE HYDROCHLORIDE 0.4 MG/ML
0.2 INJECTION, SOLUTION INTRAMUSCULAR; INTRAVENOUS; SUBCUTANEOUS
Status: CANCELLED | OUTPATIENT
Start: 2023-03-16

## 2023-03-16 RX ORDER — FENTANYL CITRATE 50 UG/ML
INJECTION, SOLUTION INTRAMUSCULAR; INTRAVENOUS PRN
Status: DISCONTINUED | OUTPATIENT
Start: 2023-03-16 | End: 2023-03-16

## 2023-03-16 RX ORDER — SODIUM CHLORIDE, SODIUM LACTATE, POTASSIUM CHLORIDE, CALCIUM CHLORIDE 600; 310; 30; 20 MG/100ML; MG/100ML; MG/100ML; MG/100ML
INJECTION, SOLUTION INTRAVENOUS CONTINUOUS PRN
Status: DISCONTINUED | OUTPATIENT
Start: 2023-03-16 | End: 2023-03-16

## 2023-03-16 RX ORDER — HYDROMORPHONE HCL IN WATER/PF 6 MG/30 ML
0.2 PATIENT CONTROLLED ANALGESIA SYRINGE INTRAVENOUS EVERY 5 MIN PRN
Status: DISCONTINUED | OUTPATIENT
Start: 2023-03-16 | End: 2023-03-16 | Stop reason: HOSPADM

## 2023-03-16 RX ORDER — ONDANSETRON 4 MG/1
4 TABLET, ORALLY DISINTEGRATING ORAL EVERY 6 HOURS PRN
Status: CANCELLED | OUTPATIENT
Start: 2023-03-16

## 2023-03-16 RX ORDER — LIDOCAINE 40 MG/G
CREAM TOPICAL
Status: DISCONTINUED | OUTPATIENT
Start: 2023-03-16 | End: 2023-03-16 | Stop reason: HOSPADM

## 2023-03-16 RX ORDER — ONDANSETRON 4 MG/1
4 TABLET, ORALLY DISINTEGRATING ORAL EVERY 30 MIN PRN
Status: DISCONTINUED | OUTPATIENT
Start: 2023-03-16 | End: 2023-03-16 | Stop reason: HOSPADM

## 2023-03-16 RX ORDER — LIDOCAINE HYDROCHLORIDE 20 MG/ML
INJECTION, SOLUTION INFILTRATION; PERINEURAL PRN
Status: DISCONTINUED | OUTPATIENT
Start: 2023-03-16 | End: 2023-03-16

## 2023-03-16 RX ADMIN — SODIUM CHLORIDE, POTASSIUM CHLORIDE, SODIUM LACTATE AND CALCIUM CHLORIDE: 600; 310; 30; 20 INJECTION, SOLUTION INTRAVENOUS at 07:24

## 2023-03-16 RX ADMIN — PROPOFOL 200 MG: 10 INJECTION, EMULSION INTRAVENOUS at 07:29

## 2023-03-16 RX ADMIN — FENTANYL CITRATE 50 MCG: 50 INJECTION, SOLUTION INTRAMUSCULAR; INTRAVENOUS at 07:29

## 2023-03-16 RX ADMIN — DEXAMETHASONE SODIUM PHOSPHATE 4 MG: 4 INJECTION, SOLUTION INTRA-ARTICULAR; INTRALESIONAL; INTRAMUSCULAR; INTRAVENOUS; SOFT TISSUE at 07:40

## 2023-03-16 RX ADMIN — Medication 10 MG: at 08:06

## 2023-03-16 RX ADMIN — Medication 5 MG: at 07:44

## 2023-03-16 RX ADMIN — ROCURONIUM BROMIDE 30 MG: 50 INJECTION, SOLUTION INTRAVENOUS at 07:29

## 2023-03-16 RX ADMIN — NEOSTIGMINE METHYLSULFATE 5 MG: 1 INJECTION, SOLUTION INTRAVENOUS at 08:18

## 2023-03-16 RX ADMIN — GLYCOPYRROLATE 0.8 MG: 0.2 INJECTION, SOLUTION INTRAMUSCULAR; INTRAVENOUS at 08:18

## 2023-03-16 RX ADMIN — SODIUM CHLORIDE, POTASSIUM CHLORIDE, SODIUM LACTATE AND CALCIUM CHLORIDE: 600; 310; 30; 20 INJECTION, SOLUTION INTRAVENOUS at 08:59

## 2023-03-16 RX ADMIN — Medication 10 MG: at 07:42

## 2023-03-16 RX ADMIN — PROPOFOL 30 MCG/KG/MIN: 10 INJECTION, EMULSION INTRAVENOUS at 07:33

## 2023-03-16 RX ADMIN — LIDOCAINE HYDROCHLORIDE 100 MG: 20 INJECTION, SOLUTION INFILTRATION; PERINEURAL at 07:29

## 2023-03-16 ASSESSMENT — ACTIVITIES OF DAILY LIVING (ADL)
ADLS_ACUITY_SCORE: 35
ADLS_ACUITY_SCORE: 35

## 2023-03-16 ASSESSMENT — LIFESTYLE VARIABLES: TOBACCO_USE: 0

## 2023-03-16 NOTE — INTERVAL H&P NOTE
"I have reviewed the surgical (or preoperative) H&P that is linked to this encounter, and examined the patient. There are no significant changes    Clinical Conditions Present on Arrival:  Clinically Significant Risk Factors Present on Admission                  # Drug Induced Coagulation Defect: home medication list includes an anticoagulant medication   # DMII: A1C = 8.9 % (Ref range: 0.0 - 5.6 %) within past 6 months  # Obesity: Estimated body mass index is 33.55 kg/m  as calculated from the following:    Height as of this encounter: 1.702 m (5' 7\").    Weight as of this encounter: 97.2 kg (214 lb 3.2 oz).       "

## 2023-03-16 NOTE — DISCHARGE INSTRUCTIONS
MAY RESUME XARELTO ON Monday.    Same Day Surgery Discharge Instructions for  Sedation and General Anesthesia     It's not unusual to feel dizzy, light-headed or faint for up to 24 hours after surgery or while taking pain medication.  If you have these symptoms: sit for a few minutes before standing and have someone assist you when you get up to walk or use the bathroom.    You should rest and relax for the next 24 hours. We recommend you make arrangements to have an adult stay with you for at least 24 hours after your discharge.  Avoid hazardous and strenuous activity.    DO NOT DRIVE any vehicle or operate mechanical equipment for 24 hours following the end of your surgery.  Even though you may feel normal, your reactions may be affected by the medication you have received.    Do not drink alcoholic beverages for 24 hours following surgery.     Slowly progress to your regular diet as you feel able. It's not unusual to feel nauseated and/or vomit after receiving anesthesia.  If you develop these symptoms, drink clear liquids (apple juice, ginger ale, broth, 7-up, etc. ) until you feel better.  If your nausea and vomiting persists for 24 hours, please notify your surgeon.      All narcotic pain medications, along with inactivity and anesthesia, can cause constipation. Drinking plenty of liquids and increasing fiber intake will help.    For any questions of a medical nature, call your surgeon.    Do not make important decisions for 24 hours.    If you had general anesthesia, you may have a sore throat for a couple of days related to the breathing tube used during surgery.  You may use Cepacol lozenges to help with this discomfort.  If it worsens or if you develop a fever, contact your surgeon.     If you feel your pain is not well managed with the pain medications prescribed by your surgeon, please contact your surgeon's office to let them know so they can address your concern.    
Improved.

## 2023-03-16 NOTE — ANESTHESIA PREPROCEDURE EVALUATION
Anesthesia Pre-Procedure Evaluation    Patient: Gricelda Sabillon   MRN: [id number] : 1950        Procedure : Procedure(s):  ESOPHAGOGASTRODUODENOSCOPY (EGD)  ENDOSCOPIC RETROGRADE CHOLANGIOPANCREATOGRAPHY          Past Medical History:   Diagnosis Date     Asthma, mild intermittent      Cataract      Endometrial cancer (H) 2022     HTN, goal below 140/90      Hyperlipidemia LDL goal < 100      Macular hole of left eye      Melanoma (H)     RIGHT KNEE     Sleep apnea     uses c pap     Type 2 diabetes, HbA1C goal < 8% (H)       Past Surgical History:   Procedure Laterality Date     ARTHROPLASTY HIP Right 2017    Procedure: ARTHROPLASTY HIP;  Right total hip arthroplasty  ;  Surgeon: Ayaan Pena MD;  Location: [facility]     ARTHROPLASTY KNEE  2013    Procedure: ARTHROPLASTY KNEE;  right total knee arthroplasty ;  Surgeon: Chaparro Wesley MD;  Location: [facility]     ARTHROSCOPY KNEE Right 2015    Procedure: ARTHROSCOPY KNEE;  Surgeon: Ayaan Pena MD;  Location: [facility]     BRONCHOSCOPY (RIGID OR FLEXIBLE), DIAGNOSTIC N/A 2022    Procedure: BRONCHOSCOPY, WITH BRONCHOALVEOLAR LAVAGE;  Surgeon: Roselia Sosa MD;  Location: [facility]     C INCISION OF HYMEN       CATARACT IOL, RT/LT       COLONOSCOPY       COLONOSCOPY N/A 2019    Procedure: Colonoscopy with polypectomy;  Surgeon: Shaun Guerrero MD;  [facility]: [facility]     CYSTOSCOPY N/A 2022    Procedure: Cystoscopy;  Surgeon: Eli Guidry MD;  [facility]     ENDOSCOPIC RETROGRADE CHOLANGIOPANCREATOGRAM N/A 2019    Procedure: COMBINED ENDOSCOPIC RETROGRADE CHOLANGIOPANCREATOGRAPHY, BILIARY SPINCTEROTOMY AND DILATION, PLACE BILE DUCT STENT;  Surgeon: Guru Andie Gonzalez MD;  [facility]     ENDOSCOPIC RETROGRADE CHOLANGIOPANCREATOGRAM N/A 2019    Procedure: Endoscopic Retrograde Cholangiopancreatogram, Bile duct stent removal and placement;  Surgeon: Cesar  MD Shaun;  [facility]     ENDOSCOPIC RETROGRADE CHOLANGIOPANCREATOGRAM N/A 4/18/2019    Procedure: COMBINED ENDOSCOPIC RETROGRADE CHOLANGIOPANCREATOGRAPHY, Bile duct stent exchange and Polypectomy;  Surgeon: Shaun Guerrero MD;  [facility]     ENDOSCOPIC RETROGRADE CHOLANGIOPANCREATOGRAM N/A 10/18/2019    Procedure: Endoscopic Retrograde Cholangiopancreatogram;  Surgeon: Shaun Guerrero MD;  [facility]     ENDOSCOPIC RETROGRADE CHOLANGIOPANCREATOGRAM N/A 3/24/2022    Procedure: ENDOSCOPIC RETROGRADE CHOLANGIOPANCREATOGRAPHY;  Surgeon: Shaun Guerrero MD;  Location: [facility]     ENDOSCOPIC RETROGRADE CHOLANGIOPANCREATOGRAM WITH SPYGLASS N/A 7/26/2019    Procedure: Endoscopic Retrograde Cholangiopancreatogram With Spyglas,l Radiofrequency Ablation, Stent Exchangeand Duodenal Biopsy x2;  Surgeon: Shaun Guerrero MD;  [facility]     ENDOSCOPIC RETROGRADE CHOLANGIOPANCREATOGRAM WITH SPYGLASS N/A 9/10/2020    Procedure: ENDOSCOPIC RETROGRADE CHOLANGIOPANCREATOGRAPHY, WITH DIRECT DUCT VISUALIZATION, USING PANCREATICOBILIARY FIBEROPTIC PROBE;  Surgeon: Shaun Guerrero MD;  [facility]     ENDOSCOPIC RETROGRADE CHOLANGIOPANCREATOGRAM WITH SPYGLASS N/A 3/22/2021    Procedure: ENDOSCOPIC RETROGRADE CHOLANGIOPANCREATOGRAPHY, WITH DIRECT DUCT VISUALIZATION, USING PANCREATICOBILIARY FIBEROPTIC PROBE;  Surgeon: Shaun Guerrero MD;  [facility]     ESOPHAGOSCOPY, GASTROSCOPY, DUODENOSCOPY (EGD) WITH RADIO FREQUENCY ABLATION, COMBINED N/A 9/10/2020    Procedure: possible repeat ESOPHAGOGASTRODUODENOSCOPY, WITH RADIOFREQUENCY ABLATION and endoscopic mucosal resection;  Surgeon: Shaun Guerrero MD;  [facility]     ESOPHAGOSCOPY, GASTROSCOPY, DUODENOSCOPY (EGD), COMBINED N/A 4/18/2019    Procedure: upper endoscopy with polypectomy;  Surgeon: Shaun Guerrero MD;  [facility]     ESOPHAGOSCOPY, GASTROSCOPY, DUODENOSCOPY (EGD), COMBINED N/A 10/18/2019    Procedure: Upper Endoscopy and ERCP with stent removal,  "stone removal and biopsy;  Surgeon: Shaun Guerrero MD;  [facility]     ESOPHAGOSCOPY, GASTROSCOPY, DUODENOSCOPY (EGD), COMBINED N/A 3/24/2022    Procedure: ESOPHAGOGASTRODUODENOSCOPY (EGD), Duodenal  polyp removal;  Surgeon: Shaun Guerrero MD;  [facility]     ESOPHAGOSCOPY, GASTROSCOPY, DUODENOSCOPY (EGD), RESECT MUCOSA, COMBINED N/A 2/28/2019    Procedure: Upper Endoscopy, Endoscopic Ultrasound, Endoscopic Mucosal Resection,  Ampullectomy, polypectomy.;  Surgeon: Shaun Guerrero MD;  [facility]     ESOPHAGOSCOPY, GASTROSCOPY, DUODENOSCOPY (EGD), RESECT MUCOSA, COMBINED N/A 3/22/2021    Procedure: ESOPHAGOGASTRODUODENOSCOPY (EGD) with  endoscopic mucosal resection/ polypectomy;  Surgeon: Shaun Guerrero MD;  [facility]     EXCISE LESION LOWER EXTREMITY Right 3/28/2022    Procedure: Wide local excision of right knee melanoma;  Surgeon: Chevy Allison MD;  Location: [facility]     EXCISE MASS LOWER EXTREMITY Right 1/13/2022    Procedure: excision of right thigh mass;  Surgeon: Salvador Kelly MD;  Location: [facility]     EYE SURGERY      macular hole repaired left eye     HC KNEE SCOPE, DIAGNOSTIC      - both knees     HEAD & NECK SURGERY      wisdom teeth     IR CHEST PORT PLACEMENT > 5 YRS OF AGE  5/2/2022     LAPAROSCOPIC HYSTERECTOMY TOTAL, BILATERAL SALPINGO-OOPHORECTOMY, NODE DISSECTION, COMBINED Bilateral 6/23/2022    Procedure: Total Laparoscopic Hyserectomy, Bilateral Salpibgo-oophorectomy, Bilateral [place] Lymph Node Dissection;  Surgeon: Eli Guidry MD;  [facility]      Allergies   Allergen Reactions     Bromfenac Visual Disturbance      xibrom  Causes sever eye pain     Codeine      \"NAUSE,HA AND DIZZINESS\"     Codeine Nausea     Other reaction(s): Dizziness, Headache     Metformin GI Disturbance      Social History     Tobacco Use     Smoking status: Never     Passive exposure: Never     Smokeless tobacco: Never   Substance Use Topics     Alcohol use: No     " Alcohol/week: 0.0 standard drinks      Wt Readings from Last 1 Encounters:   03/16/23 97.2 kg (214 lb 3.2 oz)        Anesthesia Evaluation   Pt has had prior anesthetic. Type: General.    No history of anesthetic complications       ROS/MED HX  ENT/Pulmonary:     (+) sleep apnea, uses CPAP, Intermittent, asthma  (-) tobacco use   Neurologic:  - neg neurologic ROS     Cardiovascular:     (+) Dyslipidemia hypertension-----Previous cardiac testing   Echo: Date: Results:    Stress Test: Date: Results:    ECG Reviewed: Date: 1/2022 Results:  Sinus rhythm, no changes suggestive for ischemia  Cath: Date: Results:      METS/Exercise Tolerance:     Hematologic: Comments: Hb:13.5    (+) History of blood clots, pt is anticoagulated,     Musculoskeletal:   (+) arthritis,     GI/Hepatic: Comment: Ampullary adenoma   (-) GERD   Renal/Genitourinary:     (+) renal disease, type: CRI, Pt does not require dialysis,     Endo:     (+) type II DM, Not using insulin, - not using insulin pump. Obesity,     Psychiatric/Substance Use:       Infectious Disease:       Malignancy: Comment: Metastatic malignant melanoma (H)  -- R thigh   Endometrial carcinoma   (+) Malignancy, History of Skin and Other.    Other:            Physical Exam    Airway        Mallampati: III   TM distance: > 3 FB   Neck ROM: full   Mouth opening: > 3 cm    Respiratory Devices and Support         Dental    unable to assess        Cardiovascular   cardiovascular exam normal          Pulmonary   pulmonary exam normal                OUTSIDE LABS:  CBC:   Lab Results   Component Value Date    WBC 6.4 12/30/2022    WBC 8.8 11/01/2022    HGB 13.1 12/30/2022    HGB 12.5 11/01/2022    HCT 42.3 12/30/2022    HCT 40.7 11/01/2022     12/30/2022     11/01/2022     BMP:   Lab Results   Component Value Date     12/30/2022     11/01/2022    POTASSIUM 3.9 12/30/2022    POTASSIUM 4.2 11/01/2022    CHLORIDE 102 12/30/2022    CHLORIDE 105 11/01/2022    CO2 24  12/30/2022    CO2 27 11/01/2022    BUN 15.5 12/30/2022    BUN 20 11/01/2022    CR 0.7 12/30/2022    CR 0.81 12/30/2022     (H) 03/16/2023     (H) 12/30/2022     COAGS:   Lab Results   Component Value Date    PTT 25 05/02/2022    INR 1.08 08/02/2022     POC:   Lab Results   Component Value Date     (H) 03/22/2021     HEPATIC:   Lab Results   Component Value Date    ALBUMIN 4.1 12/30/2022    PROTTOTAL 7.4 12/30/2022    ALT 20 12/30/2022    AST 22 12/30/2022    ALKPHOS 115 (H) 12/30/2022    BILITOTAL 0.4 12/30/2022     OTHER:   Lab Results   Component Value Date    LACT 1.0 07/25/2022    A1C 8.9 (H) 12/30/2022    KATHRYN 10.2 12/30/2022    MAG 1.9 08/03/2022    LIPASE 347 03/22/2021    AMYLASE 73 03/22/2021    TSH 1.09 11/01/2022    T4 1.17 02/04/2016    CRP <5.0 01/07/2014    SED 16 01/07/2014       Anesthesia Plan    ASA Status:  3   NPO Status:  NPO Appropriate    Anesthesia Type: General.     - Airway: ETT   Induction: Intravenous, Propofol.   Maintenance: Balanced.        Consents    Anesthesia Plan(s) and associated risks, benefits, and realistic alternatives discussed. Questions answered and patient/representative(s) expressed understanding.    - Discussed:     - Discussed with:  Patient         Postoperative Care    Pain management: IV analgesics.   PONV prophylaxis: Ondansetron (or other 5HT-3), Background Propofol Infusion     Comments:                Baldemar Polo MD

## 2023-03-16 NOTE — OP NOTE
ERCP 03/16/2023  7:48 AM [address] [place] [place]  [address]   _______________________________________________________________________________   Patient Name: Gricelda Sabillon         Procedure Date: 3/16/2023 7:48 AM   MRN: [id number]                       Account Number: [id number]   YOB: 1950              Admit Type: Outpatient   Age: 72                               Room: [place] [address]   Note Status: Finalized                Attending MD: LAVINIA DRAKE MD   Instrument Name: 501 Q190V ERCP,509 GIF  Gastroscope   _______________________________________________________________________________       Procedure:                ERCP   Indications:              Ampullary adenoma, h/o ampullary adenoma with large                             periampullary spread and intraductal polyp                             extension into the CBD s/p papillectomy, EMR, and                             intraductal RFA. Here for surveillance. Last                             surveillance ~1 year ago with small residual                             adenoma removed.   Providers:                LAVINIA DRAKE MD   Referring MD:               Requesting Provider:      JOLIE VELAZQUEZ MD   Medicines:                General Anesthesia   Complications:            No immediate complications. Estimated blood loss:                             Minimal.   _______________________________________________________________________________   Procedure:                Pre-Anesthesia Assessment:                             - Prior to the procedure, a History and Physical                             was performed, and patient medications and                             allergies were reviewed. The patient is competent.                             The risks and benefits of the procedure and the                             sedation options and risks were discussed with the                              patient. All questions were answered and informed                             consent was obtained. Patient identification and                             proposed procedure were verified by the physician,                             the nurse, the anesthesiologist and the anesthetist                             in the procedure room. Mental Status Examination:                             alert and oriented. Airway Examination: normal                             oropharyngeal airway and neck mobility. Respiratory                             Examination: clear to auscultation. CV Examination:                             normal. Prophylactic Antibiotics: The patient does                             not require prophylactic antibiotics. Prior                             Anticoagulants: The patient has taken no                             anticoagulant or antiplatelet agents. ASA Grade                             Assessment: III - A patient with severe systemic                             disease. After reviewing the risks and benefits,                             the patient was deemed in satisfactory condition to                             undergo the procedure. The anesthesia plan was to                             use general anesthesia. Immediately prior to                             administration of medications, the patient was                             re-assessed for adequacy to receive sedatives. The                             heart rate, respiratory rate, oxygen saturations,                             blood pressure, adequacy of pulmonary ventilation,                             and response to care were monitored throughout the                             procedure. The physical status of the patient was                             re-assessed after the procedure.                             After obtaining informed consent, the scope was                             passed under direct vision.  Throughout the                             procedure, the patient's blood pressure, pulse, and                             oxygen saturations were monitored continuously. The                             Duodenoscope was introduced through the mouth, and                             used to inject contrast into and used to inject                             contrast into the bile duct. The endoscope 509 was                             introduced through the and used to inject contrast                             into. The ERCP was accomplished without difficulty.                             The patient tolerated the procedure well.                                                                                     Findings:        The  film was normal. The esophagus was successfully intubated        under direct vision. The scope was advanced from the mouth to the        duodenum. The pharynx, larynx and associated structures, as well as the        upper GI tract, were normal. Inspection of the major papilla revealed        that an ampullectomy (papillectomy) had been performed previously. The        minor papilla was enlarged. The bile duct was deeply cannulated with the        12 mm balloon. Contrast was injected. I personally interpreted the bile        duct images. There was brisk flow of contrast through the ducts. Image        quality was excellent. Contrast extended to the entire biliary tree. The        main bile duct was normal. Visiglide wire was passed into the biliary        tree. The biliary tree was swept with a 12 mm balloon starting at the        bifurcation. Nothing was found. Using a snare, the minor papilla was        grasped. The papilla was then resected using ERBE electrocautery.        Resection and retrieval were complete. The dorsal pancreatic duct could        not be cannulated with the Fusion OMNI sphincterotome. A standard        esophagogastroduodenoscopy scope was used for the  examination of the        upper gastrointestinal tract. The scope was passed under direct vision        through the upper GI tract. A large post mucosectomy scar was found in        the second portion of the duodenum. The scar tissue was healthy in        appearance. There was no evidence of the previous polyp.                                                                                     Impression:               - Evidence of prior endoscopic major papillectomy.                             No evidence of recurrence endoscopically.                             - The minor papilla appeared to be enlarged and                             have an overlying polyp.                             - Duodenal scar in D2 from prior endoscopic                             resection. No other evidence of recurrence.                             - Normal cholangiogram                             - The biliary tree was swept and nothing was found.                             Specifically not evidence of intraductal polyp                             - Snare papillectomy of the minor papilla was                             performed. Resection and retrieval were complete.                             - Brief attempt at cannulated the dorsal pancreatic                             duct via the minor papilla was unsuccessful, but                             patient has known primary pancreatic drainage via                             the major papilla with communication of the duct of                             [place] with the duct of [place] which should                             ensure drainage   Recommendation:           - Discharge patient to home (ambulatory).                             - Await path results.                             - Full liquid diet today. Advance as tolerated                             tomorrow to a regular diet                             - Repeat ERCP in 1 year for surveillance. Tentative                              plan will be to continue surveillance for a total                             of 5 years from index procedure assuming no                             residual is found (until 2024).                             - Above discussed with patient and family

## 2023-03-16 NOTE — OR NURSING
Patient at times spO2 will dip into 87-89%. When patient deep breaths or uses incentive spirometer spO2 jumps back up to 94-98% quickly. Patient states she sleeps with 1 liter nasal cannula of oxygen at home. She also states that at time she will use oxygen in the day if she feels she needs it. At rest at home she says her oxygen is around 91% on room air. Dr. Polo aware of these circumstances and is okay with plans to discharge knowing patient has oxygen prn at home as well is good incentive spirometry use demonstration and result.

## 2023-03-16 NOTE — ANESTHESIA CARE TRANSFER NOTE
Patient: Gricelda Sabillon    Procedure: Procedure(s):  ESOPHAGOGASTRODUODENOSCOPY (EGD)  endoscopic retrograde cholangiopancreatography with minor papillotomy       Diagnosis: Ampullary adenoma [D13.5]  Diagnosis Additional Information: No value filed.    Anesthesia Type:   General     Note:    Oropharynx: oropharynx clear of all foreign objects  Level of Consciousness: drowsy  Oxygen Supplementation: face mask  Level of Supplemental Oxygen (L/min / FiO2): 10  Independent Airway: airway patency satisfactory and stable    Vital Signs Stable: post-procedure vital signs reviewed and stable  Report to RN Given: handoff report given  Patient transferred to: PACU  Comments: Spont. Resps, pt. Responding.  Extubated, sufficient air exchange. Oxygen mask placed with 10 L O2. To PACU VSS, Monitors on. Report to RN  Handoff Report: Identifed the Patient, Identified the Reponsible Provider, Reviewed the pertinent medical history, Discussed the surgical course, Reviewed Intra-OP anesthesia mangement and issues during anesthesia, Set expectations for post-procedure period and Allowed opportunity for questions and acknowledgement of understanding      Vitals:  Vitals Value Taken Time   BP     Temp     Pulse 67 03/16/23 0828   Resp 10 03/16/23 0828   SpO2 97 % 03/16/23 0828   Vitals shown include unvalidated device data.    Electronically Signed By: DENYS Ponce CRNA  March 16, 2023  8:29 AM

## 2023-03-16 NOTE — ANESTHESIA PROCEDURE NOTES
Airway       Patient location during procedure: OR       Procedure Start/Stop Times: 3/16/2023 7:31 AM  Staff -        Anesthesiologist:  Baldemar Polo MD       CRNA: Cami Wyatt APRN CRNA       Performed By: CRNA  Consent for Airway        Urgency: elective  Indications and Patient Condition       Indications for airway management: rita-procedural       Induction type:intravenous       Mask difficulty assessment: 1 - vent by mask    Final Airway Details       Final airway type: endotracheal airway    Endotracheal Airway Details        Cuffed: yes       Cuff volume (mL): 10       Successful intubation technique: direct laryngoscopy       DL Blade Type: Mullen 2       Grade View of Cords: 1       Adjucts: stylet       Position: Right       Measured from: lips       Secured at (cm): 23       Bite Block used: endo oral airway.    Post intubation assessment        Placement verified by: capnometry and equal breath sounds        Number of attempts at approach: 1       Number of other approaches attempted: 0       Secured with: pink tape       Ease of procedure: easy       Dentition: Intact and Unchanged    Medication(s) Administered   Medication Administration Time: 3/16/2023 7:31 AM

## 2023-03-16 NOTE — ANESTHESIA POSTPROCEDURE EVALUATION
Patient: Gricelda Sabillon    Procedure: Procedure(s):  ESOPHAGOGASTRODUODENOSCOPY (EGD)  endoscopic retrograde cholangiopancreatography with minor papillotomy       Anesthesia Type:  General    Note:  Disposition: Outpatient   Postop Pain Control: Uneventful            Sign Out: Well controlled pain   PONV: No   Neuro/Psych: Uneventful            Sign Out: Acceptable/Baseline neuro status   Airway/Respiratory: Uneventful            Sign Out: Acceptable/Baseline resp. status   CV/Hemodynamics: Uneventful            Sign Out: Acceptable CV status; No obvious hypovolemia; No obvious fluid overload   Other NRE: NONE   DID A NON-ROUTINE EVENT OCCUR? No           Last vitals:  Vitals Value Taken Time   /63 03/16/23 0900   Temp 35.9  C (96.7  F) 03/16/23 0830   Pulse 68 03/16/23 0909   Resp 17 03/16/23 0909   SpO2 99 % 03/16/23 0909   Vitals shown include unvalidated device data.    Electronically Signed By: Baldemar Polo MD  March 16, 2023  9:09 AM

## 2023-03-20 PROCEDURE — 88305 TISSUE EXAM BY PATHOLOGIST: CPT | Mod: 26 | Performed by: PATHOLOGY

## 2023-03-21 ENCOUNTER — ALLIED HEALTH/NURSE VISIT (OUTPATIENT)
Dept: EDUCATION SERVICES | Facility: CLINIC | Age: 73
End: 2023-03-21
Attending: INTERNAL MEDICINE
Payer: COMMERCIAL

## 2023-03-21 DIAGNOSIS — E11.65 TYPE 2 DIABETES MELLITUS WITH HYPERGLYCEMIA, WITHOUT LONG-TERM CURRENT USE OF INSULIN (H): Primary | ICD-10-CM

## 2023-03-21 DIAGNOSIS — E11.65 TYPE 2 DIABETES MELLITUS WITH HYPERGLYCEMIA, WITHOUT LONG-TERM CURRENT USE OF INSULIN (H): ICD-10-CM

## 2023-03-21 PROCEDURE — G0108 DIAB MANAGE TRN  PER INDIV: HCPCS

## 2023-03-21 PROCEDURE — 99207 PR DROP WITH A PROCEDURE: CPT

## 2023-03-21 NOTE — LETTER
"    3/21/2023         RE: Gricelda Sabillon  [address] [facility] [address]        Dear Colleague,    Thank you for referring your patient, Gricelda Sabillon, to the [facility]. Please see a copy of my visit note below.    Diabetes Self-Management Education & Support    Presents for: Individual review    Type of Service: In Person Visit     ASSESSMENT:  Melva states she lost about 30-40 pounds over the last year due to diagnosis of multiple cancers and complicated medical history.  Reports she just finished a prednisone taper this February.     \"I am back to sleep walking and I haven't done that since I was a kid.\"    Diabetes medications:  Oral medications:  Metformin: She does not tolerate  Pioglitazone: Dr. Torres prefers her not to use   Glipizide: 10 mg in the morning    Insulin:  NPH: No longer taking now that she is not taking prednisone  Basaglar: 40 units (0.41 units/kg) in the morning. She is using from previous prescriptions. Basaglar pens have been stored in the fridge.  She prefers using vial and syringe as it is more cost effective. Prescriptions pended to her PCP as Basaglar not in her med list.     Patient's most recent   Lab Results   Component Value Date    A1C 8.9 12/30/2022    A1C 7.1 03/03/2021     is not meeting goal of <8.0    Diabetes knowledge and skills assessment:   Patient is knowledgeable in diabetes management concepts related to: Monitoring, Taking Medication and Problem Solving    Continue education with the following diabetes management concepts: Healthy Eating, Being Active, Monitoring, Taking Medication, Problem Solving, Reducing Risks and Healthy Coping    Based on learning assessment above, most appropriate setting for further diabetes education would be: Individual setting.      PLAN    Lantus vial pended to PCP at 44 units (.45 units/kg). Increase of 10% due to high glucose as reports indicate below.   Enc to call triage to report glucose data next " "week as likely will need Basaglar increased to 48 units.  Enc to schedule follow up appt with diabetes ed first part of May  Discussed timing of her Baldo sensor around her radiology appt 4/7. Enc to place after appt.  Briefly discussed carbohydrate counting today and healthy snacks. Will benefit from more review at next visit.     Topics to cover at upcoming visits: Healthy Eating, Being Active, Monitoring, Taking Medication, Problem Solving, Reducing Risks and Healthy Coping    Follow-up: Call scheduling line for next appt.     See Care Plan for co-developed, patient-state behavior change goals.  AVS provided for patient today.    Education Materials Provided:  Carbohydrate Counting      SUBJECTIVE/OBJECTIVE:  Presents for: Individual review  Accompanied by: Self  Diabetes education in the past 24mo: No  Focus of Visit: Monitoring, Taking Medication, Healthy Eating, CGM  Diabetes type: Type 2, Secondary Diabetes  Disease course: Getting harder to manage  How confident are you filling out medical forms by yourself:: Quite a bit  Other concerns:: None  Cultural Influences/Ethnic Background:  Not  or     Diabetes Symptoms & Complications:  Polyphagia: No  Autonomic neuropathy: No  CVA: No  Heart disease: No  Nephropathy: No  Peripheral neuropathy: No  Peripheral Vascular Disease: No  Retinopathy: No  Sexual dysfunction: No    Patient Problem List and Family Medical History reviewed for relevant medical history, current medical status, and diabetes risk factors.    Vitals:  LMP 12/13/2002 (Exact Date)   Estimated body mass index is 33.55 kg/m  as calculated from the following:    Height as of 3/16/23: 1.702 m (5' 7\").    Weight as of 3/16/23: 97.2 kg (214 lb 3.2 oz).   Last 3 BP:   BP Readings from Last 3 Encounters:   03/16/23 (!) 154/75   03/01/23 135/72   02/06/23 135/67       History   Smoking Status     Never   Smokeless Tobacco     Never       Labs:  Lab Results   Component Value Date    A1C 8.9 " 12/30/2022    A1C 7.1 03/03/2021     Lab Results   Component Value Date     03/16/2023     11/01/2022     03/22/2021     Lab Results   Component Value Date     12/30/2022    LDL 91 03/03/2021     HDL Cholesterol   Date Value Ref Range Status   03/03/2021 40 (L) >49 mg/dL Final     Direct Measure HDL   Date Value Ref Range Status   12/30/2022 38 (L) >=50 mg/dL Final   ]  GFR Estimate   Date Value Ref Range Status   12/30/2022 76 >60 mL/min/1.73m2 Final     Comment:     Effective December 21, 2021 eGFRcr in adults is calculated using the 2021 CKD-EPI creatinine equation which includes age and gender (Estela et al., [place], DOI: 10.1056/TJSIed0912166)   03/22/2021 71 >60 mL/min/[1.73_m2] Final     Comment:     Non  GFR Calc  Starting 12/18/2018, serum creatinine based estimated GFR (eGFR) will be   calculated using the Chronic Kidney Disease Epidemiology Collaboration   (CKD-EPI) equation.       GFR, ESTIMATED POCT   Date Value Ref Range Status   12/30/2022 >60 >60 mL/min/1.73m2 Final     GFR Estimate If Black   Date Value Ref Range Status   03/22/2021 82 >60 mL/min/[1.73_m2] Final     Comment:      GFR Calc  Starting 12/18/2018, serum creatinine based estimated GFR (eGFR) will be   calculated using the Chronic Kidney Disease Epidemiology Collaboration   (CKD-EPI) equation.       Lab Results   Component Value Date    CR 0.7 12/30/2022    CR 0.81 12/30/2022    CR 0.82 03/22/2021     No results found for: MICROALBUMIN    Healthy Eating:  Healthy Eating Assessed Today: Yes  Cultural/Restorationist diet restrictions?: No  Meal planning/habits: Low carb, Smaller portions  How many times a week on average do you eat food made away from home (restaurant/take-out)?: 1  Meals include: Breakfast, Dinner, Afternoon Snack, Evening Snack  Beverages: Water, Coffee, Milk, Diet soda  Has patient met with a dietitian in the past?: Yes    Being Active:  Being Active Assessed Today:  Yes  Barrier to exercise: Physical limitation    Monitoring:  Monitoring Assessed Today: Yes  Blood Glucose Meter: Other  Times checking blood sugar at home (number): 5+  Times checking blood sugar at home (per): Day  Blood glucose trend: Fluctuating                  Taking Medications:  Diabetes Medication(s)     Insulin       insulin  UNIT/ML vial    Inject 24 Units Subcutaneous 2 times daily    Sulfonylureas       glipiZIDE (GLUCOTROL) 5 MG tablet    Take 2 tablets (10 mg) by mouth every morning (before breakfast)          Taking Medication Assessed Today: Yes  Current Treatments: Insulin Injections, Oral Medication (taken by mouth)    Problem Solving:  Problem Solving Assessed Today: Yes    Reducing Risks:  Reducing Risks Assessed Today: No  CAD Risks: Diabetes Mellitus, Dyslipidemia, Family history, Obesity, Post-menopausal, Sedentary lifestyle  Has dilated eye exam at least once a year?: Yes  Sees dentist every 6 months?: Yes  Feet checked by healthcare provider in the last year?: Yes    Healthy Coping:  Healthy Coping Assessed Today: Yes  Emotional response to diabetes: Ready to learn  Informal Support system:: Children, [place] based, Family, Friends, Neighbors, Spouse  Stage of change: ACTION (Actively working towards change)  Support resources: Websites  Patient Activation Measure Survey Score:  ADRIANO Score (Last Two) 8/26/2013   ADRIANO Raw Score 41   Activation Score 63.2   ADRIANO Level 3         Care Plan and Education Provided:  Care Plan: Diabetes   Updates made by Lili Ahuja since 3/21/2023 12:00 AM      Problem: HbA1C Not In Goal       Goal: Establish Regular Follow-Ups with PCP       Task: Discuss with PCP the recommended timing for patient's next follow up visit(s)    Responsible User: Lili Ahuja      Task: Discuss schedule for PCP visits with patient    Responsible User: Lili Ahuja      Goal: Get HbA1C Level in Goal       Task: Educate patient on diabetes education self-management  topics    Responsible User: Lili Ahuja      Task: Educate patient on benefits of regular glucose monitoring    Responsible User: Lili Ahuja      Task: Refer patient to appropriate extended care team member, as needed (Medication Therapy Management, Behavioral Health, Physical Therapy, etc.)    Responsible User: Lili Ahuja      Task: Discuss diabetes treatment plan with patient    Responsible User: Lili Ahuja      Problem: Diabetes Self-Management Education Needed to Optimize Self-Care Behaviors       Goal: Understand diabetes pathophysiology and disease progression       Task: Provide education on diabetes pathophysiology and disease progression specfic to patient's diabetes type    Responsible User: Lili Ahuja      Goal: Healthy Eating - follow a healthy eating pattern for diabetes       Task: Provide education on portion control and consistency in amount, composition and timing of food intake Completed 3/21/2023   Responsible User: Lili Ahuja      Task: Provide education on managing carbohydrate intake (carbohydrate counting, plate planning method, etc.) Completed 3/21/2023   Responsible User: Lili Ahuja      Task: Provide education on weight management    Responsible User: Lili Ahuja      Task: Provide education on heart healthy eating    Responsible User: Lili Ahuja      Task: Provide education on eating out    Responsible User: Lili Ahuja      Task: Develop individualized healthy eating plan with patient    Responsible User: Lili Ahuja      Goal: Being Active - get regular physical activity, working up to at least 150 minutes per week       Task: Provide education on relationship of activity to glucose and precautions to take if at risk for low glucose    Responsible User: Lili Ahuja      Task: Discuss barriers to physical activity with patient    Responsible User: Lili Ahuja      Task: Develop physical activity plan with patient     Responsible User: Lili Ahuja      Task: Explore community resources including walking groups, assistance programs, and home videos    Responsible User: Lili Ahuja      Goal: Monitoring - monitor glucose and ketones as directed       Task: Provide education on blood glucose monitoring (purpose, proper technique, frequency, glucose targets, interpreting results, when to use glucose control solution, sharps disposal)    Responsible User: Lili Ahuja      Task: Provide education on continuous glucose monitoring (sensor placement, use of chi or /reader, understanding glucose trends, alerts and alarms, differences between sensor glucose and blood glucose) Completed 3/21/2023   Responsible User: Lili Ahuja      Task: Provide education on ketone monitoring (when to monitor, frequency, etc.)    Responsible User: Lili Ahuja      Goal: Taking Medication - patient is consistently taking medications as directed    Start Date: 3/21/2023   This Visit's Progress: 0%   Note:    Increase Basaglar to 44 units, if PCP agrees.      Task: Provide education on action of prescribed medication, including when to take and possible side effects Completed 3/21/2023   Responsible User: Lili Ahuja      Task: Provide education on insulin and injectable diabetes medications, including administration, storage, site selection and rotation for injection sites    Responsible User: Lili Ahuja      Task: Discuss barriers to medication adherence with patient and provide management technique ideas as appropriate    Responsible User: Lili Ahuja      Task: Provide education on frequency and refill details of medications    Responsible User: Lili Ahuja      Goal: Problem Solving - know how to prevent and manage short-term diabetes complications       Task: Provide education on high blood glucose - causes, signs/symptoms, prevention and treatment Completed 3/21/2023   Responsible User: Leigha  Lili HAMPTON      Task: Provide education on low blood glucose - causes, signs/symptoms, prevention, treatment, carrying a carbohydrate source at all times, and medical identification    Responsible User: Lili Ahuja      Task: Provide education on safe travel with diabetes    Responsible User: Lili Ahuja      Task: Provide education on how to care for diabetes on sick days    Responsible User: Lili Ahuja      Task: Provide education on when to call a health care provider    Responsible User: Lili Ahuja      Goal: Reducing Risks - know how to prevent and treat long-term diabetes complications       Task: Provide education on major complications of diabetes, prevention, early diagnostic measures and treatment of complications Completed 3/21/2023   Responsible User: Lili Ahuja      Task: Provide education on recommended care for dental, eye and foot health    Responsible User: Lili Ahuja      Task: Provide education on Hemoglobin A1c - goals and relationship to blood glucose levels    Responsible User: Lili Ahuja      Task: Provide education on recommendations for heart health - lipid levels and goals, blood pressure and goals, and aspirin therapy, if indicated    Responsible User: Lili Ahuja      Task: Provide education on tobacco cessation    Responsible User: Lili Ahuja      Goal: Healthy Coping - use available resources to cope with the challenges of managing diabetes       Task: Discuss recognizing feelings about having diabetes    Responsible User: Lili Ahuja      Task: Provide education on the benefits of making appropriate lifestyle changes Completed 3/21/2023   Responsible User: Lili Ahuja      Task: Provide education on benefits of utilizing support systems    Responsible User: Lili Ahuja      Task: Discuss methods for coping with stress    Responsible User: Lili Ahuja      Task: Provide education on when to seek professional  counseling    Responsible User: Lili Ahuja RDN, LD, [facility]    Time Spent: 60 minutes  Encounter Type: Individual    Any diabetes medication dose changes were made via the CDE Protocol per the patient's referring provider. A copy of this encounter was shared with the provider.

## 2023-03-21 NOTE — TELEPHONE ENCOUNTER
Jaycee Davidson,    Melva's BG have been high (CGM reports in note from today). She has been injecting Basaglar via pen for the last 5 days, injecting 40 units in the morning. There is not a current prescription in her chart for Lantus/Basaglar. Please discontinue NPH and sign pended prescription for Lantus at 44 units, if you agree. Increase of 10% today recommended as she is in target 38% of the time. She prefers vial and syringe due to cost effectiveness.     Thank you,  Lili Ahuja RDN, LD, [facility]

## 2023-03-21 NOTE — RESULT ENCOUNTER NOTE
I called Melva to review her pathology results. See ERCP note for details. Nodular area on minor papilla returned as just peptic injury/inflammation. No residual/recurrent polyp/adenoma seen. Small portion of benign pancreatic tissue included in the resected specimen which sometimes occurs with endoscopic resection in this area. She did well post-procedure without issues or complaints. We'll plan on one more surveillance endoscopy in 1 year and can likely stop at that point.     Ingrid,  Please assist in scheduling:     Procedure/Imaging/Clinic: ERCP  Physician: Cesar  Timin year  Procedure length: 60 min  Anesthesia: Gen  Dx: h/o ampullary adenoma  Tier: 4  Location: [facility] OR or [facility]   Thanks    Shaun Guerrero MD  [facility]  Division of Gastroenterology and Hepatology  [facility] [address] [place], [place] [address]

## 2023-03-21 NOTE — PATIENT INSTRUCTIONS
Jaycee Frye,    It was great meeting you today! Keep up your good work managing your diabetes.     I pended a prescription for Lantus in a vial to your PCP. If she agrees, please increase Lantus to 44 units in the morning.    Call to schedule follow up appt with me the first week in May.    Call Triage line for a glucose update next week.     Wait until after your radiology appt  to put on a Baldo sensor.       Lili Ahuja RDN, LD, [facility]  Diabetes Education Triage Line: [phone]  Diabetes Education Appointment [phone]

## 2023-03-21 NOTE — PROGRESS NOTES
"Diabetes Self-Management Education & Support    Presents for: Individual review    Type of Service: In Person Visit     ASSESSMENT:  Melva states she lost about 30-40 pounds over the last year due to diagnosis of multiple cancers and complicated medical history.  Reports she just finished a prednisone taper this February.     \"I am back to sleep walking and I haven't done that since I was a kid.\"    Diabetes medications:  Oral medications:  Metformin: She does not tolerate  Pioglitazone: Dr. Torres prefers her not to use   Glipizide: 10 mg in the morning    Insulin:  NPH: No longer taking now that she is not taking prednisone  Basaglar: 40 units (0.41 units/kg) in the morning. She is using from previous prescriptions. Basaglar pens have been stored in the fridge.  She prefers using vial and syringe as it is more cost effective. Prescriptions pended to her PCP as Basaglar not in her med list.     Patient's most recent   Lab Results   Component Value Date    A1C 8.9 12/30/2022    A1C 7.1 03/03/2021     is not meeting goal of <8.0    Diabetes knowledge and skills assessment:   Patient is knowledgeable in diabetes management concepts related to: Monitoring, Taking Medication and Problem Solving    Continue education with the following diabetes management concepts: Healthy Eating, Being Active, Monitoring, Taking Medication, Problem Solving, Reducing Risks and Healthy Coping    Based on learning assessment above, most appropriate setting for further diabetes education would be: Individual setting.      PLAN    Lantus vial pended to PCP at 44 units (.45 units/kg). Increase of 10% due to high glucose as reports indicate below.   Enc to call triage to report glucose data next week as likely will need Basaglar increased to 48 units.  Enc to schedule follow up appt with diabetes ed first part of May  Discussed timing of her Baldo sensor around her radiology appt 4/7. Enc to place after appt.  Briefly discussed carbohydrate " "counting today and healthy snacks. Will benefit from more review at next visit.     Topics to cover at upcoming visits: Healthy Eating, Being Active, Monitoring, Taking Medication, Problem Solving, Reducing Risks and Healthy Coping    Follow-up: Call scheduling line for next appt.     See Care Plan for co-developed, patient-state behavior change goals.  AVS provided for patient today.    Education Materials Provided:  Carbohydrate Counting      SUBJECTIVE/OBJECTIVE:  Presents for: Individual review  Accompanied by: Self  Diabetes education in the past 24mo: No  Focus of Visit: Monitoring, Taking Medication, Healthy Eating, CGM  Diabetes type: Type 2, Secondary Diabetes  Disease course: Getting harder to manage  How confident are you filling out medical forms by yourself:: Quite a bit  Other concerns:: None  Cultural Influences/Ethnic Background:  Not  or     Diabetes Symptoms & Complications:  Polyphagia: No  Autonomic neuropathy: No  CVA: No  Heart disease: No  Nephropathy: No  Peripheral neuropathy: No  Peripheral Vascular Disease: No  Retinopathy: No  Sexual dysfunction: No    Patient Problem List and Family Medical History reviewed for relevant medical history, current medical status, and diabetes risk factors.    Vitals:  LMP 12/13/2002 (Exact Date)   Estimated body mass index is 33.55 kg/m  as calculated from the following:    Height as of 3/16/23: 1.702 m (5' 7\").    Weight as of 3/16/23: 97.2 kg (214 lb 3.2 oz).   Last 3 BP:   BP Readings from Last 3 Encounters:   03/16/23 (!) 154/75   03/01/23 135/72   02/06/23 135/67       History   Smoking Status     Never   Smokeless Tobacco     Never       Labs:  Lab Results   Component Value Date    A1C 8.9 12/30/2022    A1C 7.1 03/03/2021     Lab Results   Component Value Date     03/16/2023     11/01/2022     03/22/2021     Lab Results   Component Value Date     12/30/2022    LDL 91 03/03/2021     HDL Cholesterol   Date Value " Ref Range Status   03/03/2021 40 (L) >49 mg/dL Final     Direct Measure HDL   Date Value Ref Range Status   12/30/2022 38 (L) >=50 mg/dL Final   ]  GFR Estimate   Date Value Ref Range Status   12/30/2022 76 >60 mL/min/1.73m2 Final     Comment:     Effective December 21, 2021 eGFRcr in adults is calculated using the 2021 CKD-EPI creatinine equation which includes age and gender (Estela et al., [place], DOI: 10.1056/DXUPjq5202664)   03/22/2021 71 >60 mL/min/[1.73_m2] Final     Comment:     Non  GFR Calc  Starting 12/18/2018, serum creatinine based estimated GFR (eGFR) will be   calculated using the Chronic Kidney Disease Epidemiology Collaboration   (CKD-EPI) equation.       GFR, ESTIMATED POCT   Date Value Ref Range Status   12/30/2022 >60 >60 mL/min/1.73m2 Final     GFR Estimate If Black   Date Value Ref Range Status   03/22/2021 82 >60 mL/min/[1.73_m2] Final     Comment:      GFR Calc  Starting 12/18/2018, serum creatinine based estimated GFR (eGFR) will be   calculated using the Chronic Kidney Disease Epidemiology Collaboration   (CKD-EPI) equation.       Lab Results   Component Value Date    CR 0.7 12/30/2022    CR 0.81 12/30/2022    CR 0.82 03/22/2021     No results found for: MICROALBUMIN    Healthy Eating:  Healthy Eating Assessed Today: Yes  Cultural/Jehovah's witness diet restrictions?: No  Meal planning/habits: Low carb, Smaller portions  How many times a week on average do you eat food made away from home (restaurant/take-out)?: 1  Meals include: Breakfast, Dinner, Afternoon Snack, Evening Snack  Beverages: Water, Coffee, Milk, Diet soda  Has patient met with a dietitian in the past?: Yes    Being Active:  Being Active Assessed Today: Yes  Barrier to exercise: Physical limitation    Monitoring:  Monitoring Assessed Today: Yes  Blood Glucose Meter: Other  Times checking blood sugar at home (number): 5+  Times checking blood sugar at home (per): Day  Blood glucose trend:  Fluctuating                  Taking Medications:  Diabetes Medication(s)     Insulin       insulin  UNIT/ML vial    Inject 24 Units Subcutaneous 2 times daily    Sulfonylureas       glipiZIDE (GLUCOTROL) 5 MG tablet    Take 2 tablets (10 mg) by mouth every morning (before breakfast)          Taking Medication Assessed Today: Yes  Current Treatments: Insulin Injections, Oral Medication (taken by mouth)    Problem Solving:  Problem Solving Assessed Today: Yes    Reducing Risks:  Reducing Risks Assessed Today: No  CAD Risks: Diabetes Mellitus, Dyslipidemia, Family history, Obesity, Post-menopausal, Sedentary lifestyle  Has dilated eye exam at least once a year?: Yes  Sees dentist every 6 months?: Yes  Feet checked by healthcare provider in the last year?: Yes    Healthy Coping:  Healthy Coping Assessed Today: Yes  Emotional response to diabetes: Ready to learn  Informal Support system:: Children, [place] based, Family, Friends, Neighbors, Spouse  Stage of change: ACTION (Actively working towards change)  Support resources: Websites  Patient Activation Measure Survey Score:  ADRIANO Score (Last Two) 8/26/2013   ADRIANO Raw Score 41   Activation Score 63.2   ADRIANO Level 3         Care Plan and Education Provided:  Care Plan: Diabetes   Updates made by Lili Ahuja since 3/21/2023 12:00 AM      Problem: HbA1C Not In Goal       Goal: Establish Regular Follow-Ups with PCP       Task: Discuss with PCP the recommended timing for patient's next follow up visit(s)    Responsible User: Lili Ahuja      Task: Discuss schedule for PCP visits with patient    Responsible User: Lili Ahuja      Goal: Get HbA1C Level in Goal       Task: Educate patient on diabetes education self-management topics    Responsible User: Lili Ahuja      Task: Educate patient on benefits of regular glucose monitoring    Responsible User: Lili Ahuja      Task: Refer patient to appropriate extended care team member, as needed (Medication  Therapy Management, Behavioral Health, Physical Therapy, etc.)    Responsible User: Lili Ahuja      Task: Discuss diabetes treatment plan with patient    Responsible User: Lili Ahuja      Problem: Diabetes Self-Management Education Needed to Optimize Self-Care Behaviors       Goal: Understand diabetes pathophysiology and disease progression       Task: Provide education on diabetes pathophysiology and disease progression specfic to patient's diabetes type    Responsible User: Lili Ahuja      Goal: Healthy Eating - follow a healthy eating pattern for diabetes       Task: Provide education on portion control and consistency in amount, composition and timing of food intake Completed 3/21/2023   Responsible User: Lili Ahuja      Task: Provide education on managing carbohydrate intake (carbohydrate counting, plate planning method, etc.) Completed 3/21/2023   Responsible User: Lili Ahuja      Task: Provide education on weight management    Responsible User: Lili Ahuja      Task: Provide education on heart healthy eating    Responsible User: Lili Ahuja      Task: Provide education on eating out    Responsible User: Lili Ahuja      Task: Develop individualized healthy eating plan with patient    Responsible User: Lili Ahuja      Goal: Being Active - get regular physical activity, working up to at least 150 minutes per week       Task: Provide education on relationship of activity to glucose and precautions to take if at risk for low glucose    Responsible User: Lili Ahuja      Task: Discuss barriers to physical activity with patient    Responsible User: Lili Ahuja      Task: Develop physical activity plan with patient    Responsible User: Lili Ahuja      Task: Explore community resources including walking groups, assistance programs, and home videos    Responsible User: Lili Ahuja      Goal: Monitoring - monitor glucose and ketones as directed        Task: Provide education on blood glucose monitoring (purpose, proper technique, frequency, glucose targets, interpreting results, when to use glucose control solution, sharps disposal)    Responsible User: Lili Ahuja      Task: Provide education on continuous glucose monitoring (sensor placement, use of chi or /reader, understanding glucose trends, alerts and alarms, differences between sensor glucose and blood glucose) Completed 3/21/2023   Responsible User: Lili Ahuja      Task: Provide education on ketone monitoring (when to monitor, frequency, etc.)    Responsible User: Lili Ahuja      Goal: Taking Medication - patient is consistently taking medications as directed    Start Date: 3/21/2023   This Visit's Progress: 0%   Note:    Increase Basaglar to 44 units, if PCP agrees.      Task: Provide education on action of prescribed medication, including when to take and possible side effects Completed 3/21/2023   Responsible User: Lili Ahuja      Task: Provide education on insulin and injectable diabetes medications, including administration, storage, site selection and rotation for injection sites    Responsible User: Lili Ahuja      Task: Discuss barriers to medication adherence with patient and provide management technique ideas as appropriate    Responsible User: Lili Ahuja      Task: Provide education on frequency and refill details of medications    Responsible User: Lili Ahuja      Goal: Problem Solving - know how to prevent and manage short-term diabetes complications       Task: Provide education on high blood glucose - causes, signs/symptoms, prevention and treatment Completed 3/21/2023   Responsible User: Lili Ahuja      Task: Provide education on low blood glucose - causes, signs/symptoms, prevention, treatment, carrying a carbohydrate source at all times, and medical identification    Responsible User: Lili Ahuja      Task: Provide education  on safe travel with diabetes    Responsible User: Lili Ahuja      Task: Provide education on how to care for diabetes on sick days    Responsible User: Lili Ahuja      Task: Provide education on when to call a health care provider    Responsible User: Lili Ahuja      Goal: Reducing Risks - know how to prevent and treat long-term diabetes complications       Task: Provide education on major complications of diabetes, prevention, early diagnostic measures and treatment of complications Completed 3/21/2023   Responsible User: Lili Ahuja      Task: Provide education on recommended care for dental, eye and foot health    Responsible User: Lili Ahuja      Task: Provide education on Hemoglobin A1c - goals and relationship to blood glucose levels    Responsible User: Lili Ahuja      Task: Provide education on recommendations for heart health - lipid levels and goals, blood pressure and goals, and aspirin therapy, if indicated    Responsible User: Lili Ahuja      Task: Provide education on tobacco cessation    Responsible User: Lili Ahuja      Goal: Healthy Coping - use available resources to cope with the challenges of managing diabetes       Task: Discuss recognizing feelings about having diabetes    Responsible User: Lili Ahuja      Task: Provide education on the benefits of making appropriate lifestyle changes Completed 3/21/2023   Responsible User: Lili Ahuja      Task: Provide education on benefits of utilizing support systems    Responsible User: Lili Ahuja      Task: Discuss methods for coping with stress    Responsible User: Lili Ahuja      Task: Provide education on when to seek professional counseling    Responsible User: Lili Ahuja RDN, LD, [facility]    Time Spent: 60 minutes  Encounter Type: Individual    Any diabetes medication dose changes were made via the CDE Protocol per the patient's referring provider. A copy  of this encounter was shared with the provider.

## 2023-03-23 ENCOUNTER — TELEPHONE (OUTPATIENT)
Dept: PHARMACY | Facility: CLINIC | Age: 73
End: 2023-03-23
Payer: COMMERCIAL

## 2023-03-23 DIAGNOSIS — E11.65 TYPE 2 DIABETES MELLITUS WITH HYPERGLYCEMIA, WITHOUT LONG-TERM CURRENT USE OF INSULIN (H): Primary | ICD-10-CM

## 2023-03-23 RX ORDER — INSULIN GLARGINE 100 [IU]/ML
44 INJECTION, SOLUTION SUBCUTANEOUS DAILY
Qty: 15 ML
Start: 2023-03-23 | End: 2023-06-23 | Stop reason: ALTCHOICE

## 2023-03-23 NOTE — TELEPHONE ENCOUNTER
Melva called back.  Hydrochlorothiazide helped a little bit.  Still taking daily.  Blood pressure at home back down to 120s/60-70.    Has changed to Lantus daily (finished supply of NPH) and taking 44 units daily.  Saw certified diabetes educator this week and has follow-up scheduled.  Endocrinology scheduled in Aug.      Endoscopy was done last week and everything looked good.  Plans to follow-up next year.      Follow-up in 2 months.

## 2023-03-23 NOTE — TELEPHONE ENCOUNTER
Called to follow-up on restart of hydrochlorothiazide.  She met with certified diabetes educator earlier this week and insulin was adjusted.

## 2023-03-23 NOTE — TELEPHONE ENCOUNTER
Hello, [place] prescriptions remain pended. Melva does not have pen needles and per discussion, states she prefers vial and syringe for cost savings.     Please cancel Lantus Solostar and sign pended Lantus vial prescription.     Lili Ahuja RDN, LD, [facility]

## 2023-03-24 RX ORDER — INSULIN GLARGINE 100 [IU]/ML
44 INJECTION, SOLUTION SUBCUTANEOUS AT BEDTIME
Qty: 15 ML | Refills: 6 | Status: SHIPPED | OUTPATIENT
Start: 2023-03-24 | End: 2023-11-20

## 2023-04-07 ENCOUNTER — HOSPITAL ENCOUNTER (OUTPATIENT)
Dept: CT IMAGING | Facility: CLINIC | Age: 73
Discharge: HOME | End: 2023-04-07
Attending: INTERNAL MEDICINE | Admitting: INTERNAL MEDICINE
Payer: COMMERCIAL

## 2023-04-07 DIAGNOSIS — C43.71: ICD-10-CM

## 2023-04-07 LAB
CREAT BLD-MCNC: 0.9 MG/DL (ref 0.5–1)
GFR SERPL CREATININE-BSD FRML MDRD: >60 ML/MIN/1.73M2

## 2023-04-07 PROCEDURE — 250N000011 HC RX IP 250 OP 636: Performed by: INTERNAL MEDICINE

## 2023-04-07 PROCEDURE — 250N000009 HC RX 250: Performed by: INTERNAL MEDICINE

## 2023-04-07 PROCEDURE — 82565 ASSAY OF CREATININE: CPT

## 2023-04-07 PROCEDURE — 74177 CT ABD & PELVIS W/CONTRAST: CPT

## 2023-04-07 RX ORDER — IOPAMIDOL 755 MG/ML
500 INJECTION, SOLUTION INTRAVASCULAR ONCE
Status: COMPLETED | OUTPATIENT
Start: 2023-04-07 | End: 2023-04-07

## 2023-04-07 RX ADMIN — SODIUM CHLORIDE 65 ML: 9 INJECTION, SOLUTION INTRAVENOUS at 08:50

## 2023-04-07 RX ADMIN — IOPAMIDOL 100 ML: 755 INJECTION, SOLUTION INTRAVENOUS at 08:50

## 2023-04-10 DIAGNOSIS — J30.2 SEASONAL ALLERGIC RHINITIS, UNSPECIFIED TRIGGER: ICD-10-CM

## 2023-04-11 RX ORDER — AZELASTINE 1 MG/ML
SPRAY, METERED NASAL
Qty: 30 ML | Refills: 0 | Status: SHIPPED | OUTPATIENT
Start: 2023-04-11 | End: 2024-03-11

## 2023-04-11 NOTE — TELEPHONE ENCOUNTER
Pending Prescriptions:                       Disp   Refills    azelastine (ASTELIN) 0.1 % nasal spray [Ph*30 mL  0        Sig: USE 1 SPRAY(S) IN EACH NOSTRIL TWICE DAILY AS NEEDED    Routing refill request to provider for review/approval because:  Needs provider review       
Statement Selected

## 2023-04-12 ENCOUNTER — TRANSFERRED RECORDS (OUTPATIENT)
Dept: HEALTH INFORMATION MANAGEMENT | Facility: CLINIC | Age: 73
End: 2023-04-12
Payer: COMMERCIAL

## 2023-04-13 ENCOUNTER — LAB (OUTPATIENT)
Dept: LAB | Facility: CLINIC | Age: 73
End: 2023-04-13
Payer: COMMERCIAL

## 2023-04-13 DIAGNOSIS — I10 ESSENTIAL HYPERTENSION WITH GOAL BLOOD PRESSURE LESS THAN 140/90: ICD-10-CM

## 2023-04-13 DIAGNOSIS — Z79.899 ENCOUNTER FOR LONG-TERM (CURRENT) USE OF MEDICATIONS: ICD-10-CM

## 2023-04-13 DIAGNOSIS — E11.65 TYPE 2 DIABETES MELLITUS WITH HYPERGLYCEMIA, WITHOUT LONG-TERM CURRENT USE OF INSULIN (H): ICD-10-CM

## 2023-04-13 DIAGNOSIS — E78.5 HYPERLIPIDEMIA WITH TARGET LDL LESS THAN 100: ICD-10-CM

## 2023-04-13 LAB
ALBUMIN SERPL BCG-MCNC: 4.3 G/DL (ref 3.5–5.2)
ALP SERPL-CCNC: 140 U/L (ref 35–104)
ALT SERPL W P-5'-P-CCNC: 24 U/L (ref 10–35)
ANION GAP SERPL CALCULATED.3IONS-SCNC: 13 MMOL/L (ref 7–15)
AST SERPL W P-5'-P-CCNC: 16 U/L (ref 10–35)
BILIRUB SERPL-MCNC: 0.5 MG/DL
BUN SERPL-MCNC: 19.3 MG/DL (ref 8–23)
CALCIUM SERPL-MCNC: 10.5 MG/DL (ref 8.8–10.2)
CHLORIDE SERPL-SCNC: 101 MMOL/L (ref 98–107)
CHOLEST SERPL-MCNC: 159 MG/DL
CK SERPL-CCNC: 68 U/L (ref 26–192)
CREAT SERPL-MCNC: 0.84 MG/DL (ref 0.51–0.95)
DEPRECATED HCO3 PLAS-SCNC: 26 MMOL/L (ref 22–29)
GFR SERPL CREATININE-BSD FRML MDRD: 73 ML/MIN/1.73M2
GLUCOSE SERPL-MCNC: 207 MG/DL (ref 70–99)
HBA1C MFR BLD: 9.5 % (ref 0–5.6)
HDLC SERPL-MCNC: 42 MG/DL
LDLC SERPL CALC-MCNC: 74 MG/DL
NONHDLC SERPL-MCNC: 117 MG/DL
POTASSIUM SERPL-SCNC: 4.3 MMOL/L (ref 3.4–5.3)
PROT SERPL-MCNC: 7.3 G/DL (ref 6.4–8.3)
SODIUM SERPL-SCNC: 140 MMOL/L (ref 136–145)
TRIGL SERPL-MCNC: 217 MG/DL

## 2023-04-13 PROCEDURE — 83036 HEMOGLOBIN GLYCOSYLATED A1C: CPT

## 2023-04-13 PROCEDURE — 80061 LIPID PANEL: CPT

## 2023-04-13 PROCEDURE — 82550 ASSAY OF CK (CPK): CPT

## 2023-04-13 PROCEDURE — 36415 COLL VENOUS BLD VENIPUNCTURE: CPT

## 2023-04-13 PROCEDURE — 80053 COMPREHEN METABOLIC PANEL: CPT

## 2023-04-15 DIAGNOSIS — E11.65 TYPE 2 DIABETES MELLITUS WITH HYPERGLYCEMIA, WITHOUT LONG-TERM CURRENT USE OF INSULIN (H): ICD-10-CM

## 2023-04-18 ENCOUNTER — ALLIED HEALTH/NURSE VISIT (OUTPATIENT)
Dept: EDUCATION SERVICES | Facility: CLINIC | Age: 73
End: 2023-04-18
Payer: COMMERCIAL

## 2023-04-18 DIAGNOSIS — E11.65 TYPE 2 DIABETES MELLITUS WITH HYPERGLYCEMIA, WITHOUT LONG-TERM CURRENT USE OF INSULIN (H): Primary | ICD-10-CM

## 2023-04-18 PROCEDURE — G0108 DIAB MANAGE TRN  PER INDIV: HCPCS

## 2023-04-18 NOTE — TELEPHONE ENCOUNTER
Pending Prescriptions:                       Disp   Refills    glipiZIDE (GLUCOTROL) 5 MG tablet [Pharmac*120 ta*0        Sig: TAKE 2 TABLETS BY MOUTH IN THE MORNING    Routing refill request to provider for review/approval because:  Medication is reported/historical

## 2023-04-18 NOTE — PROGRESS NOTES
Diabetes Self-Management Education & Support    Presents for: Follow-up    Type of Service: In Person Visit    ASSESSMENT:  Melva was 25 minutes late for her appt.   See Glucose data reports below. Based on glucose in target 48% of the time, recommend she increase Lantus to 48 units. She is not interested in doing so today. States she would like to discuss further with her PCP. Note that her fasting glucose results are typically not below 130 and her post meal glucose elevations may occur even when she is having smaller portions of carbohydrates at meals.     She is interested in discussing meal plan. Reviewed typical meals and patterns, as below.   Discussed plate planning method, carbohydrate counting, snack ideas and resources today.     Patient's most recent   Lab Results   Component Value Date    A1C 9.5 04/13/2023    A1C 7.1 03/03/2021     is not meeting goal of <7.0    Diabetes knowledge and skills assessment:   Patient is knowledgeable in diabetes management concepts related to: Monitoring and Taking Medication    Continue education with the following diabetes management concepts: Healthy Eating, Taking Medication, Problem Solving and Reducing Risks    Based on learning assessment above, most appropriate setting for further diabetes education would be: Individual setting.      PLAN  Use plate planner and carbohydrate counting for meal plan.  Carbs at meals 30-45 grams, at snacks 0-30 grams  Use [email or web address] for recipe and meal planning information  Discussed adding protein to breakfast meal to balance carb intake, especially with cereal.    Glucose levels are improving but would recommend she increase Lantus to 48 units (0.49 units/kg) and likely 52 units (0.53 units/kg) after.   Would consider increasing Lantus to 0.6 units/kg (if needed) before adding meal time insulin, especially if GLP-1 medication not an option for her.     Topics to cover at upcoming visits: Taking Medication, Problem Solving and  "Reducing Risks    Follow-up: 5/18/23    See Care Plan for co-developed, patient-state behavior change goals.  AVS provided for patient today.    Education Materials Provided:  Carbohydrate Counting, My Plate Planner and healthy snack ideas      SUBJECTIVE/OBJECTIVE:  Presents for: Follow-up  Accompanied by: Self  Diabetes education in the past 24mo: No  Focus of Visit: Monitoring, Taking Medication, Healthy Eating, CGM  Diabetes type: Type 2, Secondary Diabetes  Disease course: Getting harder to manage  How confident are you filling out medical forms by yourself:: Quite a bit  Transportation concerns: No  Difficulty affording diabetes medication?: No  Difficulty affording diabetes testing supplies?: No  Other concerns:: None  Cultural Influences/Ethnic Background:  Not  or     Diabetes Symptoms & Complications:  Polyphagia: No  Complications assessed today?: Yes  Autonomic neuropathy: No  CVA: No  Heart disease: No  Nephropathy: No  Peripheral neuropathy: No  Peripheral Vascular Disease: No  Retinopathy: No  Sexual dysfunction: No    Patient Problem List and Family Medical History reviewed for relevant medical history, current medical status, and diabetes risk factors.    Vitals:  LMP 12/13/2002 (Exact Date)   Estimated body mass index is 33.55 kg/m  as calculated from the following:    Height as of 3/16/23: 1.702 m (5' 7\").    Weight as of 3/16/23: 97.2 kg (214 lb 3.2 oz).   Last 3 BP:   BP Readings from Last 3 Encounters:   03/16/23 (!) 154/75   03/01/23 135/72   02/06/23 135/67       History   Smoking Status     Never   Smokeless Tobacco     Never       Labs:  Lab Results   Component Value Date    A1C 9.5 04/13/2023    A1C 7.1 03/03/2021     Lab Results   Component Value Date     04/13/2023     03/16/2023     11/01/2022     03/22/2021     Lab Results   Component Value Date    LDL 74 04/13/2023    LDL 91 03/03/2021     HDL Cholesterol   Date Value Ref Range Status "   03/03/2021 40 (L) >49 mg/dL Final     Direct Measure HDL   Date Value Ref Range Status   04/13/2023 42 (L) >=50 mg/dL Final   ]  GFR Estimate   Date Value Ref Range Status   04/13/2023 73 >60 mL/min/1.73m2 Final     Comment:     eGFR calculated using 2021 CKD-EPI equation.   03/22/2021 71 >60 mL/min/[1.73_m2] Final     Comment:     Non  GFR Calc  Starting 12/18/2018, serum creatinine based estimated GFR (eGFR) will be   calculated using the Chronic Kidney Disease Epidemiology Collaboration   (CKD-EPI) equation.       GFR, ESTIMATED POCT   Date Value Ref Range Status   04/07/2023 >60 >60 mL/min/1.73m2 Final     GFR Estimate If Black   Date Value Ref Range Status   03/22/2021 82 >60 mL/min/[1.73_m2] Final     Comment:      GFR Calc  Starting 12/18/2018, serum creatinine based estimated GFR (eGFR) will be   calculated using the Chronic Kidney Disease Epidemiology Collaboration   (CKD-EPI) equation.       Lab Results   Component Value Date    CR 0.84 04/13/2023    CR 0.82 03/22/2021     No results found for: MICROALBUMIN    Healthy Eating:  Healthy Eating Assessed Today: Yes  Cultural/Catholic diet restrictions?: No  Meal planning/habits: Low carb, Smaller portions  How many times a week on average do you eat food made away from home (restaurant/take-out)?: 1  Meals include: Breakfast, Dinner, Afternoon Snack, Evening Snack  Breakfast: 8-10 AM: coffee with cream, cereal with fruit OR couple pieces of toast with butter OR eggs and toast  Lunch: 3 PM: may skip this meal or have an apple or nuts, water  Dinner: 6 PM: chicken, potatoes, fruit salad or vegetable salad OR hot dish with noodles, hamburger, tomatoes, onion, celery,  drinks a glass of milk  Snacks: HS: When she started Lantus she added a snack before bed. Meat and crackers or nuts  Beverages: Water, Coffee, Milk, Diet soda  Has patient met with a dietitian in the past?: Yes    Being Active:  Being Active Assessed Today:  Yes  Exercise:: Currently not exercising  Barrier to exercise: Physical limitation    Monitoring:  Monitoring Assessed Today: Yes  Blood Glucose Meter: Other, CGM  Times checking blood sugar at home (number): 5+  Times checking blood sugar at home (per): Day  Blood glucose trend: Fluctuating        Taking Medications:  Diabetes Medication(s)     Insulin       insulin glargine (LANTUS SOLOSTAR) 100 UNIT/ML pen    Inject 44 Units Subcutaneous daily Ok to substitute Basaglar at same dose and frequency if insurance prefers.     insulin glargine (LANTUS VIAL) 100 UNIT/ML vial    Inject 44 Units Subcutaneous At Bedtime    Sulfonylureas       glipiZIDE (GLUCOTROL) 5 MG tablet    Take 2 tablets (10 mg) by mouth every morning (before breakfast)          Taking Medication Assessed Today: Yes  Current Treatments: Insulin Injections, Oral Medication (taken by mouth)  Dose schedule: Pre-breakfast  Given by: Patient  Injection/Infusion sites: Abdomen  Problems taking diabetes medications regularly?: No  Diabetes medication side effects?: No    Problem Solving:  Problem Solving Assessed Today: Yes    Reducing Risks:  Reducing Risks Assessed Today: No  Diabetes Risks: Age over 45 years  CAD Risks: Diabetes Mellitus, Dyslipidemia, Family history, Obesity, Post-menopausal, Sedentary lifestyle  Has dilated eye exam at least once a year?: Yes  Sees dentist every 6 months?: Yes  Feet checked by healthcare provider in the last year?: Yes    Healthy Coping:  Healthy Coping Assessed Today: Yes  Emotional response to diabetes: Ready to learn  Informal Support system:: Children, [place] based, Family, Friends, Neighbors, Spouse  Stage of change: ACTION (Actively working towards change)  Support resources: Websites  Patient Activation Measure Survey Score:      8/26/2013     2:00 PM   ADRIANO Score (Last Two)   ADRIANO Raw Score 41   Activation Score 63.2   ADRIANO Level 3         Care Plan and Education Provided:  Care Plan: Diabetes   Updates made by  Lili hAuja since 4/18/2023 12:00 AM      Problem: Diabetes Self-Management Education Needed to Optimize Self-Care Behaviors       Goal: Taking Medication - patient is consistently taking medications as directed    Start Date: 3/21/2023   This Visit's Progress: 100%   Recent Progress: 0%   Note:    Increase Basaglar to 44 units, if PCP agrees.      Task: Provide education on insulin and injectable diabetes medications, including administration, storage, site selection and rotation for injection sites Completed 4/18/2023   Responsible User: Lili Ahuja RDN, LD, [facility]    Time Spent: 30 minutes  Encounter Type: Individual    Any diabetes medication dose changes were made via the CDE Protocol per the patient's referring provider. A copy of this encounter was shared with the provider.

## 2023-04-18 NOTE — LETTER
4/18/2023         RE: Gricelda Sabillon  [address] [facility] [address]        Dear Colleague,    Thank you for referring your patient, Gricelda Sabillon, to the [facility]. Please see a copy of my visit note below.    Diabetes Self-Management Education & Support    Presents for: Follow-up    Type of Service: In Person Visit    ASSESSMENT:  Melva was 25 minutes late for her appt.   See Glucose data reports below. Based on glucose in target 48% of the time, recommend she increase Lantus to 48 units. She is not interested in doing so today. States she would like to discuss further with her PCP. Note that her fasting glucose results are typically not below 130 and her post meal glucose elevations may occur even when she is having smaller portions of carbohydrates at meals.     She is interested in discussing meal plan. Reviewed typical meals and patterns, as below.   Discussed plate planning method, carbohydrate counting, snack ideas and resources today.     Patient's most recent   Lab Results   Component Value Date    A1C 9.5 04/13/2023    A1C 7.1 03/03/2021     is not meeting goal of <7.0    Diabetes knowledge and skills assessment:   Patient is knowledgeable in diabetes management concepts related to: Monitoring and Taking Medication    Continue education with the following diabetes management concepts: Healthy Eating, Taking Medication, Problem Solving and Reducing Risks    Based on learning assessment above, most appropriate setting for further diabetes education would be: Individual setting.      PLAN  Use plate planner and carbohydrate counting for meal plan.  Carbs at meals 30-45 grams, at snacks 0-30 grams  Use [email or web address] for recipe and meal planning information  Discussed adding protein to breakfast meal to balance carb intake, especially with cereal.    Glucose levels are improving but would recommend she increase Lantus to 48 units (0.49 units/kg) and likely 52  "units (0.53 units/kg) after.   Would consider increasing Lantus to 0.6 units/kg (if needed) before adding meal time insulin, especially if GLP-1 medication not an option for her.     Topics to cover at upcoming visits: Taking Medication, Problem Solving and Reducing Risks    Follow-up: 5/18/23    See Care Plan for co-developed, patient-state behavior change goals.  AVS provided for patient today.    Education Materials Provided:  Carbohydrate Counting, My Plate Planner and healthy snack ideas      SUBJECTIVE/OBJECTIVE:  Presents for: Follow-up  Accompanied by: Self  Diabetes education in the past 24mo: No  Focus of Visit: Monitoring, Taking Medication, Healthy Eating, CGM  Diabetes type: Type 2, Secondary Diabetes  Disease course: Getting harder to manage  How confident are you filling out medical forms by yourself:: Quite a bit  Transportation concerns: No  Difficulty affording diabetes medication?: No  Difficulty affording diabetes testing supplies?: No  Other concerns:: None  Cultural Influences/Ethnic Background:  Not  or     Diabetes Symptoms & Complications:  Polyphagia: No  Complications assessed today?: Yes  Autonomic neuropathy: No  CVA: No  Heart disease: No  Nephropathy: No  Peripheral neuropathy: No  Peripheral Vascular Disease: No  Retinopathy: No  Sexual dysfunction: No    Patient Problem List and Family Medical History reviewed for relevant medical history, current medical status, and diabetes risk factors.    Vitals:  LMP 12/13/2002 (Exact Date)   Estimated body mass index is 33.55 kg/m  as calculated from the following:    Height as of 3/16/23: 1.702 m (5' 7\").    Weight as of 3/16/23: 97.2 kg (214 lb 3.2 oz).   Last 3 BP:   BP Readings from Last 3 Encounters:   03/16/23 (!) 154/75   03/01/23 135/72   02/06/23 135/67       History   Smoking Status     Never   Smokeless Tobacco     Never       Labs:  Lab Results   Component Value Date    A1C 9.5 04/13/2023    A1C 7.1 03/03/2021     Lab " Results   Component Value Date     04/13/2023     03/16/2023     11/01/2022     03/22/2021     Lab Results   Component Value Date    LDL 74 04/13/2023    LDL 91 03/03/2021     HDL Cholesterol   Date Value Ref Range Status   03/03/2021 40 (L) >49 mg/dL Final     Direct Measure HDL   Date Value Ref Range Status   04/13/2023 42 (L) >=50 mg/dL Final   ]  GFR Estimate   Date Value Ref Range Status   04/13/2023 73 >60 mL/min/1.73m2 Final     Comment:     eGFR calculated using 2021 CKD-EPI equation.   03/22/2021 71 >60 mL/min/[1.73_m2] Final     Comment:     Non  GFR Calc  Starting 12/18/2018, serum creatinine based estimated GFR (eGFR) will be   calculated using the Chronic Kidney Disease Epidemiology Collaboration   (CKD-EPI) equation.       GFR, ESTIMATED POCT   Date Value Ref Range Status   04/07/2023 >60 >60 mL/min/1.73m2 Final     GFR Estimate If Black   Date Value Ref Range Status   03/22/2021 82 >60 mL/min/[1.73_m2] Final     Comment:      GFR Calc  Starting 12/18/2018, serum creatinine based estimated GFR (eGFR) will be   calculated using the Chronic Kidney Disease Epidemiology Collaboration   (CKD-EPI) equation.       Lab Results   Component Value Date    CR 0.84 04/13/2023    CR 0.82 03/22/2021     No results found for: MICROALBUMIN    Healthy Eating:  Healthy Eating Assessed Today: Yes  Cultural/Yarsanism diet restrictions?: No  Meal planning/habits: Low carb, Smaller portions  How many times a week on average do you eat food made away from home (restaurant/take-out)?: 1  Meals include: Breakfast, Dinner, Afternoon Snack, Evening Snack  Breakfast: 8-10 AM: coffee with cream, cereal with fruit OR couple pieces of toast with butter OR eggs and toast  Lunch: 3 PM: may skip this meal or have an apple or nuts, water  Dinner: 6 PM: chicken, potatoes, fruit salad or vegetable salad OR hot dish with noodles, hamburger, tomatoes, onion, celery,  drinks a glass  of milk  Snacks: HS: When she started Lantus she added a snack before bed. Meat and crackers or nuts  Beverages: Water, Coffee, Milk, Diet soda  Has patient met with a dietitian in the past?: Yes    Being Active:  Being Active Assessed Today: Yes  Exercise:: Currently not exercising  Barrier to exercise: Physical limitation    Monitoring:  Monitoring Assessed Today: Yes  Blood Glucose Meter: Other, CGM  Times checking blood sugar at home (number): 5+  Times checking blood sugar at home (per): Day  Blood glucose trend: Fluctuating        Taking Medications:  Diabetes Medication(s)       Insulin       insulin glargine (LANTUS SOLOSTAR) 100 UNIT/ML pen    Inject 44 Units Subcutaneous daily Ok to substitute Basaglar at same dose and frequency if insurance prefers.     insulin glargine (LANTUS VIAL) 100 UNIT/ML vial    Inject 44 Units Subcutaneous At Bedtime      Sulfonylureas       glipiZIDE (GLUCOTROL) 5 MG tablet    Take 2 tablets (10 mg) by mouth every morning (before breakfast)            Taking Medication Assessed Today: Yes  Current Treatments: Insulin Injections, Oral Medication (taken by mouth)  Dose schedule: Pre-breakfast  Given by: Patient  Injection/Infusion sites: Abdomen  Problems taking diabetes medications regularly?: No  Diabetes medication side effects?: No    Problem Solving:  Problem Solving Assessed Today: Yes    Reducing Risks:  Reducing Risks Assessed Today: No  Diabetes Risks: Age over 45 years  CAD Risks: Diabetes Mellitus, Dyslipidemia, Family history, Obesity, Post-menopausal, Sedentary lifestyle  Has dilated eye exam at least once a year?: Yes  Sees dentist every 6 months?: Yes  Feet checked by healthcare provider in the last year?: Yes    Healthy Coping:  Healthy Coping Assessed Today: Yes  Emotional response to diabetes: Ready to learn  Informal Support system:: Children, [place] based, Family, Friends, Neighbors, Spouse  Stage of change: ACTION (Actively working towards change)  Support  resources: Websites  Patient Activation Measure Survey Score:      8/26/2013     2:00 PM   ADRIANO Score (Last Two)   ADRIANO Raw Score 41   Activation Score 63.2   ADRIANO Level 3         Care Plan and Education Provided:  Care Plan: Diabetes   Updates made by Lili Ahuja since 4/18/2023 12:00 AM        Problem: Diabetes Self-Management Education Needed to Optimize Self-Care Behaviors         Goal: Taking Medication - patient is consistently taking medications as directed    Start Date: 3/21/2023   This Visit's Progress: 100%   Recent Progress: 0%   Note:    Increase Basaglar to 44 units, if PCP agrees.        Task: Provide education on insulin and injectable diabetes medications, including administration, storage, site selection and rotation for injection sites Completed 4/18/2023   Responsible User: Lili Ahuja RDN, LD, [facility]    Time Spent: 30 minutes  Encounter Type: Individual    Any diabetes medication dose changes were made via the CDE Protocol per the patient's referring provider. A copy of this encounter was shared with the provider.

## 2023-04-19 RX ORDER — GLIPIZIDE 5 MG/1
TABLET ORAL
Qty: 120 TABLET | Refills: 0 | Status: SHIPPED | OUTPATIENT
Start: 2023-04-19 | End: 2023-06-23

## 2023-04-20 ENCOUNTER — TELEPHONE (OUTPATIENT)
Dept: INTERNAL MEDICINE | Facility: CLINIC | Age: 73
End: 2023-04-20

## 2023-04-20 ENCOUNTER — PATIENT OUTREACH (OUTPATIENT)
Dept: CARE COORDINATION | Facility: CLINIC | Age: 73
End: 2023-04-20

## 2023-04-20 ENCOUNTER — OFFICE VISIT (OUTPATIENT)
Dept: INTERNAL MEDICINE | Facility: CLINIC | Age: 73
End: 2023-04-20
Payer: COMMERCIAL

## 2023-04-20 VITALS
WEIGHT: 214.7 LBS | DIASTOLIC BLOOD PRESSURE: 70 MMHG | OXYGEN SATURATION: 96 % | HEIGHT: 67 IN | RESPIRATION RATE: 18 BRPM | HEART RATE: 69 BPM | BODY MASS INDEX: 33.7 KG/M2 | SYSTOLIC BLOOD PRESSURE: 90 MMHG | TEMPERATURE: 97.2 F

## 2023-04-20 DIAGNOSIS — I10 ESSENTIAL HYPERTENSION WITH GOAL BLOOD PRESSURE LESS THAN 140/90: Chronic | ICD-10-CM

## 2023-04-20 DIAGNOSIS — E11.65 TYPE 2 DIABETES MELLITUS WITH HYPERGLYCEMIA, WITHOUT LONG-TERM CURRENT USE OF INSULIN (H): Primary | ICD-10-CM

## 2023-04-20 DIAGNOSIS — C43.9 METASTATIC MALIGNANT MELANOMA (H): ICD-10-CM

## 2023-04-20 DIAGNOSIS — R60.0 BILATERAL LEG EDEMA: ICD-10-CM

## 2023-04-20 DIAGNOSIS — E11.65 TYPE 2 DIABETES MELLITUS WITH HYPERGLYCEMIA, WITHOUT LONG-TERM CURRENT USE OF INSULIN (H): ICD-10-CM

## 2023-04-20 DIAGNOSIS — Z00.00 ROUTINE GENERAL MEDICAL EXAMINATION AT A HEALTH CARE FACILITY: Primary | ICD-10-CM

## 2023-04-20 DIAGNOSIS — I10 HTN, GOAL BELOW 140/90: ICD-10-CM

## 2023-04-20 DIAGNOSIS — E78.5 HYPERLIPIDEMIA WITH TARGET LDL LESS THAN 100: Chronic | ICD-10-CM

## 2023-04-20 PROCEDURE — 99214 OFFICE O/P EST MOD 30 MIN: CPT | Mod: 25 | Performed by: INTERNAL MEDICINE

## 2023-04-20 PROCEDURE — G0439 PPPS, SUBSEQ VISIT: HCPCS | Performed by: INTERNAL MEDICINE

## 2023-04-20 RX ORDER — INSULIN LISPRO 100 [IU]/ML
5 INJECTION, SOLUTION INTRAVENOUS; SUBCUTANEOUS
Qty: 15 ML | Refills: 1 | Status: SHIPPED | OUTPATIENT
Start: 2023-04-20 | End: 2023-06-23 | Stop reason: ALTCHOICE

## 2023-04-20 ASSESSMENT — ENCOUNTER SYMPTOMS
FEVER: 0
ARTHRALGIAS: 1
ABDOMINAL PAIN: 0
DYSURIA: 0
FREQUENCY: 0
HEMATOCHEZIA: 0
WEAKNESS: 0
DIZZINESS: 0
JOINT SWELLING: 0
SHORTNESS OF BREATH: 0
NAUSEA: 0
COUGH: 0
PALPITATIONS: 0
NERVOUS/ANXIOUS: 0
EYE PAIN: 0
HEARTBURN: 0
HEMATURIA: 0
SORE THROAT: 0
DIARRHEA: 1
CONSTIPATION: 0
MYALGIAS: 0
HEADACHES: 0
BREAST MASS: 0
CHILLS: 0
PARESTHESIAS: 0

## 2023-04-20 ASSESSMENT — PAIN SCALES - GENERAL: PAINLEVEL: NO PAIN (0)

## 2023-04-20 ASSESSMENT — ACTIVITIES OF DAILY LIVING (ADL): CURRENT_FUNCTION: NO ASSISTANCE NEEDED

## 2023-04-20 NOTE — TELEPHONE ENCOUNTER
Pt called stating that she picked up the insulin lispro (HUMALOG KWIKPEN) 100 UNIT/ML (1 unit dial) KWIKPEN today and was shocked that it was a kwikpen as the pt has and preferres to use a syringe. Pt is requesting the next RX for this be a syringe/vial option.     Pt call back: [phone] Detailed Vm MAGGIE [place]

## 2023-04-20 NOTE — PROGRESS NOTES
Dr Torres's note    Patient's instructions / PLAN:                                                        Plan:  1. Increase Lantus insulin by 1-2 units until the morning blood sugars are 120-130s  2. Add Insulin Novolog OR Humalog 3 units 3 times a day before each meal. In 2 week increase it to 4-5 units before each meal   3. If the blood pressure is below 110 take only 25 mg Atenolol   4. Echocardiogram  -- To schedule this test please call [facility] at: [phone]   5. Schedule a follow up appointment middle of June ( [place] ok)        ASSESSMENT & PLAN:                                                      (Z00.00) Routine general medical examination at a health care facility  (primary encounter diagnosis)  Comment:   Plan:     (E11.9) Diabetes mellitus, type 2 (H)    (E11.65) Type 2 diabetes mellitus with hyperglycemia, without long-term current use of insulin (H)  Comment: Not controlled  We discussed about the new meds, advantages and potential side effects. The patient will read also the info from the pharmacy and call back if questions.   Plan: Albumin Random Urine Quantitative with Creat         Ratio, insulin aspart (NOVOLOG PEN) 100 UNIT/ML        pen, insulin lispro (HUMALOG KWIKPEN) 100         UNIT/ML (1 unit dial) KWIKPEN, Hemoglobin A1c            (I10) HTN, goal below 140/90  Comment:   Plan: Echocardiogram Complete  (R60.0) Bilateral leg edema  Comment:   Plan: Echocardiogram Complete            (E78.5) Hyperlipidemia with target LDL less than 100  Comment: Controlled  Plan: Continue same meds, same doses for now     (C43.9) Metastatic malignant melanoma (H)  -- R thigh   Comment: Stable, no new metastasis  Plan: Follow-up with oncology and dermatology       Chief Complaint:                                                        Annual exam  Follow up chronic medical problems      SUBJECTIVE:                                                    History of present illness     We reviewed the chronic  medical problems as above.   I reviewed the recent tests results in Epic       DM  -- Not controlled  -- aic 9.5  -- no prednisone   -- Lantus 46 units , no Novolog   -- am 149 - 211     + 1 leg edema     ROS:     See below      PMHx: - reviewed  Past Medical History:   Diagnosis Date     Asthma, mild intermittent      Cataract      Endometrial cancer (H) 06/2022     HTN, goal below 140/90      Hyperlipidemia LDL goal < 100      Macular hole of left eye      Melanoma (H)     RIGHT KNEE     Sleep apnea     uses c pap     Type 2 diabetes, HbA1C goal < 8% (H)        PSHx: reviewed  Past Surgical History:   Procedure Laterality Date     ARTHROPLASTY HIP Right 9/25/2017    Procedure: ARTHROPLASTY HIP;  Right total hip arthroplasty  ;  Surgeon: Ayaan Pena MD;  Location:  OR     ARTHROPLASTY KNEE  7/12/2013    Procedure: ARTHROPLASTY KNEE;  right total knee arthroplasty ;  Surgeon: Chaparro Wesley MD;  Location:  OR     ARTHROSCOPY KNEE Right 1/21/2015    Procedure: ARTHROSCOPY KNEE;  Surgeon: Ayaan Pena MD;  Location:  OR     BRONCHOSCOPY (RIGID OR FLEXIBLE), DIAGNOSTIC N/A 8/11/2022    Procedure: BRONCHOSCOPY, WITH BRONCHOALVEOLAR LAVAGE;  Surgeon: Roselia Sosa MD;  Location:  GI     C INCISION OF HYMEN       CATARACT IOL, RT/LT       COLONOSCOPY  2008     COLONOSCOPY N/A 4/18/2019    Procedure: Colonoscopy with polypectomy;  Surgeon: Shaun Guerrero MD;  [facility]: [facility]     CYSTOSCOPY N/A 6/23/2022    Procedure: Cystoscopy;  Surgeon: Eli Guidry MD;  [facility]     ENDOSCOPIC RETROGRADE CHOLANGIOPANCREATOGRAM N/A 2/6/2019    Procedure: COMBINED ENDOSCOPIC RETROGRADE CHOLANGIOPANCREATOGRAPHY, BILIARY SPINCTEROTOMY AND DILATION, PLACE BILE DUCT STENT;  Surgeon: Guru Andie Gonzalez MD;  [facility]: [facility]     ENDOSCOPIC RETROGRADE CHOLANGIOPANCREATOGRAM N/A 2/28/2019    Procedure: Endoscopic Retrograde Cholangiopancreatogram, Bile duct stent  removal and placement;  Surgeon: Shaun Guerrero MD;  [facility]     ENDOSCOPIC RETROGRADE CHOLANGIOPANCREATOGRAM N/A 4/18/2019    Procedure: COMBINED ENDOSCOPIC RETROGRADE CHOLANGIOPANCREATOGRAPHY, Bile duct stent exchange and Polypectomy;  Surgeon: Shaun Guerrero MD;  [facility]     ENDOSCOPIC RETROGRADE CHOLANGIOPANCREATOGRAM N/A 10/18/2019    Procedure: Endoscopic Retrograde Cholangiopancreatogram;  Surgeon: Shaun Guerrero MD;  [facility]     ENDOSCOPIC RETROGRADE CHOLANGIOPANCREATOGRAM N/A 3/24/2022    Procedure: ENDOSCOPIC RETROGRADE CHOLANGIOPANCREATOGRAPHY;  Surgeon: Shaun Guerrero MD;  Location:  OR     ENDOSCOPIC RETROGRADE CHOLANGIOPANCREATOGRAM N/A 3/16/2023    Procedure: endoscopic retrograde cholangiopancreatography with minor papillotomy;  Surgeon: Shaun Guerrero MD;  Location:  OR     ENDOSCOPIC RETROGRADE CHOLANGIOPANCREATOGRAM WITH SPYGLASS N/A 7/26/2019    Procedure: Endoscopic Retrograde Cholangiopancreatogram With Spyglas,l Radiofrequency Ablation, Stent Exchangeand Duodenal Biopsy x2;  Surgeon: Shaun Guerrero MD;  [facility]     ENDOSCOPIC RETROGRADE CHOLANGIOPANCREATOGRAM WITH SPYGLASS N/A 9/10/2020    Procedure: ENDOSCOPIC RETROGRADE CHOLANGIOPANCREATOGRAPHY, WITH DIRECT DUCT VISUALIZATION, USING PANCREATICOBILIARY FIBEROPTIC PROBE;  Surgeon: Shaun Guerrero MD;  [facility]     ENDOSCOPIC RETROGRADE CHOLANGIOPANCREATOGRAM WITH SPYGLASS N/A 3/22/2021    Procedure: ENDOSCOPIC RETROGRADE CHOLANGIOPANCREATOGRAPHY, WITH DIRECT DUCT VISUALIZATION, USING PANCREATICOBILIARY FIBEROPTIC PROBE;  Surgeon: Shaun Guerrero MD;  [facility]     ESOPHAGOSCOPY, GASTROSCOPY, DUODENOSCOPY (EGD) WITH RADIO FREQUENCY ABLATION, COMBINED N/A 9/10/2020    Procedure: possible repeat ESOPHAGOGASTRODUODENOSCOPY, WITH RADIOFREQUENCY ABLATION and endoscopic mucosal resection;  Surgeon: Shaun Guerrero MD;  [facility]     ESOPHAGOSCOPY, GASTROSCOPY, DUODENOSCOPY (EGD), COMBINED N/A  4/18/2019    Procedure: upper endoscopy with polypectomy;  Surgeon: Shaun Guerrero MD;  [facility]     ESOPHAGOSCOPY, GASTROSCOPY, DUODENOSCOPY (EGD), COMBINED N/A 10/18/2019    Procedure: Upper Endoscopy and ERCP with stent removal, stone removal and biopsy;  Surgeon: Shaun Guerrero MD;  [facility]     ESOPHAGOSCOPY, GASTROSCOPY, DUODENOSCOPY (EGD), COMBINED N/A 3/24/2022    Procedure: ESOPHAGOGASTRODUODENOSCOPY (EGD), Duodenal  polyp removal;  Surgeon: Shaun Guerrero MD;  Location: [facility]     ESOPHAGOSCOPY, GASTROSCOPY, DUODENOSCOPY (EGD), COMBINED N/A 3/16/2023    Procedure: ESOPHAGOGASTRODUODENOSCOPY (EGD);  Surgeon: Shaun Guerrero MD;  Location: [facility]     ESOPHAGOSCOPY, GASTROSCOPY, DUODENOSCOPY (EGD), RESECT MUCOSA, COMBINED N/A 2/28/2019    Procedure: Upper Endoscopy, Endoscopic Ultrasound, Endoscopic Mucosal Resection,  Ampullectomy, polypectomy.;  Surgeon: Shaun Guerrero MD;  [facility]     ESOPHAGOSCOPY, GASTROSCOPY, DUODENOSCOPY (EGD), RESECT MUCOSA, COMBINED N/A 3/22/2021    Procedure: ESOPHAGOGASTRODUODENOSCOPY (EGD) with  endoscopic mucosal resection/ polypectomy;  Surgeon: Shaun Guerrero MD;  [facility]     EXCISE LESION LOWER EXTREMITY Right 3/28/2022    Procedure: Wide local excision of right knee melanoma;  Surgeon: Chevy Allison MD;  Location: [facility] OR     EXCISE MASS LOWER EXTREMITY Right 1/13/2022    Procedure: excision of right thigh mass;  Surgeon: Salvador Kelly MD;  Location: [facility]     EYE SURGERY      macular hole repaired left eye     HC KNEE SCOPE, DIAGNOSTIC      - both knees     HEAD & NECK SURGERY      wisdom teeth     IR CHEST PORT PLACEMENT > 5 YRS OF AGE  5/2/2022     LAPAROSCOPIC HYSTERECTOMY TOTAL, BILATERAL SALPINGO-OOPHORECTOMY, NODE DISSECTION, COMBINED Bilateral 6/23/2022    Procedure: Total Laparoscopic Hyserectomy, Bilateral Salpibgo-oophorectomy, Bilateral Icard Lymph Node Dissection;  Surgeon: Eli Guidry MD;  Location:  OR         Soc Hx: No daily alcohol, no smoking  Social History     Socioeconomic History     Marital status:      Spouse name: Allen     Number of children: 3     Years of education: 15     Highest education level: Not on file   Occupational History     Not on file   Tobacco Use     Smoking status: Never     Passive exposure: Never     Smokeless tobacco: Never   Vaping Use     Vaping status: Never Used     Passive vaping exposure: Yes   Substance and Sexual Activity     Alcohol use: No     Alcohol/week: 0.0 standard drinks of alcohol     Drug use: No     Sexual activity: Not Currently     Partners: Male   Other Topics Concern     Parent/sibling w/ CABG, MI or angioplasty before 65F 55M? Not Asked   Social History Narrative     Not on file     Social Determinants of Health     Financial Resource Strain: Not on file   Food Insecurity: Not on file   Transportation Needs: Not on file   Physical Activity: Not on file   Stress: Not on file   Social Connections: Not on file   Intimate Partner Violence: Not At Risk (2022)    Humiliation, Afraid, Rape, and Kick questionnaire      Fear of Current or Ex-Partner: No      Emotionally Abused: No      Physically Abused: No      Sexually Abused: No   Housing Stability: Not on file        Fam Hx: reviewed  Family History   Problem Relation Age of Onset     Hypertension Mother         diabetes,hypoythryoidism, stroke     Diabetes Mother      Breast Cancer Mother      Heart Disease Father         , cancer lip     Uterine Cancer Sister      Connective Tissue Disorder Sister         fibromyalgia     Diabetes Sister      Hypertension Brother      Cerebrovascular Disease Maternal Grandmother         , diabetes     Cerebrovascular Disease Maternal Grandfather              Cancer Paternal Grandmother              Heart Disease Paternal Grandfather              Cancer Paternal Aunt         ovarian     Breast Cancer Niece      Asthma Maternal Aunt   "        Screening: reviewed      All: reviewed    Meds: reviewed  Current Outpatient Medications   Medication Sig Dispense Refill     atenolol (TENORMIN) 50 MG tablet Take 1 tablet (50 mg) by mouth daily 90 tablet 1     atorvastatin (LIPITOR) 40 MG tablet Take 1 tablet by mouth once daily 90 tablet 1     azelastine (ASTELIN) 0.1 % nasal spray USE 1 SPRAY(S) IN EACH NOSTRIL TWICE DAILY AS NEEDED 30 mL 0     azelastine (OPTIVAR) 0.05 % SOLN Place 1 drop into both eyes 2 times daily as needed Reported on 3/22/2017       cetirizine (ZYRTEC) 10 MG tablet Take 10 mg by mouth every morning       Continuous Blood Gluc Sensor (PerformableYLE DAVID 14 DAY SENSOR) MISC USE 1 EVERY 14 DAYS USE TO READ BLOOD SUGARS PER MANUFACTURERS INSTRUCTIONS 2 each 5     CONTOUR NEXT TEST test strip USE 1 STRIP TO CHECK GLUCOSE ONCE DAILY 100 strip 4     glipiZIDE (GLUCOTROL) 5 MG tablet TAKE 2 TABLETS BY MOUTH IN THE MORNING 120 tablet 0     hydrochlorothiazide (HYDRODIURIL) 25 MG tablet Take 1 tablet (25 mg) by mouth daily 90 tablet 1     insulin glargine (LANTUS SOLOSTAR) 100 UNIT/ML pen Inject 44 Units Subcutaneous daily Ok to substitute Basaglar at same dose and frequency if insurance prefers. 15 mL      insulin glargine (LANTUS VIAL) 100 UNIT/ML vial Inject 44 Units Subcutaneous At Bedtime 15 mL 6     insulin syringe-needle U-100 (BD INSULIN SYRINGE ULTRAFINE) 31G X 5/16\" 0.5 ML miscellaneous Use daily with NPH insulin 100 each 11     latanoprost (XALATAN) 0.005 % ophthalmic solution Place 1 drop into both eyes At Bedtime  2.5 mL 1     lidocaine-prilocaine (EMLA) 2.5-2.5 % external cream APPLY CREAM TOPICALLY ONCE DAILY AS NEEDED FOR PAIN APPLY A PEA SIZED AMOUNT OVER PORT SITE 60 MINUTES PRIOR TO USE COVER WITH PLASTIC WRAP       LORazepam (ATIVAN) 0.5 MG tablet Take 1 tablet (0.5 mg) by mouth nightly as needed for anxiety or sleep 30 tablet 0     ondansetron (ZOFRAN) 8 MG tablet Take 1 tablet (8 mg) by mouth every 8 hours as needed for " "nausea 30 tablet 1     senna-docusate (SENOKOT-S/PERICOLACE) 8.6-50 MG tablet Take 2 tablets by mouth daily as needed for constipation 60 tablet 11     timolol maleate (TIMOPTIC) 0.5 % ophthalmic solution INSTILL 1 DROP INTO EACH EYE TWICE DAILY       triamcinolone (KENALOG) 0.5 % external ointment Apply 1 g topically daily as needed for irritation       VENTOLIN  (90 Base) MCG/ACT inhaler INHALE 2 PUFFS BY MOUTH EVERY 4 HOURS AS NEEDED FOR SHORTNESS OF BREATH 18 g 0     XARELTO ANTICOAGULANT 20 MG TABS tablet Take 1 tablet by mouth daily         OBJECTIVE:                                                    Physical Exam :  Blood pressure 90/70, pulse 69, temperature 97.2  F (36.2  C), temperature source Tympanic, resp. rate 18, height 1.702 m (5' 7\"), weight 97.4 kg (214 lb 11.2 oz), last menstrual period 12/13/2002, SpO2 96 %, not currently breastfeeding.     NAD, appears comfortable  Skin clear, no rashes  Neck: supple, no JVD,  no thyroidmegaly  Lymph nodes non palpable in the cervical, supraclavicular axillaries,   Chest: clear to auscultation with good respiratory effort  Cardiac: S1S2, RRR, no mgr appreciated  Abdomen: soft, not tender, not distended, audible bowel sound, no hepatosplenomegaly, no palpable masses, no abdominal bruits  Extremities: no cyanosis, clubbing or edema.   Neuro: A, Ox3, no focal signs.  Breast exam in supine and erect position: they are symmetrical, no skin changes, no tenderness or nodes on palpation. Nipples are erect, no skin lesions, no discharge on pressure.    Pelvic exam: Patient follows up regularly with the OB/GYN for the endometrial cancer        Raisa Torres MD  Internal Medicine         SUBJECTIVE:   Melva is a 72 year old who presents for Preventive Visit.       View : No data to display.            Patient has been advised of split billing requirements and indicates understanding: Yes  Are you in the first 12 months of your Medicare coverage?  No    Healthy " "Habits:     In general, how would you rate your overall health?  Good    Frequency of exercise:  2-3 days/week    Duration of exercise:  15-30 minutes    Do you usually eat at least 4 servings of fruit and vegetables a day, include whole grains    & fiber and avoid regularly eating high fat or \"junk\" foods?  Yes    Taking medications regularly:  Yes    Medication side effects:  None    Ability to successfully perform activities of daily living:  No assistance needed    Home Safety:  Lack of grab bars in the bathroom    Hearing Impairment:  Difficulty understanding speech on the telephone    In the past 6 months, have you been bothered by leaking of urine? Yes    In general, how would you rate your overall mental or emotional health?  Excellent      PHQ-2 Total Score: 0    Additional concerns today:  Yes      Have you ever done Advance Care Planning? (For example, a Health Directive, POLST, or a discussion with a medical provider or your loved ones about your wishes): No, advance care planning information given to patient to review.  Patient declined advance care planning discussion at this time.       Fall risk  Fallen 2 or more times in the past year?: No  Any fall with injury in the past year?: No    Cognitive Screening   1) Repeat 3 items (Leader, Season, Table)    2) Clock draw: NORMAL  3) 3 item recall: Recalls 3 objects  Results: 3 items recalled: COGNITIVE IMPAIRMENT LESS LIKELY    Mini-CogTM Copyright SHELLIE Reza. Licensed by the author for use in [facility]; reprinted with permission (sarah@.[facility]). All rights reserved.      Do you have sleep apnea, excessive snoring or daytime drowsiness?: sleep apnea     Reviewed and updated as needed this visit by clinical staff                  Reviewed and updated as needed this visit by Provider                 Social History     Tobacco Use     Smoking status: Never     Passive exposure: Never     Smokeless tobacco: Never   Vaping Use     Vaping status: " Never Used     Passive vaping exposure: Yes   Substance Use Topics     Alcohol use: No     Alcohol/week: 0.0 standard drinks of alcohol             4/15/2022     8:17 PM   Alcohol Use   Prescreen: >3 drinks/day or >7 drinks/week? Not Applicable     Do you have a current opioid prescription? No  Do you use any other controlled substances or medications that are not prescribed by a provider? None          Diabetes Follow-up    How often are you checking your blood sugar? Continuous glucose monitor  What time of day are you checking your blood sugars (select all that apply)?  Before and after meals  Have you had any blood sugars above 200?  Yes   Have you had any blood sugars below 70?  No    What symptoms do you notice when your blood sugar is low?  None    What concerns do you have today about your diabetes? None and Blood sugar is often over 200     Do you have any of these symptoms? (Select all that apply)  No numbness or tingling in feet.  No redness, sores or blisters on feet.  No complaints of excessive thirst.  No reports of blurry vision.  No significant changes to weight.          Hyperlipidemia Follow-Up      Are you regularly taking any medication or supplement to lower your cholesterol?   Yes- atorvastatin    Are you having muscle aches or other side effects that you think could be caused by your cholesterol lowering medication?  No    Hypertension Follow-up      Do you check your blood pressure regularly outside of the clinic? Yes     Are you following a low salt diet? Yes    Are your blood pressures ever more than 140 on the top number (systolic) OR more   than 90 on the bottom number (diastolic), for example 140/90? No    BP Readings from Last 2 Encounters:   03/16/23 (!) 154/75   03/01/23 135/72     Hemoglobin A1C (%)   Date Value   04/13/2023 9.5 (H)   12/30/2022 8.9 (H)   03/03/2021 7.1 (H)   08/24/2020 7.2 (H)     LDL Cholesterol Calculated (mg/dL)   Date Value   04/13/2023 74   12/30/2022 105 (H)    03/03/2021 91   02/20/2020 88         Current providers sharing in care for this patient include:   Patient Care Team:  Raisa Davidson MD as PCP - General (Internal Medicine)  Raisa Davidson MD as Assigned PCP  Tanya Sheehan RD as Diabetes Educator (Dietitian, Registered)  Chevy Allison MD as MD (Surgery)  Chevy Allison MD as Assigned Surgical Provider  Patricio Noriega MD as Assigned OBGYN Provider  Yarelis Mitchell, RN as Specialty Care Coordinator (Gynecologic Oncology)  Arlet Harding, [facility] as Pharmacist (Pharmacist)  Roselia Sosa MD as Assigned Pulmonology Provider  Carie Webb [facility] (Pharmacist)  Arlet Harding, [facility] as Assigned [place] Pharmacist  Eli Guidry MD as Assigned Cancer Care Provider  Shaun Guerrero MD as MD (Gastroenterology)  Aishwarya Reese, RN as Registered Nurse  Clara Corado MD as Hospitalist (Endocrinology, Diabetes, and Metabolism)  Lili Ahuja as Diabetes Educator    The following health maintenance items are reviewed in Epic and correct as of today:  Health Maintenance   Topic Date Due     URINE DRUG SCREEN  Never done     ANNUAL REVIEW OF HM ORDERS  Never done     ASTHMA ACTION PLAN  08/27/2019     DIABETIC FOOT EXAM  03/25/2020     MICROALBUMIN  03/21/2023     FALL RISK ASSESSMENT  04/18/2023     MEDICARE ANNUAL WELLNESS VISIT  04/18/2023     ASTHMA CONTROL TEST  05/04/2023     EYE EXAM  10/01/2023     A1C  10/13/2023     BMP  04/13/2024     LIPID  04/13/2024     MAMMO SCREENING  05/16/2024     ADVANCE CARE PLANNING  07/25/2027     COLORECTAL CANCER SCREENING  04/18/2029     DTAP/TDAP/TD IMMUNIZATION (4 - Td or Tdap) 07/27/2031     DEXA  01/20/2035     HEPATITIS C SCREENING  Completed     PHQ-2 (once per calendar year)  Completed     INFLUENZA VACCINE  Completed     Pneumococcal Vaccine: 65+ Years  Completed     COVID-19 Vaccine  Completed     IPV IMMUNIZATION  Aged Out     MENINGITIS  "IMMUNIZATION  Aged Out     ZOSTER IMMUNIZATION  Discontinued     Labs reviewed in EPIC          Review of Systems   Constitutional: Negative for chills and fever.   HENT: Positive for congestion. Negative for ear pain, hearing loss and sore throat.    Eyes: Negative for pain and visual disturbance.   Respiratory: Negative for cough and shortness of breath.    Cardiovascular: Positive for peripheral edema. Negative for chest pain and palpitations.   Gastrointestinal: Positive for diarrhea. Negative for abdominal pain, constipation, heartburn, hematochezia and nausea.   Breasts:  Negative for tenderness, breast mass and discharge.   Genitourinary: Negative for dysuria, frequency, genital sores, hematuria, pelvic pain, urgency, vaginal bleeding and vaginal discharge.   Musculoskeletal: Positive for arthralgias. Negative for joint swelling and myalgias.   Skin: Negative for rash.   Neurological: Negative for dizziness, weakness, headaches and paresthesias.   Psychiatric/Behavioral: Negative for mood changes. The patient is not nervous/anxious.      Not constant diarrhea - just loose BM< for few days       Patient has been advised of split billing requirements and indicates understanding: Yes At the check in, at the        COUNSELING:  Reviewed preventive health counseling, as reflected in patient instructions       Regular exercise       Healthy diet/nutrition      BMI:   Estimated body mass index is 33.55 kg/m  as calculated from the following:    Height as of 3/16/23: 1.702 m (5' 7\").    Weight as of 3/16/23: 97.2 kg (214 lb 3.2 oz).   Weight management plan: Discussed healthy diet and exercise guidelines      She reports that she has never smoked. She has never been exposed to tobacco smoke. She has never used smokeless tobacco.      Appropriate preventive services were discussed with this patient, including applicable screening as appropriate for cardiovascular disease, diabetes, osteopenia/osteoporosis, " and glaucoma.  As appropriate for age/gender, discussed screening for colorectal cancer, prostate cancer, breast cancer, and cervical cancer. Checklist reviewing preventive services available has been given to the patient.    Reviewed patients plan of care and provided an AVS. The Basic Care Plan (routine screening as documented in Health Maintenance) for Gricelda meets the Care Plan requirement. This Care Plan has been established and reviewed with the Patient.          Raisa Davidson MD  [facility]    Identified Health Risks:    I have reviewed Opioid Use Disorder and Substance Use Disorder risk factors and made any needed referrals.

## 2023-04-20 NOTE — PATIENT INSTRUCTIONS
Plan:  1. Increase Lantus insulin by 1-2 units until the morning blood sugars are 120-130s  2. Add Insulin Novolog OR Humalog 3 units 3 times a day before each meal. In 2 week increase it to 4-5 units before each meal   3. If the blood pressure is below 110 take only 25 mg Atenolol   4. Echocardiogram  -- To schedule this test please call [facility] at: [phone]   5. Schedule a follow up appointment middle of June ( PETER arreola)

## 2023-04-23 RX ORDER — INSULIN LISPRO 100 [IU]/ML
5 INJECTION, SOLUTION INTRAVENOUS; SUBCUTANEOUS
Qty: 15 ML | Refills: 1 | Status: SHIPPED | OUTPATIENT
Start: 2023-04-23 | End: 2023-10-09

## 2023-05-01 ENCOUNTER — TELEPHONE (OUTPATIENT)
Dept: INTERNAL MEDICINE | Facility: CLINIC | Age: 73
End: 2023-05-01
Payer: COMMERCIAL

## 2023-05-01 NOTE — TELEPHONE ENCOUNTER
Patient Quality Outreach    Patient is due for the following:   Diabetes -  Diabetic Follow-Up Visit    Next Steps:   Schedule a office visit for diabetes follow up.    Type of outreach:    Phone, left message for patient/parent to call back.    Next Steps:  Reach out within 90 days via Letter.    Max number of attempts reached: No. Will try again in 90 days if patient still on fail list.    Questions for provider review:    None     Thais Cadet

## 2023-05-04 ENCOUNTER — TRANSFERRED RECORDS (OUTPATIENT)
Dept: HEALTH INFORMATION MANAGEMENT | Facility: CLINIC | Age: 73
End: 2023-05-04

## 2023-05-11 ENCOUNTER — HOSPITAL ENCOUNTER (OUTPATIENT)
Dept: CARDIOLOGY | Facility: CLINIC | Age: 73
Discharge: HOME | End: 2023-05-11
Attending: INTERNAL MEDICINE | Admitting: INTERNAL MEDICINE
Payer: COMMERCIAL

## 2023-05-11 DIAGNOSIS — R60.0 BILATERAL LEG EDEMA: ICD-10-CM

## 2023-05-11 DIAGNOSIS — I10 HTN, GOAL BELOW 140/90: ICD-10-CM

## 2023-05-11 LAB — LVEF ECHO: NORMAL

## 2023-05-11 PROCEDURE — 93306 TTE W/DOPPLER COMPLETE: CPT

## 2023-05-11 PROCEDURE — 93306 TTE W/DOPPLER COMPLETE: CPT | Mod: 26 | Performed by: INTERNAL MEDICINE

## 2023-05-15 ENCOUNTER — TRANSFERRED RECORDS (OUTPATIENT)
Dept: HEALTH INFORMATION MANAGEMENT | Facility: CLINIC | Age: 73
End: 2023-05-15

## 2023-05-15 ENCOUNTER — ONCOLOGY VISIT (OUTPATIENT)
Dept: ONCOLOGY | Facility: CLINIC | Age: 73
End: 2023-05-15
Attending: INTERNAL MEDICINE
Payer: COMMERCIAL

## 2023-05-15 VITALS
TEMPERATURE: 97.5 F | HEIGHT: 67 IN | BODY MASS INDEX: 34.06 KG/M2 | RESPIRATION RATE: 16 BRPM | WEIGHT: 217 LBS | HEART RATE: 70 BPM | OXYGEN SATURATION: 96 % | DIASTOLIC BLOOD PRESSURE: 61 MMHG | SYSTOLIC BLOOD PRESSURE: 125 MMHG

## 2023-05-15 DIAGNOSIS — J84.89 ORGANIZING PNEUMONIA (H): ICD-10-CM

## 2023-05-15 DIAGNOSIS — C43.9 METASTATIC MALIGNANT MELANOMA (H): Primary | ICD-10-CM

## 2023-05-15 PROCEDURE — 99213 OFFICE O/P EST LOW 20 MIN: CPT | Performed by: INTERNAL MEDICINE

## 2023-05-15 PROCEDURE — G0463 HOSPITAL OUTPT CLINIC VISIT: HCPCS | Performed by: INTERNAL MEDICINE

## 2023-05-15 ASSESSMENT — PAIN SCALES - GENERAL: PAINLEVEL: NO PAIN (0)

## 2023-05-15 NOTE — PROGRESS NOTES
"[facility] Follow Up Visit    Reason for Visit  Gricelda Sabillon is a 73 year old female who is seen in follow up for immune checkpoint inhibitor pneumonitis   She sees Rosibel Florez and Melissa   Pulmonary HPI    - Melva has been off prednisone since the end of January. Dr Florez says there is no other treatment for her melanoma, which is considered to be a bit aggressive.   - seeing Dr Perez later today for CPAP, also uses nocturnal.   - she is not using oxygen most of the time but occasionally uses it to treat visual migraines (clinical diagnosis from an ophthalmologist based on kaleidoscope description).      In 2022 she noted a lump on her thigh, it was indeterminate and removed, found to be melanoma. During the workup, abnormal imaging found in the uterus so she had hysterectomy. Last week she had a percutaneous lung biopsy and the week before she was diagnosed with pulmonary embolism  - she was coughing before the hospitalization, improved since treatment for PE, breathing is improved, can \"almost\" take a deep breath  - she is taking lovenox injections  - unclear if the PE was from malignancy vs postoperative inactivity      Timeline of medical care this year:   - Jan 2022 leg mass resection; March 2022 extended surgical resection for margins   Jan 2022 CT showing lung nodule, not PET FDG avid  May port placed - April 29 first immunotherapy Keytruda, every 3 weeks May 20, Neha 10, July 5 last treatment  - June 2022 hysterectomy, lymph nodes negative for malignancy, low grade endometrial cancer  After the hysterectomy, she had some issues with nausea, poor appetite. Thought it might be semaglutide so it was stopped. No other new medications.  Routine visit on 7/25/22 with Dr Torres showed hypoxemia, she went to ED and noted to have a PE. She didn't feel that bad but became short of breath at the clinic - discharged with 4 lpm supplemental oxygen, she is on 2 lpm now, usually " "92-93%  She continues to experience lightheadedness, nausea, chills every morning. No appetite, nothing tastes good.   - Aug 2022 lung biopsy percutaneous   - history of 1-2 episodes of bronchitis annually, family history of asthma. Has used albuterol in the past for these episodes  - she has been hanging about the house, no camping or outdoor activities, no pets    ROS Pulmonary  Dyspnea: Yes, Cough: Yes, Chest pain: No, Wheezing: No, Sputum Production: No, Hemoptysis: No  A complete ROS was otherwise negative except as noted in the HPI.  The patient was seen and examined by Roselia Sosa MD   Current Outpatient Medications   Medication     atenolol (TENORMIN) 50 MG tablet     atorvastatin (LIPITOR) 40 MG tablet     azelastine (ASTELIN) 0.1 % nasal spray     azelastine (OPTIVAR) 0.05 % SOLN     cetirizine (ZYRTEC) 10 MG tablet     Continuous Blood Gluc Sensor (OncoEthixSTYLE DAVID 14 DAY SENSOR) MISC     CONTOUR NEXT TEST test strip     glipiZIDE (GLUCOTROL) 5 MG tablet     hydrochlorothiazide (HYDRODIURIL) 25 MG tablet     insulin aspart (NOVOLOG PEN) 100 UNIT/ML pen     insulin glargine (LANTUS SOLOSTAR) 100 UNIT/ML pen     insulin glargine (LANTUS VIAL) 100 UNIT/ML vial     insulin lispro (HUMALOG KWIKPEN) 100 UNIT/ML (1 unit dial) KWIKPEN     insulin lispro (HUMALOG VIAL) 100 UNIT/ML vial     insulin syringe-needle U-100 (BD INSULIN SYRINGE ULTRAFINE) 31G X 5/16\" 0.5 ML miscellaneous     latanoprost (XALATAN) 0.005 % ophthalmic solution     lidocaine-prilocaine (EMLA) 2.5-2.5 % external cream     LORazepam (ATIVAN) 0.5 MG tablet     ondansetron (ZOFRAN) 8 MG tablet     senna-docusate (SENOKOT-S/PERICOLACE) 8.6-50 MG tablet     timolol maleate (TIMOPTIC) 0.5 % ophthalmic solution     triamcinolone (KENALOG) 0.5 % external ointment     VENTOLIN  (90 Base) MCG/ACT inhaler     XARELTO ANTICOAGULANT 20 MG TABS tablet     No current facility-administered medications for this visit.     Allergies   Allergen " "Reactions     Bromfenac Visual Disturbance      xibrom  Causes sever eye pain     Codeine Nausea     Other reaction(s): Dizziness, Headache     Metformin GI Disturbance     Morphine And Related      \"NAUSE,HA AND DIZZINESS\"     Social History     Socioeconomic History     Marital status:      Spouse name: Allen     Number of children: 3     Years of education: 15     Highest education level: Not on file   Occupational History     Not on file   Tobacco Use     Smoking status: Never     Passive exposure: Never     Smokeless tobacco: Never   Vaping Use     Vaping status: Never Used     Passive vaping exposure: Yes   Substance and Sexual Activity     Alcohol use: No     Alcohol/week: 0.0 standard drinks of alcohol     Drug use: No     Sexual activity: Not Currently     Partners: Male   Other Topics Concern     Parent/sibling w/ CABG, MI or angioplasty before 65F 55M? Not Asked   Social History Narrative     Not on file     Social Determinants of Health     Financial Resource Strain: Not on file   Food Insecurity: Not on file   Transportation Needs: Not on file   Physical Activity: Not on file   Stress: Not on file   Social Connections: Not on file   Intimate Partner Violence: Not At Risk (9/19/2022)    Humiliation, Afraid, Rape, and Kick questionnaire      Fear of Current or Ex-Partner: No      Emotionally Abused: No      Physically Abused: No      Sexually Abused: No   Housing Stability: Not on file     Past Medical History:   Diagnosis Date     Asthma, mild intermittent      Cataract      Endometrial cancer (H) 06/2022     HTN, goal below 140/90      Hyperlipidemia LDL goal < 100      Macular hole of left eye      Melanoma (H)     RIGHT KNEE     Sleep apnea     uses c pap     Type 2 diabetes, HbA1C goal < 8% (H)      Past Surgical History:   Procedure Laterality Date     ARTHROPLASTY HIP Right 9/25/2017    Procedure: ARTHROPLASTY HIP;  Right total hip arthroplasty  ;  Surgeon: Ayaan Pena MD;  " Location: [facility]     ARTHROPLASTY KNEE  7/12/2013    Procedure: ARTHROPLASTY KNEE;  right total knee arthroplasty ;  Surgeon: Chaparro Wesley MD;  Location: [facility]     ARTHROSCOPY KNEE Right 1/21/2015    Procedure: ARTHROSCOPY KNEE;  Surgeon: Ayaan Pena MD;  Location: [facility]     BRONCHOSCOPY (RIGID OR FLEXIBLE), DIAGNOSTIC N/A 8/11/2022    Procedure: BRONCHOSCOPY, WITH BRONCHOALVEOLAR LAVAGE;  Surgeon: Roselia Sosa MD;  Location:  GI     C INCISION OF HYMEN       CATARACT IOL, RT/LT       COLONOSCOPY  2008     COLONOSCOPY N/A 4/18/2019    Procedure: Colonoscopy with polypectomy;  Surgeon: Shaun Guerrero MD;  Location: [facility]     CYSTOSCOPY N/A 6/23/2022    Procedure: Cystoscopy;  Surgeon: Eli Guidry MD;  Location: [facility]     ENDOSCOPIC RETROGRADE CHOLANGIOPANCREATOGRAM N/A 2/6/2019    Procedure: COMBINED ENDOSCOPIC RETROGRADE CHOLANGIOPANCREATOGRAPHY, BILIARY SPINCTEROTOMY AND DILATION, PLACE BILE DUCT STENT;  Surgeon: Guru Andie Gonzalez MD;  Location: [facility]     ENDOSCOPIC RETROGRADE CHOLANGIOPANCREATOGRAM N/A 2/28/2019    Procedure: Endoscopic Retrograde Cholangiopancreatogram, Bile duct stent removal and placement;  Surgeon: Shaun Guerrero MD;  Location: [facility]     ENDOSCOPIC RETROGRADE CHOLANGIOPANCREATOGRAM N/A 4/18/2019    Procedure: COMBINED ENDOSCOPIC RETROGRADE CHOLANGIOPANCREATOGRAPHY, Bile duct stent exchange and Polypectomy;  Surgeon: Shaun Guerrero MD;  Location: [facility]     ENDOSCOPIC RETROGRADE CHOLANGIOPANCREATOGRAM N/A 10/18/2019    Procedure: Endoscopic Retrograde Cholangiopancreatogram;  Surgeon: Shaun Guerrero MD;  [facility]     ENDOSCOPIC RETROGRADE CHOLANGIOPANCREATOGRAM N/A 3/24/2022    Procedure: ENDOSCOPIC RETROGRADE CHOLANGIOPANCREATOGRAPHY;  Surgeon: Shaun Guerrero MD;  Location:  OR     ENDOSCOPIC RETROGRADE CHOLANGIOPANCREATOGRAM N/A 3/16/2023    Procedure: endoscopic retrograde cholangiopancreatography with minor papillotomy;   Surgeon: Shaun Guerrero MD;  Location: [facility]     ENDOSCOPIC RETROGRADE CHOLANGIOPANCREATOGRAM WITH SPYGLASS N/A 7/26/2019    Procedure: Endoscopic Retrograde Cholangiopancreatogram With Spyglas,l Radiofrequency Ablation, Stent Exchangeand Duodenal Biopsy x2;  Surgeon: Shaun Guerrero MD;  [facility]: [facility]     ENDOSCOPIC RETROGRADE CHOLANGIOPANCREATOGRAM WITH SPYGLASS N/A 9/10/2020    Procedure: ENDOSCOPIC RETROGRADE CHOLANGIOPANCREATOGRAPHY, WITH DIRECT DUCT VISUALIZATION, USING PANCREATICOBILIARY FIBEROPTIC PROBE;  Surgeon: Shaun Guerrero MD;  [facility]     ENDOSCOPIC RETROGRADE CHOLANGIOPANCREATOGRAM WITH SPYGLASS N/A 3/22/2021    Procedure: ENDOSCOPIC RETROGRADE CHOLANGIOPANCREATOGRAPHY, WITH DIRECT DUCT VISUALIZATION, USING PANCREATICOBILIARY FIBEROPTIC PROBE;  Surgeon: Shaun Guerrero MD;  [facility]: [facility]     ESOPHAGOSCOPY, GASTROSCOPY, DUODENOSCOPY (EGD) WITH RADIO FREQUENCY ABLATION, COMBINED N/A 9/10/2020    Procedure: possible repeat ESOPHAGOGASTRODUODENOSCOPY, WITH RADIOFREQUENCY ABLATION and endoscopic mucosal resection;  Surgeon: Shaun Guerrero MD;  [facility]: [facility]     ESOPHAGOSCOPY, GASTROSCOPY, DUODENOSCOPY (EGD), COMBINED N/A 4/18/2019    Procedure: upper endoscopy with polypectomy;  Surgeon: Shaun Guerrero MD;  [facility]     ESOPHAGOSCOPY, GASTROSCOPY, DUODENOSCOPY (EGD), COMBINED N/A 10/18/2019    Procedure: Upper Endoscopy and ERCP with stent removal, stone removal and biopsy;  Surgeon: Shaun Guerrero MD;  [facility]     ESOPHAGOSCOPY, GASTROSCOPY, DUODENOSCOPY (EGD), COMBINED N/A 3/24/2022    Procedure: ESOPHAGOGASTRODUODENOSCOPY (EGD), Duodenal  polyp removal;  Surgeon: Shaun Guerrero MD;  Location:  OR     ESOPHAGOSCOPY, GASTROSCOPY, DUODENOSCOPY (EGD), COMBINED N/A 3/16/2023    Procedure: ESOPHAGOGASTRODUODENOSCOPY (EGD);  Surgeon: Shaun Guerrero MD;  Location: [facility]     ESOPHAGOSCOPY, GASTROSCOPY, DUODENOSCOPY (EGD), RESECT MUCOSA, COMBINED N/A 2/28/2019    Procedure: Upper  Endoscopy, Endoscopic Ultrasound, Endoscopic Mucosal Resection,  Ampullectomy, polypectomy.;  Surgeon: Shaun Guerrero MD;  [facility]: [facility]     ESOPHAGOSCOPY, GASTROSCOPY, DUODENOSCOPY (EGD), RESECT MUCOSA, COMBINED N/A 3/22/2021    Procedure: ESOPHAGOGASTRODUODENOSCOPY (EGD) with  endoscopic mucosal resection/ polypectomy;  Surgeon: Shaun Guerrero MD;  Location: [facility]     EXCISE LESION LOWER EXTREMITY Right 3/28/2022    Procedure: Wide local excision of right knee melanoma;  Surgeon: Chevy Allison MD;  Location: [facility] OR     EXCISE MASS LOWER EXTREMITY Right 2022    Procedure: excision of right thigh mass;  Surgeon: Salvador Kelly MD;  Location: [facility] OR     EYE SURGERY      macular hole repaired left eye     HC KNEE SCOPE, DIAGNOSTIC      - both knees     HEAD & NECK SURGERY      wisdom teeth     IR CHEST PORT PLACEMENT > 5 YRS OF AGE  2022     LAPAROSCOPIC HYSTERECTOMY TOTAL, BILATERAL SALPINGO-OOPHORECTOMY, NODE DISSECTION, COMBINED Bilateral 2022    Procedure: Total Laparoscopic Hyserectomy, Bilateral Salpibgo-oophorectomy, Bilateral [place] Lymph Node Dissection;  Surgeon: Eli Guidry MD;  Location: [facility]     Family History   Problem Relation Age of Onset     Hypertension Mother         diabetes,hypoythryoidism, stroke     Diabetes Mother      Breast Cancer Mother      Heart Disease Father         , cancer lip     Uterine Cancer Sister      Connective Tissue Disorder Sister         fibromyalgia     Diabetes Sister      Hypertension Brother      Cerebrovascular Disease Maternal Grandmother         , diabetes     Cerebrovascular Disease Maternal Grandfather              Cancer Paternal Grandmother              Heart Disease Paternal Grandfather              Cancer Paternal Aunt         ovarian     Breast Cancer Niece      Asthma Maternal Aunt      Exam:   /61 (Cuff Size: Adult Large)   Pulse 70   Temp 97.5  F (36.4  C) (Tympanic)    "Resp 16   Ht 1.702 m (5' 7\")   Wt 98.4 kg (217 lb)   LMP 12/13/2002 (Exact Date)   SpO2 96%   BMI 33.99 kg/m    GENERAL APPEARANCE: Well developed, well nourished, alert, and in no apparent distress.  RESP: normal percussion, good air flow throughout.  Left basilar inspiratory crackles. No rhonchi. No wheezes.  NEURO: Mentation intact, speech normal, normal strength and tone, normal gait and stance  PSYCH: mentation appears normal. and affect normal/bright  Results:  - My interpretation of the images relevant for this visit includes: chest images reviewed today compared to priors. Continued improvement in density/consolidation compared to July 2022,  bilateral patchy ground glass opacity and consolidations in the bases 11/29/2022 PET   - My interpretation of the PFT's relevant for this visit includes: None  (ordered but she was unable to complete)  - lung biopsy 8/2/22 lung and bronchial mucosa with chronic inflammation and scattered fibroblastic foci plugging airspaces.  There is no malignancy.    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 I think it would be reasonable, considering it's the only therapy option.        Pulmonary embolism - acute, related to malignancy? Vs inactivity   She is on Xarelto now    No return to clinic planned with me

## 2023-05-15 NOTE — PROGRESS NOTES
Consult Notes on Referred Patient        Dr. 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 She is unaccompanied today.  From a gynecologic perspective she is doing well.  She denies any vaginal bleeding or abdominal/pelvic pain.  She has some baseline diarrhea but this is unchanged for her.  She has been gaining weight but with a decreased appetite since completing her pembrolizumab for her melanoma.  She also has noted increased lower extremity edema which her PCP is managing with diuretics with some relief.    Cancer Course:  Patient was undergoing work up for melanoma without a cutaneous primary identified which showed thickened endometrium.  She is undergoing treatment with Keytruda every 3 weeks for a year.  She is tolerating this very well without major side effects. She reports for about 6 months she has had a mucous discharge with very light spotting after.    5/10/22:  US pelvis:  Uterus measures 8 x 3.7 x 4.2 cm with EMS of 11.2 mm.  Normal appearing adnexa    22:  EMB:  Grade 1 endometrial adenocarcinoma, MMR intact    22:  Total laparoscopic hysterectomy, bilateral salpingo-oophorectomy, bilateral pelvic sentinel lymph node dissection, cystoscopy   Pathology:  Grade 1 endometrial adenocarcinoma, tumor size 2.2 cm, 10/12 mm myometrial invasion, no LVSI,  0/8 sentinel LN, lymphangioleiomyomatosis    10/28/22:  Completed vaginal brachytherapy      Obstetrics and Gynecology History:  ,  x 3  Menopause around 48, no HRT use      Past Medical History:  Past Medical History:   Diagnosis Date     Asthma, mild intermittent      Cataract      Endometrial cancer (H) 2022     HTN, goal below 140/90      Hyperlipidemia LDL goal < 100      Macular hole of left eye      Melanoma (H)     RIGHT  KNEE     Sleep apnea     uses c pap     Type 2 diabetes, HbA1C goal < 8% (H)        Past Surgical History:  Past Surgical History:   Procedure Laterality Date     ARTHROPLASTY HIP Right 9/25/2017    Procedure: ARTHROPLASTY HIP;  Right total hip arthroplasty  ;  Surgeon: Ayaan Pena MD;  Location: [facility]     ARTHROPLASTY KNEE  7/12/2013    Procedure: ARTHROPLASTY KNEE;  right total knee arthroplasty ;  Surgeon: Chaparro Wesley MD;  Location: [facility]     ARTHROSCOPY KNEE Right 1/21/2015    Procedure: ARTHROSCOPY KNEE;  Surgeon: Ayaan Pena MD;  Location: [facility]     BRONCHOSCOPY (RIGID OR FLEXIBLE), DIAGNOSTIC N/A 8/11/2022    Procedure: BRONCHOSCOPY, WITH BRONCHOALVEOLAR LAVAGE;  Surgeon: Roselia Sosa MD;  Location:  GI     C INCISION OF HYMEN       CATARACT IOL, RT/LT       COLONOSCOPY  2008     COLONOSCOPY N/A 4/18/2019    Procedure: Colonoscopy with polypectomy;  Surgeon: Shaun Guerrero MD;  Location: [facility]     CYSTOSCOPY N/A 6/23/2022    Procedure: Cystoscopy;  Surgeon: Eli Guidry MD;  [facility]     ENDOSCOPIC RETROGRADE CHOLANGIOPANCREATOGRAM N/A 2/6/2019    Procedure: COMBINED ENDOSCOPIC RETROGRADE CHOLANGIOPANCREATOGRAPHY, BILIARY SPINCTEROTOMY AND DILATION, PLACE BILE DUCT STENT;  Surgeon: Guru Andie Gonzalez MD;  [facility]     ENDOSCOPIC RETROGRADE CHOLANGIOPANCREATOGRAM N/A 2/28/2019    Procedure: Endoscopic Retrograde Cholangiopancreatogram, Bile duct stent removal and placement;  Surgeon: Shaun Guerrero MD;  [facility]     ENDOSCOPIC RETROGRADE CHOLANGIOPANCREATOGRAM N/A 4/18/2019    Procedure: COMBINED ENDOSCOPIC RETROGRADE CHOLANGIOPANCREATOGRAPHY, Bile duct stent exchange and Polypectomy;  Surgeon: Shaun Guerrero MD;  [facility]     ENDOSCOPIC RETROGRADE CHOLANGIOPANCREATOGRAM N/A 10/18/2019    Procedure: Endoscopic Retrograde Cholangiopancreatogram;  Surgeon: Shaun Guerrero MD;  [facility]     ENDOSCOPIC  RETROGRADE CHOLANGIOPANCREATOGRAM N/A 3/24/2022    Procedure: ENDOSCOPIC RETROGRADE CHOLANGIOPANCREATOGRAPHY;  Surgeon: Shaun Guerrero MD;  Location: [facility]     ENDOSCOPIC RETROGRADE CHOLANGIOPANCREATOGRAM N/A 3/16/2023    Procedure: endoscopic retrograde cholangiopancreatography with minor papillotomy;  Surgeon: Shaun Guerrero MD;  Location:  OR     ENDOSCOPIC RETROGRADE CHOLANGIOPANCREATOGRAM WITH SPYGLASS N/A 7/26/2019    Procedure: Endoscopic Retrograde Cholangiopancreatogram With Spyglas,l Radiofrequency Ablation, Stent Exchangeand Duodenal Biopsy x2;  Surgeon: Shaun Guerrero MD;  [facility]     ENDOSCOPIC RETROGRADE CHOLANGIOPANCREATOGRAM WITH SPYGLASS N/A 9/10/2020    Procedure: ENDOSCOPIC RETROGRADE CHOLANGIOPANCREATOGRAPHY, WITH DIRECT DUCT VISUALIZATION, USING PANCREATICOBILIARY FIBEROPTIC PROBE;  Surgeon: Shaun Guerrero MD;  [facility]     ENDOSCOPIC RETROGRADE CHOLANGIOPANCREATOGRAM WITH SPYGLASS N/A 3/22/2021    Procedure: ENDOSCOPIC RETROGRADE CHOLANGIOPANCREATOGRAPHY, WITH DIRECT DUCT VISUALIZATION, USING PANCREATICOBILIARY FIBEROPTIC PROBE;  Surgeon: Shaun Guerrero MD;  [facility]     ESOPHAGOSCOPY, GASTROSCOPY, DUODENOSCOPY (EGD) WITH RADIO FREQUENCY ABLATION, COMBINED N/A 9/10/2020    Procedure: possible repeat ESOPHAGOGASTRODUODENOSCOPY, WITH RADIOFREQUENCY ABLATION and endoscopic mucosal resection;  Surgeon: Shaun Guerrero MD;  [facility]     ESOPHAGOSCOPY, GASTROSCOPY, DUODENOSCOPY (EGD), COMBINED N/A 4/18/2019    Procedure: upper endoscopy with polypectomy;  Surgeon: Shaun Guerrero MD;  [facility]     ESOPHAGOSCOPY, GASTROSCOPY, DUODENOSCOPY (EGD), COMBINED N/A 10/18/2019    Procedure: Upper Endoscopy and ERCP with stent removal, stone removal and biopsy;  Surgeon: Shaun Guerrero MD;  [facility]     ESOPHAGOSCOPY, GASTROSCOPY, DUODENOSCOPY (EGD), COMBINED N/A 3/24/2022    Procedure: ESOPHAGOGASTRODUODENOSCOPY (EGD), Duodenal  polyp removal;  Surgeon: Cesar  MD Shaun;  [facility]     ESOPHAGOSCOPY, GASTROSCOPY, DUODENOSCOPY (EGD), COMBINED N/A 3/16/2023    Procedure: ESOPHAGOGASTRODUODENOSCOPY (EGD);  Surgeon: Shaun Guerrero MD;  [facility]     ESOPHAGOSCOPY, GASTROSCOPY, DUODENOSCOPY (EGD), RESECT MUCOSA, COMBINED N/A 2/28/2019    Procedure: Upper Endoscopy, Endoscopic Ultrasound, Endoscopic Mucosal Resection,  Ampullectomy, polypectomy.;  Surgeon: Shaun Guerrero MD;  [facility]     ESOPHAGOSCOPY, GASTROSCOPY, DUODENOSCOPY (EGD), RESECT MUCOSA, COMBINED N/A 3/22/2021    Procedure: ESOPHAGOGASTRODUODENOSCOPY (EGD) with  endoscopic mucosal resection/ polypectomy;  Surgeon: Shaun Guerrero MD;  [facility]     EXCISE LESION LOWER EXTREMITY Right 3/28/2022    Procedure: Wide local excision of right knee melanoma;  Surgeon: Chevy Allison MD;  Location: [facility] OR     EXCISE MASS LOWER EXTREMITY Right 1/13/2022    Procedure: excision of right thigh mass;  Surgeon: Salvador Kelly MD;  Location: [facility]     EYE SURGERY      macular hole repaired left eye     HC KNEE SCOPE, DIAGNOSTIC      - both knees     HEAD & NECK SURGERY      wisdom teeth     IR CHEST PORT PLACEMENT > 5 YRS OF AGE  5/2/2022     LAPAROSCOPIC HYSTERECTOMY TOTAL, BILATERAL SALPINGO-OOPHORECTOMY, NODE DISSECTION, COMBINED Bilateral 6/23/2022    Procedure: Total Laparoscopic Hyserectomy, Bilateral Salpibgo-oophorectomy, Bilateral [place] Lymph Node Dissection;  Surgeon: Eli Guidry MD;  Location[facility]       Health Maintenance:  Last Pap Smear: No need for further pap smear exams s/p hysterectomy  She has not had a history of abnormal Pap smears.    Last Mammogram: 5/16/22              Result: normal      She has not had a history of abnormal mammograms.    Last Colonoscopy: 4/8/19              Result: Polyps-repeat 5 years       Social History:  Lives with , feels safe at home.  Retired nurse.  Enjoys reading, bird watching, [place] time [place].  Does not have  "an advanced directive on file and would like her  to be her POA.  Would like full resuscitation if reversible cause is identified, however would not like to be kept on life sustaining measures long-term.     Family History:   The patient's family history is significant for.  Family History   Problem Relation Age of Onset     Hypertension Mother         diabetes,hypoythryoidism, stroke     Diabetes Mother      Breast Cancer Mother      Heart Disease Father         , cancer lip     Uterine Cancer Sister      Connective Tissue Disorder Sister         fibromyalgia     Diabetes Sister      Hypertension Brother      Cerebrovascular Disease Maternal Grandmother         , diabetes     Cerebrovascular Disease Maternal Grandfather              Cancer Paternal Grandmother              Heart Disease Paternal Grandfather              Cancer Paternal Aunt         ovarian     Breast Cancer Niece      Asthma Maternal Aunt          Physical Exam:   /70   Pulse 66   Temp 97.7  F (36.5  C) (Tympanic)   Resp 16   Ht 1.702 m (5' 7\")   Wt 98.9 kg (218 lb)   LMP 2002 (Exact Date)   SpO2 96%   BMI 34.14 kg/m    Body mass index is 34.14 kg/m .  General Appearance: healthy and alert, no distress     Gastrointestinal:       abdomen soft, non-tender, non-distended, no organomegaly or masses    Genitourinary: External genitalia and urethral meatus appears normal.  Vagina is smooth without nodularity or masses.  Cervix surgically absent.  Bimanual exam reveal no masses, nodularity or fullness.  Recto-vaginal exam confirms these findings.     Labs:  None    Assessment:    Gricelda Sabillon is a 73 year old woman with stage 1B, grade 1 endometrial adenocarcinoma.     A total of 15 minutes was spent on patient care today.       Plan:     1.)    Stage 1B, grade 1 endometrial adenocarcinoma. Reviewed signs and symptoms of recurrence and to call if these should occur.  Plan " surveillance visits every 6 months for up to 5 years (6/27) then annually thereafter.  Discussed that these visits would be with the NP unless concerns arise. She will return in 6 months.     2.) Lymphangioleiomyomatosis-Following with pulomnology     3.) Genetic risk factors were assessed and the patient does meet the qualifications for a referral.      4.) Labs and/or tests ordered include:  Mammogram     5.) Health maintenance issues addressed today include pt is due for a mammogram          Thank you for allowing us to participate in the care of your patient.         Sincerely,    Eli Herrera MD  Gynecologic Oncology  [facility]       KATIE REESE

## 2023-05-15 NOTE — LETTER
"    5/15/2023         RE: Gricelda Sabiloln  [address]  [facility] [address]        Dear Colleague,    Thank you for referring your patient, Gricelda Sabillon, to the [facility]. Please see a copy of my visit note below.    [facility] Follow Up Visit    Reason for Visit  Gricelda Sabillon is a 73 year old female who is seen in follow up for immune checkpoint inhibitor pneumonitis   She sees Rosibel Florez and Melissa   Pulmonary HPI    - Melva has been off prednisone since the end of January. Dr Florez says there is no other treatment for her melanoma, which is considered to be a bit aggressive.   - seeing Dr Perez later today for CPAP, also uses nocturnal.   - she is not using oxygen most of the time but occasionally uses it to treat visual migraines (clinical diagnosis from an ophthalmologist based on kaleidoscope description).      In 2022 she noted a lump on her thigh, it was indeterminate and removed, found to be melanoma. During the workup, abnormal imaging found in the uterus so she had hysterectomy. Last week she had a percutaneous lung biopsy and the week before she was diagnosed with pulmonary embolism  - she was coughing before the hospitalization, improved since treatment for PE, breathing is improved, can \"almost\" take a deep breath  - she is taking lovenox injections  - unclear if the PE was from malignancy vs postoperative inactivity      Timeline of medical care this year:   - Jan 2022 leg mass resection; March 2022 extended surgical resection for margins   Jan 2022 CT showing lung nodule, not PET FDG avid  May port placed - April 29 first immunotherapy Keytruda, every 3 weeks May 20, Neha 10, July 5 last treatment  - June 2022 hysterectomy, lymph nodes negative for malignancy, low grade endometrial cancer  After the hysterectomy, she had some issues with nausea, poor appetite. Thought it might be semaglutide so it was stopped. No " "other new medications.  Routine visit on 7/25/22 with Dr Torres showed hypoxemia, she went to ED and noted to have a PE. She didn't feel that bad but became short of breath at the clinic - discharged with 4 lpm supplemental oxygen, she is on 2 lpm now, usually 92-93%  She continues to experience lightheadedness, nausea, chills every morning. No appetite, nothing tastes good.   - Aug 2022 lung biopsy percutaneous   - history of 1-2 episodes of bronchitis annually, family history of asthma. Has used albuterol in the past for these episodes  - she has been hanging about the house, no camping or outdoor activities, no pets    ROS Pulmonary  Dyspnea: Yes, Cough: Yes, Chest pain: No, Wheezing: No, Sputum Production: No, Hemoptysis: No  A complete ROS was otherwise negative except as noted in the HPI.  The patient was seen and examined by Roselia Sosa MD   Current Outpatient Medications   Medication     atenolol (TENORMIN) 50 MG tablet     atorvastatin (LIPITOR) 40 MG tablet     azelastine (ASTELIN) 0.1 % nasal spray     azelastine (OPTIVAR) 0.05 % SOLN     cetirizine (ZYRTEC) 10 MG tablet     Continuous Blood Gluc Sensor (FREESTYLE DAVID 14 DAY SENSOR) MISC     CONTOUR NEXT TEST test strip     glipiZIDE (GLUCOTROL) 5 MG tablet     hydrochlorothiazide (HYDRODIURIL) 25 MG tablet     insulin aspart (NOVOLOG PEN) 100 UNIT/ML pen     insulin glargine (LANTUS SOLOSTAR) 100 UNIT/ML pen     insulin glargine (LANTUS VIAL) 100 UNIT/ML vial     insulin lispro (HUMALOG KWIKPEN) 100 UNIT/ML (1 unit dial) KWIKPEN     insulin lispro (HUMALOG VIAL) 100 UNIT/ML vial     insulin syringe-needle U-100 (BD INSULIN SYRINGE ULTRAFINE) 31G X 5/16\" 0.5 ML miscellaneous     latanoprost (XALATAN) 0.005 % ophthalmic solution     lidocaine-prilocaine (EMLA) 2.5-2.5 % external cream     LORazepam (ATIVAN) 0.5 MG tablet     ondansetron (ZOFRAN) 8 MG tablet     senna-docusate (SENOKOT-S/PERICOLACE) 8.6-50 MG tablet     timolol maleate (TIMOPTIC) " "0.5 % ophthalmic solution     triamcinolone (KENALOG) 0.5 % external ointment     VENTOLIN  (90 Base) MCG/ACT inhaler     XARELTO ANTICOAGULANT 20 MG TABS tablet     No current facility-administered medications for this visit.     Allergies   Allergen Reactions     Bromfenac Visual Disturbance      xibrom  Causes sever eye pain     Codeine Nausea     Other reaction(s): Dizziness, Headache     Metformin GI Disturbance     Morphine And Related      \"NAUSE,HA AND DIZZINESS\"     Social History     Socioeconomic History     Marital status:      Spouse name: lAlen     Number of children: 3     Years of education: 15     Highest education level: Not on file   Occupational History     Not on file   Tobacco Use     Smoking status: Never     Passive exposure: Never     Smokeless tobacco: Never   Vaping Use     Vaping status: Never Used     Passive vaping exposure: Yes   Substance and Sexual Activity     Alcohol use: No     Alcohol/week: 0.0 standard drinks of alcohol     Drug use: No     Sexual activity: Not Currently     Partners: Male   Other Topics Concern     Parent/sibling w/ CABG, MI or angioplasty before 65F 55M? Not Asked   Social History Narrative     Not on file     Social Determinants of Health     Financial Resource Strain: Not on file   Food Insecurity: Not on file   Transportation Needs: Not on file   Physical Activity: Not on file   Stress: Not on file   Social Connections: Not on file   Intimate Partner Violence: Not At Risk (9/19/2022)    Humiliation, Afraid, Rape, and Kick questionnaire      Fear of Current or Ex-Partner: No      Emotionally Abused: No      Physically Abused: No      Sexually Abused: No   Housing Stability: Not on file     Past Medical History:   Diagnosis Date     Asthma, mild intermittent      Cataract      Endometrial cancer (H) 06/2022     HTN, goal below 140/90      Hyperlipidemia LDL goal < 100      Macular hole of left eye      Melanoma (H)     RIGHT KNEE     Sleep apnea  "    uses c pap     Type 2 diabetes, HbA1C goal < 8% (H)      Past Surgical History:   Procedure Laterality Date     ARTHROPLASTY HIP Right 9/25/2017    Procedure: ARTHROPLASTY HIP;  Right total hip arthroplasty  ;  Surgeon: Ayaan Pena MD;  Location: [facility]     ARTHROPLASTY KNEE  7/12/2013    Procedure: ARTHROPLASTY KNEE;  right total knee arthroplasty ;  Surgeon: Chaparro Wesley MD;  Location: [facility] OR     ARTHROSCOPY KNEE Right 1/21/2015    Procedure: ARTHROSCOPY KNEE;  Surgeon: Ayaan Pena MD;  Location: [facility]     BRONCHOSCOPY (RIGID OR FLEXIBLE), DIAGNOSTIC N/A 8/11/2022    Procedure: BRONCHOSCOPY, WITH BRONCHOALVEOLAR LAVAGE;  Surgeon: Roselia Sosa MD;  Location:  GI     C INCISION OF HYMEN       CATARACT IOL, RT/LT       COLONOSCOPY  2008     COLONOSCOPY N/A 4/18/2019    Procedure: Colonoscopy with polypectomy;  Surgeon: Shaun Guerrero MD;  [facility]     CYSTOSCOPY N/A 6/23/2022    Procedure: Cystoscopy;  Surgeon: Eli Guidry MD;  [facility]     ENDOSCOPIC RETROGRADE CHOLANGIOPANCREATOGRAM N/A 2/6/2019    Procedure: COMBINED ENDOSCOPIC RETROGRADE CHOLANGIOPANCREATOGRAPHY, BILIARY SPINCTEROTOMY AND DILATION, PLACE BILE DUCT STENT;  Surgeon: Guru Andie Gonzalez MD;  [facility]     ENDOSCOPIC RETROGRADE CHOLANGIOPANCREATOGRAM N/A 2/28/2019    Procedure: Endoscopic Retrograde Cholangiopancreatogram, Bile duct stent removal and placement;  Surgeon: Shaun Guerrero MD;  [facility]     ENDOSCOPIC RETROGRADE CHOLANGIOPANCREATOGRAM N/A 4/18/2019    Procedure: COMBINED ENDOSCOPIC RETROGRADE CHOLANGIOPANCREATOGRAPHY, Bile duct stent exchange and Polypectomy;  Surgeon: Shaun Guerrero MD;  [facility]     ENDOSCOPIC RETROGRADE CHOLANGIOPANCREATOGRAM N/A 10/18/2019    Procedure: Endoscopic Retrograde Cholangiopancreatogram;  Surgeon: Shaun Guerrero MD;  [facility]     ENDOSCOPIC RETROGRADE CHOLANGIOPANCREATOGRAM N/A 3/24/2022     Procedure: ENDOSCOPIC RETROGRADE CHOLANGIOPANCREATOGRAPHY;  Surgeon: Shaun Guerrero MD;  Location:  OR     ENDOSCOPIC RETROGRADE CHOLANGIOPANCREATOGRAM N/A 3/16/2023    Procedure: endoscopic retrograde cholangiopancreatography with minor papillotomy;  Surgeon: Shaun Guerrero MD;  Location:  OR     ENDOSCOPIC RETROGRADE CHOLANGIOPANCREATOGRAM WITH SPYGLASS N/A 7/26/2019    Procedure: Endoscopic Retrograde Cholangiopancreatogram With Spyglas,l Radiofrequency Ablation, Stent Exchangeand Duodenal Biopsy x2;  Surgeon: Shaun Guerrero MD;  [facility]     ENDOSCOPIC RETROGRADE CHOLANGIOPANCREATOGRAM WITH SPYGLASS N/A 9/10/2020    Procedure: ENDOSCOPIC RETROGRADE CHOLANGIOPANCREATOGRAPHY, WITH DIRECT DUCT VISUALIZATION, USING PANCREATICOBILIARY FIBEROPTIC PROBE;  Surgeon: Shaun Guerrero MD;  [facility]     ENDOSCOPIC RETROGRADE CHOLANGIOPANCREATOGRAM WITH SPYGLASS N/A 3/22/2021    Procedure: ENDOSCOPIC RETROGRADE CHOLANGIOPANCREATOGRAPHY, WITH DIRECT DUCT VISUALIZATION, USING PANCREATICOBILIARY FIBEROPTIC PROBE;  Surgeon: Shaun Guerrero MD;  [facility]     ESOPHAGOSCOPY, GASTROSCOPY, DUODENOSCOPY (EGD) WITH RADIO FREQUENCY ABLATION, COMBINED N/A 9/10/2020    Procedure: possible repeat ESOPHAGOGASTRODUODENOSCOPY, WITH RADIOFREQUENCY ABLATION and endoscopic mucosal resection;  Surgeon: Shaun Guerrero MD;  [facility]     ESOPHAGOSCOPY, GASTROSCOPY, DUODENOSCOPY (EGD), COMBINED N/A 4/18/2019    Procedure: upper endoscopy with polypectomy;  Surgeon: Shaun Guerrero MD;  [facility]     ESOPHAGOSCOPY, GASTROSCOPY, DUODENOSCOPY (EGD), COMBINED N/A 10/18/2019    Procedure: Upper Endoscopy and ERCP with stent removal, stone removal and biopsy;  Surgeon: Shaun Guerrero MD;  [facility]     ESOPHAGOSCOPY, GASTROSCOPY, DUODENOSCOPY (EGD), COMBINED N/A 3/24/2022    Procedure: ESOPHAGOGASTRODUODENOSCOPY (EGD), Duodenal  polyp removal;  Surgeon: Shaun Guerrero MD;  Location:  OR     ESOPHAGOSCOPY,  GASTROSCOPY, DUODENOSCOPY (EGD), COMBINED N/A 3/16/2023    Procedure: ESOPHAGOGASTRODUODENOSCOPY (EGD);  Surgeon: Shaun Guerrero MD;  Location: [facility]     ESOPHAGOSCOPY, GASTROSCOPY, DUODENOSCOPY (EGD), RESECT MUCOSA, COMBINED N/A 2019    Procedure: Upper Endoscopy, Endoscopic Ultrasound, Endoscopic Mucosal Resection,  Ampullectomy, polypectomy.;  Surgeon: Shaun Guerrero MD;  [facility]     ESOPHAGOSCOPY, GASTROSCOPY, DUODENOSCOPY (EGD), RESECT MUCOSA, COMBINED N/A 3/22/2021    Procedure: ESOPHAGOGASTRODUODENOSCOPY (EGD) with  endoscopic mucosal resection/ polypectomy;  Surgeon: Shaun Guerrero MD;  [facility]     EXCISE LESION LOWER EXTREMITY Right 3/28/2022    Procedure: Wide local excision of right knee melanoma;  Surgeon: Chevy Allison MD;  Location: [facility] OR     EXCISE MASS LOWER EXTREMITY Right 2022    Procedure: excision of right thigh mass;  Surgeon: Salvador Kelly MD;  Location: [facility] OR     EYE SURGERY      macular hole repaired left eye     HC KNEE SCOPE, DIAGNOSTIC      - both knees     HEAD & NECK SURGERY      wisdom teeth     IR CHEST PORT PLACEMENT > 5 YRS OF AGE  2022     LAPAROSCOPIC HYSTERECTOMY TOTAL, BILATERAL SALPINGO-OOPHORECTOMY, NODE DISSECTION, COMBINED Bilateral 2022    Procedure: Total Laparoscopic Hyserectomy, Bilateral Salpibgo-oophorectomy, Bilateral [place] Lymph Node Dissection;  Surgeon: Eli Guidry MD;  [facility]     Family History   Problem Relation Age of Onset     Hypertension Mother         diabetes,hypoythryoidism, stroke     Diabetes Mother      Breast Cancer Mother      Heart Disease Father         , cancer lip     Uterine Cancer Sister      Connective Tissue Disorder Sister         fibromyalgia     Diabetes Sister      Hypertension Brother      Cerebrovascular Disease Maternal Grandmother         , diabetes     Cerebrovascular Disease Maternal Grandfather              Cancer Paternal Grandmother         "      Heart Disease Paternal Grandfather              Cancer Paternal Aunt         ovarian     Breast Cancer Niece      Asthma Maternal Aunt      Exam:   /61 (Cuff Size: Adult Large)   Pulse 70   Temp 97.5  F (36.4  C) (Tympanic)   Resp 16   Ht 1.702 m (5' 7\")   Wt 98.4 kg (217 lb)   LMP 2002 (Exact Date)   SpO2 96%   BMI 33.99 kg/m    GENERAL APPEARANCE: Well developed, well nourished, alert, and in no apparent distress.  RESP: normal percussion, good air flow throughout.  Left basilar inspiratory crackles. No rhonchi. No wheezes.  NEURO: Mentation intact, speech normal, normal strength and tone, normal gait and stance  PSYCH: mentation appears normal. and affect normal/bright  Results:  - My interpretation of the images relevant for this visit includes: chest images reviewed today compared to priors. Continued improvement in density/consolidation compared to 2022,  bilateral patchy ground glass opacity and consolidations in the bases 2022 PET   - My interpretation of the PFT's relevant for this visit includes: None  (ordered but she was unable to complete)  - lung biopsy 22 lung and bronchial mucosa with chronic inflammation and scattered fibroblastic foci plugging airspaces.  There is no malignancy.    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 I think it would be reasonable, considering it's the only therapy option.        Pulmonary embolism - acute, related to malignancy? Vs inactivity   She is on Xarelto now    No return to clinic planned with me      Again, thank you for allowing me to participate in the care of your patient.        Sincerely,        Roselia Sosa MD

## 2023-05-15 NOTE — PATIENT INSTRUCTIONS
Mammogram - scheduled 5/24/23    Next visit in 6 months with NP in person - in scheduling que, deferred until 6/16/23    Fay Meza, RN, BSN    RN Care Coordinator  [facility]  [phone]

## 2023-05-15 NOTE — NURSING NOTE
"Oncology Rooming Note    May 15, 2023 10:02 AM   Gricelda Sabillon is a 73 year old female who presents for:    Chief Complaint   Patient presents with     Lung Nodule     Initial Vitals: /61 (Cuff Size: Adult Large)   Pulse 70   Temp 97.5  F (36.4  C) (Tympanic)   Resp 16   Ht 1.702 m (5' 7\")   Wt 98.4 kg (217 lb)   LMP 12/13/2002 (Exact Date)   SpO2 96%   BMI 33.99 kg/m   Estimated body mass index is 33.99 kg/m  as calculated from the following:    Height as of this encounter: 1.702 m (5' 7\").    Weight as of this encounter: 98.4 kg (217 lb). Body surface area is 2.16 meters squared.  No Pain (0) Comment: Data Unavailable   Patient's last menstrual period was 12/13/2002 (exact date).  Allergies reviewed: Yes  Medications reviewed: Yes    Medications: Medication refills not needed today.  Pharmacy name entered into EPIC:    [facility] - A MAIL ORDER [id number] - [facility] [id number] [address]    Clinical concerns: f/u       Eli Schneider, MARSHALL              "

## 2023-05-15 NOTE — PATIENT INSTRUCTIONS
I think the lungs are good for now. I don't think you need to see me any more unless you and Dr Florez decide to try Keytruda and have any concerns.     If we are asked to recertify oxygen by your insurance, you probably will not meet any established criteria to keep it. I'll await communication from them.

## 2023-05-16 ENCOUNTER — ONCOLOGY VISIT (OUTPATIENT)
Dept: ONCOLOGY | Facility: CLINIC | Age: 73
End: 2023-05-16
Attending: OBSTETRICS & GYNECOLOGY
Payer: COMMERCIAL

## 2023-05-16 VITALS
RESPIRATION RATE: 16 BRPM | WEIGHT: 218 LBS | SYSTOLIC BLOOD PRESSURE: 130 MMHG | HEART RATE: 66 BPM | BODY MASS INDEX: 34.21 KG/M2 | TEMPERATURE: 97.7 F | DIASTOLIC BLOOD PRESSURE: 70 MMHG | HEIGHT: 67 IN | OXYGEN SATURATION: 96 %

## 2023-05-16 DIAGNOSIS — Z12.31 VISIT FOR SCREENING MAMMOGRAM: ICD-10-CM

## 2023-05-16 DIAGNOSIS — C54.1 PRIMARY ENDOMETRIOID CARCINOMA OF ENDOMETRIUM OF UTERINE BODY (H): Primary | ICD-10-CM

## 2023-05-16 PROCEDURE — G0463 HOSPITAL OUTPT CLINIC VISIT: HCPCS | Performed by: OBSTETRICS & GYNECOLOGY

## 2023-05-16 PROCEDURE — 99212 OFFICE O/P EST SF 10 MIN: CPT | Performed by: OBSTETRICS & GYNECOLOGY

## 2023-05-16 ASSESSMENT — PAIN SCALES - GENERAL: PAINLEVEL: NO PAIN (0)

## 2023-05-16 NOTE — NURSING NOTE
"Oncology Rooming Note    May 16, 2023 1:30 PM   Gricelda Sabillon is a 73 year old female who presents for:    Chief Complaint   Patient presents with     Oncology Clinic Visit     Metastatic malignant melanoma      Initial Vitals: BP (!) 142/66 (Cuff Size: Adult Large)   Pulse 66   Temp 97.7  F (36.5  C) (Tympanic)   Resp 16   Ht 1.702 m (5' 7\")   Wt 98.9 kg (218 lb)   LMP 12/13/2002 (Exact Date)   SpO2 96%   BMI 34.14 kg/m   Estimated body mass index is 34.14 kg/m  as calculated from the following:    Height as of this encounter: 1.702 m (5' 7\").    Weight as of this encounter: 98.9 kg (218 lb). Body surface area is 2.16 meters squared.  No Pain (0) Comment: Data Unavailable   Patient's last menstrual period was 12/13/2002 (exact date).  Allergies reviewed: Yes  Medications reviewed: Yes    Medications: Medication refills not needed today.  Pharmacy name entered into EPIC:    [facility] - A MAIL ORDER [id number] - [place], [place] - [id number] [address]    Clinical concerns: 6 month f/u       Eli Schneider CMA              "

## 2023-05-16 NOTE — LETTER
2023         RE: Gricelda Sabillon  [address]  [facility] [address]        Dear Colleague,    Thank you for referring your patient, Gricelda Sabillon, to the [facility]. Please see a copy of my visit note below.    Consult Notes on Referred Patient        Dr. 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 She is unaccompanied today.  From a gynecologic perspective she is doing well.  She denies any vaginal bleeding or abdominal/pelvic pain.  She has some baseline diarrhea but this is unchanged for her.  She has been gaining weight but with a decreased appetite since completing her pembrolizumab for her melanoma.  She also has noted increased lower extremity edema which her PCP is managing with diuretics with some relief.    Cancer Course:  Patient was undergoing work up for melanoma without a cutaneous primary identified which showed thickened endometrium.  She is undergoing treatment with Keytruda every 3 weeks for a year.  She is tolerating this very well without major side effects. She reports for about 6 months she has had a mucous discharge with very light spotting after.    5/10/22:  US pelvis:  Uterus measures 8 x 3.7 x 4.2 cm with EMS of 11.2 mm.  Normal appearing adnexa    22:  EMB:  Grade 1 endometrial adenocarcinoma, MMR intact    22:  Total laparoscopic hysterectomy, bilateral salpingo-oophorectomy, bilateral pelvic sentinel lymph node dissection, cystoscopy   Pathology:  Grade 1 endometrial adenocarcinoma, tumor size 2.2 cm, 10/12 mm myometrial invasion, no LVSI,  0/8 sentinel LN, lymphangioleiomyomatosis    10/28/22:  Completed vaginal brachytherapy      Obstetrics and Gynecology History:  ,  x 3  Menopause around 48, no HRT  use      Past Medical History:  Past Medical History:   Diagnosis Date     Asthma, mild intermittent      Cataract      Endometrial cancer (H) 06/2022     HTN, goal below 140/90      Hyperlipidemia LDL goal < 100      Macular hole of left eye      Melanoma (H)     RIGHT KNEE     Sleep apnea     uses c pap     Type 2 diabetes, HbA1C goal < 8% (H)        Past Surgical History:  Past Surgical History:   Procedure Laterality Date     ARTHROPLASTY HIP Right 9/25/2017    Procedure: ARTHROPLASTY HIP;  Right total hip arthroplasty  ;  Surgeon: Ayaan Pena MD;  Location: [facility]     ARTHROPLASTY KNEE  7/12/2013    Procedure: ARTHROPLASTY KNEE;  right total knee arthroplasty ;  Surgeon: Chaparro Wesley MD;  Location: [facility]     ARTHROSCOPY KNEE Right 1/21/2015    Procedure: ARTHROSCOPY KNEE;  Surgeon: Ayaan Pena MD;  Location: [facility]     BRONCHOSCOPY (RIGID OR FLEXIBLE), DIAGNOSTIC N/A 8/11/2022    Procedure: BRONCHOSCOPY, WITH BRONCHOALVEOLAR LAVAGE;  Surgeon: Roselia Sosa MD;  Location:  GI     C INCISION OF HYMEN       CATARACT IOL, RT/LT       COLONOSCOPY  2008     COLONOSCOPY N/A 4/18/2019    Procedure: Colonoscopy with polypectomy;  Surgeon: Shaun Guerrero MD;  [facility]     CYSTOSCOPY N/A 6/23/2022    Procedure: Cystoscopy;  Surgeon: Eli Guidry MD;  [facility]     ENDOSCOPIC RETROGRADE CHOLANGIOPANCREATOGRAM N/A 2/6/2019    Procedure: COMBINED ENDOSCOPIC RETROGRADE CHOLANGIOPANCREATOGRAPHY, BILIARY SPINCTEROTOMY AND DILATION, PLACE BILE DUCT STENT;  Surgeon: Guru Andie Gonzalez MD;  [facility]     ENDOSCOPIC RETROGRADE CHOLANGIOPANCREATOGRAM N/A 2/28/2019    Procedure: Endoscopic Retrograde Cholangiopancreatogram, Bile duct stent removal and placement;  Surgeon: Shaun Guerrero MD;  [facility]     ENDOSCOPIC RETROGRADE CHOLANGIOPANCREATOGRAM N/A 4/18/2019    Procedure: COMBINED ENDOSCOPIC RETROGRADE CHOLANGIOPANCREATOGRAPHY, Bile  duct stent exchange and Polypectomy;  Surgeon: Shaun Guerrero MD;  [facility]     ENDOSCOPIC RETROGRADE CHOLANGIOPANCREATOGRAM N/A 10/18/2019    Procedure: Endoscopic Retrograde Cholangiopancreatogram;  Surgeon: Shaun Guerrero MD;  [facility]     ENDOSCOPIC RETROGRADE CHOLANGIOPANCREATOGRAM N/A 3/24/2022    Procedure: ENDOSCOPIC RETROGRADE CHOLANGIOPANCREATOGRAPHY;  Surgeon: Shaun Guerrero MD;  [facility]     ENDOSCOPIC RETROGRADE CHOLANGIOPANCREATOGRAM N/A 3/16/2023    Procedure: endoscopic retrograde cholangiopancreatography with minor papillotomy;  Surgeon: Shaun Guerrero MD;  [facility]     ENDOSCOPIC RETROGRADE CHOLANGIOPANCREATOGRAM WITH SPYGLASS N/A 7/26/2019    Procedure: Endoscopic Retrograde Cholangiopancreatogram With Spyglas,l Radiofrequency Ablation, Stent Exchangeand Duodenal Biopsy x2;  Surgeon: Shaun Guerrero MD;  [facility]     ENDOSCOPIC RETROGRADE CHOLANGIOPANCREATOGRAM WITH SPYGLASS N/A 9/10/2020    Procedure: ENDOSCOPIC RETROGRADE CHOLANGIOPANCREATOGRAPHY, WITH DIRECT DUCT VISUALIZATION, USING PANCREATICOBILIARY FIBEROPTIC PROBE;  Surgeon: Shaun Guerrero MD;  [facility]     ENDOSCOPIC RETROGRADE CHOLANGIOPANCREATOGRAM WITH SPYGLASS N/A 3/22/2021    Procedure: ENDOSCOPIC RETROGRADE CHOLANGIOPANCREATOGRAPHY, WITH DIRECT DUCT VISUALIZATION, USING PANCREATICOBILIARY FIBEROPTIC PROBE;  Surgeon: Shaun Guerrero MD;  [facility]     ESOPHAGOSCOPY, GASTROSCOPY, DUODENOSCOPY (EGD) WITH RADIO FREQUENCY ABLATION, COMBINED N/A 9/10/2020    Procedure: possible repeat ESOPHAGOGASTRODUODENOSCOPY, WITH RADIOFREQUENCY ABLATION and endoscopic mucosal resection;  Surgeon: Shaun Guerrero MD;  [facility]     ESOPHAGOSCOPY, GASTROSCOPY, DUODENOSCOPY (EGD), COMBINED N/A 4/18/2019    Procedure: upper endoscopy with polypectomy;  Surgeon: Shaun Guerrero MD;  [facility]     ESOPHAGOSCOPY, GASTROSCOPY, DUODENOSCOPY (EGD), COMBINED N/A 10/18/2019    Procedure: Upper Endoscopy and  ERCP with stent removal, stone removal and biopsy;  Surgeon: Shaun Guerrero MD;  [facility]     ESOPHAGOSCOPY, GASTROSCOPY, DUODENOSCOPY (EGD), COMBINED N/A 3/24/2022    Procedure: ESOPHAGOGASTRODUODENOSCOPY (EGD), Duodenal  polyp removal;  Surgeon: Shaun Guerrero MD;  [facility]     ESOPHAGOSCOPY, GASTROSCOPY, DUODENOSCOPY (EGD), COMBINED N/A 3/16/2023    Procedure: ESOPHAGOGASTRODUODENOSCOPY (EGD);  Surgeon: Shaun Guerrero MD;  [facility]     ESOPHAGOSCOPY, GASTROSCOPY, DUODENOSCOPY (EGD), RESECT MUCOSA, COMBINED N/A 2/28/2019    Procedure: Upper Endoscopy, Endoscopic Ultrasound, Endoscopic Mucosal Resection,  Ampullectomy, polypectomy.;  Surgeon: Shaun Guerrero MD;  [facility]     ESOPHAGOSCOPY, GASTROSCOPY, DUODENOSCOPY (EGD), RESECT MUCOSA, COMBINED N/A 3/22/2021    Procedure: ESOPHAGOGASTRODUODENOSCOPY (EGD) with  endoscopic mucosal resection/ polypectomy;  Surgeon: Shaun Guerrero MD;  [facility]     EXCISE LESION LOWER EXTREMITY Right 3/28/2022    Procedure: Wide local excision of right knee melanoma;  Surgeon: Chevy Allison MD;  Location: [facility] OR     EXCISE MASS LOWER EXTREMITY Right 1/13/2022    Procedure: excision of right thigh mass;  Surgeon: Salvador Kelly MD;  Location: [facility]     EYE SURGERY      macular hole repaired left eye     HC KNEE SCOPE, DIAGNOSTIC      - both knees     HEAD & NECK SURGERY      wisdom teeth     IR CHEST PORT PLACEMENT > 5 YRS OF AGE  5/2/2022     LAPAROSCOPIC HYSTERECTOMY TOTAL, BILATERAL SALPINGO-OOPHORECTOMY, NODE DISSECTION, COMBINED Bilateral 6/23/2022    Procedure: Total Laparoscopic Hyserectomy, Bilateral Salpibgo-oophorectomy, Bilateral [place] Lymph Node Dissection;  Surgeon: Eli Guidry MD;  [facility]       Health Maintenance:  Last Pap Smear: No need for further pap smear exams s/p hysterectomy  She has not had a history of abnormal Pap smears.    Last Mammogram: 5/16/22              Result: normal      She has not  "had a history of abnormal mammograms.    Last Colonoscopy: 17              Result: Polyps-repeat 5 years       Social History:  Lives with , feels safe at home.  Retired nurse.  Enjoys reading, bird watching, [place] time [place].  Does not have an advanced directive on file and would like her  to be her POA.  Would like full resuscitation if reversible cause is identified, however would not like to be kept on life sustaining measures long-term.     Family History:   The patient's family history is significant for.  Family History   Problem Relation Age of Onset     Hypertension Mother         diabetes,hypoythryoidism, stroke     Diabetes Mother      Breast Cancer Mother      Heart Disease Father         , cancer lip     Uterine Cancer Sister      Connective Tissue Disorder Sister         fibromyalgia     Diabetes Sister      Hypertension Brother      Cerebrovascular Disease Maternal Grandmother         , diabetes     Cerebrovascular Disease Maternal Grandfather              Cancer Paternal Grandmother              Heart Disease Paternal Grandfather              Cancer Paternal Aunt         ovarian     Breast Cancer Niece      Asthma Maternal Aunt          Physical Exam:   /70   Pulse 66   Temp 97.7  F (36.5  C) (Tympanic)   Resp 16   Ht 1.702 m (5' 7\")   Wt 98.9 kg (218 lb)   LMP 2002 (Exact Date)   SpO2 96%   BMI 34.14 kg/m    Body mass index is 34.14 kg/m .  General Appearance: healthy and alert, no distress     Gastrointestinal:       abdomen soft, non-tender, non-distended, no organomegaly or masses    Genitourinary: External genitalia and urethral meatus appears normal.  Vagina is smooth without nodularity or masses.  Cervix surgically absent.  Bimanual exam reveal no masses, nodularity or fullness.  Recto-vaginal exam confirms these findings.     Labs:  None    Assessment:    Gricelda Sabillon is a 73 year old woman with stage " 1B, grade 1 endometrial adenocarcinoma.     A total of 15 minutes was spent on patient care today.       Plan:     1.)    Stage 1B, grade 1 endometrial adenocarcinoma. Reviewed signs and symptoms of recurrence and to call if these should occur.  Plan surveillance visits every 6 months for up to 5 years (6/27) then annually thereafter.  Discussed that these visits would be with the NP unless concerns arise. She will return in 6 months.     2.) Lymphangioleiomyomatosis-Following with pulomnology     3.) Genetic risk factors were assessed and the patient does meet the qualifications for a referral.      4.) Labs and/or tests ordered include:  Mammogram     5.) Health maintenance issues addressed today include pt is due for a mammogram          Thank you for allowing us to participate in the care of your patient.         Sincerely,    Eli Herrera MD  Gynecologic Oncology  [facility]       KATIE REESE        Again, thank you for allowing me to participate in the care of your patient.        Sincerely,        Rivera Herrera MD

## 2023-05-18 ENCOUNTER — ALLIED HEALTH/NURSE VISIT (OUTPATIENT)
Dept: EDUCATION SERVICES | Facility: CLINIC | Age: 73
End: 2023-05-18
Payer: COMMERCIAL

## 2023-05-18 DIAGNOSIS — E11.65 TYPE 2 DIABETES MELLITUS WITH HYPERGLYCEMIA, WITHOUT LONG-TERM CURRENT USE OF INSULIN (H): Primary | ICD-10-CM

## 2023-05-18 DIAGNOSIS — I10 HTN, GOAL BELOW 140/90: ICD-10-CM

## 2023-05-18 PROCEDURE — G0108 DIAB MANAGE TRN  PER INDIV: HCPCS

## 2023-05-18 NOTE — LETTER
"    5/18/2023         RE: Gricelda Sabillon  [address] [facility] [address]        Dear Colleague,    Thank you for referring your patient, Gricelda Sabillon, to the [facility]. Please see a copy of my visit note below.    Diabetes Self-Management Education & Support    Presents for: Follow-up    Type of Service: In Person Visit    ASSESSMENT:  Melva reports frustration with meal plan and weight stating \"I'm not eating that much and my weight keeps going up.\"  Per pt preference, reviewed plate planning method for meal plan, gave written information with pictures and notes, recommendations.   She reports concerns with memory and not being able to recall information like she once was able to do.     Discussed weight gain in relation to the diabetes medication she is taking, insulin and glipizide, which can both cause weight gain.   Discussed diabetes medication options that could allow for weight loss such as GLP-1 or SGLT-2 options (in addition to insulin). She is not interested in changing any diabetes medications today.     Patient's most recent   Lab Results   Component Value Date    A1C 9.5 04/13/2023    A1C 7.1 03/03/2021     is not meeting goal of <8.0    Diabetes knowledge and skills assessment:   Patient is knowledgeable in diabetes management concepts related to: Monitoring    Continue education with the following diabetes management concepts: Taking Medication and Reducing Risks    Based on learning assessment above, most appropriate setting for further diabetes education would be: Individual setting.      PLAN    Enc to discuss memory concerns with PCP.   Continue to work with plate planning method for meal plan.     Topics to cover at upcoming visits: Taking Medication and Reducing Risks    Follow-up: She will call to schedule    See Care Plan for co-developed, patient-state behavior change goals.  AVS provided for patient today.    Education Materials Provided:  My Plate " "Planner      SUBJECTIVE/OBJECTIVE:  Presents for: Follow-up  Accompanied by: Self  Diabetes education in the past 24mo: No  Focus of Visit: Monitoring, Taking Medication, Healthy Eating, CGM  Diabetes type: Type 2, Secondary Diabetes  Disease course: Getting harder to manage  How confident are you filling out medical forms by yourself:: Quite a bit  Transportation concerns: No  Difficulty affording diabetes medication?: No  Difficulty affording diabetes testing supplies?: No  Other concerns:: None  Cultural Influences/Ethnic Background:  Not  or     Diabetes Symptoms & Complications:  Polyphagia: No  Complications assessed today?: Yes  Autonomic neuropathy: No  CVA: No  Heart disease: No  Nephropathy: No  Peripheral neuropathy: No  Peripheral Vascular Disease: No  Retinopathy: No  Sexual dysfunction: No    Patient Problem List and Family Medical History reviewed for relevant medical history, current medical status, and diabetes risk factors.    Vitals:  [facility] 12/13/2002 (Exact Date)   Estimated body mass index is 34.14 kg/m  as calculated from the following:    Height as of 5/16/23: 1.702 m (5' 7\").    Weight as of 5/16/23: 98.9 kg (218 lb).   Last 3 BP:   BP Readings from Last 3 Encounters:   05/16/23 130/70   05/15/23 125/61   04/20/23 90/70       History   Smoking Status     Never   Smokeless Tobacco     Never       Labs:  Lab Results   Component Value Date    A1C 9.5 04/13/2023    A1C 7.1 03/03/2021     Lab Results   Component Value Date     04/13/2023     03/16/2023     11/01/2022     03/22/2021     Lab Results   Component Value Date    LDL 74 04/13/2023    LDL 91 03/03/2021     HDL Cholesterol   Date Value Ref Range Status   03/03/2021 40 (L) >49 mg/dL Final     Direct Measure HDL   Date Value Ref Range Status   04/13/2023 42 (L) >=50 mg/dL Final   ]  GFR Estimate   Date Value Ref Range Status   04/13/2023 73 >60 mL/min/1.73m2 Final     Comment:     eGFR calculated using " 2021 CKD-EPI equation.   03/22/2021 71 >60 mL/min/[1.73_m2] Final     Comment:     Non  GFR Calc  Starting 12/18/2018, serum creatinine based estimated GFR (eGFR) will be   calculated using the Chronic Kidney Disease Epidemiology Collaboration   (CKD-EPI) equation.       GFR, ESTIMATED POCT   Date Value Ref Range Status   04/07/2023 >60 >60 mL/min/1.73m2 Final     GFR Estimate If Black   Date Value Ref Range Status   03/22/2021 82 >60 mL/min/[1.73_m2] Final     Comment:      GFR Calc  Starting 12/18/2018, serum creatinine based estimated GFR (eGFR) will be   calculated using the Chronic Kidney Disease Epidemiology Collaboration   (CKD-EPI) equation.       Lab Results   Component Value Date    CR 0.84 04/13/2023    CR 0.82 03/22/2021     No results found for: MICROALBUMIN    Healthy Eating:  Healthy Eating Assessed Today: Yes  Cultural/Yarsanism diet restrictions?: No  Meal planning/habits: Low carb, Smaller portions  How many times a week on average do you eat food made away from home (restaurant/take-out)?: 1  Meals include: Breakfast, Dinner, Afternoon Snack, Evening Snack  Breakfast: 8-10 AM: coffee with cream, cereal with fruit OR couple pieces of toast with butter OR eggs and toast  Lunch: 3 PM: may skip this meal or have an apple or nuts, water  Dinner: 6 PM: chicken, potatoes, fruit salad or vegetable salad OR hotdish with noodles, hamburger, tomatoes, onion, celery,  drinks a glass of milk  Snacks: HS: When she started Lantus she added a snack before bed. Meat and crackers or nuts  Beverages: Water, Coffee, Milk, Diet soda  Has patient met with a dietitian in the past?: Yes    Being Active:  Being Active Assessed Today: Yes  Exercise:: Currently not exercising  Barrier to exercise: Physical limitation    Monitoring:  Monitoring Assessed Today: Yes  Blood Glucose Meter: Other, CGM  Times checking blood sugar at home (number): 5+  Times checking blood sugar at home (per):  Day  Blood glucose trend: Fluctuating            Taking Medications:  Diabetes Medication(s)       Insulin       insulin aspart (NOVOLOG PEN) 100 UNIT/ML pen    Inject 5 Units Subcutaneous 3 times daily (with meals)     insulin glargine (LANTUS SOLOSTAR) 100 UNIT/ML pen    Inject 44 Units Subcutaneous daily Ok to substitute Basaglar at same dose and frequency if insurance prefers.     insulin glargine (LANTUS VIAL) 100 UNIT/ML vial    Inject 44 Units Subcutaneous At Bedtime     insulin lispro (HUMALOG KWIKPEN) 100 UNIT/ML (1 unit dial) KWIKPEN    Inject 5 Units Subcutaneous 3 times daily (before meals)     insulin lispro (HUMALOG VIAL) 100 UNIT/ML vial    Inject 5 Units Subcutaneous 3 times daily (before meals)      Sulfonylureas       glipiZIDE (GLUCOTROL) 5 MG tablet    TAKE 2 TABLETS BY MOUTH IN THE MORNING            Taking Medication Assessed Today: Yes  Current Treatments: Insulin Injections, Oral Medication (taken by mouth)  Dose schedule: Pre-breakfast  Given by: Patient  Injection/Infusion sites: Abdomen  Problems taking diabetes medications regularly?: No  Diabetes medication side effects?: No    Problem Solving:  Problem Solving Assessed Today: Yes              Reducing Risks:  Reducing Risks Assessed Today: No  Diabetes Risks: Age over 45 years  CAD Risks: Diabetes Mellitus, Dyslipidemia, Family history, Obesity, Post-menopausal, Sedentary lifestyle  Has dilated eye exam at least once a year?: Yes  Sees dentist every 6 months?: Yes  Feet checked by healthcare provider in the last year?: Yes    Healthy Coping:  Healthy Coping Assessed Today: Yes  Emotional response to diabetes: Ready to learn  Informal Support system:: Children, [place] based, Family, Friends, Neighbors, Spouse  Stage of change: ACTION (Actively working towards change)  Support resources: Websites  Patient Activation Measure Survey Score:      8/26/2013     2:00 PM   ADRIANO Score (Last Two)   ADRIANO Raw Score 41   Activation Score 63.2   ADRIANO  Level 3         Care Plan and Education Provided:  Care Plan: Diabetes   Updates made by Lili Ahuja since 5/18/2023 12:00 AM        Problem: Diabetes Self-Management Education Needed to Optimize Self-Care Behaviors         Goal: Healthy Eating - follow a healthy eating pattern for diabetes         Task: Provide education on weight management Completed 5/18/2023   Responsible User: Lili Ahuja        Goal: Taking Medication - patient is consistently taking medications as directed    Start Date: 3/21/2023   This Visit's Progress: 100%   Recent Progress: 100%   Note:    Increase Basaglar to 44 units, if PCP agrees.        Goal: Reducing Risks - know how to prevent and treat long-term diabetes complications         Task: Provide education on Hemoglobin A1c - goals and relationship to blood glucose levels Completed 5/18/2023   Responsible User: Lili Ahuja        Goal: Healthy Coping - use available resources to cope with the challenges of managing diabetes         Task: Provide education on benefits of utilizing support systems Completed 5/18/2023   Responsible User: Lili Ahuja RDN, CASSIE, [facility]    Time Spent: 30 minutes  Encounter Type: Individual    Any diabetes medication dose changes were made via the CDE Protocol per the patient's referring provider. A copy of this encounter was shared with the provider.

## 2023-05-18 NOTE — PROGRESS NOTES
"Diabetes Self-Management Education & Support    Presents for: Follow-up    Type of Service: In Person Visit    ASSESSMENT:  Melva reports frustration with meal plan and weight stating \"I'm not eating that much and my weight keeps going up.\"  Per pt preference, reviewed plate planning method for meal plan, gave written information with pictures and notes, recommendations.   She reports concerns with memory and not being able to recall information like she once was able to do.     Discussed weight gain in relation to the diabetes medication she is taking, insulin and glipizide, which can both cause weight gain.   Discussed diabetes medication options that could allow for weight loss such as GLP-1 or SGLT-2 options (in addition to insulin). She is not interested in changing any diabetes medications today.     Patient's most recent   Lab Results   Component Value Date    A1C 9.5 04/13/2023    A1C 7.1 03/03/2021     is not meeting goal of <8.0    Diabetes knowledge and skills assessment:   Patient is knowledgeable in diabetes management concepts related to: Monitoring    Continue education with the following diabetes management concepts: Taking Medication and Reducing Risks    Based on learning assessment above, most appropriate setting for further diabetes education would be: Individual setting.      PLAN    Enc to discuss memory concerns with PCP.   Continue to work with plate planning method for meal plan.     Topics to cover at upcoming visits: Taking Medication and Reducing Risks    Follow-up: She will call to schedule    See Care Plan for co-developed, patient-state behavior change goals.  AVS provided for patient today.    Education Materials Provided:  My Plate Planner      SUBJECTIVE/OBJECTIVE:  Presents for: Follow-up  Accompanied by: Self  Diabetes education in the past 24mo: No  Focus of Visit: Monitoring, Taking Medication, Healthy Eating, CGM  Diabetes type: Type 2, Secondary Diabetes  Disease course: " "Getting harder to manage  How confident are you filling out medical forms by yourself:: Quite a bit  Transportation concerns: No  Difficulty affording diabetes medication?: No  Difficulty affording diabetes testing supplies?: No  Other concerns:: None  Cultural Influences/Ethnic Background:  Not  or     Diabetes Symptoms & Complications:  Polyphagia: No  Complications assessed today?: Yes  Autonomic neuropathy: No  CVA: No  Heart disease: No  Nephropathy: No  Peripheral neuropathy: No  Peripheral Vascular Disease: No  Retinopathy: No  Sexual dysfunction: No    Patient Problem List and Family Medical History reviewed for relevant medical history, current medical status, and diabetes risk factors.    Vitals:  LMP 12/13/2002 (Exact Date)   Estimated body mass index is 34.14 kg/m  as calculated from the following:    Height as of 5/16/23: 1.702 m (5' 7\").    Weight as of 5/16/23: 98.9 kg (218 lb).   Last 3 BP:   BP Readings from Last 3 Encounters:   05/16/23 130/70   05/15/23 125/61   04/20/23 90/70       History   Smoking Status     Never   Smokeless Tobacco     Never       Labs:  Lab Results   Component Value Date    A1C 9.5 04/13/2023    A1C 7.1 03/03/2021     Lab Results   Component Value Date     04/13/2023     03/16/2023     11/01/2022     03/22/2021     Lab Results   Component Value Date    LDL 74 04/13/2023    LDL 91 03/03/2021     HDL Cholesterol   Date Value Ref Range Status   03/03/2021 40 (L) >49 mg/dL Final     Direct Measure HDL   Date Value Ref Range Status   04/13/2023 42 (L) >=50 mg/dL Final   ]  GFR Estimate   Date Value Ref Range Status   04/13/2023 73 >60 mL/min/1.73m2 Final     Comment:     eGFR calculated using 2021 CKD-EPI equation.   03/22/2021 71 >60 mL/min/[1.73_m2] Final     Comment:     Non  GFR Calc  Starting 12/18/2018, serum creatinine based estimated GFR (eGFR) will be   calculated using the Chronic Kidney Disease Epidemiology " Collaboration   (CKD-EPI) equation.       GFR, ESTIMATED POCT   Date Value Ref Range Status   04/07/2023 >60 >60 mL/min/1.73m2 Final     GFR Estimate If Black   Date Value Ref Range Status   03/22/2021 82 >60 mL/min/[1.73_m2] Final     Comment:      GFR Calc  Starting 12/18/2018, serum creatinine based estimated GFR (eGFR) will be   calculated using the Chronic Kidney Disease Epidemiology Collaboration   (CKD-EPI) equation.       Lab Results   Component Value Date    CR 0.84 04/13/2023    CR 0.82 03/22/2021     No results found for: MICROALBUMIN    Healthy Eating:  Healthy Eating Assessed Today: Yes  Cultural/Pentecostalism diet restrictions?: No  Meal planning/habits: Low carb, Smaller portions  How many times a week on average do you eat food made away from home (restaurant/take-out)?: 1  Meals include: Breakfast, Dinner, Afternoon Snack, Evening Snack  Breakfast: 8-10 AM: coffee with cream, cereal with fruit OR couple pieces of toast with butter OR eggs and toast  Lunch: 3 PM: may skip this meal or have an apple or nuts, water  Dinner: 6 PM: chicken, potatoes, fruit salad or vegetable salad OR hotdish with noodles, hamburger, tomatoes, onion, celery,  drinks a glass of milk  Snacks: HS: When she started Lantus she added a snack before bed. Meat and crackers or nuts  Beverages: Water, Coffee, Milk, Diet soda  Has patient met with a dietitian in the past?: Yes    Being Active:  Being Active Assessed Today: Yes  Exercise:: Currently not exercising  Barrier to exercise: Physical limitation    Monitoring:  Monitoring Assessed Today: Yes  Blood Glucose Meter: Other, CGM  Times checking blood sugar at home (number): 5+  Times checking blood sugar at home (per): Day  Blood glucose trend: Fluctuating            Taking Medications:  Diabetes Medication(s)     Insulin       insulin aspart (NOVOLOG PEN) 100 UNIT/ML pen    Inject 5 Units Subcutaneous 3 times daily (with meals)     insulin glargine (LANTUS SOLOSTAR)  100 UNIT/ML pen    Inject 44 Units Subcutaneous daily Ok to substitute Basaglar at same dose and frequency if insurance prefers.     insulin glargine (LANTUS VIAL) 100 UNIT/ML vial    Inject 44 Units Subcutaneous At Bedtime     insulin lispro (HUMALOG KWIKPEN) 100 UNIT/ML (1 unit dial) KWIKPEN    Inject 5 Units Subcutaneous 3 times daily (before meals)     insulin lispro (HUMALOG VIAL) 100 UNIT/ML vial    Inject 5 Units Subcutaneous 3 times daily (before meals)    Sulfonylureas       glipiZIDE (GLUCOTROL) 5 MG tablet    TAKE 2 TABLETS BY MOUTH IN THE MORNING          Taking Medication Assessed Today: Yes  Current Treatments: Insulin Injections, Oral Medication (taken by mouth)  Dose schedule: Pre-breakfast  Given by: Patient  Injection/Infusion sites: Abdomen  Problems taking diabetes medications regularly?: No  Diabetes medication side effects?: No    Problem Solving:  Problem Solving Assessed Today: Yes              Reducing Risks:  Reducing Risks Assessed Today: No  Diabetes Risks: Age over 45 years  CAD Risks: Diabetes Mellitus, Dyslipidemia, Family history, Obesity, Post-menopausal, Sedentary lifestyle  Has dilated eye exam at least once a year?: Yes  Sees dentist every 6 months?: Yes  Feet checked by healthcare provider in the last year?: Yes    Healthy Coping:  Healthy Coping Assessed Today: Yes  Emotional response to diabetes: Ready to learn  Informal Support system:: Children, [place] based, Family, Friends, Neighbors, Spouse  Stage of change: ACTION (Actively working towards change)  Support resources: Websites  Patient Activation Measure Survey Score:      8/26/2013     2:00 PM   ADRIANO Score (Last Two)   ADRIANO Raw Score 41   Activation Score 63.2   ADRIANO Level 3         Care Plan and Education Provided:  Care Plan: Diabetes   Updates made by Lili Ahuja since 5/18/2023 12:00 AM      Problem: Diabetes Self-Management Education Needed to Optimize Self-Care Behaviors       Goal: Healthy Eating - follow a  healthy eating pattern for diabetes       Task: Provide education on weight management Completed 5/18/2023   Responsible User: Lili Ahuja      Goal: Taking Medication - patient is consistently taking medications as directed    Start Date: 3/21/2023   This Visit's Progress: 100%   Recent Progress: 100%   Note:    Increase Basaglar to 44 units, if PCP agrees.      Goal: Reducing Risks - know how to prevent and treat long-term diabetes complications       Task: Provide education on Hemoglobin A1c - goals and relationship to blood glucose levels Completed 5/18/2023   Responsible User: Lili Ahuja      Goal: Healthy Coping - use available resources to cope with the challenges of managing diabetes       Task: Provide education on benefits of utilizing support systems Completed 5/18/2023   Responsible User: Lili Ahuja RDN, [facility], [facility]    Time Spent: 30 minutes  Encounter Type: Individual    Any diabetes medication dose changes were made via the CDE Protocol per the patient's referring provider. A copy of this encounter was shared with the provider.

## 2023-05-19 RX ORDER — HYDROCHLOROTHIAZIDE 25 MG/1
TABLET ORAL
Qty: 90 TABLET | Refills: 3 | Status: SHIPPED | OUTPATIENT
Start: 2023-05-19 | End: 2024-03-11

## 2023-05-24 ENCOUNTER — HOSPITAL ENCOUNTER (OUTPATIENT)
Dept: MAMMOGRAPHY | Facility: CLINIC | Age: 73
Discharge: HOME | End: 2023-05-24
Attending: OBSTETRICS & GYNECOLOGY | Admitting: OBSTETRICS & GYNECOLOGY
Payer: COMMERCIAL

## 2023-05-24 DIAGNOSIS — Z12.31 VISIT FOR SCREENING MAMMOGRAM: ICD-10-CM

## 2023-05-24 PROCEDURE — 77067 SCR MAMMO BI INCL CAD: CPT

## 2023-06-08 DIAGNOSIS — E11.65 TYPE 2 DIABETES MELLITUS WITH HYPERGLYCEMIA, WITHOUT LONG-TERM CURRENT USE OF INSULIN (H): Primary | ICD-10-CM

## 2023-06-11 RX ORDER — PEN NEEDLE, DIABETIC 32GX 5/32"
NEEDLE, DISPOSABLE MISCELLANEOUS
Qty: 300 EACH | Refills: 1 | Status: SHIPPED | OUTPATIENT
Start: 2023-06-11

## 2023-06-21 ENCOUNTER — LAB (OUTPATIENT)
Dept: LAB | Facility: CLINIC | Age: 73
End: 2023-06-21
Payer: COMMERCIAL

## 2023-06-21 DIAGNOSIS — E11.65 TYPE 2 DIABETES MELLITUS WITH HYPERGLYCEMIA, WITHOUT LONG-TERM CURRENT USE OF INSULIN (H): ICD-10-CM

## 2023-06-21 LAB
CREAT UR-MCNC: 57.6 MG/DL
HBA1C MFR BLD: 8.6 % (ref 0–5.6)
MICROALBUMIN UR-MCNC: 23.5 MG/L
MICROALBUMIN/CREAT UR: 40.8 MG/G CR (ref 0–25)

## 2023-06-21 PROCEDURE — 82043 UR ALBUMIN QUANTITATIVE: CPT

## 2023-06-21 PROCEDURE — 83036 HEMOGLOBIN GLYCOSYLATED A1C: CPT

## 2023-06-21 PROCEDURE — 82570 ASSAY OF URINE CREATININE: CPT

## 2023-06-21 PROCEDURE — 36415 COLL VENOUS BLD VENIPUNCTURE: CPT

## 2023-06-23 ENCOUNTER — OFFICE VISIT (OUTPATIENT)
Dept: INTERNAL MEDICINE | Facility: CLINIC | Age: 73
End: 2023-06-23
Payer: COMMERCIAL

## 2023-06-23 VITALS
OXYGEN SATURATION: 97 % | WEIGHT: 222 LBS | BODY MASS INDEX: 34.84 KG/M2 | TEMPERATURE: 96 F | HEART RATE: 66 BPM | DIASTOLIC BLOOD PRESSURE: 66 MMHG | RESPIRATION RATE: 16 BRPM | HEIGHT: 67 IN | SYSTOLIC BLOOD PRESSURE: 115 MMHG

## 2023-06-23 DIAGNOSIS — E11.9 TYPE 2 DIABETES MELLITUS WITHOUT COMPLICATION, WITHOUT LONG-TERM CURRENT USE OF INSULIN (H): ICD-10-CM

## 2023-06-23 DIAGNOSIS — E78.5 HYPERLIPIDEMIA WITH TARGET LDL LESS THAN 100: ICD-10-CM

## 2023-06-23 DIAGNOSIS — E11.65 TYPE 2 DIABETES MELLITUS WITH HYPERGLYCEMIA, WITHOUT LONG-TERM CURRENT USE OF INSULIN (H): ICD-10-CM

## 2023-06-23 DIAGNOSIS — I10 HTN, GOAL BELOW 140/90: ICD-10-CM

## 2023-06-23 PROBLEM — T50.905A ADVERSE EFFECT OF UNSPECIFIED DRUGS, MEDICAMENTS AND BIOLOGICAL SUBSTANCES, INITIAL ENCOUNTER: Status: ACTIVE | Noted: 2023-06-23

## 2023-06-23 PROBLEM — N85.8 MASS OF UTERUS: Status: ACTIVE | Noted: 2023-06-23

## 2023-06-23 PROBLEM — R91.8 MULTIPLE PULMONARY NODULES: Status: ACTIVE | Noted: 2023-06-23

## 2023-06-23 PROBLEM — E83.52 HYPERCALCEMIA: Status: ACTIVE | Noted: 2023-06-23

## 2023-06-23 PROBLEM — J98.4 PNEUMONITIS: Status: ACTIVE | Noted: 2023-06-23

## 2023-06-23 PROBLEM — R42 DIZZINESS: Status: ACTIVE | Noted: 2023-06-23

## 2023-06-23 PROCEDURE — 99214 OFFICE O/P EST MOD 30 MIN: CPT | Performed by: INTERNAL MEDICINE

## 2023-06-23 RX ORDER — AZELASTINE HYDROCHLORIDE 0.5 MG/ML
SOLUTION/ DROPS OPHTHALMIC
COMMUNITY
End: 2024-03-11

## 2023-06-23 RX ORDER — ATENOLOL 50 MG/1
50 TABLET ORAL DAILY
Qty: 90 TABLET | Refills: 1 | Status: SHIPPED | OUTPATIENT
Start: 2023-06-23 | End: 2023-11-28

## 2023-06-23 RX ORDER — ATORVASTATIN CALCIUM 40 MG/1
40 TABLET, FILM COATED ORAL DAILY
Qty: 90 TABLET | Refills: 1 | Status: SHIPPED | OUTPATIENT
Start: 2023-06-23 | End: 2023-11-28

## 2023-06-23 RX ORDER — GLIPIZIDE 10 MG/1
10 TABLET, FILM COATED, EXTENDED RELEASE ORAL DAILY
Qty: 90 TABLET | Refills: 1 | Status: SHIPPED | OUTPATIENT
Start: 2023-06-23 | End: 2023-11-14

## 2023-06-23 ASSESSMENT — PAIN SCALES - GENERAL: PAINLEVEL: WORST PAIN (10)

## 2023-06-23 NOTE — PROGRESS NOTES
Dr Torres's note      Patient's instructions / PLAN:                                                        Plan:  1. Start Semaglutide 3 mg daily for diabetes   2. Continue the other meds, same doses for now.  3. Follow up appointment in the office ( Aug 10)      ASSESSMENT & PLAN:                                                      (E11.9) Type 2 diabetes mellitus without complication, without long-term current use of insulin (H)  Comment: Not controlled   Plan: Semaglutide (RYBELSUS) 3 MG tablet            (I10) HTN, goal below 140/90  Comment: Controlled    Plan: atenolol (TENORMIN) 50 MG tablet            (E78.5) Hyperlipidemia with target LDL less than 100  Comment: Controlled    Plan: atorvastatin (LIPITOR) 40 MG tablet            (E11.65) Type 2 diabetes mellitus with hyperglycemia, without long-term current use of insulin (H)  Comment: Not controlled   Plan: glipiZIDE (GLUCOTROL XL) 10 MG 24 hr tablet               Chief complaint:                                                      Follow up chronic medical problems      SUBJECTIVE:                                                    History of present illness:    HTN:  -- home -130, sometimes it goes to 100 and she takes only 1/2 tab atenolol    DM  -- Not controlled   -- tried semaglutide last year and it was stopped because of nausea. On that time she also had severe med prob  -- willing to try semaglutide again.     Fall  -- 2 d ago. foot caught under furniture.  -- mild LBP, not radiated        Subjective   Melva is a 73 year old, presenting for the following health issues:  RECHECK and Musculoskeletal Problem (Back pain fell on Saturday.)        6/23/2023     7:16 AM   Additional Questions   Roomed by Thais Cadet     Musculoskeletal Problem    History of Present Illness       Back Pain:  She presents for follow up of back pain. Patient's back pain is a new problem.    Original cause of back pain: a fall  Patient feels back pain:  constantlyLocation of back pain:  Right lower back, left lower back, right middle of back and left middle of back  Description of back pain: sharp    Since patient first noticed back pain, pain is: unchanged  Does back pain interfere with her job:  Not applicable  On a scale of 1-10 (10 being the worst), patient describes pain as:  7  What makes back pain worse: certain positions and lying down  Acupuncture: not tried  Acetaminophen: not helpful  Activity or exercise: not helpful  Chiropractor:  Not tried  Cold: not tried  Heat: helpful  Massage: not tried  Muscle relaxants: not tried  NSAIDS: not tried  Opioids: not tried  Physical Therapy: not tried  Rest: helpful  Steroid Injection: not tried  Stretching: not tried  Surgery: not tried  TENS unit: not tried  Topical pain relievers: not tried  Other healthcare providers patient is seeing for back pain: None    She eats 4 or more servings of fruits and vegetables daily.She consumes 0 sweetened beverage(s) daily.She exercises with enough effort to increase her heart rate 30 to 60 minutes per day.  She exercises with enough effort to increase her heart rate 3 or less days per week.   She is taking medications regularly.       Diabetes Follow-up    How often are you checking your blood sugar? Continuous glucose monitor  What time of day are you checking your blood sugars (select all that apply)?  Before and after meals  Have you had any blood sugars above 200?  No  Have you had any blood sugars below 70?  Yes Wakes her up at night.    What symptoms do you notice when your blood sugar is low?  None and wakes her up at night.    What concerns do you have today about your diabetes? None and Other:      Do you have any of these symptoms? (Select all that apply)            Hyperlipidemia Follow-Up      Are you regularly taking any medication or supplement to lower your cholesterol?   Yes- atorvastatin    Are you having muscle aches or other side effects that you think could  "be caused by your cholesterol lowering medication?  No    Hypertension Follow-up      Do you check your blood pressure regularly outside of the clinic? Yes     Are you following a low salt diet? Yes    Are your blood pressures ever more than 140 on the top number (systolic) OR more   than 90 on the bottom number (diastolic), for example 140/90? No    BP Readings from Last 2 Encounters:   05/16/23 130/70   05/15/23 125/61     Hemoglobin A1C (%)   Date Value   06/21/2023 8.6 (H)   04/13/2023 9.5 (H)   03/03/2021 7.1 (H)   08/24/2020 7.2 (H)     LDL Cholesterol Calculated (mg/dL)   Date Value   04/13/2023 74   12/30/2022 105 (H)   03/03/2021 91   02/20/2020 88       Review of Systems:                                                      ROS: negative for fever, chills, cough, wheezes, chest pain, shortness of breath, vomiting, abdominal pain, leg swelling         OBJECTIVE:             Physical exam:  Blood pressure 115/66, pulse 66, temperature (!) 96  F (35.6  C), temperature source Tympanic, resp. rate 16, height 1.702 m (5' 7\"), weight 100.7 kg (222 lb), SpO2 97 %, not currently breastfeeding.     NAD, appears comfortable  Skin: no rashes   Neck: supple, no JVD,  No thyroidmegaly. Lymph nodes nonpalpable cervical and supraclavicular.  Chest: clear to auscultation bilaterally, good respiratory effort  Heart: S1 S2, RRR, no mgr appreciated  Abdomen: soft, not tender,  Extremities: no edema,   Neurologic: A, Ox3, no focal signs appreciated    PMHx: reviewed  Past Medical History:   Diagnosis Date     Asthma, mild intermittent      Cataract      Endometrial cancer (H) 06/2022     HTN, goal below 140/90      Hyperlipidemia LDL goal < 100      Macular hole of left eye      Melanoma (H)     RIGHT KNEE     Sleep apnea     uses c pap     Type 2 diabetes, HbA1C goal < 8% (H)       PSHx: reviewed  Past Surgical History:   Procedure Laterality Date     ARTHROPLASTY HIP Right 9/25/2017    Procedure: ARTHROPLASTY HIP;  Right " total hip arthroplasty  ;  Surgeon: Ayaan Pena MD;  Location: [facility]     ARTHROPLASTY KNEE  7/12/2013    Procedure: ARTHROPLASTY KNEE;  right total knee arthroplasty ;  Surgeon: Chaparro Wesley MD;  Location: [facility] OR     ARTHROSCOPY KNEE Right 1/21/2015    Procedure: ARTHROSCOPY KNEE;  Surgeon: Ayaan Pena MD;  Location: [facility]     BRONCHOSCOPY (RIGID OR FLEXIBLE), DIAGNOSTIC N/A 8/11/2022    Procedure: BRONCHOSCOPY, WITH BRONCHOALVEOLAR LAVAGE;  Surgeon: Roselia Sosa MD;  Location:  GI     C INCISION OF HYMEN       CATARACT IOL, RT/LT       COLONOSCOPY  2008     COLONOSCOPY N/A 4/18/2019    Procedure: Colonoscopy with polypectomy;  Surgeon: Shaun Geurrero MD;  [facility]: [facility]     CYSTOSCOPY N/A 6/23/2022    Procedure: Cystoscopy;  Surgeon: Eli Guidry MD;  [facility]     ENDOSCOPIC RETROGRADE CHOLANGIOPANCREATOGRAM N/A 2/6/2019    Procedure: COMBINED ENDOSCOPIC RETROGRADE CHOLANGIOPANCREATOGRAPHY, BILIARY SPINCTEROTOMY AND DILATION, PLACE BILE DUCT STENT;  Surgeon: Guru Andie Gonzalez MD;  [facility]: [facility]     ENDOSCOPIC RETROGRADE CHOLANGIOPANCREATOGRAM N/A 2/28/2019    Procedure: Endoscopic Retrograde Cholangiopancreatogram, Bile duct stent removal and placement;  Surgeon: Shaun Guerrero MD;  [facility]     ENDOSCOPIC RETROGRADE CHOLANGIOPANCREATOGRAM N/A 4/18/2019    Procedure: COMBINED ENDOSCOPIC RETROGRADE CHOLANGIOPANCREATOGRAPHY, Bile duct stent exchange and Polypectomy;  Surgeon: Shaun Guerrero MD;  [facility]     ENDOSCOPIC RETROGRADE CHOLANGIOPANCREATOGRAM N/A 10/18/2019    Procedure: Endoscopic Retrograde Cholangiopancreatogram;  Surgeon: Shaun Guerrero MD;  [facility]     ENDOSCOPIC RETROGRADE CHOLANGIOPANCREATOGRAM N/A 3/24/2022    Procedure: ENDOSCOPIC RETROGRADE CHOLANGIOPANCREATOGRAPHY;  Surgeon: Shaun Guerrero MD;  Location:  OR     ENDOSCOPIC RETROGRADE CHOLANGIOPANCREATOGRAM N/A 3/16/2023    Procedure:  endoscopic retrograde cholangiopancreatography with minor papillotomy;  Surgeon: Shaun Guerrero MD;  Location: [facility]     ENDOSCOPIC RETROGRADE CHOLANGIOPANCREATOGRAM WITH SPYGLASS N/A 7/26/2019    Procedure: Endoscopic Retrograde Cholangiopancreatogram With Spyglas,l Radiofrequency Ablation, Stent Exchangeand Duodenal Biopsy x2;  Surgeon: Shaun Guerrero MD;  [facility]     ENDOSCOPIC RETROGRADE CHOLANGIOPANCREATOGRAM WITH SPYGLASS N/A 9/10/2020    Procedure: ENDOSCOPIC RETROGRADE CHOLANGIOPANCREATOGRAPHY, WITH DIRECT DUCT VISUALIZATION, USING PANCREATICOBILIARY FIBEROPTIC PROBE;  Surgeon: Shaun Guerrero MD;  [facility]     ENDOSCOPIC RETROGRADE CHOLANGIOPANCREATOGRAM WITH SPYGLASS N/A 3/22/2021    Procedure: ENDOSCOPIC RETROGRADE CHOLANGIOPANCREATOGRAPHY, WITH DIRECT DUCT VISUALIZATION, USING PANCREATICOBILIARY FIBEROPTIC PROBE;  Surgeon: Shaun Guerrero MD;  [facility]     ESOPHAGOSCOPY, GASTROSCOPY, DUODENOSCOPY (EGD) WITH RADIO FREQUENCY ABLATION, COMBINED N/A 9/10/2020    Procedure: possible repeat ESOPHAGOGASTRODUODENOSCOPY, WITH RADIOFREQUENCY ABLATION and endoscopic mucosal resection;  Surgeon: Shaun Guerrero MD;  [facility]     ESOPHAGOSCOPY, GASTROSCOPY, DUODENOSCOPY (EGD), COMBINED N/A 4/18/2019    Procedure: upper endoscopy with polypectomy;  Surgeon: Shaun Guerrero MD;  [facility]     ESOPHAGOSCOPY, GASTROSCOPY, DUODENOSCOPY (EGD), COMBINED N/A 10/18/2019    Procedure: Upper Endoscopy and ERCP with stent removal, stone removal and biopsy;  Surgeon: Shaun Guerrero MD;  [facility]     ESOPHAGOSCOPY, GASTROSCOPY, DUODENOSCOPY (EGD), COMBINED N/A 3/24/2022    Procedure: ESOPHAGOGASTRODUODENOSCOPY (EGD), Duodenal  polyp removal;  Surgeon: Shaun Guerrero MD;  Location:  OR     ESOPHAGOSCOPY, GASTROSCOPY, DUODENOSCOPY (EGD), COMBINED N/A 3/16/2023    Procedure: ESOPHAGOGASTRODUODENOSCOPY (EGD);  Surgeon: Shaun Guerrero MD;  Location:  OR     ESOPHAGOSCOPY, GASTROSCOPY,  DUODENOSCOPY (EGD), RESECT MUCOSA, COMBINED N/A 2/28/2019    Procedure: Upper Endoscopy, Endoscopic Ultrasound, Endoscopic Mucosal Resection,  Ampullectomy, polypectomy.;  Surgeon: Shaun Guerrero MD;  [facility]     ESOPHAGOSCOPY, GASTROSCOPY, DUODENOSCOPY (EGD), RESECT MUCOSA, COMBINED N/A 3/22/2021    Procedure: ESOPHAGOGASTRODUODENOSCOPY (EGD) with  endoscopic mucosal resection/ polypectomy;  Surgeon: Shaun Guerrero MD;  [facility]     EXCISE LESION LOWER EXTREMITY Right 3/28/2022    Procedure: Wide local excision of right knee melanoma;  Surgeon: Chevy Allison MD;  Location: [facility] OR     EXCISE MASS LOWER EXTREMITY Right 1/13/2022    Procedure: excision of right thigh mass;  Surgeon: Salvador Kelly MD;  Location: [facility]     EYE SURGERY      macular hole repaired left eye     HC KNEE SCOPE, DIAGNOSTIC      - both knees     HEAD & NECK SURGERY      wisdom teeth     IR CHEST PORT PLACEMENT > 5 YRS OF AGE  5/2/2022     LAPAROSCOPIC HYSTERECTOMY TOTAL, BILATERAL SALPINGO-OOPHORECTOMY, NODE DISSECTION, COMBINED Bilateral 6/23/2022    Procedure: Total Laparoscopic Hyserectomy, Bilateral Salpibgo-oophorectomy, Bilateral [place] Lymph Node Dissection;  Surgeon: Eli Guidry MD;  [facility]        Meds: reviewed  Current Outpatient Medications   Medication Sig Dispense Refill     atenolol (TENORMIN) 50 MG tablet Take 1 tablet (50 mg) by mouth daily 90 tablet 1     atorvastatin (LIPITOR) 40 MG tablet Take 1 tablet by mouth once daily 90 tablet 1     azelastine (ASTELIN) 0.1 % nasal spray USE 1 SPRAY(S) IN EACH NOSTRIL TWICE DAILY AS NEEDED 30 mL 0     azelastine (OPTIVAR) 0.05 % ophthalmic solution        azelastine (OPTIVAR) 0.05 % SOLN Place 1 drop into both eyes 2 times daily as needed Reported on 3/22/2017       cetirizine (ZYRTEC) 10 MG tablet Take 10 mg by mouth every morning       Continuous Blood Gluc Sensor (FREESTYLE DAVID 14 DAY SENSOR) MISC USE 1 EVERY 14 DAYS USE TO READ BLOOD  "SUGARS PER MANUFACTURERS INSTRUCTIONS 2 each 5     CONTOUR NEXT TEST test strip USE 1 STRIP TO CHECK GLUCOSE ONCE DAILY 100 strip 4     glipiZIDE (GLUCOTROL) 5 MG tablet TAKE 2 TABLETS BY MOUTH IN THE MORNING 120 tablet 0     hydrochlorothiazide (HYDRODIURIL) 25 MG tablet TAKE 1 TABLET BY MOUTH ONCE DAILY IN THE MORNING 90 tablet 3     insulin aspart (NOVOLOG PEN) 100 UNIT/ML pen Inject 5 Units Subcutaneous 3 times daily (with meals) 15 mL 1     insulin glargine (LANTUS SOLOSTAR) 100 UNIT/ML pen Inject 44 Units Subcutaneous daily Ok to substitute Basaglar at same dose and frequency if insurance prefers. 15 mL      insulin glargine (LANTUS VIAL) 100 UNIT/ML vial Inject 44 Units Subcutaneous At Bedtime 15 mL 6     insulin lispro (HUMALOG KWIKPEN) 100 UNIT/ML (1 unit dial) KWIKPEN Inject 5 Units Subcutaneous 3 times daily (before meals) 15 mL 1     insulin lispro (HUMALOG VIAL) 100 UNIT/ML vial Inject 5 Units Subcutaneous 3 times daily (before meals) 15 mL 1     insulin pen needle (MM PEN NEEDLES) 32G X 4 MM miscellaneous Inject insulin 4 times a day 300 each 1     insulin syringe-needle U-100 (BD INSULIN SYRINGE ULTRAFINE) 31G X 5/16\" 0.5 ML miscellaneous Use daily with NPH insulin 100 each 11     latanoprost (XALATAN) 0.005 % ophthalmic solution Place 1 drop into both eyes At Bedtime  2.5 mL 1     lidocaine-prilocaine (EMLA) 2.5-2.5 % external cream APPLY CREAM TOPICALLY ONCE DAILY AS NEEDED FOR PAIN APPLY A PEA SIZED AMOUNT OVER PORT SITE 60 MINUTES PRIOR TO USE COVER WITH PLASTIC WRAP       LORazepam (ATIVAN) 0.5 MG tablet Take 1 tablet (0.5 mg) by mouth nightly as needed for anxiety or sleep 30 tablet 0     ondansetron (ZOFRAN) 8 MG tablet Take 1 tablet (8 mg) by mouth every 8 hours as needed for nausea 30 tablet 1     senna-docusate (SENOKOT-S/PERICOLACE) 8.6-50 MG tablet Take 2 tablets by mouth daily as needed for constipation 60 tablet 11     timolol maleate (TIMOPTIC) 0.5 % ophthalmic solution INSTILL 1 DROP " INTO EACH EYE TWICE DAILY       triamcinolone (KENALOG) 0.5 % external ointment Apply 1 g topically daily as needed for irritation       VENTOLIN  (90 Base) MCG/ACT inhaler INHALE 2 PUFFS BY MOUTH EVERY 4 HOURS AS NEEDED FOR SHORTNESS OF BREATH 18 g 0     XARELTO ANTICOAGULANT 20 MG TABS tablet Take 1 tablet by mouth daily         Soc Hx: reviewed  Fam Hx: reviewed      Chart documentation was completed, in part, with [employer] voice-recognition software. Even though reviewed, some grammatical, spelling, and word errors may remain.      Raisa Torres MD  Internal Medicine

## 2023-06-23 NOTE — PATIENT INSTRUCTIONS
Plan:  1. Start Semaglutide 3 mg daily for diabetes   2. Continue the other meds, same doses for now.  3. Follow up appointment in the office ( Aug 10)

## 2023-06-30 ENCOUNTER — DOCUMENTATION ONLY (OUTPATIENT)
Dept: PULMONOLOGY | Facility: CLINIC | Age: 73
End: 2023-06-30
Payer: COMMERCIAL

## 2023-06-30 DIAGNOSIS — J96.21 ACUTE AND CHRONIC RESPIRATORY FAILURE WITH HYPOXIA (H): ICD-10-CM

## 2023-06-30 DIAGNOSIS — I26.99 OTHER PULMONARY EMBOLISM WITHOUT ACUTE COR PULMONALE (H): Primary | ICD-10-CM

## 2023-07-17 DIAGNOSIS — E11.65 TYPE 2 DIABETES MELLITUS WITH HYPERGLYCEMIA, WITHOUT LONG-TERM CURRENT USE OF INSULIN (H): ICD-10-CM

## 2023-07-18 RX ORDER — SYRING-NEEDL,DISP,INSUL,0.3 ML 31GX15/64"
SYRINGE, EMPTY DISPOSABLE MISCELLANEOUS
Qty: 100 EACH | Refills: 1 | Status: SHIPPED | OUTPATIENT
Start: 2023-07-18 | End: 2023-08-30

## 2023-07-27 DIAGNOSIS — E11.65 TYPE 2 DIABETES MELLITUS WITH HYPERGLYCEMIA, WITHOUT LONG-TERM CURRENT USE OF INSULIN (H): ICD-10-CM

## 2023-07-27 RX ORDER — FLASH GLUCOSE SENSOR
KIT MISCELLANEOUS
Qty: 2 EACH | Refills: 1 | Status: SHIPPED | OUTPATIENT
Start: 2023-07-27 | End: 2023-09-22

## 2023-08-03 ASSESSMENT — ASTHMA QUESTIONNAIRES: ACT_TOTALSCORE: 25

## 2023-08-10 ENCOUNTER — OFFICE VISIT (OUTPATIENT)
Dept: INTERNAL MEDICINE | Facility: CLINIC | Age: 73
End: 2023-08-10
Payer: COMMERCIAL

## 2023-08-10 VITALS
SYSTOLIC BLOOD PRESSURE: 136 MMHG | WEIGHT: 224.6 LBS | HEIGHT: 67 IN | TEMPERATURE: 97.1 F | DIASTOLIC BLOOD PRESSURE: 72 MMHG | RESPIRATION RATE: 18 BRPM | OXYGEN SATURATION: 97 % | HEART RATE: 74 BPM | BODY MASS INDEX: 35.25 KG/M2

## 2023-08-10 DIAGNOSIS — E11.65 TYPE 2 DIABETES MELLITUS WITH HYPERGLYCEMIA, WITHOUT LONG-TERM CURRENT USE OF INSULIN (H): Primary | ICD-10-CM

## 2023-08-10 DIAGNOSIS — C54.1 ENDOMETRIAL CANCER (H): ICD-10-CM

## 2023-08-10 DIAGNOSIS — I10 ESSENTIAL HYPERTENSION WITH GOAL BLOOD PRESSURE LESS THAN 140/90: Chronic | ICD-10-CM

## 2023-08-10 DIAGNOSIS — C43.9 METASTATIC MALIGNANT MELANOMA (H): ICD-10-CM

## 2023-08-10 PROCEDURE — 99207 PR FOOT EXAM NO CHARGE: CPT | Performed by: INTERNAL MEDICINE

## 2023-08-10 PROCEDURE — 99214 OFFICE O/P EST MOD 30 MIN: CPT | Performed by: INTERNAL MEDICINE

## 2023-08-10 ASSESSMENT — PAIN SCALES - GENERAL: PAINLEVEL: NO PAIN (1)

## 2023-08-10 NOTE — PROGRESS NOTES
Dr Torres's note      Patient's instructions / PLAN:                                                      Plan:  When you start Rybelsus, decrease the Lantus Insulin to 40 units and then slowly increase by 1 unit every few days until the morning blood sugars are in the 130s range   2. Follow up in 3-6 months  3. Please make a lab appointment for non fasting labs  after Sept 22  4. Continue same meds, same doses for now      ASSESSMENT & PLAN:                                                      (E11.65) Type 2 diabetes mellitus with hyperglycemia, without long-term current use of insulin (H)  (primary encounter diagnosis)  Comment: Not controlled. Has not started rybelsus yet. Signed forms for assisted program for Rybelsus today.   Plan: Hemoglobin A1c        See above    (I10) HTN goal less than 140/90,  Comment: controlled  Plan: see above    (C43.9) Metastatic malignant melanoma (H)  -- R thigh   Comment: stable  Plan:     (C54.1) Endometrial cancer (H)  Comment: stable  Plan:       Chief complaint:                                                      Follow up on chronic conditions     SUBJECTIVE:                                                    History of present illness:    DM  -- Rybelsus 7 mg - I signed the forms for assisted program   -- she hasn't started the Rybelsus   -- BS 82% in the  range, zero hypoglycemia  -- Lantus 46, Humalog 6 x 3  -- appointment w endo Aug 23       HTN  -- home -130s. Today 119/72    June 23 LOV: Plan:  1. Start Semaglutide 3 mg daily for diabetes   2. Continue the other meds, same doses for now.  3. Follow up appointment in the office ( Aug 10)    Subjective   Melva is a 73 year old, presenting for the following health issues:  Patient is being seen for an follow up and to discuss back pain form fall over a month ago.      History of Present Illness       Back Pain:  She presents for follow up of back pain. Patient's back pain is a new problem.    Original cause  of back pain: a fall  First noticed back pain: more than 1 month ago  Patient feels back pain: dailyLocation of back pain:  Right lower back, right middle of back and right side of waist  Description of back pain: dull ache and shooting  Back pain spreads: right buttocks and left buttocks    Since patient first noticed back pain, pain is: gradually improving  Does back pain interfere with her job:  Not applicable  On a scale of 1-10 (10 being the worst), patient describes pain as:  2  What makes back pain worse: standing and other   Acupuncture: not tried  Acetaminophen: not helpful  Activity or exercise: helpful  Chiropractor:  Not tried  Cold: not tried  Heat: helpful  Massage: not tried  Muscle relaxants: not tried  NSAIDS: not tried  Opioids: not tried  Physical Therapy: not tried  Rest: helpful  Steroid Injection: not tried  Stretching: helpful  Surgery: not tried  TENS unit: not tried  Topical pain relievers: not tried  Other healthcare providers patient is seeing for back pain: None    Diabetes:   She presents for follow up of diabetes.  She is checking home blood glucose four or more times daily.   She checks blood glucose before and after meals and at bedtime.  Blood glucose is sometimes over 200 and sometimes under 70. She is aware of hypoglycemia symptoms including other.   She is concerned about other.   She is having weight gain.            She eats 2-3 servings of fruits and vegetables daily.She consumes 0 sweetened beverage(s) daily.She exercises with enough effort to increase her heart rate 20 to 29 minutes per day.  She exercises with enough effort to increase her heart rate 3 or less days per week.   She is taking medications regularly.       Diabetes Follow-up    How often are you checking your blood sugar? Continuous glucose monitor  What time of day are you checking your blood sugars (select all that apply)?  Before and after meals  Have you had any blood sugars above 200?  No  Have you had any  "blood sugars below 70?  No  What symptoms do you notice when your blood sugar is low?  None  What concerns do you have today about your diabetes? None   Do you have any of these symptoms? (Select all that apply)  No numbness or tingling in feet.  No redness, sores or blisters on feet.  No complaints of excessive thirst.  No reports of blurry vision.  No significant changes to weight.          Hyperlipidemia Follow-Up    Are you regularly taking any medication or supplement to lower your cholesterol?   Yes- atorvastatin  Are you having muscle aches or other side effects that you think could be caused by your cholesterol lowering medication?  No    Hypertension Follow-up    Do you check your blood pressure regularly outside of the clinic? Yes   Are you following a low salt diet? Yes  Are your blood pressures ever more than 140 on the top number (systolic) OR more   than 90 on the bottom number (diastolic), for example 140/90? No    BP Readings from Last 2 Encounters:   06/23/23 115/66   05/16/23 130/70     Hemoglobin A1C (%)   Date Value   06/21/2023 8.6 (H)   04/13/2023 9.5 (H)   03/03/2021 7.1 (H)   08/24/2020 7.2 (H)     LDL Cholesterol Calculated (mg/dL)   Date Value   04/13/2023 74   12/30/2022 105 (H)   03/03/2021 91   02/20/2020 88         Review of Systems:                                                      ROS: negative for fever, chills, cough, wheezes, chest pain, shortness of breath, vomiting, abdominal pain, leg swelling     This document serves as a record of the services and decisions personally performed and made by Stef García MD. It was created on his behalf by Dara Lobo, a physician assistant student. The creation of this document is based on the provider's statements to the student.  August 10, 2023     OBJECTIVE:           Physical exam:   Blood pressure 136/72, pulse 74, temperature 97.1  F (36.2  C), temperature source Tympanic, resp. rate 18, height 1.702 m (5' 7\"), weight 101.9 kg " (224 lb 9.6 oz), SpO2 97 %, not currently breastfeeding.   NAD, appears comfortable  Skin: no rashes   Neck: supple, no JVD,  No thyroidmegaly. Lymph nodes nonpalpable cervical and supraclavicular.  Chest: clear to auscultation bilaterally, good respiratory effort  Heart: S1 S2, RRR, no mgr appreciated  Abdomen: soft, not tender, no hepatosplenomegaly or masses appreciated, no abdominal bruit, present bowel sounds  Extremities: no edema, clubbing, cyanosis. Palpable pedal pulses bilaterally, Monofilament skin sensation is intact, no feet skin lesions   Neurologic: A, Ox3, no focal signs appreciated    PMHx: reviewed  Past Medical History:   Diagnosis Date    Asthma, mild intermittent     Cataract     Endometrial cancer (H) 06/2022    HTN, goal below 140/90     Hyperlipidemia LDL goal < 100     Macular hole of left eye     Melanoma (H)     RIGHT KNEE    Sleep apnea     uses c pap    Type 2 diabetes, HbA1C goal < 8% (H)       PSHx: reviewed  Past Surgical History:   Procedure Laterality Date    ARTHROPLASTY HIP Right 9/25/2017    Procedure: ARTHROPLASTY HIP;  Right total hip arthroplasty  ;  Surgeon: Ayaan Pena MD;  Location:  OR    ARTHROPLASTY KNEE  7/12/2013    Procedure: ARTHROPLASTY KNEE;  right total knee arthroplasty ;  Surgeon: Chaparro Wesley MD;  Location:  OR    ARTHROSCOPY KNEE Right 1/21/2015    Procedure: ARTHROSCOPY KNEE;  Surgeon: Ayaan Pena MD;  Location:  OR    BRONCHOSCOPY (RIGID OR FLEXIBLE), DIAGNOSTIC N/A 8/11/2022    Procedure: BRONCHOSCOPY, WITH BRONCHOALVEOLAR LAVAGE;  Surgeon: Roselia Sosa MD;  Location:  GI    C INCISION OF HYMEN      CATARACT IOL, RT/LT      COLONOSCOPY  2008    COLONOSCOPY N/A 4/18/2019    Procedure: Colonoscopy with polypectomy;  Surgeon: Shaun Guerrero MD;  Location: [facility] OR    CYSTOSCOPY N/A 6/23/2022    Procedure: Cystoscopy;  Surgeon: Eli Guidry MD;  Location: [facility]    ENDOSCOPIC RETROGRADE  CHOLANGIOPANCREATOGRAM N/A 2/6/2019    Procedure: COMBINED ENDOSCOPIC RETROGRADE CHOLANGIOPANCREATOGRAPHY, BILIARY SPINCTEROTOMY AND DILATION, PLACE BILE DUCT STENT;  Surgeon: Guru Andie Gonzalez MD;  Location:  OR    ENDOSCOPIC RETROGRADE CHOLANGIOPANCREATOGRAM N/A 2/28/2019    Procedure: Endoscopic Retrograde Cholangiopancreatogram, Bile duct stent removal and placement;  Surgeon: Shaun Guerrero MD;  Location:  OR    ENDOSCOPIC RETROGRADE CHOLANGIOPANCREATOGRAM N/A 4/18/2019    Procedure: COMBINED ENDOSCOPIC RETROGRADE CHOLANGIOPANCREATOGRAPHY, Bile duct stent exchange and Polypectomy;  Surgeon: Shaun Guerrero MD;  Location:  OR    ENDOSCOPIC RETROGRADE CHOLANGIOPANCREATOGRAM N/A 10/18/2019    Procedure: Endoscopic Retrograde Cholangiopancreatogram;  Surgeon: Shaun Guerrero MD;  Location:  OR    ENDOSCOPIC RETROGRADE CHOLANGIOPANCREATOGRAM N/A 3/24/2022    Procedure: ENDOSCOPIC RETROGRADE CHOLANGIOPANCREATOGRAPHY;  Surgeon: Shaun Guerrero MD;  Location:  OR    ENDOSCOPIC RETROGRADE CHOLANGIOPANCREATOGRAM N/A 3/16/2023    Procedure: endoscopic retrograde cholangiopancreatography with minor papillotomy;  Surgeon: Shaun Guerrero MD;  Location:  OR    ENDOSCOPIC RETROGRADE CHOLANGIOPANCREATOGRAM WITH SPYGLASS N/A 7/26/2019    Procedure: Endoscopic Retrograde Cholangiopancreatogram With Spyglas,l Radiofrequency Ablation, Stent Exchangeand Duodenal Biopsy x2;  Surgeon: Shaun Guerrero MD;  Location:  OR    ENDOSCOPIC RETROGRADE CHOLANGIOPANCREATOGRAM WITH SPYGLASS N/A 9/10/2020    Procedure: ENDOSCOPIC RETROGRADE CHOLANGIOPANCREATOGRAPHY, WITH DIRECT DUCT VISUALIZATION, USING PANCREATICOBILIARY FIBEROPTIC PROBE;  Surgeon: Shaun Guerrero MD;  Location:  OR    ENDOSCOPIC RETROGRADE CHOLANGIOPANCREATOGRAM WITH SPYGLASS N/A 3/22/2021    Procedure: ENDOSCOPIC RETROGRADE CHOLANGIOPANCREATOGRAPHY, WITH DIRECT DUCT VISUALIZATION, USING PANCREATICOBILIARY FIBEROPTIC PROBE;  Surgeon: Shaun Guerrero  MD;  Location: [facility] OR    ESOPHAGOSCOPY, GASTROSCOPY, DUODENOSCOPY (EGD) WITH RADIO FREQUENCY ABLATION, COMBINED N/A 9/10/2020    Procedure: possible repeat ESOPHAGOGASTRODUODENOSCOPY, WITH RADIOFREQUENCY ABLATION and endoscopic mucosal resection;  Surgeon: Shaun Guerrero MD;  Location: [facility]    ESOPHAGOSCOPY, GASTROSCOPY, DUODENOSCOPY (EGD), COMBINED N/A 4/18/2019    Procedure: upper endoscopy with polypectomy;  Surgeon: Shaun Guerrero MD;  Location: [facility]    ESOPHAGOSCOPY, GASTROSCOPY, DUODENOSCOPY (EGD), COMBINED N/A 10/18/2019    Procedure: Upper Endoscopy and ERCP with stent removal, stone removal and biopsy;  Surgeon: Shaun Guerrero MD;  Location: [facility]    ESOPHAGOSCOPY, GASTROSCOPY, DUODENOSCOPY (EGD), COMBINED N/A 3/24/2022    Procedure: ESOPHAGOGASTRODUODENOSCOPY (EGD), Duodenal  polyp removal;  Surgeon: Shaun Guerrero MD;  Location: [facility] OR    ESOPHAGOSCOPY, GASTROSCOPY, DUODENOSCOPY (EGD), COMBINED N/A 3/16/2023    Procedure: ESOPHAGOGASTRODUODENOSCOPY (EGD);  Surgeon: Shaun Guerrero MD;  Location: [facility] OR    ESOPHAGOSCOPY, GASTROSCOPY, DUODENOSCOPY (EGD), RESECT MUCOSA, COMBINED N/A 2/28/2019    Procedure: Upper Endoscopy, Endoscopic Ultrasound, Endoscopic Mucosal Resection,  Ampullectomy, polypectomy.;  Surgeon: Shaun Guerrero MD;  Location: [facility] OR    ESOPHAGOSCOPY, GASTROSCOPY, DUODENOSCOPY (EGD), RESECT MUCOSA, COMBINED N/A 3/22/2021    Procedure: ESOPHAGOGASTRODUODENOSCOPY (EGD) with  endoscopic mucosal resection/ polypectomy;  Surgeon: Shaun Guerrero MD;  Location: [facility]    EXCISE LESION LOWER EXTREMITY Right 3/28/2022    Procedure: Wide local excision of right knee melanoma;  Surgeon: Chevy Allison MD;  Location: [facility] OR    EXCISE MASS LOWER EXTREMITY Right 1/13/2022    Procedure: excision of right thigh mass;  Surgeon: Salvador Kelly MD;  Location: [facility] OR    EYE SURGERY      macular hole repaired left eye    HC KNEE SCOPE, DIAGNOSTIC      - both knees    HEAD & NECK SURGERY      wisdom teeth    IR  "CHEST PORT PLACEMENT > 5 YRS OF AGE  5/2/2022    LAPAROSCOPIC HYSTERECTOMY TOTAL, BILATERAL SALPINGO-OOPHORECTOMY, NODE DISSECTION, COMBINED Bilateral 6/23/2022    Procedure: Total Laparoscopic Hyserectomy, Bilateral Salpibgo-oophorectomy, Bilateral [place] Lymph Node Dissection;  Surgeon: Eli Guidry MD;  Location: [facility] OR        Meds: reviewed  Current Outpatient Medications   Medication Sig Dispense Refill    atenolol (TENORMIN) 50 MG tablet Take 1 tablet (50 mg) by mouth daily 90 tablet 1    atorvastatin (LIPITOR) 40 MG tablet Take 1 tablet (40 mg) by mouth daily 90 tablet 1    azelastine (ASTELIN) 0.1 % nasal spray USE 1 SPRAY(S) IN EACH NOSTRIL TWICE DAILY AS NEEDED 30 mL 0    azelastine (OPTIVAR) 0.05 % ophthalmic solution       azelastine (OPTIVAR) 0.05 % SOLN Place 1 drop into both eyes 2 times daily as needed Reported on 3/22/2017      BD VEO INSULIN SYRINGE U/F 31G X 15/64\" 0.3 ML USE DAILY WITH NPH INSULIN 100 each 1    cetirizine (ZYRTEC) 10 MG tablet Take 10 mg by mouth every morning      Continuous Blood Gluc Sensor (FREESTYLE DAVID 14 DAY SENSOR) MIS USE 1 EVERY 14 DAYS TO READ BLOOD SUGARS 2 each 1    CONTOUR NEXT TEST test strip USE 1 STRIP TO CHECK GLUCOSE ONCE DAILY 100 strip 4    glipiZIDE (GLUCOTROL XL) 10 MG 24 hr tablet Take 1 tablet (10 mg) by mouth daily 90 tablet 1    hydrochlorothiazide (HYDRODIURIL) 25 MG tablet TAKE 1 TABLET BY MOUTH ONCE DAILY IN THE MORNING 90 tablet 3    insulin glargine (LANTUS VIAL) 100 UNIT/ML vial Inject 44 Units Subcutaneous At Bedtime 15 mL 6    insulin lispro (HUMALOG VIAL) 100 UNIT/ML vial Inject 5 Units Subcutaneous 3 times daily (before meals) 15 mL 1    insulin pen needle (MM PEN NEEDLES) 32G X 4 MM miscellaneous Inject insulin 4 times a day 300 each 1    insulin syringe-needle U-100 (BD INSULIN SYRINGE ULTRAFINE) 31G X 5/16\" 0.5 ML miscellaneous Use daily with NPH insulin 100 each 11    latanoprost (XALATAN) 0.005 % ophthalmic solution Place " 1 drop into both eyes At Bedtime  2.5 mL 1    lidocaine-prilocaine (EMLA) 2.5-2.5 % external cream APPLY CREAM TOPICALLY ONCE DAILY AS NEEDED FOR PAIN APPLY A PEA SIZED AMOUNT OVER PORT SITE 60 MINUTES PRIOR TO USE COVER WITH PLASTIC WRAP      LORazepam (ATIVAN) 0.5 MG tablet Take 1 tablet (0.5 mg) by mouth nightly as needed for anxiety or sleep 30 tablet 0    ondansetron (ZOFRAN) 8 MG tablet Take 1 tablet (8 mg) by mouth every 8 hours as needed for nausea 30 tablet 1    Semaglutide (RYBELSUS) 3 MG tablet Take 3 mg by mouth daily 30 tablet 3    senna-docusate (SENOKOT-S/PERICOLACE) 8.6-50 MG tablet Take 2 tablets by mouth daily as needed for constipation 60 tablet 11    timolol maleate (TIMOPTIC) 0.5 % ophthalmic solution INSTILL 1 DROP INTO EACH EYE TWICE DAILY      triamcinolone (KENALOG) 0.5 % external ointment Apply 1 g topically daily as needed for irritation      VENTOLIN  (90 Base) MCG/ACT inhaler INHALE 2 PUFFS BY MOUTH EVERY 4 HOURS AS NEEDED FOR SHORTNESS OF BREATH 18 g 0    XARELTO ANTICOAGULANT 20 MG TABS tablet Take 1 tablet by mouth daily         Soc Hx: reviewed  Fam Hx: reviewed      Chart documentation was completed, in part, with [employer] voice-recognition software. Even though reviewed, some grammatical, spelling, and word errors may remain.    The information in this document, created by the physician assistant student for me, accurately reflects the services I personally performed and the decisions made by me. I have reviewed and approved this document for accuracy prior to leaving the patient care area.  August 10, 2023     Raisa Torres MD  Internal Medicine

## 2023-08-10 NOTE — PATIENT INSTRUCTIONS
Plan:  When you start Rybelsus, decrease the Lantus Insulin to 40 units and then slowly increase by 1 unit every few days until the morning blood sugars are in the 130s range   2. Follow up in 3-6 months  3. Please make a lab appointment for non fasting labs  after Sept 22  4. Continue same meds, same doses for now

## 2023-08-18 ENCOUNTER — TELEPHONE (OUTPATIENT)
Dept: INTERNAL MEDICINE | Facility: CLINIC | Age: 73
End: 2023-08-18
Payer: COMMERCIAL

## 2023-08-18 NOTE — TELEPHONE ENCOUNTER
Patient Quality Outreach    Patient is due for the following:   Diabetes -  Diabetic Follow-Up Visit    Next Steps:   Schedule a office visit for diabetes follow up.    Type of outreach:    Phone, left message for patient/parent to call back.    Next Steps:  Reach out within 90 days via [facility].    Max number of attempts reached: No. Will try again in 90 days if patient still on fail list.    Questions for provider review:    None           Thais Cadet

## 2023-08-23 ENCOUNTER — OFFICE VISIT (OUTPATIENT)
Dept: ENDOCRINOLOGY | Facility: CLINIC | Age: 73
End: 2023-08-23
Attending: INTERNAL MEDICINE
Payer: COMMERCIAL

## 2023-08-23 VITALS
SYSTOLIC BLOOD PRESSURE: 131 MMHG | DIASTOLIC BLOOD PRESSURE: 70 MMHG | WEIGHT: 221 LBS | OXYGEN SATURATION: 94 % | TEMPERATURE: 99.1 F | RESPIRATION RATE: 16 BRPM | HEART RATE: 74 BPM | BODY MASS INDEX: 34.69 KG/M2 | HEIGHT: 67 IN

## 2023-08-23 DIAGNOSIS — E11.65 TYPE 2 DIABETES MELLITUS WITH HYPERGLYCEMIA, WITHOUT LONG-TERM CURRENT USE OF INSULIN (H): ICD-10-CM

## 2023-08-23 LAB — HBA1C MFR BLD: 7.9 % (ref 0–5.6)

## 2023-08-23 PROCEDURE — 83036 HEMOGLOBIN GLYCOSYLATED A1C: CPT | Performed by: INTERNAL MEDICINE

## 2023-08-23 PROCEDURE — 36415 COLL VENOUS BLD VENIPUNCTURE: CPT | Performed by: INTERNAL MEDICINE

## 2023-08-23 PROCEDURE — 99204 OFFICE O/P NEW MOD 45 MIN: CPT | Performed by: INTERNAL MEDICINE

## 2023-08-23 PROCEDURE — 95251 CONT GLUC MNTR ANALYSIS I&R: CPT | Performed by: INTERNAL MEDICINE

## 2023-08-23 NOTE — PROGRESS NOTES
ENDOCRINOLOGY CLINIC NOTE:  Name: Gricelda Sabillon  Seen for Diabetes. Last visit was 2018  HPI:  Gricelda Sabillon is a 73 year old female who presents for the evaluation/management of Diabetes.   has a past medical history of Asthma, mild intermittent, Cataract, Endometrial cancer (H) (06/2022), HTN, goal below 140/90, Hyperlipidemia LDL goal < 100, Macular hole of left eye, Melanoma (H), Sleep apnea, and Type 2 diabetes, HbA1C goal < 8% (H).    S/p hip replacement in 9/2017  Since then activity is improved.      1. Type 2 DM:  Orginally diagnosed about 10 years back   Current Regimen:   Glipizide XL 10 mg in AM with food  Lantus 46 units/day  Humalog 6 units TID      (Metformin-- not able to tolerate 2/2 to diarrhea  Was on INvokana but not able to afford.  Semglutide-- was taken off during one of the hospitalization, not sure about exact reason.  She reports that she was tolerating it okay.  It is not listed in allergies.)    BS checks: dave  Average Meter Download:  No major episodes of hypoglycemia noted/reported.   Exercise: Limited secondary to knee pain  Last A1c: 9.5%  Symptoms of hypoglycemia (low blood sugar):  Gets symptoms of hypoglycemia.  Episodes of hypoglycemia: No  Fixed meal pattern: Yes.    Patient counting carbs: No    DM Complications:   Nephropathy: No  Retinopathy: No  Neuropathy: No  Microalbuminuria: + Microalbuminuria  CAD/PAD: No  Gastroparesis: No  Hypoglycemia unawareness: No    2. Hypertension:    Blood pressure medications include lisinopril 40 mg, atenolol 50 mg and hydrochlorothiazide 25 mg  3. Hyperlipidemia: Takes atorvastatin 20 mg.    PMH/PSH:  Past Medical History:   Diagnosis Date    Asthma, mild intermittent     Cataract     Endometrial cancer (H) 06/2022    HTN, goal below 140/90     Hyperlipidemia LDL goal < 100     Macular hole of left eye     Melanoma (H)     RIGHT KNEE    Sleep apnea     uses c pap    Type 2 diabetes, HbA1C goal < 8% (H)      Past Surgical  History:   Procedure Laterality Date    ARTHROPLASTY HIP Right 9/25/2017    Procedure: ARTHROPLASTY HIP;  Right total hip arthroplasty  ;  Surgeon: Ayaan Pena MD;  Location: [facility]    ARTHROPLASTY KNEE  7/12/2013    Procedure: ARTHROPLASTY KNEE;  right total knee arthroplasty ;  Surgeon: Chaparro Wesley MD;  Location: [facility]    ARTHROSCOPY KNEE Right 1/21/2015    Procedure: ARTHROSCOPY KNEE;  Surgeon: Ayaan Pena MD;  Location: [facility]    BRONCHOSCOPY (RIGID OR FLEXIBLE), DIAGNOSTIC N/A 8/11/2022    Procedure: BRONCHOSCOPY, WITH BRONCHOALVEOLAR LAVAGE;  Surgeon: Roselia Sosa MD;  Location:  GI    C INCISION OF HYMEN      CATARACT IOL, RT/LT      COLONOSCOPY  2008    COLONOSCOPY N/A 4/18/2019    Procedure: Colonoscopy with polypectomy;  Surgeon: Shaun Guerrero MD;  Location: [facility]    CYSTOSCOPY N/A 6/23/2022    Procedure: Cystoscopy;  Surgeon: Eli Guidry MD;  [facility]    ENDOSCOPIC RETROGRADE CHOLANGIOPANCREATOGRAM N/A 2/6/2019    Procedure: COMBINED ENDOSCOPIC RETROGRADE CHOLANGIOPANCREATOGRAPHY, BILIARY SPINCTEROTOMY AND DILATION, PLACE BILE DUCT STENT;  Surgeon: Guru Andie Gonzalez MD;  [facility]    ENDOSCOPIC RETROGRADE CHOLANGIOPANCREATOGRAM N/A 2/28/2019    Procedure: Endoscopic Retrograde Cholangiopancreatogram, Bile duct stent removal and placement;  Surgeon: Shaun Guerrero MD;  [facility]    ENDOSCOPIC RETROGRADE CHOLANGIOPANCREATOGRAM N/A 4/18/2019    Procedure: COMBINED ENDOSCOPIC RETROGRADE CHOLANGIOPANCREATOGRAPHY, Bile duct stent exchange and Polypectomy;  Surgeon: Shaun Guerrero MD;  [facility]    ENDOSCOPIC RETROGRADE CHOLANGIOPANCREATOGRAM N/A 10/18/2019    Procedure: Endoscopic Retrograde Cholangiopancreatogram;  Surgeon: Shaun Guerrero MD;  [facility]    ENDOSCOPIC RETROGRADE CHOLANGIOPANCREATOGRAM N/A 3/24/2022    Procedure: ENDOSCOPIC RETROGRADE CHOLANGIOPANCREATOGRAPHY;  Surgeon: Shaun Guerrero MD;   Location: [facility]    ENDOSCOPIC RETROGRADE CHOLANGIOPANCREATOGRAM N/A 3/16/2023    Procedure: endoscopic retrograde cholangiopancreatography with minor papillotomy;  Surgeon: Shaun Guerrero MD;  [facility]    ENDOSCOPIC RETROGRADE CHOLANGIOPANCREATOGRAM WITH SPYGLASS N/A 7/26/2019    Procedure: Endoscopic Retrograde Cholangiopancreatogram With Spyglas,l Radiofrequency Ablation, Stent Exchangeand Duodenal Biopsy x2;  Surgeon: Shaun Guerrero MD;  [facility]    ENDOSCOPIC RETROGRADE CHOLANGIOPANCREATOGRAM WITH SPYGLASS N/A 9/10/2020    Procedure: ENDOSCOPIC RETROGRADE CHOLANGIOPANCREATOGRAPHY, WITH DIRECT DUCT VISUALIZATION, USING PANCREATICOBILIARY FIBEROPTIC PROBE;  Surgeon: Shaun Guerrero MD;  [facility]    ENDOSCOPIC RETROGRADE CHOLANGIOPANCREATOGRAM WITH SPYGLASS N/A 3/22/2021    Procedure: ENDOSCOPIC RETROGRADE CHOLANGIOPANCREATOGRAPHY, WITH DIRECT DUCT VISUALIZATION, USING PANCREATICOBILIARY FIBEROPTIC PROBE;  Surgeon: Shaun Guerrero MD;  [facility]    ESOPHAGOSCOPY, GASTROSCOPY, DUODENOSCOPY (EGD) WITH RADIO FREQUENCY ABLATION, COMBINED N/A 9/10/2020    Procedure: possible repeat ESOPHAGOGASTRODUODENOSCOPY, WITH RADIOFREQUENCY ABLATION and endoscopic mucosal resection;  Surgeon: Shaun Guerrero MD;  [facility]    ESOPHAGOSCOPY, GASTROSCOPY, DUODENOSCOPY (EGD), COMBINED N/A 4/18/2019    Procedure: upper endoscopy with polypectomy;  Surgeon: Shaun Guerrero MD;  [facility]    ESOPHAGOSCOPY, GASTROSCOPY, DUODENOSCOPY (EGD), COMBINED N/A 10/18/2019    Procedure: Upper Endoscopy and ERCP with stent removal, stone removal and biopsy;  Surgeon: Shaun Guerrero MD;  [facility]    ESOPHAGOSCOPY, GASTROSCOPY, DUODENOSCOPY (EGD), COMBINED N/A 3/24/2022    Procedure: ESOPHAGOGASTRODUODENOSCOPY (EGD), Duodenal  polyp removal;  Surgeon: Shaun Guerrero MD;  Location:  OR    ESOPHAGOSCOPY, GASTROSCOPY, DUODENOSCOPY (EGD), COMBINED N/A 3/16/2023    Procedure: ESOPHAGOGASTRODUODENOSCOPY (EGD);   Surgeon: Shaun Guerrero MD;  [facility]    ESOPHAGOSCOPY, GASTROSCOPY, DUODENOSCOPY (EGD), RESECT MUCOSA, COMBINED N/A 2019    Procedure: Upper Endoscopy, Endoscopic Ultrasound, Endoscopic Mucosal Resection,  Ampullectomy, polypectomy.;  Surgeon: Shaun Guerrero MD;  [facility]    ESOPHAGOSCOPY, GASTROSCOPY, DUODENOSCOPY (EGD), RESECT MUCOSA, COMBINED N/A 3/22/2021    Procedure: ESOPHAGOGASTRODUODENOSCOPY (EGD) with  endoscopic mucosal resection/ polypectomy;  Surgeon: Shaun Guerrero MD;  [facility]    EXCISE LESION LOWER EXTREMITY Right 3/28/2022    Procedure: Wide local excision of right knee melanoma;  Surgeon: Chevy Allison MD;  Location: [facility] OR    EXCISE MASS LOWER EXTREMITY Right 2022    Procedure: excision of right thigh mass;  Surgeon: Salvador Kelly MD;  Location: [facility]    EYE SURGERY      macular hole repaired left eye    HC KNEE SCOPE, DIAGNOSTIC      - both knees    HEAD & NECK SURGERY      wisdom teeth    IR CHEST PORT PLACEMENT > 5 YRS OF AGE  2022    LAPAROSCOPIC HYSTERECTOMY TOTAL, BILATERAL SALPINGO-OOPHORECTOMY, NODE DISSECTION, COMBINED Bilateral 2022    Procedure: Total Laparoscopic Hyserectomy, Bilateral Salpibgo-oophorectomy, Bilateral [place] Lymph Node Dissection;  Surgeon: Eli Guidry MD;  [facility]     Family Hx:  Family History   Problem Relation Age of Onset    Hypertension Mother         diabetes,hypoythryoidism, stroke    Diabetes Mother     Breast Cancer Mother     Heart Disease Father         , cancer lip    Uterine Cancer Sister     Connective Tissue Disorder Sister         fibromyalgia    Diabetes Sister     Hypertension Brother     Cerebrovascular Disease Maternal Grandmother         , diabetes    Cerebrovascular Disease Maternal Grandfather             Cancer Paternal Grandmother             Heart Disease Paternal Grandfather             Cancer Paternal Aunt         ovarian     "Breast Cancer Niece     Asthma Maternal Aunt        Diabetes:    Social Hx:  Social History     Socioeconomic History    Marital status:      Spouse name: Allen    Number of children: 3    Years of education: 15    Highest education level: Not on file   Occupational History    Not on file   Tobacco Use    Smoking status: Never     Passive exposure: Never    Smokeless tobacco: Never   Vaping Use    Vaping Use: Never used   Substance and Sexual Activity    Alcohol use: No     Alcohol/week: 0.0 standard drinks of alcohol    Drug use: No    Sexual activity: Not Currently     Partners: Male   Other Topics Concern    Parent/sibling w/ CABG, MI or angioplasty before 65F 55M? Not Asked   Social History Narrative    Not on file     Social Determinants of Health     Financial Resource Strain: Not on file   Food Insecurity: Not on file   Transportation Needs: Not on file   Physical Activity: Not on file   Stress: Not on file   Social Connections: Not on file   Intimate Partner Violence: Not At Risk (5/16/2023)    Humiliation, Afraid, Rape, and Kick questionnaire     Fear of Current or Ex-Partner: No     Emotionally Abused: No     Physically Abused: No     Sexually Abused: No   Housing Stability: Not on file          MEDICATIONS:  has a current medication list which includes the following prescription(s): atenolol, atorvastatin, azelastine, azelastine, azelastine, bd veo insulin syringe u/f, cetirizine, freestyle dave 14 day sensor, contour next test, glipizide, hydrochlorothiazide, insulin glargine, insulin lispro, mm pen needles, insulin syringe-needle u-100, latanoprost, lidocaine-prilocaine, lorazepam, ondansetron, senna-docusate, timolol maleate, triamcinolone, ventolin hfa, xarelto anticoagulant, and semaglutide.    ROS     ROS: 10 point ROS neg other than the symptoms noted above in the HPI.    Physical Exam   VS: Ht 1.702 m (5' 7\")   Wt 100.2 kg (221 lb)   LMP  (LMP Unknown)   BMI 34.61 kg/m    GENERAL: AXOX3, " NAD, well dressed, answering questions appropriately, appears stated age.  HEENT: No exopthalmous, no proptosis, EOMI, no lig lag, no retraction  NECK: Thyroid normal in size, non tender, no nodules were palpated.  CV: RRR  LUNGS: CTAB  ABDOMEN: +BS  NEUROLOGY: CN grossly intact, no tremors  PSYCH: normal affect and mood    LABS:  A1c:  Lab Results   Component Value Date    A1C 8.6 06/21/2023    A1C 9.5 04/13/2023    A1C 8.9 12/30/2022    A1C 8.8 09/01/2022    A1C 8.3 07/12/2022    A1C 7.1 03/03/2021    A1C 7.2 08/24/2020    A1C 7.3 05/21/2020    A1C 7.0 02/20/2020    A1C 6.5 10/31/2019     Creatinine:  Creatinine   Date Value Ref Range Status   04/13/2023 0.84 0.51 - 0.95 mg/dL Final   03/22/2021 0.82 0.52 - 1.04 mg/dL Final   ]    Urine Micro:  Lab Results   Component Value Date    UMALCR 40.80 06/21/2023    UMALCR 8.99 03/21/2022    UMALCR 10.83 03/03/2021         LFTs/Lipids:  Recent Labs   Lab Test 04/13/23  0925 12/30/22  0838 09/07/16  0821 08/03/15  0835   CHOL 159 193   < > 132   HDL 42* 38*   < > 42*   LDL 74 105*   < > 57   TRIG 217* 249*   < > 167*   CHOLHDLRATIO  --   --   --  3.1    < > = values in this interval not displayed.       TFTs:  TSH   Date Value Ref Range Status   11/01/2022 1.09 0.40 - 4.00 mU/L Final   03/03/2021 2.56 0.40 - 4.00 mU/L Final       All pertinent notes, labs, and images personally reviewed by me.     A/P  Ms.Barbara Evens Sabillon is a 73 year old here for the evaluation/management of diabetes:    1. DM2 - Under poor control.  A1c 8.6%  A1c is rising.  Patient reports that she is not following good diet  Plan:  Discussed diagnosis, pathophysiology, management and treatment options of condition with pt.  I also discussed importance of strict blood sugar control to prevent complications associated with uncontrolled diabetes.    Labs today  Continue lantus 46 units/day and glipizide XL 10 mg in a.m. with food  Increase Humalog-- take 7/7/6 Units with breakfast/lunch/dinner  respectively  Continue to use dave.  Labs and follow up with Danisha Villa in 6 weeks.  Can consider GLP-1 RA in future.  That would also help with weight los.  Repeat labs and follow up 4 months.  Please make a lab appointment for blood work and follow up clinic appointment in 1 week after that to discuss results.      2. Hypertension - Under Good control.  On medication g      3. Hyperlipidemia - Under Good control.  Continue atorvastatin 40 mg.  LDL 74.    4. Prevention  Opthalmology- August 2016   ASA-81 mg  Smoking- no    All questions were answered.  The patient indicates understanding of the above issues and agrees with the plan set forth.     More than 50% of face to face time spent with Ms. Sabillon on counseling / coordinating her care.  Total appointment times was 30 minutes.      Follow-up:  As noted in AVS.    Clara Corado M.D  Endocrinology  [facility]/[place]  CC: Raisa Davidson    Disclaimer: This note consists of symbols derived from keyboarding, dictation and/or voice recognition software. As a result, there may be errors in the script that have gone undetected. Please consider this when interpreting information found in this chart.

## 2023-08-23 NOTE — LETTER
8/23/2023         RE: Gricelda Sabillon  [address] [facility] [address]        Dear Colleague,    Thank you for referring your patient, Gricelda Sabillon, to the [facility]. Please see a copy of my visit note below.    ENDOCRINOLOGY CLINIC NOTE:  Name: Gricelda Sabillon  Seen for Diabetes. Last visit was 2018  HPI:  Gricelda Sabillon is a 73 year old female who presents for the evaluation/management of Diabetes.   has a past medical history of Asthma, mild intermittent, Cataract, Endometrial cancer (H) (06/2022), HTN, goal below 140/90, Hyperlipidemia LDL goal < 100, Macular hole of left eye, Melanoma (H), Sleep apnea, and Type 2 diabetes, HbA1C goal < 8% (H).    S/p hip replacement in 9/2017  Since then activity is improved.      1. Type 2 DM:  Orginally diagnosed about 10 years back   Current Regimen:   Glipizide XL 10 mg in AM with food  Lantus 46 units/day  Humalog 6 units TID      (Metformin-- not able to tolerate 2/2 to diarrhea  Was on INvokana but not able to afford.  Semglutide-- was taken off during one of the hospitalization, not sure about exact reason.  She reports that she was tolerating it okay.  It is not listed in allergies.)    BS checks: dave  Average Meter Download:  No major episodes of hypoglycemia noted/reported.   Exercise: Limited secondary to knee pain  Last A1c: 9.5%  Symptoms of hypoglycemia (low blood sugar):  Gets symptoms of hypoglycemia.  Episodes of hypoglycemia: No  Fixed meal pattern: Yes.    Patient counting carbs: No    DM Complications:   Nephropathy: No  Retinopathy: No  Neuropathy: No  Microalbuminuria: + Microalbuminuria  CAD/PAD: No  Gastroparesis: No  Hypoglycemia unawareness: No    2. Hypertension:    Blood pressure medications include lisinopril 40 mg, atenolol 50 mg and hydrochlorothiazide 25 mg  3. Hyperlipidemia: Takes atorvastatin 20 mg.    PMH/PSH:  Past Medical History:   Diagnosis Date     Asthma, mild intermittent       Cataract      Endometrial cancer (H) 06/2022     HTN, goal below 140/90      Hyperlipidemia LDL goal < 100      Macular hole of left eye      Melanoma (H)     RIGHT KNEE     Sleep apnea     uses c pap     Type 2 diabetes, HbA1C goal < 8% (H)      Past Surgical History:   Procedure Laterality Date     ARTHROPLASTY HIP Right 9/25/2017    Procedure: ARTHROPLASTY HIP;  Right total hip arthroplasty  ;  Surgeon: Ayaan Pena MD;  Location: [facility]     ARTHROPLASTY KNEE  7/12/2013    Procedure: ARTHROPLASTY KNEE;  right total knee arthroplasty ;  Surgeon: Chaparro Wesley MD;  Location: [facility] OR     ARTHROSCOPY KNEE Right 1/21/2015    Procedure: ARTHROSCOPY KNEE;  Surgeon: Ayaan Pena MD;  Location: [facility]     BRONCHOSCOPY (RIGID OR FLEXIBLE), DIAGNOSTIC N/A 8/11/2022    Procedure: BRONCHOSCOPY, WITH BRONCHOALVEOLAR LAVAGE;  Surgeon: Roselia Sosa MD;  Location:  GI     C INCISION OF HYMEN       CATARACT IOL, RT/LT       COLONOSCOPY  2008     COLONOSCOPY N/A 4/18/2019    Procedure: Colonoscopy with polypectomy;  Surgeon: Shaun Guerrero MD;  [facility]     CYSTOSCOPY N/A 6/23/2022    Procedure: Cystoscopy;  Surgeon: Eli Guidry MD;  [facility]     ENDOSCOPIC RETROGRADE CHOLANGIOPANCREATOGRAM N/A 2/6/2019    Procedure: COMBINED ENDOSCOPIC RETROGRADE CHOLANGIOPANCREATOGRAPHY, BILIARY SPINCTEROTOMY AND DILATION, PLACE BILE DUCT STENT;  Surgeon: Guru Andie Gonzalez MD;  [facility]     ENDOSCOPIC RETROGRADE CHOLANGIOPANCREATOGRAM N/A 2/28/2019    Procedure: Endoscopic Retrograde Cholangiopancreatogram, Bile duct stent removal and placement;  Surgeon: Shaun Guerrero MD;  [facility]     ENDOSCOPIC RETROGRADE CHOLANGIOPANCREATOGRAM N/A 4/18/2019    Procedure: COMBINED ENDOSCOPIC RETROGRADE CHOLANGIOPANCREATOGRAPHY, Bile duct stent exchange and Polypectomy;  Surgeon: Shaun Guerrero MD;  [facility]     ENDOSCOPIC RETROGRADE CHOLANGIOPANCREATOGRAM N/A  10/18/2019    Procedure: Endoscopic Retrograde Cholangiopancreatogram;  Surgeon: Shaun Guerrero MD;  [facility]     ENDOSCOPIC RETROGRADE CHOLANGIOPANCREATOGRAM N/A 3/24/2022    Procedure: ENDOSCOPIC RETROGRADE CHOLANGIOPANCREATOGRAPHY;  Surgeon: Shaun Guerrero MD;  Location:  OR     ENDOSCOPIC RETROGRADE CHOLANGIOPANCREATOGRAM N/A 3/16/2023    Procedure: endoscopic retrograde cholangiopancreatography with minor papillotomy;  Surgeon: Shaun Guerrero MD;  Location: [facility]     ENDOSCOPIC RETROGRADE CHOLANGIOPANCREATOGRAM WITH SPYGLASS N/A 7/26/2019    Procedure: Endoscopic Retrograde Cholangiopancreatogram With Spyglas,l Radiofrequency Ablation, Stent Exchangeand Duodenal Biopsy x2;  Surgeon: Shaun Guerrero MD;  [facility]     ENDOSCOPIC RETROGRADE CHOLANGIOPANCREATOGRAM WITH SPYGLASS N/A 9/10/2020    Procedure: ENDOSCOPIC RETROGRADE CHOLANGIOPANCREATOGRAPHY, WITH DIRECT DUCT VISUALIZATION, USING PANCREATICOBILIARY FIBEROPTIC PROBE;  Surgeon: Shaun Guerrero MD;  [facility]     ENDOSCOPIC RETROGRADE CHOLANGIOPANCREATOGRAM WITH SPYGLASS N/A 3/22/2021    Procedure: ENDOSCOPIC RETROGRADE CHOLANGIOPANCREATOGRAPHY, WITH DIRECT DUCT VISUALIZATION, USING PANCREATICOBILIARY FIBEROPTIC PROBE;  Surgeon: Shaun Guerrero MD;  [facility]     ESOPHAGOSCOPY, GASTROSCOPY, DUODENOSCOPY (EGD) WITH RADIO FREQUENCY ABLATION, COMBINED N/A 9/10/2020    Procedure: possible repeat ESOPHAGOGASTRODUODENOSCOPY, WITH RADIOFREQUENCY ABLATION and endoscopic mucosal resection;  Surgeon: Shaun Guerrero MD;  [facility]     ESOPHAGOSCOPY, GASTROSCOPY, DUODENOSCOPY (EGD), COMBINED N/A 4/18/2019    Procedure: upper endoscopy with polypectomy;  Surgeon: Shaun Guerrero MD;  [facility]     ESOPHAGOSCOPY, GASTROSCOPY, DUODENOSCOPY (EGD), COMBINED N/A 10/18/2019    Procedure: Upper Endoscopy and ERCP with stent removal, stone removal and biopsy;  Surgeon: Shaun Guerrero MD;  [facility]     ESOPHAGOSCOPY, GASTROSCOPY,  DUODENOSCOPY (EGD), COMBINED N/A 3/24/2022    Procedure: ESOPHAGOGASTRODUODENOSCOPY (EGD), Duodenal  polyp removal;  Surgeon: Shaun Guerrero MD;  [facility]: [facility]     ESOPHAGOSCOPY, GASTROSCOPY, DUODENOSCOPY (EGD), COMBINED N/A 3/16/2023    Procedure: ESOPHAGOGASTRODUODENOSCOPY (EGD);  Surgeon: Shaun Guerrero MD;  Location:  OR     ESOPHAGOSCOPY, GASTROSCOPY, DUODENOSCOPY (EGD), RESECT MUCOSA, COMBINED N/A 2019    Procedure: Upper Endoscopy, Endoscopic Ultrasound, Endoscopic Mucosal Resection,  Ampullectomy, polypectomy.;  Surgeon: Shaun Guerrero MD;  [facility]     ESOPHAGOSCOPY, GASTROSCOPY, DUODENOSCOPY (EGD), RESECT MUCOSA, COMBINED N/A 3/22/2021    Procedure: ESOPHAGOGASTRODUODENOSCOPY (EGD) with  endoscopic mucosal resection/ polypectomy;  Surgeon: Shaun Guerrero MD;  [facility]     EXCISE LESION LOWER EXTREMITY Right 3/28/2022    Procedure: Wide local excision of right knee melanoma;  Surgeon: Chevy Allison MD;  Location: [facility] OR     EXCISE MASS LOWER EXTREMITY Right 2022    Procedure: excision of right thigh mass;  Surgeon: Salvador Kelly MD;  Location: [facility]     EYE SURGERY      macular hole repaired left eye     HC KNEE SCOPE, DIAGNOSTIC      - both knees     HEAD & NECK SURGERY      wisdom teeth     IR CHEST PORT PLACEMENT > 5 YRS OF AGE  2022     LAPAROSCOPIC HYSTERECTOMY TOTAL, BILATERAL SALPINGO-OOPHORECTOMY, NODE DISSECTION, COMBINED Bilateral 2022    Procedure: Total Laparoscopic Hyserectomy, Bilateral Salpibgo-oophorectomy, Bilateral [place] Lymph Node Dissection;  Surgeon: Eli Guidry MD;  [facility]     Family Hx:  Family History   Problem Relation Age of Onset     Hypertension Mother         diabetes,hypoythryoidism, stroke     Diabetes Mother      Breast Cancer Mother      Heart Disease Father         , cancer lip     Uterine Cancer Sister      Connective Tissue Disorder Sister         fibromyalgia     Diabetes Sister       Hypertension Brother      Cerebrovascular Disease Maternal Grandmother         , diabetes     Cerebrovascular Disease Maternal Grandfather              Cancer Paternal Grandmother              Heart Disease Paternal Grandfather              Cancer Paternal Aunt         ovarian     Breast Cancer Niece      Asthma Maternal Aunt        Diabetes:    Social Hx:  Social History     Socioeconomic History     Marital status:      Spouse name: Allen     Number of children: 3     Years of education: 15     Highest education level: Not on file   Occupational History     Not on file   Tobacco Use     Smoking status: Never     Passive exposure: Never     Smokeless tobacco: Never   Vaping Use     Vaping Use: Never used   Substance and Sexual Activity     Alcohol use: No     Alcohol/week: 0.0 standard drinks of alcohol     Drug use: No     Sexual activity: Not Currently     Partners: Male   Other Topics Concern     Parent/sibling w/ CABG, MI or angioplasty before 65F 55M? Not Asked   Social History Narrative     Not on file     Social Determinants of Health     Financial Resource Strain: Not on file   Food Insecurity: Not on file   Transportation Needs: Not on file   Physical Activity: Not on file   Stress: Not on file   Social Connections: Not on file   Intimate Partner Violence: Not At Risk (2023)    Humiliation, Afraid, Rape, and Kick questionnaire      Fear of Current or Ex-Partner: No      Emotionally Abused: No      Physically Abused: No      Sexually Abused: No   Housing Stability: Not on file          MEDICATIONS:  has a current medication list which includes the following prescription(s): atenolol, atorvastatin, azelastine, azelastine, azelastine, bd veo insulin syringe u/f, cetirizine, freestyle dave 14 day sensor, contour next test, glipizide, hydrochlorothiazide, insulin glargine, insulin lispro, mm pen needles, insulin syringe-needle u-100, latanoprost, lidocaine-prilocaine,  "lorazepam, ondansetron, senna-docusate, timolol maleate, triamcinolone, ventolin hfa, xarelto anticoagulant, and semaglutide.    ROS     ROS: 10 point ROS neg other than the symptoms noted above in the HPI.    Physical Exam   VS: Ht 1.702 m (5' 7\")   Wt 100.2 kg (221 lb)   LMP  (LMP Unknown)   BMI 34.61 kg/m    GENERAL: AXOX3, NAD, well dressed, answering questions appropriately, appears stated age.  HEENT: No exopthalmous, no proptosis, EOMI, no lig lag, no retraction  NECK: Thyroid normal in size, non tender, no nodules were palpated.  CV: RRR  LUNGS: CTAB  ABDOMEN: +BS  NEUROLOGY: CN grossly intact, no tremors  PSYCH: normal affect and mood    LABS:  A1c:  Lab Results   Component Value Date    A1C 8.6 06/21/2023    A1C 9.5 04/13/2023    A1C 8.9 12/30/2022    A1C 8.8 09/01/2022    A1C 8.3 07/12/2022    A1C 7.1 03/03/2021    A1C 7.2 08/24/2020    A1C 7.3 05/21/2020    A1C 7.0 02/20/2020    A1C 6.5 10/31/2019     Creatinine:  Creatinine   Date Value Ref Range Status   04/13/2023 0.84 0.51 - 0.95 mg/dL Final   03/22/2021 0.82 0.52 - 1.04 mg/dL Final   ]    Urine Micro:  Lab Results   Component Value Date    UMALCR 40.80 06/21/2023    UMALCR 8.99 03/21/2022    UMALCR 10.83 03/03/2021         LFTs/Lipids:  Recent Labs   Lab Test 04/13/23  0925 12/30/22  0838 09/07/16  0821 08/03/15  0835   CHOL 159 193   < > 132   HDL 42* 38*   < > 42*   LDL 74 105*   < > 57   TRIG 217* 249*   < > 167*   CHOLHDLRATIO  --   --   --  3.1    < > = values in this interval not displayed.       TFTs:  TSH   Date Value Ref Range Status   11/01/2022 1.09 0.40 - 4.00 mU/L Final   03/03/2021 2.56 0.40 - 4.00 mU/L Final       All pertinent notes, labs, and images personally reviewed by me.     A/P  Ms.Gricelda Sabillon is a 73 year old here for the evaluation/management of diabetes:    1. DM2 - Under poor control.  A1c 8.6%  A1c is rising.  Patient reports that she is not following good diet  Plan:  Discussed diagnosis, pathophysiology, " management and treatment options of condition with pt.  I also discussed importance of strict blood sugar control to prevent complications associated with uncontrolled diabetes.    Labs today  Continue lantus 46 units/day and glipizide XL 10 mg in a.m. with food  Increase Humalog-- take 7/7/6 Units with breakfast/lunch/dinner respectively  Continue to use dave.  Labs and follow up with Danisha Villa in 6 weeks.  Can consider GLP-1 RA in future.  That would also help with weight los.  Repeat labs and follow up 4 months.  Please make a lab appointment for blood work and follow up clinic appointment in 1 week after that to discuss results.      2. Hypertension - Under Good control.  On medication g      3. Hyperlipidemia - Under Good control.  Continue atorvastatin 40 mg.  LDL 74.    4. Prevention  Opthalmology- August 2016   ASA-81 mg  Smoking- no    All questions were answered.  The patient indicates understanding of the above issues and agrees with the plan set forth.     More than 50% of face to face time spent with Ms. Sabillon on counseling / coordinating her care.  Total appointment times was 30 minutes.      Follow-up:  As noted in AVS.    Clara Corado M.D  Endocrinology  [facility]  CC: Raisa Davidson    Disclaimer: This note consists of symbols derived from keyboarding, dictation and/or voice recognition software. As a result, there may be errors in the script that have gone undetected. Please consider this when interpreting information found in this chart.      Again, thank you for allowing me to participate in the care of your patient.        Sincerely,        Clara Corado MD

## 2023-08-23 NOTE — PATIENT INSTRUCTIONS
[facility]  Dr Corado, Endocrinology Department    [facility]   [address] [facility]  [place] [address]  Appointment [phone]  Fax: [phone]  [place]: Monday - Thursday      To provide the best diabetic care, please bring your blood glucose meter to each and every visit with your Endocrinologist.  Your blood glucose meter/insulin pump will be downloaded at every appointment.    Please arrive 15 minutes before your scheduled appointment.  This will allow for your blood glucose meter/insulin pump to be downloaded.  If you are wearing DEXCOM please bring  or sharing code so that it can be downloaded.  If you are using freestyle dave personal sensors please bring the reader.  If you are using TANDEM insulin pump please have your username and password to get info from [employer] website.    Labs today  Continue lantus 46 units/day and glipizide XL 10 mg in a.m. with food  Increase Humalog-- take 7/7/6 Units with breakfast/lunch/dinner respectively  Continue to use dave.  Labs and follow up with Danisha Villa in 6 weeks.  Repeat labs and follow up 4 months.  Please make a lab appointment for blood work and follow up clinic appointment in 1 week after that to discuss results.    Danisha Villa PAC-- She is a new Physician assistant who is at [facility] on Monday and  and will see follow up Diabetes patients.  Please make a lab appointment for blood work and follow up clinic appointment in 1 week after that to discuss results.    Recommend checking blood sugars before meals and at bedtime.    If Blood glucose are low more often-> 2-3 times/week- give us a call.  Make better food choices: reduce carbs, Reduce portion size, weight loss and exercise 3-4 times a week.    What is hypoglycemia:  Hypoglycemia is when blood sugar levels become too low - below 70 m/dl.      What causes hypoglycemia?  - using too much insulin  -taking too many diabetes  pills  -not eating enough, or skipping meals or snacks  -not eating enough carbohydrate with meals  -changing your exercise routine  -drinking alcohol in excess    It is also possible to have hypoglycemia even when you are carefully managing your blood sugar levels.    What does it feel like when blood sugars get too low?  You may feel:  - anxious  -confused  -dizzy  -hungry  -light-headed  -nervous  -shaky  -sleepy  -sweaty    You may have  -blurred or cloudy vision  -heart palpitations (heart skips a beat or races)  -tingling or numbness around the mouth and tongue  -tremors    What to do if you have symptoms of hypoglycmemia:  If you think your blood sugar is too low, check it with a glucose meter.  If its below 70 mg/dl, consume one of the following:  Fruit juice (1/2 cup)  Glucose tablets (15 grams)  Hard candy (5 to 7 pieces)  Honey or sugar (2 teaspoons)  Milk (1/2 cup)  Soft drink (non-diet, 1/2 cup)    Wait 15 minutes and check your blood glucose again.  IF it is still below 70 mg/dl, have another food item listed above. Wait another 15 minutes and repeat the blood glucose test.  Have a small meal or snack that contains some carbohydrate after your blood glucose rises above 70 mg/dl.    If you are at risk of hypoglycemia, always carry with you glucose tablets or one of the foods listed above.      To prevent Hypoglycemia:  Avoid situations that may cause hypoglycemia  Before making any change to your diet or exercise routine, discuss them with your healthcare provider  Keep a record of your blood glucose levels.  Include the time of day, diabetes medications, when you had your last meal or snack, and what you were doing at the time (e.g. Watching TV, gardening, jogging, etc).    Talk to your healthcare provider if your blood glucose levels are often low        Patient guide on hypoglycemia    http://www.hormone.org/Resources/upload/patient-guide-diagnosis-and-management-hypoglycemia-686788.pdf

## 2023-08-25 ENCOUNTER — TELEPHONE (OUTPATIENT)
Dept: INTERNAL MEDICINE | Facility: CLINIC | Age: 73
End: 2023-08-25

## 2023-08-25 NOTE — TELEPHONE ENCOUNTER
Follow up appointment needed for a 6 week follow up from 8/23/24 and a 2 month follow up from 8/23/23 unfortunately 8/30/23 will be too soon.

## 2023-08-25 NOTE — TELEPHONE ENCOUNTER
FYI - Status Update    Who is Calling: patient    Update: Patient was unable to schedule with endocrinology within appropriate time. She was put on a wait list, contacted patient relations and would like to know what other options she has to be seen sooner. She is wondering if her primary care provider has other physicians to recommend for endocrinology within the organization.     Does caller want a call/response back: Yes     Could we send this information to you in [employer] or would you prefer to receive a phone call?:   Patient would prefer a phone call   Okay to leave a detailed message?: Yes at Home number on file [phone] (home)

## 2023-08-30 ENCOUNTER — TELEPHONE (OUTPATIENT)
Dept: INTERNAL MEDICINE | Facility: CLINIC | Age: 73
End: 2023-08-30
Payer: COMMERCIAL

## 2023-08-30 DIAGNOSIS — E11.65 TYPE 2 DIABETES MELLITUS WITH HYPERGLYCEMIA, WITHOUT LONG-TERM CURRENT USE OF INSULIN (H): ICD-10-CM

## 2023-08-30 NOTE — TELEPHONE ENCOUNTER
Received fax from pharmacy regarding insulin syringes/needles.    States patient is requesting prescription for TID injections, not daily.    Prescription pended.    Routing refill request to provider for review/approval because:  Per refill protocol, RN unable to adjust directions.    Please advise, thanks.

## 2023-08-31 NOTE — TELEPHONE ENCOUNTER
Can patient be seen with Danisha Villa six weeks from her 8/23/23 as patient was instructed by Dr Corado.

## 2023-09-01 RX ORDER — SYRING-NEEDL,DISP,INSUL,0.3 ML 31GX15/64"
SYRINGE, EMPTY DISPOSABLE MISCELLANEOUS
Qty: 300 EACH | Refills: 1 | Status: SHIPPED | OUTPATIENT
Start: 2023-09-01 | End: 2024-08-19

## 2023-09-05 ENCOUNTER — HOSPITAL ENCOUNTER (OUTPATIENT)
Dept: PET IMAGING | Facility: CLINIC | Age: 73
Discharge: HOME | End: 2023-09-05
Attending: INTERNAL MEDICINE | Admitting: INTERNAL MEDICINE
Payer: COMMERCIAL

## 2023-09-05 DIAGNOSIS — C43.71 MELANOMA OF LOWER LEG, RIGHT (H): ICD-10-CM

## 2023-09-05 DIAGNOSIS — N85.8 UTERINE MASS: ICD-10-CM

## 2023-09-05 PROCEDURE — 78816 PET IMAGE W/CT FULL BODY: CPT | Mod: PS

## 2023-09-05 PROCEDURE — A9552 F18 FDG: HCPCS | Performed by: INTERNAL MEDICINE

## 2023-09-05 PROCEDURE — 343N000001 HC RX 343: Performed by: INTERNAL MEDICINE

## 2023-09-05 RX ADMIN — FLUDEOXYGLUCOSE F-18 13.75 MILLICURIE: 500 INJECTION, SOLUTION INTRAVENOUS at 09:07

## 2023-09-13 ENCOUNTER — TRANSFERRED RECORDS (OUTPATIENT)
Dept: HEALTH INFORMATION MANAGEMENT | Facility: CLINIC | Age: 73
End: 2023-09-13
Payer: COMMERCIAL

## 2023-09-17 ENCOUNTER — TRANSFERRED RECORDS (OUTPATIENT)
Dept: MULTI SPECIALTY CLINIC | Facility: CLINIC | Age: 73
End: 2023-09-17

## 2023-09-17 LAB — RETINOPATHY: NORMAL

## 2023-09-19 ENCOUNTER — APPOINTMENT (OUTPATIENT)
Dept: GENERAL RADIOLOGY | Facility: CLINIC | Age: 73
End: 2023-09-19
Attending: EMERGENCY MEDICINE
Payer: COMMERCIAL

## 2023-09-19 ENCOUNTER — APPOINTMENT (OUTPATIENT)
Dept: CT IMAGING | Facility: CLINIC | Age: 73
End: 2023-09-19
Attending: EMERGENCY MEDICINE
Payer: COMMERCIAL

## 2023-09-19 ENCOUNTER — HOSPITAL ENCOUNTER (EMERGENCY)
Facility: CLINIC | Age: 73
Discharge: HOME | End: 2023-09-20
Attending: EMERGENCY MEDICINE | Admitting: EMERGENCY MEDICINE
Payer: COMMERCIAL

## 2023-09-19 DIAGNOSIS — S22.42XA CLOSED FRACTURE OF MULTIPLE RIBS OF LEFT SIDE, INITIAL ENCOUNTER: ICD-10-CM

## 2023-09-19 PROCEDURE — 73502 X-RAY EXAM HIP UNI 2-3 VIEWS: CPT

## 2023-09-19 PROCEDURE — 73030 X-RAY EXAM OF SHOULDER: CPT | Mod: 26 | Performed by: RADIOLOGY

## 2023-09-19 PROCEDURE — 73560 X-RAY EXAM OF KNEE 1 OR 2: CPT | Mod: RT

## 2023-09-19 PROCEDURE — 73560 X-RAY EXAM OF KNEE 1 OR 2: CPT | Mod: 26 | Performed by: RADIOLOGY

## 2023-09-19 PROCEDURE — 70450 CT HEAD/BRAIN W/O DYE: CPT | Mod: 26 | Performed by: RADIOLOGY

## 2023-09-19 PROCEDURE — 71250 CT THORAX DX C-: CPT

## 2023-09-19 PROCEDURE — 71250 CT THORAX DX C-: CPT | Mod: 26 | Performed by: RADIOLOGY

## 2023-09-19 PROCEDURE — 99284 EMERGENCY DEPT VISIT MOD MDM: CPT | Performed by: EMERGENCY MEDICINE

## 2023-09-19 PROCEDURE — 70450 CT HEAD/BRAIN W/O DYE: CPT

## 2023-09-19 PROCEDURE — 73502 X-RAY EXAM HIP UNI 2-3 VIEWS: CPT | Mod: 26 | Performed by: RADIOLOGY

## 2023-09-19 PROCEDURE — 99284 EMERGENCY DEPT VISIT MOD MDM: CPT | Mod: 25 | Performed by: EMERGENCY MEDICINE

## 2023-09-19 PROCEDURE — 73030 X-RAY EXAM OF SHOULDER: CPT | Mod: LT

## 2023-09-19 ASSESSMENT — ACTIVITIES OF DAILY LIVING (ADL): ADLS_ACUITY_SCORE: 35

## 2023-09-20 VITALS
HEART RATE: 65 BPM | DIASTOLIC BLOOD PRESSURE: 60 MMHG | WEIGHT: 218 LBS | OXYGEN SATURATION: 97 % | BODY MASS INDEX: 34.14 KG/M2 | TEMPERATURE: 97.9 F | SYSTOLIC BLOOD PRESSURE: 143 MMHG | RESPIRATION RATE: 22 BRPM

## 2023-09-20 RX ORDER — OXYCODONE HYDROCHLORIDE 5 MG/1
5 TABLET ORAL EVERY 6 HOURS PRN
Qty: 12 TABLET | Refills: 0 | Status: SHIPPED | OUTPATIENT
Start: 2023-09-20 | End: 2023-09-23

## 2023-09-20 RX ORDER — LIDOCAINE 50 MG/G
1 PATCH TOPICAL EVERY 24 HOURS
Qty: 12 PATCH | Refills: 0 | Status: SHIPPED | OUTPATIENT
Start: 2023-09-20 | End: 2023-11-28

## 2023-09-20 ASSESSMENT — ACTIVITIES OF DAILY LIVING (ADL): ADLS_ACUITY_SCORE: 35

## 2023-09-20 NOTE — DISCHARGE INSTRUCTIONS
Your CT scan shows some mild fractures of your fourth fifth and sixth ribs of your left chest wall.  The rest of your imaging is reassuring.  We discussed admission to the hospital for pain control but you have elected for discharge.  We discussed acetaminophen and ice.  Have also prescribed lidocaine patches and a short course of oxycodone to be used sparingly.  Follow-up with your primary care physician in 1 week for reassessment.  Return to the ED immediately with any new or worsening symptoms.

## 2023-09-20 NOTE — ED TRIAGE NOTES
Glf denies hitting head, no LOC, L shoulder pain, R knee pain with hx replacement. On xarelto      Triage Assessment       Row Name 09/19/23 1804       Triage Assessment (Adult)    Airway WDL WDL       Respiratory WDL    Respiratory WDL WDL       Skin Circulation/Temperature WDL    Skin Circulation/Temperature WDL WDL       Cardiac WDL    Cardiac WDL WDL       Peripheral/Neurovascular WDL    Peripheral Neurovascular WDL WDL       Cognitive/Neuro/Behavioral WDL    Cognitive/Neuro/Behavioral WDL WDL       Alyce Coma Scale    Best Eye Response 4-->(E4) spontaneous    Best Motor Response 6-->(M6) obeys commands    Best Verbal Response 5-->(V5) oriented    Lindsay Coma Scale Score 15

## 2023-09-20 NOTE — ED NOTES
Pt received discharge paperwork with instructions on where to  medications, pt had no further questions for the RN of MD, pt ambulated out of the ED with family member, pt stated they were able to get a ride to take them home

## 2023-09-20 NOTE — ED PROVIDER NOTES
[place] EMERGENCY DEPARTMENT ([facility])    9/19/23       History     Chief Complaint   Patient presents with    Fall     Glf denies hitting head, no LOC, L shoulder pain, R knee pain with hx replacement. On xarelto     The history is provided by the patient and medical records.     Gricelda Sabillon is a 73 year old female who with PMH of HTN, Asthma, bilateral pulmonary embolism, organizing pneumonia, CHERRY, hyperlipidemia and DM type 2 presents to the ED due to a fall.   Patient reports that she has fallen this several times. Initially she thought it was due to her sandals but she really does not know how long she has been having falls. Patient was at her daughter's with her  when she had a fall. She states that her left shoulder hurts even while she raises her arm. She is unsure if she hit her head but does note that her glasses had flown off.  Patient reports that she has had metastastic melanoma and had a PET scan. No metastasis in brain. She has been on latrudo and believes that may have caused the organizing pneumonia. Patient has had knee replacement and reports that she has a lump near her knee that is not usually there and it is not tender. As for medications, patient reports that she has been on Xarelto as blood thinners due to her PE and organizing pneumonia.  She also takes medications for her diabetes mellitus such as Glipizide, Lantus and Humalog.  She also takes atenolol and hydrochlorothiazides for her hypertension. Patient denies neck or back pain other than this pain on her left arm and shoulder. Patient denies LOC, double vision, dizziness prior to the fall.    Per [facility] Review:   Pet oncology whole body: on 9/5/23:  .IMPRESSION:  No convincing metabolic evidence of active neoplasm.    Past Medical History  Past Medical History:   Diagnosis Date    Asthma, mild intermittent     Cataract     Endometrial cancer (H) 06/2022    HTN, goal below 140/90     Hyperlipidemia LDL goal <  100     Macular hole of left eye     Melanoma (H)     RIGHT KNEE    Sleep apnea     uses c pap    Type 2 diabetes, HbA1C goal < 8% (H)      Past Surgical History:   Procedure Laterality Date    ARTHROPLASTY HIP Right 9/25/2017    Procedure: ARTHROPLASTY HIP;  Right total hip arthroplasty  ;  Surgeon: Ayaan Pena MD;  Location: [facility] OR    ARTHROPLASTY KNEE  7/12/2013    Procedure: ARTHROPLASTY KNEE;  right total knee arthroplasty ;  Surgeon: Chaparro Wesley MD;  Location: [facility] OR    ARTHROSCOPY KNEE Right 1/21/2015    Procedure: ARTHROSCOPY KNEE;  Surgeon: Ayaan Pena MD;  Location: [facility] OR    BRONCHOSCOPY (RIGID OR FLEXIBLE), DIAGNOSTIC N/A 8/11/2022    Procedure: BRONCHOSCOPY, WITH BRONCHOALVEOLAR LAVAGE;  Surgeon: Roselia Sosa MD;  Location:  GI    C INCISION OF HYMEN      CATARACT IOL, RT/LT      COLONOSCOPY  2008    COLONOSCOPY N/A 4/18/2019    Procedure: Colonoscopy with polypectomy;  Surgeon: Shaun Guerrero MD;  [facility]    CYSTOSCOPY N/A 6/23/2022    Procedure: Cystoscopy;  Surgeon: Eli Guidry MD;  [facility]    ENDOSCOPIC RETROGRADE CHOLANGIOPANCREATOGRAM N/A 2/6/2019    Procedure: COMBINED ENDOSCOPIC RETROGRADE CHOLANGIOPANCREATOGRAPHY, BILIARY SPINCTEROTOMY AND DILATION, PLACE BILE DUCT STENT;  Surgeon: Guru Andie Gonzalez MD;  [facility]    ENDOSCOPIC RETROGRADE CHOLANGIOPANCREATOGRAM N/A 2/28/2019    Procedure: Endoscopic Retrograde Cholangiopancreatogram, Bile duct stent removal and placement;  Surgeon: Shaun Guerrero MD;  [facility]    ENDOSCOPIC RETROGRADE CHOLANGIOPANCREATOGRAM N/A 4/18/2019    Procedure: COMBINED ENDOSCOPIC RETROGRADE CHOLANGIOPANCREATOGRAPHY, Bile duct stent exchange and Polypectomy;  Surgeon: Shaun Guerrero MD;  [facility]    ENDOSCOPIC RETROGRADE CHOLANGIOPANCREATOGRAM N/A 10/18/2019    Procedure: Endoscopic Retrograde Cholangiopancreatogram;  Surgeon: Shaun Guerrero MD;  [facility]     ENDOSCOPIC RETROGRADE CHOLANGIOPANCREATOGRAM N/A 3/24/2022    Procedure: ENDOSCOPIC RETROGRADE CHOLANGIOPANCREATOGRAPHY;  Surgeon: Shaun Guerrero MD;  Location: [facility] OR    ENDOSCOPIC RETROGRADE CHOLANGIOPANCREATOGRAM N/A 3/16/2023    Procedure: endoscopic retrograde cholangiopancreatography with minor papillotomy;  Surgeon: Shaun Guerrero MD;  Location:  OR    ENDOSCOPIC RETROGRADE CHOLANGIOPANCREATOGRAM WITH SPYGLASS N/A 7/26/2019    Procedure: Endoscopic Retrograde Cholangiopancreatogram With Spyglas,l Radiofrequency Ablation, Stent Exchangeand Duodenal Biopsy x2;  Surgeon: Shaun Guerrero MD;  [facility]    ENDOSCOPIC RETROGRADE CHOLANGIOPANCREATOGRAM WITH SPYGLASS N/A 9/10/2020    Procedure: ENDOSCOPIC RETROGRADE CHOLANGIOPANCREATOGRAPHY, WITH DIRECT DUCT VISUALIZATION, USING PANCREATICOBILIARY FIBEROPTIC PROBE;  Surgeon: Shaun Guerrero MD;  [facility]    ENDOSCOPIC RETROGRADE CHOLANGIOPANCREATOGRAM WITH SPYGLASS N/A 3/22/2021    Procedure: ENDOSCOPIC RETROGRADE CHOLANGIOPANCREATOGRAPHY, WITH DIRECT DUCT VISUALIZATION, USING PANCREATICOBILIARY FIBEROPTIC PROBE;  Surgeon: Shaun Guerrero MD;  [facility]    ESOPHAGOSCOPY, GASTROSCOPY, DUODENOSCOPY (EGD) WITH RADIO FREQUENCY ABLATION, COMBINED N/A 9/10/2020    Procedure: possible repeat ESOPHAGOGASTRODUODENOSCOPY, WITH RADIOFREQUENCY ABLATION and endoscopic mucosal resection;  Surgeon: Shaun Guerrero MD;  [facility]    ESOPHAGOSCOPY, GASTROSCOPY, DUODENOSCOPY (EGD), COMBINED N/A 4/18/2019    Procedure: upper endoscopy with polypectomy;  Surgeon: Shaun Guerrero MD;  [facility]    ESOPHAGOSCOPY, GASTROSCOPY, DUODENOSCOPY (EGD), COMBINED N/A 10/18/2019    Procedure: Upper Endoscopy and ERCP with stent removal, stone removal and biopsy;  Surgeon: Shaun Guerrero MD;  [facility]    ESOPHAGOSCOPY, GASTROSCOPY, DUODENOSCOPY (EGD), COMBINED N/A 3/24/2022    Procedure: ESOPHAGOGASTRODUODENOSCOPY (EGD), Duodenal  polyp removal;  Surgeon: Cesar  MD Shaun;  Location: [facility]    ESOPHAGOSCOPY, GASTROSCOPY, DUODENOSCOPY (EGD), COMBINED N/A 3/16/2023    Procedure: ESOPHAGOGASTRODUODENOSCOPY (EGD);  Surgeon: Shaun Guerrero MD;  [facility]: [facility]    ESOPHAGOSCOPY, GASTROSCOPY, DUODENOSCOPY (EGD), RESECT MUCOSA, COMBINED N/A 2/28/2019    Procedure: Upper Endoscopy, Endoscopic Ultrasound, Endoscopic Mucosal Resection,  Ampullectomy, polypectomy.;  Surgeon: Shaun Guerrero MD;  [facility]    ESOPHAGOSCOPY, GASTROSCOPY, DUODENOSCOPY (EGD), RESECT MUCOSA, COMBINED N/A 3/22/2021    Procedure: ESOPHAGOGASTRODUODENOSCOPY (EGD) with  endoscopic mucosal resection/ polypectomy;  Surgeon: Shaun Guerrero MD;  [facility]    EXCISE LESION LOWER EXTREMITY Right 3/28/2022    Procedure: Wide local excision of right knee melanoma;  Surgeon: Chevy Allison MD;  Location: [facility] OR    EXCISE MASS LOWER EXTREMITY Right 1/13/2022    Procedure: excision of right thigh mass;  Surgeon: Salvador Kelly MD;  Location: [facility] OR    EYE SURGERY      macular hole repaired left eye    HC KNEE SCOPE, DIAGNOSTIC      - both knees    HEAD & NECK SURGERY      wisdom teeth    IR CHEST PORT PLACEMENT > 5 YRS OF AGE  5/2/2022    LAPAROSCOPIC HYSTERECTOMY TOTAL, BILATERAL SALPINGO-OOPHORECTOMY, NODE DISSECTION, COMBINED Bilateral 6/23/2022    Procedure: Total Laparoscopic Hyserectomy, Bilateral Salpibgo-oophorectomy, Bilateral [place] Lymph Node Dissection;  Surgeon: Eli Guidry MD;  [facility]     lidocaine (LIDODERM) 5 % patch  oxyCODONE (ROXICODONE) 5 MG tablet  atenolol (TENORMIN) 50 MG tablet  atorvastatin (LIPITOR) 40 MG tablet  azelastine (ASTELIN) 0.1 % nasal spray  azelastine (OPTIVAR) 0.05 % ophthalmic solution  azelastine (OPTIVAR) 0.05 % SOLN  cetirizine (ZYRTEC) 10 MG tablet  Continuous Blood Gluc Sensor (FREESTYLE DAVID 14 DAY SENSOR) MISC  CONTOUR NEXT TEST test strip  glipiZIDE (GLUCOTROL XL) 10 MG 24 hr tablet  hydrochlorothiazide (HYDRODIURIL) 25 MG  "tablet  insulin glargine (LANTUS VIAL) 100 UNIT/ML vial  insulin lispro (HUMALOG VIAL) 100 UNIT/ML vial  insulin pen needle (MM PEN NEEDLES) 32G X 4 MM miscellaneous  insulin syringe-needle U-100 (BD INSULIN SYRINGE ULTRAFINE) 31G X 5/16\" 0.5 ML miscellaneous  insulin syringe-needle U-100 (BD VEO INSULIN SYRINGE U/F) 31G X 15/64\" 0.3 ML  latanoprost (XALATAN) 0.005 % ophthalmic solution  lidocaine-prilocaine (EMLA) 2.5-2.5 % external cream  LORazepam (ATIVAN) 0.5 MG tablet  ondansetron (ZOFRAN) 8 MG tablet  Semaglutide (RYBELSUS) 3 MG tablet  senna-docusate (SENOKOT-S/PERICOLACE) 8.6-50 MG tablet  timolol maleate (TIMOPTIC) 0.5 % ophthalmic solution  triamcinolone (KENALOG) 0.5 % external ointment  VENTOLIN  (90 Base) MCG/ACT inhaler  XARELTO ANTICOAGULANT 20 MG TABS tablet      Allergies   Allergen Reactions    Bromfenac Visual Disturbance      xibrom  Causes sever eye pain    Codeine Nausea     Other reaction(s): Dizziness, Headache    Metformin GI Disturbance    Morphine And Related      \"NAUSE,HA AND DIZZINESS\"    Seasonal Allergies      Family History  Family History   Problem Relation Age of Onset    Hypertension Mother         diabetes,hypoythryoidism, stroke    Diabetes Mother     Breast Cancer Mother     Heart Disease Father         , cancer lip    Uterine Cancer Sister     Connective Tissue Disorder Sister         fibromyalgia    Diabetes Sister     Hypertension Brother     Cerebrovascular Disease Maternal Grandmother         , diabetes    Cerebrovascular Disease Maternal Grandfather             Cancer Paternal Grandmother             Heart Disease Paternal Grandfather             Cancer Paternal Aunt         ovarian    Breast Cancer Niece     Asthma Maternal Aunt      Social History   Social History     Tobacco Use    Smoking status: Never     Passive exposure: Never    Smokeless tobacco: Never   Vaping Use    Vaping Use: Never used   Substance Use Topics    " Alcohol use: No     Alcohol/week: 0.0 standard drinks of alcohol    Drug use: No      Past medical history, past surgical history, medications, allergies, family history, and social history were reviewed with the patient. No additional pertinent items.      A complete review of systems was performed with pertinent positives and negatives noted in the HPI, and all other systems negative.    Physical Exam   BP: (!) 146/72  Pulse: 75  Temp: 97.9  F (36.6  C)  Resp: 16  Weight: 98.9 kg (218 lb)  SpO2: 97 %  Physical Exam  Vitals and nursing note reviewed.   Constitutional:       General: She is not in acute distress.     Appearance: Normal appearance.   HENT:      Head: Normocephalic and atraumatic.      Nose: Nose normal.   Eyes:      Pupils: Pupils are equal, round, and reactive to light.   Cardiovascular:      Rate and Rhythm: Normal rate and regular rhythm.   Pulmonary:      Effort: Pulmonary effort is normal.   Abdominal:      General: There is no distension.   Musculoskeletal:         General: No deformity. Normal range of motion.      Cervical back: Normal range of motion.      Comments: Tenderness palpation of the anterior and posterior glenohumeral joint and proximal humerus.  Pain exacerbated with shoulder flexion and external rotation.  Range of motion limited due to pain.  Passive range of motion is intact.  Upper extremity is neurovascularly intact.    Tenderness palpation of the anterior knee as well as some tenderness over the greater trochanter.  No obvious ligament laxity on knee exam.  No deformities.  Patient is ambulatory.   Skin:     General: Skin is warm.   Neurological:      Mental Status: She is alert and oriented to person, place, and time.   Psychiatric:         Mood and Affect: Mood normal.           ED Course, Procedures, & Data    10:53 PM  The patient was seen and examined by Phu Wang DO.  in Room ED 27.        Results for orders placed or performed during the hospital encounter of  09/19/23   CT Head w/o Contrast     Status: None    Narrative    EXAM: CT HEAD W/O CONTRAST  LOCATION: [facility]  DATE: 9/19/2023    INDICATION: Traumatic injury. Fall.  COMPARISON: 07/29/2022  TECHNIQUE: Routine CT Head without IV contrast. Multiplanar reformats. Dose reduction techniques were used.    FINDINGS:  INTRACRANIAL CONTENTS: No intracranial hemorrhage, extraaxial collection, or mass effect.  No CT evidence of acute infarct. Mild presumed chronic small vessel ischemic changes. Mild generalized volume loss. No hydrocephalus.     VISUALIZED ORBITS/SINUSES/MASTOIDS: Prior bilateral cataract surgery. Visualized portions of the orbits are otherwise unremarkable. Mild mucosal thickening scattered about the paranasal sinuses. No middle ear or mastoid effusion.    BONES/SOFT TISSUES: No acute abnormality.      Impression    IMPRESSION:  1.  No CT evidence for acute intracranial process.  2.  Brain atrophy and presumed chronic microvascular ischemic changes as above.   CT Chest w/o Contrast     Status: None    Narrative    EXAM: CT CHEST W/O CONTRAST  LOCATION: [facility]  DATE: 9/20/2023    INDICATION: fall, pain L shoulder and L lateral rib cage  COMPARISON: CT from 01/27/2022.  TECHNIQUE: CT chest without IV contrast. Multiplanar reformats were obtained. Dose reduction techniques were used.  CONTRAST: None.    FINDINGS:   LUNGS AND PLEURA: Small pulmonary nodules in the right lower lobe again identified measuring up to 6 mm in the right lower lobe on image 195 of series 5, stable by my measurement. No focal consolidation or contusion. Scattered bands of atelectasis   throughout the lungs, left greater than right. No pneumothorax or pleural effusion.    MEDIASTINUM/AXILLAE: Heart size is normal. Small hiatal hernia. No pericardial effusion or mediastinal hematoma. Right chest port catheter tip terminates at the  cavoatrial junction. Small coronary artery calcifications.    CORONARY ARTERY CALCIFICATION: Mild.    UPPER ABDOMEN: Central hepatic pneumobilia as seen previously.    MUSCULOSKELETAL: Degenerative disc changes of the mid and lower thoracic spine. A few very subtle contour deformities in the left anterior fourth through sixth ribs are indeterminate for nondisplaced fractures. There is an old left posterior ninth rib   fracture. A few of the ribs have a sclerotic appearance as seen in the right posterior fourth and fifth ribs though this is stable.      Impression    IMPRESSION:   1.  Equivocal findings of nondisplaced left anterior fourth - sixth rib fractures. Recommend correlation for point tenderness.    2.  Otherwise, no acute, traumatic findings in the chest given absence of the benefit of IV contrast.     XR Pelvis w Hip Port Right 1 View     Status: None    Narrative    EXAM: XR PELVIS AND HIP PORTABLE RIGHT 1 VIEW  LOCATION: [facility]  DATE: 9/19/2023    INDICATION: fall, r hip pain  COMPARISON: CT pelvis 04/07/2023.      Impression    IMPRESSION: Post right femoral acetabular arthroplasty. Hardware in satisfactory position without complication detected. No acute fracture visualized. No dislocation. Degenerative osteoarthritis of the left hip with loss of joint space and formation of   small osteophytes.   XR Knee Port Right 1/2 Views     Status: None    Narrative    EXAM: XR KNEE PORT RIGHT 1/2 VIEWS  LOCATION: [facility]  DATE: 9/19/2023    INDICATION: fall, R anterior knee pain, new contusion  COMPARISON: Prior studies currently unavailable.      Impression    IMPRESSION: Total right knee arthroplasty with patellar resurfacing. Components in satisfactory position and alignment. No evidence for fracture or joint effusion.   XR Shoulder Left Port G/E 2 Views     Status: None    Narrative    EXAM: XR SHOULDER LEFT  PORT G/E 2 VIEWS  LOCATION: [facility]  DATE: 9/19/2023    INDICATION: fall, L shoulder pain  COMPARISON: None.      Impression    IMPRESSION: Left shoulder is normally located. Mild to moderate degenerative changes in the AC joint and glenohumeral joint. Calcific tendinopathy noted adjacent to the greater tuberosity. No acute bony abnormalities.     Medications - No data to display  Labs Ordered and Resulted from Time of ED Arrival to Time of ED Departure - No data to display  CT Chest w/o Contrast   Final Result   IMPRESSION:    1.  Equivocal findings of nondisplaced left anterior fourth - sixth rib fractures. Recommend correlation for point tenderness.      2.  Otherwise, no acute, traumatic findings in the chest given absence of the benefit of IV contrast.         CT Head w/o Contrast   Final Result   IMPRESSION:   1.  No CT evidence for acute intracranial process.   2.  Brain atrophy and presumed chronic microvascular ischemic changes as above.      XR Knee Port Right 1/2 Views   Final Result   IMPRESSION: Total right knee arthroplasty with patellar resurfacing. Components in satisfactory position and alignment. No evidence for fracture or joint effusion.      XR Pelvis w Hip Port Right 1 View   Final Result   IMPRESSION: Post right femoral acetabular arthroplasty. Hardware in satisfactory position without complication detected. No acute fracture visualized. No dislocation. Degenerative osteoarthritis of the left hip with loss of joint space and formation of    small osteophytes.      XR Shoulder Left Port G/E 2 Views   Final Result   IMPRESSION: Left shoulder is normally located. Mild to moderate degenerative changes in the AC joint and glenohumeral joint. Calcific tendinopathy noted adjacent to the greater tuberosity. No acute bony abnormalities.             Critical care was not performed.     Medical Decision Making  The patient's presentation was of moderate  complexity (an acute complicated injury).    The patient's evaluation involved:  ordering and/or review of 3+ test(s) in this encounter (see separate area of note for details)    The patient's management necessitated moderate risk (prescription drug management including medications given in the ED) and high risk (a decision regarding hospitalization).    Assessment & Plan    Patient presents the ED for evaluation of multiple areas of pain after a ground-level fall.  These include left shoulder, left proximal humerus, left anterior chest wall, right knee, and right hip.  She is also unsure if she hit her head.  Denies any loss of consciousness in the fall seems purely mechanical.  No indication for laboratory work-up or EKG.    CT head is negative for intracranial hemorrhage, skull fracture, or other acute pathology.  Knee hip shoulder and x-ray images and radiology interpretation reviewed by me.  No evidence of acute fracture or dislocation.  Previous hardware is intact.    CT scan of the chest does show some mild anterior rib fractures of 4 5 and 6.  Patient does have tenderness palpation of this area so I feel that they are likely acute.  There is no obvious displacement.  Patient is in no respiratory distress.    I offered admission to the hospital for pain control given her multiple rib fractures.  Patient states that she feels fine and would rather be discharged home.  Strict instructions to follow-up with her PCP within the next week.  Educated reasons to return to the ED.  Discharged with instructions for acetaminophen and ice.  Have also provided prescriptions for lidocaine patches and short course of oxycodone.    I have reviewed the nursing notes. I have reviewed the findings, diagnosis, plan and need for follow up with the patient.    Discharge Medication List as of 9/20/2023  1:12 AM        START taking these medications    Details   lidocaine (LIDODERM) 5 % patch Place 1 patch onto the skin every 24  hours To prevent lidocaine toxicity, patient should be patch free for 12 hrs daily.Disp-12 patch, [id number]      oxyCODONE (ROXICODONE) 5 MG tablet Take 1 tablet (5 mg) by mouth every 6 hours as needed for pain, Disp-12 tablet, R-0, E-Prescribe             Final diagnoses:   Closed fracture of multiple ribs of left side, initial encounter   I, ANA MCCALL, am serving as a trained medical scribe to document services personally performed by Puh Wang DO, based on the provider's statements to me.     I, Phu Wang DO, was physically present and have reviewed and verified the accuracy of this note documented by ANA MCCALL.     Phu Wang DO.     [facility] EMERGENCY DEPARTMENT  9/19/2023     Phu Wang DO  09/20/23 0214

## 2023-09-21 DIAGNOSIS — E11.65 TYPE 2 DIABETES MELLITUS WITH HYPERGLYCEMIA, WITHOUT LONG-TERM CURRENT USE OF INSULIN (H): ICD-10-CM

## 2023-09-22 RX ORDER — FLASH GLUCOSE SENSOR
KIT MISCELLANEOUS
Qty: 2 EACH | Refills: 5 | Status: SHIPPED | OUTPATIENT
Start: 2023-09-22 | End: 2024-03-25

## 2023-10-09 ENCOUNTER — OFFICE VISIT (OUTPATIENT)
Dept: ENDOCRINOLOGY | Facility: CLINIC | Age: 73
End: 2023-10-09
Payer: COMMERCIAL

## 2023-10-09 VITALS
HEIGHT: 67 IN | WEIGHT: 225.4 LBS | RESPIRATION RATE: 18 BRPM | BODY MASS INDEX: 35.38 KG/M2 | DIASTOLIC BLOOD PRESSURE: 78 MMHG | HEART RATE: 63 BPM | TEMPERATURE: 98.6 F | OXYGEN SATURATION: 97 % | SYSTOLIC BLOOD PRESSURE: 136 MMHG

## 2023-10-09 DIAGNOSIS — E66.01 CLASS 2 SEVERE OBESITY DUE TO EXCESS CALORIES WITH SERIOUS COMORBIDITY AND BODY MASS INDEX (BMI) OF 35.0 TO 35.9 IN ADULT (H): ICD-10-CM

## 2023-10-09 DIAGNOSIS — E11.65 TYPE 2 DIABETES MELLITUS WITH HYPERGLYCEMIA, WITHOUT LONG-TERM CURRENT USE OF INSULIN (H): Primary | ICD-10-CM

## 2023-10-09 DIAGNOSIS — E66.812 CLASS 2 SEVERE OBESITY DUE TO EXCESS CALORIES WITH SERIOUS COMORBIDITY AND BODY MASS INDEX (BMI) OF 35.0 TO 35.9 IN ADULT (H): ICD-10-CM

## 2023-10-09 DIAGNOSIS — E11.65 TYPE 2 DIABETES MELLITUS WITH HYPERGLYCEMIA, WITHOUT LONG-TERM CURRENT USE OF INSULIN (H): ICD-10-CM

## 2023-10-09 PROCEDURE — 99213 OFFICE O/P EST LOW 20 MIN: CPT | Performed by: PHYSICIAN ASSISTANT

## 2023-10-09 RX ORDER — INSULIN LISPRO 100 [IU]/ML
INJECTION, SOLUTION INTRAVENOUS; SUBCUTANEOUS
Qty: 10 ML | Refills: 0 | Status: SHIPPED | OUTPATIENT
Start: 2023-10-09 | End: 2024-03-11

## 2023-10-09 ASSESSMENT — ENCOUNTER SYMPTOMS
EXERCISE INTOLERANCE: 1
DOUBLE VISION: 0
ABDOMINAL PAIN: 0
HEARTBURN: 0
JOINT SWELLING: 1
ORTHOPNEA: 0
HYPOTENSION: 0
DIARRHEA: 1
HYPERTENSION: 1
EYE PAIN: 0
MYALGIAS: 1
LIGHT-HEADEDNESS: 0
MUSCLE WEAKNESS: 0
CONSTIPATION: 0
STIFFNESS: 1
VOMITING: 0
BOWEL INCONTINENCE: 0
NAUSEA: 0
EYE WATERING: 0
JAUNDICE: 0
BLOATING: 0
ARTHRALGIAS: 1
PALPITATIONS: 0
MUSCLE CRAMPS: 0
LEG PAIN: 0
BLOOD IN STOOL: 0
BACK PAIN: 1
EYE IRRITATION: 0
RECTAL PAIN: 0
NECK PAIN: 0
SYNCOPE: 0
EYE REDNESS: 0
SLEEP DISTURBANCES DUE TO BREATHING: 0

## 2023-10-09 NOTE — LETTER
"    10/9/2023         RE: Gricelda Sabillon  [address] [address]        Dear Colleague,    Thank you for referring your patient, Gricelda Sabillon, to the [facility]. Please see a copy of my visit note below.    Assessment/Plan :   Type 2 DM with long term use of insulin. Melva is doing a great job at managing her blood sugars. Her current time in range is at 78% and she reports that it was previously over 80%. We discussed medication options, including adding a once weekly GLP-1. It is likely that with the once weekly, we could discontinue the meal time insulin. Melva is concerned about cost, at this time. She is currently in the \"donut-hole.\" Her blood sugars are stable and I do not see any reason to make adjustments, at this time. We will plan on a follow-up visit in a couple months and we can discuss adding a GLP-1 at that visit. If she has any problems before then, she can reach out to our office.       I have independently reviewed and interpreted labs, imaging as indicated.      Chief complaint:  Gricelda is a 73 year old female who returns for follow-up of Type 2 DM.    I have reviewed Care Everywhere including [facility], [facility], [facility], [facility], [facility] , [facility], [place] lab reports, imaging reports and provider notes as indicated.      HISTORY OF PRESENT ILLNESS  Melva continues to work on managing her blood sugars. She has been really working to keep all of her readings between 70 and 180 mg/dl. She is currently taking Lantus 46 unit(s) daily, glipizide 10 mg daily, and Humalog 7 unit(s) with breakfast and lunch, 6 unit(s) with supper. She continues to monitor her blood sugars closely with the Baldo sensor.    Melva has not had any problems with severe hyperglycemia and/or hypoglycemia. She is still getting a large spike in her blood sugars around midday. She admits that she is not consistent with mealtimes and it sounds like the " spike may be more due to eating breakfast late, since she often skips lunch. She also denies any problems with worsening vision or an increase in numbness/tingling in her feet. It is interesting to note that she has been falling more often. She doesn't feel like she is unsteady. She is just not as stable on her feet.     Melva's diabetes is complicated by hyperlipidemia, obesity, history of orthostatic hypotension, metastatic malignant melanoma, HTN, CHERRY, heart murmur, and cataract. She has discussed using semaglutide in the past with her primary care provider but it was too expensive. She is frustrated with her recent weight gain and she is having trouble losing the extra weight.     Endocrine relevant labs are as follows:   Latest Reference Range & Units 08/23/23 16:10   Hemoglobin A1C 0.0 - 5.6 % 7.9 (H)   (H): Data is abnormally high    REVIEW OF SYSTEMS  Answers submitted by the patient for this visit:  Symptoms you have experienced in the last 30 days (Submitted on 10/9/2023)  General Symptoms: No  Skin Symptoms: No  HENT Symptoms: No  EYE SYMPTOMS: Yes  HEART SYMPTOMS: Yes  LUNG SYMPTOMS: No  INTESTINAL SYMPTOMS: Yes  URINARY SYMPTOMS: No  GYNECOLOGIC SYMPTOMS: No  BREAST SYMPTOMS: No  SKELETAL SYMPTOMS: Yes  BLOOD SYMPTOMS: No  NERVOUS SYSTEM SYMPTOMS: No  MENTAL HEALTH SYMPTOMS: No  Please answer the questions below to tell us what conditions you are experiencing: (Submitted on 10/9/2023)  Eye pain: No  Vision loss: No  Dry eyes: No  Watery eyes: No  Eye bulging: No  Double vision: No  Flashing of lights: No  Spots: No  Floaters: Yes  Redness: No  Crossed eyes: No  Tunnel Vision: No  Yellowing of eyes: No  Eye irritation: No  Please answer the questions below to tell us what condition you are experiencing: (Submitted on 10/9/2023)  Chest pain or pressure: No  Fast or irregular heartbeat: No  Pain in legs with walking: No  Trouble breathing while lying down: No  Fingers or toes appear blue: No  High blood  pressure: Yes  Low blood pressure: No  Fainting: No  Murmurs: No  Pacemaker: No  Varicose veins: No  Edema or swelling: Yes  Wake up at night with shortness of breath: No  Light-headedness: No  Exercise intolerance: Yes  Please answer the questions below to tell us what conditions you are experiencing: (Submitted on 10/9/2023)  Heart burn or indigestion: No  Nausea: No  Vomiting: No  Abdominal pain: No  Bloating: No  Constipation: No  Diarrhea: Yes  Blood in stool: No  Black stools: No  Rectal or Anal pain: No  Fecal incontinence: No  Yellowing of skin or eyes: No  Vomit with blood: No  Change in stools: No  Please answer the questions below to tell us what condition you are experiencing: (Submitted on 10/9/2023)  Back pain: Yes  Muscle aches: Yes  Neck pain: No  Swollen joints: Yes  Joint pain: Yes  Bone pain: No  Muscle cramps: No  Muscle weakness: No  Joint stiffness: Yes  Bone fracture: Yes    Endocrine: positive for diabetes  Skin: negative  Eyes: negative for, visual blurring, redness, tearing, itching  Ears/Nose/Throat: negative  Respiratory: No shortness of breath, dyspnea on exertion, cough, or hemoptysis  Cardiovascular: negative for, irregular heart beat, chest pain, lower extremity edema, and exercise intolerance  Gastrointestinal: negative for, nausea, vomiting, constipation, and diarrhea  Genitourinary: negative for, nocturia, dysuria, frequency, and urgency  Musculoskeletal: positive for fracture/rib fracture, negative for, muscular weakness, nocturnal cramping, and foot pain  Neurologic: negative for and numbness or tingling of feet  Psychiatric: negative  Hematologic/Lymphatic/Immunologic: negative    Past Medical History  Past Medical History:   Diagnosis Date     Asthma, mild intermittent      Cataract      Endometrial cancer (H) 06/2022     HTN, goal below 140/90      Hyperlipidemia LDL goal < 100      Macular hole of left eye      Melanoma (H)     RIGHT KNEE     Sleep apnea     uses c pap      "Type 2 diabetes, HbA1C goal < 8% (H)        Medications  Current Outpatient Medications   Medication Sig Dispense Refill     atenolol (TENORMIN) 50 MG tablet Take 1 tablet (50 mg) by mouth daily 90 tablet 1     atorvastatin (LIPITOR) 40 MG tablet Take 1 tablet (40 mg) by mouth daily 90 tablet 1     azelastine (ASTELIN) 0.1 % nasal spray USE 1 SPRAY(S) IN EACH NOSTRIL TWICE DAILY AS NEEDED 30 mL 0     azelastine (OPTIVAR) 0.05 % ophthalmic solution        cetirizine (ZYRTEC) 10 MG tablet Take 10 mg by mouth every morning       Continuous Blood Gluc Sensor (FREESTYLE DAVID 14 DAY SENSOR) MISC USE 1 EVERY 14 DAYS 2 each 5     CONTOUR NEXT TEST test strip USE 1 STRIP TO CHECK GLUCOSE ONCE DAILY 100 strip 4     glipiZIDE (GLUCOTROL XL) 10 MG 24 hr tablet Take 1 tablet (10 mg) by mouth daily 90 tablet 1     HUMALOG 100 UNIT/ML injection INJECT 5 UNITS SUBCUTANEOUSLY THREE TIMES DAILY BEFORE MEALS 10 mL 0     hydrochlorothiazide (HYDRODIURIL) 25 MG tablet TAKE 1 TABLET BY MOUTH ONCE DAILY IN THE MORNING 90 tablet 3     insulin glargine (LANTUS VIAL) 100 UNIT/ML vial Inject 44 Units Subcutaneous At Bedtime 15 mL 6     insulin pen needle (MM PEN NEEDLES) 32G X 4 MM miscellaneous Inject insulin 4 times a day 300 each 1     insulin syringe-needle U-100 (BD INSULIN SYRINGE ULTRAFINE) 31G X 5/16\" 0.5 ML miscellaneous Use daily with NPH insulin 100 each 11     insulin syringe-needle U-100 (BD VEO INSULIN SYRINGE U/F) 31G X 15/64\" 0.3 ML Patient has 4 shots of insulin a day 300 each 1     latanoprost (XALATAN) 0.005 % ophthalmic solution Place 1 drop into both eyes At Bedtime  2.5 mL 1     lidocaine (LIDODERM) 5 % patch Place 1 patch onto the skin every 24 hours To prevent lidocaine toxicity, patient should be patch free for 12 hrs daily. 12 patch 0     lidocaine-prilocaine (EMLA) 2.5-2.5 % external cream APPLY CREAM TOPICALLY ONCE DAILY AS NEEDED FOR PAIN APPLY A PEA SIZED AMOUNT OVER PORT SITE 60 MINUTES PRIOR TO USE COVER WITH " "PLASTIC WRAP       LORazepam (ATIVAN) 0.5 MG tablet Take 1 tablet (0.5 mg) by mouth nightly as needed for anxiety or sleep 30 tablet 0     ondansetron (ZOFRAN) 8 MG tablet Take 1 tablet (8 mg) by mouth every 8 hours as needed for nausea 30 tablet 1     semaglutide (OZEMPIC) 2 MG/3ML pen Inject 0.25 mg Subcutaneous every 7 days 3 mL 0     Semaglutide (RYBELSUS) 3 MG tablet Take 3 mg by mouth daily 30 tablet 3     senna-docusate (SENOKOT-S/PERICOLACE) 8.6-50 MG tablet Take 2 tablets by mouth daily as needed for constipation 60 tablet 11     timolol maleate (TIMOPTIC) 0.5 % ophthalmic solution INSTILL 1 DROP INTO EACH EYE TWICE DAILY       triamcinolone (KENALOG) 0.5 % external ointment Apply 1 g topically daily as needed for irritation       VENTOLIN  (90 Base) MCG/ACT inhaler INHALE 2 PUFFS BY MOUTH EVERY 4 HOURS AS NEEDED FOR SHORTNESS OF BREATH 18 g 0     XARELTO ANTICOAGULANT 20 MG TABS tablet Take 1 tablet by mouth daily         Allergies  Allergies   Allergen Reactions     Bromfenac Visual Disturbance      xibrom  Causes sever eye pain     Codeine Nausea     Other reaction(s): Dizziness, Headache     Metformin GI Disturbance     Morphine And Related      \"NAUSE,HA AND DIZZINESS\"     Seasonal Allergies          Family History  family history includes Asthma in her maternal aunt; Breast Cancer in her mother and niece; Cancer in her paternal aunt and paternal grandmother; Cerebrovascular Disease in her maternal grandfather and maternal grandmother; Connective Tissue Disorder in her sister; Diabetes in her mother and sister; Heart Disease in her father and paternal grandfather; Hypertension in her brother and mother; Uterine Cancer in her sister.    Social History  Social History     Tobacco Use     Smoking status: Never     Passive exposure: Never     Smokeless tobacco: Never   Vaping Use     Vaping Use: Never used   Substance Use Topics     Alcohol use: No     Alcohol/week: 0.0 standard drinks of alcohol " "    Drug use: No       Physical Exam  /78 (BP Location: Left arm, Patient Position: Chair, Cuff Size: Adult Large)   Pulse 63   Temp 98.6  F (37  C) (Tympanic)   Resp 18   Ht 1.702 m (5' 7.01\")   Wt 102.2 kg (225 lb 6.4 oz)   LMP  (LMP Unknown)   SpO2 97%   Breastfeeding No   BMI 35.29 kg/m    Body mass index is 35.29 kg/m .  GENERAL :  In no apparent distress  SKIN: Normal color, normal temperature, texture.  No hirsutism, alopecia or purple striae.     EYES: PERRL, EOMI, No scleral icterus,  No proptosis, conjunctival redness, stare, retraction  RESP: Lungs clear to auscultation bilaterally  CARDIAC: Regular rate and rhythm, normal S1 S2, without murmurs, rubs or gallops    NEURO: awake, alert, responds appropriately to questions.  Cranial nerves intact.   Moves all extremities; Gait normal.  No tremor of the outstretched hand.    EXTREMITIES: No clubbing, cyanosis or edema.    DATA REVIEW  FreeStyle LibreView  Time in target range 78%  High 17%, Very High 4%  Very Low 1%  Current Ave  mg/dl      Again, thank you for allowing me to participate in the care of your patient.        Sincerely,        Danisha Villa PA-C  "

## 2023-10-10 ENCOUNTER — TELEPHONE (OUTPATIENT)
Dept: ENDOCRINOLOGY | Facility: CLINIC | Age: 73
End: 2023-10-10

## 2023-10-10 NOTE — TELEPHONE ENCOUNTER
[facility]    Phone Message    May a detailed message be left on voicemail: yes     Reason for Call: Other: Pt is calling in regards to the Ozempic. Pt decided she wanted to move forward with it, so she is calling to see how much insulin she will need now that she will be on the Ozempic. Please contact pt with information. She stated she won't start the Ozempic until she's gotten direction from her care team.      Action Taken: Other: [facility]    Travel Screening: Not Applicable

## 2023-10-10 NOTE — PROGRESS NOTES
"Assessment/Plan :   Type 2 DM with long term use of insulin. Melva is doing a great job at managing her blood sugars. Her current time in range is at 78% and she reports that it was previously over 80%. We discussed medication options, including adding a once weekly GLP-1. It is likely that with the once weekly, we could discontinue the meal time insulin. Melva is concerned about cost, at this time. She is currently in the \"donut-hole.\" Her blood sugars are stable and I do not see any reason to make adjustments, at this time. We will plan on a follow-up visit in a couple months and we can discuss adding a GLP-1 at that visit. If she has any problems before then, she can reach out to our office.       I have independently reviewed and interpreted labs, imaging as indicated.      Chief complaint:  Gricelda is a 73 year old female who returns for follow-up of Type 2 DM.    I have reviewed Care Everywhere including [facility], [facility], [facility], [place], [facility], [facility] , [facility], [place] lab reports, imaging reports and provider notes as indicated.      HISTORY OF PRESENT ILLNESS  Melva continues to work on managing her blood sugars. She has been really working to keep all of her readings between 70 and 180 mg/dl. She is currently taking Lantus 46 unit(s) daily, glipizide 10 mg daily, and Humalog 7 unit(s) with breakfast and lunch, 6 unit(s) with supper. She continues to monitor her blood sugars closely with the Baldo sensor.    Melva has not had any problems with severe hyperglycemia and/or hypoglycemia. She is still getting a large spike in her blood sugars around midday. She admits that she is not consistent with mealtimes and it sounds like the spike may be more due to eating breakfast late, since she often skips lunch. She also denies any problems with worsening vision or an increase in numbness/tingling in her feet. It is interesting to note that she has been falling more often. She doesn't " feel like she is unsteady. She is just not as stable on her feet.     Melva's diabetes is complicated by hyperlipidemia, obesity, history of orthostatic hypotension, metastatic malignant melanoma, HTN, CHERRY, heart murmur, and cataract. She has discussed using semaglutide in the past with her primary care provider but it was too expensive. She is frustrated with her recent weight gain and she is having trouble losing the extra weight.     Endocrine relevant labs are as follows:   Latest Reference Range & Units 08/23/23 16:10   Hemoglobin A1C 0.0 - 5.6 % 7.9 (H)   (H): Data is abnormally high    REVIEW OF SYSTEMS  Answers submitted by the patient for this visit:  Symptoms you have experienced in the last 30 days (Submitted on 10/9/2023)  General Symptoms: No  Skin Symptoms: No  HENT Symptoms: No  EYE SYMPTOMS: Yes  HEART SYMPTOMS: Yes  LUNG SYMPTOMS: No  INTESTINAL SYMPTOMS: Yes  URINARY SYMPTOMS: No  GYNECOLOGIC SYMPTOMS: No  BREAST SYMPTOMS: No  SKELETAL SYMPTOMS: Yes  BLOOD SYMPTOMS: No  NERVOUS SYSTEM SYMPTOMS: No  MENTAL HEALTH SYMPTOMS: No  Please answer the questions below to tell us what conditions you are experiencing: (Submitted on 10/9/2023)  Eye pain: No  Vision loss: No  Dry eyes: No  Watery eyes: No  Eye bulging: No  Double vision: No  Flashing of lights: No  Spots: No  Floaters: Yes  Redness: No  Crossed eyes: No  Tunnel Vision: No  Yellowing of eyes: No  Eye irritation: No  Please answer the questions below to tell us what condition you are experiencing: (Submitted on 10/9/2023)  Chest pain or pressure: No  Fast or irregular heartbeat: No  Pain in legs with walking: No  Trouble breathing while lying down: No  Fingers or toes appear blue: No  High blood pressure: Yes  Low blood pressure: No  Fainting: No  Murmurs: No  Pacemaker: No  Varicose veins: No  Edema or swelling: Yes  Wake up at night with shortness of breath: No  Light-headedness: No  Exercise intolerance: Yes  Please answer the questions below  to tell us what conditions you are experiencing: (Submitted on 10/9/2023)  Heart burn or indigestion: No  Nausea: No  Vomiting: No  Abdominal pain: No  Bloating: No  Constipation: No  Diarrhea: Yes  Blood in stool: No  Black stools: No  Rectal or Anal pain: No  Fecal incontinence: No  Yellowing of skin or eyes: No  Vomit with blood: No  Change in stools: No  Please answer the questions below to tell us what condition you are experiencing: (Submitted on 10/9/2023)  Back pain: Yes  Muscle aches: Yes  Neck pain: No  Swollen joints: Yes  Joint pain: Yes  Bone pain: No  Muscle cramps: No  Muscle weakness: No  Joint stiffness: Yes  Bone fracture: Yes    Endocrine: positive for diabetes  Skin: negative  Eyes: negative for, visual blurring, redness, tearing, itching  Ears/Nose/Throat: negative  Respiratory: No shortness of breath, dyspnea on exertion, cough, or hemoptysis  Cardiovascular: negative for, irregular heart beat, chest pain, lower extremity edema, and exercise intolerance  Gastrointestinal: negative for, nausea, vomiting, constipation, and diarrhea  Genitourinary: negative for, nocturia, dysuria, frequency, and urgency  Musculoskeletal: positive for fracture/rib fracture, negative for, muscular weakness, nocturnal cramping, and foot pain  Neurologic: negative for and numbness or tingling of feet  Psychiatric: negative  Hematologic/Lymphatic/Immunologic: negative    Past Medical History  Past Medical History:   Diagnosis Date    Asthma, mild intermittent     Cataract     Endometrial cancer (H) 06/2022    HTN, goal below 140/90     Hyperlipidemia LDL goal < 100     Macular hole of left eye     Melanoma (H)     RIGHT KNEE    Sleep apnea     uses c pap    Type 2 diabetes, HbA1C goal < 8% (H)        Medications  Current Outpatient Medications   Medication Sig Dispense Refill    atenolol (TENORMIN) 50 MG tablet Take 1 tablet (50 mg) by mouth daily 90 tablet 1    atorvastatin (LIPITOR) 40 MG tablet Take 1 tablet (40 mg)  "by mouth daily 90 tablet 1    azelastine (ASTELIN) 0.1 % nasal spray USE 1 SPRAY(S) IN EACH NOSTRIL TWICE DAILY AS NEEDED 30 mL 0    azelastine (OPTIVAR) 0.05 % ophthalmic solution       cetirizine (ZYRTEC) 10 MG tablet Take 10 mg by mouth every morning      Continuous Blood Gluc Sensor (FREESTYLE DAVID 14 DAY SENSOR) MISC USE 1 EVERY 14 DAYS 2 each 5    CONTOUR NEXT TEST test strip USE 1 STRIP TO CHECK GLUCOSE ONCE DAILY 100 strip 4    glipiZIDE (GLUCOTROL XL) 10 MG 24 hr tablet Take 1 tablet (10 mg) by mouth daily 90 tablet 1    HUMALOG 100 UNIT/ML injection INJECT 5 UNITS SUBCUTANEOUSLY THREE TIMES DAILY BEFORE MEALS 10 mL 0    hydrochlorothiazide (HYDRODIURIL) 25 MG tablet TAKE 1 TABLET BY MOUTH ONCE DAILY IN THE MORNING 90 tablet 3    insulin glargine (LANTUS VIAL) 100 UNIT/ML vial Inject 44 Units Subcutaneous At Bedtime 15 mL 6    insulin pen needle (MM PEN NEEDLES) 32G X 4 MM miscellaneous Inject insulin 4 times a day 300 each 1    insulin syringe-needle U-100 (BD INSULIN SYRINGE ULTRAFINE) 31G X 5/16\" 0.5 ML miscellaneous Use daily with NPH insulin 100 each 11    insulin syringe-needle U-100 (BD VEO INSULIN SYRINGE U/F) 31G X 15/64\" 0.3 ML Patient has 4 shots of insulin a day 300 each 1    latanoprost (XALATAN) 0.005 % ophthalmic solution Place 1 drop into both eyes At Bedtime  2.5 mL 1    lidocaine (LIDODERM) 5 % patch Place 1 patch onto the skin every 24 hours To prevent lidocaine toxicity, patient should be patch free for 12 hrs daily. 12 patch 0    lidocaine-prilocaine (EMLA) 2.5-2.5 % external cream APPLY CREAM TOPICALLY ONCE DAILY AS NEEDED FOR PAIN APPLY A PEA SIZED AMOUNT OVER PORT SITE 60 MINUTES PRIOR TO USE COVER WITH PLASTIC WRAP      LORazepam (ATIVAN) 0.5 MG tablet Take 1 tablet (0.5 mg) by mouth nightly as needed for anxiety or sleep 30 tablet 0    ondansetron (ZOFRAN) 8 MG tablet Take 1 tablet (8 mg) by mouth every 8 hours as needed for nausea 30 tablet 1    semaglutide (OZEMPIC) 2 MG/3ML pen " "Inject 0.25 mg Subcutaneous every 7 days 3 mL 0    Semaglutide (RYBELSUS) 3 MG tablet Take 3 mg by mouth daily 30 tablet 3    senna-docusate (SENOKOT-S/PERICOLACE) 8.6-50 MG tablet Take 2 tablets by mouth daily as needed for constipation 60 tablet 11    timolol maleate (TIMOPTIC) 0.5 % ophthalmic solution INSTILL 1 DROP INTO EACH EYE TWICE DAILY      triamcinolone (KENALOG) 0.5 % external ointment Apply 1 g topically daily as needed for irritation      VENTOLIN  (90 Base) MCG/ACT inhaler INHALE 2 PUFFS BY MOUTH EVERY 4 HOURS AS NEEDED FOR SHORTNESS OF BREATH 18 g 0    XARELTO ANTICOAGULANT 20 MG TABS tablet Take 1 tablet by mouth daily         Allergies  Allergies   Allergen Reactions    Bromfenac Visual Disturbance      xibrom  Causes sever eye pain    Codeine Nausea     Other reaction(s): Dizziness, Headache    Metformin GI Disturbance    Morphine And Related      \"NAUSE,HA AND DIZZINESS\"    Seasonal Allergies          Family History  family history includes Asthma in her maternal aunt; Breast Cancer in her mother and niece; Cancer in her paternal aunt and paternal grandmother; Cerebrovascular Disease in her maternal grandfather and maternal grandmother; Connective Tissue Disorder in her sister; Diabetes in her mother and sister; Heart Disease in her father and paternal grandfather; Hypertension in her brother and mother; Uterine Cancer in her sister.    Social History  Social History     Tobacco Use    Smoking status: Never     Passive exposure: Never    Smokeless tobacco: Never   Vaping Use    Vaping Use: Never used   Substance Use Topics    Alcohol use: No     Alcohol/week: 0.0 standard drinks of alcohol    Drug use: No       Physical Exam  /78 (BP Location: Left arm, Patient Position: Chair, Cuff Size: Adult Large)   Pulse 63   Temp 98.6  F (37  C) (Tympanic)   Resp 18   Ht 1.702 m (5' 7.01\")   Wt 102.2 kg (225 lb 6.4 oz)   LMP  (LMP Unknown)   SpO2 97%   Breastfeeding No   BMI 35.29 " kg/m    Body mass index is 35.29 kg/m .  GENERAL :  In no apparent distress  SKIN: Normal color, normal temperature, texture.  No hirsutism, alopecia or purple striae.     EYES: PERRL, EOMI, No scleral icterus,  No proptosis, conjunctival redness, stare, retraction  RESP: Lungs clear to auscultation bilaterally  CARDIAC: Regular rate and rhythm, normal S1 S2, without murmurs, rubs or gallops    NEURO: awake, alert, responds appropriately to questions.  Cranial nerves intact.   Moves all extremities; Gait normal.  No tremor of the outstretched hand.    EXTREMITIES: No clubbing, cyanosis or edema.    DATA REVIEW  FreeStyle LibreView  Time in target range 78%  High 17%, Very High 4%  Very Low 1%  Current Ave  mg/dl

## 2023-10-11 NOTE — TELEPHONE ENCOUNTER
Per Danisha Villa;  Let's have her cut the Lantus back by 10%, so decrease by 5 unit(s). She has a CGMS sensor, so she should use the pre-meal insulin, as needed only.  I called the pt and informed of the above. Pt understands.

## 2023-10-12 DIAGNOSIS — R31.0 GROSS HEMATURIA: Primary | ICD-10-CM

## 2023-11-20 ENCOUNTER — MYC MEDICAL ADVICE (OUTPATIENT)
Dept: ENDOCRINOLOGY | Facility: CLINIC | Age: 73
End: 2023-11-20
Payer: COMMERCIAL

## 2023-11-20 DIAGNOSIS — E11.65 TYPE 2 DIABETES MELLITUS WITH HYPERGLYCEMIA, WITHOUT LONG-TERM CURRENT USE OF INSULIN (H): ICD-10-CM

## 2023-11-20 DIAGNOSIS — E66.01 CLASS 2 SEVERE OBESITY DUE TO EXCESS CALORIES WITH SERIOUS COMORBIDITY AND BODY MASS INDEX (BMI) OF 35.0 TO 35.9 IN ADULT (H): ICD-10-CM

## 2023-11-20 DIAGNOSIS — E66.812 CLASS 2 SEVERE OBESITY DUE TO EXCESS CALORIES WITH SERIOUS COMORBIDITY AND BODY MASS INDEX (BMI) OF 35.0 TO 35.9 IN ADULT (H): ICD-10-CM

## 2023-11-20 RX ORDER — INSULIN GLARGINE 100 [IU]/ML
44 INJECTION, SOLUTION SUBCUTANEOUS AT BEDTIME
Qty: 15 ML | Refills: 2 | Status: SHIPPED | OUTPATIENT
Start: 2023-11-20 | End: 2024-03-04

## 2023-11-20 RX ORDER — SEMAGLUTIDE 0.68 MG/ML
INJECTION, SOLUTION SUBCUTANEOUS
Qty: 3 ML | Refills: 0 | Status: SHIPPED | OUTPATIENT
Start: 2023-11-20 | End: 2024-01-31

## 2023-11-20 NOTE — TELEPHONE ENCOUNTER
"[place] message sent to pt. CGM data sent to email.    Requested Prescriptions   Pending Prescriptions Disp Refills    OZEMPIC (0.25 OR 0.5 MG/DOSE) 2 MG/3ML pen [Pharmacy Med Name: Ozempic (0.25 or 0.5 MG/DOSE) 2 MG/3ML Subcutaneous Solution Pen-injector] 3 mL 0     Sig: INJECT 0.25MG SUBCUTANEOUSLY EVERY 7 DAYS       GLP-1 Agonists Protocol Passed - 11/20/2023  9:48 AM        Passed - HgbA1C in past 3 or 6 months     If HgbA1C is 8 or greater, it needs to be on file within the past 3 months.  If less than 8, must be on file within the past 6 months.     Recent Labs   Lab Test 08/23/23  1610   A1C 7.9*             Passed - Medication is active on med list        Passed - Patient is age 18 or older        Passed - No active pregnancy on record        Passed - Normal serum creatinine on file in past 12 months     Recent Labs   Lab Test 04/13/23  0925   CR 0.84       Ok to refill medication if creatinine is low          Passed - No positive pregnancy test in past 12 months        Passed - Recent (6 mo) or future (30 days) visit within the authorizing provider's specialty     Patient had office visit in the last 6 months or has a visit in the next 30 days with authorizing provider.  See \"Patient Info\" tab in inbasket, or \"Choose Columns\" in Meds & Orders section of the refill encounter.                "

## 2023-11-21 ENCOUNTER — LAB (OUTPATIENT)
Dept: LAB | Facility: CLINIC | Age: 73
End: 2023-11-21
Payer: COMMERCIAL

## 2023-11-21 DIAGNOSIS — R31.0 GROSS HEMATURIA: ICD-10-CM

## 2023-11-21 DIAGNOSIS — E11.65 TYPE 2 DIABETES MELLITUS WITH HYPERGLYCEMIA, WITHOUT LONG-TERM CURRENT USE OF INSULIN (H): ICD-10-CM

## 2023-11-21 LAB
ALBUMIN UR-MCNC: ABNORMAL MG/DL
AMORPH CRY #/AREA URNS HPF: ABNORMAL /HPF
APPEARANCE UR: CLEAR
BACTERIA #/AREA URNS HPF: ABNORMAL /HPF
BILIRUB UR QL STRIP: NEGATIVE
COLOR UR AUTO: YELLOW
GLUCOSE UR STRIP-MCNC: NEGATIVE MG/DL
HBA1C MFR BLD: 7.8 % (ref 0–5.6)
HGB UR QL STRIP: ABNORMAL
KETONES UR STRIP-MCNC: NEGATIVE MG/DL
LEUKOCYTE ESTERASE UR QL STRIP: NEGATIVE
MUCOUS THREADS #/AREA URNS LPF: PRESENT /LPF
NITRATE UR QL: NEGATIVE
PH UR STRIP: 5.5 [PH] (ref 5–7)
RBC #/AREA URNS AUTO: ABNORMAL /HPF
SP GR UR STRIP: >=1.03 (ref 1–1.03)
SQUAMOUS #/AREA URNS AUTO: ABNORMAL /LPF
UROBILINOGEN UR STRIP-ACNC: 0.2 E.U./DL
WBC #/AREA URNS AUTO: ABNORMAL /HPF

## 2023-11-21 PROCEDURE — 36415 COLL VENOUS BLD VENIPUNCTURE: CPT

## 2023-11-21 PROCEDURE — 83036 HEMOGLOBIN GLYCOSYLATED A1C: CPT

## 2023-11-21 PROCEDURE — 81001 URINALYSIS AUTO W/SCOPE: CPT

## 2023-11-28 ENCOUNTER — OFFICE VISIT (OUTPATIENT)
Dept: INTERNAL MEDICINE | Facility: CLINIC | Age: 73
End: 2023-11-28
Payer: COMMERCIAL

## 2023-11-28 VITALS
WEIGHT: 228.5 LBS | HEIGHT: 67 IN | TEMPERATURE: 98.3 F | SYSTOLIC BLOOD PRESSURE: 138 MMHG | BODY MASS INDEX: 35.87 KG/M2 | DIASTOLIC BLOOD PRESSURE: 80 MMHG | HEART RATE: 93 BPM | OXYGEN SATURATION: 95 % | RESPIRATION RATE: 18 BRPM

## 2023-11-28 DIAGNOSIS — E11.65 TYPE 2 DIABETES MELLITUS WITH HYPERGLYCEMIA, WITHOUT LONG-TERM CURRENT USE OF INSULIN (H): Primary | ICD-10-CM

## 2023-11-28 DIAGNOSIS — E83.52 HYPERCALCEMIA: ICD-10-CM

## 2023-11-28 DIAGNOSIS — I10 HTN, GOAL BELOW 140/90: ICD-10-CM

## 2023-11-28 DIAGNOSIS — I27.82 OTHER CHRONIC PULMONARY EMBOLISM WITHOUT ACUTE COR PULMONALE (H): ICD-10-CM

## 2023-11-28 DIAGNOSIS — E78.5 HYPERLIPIDEMIA WITH TARGET LDL LESS THAN 100: ICD-10-CM

## 2023-11-28 PROCEDURE — 99215 OFFICE O/P EST HI 40 MIN: CPT | Performed by: INTERNAL MEDICINE

## 2023-11-28 RX ORDER — LISINOPRIL 5 MG/1
5 TABLET ORAL DAILY
Qty: 90 TABLET | Refills: 1 | Status: SHIPPED | OUTPATIENT
Start: 2023-11-28 | End: 2024-03-11

## 2023-11-28 RX ORDER — RIVAROXABAN 20 MG/1
20 TABLET, FILM COATED ORAL DAILY
Qty: 90 TABLET | Refills: 1 | Status: SHIPPED | OUTPATIENT
Start: 2023-11-28 | End: 2024-03-11

## 2023-11-28 RX ORDER — ATENOLOL 50 MG/1
50 TABLET ORAL DAILY
Qty: 90 TABLET | Refills: 1 | Status: SHIPPED | OUTPATIENT
Start: 2023-11-28 | End: 2024-03-11

## 2023-11-28 RX ORDER — ATORVASTATIN CALCIUM 40 MG/1
40 TABLET, FILM COATED ORAL DAILY
Qty: 90 TABLET | Refills: 1 | Status: SHIPPED | OUTPATIENT
Start: 2023-11-28 | End: 2024-03-11

## 2023-11-28 ASSESSMENT — PAIN SCALES - GENERAL: PAINLEVEL: NO PAIN (0)

## 2023-11-28 NOTE — PROGRESS NOTES
Dr Torres's note      Patient's instructions / PLAN:                                                        Plan:  Wrist brace at night and schedule follow up appointment with hand specialist   2. Decrease lantus to 36 units  3. The evening dose of Humalog - decrease it to 4 if you need to take it  4. Keep the appointment on Dec 18 with endocrinology  5. Start Lisinopril 5 mg daily for the blood pressure   6. Continue the other meds, same doses for now.  7. Schedule a follow up appointment in Feb-March 8. Please make a lab appointment for non fasting labs  few days before        ASSESSMENT & PLAN:                                                      (E11.65) Type 2 diabetes mellitus with hyperglycemia, without long-term current use of insulin (H)  (primary encounter diagnosis)  Comment: Frequent episodes of hypoglycemia, most likely related with to the treatment  Plan: Hemoglobin A1c        As above    (I10) HTN, goal below 140/90  Comment: borderline Controlled  She used to take lisinopril, but it was stopped last year in the hospital because low blood pressure when she was ill  Plan: atenolol (TENORMIN) 50 MG tablet, lisinopril         (ZESTRIL) 5 MG tablet, Comprehensive metabolic         panel            (E78.5) Hyperlipidemia with target LDL less than 100  Comment: Controlled based on prior numbers  Plan: atorvastatin (LIPITOR) 40 MG tablet,         Comprehensive metabolic panel            (I27.82) Other chronic pulmonary embolism without acute cor pulmonale (H)  Comment: Stable  Plan: XARELTO ANTICOAGULANT 20 MG TABS tablet            (E83.52) Hypercalcemia  Comment: Persistent, no symptoms  Plan: Ionized Calcium                 Chief complaint:                                                      Follow up chronic medical problems       SUBJECTIVE:                                                    History of present illness:    Hands pain  -- bilat thumbs     3 falls in the last 6 month. No dizziness, no  syncope. She started using a cane when she goes outside   Malignant melanoma -- neg PET scna Sept 2023    DM  -- BS much better  -- sees Danisha Allen in endo -- appointment Dec 18  -- A1c 7.8 < -- 7.9   -- dave: avg 116 last 24 h, w 2 low BS at 2 and 7 pm.  -- 7 d avg 132 14 d is 140  -- lantus 41  -- humalog 0 in am because low BS,  7 at noon and 6 at night  -- started on Ozempic 6 w ago.       Lou Frye is a 73 year old, presenting for the following health issues:  Patient is being seen for an follow up.      11/28/2023    12:51 PM   Additional Questions   Roomed by Thais Cadet       History of Present Illness       Back Pain:  She presents for follow up of back pain. Patient's back pain is a chronic problem.  Location of back pain:  Right lower back, left lower back, right hip and left hip  Description of back pain: dull ache, sharp and shooting  Back pain spreads: right buttocks, left buttocks and right knee    Since patient first noticed back pain, pain is: always present, but gets better and worse  Does back pain interfere with her job:  Not applicable       Diabetes:   She presents for follow up of diabetes.  She is checking home blood glucose four or more times daily.   She checks blood glucose before and after meals and at bedtime.  Blood glucose is sometimes over 200 and sometimes under 70. She is aware of hypoglycemia symptoms including none.   She is concerned about blood sugar frequently over 200 and low blood sugar, several less than 70 in the past few weeks.   She is having weight gain.            Hyperlipidemia:  She presents for follow up of hyperlipidemia.   She is taking medication to lower cholesterol. She is not having myalgia or other side effects to statin medications.    She eats 4 or more servings of fruits and vegetables daily.She consumes 0 sweetened beverage(s) daily.She exercises with enough effort to increase her heart rate 30 to 60 minutes per day.  She exercises with enough  "effort to increase her heart rate 3 or less days per week.   She is taking medications regularly.         Hypertension Follow-up    Do you check your blood pressure regularly outside of the clinic? Yes   Are you following a low salt diet? Yes  Are your blood pressures ever more than 140 on the top number (systolic) OR more   than 90 on the bottom number (diastolic), for example 140/90? Yes      Review of Systems:                                                      ROS: negative for fever, chills, cough, wheezes, chest pain, shortness of breath, vomiting, abdominal pain, leg swelling     OBJECTIVE:             Physical exam:   Blood pressure 138/80, pulse 93, temperature 98.3  F (36.8  C), temperature source Tympanic, resp. rate 18, height 1.702 m (5' 7.01\"), weight 103.6 kg (228 lb 8 oz), SpO2 95%, not currently breastfeeding.   NAD, appears comfortable  Skin: no rashes     Neck: supple, no JVD,  No thyroidmegaly. Lymph nodes nonpalpable cervical and supraclavicular.  Chest: clear to auscultation bilaterally, good respiratory effort  Heart: S1 S2, RRR, no mgr appreciated  Abdomen: soft, not tender,   Extremities: no edema,   Neurologic: A, Ox3, no focal signs appreciated    PMHx: reviewed  Past Medical History:   Diagnosis Date    Asthma, mild intermittent     Cataract     Endometrial cancer (H) 06/2022    HTN, goal below 140/90     Hyperlipidemia LDL goal < 100     Macular hole of left eye     Melanoma (H)     RIGHT KNEE    Sleep apnea     uses c pap    Type 2 diabetes, HbA1C goal < 8% (H)       PSHx: reviewed  Past Surgical History:   Procedure Laterality Date    ARTHROPLASTY HIP Right 9/25/2017    Procedure: ARTHROPLASTY HIP;  Right total hip arthroplasty  ;  Surgeon: Ayaan Pena MD;  Location: [facility]    ARTHROPLASTY KNEE  7/12/2013    Procedure: ARTHROPLASTY KNEE;  right total knee arthroplasty ;  Surgeon: Chaparro Wesley MD;  Location: [facility]    ARTHROSCOPY KNEE Right 1/21/2015    Procedure: " ARTHROSCOPY KNEE;  Surgeon: Ayaan Pena MD;  Location: [facility]    BRONCHOSCOPY (RIGID OR FLEXIBLE), DIAGNOSTIC N/A 8/11/2022    Procedure: BRONCHOSCOPY, WITH BRONCHOALVEOLAR LAVAGE;  Surgeon: Roselia Sosa MD;  Location:  GI    C INCISION OF HYMEN      CATARACT IOL, RT/LT      COLONOSCOPY  2008    COLONOSCOPY N/A 4/18/2019    Procedure: Colonoscopy with polypectomy;  Surgeon: Shaun Guerrero MD;  [facility]: [facility]    CYSTOSCOPY N/A 6/23/2022    Procedure: Cystoscopy;  Surgeon: Eli Guidry MD;  [facility]    ENDOSCOPIC RETROGRADE CHOLANGIOPANCREATOGRAM N/A 2/6/2019    Procedure: COMBINED ENDOSCOPIC RETROGRADE CHOLANGIOPANCREATOGRAPHY, BILIARY SPINCTEROTOMY AND DILATION, PLACE BILE DUCT STENT;  Surgeon: Guru Andie Gonzalez MD;  [facility]    ENDOSCOPIC RETROGRADE CHOLANGIOPANCREATOGRAM N/A 2/28/2019    Procedure: Endoscopic Retrograde Cholangiopancreatogram, Bile duct stent removal and placement;  Surgeon: Shaun Guerrero MD;  Location:  OR    ENDOSCOPIC RETROGRADE CHOLANGIOPANCREATOGRAM N/A 4/18/2019    Procedure: COMBINED ENDOSCOPIC RETROGRADE CHOLANGIOPANCREATOGRAPHY, Bile duct stent exchange and Polypectomy;  Surgeon: Shaun Guerrero MD;  [facility]    ENDOSCOPIC RETROGRADE CHOLANGIOPANCREATOGRAM N/A 10/18/2019    Procedure: Endoscopic Retrograde Cholangiopancreatogram;  Surgeon: Shaun Guerrero MD;  [facility]    ENDOSCOPIC RETROGRADE CHOLANGIOPANCREATOGRAM N/A 3/24/2022    Procedure: ENDOSCOPIC RETROGRADE CHOLANGIOPANCREATOGRAPHY;  Surgeon: Shaun Guerrero MD;  Location:  OR    ENDOSCOPIC RETROGRADE CHOLANGIOPANCREATOGRAM N/A 3/16/2023    Procedure: endoscopic retrograde cholangiopancreatography with minor papillotomy;  Surgeon: Shaun Guerrero MD;  Location:  OR    ENDOSCOPIC RETROGRADE CHOLANGIOPANCREATOGRAM WITH SPYGLASS N/A 7/26/2019    Procedure: Endoscopic Retrograde Cholangiopancreatogram With Spyglas,l Radiofrequency Ablation, Stent  Exchangeand Duodenal Biopsy x2;  Surgeon: Shaun Guerrero MD;  [facility]    ENDOSCOPIC RETROGRADE CHOLANGIOPANCREATOGRAM WITH SPYGLASS N/A 9/10/2020    Procedure: ENDOSCOPIC RETROGRADE CHOLANGIOPANCREATOGRAPHY, WITH DIRECT DUCT VISUALIZATION, USING PANCREATICOBILIARY FIBEROPTIC PROBE;  Surgeon: Shaun Guerrero MD;  [facility]    ENDOSCOPIC RETROGRADE CHOLANGIOPANCREATOGRAM WITH SPYGLASS N/A 3/22/2021    Procedure: ENDOSCOPIC RETROGRADE CHOLANGIOPANCREATOGRAPHY, WITH DIRECT DUCT VISUALIZATION, USING PANCREATICOBILIARY FIBEROPTIC PROBE;  Surgeon: Shaun Guerrero MD;  [facility]    ESOPHAGOSCOPY, GASTROSCOPY, DUODENOSCOPY (EGD) WITH RADIO FREQUENCY ABLATION, COMBINED N/A 9/10/2020    Procedure: possible repeat ESOPHAGOGASTRODUODENOSCOPY, WITH RADIOFREQUENCY ABLATION and endoscopic mucosal resection;  Surgeon: Shaun Guerrero MD;  [facility]    ESOPHAGOSCOPY, GASTROSCOPY, DUODENOSCOPY (EGD), COMBINED N/A 4/18/2019    Procedure: upper endoscopy with polypectomy;  Surgeon: Shaun Guerrero MD;  [facility]    ESOPHAGOSCOPY, GASTROSCOPY, DUODENOSCOPY (EGD), COMBINED N/A 10/18/2019    Procedure: Upper Endoscopy and ERCP with stent removal, stone removal and biopsy;  Surgeon: Shaun Guerrero MD;  [facility]    ESOPHAGOSCOPY, GASTROSCOPY, DUODENOSCOPY (EGD), COMBINED N/A 3/24/2022    Procedure: ESOPHAGOGASTRODUODENOSCOPY (EGD), Duodenal  polyp removal;  Surgeon: Shaun Guerrero MD;  Location: [facility]    ESOPHAGOSCOPY, GASTROSCOPY, DUODENOSCOPY (EGD), COMBINED N/A 3/16/2023    Procedure: ESOPHAGOGASTRODUODENOSCOPY (EGD);  Surgeon: Shaun Guerrero MD;  Location: [facility]    ESOPHAGOSCOPY, GASTROSCOPY, DUODENOSCOPY (EGD), RESECT MUCOSA, COMBINED N/A 2/28/2019    Procedure: Upper Endoscopy, Endoscopic Ultrasound, Endoscopic Mucosal Resection,  Ampullectomy, polypectomy.;  Surgeon: Shaun Guerrero MD;  [facility]    ESOPHAGOSCOPY, GASTROSCOPY, DUODENOSCOPY (EGD), RESECT MUCOSA, COMBINED N/A 3/22/2021     Procedure: ESOPHAGOGASTRODUODENOSCOPY (EGD) with  endoscopic mucosal resection/ polypectomy;  Surgeon: Shaun Guerrero MD;  [facility]    EXCISE LESION LOWER EXTREMITY Right 3/28/2022    Procedure: Wide local excision of right knee melanoma;  Surgeon: Chevy Allison MD;  Location: [facility] OR    EXCISE MASS LOWER EXTREMITY Right 1/13/2022    Procedure: excision of right thigh mass;  Surgeon: Salvador Kelly MD;  Location: [facility]    EYE SURGERY      macular hole repaired left eye    HC KNEE SCOPE, DIAGNOSTIC      - both knees    HEAD & NECK SURGERY      wisdom teeth    IR CHEST PORT PLACEMENT > 5 YRS OF AGE  5/2/2022    LAPAROSCOPIC HYSTERECTOMY TOTAL, BILATERAL SALPINGO-OOPHORECTOMY, NODE DISSECTION, COMBINED Bilateral 6/23/2022    Procedure: Total Laparoscopic Hyserectomy, Bilateral Salpibgo-oophorectomy, Bilateral [place] Lymph Node Dissection;  Surgeon: Eli Guidry MD;  [facility]        Meds: reviewed  Current Outpatient Medications   Medication Sig Dispense Refill    atenolol (TENORMIN) 50 MG tablet Take 1 tablet (50 mg) by mouth daily 90 tablet 1    atorvastatin (LIPITOR) 40 MG tablet Take 1 tablet (40 mg) by mouth daily 90 tablet 1    azelastine (ASTELIN) 0.1 % nasal spray USE 1 SPRAY(S) IN EACH NOSTRIL TWICE DAILY AS NEEDED 30 mL 0    azelastine (OPTIVAR) 0.05 % ophthalmic solution       cetirizine (ZYRTEC) 10 MG tablet Take 10 mg by mouth every morning      Continuous Blood Gluc Sensor (FREESTYLE DAVID 14 DAY SENSOR) MISC USE 1 EVERY 14 DAYS 2 each 5    CONTOUR NEXT TEST test strip USE 1 STRIP TO CHECK GLUCOSE ONCE DAILY 100 strip 4    glipiZIDE (GLUCOTROL XL) 10 MG 24 hr tablet Take 1 tablet by mouth once daily 90 tablet 0    HUMALOG 100 UNIT/ML injection INJECT 5 UNITS SUBCUTANEOUSLY THREE TIMES DAILY BEFORE MEALS 10 mL 0    hydrochlorothiazide (HYDRODIURIL) 25 MG tablet TAKE 1 TABLET BY MOUTH ONCE DAILY IN THE MORNING 90 tablet 3    insulin glargine (LANTUS VIAL) 100 UNIT/ML vial  "Inject 44 Units Subcutaneous at bedtime 15 mL 2    insulin pen needle (MM PEN NEEDLES) 32G X 4 MM miscellaneous Inject insulin 4 times a day 300 each 1    insulin syringe-needle U-100 (BD INSULIN SYRINGE ULTRAFINE) 31G X 5/16\" 0.5 ML miscellaneous Use daily with NPH insulin 100 each 11    insulin syringe-needle U-100 (BD VEO INSULIN SYRINGE U/F) 31G X 15/64\" 0.3 ML Patient has 4 shots of insulin a day 300 each 1    latanoprost (XALATAN) 0.005 % ophthalmic solution Place 1 drop into both eyes At Bedtime  2.5 mL 1    lidocaine-prilocaine (EMLA) 2.5-2.5 % external cream APPLY CREAM TOPICALLY ONCE DAILY AS NEEDED FOR PAIN APPLY A PEA SIZED AMOUNT OVER PORT SITE 60 MINUTES PRIOR TO USE COVER WITH PLASTIC WRAP      LORazepam (ATIVAN) 0.5 MG tablet Take 1 tablet (0.5 mg) by mouth nightly as needed for anxiety or sleep 30 tablet 0    ondansetron (ZOFRAN) 8 MG tablet Take 1 tablet (8 mg) by mouth every 8 hours as needed for nausea 30 tablet 1    OZEMPIC, 0.25 OR 0.5 MG/DOSE, 2 MG/3ML pen INJECT 0.25MG SUBCUTANEOUSLY EVERY 7 DAYS 3 mL 0    senna-docusate (SENOKOT-S/PERICOLACE) 8.6-50 MG tablet Take 2 tablets by mouth daily as needed for constipation 60 tablet 11    timolol maleate (TIMOPTIC) 0.5 % ophthalmic solution INSTILL 1 DROP INTO EACH EYE TWICE DAILY      triamcinolone (KENALOG) 0.5 % external ointment Apply 1 g topically daily as needed for irritation      VENTOLIN  (90 Base) MCG/ACT inhaler INHALE 2 PUFFS BY MOUTH EVERY 4 HOURS AS NEEDED FOR SHORTNESS OF BREATH 18 g 0    XARELTO ANTICOAGULANT 20 MG TABS tablet Take 1 tablet by mouth daily         Soc Hx: reviewed  Fam Hx: reviewed    40 minutes spent on the date of the encounter doing   chart review,   review of outside records,   review of test results,   interpretation of tests,   patient visit,   documentation,       Chart documentation was completed, in part, with [employer] voice-recognition software. Even though reviewed, some grammatical, spelling, and word " errors may remain.      Raisa Torres MD  Internal Medicine

## 2023-11-28 NOTE — PATIENT INSTRUCTIONS
Plan:  Wrist brace at night and schedule follow up appointment with hand specialist   2. Decrease lantus to 36 units  3. The evening dose of Humalog - decrease it to 4 if you need to take it  4. Keep the appointment on Dec 18 with endocrinology  5. Start Lisinopril 5 mg daily for the blood pressure   6. Continue the other meds, same doses for now.  7. Schedule a follow up appointment in Feb-March 8. Please make a lab appointment for non fasting labs  few days before

## 2023-12-05 DIAGNOSIS — E11.65 TYPE 2 DIABETES MELLITUS WITH HYPERGLYCEMIA, WITHOUT LONG-TERM CURRENT USE OF INSULIN (H): Primary | ICD-10-CM

## 2023-12-05 RX ORDER — SYRING-NEEDL,DISP,INSUL,0.3 ML 31GX15/64"
SYRINGE, EMPTY DISPOSABLE MISCELLANEOUS
Qty: 100 EACH | Refills: 1 | Status: SHIPPED | OUTPATIENT
Start: 2023-12-05 | End: 2024-06-24

## 2023-12-05 NOTE — TELEPHONE ENCOUNTER
Prescription approved per [facility] Refill Protocol.  Alice Rivas, RN  [facility] Triage Nurse

## 2023-12-08 NOTE — TELEPHONE ENCOUNTER
Melva called - concerned that her blood sugars are running high.  Taking NPH 10 units every morning; changed from Lantus last week.    Plan:  Increase NPH to 14 units.  Follow-up on Wednesday.                  
Left leg

## 2023-12-13 NOTE — PROGRESS NOTES
Follow Up Notes on Referred Patient    Date: 2023       Dr. Eli Stafford MD  [address] [facility] [address]  [place] [address]       RE: Gricelda Sabillon  : 1950  CRAIG: 2023    Dear Dr. 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  She is here today for a surveillance visit.     Oncology history: ;    5/10/22:  US pelvis:  Uterus measures 8 x 3.7 x 4.2 cm with EMS of 11.2 mm.  Normal appearing adnexa     22:  EMB:  Grade 1 endometrial adenocarcinoma, MMR intact     22:  Total laparoscopic hysterectomy, bilateral salpingo-oophorectomy, bilateral pelvic sentinel lymph node dissection, cystoscopy                 Pathology:  Grade 1 endometrial adenocarcinoma, tumor size 2.2 cm, 10/12 mm myometrial invasion, no LVSI,  0/8 sentinel LN, lymphangioleiomyomatosis     10/28/22:  Completed vaginal brachytherapy    23: PET CT IMPRESSION: No convincing metabolic evidence of active neoplasm.      Today she comes to clinic and denies any issues for her visit. She denies any vaginal bleeding, no changes in her bowel (Every 3 days or so will have a day of ~4 soft stools which she relates  to  her DM medications) or bladder habits, no nausea/emesis, no lower extremity edema, and no difficulties eating or sleeping. She denies any abdominal discomfort/bloating, no fevers or chills, and no chest pain or shortness of breath. She does have some hip/sciatic pain recently.  She saw her PCP ,  had  her mammogram , and was told  she did not need any further colon  cancer  screening (last done ). She is not using  her dilator, but did use it a couple times this  week   and denies any spotting. She is not sexually active.  Her home BPs run 120-136/70-80. She does get imaging  done  for  her melanoma and will have  it again in  the spring; last scan was good.          Review of Systems:    Systemic           no weight changes; no fever; no chills; no night sweats; no appetite changes  Skin           no rashes, or lesions  Eye           no irritation; no changes in vision  Dell-Laryngeal           no dysphagia; no hoarseness   Pulmonary    no cough; no shortness of breath  Cardiovascular    no chest pain; no palpitations  Gastrointestinal    no diarrhea; no constipation; no abdominal pain; no changes in bowel habits; no blood in stool  Genitourinary   no urinary frequency; no urinary urgency; no dysuria; no pain; no abnormal vaginal discharge; no abnormal vaginal bleeding  Breast   no breast discharge; no breast changes; no breast pain  Musculoskeletal    no myalgias; no arthralgias; no back pain  Psychiatric           no depressed mood; no anxiety    Hematologic              no tender lymph nodes; no noticeable swellings or lumps   Endocrine    no hot flashes; no heat/cold intolerance         Neurological   no tremor; no numbness and tingling; no headaches; no difficulty sleeping      Past Medical History:    Past Medical History:   Diagnosis Date    Asthma, mild intermittent     Cataract     Endometrial cancer (H) 06/2022    HTN, goal below 140/90     Hyperlipidemia LDL goal < 100     Macular hole of left eye     Melanoma (H)     RIGHT KNEE    Sleep apnea     uses c pap    Type 2 diabetes, HbA1C goal < 8% (H)          Past Surgical History:    Past Surgical History:   Procedure Laterality Date    ARTHROPLASTY HIP Right 9/25/2017    Procedure: ARTHROPLASTY HIP;  Right total hip arthroplasty  ;  Surgeon: Ayaan Pena MD;  Location:  OR    ARTHROPLASTY KNEE  7/12/2013    Procedure: ARTHROPLASTY KNEE;  right total knee arthroplasty ;  Surgeon: Chaparro Wesley MD;  Location:  OR    ARTHROSCOPY KNEE Right 1/21/2015    Procedure: ARTHROSCOPY KNEE;  Surgeon: Ayaan Pena MD;  Location:  OR    BRONCHOSCOPY (RIGID OR  FLEXIBLE), DIAGNOSTIC N/A 8/11/2022    Procedure: BRONCHOSCOPY, WITH BRONCHOALVEOLAR LAVAGE;  Surgeon: Roselia Sosa MD;  Location: [facility]    C INCISION OF HYMEN      CATARACT IOL, RT/LT      COLONOSCOPY  2008    COLONOSCOPY N/A 4/18/2019    Procedure: Colonoscopy with polypectomy;  Surgeon: Shaun Guerrero MD;  [facility]    CYSTOSCOPY N/A 6/23/2022    Procedure: Cystoscopy;  Surgeon: Eli Guidry MD;  [facility]    ENDOSCOPIC RETROGRADE CHOLANGIOPANCREATOGRAM N/A 2/6/2019    Procedure: COMBINED ENDOSCOPIC RETROGRADE CHOLANGIOPANCREATOGRAPHY, BILIARY SPINCTEROTOMY AND DILATION, PLACE BILE DUCT STENT;  Surgeon: Guru Andie Gonzalez MD;  Location:  OR    ENDOSCOPIC RETROGRADE CHOLANGIOPANCREATOGRAM N/A 2/28/2019    Procedure: Endoscopic Retrograde Cholangiopancreatogram, Bile duct stent removal and placement;  Surgeon: Shaun Guerrero MD;  Location:  OR    ENDOSCOPIC RETROGRADE CHOLANGIOPANCREATOGRAM N/A 4/18/2019    Procedure: COMBINED ENDOSCOPIC RETROGRADE CHOLANGIOPANCREATOGRAPHY, Bile duct stent exchange and Polypectomy;  Surgeon: Shaun Guerrero MD;  Location:  OR    ENDOSCOPIC RETROGRADE CHOLANGIOPANCREATOGRAM N/A 10/18/2019    Procedure: Endoscopic Retrograde Cholangiopancreatogram;  Surgeon: Shaun Guerrero MD;  Location:  OR    ENDOSCOPIC RETROGRADE CHOLANGIOPANCREATOGRAM N/A 3/24/2022    Procedure: ENDOSCOPIC RETROGRADE CHOLANGIOPANCREATOGRAPHY;  Surgeon: Shaun Guerrero MD;  Location:  OR    ENDOSCOPIC RETROGRADE CHOLANGIOPANCREATOGRAM N/A 3/16/2023    Procedure: endoscopic retrograde cholangiopancreatography with minor papillotomy;  Surgeon: Shaun Guerrero MD;  Location:  OR    ENDOSCOPIC RETROGRADE CHOLANGIOPANCREATOGRAM WITH SPYGLASS N/A 7/26/2019    Procedure: Endoscopic Retrograde Cholangiopancreatogram With Spyglas,l Radiofrequency Ablation, Stent Exchangeand Duodenal Biopsy x2;  Surgeon: Shaun Guerrero MD;  Location:  OR    ENDOSCOPIC RETROGRADE  CHOLANGIOPANCREATOGRAM WITH SPYGLASS N/A 9/10/2020    Procedure: ENDOSCOPIC RETROGRADE CHOLANGIOPANCREATOGRAPHY, WITH DIRECT DUCT VISUALIZATION, USING PANCREATICOBILIARY FIBEROPTIC PROBE;  Surgeon: Shaun Guerrero MD;  [facility]    ENDOSCOPIC RETROGRADE CHOLANGIOPANCREATOGRAM WITH SPYGLASS N/A 3/22/2021    Procedure: ENDOSCOPIC RETROGRADE CHOLANGIOPANCREATOGRAPHY, WITH DIRECT DUCT VISUALIZATION, USING PANCREATICOBILIARY FIBEROPTIC PROBE;  Surgeon: Shaun Guerrero MD;  [facility]    ESOPHAGOSCOPY, GASTROSCOPY, DUODENOSCOPY (EGD) WITH RADIO FREQUENCY ABLATION, COMBINED N/A 9/10/2020    Procedure: possible repeat ESOPHAGOGASTRODUODENOSCOPY, WITH RADIOFREQUENCY ABLATION and endoscopic mucosal resection;  Surgeon: Shaun Guerrero MD;  [facility]    ESOPHAGOSCOPY, GASTROSCOPY, DUODENOSCOPY (EGD), COMBINED N/A 4/18/2019    Procedure: upper endoscopy with polypectomy;  Surgeon: Shaun Guerrero MD;  [facility]    ESOPHAGOSCOPY, GASTROSCOPY, DUODENOSCOPY (EGD), COMBINED N/A 10/18/2019    Procedure: Upper Endoscopy and ERCP with stent removal, stone removal and biopsy;  Surgeon: Shaun Guerrero MD;  [facility]    ESOPHAGOSCOPY, GASTROSCOPY, DUODENOSCOPY (EGD), COMBINED N/A 3/24/2022    Procedure: ESOPHAGOGASTRODUODENOSCOPY (EGD), Duodenal  polyp removal;  Surgeon: Shaun Guerrero MD;  Location: [facility]    ESOPHAGOSCOPY, GASTROSCOPY, DUODENOSCOPY (EGD), COMBINED N/A 3/16/2023    Procedure: ESOPHAGOGASTRODUODENOSCOPY (EGD);  Surgeon: Shuan Guerrero MD;  Location: [facility]    ESOPHAGOSCOPY, GASTROSCOPY, DUODENOSCOPY (EGD), RESECT MUCOSA, COMBINED N/A 2/28/2019    Procedure: Upper Endoscopy, Endoscopic Ultrasound, Endoscopic Mucosal Resection,  Ampullectomy, polypectomy.;  Surgeon: Shaun Guerrero MD;  [facility]    ESOPHAGOSCOPY, GASTROSCOPY, DUODENOSCOPY (EGD), RESECT MUCOSA, COMBINED N/A 3/22/2021    Procedure: ESOPHAGOGASTRODUODENOSCOPY (EGD) with  endoscopic mucosal resection/ polypectomy;  Surgeon: Cesar  "MD Shaun;  [facility]    EXCISE LESION LOWER EXTREMITY Right 3/28/2022    Procedure: Wide local excision of right knee melanoma;  Surgeon: Chevy Allison MD;  Location: [facility] OR    EXCISE MASS LOWER EXTREMITY Right 1/13/2022    Procedure: excision of right thigh mass;  Surgeon: Salvador Kelly MD;  Location: [facility]    EYE SURGERY      macular hole repaired left eye    HC KNEE SCOPE, DIAGNOSTIC      - both knees    HEAD & NECK SURGERY      wisdom teeth    IR CHEST PORT PLACEMENT > 5 YRS OF AGE  5/2/2022    LAPAROSCOPIC HYSTERECTOMY TOTAL, BILATERAL SALPINGO-OOPHORECTOMY, NODE DISSECTION, COMBINED Bilateral 6/23/2022    Procedure: Total Laparoscopic Hyserectomy, Bilateral Salpibgo-oophorectomy, Bilateral Delta Lymph Node Dissection;  Surgeon: Eli Guidry MD;  [facility]         Health Maintenance Due   Topic Date Due    URINE DRUG SCREEN  Never done    ASTHMA ACTION PLAN  08/27/2019       Current Medications:     Current Outpatient Medications   Medication Sig Dispense Refill    atenolol (TENORMIN) 50 MG tablet Take 1 tablet (50 mg) by mouth daily 90 tablet 1    atorvastatin (LIPITOR) 40 MG tablet Take 1 tablet (40 mg) by mouth daily 90 tablet 1    azelastine (ASTELIN) 0.1 % nasal spray USE 1 SPRAY(S) IN EACH NOSTRIL TWICE DAILY AS NEEDED 30 mL 0    azelastine (OPTIVAR) 0.05 % ophthalmic solution       BD VEO INSULIN SYRINGE U/F 31G X 15/64\" 0.5 ML USE DAILY WITH NPH INSULIN 100 each 1    cetirizine (ZYRTEC) 10 MG tablet Take 10 mg by mouth every morning      Continuous Blood Gluc Sensor (FREESTYLE DAVID 14 DAY SENSOR) MISC USE 1 EVERY 14 DAYS 2 each 5    CONTOUR NEXT TEST test strip USE 1 STRIP TO CHECK GLUCOSE ONCE DAILY 100 strip 4    glipiZIDE (GLUCOTROL XL) 10 MG 24 hr tablet Take 1 tablet by mouth once daily 90 tablet 0    HUMALOG 100 UNIT/ML injection INJECT 5 UNITS SUBCUTANEOUSLY THREE TIMES DAILY BEFORE MEALS 10 mL 0    hydrochlorothiazide (HYDRODIURIL) 25 MG tablet TAKE 1 " "TABLET BY MOUTH ONCE DAILY IN THE MORNING 90 tablet 3    insulin glargine (LANTUS VIAL) 100 UNIT/ML vial Inject 44 Units Subcutaneous at bedtime 15 mL 2    insulin pen needle (MM PEN NEEDLES) 32G X 4 MM miscellaneous Inject insulin 4 times a day 300 each 1    insulin syringe-needle U-100 (BD INSULIN SYRINGE ULTRAFINE) 31G X 5/16\" 0.5 ML miscellaneous Use daily with NPH insulin 100 each 11    insulin syringe-needle U-100 (BD VEO INSULIN SYRINGE U/F) 31G X 15/64\" 0.3 ML Patient has 4 shots of insulin a day 300 each 1    latanoprost (XALATAN) 0.005 % ophthalmic solution Place 1 drop into both eyes At Bedtime  2.5 mL 1    lidocaine-prilocaine (EMLA) 2.5-2.5 % external cream APPLY CREAM TOPICALLY ONCE DAILY AS NEEDED FOR PAIN APPLY A PEA SIZED AMOUNT OVER PORT SITE 60 MINUTES PRIOR TO USE COVER WITH PLASTIC WRAP      lisinopril (ZESTRIL) 5 MG tablet Take 1 tablet (5 mg) by mouth daily 90 tablet 1    LORazepam (ATIVAN) 0.5 MG tablet Take 1 tablet (0.5 mg) by mouth nightly as needed for anxiety or sleep 30 tablet 0    ondansetron (ZOFRAN) 8 MG tablet Take 1 tablet (8 mg) by mouth every 8 hours as needed for nausea 30 tablet 1    OZEMPIC, 0.25 OR 0.5 MG/DOSE, 2 MG/3ML pen INJECT 0.25MG SUBCUTANEOUSLY EVERY 7 DAYS 3 mL 0    senna-docusate (SENOKOT-S/PERICOLACE) 8.6-50 MG tablet Take 2 tablets by mouth daily as needed for constipation 60 tablet 11    timolol maleate (TIMOPTIC) 0.5 % ophthalmic solution INSTILL 1 DROP INTO EACH EYE TWICE DAILY      triamcinolone (KENALOG) 0.5 % external ointment Apply 1 g topically daily as needed for irritation      VENTOLIN  (90 Base) MCG/ACT inhaler INHALE 2 PUFFS BY MOUTH EVERY 4 HOURS AS NEEDED FOR SHORTNESS OF BREATH 18 g 0    XARELTO ANTICOAGULANT 20 MG TABS tablet Take 1 tablet (20 mg) by mouth daily 90 tablet 1         Allergies:        Allergies   Allergen Reactions    Bromfenac Visual Disturbance      xibrom  Causes sever eye pain    Codeine Nausea     Other reaction(s): " "Dizziness, Headache    Metformin GI Disturbance    Morphine And Related      \"NAUSE,HA AND DIZZINESS\"    Seasonal Allergies         Social History:     Social History     Tobacco Use    Smoking status: Never     Passive exposure: Never    Smokeless tobacco: Never   Substance Use Topics    Alcohol use: No     Alcohol/week: 0.0 standard drinks of alcohol       History   Drug Use No         Family History:     The patient's family history is notable for     Family History   Problem Relation Age of Onset    Hypertension Mother         diabetes,hypoythryoidism, stroke    Diabetes Mother     Breast Cancer Mother     Heart Disease Father         , cancer lip    Uterine Cancer Sister     Connective Tissue Disorder Sister         fibromyalgia    Diabetes Sister     Hypertension Brother     Cerebrovascular Disease Maternal Grandmother         , diabetes    Cerebrovascular Disease Maternal Grandfather             Cancer Paternal Grandmother             Heart Disease Paternal Grandfather             Cancer Paternal Aunt         ovarian    Breast Cancer Niece     Asthma Maternal Aunt          Physical Exam:     BP (!) 148/89   Pulse 91   Temp 97  F (36.1  C) (Oral)   Resp 18   Wt 103.4 kg (227 lb 14.4 oz)   LMP  (LMP Unknown)   SpO2 97%   BMI 35.68 kg/m    Body mass index is 35.68 kg/m .    General Appearance: healthy and alert, no distress     HEENT: no thyromegaly, no palpable nodules or masses        Cardiovascular: regular rate and rhythm, no gallops, rubs or murmurs     Respiratory: lungs clear, no rales, rhonchi or wheezes, normal diaphragmatic excursion    Musculoskeletal: extremities non tender and without edema    Skin: no lesions or rashes     Neurological: normal gait, no gross defects     Psychiatric: appropriate mood and affect                               Hematological: normal cervical, supraclavicular and inguinal lymph nodes     Gastrointestinal:       abdomen soft, " non-tender, non-distended, no organomegaly or masses    Genitourinary: External genitalia and urethral meatus appears normal.  Vagina is smooth without nodularity or masses.  Cervix surgically absent  Bimanual exam reveal no masses, nodularity or fullness.  Recto-vaginal exam confirms these findings.      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  She is here today for a surveillance visit.     27 minutes spent on the date of the encounter doing chart review, history and exam, documentation and further activities as noted above      Plan:     1.)        Patient to RTC in 6 months for her next surveillance visit. Reviewed recommendations from [facility] not to perform surveillance pap smears in women diagnosed with endometrial cancer as this does not improve detection of local recurrence. Reviewed signs and symptoms for when she should contact the clinic or seek additional care. Patient to contact the clinic with any questions or concerns in the interim.  Answered all of her questions to the best of my ability. She can use her dilator once a month if she chooses otherwise she does not need to use it.      2.) Genetic risk factors were assessed and her MMR proteins were intact.     3.) Labs and/or tests ordered include:  none.     4.) Health maintenance issues addressed today include annual health maintenance and non-gynecologic issues with PCP.    5.)        Continue her follow up for her other co-morbidities.     DENYS Fernandez, WHNP-BC, ANP-BC  Women's Health Nurse Practitioner  Adult Nurse Practitioner  Division of Gynecologic Oncology  .      CC  Patient Care Team:  Raisa Davidson MD as PCP - General (Internal Medicine)  Raisa Davidson MD as Assigned PCP  Tanya Sheehan RD as Diabetes Educator (Dietitian, Registered)  Chevy Allison MD  as MD (Surgery)  Yarelis Mitchell, RN as Specialty Care Coordinator (Gynecologic Oncology)  Roselia Sosa MD as Assigned Pulmonology Provider  Arlet Harding [facility] as Assigned MTM Pharmacist  Shaun Guerrero MD as MD (Gastroenterology)  Aishwarya Reese, RN as Registered Nurse  Clara Corado MD as Hospitalist (Endocrinology, Diabetes, and Metabolism)  Lili Ahuja as Diabetes Educator  Eli Canales MD as Assigned Cancer Care Provider  Clara Corado MD as Assigned Endocrinology Provider  Riley Lyons MD as Assigned Surgical Provider  Danisha Villa PA-C as Physician Assistant (Endocrinology, Diabetes, and Metabolism)  ELI CANALES

## 2023-12-14 ENCOUNTER — ONCOLOGY VISIT (OUTPATIENT)
Dept: ONCOLOGY | Facility: CLINIC | Age: 73
End: 2023-12-14
Attending: OBSTETRICS & GYNECOLOGY
Payer: COMMERCIAL

## 2023-12-14 VITALS
WEIGHT: 227.9 LBS | BODY MASS INDEX: 35.68 KG/M2 | HEART RATE: 91 BPM | RESPIRATION RATE: 18 BRPM | TEMPERATURE: 97 F | SYSTOLIC BLOOD PRESSURE: 148 MMHG | DIASTOLIC BLOOD PRESSURE: 89 MMHG | OXYGEN SATURATION: 97 %

## 2023-12-14 DIAGNOSIS — C54.1 ENDOMETRIAL CANCER (H): Primary | ICD-10-CM

## 2023-12-14 PROCEDURE — 99213 OFFICE O/P EST LOW 20 MIN: CPT | Performed by: NURSE PRACTITIONER

## 2023-12-14 PROCEDURE — G0463 HOSPITAL OUTPT CLINIC VISIT: HCPCS | Performed by: NURSE PRACTITIONER

## 2023-12-14 ASSESSMENT — PAIN SCALES - GENERAL: PAINLEVEL: MODERATE PAIN (5)

## 2023-12-14 NOTE — LETTER
2023         RE: Gricelda Sabillon  [address]  [facility] [address]        Dear Colleague,    Thank you for referring your patient, Gricelda Sabillon, to the [facility]. Please see a copy of my visit note below.                Follow Up Notes on Referred Patient    Date: 2023       Dr. Eli Stafford MD  [address]  [place] [address]       RE: Gricelda Sabillon  : 1950  CRAIG: 2023    Dear Dr. 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  She is here today for a surveillance visit.     Oncology history: ;    5/10/22:  US pelvis:  Uterus measures 8 x 3.7 x 4.2 cm with EMS of 11.2 mm.  Normal appearing adnexa     22:  EMB:  Grade 1 endometrial adenocarcinoma, MMR intact     22:  Total laparoscopic hysterectomy, bilateral salpingo-oophorectomy, bilateral pelvic sentinel lymph node dissection, cystoscopy                 Pathology:  Grade 1 endometrial adenocarcinoma, tumor size 2.2 cm, 10/12 mm myometrial invasion, no LVSI,  0/8 sentinel LN, lymphangioleiomyomatosis     10/28/22:  Completed vaginal brachytherapy    23: PET CT IMPRESSION: No convincing metabolic evidence of active neoplasm.      Today she comes to clinic and denies any issues for her visit. She denies any vaginal bleeding, no changes in her bowel (Every 3 days or so will have a day of ~4 soft stools which she relates  to  her DM medications) or bladder habits, no nausea/emesis, no lower extremity edema, and no difficulties eating or sleeping. She denies any abdominal discomfort/bloating, no fevers or chills, and no chest pain or shortness of breath. She does have some hip/sciatic pain recently.  She saw her PCP ,  had  her mammogram , and was told  she did not need any further colon  cancer   screening (last done 4/19). She is not using  her dilator, but did use it a couple times this  week   and denies any spotting. She is not sexually active.  Her home BPs run 120-136/70-80. She does get imaging  done  for  her melanoma and will have  it again in the spring; last scan was good.          Review of Systems:    Systemic           no weight changes; no fever; no chills; no night sweats; no appetite changes  Skin           no rashes, or lesions  Eye           no irritation; no changes in vision  Dell-Laryngeal           no dysphagia; no hoarseness   Pulmonary    no cough; no shortness of breath  Cardiovascular    no chest pain; no palpitations  Gastrointestinal    no diarrhea; no constipation; no abdominal pain; no changes in bowel habits; no blood in stool  Genitourinary   no urinary frequency; no urinary urgency; no dysuria; no pain; no abnormal vaginal discharge; no abnormal vaginal bleeding  Breast   no breast discharge; no breast changes; no breast pain  Musculoskeletal    no myalgias; no arthralgias; no back pain  Psychiatric           no depressed mood; no anxiety    Hematologic              no tender lymph nodes; no noticeable swellings or lumps   Endocrine    no hot flashes; no heat/cold intolerance         Neurological   no tremor; no numbness and tingling; no headaches; no difficulty sleeping      Past Medical History:    Past Medical History:   Diagnosis Date     Asthma, mild intermittent      Cataract      Endometrial cancer (H) 06/2022     HTN, goal below 140/90      Hyperlipidemia LDL goal < 100      Macular hole of left eye      Melanoma (H)     RIGHT KNEE     Sleep apnea     uses c pap     Type 2 diabetes, HbA1C goal < 8% (H)          Past Surgical History:    Past Surgical History:   Procedure Laterality Date     ARTHROPLASTY HIP Right 9/25/2017    Procedure: ARTHROPLASTY HIP;  Right total hip arthroplasty  ;  Surgeon: Ayaan Pena MD;  Location: [facility]     ARTHROPLASTY KNEE   7/12/2013    Procedure: ARTHROPLASTY KNEE;  right total knee arthroplasty ;  Surgeon: Chaparro Wesley MD;  Location: [facility]     ARTHROSCOPY KNEE Right 1/21/2015    Procedure: ARTHROSCOPY KNEE;  Surgeon: Ayaan Pena MD;  Location:  OR     BRONCHOSCOPY (RIGID OR FLEXIBLE), DIAGNOSTIC N/A 8/11/2022    Procedure: BRONCHOSCOPY, WITH BRONCHOALVEOLAR LAVAGE;  Surgeon: Roselia Sosa MD;  Location:  GI     C INCISION OF HYMEN       CATARACT IOL, RT/LT       COLONOSCOPY  2008     COLONOSCOPY N/A 4/18/2019    Procedure: Colonoscopy with polypectomy;  Surgeon: Shaun Guerrero MD;  [facility]     CYSTOSCOPY N/A 6/23/2022    Procedure: Cystoscopy;  Surgeon: Eli Guidry MD;  [facility]     ENDOSCOPIC RETROGRADE CHOLANGIOPANCREATOGRAM N/A 2/6/2019    Procedure: COMBINED ENDOSCOPIC RETROGRADE CHOLANGIOPANCREATOGRAPHY, BILIARY SPINCTEROTOMY AND DILATION, PLACE BILE DUCT STENT;  Surgeon: Guru Andie Gonzalez MD;  [facility]     ENDOSCOPIC RETROGRADE CHOLANGIOPANCREATOGRAM N/A 2/28/2019    Procedure: Endoscopic Retrograde Cholangiopancreatogram, Bile duct stent removal and placement;  Surgeon: Shaun Guerrero MD;  [facility]     ENDOSCOPIC RETROGRADE CHOLANGIOPANCREATOGRAM N/A 4/18/2019    Procedure: COMBINED ENDOSCOPIC RETROGRADE CHOLANGIOPANCREATOGRAPHY, Bile duct stent exchange and Polypectomy;  Surgeon: Shaun Guerrero MD;  [facility]     ENDOSCOPIC RETROGRADE CHOLANGIOPANCREATOGRAM N/A 10/18/2019    Procedure: Endoscopic Retrograde Cholangiopancreatogram;  Surgeon: Shaun Guerrero MD;  [facility]     ENDOSCOPIC RETROGRADE CHOLANGIOPANCREATOGRAM N/A 3/24/2022    Procedure: ENDOSCOPIC RETROGRADE CHOLANGIOPANCREATOGRAPHY;  Surgeon: Shaun Guerrero MD;  Location:  OR     ENDOSCOPIC RETROGRADE CHOLANGIOPANCREATOGRAM N/A 3/16/2023    Procedure: endoscopic retrograde cholangiopancreatography with minor papillotomy;  Surgeon: Shaun Guerrero MD;  Location:  [facility]     ENDOSCOPIC RETROGRADE CHOLANGIOPANCREATOGRAM WITH SPYGLASS N/A 7/26/2019    Procedure: Endoscopic Retrograde Cholangiopancreatogram With Spyglas,l Radiofrequency Ablation, Stent Exchangeand Duodenal Biopsy x2;  Surgeon: Shaun Guerrero MD;  [facility]     ENDOSCOPIC RETROGRADE CHOLANGIOPANCREATOGRAM WITH SPYGLASS N/A 9/10/2020    Procedure: ENDOSCOPIC RETROGRADE CHOLANGIOPANCREATOGRAPHY, WITH DIRECT DUCT VISUALIZATION, USING PANCREATICOBILIARY FIBEROPTIC PROBE;  Surgeon: Shaun Guerrero MD;  [facility]     ENDOSCOPIC RETROGRADE CHOLANGIOPANCREATOGRAM WITH SPYGLASS N/A 3/22/2021    Procedure: ENDOSCOPIC RETROGRADE CHOLANGIOPANCREATOGRAPHY, WITH DIRECT DUCT VISUALIZATION, USING PANCREATICOBILIARY FIBEROPTIC PROBE;  Surgeon: Shaun Guerrero MD;  [facility]     ESOPHAGOSCOPY, GASTROSCOPY, DUODENOSCOPY (EGD) WITH RADIO FREQUENCY ABLATION, COMBINED N/A 9/10/2020    Procedure: possible repeat ESOPHAGOGASTRODUODENOSCOPY, WITH RADIOFREQUENCY ABLATION and endoscopic mucosal resection;  Surgeon: Shaun Guerrero MD;  [facility]     ESOPHAGOSCOPY, GASTROSCOPY, DUODENOSCOPY (EGD), COMBINED N/A 4/18/2019    Procedure: upper endoscopy with polypectomy;  Surgeon: Shaun Guerrero MD;  [facility]     ESOPHAGOSCOPY, GASTROSCOPY, DUODENOSCOPY (EGD), COMBINED N/A 10/18/2019    Procedure: Upper Endoscopy and ERCP with stent removal, stone removal and biopsy;  Surgeon: Shaun Guerrero MD;  [facility]     ESOPHAGOSCOPY, GASTROSCOPY, DUODENOSCOPY (EGD), COMBINED N/A 3/24/2022    Procedure: ESOPHAGOGASTRODUODENOSCOPY (EGD), Duodenal  polyp removal;  Surgeon: Shaun Guerrero MD;  Location:  OR     ESOPHAGOSCOPY, GASTROSCOPY, DUODENOSCOPY (EGD), COMBINED N/A 3/16/2023    Procedure: ESOPHAGOGASTRODUODENOSCOPY (EGD);  Surgeon: Shaun Guerrero MD;  [facility]: [facility]     ESOPHAGOSCOPY, GASTROSCOPY, DUODENOSCOPY (EGD), RESECT MUCOSA, COMBINED N/A 2/28/2019    Procedure: Upper Endoscopy, Endoscopic Ultrasound,  "Endoscopic Mucosal Resection,  Ampullectomy, polypectomy.;  Surgeon: Shaun Guerrero MD;  [facility]     ESOPHAGOSCOPY, GASTROSCOPY, DUODENOSCOPY (EGD), RESECT MUCOSA, COMBINED N/A 3/22/2021    Procedure: ESOPHAGOGASTRODUODENOSCOPY (EGD) with  endoscopic mucosal resection/ polypectomy;  Surgeon: Shaun Guerrero MD;  [facility]     EXCISE LESION LOWER EXTREMITY Right 3/28/2022    Procedure: Wide local excision of right knee melanoma;  Surgeon: Chevy Allison MD;  Location: [facility] OR     EXCISE MASS LOWER EXTREMITY Right 1/13/2022    Procedure: excision of right thigh mass;  Surgeon: Salvador Kelly MD;  Location: [facility]     EYE SURGERY      macular hole repaired left eye     HC KNEE SCOPE, DIAGNOSTIC      - both knees     HEAD & NECK SURGERY      wisdom teeth     IR CHEST PORT PLACEMENT > 5 YRS OF AGE  5/2/2022     LAPAROSCOPIC HYSTERECTOMY TOTAL, BILATERAL SALPINGO-OOPHORECTOMY, NODE DISSECTION, COMBINED Bilateral 6/23/2022    Procedure: Total Laparoscopic Hyserectomy, Bilateral Salpibgo-oophorectomy, Bilateral [place] Lymph Node Dissection;  Surgeon: Eli Guidry MD;  [facility]         Health Maintenance Due   Topic Date Due     URINE DRUG SCREEN  Never done     ASTHMA ACTION PLAN  08/27/2019       Current Medications:     Current Outpatient Medications   Medication Sig Dispense Refill     atenolol (TENORMIN) 50 MG tablet Take 1 tablet (50 mg) by mouth daily 90 tablet 1     atorvastatin (LIPITOR) 40 MG tablet Take 1 tablet (40 mg) by mouth daily 90 tablet 1     azelastine (ASTELIN) 0.1 % nasal spray USE 1 SPRAY(S) IN EACH NOSTRIL TWICE DAILY AS NEEDED 30 mL 0     azelastine (OPTIVAR) 0.05 % ophthalmic solution        BD VEO INSULIN SYRINGE U/F 31G X 15/64\" 0.5 ML USE DAILY WITH NPH INSULIN 100 each 1     cetirizine (ZYRTEC) 10 MG tablet Take 10 mg by mouth every morning       Continuous Blood Gluc Sensor (FREESTYLE DAVID 14 DAY SENSOR) MISC USE 1 EVERY 14 DAYS 2 each 5     CONTOUR NEXT " "TEST test strip USE 1 STRIP TO CHECK GLUCOSE ONCE DAILY 100 strip 4     glipiZIDE (GLUCOTROL XL) 10 MG 24 hr tablet Take 1 tablet by mouth once daily 90 tablet 0     HUMALOG 100 UNIT/ML injection INJECT 5 UNITS SUBCUTANEOUSLY THREE TIMES DAILY BEFORE MEALS 10 mL 0     hydrochlorothiazide (HYDRODIURIL) 25 MG tablet TAKE 1 TABLET BY MOUTH ONCE DAILY IN THE MORNING 90 tablet 3     insulin glargine (LANTUS VIAL) 100 UNIT/ML vial Inject 44 Units Subcutaneous at bedtime 15 mL 2     insulin pen needle (MM PEN NEEDLES) 32G X 4 MM miscellaneous Inject insulin 4 times a day 300 each 1     insulin syringe-needle U-100 (BD INSULIN SYRINGE ULTRAFINE) 31G X 5/16\" 0.5 ML miscellaneous Use daily with NPH insulin 100 each 11     insulin syringe-needle U-100 (BD VEO INSULIN SYRINGE U/F) 31G X 15/64\" 0.3 ML Patient has 4 shots of insulin a day 300 each 1     latanoprost (XALATAN) 0.005 % ophthalmic solution Place 1 drop into both eyes At Bedtime  2.5 mL 1     lidocaine-prilocaine (EMLA) 2.5-2.5 % external cream APPLY CREAM TOPICALLY ONCE DAILY AS NEEDED FOR PAIN APPLY A PEA SIZED AMOUNT OVER PORT SITE 60 MINUTES PRIOR TO USE COVER WITH PLASTIC WRAP       lisinopril (ZESTRIL) 5 MG tablet Take 1 tablet (5 mg) by mouth daily 90 tablet 1     LORazepam (ATIVAN) 0.5 MG tablet Take 1 tablet (0.5 mg) by mouth nightly as needed for anxiety or sleep 30 tablet 0     ondansetron (ZOFRAN) 8 MG tablet Take 1 tablet (8 mg) by mouth every 8 hours as needed for nausea 30 tablet 1     OZEMPIC, 0.25 OR 0.5 MG/DOSE, 2 MG/3ML pen INJECT 0.25MG SUBCUTANEOUSLY EVERY 7 DAYS 3 mL 0     senna-docusate (SENOKOT-S/PERICOLACE) 8.6-50 MG tablet Take 2 tablets by mouth daily as needed for constipation 60 tablet 11     timolol maleate (TIMOPTIC) 0.5 % ophthalmic solution INSTILL 1 DROP INTO EACH EYE TWICE DAILY       triamcinolone (KENALOG) 0.5 % external ointment Apply 1 g topically daily as needed for irritation       VENTOLIN  (90 Base) MCG/ACT inhaler " "INHALE 2 PUFFS BY MOUTH EVERY 4 HOURS AS NEEDED FOR SHORTNESS OF BREATH 18 g 0     XARELTO ANTICOAGULANT 20 MG TABS tablet Take 1 tablet (20 mg) by mouth daily 90 tablet 1         Allergies:        Allergies   Allergen Reactions     Bromfenac Visual Disturbance      xibrom  Causes sever eye pain     Codeine Nausea     Other reaction(s): Dizziness, Headache     Metformin GI Disturbance     Morphine And Related      \"NAUSE,HA AND DIZZINESS\"     Seasonal Allergies         Social History:     Social History     Tobacco Use     Smoking status: Never     Passive exposure: Never     Smokeless tobacco: Never   Substance Use Topics     Alcohol use: No     Alcohol/week: 0.0 standard drinks of alcohol       History   Drug Use No         Family History:     The patient's family history is notable for     Family History   Problem Relation Age of Onset     Hypertension Mother         diabetes,hypoythryoidism, stroke     Diabetes Mother      Breast Cancer Mother      Heart Disease Father         , cancer lip     Uterine Cancer Sister      Connective Tissue Disorder Sister         fibromyalgia     Diabetes Sister      Hypertension Brother      Cerebrovascular Disease Maternal Grandmother         , diabetes     Cerebrovascular Disease Maternal Grandfather              Cancer Paternal Grandmother              Heart Disease Paternal Grandfather              Cancer Paternal Aunt         ovarian     Breast Cancer Niece      Asthma Maternal Aunt          Physical Exam:     BP (!) 148/89   Pulse 91   Temp 97  F (36.1  C) (Oral)   Resp 18   Wt 103.4 kg (227 lb 14.4 oz)   LMP  (LMP Unknown)   SpO2 97%   BMI 35.68 kg/m    Body mass index is 35.68 kg/m .    General Appearance: healthy and alert, no distress     HEENT: no thyromegaly, no palpable nodules or masses        Cardiovascular: regular rate and rhythm, no gallops, rubs or murmurs     Respiratory: lungs clear, no rales, rhonchi or wheezes, " normal diaphragmatic excursion    Musculoskeletal: extremities non tender and without edema    Skin: no lesions or rashes     Neurological: normal gait, no gross defects     Psychiatric: appropriate mood and affect                               Hematological: normal cervical, supraclavicular and inguinal lymph nodes     Gastrointestinal:       abdomen soft, non-tender, non-distended, no organomegaly or masses    Genitourinary: External genitalia and urethral meatus appears normal.  Vagina is smooth without nodularity or masses.  Cervix surgically absent  Bimanual exam reveal no masses, nodularity or fullness.  Recto-vaginal exam confirms these findings.      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  She is here today for a surveillance visit.     27 minutes spent on the date of the encounter doing chart review, history and exam, documentation and further activities as noted above      Plan:     1.)        Patient to RTC in 6 months for her next surveillance visit. Reviewed recommendations from [facility] not to perform surveillance pap smears in women diagnosed with endometrial cancer as this does not improve detection of local recurrence. Reviewed signs and symptoms for when she should contact the clinic or seek additional care. Patient to contact the clinic with any questions or concerns in the interim.  Answered all of her questions to the best of my ability. She can use her dilator once a month if she chooses otherwise she does not need to use it.      2.) Genetic risk factors were assessed and her MMR proteins were intact.     3.) Labs and/or tests ordered include:  none.     4.) Health maintenance issues addressed today include annual health maintenance and non-gynecologic issues with PCP.    5.)        Continue her follow up for her other co-morbidities.     Meme Walls,  DENYS, WHNP-BC, ANP-BC  Women's Health Nurse Practitioner  Adult Nurse Practitioner  Division of Gynecologic Oncology  .      CC  Patient Care Team:  Raisa Davidson MD as PCP - General (Internal Medicine)  Raisa Davidson MD as Assigned PCP  Tanya Sheehan RD as Diabetes Educator (Dietitian, Registered)  Chevy Allison MD as MD (Surgery)  Yarelis Mitchell, RN as Specialty Care Coordinator (Gynecologic Oncology)  Roselia Sosa MD as Assigned Pulmonology Provider  Arlet Harding [facility] as Assigned MTM Pharmacist  Shaun Guerrero MD as MD (Gastroenterology)  Aishwarya Reese RN as Registered Nurse  Clara Corado MD as Hospitalist (Endocrinology, Diabetes, and Metabolism)  Lili Ahuja as Diabetes Educator  Eli Canales MD as Assigned Cancer Care Provider  Clara Corado MD as Assigned Endocrinology Provider  Riley Lyons MD as Assigned Surgical Provider  Danisha Villa PA-C as Physician Assistant (Endocrinology, Diabetes, and Metabolism)  ELI CANALES      Again, thank you for allowing me to participate in the care of your patient.        Sincerely,        DENYS Guerra CNP

## 2023-12-14 NOTE — NURSING NOTE
"Oncology Rooming Note    December 14, 2023 1:44 PM   Gricelda Sabillon is a 73 year old female who presents for:    Chief Complaint   Patient presents with    Oncology Clinic Visit     Initial Vitals: BP (!) 148/89   Pulse 91   Temp 97  F (36.1  C) (Oral)   Resp 18   Wt 103.4 kg (227 lb 14.4 oz)   LMP  (LMP Unknown)   SpO2 97%   BMI 35.68 kg/m   Estimated body mass index is 35.68 kg/m  as calculated from the following:    Height as of 11/28/23: 1.702 m (5' 7.01\").    Weight as of this encounter: 103.4 kg (227 lb 14.4 oz). Body surface area is 2.21 meters squared.  Moderate Pain (5) Comment: Data Unavailable   No LMP recorded (lmp unknown). Patient is postmenopausal.  Allergies reviewed: Yes  Medications reviewed: Yes    Medications: Medication refills not needed today.  Pharmacy name entered into EPIC:    [facility] MA[facility] ORDER [id number] - [place], [place] - [address] [address] [facility] [address] [facility]    Clinical concerns: follow up        Tanya Colvin            " [facility] ENCOUNTER  CARDIOLOGY OFFICE VISIT        PRIMARY CARE PROVIDER  Hodan Peralta MD  Last seen: 10/31/2022    SUBJECTIVE  CHIEF COMPLAINT / REASON FOR VISIT: follow up atrial fibrillation, aortic stenosis     Cardiac Problem List:    Mild Aortic Stenosis  Moderate Mitral Valve Insufficiency  Permanent Atrial Fibrillation  Hyperlipidemia  Hypertension    Noncardiac Considerations:  N/A    Pertinent ED Visits/Hospitalizations since last Cardiology office visit:  N/A    HISTORY OF PRESENT ILLNESS  Milagros Hunter is a 84 year old female who presents today for 1 year follow up.  Patient has history of atrial fibrillation, mild aortic stenosis, hyperlipidemia and hypertension.    Patient diagnosed with atrial fibrillation back in 2000, per patient cardioversion attempted with no success.    Last ischemic evaluation was NM Stress on 05/10/2018 shows normal myocardial perfusion scans following a regadenoson injection and at rest with normal post-regadenoson LV systolic function.    Last cardiology visit was on 7/12/2021.  TAVR video shared with patient.  Recommend follow up echocardiogram after next office visit to assess aortic valve.     Patient follows with Dr Dillon, neurology for memory loss. Last visit 9/28/2022.  Patient is now living in a senior facility, but not assisted living.  No recent accidents or episodes of getting lost.    Most recent echocardiogram 11/22/2022 with findings of; Left ventricular ejection fraction; 64 %.  No left ventricular regional wall motion abnormalities.  Indeterminate left ventricular diastolic dysfunction.  LV Global longitudinal strain -17.0 %. Normal right ventricular systolic function.  Moderate pulmonary hypertension; RVSP 47 mmHg.  Catheter/pacemaker wire visualized in the right ventricle.  Left atrial volume index of 91.7 ml/m².  Moderate aortic valve stenosis; mean gradient 18 mmHg, MADONNA 1.0 cm2.  Moderate to severe tricuspid valve regurgitation.  Mildly  dilated ascending aorta, 3.8 cm.  Compared to prior study of 07/15/2020 RVSP has increased , along with decrease in aortic valve area.    Last saw Hodan Peralta MD [id number] for wellness visit.  Daughter noticed that patient is more short of breath with exertion walking to the mailbox.  Patient does not notice shortness of breath.  She manages patient's ongoing medical conditions.    Today patient reports No chest pain, no edema, no dizziness, no palpitations, no leg pain with walking. No signs of bleeding. No dyspnea upon exertion. No fever, chills. No syncope. Reviewed lipid panel results with patient. She states she stays active walking in her hallways, in summer she walks outside. She denies shortness of breath with lying flat.     ALLERGIES  ALLERGIES:  No Known Allergies    MEDICATIONS  Current Outpatient Medications   Medication Sig Dispense Refill   • lisinopril (ZESTRIL) 40 MG tablet TAKE 1 TABLET BY MOUTH  DAILY 90 tablet 0   • metoPROLOL succinate (TOPROL-XL) 50 MG 24 hr tablet TAKE 1 TABLET BY MOUTH  TWICE DAILY 180 tablet 3   • apixaBAN (Eliquis) 5 MG Tab Take 1 tablet by mouth every 12 hours. 180 tablet 1   • Multiple Vitamins-Minerals (MULTIVITAMIN WOMEN PO) Take 1 tablet by mouth daily.     • Cholecalciferol (VITAMIN D3) 50 mcg (2,000 units) tablet Take 2,000 Units by mouth.     • acetaminophen (TYLENOL) 325 MG tablet Take 500 mg by mouth every 6 hours as needed for Pain.        No current facility-administered medications for this visit.       MEDICAL HISTORY  Past Medical History:   Diagnosis Date   • Arthritis    • Atrial fibrillation (CMS/HCC)    • Atrial fibrillation, unspecified type (CMS/HCC) 11/29/2017   • Cataract     right eye   • Chronic anticoagulation 04/17/2013   • Chronic atrial fibrillation (CMS/HCC) 10/31/2018   • HTN (hypertension)    • Hyperlipidemia    • Lens replaced by other means 11/01/2012   • Microscopic hematuria    • Pseudophakia of left eye    • Urge urinary  incontinence 10/05/2015        SURGICAL HISTORY  Past Surgical History:   Procedure Laterality Date   • Bd dexa axial skeleton  05/04/2010   • Cataract extraction, bilateral     • Colonoscopy remove lesions by snare  11/20/2009   • Discission,2nd cataract,laser      Left eye   • Extracapsular cataract removal w insert io lens prosth wo ecp      left eye  7/2009    right eye 10/31/12        SOCIAL HISTORY  Social History     Tobacco Use   • Smoking status: Never   • Smokeless tobacco: Never   Vaping Use   • Vaping Use: never used   Substance Use Topics   • Alcohol use: Yes     Alcohol/week: 3.0 standard drinks     Types: 3 Standard drinks or equivalent per week     Comment: very rare   • Drug use: No        FAMILY HISTORY  Family History   Problem Relation Age of Onset   • Cataracts Father    • Dementia/Alzheimers Sister    • Hypertension Sister    • Cataracts Maternal Aunt         Review of Systems:  GENERAL: Negative for: fever and chills  NEURO: Negative for: lightheadedness or dizziness   PULMONARY: Negative for: shortness of breath, wheezing and cough  CV: Negative for: syncope, angina, palpitations and claudication  GI: Negative for: hematemesis  and melena  MS: Negative for: swelling  and myalgias     VITALS  Visit Vitals  BP (!) 157/91 (BP Location: LUE - Left upper extremity, Patient Position: Sitting, Cuff Size: Regular)   Pulse 94   Ht 5' 5.5\" (1.664 m)   Wt 64.3 kg (141 lb 11.2 oz)   BMI 23.22 kg/m²        Physical Exam:  General: comfortable and in no acute distress  HEENT:   no pallor, jaundice  Neck:  supple, no carotid bruits and no JVD  Lungs:  clear to auscultation, good air entry, no rales or wheezes and no rhonchi  Cardiovascular:   regular rate and rhythm,   Abdomen:   soft, non-tender, nondistended  Extremities:   no edema, no clubbing  Neuro:  awake, alert, oriented X3  Skin:   color, texture, turgor are normal, no bruises or rashes       Lab Results   Component Value Date    SODIUM 139  10/31/2022    POTASSIUM 3.8 10/31/2022    BUN 23 (H) 10/31/2022    CREATININE 0.65 10/31/2022    WBC 6.5 10/31/2022    HCT 40.6 10/31/2022    HGB 13.0 10/31/2022     10/31/2022    INR 1.3 04/01/2021    RAPDTR <0.02 05/08/2018    GLUCOSE 87 10/31/2022    TSH 1.070 04/06/2021    CHOLESTEROL 241 (H) 08/23/2021    HDL 88 08/23/2021    CALCLDL 132 (H) 08/23/2021    TRIGLYCERIDE 106 08/23/2021    GFRA 70 01/08/2020    GFRNA 61 01/08/2020    AST 12 10/31/2022    GPT 24 10/31/2022    ALKPT 82 10/31/2022    BILIRUBIN 1.3 (H) 10/31/2022       No results found for: NTPROB       DIAGNOSTICS:  11/22/2022 Echocardiogram  Left ventricular ejection fraction; 64 %.  No left ventricular regional wall motion abnormalities.  Indeterminate left ventricular diastolic dysfunction.  LV Global longitudinal strain -17.0 %.  Normal right ventricular systolic function.  Moderate pulmonary hypertension; RVSP 47 mmHg.  Catheter/pacemaker wire visualized in the right ventricle.  Left atrial volume index of 91.7 ml/m².  Moderate aortic valve stenosis; mean gradient 18 mmHg, MADONNA 1.0 cm2.  Moderate to severe tricuspid valve regurgitation.  Mildly dilated ascending aorta, 3.8 cm.  Compared to prior study of 07/15/2020 RVSP has increased , along with decrease in aortic valve area.    07/06/2021 MRI Brain  IMPRESSION:  1. There is moderate generalized cerebral cortical atrophy and overall  moderate-severely advanced chronic microvascular ischemia.  2. There is a solitary remote microhemorrhage at the periphery of the right  temporal-occipital region which could be a sequela of hypertensive  encephalopathy versus extremely early changes of amyloid angiopathy.    03/29/2021 12-Lead EKG  Atrial fibrillation   Right axis deviation   Anteroseptal infarct (cited on or before 10-MAY-2018)   Abnormal ECG   When compared with ECG of 14-JUN-2019 10:57,   QRS axis shifted right   Confirmed by AMISH NICE, TAM ([id number]) on 3/29/2021 7:03:13 PM    06/14/2019 CT  Head  IMPRESSION:  1. No acute intracranial process.  2. Chronic appearing periventricular white matter changes bilaterally in keeping with chronic small vessel ischemic change.  3. Moderate cortical atrophy particularly the frontal and temporal lobes.    07/09/2018 Holter Monitor  The predominant rhythm during recording is atrial fibrillation with average heart rate 72 bpm.  Minimum heart rate is 39 bpm at 5:07 AM.  Maximum heart rate is 190 bpm at 7:52 PM.   ST segment changes are noted during tachycardia.  There are 15 premature ventricular complexes present during the study   8 beat run of wide-complex tachycardia, nonsustained ventricular tachycardia with heart rate 143 bpm occurred at 10:52 PM.  There is no significant sinus pause, maximum R-R interval is 2.1 seconds.  Normal intraventricular conduction is seen  Symptoms of shortness of breath are reported once during atrial fibrillation rhythm with controlled ventricular response.  The overall quality of study is good.    05/10/2018 Regadenoson Stress Test  Impression:  1. Normal myocardial perfusion scans following a regadenoson injection and at rest.  2. Normal post-regadenoson LV systolic function.  3. Cardiac Risk Assessment: Low.       CHADS-VASC score   CHF (1 point):  0  HTN (1 point):  1  Age >/= to 75 years (2 points):  2  Diabetes Mellitus (1 point):  0  Stroke/Thromboembolism previously (2 points):  0  Vascular disease (1 point): 0  Age 65-74 (1 point):  0  Sex category (female=1 point):  1  Total points:  4       Assessment:  Aortic Valve Insufficiency  · Echo (11/22/2022) - Moderate aortic valve stenosis; mean gradient 18 mmHg, MADONNA 1.0 cm2. Moderate to severe tricuspid valve regurgitation.  Permanent Atrial Fibrillation  • FLORENCIO 91.7 ml/m² (Echo 11/22/2022)   • 24hr Holter (07/09/2018)  · CHADSVASc Stroke Risk Score = 4  · On Eliquis  Hyperlipidemia  • Last Lipid Panel (08/23/2021) -   Hypertension    Plan:  Echocardiogram images reviewed  with patient on computer  Continue on current cardiac medications lisinopril, metoprolol, Eliquis   Recommend repeating echocardiogram in 1 year   If symptoms of chest pain. shortness of breath, dizziness or passing out patient to call our office      Follow up in cardiology clinic in 6 months or sooner if symptomatic     I Maryana STEVENS RN attest, that I performed the duties of a scribe for the encounter in the presence of Beni Gabriel MD    I, Dr. Beni Gabriel, attest that I performed the history of present Illness, physical exam, and assessment and plan for this encounter.  I reviewed the review of systems, past family and social history, verify that it is accurate, and the scribe merely recorded my findings.  Beni Gabriel MD [facility]  01/16/23    We would like to thank Dr. Hodan Peralta MD for involving us in the care of Milagros Hunter.

## 2023-12-18 ENCOUNTER — OFFICE VISIT (OUTPATIENT)
Dept: ENDOCRINOLOGY | Facility: CLINIC | Age: 73
End: 2023-12-18
Payer: COMMERCIAL

## 2023-12-18 VITALS
SYSTOLIC BLOOD PRESSURE: 146 MMHG | OXYGEN SATURATION: 97 % | WEIGHT: 227.1 LBS | HEART RATE: 91 BPM | BODY MASS INDEX: 35.64 KG/M2 | HEIGHT: 67 IN | TEMPERATURE: 98.2 F | DIASTOLIC BLOOD PRESSURE: 78 MMHG | RESPIRATION RATE: 18 BRPM

## 2023-12-18 DIAGNOSIS — E11.65 TYPE 2 DIABETES MELLITUS WITH HYPERGLYCEMIA, WITHOUT LONG-TERM CURRENT USE OF INSULIN (H): Primary | ICD-10-CM

## 2023-12-18 DIAGNOSIS — E11.649: ICD-10-CM

## 2023-12-18 PROCEDURE — 99213 OFFICE O/P EST LOW 20 MIN: CPT | Performed by: PHYSICIAN ASSISTANT

## 2023-12-18 RX ORDER — BLOOD-GLUCOSE SENSOR
1 EACH MISCELLANEOUS
Qty: 2 EACH | Refills: 3 | Status: SHIPPED | OUTPATIENT
Start: 2023-12-18 | End: 2024-06-13

## 2023-12-18 NOTE — LETTER
12/18/2023         RE: Gricelda Sabillon  [address] [address]        Dear Colleague,    Thank you for referring your patient, Gricelda Sabillon, to the [facility]. Please see a copy of my visit note below.    Assessment/Plan :   Type 2 DM. Melva is doing well. Her blood sugars are stable and her FreeStyle Baldo report indicates that her overall numbers are improving. She would like to switch to sensor with an alert. I think that this would be a great idea. She does appear to have some level of hypoglycemia unawareness along with a tendency to experience low blood sugars at night. I think upgrading to a Baldo 3 would be very helpful and may allow her to get more sleep. We also discussed the possible causes of her late morning spike. She drinks coffee with creamer throughout the morning. I think this is leading to prolonged hyperglycemia. Aside from that spike, her blood sugars are within target range. We will continue with her current medications, for now.      I have independently reviewed and interpreted labs, imaging as indicated.      Chief complaint:  Gricelda is a 73 year old female who returns for follow-up of Type 2 DM.    I have reviewed Care Everywhere including [facility], [facility], [facility], [facility], [facility], [facility] , [facility], [facility] lab reports, imaging reports and provider notes as indicated.      HISTORY OF PRESENT ILLNESS  Melva continues to work on managing her blood sugars. Recently, she has been very worried about the potential for hypoglycemia over night. She states that she has had a few episodes of nocturnal hypoglycemia with one reading being as low as 48 mg/dl She feels like her anxiety regarding low blood sugars, is preventing her from getting a good night's sleep. She has continued to take Ozempic 0.25 mg weekly and glipizide XL 10 mg daily. Her Lantus was recently decreased to 36 unit(s) along with a slight decrease in  Humalog to 4 unit(s), by Dr. Torres. Melva continues to use the FreeStyle Baldo 14 day sensor to monitor her blood sugars.    Melva has not had any problems with severe hyperglycemia and/or hypoglycemia. However, as previously mentioned, she has had a few low blood sugars over night. She has also noticed that her blood sugars seem to gradually increase after breakfast. She denies any problems with blurred vision and she has not had any issues with numbness/tingling in her feet.     Melva has had diabetes for about 10 yrs. She was started on metformin at the time of diagnosis but was unable to tolerate the medication due to adverse effects. Her diabetes is complicated by hyperlipidemia, obesity, HTN, CHERRY,m and a history of endometrial cancer.    Endocrine relevant labs are as follows:    Relevant imaging is as follows: (as read by me as it pertains to endocrine relevant organs)    REVIEW OF SYSTEMS    Endocrine: positive for diabetes  Skin: negative  Eyes: negative for, visual blurring, redness, tearing  Ears/Nose/Throat: negative  Respiratory: No shortness of breath, dyspnea on exertion, cough, or hemoptysis  Cardiovascular: negative for, chest pain, dyspnea on exertion, lower extremity edema, and exercise intolerance  Gastrointestinal: negative for, nausea, vomiting, constipation, and diarrhea  Genitourinary: negative for, nocturia, dysuria, frequency, and urgency  Musculoskeletal: negative for, muscular weakness, nocturnal cramping, and foot pain  Neurologic: negative for, local weakness, numbness or tingling of hands, and numbness or tingling of feet  Psychiatric: negative  Hematologic/Lymphatic/Immunologic: negative    Past Medical History  Past Medical History:   Diagnosis Date     Asthma, mild intermittent      Cataract      Endometrial cancer (H) 06/2022     HTN, goal below 140/90      Hyperlipidemia LDL goal < 100      Macular hole of left eye      Melanoma (H)     RIGHT KNEE     Sleep apnea     uses c pap      "Type 2 diabetes, HbA1C goal < 8% (H)        Medications  Current Outpatient Medications   Medication Sig Dispense Refill     atenolol (TENORMIN) 50 MG tablet Take 1 tablet (50 mg) by mouth daily 90 tablet 1     atorvastatin (LIPITOR) 40 MG tablet Take 1 tablet (40 mg) by mouth daily 90 tablet 1     azelastine (ASTELIN) 0.1 % nasal spray USE 1 SPRAY(S) IN EACH NOSTRIL TWICE DAILY AS NEEDED 30 mL 0     azelastine (OPTIVAR) 0.05 % ophthalmic solution        BD VEO INSULIN SYRINGE U/F 31G X 15/64\" 0.5 ML USE DAILY WITH NPH INSULIN 100 each 1     cetirizine (ZYRTEC) 10 MG tablet Take 10 mg by mouth every morning       Continuous Blood Gluc Sensor (FREESTYLE DAVID 14 DAY SENSOR) MISC USE 1 EVERY 14 DAYS 2 each 5     Continuous Blood Gluc Sensor (FREESTYLE DAVID 3 SENSOR) MISC 1 Device every 14 days 2 each 3     CONTOUR NEXT TEST test strip USE 1 STRIP TO CHECK GLUCOSE ONCE DAILY 100 strip 4     glipiZIDE (GLUCOTROL XL) 10 MG 24 hr tablet Take 1 tablet by mouth once daily 90 tablet 0     HUMALOG 100 UNIT/ML injection INJECT 5 UNITS SUBCUTANEOUSLY THREE TIMES DAILY BEFORE MEALS 10 mL 0     hydrochlorothiazide (HYDRODIURIL) 25 MG tablet TAKE 1 TABLET BY MOUTH ONCE DAILY IN THE MORNING 90 tablet 3     insulin glargine (LANTUS VIAL) 100 UNIT/ML vial Inject 44 Units Subcutaneous at bedtime 15 mL 2     insulin pen needle (MM PEN NEEDLES) 32G X 4 MM miscellaneous Inject insulin 4 times a day 300 each 1     insulin syringe-needle U-100 (BD INSULIN SYRINGE ULTRAFINE) 31G X 5/16\" 0.5 ML miscellaneous Use daily with NPH insulin 100 each 11     insulin syringe-needle U-100 (BD VEO INSULIN SYRINGE U/F) 31G X 15/64\" 0.3 ML Patient has 4 shots of insulin a day 300 each 1     latanoprost (XALATAN) 0.005 % ophthalmic solution Place 1 drop into both eyes At Bedtime  2.5 mL 1     lidocaine-prilocaine (EMLA) 2.5-2.5 % external cream APPLY CREAM TOPICALLY ONCE DAILY AS NEEDED FOR PAIN APPLY A PEA SIZED AMOUNT OVER PORT SITE 60 MINUTES PRIOR TO " "USE COVER WITH PLASTIC WRAP       lisinopril (ZESTRIL) 5 MG tablet Take 1 tablet (5 mg) by mouth daily 90 tablet 1     LORazepam (ATIVAN) 0.5 MG tablet Take 1 tablet (0.5 mg) by mouth nightly as needed for anxiety or sleep 30 tablet 0     ondansetron (ZOFRAN) 8 MG tablet Take 1 tablet (8 mg) by mouth every 8 hours as needed for nausea 30 tablet 1     OZEMPIC, 0.25 OR 0.5 MG/DOSE, 2 MG/3ML pen INJECT 0.25MG SUBCUTANEOUSLY EVERY 7 DAYS 3 mL 0     senna-docusate (SENOKOT-S/PERICOLACE) 8.6-50 MG tablet Take 2 tablets by mouth daily as needed for constipation 60 tablet 11     timolol maleate (TIMOPTIC) 0.5 % ophthalmic solution INSTILL 1 DROP INTO EACH EYE TWICE DAILY       triamcinolone (KENALOG) 0.5 % external ointment Apply 1 g topically daily as needed for irritation       VENTOLIN  (90 Base) MCG/ACT inhaler INHALE 2 PUFFS BY MOUTH EVERY 4 HOURS AS NEEDED FOR SHORTNESS OF BREATH 18 g 0     XARELTO ANTICOAGULANT 20 MG TABS tablet Take 1 tablet (20 mg) by mouth daily 90 tablet 1       Allergies  Allergies   Allergen Reactions     Bromfenac Visual Disturbance      xibrom  Causes sever eye pain     Codeine Nausea     Other reaction(s): Dizziness, Headache     Metformin GI Disturbance     Morphine And Related      \"NAUSE,HA AND DIZZINESS\"     Seasonal Allergies          Family History  family history includes Asthma in her maternal aunt; Breast Cancer in her mother and niece; Cancer in her paternal aunt and paternal grandmother; Cerebrovascular Disease in her maternal grandfather and maternal grandmother; Connective Tissue Disorder in her sister; Diabetes in her mother and sister; Heart Disease in her father and paternal grandfather; Hypertension in her brother and mother; Uterine Cancer in her sister.    Social History  Social History     Tobacco Use     Smoking status: Never     Passive exposure: Never     Smokeless tobacco: Never   Vaping Use     Vaping Use: Never used   Substance Use Topics     Alcohol use: No " "    Alcohol/week: 0.0 standard drinks of alcohol     Drug use: No       Physical Exam  BP (!) 146/78 (BP Location: Right arm, Patient Position: Chair, Cuff Size: Adult Regular)   Pulse 91   Temp 98.2  F (36.8  C) (Tympanic)   Resp 18   Ht 1.702 m (5' 7.01\")   Wt 103 kg (227 lb 1.6 oz)   LMP  (LMP Unknown)   SpO2 97%   Breastfeeding No   BMI 35.56 kg/m    Body mass index is 35.56 kg/m .  GENERAL :  In no apparent distress  SKIN: Normal color, normal temperature, texture.  No hirsutism, alopecia or purple striae.     EYES: PERRL, EOMI, No scleral icterus,  No proptosis, conjunctival redness, stare, retraction  RESP: Lungs clear to auscultation bilaterally  CARDIAC: Regular rate and rhythm, normal S1 S2, without murmurs, rubs or gallops  NEURO: awake, alert, responds appropriately to questions.  Cranial nerves intact.   Moves all extremities; Gait normal.  No tremor of the outstretched hand.    EXTREMITIES: No clubbing, cyanosis or edema.    DATA REVIEW  Freestyle Libreview  Time in target range 82%  Low 2%, High 15%, and Very High 1%  Current Ave  mg/dl        Again, thank you for allowing me to participate in the care of your patient.        Sincerely,        Danisha Villa PA-C  "

## 2023-12-18 NOTE — PROGRESS NOTES
Assessment/Plan :   Type 2 DM. Melva is doing well. Her blood sugars are stable and her FreeStyle Baldo report indicates that her overall numbers are improving. She would like to switch to sensor with an alert. I think that this would be a great idea. She does appear to have some level of hypoglycemia unawareness along with a tendency to experience low blood sugars at night. I think upgrading to a Baldo 3 would be very helpful and may allow her to get more sleep. We also discussed the possible causes of her late morning spike. She drinks coffee with creamer throughout the morning. I think this is leading to prolonged hyperglycemia. Aside from that spike, her blood sugars are within target range. We will continue with her current medications, for now.      I have independently reviewed and interpreted labs, imaging as indicated.      Chief complaint:  Gricelda is a 73 year old female who returns for follow-up of Type 2 DM.    I have reviewed Care Everywhere including [facility], [facility], [facility], [facility], [facility], [facility] , [facility], [place] lab reports, imaging reports and provider notes as indicated.      HISTORY OF PRESENT ILLNESS  Melva continues to work on managing her blood sugars. Recently, she has been very worried about the potential for hypoglycemia over night. She states that she has had a few episodes of nocturnal hypoglycemia with one reading being as low as 48 mg/dl She feels like her anxiety regarding low blood sugars, is preventing her from getting a good night's sleep. She has continued to take Ozempic 0.25 mg weekly and glipizide XL 10 mg daily. Her Lantus was recently decreased to 36 unit(s) along with a slight decrease in Humalog to 4 unit(s), by Dr. Torres. Melva continues to use the FreeStyle Baldo 14 day sensor to monitor her blood sugars.    Melva has not had any problems with severe hyperglycemia and/or hypoglycemia. However, as previously mentioned, she has had a few  low blood sugars over night. She has also noticed that her blood sugars seem to gradually increase after breakfast. She denies any problems with blurred vision and she has not had any issues with numbness/tingling in her feet.     Melva has had diabetes for about 10 yrs. She was started on metformin at the time of diagnosis but was unable to tolerate the medication due to adverse effects. Her diabetes is complicated by hyperlipidemia, obesity, HTN, CHERRY,m and a history of endometrial cancer.    Endocrine relevant labs are as follows:    Relevant imaging is as follows: (as read by me as it pertains to endocrine relevant organs)    REVIEW OF SYSTEMS    Endocrine: positive for diabetes  Skin: negative  Eyes: negative for, visual blurring, redness, tearing  Ears/Nose/Throat: negative  Respiratory: No shortness of breath, dyspnea on exertion, cough, or hemoptysis  Cardiovascular: negative for, chest pain, dyspnea on exertion, lower extremity edema, and exercise intolerance  Gastrointestinal: negative for, nausea, vomiting, constipation, and diarrhea  Genitourinary: negative for, nocturia, dysuria, frequency, and urgency  Musculoskeletal: negative for, muscular weakness, nocturnal cramping, and foot pain  Neurologic: negative for, local weakness, numbness or tingling of hands, and numbness or tingling of feet  Psychiatric: negative  Hematologic/Lymphatic/Immunologic: negative    Past Medical History  Past Medical History:   Diagnosis Date    Asthma, mild intermittent     Cataract     Endometrial cancer (H) 06/2022    HTN, goal below 140/90     Hyperlipidemia LDL goal < 100     Macular hole of left eye     Melanoma (H)     RIGHT KNEE    Sleep apnea     uses c pap    Type 2 diabetes, HbA1C goal < 8% (H)        Medications  Current Outpatient Medications   Medication Sig Dispense Refill    atenolol (TENORMIN) 50 MG tablet Take 1 tablet (50 mg) by mouth daily 90 tablet 1    atorvastatin (LIPITOR) 40 MG tablet Take 1 tablet (40  "mg) by mouth daily 90 tablet 1    azelastine (ASTELIN) 0.1 % nasal spray USE 1 SPRAY(S) IN EACH NOSTRIL TWICE DAILY AS NEEDED 30 mL 0    azelastine (OPTIVAR) 0.05 % ophthalmic solution       BD VEO INSULIN SYRINGE U/F 31G X 15/64\" 0.5 ML USE DAILY WITH NPH INSULIN 100 each 1    cetirizine (ZYRTEC) 10 MG tablet Take 10 mg by mouth every morning      Continuous Blood Gluc Sensor (FREESTYLE DAVID 14 DAY SENSOR) MISC USE 1 EVERY 14 DAYS 2 each 5    Continuous Blood Gluc Sensor (FREESTYLE DAVID 3 SENSOR) MISC 1 Device every 14 days 2 each 3    CONTOUR NEXT TEST test strip USE 1 STRIP TO CHECK GLUCOSE ONCE DAILY 100 strip 4    glipiZIDE (GLUCOTROL XL) 10 MG 24 hr tablet Take 1 tablet by mouth once daily 90 tablet 0    HUMALOG 100 UNIT/ML injection INJECT 5 UNITS SUBCUTANEOUSLY THREE TIMES DAILY BEFORE MEALS 10 mL 0    hydrochlorothiazide (HYDRODIURIL) 25 MG tablet TAKE 1 TABLET BY MOUTH ONCE DAILY IN THE MORNING 90 tablet 3    insulin glargine (LANTUS VIAL) 100 UNIT/ML vial Inject 44 Units Subcutaneous at bedtime 15 mL 2    insulin pen needle (MM PEN NEEDLES) 32G X 4 MM miscellaneous Inject insulin 4 times a day 300 each 1    insulin syringe-needle U-100 (BD INSULIN SYRINGE ULTRAFINE) 31G X 5/16\" 0.5 ML miscellaneous Use daily with NPH insulin 100 each 11    insulin syringe-needle U-100 (BD VEO INSULIN SYRINGE U/F) 31G X 15/64\" 0.3 ML Patient has 4 shots of insulin a day 300 each 1    latanoprost (XALATAN) 0.005 % ophthalmic solution Place 1 drop into both eyes At Bedtime  2.5 mL 1    lidocaine-prilocaine (EMLA) 2.5-2.5 % external cream APPLY CREAM TOPICALLY ONCE DAILY AS NEEDED FOR PAIN APPLY A PEA SIZED AMOUNT OVER PORT SITE 60 MINUTES PRIOR TO USE COVER WITH PLASTIC WRAP      lisinopril (ZESTRIL) 5 MG tablet Take 1 tablet (5 mg) by mouth daily 90 tablet 1    LORazepam (ATIVAN) 0.5 MG tablet Take 1 tablet (0.5 mg) by mouth nightly as needed for anxiety or sleep 30 tablet 0    ondansetron (ZOFRAN) 8 MG tablet Take 1 " "tablet (8 mg) by mouth every 8 hours as needed for nausea 30 tablet 1    OZEMPIC, 0.25 OR 0.5 MG/DOSE, 2 MG/3ML pen INJECT 0.25MG SUBCUTANEOUSLY EVERY 7 DAYS 3 mL 0    senna-docusate (SENOKOT-S/PERICOLACE) 8.6-50 MG tablet Take 2 tablets by mouth daily as needed for constipation 60 tablet 11    timolol maleate (TIMOPTIC) 0.5 % ophthalmic solution INSTILL 1 DROP INTO EACH EYE TWICE DAILY      triamcinolone (KENALOG) 0.5 % external ointment Apply 1 g topically daily as needed for irritation      VENTOLIN  (90 Base) MCG/ACT inhaler INHALE 2 PUFFS BY MOUTH EVERY 4 HOURS AS NEEDED FOR SHORTNESS OF BREATH 18 g 0    XARELTO ANTICOAGULANT 20 MG TABS tablet Take 1 tablet (20 mg) by mouth daily 90 tablet 1       Allergies  Allergies   Allergen Reactions    Bromfenac Visual Disturbance      xibrom  Causes sever eye pain    Codeine Nausea     Other reaction(s): Dizziness, Headache    Metformin GI Disturbance    Morphine And Related      \"NAUSE,HA AND DIZZINESS\"    Seasonal Allergies          Family History  family history includes Asthma in her maternal aunt; Breast Cancer in her mother and niece; Cancer in her paternal aunt and paternal grandmother; Cerebrovascular Disease in her maternal grandfather and maternal grandmother; Connective Tissue Disorder in her sister; Diabetes in her mother and sister; Heart Disease in her father and paternal grandfather; Hypertension in her brother and mother; Uterine Cancer in her sister.    Social History  Social History     Tobacco Use    Smoking status: Never     Passive exposure: Never    Smokeless tobacco: Never   Vaping Use    Vaping Use: Never used   Substance Use Topics    Alcohol use: No     Alcohol/week: 0.0 standard drinks of alcohol    Drug use: No       Physical Exam  BP (!) 146/78 (BP Location: Right arm, Patient Position: Chair, Cuff Size: Adult Regular)   Pulse 91   Temp 98.2  F (36.8  C) (Tympanic)   Resp 18   Ht 1.702 m (5' 7.01\")   Wt 103 kg (227 lb 1.6 oz)   " LMP  (LMP Unknown)   SpO2 97%   Breastfeeding No   BMI 35.56 kg/m    Body mass index is 35.56 kg/m .  GENERAL :  In no apparent distress  SKIN: Normal color, normal temperature, texture.  No hirsutism, alopecia or purple striae.     EYES: PERRL, EOMI, No scleral icterus,  No proptosis, conjunctival redness, stare, retraction  RESP: Lungs clear to auscultation bilaterally  CARDIAC: Regular rate and rhythm, normal S1 S2, without murmurs, rubs or gallops  NEURO: awake, alert, responds appropriately to questions.  Cranial nerves intact.   Moves all extremities; Gait normal.  No tremor of the outstretched hand.    EXTREMITIES: No clubbing, cyanosis or edema.    DATA REVIEW  Freestyle Libreview  Time in target range 82%  Low 2%, High 15%, and Very High 1%  Current [address]  mg/dl

## 2023-12-20 ENCOUNTER — PATIENT OUTREACH (OUTPATIENT)
Dept: GASTROENTEROLOGY | Facility: CLINIC | Age: 73
End: 2023-12-20
Payer: COMMERCIAL

## 2023-12-20 ENCOUNTER — PREP FOR PROCEDURE (OUTPATIENT)
Dept: GASTROENTEROLOGY | Facility: CLINIC | Age: 73
End: 2023-12-20
Payer: COMMERCIAL

## 2023-12-20 DIAGNOSIS — D13.5 AMPULLARY ADENOMA: Primary | ICD-10-CM

## 2023-12-20 NOTE — TELEPHONE ENCOUNTER
Called pt to discuss follow up procedure due in March     Procedure/Imaging/Clinic: [facility]CP  Physician: Cesar  Timin year (from 3/16/23)  Procedure length: 60 min  Anesthesia: Gen  Dx: h/o ampullary adenoma  Tier: 4  [place] [facility]    Pt in agreement with 3/14 at [facility]    Explained they will need a , someone to stay with them for 24 hours and should stay in town for 24 hours (within 45 min of Hospital) post procedure     Patient needs to get pre-op physical completed. If outside  health system will need physical faxed to number [phone]   If you do not get a preop physical, your procedure could be cancelled, patient voiced understanding*     Preop Plan:  Appt with Dr Davidson scheduled 3/4/24, within 30 days of procedure     Med Review     Blood thinner -  xarelto, guidelines to hold for 2 days -  advised to hold this on Tues 3/12   Diabetic - lantus and humulog insulin, glipizide, - discussed holding the day of in prep   Weight loss - Ozempic, takes on , will hold on Mon 3/11       Patient Education r/t procedure: alexit     A pre-op nurse will call 1-2 days prior to the procedure     Verbalized understanding of all instructions. All questions answered.      Case request placed, message routed to OR      Aishwarya Reese, RN, BSN,   [facility]  Care coordinator

## 2024-01-29 DIAGNOSIS — E11.65 TYPE 2 DIABETES MELLITUS WITH HYPERGLYCEMIA, WITHOUT LONG-TERM CURRENT USE OF INSULIN (H): ICD-10-CM

## 2024-01-29 DIAGNOSIS — E66.812 CLASS 2 SEVERE OBESITY DUE TO EXCESS CALORIES WITH SERIOUS COMORBIDITY AND BODY MASS INDEX (BMI) OF 35.0 TO 35.9 IN ADULT (H): ICD-10-CM

## 2024-01-29 DIAGNOSIS — E66.01 CLASS 2 SEVERE OBESITY DUE TO EXCESS CALORIES WITH SERIOUS COMORBIDITY AND BODY MASS INDEX (BMI) OF 35.0 TO 35.9 IN ADULT (H): ICD-10-CM

## 2024-01-31 RX ORDER — SEMAGLUTIDE 0.68 MG/ML
INJECTION, SOLUTION SUBCUTANEOUS
Qty: 3 ML | Refills: 0 | Status: SHIPPED | OUTPATIENT
Start: 2024-01-31 | End: 2024-04-22

## 2024-01-31 NOTE — TELEPHONE ENCOUNTER
Requested Prescriptions   Pending Prescriptions Disp Refills    OZEMPIC (0.25 OR 0.5 MG/DOSE) 2 MG/3ML pen [Pharmacy Med Name: Ozempic (0.25 or 0.5 MG/DOSE) 2 MG/3ML Subcutaneous Solution Pen-injector] 3 mL 0     Sig: INJECT 0.25MG SUBCUTANOUESLY EVERY 7 DAYS       GLP-1 Agonists Protocol Passed - 1/31/2024  5:05 AM        Passed - HgbA1C in past 3 or 6 months     If HgbA1C is 8 or greater, it needs to be on file within the past 3 months.  If less than 8, must be on file within the past 6 months.     Recent Labs   Lab Test 11/21/23  0951   A1C 7.8*             Passed - Medication is active on med list        Passed - Has GFR on file in past 12 months and most recent value is normal        Passed - Recent (6 mo) or future (90 days) visit within the authorizing provider's specialty     The patient must have completed an in-person or virtual visit within the past 6 months or has a future visit scheduled within the next 90 days with the authorizing provider s specialty.  Urgent care and e-visits do not quality as an office visit for this protocol.          Passed - Medication indicated for associated diagnosis     Medication is associated with one or more of the following diagnoses:     Type 2 diabetes mellitus           Passed - Patient is age 18 or older        Passed - No active pregnancy on record        Passed - No positive pregnancy test in past 12 months

## 2024-02-05 ENCOUNTER — HOSPITAL ENCOUNTER (OUTPATIENT)
Dept: CT IMAGING | Facility: CLINIC | Age: 74
Discharge: HOME | End: 2024-02-05
Attending: INTERNAL MEDICINE | Admitting: INTERNAL MEDICINE
Payer: COMMERCIAL

## 2024-02-05 DIAGNOSIS — J98.4 PNEUMONITIS: ICD-10-CM

## 2024-02-05 DIAGNOSIS — C43.70: ICD-10-CM

## 2024-02-05 LAB
CREAT BLD-MCNC: 1 MG/DL (ref 0.5–1)
EGFRCR SERPLBLD CKD-EPI 2021: 58 ML/MIN/1.73M2

## 2024-02-05 PROCEDURE — 250N000011 HC RX IP 250 OP 636: Performed by: INTERNAL MEDICINE

## 2024-02-05 PROCEDURE — 82565 ASSAY OF CREATININE: CPT

## 2024-02-05 PROCEDURE — 71260 CT THORAX DX C+: CPT

## 2024-02-05 PROCEDURE — 250N000009 HC RX 250: Performed by: INTERNAL MEDICINE

## 2024-02-05 RX ORDER — IOPAMIDOL 755 MG/ML
500 INJECTION, SOLUTION INTRAVASCULAR ONCE
Status: COMPLETED | OUTPATIENT
Start: 2024-02-05 | End: 2024-02-05

## 2024-02-05 RX ADMIN — SODIUM CHLORIDE 65 ML: 9 INJECTION, SOLUTION INTRAVENOUS at 15:11

## 2024-02-05 RX ADMIN — IOPAMIDOL 100 ML: 755 INJECTION, SOLUTION INTRAVENOUS at 15:11

## 2024-02-13 ENCOUNTER — TRANSFERRED RECORDS (OUTPATIENT)
Dept: HEALTH INFORMATION MANAGEMENT | Facility: CLINIC | Age: 74
End: 2024-02-13
Payer: COMMERCIAL

## 2024-02-14 ENCOUNTER — MEDICAL CORRESPONDENCE (OUTPATIENT)
Dept: SCHEDULING | Facility: CLINIC | Age: 74
End: 2024-02-14
Payer: COMMERCIAL

## 2024-02-27 ENCOUNTER — LAB (OUTPATIENT)
Dept: LAB | Facility: CLINIC | Age: 74
End: 2024-02-27
Payer: COMMERCIAL

## 2024-02-27 ENCOUNTER — TRANSFERRED RECORDS (OUTPATIENT)
Dept: HEALTH INFORMATION MANAGEMENT | Facility: CLINIC | Age: 74
End: 2024-02-27

## 2024-02-27 ENCOUNTER — TELEPHONE (OUTPATIENT)
Dept: GASTROENTEROLOGY | Facility: CLINIC | Age: 74
End: 2024-02-27

## 2024-02-27 DIAGNOSIS — E83.52 HYPERCALCEMIA: ICD-10-CM

## 2024-02-27 DIAGNOSIS — E11.65 TYPE 2 DIABETES MELLITUS WITH HYPERGLYCEMIA, WITHOUT LONG-TERM CURRENT USE OF INSULIN (H): ICD-10-CM

## 2024-02-27 DIAGNOSIS — I10 HTN, GOAL BELOW 140/90: ICD-10-CM

## 2024-02-27 DIAGNOSIS — E78.5 HYPERLIPIDEMIA WITH TARGET LDL LESS THAN 100: ICD-10-CM

## 2024-02-27 LAB
ALBUMIN SERPL BCG-MCNC: 4.4 G/DL (ref 3.5–5.2)
ALP SERPL-CCNC: 101 U/L (ref 40–150)
ALT SERPL W P-5'-P-CCNC: 31 U/L (ref 0–50)
ANION GAP SERPL CALCULATED.3IONS-SCNC: 11 MMOL/L (ref 7–15)
AST SERPL W P-5'-P-CCNC: 22 U/L (ref 0–45)
BILIRUB SERPL-MCNC: 0.6 MG/DL
BUN SERPL-MCNC: 20.1 MG/DL (ref 8–23)
CA-I BLD-MCNC: 5.1 MG/DL (ref 4.4–5.2)
CALCIUM SERPL-MCNC: 10.2 MG/DL (ref 8.8–10.2)
CHLORIDE SERPL-SCNC: 100 MMOL/L (ref 98–107)
CREAT SERPL-MCNC: 0.78 MG/DL (ref 0.51–0.95)
DEPRECATED HCO3 PLAS-SCNC: 25 MMOL/L (ref 22–29)
EGFRCR SERPLBLD CKD-EPI 2021: 79 ML/MIN/1.73M2
ERYTHROCYTE [DISTWIDTH] IN BLOOD BY AUTOMATED COUNT: 13.3 % (ref 10–15)
GLUCOSE SERPL-MCNC: 198 MG/DL (ref 70–99)
HBA1C MFR BLD: 8.1 %
HCT VFR BLD AUTO: 43.6 % (ref 35–47)
HGB BLD-MCNC: 13.8 G/DL (ref 11.7–15.7)
MCH RBC QN AUTO: 27.8 PG (ref 26.5–33)
MCHC RBC AUTO-ENTMCNC: 31.7 G/DL (ref 31.5–36.5)
MCV RBC AUTO: 88 FL (ref 78–100)
PLATELET # BLD AUTO: 235 10E3/UL (ref 150–450)
POTASSIUM SERPL-SCNC: 4.3 MMOL/L (ref 3.4–5.3)
PROT SERPL-MCNC: 7.4 G/DL (ref 6.4–8.3)
RBC # BLD AUTO: 4.96 10E6/UL (ref 3.8–5.2)
SODIUM SERPL-SCNC: 136 MMOL/L (ref 135–145)
TSH SERPL DL<=0.005 MIU/L-ACNC: 2.01 UIU/ML (ref 0.3–4.2)
WBC # BLD AUTO: 7.3 10E3/UL (ref 4–11)

## 2024-02-27 PROCEDURE — 82330 ASSAY OF CALCIUM: CPT

## 2024-02-27 PROCEDURE — 85027 COMPLETE CBC AUTOMATED: CPT

## 2024-02-27 PROCEDURE — 82040 ASSAY OF SERUM ALBUMIN: CPT

## 2024-02-27 PROCEDURE — 83036 HEMOGLOBIN GLYCOSYLATED A1C: CPT

## 2024-02-27 PROCEDURE — 36415 COLL VENOUS BLD VENIPUNCTURE: CPT

## 2024-02-27 PROCEDURE — 84443 ASSAY THYROID STIM HORMONE: CPT

## 2024-02-27 NOTE — TELEPHONE ENCOUNTER
From: Aishwarya Reese RN  Sent: 2024  11:40 AM CST  To: Henry Nur  Subject: FW: proc 3/14 left VM  for post op check*    Hi Henry,    In addition to the two dates that Dr Guerrero mentioned for interviews, he also said he would like to get some academic stuff done in prep on his Thursday 3/14 at [facility].    This is one patient scheduled that day. I have been unsuccessful at reaching her, and today it looks like she is in the ED for neurological issues.     Let's plan to move the cases that are scheduled for 3/14 to either 3/21 or 3/28 at [facility]. I will call the second OR case on 3/14 (initials AT) and follow with Jennifer Ramirez.    Could you call the first OR case on 3/14 ([facility])? She is a return patient, comes every 6 mos for procedure so should not have questions.     Thanks  Ingrid    ----- Message -----  From: Aishwarya Reese RN  Sent: 2024  12:00 AM CST  To: Aishwarya Reese RN  Subject: proc 3/14 left VM  for post op check in *      ----- Message -----  From: Aishwarya Reese RN  Sent: 2024  12:00 AM CST  To: Aishwarya Reese RN  Subject: RE: scheduled for 3/14? call for post op ronnell*      ----- Message -----  From: Aishwarya Reese RN  Sent: 2024  12:00 AM CST  To: Aishwarya Reese RN  Subject: scheduled for 3/14? call for post op check i*      ----- Message -----  From: Shaun Guerrero MD  Sent: 2/15/2024   9:40 AM CST  To: Aishwarya Reese RN    Probably just needs one more dilation.  Please assist in scheduling:     Procedure/Imaging/Clinic: EGD with dilation and steroid injection  Physician: Cesar  Timin month  Scope time needed: 15 min  Anesthesia: MAC  Dx: esophageal stricture  Tier: 3  Location: [facility]  Header of letter for pt communication: Upper endoscopy     Thanks  Shaun

## 2024-02-27 NOTE — TELEPHONE ENCOUNTER
Writer contacted patient to schedule     Patient rescheduled to 3/28.     Following details were discussed:    Will need a , someone to stay with them for 24 hours and should stay in town for 24 hours (within 45 min of Hospital) post procedure    Needs to get pre-op physical completed. If outside  health system will need physical faxed to number [phone]     Will be receiving phone call from [facility] nurses to discuss arrival times etc...       Was patient in agreement: Yes  Any questions/concerns?: No

## 2024-03-04 ENCOUNTER — TRANSFERRED RECORDS (OUTPATIENT)
Dept: PEDIATRICS | Facility: CLINIC | Age: 74
End: 2024-03-04

## 2024-03-04 ENCOUNTER — OFFICE VISIT (OUTPATIENT)
Dept: INTERNAL MEDICINE | Facility: CLINIC | Age: 74
End: 2024-03-04
Payer: COMMERCIAL

## 2024-03-04 VITALS
DIASTOLIC BLOOD PRESSURE: 68 MMHG | SYSTOLIC BLOOD PRESSURE: 108 MMHG | TEMPERATURE: 97.4 F | WEIGHT: 224.2 LBS | HEIGHT: 67 IN | BODY MASS INDEX: 35.19 KG/M2 | OXYGEN SATURATION: 96 % | HEART RATE: 82 BPM | RESPIRATION RATE: 18 BRPM

## 2024-03-04 DIAGNOSIS — E11.69 TYPE 2 DIABETES MELLITUS WITH OTHER SPECIFIED COMPLICATION, WITH LONG-TERM CURRENT USE OF INSULIN (H): ICD-10-CM

## 2024-03-04 DIAGNOSIS — Z79.4 TYPE 2 DIABETES MELLITUS WITH OTHER SPECIFIED COMPLICATION, WITH LONG-TERM CURRENT USE OF INSULIN (H): ICD-10-CM

## 2024-03-04 DIAGNOSIS — E11.65 TYPE 2 DIABETES MELLITUS WITH HYPERGLYCEMIA, WITHOUT LONG-TERM CURRENT USE OF INSULIN (H): ICD-10-CM

## 2024-03-04 DIAGNOSIS — Z01.818 PRE-OP EXAM: Primary | ICD-10-CM

## 2024-03-04 DIAGNOSIS — K31.7 POLYP OF DUODENUM: ICD-10-CM

## 2024-03-04 DIAGNOSIS — D13.5 AMPULLARY ADENOMA: ICD-10-CM

## 2024-03-04 DIAGNOSIS — I10 HTN, GOAL BELOW 140/90: ICD-10-CM

## 2024-03-04 DIAGNOSIS — G47.33 OSA (OBSTRUCTIVE SLEEP APNEA): ICD-10-CM

## 2024-03-04 LAB
CREAT UR-MCNC: 191 MG/DL
MICROALBUMIN UR-MCNC: 19.4 MG/L
MICROALBUMIN/CREAT UR: 10.16 MG/G CR (ref 0–25)

## 2024-03-04 PROCEDURE — 99214 OFFICE O/P EST MOD 30 MIN: CPT | Performed by: INTERNAL MEDICINE

## 2024-03-04 PROCEDURE — 82570 ASSAY OF URINE CREATININE: CPT | Performed by: INTERNAL MEDICINE

## 2024-03-04 PROCEDURE — 82043 UR ALBUMIN QUANTITATIVE: CPT | Performed by: INTERNAL MEDICINE

## 2024-03-04 PROCEDURE — 80061 LIPID PANEL: CPT | Performed by: INTERNAL MEDICINE

## 2024-03-04 PROCEDURE — 93000 ELECTROCARDIOGRAM COMPLETE: CPT | Performed by: INTERNAL MEDICINE

## 2024-03-04 PROCEDURE — 36415 COLL VENOUS BLD VENIPUNCTURE: CPT | Performed by: INTERNAL MEDICINE

## 2024-03-04 RX ORDER — INSULIN GLARGINE 100 [IU]/ML
44 INJECTION, SOLUTION SUBCUTANEOUS AT BEDTIME
Qty: 15 ML | Refills: 2 | Status: SHIPPED | OUTPATIENT
Start: 2024-03-04 | End: 2024-06-24

## 2024-03-04 ASSESSMENT — ASTHMA QUESTIONNAIRES
QUESTION_3 LAST FOUR WEEKS HOW OFTEN DID YOUR ASTHMA SYMPTOMS (WHEEZING, COUGHING, SHORTNESS OF BREATH, CHEST TIGHTNESS OR PAIN) WAKE YOU UP AT NIGHT OR EARLIER THAN USUAL IN THE MORNING: NOT AT ALL
QUESTION_3 LAST FOUR WEEKS HOW OFTEN DID YOUR ASTHMA SYMPTOMS (WHEEZING, COUGHING, SHORTNESS OF BREATH, CHEST TIGHTNESS OR PAIN) WAKE YOU UP AT NIGHT OR EARLIER THAN USUAL IN THE MORNING: NOT AT ALL
QUESTION_2 LAST FOUR WEEKS HOW OFTEN HAVE YOU HAD SHORTNESS OF BREATH: NOT AT ALL
QUESTION_2 LAST FOUR WEEKS HOW OFTEN HAVE YOU HAD SHORTNESS OF BREATH: NOT AT ALL
ACT_TOTALSCORE: 25
QUESTION_4 LAST FOUR WEEKS HOW OFTEN HAVE YOU USED YOUR RESCUE INHALER OR NEBULIZER MEDICATION (SUCH AS ALBUTEROL): NOT AT ALL
QUESTION_1 LAST FOUR WEEKS HOW MUCH OF THE TIME DID YOUR ASTHMA KEEP YOU FROM GETTING AS MUCH DONE AT WORK, SCHOOL OR AT HOME: NONE OF THE TIME
QUESTION_5 LAST FOUR WEEKS HOW WOULD YOU RATE YOUR ASTHMA CONTROL: COMPLETELY CONTROLLED
ACT_TOTALSCORE: 25
QUESTION_1 LAST FOUR WEEKS HOW MUCH OF THE TIME DID YOUR ASTHMA KEEP YOU FROM GETTING AS MUCH DONE AT WORK, SCHOOL OR AT HOME: NONE OF THE TIME
QUESTION_4 LAST FOUR WEEKS HOW OFTEN HAVE YOU USED YOUR RESCUE INHALER OR NEBULIZER MEDICATION (SUCH AS ALBUTEROL): NOT AT ALL
ACT_TOTALSCORE: 25
ACT_TOTALSCORE: 25
QUESTION_5 LAST FOUR WEEKS HOW WOULD YOU RATE YOUR ASTHMA CONTROL: COMPLETELY CONTROLLED

## 2024-03-04 ASSESSMENT — PAIN SCALES - GENERAL: PAINLEVEL: NO PAIN (0)

## 2024-03-04 NOTE — PATIENT INSTRUCTIONS
Plan:  1. In the morning of the surgery day you take with a sip of water just these meds: Atenolol.  The rest of the meds you resume after surgery.  2. Last dose Semaglutide - Monday March 18   3. In the morning of the procedure day take 18 units. Take the rest of the Lantus dose after the procedure   4. Last dose of Xarelto is March 26. Resume it after the procedure   5. Labs today - [address]

## 2024-03-04 NOTE — PROGRESS NOTES
Preoperative Evaluation  [place] [address] [address]  [address]  [facility] [address]  Phone: [phone]  Primary Provider: Raisa Davidson  Pre-op Performing Provider: RAISA DAVIDSON  Mar 4, 2024       Melva is a 73 year old, presenting for the following:  Pre-Op Exam        3/4/2024     8:17 AM   Additional Questions   Roomed by Radha Grant   Accompanied by self         3/4/2024     8:17 AM   Patient Reported Additional Medications   Patient reports taking the following new medications no     Surgical Information  Surgery/Procedure: endoscopy  Surgery Location: [facility]  Surgeon: Cesar  Surgery Date: 3/28/24  Time of Surgery: TBD  Where patient plans to recover: At home with family  Fax number for surgical facility: Note does not need to be faxed, will be available electronically in Epic.                2/27/2024     3:15 PM   Preop Questions   1. Have you ever had a heart attack or stroke? No   2. Have you ever had surgery on your heart or blood vessels, such as a stent placement, a coronary artery bypass, or surgery on an artery in your head, neck, heart, or legs? No   3. Do you have chest pain with activity? No   4. Do you have a history of  heart failure? No   5. Do you currently have a cold, bronchitis or symptoms of other infection? No   6. Do you have a cough, shortness of breath, or wheezing? No   7. Do you or anyone in your family have previous history of blood clots? No   8. Do you or does anyone in your family have a serious bleeding problem such as prolonged bleeding following surgeries or cuts? No   9. Have you ever had problems with anemia or been told to take iron pills? YES -   Last Hgb 13.8      10. Have you had any abnormal blood loss such as black, tarry or bloody stools, or abnormal vaginal bleeding? No   11. Have you ever had a blood transfusion? No   12. Are you willing to have a blood transfusion if it is medically needed  before, during, or after your surgery? Yes   13. Have you or any of your relatives ever had problems with anesthesia? No   14. Do you have sleep apnea, excessive snoring or daytime drowsiness? YES - CPAP   14a. Do you have a CPAP machine? Yes   15. Do you have any artifical heart valves or other implanted medical devices like a pacemaker, defibrillator, or continuous glucose monitor? No   16. Do you have artificial joints? YES - R knee, R hip   17. Are you allergic to latex? No       CC:  Preop for multiple medical problems.    HPI:    The patient is scheduled for Endoscopy procedure  with Dr. Guerrero  on  March 28  No other acute complaints.    Assessment:  1. V72.83H Preop general physical exam _ I do not see any major contraindications for the patient to go through the scheduled surgery.    The proposed surgical procedure is considered INTERMEDIATE,   surgery risk.    For above listed surgery and anesthesia, Patient is at  MODERATE,  risk for surgery/procedure and perioperative/procedure complications.      Cardiovascular risk  Assessment  -- low risk   -- No further cardiac work up is needed before this surgery.     ECG:    sinus rhythm, no changes suggestive for ischemia    Pulmonary Risk Assessment  -- low risk   -- The patient doesn't have chronic lung disease nor acute respiratory problems     Obstructive Sleep Apnea (or suspected sleep apnea) on CPAP      Anemia Assessment :  -- no anemia - patient doesn't need further anemia work up      Blood Sugar Assessment  -- patient has controlled DM,       Anticoagulation assessment  -- patient takes Xarelto  for AFib, Hx of PE/DVT      (E11.9) Diabetes mellitus, type 2 (H)  Comment: Controlled  for age   Plan:  See below      (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  (K31.7) Polyp of duodenum  Comment:   Plan: procedure     (I10) HTN, goal below 140/90  Comment: Controlled    Plan:  See below      (G47.33) CHERRY (obstructive sleep apnea)  Comment:   Plan: CPAP          Plan:  1. In  "the morning of the surgery day you take with a sip of water just these meds: Atenolol.  The rest of the meds you resume after surgery.  2. Last dose Semaglutide - Monday March 18   3. In the morning of the procedure day take 18 units. Take the rest of the Lantus dose after the procedure   4. Last dose of Xarelto is March 26. Resume it after the procedure   5. Labs today - [address]     Physical exam:    Blood pressure 108/68, pulse 82, temperature 97.4  F (36.3  C), temperature source Tympanic, resp. rate 18, height 1.702 m (5' 7\"), weight 101.7 kg (224 lb 3.2 oz), SpO2 96%, not currently breastfeeding.   NAD, appears comfortable  Skin clear, no rashes  Neck: supple, no JVD,  no thyroidmegaly  Lymph nodes non palpable in the cervical, supraclavicular   Chest: clear to auscultation with good respiratory effort  Cardiac: S1S2, RRR, no mgr appreciated  Abdomen: soft, not tender, not distended, audible bowel sound, no hepatosplenomegaly, no palpable masses, no abdominal bruits  Extremities: no cyanosis, clubbing or edema.   Neuro: A, Ox3, no focal signs.        ROS:   as above     Patient Active Problem List   Diagnosis    Allergic rhinitis    Hyperlipidemia with target LDL less than 100    Asthma, mild intermittent    Cataract    Macular hole of left eye    Obesity (BMI 30-39.9)    Heart murmur    Total knee replacement status    Chronic knee pain, right    HTN goal less than 140/90,    CHERRY (obstructive sleep apnea)    Status post total replacement of right hip    Ampullary adenoma    Polyp of duodenum    HTN, goal below 140/90    Diabetes mellitus, type 2 (H)    Melanoma of skin (H)    Metastatic malignant melanoma (H)  -- R thigh     Dehydration    Orthostatic hypotension    Endometrial cancer (H)    Bilateral pulmonary embolism (H)    Organizing pneumonia (H) -- Aug 2022    Dizziness    Adverse effect of unspecified drugs, medicaments and biological substances, initial encounter    Hypercalcemia    Mass of uterus    " Multiple pulmonary nodules    Pneumonitis    Class 2 severe obesity due to excess calories with serious comorbidity in adult (H)        Past Medical History:   Diagnosis Date    Asthma, mild intermittent     Cataract     Endometrial cancer (H) 06/2022    HTN, goal below 140/90     Hyperlipidemia LDL goal < 100     Macular hole of left eye     Melanoma (H)     RIGHT KNEE    Sleep apnea     uses c pap    Type 2 diabetes, HbA1C goal < 8% (H)       Past Surgical History:   Procedure Laterality Date    ARTHROPLASTY HIP Right 9/25/2017    Procedure: ARTHROPLASTY HIP;  Right total hip arthroplasty  ;  Surgeon: Ayaan Pena MD;  Location: [facility]    ARTHROPLASTY KNEE  7/12/2013    Procedure: ARTHROPLASTY KNEE;  right total knee arthroplasty ;  Surgeon: Chaparro Wesley MD;  Location: [facility]    ARTHROSCOPY KNEE Right 1/21/2015    Procedure: ARTHROSCOPY KNEE;  Surgeon: Ayaan Pena MD;  Location:  OR    BRONCHOSCOPY (RIGID OR FLEXIBLE), DIAGNOSTIC N/A 8/11/2022    Procedure: BRONCHOSCOPY, WITH BRONCHOALVEOLAR LAVAGE;  Surgeon: Rsoelia Sosa MD;  Location:  GI    C INCISION OF HYMEN      CATARACT IOL, RT/LT      COLONOSCOPY  2008    COLONOSCOPY N/A 4/18/2019    Procedure: Colonoscopy with polypectomy;  Surgeon: Shaun Guerrero MD;  [facility]    CYSTOSCOPY N/A 6/23/2022    Procedure: Cystoscopy;  Surgeon: Eli Guidry MD;  [facility]    ENDOSCOPIC RETROGRADE CHOLANGIOPANCREATOGRAM N/A 2/6/2019    Procedure: COMBINED ENDOSCOPIC RETROGRADE CHOLANGIOPANCREATOGRAPHY, BILIARY SPINCTEROTOMY AND DILATION, PLACE BILE DUCT STENT;  Surgeon: Guru Andie Gonzalez MD;  [facility]    ENDOSCOPIC RETROGRADE CHOLANGIOPANCREATOGRAM N/A 2/28/2019    Procedure: Endoscopic Retrograde Cholangiopancreatogram, Bile duct stent removal and placement;  Surgeon: Shaun Guerrero MD;  [facility]    ENDOSCOPIC RETROGRADE CHOLANGIOPANCREATOGRAM N/A 4/18/2019    Procedure:  COMBINED ENDOSCOPIC RETROGRADE CHOLANGIOPANCREATOGRAPHY, Bile duct stent exchange and Polypectomy;  Surgeon: Shaun Guerrero MD;  [facility]    ENDOSCOPIC RETROGRADE CHOLANGIOPANCREATOGRAM N/A 10/18/2019    Procedure: Endoscopic Retrograde Cholangiopancreatogram;  Surgeon: Shaun Guerrero MD;  [facility]    ENDOSCOPIC RETROGRADE CHOLANGIOPANCREATOGRAM N/A 3/24/2022    Procedure: ENDOSCOPIC RETROGRADE CHOLANGIOPANCREATOGRAPHY;  Surgeon: Shaun Guerrero MD;  Location:  OR    ENDOSCOPIC RETROGRADE CHOLANGIOPANCREATOGRAM N/A 3/16/2023    Procedure: endoscopic retrograde cholangiopancreatography with minor papillotomy;  Surgeon: Shaun Guerrero MD;  Location:  OR    ENDOSCOPIC RETROGRADE CHOLANGIOPANCREATOGRAM WITH SPYGLASS N/A 7/26/2019    Procedure: Endoscopic Retrograde Cholangiopancreatogram With Spyglas,l Radiofrequency Ablation, Stent Exchangeand Duodenal Biopsy x2;  Surgeon: Shaun Guerrero MD;  [facility]    ENDOSCOPIC RETROGRADE CHOLANGIOPANCREATOGRAM WITH SPYGLASS N/A 9/10/2020    Procedure: ENDOSCOPIC RETROGRADE CHOLANGIOPANCREATOGRAPHY, WITH DIRECT DUCT VISUALIZATION, USING PANCREATICOBILIARY FIBEROPTIC PROBE;  Surgeon: Shaun Guerrero MD;  [facility]    ENDOSCOPIC RETROGRADE CHOLANGIOPANCREATOGRAM WITH SPYGLASS N/A 3/22/2021    Procedure: ENDOSCOPIC RETROGRADE CHOLANGIOPANCREATOGRAPHY, WITH DIRECT DUCT VISUALIZATION, USING PANCREATICOBILIARY FIBEROPTIC PROBE;  Surgeon: Shaun Guerrero MD;  [facility]    ESOPHAGOSCOPY, GASTROSCOPY, DUODENOSCOPY (EGD) WITH RADIO FREQUENCY ABLATION, COMBINED N/A 9/10/2020    Procedure: possible repeat ESOPHAGOGASTRODUODENOSCOPY, WITH RADIOFREQUENCY ABLATION and endoscopic mucosal resection;  Surgeon: Shaun Guerrero MD;  [facility]    ESOPHAGOSCOPY, GASTROSCOPY, DUODENOSCOPY (EGD), COMBINED N/A 4/18/2019    Procedure: upper endoscopy with polypectomy;  Surgeon: Shaun Guerrero MD;  [facility]    ESOPHAGOSCOPY, GASTROSCOPY, DUODENOSCOPY (EGD),  COMBINED N/A 10/18/2019    Procedure: Upper Endoscopy and ERCP with stent removal, stone removal and biopsy;  Surgeon: Shaun Guerrero MD;  [facility]    ESOPHAGOSCOPY, GASTROSCOPY, DUODENOSCOPY (EGD), COMBINED N/A 3/24/2022    Procedure: ESOPHAGOGASTRODUODENOSCOPY (EGD), Duodenal  polyp removal;  Surgeon: Shaun Guerrero MD;  [facility]    ESOPHAGOSCOPY, GASTROSCOPY, DUODENOSCOPY (EGD), COMBINED N/A 3/16/2023    Procedure: ESOPHAGOGASTRODUODENOSCOPY (EGD);  Surgeon: Shaun Guerrero MD;  [facility]    ESOPHAGOSCOPY, GASTROSCOPY, DUODENOSCOPY (EGD), RESECT MUCOSA, COMBINED N/A 2/28/2019    Procedure: Upper Endoscopy, Endoscopic Ultrasound, Endoscopic Mucosal Resection,  Ampullectomy, polypectomy.;  Surgeon: Shaun Guerrero MD;  [facility]    ESOPHAGOSCOPY, GASTROSCOPY, DUODENOSCOPY (EGD), RESECT MUCOSA, COMBINED N/A 3/22/2021    Procedure: ESOPHAGOGASTRODUODENOSCOPY (EGD) with  endoscopic mucosal resection/ polypectomy;  Surgeon: Shaun Guerrero MD;  [facility]    EXCISE LESION LOWER EXTREMITY Right 3/28/2022    Procedure: Wide local excision of right knee melanoma;  Surgeon: Chevy Allison MD;  Location: [facility] OR    EXCISE MASS LOWER EXTREMITY Right 1/13/2022    Procedure: excision of right thigh mass;  Surgeon: Salvador Kelly MD;  [facility]: [facility]    EYE SURGERY      macular hole repaired left eye    HC KNEE SCOPE, DIAGNOSTIC      - both knees    HEAD & NECK SURGERY      wisdom teeth    IR CHEST PORT PLACEMENT > 5 YRS OF AGE  5/2/2022    LAPAROSCOPIC HYSTERECTOMY TOTAL, BILATERAL SALPINGO-OOPHORECTOMY, NODE DISSECTION, COMBINED Bilateral 6/23/2022    Procedure: Total Laparoscopic Hyserectomy, Bilateral Salpibgo-oophorectomy, Bilateral [place] Lymph Node Dissection;  Surgeon: Eli Guidry MD;  [facility]        PSHx: No complications with prior surgeries or anesthesia     Soc Hx: No daily alcohol, no smoking     Family History   Problem Relation Age of Onset    Hypertension  "Mother         diabetes,hypoythryoidism, stroke    Diabetes Mother     Breast Cancer Mother     Heart Disease Father         , cancer lip    Uterine Cancer Sister     Connective Tissue Disorder Sister         fibromyalgia    Diabetes Sister     Hypertension Brother     Cerebrovascular Disease Maternal Grandmother         , diabetes    Cerebrovascular Disease Maternal Grandfather             Cancer Paternal Grandmother             Heart Disease Paternal Grandfather             Cancer Paternal Aunt         ovarian    Breast Cancer Niece     Asthma Maternal Aunt         All: reviewed    Meds: reviewed  Current Outpatient Medications   Medication Sig Dispense Refill    atenolol (TENORMIN) 50 MG tablet Take 1 tablet (50 mg) by mouth daily 90 tablet 1    atorvastatin (LIPITOR) 40 MG tablet Take 1 tablet (40 mg) by mouth daily 90 tablet 1    BD VEO INSULIN SYRINGE U/F 31G X 15/64\" 0.5 ML USE DAILY WITH NPH INSULIN 100 each 1    cetirizine (ZYRTEC) 10 MG tablet Take 10 mg by mouth every morning      Continuous Blood Gluc Sensor (FREESTYLE DAVID 14 DAY SENSOR) MISC USE 1 EVERY 14 DAYS 2 each 5    Continuous Blood Gluc Sensor (FREESTYLE DAVID 3 SENSOR) MISC 1 Device every 14 days 2 each 3    CONTOUR NEXT TEST test strip USE 1 STRIP TO CHECK GLUCOSE ONCE DAILY 100 strip 4    glipiZIDE (GLUCOTROL XL) 10 MG 24 hr tablet Take 1 tablet by mouth once daily 90 tablet 0    HUMALOG 100 UNIT/ML injection INJECT 5 UNITS SUBCUTANEOUSLY THREE TIMES DAILY BEFORE MEALS 10 mL 0    hydrochlorothiazide (HYDRODIURIL) 25 MG tablet TAKE 1 TABLET BY MOUTH ONCE DAILY IN THE MORNING 90 tablet 3    insulin glargine (LANTUS VIAL) 100 UNIT/ML vial Inject 44 Units Subcutaneous at bedtime 15 mL 2    insulin pen needle (MM PEN NEEDLES) 32G X 4 MM miscellaneous Inject insulin 4 times a day 300 each 1    insulin syringe-needle U-100 (BD INSULIN SYRINGE ULTRAFINE) 31G X 5/16\" 0.5 ML miscellaneous Use daily with NPH " "insulin 100 each 11    insulin syringe-needle U-100 (BD VEO INSULIN SYRINGE U/F) 31G X 15/64\" 0.3 ML Patient has 4 shots of insulin a day 300 each 1    latanoprost (XALATAN) 0.005 % ophthalmic solution Place 1 drop into both eyes At Bedtime  2.5 mL 1    lisinopril (ZESTRIL) 5 MG tablet Take 1 tablet (5 mg) by mouth daily 90 tablet 1    LORazepam (ATIVAN) 0.5 MG tablet Take 1 tablet (0.5 mg) by mouth nightly as needed for anxiety or sleep 30 tablet 0    OZEMPIC, 0.25 OR 0.5 MG/DOSE, 2 MG/3ML pen INJECT 0.25MG SUBCUTANOUESLY EVERY 7 DAYS 3 mL 0    senna-docusate (SENOKOT-S/PERICOLACE) 8.6-50 MG tablet Take 2 tablets by mouth daily as needed for constipation 60 tablet 11    timolol maleate (TIMOPTIC) 0.5 % ophthalmic solution INSTILL 1 DROP INTO EACH EYE TWICE DAILY      triamcinolone (KENALOG) 0.5 % external ointment Apply 1 g topically daily as needed for irritation      XARELTO ANTICOAGULANT 20 MG TABS tablet Take 1 tablet (20 mg) by mouth daily 90 tablet 1    azelastine (ASTELIN) 0.1 % nasal spray USE 1 SPRAY(S) IN EACH NOSTRIL TWICE DAILY AS NEEDED (Patient not taking: Reported on 3/4/2024) 30 mL 0    azelastine (OPTIVAR) 0.05 % ophthalmic solution  (Patient not taking: Reported on 3/4/2024)      lidocaine-prilocaine (EMLA) 2.5-2.5 % external cream APPLY CREAM TOPICALLY ONCE DAILY AS NEEDED FOR PAIN APPLY A PEA SIZED AMOUNT OVER PORT SITE 60 MINUTES PRIOR TO USE COVER WITH PLASTIC WRAP (Patient not taking: Reported on 3/4/2024)      ondansetron (ZOFRAN) 8 MG tablet Take 1 tablet (8 mg) by mouth every 8 hours as needed for nausea (Patient not taking: Reported on 3/4/2024) 30 tablet 1    VENTOLIN  (90 Base) MCG/ACT inhaler INHALE 2 PUFFS BY MOUTH EVERY 4 HOURS AS NEEDED FOR SHORTNESS OF BREATH (Patient not taking: Reported on 3/4/2024) 18 g 0            Raisa Crintea, MD  Internal Medicine       Patient Active Problem List    Diagnosis Date Noted    Hyperlipidemia with target LDL less than 100      Priority: " High     Diagnosis updated by automated process. Provider to review and confirm.      Asthma, mild intermittent      Priority: High    Class 2 severe obesity due to excess calories with serious comorbidity in adult (H) 10/09/2023     Priority: Medium    Dizziness 06/23/2023     Priority: Medium    Adverse effect of unspecified drugs, medicaments and biological substances, initial encounter 06/23/2023     Priority: Medium    Hypercalcemia 06/23/2023     Priority: Medium    Mass of uterus 06/23/2023     Priority: Medium    Multiple pulmonary nodules 06/23/2023     Priority: Medium    Pneumonitis 06/23/2023     Priority: Medium    Organizing pneumonia (H) -- Aug 2022 09/05/2022     Priority: Medium    Bilateral pulmonary embolism (H) 07/25/2022     Priority: Medium    Dehydration 06/29/2022     Priority: Medium    Orthostatic hypotension 06/29/2022     Priority: Medium    Endometrial cancer (H)      Priority: Medium     Complete prior to 12.14 KM      Metastatic malignant melanoma (H)  -- R thigh  04/20/2022     Priority: Medium    Melanoma of skin (H) 03/03/2022     Priority: Medium     Added automatically from request for surgery [id number]      Diabetes mellitus, type 2 (H) 01/07/2022     Priority: Medium    HTN, goal below 140/90 05/27/2020     Priority: Medium    Polyp of duodenum 02/18/2020     Priority: Medium     Added automatically from request for surgery [id number]      Ampullary adenoma 02/28/2019     Priority: Medium    Status post total replacement of right hip 02/13/2018     Priority: Medium    CHERRY (obstructive sleep apnea) 09/15/2017     Priority: Medium    HTN goal less than 140/90, 05/22/2016     Priority: Medium    Chronic knee pain, right 08/30/2015     Priority: Medium    Total knee replacement status 07/12/2013     Priority: Medium    Obesity (BMI 30-39.9)      Priority: Medium    Heart murmur      Priority: Medium     aortic area      Allergic rhinitis      Priority: Medium     Problem list name  updated by automated process. Provider to review      Cataract      Priority: Low     Utility update for deleted IMO code  Im[employer] Update utility      Macular hole of left eye      Priority: Low      Past Medical History:   Diagnosis Date    Asthma, mild intermittent     Cataract     Endometrial cancer (H) 06/2022    HTN, goal below 140/90     Hyperlipidemia LDL goal < 100     Macular hole of left eye     Melanoma (H)     RIGHT KNEE    Sleep apnea     uses c pap    Type 2 diabetes, HbA1C goal < 8% (H)      Past Surgical History:   Procedure Laterality Date    ARTHROPLASTY HIP Right 9/25/2017    Procedure: ARTHROPLASTY HIP;  Right total hip arthroplasty  ;  Surgeon: Ayaan Pena MD;  [facility]    ARTHROPLASTY KNEE  7/12/2013    Procedure: ARTHROPLASTY KNEE;  right total knee arthroplasty ;  Surgeon: Chaparro Wesley MD;  [facility]    ARTHROSCOPY KNEE Right 1/21/2015    Procedure: ARTHROSCOPY KNEE;  Surgeon: Ayana Pena MD;  Location:  OR    BRONCHOSCOPY (RIGID OR FLEXIBLE), DIAGNOSTIC N/A 8/11/2022    Procedure: BRONCHOSCOPY, WITH BRONCHOALVEOLAR LAVAGE;  Surgeon: Roselia Sosa MD;  Location:  GI    C INCISION OF HYMEN      CATARACT IOL, RT/LT      COLONOSCOPY  2008    COLONOSCOPY N/A 4/18/2019    Procedure: Colonoscopy with polypectomy;  Surgeon: Shaun Guerrero MD;  [facility]    CYSTOSCOPY N/A 6/23/2022    Procedure: Cystoscopy;  Surgeon: Eli Guidry MD;  [facility]    ENDOSCOPIC RETROGRADE CHOLANGIOPANCREATOGRAM N/A 2/6/2019    Procedure: COMBINED ENDOSCOPIC RETROGRADE CHOLANGIOPANCREATOGRAPHY, BILIARY SPINCTEROTOMY AND DILATION, PLACE BILE DUCT STENT;  Surgeon: Guru Andie Gonzalez MD;  [facility]    ENDOSCOPIC RETROGRADE CHOLANGIOPANCREATOGRAM N/A 2/28/2019    Procedure: Endoscopic Retrograde Cholangiopancreatogram, Bile duct stent removal and placement;  Surgeon: Shaun Guerrero MD;  [facility]    ENDOSCOPIC RETROGRADE  CHOLANGIOPANCREATOGRAM N/A 4/18/2019    Procedure: COMBINED ENDOSCOPIC RETROGRADE CHOLANGIOPANCREATOGRAPHY, Bile duct stent exchange and Polypectomy;  Surgeon: Shaun Guerrero MD;  [facility]    ENDOSCOPIC RETROGRADE CHOLANGIOPANCREATOGRAM N/A 10/18/2019    Procedure: Endoscopic Retrograde Cholangiopancreatogram;  Surgeon: Shaun Guerrero MD;  [facility]    ENDOSCOPIC RETROGRADE CHOLANGIOPANCREATOGRAM N/A 3/24/2022    Procedure: ENDOSCOPIC RETROGRADE CHOLANGIOPANCREATOGRAPHY;  Surgeon: Shaun Guerrero MD;  Location:  OR    ENDOSCOPIC RETROGRADE CHOLANGIOPANCREATOGRAM N/A 3/16/2023    Procedure: endoscopic retrograde cholangiopancreatography with minor papillotomy;  Surgeon: Shaun Guerrero MD;  Location:  OR    ENDOSCOPIC RETROGRADE CHOLANGIOPANCREATOGRAM WITH SPYGLASS N/A 7/26/2019    Procedure: Endoscopic Retrograde Cholangiopancreatogram With Spyglas,l Radiofrequency Ablation, Stent Exchangeand Duodenal Biopsy x2;  Surgeon: Shaun Guerrero MD;  [facility]    ENDOSCOPIC RETROGRADE CHOLANGIOPANCREATOGRAM WITH SPYGLASS N/A 9/10/2020    Procedure: ENDOSCOPIC RETROGRADE CHOLANGIOPANCREATOGRAPHY, WITH DIRECT DUCT VISUALIZATION, USING PANCREATICOBILIARY FIBEROPTIC PROBE;  Surgeon: Shaun Guerrero MD;  [facility]    ENDOSCOPIC RETROGRADE CHOLANGIOPANCREATOGRAM WITH SPYGLASS N/A 3/22/2021    Procedure: ENDOSCOPIC RETROGRADE CHOLANGIOPANCREATOGRAPHY, WITH DIRECT DUCT VISUALIZATION, USING PANCREATICOBILIARY FIBEROPTIC PROBE;  Surgeon: Shaun Guerrero MD;  [facility]    ESOPHAGOSCOPY, GASTROSCOPY, DUODENOSCOPY (EGD) WITH RADIO FREQUENCY ABLATION, COMBINED N/A 9/10/2020    Procedure: possible repeat ESOPHAGOGASTRODUODENOSCOPY, WITH RADIOFREQUENCY ABLATION and endoscopic mucosal resection;  Surgeon: Shaun Guerrero MD;  Location:  OR    ESOPHAGOSCOPY, GASTROSCOPY, DUODENOSCOPY (EGD), COMBINED N/A 4/18/2019    Procedure: upper endoscopy with polypectomy;  Surgeon: Shaun Guerrero MD;  Location:  OR     ESOPHAGOSCOPY, GASTROSCOPY, DUODENOSCOPY (EGD), COMBINED N/A 10/18/2019    Procedure: Upper Endoscopy and ERCP with stent removal, stone removal and biopsy;  Surgeon: Shaun Guerrero MD;  [facility]    ESOPHAGOSCOPY, GASTROSCOPY, DUODENOSCOPY (EGD), COMBINED N/A 3/24/2022    Procedure: ESOPHAGOGASTRODUODENOSCOPY (EGD), Duodenal  polyp removal;  Surgeon: Shaun Guerrero MD;  [facility]    ESOPHAGOSCOPY, GASTROSCOPY, DUODENOSCOPY (EGD), COMBINED N/A 3/16/2023    Procedure: ESOPHAGOGASTRODUODENOSCOPY (EGD);  Surgeon: Shaun Guerrero MD;  [facility]    ESOPHAGOSCOPY, GASTROSCOPY, DUODENOSCOPY (EGD), RESECT MUCOSA, COMBINED N/A 2/28/2019    Procedure: Upper Endoscopy, Endoscopic Ultrasound, Endoscopic Mucosal Resection,  Ampullectomy, polypectomy.;  Surgeon: Shaun Guerrero MD;  [facility]    ESOPHAGOSCOPY, GASTROSCOPY, DUODENOSCOPY (EGD), RESECT MUCOSA, COMBINED N/A 3/22/2021    Procedure: ESOPHAGOGASTRODUODENOSCOPY (EGD) with  endoscopic mucosal resection/ polypectomy;  Surgeon: Shaun Guerrero MD;  [facility]    EXCISE LESION LOWER EXTREMITY Right 3/28/2022    Procedure: Wide local excision of right knee melanoma;  Surgeon: Chevy Allison MD;  Location: [facility]    EXCISE MASS LOWER EXTREMITY Right 1/13/2022    Procedure: excision of right thigh mass;  Surgeon: Salvador Kelly MD;  Location: [facility]    EYE SURGERY      macular hole repaired left eye    HC KNEE SCOPE, DIAGNOSTIC      - both knees    HEAD & NECK SURGERY      wisdom teeth    IR CHEST PORT PLACEMENT > 5 YRS OF AGE  5/2/2022    LAPAROSCOPIC HYSTERECTOMY TOTAL, BILATERAL SALPINGO-OOPHORECTOMY, NODE DISSECTION, COMBINED Bilateral 6/23/2022    Procedure: Total Laparoscopic Hyserectomy, Bilateral Salpibgo-oophorectomy, Bilateral [place] Lymph Node Dissection;  Surgeon: Eli Guidry MD;  [facility]     Current Outpatient Medications   Medication Sig Dispense Refill    atenolol (TENORMIN) 50 MG tablet Take 1 tablet (50 mg)  "by mouth daily 90 tablet 1    atorvastatin (LIPITOR) 40 MG tablet Take 1 tablet (40 mg) by mouth daily 90 tablet 1    BD VEO INSULIN SYRINGE U/F 31G X 15/64\" 0.5 ML USE DAILY WITH NPH INSULIN 100 each 1    cetirizine (ZYRTEC) 10 MG tablet Take 10 mg by mouth every morning      Continuous Blood Gluc Sensor (FREESTYLE DAVID 14 DAY SENSOR) MISC USE 1 EVERY 14 DAYS 2 each 5    Continuous Blood Gluc Sensor (FREESTYLE DAVID 3 SENSOR) MISC 1 Device every 14 days 2 each 3    CONTOUR NEXT TEST test strip USE 1 STRIP TO CHECK GLUCOSE ONCE DAILY 100 strip 4    glipiZIDE (GLUCOTROL XL) 10 MG 24 hr tablet Take 1 tablet by mouth once daily 90 tablet 0    HUMALOG 100 UNIT/ML injection INJECT 5 UNITS SUBCUTANEOUSLY THREE TIMES DAILY BEFORE MEALS 10 mL 0    hydrochlorothiazide (HYDRODIURIL) 25 MG tablet TAKE 1 TABLET BY MOUTH ONCE DAILY IN THE MORNING 90 tablet 3    insulin glargine (LANTUS VIAL) 100 UNIT/ML vial Inject 44 Units Subcutaneous at bedtime 15 mL 2    insulin pen needle (MM PEN NEEDLES) 32G X 4 MM miscellaneous Inject insulin 4 times a day 300 each 1    insulin syringe-needle U-100 (BD INSULIN SYRINGE ULTRAFINE) 31G X 5/16\" 0.5 ML miscellaneous Use daily with NPH insulin 100 each 11    insulin syringe-needle U-100 (BD VEO INSULIN SYRINGE U/F) 31G X 15/64\" 0.3 ML Patient has 4 shots of insulin a day 300 each 1    latanoprost (XALATAN) 0.005 % ophthalmic solution Place 1 drop into both eyes At Bedtime  2.5 mL 1    lisinopril (ZESTRIL) 5 MG tablet Take 1 tablet (5 mg) by mouth daily 90 tablet 1    LORazepam (ATIVAN) 0.5 MG tablet Take 1 tablet (0.5 mg) by mouth nightly as needed for anxiety or sleep 30 tablet 0    OZEMPIC, 0.25 OR 0.5 MG/DOSE, 2 MG/3ML pen INJECT 0.25MG SUBCUTANOUESLY EVERY 7 DAYS 3 mL 0    senna-docusate (SENOKOT-S/PERICOLACE) 8.6-50 MG tablet Take 2 tablets by mouth daily as needed for constipation 60 tablet 11    timolol maleate (TIMOPTIC) 0.5 % ophthalmic solution INSTILL 1 DROP INTO EACH EYE TWICE " "DAILY      triamcinolone (KENALOG) 0.5 % external ointment Apply 1 g topically daily as needed for irritation      XARELTO ANTICOAGULANT 20 MG TABS tablet Take 1 tablet (20 mg) by mouth daily 90 tablet 1    azelastine (ASTELIN) 0.1 % nasal spray USE 1 SPRAY(S) IN EACH NOSTRIL TWICE DAILY AS NEEDED (Patient not taking: Reported on 3/4/2024) 30 mL 0    azelastine (OPTIVAR) 0.05 % ophthalmic solution  (Patient not taking: Reported on 3/4/2024)      lidocaine-prilocaine (EMLA) 2.5-2.5 % external cream APPLY CREAM TOPICALLY ONCE DAILY AS NEEDED FOR PAIN APPLY A PEA SIZED AMOUNT OVER PORT SITE 60 MINUTES PRIOR TO USE COVER WITH PLASTIC WRAP (Patient not taking: Reported on 3/4/2024)      ondansetron (ZOFRAN) 8 MG tablet Take 1 tablet (8 mg) by mouth every 8 hours as needed for nausea (Patient not taking: Reported on 3/4/2024) 30 tablet 1    VENTOLIN  (90 Base) MCG/ACT inhaler INHALE 2 PUFFS BY MOUTH EVERY 4 HOURS AS NEEDED FOR SHORTNESS OF BREATH (Patient not taking: Reported on 3/4/2024) 18 g 0       Allergies   Allergen Reactions    Bromfenac Visual Disturbance      xibrom  Causes sever eye pain    Codeine Nausea     Other reaction(s): Dizziness, Headache    Metformin GI Disturbance    Morphine And Related      \"NAUSE,HA AND DIZZINESS\"    Seasonal Allergies         Social History     Tobacco Use    Smoking status: Never     Passive exposure: Never    Smokeless tobacco: Never   Substance Use Topics    Alcohol use: No     Alcohol/week: 0.0 standard drinks of alcohol       History   Drug Use No         Recent Labs   Lab Test 02/27/24  1026 02/05/24  1501 11/21/23  0951 06/21/23  1159 04/13/23  0925 12/30/22  1220 12/30/22  0838 08/03/22  0655 08/02/22  1119 06/14/22  1354 05/02/22  0912   HGB 13.8  --   --   --   --   --  13.1   < >  --    < > 13.8     --   --   --   --   --  351   < >  --    < > 290   INR  --   --   --   --   --   --   --   --  1.08  --  0.96     --   --   --  140  --  139   < >  --    " < > 135   POTASSIUM 4.3  --   --   --  4.3  --  3.9   < >  --    < > 4.0   CR 0.78 1.0  --   --  0.84   < > 0.81   < >  --    < > 0.87   A1C 8.1*  --  7.8*   < > 9.5*  --  8.9*   < >  --    < >  --     < > = values in this interval not displayed.         Signed Electronically by: Raisa Davidson MD  Copy of this evaluation report is provided to requesting physician.

## 2024-03-05 ENCOUNTER — TRANSFERRED RECORDS (OUTPATIENT)
Dept: HEALTH INFORMATION MANAGEMENT | Facility: CLINIC | Age: 74
End: 2024-03-05
Payer: COMMERCIAL

## 2024-03-05 ENCOUNTER — MEDICAL CORRESPONDENCE (OUTPATIENT)
Dept: SCHEDULING | Facility: CLINIC | Age: 74
End: 2024-03-05
Payer: COMMERCIAL

## 2024-03-05 LAB
CHOLEST SERPL-MCNC: 150 MG/DL
FASTING STATUS PATIENT QL REPORTED: YES
HDLC SERPL-MCNC: 33 MG/DL
LDLC SERPL CALC-MCNC: 71 MG/DL
NONHDLC SERPL-MCNC: 117 MG/DL
TRIGL SERPL-MCNC: 230 MG/DL

## 2024-03-11 ENCOUNTER — OFFICE VISIT (OUTPATIENT)
Dept: INTERNAL MEDICINE | Facility: CLINIC | Age: 74
End: 2024-03-11
Payer: COMMERCIAL

## 2024-03-11 VITALS
OXYGEN SATURATION: 97 % | SYSTOLIC BLOOD PRESSURE: 127 MMHG | RESPIRATION RATE: 18 BRPM | BODY MASS INDEX: 35.43 KG/M2 | WEIGHT: 225.7 LBS | DIASTOLIC BLOOD PRESSURE: 73 MMHG | HEART RATE: 89 BPM | TEMPERATURE: 98.4 F | HEIGHT: 67 IN

## 2024-03-11 DIAGNOSIS — I10 HTN, GOAL BELOW 140/90: ICD-10-CM

## 2024-03-11 DIAGNOSIS — C43.9 METASTATIC MALIGNANT MELANOMA (H): ICD-10-CM

## 2024-03-11 DIAGNOSIS — I27.82 OTHER CHRONIC PULMONARY EMBOLISM WITHOUT ACUTE COR PULMONALE (H): ICD-10-CM

## 2024-03-11 DIAGNOSIS — E78.5 HYPERLIPIDEMIA WITH TARGET LDL LESS THAN 100: ICD-10-CM

## 2024-03-11 DIAGNOSIS — E11.65 TYPE 2 DIABETES MELLITUS WITH HYPERGLYCEMIA, WITHOUT LONG-TERM CURRENT USE OF INSULIN (H): Primary | ICD-10-CM

## 2024-03-11 PROBLEM — R22.9 MASS OF SKIN: Status: ACTIVE | Noted: 2024-03-11

## 2024-03-11 PROCEDURE — 99214 OFFICE O/P EST MOD 30 MIN: CPT | Performed by: INTERNAL MEDICINE

## 2024-03-11 RX ORDER — HYDROCHLOROTHIAZIDE 25 MG/1
25 TABLET ORAL EVERY MORNING
Qty: 90 TABLET | Refills: 1 | Status: SHIPPED | OUTPATIENT
Start: 2024-03-11 | End: 2024-09-03

## 2024-03-11 RX ORDER — INSULIN LISPRO 100 [IU]/ML
INJECTION, SOLUTION INTRAVENOUS; SUBCUTANEOUS
Qty: 10 ML | Refills: 0 | Status: SHIPPED | OUTPATIENT
Start: 2024-03-11 | End: 2024-07-30

## 2024-03-11 RX ORDER — RIVAROXABAN 20 MG/1
20 TABLET, FILM COATED ORAL DAILY
Qty: 90 TABLET | Refills: 1 | Status: SHIPPED | OUTPATIENT
Start: 2024-03-11 | End: 2024-09-03

## 2024-03-11 RX ORDER — ATENOLOL 50 MG/1
50 TABLET ORAL DAILY
Qty: 90 TABLET | Refills: 1 | Status: SHIPPED | OUTPATIENT
Start: 2024-03-11 | End: 2024-09-03

## 2024-03-11 RX ORDER — GLIPIZIDE 10 MG/1
10 TABLET, FILM COATED, EXTENDED RELEASE ORAL DAILY
Qty: 90 TABLET | Refills: 1 | Status: SHIPPED | OUTPATIENT
Start: 2024-03-11 | End: 2024-09-03

## 2024-03-11 RX ORDER — MUPIROCIN 20 MG/G
OINTMENT TOPICAL 3 TIMES DAILY
COMMUNITY
End: 2024-04-22

## 2024-03-11 RX ORDER — ATORVASTATIN CALCIUM 40 MG/1
40 TABLET, FILM COATED ORAL DAILY
Qty: 90 TABLET | Refills: 1 | Status: SHIPPED | OUTPATIENT
Start: 2024-03-11 | End: 2024-09-03

## 2024-03-11 RX ORDER — LISINOPRIL 5 MG/1
5 TABLET ORAL DAILY
Qty: 90 TABLET | Refills: 1 | Status: SHIPPED | OUTPATIENT
Start: 2024-03-11 | End: 2024-09-03

## 2024-03-11 ASSESSMENT — PAIN SCALES - GENERAL: PAINLEVEL: NO PAIN (0)

## 2024-03-11 NOTE — PROGRESS NOTES
Dr Torres's note      Patient's instructions / PLAN:                                                        Plan:  Continue same meds, same doses for now   2. ANNUAL EXAM on April 22      ASSESSMENT & PLAN:                                                      (E11.65) Type 2 diabetes mellitus with hyperglycemia, without long-term current use of insulin (H)  (primary encounter diagnosis)  Comment: borderline controlled   Plan: glipiZIDE (GLUCOTROL XL) 10 MG 24 hr tablet,         insulin lispro (HUMALOG) 100 UNIT/ML vial            (E78.5) Hyperlipidemia with target LDL less than 100  Comment: Controlled    Plan: atorvastatin (LIPITOR) 40 MG tablet            (I10) HTN, goal below 140/90  Comment: Controlled    Plan: atenolol (TENORMIN) 50 MG tablet,         hydrochlorothiazide (HYDRODIURIL) 25 MG tablet,        lisinopril (ZESTRIL) 5 MG tablet            (I27.82) Other chronic pulmonary embolism without acute cor pulmonale (H)  Comment:   Plan: XARELTO ANTICOAGULANT 20 MG TABS tablet            (C43.9) Metastatic malignant melanoma (H)  -- R thigh   Comment:   Plan: f/up w onco          Chief complaint:                                                      Follow up chronic medical problems       SUBJECTIVE:                                                    History of present illness:    New MM met above the first met    Subjective   Melva is a 73 year old, presenting for the following health issues:  Patient is being seen for an follow up, results and to discuss chronic back pain.      3/11/2024    12:31 PM   Additional Questions   Roomed by Thais Cadet     Via the Health Maintenance questionnaire, the patient has reported the following services have been completed -Eye Exam, this information has been sent to the abstraction team.  History of Present Illness       Back Pain:  She presents for follow up of back pain. Patient's back pain is a chronic problem.  Location of back pain:  Right lower back, left lower  back, right hip and left hip  Description of back pain: dull ache  Back pain spreads: right buttocks and left buttocks    Since patient first noticed back pain, pain is: always present, but gets better and worse  Does back pain interfere with her job:  Not applicable       Diabetes:   She presents for follow up of diabetes.  She is checking home blood glucose four or more times daily.   She checks blood glucose before meals and at bedtime.  Blood glucose is sometimes over 200 and sometimes under 70. She is aware of hypoglycemia symptoms including none.   She is concerned about other.    She is not experiencing numbness or burning in feet, excessive thirst, blurry vision, weight changes or redness, sores or blisters on feet. The patient has had a diabetic eye exam in the last 12 months. Eye exam performed on about 6 months ago. I have one coming up later this month.. Location of last eye exam [facility].        She eats 2-3 servings of fruits and vegetables daily.She consumes 0 sweetened beverage(s) daily.She exercises with enough effort to increase her heart rate 10 to 19 minutes per day.  She exercises with enough effort to increase her heart rate 3 or less days per week.   She is taking medications regularly.         Hyperlipidemia Follow-Up    Are you regularly taking any medication or supplement to lower your cholesterol?   Yes- atorvastatin  Are you having muscle aches or other side effects that you think could be caused by your cholesterol lowering medication?  Yes- muscle cramps in legs not sure of the cause .    Hypertension Follow-up    Do you check your blood pressure regularly outside of the clinic? Yes   Are you following a low salt diet? Yes  Are your blood pressures ever more than 140 on the top number (systolic) OR more   than 90 on the bottom number (diastolic), for example 140/90? No        Review of Systems:                                                      ROS: negative for fever,  "chills, cough, wheezes, chest pain, shortness of breath, vomiting, abdominal pain, leg swelling       OBJECTIVE:             Physical exam:  Blood pressure 127/73, pulse 89, temperature 98.4  F (36.9  C), temperature source Tympanic, resp. rate 18, height 1.702 m (5' 7\"), weight 102.4 kg (225 lb 11.2 oz), SpO2 97%, not currently breastfeeding.     NAD, appears comfortable  Skin: no rashes     Neck: supple, no JVD,  No thyroidmegaly. Lymph nodes nonpalpable cervical and supraclavicular.  Chest: clear to auscultation bilaterally, good respiratory effort  Heart: S1 S2, RRR, no mgr appreciated  Abdomen: soft, not tender,   Extremities: no edema,   Neurologic: A, Ox3, no focal signs appreciated    PMHx: reviewed  Past Medical History:   Diagnosis Date    Asthma, mild intermittent     Cataract     Endometrial cancer (H) 06/2022    HTN, goal below 140/90     Hyperlipidemia LDL goal < 100     Macular hole of left eye     Melanoma (H)     RIGHT KNEE    Sleep apnea     uses c pap    Type 2 diabetes, HbA1C goal < 8% (H)       PSHx: reviewed  Past Surgical History:   Procedure Laterality Date    ARTHROPLASTY HIP Right 9/25/2017    Procedure: ARTHROPLASTY HIP;  Right total hip arthroplasty  ;  Surgeon: Ayaan Pena MD;  Location:  OR    ARTHROPLASTY KNEE  7/12/2013    Procedure: ARTHROPLASTY KNEE;  right total knee arthroplasty ;  Surgeon: Chaparro Wesley MD;  Location:  OR    ARTHROSCOPY KNEE Right 1/21/2015    Procedure: ARTHROSCOPY KNEE;  Surgeon: Ayaan Pena MD;  Location:  OR    BRONCHOSCOPY (RIGID OR FLEXIBLE), DIAGNOSTIC N/A 8/11/2022    Procedure: BRONCHOSCOPY, WITH BRONCHOALVEOLAR LAVAGE;  Surgeon: Roselia Sosa MD;  Location:  GI    C INCISION OF HYMEN      CATARACT IOL, RT/LT      COLONOSCOPY  2008    COLONOSCOPY N/A 4/18/2019    Procedure: Colonoscopy with polypectomy;  Surgeon: Shaun Guerrero MD;  Location: [facility] OR    CYSTOSCOPY N/A 6/23/2022    Procedure: Cystoscopy;  " Surgeon: Eli Guidry MD;  Location:  OR    ENDOSCOPIC RETROGRADE CHOLANGIOPANCREATOGRAM N/A 2/6/2019    Procedure: COMBINED ENDOSCOPIC RETROGRADE CHOLANGIOPANCREATOGRAPHY, BILIARY SPINCTEROTOMY AND DILATION, PLACE BILE DUCT STENT;  Surgeon: Guru Andie Gonzalez MD;  Location:  OR    ENDOSCOPIC RETROGRADE CHOLANGIOPANCREATOGRAM N/A 2/28/2019    Procedure: Endoscopic Retrograde Cholangiopancreatogram, Bile duct stent removal and placement;  Surgeon: Shaun Guerrero MD;  Location:  OR    ENDOSCOPIC RETROGRADE CHOLANGIOPANCREATOGRAM N/A 4/18/2019    Procedure: COMBINED ENDOSCOPIC RETROGRADE CHOLANGIOPANCREATOGRAPHY, Bile duct stent exchange and Polypectomy;  Surgeon: Shaun Guerrero MD;  Location:  OR    ENDOSCOPIC RETROGRADE CHOLANGIOPANCREATOGRAM N/A 10/18/2019    Procedure: Endoscopic Retrograde Cholangiopancreatogram;  Surgeon: Shaun Guerrero MD;  Location:  OR    ENDOSCOPIC RETROGRADE CHOLANGIOPANCREATOGRAM N/A 3/24/2022    Procedure: ENDOSCOPIC RETROGRADE CHOLANGIOPANCREATOGRAPHY;  Surgeon: Shaun Guerrero MD;  Location:  OR    ENDOSCOPIC RETROGRADE CHOLANGIOPANCREATOGRAM N/A 3/16/2023    Procedure: endoscopic retrograde cholangiopancreatography with minor papillotomy;  Surgeon: Shaun Guerrero MD;  Location:  OR    ENDOSCOPIC RETROGRADE CHOLANGIOPANCREATOGRAM WITH SPYGLASS N/A 7/26/2019    Procedure: Endoscopic Retrograde Cholangiopancreatogram With Spyglas,l Radiofrequency Ablation, Stent Exchangeand Duodenal Biopsy x2;  Surgeon: Shaun Guerrero MD;  Location:  OR    ENDOSCOPIC RETROGRADE CHOLANGIOPANCREATOGRAM WITH SPYGLASS N/A 9/10/2020    Procedure: ENDOSCOPIC RETROGRADE CHOLANGIOPANCREATOGRAPHY, WITH DIRECT DUCT VISUALIZATION, USING PANCREATICOBILIARY FIBEROPTIC PROBE;  Surgeon: Shaun Guerrero MD;  Location:  OR    ENDOSCOPIC RETROGRADE CHOLANGIOPANCREATOGRAM WITH SPYGLASS N/A 3/22/2021    Procedure: ENDOSCOPIC RETROGRADE CHOLANGIOPANCREATOGRAPHY, WITH DIRECT DUCT  VISUALIZATION, USING PANCREATICOBILIARY FIBEROPTIC PROBE;  Surgeon: Shaun Guerrero MD;  Location: [facility] OR    ESOPHAGOSCOPY, GASTROSCOPY, DUODENOSCOPY (EGD) WITH RADIO FREQUENCY ABLATION, COMBINED N/A 9/10/2020    Procedure: possible repeat ESOPHAGOGASTRODUODENOSCOPY, WITH RADIOFREQUENCY ABLATION and endoscopic mucosal resection;  Surgeon: Shaun Guerrero MD;  Location: [facility]    ESOPHAGOSCOPY, GASTROSCOPY, DUODENOSCOPY (EGD), COMBINED N/A 4/18/2019    Procedure: upper endoscopy with polypectomy;  Surgeon: Shaun Guerrero MD;  Location: [facility]    ESOPHAGOSCOPY, GASTROSCOPY, DUODENOSCOPY (EGD), COMBINED N/A 10/18/2019    Procedure: Upper Endoscopy and ERCP with stent removal, stone removal and biopsy;  Surgeon: Shaun Guerrero MD;  Location: [facility]    ESOPHAGOSCOPY, GASTROSCOPY, DUODENOSCOPY (EGD), COMBINED N/A 3/24/2022    Procedure: ESOPHAGOGASTRODUODENOSCOPY (EGD), Duodenal  polyp removal;  Surgeon: Shaun Guerrero MD;  Location: [facility] OR    ESOPHAGOSCOPY, GASTROSCOPY, DUODENOSCOPY (EGD), COMBINED N/A 3/16/2023    Procedure: ESOPHAGOGASTRODUODENOSCOPY (EGD);  Surgeon: Shaun Guerrero MD;  Location: [facility] OR    ESOPHAGOSCOPY, GASTROSCOPY, DUODENOSCOPY (EGD), RESECT MUCOSA, COMBINED N/A 2/28/2019    Procedure: Upper Endoscopy, Endoscopic Ultrasound, Endoscopic Mucosal Resection,  Ampullectomy, polypectomy.;  Surgeon: Shaun Guerrero MD;  Location: [facility]    ESOPHAGOSCOPY, GASTROSCOPY, DUODENOSCOPY (EGD), RESECT MUCOSA, COMBINED N/A 3/22/2021    Procedure: ESOPHAGOGASTRODUODENOSCOPY (EGD) with  endoscopic mucosal resection/ polypectomy;  Surgeon: Shaun Guerrero MD;  Location: [facility]    EXCISE LESION LOWER EXTREMITY Right 3/28/2022    Procedure: Wide local excision of right knee melanoma;  Surgeon: Chevy Allison MD;  Location: [facility] OR    EXCISE MASS LOWER EXTREMITY Right 1/13/2022    Procedure: excision of right thigh mass;  Surgeon: Salvador Kelly MD;  Location: [facility] OR    EYE SURGERY      macular hole repaired left eye    HC KNEE  "SCOPE, DIAGNOSTIC      - both knees    HEAD & NECK SURGERY      wisdom teeth    IR CHEST PORT PLACEMENT > 5 YRS OF AGE  5/2/2022    LAPAROSCOPIC HYSTERECTOMY TOTAL, BILATERAL SALPINGO-OOPHORECTOMY, NODE DISSECTION, COMBINED Bilateral 6/23/2022    Procedure: Total Laparoscopic Hyserectomy, Bilateral Salpibgo-oophorectomy, Bilateral [place] Lymph Node Dissection;  Surgeon: Eli Guidry MD;  Location: [facility] OR        Meds: reviewed  Current Outpatient Medications   Medication Sig Dispense Refill    atenolol (TENORMIN) 50 MG tablet Take 1 tablet (50 mg) by mouth daily 90 tablet 1    atorvastatin (LIPITOR) 40 MG tablet Take 1 tablet (40 mg) by mouth daily 90 tablet 1    BD VEO INSULIN SYRINGE U/F 31G X 15/64\" 0.5 ML USE DAILY WITH NPH INSULIN 100 each 1    cetirizine (ZYRTEC) 10 MG tablet Take 10 mg by mouth every morning      Continuous Blood Gluc Sensor (FREESTYLE DAVID 14 DAY SENSOR) MISC USE 1 EVERY 14 DAYS 2 each 5    Continuous Blood Gluc Sensor (FREESTYLE DAVID 3 SENSOR) MISC 1 Device every 14 days 2 each 3    CONTOUR NEXT TEST test strip USE 1 STRIP TO CHECK GLUCOSE ONCE DAILY 100 strip 4    glipiZIDE (GLUCOTROL XL) 10 MG 24 hr tablet Take 1 tablet by mouth once daily 90 tablet 0    HUMALOG 100 UNIT/ML injection INJECT 5 UNITS SUBCUTANEOUSLY THREE TIMES DAILY BEFORE MEALS 10 mL 0    hydrochlorothiazide (HYDRODIURIL) 25 MG tablet TAKE 1 TABLET BY MOUTH ONCE DAILY IN THE MORNING 90 tablet 3    insulin glargine (LANTUS VIAL) 100 UNIT/ML vial Inject 44 Units Subcutaneous at bedtime 15 mL 2    insulin pen needle (MM PEN NEEDLES) 32G X 4 MM miscellaneous Inject insulin 4 times a day 300 each 1    insulin syringe-needle U-100 (BD INSULIN SYRINGE ULTRAFINE) 31G X 5/16\" 0.5 ML miscellaneous Use daily with NPH insulin 100 each 11    insulin syringe-needle U-100 (BD VEO INSULIN SYRINGE U/F) 31G X 15/64\" 0.3 ML Patient has 4 shots of insulin a day 300 each 1    latanoprost (XALATAN) 0.005 % ophthalmic solution " Place 1 drop into both eyes At Bedtime  2.5 mL 1    lisinopril (ZESTRIL) 5 MG tablet Take 1 tablet (5 mg) by mouth daily 90 tablet 1    LORazepam (ATIVAN) 0.5 MG tablet Take 1 tablet (0.5 mg) by mouth nightly as needed for anxiety or sleep 30 tablet 0    mupirocin (BACTROBAN) 2 % external ointment Apply topically 3 times daily      OZEMPIC, 0.25 OR 0.5 MG/DOSE, 2 MG/3ML pen INJECT 0.25MG SUBCUTANOUESLY EVERY 7 DAYS 3 mL 0    senna-docusate (SENOKOT-S/PERICOLACE) 8.6-50 MG tablet Take 2 tablets by mouth daily as needed for constipation 60 tablet 11    timolol maleate (TIMOPTIC) 0.5 % ophthalmic solution INSTILL 1 DROP INTO EACH EYE TWICE DAILY      triamcinolone (KENALOG) 0.5 % external ointment Apply 1 g topically daily as needed for irritation      XARELTO ANTICOAGULANT 20 MG TABS tablet Take 1 tablet (20 mg) by mouth daily 90 tablet 1       Soc Hx: reviewed  Fam Hx: reviewed      Chart documentation was completed, in part, with [employer] voice-recognition software. Even though reviewed, some grammatical, spelling, and word errors may remain.      Raisa Torres MD  Internal Medicine       Signed Electronically by: Raisa Davidson MD

## 2024-03-22 ENCOUNTER — HOSPITAL ENCOUNTER (OUTPATIENT)
Dept: MRI IMAGING | Facility: CLINIC | Age: 74
Discharge: HOME | End: 2024-03-22
Attending: INTERNAL MEDICINE | Admitting: INTERNAL MEDICINE
Payer: COMMERCIAL

## 2024-03-22 DIAGNOSIS — R42 DIZZINESS: ICD-10-CM

## 2024-03-22 DIAGNOSIS — C43.71 MALIGNANT MELANOMA OF RIGHT LOWER EXTREMITY INCLUDING HIP (H): ICD-10-CM

## 2024-03-22 PROCEDURE — A9585 GADOBUTROL INJECTION: HCPCS | Performed by: INTERNAL MEDICINE

## 2024-03-22 PROCEDURE — 70553 MRI BRAIN STEM W/O & W/DYE: CPT

## 2024-03-22 PROCEDURE — 255N000002 HC RX 255 OP 636: Performed by: INTERNAL MEDICINE

## 2024-03-22 PROCEDURE — 250N000011 HC RX IP 250 OP 636

## 2024-03-22 RX ORDER — HEPARIN SODIUM (PORCINE) LOCK FLUSH IV SOLN 100 UNIT/ML 100 UNIT/ML
5 SOLUTION INTRAVENOUS ONCE
Status: COMPLETED | OUTPATIENT
Start: 2024-03-22 | End: 2024-03-22

## 2024-03-22 RX ORDER — GADOBUTROL 604.72 MG/ML
10 INJECTION INTRAVENOUS ONCE
Status: COMPLETED | OUTPATIENT
Start: 2024-03-22 | End: 2024-03-22

## 2024-03-22 RX ORDER — HEPARIN SODIUM (PORCINE) LOCK FLUSH IV SOLN 100 UNIT/ML 100 UNIT/ML
SOLUTION INTRAVENOUS
Status: COMPLETED
Start: 2024-03-22 | End: 2024-03-22

## 2024-03-22 RX ADMIN — GADOBUTROL 10 ML: 604.72 INJECTION INTRAVENOUS at 13:53

## 2024-03-22 RX ADMIN — SODIUM CHLORIDE, PRESERVATIVE FREE 5 ML: 5 INJECTION INTRAVENOUS at 14:45

## 2024-03-22 RX ADMIN — HEPARIN SODIUM (PORCINE) LOCK FLUSH IV SOLN 100 UNIT/ML 5 ML: 100 SOLUTION at 14:45

## 2024-03-25 ENCOUNTER — OFFICE VISIT (OUTPATIENT)
Dept: ENDOCRINOLOGY | Facility: CLINIC | Age: 74
End: 2024-03-25
Payer: COMMERCIAL

## 2024-03-25 ENCOUNTER — ONCOLOGY VISIT (OUTPATIENT)
Dept: ONCOLOGY | Facility: CLINIC | Age: 74
End: 2024-03-25
Attending: SURGERY
Payer: COMMERCIAL

## 2024-03-25 ENCOUNTER — PATIENT OUTREACH (OUTPATIENT)
Dept: ONCOLOGY | Facility: CLINIC | Age: 74
End: 2024-03-25

## 2024-03-25 VITALS
HEART RATE: 79 BPM | BODY MASS INDEX: 35.61 KG/M2 | RESPIRATION RATE: 18 BRPM | OXYGEN SATURATION: 98 % | DIASTOLIC BLOOD PRESSURE: 78 MMHG | TEMPERATURE: 98.4 F | WEIGHT: 226.9 LBS | SYSTOLIC BLOOD PRESSURE: 121 MMHG | HEIGHT: 67 IN

## 2024-03-25 VITALS
DIASTOLIC BLOOD PRESSURE: 78 MMHG | OXYGEN SATURATION: 98 % | WEIGHT: 226.85 LBS | BODY MASS INDEX: 35.61 KG/M2 | HEIGHT: 67 IN | TEMPERATURE: 98.4 F | SYSTOLIC BLOOD PRESSURE: 121 MMHG | RESPIRATION RATE: 18 BRPM | HEART RATE: 79 BPM

## 2024-03-25 DIAGNOSIS — C43.9 MELANOMA OF SKIN (H): Primary | ICD-10-CM

## 2024-03-25 DIAGNOSIS — E11.65 TYPE 2 DIABETES MELLITUS WITH HYPERGLYCEMIA, WITHOUT LONG-TERM CURRENT USE OF INSULIN (H): Primary | ICD-10-CM

## 2024-03-25 PROCEDURE — 99213 OFFICE O/P EST LOW 20 MIN: CPT | Performed by: PHYSICIAN ASSISTANT

## 2024-03-25 PROCEDURE — 99213 OFFICE O/P EST LOW 20 MIN: CPT | Performed by: SURGERY

## 2024-03-25 PROCEDURE — G0463 HOSPITAL OUTPT CLINIC VISIT: HCPCS | Performed by: SURGERY

## 2024-03-25 RX ORDER — GENTAMICIN SULFATE 1 MG/G
OINTMENT TOPICAL 3 TIMES DAILY
COMMUNITY
End: 2024-04-22

## 2024-03-25 ASSESSMENT — PAIN SCALES - GENERAL: PAINLEVEL: NO PAIN (0)

## 2024-03-25 NOTE — PROGRESS NOTES
Assessment/Plan :   Type 2 DM with long term use of insulin. Melva is doing great. We reviewed her recent CGMS report and her current time in range is at 88% with a predicted GMI of 6.6%. We also reviewed her current medications and the possible impact of future treatment for melanoma. I encouraged her to keep up the good work, her blood sugars look great. We will plan on a follow-up appt in 4-5 mos. If she should have any problems before our follow-up visit, she can reach out to our office through [employer]. We can also download her CGMS report and make recommendations based on the results. She understands and she will reach out to us, as needed.      I have independently reviewed and interpreted labs, imaging as indicated.      Chief complaint:  Gricelda is a 73 year old female who returns for follow-up of Type 2 DM.    I have reviewed Care Everywhere including [facility], [facility], [facility], [facility], [facility], [facility], [facility] , [facility], [place] lab reports, imaging reports and provider notes as indicated.      HISTORY OF PRESENT ILLNESS  Melva has noticed a significant improvement in her blood sugars. Recently, she has had a few days with 90% time in target range. She is currently taking Lantus 44 unit(s) daily, along with Humalog 5 unit(s) before meals, glipizide XL 10 mg daily and Ozempic 0.25 mg weekly. She is monitoring her blood sugars with the FreeStyle Baldo 3 sensor.    Melva was diagnosed with diabetes over 10 yrs ago. She has taken a variety of oral medications along with MDI insulin therapy for the last few years. Her diabetes is complicated by hyperlipidemia, obesity, HTN, CHERRY, a history of endometrial cancer, and a new diagnosis of metastatic melanoma. She has an upcoming PET scan and surgery, and she may be starting Keytruda in the near future.    Melva has not had any recent problems with severe hyperglycemia and/or hypoglycemia. She continues to experience a spike around the mid-morning  to noon. She has noticed that it seems to be related to food and she has continued to work on making healthy dietary decisions. She has not had any problems with blurred vision or worsening numbness/tingling in her feet.     Endocrine relevant labs are as follows:   Latest Reference Range & Units 03/04/24 09:22   Albumin Urine mg/g Cr 0.00 - 25.00 mg/g Cr 10.16      Latest Reference Range & Units 03/04/24 09:22   Albumin Urine mg/L mg/L 19.4      Latest Reference Range & Units 02/27/24 10:26   Hemoglobin A1C <5.7 % 8.1 (H)   (H): Data is abnormally high    REVIEW OF SYSTEMS    Endocrine: positive for diabetes and obesity  Skin: positive for lesion on right thigh  Eyes: negative for, visual blurring, redness, tearing  Ears/Nose/Throat: negative  Respiratory: No shortness of breath, dyspnea on exertion, cough, or hemoptysis  Cardiovascular: negative for, chest pain, dyspnea on exertion, lower extremity edema, and exercise intolerance  Gastrointestinal: negative for, nausea, vomiting, constipation, and diarrhea  Genitourinary: negative for, nocturia, dysuria, frequency, and urgency  Musculoskeletal: negative for, muscular weakness, nocturnal cramping, and foot pain  Neurologic: negative for, local weakness, numbness or tingling of hands, and numbness or tingling of feet  Psychiatric: negative  Hematologic/Lymphatic/Immunologic: negative    Past Medical History  Past Medical History:   Diagnosis Date    Asthma, mild intermittent     Cataract     Endometrial cancer (H) 06/2022    HTN, goal below 140/90     Hyperlipidemia LDL goal < 100     Macular hole of left eye     Melanoma (H)     RIGHT KNEE    Sleep apnea     uses c pap    Type 2 diabetes, HbA1C goal < 8% (H)        Medications  Current Outpatient Medications   Medication Sig Dispense Refill    atenolol (TENORMIN) 50 MG tablet Take 1 tablet (50 mg) by mouth daily 90 tablet 1    atorvastatin (LIPITOR) 40 MG tablet Take 1 tablet (40 mg) by mouth daily 90 tablet 1     "BD VEO INSULIN SYRINGE U/F 31G X 15/64\" 0.5 ML USE DAILY WITH NPH INSULIN 100 each 1    cetirizine (ZYRTEC) 10 MG tablet Take 10 mg by mouth every morning      Continuous Blood Gluc Sensor (FREESTYLE DAVID 3 SENSOR) MISC 1 Device every 14 days 2 each 3    CONTOUR NEXT TEST test strip USE 1 STRIP TO CHECK GLUCOSE ONCE DAILY 100 strip 4    glipiZIDE (GLUCOTROL XL) 10 MG 24 hr tablet Take 1 tablet (10 mg) by mouth daily 90 tablet 1    hydrochlorothiazide (HYDRODIURIL) 25 MG tablet Take 1 tablet (25 mg) by mouth every morning 90 tablet 1    insulin glargine (LANTUS VIAL) 100 UNIT/ML vial Inject 44 Units Subcutaneous at bedtime 15 mL 2    insulin lispro (HUMALOG) 100 UNIT/ML vial INJECT 5 UNITS SUBCUTANEOUSLY THREE TIMES DAILY BEFORE MEALS 10 mL 0    insulin pen needle (MM PEN NEEDLES) 32G X 4 MM miscellaneous Inject insulin 4 times a day 300 each 1    insulin syringe-needle U-100 (BD INSULIN SYRINGE ULTRAFINE) 31G X 5/16\" 0.5 ML miscellaneous Use daily with NPH insulin 100 each 11    insulin syringe-needle U-100 (BD VEO INSULIN SYRINGE U/F) 31G X 15/64\" 0.3 ML Patient has 4 shots of insulin a day 300 each 1    latanoprost (XALATAN) 0.005 % ophthalmic solution Place 1 drop into both eyes At Bedtime  2.5 mL 1    lisinopril (ZESTRIL) 5 MG tablet Take 1 tablet (5 mg) by mouth daily 90 tablet 1    LORazepam (ATIVAN) 0.5 MG tablet Take 1 tablet (0.5 mg) by mouth nightly as needed for anxiety or sleep 30 tablet 0    mupirocin (BACTROBAN) 2 % external ointment Apply topically 3 times daily      OZEMPIC, 0.25 OR 0.5 MG/DOSE, 2 MG/3ML pen INJECT 0.25MG SUBCUTANOUESLY EVERY 7 DAYS 3 mL 0    senna-docusate (SENOKOT-S/PERICOLACE) 8.6-50 MG tablet Take 2 tablets by mouth daily as needed for constipation 60 tablet 11    timolol maleate (TIMOPTIC) 0.5 % ophthalmic solution INSTILL 1 DROP INTO EACH EYE TWICE DAILY      triamcinolone (KENALOG) 0.5 % external ointment Apply 1 g topically daily as needed for irritation      XARELTO " "ANTICOAGULANT 20 MG TABS tablet Take 1 tablet (20 mg) by mouth daily 90 tablet 1       Allergies  Allergies   Allergen Reactions    Bromfenac Visual Disturbance      xibrom  Causes sever eye pain    Codeine Nausea     Other reaction(s): Dizziness, Headache    Metformin GI Disturbance    Morphine And Related      \"NAUSE,HA AND DIZZINESS\"    Seasonal Allergies          Family History  family history includes Asthma in her maternal aunt; Breast Cancer in her mother and niece; Cancer in her paternal aunt and paternal grandmother; Cerebrovascular Disease in her maternal grandfather and maternal grandmother; Connective Tissue Disorder in her sister; Diabetes in her mother and sister; Heart Disease in her father and paternal grandfather; Hypertension in her brother and mother; Uterine Cancer in her sister.    Social History  Social History     Tobacco Use    Smoking status: Never     Passive exposure: Never    Smokeless tobacco: Never   Vaping Use    Vaping Use: Never used   Substance Use Topics    Alcohol use: No     Alcohol/week: 0.0 standard drinks of alcohol    Drug use: No       Physical Exam  LMP  (LMP Unknown)   Breastfeeding No   There is no height or weight on file to calculate BMI.  GENERAL :  In no apparent distress  SKIN: Normal color, normal temperature, texture.  No hirsutism, alopecia or purple striae.     EYES: PERRL, EOMI, No scleral icterus,  No proptosis, conjunctival redness, stare, retraction  RESP: Lungs clear to auscultation bilaterally  CARDIAC: Regular rate and rhythm, normal S1 S2, without murmurs, rubs or gallops       NEURO: awake, alert, responds appropriately to questions.  Cranial nerves intact.   Moves all extremities; Gait normal.  No tremor of the outstretched hand.   EXTREMITIES: No clubbing, cyanosis or edema.    DATA REVIEW  FreeStyle LibreView  Time in target range 88%  High 11% and Very High 1%  Current Ave  mg/dl      "

## 2024-03-25 NOTE — NURSING NOTE
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    Weight as of this encounter: 102.9 kg (226 lb 13.7 oz). Body surface area is 2.21 meters squared.  No Pain (0) Comment: Data Unavailable   No LMP recorded (lmp unknown). Patient is postmenopausal.  Allergies reviewed: Yes  Medications reviewed: Yes    Medications: Medication refills not needed today.  Pharmacy name entered into EPIC:    "[facility] - [id number] - [place], [place] - [address] [place], [place] - [address] [facility] - [place], [place] - [address]    Frailty Screening:   Is the patient here for a new oncology consult visit in cancer care? 2. No    Charles Williamson LPN              "

## 2024-03-25 NOTE — LETTER
3/25/2024         RE: Gricelda Sabillon  [address]  [place] [address]        Dear Colleague,    Thank you for referring your patient, Gricelda Sabillon, to the [facility]. Please see a copy of my visit note below.    Assessment/Plan :   Type 2 DM with long term use of insulin. Melva is doing great. We reviewed her recent CGMS report and her current time in range is at 88% with a predicted GMI of 6.6%. We also reviewed her current medications and the possible impact of future treatment for melanoma. I encouraged her to keep up the good work, her blood sugars look great. We will plan on a follow-up appt in 4-5 mos. If she should have any problems before our follow-up visit, she can reach out to our office through [employer]. We can also download her CGMS report and make recommendations based on the results. She understands and she will reach out to us, as needed.      I have independently reviewed and interpreted labs, imaging as indicated.      Chief complaint:  Gricelda is a 73 year old female who returns for follow-up of Type 2 DM.    I have reviewed Care Everywhere including [facility], [facility], [facility], [place], [facility], [facility] , [facility], [facility] lab reports, imaging reports and provider notes as indicated.      HISTORY OF PRESENT ILLNESS  Melva has noticed a significant improvement in her blood sugars. Recently, she has had a few days with 90% time in target range. She is currently taking Lantus 44 unit(s) daily, along with Humalog 5 unit(s) before meals, glipizide XL 10 mg daily and Ozempic 0.25 mg weekly. She is monitoring her blood sugars with the FreeStyle Baldo 3 sensor.    Melva was diagnosed with diabetes over 10 yrs ago. She has taken a variety of oral medications along with MDI insulin therapy for the last few years. Her diabetes is complicated by hyperlipidemia, obesity, HTN, CHERRY, a history of endometrial cancer, and a new diagnosis of  metastatic melanoma. She has an upcoming PET scan and surgery, and she may be starting Keytruda in the near future.    Melva has not had any recent problems with severe hyperglycemia and/or hypoglycemia. She continues to experience a spike around the mid-morning to noon. She has noticed that it seems to be related to food and she has continued to work on making healthy dietary decisions. She has not had any problems with blurred vision or worsening numbness/tingling in her feet.     Endocrine relevant labs are as follows:   Latest Reference Range & Units 03/04/24 09:22   Albumin Urine mg/g Cr 0.00 - 25.00 mg/g Cr 10.16      Latest Reference Range & Units 03/04/24 09:22   Albumin Urine mg/L mg/L 19.4      Latest Reference Range & Units 02/27/24 10:26   Hemoglobin A1C <5.7 % 8.1 (H)   (H): Data is abnormally high    REVIEW OF SYSTEMS    Endocrine: positive for diabetes and obesity  Skin: positive for lesion on right thigh  Eyes: negative for, visual blurring, redness, tearing  Ears/Nose/Throat: negative  Respiratory: No shortness of breath, dyspnea on exertion, cough, or hemoptysis  Cardiovascular: negative for, chest pain, dyspnea on exertion, lower extremity edema, and exercise intolerance  Gastrointestinal: negative for, nausea, vomiting, constipation, and diarrhea  Genitourinary: negative for, nocturia, dysuria, frequency, and urgency  Musculoskeletal: negative for, muscular weakness, nocturnal cramping, and foot pain  Neurologic: negative for, local weakness, numbness or tingling of hands, and numbness or tingling of feet  Psychiatric: negative  Hematologic/Lymphatic/Immunologic: negative    Past Medical History  Past Medical History:   Diagnosis Date     Asthma, mild intermittent      Cataract      Endometrial cancer (H) 06/2022     HTN, goal below 140/90      Hyperlipidemia LDL goal < 100      Macular hole of left eye      Melanoma (H)     RIGHT KNEE     Sleep apnea     uses c pap     Type 2 diabetes, HbA1C goal  "< 8% (H)        Medications  Current Outpatient Medications   Medication Sig Dispense Refill     atenolol (TENORMIN) 50 MG tablet Take 1 tablet (50 mg) by mouth daily 90 tablet 1     atorvastatin (LIPITOR) 40 MG tablet Take 1 tablet (40 mg) by mouth daily 90 tablet 1     BD VEO INSULIN SYRINGE U/F 31G X 15/64\" 0.5 ML USE DAILY WITH NPH INSULIN 100 each 1     cetirizine (ZYRTEC) 10 MG tablet Take 10 mg by mouth every morning       Continuous Blood Gluc Sensor (FREESTYLE DAVID 3 SENSOR) MISC 1 Device every 14 days 2 each 3     CONTOUR NEXT TEST test strip USE 1 STRIP TO CHECK GLUCOSE ONCE DAILY 100 strip 4     glipiZIDE (GLUCOTROL XL) 10 MG 24 hr tablet Take 1 tablet (10 mg) by mouth daily 90 tablet 1     hydrochlorothiazide (HYDRODIURIL) 25 MG tablet Take 1 tablet (25 mg) by mouth every morning 90 tablet 1     insulin glargine (LANTUS VIAL) 100 UNIT/ML vial Inject 44 Units Subcutaneous at bedtime 15 mL 2     insulin lispro (HUMALOG) 100 UNIT/ML vial INJECT 5 UNITS SUBCUTANEOUSLY THREE TIMES DAILY BEFORE MEALS 10 mL 0     insulin pen needle (MM PEN NEEDLES) 32G X 4 MM miscellaneous Inject insulin 4 times a day 300 each 1     insulin syringe-needle U-100 (BD INSULIN SYRINGE ULTRAFINE) 31G X 5/16\" 0.5 ML miscellaneous Use daily with NPH insulin 100 each 11     insulin syringe-needle U-100 (BD VEO INSULIN SYRINGE U/F) 31G X 15/64\" 0.3 ML Patient has 4 shots of insulin a day 300 each 1     latanoprost (XALATAN) 0.005 % ophthalmic solution Place 1 drop into both eyes At Bedtime  2.5 mL 1     lisinopril (ZESTRIL) 5 MG tablet Take 1 tablet (5 mg) by mouth daily 90 tablet 1     LORazepam (ATIVAN) 0.5 MG tablet Take 1 tablet (0.5 mg) by mouth nightly as needed for anxiety or sleep 30 tablet 0     mupirocin (BACTROBAN) 2 % external ointment Apply topically 3 times daily       OZEMPIC, 0.25 OR 0.5 MG/DOSE, 2 MG/3ML pen INJECT 0.25MG SUBCUTANOUESLY EVERY 7 DAYS 3 mL 0     senna-docusate (SENOKOT-S/PERICOLACE) 8.6-50 MG tablet " "Take 2 tablets by mouth daily as needed for constipation 60 tablet 11     timolol maleate (TIMOPTIC) 0.5 % ophthalmic solution INSTILL 1 DROP INTO EACH EYE TWICE DAILY       triamcinolone (KENALOG) 0.5 % external ointment Apply 1 g topically daily as needed for irritation       XARELTO ANTICOAGULANT 20 MG TABS tablet Take 1 tablet (20 mg) by mouth daily 90 tablet 1       Allergies  Allergies   Allergen Reactions     Bromfenac Visual Disturbance      xibrom  Causes sever eye pain     Codeine Nausea     Other reaction(s): Dizziness, Headache     Metformin GI Disturbance     Morphine And Related      \"NAUSE,HA AND DIZZINESS\"     Seasonal Allergies          Family History  family history includes Asthma in her maternal aunt; Breast Cancer in her mother and niece; Cancer in her paternal aunt and paternal grandmother; Cerebrovascular Disease in her maternal grandfather and maternal grandmother; Connective Tissue Disorder in her sister; Diabetes in her mother and sister; Heart Disease in her father and paternal grandfather; Hypertension in her brother and mother; Uterine Cancer in her sister.    Social History  Social History     Tobacco Use     Smoking status: Never     Passive exposure: Never     Smokeless tobacco: Never   Vaping Use     Vaping Use: Never used   Substance Use Topics     Alcohol use: No     Alcohol/week: 0.0 standard drinks of alcohol     Drug use: No       Physical Exam  LMP  (LMP Unknown)   Breastfeeding No   There is no height or weight on file to calculate BMI.  GENERAL :  In no apparent distress  SKIN: Normal color, normal temperature, texture.  No hirsutism, alopecia or purple striae.     EYES: PERRL, EOMI, No scleral icterus,  No proptosis, conjunctival redness, stare, retraction  RESP: Lungs clear to auscultation bilaterally  CARDIAC: Regular rate and rhythm, normal S1 S2, without murmurs, rubs or gallops       NEURO: awake, alert, responds appropriately to questions.  Cranial nerves intact.   " Moves all extremities; Gait normal.  No tremor of the outstretched hand.   EXTREMITIES: No clubbing, cyanosis or edema.    DATA REVIEW  FreeStyle LibreView  Time in target range 88%  High 11% and Very High 1%  Current Ave  mg/dl        Again, thank you for allowing me to participate in the care of your patient.        Sincerely,        Danisha Villa PA-C

## 2024-03-25 NOTE — PATIENT INSTRUCTIONS
[facility]  Dr Corado, Endocrinology Department    [address] [id number]  [place] [address]  Appointment [phone]  Fax: [phone]  [place]: Monday - Thursday

## 2024-03-25 NOTE — PROGRESS NOTES
[facility]: Surgical Oncology Plan of Care Education Note                                     Discussion with Patient:                                                         Met with Melva and her  following her visit with Dr. Allison on 3/25/2024. Introduced self and explained role of RNCC.       Plan:                                                       Reviewed plan for wide excision of right thigh melanoma at the ASC. . Discussed need for H&P within 30 days of surgery. Melva prefers to schedule with her PCP.  Reviewed shower instructions and provided written hand-out and bottle of surgical scrub.     Informed patient they should get a call within the next three business days from our OR  with surgery date, H&P plan and date of post-op visit with their surgeon. Writer answered all questions and concerns to the best of her ability and provided her contact information. Reviewed use of [id number] as alternative way to contact team.  Encouraged patient to contact with questions.         Ethel Cook, RNCC  [facility]  Surgical Onolcogy      Approximately 5 minutes was spent in discussion with patient.

## 2024-03-25 NOTE — LETTER
3/25/2024         RE: Gricelda Sabillon  [address]  [place] [address]        Dear Colleague,    Thank you for referring your patient, Gricelda Sabillon, to the [facility]. Please see a copy of my visit note below.    Gricelda is here for a follow-up visit.  In March 2022 I performed a wide local excision of a subcutaneous melanoma in her right thigh.  She had no known primary and she was considered a stage IV and we completely removed all of her disease.  She received adjuvant Keytruda has been doing well.  She noticed a new subcutaneous lump in her right thigh medial to her previous incision.  She underwent an excisional biopsy in February 27 which demonstrated metastatic melanoma with positive margins.  She underwent a CT scan of the chest abdomen pelvis on February 5 which showed no evidence of metastatic disease.  She underwent a brain MRI on Monday, March 22 that showed no evidence of she has a scheduled PET CT scan for early next month.  On physical examination her incisions are well-healed.  There were no residual palpable or visible masses in her right leg.  She has no lymphadenopathy.    Impression recurrence of metastatic melanoma to the skin.  Plan I have recommended a reexcision after her PET CT scan this will be done with a 1 cm margin in the amatory surgery center.    Total time was 20 minutes which included reviewing her medical records and in person visit    Sincerely,        Chevy Allison MD

## 2024-03-25 NOTE — PROGRESS NOTES
Gricelda is here for a follow-up visit.  In March 2022 I performed a wide local excision of a subcutaneous melanoma in her right thigh.  She had no known primary and she was considered a stage IV and we completely removed all of her disease.  She received adjuvant Keytruda has been doing well.  She noticed a new subcutaneous lump in her right thigh medial to her previous incision.  She underwent an excisional biopsy in February 27 which demonstrated metastatic melanoma with positive margins.  She underwent a CT scan of the chest abdomen pelvis on February 5 which showed no evidence of metastatic disease.  She underwent a brain MRI on Monday, March 22 that showed no evidence of she has a scheduled PET CT scan for early next month.  On physical examination her incisions are well-healed.  There were no residual palpable or visible masses in her right leg.  She has no lymphadenopathy.    Impression recurrence of metastatic melanoma to the skin.  Plan I have recommended a reexcision after her PET CT scan this will be done with a 1 cm margin in the [facility] surgery center.    Total time was 20 minutes which included reviewing her medical records and in person visit

## 2024-03-26 ENCOUNTER — TELEPHONE (OUTPATIENT)
Dept: INTERNAL MEDICINE | Facility: CLINIC | Age: 74
End: 2024-03-26
Payer: COMMERCIAL

## 2024-03-26 ENCOUNTER — TELEPHONE (OUTPATIENT)
Dept: ONCOLOGY | Facility: CLINIC | Age: 74
End: 2024-03-26
Payer: COMMERCIAL

## 2024-03-26 NOTE — TELEPHONE ENCOUNTER
Called patient to discuss surgery scheduling. Spoke with patient and scheduled surgery with Dr. Allison. Surgery is scheduled at [facility] for 4/10.    Patient had H&P on 3/2 and her PCP said she would update her H&P to clear her for 4/10 surgery with Dr. Allison. Patient is aware she will get a call from the pre-op nurses a few days before surgery to confirm her arrival time.    Patient will need a 2 week post-op however, will route to clinic to assist as clinics are overbooked/on hold due to providers OR schedule.    Surgery packet provided in clinic.    Patient has no further questions at this time.    Sri Zelaya on 3/26/2024 at 4:44 PM

## 2024-03-26 NOTE — TELEPHONE ENCOUNTER
Spoke with patient and she had a pre-op physical on 3/4/2024  with Dr Torres. Patient's surgery is scheduled for 4/10/2024.    Is patient going to have to have another appointment or how would you like to proceed. Please advise?

## 2024-03-26 NOTE — TELEPHONE ENCOUNTER
General Call    Contacts         Type Contact Phone/Fax    03/26/2024 04:46 PM CDT Phone (Incoming) Melva Sabillon (Self) [phone] (H)          Reason for Call:  Pre-op    What are your questions or concerns:  Have appointment with oncology and want to talk to  or her team about pre-op    Date of last appointment with provider: 03/11/2024    Could we send this information to you in [facility] or would you prefer to receive a phone call?:   Patient would prefer a phone call   Okay to leave a detailed message?: Yes at Home number on file [phone] (home)

## 2024-03-27 ENCOUNTER — ANESTHESIA EVENT (OUTPATIENT)
Dept: SURGERY | Facility: CLINIC | Age: 74
End: 2024-03-27
Payer: COMMERCIAL

## 2024-03-28 ENCOUNTER — APPOINTMENT (OUTPATIENT)
Dept: GENERAL RADIOLOGY | Facility: CLINIC | Age: 74
End: 2024-03-28
Attending: INTERNAL MEDICINE
Payer: COMMERCIAL

## 2024-03-28 ENCOUNTER — HOSPITAL ENCOUNTER (OUTPATIENT)
Facility: CLINIC | Age: 74
Discharge: HOME | End: 2024-03-28
Attending: INTERNAL MEDICINE | Admitting: INTERNAL MEDICINE
Payer: COMMERCIAL

## 2024-03-28 ENCOUNTER — ANESTHESIA (OUTPATIENT)
Dept: SURGERY | Facility: CLINIC | Age: 74
End: 2024-03-28
Payer: COMMERCIAL

## 2024-03-28 VITALS
TEMPERATURE: 98.6 F | BODY MASS INDEX: 35.31 KG/M2 | HEIGHT: 67 IN | DIASTOLIC BLOOD PRESSURE: 82 MMHG | RESPIRATION RATE: 18 BRPM | SYSTOLIC BLOOD PRESSURE: 153 MMHG | OXYGEN SATURATION: 93 % | HEART RATE: 76 BPM | WEIGHT: 225 LBS

## 2024-03-28 LAB
ALBUMIN SERPL BCG-MCNC: 4.1 G/DL (ref 3.5–5.2)
ALP SERPL-CCNC: 104 U/L (ref 40–150)
ALT SERPL W P-5'-P-CCNC: 24 U/L (ref 0–50)
ANION GAP SERPL CALCULATED.3IONS-SCNC: 15 MMOL/L (ref 7–15)
AST SERPL W P-5'-P-CCNC: 16 U/L (ref 0–45)
BILIRUB SERPL-MCNC: 0.4 MG/DL
BUN SERPL-MCNC: 24.1 MG/DL (ref 8–23)
CALCIUM SERPL-MCNC: 10.2 MG/DL (ref 8.8–10.2)
CHLORIDE SERPL-SCNC: 100 MMOL/L (ref 98–107)
CREAT SERPL-MCNC: 0.81 MG/DL (ref 0.51–0.95)
DEPRECATED HCO3 PLAS-SCNC: 22 MMOL/L (ref 22–29)
EGFRCR SERPLBLD CKD-EPI 2021: 75 ML/MIN/1.73M2
ERCP: NORMAL
ERYTHROCYTE [DISTWIDTH] IN BLOOD BY AUTOMATED COUNT: 13.1 % (ref 10–15)
FASTING STATUS PATIENT QL REPORTED: YES
GLUCOSE SERPL-MCNC: 155 MG/DL (ref 70–99)
GLUCOSE SERPL-MCNC: 155 MG/DL (ref 70–99)
HCT VFR BLD AUTO: 40.7 % (ref 35–47)
HGB BLD-MCNC: 13.1 G/DL (ref 11.7–15.7)
INR PPP: 0.92 (ref 0.85–1.15)
LIPASE SERPL-CCNC: 45 U/L (ref 13–60)
MCH RBC QN AUTO: 27.5 PG (ref 26.5–33)
MCHC RBC AUTO-ENTMCNC: 32.2 G/DL (ref 31.5–36.5)
MCV RBC AUTO: 85 FL (ref 78–100)
PLATELET # BLD AUTO: 238 10E3/UL (ref 150–450)
POTASSIUM SERPL-SCNC: 3.9 MMOL/L (ref 3.4–5.3)
PROT SERPL-MCNC: 7.2 G/DL (ref 6.4–8.3)
RBC # BLD AUTO: 4.77 10E6/UL (ref 3.8–5.2)
SODIUM SERPL-SCNC: 137 MMOL/L (ref 135–145)
WBC # BLD AUTO: 6.5 10E3/UL (ref 4–11)

## 2024-03-28 PROCEDURE — 250N000009 HC RX 250: Performed by: NURSE ANESTHETIST, CERTIFIED REGISTERED

## 2024-03-28 PROCEDURE — 360N000083 HC SURGERY LEVEL 3 W/ FLUORO, PER MIN: Performed by: INTERNAL MEDICINE

## 2024-03-28 PROCEDURE — 272N000001 HC OR GENERAL SUPPLY STERILE: Performed by: INTERNAL MEDICINE

## 2024-03-28 PROCEDURE — 85027 COMPLETE CBC AUTOMATED: CPT | Performed by: INTERNAL MEDICINE

## 2024-03-28 PROCEDURE — 43260 ERCP W/SPECIMEN COLLECTION: CPT | Performed by: ANESTHESIOLOGY

## 2024-03-28 PROCEDURE — 710N000009 HC RECOVERY PHASE 1, LEVEL 1, PER MIN: Performed by: INTERNAL MEDICINE

## 2024-03-28 PROCEDURE — 999N000141 HC STATISTIC PRE-PROCEDURE NURSING ASSESSMENT: Performed by: INTERNAL MEDICINE

## 2024-03-28 PROCEDURE — 258N000003 HC RX IP 258 OP 636: Performed by: NURSE ANESTHETIST, CERTIFIED REGISTERED

## 2024-03-28 PROCEDURE — 370N000017 HC ANESTHESIA TECHNICAL FEE, PER MIN: Performed by: INTERNAL MEDICINE

## 2024-03-28 PROCEDURE — 80053 COMPREHEN METABOLIC PANEL: CPT | Performed by: INTERNAL MEDICINE

## 2024-03-28 PROCEDURE — 43260 ERCP W/SPECIMEN COLLECTION: CPT | Performed by: NURSE ANESTHETIST, CERTIFIED REGISTERED

## 2024-03-28 PROCEDURE — 250N000011 HC RX IP 250 OP 636: Performed by: NURSE ANESTHETIST, CERTIFIED REGISTERED

## 2024-03-28 PROCEDURE — 83690 ASSAY OF LIPASE: CPT | Performed by: INTERNAL MEDICINE

## 2024-03-28 PROCEDURE — 99100 ANES PT EXTEME AGE<1 YR&>70: CPT | Performed by: NURSE ANESTHETIST, CERTIFIED REGISTERED

## 2024-03-28 PROCEDURE — 85610 PROTHROMBIN TIME: CPT | Performed by: INTERNAL MEDICINE

## 2024-03-28 PROCEDURE — 250N000025 HC SEVOFLURANE, PER MIN: Performed by: INTERNAL MEDICINE

## 2024-03-28 PROCEDURE — 710N000012 HC RECOVERY PHASE 2, PER MINUTE: Performed by: INTERNAL MEDICINE

## 2024-03-28 PROCEDURE — 999N000179 XR SURGERY CARM FLUORO LESS THAN 5 MIN W STILLS: Mod: TC

## 2024-03-28 PROCEDURE — 36415 COLL VENOUS BLD VENIPUNCTURE: CPT | Performed by: INTERNAL MEDICINE

## 2024-03-28 PROCEDURE — C1769 GUIDE WIRE: HCPCS | Performed by: INTERNAL MEDICINE

## 2024-03-28 RX ORDER — SODIUM CHLORIDE, SODIUM LACTATE, POTASSIUM CHLORIDE, CALCIUM CHLORIDE 600; 310; 30; 20 MG/100ML; MG/100ML; MG/100ML; MG/100ML
INJECTION, SOLUTION INTRAVENOUS CONTINUOUS
Status: DISCONTINUED | OUTPATIENT
Start: 2024-03-28 | End: 2024-03-28 | Stop reason: HOSPADM

## 2024-03-28 RX ORDER — LIDOCAINE 40 MG/G
CREAM TOPICAL
Status: DISCONTINUED | OUTPATIENT
Start: 2024-03-28 | End: 2024-03-28 | Stop reason: HOSPADM

## 2024-03-28 RX ORDER — ONDANSETRON 2 MG/ML
INJECTION INTRAMUSCULAR; INTRAVENOUS PRN
Status: DISCONTINUED | OUTPATIENT
Start: 2024-03-28 | End: 2024-03-28

## 2024-03-28 RX ORDER — LABETALOL HYDROCHLORIDE 5 MG/ML
10 INJECTION, SOLUTION INTRAVENOUS
Status: DISCONTINUED | OUTPATIENT
Start: 2024-03-28 | End: 2024-03-28 | Stop reason: HOSPADM

## 2024-03-28 RX ORDER — ONDANSETRON 4 MG/1
4 TABLET, ORALLY DISINTEGRATING ORAL EVERY 30 MIN PRN
Status: DISCONTINUED | OUTPATIENT
Start: 2024-03-28 | End: 2024-03-28 | Stop reason: HOSPADM

## 2024-03-28 RX ORDER — DIMENHYDRINATE 50 MG/ML
25 INJECTION, SOLUTION INTRAMUSCULAR; INTRAVENOUS
Status: DISCONTINUED | OUTPATIENT
Start: 2024-03-28 | End: 2024-03-28 | Stop reason: HOSPADM

## 2024-03-28 RX ORDER — ONDANSETRON 2 MG/ML
4 INJECTION INTRAMUSCULAR; INTRAVENOUS EVERY 30 MIN PRN
Status: DISCONTINUED | OUTPATIENT
Start: 2024-03-28 | End: 2024-03-28 | Stop reason: HOSPADM

## 2024-03-28 RX ORDER — NETARSUDIL 0.2 MG/ML
1 SOLUTION/ DROPS OPHTHALMIC; TOPICAL AT BEDTIME
COMMUNITY

## 2024-03-28 RX ORDER — FENTANYL CITRATE 50 UG/ML
INJECTION, SOLUTION INTRAMUSCULAR; INTRAVENOUS PRN
Status: DISCONTINUED | OUTPATIENT
Start: 2024-03-28 | End: 2024-03-28

## 2024-03-28 RX ORDER — FLUMAZENIL 0.1 MG/ML
0.2 INJECTION, SOLUTION INTRAVENOUS
Status: DISCONTINUED | OUTPATIENT
Start: 2024-03-28 | End: 2024-03-28 | Stop reason: HOSPADM

## 2024-03-28 RX ORDER — NALOXONE HYDROCHLORIDE 0.4 MG/ML
0.1 INJECTION, SOLUTION INTRAMUSCULAR; INTRAVENOUS; SUBCUTANEOUS
Status: DISCONTINUED | OUTPATIENT
Start: 2024-03-28 | End: 2024-03-28 | Stop reason: HOSPADM

## 2024-03-28 RX ORDER — LIDOCAINE HYDROCHLORIDE 20 MG/ML
INJECTION, SOLUTION INFILTRATION; PERINEURAL PRN
Status: DISCONTINUED | OUTPATIENT
Start: 2024-03-28 | End: 2024-03-28

## 2024-03-28 RX ORDER — SODIUM CHLORIDE, SODIUM LACTATE, POTASSIUM CHLORIDE, CALCIUM CHLORIDE 600; 310; 30; 20 MG/100ML; MG/100ML; MG/100ML; MG/100ML
INJECTION, SOLUTION INTRAVENOUS CONTINUOUS PRN
Status: DISCONTINUED | OUTPATIENT
Start: 2024-03-28 | End: 2024-03-28

## 2024-03-28 RX ORDER — ONDANSETRON 2 MG/ML
4 INJECTION INTRAMUSCULAR; INTRAVENOUS EVERY 6 HOURS PRN
Status: DISCONTINUED | OUTPATIENT
Start: 2024-03-28 | End: 2024-03-28 | Stop reason: HOSPADM

## 2024-03-28 RX ORDER — INDOMETHACIN 50 MG/1
100 SUPPOSITORY RECTAL
Status: DISCONTINUED | OUTPATIENT
Start: 2024-03-28 | End: 2024-03-28 | Stop reason: HOSPADM

## 2024-03-28 RX ORDER — HYDROMORPHONE HCL IN WATER/PF 6 MG/30 ML
0.4 PATIENT CONTROLLED ANALGESIA SYRINGE INTRAVENOUS EVERY 5 MIN PRN
Status: DISCONTINUED | OUTPATIENT
Start: 2024-03-28 | End: 2024-03-28 | Stop reason: HOSPADM

## 2024-03-28 RX ORDER — HYDRALAZINE HYDROCHLORIDE 20 MG/ML
2.5-5 INJECTION INTRAMUSCULAR; INTRAVENOUS EVERY 10 MIN PRN
Status: DISCONTINUED | OUTPATIENT
Start: 2024-03-28 | End: 2024-03-28 | Stop reason: HOSPADM

## 2024-03-28 RX ORDER — ONDANSETRON 4 MG/1
4 TABLET, ORALLY DISINTEGRATING ORAL EVERY 6 HOURS PRN
Status: DISCONTINUED | OUTPATIENT
Start: 2024-03-28 | End: 2024-03-28 | Stop reason: HOSPADM

## 2024-03-28 RX ORDER — FENTANYL CITRATE 0.05 MG/ML
50 INJECTION, SOLUTION INTRAMUSCULAR; INTRAVENOUS EVERY 5 MIN PRN
Status: DISCONTINUED | OUTPATIENT
Start: 2024-03-28 | End: 2024-03-28 | Stop reason: HOSPADM

## 2024-03-28 RX ORDER — FENTANYL CITRATE 0.05 MG/ML
25 INJECTION, SOLUTION INTRAMUSCULAR; INTRAVENOUS EVERY 5 MIN PRN
Status: DISCONTINUED | OUTPATIENT
Start: 2024-03-28 | End: 2024-03-28 | Stop reason: HOSPADM

## 2024-03-28 RX ORDER — ACETAMINOPHEN 325 MG/1
975 TABLET ORAL ONCE
Status: DISCONTINUED | OUTPATIENT
Start: 2024-03-28 | End: 2024-03-28 | Stop reason: HOSPADM

## 2024-03-28 RX ORDER — PROPOFOL 10 MG/ML
INJECTION, EMULSION INTRAVENOUS PRN
Status: DISCONTINUED | OUTPATIENT
Start: 2024-03-28 | End: 2024-03-28

## 2024-03-28 RX ORDER — HYDROMORPHONE HCL IN WATER/PF 6 MG/30 ML
0.2 PATIENT CONTROLLED ANALGESIA SYRINGE INTRAVENOUS EVERY 5 MIN PRN
Status: DISCONTINUED | OUTPATIENT
Start: 2024-03-28 | End: 2024-03-28 | Stop reason: HOSPADM

## 2024-03-28 RX ADMIN — LIDOCAINE HYDROCHLORIDE 100 MG: 20 INJECTION, SOLUTION INFILTRATION; PERINEURAL at 07:46

## 2024-03-28 RX ADMIN — ROCURONIUM BROMIDE 20 MG: 50 INJECTION, SOLUTION INTRAVENOUS at 07:55

## 2024-03-28 RX ADMIN — FENTANYL CITRATE 50 MCG: 50 INJECTION INTRAMUSCULAR; INTRAVENOUS at 07:37

## 2024-03-28 RX ADMIN — ONDANSETRON 4 MG: 2 INJECTION INTRAMUSCULAR; INTRAVENOUS at 08:00

## 2024-03-28 RX ADMIN — PROPOFOL 120 MG: 10 INJECTION, EMULSION INTRAVENOUS at 07:46

## 2024-03-28 RX ADMIN — SUCCINYLCHOLINE CHLORIDE 120 MG: 20 INJECTION, SOLUTION INTRAMUSCULAR; INTRAVENOUS; PARENTERAL at 07:46

## 2024-03-28 RX ADMIN — SUGAMMADEX 200 MG: 100 INJECTION, SOLUTION INTRAVENOUS at 08:14

## 2024-03-28 RX ADMIN — SODIUM CHLORIDE, POTASSIUM CHLORIDE, SODIUM LACTATE AND CALCIUM CHLORIDE: 600; 310; 30; 20 INJECTION, SOLUTION INTRAVENOUS at 07:30

## 2024-03-28 RX ADMIN — PHENYLEPHRINE HYDROCHLORIDE 100 MCG: 10 INJECTION INTRAVENOUS at 08:00

## 2024-03-28 ASSESSMENT — ACTIVITIES OF DAILY LIVING (ADL)
ADLS_ACUITY_SCORE: 38

## 2024-03-28 ASSESSMENT — ENCOUNTER SYMPTOMS: DYSRHYTHMIAS: 1

## 2024-03-28 NOTE — ANESTHESIA PREPROCEDURE EVALUATION
Anesthesia Pre-Procedure Evaluation    Patient: Gricelda Sabillon   MRN: [id number] : 1950        Procedure : Procedure(s):  ENDOSCOPIC RETROGRADE CHOLANGIOPANCREATOGRAPHY          Past Medical History:   Diagnosis Date    Asthma, mild intermittent     Cataract     Endometrial cancer (H) 2022    HTN, goal below 140/90     Hyperlipidemia LDL goal < 100     Macular hole of left eye     Melanoma (H)     RIGHT KNEE    Sleep apnea     uses c pap    Type 2 diabetes, HbA1C goal < 8% (H)       Past Surgical History:   Procedure Laterality Date    ARTHROPLASTY HIP Right 2017    Procedure: ARTHROPLASTY HIP;  Right total hip arthroplasty  ;  Surgeon: Ayaan Pena MD;  Location: [facility]    ARTHROPLASTY KNEE  2013    Procedure: ARTHROPLASTY KNEE;  right total knee arthroplasty ;  Surgeon: Chaparro Welsey MD;  Location: [facility]    ARTHROSCOPY KNEE Right 2015    Procedure: ARTHROSCOPY KNEE;  Surgeon: Ayaan Pena MD;  Location:  OR    BRONCHOSCOPY (RIGID OR FLEXIBLE), DIAGNOSTIC N/A 2022    Procedure: BRONCHOSCOPY, WITH BRONCHOALVEOLAR LAVAGE;  Surgeon: Roselia Sosa MD;  Location:  GI    C INCISION OF HYMEN      CATARACT IOL, RT/LT      COLONOSCOPY      COLONOSCOPY N/A 2019    Procedure: Colonoscopy with polypectomy;  Surgeon: Shaun Guerrero MD;  Location: [facility]    CYSTOSCOPY N/A 2022    Procedure: Cystoscopy;  Surgeon: Eli Guidry MD;  Location: [facility]    ENDOSCOPIC RETROGRADE CHOLANGIOPANCREATOGRAM N/A 2019    Procedure: COMBINED ENDOSCOPIC RETROGRADE CHOLANGIOPANCREATOGRAPHY, BILIARY SPINCTEROTOMY AND DILATION, PLACE BILE DUCT STENT;  Surgeon: Guru Andie Gonzalez MD;  Location: [facility]    ENDOSCOPIC RETROGRADE CHOLANGIOPANCREATOGRAM N/A 2019    Procedure: Endoscopic Retrograde Cholangiopancreatogram, Bile duct stent removal and placement;  Surgeon: Shaun Guerrero MD;  Location: [facility]    ENDOSCOPIC RETROGRADE  CHOLANGIOPANCREATOGRAM N/A 4/18/2019    Procedure: COMBINED ENDOSCOPIC RETROGRADE CHOLANGIOPANCREATOGRAPHY, Bile duct stent exchange and Polypectomy;  Surgeon: Shaun Guerrero MD;  [facility]: [facility]    ENDOSCOPIC RETROGRADE CHOLANGIOPANCREATOGRAM N/A 10/18/2019    Procedure: Endoscopic Retrograde Cholangiopancreatogram;  Surgeon: Shaun Guerrero MD;  [facility]    ENDOSCOPIC RETROGRADE CHOLANGIOPANCREATOGRAM N/A 3/24/2022    Procedure: ENDOSCOPIC RETROGRADE CHOLANGIOPANCREATOGRAPHY;  Surgeon: Shaun Guerrero MD;  Location:  OR    ENDOSCOPIC RETROGRADE CHOLANGIOPANCREATOGRAM N/A 3/16/2023    Procedure: endoscopic retrograde cholangiopancreatography with minor papillotomy;  Surgeon: Shaun Guerrero MD;  Location:  OR    ENDOSCOPIC RETROGRADE CHOLANGIOPANCREATOGRAM WITH SPYGLASS N/A 7/26/2019    Procedure: Endoscopic Retrograde Cholangiopancreatogram With Spyglas,l Radiofrequency Ablation, Stent Exchangeand Duodenal Biopsy x2;  Surgeon: Shaun Guerrero MD;  [facility]    ENDOSCOPIC RETROGRADE CHOLANGIOPANCREATOGRAM WITH SPYGLASS N/A 9/10/2020    Procedure: ENDOSCOPIC RETROGRADE CHOLANGIOPANCREATOGRAPHY, WITH DIRECT DUCT VISUALIZATION, USING PANCREATICOBILIARY FIBEROPTIC PROBE;  Surgeon: Shaun Guerrero MD;  [facility]    ENDOSCOPIC RETROGRADE CHOLANGIOPANCREATOGRAM WITH SPYGLASS N/A 3/22/2021    Procedure: ENDOSCOPIC RETROGRADE CHOLANGIOPANCREATOGRAPHY, WITH DIRECT DUCT VISUALIZATION, USING PANCREATICOBILIARY FIBEROPTIC PROBE;  Surgeon: Shaun Guerrero MD;  [facility]    ESOPHAGOSCOPY, GASTROSCOPY, DUODENOSCOPY (EGD) WITH RADIO FREQUENCY ABLATION, COMBINED N/A 9/10/2020    Procedure: possible repeat ESOPHAGOGASTRODUODENOSCOPY, WITH RADIOFREQUENCY ABLATION and endoscopic mucosal resection;  Surgeon: Shaun Guerrero MD;  Location:  OR    ESOPHAGOSCOPY, GASTROSCOPY, DUODENOSCOPY (EGD), COMBINED N/A 4/18/2019    Procedure: upper endoscopy with polypectomy;  Surgeon: Shaun Guerrero MD;  Location:  OR     ESOPHAGOSCOPY, GASTROSCOPY, DUODENOSCOPY (EGD), COMBINED N/A 10/18/2019    Procedure: Upper Endoscopy and ERCP with stent removal, stone removal and biopsy;  Surgeon: Shaun Guerrero MD;  [facility]    ESOPHAGOSCOPY, GASTROSCOPY, DUODENOSCOPY (EGD), COMBINED N/A 3/24/2022    Procedure: ESOPHAGOGASTRODUODENOSCOPY (EGD), Duodenal  polyp removal;  Surgeon: Shaun Guerrero MD;  Location: [facility]    ESOPHAGOSCOPY, GASTROSCOPY, DUODENOSCOPY (EGD), COMBINED N/A 3/16/2023    Procedure: ESOPHAGOGASTRODUODENOSCOPY (EGD);  Surgeon: Shaun Guerrero MD;  Location: [facility]    ESOPHAGOSCOPY, GASTROSCOPY, DUODENOSCOPY (EGD), RESECT MUCOSA, COMBINED N/A 2/28/2019    Procedure: Upper Endoscopy, Endoscopic Ultrasound, Endoscopic Mucosal Resection,  Ampullectomy, polypectomy.;  Surgeon: Shaun Guerrero MD;  [facility]: [facility]    ESOPHAGOSCOPY, GASTROSCOPY, DUODENOSCOPY (EGD), RESECT MUCOSA, COMBINED N/A 3/22/2021    Procedure: ESOPHAGOGASTRODUODENOSCOPY (EGD) with  endoscopic mucosal resection/ polypectomy;  Surgeon: Shuan Guerrero MD;  [facility]    EXCISE LESION LOWER EXTREMITY Right 3/28/2022    Procedure: Wide local excision of right knee melanoma;  Surgeon: Chevy Allison MD;  Location: [facility] OR    EXCISE MASS LOWER EXTREMITY Right 1/13/2022    Procedure: excision of right thigh mass;  Surgeon: Salvador Kelly MD;  Location: [facility]    EYE SURGERY      macular hole repaired left eye    HC KNEE SCOPE, DIAGNOSTIC      - both knees    HEAD & NECK SURGERY      wisdom teeth    IR CHEST PORT PLACEMENT > 5 YRS OF AGE  5/2/2022    LAPAROSCOPIC HYSTERECTOMY TOTAL, BILATERAL SALPINGO-OOPHORECTOMY, NODE DISSECTION, COMBINED Bilateral 6/23/2022    Procedure: Total Laparoscopic Hyserectomy, Bilateral Salpibgo-oophorectomy, Bilateral Lemhi Lymph Node Dissection;  Surgeon: Eli Guidry MD;  Location: [facility]      Allergies   Allergen Reactions    Bromfenac Visual Disturbance      xibrom  Causes sever eye pain    Codeine Nausea      "Other reaction(s): Dizziness, Headache    Metformin GI Disturbance    Morphine And Related      \"NAUSE,HA AND DIZZINESS\"    Seasonal Allergies       Social History     Tobacco Use    Smoking status: Never     Passive exposure: Never    Smokeless tobacco: Never   Substance Use Topics    Alcohol use: No     Alcohol/week: 0.0 standard drinks of alcohol      Wt Readings from Last 1 Encounters:   03/28/24 102.1 kg (225 lb)        Anesthesia Evaluation   Pt has had prior anesthetic.     No history of anesthetic complications       ROS/MED HX  ENT/Pulmonary:     (+) sleep apnea,                     asthma                  Neurologic:       Cardiovascular:     (+) Dyslipidemia hypertension- -   -  - -   Taking blood thinners  Instructions Given to patient: Xarelto.                   dysrhythmias, a-fib,  valvular problems/murmurs type: AI Trace AI.    Previous cardiac testing   Echo: Date: 5/11/23 Results:  Interpretation Summary  The visual ejection fraction is 55-60%. The right ventricle is normal in structure, function and size. There is trace aortic regurgitation.      Left Ventricle  The left ventricle is normal in structure, function and size. Left Ventricle false tendon. There is normal left ventricular wall thickness. The visual ejection fraction is 55-60%. No regional wall motion abnormalities noted.     Right Ventricle  The right ventricle is normal in structure, function and size.     Atria  Normal left atrial size. Right atrial size is normal. There is no color  Doppler evidence of an atrial shunt.     Mitral Valve  The mitral valve is normal in structure and function. There is no mitral  regurgitation noted.     Tricuspid Valve  The tricuspid valve is normal in structure and function. There is trace tricuspid regurgitation. Right ventricular systolic pressure could not be approximated due to inadequate tricuspid regurgitation.     Aortic Valve  There is mild trileaflet aortic sclerosis. There is trace aortic " regurgitation.     Pulmonic Valve  The pulmonic valve is not well seen, but is grossly normal.     Vessels  Normal size aorta. Normal size ascending aorta.     Pericardium  There is no pericardial effusion.     Rhythm  Sinus rhythm was noted.  Stress Test:  Date: Results:    ECG Reviewed:  Date: 3/4/24 Results:  NSR  Cath:  Date: Results:      METS/Exercise Tolerance:     Hematologic:     (+) History of blood clots,    pt is anticoagulated,           Musculoskeletal:   (+)  arthritis,             GI/Hepatic:       Renal/Genitourinary:       Endo:     (+)  type II DM (Implantable sensor),   Using insulin,          Obesity (Class 2; on Ozempic, last dose 3/18),       Psychiatric/Substance Use:     (+) psychiatric history anxiety       Infectious Disease:       Malignancy:   (+) Malignancy, History of Skin and Other.    Other:            Physical Exam    Airway        Mallampati: II   TM distance: > 3 FB   Neck ROM: full   Mouth opening: > 3 cm    Respiratory Devices and Support         Dental           Cardiovascular          Rhythm and rate: regular and normal     Pulmonary           breath sounds clear to auscultation           OUTSIDE LABS:  CBC:   Lab Results   Component Value Date    WBC 6.5 03/28/2024    WBC 7.3 02/27/2024    HGB 13.1 03/28/2024    HGB 13.8 02/27/2024    HCT 40.7 03/28/2024    HCT 43.6 02/27/2024     03/28/2024     02/27/2024     BMP:   Lab Results   Component Value Date     02/27/2024     04/13/2023    POTASSIUM 4.3 02/27/2024    POTASSIUM 4.3 04/13/2023    CHLORIDE 100 02/27/2024    CHLORIDE 101 04/13/2023    CO2 25 02/27/2024    CO2 26 04/13/2023    BUN 20.1 02/27/2024    BUN 19.3 04/13/2023    CR 0.78 02/27/2024    CR 1.0 02/05/2024     (H) 02/27/2024     (H) 04/13/2023     COAGS:   Lab Results   Component Value Date    PTT 25 05/02/2022    INR 0.92 03/28/2024     POC:   Lab Results   Component Value Date     (H) 03/22/2021     HEPATIC:   Lab  "Results   Component Value Date    ALBUMIN 4.4 02/27/2024    PROTTOTAL 7.4 02/27/2024    ALT 31 02/27/2024    AST 22 02/27/2024    ALKPHOS 101 02/27/2024    BILITOTAL 0.6 02/27/2024     OTHER:   Lab Results   Component Value Date    LACT 1.0 07/25/2022    A1C 8.1 (H) 02/27/2024    KATHRYN 10.2 02/27/2024    MAG 1.9 08/03/2022    LIPASE 347 03/22/2021    AMYLASE 73 03/22/2021    TSH 2.01 02/27/2024    T4 1.17 02/04/2016    CRP <5.0 01/07/2014    SED 16 01/07/2014       Anesthesia Plan    ASA Status:  3    NPO Status:  NPO Appropriate    Anesthesia Type: General.     - Airway: ETT   Induction: RSI.   Maintenance: Balanced.        Consents    Anesthesia Plan(s) and associated risks, benefits, and realistic alternatives discussed. Questions answered and patient/representative(s) expressed understanding.     - Discussed:     - Discussed with:  Patient            Postoperative Care    Pain management: IV analgesics, Oral pain medications, Multi-modal analgesia.   PONV prophylaxis: Ondansetron (or other 5HT-3)     Comments:               Nima Rico MD    I have reviewed the pertinent notes and labs in the chart from the past 30 days and (re)examined the patient.  Any updates or changes from those notes are reflected in this note.      # Hypercalcemia: Highest Ca = 10.2 mg/dL in last 30 days, will monitor as appropriate       # Drug Induced Coagulation Defect: home medication list includes an anticoagulant medication   # DMII: A1C = 8.1 % (Ref range: <5.7 %) within past 6 months  # Obesity: Estimated body mass index is 35.24 kg/m  as calculated from the following:    Height as of this encounter: 1.702 m (5' 7\").    Weight as of this encounter: 102.1 kg (225 lb).      "

## 2024-03-28 NOTE — ANESTHESIA PROCEDURE NOTES
Airway       Patient location during procedure: OR       Procedure Start/Stop Times: 3/28/2024 7:48 AM  Staff -        Anesthesiologist:  Nima Rico MD       CRNA: Lamar Dickinson APRN CRNA       Performed By: CRNA  Consent for Airway        Urgency: elective  Indications and Patient Condition       Indications for airway management: rita-procedural       Induction type:RSI       Mask difficulty assessment: 0 - not attempted    Final Airway Details       Final airway type: endotracheal airway       Successful airway: ETT - single  Endotracheal Airway Details        ETT size (mm): 7.0       Cuffed: yes       Successful intubation technique: video laryngoscopy       VL Blade Size: Glidescope 3       Grade View of Cords: 1       Adjucts: stylet       Position: Right       Measured from: gums/teeth       Secured at (cm): 21       Bite block used: None    Post intubation assessment        Placement verified by: capnometry, equal breath sounds and chest rise        Number of attempts at approach: 1       Secured with: tape       Ease of procedure: easy       Dentition: Intact and Unchanged    Medication(s) Administered   Medication Administration Time: 3/28/2024 7:48 AM

## 2024-03-28 NOTE — ANESTHESIA POSTPROCEDURE EVALUATION
Patient: Gricelda Sabillon    Procedure: Procedure(s):  ENDOSCOPIC RETROGRADE CHOLANGIOPANCREATOGRAPHY       Anesthesia Type:  General    Note:  Disposition: Outpatient   Postop Pain Control: Uneventful            Sign Out: Well controlled pain   PONV: No   Neuro/Psych: Uneventful            Sign Out: Acceptable/Baseline neuro status   Airway/Respiratory: Uneventful            Sign Out: Acceptable/Baseline resp. status   CV/Hemodynamics: Uneventful            Sign Out: Acceptable CV status   Other NRE: NONE   DID A NON-ROUTINE EVENT OCCUR?            Last vitals:  Vitals Value Taken Time   /76 03/28/24 0915   Temp 36.5  C (97.7  F) 03/28/24 0915   Pulse 79 03/28/24 0916   Resp 18 03/28/24 0916   SpO2 91 % 03/28/24 0916   Vitals shown include unfiled device data.    Electronically Signed By: Jamal Alberts MD  March 28, 2024  1:19 PM

## 2024-03-28 NOTE — OR NURSING
VSS, denies pain. Discharge instructions reviewed w/spouse; no questions for RN. Pt discharge to home.

## 2024-03-28 NOTE — ANESTHESIA CARE TRANSFER NOTE
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    Anesthesia Type:   General     Note:    Oropharynx: oropharynx clear of all foreign objects and spontaneously breathing    Oxygen Supplementation: face mask  Level of Supplemental Oxygen (L/min / FiO2): 6  Independent Airway: airway patency satisfactory and stable  Dentition: dentition unchanged  Vital Signs Stable: post-procedure vital signs reviewed and stable  Report to RN Given: handoff report given  Patient transferred to: PACU  Comments: Neuromuscular blockade reversed with sugammadex, spontaneous respirations, adequate tidal volumes, followed commands to voice, oropharynx suctioned with soft flexible catheter, extubated atraumatically, extubated with suction, airway patent after extubation.  Oxygen via facemask at 6 liters per minute to PACU. Oxygen tubing connected to wall O2 in PACU, SpO2, NiBP, and EKG monitors and alarms on and functioning, Arely Hugger warmer connected to patient gown, report on patient's clinical status given to PACU RN, RN questions answered.         Handoff Report: Identifed the Patient, Identified the Reponsible Provider, Reviewed the pertinent medical history, Discussed the surgical course, Reviewed Intra-OP anesthesia mangement and issues during anesthesia, Set expectations for post-procedure period and Allowed opportunity for questions and acknowledgement of understanding      Vitals:  Vitals Value Taken Time   /106 03/28/24 0823   Temp     Pulse 77 03/28/24 0825   Resp 29 03/28/24 0825   SpO2 92 % 03/28/24 0825   Vitals shown include unfiled device data.    Electronically Signed By: DENYS Bennett CRNA  March 28, 2024  8:26 AM

## 2024-04-02 ENCOUNTER — VIRTUAL VISIT (OUTPATIENT)
Dept: INTERNAL MEDICINE | Facility: CLINIC | Age: 74
End: 2024-04-02
Payer: COMMERCIAL

## 2024-04-02 DIAGNOSIS — C43.9 METASTATIC MALIGNANT MELANOMA (H): ICD-10-CM

## 2024-04-02 DIAGNOSIS — Z01.818 PREOP GENERAL PHYSICAL EXAM: Primary | ICD-10-CM

## 2024-04-02 DIAGNOSIS — G47.33 OSA (OBSTRUCTIVE SLEEP APNEA): ICD-10-CM

## 2024-04-02 DIAGNOSIS — I10 ESSENTIAL HYPERTENSION WITH GOAL BLOOD PRESSURE LESS THAN 140/90: Chronic | ICD-10-CM

## 2024-04-02 DIAGNOSIS — Z79.4 TYPE 2 DIABETES MELLITUS WITHOUT COMPLICATION, WITH LONG-TERM CURRENT USE OF INSULIN (H): ICD-10-CM

## 2024-04-02 DIAGNOSIS — E11.9 TYPE 2 DIABETES MELLITUS WITHOUT COMPLICATION, WITH LONG-TERM CURRENT USE OF INSULIN (H): ICD-10-CM

## 2024-04-02 PROCEDURE — 99214 OFFICE O/P EST MOD 30 MIN: CPT | Mod: 95 | Performed by: INTERNAL MEDICINE

## 2024-04-02 NOTE — PROGRESS NOTES
This is a VIDEO ( using Doximity)  encounter with the patient.       I consider that a video preop eval is acceptable for this patient because I evaluated in the clinic on March 4, 2024 (preop) and again on March 11, 2024 for chronic medical problems. She has no new medical problems and the old medical problems are stable.   Location of the provider : office   Location of the patient : home      17:10  --- 17:32      Preoperative Evaluation  [place] [address] [address]  [address]  [facility] [address]  Phone: [phone]  Primary Provider: Raisa Davidson  Pre-op Performing Provider: RAISA DAVIDSON  Apr 2, 2024       Melva is a 73 year old, presenting for the following:  Pre-Op Exam  Melva is a 73 year old who is being evaluated via a billable video visit.    How would you like to obtain your AVS? Mail a copy  If the video visit is dropped, the invitation should be resent by: Text to cell phone: [phone]  Will anyone else be joining your video visit? No          Subjective   Melva is a 73 year old, presenting for the following health issues:  Pre-Op Exam      4/2/2024    10:03 AM   Additional Questions   Roomed by Thais Cadet       History of Present Illness       Reason for visit:  Preop exam    She eats 2-3 servings of fruits and vegetables daily.She consumes 0 sweetened beverage(s) daily.She exercises with enough effort to increase her heart rate 10 to 19 minutes per day.  She exercises with enough effort to increase her heart rate 3 or less days per week.   She is taking medications regularly.       Objective    Vitals - Patient Reported  Pain Score: No Pain (0)    1. No - Have you ever had a heart attack or stroke?  2. No - Have you ever had surgery on your heart or blood vessels, such as a stent, coronary (heart) bypass, or surgery on an artery in the head, neck, heart, or legs?  3. No - Do you have chest pain when you are physically  active?  4. No - Do you have a history of heart failure?  5. No - Do you currently have a cold, bronchitis, or symptoms of other respiratory (head and chest) infections?  6. No - Do you have a cough, shortness of breath, or wheezing?  7. No - Do you or anyone in your family have a history of blood clots?  8. No - Do you or anyone in your family have a serious bleeding problem, such as long-lasting bleeding after surgeries or cuts?  9. Yes - Have you ever had anemia or been told to take iron pills?  10. No - Have you had any abnormal blood loss such as black, tarry or bloody stools, or abnormal vaginal bleeding?  11. No - Have you ever had a blood transfusion?  12. Yes - Are you willing to have a blood transfusion if it is medically needed before, during, or after your surgery?  13. No - Have you or anyone in your family ever had problems with anesthesia (sedation for surgery)?  14. Yes, sleep apnea - Do you have sleep apnea, excessive snoring, or daytime drowsiness?   15. Yes, continuous glucose meter and a power port - Do you have any artifical heart valves or other implanted medical devices, such as a pacemaker, defibrillator, or continuous glucose monitor?  16. Yes right knee and hip- Do you have any artifical joints?  17. No - Are you allergic to latex?  18. No - Is there any chance that you may be pregnant?               4/2/2024    10:03 AM   Additional Questions   Roomed by Thais Cadet     Surgical Information  Surgery/Procedure:   WIDE EXCISION OF RIGHT THIGH MELANOMA Right General     Surgery Location: [facility] OR   Surgeon:   Provider Role   Chevy Allison MD         Surgery Date: 4/10/2024   Time of Surgery: 11:00 am  Where patient plans to recover: At home with family  Fax number for surgical facility:     CC:  Preop for multiple medical problems.    HPI:    The patient is scheduled for wide excision of the right thigh melanoma surgery with Dr. Allison on  April 10, 2024  No other acute  complaints.    Assessment:  1. V72.83H Preop general physical exam _ I do not see any major contraindications for the patient to go through the scheduled surgery.    Assessment:  1. V72.83H Preop general physical exam _ I do not see any major contraindications for the patient to go through the scheduled surgery.     The proposed surgical procedure is considered INTERMEDIATE,   surgery risk.     For above listed surgery and anesthesia, Patient is at  MODERATE,  risk for surgery/procedure and perioperative/procedure complications.        Cardiovascular risk  Assessment  -- low risk   -- No further cardiac work up is needed before this surgery.      ECG:    sinus rhythm, no changes suggestive for ischemia     Pulmonary Risk Assessment  -- low risk   -- The patient doesn't have chronic lung disease nor acute respiratory problems      Obstructive Sleep Apnea (or suspected sleep apnea) on CPAP        Anemia Assessment :  -- no anemia - patient doesn't need further anemia work up        Blood Sugar Assessment  -- patient has controlled DM,         Anticoagulation assessment  -- patient takes Xarelto  for AFib, Hx of PE/DVT    (E11.9) Diabetes mellitus, type 2 (H)  Comment: Controlled  for age   Plan:  See below       (C43.9) Metastatic malignant melanoma (H)  -- R thigh   Comment:   Plan: surgery      (I10) HTN, goal below 140/90  Comment: Controlled    Plan:  See below       (G47.33) CHERRY (obstructive sleep apnea)  Comment:   Plan: CPAP       Plan:  1. In the morning of the surgery day you take with a sip of water just these meds: Atenolol.  The rest of the meds you resume after surgery.  2. no more Semaglutide doses before the surgery  3. In the morning of the procedure day take 18 units. Take the rest of the Lantus dose after the procedure   4. Last dose of Xarelto is April 7.  Resume it after the procedure on April 10 or April 11 - according to dr Allison instructions     Physical exam:  NAD       ROS:   as above      Patient Active Problem List   Diagnosis    Allergic rhinitis    Hyperlipidemia with target LDL less than 100    Asthma, mild intermittent    Cataract    Macular hole of left eye    Obesity (BMI 30-39.9)    Heart murmur    Total knee replacement status    Chronic knee pain, right    HTN goal less than 140/90,    CHERRY (obstructive sleep apnea)    Status post total replacement of right hip    Ampullary adenoma    Polyp of duodenum    HTN, goal below 140/90    Diabetes mellitus, type 2 (H)    Melanoma of skin (H)    Metastatic malignant melanoma (H)  -- R thigh     Dehydration    Orthostatic hypotension    Endometrial cancer (H)    Bilateral pulmonary embolism (H)    Organizing pneumonia (H) -- Aug 2022    Dizziness    Adverse effect of unspecified drugs, medicaments and biological substances, initial encounter    Hypercalcemia    Mass of uterus    Multiple pulmonary nodules    Pneumonitis    Class 2 severe obesity due to excess calories with serious comorbidity in adult (H)    Mass of skin        Past Medical History:   Diagnosis Date    Asthma, mild intermittent     Cataract     Endometrial cancer (H) 06/2022    HTN, goal below 140/90     Hyperlipidemia LDL goal < 100     Macular hole of left eye     Melanoma (H)     RIGHT KNEE    Sleep apnea     uses c pap    Type 2 diabetes, HbA1C goal < 8% (H)       Past Surgical History:   Procedure Laterality Date    ARTHROPLASTY HIP Right 9/25/2017    Procedure: ARTHROPLASTY HIP;  Right total hip arthroplasty  ;  Surgeon: Ayaan Pena MD;  [facility]: [facility]    ARTHROPLASTY KNEE  7/12/2013    Procedure: ARTHROPLASTY KNEE;  right total knee arthroplasty ;  Surgeon: Chaparro Wesley MD;  [facility]: [facility]    ARTHROSCOPY KNEE Right 1/21/2015    Procedure: ARTHROSCOPY KNEE;  Surgeon: Ayaan Pena MD;  [facility]    BRONCHOSCOPY (RIGID OR FLEXIBLE), DIAGNOSTIC N/A 8/11/2022    Procedure: BRONCHOSCOPY, WITH BRONCHOALVEOLAR LAVAGE;  Surgeon: Roselia Sosa  MD Angelina;  Location: [facility]    C INCISION OF HYMEN      CATARACT IOL, RT/LT      COLONOSCOPY  2008    COLONOSCOPY N/A 4/18/2019    Procedure: Colonoscopy with polypectomy;  Surgeon: Shaun Guerrero MD;  [facility]    CYSTOSCOPY N/A 6/23/2022    Procedure: Cystoscopy;  Surgeon: Eli Guidry MD;  [facility]    ENDOSCOPIC RETROGRADE CHOLANGIOPANCREATOGRAM N/A 2/6/2019    Procedure: COMBINED ENDOSCOPIC RETROGRADE CHOLANGIOPANCREATOGRAPHY, BILIARY SPINCTEROTOMY AND DILATION, PLACE BILE DUCT STENT;  Surgeon: Guru Andie Gonzalez MD;  [facility]    ENDOSCOPIC RETROGRADE CHOLANGIOPANCREATOGRAM N/A 2/28/2019    Procedure: Endoscopic Retrograde Cholangiopancreatogram, Bile duct stent removal and placement;  Surgeon: Shaun Guerrero MD;  [facility]    ENDOSCOPIC RETROGRADE CHOLANGIOPANCREATOGRAM N/A 4/18/2019    Procedure: COMBINED ENDOSCOPIC RETROGRADE CHOLANGIOPANCREATOGRAPHY, Bile duct stent exchange and Polypectomy;  Surgeon: Shaun Guerrero MD;  [facility]    ENDOSCOPIC RETROGRADE CHOLANGIOPANCREATOGRAM N/A 10/18/2019    Procedure: Endoscopic Retrograde Cholangiopancreatogram;  Surgeon: Shaun Guerrero MD;  Location:  OR    ENDOSCOPIC RETROGRADE CHOLANGIOPANCREATOGRAM N/A 3/24/2022    Procedure: ENDOSCOPIC RETROGRADE CHOLANGIOPANCREATOGRAPHY;  Surgeon: Shaun Guerrero MD;  Location:  OR    ENDOSCOPIC RETROGRADE CHOLANGIOPANCREATOGRAM N/A 3/16/2023    Procedure: endoscopic retrograde cholangiopancreatography with minor papillotomy;  Surgeon: Shaun Guerrero MD;  Location:  OR    ENDOSCOPIC RETROGRADE CHOLANGIOPANCREATOGRAM N/A 3/28/2024    Procedure: ENDOSCOPIC RETROGRADE CHOLANGIOPANCREATOGRAPHY;  Surgeon: Shaun Guerrero MD;  Location:  OR    ENDOSCOPIC RETROGRADE CHOLANGIOPANCREATOGRAM WITH SPYGLASS N/A 7/26/2019    Procedure: Endoscopic Retrograde Cholangiopancreatogram With Spyglas,l Radiofrequency Ablation, Stent Exchangeand Duodenal Biopsy x2;  Surgeon: Cesar  MD Shaun;  [facility]    ENDOSCOPIC RETROGRADE CHOLANGIOPANCREATOGRAM WITH SPYGLASS N/A 9/10/2020    Procedure: ENDOSCOPIC RETROGRADE CHOLANGIOPANCREATOGRAPHY, WITH DIRECT DUCT VISUALIZATION, USING PANCREATICOBILIARY FIBEROPTIC PROBE;  Surgeon: Shaun Guerrero MD;  [facility]    ENDOSCOPIC RETROGRADE CHOLANGIOPANCREATOGRAM WITH SPYGLASS N/A 3/22/2021    Procedure: ENDOSCOPIC RETROGRADE CHOLANGIOPANCREATOGRAPHY, WITH DIRECT DUCT VISUALIZATION, USING PANCREATICOBILIARY FIBEROPTIC PROBE;  Surgeon: Shaun Guerrero MD;  [facility]    ESOPHAGOSCOPY, GASTROSCOPY, DUODENOSCOPY (EGD) WITH RADIO FREQUENCY ABLATION, COMBINED N/A 9/10/2020    Procedure: possible repeat ESOPHAGOGASTRODUODENOSCOPY, WITH RADIOFREQUENCY ABLATION and endoscopic mucosal resection;  Surgeon: Shaun Guerrero MD;  [facility]    ESOPHAGOSCOPY, GASTROSCOPY, DUODENOSCOPY (EGD), COMBINED N/A 4/18/2019    Procedure: upper endoscopy with polypectomy;  Surgeon: Shaun Guerrero MD;  [facility]    ESOPHAGOSCOPY, GASTROSCOPY, DUODENOSCOPY (EGD), COMBINED N/A 10/18/2019    Procedure: Upper Endoscopy and ERCP with stent removal, stone removal and biopsy;  Surgeon: Shaun Guerrero MD;  [facility]    ESOPHAGOSCOPY, GASTROSCOPY, DUODENOSCOPY (EGD), COMBINED N/A 3/24/2022    Procedure: ESOPHAGOGASTRODUODENOSCOPY (EGD), Duodenal  polyp removal;  Surgeon: Shaun Guerrero MD;  [facility]    ESOPHAGOSCOPY, GASTROSCOPY, DUODENOSCOPY (EGD), COMBINED N/A 3/16/2023    Procedure: ESOPHAGOGASTRODUODENOSCOPY (EGD);  Surgeon: Shaun Guerrero MD;  [facility]    ESOPHAGOSCOPY, GASTROSCOPY, DUODENOSCOPY (EGD), RESECT MUCOSA, COMBINED N/A 2/28/2019    Procedure: Upper Endoscopy, Endoscopic Ultrasound, Endoscopic Mucosal Resection,  Ampullectomy, polypectomy.;  Surgeon: Shaun Guerrero MD;  [facility]    ESOPHAGOSCOPY, GASTROSCOPY, DUODENOSCOPY (EGD), RESECT MUCOSA, COMBINED N/A 3/22/2021    Procedure: ESOPHAGOGASTRODUODENOSCOPY (EGD) with   "endoscopic mucosal resection/ polypectomy;  Surgeon: Shaun Guerrero MD;  [facility]    EXCISE LESION LOWER EXTREMITY Right 3/28/2022    Procedure: Wide local excision of right knee melanoma;  Surgeon: Chevy Allison MD;  Location: [facility] OR    EXCISE MASS LOWER EXTREMITY Right 1/13/2022    Procedure: excision of right thigh mass;  Surgeon: Salvador Kelly MD;  [facility]    EYE SURGERY      macular hole repaired left eye    HC KNEE SCOPE, DIAGNOSTIC      - both knees    HEAD & NECK SURGERY      wisdom teeth    IR CHEST PORT PLACEMENT > 5 YRS OF AGE  5/2/2022    LAPAROSCOPIC HYSTERECTOMY TOTAL, BILATERAL SALPINGO-OOPHORECTOMY, NODE DISSECTION, COMBINED Bilateral 6/23/2022    Procedure: Total Laparoscopic Hyserectomy, Bilateral Salpibgo-oophorectomy, Bilateral [place] Lymph Node Dissection;  Surgeon: Eli Guidry MD;  [facility]        PSHx: No complications with prior surgeries or anesthesia     Soc Hx: No daily alcohol, no smoking     Fam Hx: reviewed  Mother, age   Father, age  Brothers  Sisters  Children    All: reviewed    Meds: reviewed  Current Outpatient Medications   Medication Sig Dispense Refill    atenolol (TENORMIN) 50 MG tablet Take 1 tablet (50 mg) by mouth daily 90 tablet 1    atorvastatin (LIPITOR) 40 MG tablet Take 1 tablet (40 mg) by mouth daily 90 tablet 1    BD VEO INSULIN SYRINGE U/F 31G X 15/64\" 0.5 ML USE DAILY WITH NPH INSULIN 100 each 1    cetirizine (ZYRTEC) 10 MG tablet Take 10 mg by mouth every morning      Continuous Blood Gluc Sensor (FREESTYLE DAVID 3 SENSOR) MISC 1 Device every 14 days 2 each 3    CONTOUR NEXT TEST test strip USE 1 STRIP TO CHECK GLUCOSE ONCE DAILY 100 strip 4    gentamicin (GARAMYCIN) 0.1 % external ointment Apply topically 3 times daily      glipiZIDE (GLUCOTROL XL) 10 MG 24 hr tablet Take 1 tablet (10 mg) by mouth daily 90 tablet 1    hydrochlorothiazide (HYDRODIURIL) 25 MG tablet Take 1 tablet (25 mg) by mouth every morning 90 tablet " "1    insulin glargine (LANTUS VIAL) 100 UNIT/ML vial Inject 44 Units Subcutaneous at bedtime 15 mL 2    insulin lispro (HUMALOG) 100 UNIT/ML vial INJECT 5 UNITS SUBCUTANEOUSLY THREE TIMES DAILY BEFORE MEALS 10 mL 0    insulin pen needle (MM PEN NEEDLES) 32G X 4 MM miscellaneous Inject insulin 4 times a day 300 each 1    insulin syringe-needle U-100 (BD INSULIN SYRINGE ULTRAFINE) 31G X 5/16\" 0.5 ML miscellaneous Use daily with NPH insulin 100 each 11    insulin syringe-needle U-100 (BD VEO INSULIN SYRINGE U/F) 31G X 15/64\" 0.3 ML Patient has 4 shots of insulin a day 300 each 1    latanoprost (XALATAN) 0.005 % ophthalmic solution Place 1 drop into both eyes At Bedtime  2.5 mL 1    lisinopril (ZESTRIL) 5 MG tablet Take 1 tablet (5 mg) by mouth daily 90 tablet 1    LORazepam (ATIVAN) 0.5 MG tablet Take 1 tablet (0.5 mg) by mouth nightly as needed for anxiety or sleep 30 tablet 0    mupirocin (BACTROBAN) 2 % external ointment Apply topically 3 times daily      netarsudil (RHOPRESSA) 0.02 % ophthalmic solution Place 1 drop Into the left eye at bedtime      OZEMPIC, 0.25 OR 0.5 MG/DOSE, 2 MG/3ML pen INJECT 0.25MG SUBCUTANOUESLY EVERY 7 DAYS 3 mL 0    senna-docusate (SENOKOT-S/PERICOLACE) 8.6-50 MG tablet Take 2 tablets by mouth daily as needed for constipation 60 tablet 11    timolol maleate (TIMOPTIC) 0.5 % ophthalmic solution INSTILL 1 DROP INTO EACH EYE TWICE DAILY      triamcinolone (KENALOG) 0.5 % external ointment Apply 1 g topically daily as needed for irritation      XARELTO ANTICOAGULANT 20 MG TABS tablet Take 1 tablet (20 mg) by mouth daily 90 tablet 1            Raisa Torres MD  Internal Medicine       Patient Active Problem List    Diagnosis Date Noted    Hyperlipidemia with target LDL less than 100      Priority: High     Diagnosis updated by automated process. Provider to review and confirm.      Asthma, mild intermittent      Priority: High    Mass of skin 03/11/2024     Priority: Medium    Class 2 severe " obesity due to excess calories with serious comorbidity in adult (H) 10/09/2023     Priority: Medium    Dizziness 06/23/2023     Priority: Medium    Adverse effect of unspecified drugs, medicaments and biological substances, initial encounter 06/23/2023     Priority: Medium    Hypercalcemia 06/23/2023     Priority: Medium    Mass of uterus 06/23/2023     Priority: Medium    Multiple pulmonary nodules 06/23/2023     Priority: Medium    Pneumonitis 06/23/2023     Priority: Medium    Organizing pneumonia (H) -- Aug 2022 09/05/2022     Priority: Medium    Bilateral pulmonary embolism (H) 07/25/2022     Priority: Medium    Dehydration 06/29/2022     Priority: Medium    Orthostatic hypotension 06/29/2022     Priority: Medium    Endometrial cancer (H)      Priority: Medium     Complete prior to 12.14 KM      Metastatic malignant melanoma (H)  -- R thigh  04/20/2022     Priority: Medium    Melanoma of skin (H) 03/03/2022     Priority: Medium     Added automatically from request for surgery [id number]      Diabetes mellitus, type 2 (H) 01/07/2022     Priority: Medium    HTN, goal below 140/90 05/27/2020     Priority: Medium    Polyp of duodenum 02/18/2020     Priority: Medium     Added automatically from request for surgery [id number]      Ampullary adenoma 02/28/2019     Priority: Medium    Status post total replacement of right hip 02/13/2018     Priority: Medium    CHERRY (obstructive sleep apnea) 09/15/2017     Priority: Medium    HTN goal less than 140/90, 05/22/2016     Priority: Medium    Chronic knee pain, right 08/30/2015     Priority: Medium    Total knee replacement status 07/12/2013     Priority: Medium    Obesity (BMI 30-39.9)      Priority: Medium    Heart murmur      Priority: Medium     aortic area      Allergic rhinitis      Priority: Medium     Problem list name updated by automated process. Provider to review      Cataract      Priority: Low     Utility update for deleted IMO code  Imo Update utility      Macular  hole of left eye      Priority: Low      Past Medical History:   Diagnosis Date    Asthma, mild intermittent     Cataract     Endometrial cancer (H) 06/2022    HTN, goal below 140/90     Hyperlipidemia LDL goal < 100     Macular hole of left eye     Melanoma (H)     RIGHT KNEE    Sleep apnea     uses c pap    Type 2 diabetes, HbA1C goal < 8% (H)      Past Surgical History:   Procedure Laterality Date    ARTHROPLASTY HIP Right 9/25/2017    Procedure: ARTHROPLASTY HIP;  Right total hip arthroplasty  ;  Surgeon: Ayaan Pena MD;  [facility]    ARTHROPLASTY KNEE  7/12/2013    Procedure: ARTHROPLASTY KNEE;  right total knee arthroplasty ;  Surgeon: Chaparro Wesley MD;  [facility]    ARTHROSCOPY KNEE Right 1/21/2015    Procedure: ARTHROSCOPY KNEE;  Surgeon: Ayaan Pena MD;  [facility]    BRONCHOSCOPY (RIGID OR FLEXIBLE), DIAGNOSTIC N/A 8/11/2022    Procedure: BRONCHOSCOPY, WITH BRONCHOALVEOLAR LAVAGE;  Surgeon: Roselia Sosa MD;  Location:  GI    C INCISION OF HYMEN      CATARACT IOL, RT/LT      COLONOSCOPY  2008    COLONOSCOPY N/A 4/18/2019    Procedure: Colonoscopy with polypectomy;  Surgeon: Shaun Guerrero MD;  [facility]    CYSTOSCOPY N/A 6/23/2022    Procedure: Cystoscopy;  Surgeon: Eli Guidry MD;  [facility]    ENDOSCOPIC RETROGRADE CHOLANGIOPANCREATOGRAM N/A 2/6/2019    Procedure: COMBINED ENDOSCOPIC RETROGRADE CHOLANGIOPANCREATOGRAPHY, BILIARY SPINCTEROTOMY AND DILATION, PLACE BILE DUCT STENT;  Surgeon: Guru Andie Gonzalez MD;  [facility]    ENDOSCOPIC RETROGRADE CHOLANGIOPANCREATOGRAM N/A 2/28/2019    Procedure: Endoscopic Retrograde Cholangiopancreatogram, Bile duct stent removal and placement;  Surgeon: Shaun Guerrero MD;  [facility]    ENDOSCOPIC RETROGRADE CHOLANGIOPANCREATOGRAM N/A 4/18/2019    Procedure: COMBINED ENDOSCOPIC RETROGRADE CHOLANGIOPANCREATOGRAPHY, Bile duct stent exchange and Polypectomy;   Surgeon: Shaun Guerrero MD;  [facility]    ENDOSCOPIC RETROGRADE CHOLANGIOPANCREATOGRAM N/A 10/18/2019    Procedure: Endoscopic Retrograde Cholangiopancreatogram;  Surgeon: Shaun Guerrero MD;  [facility]    ENDOSCOPIC RETROGRADE CHOLANGIOPANCREATOGRAM N/A 3/24/2022    Procedure: ENDOSCOPIC RETROGRADE CHOLANGIOPANCREATOGRAPHY;  Surgeon: Shaun Guerrero MD;  Location:  OR    ENDOSCOPIC RETROGRADE CHOLANGIOPANCREATOGRAM N/A 3/16/2023    Procedure: endoscopic retrograde cholangiopancreatography with minor papillotomy;  Surgeon: Shaun Guerrero MD;  Location:  OR    ENDOSCOPIC RETROGRADE CHOLANGIOPANCREATOGRAM N/A 3/28/2024    Procedure: ENDOSCOPIC RETROGRADE CHOLANGIOPANCREATOGRAPHY;  Surgeon: Shaun Guerrero MD;  Location:  OR    ENDOSCOPIC RETROGRADE CHOLANGIOPANCREATOGRAM WITH SPYGLASS N/A 7/26/2019    Procedure: Endoscopic Retrograde Cholangiopancreatogram With Spyglas,l Radiofrequency Ablation, Stent Exchangeand Duodenal Biopsy x2;  Surgeon: Shaun Guerrero MD;  [facility]    ENDOSCOPIC RETROGRADE CHOLANGIOPANCREATOGRAM WITH SPYGLASS N/A 9/10/2020    Procedure: ENDOSCOPIC RETROGRADE CHOLANGIOPANCREATOGRAPHY, WITH DIRECT DUCT VISUALIZATION, USING PANCREATICOBILIARY FIBEROPTIC PROBE;  Surgeon: Shaun Guerrero MD;  [facility]    ENDOSCOPIC RETROGRADE CHOLANGIOPANCREATOGRAM WITH SPYGLASS N/A 3/22/2021    Procedure: ENDOSCOPIC RETROGRADE CHOLANGIOPANCREATOGRAPHY, WITH DIRECT DUCT VISUALIZATION, USING PANCREATICOBILIARY FIBEROPTIC PROBE;  Surgeon: Shaun Guerrero MD;  [facility]    ESOPHAGOSCOPY, GASTROSCOPY, DUODENOSCOPY (EGD) WITH RADIO FREQUENCY ABLATION, COMBINED N/A 9/10/2020    Procedure: possible repeat ESOPHAGOGASTRODUODENOSCOPY, WITH RADIOFREQUENCY ABLATION and endoscopic mucosal resection;  Surgeon: Shaun Guerrero MD;  [facility]    ESOPHAGOSCOPY, GASTROSCOPY, DUODENOSCOPY (EGD), COMBINED N/A 4/18/2019    Procedure: upper endoscopy with polypectomy;  Surgeon: Shaun Guerrero MD;   [facility]    ESOPHAGOSCOPY, GASTROSCOPY, DUODENOSCOPY (EGD), COMBINED N/A 10/18/2019    Procedure: Upper Endoscopy and ERCP with stent removal, stone removal and biopsy;  Surgeon: Shaun Guerrero MD;  [facility]    ESOPHAGOSCOPY, GASTROSCOPY, DUODENOSCOPY (EGD), COMBINED N/A 3/24/2022    Procedure: ESOPHAGOGASTRODUODENOSCOPY (EGD), Duodenal  polyp removal;  Surgeon: Shaun Guerrero MD;  [facility]    ESOPHAGOSCOPY, GASTROSCOPY, DUODENOSCOPY (EGD), COMBINED N/A 3/16/2023    Procedure: ESOPHAGOGASTRODUODENOSCOPY (EGD);  Surgeon: Shaun Guerrero MD;  [facility]    ESOPHAGOSCOPY, GASTROSCOPY, DUODENOSCOPY (EGD), RESECT MUCOSA, COMBINED N/A 2/28/2019    Procedure: Upper Endoscopy, Endoscopic Ultrasound, Endoscopic Mucosal Resection,  Ampullectomy, polypectomy.;  Surgeon: Shaun Guerrero MD;  [facility]    ESOPHAGOSCOPY, GASTROSCOPY, DUODENOSCOPY (EGD), RESECT MUCOSA, COMBINED N/A 3/22/2021    Procedure: ESOPHAGOGASTRODUODENOSCOPY (EGD) with  endoscopic mucosal resection/ polypectomy;  Surgeon: Shaun Guerrero MD;  [facility]    EXCISE LESION LOWER EXTREMITY Right 3/28/2022    Procedure: Wide local excision of right knee melanoma;  Surgeon: Chevy Allison MD;  Location: [facility]    EXCISE MASS LOWER EXTREMITY Right 1/13/2022    Procedure: excision of right thigh mass;  Surgeon: Salvador Kelly MD;  [facility]    EYE SURGERY      macular hole repaired left eye    HC KNEE SCOPE, DIAGNOSTIC      - both knees    HEAD & NECK SURGERY      wisdom teeth    IR CHEST PORT PLACEMENT > 5 YRS OF AGE  5/2/2022    LAPAROSCOPIC HYSTERECTOMY TOTAL, BILATERAL SALPINGO-OOPHORECTOMY, NODE DISSECTION, COMBINED Bilateral 6/23/2022    Procedure: Total Laparoscopic Hyserectomy, Bilateral Salpibgo-oophorectomy, Bilateral [place] Lymph Node Dissection;  Surgeon: Eli Guidry MD;  [facility]     Current Outpatient Medications   Medication Sig Dispense Refill    atenolol (TENORMIN) 50 MG tablet  "Take 1 tablet (50 mg) by mouth daily 90 tablet 1    atorvastatin (LIPITOR) 40 MG tablet Take 1 tablet (40 mg) by mouth daily 90 tablet 1    BD VEO INSULIN SYRINGE U/F 31G X 15/64\" 0.5 ML USE DAILY WITH NPH INSULIN 100 each 1    cetirizine (ZYRTEC) 10 MG tablet Take 10 mg by mouth every morning      Continuous Blood Gluc Sensor (FREESTYLE DAVID 3 SENSOR) MISC 1 Device every 14 days 2 each 3    CONTOUR NEXT TEST test strip USE 1 STRIP TO CHECK GLUCOSE ONCE DAILY 100 strip 4    gentamicin (GARAMYCIN) 0.1 % external ointment Apply topically 3 times daily      glipiZIDE (GLUCOTROL XL) 10 MG 24 hr tablet Take 1 tablet (10 mg) by mouth daily 90 tablet 1    hydrochlorothiazide (HYDRODIURIL) 25 MG tablet Take 1 tablet (25 mg) by mouth every morning 90 tablet 1    insulin glargine (LANTUS VIAL) 100 UNIT/ML vial Inject 44 Units Subcutaneous at bedtime 15 mL 2    insulin lispro (HUMALOG) 100 UNIT/ML vial INJECT 5 UNITS SUBCUTANEOUSLY THREE TIMES DAILY BEFORE MEALS 10 mL 0    insulin pen needle (MM PEN NEEDLES) 32G X 4 MM miscellaneous Inject insulin 4 times a day 300 each 1    insulin syringe-needle U-100 (BD INSULIN SYRINGE ULTRAFINE) 31G X 5/16\" 0.5 ML miscellaneous Use daily with NPH insulin 100 each 11    insulin syringe-needle U-100 (BD VEO INSULIN SYRINGE U/F) 31G X 15/64\" 0.3 ML Patient has 4 shots of insulin a day 300 each 1    latanoprost (XALATAN) 0.005 % ophthalmic solution Place 1 drop into both eyes At Bedtime  2.5 mL 1    lisinopril (ZESTRIL) 5 MG tablet Take 1 tablet (5 mg) by mouth daily 90 tablet 1    LORazepam (ATIVAN) 0.5 MG tablet Take 1 tablet (0.5 mg) by mouth nightly as needed for anxiety or sleep 30 tablet 0    mupirocin (BACTROBAN) 2 % external ointment Apply topically 3 times daily      netarsudil (RHOPRESSA) 0.02 % ophthalmic solution Place 1 drop Into the left eye at bedtime      OZEMPIC, 0.25 OR 0.5 MG/DOSE, 2 MG/3ML pen INJECT 0.25MG SUBCUTANOUESLY EVERY 7 DAYS 3 mL 0    senna-docusate " "(SENOKOT-S/PERICOLACE) 8.6-50 MG tablet Take 2 tablets by mouth daily as needed for constipation 60 tablet 11    timolol maleate (TIMOPTIC) 0.5 % ophthalmic solution INSTILL 1 DROP INTO EACH EYE TWICE DAILY      triamcinolone (KENALOG) 0.5 % external ointment Apply 1 g topically daily as needed for irritation      XARELTO ANTICOAGULANT 20 MG TABS tablet Take 1 tablet (20 mg) by mouth daily 90 tablet 1       Allergies   Allergen Reactions    Bromfenac Visual Disturbance      xibrom  Causes sever eye pain    Codeine Nausea     Other reaction(s): Dizziness, Headache    Metformin GI Disturbance    Morphine And Related      \"NAUSE,HA AND DIZZINESS\"    Seasonal Allergies         Social History     Tobacco Use    Smoking status: Never     Passive exposure: Never    Smokeless tobacco: Never   Substance Use Topics    Alcohol use: No     Alcohol/week: 0.0 standard drinks of alcohol       History   Drug Use No         Review of Systems      Objective    LMP  (LMP Unknown)   Breastfeeding No    Estimated body mass index is 35.24 kg/m  as calculated from the following:    Height as of 3/28/24: 1.702 m (5' 7\").    Weight as of 3/28/24: 102.1 kg (225 lb).  Physical Exam      Recent Labs   Lab Test 03/28/24  0612 02/27/24  1026 02/05/24  1501 11/21/23  0951 08/03/22  0655 08/02/22  1119   HGB 13.1 13.8  --   --    < >  --     235  --   --    < >  --    INR 0.92  --   --   --   --  1.08    136  --   --    < >  --    POTASSIUM 3.9 4.3  --   --    < >  --    CR 0.81 0.78   < >  --    < >  --    A1C  --  8.1*  --  7.8*   < >  --     < > = values in this interval not displayed.       Signed Electronically by: Raisa Davidson MD  Copy of this evaluation report is provided to requesting physician.         Answers submitted by the patient for this visit:  General Questionnaire (Submitted on 3/27/2024)  Chief Complaint: Chronic problems general questions HPI Form  What is the reason for your visit today? : preop " exam  How many servings of fruits and vegetables do you eat daily?: 2-3  On average, how many sweetened beverages do you drink each day (Examples: soda, juice, sweet tea, etc.  Do NOT count diet or artificially sweetened beverages)?: 0  How many minutes a day do you exercise enough to make your heart beat faster?: 10 to 19  How many days a week do you exercise enough to make your heart beat faster?: 3 or less  How many days per week do you miss taking your medication?: 0

## 2024-04-02 NOTE — PATIENT INSTRUCTIONS
Plan:  1. In the morning of the surgery day you take with a sip of water just these meds: Atenolol.  The rest of the meds you resume after surgery.  2. no more Semaglutide doses before the surgery  3. In the morning of the procedure day take 18 units. Take the rest of the Lantus dose after the procedure   4. Last dose of Xarelto is April 7.  Resume it after the procedure on April 10 or April 11 - according to dr Allison instructions

## 2024-04-08 ENCOUNTER — ANESTHESIA EVENT (OUTPATIENT)
Dept: SURGERY | Facility: AMBULATORY SURGERY CENTER | Age: 74
End: 2024-04-08
Payer: COMMERCIAL

## 2024-04-08 ENCOUNTER — HOSPITAL ENCOUNTER (OUTPATIENT)
Dept: PET IMAGING | Facility: CLINIC | Age: 74
Discharge: HOME | End: 2024-04-08
Attending: INTERNAL MEDICINE | Admitting: INTERNAL MEDICINE
Payer: COMMERCIAL

## 2024-04-08 DIAGNOSIS — C43.71 MALIGNANT MELANOMA OF RIGHT LOWER EXTREMITY INCLUDING HIP (H): ICD-10-CM

## 2024-04-08 PROCEDURE — 78816 PET IMAGE W/CT FULL BODY: CPT | Mod: PS

## 2024-04-08 PROCEDURE — A9552 F18 FDG: HCPCS | Performed by: INTERNAL MEDICINE

## 2024-04-08 PROCEDURE — 343N000001 HC RX 343: Performed by: INTERNAL MEDICINE

## 2024-04-08 RX ADMIN — FLUDEOXYGLUCOSE F-18 13 MILLICURIE: 500 INJECTION, SOLUTION INTRAVENOUS at 08:54

## 2024-04-09 ENCOUNTER — TELEPHONE (OUTPATIENT)
Dept: INTERNAL MEDICINE | Facility: CLINIC | Age: 74
End: 2024-04-09
Payer: COMMERCIAL

## 2024-04-09 ASSESSMENT — ENCOUNTER SYMPTOMS: DYSRHYTHMIAS: 1

## 2024-04-09 NOTE — TELEPHONE ENCOUNTER
Patient Quality Outreach    Patient is due for the following:   Physical Annual Wellness Visit    Next Steps:   Patient has upcoming appointment, these items will be addressed at that time.    Type of outreach:    Chart review performed, no outreach needed.    Next Steps:  Reach out within 90 days via Phone.    Max number of attempts reached: No. Will try again in 90 days if patient still on fail list.    Questions for provider review:    None           Thais Cadet

## 2024-04-10 ENCOUNTER — HOSPITAL ENCOUNTER (OUTPATIENT)
Facility: AMBULATORY SURGERY CENTER | Age: 74
Discharge: HOME | End: 2024-04-10
Attending: SURGERY
Payer: COMMERCIAL

## 2024-04-10 ENCOUNTER — ANESTHESIA (OUTPATIENT)
Dept: SURGERY | Facility: AMBULATORY SURGERY CENTER | Age: 74
End: 2024-04-10
Payer: COMMERCIAL

## 2024-04-10 VITALS
WEIGHT: 225 LBS | OXYGEN SATURATION: 94 % | HEART RATE: 81 BPM | RESPIRATION RATE: 12 BRPM | SYSTOLIC BLOOD PRESSURE: 146 MMHG | TEMPERATURE: 96.6 F | BODY MASS INDEX: 35.31 KG/M2 | HEIGHT: 67 IN | DIASTOLIC BLOOD PRESSURE: 83 MMHG

## 2024-04-10 LAB — GLUCOSE BLDC GLUCOMTR-MCNC: 185 MG/DL (ref 70–99)

## 2024-04-10 PROCEDURE — 88305 TISSUE EXAM BY PATHOLOGIST: CPT | Mod: 26 | Performed by: DERMATOLOGY

## 2024-04-10 PROCEDURE — 11606 EXC TR-EXT MAL+MARG >4 CM: CPT | Mod: RT | Performed by: SURGERY

## 2024-04-10 PROCEDURE — 88341 IMHCHEM/IMCYTCHM EA ADD ANTB: CPT | Mod: TC | Performed by: SURGERY

## 2024-04-10 PROCEDURE — 11606 EXC TR-EXT MAL+MARG >4 CM: CPT | Performed by: ANESTHESIOLOGY

## 2024-04-10 PROCEDURE — 11606 EXC TR-EXT MAL+MARG >4 CM: CPT | Performed by: NURSE ANESTHETIST, CERTIFIED REGISTERED

## 2024-04-10 PROCEDURE — 88341 IMHCHEM/IMCYTCHM EA ADD ANTB: CPT | Mod: 26 | Performed by: DERMATOLOGY

## 2024-04-10 PROCEDURE — 99100 ANES PT EXTEME AGE<1 YR&>70: CPT | Performed by: NURSE ANESTHETIST, CERTIFIED REGISTERED

## 2024-04-10 PROCEDURE — 82962 GLUCOSE BLOOD TEST: CPT | Performed by: PATHOLOGY

## 2024-04-10 PROCEDURE — 99100 ANES PT EXTEME AGE<1 YR&>70: CPT | Performed by: ANESTHESIOLOGY

## 2024-04-10 PROCEDURE — 88342 IMHCHEM/IMCYTCHM 1ST ANTB: CPT | Mod: 26 | Performed by: DERMATOLOGY

## 2024-04-10 PROCEDURE — 11606 EXC TR-EXT MAL+MARG >4 CM: CPT | Mod: 59,RT

## 2024-04-10 RX ORDER — SODIUM CHLORIDE, SODIUM LACTATE, POTASSIUM CHLORIDE, CALCIUM CHLORIDE 600; 310; 30; 20 MG/100ML; MG/100ML; MG/100ML; MG/100ML
INJECTION, SOLUTION INTRAVENOUS CONTINUOUS
Status: DISCONTINUED | OUTPATIENT
Start: 2024-04-10 | End: 2024-04-11 | Stop reason: HOSPADM

## 2024-04-10 RX ORDER — FENTANYL CITRATE 50 UG/ML
INJECTION, SOLUTION INTRAMUSCULAR; INTRAVENOUS PRN
Status: DISCONTINUED | OUTPATIENT
Start: 2024-04-10 | End: 2024-04-10

## 2024-04-10 RX ORDER — FENTANYL CITRATE 50 UG/ML
50 INJECTION, SOLUTION INTRAMUSCULAR; INTRAVENOUS EVERY 5 MIN PRN
Status: DISCONTINUED | OUTPATIENT
Start: 2024-04-10 | End: 2024-04-11 | Stop reason: HOSPADM

## 2024-04-10 RX ORDER — NALOXONE HYDROCHLORIDE 0.4 MG/ML
0.1 INJECTION, SOLUTION INTRAMUSCULAR; INTRAVENOUS; SUBCUTANEOUS
Status: DISCONTINUED | OUTPATIENT
Start: 2024-04-10 | End: 2024-04-11 | Stop reason: HOSPADM

## 2024-04-10 RX ORDER — ONDANSETRON 2 MG/ML
4 INJECTION INTRAMUSCULAR; INTRAVENOUS EVERY 30 MIN PRN
Status: DISCONTINUED | OUTPATIENT
Start: 2024-04-10 | End: 2024-04-11 | Stop reason: HOSPADM

## 2024-04-10 RX ORDER — PROPOFOL 10 MG/ML
INJECTION, EMULSION INTRAVENOUS CONTINUOUS PRN
Status: DISCONTINUED | OUTPATIENT
Start: 2024-04-10 | End: 2024-04-10

## 2024-04-10 RX ORDER — HEPARIN SODIUM (PORCINE) LOCK FLUSH IV SOLN 100 UNIT/ML 100 UNIT/ML
5-10 SOLUTION INTRAVENOUS
Status: DISCONTINUED | OUTPATIENT
Start: 2024-04-10 | End: 2024-04-11 | Stop reason: HOSPADM

## 2024-04-10 RX ORDER — HEPARIN SODIUM,PORCINE 10 UNIT/ML
5-10 VIAL (ML) INTRAVENOUS
Status: DISCONTINUED | OUTPATIENT
Start: 2024-04-10 | End: 2024-04-11 | Stop reason: HOSPADM

## 2024-04-10 RX ORDER — OXYCODONE HYDROCHLORIDE 5 MG/1
5 TABLET ORAL
Status: DISCONTINUED | OUTPATIENT
Start: 2024-04-10 | End: 2024-04-11 | Stop reason: HOSPADM

## 2024-04-10 RX ORDER — HYDROMORPHONE HYDROCHLORIDE 1 MG/ML
0.2 INJECTION, SOLUTION INTRAMUSCULAR; INTRAVENOUS; SUBCUTANEOUS EVERY 5 MIN PRN
Status: DISCONTINUED | OUTPATIENT
Start: 2024-04-10 | End: 2024-04-11 | Stop reason: HOSPADM

## 2024-04-10 RX ORDER — CEFAZOLIN SODIUM 2 G/50ML
2 SOLUTION INTRAVENOUS SEE ADMIN INSTRUCTIONS
Status: DISCONTINUED | OUTPATIENT
Start: 2024-04-10 | End: 2024-04-11 | Stop reason: HOSPADM

## 2024-04-10 RX ORDER — ENOXAPARIN SODIUM 100 MG/ML
40 INJECTION SUBCUTANEOUS
Status: COMPLETED | OUTPATIENT
Start: 2024-04-10 | End: 2024-04-10

## 2024-04-10 RX ORDER — ACETAMINOPHEN 325 MG/1
975 TABLET ORAL ONCE
Status: COMPLETED | OUTPATIENT
Start: 2024-04-10 | End: 2024-04-10

## 2024-04-10 RX ORDER — CEFAZOLIN SODIUM 2 G/50ML
2 SOLUTION INTRAVENOUS
Status: DISCONTINUED | OUTPATIENT
Start: 2024-04-10 | End: 2024-04-11 | Stop reason: HOSPADM

## 2024-04-10 RX ORDER — ONDANSETRON 2 MG/ML
INJECTION INTRAMUSCULAR; INTRAVENOUS PRN
Status: DISCONTINUED | OUTPATIENT
Start: 2024-04-10 | End: 2024-04-10

## 2024-04-10 RX ORDER — ACETAMINOPHEN 325 MG/1
975 TABLET ORAL ONCE
Status: DISCONTINUED | OUTPATIENT
Start: 2024-04-10 | End: 2024-04-11 | Stop reason: HOSPADM

## 2024-04-10 RX ORDER — HYDROMORPHONE HYDROCHLORIDE 1 MG/ML
0.4 INJECTION, SOLUTION INTRAMUSCULAR; INTRAVENOUS; SUBCUTANEOUS EVERY 5 MIN PRN
Status: DISCONTINUED | OUTPATIENT
Start: 2024-04-10 | End: 2024-04-11 | Stop reason: HOSPADM

## 2024-04-10 RX ORDER — LIDOCAINE 40 MG/G
CREAM TOPICAL
Status: DISCONTINUED | OUTPATIENT
Start: 2024-04-10 | End: 2024-04-11 | Stop reason: HOSPADM

## 2024-04-10 RX ORDER — FENTANYL CITRATE 50 UG/ML
25 INJECTION, SOLUTION INTRAMUSCULAR; INTRAVENOUS EVERY 5 MIN PRN
Status: DISCONTINUED | OUTPATIENT
Start: 2024-04-10 | End: 2024-04-11 | Stop reason: HOSPADM

## 2024-04-10 RX ORDER — ONDANSETRON 4 MG/1
4 TABLET, ORALLY DISINTEGRATING ORAL EVERY 30 MIN PRN
Status: DISCONTINUED | OUTPATIENT
Start: 2024-04-10 | End: 2024-04-11 | Stop reason: HOSPADM

## 2024-04-10 RX ORDER — HEPARIN SODIUM,PORCINE 10 UNIT/ML
5-10 VIAL (ML) INTRAVENOUS EVERY 24 HOURS
Status: DISCONTINUED | OUTPATIENT
Start: 2024-04-10 | End: 2024-04-11 | Stop reason: HOSPADM

## 2024-04-10 RX ORDER — PROPOFOL 10 MG/ML
INJECTION, EMULSION INTRAVENOUS PRN
Status: DISCONTINUED | OUTPATIENT
Start: 2024-04-10 | End: 2024-04-10

## 2024-04-10 RX ORDER — OXYCODONE HYDROCHLORIDE 5 MG/1
10 TABLET ORAL
Status: DISCONTINUED | OUTPATIENT
Start: 2024-04-10 | End: 2024-04-11 | Stop reason: HOSPADM

## 2024-04-10 RX ADMIN — CEFAZOLIN SODIUM 2 G: 2 SOLUTION INTRAVENOUS at 11:05

## 2024-04-10 RX ADMIN — FENTANYL CITRATE 25 MCG: 50 INJECTION, SOLUTION INTRAMUSCULAR; INTRAVENOUS at 11:03

## 2024-04-10 RX ADMIN — ENOXAPARIN SODIUM 40 MG: 100 INJECTION SUBCUTANEOUS at 09:56

## 2024-04-10 RX ADMIN — SODIUM CHLORIDE, SODIUM LACTATE, POTASSIUM CHLORIDE, CALCIUM CHLORIDE: 600; 310; 30; 20 INJECTION, SOLUTION INTRAVENOUS at 10:58

## 2024-04-10 RX ADMIN — HEPARIN SODIUM (PORCINE) LOCK FLUSH IV SOLN 100 UNIT/ML 5 ML: 100 SOLUTION at 12:46

## 2024-04-10 RX ADMIN — ONDANSETRON 4 MG: 2 INJECTION INTRAMUSCULAR; INTRAVENOUS at 11:16

## 2024-04-10 RX ADMIN — PROPOFOL 100 MCG/KG/MIN: 10 INJECTION, EMULSION INTRAVENOUS at 11:07

## 2024-04-10 RX ADMIN — PROPOFOL 30 MG: 10 INJECTION, EMULSION INTRAVENOUS at 11:07

## 2024-04-10 RX ADMIN — ACETAMINOPHEN 975 MG: 325 TABLET ORAL at 09:55

## 2024-04-10 NOTE — OP NOTE
Preoperative Diagnosis: Recurrent melanoma right leg    Postoperative Diagnosis: Same    Procedure: Wide excision of right thigh melanoma x 2.  The anterior lesion was 5.5 x 3.5 cm.  The lateral lesion was 7.5 x 3.5 cm    Surgeons: Dr. Chevy Allison    Anesthesia: Local with IV sedation    Indications for Surgery: The patient is a 73-year-old woman with a history of a melanoma of an unknown primary with metastasis to her skin.  She has previously undergone surgical resection.  She now has a recurrent lesion of her anterior right thigh that was biopsied and positive.  In addition she has 2 palpable nodules lateral to this area that were also seen on her PET CT scan.  In the preoperative area I circled all the lesions for the patient and her .    Procedure in Detail: After informed consent the patient was brought the operating room and given IV sedation.  She was prepped and draped in the usual fashion.  I started with the anterior lesion first.  I drew out elliptical incision along the long axis of the extremity at her previous biopsy site.  A skin incision was made with a scalpel.  The Bovie cautery was used to incise the subcutaneous tissues.  The dissection went down to the underlying fascia which was left intact.  At the superior aspect of the specimen there was a firm subcutaneous nodule.  The specimen was sent off to pathology.  I did submit an additional margin at the 12 o'clock position and sent this to pathology.  The defect measured 5.5 x 3.5 cm.  This incision was closed with multiple 3-0 nylon sutures in a simple interrupted fashion.  Next I performed an excision of the lateral 2 melanoma lesions.  A local anesthetic was administered and the elliptical incision was made with a scalpel.  The Bovie cautery was used to incise the subcutaneous tissues.  The dissection went down to the underlying fascia which was left intact.  The specimen was removed oriented and sent to pathology.  The size of the  defect was 7.5 x 3.5 cm.  This is incision was also closed with interrupted 3-0 nylon sutures in a simple fashion.  A dry sterile dressing was placed and the patient was taken to the recovery room.      This procedure was not performed to treat primary cutaneous melanoma through wide local excision      Chevy Allison MD, MS  Surgical Oncology

## 2024-04-10 NOTE — ANESTHESIA PREPROCEDURE EVALUATION
Anesthesia Pre-Procedure Evaluation    Patient: Gricelda Sabillon   MRN: [id number] : 1950        Procedure : Procedure(s):  WIDE EXCISION OF RIGHT THIGH MELANOMA          Past Medical History:   Diagnosis Date     Asthma, mild intermittent      Cataract      Endometrial cancer (H) 2022     HTN, goal below 140/90      Hyperlipidemia LDL goal < 100      Macular hole of left eye      Melanoma (H)     RIGHT KNEE     Sleep apnea     uses c pap     Type 2 diabetes, HbA1C goal < 8% (H)       Past Surgical History:   Procedure Laterality Date     ARTHROPLASTY HIP Right 2017    Procedure: ARTHROPLASTY HIP;  Right total hip arthroplasty  ;  Surgeon: Ayaan Pena MD;  Location: [facility]     ARTHROPLASTY KNEE  2013    Procedure: ARTHROPLASTY KNEE;  right total knee arthroplasty ;  Surgeon: Chaparro Wesley MD;  Location:  OR     ARTHROSCOPY KNEE Right 2015    Procedure: ARTHROSCOPY KNEE;  Surgeon: Ayaan Pena MD;  Location:  OR     BRONCHOSCOPY (RIGID OR FLEXIBLE), DIAGNOSTIC N/A 2022    Procedure: BRONCHOSCOPY, WITH BRONCHOALVEOLAR LAVAGE;  Surgeon: Roselia Sosa MD;  Location:  GI     C INCISION OF HYMEN       CATARACT IOL, RT/LT       COLONOSCOPY       COLONOSCOPY N/A 2019    Procedure: Colonoscopy with polypectomy;  Surgeon: Shaun Guerrero MD;  [facility]: [facility]     CYSTOSCOPY N/A 2022    Procedure: Cystoscopy;  Surgeon: Eli Guidry MD;  [facility]     ENDOSCOPIC RETROGRADE CHOLANGIOPANCREATOGRAM N/A 2019    Procedure: COMBINED ENDOSCOPIC RETROGRADE CHOLANGIOPANCREATOGRAPHY, BILIARY SPINCTEROTOMY AND DILATION, PLACE BILE DUCT STENT;  Surgeon: Guru Andie Gonzalez MD;  [facility]     ENDOSCOPIC RETROGRADE CHOLANGIOPANCREATOGRAM N/A 2019    Procedure: Endoscopic Retrograde Cholangiopancreatogram, Bile duct stent removal and placement;  Surgeon: Shaun Guerrero MD;  [facility]     ENDOSCOPIC  RETROGRADE CHOLANGIOPANCREATOGRAM N/A 4/18/2019    Procedure: COMBINED ENDOSCOPIC RETROGRADE CHOLANGIOPANCREATOGRAPHY, Bile duct stent exchange and Polypectomy;  Surgeon: Shaun Guerrero MD;  [facility]     ENDOSCOPIC RETROGRADE CHOLANGIOPANCREATOGRAM N/A 10/18/2019    Procedure: Endoscopic Retrograde Cholangiopancreatogram;  Surgeon: Shaun Guerrero MD;  [facility]     ENDOSCOPIC RETROGRADE CHOLANGIOPANCREATOGRAM N/A 3/24/2022    Procedure: ENDOSCOPIC RETROGRADE CHOLANGIOPANCREATOGRAPHY;  Surgeon: Shaun Guerrero MD;  Location:  OR     ENDOSCOPIC RETROGRADE CHOLANGIOPANCREATOGRAM N/A 3/16/2023    Procedure: endoscopic retrograde cholangiopancreatography with minor papillotomy;  Surgeon: Shaun Guerrero MD;  Location:  OR     ENDOSCOPIC RETROGRADE CHOLANGIOPANCREATOGRAM N/A 3/28/2024    Procedure: ENDOSCOPIC RETROGRADE CHOLANGIOPANCREATOGRAPHY;  Surgeon: Shaun Guerrero MD;  Location:  OR     ENDOSCOPIC RETROGRADE CHOLANGIOPANCREATOGRAM WITH SPYGLASS N/A 7/26/2019    Procedure: Endoscopic Retrograde Cholangiopancreatogram With Spyglas,l Radiofrequency Ablation, Stent Exchangeand Duodenal Biopsy x2;  Surgeon: Shaun Guerrero MD;  [facility]     ENDOSCOPIC RETROGRADE CHOLANGIOPANCREATOGRAM WITH SPYGLASS N/A 9/10/2020    Procedure: ENDOSCOPIC RETROGRADE CHOLANGIOPANCREATOGRAPHY, WITH DIRECT DUCT VISUALIZATION, USING PANCREATICOBILIARY FIBEROPTIC PROBE;  Surgeon: Shaun Guerrero MD;  [facility]     ENDOSCOPIC RETROGRADE CHOLANGIOPANCREATOGRAM WITH SPYGLASS N/A 3/22/2021    Procedure: ENDOSCOPIC RETROGRADE CHOLANGIOPANCREATOGRAPHY, WITH DIRECT DUCT VISUALIZATION, USING PANCREATICOBILIARY FIBEROPTIC PROBE;  Surgeon: Shaun Guerrero MD;  [facility]     ESOPHAGOSCOPY, GASTROSCOPY, DUODENOSCOPY (EGD) WITH RADIO FREQUENCY ABLATION, COMBINED N/A 9/10/2020    Procedure: possible repeat ESOPHAGOGASTRODUODENOSCOPY, WITH RADIOFREQUENCY ABLATION and endoscopic mucosal resection;  Surgeon: Shaun Guerrero MD;   [facility]     ESOPHAGOSCOPY, GASTROSCOPY, DUODENOSCOPY (EGD), COMBINED N/A 4/18/2019    Procedure: upper endoscopy with polypectomy;  Surgeon: Shaun Guerrero MD;  [facility]     ESOPHAGOSCOPY, GASTROSCOPY, DUODENOSCOPY (EGD), COMBINED N/A 10/18/2019    Procedure: Upper Endoscopy and ERCP with stent removal, stone removal and biopsy;  Surgeon: Shaun Guerrero MD;  [facility]     ESOPHAGOSCOPY, GASTROSCOPY, DUODENOSCOPY (EGD), COMBINED N/A 3/24/2022    Procedure: ESOPHAGOGASTRODUODENOSCOPY (EGD), Duodenal  polyp removal;  Surgeon: Shaun Guerrero MD;  Location: [facility]     ESOPHAGOSCOPY, GASTROSCOPY, DUODENOSCOPY (EGD), COMBINED N/A 3/16/2023    Procedure: ESOPHAGOGASTRODUODENOSCOPY (EGD);  Surgeon: Shaun Guerrero MD;  Location: [facility]     ESOPHAGOSCOPY, GASTROSCOPY, DUODENOSCOPY (EGD), RESECT MUCOSA, COMBINED N/A 2/28/2019    Procedure: Upper Endoscopy, Endoscopic Ultrasound, Endoscopic Mucosal Resection,  Ampullectomy, polypectomy.;  Surgeon: Shaun Guerrero MD;  [facility]     ESOPHAGOSCOPY, GASTROSCOPY, DUODENOSCOPY (EGD), RESECT MUCOSA, COMBINED N/A 3/22/2021    Procedure: ESOPHAGOGASTRODUODENOSCOPY (EGD) with  endoscopic mucosal resection/ polypectomy;  Surgeon: Shaun Guerrero MD;  [facility]     EXCISE LESION LOWER EXTREMITY Right 3/28/2022    Procedure: Wide local excision of right knee melanoma;  Surgeon: Chevy Allison MD;  Location: [facility] OR     EXCISE MASS LOWER EXTREMITY Right 1/13/2022    Procedure: excision of right thigh mass;  Surgeon: Salvador Kelly MD;  Location: [facility]     EYE SURGERY      macular hole repaired left eye     HC KNEE SCOPE, DIAGNOSTIC      - both knees     HEAD & NECK SURGERY      wisdom teeth     IR CHEST PORT PLACEMENT > 5 YRS OF AGE  5/2/2022     LAPAROSCOPIC HYSTERECTOMY TOTAL, BILATERAL SALPINGO-OOPHORECTOMY, NODE DISSECTION, COMBINED Bilateral 6/23/2022    Procedure: Total Laparoscopic Hyserectomy, Bilateral Salpibgo-oophorectomy, Bilateral Hazen Lymph  "Node Dissection;  Surgeon: Eli Guidry MD;  [facility]      Allergies   Allergen Reactions     Bromfenac Visual Disturbance      xibrom  Causes sever eye pain     Codeine Nausea     Other reaction(s): Dizziness, Headache     Metformin GI Disturbance     Morphine And Related      \"NAUSE,HA AND DIZZINESS\"     Seasonal Allergies       Social History     Tobacco Use     Smoking status: Never     Passive exposure: Never     Smokeless tobacco: Never   Substance Use Topics     Alcohol use: No     Alcohol/week: 0.0 standard drinks of alcohol      Wt Readings from Last 1 Encounters:   03/28/24 102.1 kg (225 lb)        Anesthesia Evaluation   Pt has had prior anesthetic. Type: General and MAC.    No history of anesthetic complications       ROS/MED HX  ENT/Pulmonary:     (+) sleep apnea,                     asthma                  Neurologic:       Cardiovascular:     (+) Dyslipidemia hypertension- -   -  - -   Taking blood thinners  Instructions Given to patient: Xarelto.                   dysrhythmias, a-fib,  valvular problems/murmurs type: AI Trace AI.    Previous cardiac testing   Echo: Date: 5/11/23 Results:  Interpretation Summary  The visual ejection fraction is 55-60%. The right ventricle is normal in structure, function and size. There is trace aortic regurgitation.      Left Ventricle  The left ventricle is normal in structure, function and size. Left Ventricle false tendon. There is normal left ventricular wall thickness. The visual ejection fraction is 55-60%. No regional wall motion abnormalities noted.     Right Ventricle  The right ventricle is normal in structure, function and size.     Atria  Normal left atrial size. Right atrial size is normal. There is no color  Doppler evidence of an atrial shunt.     Mitral Valve  The mitral valve is normal in structure and function. There is no mitral  regurgitation noted.     Tricuspid Valve  The tricuspid valve is normal in structure and function. " There is trace tricuspid regurgitation. Right ventricular systolic pressure could not be approximated due to inadequate tricuspid regurgitation.     Aortic Valve  There is mild trileaflet aortic sclerosis. There is trace aortic regurgitation.     Pulmonic Valve  The pulmonic valve is not well seen, but is grossly normal.     Vessels  Normal size aorta. Normal size ascending aorta.     Pericardium  There is no pericardial effusion.     Rhythm  Sinus rhythm was noted.  Stress Test:  Date: Results:    ECG Reviewed:  Date: 3/4/24 Results:  NSR  Cath:  Date: Results:      METS/Exercise Tolerance:     Hematologic:     (+) History of blood clots,    pt is anticoagulated,           Musculoskeletal:   (+)  arthritis,             GI/Hepatic:       Renal/Genitourinary:       Endo:     (+)  type II DM (Implantable sensor), Last HgA1c: 8.1, date: 2/27/24, Using insulin,          Obesity (Class 2; on Ozempic, last dose 3/18),       Psychiatric/Substance Use:     (+) psychiatric history anxiety       Infectious Disease:       Malignancy:   (+) Malignancy, History of Skin and Other.    Other:            Physical Exam    Airway        Mallampati: II   TM distance: > 3 FB   Neck ROM: full   Mouth opening: > 3 cm    Respiratory Devices and Support         Dental       (+) Modest Abnormalities - crowns, retainers, 1 or 2 missing teeth      Cardiovascular   cardiovascular exam normal       Rhythm and rate: regular     Pulmonary   pulmonary exam normal        breath sounds clear to auscultation       OUTSIDE LABS:  CBC:   Lab Results   Component Value Date    WBC 6.5 03/28/2024    WBC 7.3 02/27/2024    HGB 13.1 03/28/2024    HGB 13.8 02/27/2024    HCT 40.7 03/28/2024    HCT 43.6 02/27/2024     03/28/2024     02/27/2024     BMP:   Lab Results   Component Value Date     03/28/2024     02/27/2024    POTASSIUM 3.9 03/28/2024    POTASSIUM 4.3 02/27/2024    CHLORIDE 100 03/28/2024    CHLORIDE 100 02/27/2024    CO2 22  "03/28/2024    CO2 25 02/27/2024    BUN 24.1 (H) 03/28/2024    BUN 20.1 02/27/2024    CR 0.81 03/28/2024    CR 0.78 02/27/2024     (H) 03/28/2024     (H) 03/28/2024     COAGS:   Lab Results   Component Value Date    PTT 25 05/02/2022    INR 0.92 03/28/2024     POC:   Lab Results   Component Value Date     (H) 03/22/2021     HEPATIC:   Lab Results   Component Value Date    ALBUMIN 4.1 03/28/2024    PROTTOTAL 7.2 03/28/2024    ALT 24 03/28/2024    AST 16 03/28/2024    ALKPHOS 104 03/28/2024    BILITOTAL 0.4 03/28/2024     OTHER:   Lab Results   Component Value Date    LACT 1.0 07/25/2022    A1C 8.1 (H) 02/27/2024    KATHRYN 10.2 03/28/2024    MAG 1.9 08/03/2022    LIPASE 45 03/28/2024    AMYLASE 73 03/22/2021    TSH 2.01 02/27/2024    T4 1.17 02/04/2016    CRP <5.0 01/07/2014    SED 16 01/07/2014       Anesthesia Plan    ASA Status:  3       Anesthesia Type: MAC.     - Reason for MAC: straight local not clinically adequate              Consents    Anesthesia Plan(s) and associated risks, benefits, and realistic alternatives discussed. Questions answered and patient/representative(s) expressed understanding.     - Discussed: Risks, Benefits and Alternatives for BOTH SEDATION and the PROCEDURE were discussed     - Discussed with:  Patient            Postoperative Care    Pain management: Multi-modal analgesia.   PONV prophylaxis: Background Propofol Infusion, Ondansetron (or other 5HT-3)     Comments:               Bella Nicholson MD    I have reviewed the pertinent notes and labs in the chart from the past 30 days and (re)examined the patient.  Any updates or changes from those notes are reflected in this note.      # Hypercalcemia: Highest Ca = 10.2 mg/dL in last 30 days, will monitor as appropriate         # DMII: A1C = 8.1 % (Ref range: <5.7 %) within past 6 months  # Obesity: Estimated body mass index is 35.24 kg/m  as calculated from the following:    Height as of 3/28/24: 1.702 m (5' 7\").    " Weight as of 3/28/24: 102.1 kg (225 lb).

## 2024-04-10 NOTE — ANESTHESIA POSTPROCEDURE EVALUATION
Patient: Gricelda Sabillon    Procedure: Procedure(s):  WIDE EXCISION OF RIGHT THIGH MELANOMA       Anesthesia Type:  MAC    Note:  Disposition: Outpatient   Postop Pain Control: Uneventful            Sign Out: Well controlled pain   PONV: No   Neuro/Psych: Uneventful            Sign Out: Acceptable/Baseline neuro status   Airway/Respiratory: Uneventful            Sign Out: Acceptable/Baseline resp. status   CV/Hemodynamics: Uneventful            Sign Out: Acceptable CV status; No obvious hypovolemia; No obvious fluid overload   Other NRE: NONE   DID A NON-ROUTINE EVENT OCCUR? No       Last vitals:  Vitals Value Taken Time   /83 04/10/24 1215   Temp 35.9  C (96.6  F) 04/10/24 1215   Pulse 81 04/10/24 1215   Resp 12 04/10/24 1215   SpO2 94 % 04/10/24 1215       Electronically Signed By: Bella Nicholson MD  April 10, 2024  12:53 PM

## 2024-04-10 NOTE — DISCHARGE INSTRUCTIONS
[facility]  Home Care Following Anesthesia  For 24 hours after surgery:  Get plenty of rest.  A responsible adult must stay with you for at least 24 hours after you leave the surgery center.  Do not drive or use heavy equipment.  If you have weakness or tingling, don't drive or use heavy equipment until this feeling goes away.   Do not drink alcohol.   Avoid strenuous or risky activities.  Ask for help when climbing stairs.  You may feel lightheaded.  IF so, sit for a few minutes before standing.  Have someone help you get up.   If you have nausea (feel sick to your stomach): Drink only clear liquids such as apple juice, ginger ale, broth or 7-Up.  Rest may also help.  Be sure to drink enough fluids.  Move to a regular diet as you feel able.   You may have a slight fever.  Call the doctor if your fever is over 100 F (37.7 C) (taken under the tongue) or lasts longer than 24 hours.  You may have a dry mouth, a sore throat, muscle aches or trouble sleeping. These should go away after 24 hours.  Do not make important or legal decisions.   It is recommended to avoid smoking.               Tips for taking pain medications  To get the best pain relief possible, remember these points:  Take pain medications as directed, before pain becomes severe.  Pain medication can upset your stomach: taking it with food may help.  Constipation is a common side effect of pain medication. Drink plenty of  fluids.  Eat foods high in fiber. Take a stool softener if recommended by your doctor or pharmacist.  Do not drink alcohol, drive or operate machinery while taking pain medications.  Ask about other ways to control pain, such as with heat, ice or relaxation.    Tylenol/Acetaminophen Consumption    If you feel your pain relief is insufficient, you may take Tylenol/Acetaminophen in addition to your narcotic pain medication.   Be careful not to exceed 4,000 mg of Tylenol/Acetaminophen in a 24 hour  period from all sources.  If you are taking extra strength Tylenol/acetaminophen (500 mg), the maximum dose is 8 tablets in 24 hours.  If you are taking regular strength acetaminophen (325 mg), the maximum dose is 12 tablets in 24 hours.    Call a doctor for any of the following:  Signs of infection (fever, growing tenderness at the surgery site, a large amount of drainage or bleeding, severe pain, foul-smelling drainage, redness, swelling).  It has been over 8 to 10 hours since surgery and you are still not able to urinate (pass water).  Headache for over 24 hours.  Numbness, tingling or weakness the day after surgery (if you had spinal anesthesia).  Signs of Covid-19 infection (temperature over 100 degrees, shortness of breath, cough, loss of taste/smell, generalized body aches, persistent headache, chills, sore throat, nausea/vomiting/diarrhea)  Your doctor is:       Dr. Chevy Allison, [facility]: [phone]               Or dial [phone] and ask for the resident on call for:  [facility] [place]  For emergency care, call the:  [facility]:  [phone] (TTY for hearing impaired: [phone])

## 2024-04-10 NOTE — ANESTHESIA CARE TRANSFER NOTE
Patient: Gricelda Sabillon    Procedure: Procedure(s):  WIDE EXCISION OF RIGHT THIGH MELANOMA       Diagnosis: Melanoma of skin (H) [C43.9]  Diagnosis Additional Information: No value filed.    Anesthesia Type:   MAC     Note:    Oropharynx: oropharynx clear of all foreign objects  Level of Consciousness: awake  Oxygen Supplementation: room air    Independent Airway: airway patency satisfactory and stable  Dentition: dentition unchanged  Vital Signs Stable: post-procedure vital signs reviewed and stable  Report to RN Given: handoff report given  Patient transferred to: Phase II    Handoff Report: Identifed the Patient, Identified the Reponsible Provider, Reviewed the pertinent medical history, Discussed the surgical course, Reviewed Intra-OP anesthesia mangement and issues during anesthesia, Set expectations for post-procedure period and Allowed opportunity for questions and acknowledgement of understanding      Vitals:  Vitals Value Taken Time   BP     Temp     Pulse     Resp     SpO2         Electronically Signed By: DENYS Man CRNA  April 10, 2024  12:05 PM

## 2024-04-11 ENCOUNTER — PATIENT OUTREACH (OUTPATIENT)
Dept: ONCOLOGY | Facility: CLINIC | Age: 74
End: 2024-04-11
Payer: COMMERCIAL

## 2024-04-11 NOTE — PROGRESS NOTES
[facility]: Surgical Oncology Post-op Call Note                                     Discussion with Patient                                                           Surgery:  Wide excision of right thigh melanoma x 2.  The anterior lesion was 5.5 x 3.5 cm.  The lateral lesion was 7.5 x 3.5 cm        Surgery date:  4/102024     Discharge Date:  4/10/2024    Date of Post-op Call:  4/11/2024       Immediate Concerns:     Bleeding from incision site which started after she got home last eveing from surgery. It was soaking through her dressing. She called the Surg Onc Resident on call number but did not get a response. She tried the clinic number provided to speak to Triage but could not get through. She reinforced the dressing at about 10:30 at night after being unable to reach anyone and she has not had any breakthrough bleeding on the dressing since then.     Melva stopped her Xeralto on 4/7. She got a dose of Lovenox at the time of surgery. She restarted Xeralto this morning.     Ecnouraged Melva to continue with extra reinformcement of dressing. Will forward message to Dr. Allison and June Blanco PA-C for any additional recommendations. Will call Melva on 4/12 to check on bleeding status.         Fever:   Denies fever/chills.      Pain:  No concerns with pain management. Taking Tylenol. Advised against Advil due to bleeding risk.   Using pain medications as recommended with appropriate relief.      Incision:   Bleeding from incision following surgery. Is reinforcing dressing.      Activity:   No difficulty with ADLs reported.   Patient is up independently at home.   Encouraged patient to continue to stay active.        Post op/follow up plans:      Post op appointment scheduled,confirmed date and time with patient. Direct contact number provided for any additional questions or concerns.     Future Appointments   Date Time Provider Department Center   4/22/2024 12:00 PM Raisa Davidson MD [facility]  [facility]   5/3/2024  9:40 AM Chevy Allison MD [facility]   6/13/2024 11:00 AM Meme Walls APRN CNP [facility]   7/16/2024  8:30 AM [facility]   8/19/2024 12:30 PM Danisha Villa PA-C [facility]         Ethel Cook, RNCC  [facility]  Surgical Oncology       Approximately 10 min was spent in conversation with the patient.

## 2024-04-12 NOTE — PROGRESS NOTES
[facility]: Surgical Oncology Cancer Care Short Note                                     Discussion with Patient:                                                         OUTBOUND CALL:     Spoke with Melva to reassess bleeding at incision site. She stated she removed the dressing today and the bleeding has mostly resolved. She stated there is continues to be some bleeding at both ends of the incisions. She stated there is minimal bruising or swelling. She has replaced the dressing. Recommended Melva wit until site has completely healed before using Aquafor or Vaseline on incisions.     Discussed post-op visit is 3.5 weeks from time of surgery due to Dr. Allison's vacation and OR schedule. Reassured Melva if sutures are ready to be removed sooner, we can schedule her to see June Blanco PA-C.     Encouraged Melva to call with any additional questions or concerns. Direct contact number provided.     Ethel Cook RNCC  [facility]   Surgical Oncology     Approximately 5 minutes was spent in conversation with patient/care giver.

## 2024-04-15 SDOH — HEALTH STABILITY: PHYSICAL HEALTH: ON AVERAGE, HOW MANY MINUTES DO YOU ENGAGE IN EXERCISE AT THIS LEVEL?: 30 MIN

## 2024-04-15 SDOH — HEALTH STABILITY: PHYSICAL HEALTH: ON AVERAGE, HOW MANY DAYS PER WEEK DO YOU ENGAGE IN MODERATE TO STRENUOUS EXERCISE (LIKE A BRISK WALK)?: 2 DAYS

## 2024-04-15 ASSESSMENT — SOCIAL DETERMINANTS OF HEALTH (SDOH): HOW OFTEN DO YOU GET TOGETHER WITH FRIENDS OR RELATIVES?: THREE TIMES A WEEK

## 2024-04-15 NOTE — ADDENDUM NOTE
Addendum  created 04/15/24 1351 by Lamar Dickinson APRN CRNA    Flowsheet accepted, Intraprocedure Event edited

## 2024-04-16 DIAGNOSIS — E11.65 TYPE 2 DIABETES MELLITUS WITH HYPERGLYCEMIA, WITHOUT LONG-TERM CURRENT USE OF INSULIN (H): ICD-10-CM

## 2024-04-16 DIAGNOSIS — E66.812 CLASS 2 SEVERE OBESITY DUE TO EXCESS CALORIES WITH SERIOUS COMORBIDITY AND BODY MASS INDEX (BMI) OF 35.0 TO 35.9 IN ADULT (H): ICD-10-CM

## 2024-04-16 DIAGNOSIS — E66.01 CLASS 2 SEVERE OBESITY DUE TO EXCESS CALORIES WITH SERIOUS COMORBIDITY AND BODY MASS INDEX (BMI) OF 35.0 TO 35.9 IN ADULT (H): ICD-10-CM

## 2024-04-22 ENCOUNTER — OFFICE VISIT (OUTPATIENT)
Dept: INTERNAL MEDICINE | Facility: CLINIC | Age: 74
End: 2024-04-22
Payer: COMMERCIAL

## 2024-04-22 VITALS
RESPIRATION RATE: 18 BRPM | BODY MASS INDEX: 35.22 KG/M2 | HEIGHT: 67 IN | WEIGHT: 224.4 LBS | OXYGEN SATURATION: 97 % | TEMPERATURE: 96.8 F | HEART RATE: 85 BPM | DIASTOLIC BLOOD PRESSURE: 68 MMHG | SYSTOLIC BLOOD PRESSURE: 110 MMHG

## 2024-04-22 DIAGNOSIS — E78.5 HYPERLIPIDEMIA WITH TARGET LDL LESS THAN 100: ICD-10-CM

## 2024-04-22 DIAGNOSIS — C54.1 ENDOMETRIAL CANCER (H): ICD-10-CM

## 2024-04-22 DIAGNOSIS — I10 HTN, GOAL BELOW 140/90: ICD-10-CM

## 2024-04-22 DIAGNOSIS — C43.9 METASTATIC MALIGNANT MELANOMA (H): ICD-10-CM

## 2024-04-22 DIAGNOSIS — I26.99 BILATERAL PULMONARY EMBOLISM (H): ICD-10-CM

## 2024-04-22 DIAGNOSIS — E11.9 TYPE 2 DIABETES MELLITUS WITHOUT COMPLICATION, WITH LONG-TERM CURRENT USE OF INSULIN (H): ICD-10-CM

## 2024-04-22 DIAGNOSIS — Z79.4 TYPE 2 DIABETES MELLITUS WITHOUT COMPLICATION, WITH LONG-TERM CURRENT USE OF INSULIN (H): ICD-10-CM

## 2024-04-22 DIAGNOSIS — J84.89 ORGANIZING PNEUMONIA (H): ICD-10-CM

## 2024-04-22 DIAGNOSIS — Z00.00 ROUTINE GENERAL MEDICAL EXAMINATION AT A HEALTH CARE FACILITY: Primary | ICD-10-CM

## 2024-04-22 PROCEDURE — 99214 OFFICE O/P EST MOD 30 MIN: CPT | Mod: 25 | Performed by: INTERNAL MEDICINE

## 2024-04-22 PROCEDURE — G0439 PPPS, SUBSEQ VISIT: HCPCS | Performed by: INTERNAL MEDICINE

## 2024-04-22 RX ORDER — SEMAGLUTIDE 0.68 MG/ML
INJECTION, SOLUTION SUBCUTANEOUS
Qty: 3 ML | Refills: 0 | Status: SHIPPED | OUTPATIENT
Start: 2024-04-22 | End: 2024-06-24

## 2024-04-22 ASSESSMENT — PAIN SCALES - GENERAL: PAINLEVEL: NO PAIN (0)

## 2024-04-22 NOTE — TELEPHONE ENCOUNTER
Requested Prescriptions   Pending Prescriptions Disp Refills    OZEMPIC (0.25 OR 0.5 MG/DOSE) 2 MG/3ML pen [Pharmacy Med Name: Ozempic (0.25 or 0.5 MG/DOSE) 2 MG/3ML Subcutaneous Solution Pen-injector] 3 mL 0     Sig: INJECT 0.25MG SUBCUTANEOUSLY EVERY 7 DAYS       GLP-1 Agonists Protocol Passed - 4/21/2024  8:17 AM        Passed - HgbA1C in past 3 or 6 months     If HgbA1C is 8 or greater, it needs to be on file within the past 3 months.  If less than 8, must be on file within the past 6 months.     Recent Labs   Lab Test 02/27/24  1026   A1C 8.1*             Passed - Medication is active on med list        Passed - Has GFR on file in past 12 months and most recent value is normal        Passed - Recent (6 mo) or future (90 days) visit within the authorizing provider's specialty     The patient must have completed an in-person or virtual visit within the past 6 months or has a future visit scheduled within the next 90 days with the authorizing provider s specialty.  Urgent care and e-visits do not quality as an office visit for this protocol.          Passed - Medication indicated for associated diagnosis     Medication is associated with one or more of the following diagnoses:     Type 2 diabetes mellitus           Passed - Patient is age 18 or older        Passed - No active pregnancy on record        Passed - No positive pregnancy test in past 12 months

## 2024-04-22 NOTE — PROGRESS NOTES
FOLLOW-UP  Apr 24, 2024    Gricelda Sabillon is a 73 year old female who returns for a post-operative follow-up visit.    HPI:    History of recurrent melanoma of unknown primary. She underwent a wide local excision of right thigh melanoma x2 on 4/14/24 with Dr. Allison. She is 10 days post op.      She presents today for suture removal. She reports doing well.     BP (!) 157/78 (BP Location: Right arm, Patient Position: Sitting, Cuff Size: Adult Regular)   Pulse 79   Temp 97.7  F (36.5  C) (Oral)   Resp 16   Wt 101.9 kg (224 lb 11.2 oz)   LMP  (LMP Unknown)   SpO2 97%   BMI 35.19 kg/m     Physical Exam  Right thigh incisions x2 with sutures. No erythema or swelling. Some of the sutures were removed. At the superior end of the anterior incision one suture was kept in place to keep the skin edges approximated. At the interior end of the posterior incision one suture was kept in place to keep the skin edges approximated.     ASSESSMENT:    Gricelda Sabillon is a 73 year old female here today for suture removal. 2 suture were kept in place to keep the skin edges approximated and will need to be removed at her follow up appointment with Dr. Allison. She was recommended to apply Aquaphor or Vaseline to the incisions daily. She will follow up with Dr. Allison 5/3/24 as scheduled.     June Blanco PA-C
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
## 2024-04-22 NOTE — PROGRESS NOTES
Dr Torres's note    Patient's instructions / PLAN:                                                        Plan:  Continue same meds, same doses for now   2. Schedule a follow up appointment in Sept        ASSESSMENT & PLAN:                                                      Melva has chronic medical problems (DM, HTN,HLip) and she also had few challenges in the last years.  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  She was found with early stage of endometrial cancer and she is doing well after hysterectomy  Malignant melanoma: She was diagnosed with metastatic nodules in the right thigh but the primary was never found.  The nodules were excised and she was started on Keytruda treatment.  Unfortunately she developed pulmonary embolism and organizing pneumonia which required months of prednisone treatment.  She was recently found with more metastatic nodules on the right side.  She had extensive surgery and now she is facing the decision for Keytruda treatment.      (Z00.00) Routine general medical examination at a health care facility  (primary encounter diagnosis)  Comment:   Plan:     (I10) HTN, goal below 140/90  Comment: Controlled most of the time  Plan: Continue same medications    (E78.5) Hyperlipidemia with target LDL less than 100  Comment: Controlled  Plan: Continue same medications    (E11.9,  Z79.4) Type 2 diabetes mellitus without complication, with long-term current use of insulin (H)  Comment: Blood sugars in the good range on the CGM  Plan: Continue same medications, follow-up with endocrinology    (C43.9) Metastatic malignant melanoma (H)  -- R thigh   Comment: Recent surgery  Plan: Follow-up with oncology    (C54.1) Endometrial cancer (H)  Comment: Doing well after hysterectomy  Plan: Follow-up with oncology gynecology    (I26.99) Hx of Bilateral pulmonary embolism (H)  Comment: Off anticoagulation  Plan:      (J84.89) Hx of Organizing pneumonia (H) -- Aug 2022  Comment: Back to baseline  Plan: Follow-up with pulmonary       Chief Complaint:                                                        Annual exam  Follow up chronic medical problems      SUBJECTIVE:                                                    History of present illness     We reviewed the chronic medical problems as above.   I reviewed the recent tests results in Epic     DM  -- 85% in the range on CGM    HTN  -- Home blood pressure in the 130s range  -- Today she feels anxious because of the melanoma    March 2024 labs -- Discussed      ROS:     See below    General: Negative for fever, chills, major weight changes, fatigue  Skin: Positive for malignant melanoma  Eyes: Negative for blurred or double vision  ENT/mouth: Negative for sinuses discomfort, earache, sore throat  Respiratory: Negative for cough, wheezes, chronic lung disease  Cardiovascular: Negative for rest or exertional chest pain, shortness of breath, palpitations, leg edema,   Gastrointestinal: Negative for vomiting, abdominal pain, heartburn, blood in stool, diarrhea, constipation  Genitourinary: Negative for urinary frequency, blood in urine, history of kidney stones  Female: Negative for abnormal vaginal bleeding, vaginal discharge  Neuro: Negative for headaches, numbness, tingling, weakness in arms or legs, history of seizure, recent syncope  Psychiatry: Negative for depression, anxiety, suicidal thoughts  Endo: Negative for known thyroid disease, positive for diabetes.  Hemato/Lymph: Negative for nodes, easy bleeding, history of DVT, blood transfusion  Musculoskeletal: Negative for joint swelling, back pain      PMHx: - reviewed  Past Medical History:   Diagnosis Date    Asthma, mild intermittent     Cataract     Endometrial cancer (H) 06/2022    HTN, goal below 140/90     Hyperlipidemia LDL goal < 100     Macular hole of left eye     Melanoma (H)     RIGHT KNEE    Sleep apnea      uses c pap    Type 2 diabetes, HbA1C goal < 8% (H)        PSHx: reviewed  Past Surgical History:   Procedure Laterality Date    ARTHROPLASTY HIP Right 9/25/2017    Procedure: ARTHROPLASTY HIP;  Right total hip arthroplasty  ;  Surgeon: Ayaan Pena MD;  Location: [facility]    ARTHROPLASTY KNEE  7/12/2013    Procedure: ARTHROPLASTY KNEE;  right total knee arthroplasty ;  Surgeon: Chaparro Wesley MD;  Location: [facility]    ARTHROSCOPY KNEE Right 1/21/2015    Procedure: ARTHROSCOPY KNEE;  Surgeon: Ayaan Pena MD;  Location: [facility]    BRONCHOSCOPY (RIGID OR FLEXIBLE), DIAGNOSTIC N/A 8/11/2022    Procedure: BRONCHOSCOPY, WITH BRONCHOALVEOLAR LAVAGE;  Surgeon: Roselia Sosa MD;  Location:  GI    C INCISION OF HYMEN      CATARACT IOL, RT/LT      COLONOSCOPY  2008    COLONOSCOPY N/A 4/18/2019    Procedure: Colonoscopy with polypectomy;  Surgeon: Shaun Guerrero MD;  [facility]    CYSTOSCOPY N/A 6/23/2022    Procedure: Cystoscopy;  Surgeon: Eli Guidry MD;  [facility]    ENDOSCOPIC RETROGRADE CHOLANGIOPANCREATOGRAM N/A 2/6/2019    Procedure: COMBINED ENDOSCOPIC RETROGRADE CHOLANGIOPANCREATOGRAPHY, BILIARY SPINCTEROTOMY AND DILATION, PLACE BILE DUCT STENT;  Surgeon: Guru Andie Gonzalez MD;  [facility]    ENDOSCOPIC RETROGRADE CHOLANGIOPANCREATOGRAM N/A 2/28/2019    Procedure: Endoscopic Retrograde Cholangiopancreatogram, Bile duct stent removal and placement;  Surgeon: Shaun Guerrero MD;  [facility]    ENDOSCOPIC RETROGRADE CHOLANGIOPANCREATOGRAM N/A 4/18/2019    Procedure: COMBINED ENDOSCOPIC RETROGRADE CHOLANGIOPANCREATOGRAPHY, Bile duct stent exchange and Polypectomy;  Surgeon: Shaun Guerrero MD;  [facility]    ENDOSCOPIC RETROGRADE CHOLANGIOPANCREATOGRAM N/A 10/18/2019    Procedure: Endoscopic Retrograde Cholangiopancreatogram;  Surgeon: Shaun Guerrero MD;  [facility]    ENDOSCOPIC RETROGRADE CHOLANGIOPANCREATOGRAM N/A 3/24/2022     Procedure: ENDOSCOPIC RETROGRADE CHOLANGIOPANCREATOGRAPHY;  Surgeon: Shaun Guerrero MD;  Location:  OR    ENDOSCOPIC RETROGRADE CHOLANGIOPANCREATOGRAM N/A 3/16/2023    Procedure: endoscopic retrograde cholangiopancreatography with minor papillotomy;  Surgeon: Shaun Guerrero MD;  Location:  OR    ENDOSCOPIC RETROGRADE CHOLANGIOPANCREATOGRAM N/A 3/28/2024    Procedure: ENDOSCOPIC RETROGRADE CHOLANGIOPANCREATOGRAPHY;  Surgeon: Shaun Guerrero MD;  [facility]: [facility]    ENDOSCOPIC RETROGRADE CHOLANGIOPANCREATOGRAM WITH SPYGLASS N/A 7/26/2019    Procedure: Endoscopic Retrograde Cholangiopancreatogram With Spyglas,l Radiofrequency Ablation, Stent Exchangeand Duodenal Biopsy x2;  Surgeon: Shaun Guerrero MD;  [facility]    ENDOSCOPIC RETROGRADE CHOLANGIOPANCREATOGRAM WITH SPYGLASS N/A 9/10/2020    Procedure: ENDOSCOPIC RETROGRADE CHOLANGIOPANCREATOGRAPHY, WITH DIRECT DUCT VISUALIZATION, USING PANCREATICOBILIARY FIBEROPTIC PROBE;  Surgeon: Shaun Guerrero MD;  [facility]    ENDOSCOPIC RETROGRADE CHOLANGIOPANCREATOGRAM WITH SPYGLASS N/A 3/22/2021    Procedure: ENDOSCOPIC RETROGRADE CHOLANGIOPANCREATOGRAPHY, WITH DIRECT DUCT VISUALIZATION, USING PANCREATICOBILIARY FIBEROPTIC PROBE;  Surgeon: Shaun Guerrero MD;  [facility]    ESOPHAGOSCOPY, GASTROSCOPY, DUODENOSCOPY (EGD) WITH RADIO FREQUENCY ABLATION, COMBINED N/A 9/10/2020    Procedure: possible repeat ESOPHAGOGASTRODUODENOSCOPY, WITH RADIOFREQUENCY ABLATION and endoscopic mucosal resection;  Surgeon: Shaun Guerrero MD;  [facility]    ESOPHAGOSCOPY, GASTROSCOPY, DUODENOSCOPY (EGD), COMBINED N/A 4/18/2019    Procedure: upper endoscopy with polypectomy;  Surgeon: Shaun Guerrero MD;  [facility]    ESOPHAGOSCOPY, GASTROSCOPY, DUODENOSCOPY (EGD), COMBINED N/A 10/18/2019    Procedure: Upper Endoscopy and ERCP with stent removal, stone removal and biopsy;  Surgeon: Shaun Guerrero MD;  [facility]    ESOPHAGOSCOPY, GASTROSCOPY, DUODENOSCOPY (EGD), COMBINED  N/A 3/24/2022    Procedure: ESOPHAGOGASTRODUODENOSCOPY (EGD), Duodenal  polyp removal;  Surgeon: Shaun Guerrero MD;  [facility]    ESOPHAGOSCOPY, GASTROSCOPY, DUODENOSCOPY (EGD), COMBINED N/A 3/16/2023    Procedure: ESOPHAGOGASTRODUODENOSCOPY (EGD);  Surgeon: Shaun Guerrero MD;  [facility]    ESOPHAGOSCOPY, GASTROSCOPY, DUODENOSCOPY (EGD), RESECT MUCOSA, COMBINED N/A 2/28/2019    Procedure: Upper Endoscopy, Endoscopic Ultrasound, Endoscopic Mucosal Resection,  Ampullectomy, polypectomy.;  Surgeon: Shaun Guerrero MD;  [facility]    ESOPHAGOSCOPY, GASTROSCOPY, DUODENOSCOPY (EGD), RESECT MUCOSA, COMBINED N/A 3/22/2021    Procedure: ESOPHAGOGASTRODUODENOSCOPY (EGD) with  endoscopic mucosal resection/ polypectomy;  Surgeon: Shaun Guerrero MD;  [facility]    EXCISE LESION LOWER EXTREMITY Right 3/28/2022    Procedure: Wide local excision of right knee melanoma;  Surgeon: Chevy Allison MD;  Location: [facility] OR    EXCISE MASS LOWER EXTREMITY Right 1/13/2022    Procedure: excision of right thigh mass;  Surgeon: Salvador Kelly MD;  Location: [facility]    EXCISE MASS LOWER EXTREMITY Right 4/10/2024    Procedure: WIDE EXCISION OF RIGHT THIGH MELANOMA;  Surgeon: Chevy Allison MD;  Location: [facility] OR    EYE SURGERY      macular hole repaired left eye    HC KNEE SCOPE, DIAGNOSTIC      - both knees    HEAD & NECK SURGERY      wisdom teeth    IR CHEST PORT PLACEMENT > 5 YRS OF AGE  5/2/2022    LAPAROSCOPIC HYSTERECTOMY TOTAL, BILATERAL SALPINGO-OOPHORECTOMY, NODE DISSECTION, COMBINED Bilateral 6/23/2022    Procedure: Total Laparoscopic Hyserectomy, Bilateral Salpibgo-oophorectomy, Bilateral [place] Lymph Node Dissection;  Surgeon: Eli Guidry MD;  [facility]        Soc Hx: No daily alcohol, no smoking  Social History     Socioeconomic History    Marital status:      Spouse name: Allen    Number of children: 3    Years of education: 15    Highest education level: Not on file    Occupational History    Not on file   Tobacco Use    Smoking status: Never     Passive exposure: Never    Smokeless tobacco: Never   Vaping Use    Vaping status: Never Used   Substance and Sexual Activity    Alcohol use: No     Alcohol/week: 0.0 standard drinks of alcohol    Drug use: No    Sexual activity: Not Currently     Partners: Male   Other Topics Concern    Parent/sibling w/ CABG, MI or angioplasty before 65F 55M? Not Asked   Social History Narrative    Not on file     Social Determinants of Health     Financial Resource Strain: Low Risk  (4/15/2024)    Financial Resource Strain     Within the past 12 months, have you or your family members you live with been unable to get utilities (heat, electricity) when it was really needed?: No   Food Insecurity: Low Risk  (4/15/2024)    Food Insecurity     Within the past 12 months, did you worry that your food would run out before you got money to buy more?: No     Within the past 12 months, did the food you bought just not last and you didn t have money to get more?: No   Transportation Needs: Low Risk  (4/15/2024)    Transportation Needs     Within the past 12 months, has lack of transportation kept you from medical appointments, getting your medicines, non-medical meetings or appointments, work, or from getting things that you need?: No   Physical Activity: Insufficiently Active (4/15/2024)    Exercise Vital Sign     Days of Exercise per Week: 2 days     Minutes of Exercise per Session: 30 min   Stress: No Stress Concern Present (4/15/2024)    [employer] - Occupational Stress Questionnaire     Feeling of Stress : Not at all   Social Connections: Unknown (4/15/2024)    Social Connection and Isolation Panel [NHANES]     Frequency of Communication with Friends and Family: Not on file     Frequency of Social Gatherings with Friends and Family: Three times a week     Attends Yazdanism Services: Not on file     Active Member of Clubs or  "Organizations: Not on file     Attends Club or Organization Meetings: Not on file     Marital Status: Not on file   Interpersonal Safety: Low Risk  (2024)    Interpersonal Safety     Do you feel physically and emotionally safe where you currently live?: Yes     Within the past 12 months, have you been hit, slapped, kicked or otherwise physically hurt by someone?: No     Within the past 12 months, have you been humiliated or emotionally abused in other ways by your partner or ex-partner?: No   Housing Stability: Low Risk  (4/15/2024)    Housing Stability     Do you have housing? : Yes     Are you worried about losing your housing?: No        Fam Hx: reviewed  Family History   Problem Relation Age of Onset    Hypertension Mother         diabetes,hypoythryoidism, stroke    Diabetes Mother     Breast Cancer Mother     Heart Disease Father         , cancer lip    Uterine Cancer Sister     Connective Tissue Disorder Sister         fibromyalgia    Diabetes Sister     Hypertension Brother     Cerebrovascular Disease Maternal Grandmother         , diabetes    Cerebrovascular Disease Maternal Grandfather             Cancer Paternal Grandmother             Heart Disease Paternal Grandfather             Cancer Paternal Aunt         ovarian    Breast Cancer Niece     Asthma Maternal Aunt          Screening: reviewed      All: reviewed    Meds: reviewed  Current Outpatient Medications   Medication Sig Dispense Refill    atenolol (TENORMIN) 50 MG tablet Take 1 tablet (50 mg) by mouth daily 90 tablet 1    atorvastatin (LIPITOR) 40 MG tablet Take 1 tablet (40 mg) by mouth daily 90 tablet 1    BD VEO INSULIN SYRINGE U/F 31G X 15/64\" 0.5 ML USE DAILY WITH NPH INSULIN 100 each 1    cetirizine (ZYRTEC) 10 MG tablet Take 10 mg by mouth every morning      Continuous Blood Gluc Sensor (FREESTYLE DAVID 3 SENSOR) MISC 1 Device every 14 days 2 each 3    CONTOUR NEXT TEST test strip USE 1 STRIP TO " "CHECK GLUCOSE ONCE DAILY 100 strip 4    gentamicin (GARAMYCIN) 0.1 % external ointment Apply topically 3 times daily      glipiZIDE (GLUCOTROL XL) 10 MG 24 hr tablet Take 1 tablet (10 mg) by mouth daily 90 tablet 1    hydrochlorothiazide (HYDRODIURIL) 25 MG tablet Take 1 tablet (25 mg) by mouth every morning 90 tablet 1    insulin glargine (LANTUS VIAL) 100 UNIT/ML vial Inject 44 Units Subcutaneous at bedtime 15 mL 2    insulin lispro (HUMALOG) 100 UNIT/ML vial INJECT 5 UNITS SUBCUTANEOUSLY THREE TIMES DAILY BEFORE MEALS 10 mL 0    insulin pen needle (MM PEN NEEDLES) 32G X 4 MM miscellaneous Inject insulin 4 times a day 300 each 1    insulin syringe-needle U-100 (BD INSULIN SYRINGE ULTRAFINE) 31G X 5/16\" 0.5 ML miscellaneous Use daily with NPH insulin 100 each 11    insulin syringe-needle U-100 (BD VEO INSULIN SYRINGE U/F) 31G X 15/64\" 0.3 ML Patient has 4 shots of insulin a day 300 each 1    latanoprost (XALATAN) 0.005 % ophthalmic solution Place 1 drop into both eyes At Bedtime  2.5 mL 1    lisinopril (ZESTRIL) 5 MG tablet Take 1 tablet (5 mg) by mouth daily 90 tablet 1    LORazepam (ATIVAN) 0.5 MG tablet Take 1 tablet (0.5 mg) by mouth nightly as needed for anxiety or sleep 30 tablet 0    mupirocin (BACTROBAN) 2 % external ointment Apply topically 3 times daily      netarsudil (RHOPRESSA) 0.02 % ophthalmic solution Place 1 drop Into the left eye at bedtime      OZEMPIC, 0.25 OR 0.5 MG/DOSE, 2 MG/3ML pen INJECT 0.25MG SUBCUTANEOUSLY EVERY 7 DAYS 3 mL 0    senna-docusate (SENOKOT-S/PERICOLACE) 8.6-50 MG tablet Take 2 tablets by mouth daily as needed for constipation 60 tablet 11    timolol maleate (TIMOPTIC) 0.5 % ophthalmic solution INSTILL 1 DROP INTO EACH EYE TWICE DAILY      triamcinolone (KENALOG) 0.5 % external ointment Apply 1 g topically daily as needed for irritation      XARELTO ANTICOAGULANT 20 MG TABS tablet Take 1 tablet (20 mg) by mouth daily 90 tablet 1       OBJECTIVE:                                " "                    Physical Exam :  Blood pressure (!) 147/73, pulse 85, temperature 96.8  F (36  C), temperature source Tympanic, resp. rate 18, height 1.702 m (5' 7\"), weight 101.8 kg (224 lb 6.4 oz), SpO2 97%, not currently breastfeeding.     NAD, appears comfortable  Skin clear, no rashes, postsurgical scar healed well  Neck: supple, no JVD,  no thyroidmegaly  Lymph nodes non palpable in the cervical, supraclavicular axillaries,   Chest: clear to auscultation with good respiratory effort  Cardiac: S1S2, RRR, no mgr appreciated  Abdomen: soft, not tender, not distended, audible bowel sound, no hepatosplenomegaly, no palpable masses, no abdominal bruits  Extremities: no cyanosis, clubbing or edema.   Neuro: A, Ox3, no focal signs.  Breast exam in supine and erect position: they are symmetrical, no skin changes, no tenderness or nodes on palpation. Nipples are erect, no skin lesions, no discharge on pressure.    Pelvic exam: deferred, s/p hysterectomy        Patient has been advised of split billing requirements and indicates understanding: Yes.  At the check in, at the      Raisa Torres MD  Internal Medicine       #################################  Preventive Care Visit  [facility]  Raisa Davidson MD, Internal Medicine  Apr 22, 2024          Subjective   Melva is a 73 year old, presenting for the following:  Wellness Visit        4/22/2024    12:06 PM   Additional Questions   Roomed by Thais Cadet         Health Care Directive  Patient does not have a Health Care Directive or Living Will: Discussed advance care planning with patient; however, patient declined at this time.    HPI      Diabetes Follow-up    How often are you checking your blood sugar? Continuous glucose monitor  What time of day are you checking your blood sugars (select all that apply)?  Before and after meals  Have you had any blood sugars above 200?  Yes   Have you had any blood sugars below " 70?  No  What symptoms do you notice when your blood sugar is low?  None  What concerns do you have today about your diabetes? None   Do you have any of these symptoms? (Select all that apply)  No symptoms          Hyperlipidemia Follow-Up    Are you regularly taking any medication or supplement to lower your cholesterol?   Yes- atorvastatin  Are you having muscle aches or other side effects that you think could be caused by your cholesterol lowering medication?  Yes- muscle aches    Hypertension Follow-up    Do you check your blood pressure regularly outside of the clinic? Yes   Are you following a low salt diet? Yes  Are your blood pressures ever more than 140 on the top number (systolic) OR more   than 90 on the bottom number (diastolic), for example 140/90? No    BP Readings from Last 2 Encounters:   04/22/24 (!) 147/73   04/10/24 (!) 146/83     Hemoglobin A1C (%)   Date Value   02/27/2024 8.1 (H)   11/21/2023 7.8 (H)   03/03/2021 7.1 (H)   08/24/2020 7.2 (H)     LDL Cholesterol Calculated (mg/dL)   Date Value   03/04/2024 71   04/13/2023 74   03/03/2021 91   02/20/2020 88           4/15/2024   General Health   How would you rate your overall physical health? (!) FAIR   Feel stress (tense, anxious, or unable to sleep) Not at all         4/15/2024   Nutrition   Diet: Diabetic         4/15/2024   Exercise   Days per week of moderate/strenous exercise 2 days   Average minutes spent exercising at this level 30 min   (!) EXERCISE CONCERN      4/15/2024   Social Factors   Frequency of gathering with friends or relatives Three times a week   Worry food won't last until get money to buy more No   Food not last or not have enough money for food? No   Do you have housing?  Yes   Are you worried about losing your housing? No   Lack of transportation? No   Unable to get utilities (heat,electricity)? No         4/22/2024   Fall Risk   Gait Speed Test (Document in seconds) 6          4/15/2024   Activities of Daily Living-  Home Safety   Needs help with the following daily activites None of the above   Safety concerns in the home None of the above         4/15/2024   Dental   Dentist two times every year? Yes         4/15/2024   Hearing Screening   Hearing concerns? None of the above         4/15/2024   Driving Risk Screening   Patient/family members have concerns about driving No         4/15/2024   General Alertness/Fatigue Screening   Have you been more tired than usual lately? (!) YES         4/15/2024   Urinary Incontinence Screening   Bothered by leaking urine in past 6 months No         4/15/2024   TB Screening   Were you born outside of the [place]? No         Today's PHQ-2 Score:       4/22/2024    11:04 AM   PHQ-2 ( 1999 [employer])   Q1: Little interest or pleasure in doing things 0   Q2: Feeling down, depressed or hopeless 0   PHQ-2 Score 0   Q1: Little interest or pleasure in doing things Not at all   Q2: Feeling down, depressed or hopeless Not at all   PHQ-2 Score 0           4/15/2024   Substance Use   Alcohol more than 3/day or more than 7/wk Not Applicable   Do you have a current opioid prescription? No   How severe/bad is pain from 1 to 10? 4/10   Do you use any other substances recreationally? No     Social History     Tobacco Use    Smoking status: Never     Passive exposure: Never    Smokeless tobacco: Never   Vaping Use    Vaping status: Never Used   Substance Use Topics    Alcohol use: No     Alcohol/week: 0.0 standard drinks of alcohol    Drug use: No           5/24/2023   LAST FHS-7 RESULTS   1st degree relative breast or ovarian cancer Yes   Any relative bilateral breast cancer No   Any male have breast cancer No   Any ONE woman have BOTH breast AND ovarian cancer No   Any woman with breast cancer before 50yrs No   2 or more relatives with breast AND/OR ovarian cancer Yes   2 or more relatives with breast AND/OR bowel cancer No            ASCVD Risk   The 10-year ASCVD risk score (Rosibel MATIAS, et al., 2019) is:  44.2%    Values used to calculate the score:      Age: 73 years      Sex: Female      Is Non- : No      Diabetic: Yes      Tobacco smoker: No      Systolic Blood Pressure: 147 mmHg      Is BP treated: Yes      HDL Cholesterol: 33 mg/dL      Total Cholesterol: 150 mg/dL            Reviewed and updated as needed this visit by Provider                    Do you have a current opioid prescription? No  Do you use any other controlled substances or medications that are not prescribed by a provider? None        Current providers sharing in care for this patient include:  Patient Care Team:  Raisa Davidson MD as PCP - General (Internal Medicine)  Raisa Davidson MD as Assigned PCP  Tanya Sheehan RD as Diabetes Educator (Dietitian, Registered)  Chevy Allison MD as MD (Surgery)  Yarelis Mitchell RN as Specialty Care Coordinator (Gynecologic Oncology)  Roselia Sosa MD as Assigned Pulmonology Provider  Arlet Harding [facility] as Assigned MTM Pharmacist  Shaun Guerrero MD as MD (Gastroenterology)  Aishwarya Reese RN as Registered Nurse  Clara Corado MD as Hospitalist (Endocrinology, Diabetes, and Metabolism)  Lili Ahuja RD as Diabetes Educator  Riley Lyons MD as Assigned Surgical Provider  Danisha Villa PA-C as Physician Assistant (Endocrinology, Diabetes, and Metabolism)  Eli Guidry MD as Assigned Cancer Care Provider  Danisha Villa PA-C as Assigned Endocrinology Provider    The following health maintenance items are reviewed in Epic and correct as of today:  Health Maintenance   Topic Date Due    URINE DRUG SCREEN  Never done    ASTHMA ACTION PLAN  08/27/2019    ANNUAL REVIEW OF HM ORDERS  04/20/2024    MEDICARE ANNUAL WELLNESS VISIT  04/20/2024    DIABETIC FOOT EXAM  08/10/2024    A1C  08/27/2024    ASTHMA CONTROL TEST  09/11/2024    EYE EXAM  09/17/2024    LIPID  03/04/2025    MICROALBUMIN  03/04/2025    BMP   "03/28/2025    FALL RISK ASSESSMENT  04/22/2025    ADVANCE CARE PLANNING  04/20/2028    COLORECTAL CANCER SCREENING  04/18/2029    DTAP/TDAP/TD IMMUNIZATION (4 - Td or Tdap) 07/27/2031    DEXA  01/20/2035    HEPATITIS C SCREENING  Completed    PHQ-2 (once per calendar year)  Completed    INFLUENZA VACCINE  Completed    Pneumococcal Vaccine: 65+ Years  Completed    RSV VACCINE (Pregnancy & 60+)  Completed    COVID-19 Vaccine  Completed    IPV IMMUNIZATION  Aged Out    HPV IMMUNIZATION  Aged Out    MENINGITIS IMMUNIZATION  Aged Out    RSV MONOCLONAL ANTIBODY  Aged Out    MAMMO SCREENING  Discontinued    ZOSTER IMMUNIZATION  Discontinued            Objective    Exam  BP (!) 147/73   Pulse 85   Temp 96.8  F (36  C) (Tympanic)   Resp 18   Ht 1.702 m (5' 7\")   Wt 101.8 kg (224 lb 6.4 oz)   LMP  (LMP Unknown)   SpO2 97%   Breastfeeding No   BMI 35.15 kg/m     Estimated body mass index is 35.15 kg/m  as calculated from the following:    Height as of this encounter: 1.702 m (5' 7\").    Weight as of this encounter: 101.8 kg (224 lb 6.4 oz).    Physical Exam          4/22/2024   Mini Cog   Clock Draw Score 2 Normal   3 Item Recall 3 objects recalled   Mini Cog Total Score 5              Signed Electronically by: Raisa Davidson MD    "

## 2024-04-24 ENCOUNTER — ONCOLOGY VISIT (OUTPATIENT)
Dept: SURGERY | Facility: CLINIC | Age: 74
End: 2024-04-24
Attending: PHYSICIAN ASSISTANT
Payer: COMMERCIAL

## 2024-04-24 VITALS
OXYGEN SATURATION: 97 % | BODY MASS INDEX: 35.19 KG/M2 | HEART RATE: 79 BPM | RESPIRATION RATE: 16 BRPM | TEMPERATURE: 97.7 F | DIASTOLIC BLOOD PRESSURE: 78 MMHG | WEIGHT: 224.7 LBS | SYSTOLIC BLOOD PRESSURE: 157 MMHG

## 2024-04-24 DIAGNOSIS — C43.9 MELANOMA OF SKIN (H): Primary | ICD-10-CM

## 2024-04-24 PROCEDURE — G0463 HOSPITAL OUTPT CLINIC VISIT: HCPCS | Performed by: PHYSICIAN ASSISTANT

## 2024-04-24 PROCEDURE — 99024 POSTOP FOLLOW-UP VISIT: CPT | Performed by: PHYSICIAN ASSISTANT

## 2024-04-24 ASSESSMENT — PAIN SCALES - GENERAL: PAINLEVEL: NO PAIN (0)

## 2024-04-24 NOTE — LETTER
4/24/2024       RE: Gricelda Sabillon  [address]  [facility] [address]    Dear Colleague,    Thank you for referring your patient, Gricelda Sabillon, to the [facility]. Please see a copy of my visit note below.    FOLLOW-UP  Apr 24, 2024    Gricelda Sabillon is a 73 year old female who returns for a post-operative follow-up visit.    HPI:    History of recurrent melanoma of unknown primary. She underwent a wide local excision of right thigh melanoma x2 on 4/14/24 with Dr. Allison. She is 10 days post op.      She presents today for suture removal. She reports doing well.     BP (!) 157/78 (BP Location: Right arm, Patient Position: Sitting, Cuff Size: Adult Regular)   Pulse 79   Temp 97.7  F (36.5  C) (Oral)   Resp 16   Wt 101.9 kg (224 lb 11.2 oz)   LMP  (LMP Unknown)   SpO2 97%   BMI 35.19 kg/m     Physical Exam  Right thigh incisions x2 with sutures. No erythema or swelling. Some of the sutures were removed. At the superior end of the anterior incision one suture was kept in place to keep the skin edges approximated. At the interior end of the posterior incision one suture was kept in place to keep the skin edges approximated.     ASSESSMENT:    Gricelda Sabillon is a 73 year old female here today for suture removal. 2 suture were kept in place to keep the skin edges approximated and will need to be removed at her follow up appointment with Dr. Allison. She was recommended to apply Aquaphor or Vaseline to the incisions daily. She will follow up with Dr. Allison 5/3/24 as scheduled.     June Blanco PA-C

## 2024-04-24 NOTE — NURSING NOTE
"Oncology Rooming Note    April 24, 2024 9:34 AM   Gricelda Sabillon is a 73 year old female who presents for:    Chief Complaint   Patient presents with    Oncology Clinic Visit     RTN for Suture Removal     Initial Vitals: BP (!) 157/78 (BP Location: Right arm, Patient Position: Sitting, Cuff Size: Adult Regular)   Pulse 79   Temp 97.7  F (36.5  C) (Oral)   Resp 16   Wt 101.9 kg (224 lb 11.2 oz)   LMP  (LMP Unknown)   SpO2 97%   BMI 35.19 kg/m   Estimated body mass index is 35.19 kg/m  as calculated from the following:    Height as of 4/22/24: 1.702 m (5' 7\").    Weight as of this encounter: 101.9 kg (224 lb 11.2 oz). Body surface area is 2.19 meters squared.  No Pain (0) Comment: Data Unavailable   No LMP recorded (lmp unknown). Patient is postmenopausal.  Allergies reviewed: Yes  Medications reviewed: Yes    Medications: Medication refills not needed today.  Pharmacy name entered into EPIC:    [place] - A MAIL ORDER [id number] - [place], [place] - [address] [place], [place] - [address] [facility] - [place], [place] - [address]    Frailty Screening:   Is the patient here for a new oncology consult visit in cancer care? 2. No      Clinical concerns:  None      Henry Uribe"
bilateral upper extremity ROM was WFL (within functional limits)/bilateral lower extremity ROM was WFL (within functional limits)

## 2024-04-29 ENCOUNTER — TRANSFERRED RECORDS (OUTPATIENT)
Dept: HEALTH INFORMATION MANAGEMENT | Facility: CLINIC | Age: 74
End: 2024-04-29
Payer: COMMERCIAL

## 2024-04-30 ENCOUNTER — PATIENT OUTREACH (OUTPATIENT)
Dept: ONCOLOGY | Facility: CLINIC | Age: 74
End: 2024-04-30
Payer: COMMERCIAL

## 2024-04-30 NOTE — CONFIDENTIAL NOTE
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    I would support rechallenge /restart Keytruda if Dr Florez recommends it as the best/only treatment choice.     My recommendations for doing this safely are close monitoring:  - CRP at baseline and every 2-4 weeks  - monitor SpO2 at baseline and every clinical encounter (consider patient oximeter for home monitoring)  - CT chest after 2 doses of Keytruda     Source:  https://jamanetwork.com/journals/jamaoncology/fullarticle/6230898

## 2024-04-30 NOTE — PROGRESS NOTES
"Patient called writer to update our team that she has had a reoccurrence of her melanoma cancer. Patient states that her primary oncologist, Dr. Florez, is discussing the possibility of having her start Keytruda again. Patient indicates that she \"had a lot of trouble with my lungs when I was on the Keytruda in 2023, and Dr. Florez wants to get Dr. Sosa's thoughts about me going back on it\". Patient reports that she thinks Dr. Florez and Dr. Allison, her surgeon, have/will be reaching out to Dr. Sosa directly as well. Patient is inquiring about getting an appointment with Dr. Sosa here in [place]. Writer relayed that the next opening Dr. Sosa has at this clinic is 7/15/24. Writer advised patient to follow up with Dr. Florez to inquire if this time-lime would be acceptable. Writer will also route to Dr. Sosa to inquire if a sooner date would be possible to get patient scheduled.     Kasia Suggs RN, BSN, PHN, OCN     4/30/2024 at 10:22 AM  Nurse Care Coordinator  [facility]  P: [phone]   F: [phone]    ADDENDUM    Per secure chat in Epic, Dr. Sosa connected with Dr. Florez/her team directly with her recommendations. Writer called patient to update her of this. Patient to follow up with Dr. Florez directly to inquire if she still needs an appointment with Dr. Sosa. Patient aware to contact our team to assist with scheduling, if an appointment is recommended by Dr. Florez.     Kasia Suggs RN, BSN, PHN, OCN     5/1/2024 at 2:46 PM  Nurse Care Coordinator  [facility]  P: [phone]   F: [phone]    "

## 2024-05-03 ENCOUNTER — ONCOLOGY VISIT (OUTPATIENT)
Dept: ONCOLOGY | Facility: CLINIC | Age: 74
End: 2024-05-03
Attending: SURGERY
Payer: COMMERCIAL

## 2024-05-03 VITALS
TEMPERATURE: 97.9 F | SYSTOLIC BLOOD PRESSURE: 124 MMHG | OXYGEN SATURATION: 95 % | BODY MASS INDEX: 35.08 KG/M2 | HEART RATE: 75 BPM | RESPIRATION RATE: 16 BRPM | WEIGHT: 224 LBS | DIASTOLIC BLOOD PRESSURE: 78 MMHG

## 2024-05-03 DIAGNOSIS — C43.9 METASTATIC MALIGNANT MELANOMA (H): Primary | ICD-10-CM

## 2024-05-03 ASSESSMENT — PAIN SCALES - GENERAL: PAINLEVEL: NO PAIN (0)

## 2024-05-03 NOTE — PROGRESS NOTES
Melva is here for postoperative visit after undergoing excision of 2 metastatic melanoma lesions in her right thigh.  The more lateral lesion did have a positive margin.  Her incision is healing well with no evidence of infection or removed her last remaining sutures.  She has a follow-up with her oncologist.  I talked her about the lateral margin being positive.  I do not think she would benefit by a reexcision.  I think this will continue to come back and I do not think that chasing microscopic disease is going to have an impact on her outcomes.  Additionally she may have a fair amount of morbidity from her reexcision of skin closure that is already tight.  I will see her in the future if any local regional recurrence is occur.

## 2024-05-03 NOTE — LETTER
5/3/2024         RE: Gricelda Sabillon  [address]  [facility] [address]        Dear Colleague,    Thank you for referring your patient, Gricelda Sabillon, to the [facility]. Please see a copy of my visit note below.    Melva is here for postoperative visit after undergoing excision of 2 metastatic melanoma lesions in her right thigh.  The more lateral lesion did have a positive margin.  Her incision is healing well with no evidence of infection or removed her last remaining sutures.  She has a follow-up with her oncologist.  I talked her about the lateral margin being positive.  I do not think she would benefit by a reexcision.  I think this will continue to come back and I do not think that chasing microscopic disease is going to have an impact on her outcomes.  Additionally she may have a fair amount of morbidity from her reexcision of skin closure that is already tight.  I will see her in the future if any local regional recurrence is occur.    Sincerely,        Chevy Allison MD

## 2024-05-03 NOTE — NURSING NOTE
"Oncology Rooming Note    May 3, 2024 9:32 AM   Gricelda Sabillon is a 74 year old female who presents for:    Chief Complaint   Patient presents with    Oncology Clinic Visit     Metastatic Malignant Melanoma     Initial Vitals: /78 (BP Location: Left arm, Patient Position: Sitting, Cuff Size: Adult Large)   Pulse 75   Temp 97.9  F (36.6  C) (Tympanic)   Resp 16   Wt 101.6 kg (224 lb)   LMP  (LMP Unknown)   SpO2 95%   BMI 35.08 kg/m   Estimated body mass index is 35.08 kg/m  as calculated from the following:    Height as of 4/22/24: 1.702 m (5' 7\").    Weight as of this encounter: 101.6 kg (224 lb). Body surface area is 2.19 meters squared.  No Pain (0) Comment: Data Unavailable   No LMP recorded (lmp unknown). Patient is postmenopausal.  Allergies reviewed: Yes  Medications reviewed: Yes    Medications: Medication refills not needed today.  Pharmacy name entered into EPIC:    [place] - [facility] - [place], [place] - [address] [place], [place] - [address] [facility] - [place], [place] - [address]    Frailty Screening:   Is the patient here for a new oncology consult visit in cancer care? 2. No      Clinical concerns: Suture Removal       Elle Higginbotham LPN  5/3/2024              "

## 2024-05-17 ENCOUNTER — TRANSFERRED RECORDS (OUTPATIENT)
Dept: HEALTH INFORMATION MANAGEMENT | Facility: CLINIC | Age: 74
End: 2024-05-17
Payer: COMMERCIAL

## 2024-06-03 ENCOUNTER — TRANSFERRED RECORDS (OUTPATIENT)
Dept: HEALTH INFORMATION MANAGEMENT | Facility: CLINIC | Age: 74
End: 2024-06-03
Payer: COMMERCIAL

## 2024-06-04 ENCOUNTER — HOSPITAL ENCOUNTER (OUTPATIENT)
Dept: MAMMOGRAPHY | Facility: CLINIC | Age: 74
Discharge: HOME | End: 2024-06-04
Attending: INTERNAL MEDICINE | Admitting: INTERNAL MEDICINE
Payer: COMMERCIAL

## 2024-06-04 DIAGNOSIS — Z12.31 VISIT FOR SCREENING MAMMOGRAM: ICD-10-CM

## 2024-06-04 PROCEDURE — 77063 BREAST TOMOSYNTHESIS BI: CPT

## 2024-06-13 ENCOUNTER — ONCOLOGY VISIT (OUTPATIENT)
Dept: ONCOLOGY | Facility: CLINIC | Age: 74
End: 2024-06-13
Attending: NURSE PRACTITIONER
Payer: COMMERCIAL

## 2024-06-13 VITALS
HEART RATE: 92 BPM | TEMPERATURE: 97.7 F | WEIGHT: 223 LBS | RESPIRATION RATE: 18 BRPM | SYSTOLIC BLOOD PRESSURE: 127 MMHG | OXYGEN SATURATION: 97 % | DIASTOLIC BLOOD PRESSURE: 78 MMHG | BODY MASS INDEX: 34.93 KG/M2

## 2024-06-13 DIAGNOSIS — C54.1 ENDOMETRIAL CANCER (H): Primary | ICD-10-CM

## 2024-06-13 DIAGNOSIS — E11.65 TYPE 2 DIABETES MELLITUS WITH HYPERGLYCEMIA, WITHOUT LONG-TERM CURRENT USE OF INSULIN (H): ICD-10-CM

## 2024-06-13 PROCEDURE — 99213 OFFICE O/P EST LOW 20 MIN: CPT | Performed by: NURSE PRACTITIONER

## 2024-06-13 PROCEDURE — G0463 HOSPITAL OUTPT CLINIC VISIT: HCPCS | Performed by: NURSE PRACTITIONER

## 2024-06-13 RX ORDER — BLOOD-GLUCOSE SENSOR
1 EACH MISCELLANEOUS
Qty: 2 EACH | Refills: 3 | Status: SHIPPED | OUTPATIENT
Start: 2024-06-13 | End: 2024-08-29 | Stop reason: ALTCHOICE

## 2024-06-13 ASSESSMENT — PAIN SCALES - GENERAL: PAINLEVEL: NO PAIN (1)

## 2024-06-13 NOTE — LETTER
2024      Gricelda Sabillon  [address]  [facility] [address]      Dear Colleague,    Thank you for referring your patient, Gricelda Sabillon, to the [facility]. Please see a copy of my visit note below.                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  She is here today for a surveillance visit.     Oncology history:  5/10/22:  US pelvis:  Uterus measures 8 x 3.7 x 4.2 cm with EMS of 11.2 mm.  Normal appearing adnexa     22:  EMB:  Grade 1 endometrial adenocarcinoma, MMR intact     22:  Total laparoscopic hysterectomy, bilateral salpingo-oophorectomy, bilateral pelvic sentinel lymph node dissection, cystoscopy                 Pathology:  Grade 1 endometrial adenocarcinoma, tumor size 2.2 cm, 10/12 mm myometrial invasion, no LVSI,  0/8 sentinel LN, lymphangioleiomyomatosis     10/28/22:  Completed vaginal brachytherapy     23: PET CT IMPRESSION: No convincing metabolic evidence of active neoplasm        Today she comes to clinic feeling well and denies any concerns. She denies any vaginal bleeding, no changes in her bowel or bladder habits, no nausea/emesis, no new lower extremity edema, and no difficulties eating or sleeping. She denies any abdominal discomfort/bloating, no fevers or chills, and no chest pain or shortness of breath. She had surgery for right thigh melanoma (recurrent) and will be starting radiation the end of the month. She is not using her  dilator.        Annual exam with PCP: 24  Mammogram: 24  Colonoscopy: ; no longer doing      Review of Systems  See HPI      Past Medical History:    Past Medical History:   Diagnosis Date     Asthma, mild intermittent      Cataract       Endometrial cancer (H) 06/2022     HTN, goal below 140/90      Hyperlipidemia LDL goal < 100      Macular hole of left eye      Melanoma (H)     RIGHT KNEE     Sleep apnea     uses c pap     Type 2 diabetes, HbA1C goal < 8% (H)          Past Surgical History:    Past Surgical History:   Procedure Laterality Date     ARTHROPLASTY HIP Right 9/25/2017    Procedure: ARTHROPLASTY HIP;  Right total hip arthroplasty  ;  Surgeon: Ayaan Pena MD;  Location: [facility] OR     ARTHROPLASTY KNEE  7/12/2013    Procedure: ARTHROPLASTY KNEE;  right total knee arthroplasty ;  Surgeon: Chaparro Wesley MD;  Location: [facility] OR     ARTHROSCOPY KNEE Right 1/21/2015    Procedure: ARTHROSCOPY KNEE;  Surgeon: Ayaan Pena MD;  Location: [facility] OR     BRONCHOSCOPY (RIGID OR FLEXIBLE), DIAGNOSTIC N/A 8/11/2022    Procedure: BRONCHOSCOPY, WITH BRONCHOALVEOLAR LAVAGE;  Surgeon: Roselia Sosa MD;  Location:  GI     C INCISION OF HYMEN       CATARACT IOL, RT/LT       COLONOSCOPY  2008     COLONOSCOPY N/A 4/18/2019    Procedure: Colonoscopy with polypectomy;  Surgeon: Shaun Guerrero MD;  [facility]     CYSTOSCOPY N/A 6/23/2022    Procedure: Cystoscopy;  Surgeon: Eli Guidry MD;  [facility]     ENDOSCOPIC RETROGRADE CHOLANGIOPANCREATOGRAM N/A 2/6/2019    Procedure: COMBINED ENDOSCOPIC RETROGRADE CHOLANGIOPANCREATOGRAPHY, BILIARY SPINCTEROTOMY AND DILATION, PLACE BILE DUCT STENT;  Surgeon: Guru Andie Gonzalez MD;  [facility]     ENDOSCOPIC RETROGRADE CHOLANGIOPANCREATOGRAM N/A 2/28/2019    Procedure: Endoscopic Retrograde Cholangiopancreatogram, Bile duct stent removal and placement;  Surgeon: Shaun Guerrero MD;  [facility]     ENDOSCOPIC RETROGRADE CHOLANGIOPANCREATOGRAM N/A 4/18/2019    Procedure: COMBINED ENDOSCOPIC RETROGRADE CHOLANGIOPANCREATOGRAPHY, Bile duct stent exchange and Polypectomy;  Surgeon: Shaun Guerrero MD;  [facility]     ENDOSCOPIC RETROGRADE  CHOLANGIOPANCREATOGRAM N/A 10/18/2019    Procedure: Endoscopic Retrograde Cholangiopancreatogram;  Surgeon: Shaun Guerrero MD;  [facility]     ENDOSCOPIC RETROGRADE CHOLANGIOPANCREATOGRAM N/A 3/24/2022    Procedure: ENDOSCOPIC RETROGRADE CHOLANGIOPANCREATOGRAPHY;  Surgeon: Shaun Guerrero MD;  Location:  OR     ENDOSCOPIC RETROGRADE CHOLANGIOPANCREATOGRAM N/A 3/16/2023    Procedure: endoscopic retrograde cholangiopancreatography with minor papillotomy;  Surgeon: Shaun Guerrero MD;  Location:  OR     ENDOSCOPIC RETROGRADE CHOLANGIOPANCREATOGRAM N/A 3/28/2024    Procedure: ENDOSCOPIC RETROGRADE CHOLANGIOPANCREATOGRAPHY;  Surgeon: Shaun Guerrero MD;  Location:  OR     ENDOSCOPIC RETROGRADE CHOLANGIOPANCREATOGRAM WITH SPYGLASS N/A 7/26/2019    Procedure: Endoscopic Retrograde Cholangiopancreatogram With Spyglas,l Radiofrequency Ablation, Stent Exchangeand Duodenal Biopsy x2;  Surgeon: Shaun Guerrero MD;  [facility]     ENDOSCOPIC RETROGRADE CHOLANGIOPANCREATOGRAM WITH SPYGLASS N/A 9/10/2020    Procedure: ENDOSCOPIC RETROGRADE CHOLANGIOPANCREATOGRAPHY, WITH DIRECT DUCT VISUALIZATION, USING PANCREATICOBILIARY FIBEROPTIC PROBE;  Surgeon: Shaun Guerrero MD;  [facility]     ENDOSCOPIC RETROGRADE CHOLANGIOPANCREATOGRAM WITH SPYGLASS N/A 3/22/2021    Procedure: ENDOSCOPIC RETROGRADE CHOLANGIOPANCREATOGRAPHY, WITH DIRECT DUCT VISUALIZATION, USING PANCREATICOBILIARY FIBEROPTIC PROBE;  Surgeon: Shaun Guerrero MD;  [facility]     ESOPHAGOSCOPY, GASTROSCOPY, DUODENOSCOPY (EGD) WITH RADIO FREQUENCY ABLATION, COMBINED N/A 9/10/2020    Procedure: possible repeat ESOPHAGOGASTRODUODENOSCOPY, WITH RADIOFREQUENCY ABLATION and endoscopic mucosal resection;  Surgeon: Shaun Guerrero MD;  [facility]     ESOPHAGOSCOPY, GASTROSCOPY, DUODENOSCOPY (EGD), COMBINED N/A 4/18/2019    Procedure: upper endoscopy with polypectomy;  Surgeon: Shaun Guerrero MD;  [facility]     ESOPHAGOSCOPY, GASTROSCOPY, DUODENOSCOPY  (EGD), COMBINED N/A 10/18/2019    Procedure: Upper Endoscopy and ERCP with stent removal, stone removal and biopsy;  Surgeon: Shaun Guerrero MD;  [facility]     ESOPHAGOSCOPY, GASTROSCOPY, DUODENOSCOPY (EGD), COMBINED N/A 3/24/2022    Procedure: ESOPHAGOGASTRODUODENOSCOPY (EGD), Duodenal  polyp removal;  Surgeon: Shaun Guerrero MD;  Location:  OR     ESOPHAGOSCOPY, GASTROSCOPY, DUODENOSCOPY (EGD), COMBINED N/A 3/16/2023    Procedure: ESOPHAGOGASTRODUODENOSCOPY (EGD);  Surgeon: Shaun Guerrero MD;  Location: [facility] OR     ESOPHAGOSCOPY, GASTROSCOPY, DUODENOSCOPY (EGD), RESECT MUCOSA, COMBINED N/A 2/28/2019    Procedure: Upper Endoscopy, Endoscopic Ultrasound, Endoscopic Mucosal Resection,  Ampullectomy, polypectomy.;  Surgeon: Shaun Guerrero MD;  [facility]     ESOPHAGOSCOPY, GASTROSCOPY, DUODENOSCOPY (EGD), RESECT MUCOSA, COMBINED N/A 3/22/2021    Procedure: ESOPHAGOGASTRODUODENOSCOPY (EGD) with  endoscopic mucosal resection/ polypectomy;  Surgeon: Shaun Guerrero MD;  [facility]     EXCISE LESION LOWER EXTREMITY Right 3/28/2022    Procedure: Wide local excision of right knee melanoma;  Surgeon: Chevy Allison MD;  Location: [facility] OR     EXCISE MASS LOWER EXTREMITY Right 1/13/2022    Procedure: excision of right thigh mass;  Surgeon: Salvador Kelly MD;  Location: [facility] OR     EXCISE MASS LOWER EXTREMITY Right 4/10/2024    Procedure: WIDE EXCISION OF RIGHT THIGH MELANOMA;  Surgeon: Chevy Allison MD;  Location: [facility] OR     EYE SURGERY      macular hole repaired left eye     HC KNEE SCOPE, DIAGNOSTIC      - both knees     HEAD & NECK SURGERY      wisdom teeth     IR CHEST PORT PLACEMENT > 5 YRS OF AGE  5/2/2022     LAPAROSCOPIC HYSTERECTOMY TOTAL, BILATERAL SALPINGO-OOPHORECTOMY, NODE DISSECTION, COMBINED Bilateral 6/23/2022    Procedure: Total Laparoscopic Hyserectomy, Bilateral Salpibgo-oophorectomy, Bilateral Freer Lymph Node Dissection;  Surgeon: Eli Guidry MD;  [facility]  "      Current Medications:     Current Outpatient Medications   Medication Sig Dispense Refill     atenolol (TENORMIN) 50 MG tablet Take 1 tablet (50 mg) by mouth daily 90 tablet 1     atorvastatin (LIPITOR) 40 MG tablet Take 1 tablet (40 mg) by mouth daily 90 tablet 1     BD VEO INSULIN SYRINGE U/F 31G X 15/64\" 0.5 ML USE DAILY WITH NPH INSULIN 100 each 1     cetirizine (ZYRTEC) 10 MG tablet Take 10 mg by mouth every morning       Continuous Glucose Sensor (FREESTYLE DAVID 3 SENSOR) MISC 1 Device every 14 days 2 each 3     CONTOUR NEXT TEST test strip USE 1 STRIP TO CHECK GLUCOSE ONCE DAILY 100 strip 4     glipiZIDE (GLUCOTROL XL) 10 MG 24 hr tablet Take 1 tablet (10 mg) by mouth daily 90 tablet 1     hydrochlorothiazide (HYDRODIURIL) 25 MG tablet Take 1 tablet (25 mg) by mouth every morning 90 tablet 1     insulin glargine (LANTUS VIAL) 100 UNIT/ML vial Inject 44 Units Subcutaneous at bedtime 15 mL 2     insulin lispro (HUMALOG) 100 UNIT/ML vial INJECT 5 UNITS SUBCUTANEOUSLY THREE TIMES DAILY BEFORE MEALS 10 mL 0     insulin pen needle (MM PEN NEEDLES) 32G X 4 MM miscellaneous Inject insulin 4 times a day 300 each 1     insulin syringe-needle U-100 (BD INSULIN SYRINGE ULTRAFINE) 31G X 5/16\" 0.5 ML miscellaneous Use daily with NPH insulin 100 each 11     insulin syringe-needle U-100 (BD VEO INSULIN SYRINGE U/F) 31G X 15/64\" 0.3 ML Patient has 4 shots of insulin a day 300 each 1     latanoprost (XALATAN) 0.005 % ophthalmic solution Place 1 drop into both eyes At Bedtime  2.5 mL 1     lisinopril (ZESTRIL) 5 MG tablet Take 1 tablet (5 mg) by mouth daily 90 tablet 1     LORazepam (ATIVAN) 0.5 MG tablet Take 1 tablet (0.5 mg) by mouth nightly as needed for anxiety or sleep 30 tablet 0     netarsudil (RHOPRESSA) 0.02 % ophthalmic solution Place 1 drop Into the left eye at bedtime       OZEMPIC, 0.25 OR 0.5 MG/DOSE, 2 MG/3ML pen INJECT 0.25MG SUBCUTANEOUSLY EVERY 7 DAYS 3 mL 0     senna-docusate (SENOKOT-S/PERICOLACE) " 8.6-50 MG tablet Take 2 tablets by mouth daily as needed for constipation 60 tablet 11     timolol maleate (TIMOPTIC) 0.5 % ophthalmic solution INSTILL 1 DROP INTO EACH EYE TWICE DAILY       triamcinolone (KENALOG) 0.5 % external ointment Apply 1 g topically daily as needed for irritation       XARELTO ANTICOAGULANT 20 MG TABS tablet Take 1 tablet (20 mg) by mouth daily 90 tablet 1         Allergies:        Allergies   Allergen Reactions     Bromfenac Visual Disturbance      xibrom  Causes sever eye pain     Codeine Nausea     Other reaction(s): Dizziness, Headache     Metformin GI Disturbance     Seasonal Allergies         Social History:     Social History     Tobacco Use     Smoking status: Never     Passive exposure: Never     Smokeless tobacco: Never   Substance Use Topics     Alcohol use: No     Alcohol/week: 0.0 standard drinks of alcohol       History   Drug Use No         Family History:     The patient's family history is notable for     Family History   Problem Relation Age of Onset     Hypertension Mother         diabetes,hypoythryoidism, stroke     Diabetes Mother      Breast Cancer Mother      Heart Disease Father         , cancer lip     Uterine Cancer Sister      Connective Tissue Disorder Sister         fibromyalgia     Diabetes Sister      Hypertension Brother      Cerebrovascular Disease Maternal Grandmother         , diabetes     Cerebrovascular Disease Maternal Grandfather              Cancer Paternal Grandmother              Heart Disease Paternal Grandfather              Cancer Paternal Aunt         ovarian     Breast Cancer Niece      Asthma Maternal Aunt          Physical Exam:     /78   Pulse 92   Temp 97.7  F (36.5  C) (Oral)   Resp 18   Wt 101.2 kg (223 lb)   LMP  (LMP Unknown)   SpO2 97%   BMI 34.93 kg/m    Body mass index is 34.93 kg/m .    General Appearance: healthy and alert, no distress     HEENT: no thyromegaly, no palpable  nodules or masses        Cardiovascular: regular rate and rhythm, no gallops, rubs or murmurs     Respiratory: lungs clear, no rales, rhonchi or wheezes, normal diaphragmatic excursion    Musculoskeletal: extremities non tender and without edema    Skin: no lesions or rashes;  right lateral thigh incision intact    Neurological: Using  cane     Psychiatric: appropriate mood and affect                               Hematological: normal cervical, supraclavicular and inguinal lymph nodes     Gastrointestinal:       abdomen soft, non-tender, non-distended, no organomegaly or masses    Genitourinary: External genitalia and urethral meatus appears normal.  Vagina is smooth without nodularity or masses.  Cervix surgically absent.  Bimanual exam reveal no masses, nodularity or fullness.  Recto-vaginal exam confirms these findings.      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  She is here today for a surveillance visit.     25 minutes spent on the date of the encounter doing chart review, history and exam, documentation and further activities as noted above      Plan:     1.)       Patient to RTC in 6 months for her next surveillance visit. Reviewed recommendations from SGO not to perform surveillance pap smears in women diagnosed with endometrial cancer as this does not improve detection of local recurrence. Reviewed signs and symptoms for when she should contact the clinic or seek additional care. Patient to contact the clinic with any questions or concerns in the interim.  Answered all of her questions to the best of my ability.     2.) Genetic risk factors were assessed and her MMR proteins were intact.     3.) Labs and/or tests ordered include:  none.     4.) Health maintenance issues addressed today include annual health maintenance and non-gynecologic issues with  PCP.    DENYS Fernandez, WHNP-BC, ANP-BC, AOCNP  Women's Health Nurse Practitioner  Adult Nurse Practitioner  Division of Gynecologic Oncology        CC  Patient Care Team:  Raisa Davidson MD as PCP - General (Internal Medicine)  Raisa Davidson MD as Assigned PCP  Tanya Sheehan RD as Diabetes Educator (Dietitian, Registered)  Chevy Allison MD as MD (Surgery)  Yarelis Mitchell RN as Specialty Care Coordinator (Gynecologic Oncology)  Roselia Sosa MD as Assigned Pulmonology Provider  Arlet Harding [facility] as Assigned MTM Pharmacist  Shaun Guerrero MD as MD (Gastroenterology)  Aishwarya Reese RN as Registered Nurse  Clara Corado MD as Hospitalist (Endocrinology, Diabetes, and Metabolism)  Lili Ahuja RD as Diabetes Educator  Danisha Villa PA-C as Physician Assistant (Endocrinology, Diabetes, and Metabolism)  Eli Guidry MD as Assigned Cancer Care Provider  Danisha Villa PA-C as Assigned Endocrinology Provider  Chevy Allison MD as Assigned Surgical Provider      Again, thank you for allowing me to participate in the care of your patient.        Sincerely,        DENYS Guerra CNP

## 2024-06-13 NOTE — NURSING NOTE
"Oncology Rooming Note    June 13, 2024 11:14 AM   Gricelda Sabillon is a 74 year old female who presents for:    Chief Complaint   Patient presents with    Oncology Clinic Visit     Initial Vitals: /78   Pulse 92   Temp 97.7  F (36.5  C) (Oral)   Resp 18   Wt 101.2 kg (223 lb)   LMP  (LMP Unknown)   SpO2 97%   BMI 34.93 kg/m   Estimated body mass index is 34.93 kg/m  as calculated from the following:    Height as of 4/22/24: 1.702 m (5' 7\").    Weight as of this encounter: 101.2 kg (223 lb). Body surface area is 2.19 meters squared.  No Pain (1) Comment: Data Unavailable   No LMP recorded (lmp unknown). Patient is postmenopausal.  Allergies reviewed: Yes  Medications reviewed: Yes    Medications: Medication refills not needed today.  Pharmacy name entered into EPIC:    [facility] - [place], [place] - [address] [facility] [address] [facility] - [place], [place] - [address]    Frailty Screening:   Is the patient here for a new oncology consult visit in cancer care? 2. No      Clinical concerns: f/u       Clau Caicedo CMA              "

## 2024-06-13 NOTE — PROGRESS NOTES
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  She is here today for a surveillance visit.     Oncology history:  5/10/22:  US pelvis:  Uterus measures 8 x 3.7 x 4.2 cm with EMS of 11.2 mm.  Normal appearing adnexa     22:  EMB:  Grade 1 endometrial adenocarcinoma, MMR intact     22:  Total laparoscopic hysterectomy, bilateral salpingo-oophorectomy, bilateral pelvic sentinel lymph node dissection, cystoscopy                 Pathology:  Grade 1 endometrial adenocarcinoma, tumor size 2.2 cm, 10/12 mm myometrial invasion, no LVSI,  0/8 sentinel LN, lymphangioleiomyomatosis     10/28/22:  Completed vaginal brachytherapy     23: PET CT IMPRESSION: No convincing metabolic evidence of active neoplasm        Today she comes to clinic feeling well and denies any concerns. She denies any vaginal bleeding, no changes in her bowel or bladder habits, no nausea/emesis, no new lower extremity edema, and no difficulties eating or sleeping. She denies any abdominal discomfort/bloating, no fevers or chills, and no chest pain or shortness of breath. She had surgery for right thigh melanoma (recurrent) and will be starting radiation the end of the month. She is not using her  dilator.        Annual exam with PCP: 24  Mammogram: 24  Colonoscopy: ; no longer doing      Review of Systems  See HPI      Past Medical History:    Past Medical History:   Diagnosis Date    Asthma, mild intermittent     Cataract     Endometrial cancer (H) 2022    HTN, goal below 140/90     Hyperlipidemia LDL goal < 100     Macular hole of left eye     Melanoma (H)     RIGHT KNEE    Sleep apnea     uses c pap    Type 2 diabetes, HbA1C goal < 8% (H)          Past Surgical  History:    Past Surgical History:   Procedure Laterality Date    ARTHROPLASTY HIP Right 9/25/2017    Procedure: ARTHROPLASTY HIP;  Right total hip arthroplasty  ;  Surgeon: Ayaan ePna MD;  Location: [facility]    ARTHROPLASTY KNEE  7/12/2013    Procedure: ARTHROPLASTY KNEE;  right total knee arthroplasty ;  Surgeon: Chaparro Wesley MD;  Location: [facility]    ARTHROSCOPY KNEE Right 1/21/2015    Procedure: ARTHROSCOPY KNEE;  Surgeon: Ayaan Pena MD;  Location: [facility] OR    BRONCHOSCOPY (RIGID OR FLEXIBLE), DIAGNOSTIC N/A 8/11/2022    Procedure: BRONCHOSCOPY, WITH BRONCHOALVEOLAR LAVAGE;  Surgeon: Roselia Sosa MD;  Location:  GI    C INCISION OF HYMEN      CATARACT IOL, RT/LT      COLONOSCOPY  2008    COLONOSCOPY N/A 4/18/2019    Procedure: Colonoscopy with polypectomy;  Surgeon: Shaun Guerrero MD;  [facility]    CYSTOSCOPY N/A 6/23/2022    Procedure: Cystoscopy;  Surgeon: Eli Guidry MD;  [facility]    ENDOSCOPIC RETROGRADE CHOLANGIOPANCREATOGRAM N/A 2/6/2019    Procedure: COMBINED ENDOSCOPIC RETROGRADE CHOLANGIOPANCREATOGRAPHY, BILIARY SPINCTEROTOMY AND DILATION, PLACE BILE DUCT STENT;  Surgeon: Guru Andie Gonzalez MD;  [facility]    ENDOSCOPIC RETROGRADE CHOLANGIOPANCREATOGRAM N/A 2/28/2019    Procedure: Endoscopic Retrograde Cholangiopancreatogram, Bile duct stent removal and placement;  Surgeon: Shaun Guerrero MD;  [facility]    ENDOSCOPIC RETROGRADE CHOLANGIOPANCREATOGRAM N/A 4/18/2019    Procedure: COMBINED ENDOSCOPIC RETROGRADE CHOLANGIOPANCREATOGRAPHY, Bile duct stent exchange and Polypectomy;  Surgeon: Shaun Guerrero MD;  [facility]    ENDOSCOPIC RETROGRADE CHOLANGIOPANCREATOGRAM N/A 10/18/2019    Procedure: Endoscopic Retrograde Cholangiopancreatogram;  Surgeon: Shaun Guerrero MD;  [facility]    ENDOSCOPIC RETROGRADE CHOLANGIOPANCREATOGRAM N/A 3/24/2022    Procedure: ENDOSCOPIC RETROGRADE CHOLANGIOPANCREATOGRAPHY;   Surgeon: Shaun Guerrero MD;  Location: [facility]    ENDOSCOPIC RETROGRADE CHOLANGIOPANCREATOGRAM N/A 3/16/2023    Procedure: endoscopic retrograde cholangiopancreatography with minor papillotomy;  Surgeon: Shaun Guerrero MD;  [facility]    ENDOSCOPIC RETROGRADE CHOLANGIOPANCREATOGRAM N/A 3/28/2024    Procedure: ENDOSCOPIC RETROGRADE CHOLANGIOPANCREATOGRAPHY;  Surgeon: Shaun Guerrero MD;  [facility]    ENDOSCOPIC RETROGRADE CHOLANGIOPANCREATOGRAM WITH SPYGLASS N/A 7/26/2019    Procedure: Endoscopic Retrograde Cholangiopancreatogram With Spyglas,l Radiofrequency Ablation, Stent Exchangeand Duodenal Biopsy x2;  Surgeon: Shaun Guerrero MD;  [facility]    ENDOSCOPIC RETROGRADE CHOLANGIOPANCREATOGRAM WITH SPYGLASS N/A 9/10/2020    Procedure: ENDOSCOPIC RETROGRADE CHOLANGIOPANCREATOGRAPHY, WITH DIRECT DUCT VISUALIZATION, USING PANCREATICOBILIARY FIBEROPTIC PROBE;  Surgeon: Shaun Guerrero MD;  [facility]    ENDOSCOPIC RETROGRADE CHOLANGIOPANCREATOGRAM WITH SPYGLASS N/A 3/22/2021    Procedure: ENDOSCOPIC RETROGRADE CHOLANGIOPANCREATOGRAPHY, WITH DIRECT DUCT VISUALIZATION, USING PANCREATICOBILIARY FIBEROPTIC PROBE;  Surgeon: Shaun Guerrero MD;  [facility]    ESOPHAGOSCOPY, GASTROSCOPY, DUODENOSCOPY (EGD) WITH RADIO FREQUENCY ABLATION, COMBINED N/A 9/10/2020    Procedure: possible repeat ESOPHAGOGASTRODUODENOSCOPY, WITH RADIOFREQUENCY ABLATION and endoscopic mucosal resection;  Surgeon: Shaun Guerrero MD;  [facility]    ESOPHAGOSCOPY, GASTROSCOPY, DUODENOSCOPY (EGD), COMBINED N/A 4/18/2019    Procedure: upper endoscopy with polypectomy;  Surgeon: Shaun Guerrero MD;  [facility]    ESOPHAGOSCOPY, GASTROSCOPY, DUODENOSCOPY (EGD), COMBINED N/A 10/18/2019    Procedure: Upper Endoscopy and ERCP with stent removal, stone removal and biopsy;  Surgeon: Shaun Guerrero MD;  [facility]    ESOPHAGOSCOPY, GASTROSCOPY, DUODENOSCOPY (EGD), COMBINED N/A 3/24/2022    Procedure: ESOPHAGOGASTRODUODENOSCOPY  (EGD), Duodenal  polyp removal;  Surgeon: Shaun Guerrero MD;  [facility]    ESOPHAGOSCOPY, GASTROSCOPY, DUODENOSCOPY (EGD), COMBINED N/A 3/16/2023    Procedure: ESOPHAGOGASTRODUODENOSCOPY (EGD);  Surgeon: Shaun Guerrero MD;  [facility]    ESOPHAGOSCOPY, GASTROSCOPY, DUODENOSCOPY (EGD), RESECT MUCOSA, COMBINED N/A 2/28/2019    Procedure: Upper Endoscopy, Endoscopic Ultrasound, Endoscopic Mucosal Resection,  Ampullectomy, polypectomy.;  Surgeon: Shaun Guerrero MD;  [facility]    ESOPHAGOSCOPY, GASTROSCOPY, DUODENOSCOPY (EGD), RESECT MUCOSA, COMBINED N/A 3/22/2021    Procedure: ESOPHAGOGASTRODUODENOSCOPY (EGD) with  endoscopic mucosal resection/ polypectomy;  Surgeon: Shaun Guerrero MD;  [facility]    EXCISE LESION LOWER EXTREMITY Right 3/28/2022    Procedure: Wide local excision of right knee melanoma;  Surgeon: Chevy Allison MD;  Location: [facility] OR    EXCISE MASS LOWER EXTREMITY Right 1/13/2022    Procedure: excision of right thigh mass;  Surgeon: Salvador Kelly MD;  Location: [facility]    EXCISE MASS LOWER EXTREMITY Right 4/10/2024    Procedure: WIDE EXCISION OF RIGHT THIGH MELANOMA;  Surgeon: Chevy Allison MD;  Location: [facility] OR    EYE SURGERY      macular hole repaired left eye    HC KNEE SCOPE, DIAGNOSTIC      - both knees    HEAD & NECK SURGERY      wisdom teeth    IR CHEST PORT PLACEMENT > 5 YRS OF AGE  5/2/2022    LAPAROSCOPIC HYSTERECTOMY TOTAL, BILATERAL SALPINGO-OOPHORECTOMY, NODE DISSECTION, COMBINED Bilateral 6/23/2022    Procedure: Total Laparoscopic Hyserectomy, Bilateral Salpibgo-oophorectomy, Bilateral [place] Lymph Node Dissection;  Surgeon: Eli Guidry MD;  [facility]       Current Medications:     Current Outpatient Medications   Medication Sig Dispense Refill    atenolol (TENORMIN) 50 MG tablet Take 1 tablet (50 mg) by mouth daily 90 tablet 1    atorvastatin (LIPITOR) 40 MG tablet Take 1 tablet (40 mg) by mouth daily 90 tablet 1    BD VEO INSULIN SYRINGE  "U/F 31G X 15/64\" 0.5 ML USE DAILY WITH NPH INSULIN 100 each 1    cetirizine (ZYRTEC) 10 MG tablet Take 10 mg by mouth every morning      Continuous Glucose Sensor (FREESTYLE DAVID 3 SENSOR) MISC 1 Device every 14 days 2 each 3    CONTOUR NEXT TEST test strip USE 1 STRIP TO CHECK GLUCOSE ONCE DAILY 100 strip 4    glipiZIDE (GLUCOTROL XL) 10 MG 24 hr tablet Take 1 tablet (10 mg) by mouth daily 90 tablet 1    hydrochlorothiazide (HYDRODIURIL) 25 MG tablet Take 1 tablet (25 mg) by mouth every morning 90 tablet 1    insulin glargine (LANTUS VIAL) 100 UNIT/ML vial Inject 44 Units Subcutaneous at bedtime 15 mL 2    insulin lispro (HUMALOG) 100 UNIT/ML vial INJECT 5 UNITS SUBCUTANEOUSLY THREE TIMES DAILY BEFORE MEALS 10 mL 0    insulin pen needle (MM PEN NEEDLES) 32G X 4 MM miscellaneous Inject insulin 4 times a day 300 each 1    insulin syringe-needle U-100 (BD INSULIN SYRINGE ULTRAFINE) 31G X 5/16\" 0.5 ML miscellaneous Use daily with NPH insulin 100 each 11    insulin syringe-needle U-100 (BD VEO INSULIN SYRINGE U/F) 31G X 15/64\" 0.3 ML Patient has 4 shots of insulin a day 300 each 1    latanoprost (XALATAN) 0.005 % ophthalmic solution Place 1 drop into both eyes At Bedtime  2.5 mL 1    lisinopril (ZESTRIL) 5 MG tablet Take 1 tablet (5 mg) by mouth daily 90 tablet 1    LORazepam (ATIVAN) 0.5 MG tablet Take 1 tablet (0.5 mg) by mouth nightly as needed for anxiety or sleep 30 tablet 0    netarsudil (RHOPRESSA) 0.02 % ophthalmic solution Place 1 drop Into the left eye at bedtime      OZEMPIC, 0.25 OR 0.5 MG/DOSE, 2 MG/3ML pen INJECT 0.25MG SUBCUTANEOUSLY EVERY 7 DAYS 3 mL 0    senna-docusate (SENOKOT-S/PERICOLACE) 8.6-50 MG tablet Take 2 tablets by mouth daily as needed for constipation 60 tablet 11    timolol maleate (TIMOPTIC) 0.5 % ophthalmic solution INSTILL 1 DROP INTO EACH EYE TWICE DAILY      triamcinolone (KENALOG) 0.5 % external ointment Apply 1 g topically daily as needed for irritation      XARELTO ANTICOAGULANT " 20 MG TABS tablet Take 1 tablet (20 mg) by mouth daily 90 tablet 1         Allergies:        Allergies   Allergen Reactions    Bromfenac Visual Disturbance      xibrom  Causes sever eye pain    Codeine Nausea     Other reaction(s): Dizziness, Headache    Metformin GI Disturbance    Seasonal Allergies         Social History:     Social History     Tobacco Use    Smoking status: Never     Passive exposure: Never    Smokeless tobacco: Never   Substance Use Topics    Alcohol use: No     Alcohol/week: 0.0 standard drinks of alcohol       History   Drug Use No         Family History:     The patient's family history is notable for     Family History   Problem Relation Age of Onset    Hypertension Mother         diabetes,hypoythryoidism, stroke    Diabetes Mother     Breast Cancer Mother     Heart Disease Father         , cancer lip    Uterine Cancer Sister     Connective Tissue Disorder Sister         fibromyalgia    Diabetes Sister     Hypertension Brother     Cerebrovascular Disease Maternal Grandmother         , diabetes    Cerebrovascular Disease Maternal Grandfather             Cancer Paternal Grandmother             Heart Disease Paternal Grandfather             Cancer Paternal Aunt         ovarian    Breast Cancer Niece     Asthma Maternal Aunt          Physical Exam:     /78   Pulse 92   Temp 97.7  F (36.5  C) (Oral)   Resp 18   Wt 101.2 kg (223 lb)   LMP  (LMP Unknown)   SpO2 97%   BMI 34.93 kg/m    Body mass index is 34.93 kg/m .    General Appearance: healthy and alert, no distress     HEENT: no thyromegaly, no palpable nodules or masses        Cardiovascular: regular rate and rhythm, no gallops, rubs or murmurs     Respiratory: lungs clear, no rales, rhonchi or wheezes, normal diaphragmatic excursion    Musculoskeletal: extremities non tender and without edema    Skin: no lesions or rashes;  right lateral thigh incision intact    Neurological: Using   cane     Psychiatric: appropriate mood and affect                               Hematological: normal cervical, supraclavicular and inguinal lymph nodes     Gastrointestinal:       abdomen soft, non-tender, non-distended, no organomegaly or masses    Genitourinary: External genitalia and urethral meatus appears normal.  Vagina is smooth without nodularity or masses.  Cervix surgically absent.  Bimanual exam reveal no masses, nodularity or fullness.  Recto-vaginal exam confirms these findings.      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  She is here today for a surveillance visit.     25 minutes spent on the date of the encounter doing chart review, history and exam, documentation and further activities as noted above      Plan:     1.)       Patient to RTC in 6 months for her next surveillance visit. Reviewed recommendations from [facility] not to perform surveillance pap smears in women diagnosed with endometrial cancer as this does not improve detection of local recurrence. Reviewed signs and symptoms for when she should contact the clinic or seek additional care. Patient to contact the clinic with any questions or concerns in the interim.  Answered all of her questions to the best of my ability.     2.) Genetic risk factors were assessed and her MMR proteins were intact.     3.) Labs and/or tests ordered include:  none.     4.) Health maintenance issues addressed today include annual health maintenance and non-gynecologic issues with PCP.    DENYS Fernandez, WHNP-BC, ANP-BC, AOCNP  Women's Health Nurse Practitioner  Adult Nurse Practitioner  Division of Gynecologic Oncology        CC  Patient Care Team:  Raisa Davidson MD as PCP - General (Internal Medicine)  Raisa Davidson MD as Assigned PCP  Tanya Sheehan RD as Diabetes Educator  (Dietitian, Registered)  Chevy Allison MD as MD (Surgery)  Yarelis Mitchell, RN as Specialty Care Coordinator (Gynecologic Oncology)  Roselia Sosa MD as Assigned Pulmonology Provider  Arlet Harding [facility] as Assigned MTM Pharmacist  Shaun Guerrero MD as MD (Gastroenterology)  Aishwarya Reese, RN as Registered Nurse  Clara Corado MD as Hospitalist (Endocrinology, Diabetes, and Metabolism)  Lili Ahuja RD as Diabetes Educator  Danisha Villa PA-C as Physician Assistant (Endocrinology, Diabetes, and Metabolism)  Eli Guidry MD as Assigned Cancer Care Provider  Danisha Villa PA-C as Assigned Endocrinology Provider  Chevy Allison MD as Assigned Surgical Provider

## 2024-06-23 DIAGNOSIS — E11.65 TYPE 2 DIABETES MELLITUS WITH HYPERGLYCEMIA, WITHOUT LONG-TERM CURRENT USE OF INSULIN (H): ICD-10-CM

## 2024-06-24 DIAGNOSIS — E66.01 CLASS 2 SEVERE OBESITY DUE TO EXCESS CALORIES WITH SERIOUS COMORBIDITY AND BODY MASS INDEX (BMI) OF 35.0 TO 35.9 IN ADULT (H): ICD-10-CM

## 2024-06-24 DIAGNOSIS — E11.65 TYPE 2 DIABETES MELLITUS WITH HYPERGLYCEMIA, WITHOUT LONG-TERM CURRENT USE OF INSULIN (H): ICD-10-CM

## 2024-06-24 DIAGNOSIS — E66.812 CLASS 2 SEVERE OBESITY DUE TO EXCESS CALORIES WITH SERIOUS COMORBIDITY AND BODY MASS INDEX (BMI) OF 35.0 TO 35.9 IN ADULT (H): ICD-10-CM

## 2024-06-24 RX ORDER — INSULIN GLARGINE 100 [IU]/ML
44 INJECTION, SOLUTION SUBCUTANEOUS AT BEDTIME
Qty: 15 ML | Refills: 2 | Status: SHIPPED | OUTPATIENT
Start: 2024-06-24 | End: 2024-08-20

## 2024-06-24 RX ORDER — SYRING-NEEDL,DISP,INSUL,0.3 ML 31GX15/64"
SYRINGE, EMPTY DISPOSABLE MISCELLANEOUS
Qty: 100 EACH | Refills: 0 | Status: SHIPPED | OUTPATIENT
Start: 2024-06-24 | End: 2024-09-20

## 2024-06-24 RX ORDER — SEMAGLUTIDE 0.68 MG/ML
INJECTION, SOLUTION SUBCUTANEOUS
Qty: 3 ML | Refills: 0 | Status: SHIPPED | OUTPATIENT
Start: 2024-06-24

## 2024-06-24 NOTE — TELEPHONE ENCOUNTER
Refill request from pharmacy. I called pharmacy. We sent an order for 15mL and the vial comes in 10mLs. Pharmacy gave patient a 90 day supply on 3/4/24 but now there are no refills left. A 90 day supply would be 40mLs they said.

## 2024-06-24 NOTE — TELEPHONE ENCOUNTER
Requested Prescriptions   Pending Prescriptions Disp Refills    OZEMPIC (0.25 OR 0.5 MG/DOSE) 2 MG/3ML pen [Pharmacy Med Name: Ozempic (0.25 or 0.5 MG/DOSE) 2 MG/3ML Subcutaneous Solution Pen-injector] 3 mL 0     Sig: INJECT 0.25MG SUBCUTANEOUSLY EVERY 7 DAYS       GLP-1 Agonists Protocol Failed - 6/24/2024  8:49 AM        Failed - HgbA1C in past 3 or 6 months     If HgbA1C is 8 or greater, it needs to be on file within the past 3 months.  If less than 8, must be on file within the past 6 months.     Recent Labs   Lab Test 02/27/24  1026   A1C 8.1*             Passed - Medication is active on med list        Passed - Has GFR on file in past 12 months and most recent value is normal        Passed - Recent (6 mo) or future (90 days) visit within the authorizing provider's specialty     The patient must have completed an in-person or virtual visit within the past 6 months or has a future visit scheduled within the next 90 days with the authorizing provider s specialty.  Urgent care and e-visits do not quality as an office visit for this protocol.          Passed - Medication indicated for associated diagnosis     Medication is associated with one or more of the following diagnoses:     Type 2 diabetes mellitus           Passed - Patient is age 18 or older        Passed - No active pregnancy on record        Passed - No positive pregnancy test in past 12 months

## 2024-07-09 ENCOUNTER — TRANSFERRED RECORDS (OUTPATIENT)
Dept: HEALTH INFORMATION MANAGEMENT | Facility: CLINIC | Age: 74
End: 2024-07-09
Payer: COMMERCIAL

## 2024-07-12 ENCOUNTER — TELEPHONE (OUTPATIENT)
Dept: ENDOCRINOLOGY | Facility: CLINIC | Age: 74
End: 2024-07-12
Payer: COMMERCIAL

## 2024-07-12 NOTE — TELEPHONE ENCOUNTER
Per the provider;  Her morning blood sugars look good. She is getting a little elevated after meals. Let's increase the pre-meal Humalog to 7 unit(s), for now. I'll call her on Monday to check in and see how things are going.    Also, yes, it may be related to the radiation.    Thanks,  Danisha     I called and left a detailed message with the above.

## 2024-07-12 NOTE — TELEPHONE ENCOUNTER
[facility] Call [facility]    Phone Message    May a detailed message be left on voicemail: yes     Reason for Call: Other: Patient has been having blood sugars in the high 200's (285) in the AM, should she be doing something different with her meds?  She also just ended doing radiation, could this be effecting that?  Please call.         Action Taken: Message routed to:  [facility] Endo    Travel Screening: Not Applicable     Date of Service:

## 2024-07-23 ENCOUNTER — TRANSFERRED RECORDS (OUTPATIENT)
Dept: HEALTH INFORMATION MANAGEMENT | Facility: CLINIC | Age: 74
End: 2024-07-23
Payer: COMMERCIAL

## 2024-07-30 DIAGNOSIS — E11.65 TYPE 2 DIABETES MELLITUS WITH HYPERGLYCEMIA, WITHOUT LONG-TERM CURRENT USE OF INSULIN (H): ICD-10-CM

## 2024-07-30 RX ORDER — INSULIN LISPRO 100 [IU]/ML
INJECTION, SOLUTION INTRAVENOUS; SUBCUTANEOUS
Qty: 10 ML | Refills: 0 | Status: SHIPPED | OUTPATIENT
Start: 2024-07-30 | End: 2024-08-20

## 2024-08-15 ENCOUNTER — LAB (OUTPATIENT)
Dept: LAB | Facility: CLINIC | Age: 74
End: 2024-08-15
Payer: COMMERCIAL

## 2024-08-15 DIAGNOSIS — E11.65 TYPE 2 DIABETES MELLITUS WITH HYPERGLYCEMIA, WITHOUT LONG-TERM CURRENT USE OF INSULIN (H): ICD-10-CM

## 2024-08-15 LAB — HBA1C MFR BLD: 7.7 % (ref 0–5.6)

## 2024-08-15 PROCEDURE — 36415 COLL VENOUS BLD VENIPUNCTURE: CPT

## 2024-08-15 PROCEDURE — 83036 HEMOGLOBIN GLYCOSYLATED A1C: CPT

## 2024-08-16 DIAGNOSIS — E11.65 TYPE 2 DIABETES MELLITUS WITH HYPERGLYCEMIA, WITHOUT LONG-TERM CURRENT USE OF INSULIN (H): ICD-10-CM

## 2024-08-20 ENCOUNTER — OFFICE VISIT (OUTPATIENT)
Dept: ENDOCRINOLOGY | Facility: CLINIC | Age: 74
End: 2024-08-20
Payer: COMMERCIAL

## 2024-08-20 VITALS
HEIGHT: 67 IN | HEART RATE: 69 BPM | OXYGEN SATURATION: 98 % | DIASTOLIC BLOOD PRESSURE: 60 MMHG | TEMPERATURE: 99.2 F | WEIGHT: 221.8 LBS | BODY MASS INDEX: 34.81 KG/M2 | SYSTOLIC BLOOD PRESSURE: 120 MMHG | RESPIRATION RATE: 18 BRPM

## 2024-08-20 DIAGNOSIS — E11.65 TYPE 2 DIABETES MELLITUS WITH HYPERGLYCEMIA, WITHOUT LONG-TERM CURRENT USE OF INSULIN (H): ICD-10-CM

## 2024-08-20 PROCEDURE — G2211 COMPLEX E/M VISIT ADD ON: HCPCS | Performed by: PHYSICIAN ASSISTANT

## 2024-08-20 PROCEDURE — 99207 PR FOOT EXAM NO CHARGE: CPT | Performed by: PHYSICIAN ASSISTANT

## 2024-08-20 PROCEDURE — 99213 OFFICE O/P EST LOW 20 MIN: CPT | Performed by: PHYSICIAN ASSISTANT

## 2024-08-20 RX ORDER — INSULIN GLARGINE 100 [IU]/ML
INJECTION, SOLUTION SUBCUTANEOUS
Qty: 40 ML | Refills: 0 | Status: SHIPPED | OUTPATIENT
Start: 2024-08-20

## 2024-08-20 RX ORDER — INSULIN LISPRO 100 [IU]/ML
INJECTION, SOLUTION INTRAVENOUS; SUBCUTANEOUS
Qty: 10 ML | Refills: 0 | Status: SHIPPED | OUTPATIENT
Start: 2024-08-20

## 2024-08-20 NOTE — PROGRESS NOTES
Assessment/Plan :   Type 2 DM. Melva is doing well. We reviewed her recent CGMS report and her numbers seem to be stabilizing. Her fasting numbers are still a little higher than I would like and we discussed how to slowly titrate Lantus. We will start by increasing to 36 unit(s) tonight and if her morning blood sugars are still over 120 mg/dl on Monday, she will increase to 38 unit(s). She will continue to adjust Humalog as needed. I encouraged her to keep up the good work and we will follow-up in 4 mos.       I have independently reviewed and interpreted labs, imaging as indicated.      Chief complaint:  Gricelda is a 74 year old female who returns for follow-up of Type 2 DM.    I have reviewed Care Everywhere including [facility], [facility], [facility], [facility] , [facility], [facility] lab reports, imaging reports and provider notes as indicated.      HISTORY OF PRESENT ILLNESS  Melva is doing okay. She has been working hard to get her blood sugars under better control. She has been taking Lantus 36 unit(s) at night along with 5 unit(s) of Humalog before meals. She also takes glipizide XL 10 mg daily. She has been monitoring her blood sugars with the FreeStyle Baldo 3 sensor and she has been working hard to get her time in range to 75%.     Melva did recently experience some worsening hyperglycemia. She received a cortisone spinal injection and her numbers shot up to over 300 mg/dl. She added 3 extra unit(s) of Humalog at dinner and that seemed to bring the numbers back down to normal. She has not had any problems with blurred vision. She does continued to struggle with general aches and pains, including the remnants of a radiation burn. However, overall, she has been feeling good.     Melva was diagnosed with diabetes over 10 yrs ago. She has taken a variety of oral medications along with MDI insulin therapy for the last few years. Her diabetes is complicated by hyperlipidemia,  obesity, HTN, CHERRY, a history of endometrial cancer, and a new diagnosis of metastatic melanoma. She has an upcoming PET scan and surgery, and she may be starting Keytruda in the near future.     Endocrine relevant labs are as follows:   Latest Reference Range & Units 08/15/24 13:11   Hemoglobin A1C 0.0 - 5.6 % 7.7 (H)   (H): Data is abnormally high   Latest Reference Range & Units 03/04/24 09:22   Albumin Urine mg/g Cr 0.00 - 25.00 mg/g Cr 10.16      Latest Reference Range & Units 03/04/24 09:22   Albumin Urine mg/L mg/L 19.4      Latest Reference Range & Units 02/27/24 10:26   Hemoglobin A1C <5.7 % 8.1 (H)   (H): Data is abnormally high    REVIEW OF SYSTEMS    Endocrine: positive for diabetes  Skin: negative  Eyes: negative for, visual blurring, redness, tearing  Ears/Nose/Throat: negative  Respiratory: No shortness of breath, dyspnea on exertion, cough, or hemoptysis  Cardiovascular: negative for, chest pain, dyspnea on exertion, lower extremity edema, and exercise intolerance  Gastrointestinal: negative for, nausea, vomiting, constipation, and diarrhea  Genitourinary: negative for, nocturia, dysuria, frequency, and urgency  Musculoskeletal: positive for back pain and arthritis, negative for, muscular weakness, nocturnal cramping, and foot pain  Neurologic: negative for, local weakness, numbness or tingling of hands, and numbness or tingling of feet  Psychiatric: negative  Hematologic/Lymphatic/Immunologic: negative    Past Medical History  Past Medical History:   Diagnosis Date    Asthma, mild intermittent     Cataract     Endometrial cancer (H) 06/2022    HTN, goal below 140/90     Hyperlipidemia LDL goal < 100     Macular hole of left eye     Melanoma (H)     RIGHT KNEE    Sleep apnea     uses c pap    Type 2 diabetes, HbA1C goal < 8% (H)        Medications  Current Outpatient Medications   Medication Sig Dispense Refill    atenolol (TENORMIN) 50 MG tablet Take 1 tablet (50 mg) by mouth daily 90 tablet 1     "atorvastatin (LIPITOR) 40 MG tablet Take 1 tablet (40 mg) by mouth daily 90 tablet 1    cetirizine (ZYRTEC) 10 MG tablet Take 10 mg by mouth every morning      Continuous Glucose Sensor (FREESTYLE DAVID 3 SENSOR) MISC 1 Device every 14 days 2 each 3    CONTOUR NEXT TEST test strip USE 1 STRIP TO CHECK GLUCOSE ONCE DAILY 100 strip 4    glipiZIDE (GLUCOTROL XL) 10 MG 24 hr tablet Take 1 tablet (10 mg) by mouth daily 90 tablet 1    HUMALOG 100 UNIT/ML injection INJECT 5 UNITS SUBCUTANEOUSLY THREE TIMES DAILY BEFORE MEAL(S) 10 mL 0    hydrochlorothiazide (HYDRODIURIL) 25 MG tablet Take 1 tablet (25 mg) by mouth every morning 90 tablet 1    insulin pen needle (MM PEN NEEDLES) 32G X 4 MM miscellaneous Inject insulin 4 times a day 300 each 1    insulin syringe-needle U-100 (BD VEO INSULIN SYRINGE U/F) 31G X 15/64\" 0.5 ML USE WITH NPH INSULIN 100 each 0    LANTUS VIAL 100 UNIT/ML soln INJECT 44 UNITS SUBCUTANEOUSLY AT BEDTIME 40 mL 0    latanoprost (XALATAN) 0.005 % ophthalmic solution Place 1 drop into both eyes At Bedtime  2.5 mL 1    lisinopril (ZESTRIL) 5 MG tablet Take 1 tablet (5 mg) by mouth daily 90 tablet 1    LORazepam (ATIVAN) 0.5 MG tablet Take 1 tablet (0.5 mg) by mouth nightly as needed for anxiety or sleep 30 tablet 0    mometasone (ELOCON) 0.1 % external cream APPLY CREAM TOPICALLY ONCE DAILY      netarsudil (RHOPRESSA) 0.02 % ophthalmic solution Place 1 drop Into the left eye at bedtime      OZEMPIC, 0.25 OR 0.5 MG/DOSE, 2 MG/3ML pen INJECT 0.25MG SUBCUTANEOUSLY EVERY 7 DAYS 3 mL 0    senna-docusate (SENOKOT-S/PERICOLACE) 8.6-50 MG tablet Take 2 tablets by mouth daily as needed for constipation 60 tablet 11    timolol maleate (TIMOPTIC) 0.5 % ophthalmic solution INSTILL 1 DROP INTO EACH EYE TWICE DAILY      tiZANidine (ZANAFLEX) 2 MG tablet TAKE 1 TO 2 TABLETS BY MOUTH EVERY 6 TO 8 HOURS AS NEEDED FOR NECK PAIN      triamcinolone (KENALOG) 0.5 % external ointment Apply 1 g topically daily as needed for " "irritation      XARELTO ANTICOAGULANT 20 MG TABS tablet Take 1 tablet (20 mg) by mouth daily 90 tablet 1       Allergies  Allergies   Allergen Reactions    Bromfenac Visual Disturbance      xibrom  Causes sever eye pain    Codeine Nausea     Other reaction(s): Dizziness, Headache    Metformin GI Disturbance    Seasonal Allergies          Family History  family history includes Asthma in her maternal aunt; Breast Cancer in her mother and niece; Cancer in her paternal aunt and paternal grandmother; Cerebrovascular Disease in her maternal grandfather and maternal grandmother; Connective Tissue Disorder in her sister; Diabetes in her mother and sister; Heart Disease in her father and paternal grandfather; Hypertension in her brother and mother; Uterine Cancer in her sister.    Social History  Social History     Tobacco Use    Smoking status: Never     Passive exposure: Never    Smokeless tobacco: Never   Vaping Use    Vaping status: Never Used   Substance Use Topics    Alcohol use: No     Alcohol/week: 0.0 standard drinks of alcohol    Drug use: No       Physical Exam  BP (!) 153/72 (BP Location: Left arm, Patient Position: Chair, Cuff Size: Adult Regular)   Pulse 69   Temp 99.2  F (37.3  C) (Tympanic)   Resp 18   Ht 1.702 m (5' 7.01\")   Wt 100.6 kg (221 lb 12.8 oz)   LMP  (LMP Unknown)   SpO2 98%   Breastfeeding No   BMI 34.73 kg/m    Body mass index is 34.73 kg/m .  GENERAL :  In no apparent distress  SKIN: Normal color, normal temperature, texture.  No hirsutism, alopecia or purple striae.     EYES: PERRL, EOMI, No scleral icterus,  No proptosis, conjunctival redness, stare, retraction  RESP: Lungs clear to auscultation bilaterally  CARDIAC: Regular rate and rhythm, normal S1 S2, without murmurs, rubs or gallops    NEURO: awake, alert, responds appropriately to questions.  Cranial nerves intact.   Moves all extremities; Gait normal.  No tremor of the outstretched hand.    EXTREMITIES: No clubbing, cyanosis " or edema.    DATA REVIEW  FreeStyle LibreView  Time in target range 66%  High 31%, Very High 3%  Current Ave  mg/dl

## 2024-08-20 NOTE — LETTER
8/20/2024      Gricelda Sabillon  [address] [address]      Dear Colleague,    Thank you for referring your patient, Gricelda Sabillon, to the [facility]. Please see a copy of my visit note below.    Assessment/Plan :   Type 2 DM. Melva is doing well. We reviewed her recent CGMS report and her numbers seem to be stabilizing. Her fasting numbers are still a little higher than I would like and we discussed how to slowly titrate Lantus. We will start by increasing to 36 unit(s) tonight and if her morning blood sugars are still over 120 mg/dl on Monday, she will increase to 38 unit(s). She will continue to adjust Humalog as needed. I encouraged her to keep up the good work and we will follow-up in 4 mos.       I have independently reviewed and interpreted labs, imaging as indicated.      Chief complaint:  Gricelda is a 74 year old female who returns for follow-up of Type 2 DM.    I have reviewed Care Everywhere including [facility], [facility], [facility] , [facility], [place] lab reports, imaging reports and provider notes as indicated.      HISTORY OF PRESENT ILLNESS  Melva is doing okay. She has been working hard to get her blood sugars under better control. She has been taking Lantus 36 unit(s) at night along with 5 unit(s) of Humalog before meals. She also takes glipizide XL 10 mg daily. She has been monitoring her blood sugars with the FreeStyle Abldo 3 sensor and she has been working hard to get her time in range to 75%.     Melva did recently experience some worsening hyperglycemia. She received a cortisone spinal injection and her numbers shot up to over 300 mg/dl. She added 3 extra unit(s) of Humalog at dinner and that seemed to bring the numbers back down to normal. She has not had any problems with blurred vision. She does continued to struggle with general aches and pains, including the remnants of a radiation burn. However,  overall, she has been feeling good.     Melva was diagnosed with diabetes over 10 yrs ago. She has taken a variety of oral medications along with MDI insulin therapy for the last few years. Her diabetes is complicated by hyperlipidemia, obesity, HTN, CHERRY, a history of endometrial cancer, and a new diagnosis of metastatic melanoma. She has an upcoming PET scan and surgery, and she may be starting Keytruda in the near future.     Endocrine relevant labs are as follows:   Latest Reference Range & Units 08/15/24 13:11   Hemoglobin A1C 0.0 - 5.6 % 7.7 (H)   (H): Data is abnormally high   Latest Reference Range & Units 03/04/24 09:22   Albumin Urine mg/g Cr 0.00 - 25.00 mg/g Cr 10.16      Latest Reference Range & Units 03/04/24 09:22   Albumin Urine mg/L mg/L 19.4      Latest Reference Range & Units 02/27/24 10:26   Hemoglobin A1C <5.7 % 8.1 (H)   (H): Data is abnormally high    REVIEW OF SYSTEMS    Endocrine: positive for diabetes  Skin: negative  Eyes: negative for, visual blurring, redness, tearing  Ears/Nose/Throat: negative  Respiratory: No shortness of breath, dyspnea on exertion, cough, or hemoptysis  Cardiovascular: negative for, chest pain, dyspnea on exertion, lower extremity edema, and exercise intolerance  Gastrointestinal: negative for, nausea, vomiting, constipation, and diarrhea  Genitourinary: negative for, nocturia, dysuria, frequency, and urgency  Musculoskeletal: positive for back pain and arthritis, negative for, muscular weakness, nocturnal cramping, and foot pain  Neurologic: negative for, local weakness, numbness or tingling of hands, and numbness or tingling of feet  Psychiatric: negative  Hematologic/Lymphatic/Immunologic: negative    Past Medical History  Past Medical History:   Diagnosis Date     Asthma, mild intermittent      Cataract      Endometrial cancer (H) 06/2022     HTN, goal below 140/90      Hyperlipidemia LDL goal < 100      Macular hole of left eye      Melanoma (H)     RIGHT KNEE  "    Sleep apnea     uses c pap     Type 2 diabetes, HbA1C goal < 8% (H)        Medications  Current Outpatient Medications   Medication Sig Dispense Refill     atenolol (TENORMIN) 50 MG tablet Take 1 tablet (50 mg) by mouth daily 90 tablet 1     atorvastatin (LIPITOR) 40 MG tablet Take 1 tablet (40 mg) by mouth daily 90 tablet 1     cetirizine (ZYRTEC) 10 MG tablet Take 10 mg by mouth every morning       Continuous Glucose Sensor (FREESTYLE DAVID 3 SENSOR) MISC 1 Device every 14 days 2 each 3     CONTOUR NEXT TEST test strip USE 1 STRIP TO CHECK GLUCOSE ONCE DAILY 100 strip 4     glipiZIDE (GLUCOTROL XL) 10 MG 24 hr tablet Take 1 tablet (10 mg) by mouth daily 90 tablet 1     HUMALOG 100 UNIT/ML injection INJECT 5 UNITS SUBCUTANEOUSLY THREE TIMES DAILY BEFORE MEAL(S) 10 mL 0     hydrochlorothiazide (HYDRODIURIL) 25 MG tablet Take 1 tablet (25 mg) by mouth every morning 90 tablet 1     insulin pen needle (MM PEN NEEDLES) 32G X 4 MM miscellaneous Inject insulin 4 times a day 300 each 1     insulin syringe-needle U-100 (BD VEO INSULIN SYRINGE U/F) 31G X 15/64\" 0.5 ML USE WITH NPH INSULIN 100 each 0     LANTUS VIAL 100 UNIT/ML soln INJECT 44 UNITS SUBCUTANEOUSLY AT BEDTIME 40 mL 0     latanoprost (XALATAN) 0.005 % ophthalmic solution Place 1 drop into both eyes At Bedtime  2.5 mL 1     lisinopril (ZESTRIL) 5 MG tablet Take 1 tablet (5 mg) by mouth daily 90 tablet 1     LORazepam (ATIVAN) 0.5 MG tablet Take 1 tablet (0.5 mg) by mouth nightly as needed for anxiety or sleep 30 tablet 0     mometasone (ELOCON) 0.1 % external cream APPLY CREAM TOPICALLY ONCE DAILY       netarsudil (RHOPRESSA) 0.02 % ophthalmic solution Place 1 drop Into the left eye at bedtime       OZEMPIC, 0.25 OR 0.5 MG/DOSE, 2 MG/3ML pen INJECT 0.25MG SUBCUTANEOUSLY EVERY 7 DAYS 3 mL 0     senna-docusate (SENOKOT-S/PERICOLACE) 8.6-50 MG tablet Take 2 tablets by mouth daily as needed for constipation 60 tablet 11     timolol maleate (TIMOPTIC) 0.5 % " "ophthalmic solution INSTILL 1 DROP INTO EACH EYE TWICE DAILY       tiZANidine (ZANAFLEX) 2 MG tablet TAKE 1 TO 2 TABLETS BY MOUTH EVERY 6 TO 8 HOURS AS NEEDED FOR NECK PAIN       triamcinolone (KENALOG) 0.5 % external ointment Apply 1 g topically daily as needed for irritation       XARELTO ANTICOAGULANT 20 MG TABS tablet Take 1 tablet (20 mg) by mouth daily 90 tablet 1       Allergies  Allergies   Allergen Reactions     Bromfenac Visual Disturbance      xibrom  Causes sever eye pain     Codeine Nausea     Other reaction(s): Dizziness, Headache     Metformin GI Disturbance     Seasonal Allergies          Family History  family history includes Asthma in her maternal aunt; Breast Cancer in her mother and niece; Cancer in her paternal aunt and paternal grandmother; Cerebrovascular Disease in her maternal grandfather and maternal grandmother; Connective Tissue Disorder in her sister; Diabetes in her mother and sister; Heart Disease in her father and paternal grandfather; Hypertension in her brother and mother; Uterine Cancer in her sister.    Social History  Social History     Tobacco Use     Smoking status: Never     Passive exposure: Never     Smokeless tobacco: Never   Vaping Use     Vaping status: Never Used   Substance Use Topics     Alcohol use: No     Alcohol/week: 0.0 standard drinks of alcohol     Drug use: No       Physical Exam  BP (!) 153/72 (BP Location: Left arm, Patient Position: Chair, Cuff Size: Adult Regular)   Pulse 69   Temp 99.2  F (37.3  C) (Tympanic)   Resp 18   Ht 1.702 m (5' 7.01\")   Wt 100.6 kg (221 lb 12.8 oz)   LMP  (LMP Unknown)   SpO2 98%   Breastfeeding No   BMI 34.73 kg/m    Body mass index is 34.73 kg/m .  GENERAL :  In no apparent distress  SKIN: Normal color, normal temperature, texture.  No hirsutism, alopecia or purple striae.     EYES: PERRL, EOMI, No scleral icterus,  No proptosis, conjunctival redness, stare, retraction  RESP: Lungs clear to auscultation " bilaterally  CARDIAC: Regular rate and rhythm, normal S1 S2, without murmurs, rubs or gallops    NEURO: awake, alert, responds appropriately to questions.  Cranial nerves intact.   Moves all extremities; Gait normal.  No tremor of the outstretched hand.    EXTREMITIES: No clubbing, cyanosis or edema.    DATA REVIEW  FreeStyle LibreView  Time in target range 66%  High 31%, Very High 3%  Current Ave  mg/dl         Again, thank you for allowing me to participate in the care of your patient.        Sincerely,        Danisha Villa PA-C

## 2024-08-20 NOTE — PATIENT INSTRUCTIONS
[facility]  Dr Corado, Endocrinology Department    [address] [id number]  [place] [address]  Appointment [phone]  Fax: [phone]  [place]: Monday - Thursday

## 2024-08-29 ENCOUNTER — TELEPHONE (OUTPATIENT)
Dept: ENDOCRINOLOGY | Facility: CLINIC | Age: 74
End: 2024-08-29
Payer: COMMERCIAL

## 2024-08-29 DIAGNOSIS — E11.65 TYPE 2 DIABETES MELLITUS WITH HYPERGLYCEMIA, WITHOUT LONG-TERM CURRENT USE OF INSULIN (H): Primary | ICD-10-CM

## 2024-08-29 RX ORDER — BLOOD-GLUCOSE SENSOR
1 EACH MISCELLANEOUS SEE ADMIN INSTRUCTIONS
Qty: 2 EACH | Refills: 1 | Status: SHIPPED | OUTPATIENT
Start: 2024-08-29

## 2024-08-29 NOTE — TELEPHONE ENCOUNTER
Updated FL3 sensor Rx to FL3+ sensors due to backorder. 4 sensors remain on current Rx.     Shellie [email or web address]  [facility]

## 2024-09-03 ENCOUNTER — OFFICE VISIT (OUTPATIENT)
Dept: INTERNAL MEDICINE | Facility: CLINIC | Age: 74
End: 2024-09-03
Payer: COMMERCIAL

## 2024-09-03 VITALS
BODY MASS INDEX: 34.37 KG/M2 | WEIGHT: 219 LBS | TEMPERATURE: 97.8 F | HEART RATE: 61 BPM | SYSTOLIC BLOOD PRESSURE: 114 MMHG | OXYGEN SATURATION: 98 % | DIASTOLIC BLOOD PRESSURE: 62 MMHG | RESPIRATION RATE: 14 BRPM | HEIGHT: 67 IN

## 2024-09-03 DIAGNOSIS — E11.65 TYPE 2 DIABETES MELLITUS WITH HYPERGLYCEMIA, WITHOUT LONG-TERM CURRENT USE OF INSULIN (H): ICD-10-CM

## 2024-09-03 DIAGNOSIS — E78.5 HYPERLIPIDEMIA WITH TARGET LDL LESS THAN 100: ICD-10-CM

## 2024-09-03 DIAGNOSIS — I27.82 OTHER CHRONIC PULMONARY EMBOLISM WITHOUT ACUTE COR PULMONALE (H): ICD-10-CM

## 2024-09-03 DIAGNOSIS — R60.0 LEG EDEMA, RIGHT: Primary | ICD-10-CM

## 2024-09-03 DIAGNOSIS — M54.50 CHRONIC BILATERAL LOW BACK PAIN, UNSPECIFIED WHETHER SCIATICA PRESENT: ICD-10-CM

## 2024-09-03 DIAGNOSIS — I10 HTN, GOAL BELOW 140/90: ICD-10-CM

## 2024-09-03 DIAGNOSIS — C43.9 METASTATIC MALIGNANT MELANOMA (H): ICD-10-CM

## 2024-09-03 DIAGNOSIS — G89.29 CHRONIC BILATERAL LOW BACK PAIN, UNSPECIFIED WHETHER SCIATICA PRESENT: ICD-10-CM

## 2024-09-03 PROCEDURE — G2211 COMPLEX E/M VISIT ADD ON: HCPCS | Performed by: INTERNAL MEDICINE

## 2024-09-03 PROCEDURE — 99214 OFFICE O/P EST MOD 30 MIN: CPT | Performed by: INTERNAL MEDICINE

## 2024-09-03 RX ORDER — GLIPIZIDE 10 MG/1
10 TABLET, FILM COATED, EXTENDED RELEASE ORAL DAILY
Qty: 90 TABLET | Refills: 1 | Status: SHIPPED | OUTPATIENT
Start: 2024-09-03

## 2024-09-03 RX ORDER — ATORVASTATIN CALCIUM 40 MG/1
40 TABLET, FILM COATED ORAL DAILY
Qty: 90 TABLET | Refills: 1 | Status: SHIPPED | OUTPATIENT
Start: 2024-09-03

## 2024-09-03 RX ORDER — HYDROCHLOROTHIAZIDE 25 MG/1
25 TABLET ORAL EVERY MORNING
Qty: 90 TABLET | Refills: 1 | Status: SHIPPED | OUTPATIENT
Start: 2024-09-03

## 2024-09-03 RX ORDER — ATENOLOL 50 MG/1
50 TABLET ORAL DAILY
Qty: 90 TABLET | Refills: 1 | Status: SHIPPED | OUTPATIENT
Start: 2024-09-03

## 2024-09-03 RX ORDER — LISINOPRIL 5 MG/1
5 TABLET ORAL DAILY
Qty: 90 TABLET | Refills: 1 | Status: SHIPPED | OUTPATIENT
Start: 2024-09-03

## 2024-09-03 RX ORDER — RIVAROXABAN 20 MG/1
20 TABLET, FILM COATED ORAL DAILY
Qty: 90 TABLET | Refills: 1 | Status: SHIPPED | OUTPATIENT
Start: 2024-09-03

## 2024-09-03 ASSESSMENT — ASTHMA QUESTIONNAIRES
QUESTION_1 LAST FOUR WEEKS HOW MUCH OF THE TIME DID YOUR ASTHMA KEEP YOU FROM GETTING AS MUCH DONE AT WORK, SCHOOL OR AT HOME: NONE OF THE TIME
QUESTION_2 LAST FOUR WEEKS HOW OFTEN HAVE YOU HAD SHORTNESS OF BREATH: NOT AT ALL
QUESTION_4 LAST FOUR WEEKS HOW OFTEN HAVE YOU USED YOUR RESCUE INHALER OR NEBULIZER MEDICATION (SUCH AS ALBUTEROL): NOT AT ALL
QUESTION_5 LAST FOUR WEEKS HOW WOULD YOU RATE YOUR ASTHMA CONTROL: COMPLETELY CONTROLLED
ACT_TOTALSCORE: 25
ACT_TOTALSCORE: 25
QUESTION_3 LAST FOUR WEEKS HOW OFTEN DID YOUR ASTHMA SYMPTOMS (WHEEZING, COUGHING, SHORTNESS OF BREATH, CHEST TIGHTNESS OR PAIN) WAKE YOU UP AT NIGHT OR EARLIER THAN USUAL IN THE MORNING: NOT AT ALL

## 2024-09-03 NOTE — PROGRESS NOTES
Dr Torres's note      Patient's instructions / PLAN:                                                        Plan:  After the steroid injection change the lantus dose  -- continue in the morning with 36 units  -- add 4 units at bed time and adjust this dose as we discussed  -- continue the Humalog coverage during the day  2. Lymphedema treatment referral   3. Compression sock after you graduate from the lymphedema treatment   4. Next ANNUAL EXAM -- after April 23, 2025        ASSESSMENT & PLAN:                                                      (R60.0) Leg edema, right  (primary encounter diagnosis)  Comment: most likely sec surgery and Radiation to the leg  Plan: Lymphedema Therapy  Referral,         Compression Sleeve/Stocking Order for DME -         ONLY FOR DME            (E11.65) Type 2 diabetes mellitus with hyperglycemia, without long-term current use of insulin (H)  Comment: numbers expected to increase sec steroids inj for the LBP.   Plan: glipiZIDE (GLUCOTROL XL) 10 MG 24 hr tablet        I think it is easier to adjust the Lantus dose if she takes it twice a day.    (M54.50,  G89.29) Chronic bilateral low back pain, unspecified whether sciatica present  Comment: 2 months ago MRI showed arthritis, but no metastasis  Plan: Follow-up with [facility]    (I10) HTN, goal below 140/90  Comment: Controlled  Plan: atenolol (TENORMIN) 50 MG tablet,         hydrochlorothiazide (HYDRODIURIL) 25 MG tablet,        lisinopril (ZESTRIL) 5 MG tablet            (E78.5) Hyperlipidemia with target LDL less than 100  Comment: Controlled  Plan: atorvastatin (LIPITOR) 40 MG tablet            (I27.82) Other chronic pulmonary embolism without acute cor pulmonale (H)  Comment:   Plan: XARELTO ANTICOAGULANT 20 MG TABS tablet            (C43.9) Metastatic malignant melanoma (H)  -- R thigh   Comment:   Plan: Follow-up with oncology         Chief complaint:                                                      Follow up chronic  medical problems      SUBJECTIVE:                                                    History of present illness:      Malignant melanoma w mets in the R thigh, reoccurring   -- local radiation, last July 9. Skin burning.   -- f/up w [facility], received an cortisone inj and she is scheduled for additional one   -- she had spine MRI in July which showed arthritis but no mets   -- Exam: Postsurgical scars are healing well.  The skin is Tanned where she had the radiation    LBP  -- She sees [facility].  She has been getting steroid injections.  After the last steroid injection the blood sugar went into the 300s.  Endo advised her to increase Lantus from 36 to 40 units  -- She will be getting soon another steroid injection.   -- We discussed about blood sugars    Subjective   Melva is a 74 year old, presenting for the following health issues:  Back Pain and Diabetes      9/3/2024     9:35 AM   Additional Questions   Roomed by Phoebe BIANCHI     History of Present Illness       Back Pain:  She presents for follow up of back pain. Patient's back pain is a chronic problem.  Location of back pain:  Right lower back, left lower back, right middle of back, left middle of back, right hip and left hip  Description of back pain: sharp  Back pain spreads: right buttocks and left buttocks    Since patient first noticed back pain, pain is: always present, but gets better and worse  Does back pain interfere with her job:  Not applicable       Diabetes:   She presents for follow up of diabetes.  She is checking home blood glucose four or more times daily.   She checks blood glucose before and after meals and at bedtime.  Blood glucose is sometimes over 200 and sometimes under 70.  When her blood glucose is low, the patient is asymptomatic for confusion, blurred vision, lethargy and reports not feeling dizzy, shaky, or weak.  She is concerned about other.    She is not experiencing numbness or burning in feet, excessive thirst, blurry vision, weight  "changes or redness, sores or blisters on feet.           She eats 2-3 servings of fruits and vegetables daily.She consumes 0 sweetened beverage(s) daily.She exercises with enough effort to increase her heart rate 10 to 19 minutes per day.  She exercises with enough effort to increase her heart rate 3 or less days per week.   She is taking medications regularly.       Review of Systems:                                                      ROS: negative for fever, chills, cough, wheezes, chest pain, shortness of breath, vomiting, abdominal pain, leg swelling       OBJECTIVE:             Physical exam:  Blood pressure 114/62, pulse 61, temperature 97.8  F (36.6  C), temperature source Oral, resp. rate 14, height 1.702 m (5' 7\"), weight 99.3 kg (219 lb), SpO2 98%, not currently breastfeeding.     NAD, appears comfortable  Skin: no rashes   Neck: supple, no JVD,  No thyroidmegaly. Lymph nodes nonpalpable cervical and supraclavicular.  Chest: clear to auscultation bilaterally, good respiratory effort  Heart: S1 S2, RRR, no mgr appreciated  Abdomen: soft, not tender,   Extremities: no edema,   Neurologic: A, Ox3, no focal signs appreciated    PMHx: reviewed  Past Medical History:   Diagnosis Date    Asthma, mild intermittent     Cataract     Endometrial cancer (H) 06/2022    HTN, goal below 140/90     Hyperlipidemia LDL goal < 100     Macular hole of left eye     Melanoma (H)     RIGHT KNEE    Sleep apnea     uses c pap    Type 2 diabetes, HbA1C goal < 8% (H)       PSHx: reviewed  Past Surgical History:   Procedure Laterality Date    ARTHROPLASTY HIP Right 9/25/2017    Procedure: ARTHROPLASTY HIP;  Right total hip arthroplasty  ;  Surgeon: Ayaan Pena MD;  Location: [facility]    ARTHROPLASTY KNEE  7/12/2013    Procedure: ARTHROPLASTY KNEE;  right total knee arthroplasty ;  Surgeon: Chaparro Wesley MD;  Location: [facility]    ARTHROSCOPY KNEE Right 1/21/2015    Procedure: ARTHROSCOPY KNEE;  Surgeon: Ayaan Pena" MD Yordy;  Location:  OR    BRONCHOSCOPY (RIGID OR FLEXIBLE), DIAGNOSTIC N/A 8/11/2022    Procedure: BRONCHOSCOPY, WITH BRONCHOALVEOLAR LAVAGE;  Surgeon: Roselia Sosa MD;  Location:  GI    C INCISION OF HYMEN      CATARACT IOL, RT/LT      COLONOSCOPY  2008    COLONOSCOPY N/A 4/18/2019    Procedure: Colonoscopy with polypectomy;  Surgeon: Shaun Guerrero MD;  [facility]    CYSTOSCOPY N/A 6/23/2022    Procedure: Cystoscopy;  Surgeon: Eli Guidry MD;  [facility]    ENDOSCOPIC RETROGRADE CHOLANGIOPANCREATOGRAM N/A 2/6/2019    Procedure: COMBINED ENDOSCOPIC RETROGRADE CHOLANGIOPANCREATOGRAPHY, BILIARY SPINCTEROTOMY AND DILATION, PLACE BILE DUCT STENT;  Surgeon: Guru Andie Gonzalez MD;  [facility]    ENDOSCOPIC RETROGRADE CHOLANGIOPANCREATOGRAM N/A 2/28/2019    Procedure: Endoscopic Retrograde Cholangiopancreatogram, Bile duct stent removal and placement;  Surgeon: Shaun Guerrero MD;  [facility]    ENDOSCOPIC RETROGRADE CHOLANGIOPANCREATOGRAM N/A 4/18/2019    Procedure: COMBINED ENDOSCOPIC RETROGRADE CHOLANGIOPANCREATOGRAPHY, Bile duct stent exchange and Polypectomy;  Surgeon: Shaun Guerrero MD;  Location:  OR    ENDOSCOPIC RETROGRADE CHOLANGIOPANCREATOGRAM N/A 10/18/2019    Procedure: Endoscopic Retrograde Cholangiopancreatogram;  Surgeon: Shaun Guerrero MD;  Location:  OR    ENDOSCOPIC RETROGRADE CHOLANGIOPANCREATOGRAM N/A 3/24/2022    Procedure: ENDOSCOPIC RETROGRADE CHOLANGIOPANCREATOGRAPHY;  Surgeon: Shaun Guerrero MD;  Location:  OR    ENDOSCOPIC RETROGRADE CHOLANGIOPANCREATOGRAM N/A 3/16/2023    Procedure: endoscopic retrograde cholangiopancreatography with minor papillotomy;  Surgeon: Shaun Guerrero MD;  Location:  OR    ENDOSCOPIC RETROGRADE CHOLANGIOPANCREATOGRAM N/A 3/28/2024    Procedure: ENDOSCOPIC RETROGRADE CHOLANGIOPANCREATOGRAPHY;  Surgeon: Shaun Guerrero MD;  Location:  OR    ENDOSCOPIC RETROGRADE CHOLANGIOPANCREATOGRAM WITH  SPYGLASS N/A 7/26/2019    Procedure: Endoscopic Retrograde Cholangiopancreatogram With Spyglas,l Radiofrequency Ablation, Stent Exchangeand Duodenal Biopsy x2;  Surgeon: Shaun Guerrero MD;  [facility]    ENDOSCOPIC RETROGRADE CHOLANGIOPANCREATOGRAM WITH SPYGLASS N/A 9/10/2020    Procedure: ENDOSCOPIC RETROGRADE CHOLANGIOPANCREATOGRAPHY, WITH DIRECT DUCT VISUALIZATION, USING PANCREATICOBILIARY FIBEROPTIC PROBE;  Surgeon: Shaun Guerrero MD;  [facility]    ENDOSCOPIC RETROGRADE CHOLANGIOPANCREATOGRAM WITH SPYGLASS N/A 3/22/2021    Procedure: ENDOSCOPIC RETROGRADE CHOLANGIOPANCREATOGRAPHY, WITH DIRECT DUCT VISUALIZATION, USING PANCREATICOBILIARY FIBEROPTIC PROBE;  Surgeon: Shaun Guerrero MD;  [facility]    ESOPHAGOSCOPY, GASTROSCOPY, DUODENOSCOPY (EGD) WITH RADIO FREQUENCY ABLATION, COMBINED N/A 9/10/2020    Procedure: possible repeat ESOPHAGOGASTRODUODENOSCOPY, WITH RADIOFREQUENCY ABLATION and endoscopic mucosal resection;  Surgeon: Shaun Guerrero MD;  [facility]    ESOPHAGOSCOPY, GASTROSCOPY, DUODENOSCOPY (EGD), COMBINED N/A 4/18/2019    Procedure: upper endoscopy with polypectomy;  Surgeon: Shaun Guerrero MD;  [facility]    ESOPHAGOSCOPY, GASTROSCOPY, DUODENOSCOPY (EGD), COMBINED N/A 10/18/2019    Procedure: Upper Endoscopy and ERCP with stent removal, stone removal and biopsy;  Surgeon: Shaun Guerrero MD;  [facility]    ESOPHAGOSCOPY, GASTROSCOPY, DUODENOSCOPY (EGD), COMBINED N/A 3/24/2022    Procedure: ESOPHAGOGASTRODUODENOSCOPY (EGD), Duodenal  polyp removal;  Surgeon: Shaun Guerrero MD;  [facility]: [facility]    ESOPHAGOSCOPY, GASTROSCOPY, DUODENOSCOPY (EGD), COMBINED N/A 3/16/2023    Procedure: ESOPHAGOGASTRODUODENOSCOPY (EGD);  Surgeon: Shaun Guerrero MD;  Location: [facility]    ESOPHAGOSCOPY, GASTROSCOPY, DUODENOSCOPY (EGD), RESECT MUCOSA, COMBINED N/A 2/28/2019    Procedure: Upper Endoscopy, Endoscopic Ultrasound, Endoscopic Mucosal Resection,  Ampullectomy, polypectomy.;  Surgeon: Cesar  MD Shaun;  [facility]    ESOPHAGOSCOPY, GASTROSCOPY, DUODENOSCOPY (EGD), RESECT MUCOSA, COMBINED N/A 3/22/2021    Procedure: ESOPHAGOGASTRODUODENOSCOPY (EGD) with  endoscopic mucosal resection/ polypectomy;  Surgeon: Shaun Guerrero MD;  [facility]    EXCISE LESION LOWER EXTREMITY Right 3/28/2022    Procedure: Wide local excision of right knee melanoma;  Surgeon: Chevy Allison MD;  Location: [facility] OR    EXCISE MASS LOWER EXTREMITY Right 1/13/2022    Procedure: excision of right thigh mass;  Surgeon: Salvador Kelly MD;  Location: [facility]    EXCISE MASS LOWER EXTREMITY Right 4/10/2024    Procedure: WIDE EXCISION OF RIGHT THIGH MELANOMA;  Surgeon: Chevy Allison MD;  Location: [facility] OR    EYE SURGERY      macular hole repaired left eye    HC KNEE SCOPE, DIAGNOSTIC      - both knees    HEAD & NECK SURGERY      wisdom teeth    IR CHEST PORT PLACEMENT > 5 YRS OF AGE  5/2/2022    LAPAROSCOPIC HYSTERECTOMY TOTAL, BILATERAL SALPINGO-OOPHORECTOMY, NODE DISSECTION, COMBINED Bilateral 6/23/2022    Procedure: Total Laparoscopic Hyserectomy, Bilateral Salpibgo-oophorectomy, Bilateral [place] Lymph Node Dissection;  Surgeon: Eli Guidry MD;  [facility]        Meds: reviewed  Current Outpatient Medications   Medication Sig Dispense Refill    atenolol (TENORMIN) 50 MG tablet Take 1 tablet (50 mg) by mouth daily 90 tablet 1    atorvastatin (LIPITOR) 40 MG tablet Take 1 tablet (40 mg) by mouth daily 90 tablet 1    cetirizine (ZYRTEC) 10 MG tablet Take 10 mg by mouth every morning      Continuous Glucose Sensor (FREESTYLE DAVID 3 PLUS SENSOR) MISC 1 Application See Admin Instructions. Change every 15 days 2 each 1    CONTOUR NEXT TEST test strip USE 1 STRIP TO CHECK GLUCOSE ONCE DAILY 100 strip 4    glipiZIDE (GLUCOTROL XL) 10 MG 24 hr tablet Take 1 tablet (10 mg) by mouth daily 90 tablet 1    HUMALOG 100 UNIT/ML injection INJECT 5 UNITS SUBCUTANEOUSLY THREE TIMES DAILY BEFORE MEAL(S) 10 mL 0     "hydrochlorothiazide (HYDRODIURIL) 25 MG tablet Take 1 tablet (25 mg) by mouth every morning 90 tablet 1    insulin pen needle (MM PEN NEEDLES) 32G X 4 MM miscellaneous Inject insulin 4 times a day 300 each 1    insulin syringe-needle U-100 (BD VEO INSULIN SYRINGE U/F) 31G X 15/64\" 0.5 ML USE WITH NPH INSULIN 100 each 0    LANTUS VIAL 100 UNIT/ML soln INJECT 44 UNITS SUBCUTANEOUSLY AT BEDTIME (Patient taking differently: 40 Units.) 40 mL 0    latanoprost (XALATAN) 0.005 % ophthalmic solution Place 1 drop into both eyes At Bedtime  2.5 mL 1    lisinopril (ZESTRIL) 5 MG tablet Take 1 tablet (5 mg) by mouth daily 90 tablet 1    LORazepam (ATIVAN) 0.5 MG tablet Take 1 tablet (0.5 mg) by mouth nightly as needed for anxiety or sleep 30 tablet 0    mometasone (ELOCON) 0.1 % external cream APPLY CREAM TOPICALLY ONCE DAILY      netarsudil (RHOPRESSA) 0.02 % ophthalmic solution Place 1 drop Into the left eye at bedtime      timolol maleate (TIMOPTIC) 0.5 % ophthalmic solution INSTILL 1 DROP INTO EACH EYE TWICE DAILY      tiZANidine (ZANAFLEX) 2 MG tablet TAKE 1 TO 2 TABLETS BY MOUTH EVERY 6 TO 8 HOURS AS NEEDED FOR NECK PAIN      OZEMPIC, 0.25 OR 0.5 MG/DOSE, 2 MG/3ML pen INJECT 0.25MG SUBCUTANEOUSLY EVERY 7 DAYS (Patient not taking: Reported on 9/3/2024) 3 mL 0    senna-docusate (SENOKOT-S/PERICOLACE) 8.6-50 MG tablet Take 2 tablets by mouth daily as needed for constipation (Patient not taking: Reported on 9/3/2024) 60 tablet 11    triamcinolone (KENALOG) 0.5 % external ointment Apply 1 g topically daily as needed for irritation (Patient not taking: Reported on 9/3/2024)      XARELTO ANTICOAGULANT 20 MG TABS tablet Take 1 tablet (20 mg) by mouth daily (Patient not taking: Reported on 9/3/2024) 90 tablet 1       Soc Hx: reviewed  Fam Hx: reviewed      Chart documentation was completed, in part, with [employer] voice-recognition software. Even though reviewed, some grammatical, spelling, and word errors may remain.      Raisa " MD [facility]       Signed Electronically by: Raisa Davidson MD

## 2024-09-03 NOTE — PATIENT INSTRUCTIONS
Plan:  After the steroid injection change the lantus dose  -- continue in the morning with 36 units  -- add 4 units at bed time and adjust this dose as we discussed  -- continue the Humalog coverage during the day  2. Lymphedema treatment referral   3. Compression sock after you graduate from the lymphedema treatment   4. Next ANNUAL EXAM -- after April 23, 2025

## 2024-09-03 NOTE — NURSING NOTE
"Chief Complaint   Patient presents with    Back Pain    Diabetes     initial /62   Pulse 61   Temp 97.8  F (36.6  C) (Oral)   Resp 14   Ht 1.702 m (5' 7\")   Wt 99.3 kg (219 lb)   LMP  (LMP Unknown)   SpO2 98%   BMI 34.30 kg/m   Estimated body mass index is 34.3 kg/m  as calculated from the following:    Height as of this encounter: 1.702 m (5' 7\").    Weight as of this encounter: 99.3 kg (219 lb)..  bp completed using cuff size large  UTE GUNTER LPN  "

## 2024-09-09 ENCOUNTER — THERAPY VISIT (OUTPATIENT)
Dept: OCCUPATIONAL THERAPY | Facility: CLINIC | Age: 74
End: 2024-09-09
Attending: INTERNAL MEDICINE
Payer: COMMERCIAL

## 2024-09-09 DIAGNOSIS — R60.0 LEG EDEMA, RIGHT: ICD-10-CM

## 2024-09-09 DIAGNOSIS — I89.0 LYMPHEDEMA: Primary | ICD-10-CM

## 2024-09-09 PROCEDURE — 97165 OT EVAL LOW COMPLEX 30 MIN: CPT | Mod: GO | Performed by: OCCUPATIONAL THERAPIST

## 2024-09-09 PROCEDURE — 97140 MANUAL THERAPY 1/> REGIONS: CPT | Mod: GO | Performed by: OCCUPATIONAL THERAPIST

## 2024-09-09 NOTE — PROGRESS NOTES
OCCUPATIONAL THERAPY EVALUATION  Type of Visit: Evaluation       Fall Risk Screen:  Fall screen completed by: OT  Have you fallen 2 or more times in the past year?: No  Have you fallen and had an injury in the past year?: No  Is patient a fall risk?: No    Subjective      Presenting condition or subjective complaint: help with swelling  Date of onset: 09/03/24    Relevant medical history: Arthritis; Cancer; Diabetes; DVT (blood clot); High blood pressure; Overweight; Radiation treatment; Sleep disorder like apnea   Dates & types of surgery:      Prior diagnostic imaging/testing results:       Prior therapy history for the same diagnosis, illness or injury: No      Prior Level of Function  Transfers: Independent  Ambulation: Assistive equipment  ADL: Independent, Assistive equipment, Assistive person  IADL: Driving, Finances, Housekeeping, Meal preparation, Medication management    Living Environment  Social support: With a significant other or spouse   Type of home: House   Stairs to enter the home: Yes       Ramp: No   Stairs inside the home: Yes 20     Help at home: None  Equipment owned: Straight Cane; Walker; Crutches; Commode; Raised toilet seat     Employment: No    Hobbies/Interests:      Patient goals for therapy:      Pain assessment:  Pt has LBP; pt does not have pain in RLE or associated with her LB lymphedema     Objective       EDEMA EVALUATION  Additional history:  Body part affected by edema: R foot  If cancer related, treatment: metestatic melenoma R thigh  If not cancer related, problems with veins or cause of swelling:    Distance able to walk: as far as I make myself  Time able to stand: no more than 15 minutes  Sensation problems in hands/feet:      Edema etiology: Infection, Radiation, Surgery, diabetes and reduce dwalking and activity 2/2 LBP    FUNCTIONAL SCALES  Lower Extremity Functional Scale (score out of 80). A lower score indicates greater impairment:    Shoulder Pain and Disability  Index (score out of 100).  A higher score indicates greater impairment:      Cognitive Status Examination  Orientation: Oriented to person, place and time   Level of Consciousness: Alert  Follows Commands and Answers Questions: 100% of the time  Personal Safety and Judgement: Intact  Memory: Intact    EDEMA  Skin Condition: Dryness, Pitting  Scar: Yes  RTKA scar-no concerns noted  Capillary Refill: Symmetrical  Radial Pulse:  NT'd  Dorsal Pedal Pulse:  did not palpate-no signs ischemia noted  Stemmer Sign: -  Ulceration: No    GIRTH MEASUREMENTS: Refer to separate girth measurement flowsheet.     VOLUME UE  Right UE (mL)    Left UE (mL)    UE Volume Comparison BUE vol approx  same   % Difference    VOLUME LE  Right LE (mL)    Left LE (mL)    LE Volume Comparison RLE volume greater than LLE volume   % Difference    Head/Neck Volume     Trunk Volume    Genital Volume       RANGE OF MOTION: LE ROM WFL  UE ROM WNL  STRENGTH: UE Strength WNL, LE Strength WFL  POSTURE: WFL  PALPATION:   ACTIVITIES OF DAILY LIVING: Ind-Mod I-Min A  BED MOBILITY: WFL  TRANSFERS: WFL  GAIT/LOCOMOTION: Mod I  BALANCE: WFL  SENSATION:  pt reports no neuropathy  VASCULAR:  Chronic PE-on Xarelto  COORDINATION: WFL  MUSCLE TONE: WFL    Assessment & Plan   CLINICAL IMPRESSIONS  Medical Diagnosis: lymphedema    Treatment Diagnosis: lymphedema    Impression/Assessment:  Pt presents with RLE and milder LLE lymphedema. 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     Clinical Decision Making (Complexity):  Assessment of Occupational Performance: 1-3 Performance Deficits  Occupational Performance Limitations: dressing, functional mobility, and infection risk  Clinical Decision Making (Complexity): Low complexity    PLAN OF CARE  Treatment Interventions:  Interventions: Therapeutic Exercise, Manual  Therapy    Long Term Goals   OT Goal 1  Goal Identifier: Velcro Compression  Goal Description: Pt to obtain velcro compression and wear initially 23/24hrs for RLE lymphedema reductions needed to reduce risk infection and promote more ease with mobility.  Rationale: In order to maximize safety and independence with ADL/IADLs;In order to maximize safety and independence with functional transfers and functional mobility within the home or community  Target Date: 12/02/24  OT Goal 2  Goal Identifier: HEP/MLD  Goal Description: Pt to demo Ind with self MLD and HEP for RLE reducitons in lymphedema needed to reduce risk infections and promote more ease with mobility tasks  Rationale: In order to maximize safety and independence with ADL/IADLs;In order to maximize safety and independence with functional transfers and functional mobility within the home or community  Target Date: 12/02/24  OT Goal 3  Goal Identifier: Reductions  Goal Description: Pt will display reduction RLE lymphedema from pitting to non pitting lymphedema to reduce risk infections and promote more ease with LB dressing and walking  Rationale: In order to maximize safety and independence with ADL/IADLs;In order to maximize safety and independence with functional transfers and functional mobility within the home or community  Target Date: 12/02/24      Frequency of Treatment: 1x/wk  Duration of Treatment: 12 weeks with ability to taper as needed     Recommended Referrals to Other Professionals:  none at this time  Education Assessment: Learner/Method: Patient;Listening     Risks and benefits of evaluation/treatment have been explained.   Patient/Family/caregiver agrees with Plan of Care.     Evaluation Time:    OT Eval, Low Complexity Minutes (51142): 25       Signing Clinician: Avinash CHEATHAM [facility]                                                                                   OUTPATIENT OCCUPATIONAL  THERAPY      PLAN OF TREATMENT FOR OUTPATIENT REHABILITATION   Patient's Last Name, First Name, Gricelda Burden YOB: 1950   Provider's Name   [facility]   Medical Record No.  [id number]     Onset Date: 09/03/24 Start of Care Date: 09/09/24     Medical Diagnosis:  lymphedema      OT Treatment Diagnosis:  lymphedema Plan of Treatment  Frequency/Duration:1x/wk/12 weeks with ability to taper as needed    Certification date from 09/09/24   To 12/02/24        See note for plan of treatment details and functional goals     Avinash Olmstead I CERTIFY THE NEED FOR THESE SERVICES FURNISHED UNDER        THIS PLAN OF TREATMENT AND WHILE UNDER MY CARE     (Physician attestation of this document indicates review and certification of the therapy plan).              Referring Provider:  Raisa Davidson    Initial Assessment  See Epic Evaluation- 09/09/24

## 2024-09-12 ENCOUNTER — TRANSFERRED RECORDS (OUTPATIENT)
Dept: HEALTH INFORMATION MANAGEMENT | Facility: CLINIC | Age: 74
End: 2024-09-12

## 2024-09-17 ENCOUNTER — TRANSFERRED RECORDS (OUTPATIENT)
Dept: HEALTH INFORMATION MANAGEMENT | Facility: CLINIC | Age: 74
End: 2024-09-17
Payer: COMMERCIAL

## 2024-09-20 DIAGNOSIS — E11.65 TYPE 2 DIABETES MELLITUS WITH HYPERGLYCEMIA, WITHOUT LONG-TERM CURRENT USE OF INSULIN (H): ICD-10-CM

## 2024-09-20 RX ORDER — SYRING-NEEDL,DISP,INSUL,0.3 ML 31GX15/64"
SYRINGE, EMPTY DISPOSABLE MISCELLANEOUS
Qty: 100 EACH | Refills: 0 | Status: SHIPPED | OUTPATIENT
Start: 2024-09-20

## 2024-09-26 DIAGNOSIS — E11.65 TYPE 2 DIABETES MELLITUS WITH HYPERGLYCEMIA, WITHOUT LONG-TERM CURRENT USE OF INSULIN (H): ICD-10-CM

## 2024-09-27 DIAGNOSIS — E11.65 TYPE 2 DIABETES MELLITUS WITH HYPERGLYCEMIA, WITHOUT LONG-TERM CURRENT USE OF INSULIN (H): ICD-10-CM

## 2024-09-27 RX ORDER — SYRING-NEEDL,DISP,INSUL,0.3 ML 31GX15/64"
SYRINGE, EMPTY DISPOSABLE MISCELLANEOUS
Refills: 0 | OUTPATIENT
Start: 2024-09-27

## 2024-09-27 RX ORDER — SYRING-NEEDL,DISP,INSUL,0.3 ML 31GX15/64"
SYRINGE, EMPTY DISPOSABLE MISCELLANEOUS
Qty: 400 EACH | Refills: 1 | Status: SHIPPED | OUTPATIENT
Start: 2024-09-27

## 2024-10-15 DIAGNOSIS — E78.5 HYPERLIPIDEMIA WITH TARGET LDL LESS THAN 100: ICD-10-CM

## 2024-10-15 RX ORDER — ATORVASTATIN CALCIUM 40 MG/1
40 TABLET, FILM COATED ORAL DAILY
Qty: 90 TABLET | Refills: 1 | OUTPATIENT
Start: 2024-10-15

## 2024-10-21 DIAGNOSIS — E11.65 TYPE 2 DIABETES MELLITUS WITH HYPERGLYCEMIA, WITHOUT LONG-TERM CURRENT USE OF INSULIN (H): ICD-10-CM

## 2024-10-21 RX ORDER — HYDROCHLOROTHIAZIDE 12.5 MG/1
1 CAPSULE ORAL SEE ADMIN INSTRUCTIONS
Qty: 2 EACH | Refills: 1 | Status: SHIPPED | OUTPATIENT
Start: 2024-10-21

## 2024-10-30 ENCOUNTER — TELEPHONE (OUTPATIENT)
Dept: INTERNAL MEDICINE | Facility: CLINIC | Age: 74
End: 2024-10-30
Payer: COMMERCIAL

## 2024-10-30 NOTE — TELEPHONE ENCOUNTER
Patient was called and a voice message was left. Patient asked to return our call to make a pre op appointment. Patient was informed that her PCP is not available however patient can schedule with one of her partners.

## 2024-10-30 NOTE — TELEPHONE ENCOUNTER
Reason for Call:  Appointment Request    Patient requesting this type of appt: Pre-op    Requested provider: Raisa Davidson    Reason patient unable to be scheduled: Not within requested timeframe    When does patient want to be seen/preferred time:  Wants pre-op appointment between 11/13/24 - 11/20/24    Comments: call patient     Could we send this information to you in [facility] or would you prefer to receive a phone call?:   Patient would prefer a phone call   Okay to leave a detailed message?: Yes at Cell number on file:  after 12:30pm   Telephone Information:   Mobile [phone]       Call taken on 10/30/2024 at 8:53 AM by Tonya Kuhn

## 2024-11-05 ENCOUNTER — HOSPITAL ENCOUNTER (OUTPATIENT)
Dept: PET IMAGING | Facility: CLINIC | Age: 74
Discharge: HOME | End: 2024-11-05
Attending: INTERNAL MEDICINE | Admitting: INTERNAL MEDICINE
Payer: COMMERCIAL

## 2024-11-05 DIAGNOSIS — C43.71 MALIGNANT MELANOMA OF RIGHT LOWER EXTREMITY INCLUDING HIP (H): ICD-10-CM

## 2024-11-05 PROCEDURE — 78816 PET IMAGE W/CT FULL BODY: CPT | Mod: PS

## 2024-11-05 PROCEDURE — A9552 F18 FDG: HCPCS | Performed by: INTERNAL MEDICINE

## 2024-11-05 PROCEDURE — 343N000001 HC RX 343 MED OP 636: Performed by: INTERNAL MEDICINE

## 2024-11-05 RX ORDER — FLUDEOXYGLUCOSE F 18 200 MCI/ML
10-18 INJECTION, SOLUTION INTRAVENOUS ONCE
Status: COMPLETED | OUTPATIENT
Start: 2024-11-05 | End: 2024-11-05

## 2024-11-05 RX ADMIN — FLUDEOXYGLUCOSE F 18 13.1 MILLICURIE: 200 INJECTION, SOLUTION INTRAVENOUS at 10:22

## 2024-11-19 ENCOUNTER — OFFICE VISIT (OUTPATIENT)
Dept: INTERNAL MEDICINE | Facility: CLINIC | Age: 74
End: 2024-11-19
Payer: COMMERCIAL

## 2024-11-19 VITALS
HEART RATE: 68 BPM | TEMPERATURE: 97.3 F | HEIGHT: 67 IN | OXYGEN SATURATION: 98 % | DIASTOLIC BLOOD PRESSURE: 58 MMHG | SYSTOLIC BLOOD PRESSURE: 116 MMHG | WEIGHT: 219 LBS | BODY MASS INDEX: 34.37 KG/M2 | RESPIRATION RATE: 16 BRPM

## 2024-11-19 DIAGNOSIS — Z01.818 PRE-OP EXAM: Primary | ICD-10-CM

## 2024-11-19 DIAGNOSIS — I10 ESSENTIAL HYPERTENSION WITH GOAL BLOOD PRESSURE LESS THAN 140/90: Chronic | ICD-10-CM

## 2024-11-19 DIAGNOSIS — M16.12 ARTHRITIS OF LEFT HIP: ICD-10-CM

## 2024-11-19 DIAGNOSIS — Z79.4 TYPE 2 DIABETES MELLITUS WITHOUT COMPLICATION, WITH LONG-TERM CURRENT USE OF INSULIN (H): ICD-10-CM

## 2024-11-19 DIAGNOSIS — E11.9 TYPE 2 DIABETES MELLITUS WITHOUT COMPLICATION, WITH LONG-TERM CURRENT USE OF INSULIN (H): ICD-10-CM

## 2024-11-19 DIAGNOSIS — J45.20 MILD INTERMITTENT ASTHMA WITHOUT COMPLICATION: Chronic | ICD-10-CM

## 2024-11-19 DIAGNOSIS — C43.9 METASTATIC MALIGNANT MELANOMA (H): ICD-10-CM

## 2024-11-19 LAB
ERYTHROCYTE [DISTWIDTH] IN BLOOD BY AUTOMATED COUNT: 12.7 % (ref 10–15)
EST. AVERAGE GLUCOSE BLD GHB EST-MCNC: 174 MG/DL
HBA1C MFR BLD: 7.7 % (ref 0–5.6)
HCT VFR BLD AUTO: 39 % (ref 35–47)
HGB BLD-MCNC: 12.7 G/DL (ref 11.7–15.7)
MCH RBC QN AUTO: 27.6 PG (ref 26.5–33)
MCHC RBC AUTO-ENTMCNC: 32.6 G/DL (ref 31.5–36.5)
MCV RBC AUTO: 85 FL (ref 78–100)
PLATELET # BLD AUTO: 328 10E3/UL (ref 150–450)
RBC # BLD AUTO: 4.6 10E6/UL (ref 3.8–5.2)
WBC # BLD AUTO: 7.1 10E3/UL (ref 4–11)

## 2024-11-19 PROCEDURE — 83036 HEMOGLOBIN GLYCOSYLATED A1C: CPT | Performed by: INTERNAL MEDICINE

## 2024-11-19 PROCEDURE — 99214 OFFICE O/P EST MOD 30 MIN: CPT | Performed by: INTERNAL MEDICINE

## 2024-11-19 PROCEDURE — G2211 COMPLEX E/M VISIT ADD ON: HCPCS | Performed by: INTERNAL MEDICINE

## 2024-11-19 PROCEDURE — 85027 COMPLETE CBC AUTOMATED: CPT | Performed by: INTERNAL MEDICINE

## 2024-11-19 PROCEDURE — 80053 COMPREHEN METABOLIC PANEL: CPT | Performed by: INTERNAL MEDICINE

## 2024-11-19 PROCEDURE — 36415 COLL VENOUS BLD VENIPUNCTURE: CPT | Performed by: INTERNAL MEDICINE

## 2024-11-19 PROCEDURE — 93000 ELECTROCARDIOGRAM COMPLETE: CPT | Performed by: INTERNAL MEDICINE

## 2024-11-19 RX ORDER — ERYTHROMYCIN 5 MG/G
OINTMENT OPHTHALMIC
COMMUNITY
Start: 2024-11-15

## 2024-11-19 NOTE — PROGRESS NOTES
Preoperative Evaluation  [facility] [place]  [address]  [address]  [facility] [address]  Phone: [phone]  Primary Provider: Raisa Davidson MD  Pre-op Performing Provider: Raisa Davidson MD  Nov 19, 2024 11/18/2024   Surgical Information   What procedure is being done? Left total hip replacement.    Facility or Hospital where procedure/surgery will be performed: [facility],[address] . [place] [id number]    Who is doing the procedure / surgery? Dr. Alberto Nicholson    Date of surgery / procedure: Dec.11,2024    Time of surgery / procedure: I have not been given a time yet.    Where do you plan to recover after surgery? at home with family        Patient-reported     Fax number for surgical facility: [phone]      Lou Frye is a 74 year old, presenting for the following:  Pre-Op Exam          11/19/2024     8:08 AM   Additional Questions   Roomed by Phoebe BIANCHI     HPI related to upcoming procedure: [phone]        11/18/2024   Pre-Op Questionnaire   Have you ever had a heart attack or stroke? No    Have you ever had surgery on your heart or blood vessels, such as a stent placement, a coronary artery bypass, or surgery on an artery in your head, neck, heart, or legs? No    Do you have chest pain with activity? No    Do you have a history of heart failure? No    Do you currently have a cold, bronchitis or symptoms of other infection? No    Do you have a cough, shortness of breath, or wheezing? No    Do you or anyone in your family have previous history of blood clots? (!) YES -- personal hx   Do you or does anyone in your family have a serious bleeding problem such as prolonged bleeding following surgeries or cuts? No    Have you ever had problems with anemia or been told to take iron pills? (!) YES  -- Hgb 13.1 in March, 2024   Have you had any abnormal blood loss such as black, tarry or bloody stools, or  abnormal vaginal bleeding? No    Have you ever had a blood transfusion? No    Are you willing to have a blood transfusion if it is medically needed before, during, or after your surgery? Yes    Have you or any of your relatives ever had problems with anesthesia? No    Do you have sleep apnea, excessive snoring or daytime drowsiness? (!) YES    Do you have a CPAP machine? Yes    Do you have any artifical heart valves or other implanted medical devices like a pacemaker, defibrillator, or continuous glucose monitor? No    Do you have artificial joints? (!) YES  --  R hip, R knee   Are you allergic to latex? No        Patient-reported     Health Care Directive  Patient does not have a Health Care Directive: Discussed advance care planning with patient; however, patient declined at this time.    CC:  Preop for multiple medical problems.    HPI:    The patient is scheduled for L hip surgery with Dr. Do on  Dec 11, 2024   DM:   -- she feels frustrated with DM. This am the alarm woke her up because BS 58. About 10 am the BS goes > 200 if she eats or not. The BS are coming down after Novolog.   -- the GMI indicates 6.7%  No other acute complaints.    Assessment:  1. V72.83H Preop general physical exam _ I do not see any major contraindications for the patient to go through the scheduled surgery.    The proposed surgical procedure is considered  INTERMEDIATE,  surgery risk.    For above listed surgery and anesthesia, Patient is at  MODERATE  risk for surgery/procedure and perioperative/procedure complications.      Cardiovascular risk  Assessment   --   low risk  -- No further cardiac work up is needed before this surgery.        ECG:    sinus rhythm, no changes suggestive for ischemia    Pulmonary Risk Assessment   --   low risk  -- The patient doesn't have chronic lung disease nor acute respiratory problems       Obstructive Sleep Apnea (or suspected sleep apnea)  -- Patient is to bring their home CPAP with them on the  "day of surgery      Anemia Assessment :  -- no anemia - patient doesn't need further anemia work up    Blood Sugar Assessment  --  patient has controlled DM,   -- Lantus 36 in am and 4  at bed time. Humalog 7 in am, 7 t noon and prn 4 at bed time       Anticoagulation assessment  -- patient takes Xarelto for AFib, Hx of PE/DVT    (M16.12) Arthritis of left hip  Comment:   Plan: Surgery, as above    (I10) HTN goal less than 140/90,  Comment: Controlled  Plan: See plan    (E11.9,  Z79.4) Type 2 diabetes mellitus without complication, with long-term current use of insulin (H)  Comment: Controlled  Plan: Hemoglobin A1c, Comprehensive metabolic panel,         CBC with platelets        See plan    (J45.20) Mild intermittent asthma without complication  Comment: Controlled  Plan:     (C43.9) Metastatic malignant melanoma (H)  -- R thigh   Comment: Status post surgery, doing well  Plan: Follow-up with oncology         Plan:  1. In the morning of the surgery day you take with a sip of water just these meds: Atenolol  -- resume Glipizide when able to eat  -- resume Lisinopril and hydrochlorothiazide when blood pressure is above 120   -- The rest of the meds you may resume after surgery.    2. Lantus:   -- The day before surgery take only 36 units in the moring. Do not take 4 units at bed time  -- The morning of the surgery day take only 18 units  3. Humalog  -- do not take it the night before the surgery  -- do not take it in the morning of the surgery  -- resume it when able to eat  4.Xarelto   -- last day of Xarelto on Dec 8  -- resume Xarelto the night of the surgery day, if no contraindication from the ortho surgeon   5. No Aspirin or Celebrex or any NSAIDs because you take Xarelto. Tylenol is ok   6.  Labs today - [address]     Physical exam:    Blood pressure 116/58, pulse 68, temperature 97.3  F (36.3  C), temperature source Tympanic, resp. rate 16, height 1.702 m (5' 7\"), weight 99.3 kg (219 lb), SpO2 98%, not " currently breastfeeding.   NAD, appears comfortable  Skin clear, no rashes  Neck: supple, no JVD,  no thyroidmegaly  Lymph nodes non palpable in the cervical, supraclavicular   Chest: clear to auscultation with good respiratory effort  Cardiac: S1S2, RRR, no mgr appreciated  Abdomen: soft, not tender, not distended, audible bowel sound, no hepatosplenomegaly, no palpable masses, no abdominal bruits  Extremities: no cyanosis, clubbing or edema.   Neuro: A, Ox3, no focal signs.        ROS:   as above     Patient Active Problem List   Diagnosis    Allergic rhinitis    Hyperlipidemia with target LDL less than 100    Asthma, mild intermittent    Cataract    Macular hole of left eye    Obesity (BMI 30-39.9)    Heart murmur    Total knee replacement status    Chronic knee pain, right    HTN goal less than 140/90,    CHERRY (obstructive sleep apnea)    Status post total replacement of right hip    Ampullary adenoma    Polyp of duodenum    HTN, goal below 140/90    Diabetes mellitus, type 2 (H)    Melanoma of skin (H)    Metastatic malignant melanoma (H)  -- R thigh     Dehydration    Orthostatic hypotension    Endometrial cancer (H)    Bilateral pulmonary embolism (H)    Organizing pneumonia (H) -- Aug 2022    Dizziness    Adverse effect of unspecified drugs, medicaments and biological substances, initial encounter    Hypercalcemia    Mass of uterus    Multiple pulmonary nodules    Pneumonitis    Class 2 severe obesity due to excess calories with serious comorbidity in adult (H)    Mass of skin        Past Medical History:   Diagnosis Date    Arthritis     Asthma, mild intermittent     Cataract     Endometrial cancer (H) 06/2022    HTN, goal below 140/90     Hyperlipidemia LDL goal < 100     Macular hole of left eye     Melanoma (H)     RIGHT KNEE    Sleep apnea     uses c pap    Type 2 diabetes, HbA1C goal < 8% (H)       Past Surgical History:   Procedure Laterality Date    ARTHROPLASTY HIP Right 09/25/2017    Procedure:  ARTHROPLASTY HIP;  Right total hip arthroplasty  ;  Surgeon: Ayaan Pena MD;  [facility]: [facility]    ARTHROPLASTY KNEE  07/12/2013    Procedure: ARTHROPLASTY KNEE;  right total knee arthroplasty ;  Surgeon: Chaparro Wesley MD;  Location: [facility]    ARTHROSCOPY KNEE Right 01/21/2015    Procedure: ARTHROSCOPY KNEE;  Surgeon: Ayaan Pena MD;  Location: [facility]    BIOPSY  Feb13,2022, March28,2022    BRONCHOSCOPY (RIGID OR FLEXIBLE), DIAGNOSTIC N/A 08/11/2022    Procedure: BRONCHOSCOPY, WITH BRONCHOALVEOLAR LAVAGE;  Surgeon: Roselia Sosa MD;  Location:  GI    C INCISION OF HYMEN      CATARACT IOL, RT/LT      COLONOSCOPY  01/01/2008    COLONOSCOPY N/A 04/18/2019    Procedure: Colonoscopy with polypectomy;  Surgeon: Shaun Guerrero MD;  [facility]    CYSTOSCOPY N/A 06/23/2022    Procedure: Cystoscopy;  Surgeon: Eli Guidry MD;  [facility]    ENDOSCOPIC RETROGRADE CHOLANGIOPANCREATOGRAM N/A 02/06/2019    Procedure: COMBINED ENDOSCOPIC RETROGRADE CHOLANGIOPANCREATOGRAPHY, BILIARY SPINCTEROTOMY AND DILATION, PLACE BILE DUCT STENT;  Surgeon: Guru Andie Gonzalez MD;  [facility]    ENDOSCOPIC RETROGRADE CHOLANGIOPANCREATOGRAM N/A 02/28/2019    Procedure: Endoscopic Retrograde Cholangiopancreatogram, Bile duct stent removal and placement;  Surgeon: Shaun Guerrero MD;  [facility]    ENDOSCOPIC RETROGRADE CHOLANGIOPANCREATOGRAM N/A 04/18/2019    Procedure: COMBINED ENDOSCOPIC RETROGRADE CHOLANGIOPANCREATOGRAPHY, Bile duct stent exchange and Polypectomy;  Surgeon: Shaun Guerrero MD;  [facility]    ENDOSCOPIC RETROGRADE CHOLANGIOPANCREATOGRAM N/A 10/18/2019    Procedure: Endoscopic Retrograde Cholangiopancreatogram;  Surgeon: Shaun Guerrero MD;  [facility]    ENDOSCOPIC RETROGRADE CHOLANGIOPANCREATOGRAM N/A 03/24/2022    Procedure: ENDOSCOPIC RETROGRADE CHOLANGIOPANCREATOGRAPHY;  Surgeon: Shaun Guerrero MD;  Location:  OR    ENDOSCOPIC  RETROGRADE CHOLANGIOPANCREATOGRAM N/A 03/16/2023    Procedure: endoscopic retrograde cholangiopancreatography with minor papillotomy;  Surgeon: Shaun Guerrero MD;  Location:  OR    ENDOSCOPIC RETROGRADE CHOLANGIOPANCREATOGRAM N/A 03/28/2024    Procedure: ENDOSCOPIC RETROGRADE CHOLANGIOPANCREATOGRAPHY;  Surgeon: Shaun Guerrero MD;  Location:  OR    ENDOSCOPIC RETROGRADE CHOLANGIOPANCREATOGRAM WITH SPYGLASS N/A 07/26/2019    Procedure: Endoscopic Retrograde Cholangiopancreatogram With Spyglas,l Radiofrequency Ablation, Stent Exchangeand Duodenal Biopsy x2;  Surgeon: Shaun Guerrero MD;  [facility]    ENDOSCOPIC RETROGRADE CHOLANGIOPANCREATOGRAM WITH SPYGLASS N/A 09/10/2020    Procedure: ENDOSCOPIC RETROGRADE CHOLANGIOPANCREATOGRAPHY, WITH DIRECT DUCT VISUALIZATION, USING PANCREATICOBILIARY FIBEROPTIC PROBE;  Surgeon: Shaun Guerrero MD;  [facility]    ENDOSCOPIC RETROGRADE CHOLANGIOPANCREATOGRAM WITH SPYGLASS N/A 03/22/2021    Procedure: ENDOSCOPIC RETROGRADE CHOLANGIOPANCREATOGRAPHY, WITH DIRECT DUCT VISUALIZATION, USING PANCREATICOBILIARY FIBEROPTIC PROBE;  Surgeon: Shaun Guerrero MD;  [facility]    ESOPHAGOSCOPY, GASTROSCOPY, DUODENOSCOPY (EGD) WITH RADIO FREQUENCY ABLATION, COMBINED N/A 09/10/2020    Procedure: possible repeat ESOPHAGOGASTRODUODENOSCOPY, WITH RADIOFREQUENCY ABLATION and endoscopic mucosal resection;  Surgeon: Shaun Guerrero MD;  [facility]    ESOPHAGOSCOPY, GASTROSCOPY, DUODENOSCOPY (EGD), COMBINED N/A 04/18/2019    Procedure: upper endoscopy with polypectomy;  Surgeon: Shaun Guerrero MD;  [facility]    ESOPHAGOSCOPY, GASTROSCOPY, DUODENOSCOPY (EGD), COMBINED N/A 10/18/2019    Procedure: Upper Endoscopy and ERCP with stent removal, stone removal and biopsy;  Surgeon: Shaun Guerrero MD;  [facility]    ESOPHAGOSCOPY, GASTROSCOPY, DUODENOSCOPY (EGD), COMBINED N/A 03/24/2022    Procedure: ESOPHAGOGASTRODUODENOSCOPY (EGD), Duodenal  polyp removal;  Surgeon: Cesar  MD Shaun;  [facility]    ESOPHAGOSCOPY, GASTROSCOPY, DUODENOSCOPY (EGD), COMBINED N/A 03/16/2023    Procedure: ESOPHAGOGASTRODUODENOSCOPY (EGD);  Surgeon: Shaun Guerrero MD;  [facility]    ESOPHAGOSCOPY, GASTROSCOPY, DUODENOSCOPY (EGD), RESECT MUCOSA, COMBINED N/A 02/28/2019    Procedure: Upper Endoscopy, Endoscopic Ultrasound, Endoscopic Mucosal Resection,  Ampullectomy, polypectomy.;  Surgeon: Shaun Guerrero MD;  [facility]    ESOPHAGOSCOPY, GASTROSCOPY, DUODENOSCOPY (EGD), RESECT MUCOSA, COMBINED N/A 03/22/2021    Procedure: ESOPHAGOGASTRODUODENOSCOPY (EGD) with  endoscopic mucosal resection/ polypectomy;  Surgeon: Shaun Guerrero MD;  [facility]    EXCISE LESION LOWER EXTREMITY Right 03/28/2022    Procedure: Wide local excision of right knee melanoma;  Surgeon: Chevy Allison MD;  Location: [facility] OR    EXCISE MASS LOWER EXTREMITY Right 01/13/2022    Procedure: excision of right thigh mass;  Surgeon: Salvador Kelly MD;  [facility]: [facility]    EXCISE MASS LOWER EXTREMITY Right 04/10/2024    Procedure: WIDE EXCISION OF RIGHT THIGH MELANOMA;  Surgeon: Chevy Allison MD;  [place] [facility] [place]    EYE SURGERY      macular hole repaired left eye    HC KNEE SCOPE, DIAGNOSTIC      - both knees    HEAD & NECK SURGERY      wisdom teeth    IR CHEST PORT PLACEMENT > 5 YRS OF AGE  05/02/2022    LAPAROSCOPIC HYSTERECTOMY TOTAL, BILATERAL SALPINGO-OOPHORECTOMY, NODE DISSECTION, COMBINED Bilateral 06/23/2022    Procedure: Total Laparoscopic Hyserectomy, Bilateral Salpibgo-oophorectomy, Bilateral Webster Lymph Node Dissection;  Surgeon: Eli Guidry MD;  [facility]        PSHx: No complications with prior surgeries or anesthesia     Soc Hx: No daily alcohol, no smoking       All: reviewed    Meds: reviewed  Current Outpatient Medications   Medication Sig Dispense Refill    atenolol (TENORMIN) 50 MG tablet Take 1 tablet (50 mg) by mouth daily. 90 tablet 1    atorvastatin (LIPITOR) 40 MG tablet  "Take 1 tablet (40 mg) by mouth daily. 90 tablet 1    cetirizine (ZYRTEC) 10 MG tablet Take 10 mg by mouth every morning      Continuous Glucose Sensor (FREESTYLE DAVID 3 PLUS SENSOR) MISC 1 Application See Admin Instructions. Change every 15 days 2 each 1    CONTOUR NEXT TEST test strip USE 1 STRIP TO CHECK GLUCOSE ONCE DAILY 100 strip 4    erythromycin (ROMYCIN) 5 MG/GM ophthalmic ointment APPLY A SMALL AMOUNT TO EACH EYELID MARGIN EVERY DAY AT BEDTIME      glipiZIDE (GLUCOTROL XL) 10 MG 24 hr tablet Take 1 tablet (10 mg) by mouth daily. 90 tablet 1    HUMALOG 100 UNIT/ML injection INJECT 5 UNITS SUBCUTANEOUSLY THREE TIMES DAILY BEFORE MEAL(S) 10 mL 0    hydrochlorothiazide (HYDRODIURIL) 25 MG tablet Take 1 tablet (25 mg) by mouth every morning. 90 tablet 1    insulin pen needle (MM PEN NEEDLES) 32G X 4 MM miscellaneous Inject insulin 4 times a day 300 each 1    insulin syringe-needle U-100 (BD VEO INSULIN SYRINGE U/F) 31G X 15/64\" 0.3 ML USE AS DIRECTED WITH  4  SHOTS  OF  INSULIN DAILY 400 each 1    LANTUS VIAL 100 UNIT/ML soln INJECT 44 UNITS SUBCUTANEOUSLY AT BEDTIME (Patient taking differently: 40 units) 40 mL 0    latanoprost (XALATAN) 0.005 % ophthalmic solution Place 1 drop into both eyes At Bedtime  2.5 mL 1    lisinopril (ZESTRIL) 5 MG tablet Take 1 tablet (5 mg) by mouth daily. 90 tablet 1    LORazepam (ATIVAN) 0.5 MG tablet Take 1 tablet (0.5 mg) by mouth nightly as needed for anxiety or sleep 30 tablet 0    mometasone (ELOCON) 0.1 % external cream APPLY CREAM TOPICALLY ONCE DAILY      netarsudil (RHOPRESSA) 0.02 % ophthalmic solution Place 1 drop Into the left eye at bedtime      timolol maleate (TIMOPTIC) 0.5 % ophthalmic solution INSTILL 1 DROP INTO EACH EYE TWICE DAILY      tiZANidine (ZANAFLEX) 2 MG tablet TAKE 1 TO 2 TABLETS BY MOUTH EVERY 6 TO 8 HOURS AS NEEDED FOR NECK PAIN      XARELTO ANTICOAGULANT 20 MG TABS tablet Take 1 tablet (20 mg) by mouth daily. 90 tablet 1    insulin syringe-needle " "U-100 (BD VEO INSULIN SYRINGE U/F) 31G X 15/64\" 0.5 ML USE WITH NPH INSULIN 100 each 0    OZEMPIC, 0.25 OR 0.5 MG/DOSE, 2 MG/3ML pen INJECT 0.25MG SUBCUTANEOUSLY EVERY 7 DAYS (Patient not taking: Reported on 9/3/2024) 3 mL 0    senna-docusate (SENOKOT-S/PERICOLACE) 8.6-50 MG tablet Take 2 tablets by mouth daily as needed for constipation (Patient not taking: Reported on 11/19/2024) 60 tablet 11    triamcinolone (KENALOG) 0.5 % external ointment Apply 1 g topically daily as needed for irritation (Patient not taking: Reported on 11/19/2024)              Raisa Torres MD  Internal Medicine       Patient Active Problem List    Diagnosis Date Noted    Hyperlipidemia with target LDL less than 100      Priority: High     Diagnosis updated by automated process. Provider to review and confirm.      Asthma, mild intermittent      Priority: High    Mass of skin 03/11/2024     Priority: Medium    Class 2 severe obesity due to excess calories with serious comorbidity in adult (H) 10/09/2023     Priority: Medium    Dizziness 06/23/2023     Priority: Medium    Adverse effect of unspecified drugs, medicaments and biological substances, initial encounter 06/23/2023     Priority: Medium    Hypercalcemia 06/23/2023     Priority: Medium    Mass of uterus 06/23/2023     Priority: Medium    Multiple pulmonary nodules 06/23/2023     Priority: Medium    Pneumonitis 06/23/2023     Priority: Medium    Organizing pneumonia (H) -- Aug 2022 09/05/2022     Priority: Medium    Bilateral pulmonary embolism (H) 07/25/2022     Priority: Medium    Dehydration 06/29/2022     Priority: Medium    Orthostatic hypotension 06/29/2022     Priority: Medium    Endometrial cancer (H)      Priority: Medium     Complete prior to 12.14 KM      Metastatic malignant melanoma (H)  -- R thigh  04/20/2022     Priority: Medium    Melanoma of skin (H) 03/03/2022     Priority: Medium     Added automatically from request for surgery [id number]      Diabetes mellitus, type 2 " (H) 01/07/2022     Priority: Medium    HTN, goal below 140/90 05/27/2020     Priority: Medium    Polyp of duodenum 02/18/2020     Priority: Medium     Added automatically from request for surgery [id number]      Ampullary adenoma 02/28/2019     Priority: Medium    Status post total replacement of right hip 02/13/2018     Priority: Medium    CHERRY (obstructive sleep apnea) 09/15/2017     Priority: Medium    HTN goal less than 140/90, 05/22/2016     Priority: Medium    Chronic knee pain, right 08/30/2015     Priority: Medium    Total knee replacement status 07/12/2013     Priority: Medium    Obesity (BMI 30-39.9)      Priority: Medium    Heart murmur      Priority: Medium     aortic area      Allergic rhinitis      Priority: Medium     Problem list name updated by automated process. Provider to review      Cataract      Priority: Low     Utility update for deleted IMO code  [employer] Update utility      Macular hole of left eye      Priority: Low      Past Medical History:   Diagnosis Date    Arthritis     Asthma, mild intermittent     Cataract     Endometrial cancer (H) 06/2022    HTN, goal below 140/90     Hyperlipidemia LDL goal < 100     Macular hole of left eye     Melanoma (H)     RIGHT KNEE    Sleep apnea     uses c pap    Type 2 diabetes, HbA1C goal < 8% (H)      Past Surgical History:   Procedure Laterality Date    ARTHROPLASTY HIP Right 09/25/2017    Procedure: ARTHROPLASTY HIP;  Right total hip arthroplasty  ;  Surgeon: Ayaan Pena MD;  Location:  OR    ARTHROPLASTY KNEE  07/12/2013    Procedure: ARTHROPLASTY KNEE;  right total knee arthroplasty ;  Surgeon: Chaparro Wesley MD;  Location:  OR    ARTHROSCOPY KNEE Right 01/21/2015    Procedure: ARTHROSCOPY KNEE;  Surgeon: Ayaan Pena MD;  Location:  OR    BIOPSY  Feb13,2022, March28,2022    BRONCHOSCOPY (RIGID OR FLEXIBLE), DIAGNOSTIC N/A 08/11/2022    Procedure: BRONCHOSCOPY, WITH BRONCHOALVEOLAR LAVAGE;  Surgeon: Roselia Sosa,  MD;  Location: [facility]    C INCISION OF HYMEN      CATARACT IOL, RT/LT      COLONOSCOPY  01/01/2008    COLONOSCOPY N/A 04/18/2019    Procedure: Colonoscopy with polypectomy;  Surgeon: Shaun Guerrero MD;  [facility]    CYSTOSCOPY N/A 06/23/2022    Procedure: Cystoscopy;  Surgeon: Eli Guidry MD;  [facility]    ENDOSCOPIC RETROGRADE CHOLANGIOPANCREATOGRAM N/A 02/06/2019    Procedure: COMBINED ENDOSCOPIC RETROGRADE CHOLANGIOPANCREATOGRAPHY, BILIARY SPINCTEROTOMY AND DILATION, PLACE BILE DUCT STENT;  Surgeon: Guru Andie Gonzalez MD;  [facility]    ENDOSCOPIC RETROGRADE CHOLANGIOPANCREATOGRAM N/A 02/28/2019    Procedure: Endoscopic Retrograde Cholangiopancreatogram, Bile duct stent removal and placement;  Surgeon: Shaun Guerrero MD;  [facility]    ENDOSCOPIC RETROGRADE CHOLANGIOPANCREATOGRAM N/A 04/18/2019    Procedure: COMBINED ENDOSCOPIC RETROGRADE CHOLANGIOPANCREATOGRAPHY, Bile duct stent exchange and Polypectomy;  Surgeon: Shaun Guerrero MD;  [facility]    ENDOSCOPIC RETROGRADE CHOLANGIOPANCREATOGRAM N/A 10/18/2019    Procedure: Endoscopic Retrograde Cholangiopancreatogram;  Surgeon: Shaun Guerrero MD;  Location:  OR    ENDOSCOPIC RETROGRADE CHOLANGIOPANCREATOGRAM N/A 03/24/2022    Procedure: ENDOSCOPIC RETROGRADE CHOLANGIOPANCREATOGRAPHY;  Surgeon: Shaun Guerrero MD;  Location:  OR    ENDOSCOPIC RETROGRADE CHOLANGIOPANCREATOGRAM N/A 03/16/2023    Procedure: endoscopic retrograde cholangiopancreatography with minor papillotomy;  Surgeon: Shaun Guerrero MD;  Location:  OR    ENDOSCOPIC RETROGRADE CHOLANGIOPANCREATOGRAM N/A 03/28/2024    Procedure: ENDOSCOPIC RETROGRADE CHOLANGIOPANCREATOGRAPHY;  Surgeon: Shaun Guerrero MD;  Location:  OR    ENDOSCOPIC RETROGRADE CHOLANGIOPANCREATOGRAM WITH SPYGLASS N/A 07/26/2019    Procedure: Endoscopic Retrograde Cholangiopancreatogram With Spyglas,l Radiofrequency Ablation, Stent Exchangeand Duodenal Biopsy x2;  Surgeon:  Shaun Guerrero MD;  [facility]    ENDOSCOPIC RETROGRADE CHOLANGIOPANCREATOGRAM WITH SPYGLASS N/A 09/10/2020    Procedure: ENDOSCOPIC RETROGRADE CHOLANGIOPANCREATOGRAPHY, WITH DIRECT DUCT VISUALIZATION, USING PANCREATICOBILIARY FIBEROPTIC PROBE;  Surgeon: Shaun Guerrero MD;  [facility]    ENDOSCOPIC RETROGRADE CHOLANGIOPANCREATOGRAM WITH SPYGLASS N/A 03/22/2021    Procedure: ENDOSCOPIC RETROGRADE CHOLANGIOPANCREATOGRAPHY, WITH DIRECT DUCT VISUALIZATION, USING PANCREATICOBILIARY FIBEROPTIC PROBE;  Surgeon: Shaun Guerrero MD;  [facility]    ESOPHAGOSCOPY, GASTROSCOPY, DUODENOSCOPY (EGD) WITH RADIO FREQUENCY ABLATION, COMBINED N/A 09/10/2020    Procedure: possible repeat ESOPHAGOGASTRODUODENOSCOPY, WITH RADIOFREQUENCY ABLATION and endoscopic mucosal resection;  Surgeon: Shaun Guerrero MD;  [facility]    ESOPHAGOSCOPY, GASTROSCOPY, DUODENOSCOPY (EGD), COMBINED N/A 04/18/2019    Procedure: upper endoscopy with polypectomy;  Surgeon: Shaun Guerrero MD;  [facility]    ESOPHAGOSCOPY, GASTROSCOPY, DUODENOSCOPY (EGD), COMBINED N/A 10/18/2019    Procedure: Upper Endoscopy and ERCP with stent removal, stone removal and biopsy;  Surgeon: Shaun Guerrero MD;  [facility]    ESOPHAGOSCOPY, GASTROSCOPY, DUODENOSCOPY (EGD), COMBINED N/A 03/24/2022    Procedure: ESOPHAGOGASTRODUODENOSCOPY (EGD), Duodenal  polyp removal;  Surgeon: Shaun Guerrero MD;  [facility]    ESOPHAGOSCOPY, GASTROSCOPY, DUODENOSCOPY (EGD), COMBINED N/A 03/16/2023    Procedure: ESOPHAGOGASTRODUODENOSCOPY (EGD);  Surgeon: Shaun Guerrero MD;  [facility]    ESOPHAGOSCOPY, GASTROSCOPY, DUODENOSCOPY (EGD), RESECT MUCOSA, COMBINED N/A 02/28/2019    Procedure: Upper Endoscopy, Endoscopic Ultrasound, Endoscopic Mucosal Resection,  Ampullectomy, polypectomy.;  Surgeon: Shaun Guerrero MD;  [facility]    ESOPHAGOSCOPY, GASTROSCOPY, DUODENOSCOPY (EGD), RESECT MUCOSA, COMBINED N/A 03/22/2021    Procedure: ESOPHAGOGASTRODUODENOSCOPY  (EGD) with  endoscopic mucosal resection/ polypectomy;  Surgeon: Shaun Guerrero MD;  [facility]    EXCISE LESION LOWER EXTREMITY Right 03/28/2022    Procedure: Wide local excision of right knee melanoma;  Surgeon: Chevy Allison MD;  Location: [facility] OR    EXCISE MASS LOWER EXTREMITY Right 01/13/2022    Procedure: excision of right thigh mass;  Surgeon: Salvador Kelly MD;  [facility]: [facility]    EXCISE MASS LOWER EXTREMITY Right 04/10/2024    Procedure: WIDE EXCISION OF RIGHT THIGH MELANOMA;  Surgeon: Chevy Allison MD;  Location: [facility] OR    EYE SURGERY      macular hole repaired left eye    HC KNEE SCOPE, DIAGNOSTIC      - both knees    HEAD & NECK SURGERY      wisdom teeth    IR CHEST PORT PLACEMENT > 5 YRS OF AGE  05/02/2022    LAPAROSCOPIC HYSTERECTOMY TOTAL, BILATERAL SALPINGO-OOPHORECTOMY, NODE DISSECTION, COMBINED Bilateral 06/23/2022    Procedure: Total Laparoscopic Hyserectomy, Bilateral Salpibgo-oophorectomy, Bilateral [place] Lymph Node Dissection;  Surgeon: Eli Guidry MD;  Location: [facility] OR     Current Outpatient Medications   Medication Sig Dispense Refill    atenolol (TENORMIN) 50 MG tablet Take 1 tablet (50 mg) by mouth daily. 90 tablet 1    atorvastatin (LIPITOR) 40 MG tablet Take 1 tablet (40 mg) by mouth daily. 90 tablet 1    cetirizine (ZYRTEC) 10 MG tablet Take 10 mg by mouth every morning      Continuous Glucose Sensor (FREESTYLE DAVID 3 PLUS SENSOR) MISC 1 Application See Admin Instructions. Change every 15 days 2 each 1    CONTOUR NEXT TEST test strip USE 1 STRIP TO CHECK GLUCOSE ONCE DAILY 100 strip 4    erythromycin (ROMYCIN) 5 MG/GM ophthalmic ointment APPLY A SMALL AMOUNT TO EACH EYELID MARGIN EVERY DAY AT BEDTIME      glipiZIDE (GLUCOTROL XL) 10 MG 24 hr tablet Take 1 tablet (10 mg) by mouth daily. 90 tablet 1    HUMALOG 100 UNIT/ML injection INJECT 5 UNITS SUBCUTANEOUSLY THREE TIMES DAILY BEFORE MEAL(S) 10 mL 0    hydrochlorothiazide (HYDRODIURIL) 25 MG tablet Take 1  "tablet (25 mg) by mouth every morning. 90 tablet 1    insulin pen needle (MM PEN NEEDLES) 32G X 4 MM miscellaneous Inject insulin 4 times a day 300 each 1    insulin syringe-needle U-100 (BD VEO INSULIN SYRINGE U/F) 31G X 15/64\" 0.3 ML USE AS DIRECTED WITH  4  SHOTS  OF  INSULIN DAILY 400 each 1    LANTUS VIAL 100 UNIT/ML soln INJECT 44 UNITS SUBCUTANEOUSLY AT BEDTIME (Patient taking differently: 40 units) 40 mL 0    latanoprost (XALATAN) 0.005 % ophthalmic solution Place 1 drop into both eyes At Bedtime  2.5 mL 1    lisinopril (ZESTRIL) 5 MG tablet Take 1 tablet (5 mg) by mouth daily. 90 tablet 1    LORazepam (ATIVAN) 0.5 MG tablet Take 1 tablet (0.5 mg) by mouth nightly as needed for anxiety or sleep 30 tablet 0    mometasone (ELOCON) 0.1 % external cream APPLY CREAM TOPICALLY ONCE DAILY      netarsudil (RHOPRESSA) 0.02 % ophthalmic solution Place 1 drop Into the left eye at bedtime      timolol maleate (TIMOPTIC) 0.5 % ophthalmic solution INSTILL 1 DROP INTO EACH EYE TWICE DAILY      tiZANidine (ZANAFLEX) 2 MG tablet TAKE 1 TO 2 TABLETS BY MOUTH EVERY 6 TO 8 HOURS AS NEEDED FOR NECK PAIN      XARELTO ANTICOAGULANT 20 MG TABS tablet Take 1 tablet (20 mg) by mouth daily. 90 tablet 1    insulin syringe-needle U-100 (BD VEO INSULIN SYRINGE U/F) 31G X 15/64\" 0.5 ML USE WITH NPH INSULIN 100 each 0    OZEMPIC, 0.25 OR 0.5 MG/DOSE, 2 MG/3ML pen INJECT 0.25MG SUBCUTANEOUSLY EVERY 7 DAYS (Patient not taking: Reported on 9/3/2024) 3 mL 0    senna-docusate (SENOKOT-S/PERICOLACE) 8.6-50 MG tablet Take 2 tablets by mouth daily as needed for constipation (Patient not taking: Reported on 11/19/2024) 60 tablet 11    triamcinolone (KENALOG) 0.5 % external ointment Apply 1 g topically daily as needed for irritation (Patient not taking: Reported on 11/19/2024)         Allergies   Allergen Reactions    Bromfenac Visual Disturbance      xibrom  Causes sever eye pain    Codeine Nausea     Other reaction(s): Dizziness, Headache    " "Metformin GI Disturbance    Seasonal Allergies         Social History     Tobacco Use    Smoking status: Never     Passive exposure: Never    Smokeless tobacco: Never   Substance Use Topics    Alcohol use: No       History   Drug Use No               Objective    /58   Pulse 68   Temp 97.3  F (36.3  C) (Tympanic)   Resp 16   Ht 1.702 m (5' 7\")   Wt 99.3 kg (219 lb)   LMP  (LMP Unknown)   SpO2 98%   BMI 34.30 kg/m     Estimated body mass index is 34.3 kg/m  as calculated from the following:    Height as of this encounter: 1.702 m (5' 7\").    Weight as of this encounter: 99.3 kg (219 lb).  Physical Exam      Recent Labs   Lab Test 08/15/24  1311 03/28/24  0612 02/27/24  1026   HGB  --  13.1 13.8   PLT  --  238 235   INR  --  0.92  --    NA  --  137 136   POTASSIUM  --  3.9 4.3   CR  --  0.81 0.78   A1C 7.7*  --  8.1*        Signed Electronically by: Raisa Davidson MD  A copy of this evaluation report is provided to the requesting physician.        "

## 2024-11-19 NOTE — PATIENT INSTRUCTIONS
Plan:  1. In the morning of the surgery day you take with a sip of water just these meds: Atenolol  -- resume Glipizide when able to eat  -- resume Lisinopril and hydrochlorothiazide when blood pressure is above 120   -- The rest of the meds you may resume after surgery.    2. Lantus:   -- The day before surgery take only 36 units in the moring. Do not take 4 units at bed time  -- The morning of the surgery day take only 18 units  3. Humalog  -- do not take it the night before the surgery  -- do not take it in the morning of the surgery  -- resume it when able to eat  4.Xarelto   -- last day of Xarelto on Dec 8  -- resume Xarelto the night of the surgery day, if no contraindication from the ortho surgeon   5. No Aspirin or Celebrex or any NSAIDs because you take Xarelto. Tylenol is ok   6.  Labs today - [address]

## 2024-11-20 LAB
ALBUMIN SERPL BCG-MCNC: 4.1 G/DL (ref 3.5–5.2)
ALP SERPL-CCNC: 120 U/L (ref 40–150)
ALT SERPL W P-5'-P-CCNC: 25 U/L (ref 0–50)
ANION GAP SERPL CALCULATED.3IONS-SCNC: 11 MMOL/L (ref 7–15)
AST SERPL W P-5'-P-CCNC: 22 U/L (ref 0–45)
BILIRUB SERPL-MCNC: 0.3 MG/DL
BUN SERPL-MCNC: 21.7 MG/DL (ref 8–23)
CALCIUM SERPL-MCNC: 10.3 MG/DL (ref 8.8–10.4)
CHLORIDE SERPL-SCNC: 102 MMOL/L (ref 98–107)
CREAT SERPL-MCNC: 0.86 MG/DL (ref 0.51–0.95)
EGFRCR SERPLBLD CKD-EPI 2021: 70 ML/MIN/1.73M2
GLUCOSE SERPL-MCNC: 109 MG/DL (ref 70–99)
HCO3 SERPL-SCNC: 26 MMOL/L (ref 22–29)
POTASSIUM SERPL-SCNC: 3.9 MMOL/L (ref 3.4–5.3)
PROT SERPL-MCNC: 7 G/DL (ref 6.4–8.3)
SODIUM SERPL-SCNC: 139 MMOL/L (ref 135–145)

## 2024-12-03 DIAGNOSIS — Z79.4 TYPE 2 DIABETES MELLITUS WITHOUT COMPLICATION, WITH LONG-TERM CURRENT USE OF INSULIN (H): Primary | ICD-10-CM

## 2024-12-03 DIAGNOSIS — E11.9 TYPE 2 DIABETES MELLITUS WITHOUT COMPLICATION, WITH LONG-TERM CURRENT USE OF INSULIN (H): Primary | ICD-10-CM

## 2024-12-04 ENCOUNTER — TRANSFERRED RECORDS (OUTPATIENT)
Dept: HEALTH INFORMATION MANAGEMENT | Facility: CLINIC | Age: 74
End: 2024-12-04

## 2024-12-04 ENCOUNTER — LAB (OUTPATIENT)
Dept: LAB | Facility: CLINIC | Age: 74
End: 2024-12-04
Payer: COMMERCIAL

## 2024-12-04 DIAGNOSIS — E11.9 TYPE II DIABETES MELLITUS (H): Primary | ICD-10-CM

## 2024-12-04 PROCEDURE — 99000 SPECIMEN HANDLING OFFICE-LAB: CPT

## 2024-12-04 PROCEDURE — 36415 COLL VENOUS BLD VENIPUNCTURE: CPT

## 2024-12-04 PROCEDURE — 82985 ASSAY OF GLYCATED PROTEIN: CPT | Mod: 90

## 2024-12-05 DIAGNOSIS — E11.65 TYPE 2 DIABETES MELLITUS WITH HYPERGLYCEMIA, WITHOUT LONG-TERM CURRENT USE OF INSULIN (H): ICD-10-CM

## 2024-12-05 RX ORDER — INSULIN LISPRO 100 [IU]/ML
INJECTION, SOLUTION INTRAVENOUS; SUBCUTANEOUS
Qty: 10 ML | Refills: 0 | Status: SHIPPED | OUTPATIENT
Start: 2024-12-05

## 2024-12-05 NOTE — TELEPHONE ENCOUNTER
Requested Prescriptions   Pending Prescriptions Disp Refills    HUMALOG 100 UNIT/ML injection [Pharmacy Med Name: HumaLOG 100 UNIT/ML Subcutaneous Solution] 10 mL 0     Sig: INJECT 5 UNITS SUBCUTANEOUSLY THREE TIMES DAILY BEFORE MEAL(S)       Insulin Protocol Failed - 12/5/2024 12:36 PM        Failed - Chart review required     Review Chart.    Do not approve if insulin is used in a pump.  Instead, direct refill request to the patient's endocrinologist.  If the patient doesn't have an endocrinologist, then send the refill to the patient's PCP for review            Passed - HgbA1C in past 3 or 6 months     If HgbA1C is 8 or greater, it needs to be on file within the past 3 months.  If less than 8, must be on file within the past 6 months.     Recent Labs   Lab Test 11/19/24  1505   A1C 7.7*             Passed - Medication is active on med list        Passed - Recent (6 mo) or future (90 days) visit within the authorizing provider's specialty     The patient must have completed an in-person or virtual visit within the past 6 months or has a future visit scheduled within the next 90 days with the authorizing provider s specialty.  Urgent care and e-visits do not quality as an office visit for this protocol.          Passed - Medication indicated for associated diagnosis     Medication is associated with one or more of the following diagnoses:   - Type 1 diabetes mellitus  - Type 2 diabetes mellitus  - Diabetic nephropathy; Prophylaxis  - Neuropathy due to diabetes mellitus; Prophylaxis  - Retinopathy due to diabetes mellitus; Prophylaxis  - Diabetes mellitus associated with cystic fibrosis  - Disorder of cardiovascular system; Prophylaxis - Type 1 diabetes mellitus   - Disorder of cardiovascular system; Prophylaxis - Type 2 diabetes mellitus            Passed - Patient is 18 years of age or older

## 2024-12-31 DIAGNOSIS — E11.65 TYPE 2 DIABETES MELLITUS WITH HYPERGLYCEMIA, WITHOUT LONG-TERM CURRENT USE OF INSULIN (H): ICD-10-CM

## 2024-12-31 RX ORDER — INSULIN LISPRO 100 [IU]/ML
INJECTION, SOLUTION INTRAVENOUS; SUBCUTANEOUS
Qty: 10 ML | Refills: 0 | OUTPATIENT
Start: 2024-12-31

## 2024-12-31 NOTE — TELEPHONE ENCOUNTER
Requested Prescriptions   Pending Prescriptions Disp Refills    HUMALOG 100 UNIT/ML injection [Pharmacy Med Name: HumaLOG 100 UNIT/ML Subcutaneous Solution] 10 mL 0     Sig: INJECT 5 UNITS SUBCUTANEOUSLY THREE TIMES DAILY BEFORE MEAL(S)       Insulin Protocol Failed - 12/31/2024  2:20 PM        Failed - Chart review required     Review Chart.    Do not approve if insulin is used in a pump.  Instead, direct refill request to the patient's endocrinologist.  If the patient doesn't have an endocrinologist, then send the refill to the patient's PCP for review            Passed - HgbA1C in past 3 or 6 months     If HgbA1C is 8 or greater, it needs to be on file within the past 3 months.  If less than 8, must be on file within the past 6 months.     Recent Labs   Lab Test 11/19/24  1505   A1C 7.7*             Passed - Medication is active on med list        Passed - Recent (6 mo) or future (90 days) visit within the authorizing provider's specialty     The patient must have completed an in-person or virtual visit within the past 6 months or has a future visit scheduled within the next 90 days with the authorizing provider s specialty.  Urgent care and e-visits do not quality as an office visit for this protocol.          Passed - Medication indicated for associated diagnosis     Medication is associated with one or more of the following diagnoses:   - Type 1 diabetes mellitus  - Type 2 diabetes mellitus  - Diabetic nephropathy; Prophylaxis  - Neuropathy due to diabetes mellitus; Prophylaxis  - Retinopathy due to diabetes mellitus; Prophylaxis  - Diabetes mellitus associated with cystic fibrosis  - Disorder of cardiovascular system; Prophylaxis - Type 1 diabetes mellitus   - Disorder of cardiovascular system; Prophylaxis - Type 2 diabetes mellitus            Passed - Patient is 18 years of age or older

## 2025-01-02 RX ORDER — INSULIN GLARGINE 100 [IU]/ML
INJECTION, SOLUTION SUBCUTANEOUS
Qty: 40 ML | Refills: 0 | Status: SHIPPED | OUTPATIENT
Start: 2025-01-02

## 2025-01-09 ENCOUNTER — ONCOLOGY VISIT (OUTPATIENT)
Dept: ONCOLOGY | Facility: CLINIC | Age: 75
End: 2025-01-09
Attending: NURSE PRACTITIONER
Payer: COMMERCIAL

## 2025-01-09 VITALS
DIASTOLIC BLOOD PRESSURE: 79 MMHG | BODY MASS INDEX: 34.69 KG/M2 | RESPIRATION RATE: 14 BRPM | OXYGEN SATURATION: 97 % | TEMPERATURE: 98 F | HEART RATE: 70 BPM | HEIGHT: 67 IN | SYSTOLIC BLOOD PRESSURE: 136 MMHG | WEIGHT: 221 LBS

## 2025-01-09 DIAGNOSIS — C43.9 METASTATIC MALIGNANT MELANOMA (H): ICD-10-CM

## 2025-01-09 DIAGNOSIS — I10 HTN, GOAL BELOW 140/90: ICD-10-CM

## 2025-01-09 DIAGNOSIS — C54.1 ENDOMETRIAL CANCER (H): Primary | ICD-10-CM

## 2025-01-09 PROCEDURE — G0463 HOSPITAL OUTPT CLINIC VISIT: HCPCS | Performed by: NURSE PRACTITIONER

## 2025-01-09 RX ORDER — ACETAMINOPHEN 325 MG/1
500 TABLET ORAL
COMMUNITY

## 2025-01-09 ASSESSMENT — PAIN SCALES - GENERAL: PAINLEVEL_OUTOF10: MILD PAIN (2)
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
## 2025-01-09 NOTE — NURSING NOTE
"Oncology Rooming Note    January 9, 2025 1:35 PM   Gricelda Sabillon is a 74 year old female who presents for:    Chief Complaint   Patient presents with    Oncology Clinic Visit     Endometrial cancer     Initial Vitals: /79   Pulse 70   Temp 98  F (36.7  C) (Temporal)   Resp 14   Ht 1.702 m (5' 7.01\")   Wt 100.2 kg (221 lb)   LMP  (LMP Unknown)   SpO2 97%   BMI 34.61 kg/m   Estimated body mass index is 34.61 kg/m  as calculated from the following:    Height as of this encounter: 1.702 m (5' 7.01\").    Weight as of this encounter: 100.2 kg (221 lb). Body surface area is 2.18 meters squared.  Mild Pain (2) Comment: Data Unavailable   No LMP recorded (lmp unknown). Patient is postmenopausal.  Allergies reviewed: Yes  Medications reviewed: Yes    Medications: Medication refills not needed today.  Pharmacy name entered into EPIC:    [facility] - [place], [place] - [address] [place], [place] - [address] [facility] - [place], [place] - [address]    Frailty Screening:   Is the patient here for a new oncology consult visit in cancer care? 2. No      Clinical concerns: 6 month follow up       Vidhya George MA              "
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
## 2025-01-28 ENCOUNTER — OFFICE VISIT (OUTPATIENT)
Dept: ENDOCRINOLOGY | Facility: CLINIC | Age: 75
End: 2025-01-28
Payer: COMMERCIAL

## 2025-01-28 VITALS
HEART RATE: 68 BPM | TEMPERATURE: 97.7 F | BODY MASS INDEX: 35.38 KG/M2 | DIASTOLIC BLOOD PRESSURE: 75 MMHG | RESPIRATION RATE: 16 BRPM | HEIGHT: 67 IN | OXYGEN SATURATION: 97 % | WEIGHT: 225.4 LBS | SYSTOLIC BLOOD PRESSURE: 133 MMHG

## 2025-01-28 DIAGNOSIS — E11.65 TYPE 2 DIABETES MELLITUS WITH HYPERGLYCEMIA, WITHOUT LONG-TERM CURRENT USE OF INSULIN (H): ICD-10-CM

## 2025-01-28 DIAGNOSIS — E66.01 CLASS 2 SEVERE OBESITY DUE TO EXCESS CALORIES WITH SERIOUS COMORBIDITY AND BODY MASS INDEX (BMI) OF 35.0 TO 35.9 IN ADULT (H): Primary | ICD-10-CM

## 2025-01-28 DIAGNOSIS — E66.812 CLASS 2 SEVERE OBESITY DUE TO EXCESS CALORIES WITH SERIOUS COMORBIDITY AND BODY MASS INDEX (BMI) OF 35.0 TO 35.9 IN ADULT (H): Primary | ICD-10-CM

## 2025-01-28 PROCEDURE — 99214 OFFICE O/P EST MOD 30 MIN: CPT | Performed by: PHYSICIAN ASSISTANT

## 2025-01-28 RX ORDER — HYDROXYZINE HYDROCHLORIDE 25 MG/1
TABLET, FILM COATED ORAL
COMMUNITY
Start: 2024-12-12

## 2025-01-28 RX ORDER — HYDROCHLOROTHIAZIDE 12.5 MG/1
1 CAPSULE ORAL SEE ADMIN INSTRUCTIONS
Qty: 2 EACH | Refills: 1 | Status: SHIPPED | OUTPATIENT
Start: 2025-01-28

## 2025-01-28 RX ORDER — ALBUTEROL SULFATE 90 UG/1
INHALANT RESPIRATORY (INHALATION)
COMMUNITY

## 2025-01-28 RX ORDER — INSULIN LISPRO 100 [IU]/ML
7 INJECTION, SOLUTION INTRAVENOUS; SUBCUTANEOUS
Qty: 20 ML | Refills: 2 | Status: SHIPPED | OUTPATIENT
Start: 2025-01-28

## 2025-01-28 NOTE — PROGRESS NOTES
Assessment/Plan :   Type 2 DM. Melva has been working hard to get her blood sugars under better control. We reviewed her recent CGMS report and she is doing much better. Her current time in target range has improved to 81%. We did review her current medications and I would like her to focus on taking the Humalog 20-30 min before every meal. This should help to limit the post-prandial spikes and, hopefully, prevent the occasional low blood sugar. I do not see any reason to make adjustments in her current medications but she will continue to work on the timing of her injections. We will follow-up in 4-5 mos.       I have independently reviewed and interpreted labs, imaging as indicated.      Chief complaint:  Gricelda is a 74 year old female who returns for follow-up of Type 2 DM.     I have reviewed Care Everywhere including [facility], [facility], [facility], [facility], [facility] , [facility], [facility] lab reports, imaging reports and provider notes as indicated.      HISTORY OF PRESENT ILLNESS  Melva has been working hard to get her blood sugars under better control. She has continued to take 44 unit(s) of Lantus each night along with glipizide XL 20 mg daily and Humalog before meals. She has been experimenting with the timing of the Humalog, in order to prevent post-prandial spikes. She has found that if she takes her breakfast dose about 30 min before breakfast, her blood sugars do not spike as high. She has also not consistently been taking her evening dose, due to fear of going low overnight.     Melva continues to monitor her blood sugars closely with the FreeStyle Baldo 3 plus sensor. She works hard to keep her blood sugars within the target range. She doesn't like it when her numbers spike over 200 mg/dl. She has not had any problems with insulin injections. She also has not had any recent episodes of hypoglycemia. She feels like things are going better, overall. She had hip surgery in  December and her numbers were a little higher than she would like for a week or two, post-operatively. She is also worried about ongoing leg swelling and pain, since the procedure. She has not had any problems with numbness/tingling in her feet. She also has not noticed any issues with worsening blurred vision.     Melva was diagnosed with diabetes over 10 yrs ago. She has taken a variety of oral medications along with MDI insulin therapy for the last few years. Her diabetes is complicated by hyperlipidemia, obesity, HTN, CHERRY, a history of endometrial cancer, and a new diagnosis of metastatic melanoma. She has an upcoming PET scan and surgery, and she may be starting Keytruda in the near future.       Endocrine relevant labs are as follows:   Latest Reference Range & Units 11/19/24 15:05   Hemoglobin A1C 0.0 - 5.6 % 7.7 (H)   (H): Data is abnormally high   Latest Reference Range & Units 08/15/24 13:11   Hemoglobin A1C 0.0 - 5.6 % 7.7 (H)   (H): Data is abnormally high    REVIEW OF SYSTEMS    Endocrine: positive for diabetes and obesity  Skin: negative  Eyes: negative for, visual blurring, redness, tearing  Ears/Nose/Throat: negative  Respiratory: No shortness of breath, dyspnea on exertion, cough, or hemoptysis  Cardiovascular: positive for lower extremity edema, exercise intolerance, since hip surgery, negative for, chest pain, and dyspnea on exertion  Gastrointestinal: negative for, nausea, vomiting, constipation, and diarrhea  Genitourinary: negative for, nocturia, dysuria, frequency, and urgency  Musculoskeletal: positive for joint stiffness and muscular weakness, negative for, nocturnal cramping, and foot pain  Neurologic: negative for, local weakness, numbness or tingling of hands, and numbness or tingling of feet  Psychiatric: negative  Hematologic/Lymphatic/Immunologic: negative    Past Medical History  Past Medical History:   Diagnosis Date    Arthritis     Asthma, mild intermittent     Cataract      "Endometrial cancer (H) 06/2022    HTN, goal below 140/90     Hyperlipidemia LDL goal < 100     Macular hole of left eye     Melanoma (H)     RIGHT KNEE    Sleep apnea     uses c pap    Type 2 diabetes, HbA1C goal < 8% (H)        Medications  Current Outpatient Medications   Medication Sig Dispense Refill    acetaminophen (TYLENOL) 325 MG tablet 500 mg.      atenolol (TENORMIN) 50 MG tablet Take 1 tablet (50 mg) by mouth daily. 90 tablet 1    atorvastatin (LIPITOR) 40 MG tablet Take 1 tablet (40 mg) by mouth daily. 90 tablet 1    cetirizine (ZYRTEC) 10 MG tablet Take 10 mg by mouth every morning      Continuous Glucose Sensor (FREESTYLE DAVID 3 PLUS SENSOR) MISC 1 Application See Admin Instructions. Change every 15 days 2 each 1    CONTOUR NEXT TEST test strip USE 1 STRIP TO CHECK GLUCOSE ONCE DAILY 100 strip 4    erythromycin (ROMYCIN) 5 MG/GM ophthalmic ointment APPLY A SMALL AMOUNT TO EACH EYELID MARGIN EVERY DAY AT BEDTIME      glipiZIDE (GLUCOTROL XL) 10 MG 24 hr tablet Take 1 tablet (10 mg) by mouth daily. 90 tablet 1    HUMALOG 100 UNIT/ML injection Inject 7 Units subcutaneously 3 times daily (before meals). 10 mL 0    hydrochlorothiazide (HYDRODIURIL) 25 MG tablet Take 1 tablet (25 mg) by mouth every morning. 90 tablet 1    insulin pen needle (MM PEN NEEDLES) 32G X 4 MM miscellaneous Inject insulin 4 times a day 300 each 1    insulin syringe-needle U-100 (BD VEO INSULIN SYRINGE U/F) 31G X 15/64\" 0.3 ML USE AS DIRECTED WITH  4  SHOTS  OF  INSULIN DAILY 400 each 1    insulin syringe-needle U-100 (BD VEO INSULIN SYRINGE U/F) 31G X 15/64\" 0.5 ML USE WITH NPH INSULIN 100 each 0    LANTUS VIAL 100 UNIT/ML soln INJECT 44 UNITS SUBCUTANEOUSLY AT BEDTIME 40 mL 0    latanoprost (XALATAN) 0.005 % ophthalmic solution Place 1 drop into both eyes At Bedtime  2.5 mL 1    lisinopril (ZESTRIL) 5 MG tablet Take 1 tablet (5 mg) by mouth daily. 90 tablet 1    netarsudil (RHOPRESSA) 0.02 % ophthalmic solution Place 1 drop Into " "the left eye at bedtime      timolol maleate (TIMOPTIC) 0.5 % ophthalmic solution INSTILL 1 DROP INTO EACH EYE TWICE DAILY      tiZANidine (ZANAFLEX) 2 MG tablet TAKE 1 TO 2 TABLETS BY MOUTH EVERY 6 TO 8 HOURS AS NEEDED FOR NECK PAIN      triamcinolone (KENALOG) 0.5 % external ointment Apply 1 applicator topically daily as needed for irritation.      XARELTO ANTICOAGULANT 20 MG TABS tablet Take 1 tablet (20 mg) by mouth daily. 90 tablet 1       Allergies  Allergies   Allergen Reactions    Bromfenac Visual Disturbance      xibrom  Causes sever eye pain    Codeine Nausea     Other reaction(s): Dizziness, Headache    Metformin GI Disturbance    Seasonal Allergies          Family History  family history includes Asthma in her maternal aunt and another family member; Breast Cancer in her mother, niece, niece, and sister; Cancer in her paternal aunt and paternal grandmother; Cerebrovascular Disease in her maternal grandfather, maternal grandmother, and mother; Connective Tissue Disorder in her sister; Coronary Artery Disease in her father; Diabetes in her mother and sister; Heart Disease in her father and paternal grandfather; Hyperlipidemia in an other family member; Hypertension in her brother and mother; Mental Illness in her cousin; Obesity in her daughter, daughter, father, maternal grandfather, mother, niece, sister, and other family members; Thyroid Disease in her mother; Uterine Cancer in her sister.    Social History  Social History     Tobacco Use    Smoking status: Never     Passive exposure: Never    Smokeless tobacco: Never   Vaping Use    Vaping status: Never Used   Substance Use Topics    Alcohol use: No    Drug use: No       Physical Exam  BP (!) 170/83 (BP Location: Left arm, Patient Position: Chair, Cuff Size: Adult Regular)   Pulse 68   Temp 97.7  F (36.5  C) (Tympanic)   Resp 16   Ht 1.702 m (5' 7.01\")   Wt 102.2 kg (225 lb 6.4 oz)   LMP  (LMP Unknown)   SpO2 97%   Breastfeeding No   BMI 35.29 " kg/m    Body mass index is 35.29 kg/m .  GENERAL :  In no apparent distress  SKIN: Normal color, normal temperature, texture.  No hirsutism, alopecia or purple striae.     EYES: PERRL, EOMI, No scleral icterus,  No proptosis, conjunctival redness, stare, retraction  RESP: Lungs clear to auscultation bilaterally  CARDIAC: Regular rate and rhythm, normal S1 S2, without murmurs, rubs or gallops    NEURO: awake, alert, responds appropriately to questions.  Cranial nerves intact.   Moves all extremities; using a walker for ambulation.  No tremor of the outstretched hand.    EXTREMITIES: No clubbing, cyanosis or edema.    DATA REVIEW  FreeStyle Libreview  Time in target range 81%  High 18%, Very High 1%  Current Ave  mg/dl

## 2025-01-28 NOTE — LETTER
1/28/2025      Gricelda Sabillon  [address] [address]      Dear Colleague,    Thank you for referring your patient, Gricelda Sabillon, to the [facility]. Please see a copy of my visit note below.    Assessment/Plan :   Type 2 DM. Melva has been working hard to get her blood sugars under better control. We reviewed her recent CGMS report and she is doing much better. Her current time in target range has improved to 81%. We did review her current medications and I would like her to focus on taking the Humalog 20-30 min before every meal. This should help to limit the post-prandial spikes and, hopefully, prevent the occasional low blood sugar. I do not see any reason to make adjustments in her current medications but she will continue to work on the timing of her injections. We will follow-up in 4-5 mos.       I have independently reviewed and interpreted labs, imaging as indicated.      Chief complaint:  Gricelda is a 74 year old female who returns for follow-up of Type 2 DM.     I have reviewed Care Everywhere including [facility], [facility], [facility], [facility], [facility] , [facility], [facility] lab reports, imaging reports and provider notes as indicated.      HISTORY OF PRESENT ILLNESS  Melva has been working hard to get her blood sugars under better control. She has continued to take 44 unit(s) of Lantus each night along with glipizide XL 20 mg daily and Humalog before meals. She has been experimenting with the timing of the Humalog, in order to prevent post-prandial spikes. She has found that if she takes her breakfast dose about 30 min before breakfast, her blood sugars do not spike as high. She has also not consistently been taking her evening dose, due to fear of going low overnight.     Melva continues to monitor her blood sugars closely with the FreeStyle Baldo 3 plus sensor. She works hard to keep her blood sugars within the target range. She  doesn't like it when her numbers spike over 200 mg/dl. She has not had any problems with insulin injections. She also has not had any recent episodes of hypoglycemia. She feels like things are going better, overall. She had hip surgery in December and her numbers were a little higher than she would like for a week or two, post-operatively. She is also worried about ongoing leg swelling and pain, since the procedure. She has not had any problems with numbness/tingling in her feet. She also has not noticed any issues with worsening blurred vision.     Melva was diagnosed with diabetes over 10 yrs ago. She has taken a variety of oral medications along with MDI insulin therapy for the last few years. Her diabetes is complicated by hyperlipidemia, obesity, HTN, CHERRY, a history of endometrial cancer, and a new diagnosis of metastatic melanoma. She has an upcoming PET scan and surgery, and she may be starting Keytruda in the near future.       Endocrine relevant labs are as follows:   Latest Reference Range & Units 11/19/24 15:05   Hemoglobin A1C 0.0 - 5.6 % 7.7 (H)   (H): Data is abnormally high   Latest Reference Range & Units 08/15/24 13:11   Hemoglobin A1C 0.0 - 5.6 % 7.7 (H)   (H): Data is abnormally high    REVIEW OF SYSTEMS    Endocrine: positive for diabetes and obesity  Skin: negative  Eyes: negative for, visual blurring, redness, tearing  Ears/Nose/Throat: negative  Respiratory: No shortness of breath, dyspnea on exertion, cough, or hemoptysis  Cardiovascular: positive for lower extremity edema, exercise intolerance, since hip surgery, negative for, chest pain, and dyspnea on exertion  Gastrointestinal: negative for, nausea, vomiting, constipation, and diarrhea  Genitourinary: negative for, nocturia, dysuria, frequency, and urgency  Musculoskeletal: positive for joint stiffness and muscular weakness, negative for, nocturnal cramping, and foot pain  Neurologic: negative for, local weakness, numbness or tingling of  "hands, and numbness or tingling of feet  Psychiatric: negative  Hematologic/Lymphatic/Immunologic: negative    Past Medical History  Past Medical History:   Diagnosis Date     Arthritis      Asthma, mild intermittent      Cataract      Endometrial cancer (H) 06/2022     HTN, goal below 140/90      Hyperlipidemia LDL goal < 100      Macular hole of left eye      Melanoma (H)     RIGHT KNEE     Sleep apnea     uses c pap     Type 2 diabetes, HbA1C goal < 8% (H)        Medications  Current Outpatient Medications   Medication Sig Dispense Refill     acetaminophen (TYLENOL) 325 MG tablet 500 mg.       atenolol (TENORMIN) 50 MG tablet Take 1 tablet (50 mg) by mouth daily. 90 tablet 1     atorvastatin (LIPITOR) 40 MG tablet Take 1 tablet (40 mg) by mouth daily. 90 tablet 1     cetirizine (ZYRTEC) 10 MG tablet Take 10 mg by mouth every morning       Continuous Glucose Sensor (FREESTYLE DAVID 3 PLUS SENSOR) MISC 1 Application See Admin Instructions. Change every 15 days 2 each 1     CONTOUR NEXT TEST test strip USE 1 STRIP TO CHECK GLUCOSE ONCE DAILY 100 strip 4     erythromycin (ROMYCIN) 5 MG/GM ophthalmic ointment APPLY A SMALL AMOUNT TO EACH EYELID MARGIN EVERY DAY AT BEDTIME       glipiZIDE (GLUCOTROL XL) 10 MG 24 hr tablet Take 1 tablet (10 mg) by mouth daily. 90 tablet 1     HUMALOG 100 UNIT/ML injection Inject 7 Units subcutaneously 3 times daily (before meals). 10 mL 0     hydrochlorothiazide (HYDRODIURIL) 25 MG tablet Take 1 tablet (25 mg) by mouth every morning. 90 tablet 1     insulin pen needle (MM PEN NEEDLES) 32G X 4 MM miscellaneous Inject insulin 4 times a day 300 each 1     insulin syringe-needle U-100 (BD VEO INSULIN SYRINGE U/F) 31G X 15/64\" 0.3 ML USE AS DIRECTED WITH  4  SHOTS  OF  INSULIN DAILY 400 each 1     insulin syringe-needle U-100 (BD VEO INSULIN SYRINGE U/F) 31G X 15/64\" 0.5 ML USE WITH NPH INSULIN 100 each 0     LANTUS VIAL 100 UNIT/ML soln INJECT 44 UNITS SUBCUTANEOUSLY AT BEDTIME 40 mL 0     " latanoprost (XALATAN) 0.005 % ophthalmic solution Place 1 drop into both eyes At Bedtime  2.5 mL 1     lisinopril (ZESTRIL) 5 MG tablet Take 1 tablet (5 mg) by mouth daily. 90 tablet 1     netarsudil (RHOPRESSA) 0.02 % ophthalmic solution Place 1 drop Into the left eye at bedtime       timolol maleate (TIMOPTIC) 0.5 % ophthalmic solution INSTILL 1 DROP INTO EACH EYE TWICE DAILY       tiZANidine (ZANAFLEX) 2 MG tablet TAKE 1 TO 2 TABLETS BY MOUTH EVERY 6 TO 8 HOURS AS NEEDED FOR NECK PAIN       triamcinolone (KENALOG) 0.5 % external ointment Apply 1 applicator topically daily as needed for irritation.       XARELTO ANTICOAGULANT 20 MG TABS tablet Take 1 tablet (20 mg) by mouth daily. 90 tablet 1       Allergies  Allergies   Allergen Reactions     Bromfenac Visual Disturbance      xibrom  Causes sever eye pain     Codeine Nausea     Other reaction(s): Dizziness, Headache     Metformin GI Disturbance     Seasonal Allergies          Family History  family history includes Asthma in her maternal aunt and another family member; Breast Cancer in her mother, niece, niece, and sister; Cancer in her paternal aunt and paternal grandmother; Cerebrovascular Disease in her maternal grandfather, maternal grandmother, and mother; Connective Tissue Disorder in her sister; Coronary Artery Disease in her father; Diabetes in her mother and sister; Heart Disease in her father and paternal grandfather; Hyperlipidemia in an other family member; Hypertension in her brother and mother; Mental Illness in her cousin; Obesity in her daughter, daughter, father, maternal grandfather, mother, niece, sister, and other family members; Thyroid Disease in her mother; Uterine Cancer in her sister.    Social History  Social History     Tobacco Use     Smoking status: Never     Passive exposure: Never     Smokeless tobacco: Never   Vaping Use     Vaping status: Never Used   Substance Use Topics     Alcohol use: No     Drug use: No       Physical  "Exam  BP (!) 170/83 (BP Location: Left arm, Patient Position: Chair, Cuff Size: Adult Regular)   Pulse 68   Temp 97.7  F (36.5  C) (Tympanic)   Resp 16   Ht 1.702 m (5' 7.01\")   Wt 102.2 kg (225 lb 6.4 oz)   LMP  (LMP Unknown)   SpO2 97%   Breastfeeding No   BMI 35.29 kg/m    Body mass index is 35.29 kg/m .  GENERAL :  In no apparent distress  SKIN: Normal color, normal temperature, texture.  No hirsutism, alopecia or purple striae.     EYES: PERRL, EOMI, No scleral icterus,  No proptosis, conjunctival redness, stare, retraction  RESP: Lungs clear to auscultation bilaterally  CARDIAC: Regular rate and rhythm, normal S1 S2, without murmurs, rubs or gallops    NEURO: awake, alert, responds appropriately to questions.  Cranial nerves intact.   Moves all extremities; using a walker for ambulation.  No tremor of the outstretched hand.    EXTREMITIES: No clubbing, cyanosis or edema.    DATA REVIEW  FreeStyle Libreview  Time in target range 81%  High 18%, Very High 1%  Current Ave  mg/dl         Again, thank you for allowing me to participate in the care of your patient.        Sincerely,        Danisha Villa PA-C    Electronically signed"

## 2025-01-28 NOTE — PATIENT INSTRUCTIONS
[facility]  Dr Corado, Endocrinology Department    [address] [facility]  [place] [address]  Appointment [phone]  Fax: [phone]  [place]: Monday - Thursday

## 2025-02-05 ENCOUNTER — TRANSFERRED RECORDS (OUTPATIENT)
Dept: HEALTH INFORMATION MANAGEMENT | Facility: CLINIC | Age: 75
End: 2025-02-05
Payer: COMMERCIAL

## 2025-03-11 ENCOUNTER — THERAPY VISIT (OUTPATIENT)
Dept: OCCUPATIONAL THERAPY | Facility: CLINIC | Age: 75
End: 2025-03-11
Attending: INTERNAL MEDICINE
Payer: COMMERCIAL

## 2025-03-11 DIAGNOSIS — I89.0 LYMPHEDEMA: Primary | ICD-10-CM

## 2025-03-11 PROCEDURE — 97140 MANUAL THERAPY 1/> REGIONS: CPT | Mod: GO | Performed by: OCCUPATIONAL THERAPIST

## 2025-03-18 ENCOUNTER — THERAPY VISIT (OUTPATIENT)
Dept: OCCUPATIONAL THERAPY | Facility: CLINIC | Age: 75
End: 2025-03-18
Attending: INTERNAL MEDICINE
Payer: COMMERCIAL

## 2025-03-18 DIAGNOSIS — I89.0 LYMPHEDEMA: Primary | ICD-10-CM

## 2025-03-18 PROCEDURE — 97140 MANUAL THERAPY 1/> REGIONS: CPT | Mod: GO | Performed by: OCCUPATIONAL THERAPIST

## 2025-03-25 ENCOUNTER — THERAPY VISIT (OUTPATIENT)
Dept: OCCUPATIONAL THERAPY | Facility: CLINIC | Age: 75
End: 2025-03-25
Attending: INTERNAL MEDICINE
Payer: COMMERCIAL

## 2025-03-25 DIAGNOSIS — I89.0 LYMPHEDEMA: Primary | ICD-10-CM

## 2025-03-25 PROCEDURE — 97140 MANUAL THERAPY 1/> REGIONS: CPT | Mod: GO | Performed by: OCCUPATIONAL THERAPIST

## 2025-03-28 DIAGNOSIS — E11.65 TYPE 2 DIABETES MELLITUS WITH HYPERGLYCEMIA, WITHOUT LONG-TERM CURRENT USE OF INSULIN (H): ICD-10-CM

## 2025-03-31 RX ORDER — ALBUTEROL SULFATE 108 UG/1
AEROSOL, METERED RESPIRATORY (INHALATION)
Qty: 100 EACH | Refills: 1 | Status: SHIPPED | OUTPATIENT
Start: 2025-03-31

## 2025-04-01 ENCOUNTER — THERAPY VISIT (OUTPATIENT)
Dept: OCCUPATIONAL THERAPY | Facility: CLINIC | Age: 75
End: 2025-04-01
Attending: INTERNAL MEDICINE
Payer: COMMERCIAL

## 2025-04-01 DIAGNOSIS — R60.0 LEG EDEMA, RIGHT: ICD-10-CM

## 2025-04-01 DIAGNOSIS — I27.82 OTHER CHRONIC PULMONARY EMBOLISM WITHOUT ACUTE COR PULMONALE (H): ICD-10-CM

## 2025-04-01 DIAGNOSIS — I89.0 LYMPHEDEMA: Primary | ICD-10-CM

## 2025-04-01 PROCEDURE — 97140 MANUAL THERAPY 1/> REGIONS: CPT | Mod: GO | Performed by: OCCUPATIONAL THERAPY ASSISTANT

## 2025-04-02 ENCOUNTER — TRANSFERRED RECORDS (OUTPATIENT)
Dept: HEALTH INFORMATION MANAGEMENT | Facility: CLINIC | Age: 75
End: 2025-04-02
Payer: COMMERCIAL

## 2025-04-03 RX ORDER — RIVAROXABAN 20 MG/1
20 TABLET, FILM COATED ORAL DAILY
Qty: 90 TABLET | Refills: 0 | Status: SHIPPED | OUTPATIENT
Start: 2025-04-03

## 2025-04-09 ENCOUNTER — HOSPITAL ENCOUNTER (OUTPATIENT)
Dept: PET IMAGING | Facility: CLINIC | Age: 75
Discharge: HOME | End: 2025-04-09
Attending: INTERNAL MEDICINE
Payer: COMMERCIAL

## 2025-04-09 DIAGNOSIS — C43.71: ICD-10-CM

## 2025-04-09 PROCEDURE — 343N000001 HC RX 343 MED OP 636: Performed by: INTERNAL MEDICINE

## 2025-04-09 PROCEDURE — 78816 PET IMAGE W/CT FULL BODY: CPT | Mod: PS

## 2025-04-09 PROCEDURE — A9552 F18 FDG: HCPCS | Performed by: INTERNAL MEDICINE

## 2025-04-09 RX ORDER — FLUDEOXYGLUCOSE F 18 200 MCI/ML
10-18 INJECTION, SOLUTION INTRAVENOUS ONCE
Status: COMPLETED | OUTPATIENT
Start: 2025-04-09 | End: 2025-04-09

## 2025-04-09 RX ADMIN — FLUDEOXYGLUCOSE F 18 13.48 MILLICURIE: 200 INJECTION, SOLUTION INTRAVENOUS at 07:41

## 2025-04-10 DIAGNOSIS — E11.65 TYPE 2 DIABETES MELLITUS WITH HYPERGLYCEMIA, WITHOUT LONG-TERM CURRENT USE OF INSULIN (H): ICD-10-CM

## 2025-04-10 RX ORDER — SYRING-NEEDL,DISP,INSUL,0.3 ML 31GX15/64"
SYRINGE, EMPTY DISPOSABLE MISCELLANEOUS
Refills: 0 | OUTPATIENT
Start: 2025-04-10

## 2025-04-11 ENCOUNTER — TRANSFERRED RECORDS (OUTPATIENT)
Dept: HEALTH INFORMATION MANAGEMENT | Facility: CLINIC | Age: 75
End: 2025-04-11
Payer: COMMERCIAL

## 2025-04-15 ENCOUNTER — PATIENT OUTREACH (OUTPATIENT)
Dept: CARE COORDINATION | Facility: CLINIC | Age: 75
End: 2025-04-15
Payer: COMMERCIAL

## 2025-04-15 DIAGNOSIS — E78.5 HYPERLIPIDEMIA WITH TARGET LDL LESS THAN 100: ICD-10-CM

## 2025-04-15 RX ORDER — ATORVASTATIN CALCIUM 40 MG/1
40 TABLET, FILM COATED ORAL DAILY
Qty: 90 TABLET | Refills: 0 | Status: SHIPPED | OUTPATIENT
Start: 2025-04-15

## 2025-04-19 SDOH — HEALTH STABILITY: PHYSICAL HEALTH: ON AVERAGE, HOW MANY MINUTES DO YOU ENGAGE IN EXERCISE AT THIS LEVEL?: 30 MIN

## 2025-04-19 SDOH — HEALTH STABILITY: PHYSICAL HEALTH: ON AVERAGE, HOW MANY DAYS PER WEEK DO YOU ENGAGE IN MODERATE TO STRENUOUS EXERCISE (LIKE A BRISK WALK)?: 2 DAYS

## 2025-04-19 ASSESSMENT — ASTHMA QUESTIONNAIRES
QUESTION_1 LAST FOUR WEEKS HOW MUCH OF THE TIME DID YOUR ASTHMA KEEP YOU FROM GETTING AS MUCH DONE AT WORK, SCHOOL OR AT HOME: NONE OF THE TIME
QUESTION_3 LAST FOUR WEEKS HOW OFTEN DID YOUR ASTHMA SYMPTOMS (WHEEZING, COUGHING, SHORTNESS OF BREATH, CHEST TIGHTNESS OR PAIN) WAKE YOU UP AT NIGHT OR EARLIER THAN USUAL IN THE MORNING: NOT AT ALL
QUESTION_5 LAST FOUR WEEKS HOW WOULD YOU RATE YOUR ASTHMA CONTROL: COMPLETELY CONTROLLED
QUESTION_2 LAST FOUR WEEKS HOW OFTEN HAVE YOU HAD SHORTNESS OF BREATH: NOT AT ALL
ACT_TOTALSCORE: 25
QUESTION_4 LAST FOUR WEEKS HOW OFTEN HAVE YOU USED YOUR RESCUE INHALER OR NEBULIZER MEDICATION (SUCH AS ALBUTEROL): NOT AT ALL

## 2025-04-19 ASSESSMENT — SOCIAL DETERMINANTS OF HEALTH (SDOH): HOW OFTEN DO YOU GET TOGETHER WITH FRIENDS OR RELATIVES?: THREE TIMES A WEEK

## 2025-04-21 DIAGNOSIS — E11.65 TYPE 2 DIABETES MELLITUS WITH HYPERGLYCEMIA, WITHOUT LONG-TERM CURRENT USE OF INSULIN (H): ICD-10-CM

## 2025-04-21 RX ORDER — HYDROCHLOROTHIAZIDE 12.5 MG/1
1 CAPSULE ORAL SEE ADMIN INSTRUCTIONS
Qty: 2 EACH | Refills: 1 | Status: SHIPPED | OUTPATIENT
Start: 2025-04-21

## 2025-04-22 ENCOUNTER — TELEPHONE (OUTPATIENT)
Dept: INTERNAL MEDICINE | Facility: CLINIC | Age: 75
End: 2025-04-22
Payer: COMMERCIAL

## 2025-04-22 NOTE — TELEPHONE ENCOUNTER
PT calls stating she has appt on Thursday to see Dr Torres.     She is asking if needs lab tests before, or if OK to do after appointment. She just realized she needs these done.     She would have to be fasting, her appt is at 10:30. She is OK waiting.         Lab Results   Component Value Date    A1C 7.7 11/19/2024    A1C 7.7 08/15/2024    A1C 8.1 02/27/2024    A1C 7.8 11/21/2023    A1C 7.9 08/23/2023    A1C 7.1 03/03/2021    A1C 7.2 08/24/2020    A1C 7.3 05/21/2020    A1C 7.0 02/20/2020    A1C 6.5 10/31/2019

## 2025-04-23 NOTE — TELEPHONE ENCOUNTER
She needs fasting labs.  We will decide labs at the appointment time. Do not fast since appointment is 10:30

## 2025-04-24 ENCOUNTER — OFFICE VISIT (OUTPATIENT)
Dept: INTERNAL MEDICINE | Facility: CLINIC | Age: 75
End: 2025-04-24
Payer: COMMERCIAL

## 2025-04-24 VITALS
TEMPERATURE: 97.1 F | OXYGEN SATURATION: 96 % | DIASTOLIC BLOOD PRESSURE: 68 MMHG | RESPIRATION RATE: 16 BRPM | WEIGHT: 225 LBS | HEIGHT: 67 IN | SYSTOLIC BLOOD PRESSURE: 124 MMHG | BODY MASS INDEX: 35.31 KG/M2 | HEART RATE: 65 BPM

## 2025-04-24 DIAGNOSIS — C79.9 SECONDARY MALIGNANT NEOPLASM OF UNSPECIFIED SITE (CODE) (H): ICD-10-CM

## 2025-04-24 DIAGNOSIS — I10 HTN, GOAL BELOW 140/90: ICD-10-CM

## 2025-04-24 DIAGNOSIS — Z79.4 DIABETES MELLITUS TYPE 2, INSULIN DEPENDENT (H): ICD-10-CM

## 2025-04-24 DIAGNOSIS — Z12.31 ENCOUNTER FOR SCREENING MAMMOGRAM FOR BREAST CANCER: ICD-10-CM

## 2025-04-24 DIAGNOSIS — I27.82 OTHER CHRONIC PULMONARY EMBOLISM WITHOUT ACUTE COR PULMONALE (H): ICD-10-CM

## 2025-04-24 DIAGNOSIS — E66.01 CLASS 2 SEVERE OBESITY DUE TO EXCESS CALORIES WITH SERIOUS COMORBIDITY AND BODY MASS INDEX (BMI) OF 35.0 TO 35.9 IN ADULT (H): ICD-10-CM

## 2025-04-24 DIAGNOSIS — E78.5 HYPERLIPIDEMIA WITH TARGET LDL LESS THAN 100: ICD-10-CM

## 2025-04-24 DIAGNOSIS — Z00.00 ROUTINE GENERAL MEDICAL EXAMINATION AT A HEALTH CARE FACILITY: Primary | ICD-10-CM

## 2025-04-24 DIAGNOSIS — E11.9 DIABETES MELLITUS TYPE 2, INSULIN DEPENDENT (H): ICD-10-CM

## 2025-04-24 DIAGNOSIS — E66.812 CLASS 2 SEVERE OBESITY DUE TO EXCESS CALORIES WITH SERIOUS COMORBIDITY AND BODY MASS INDEX (BMI) OF 35.0 TO 35.9 IN ADULT (H): ICD-10-CM

## 2025-04-24 DIAGNOSIS — Z79.4 TYPE 2 DIABETES MELLITUS WITH OTHER SPECIFIED COMPLICATION, WITH LONG-TERM CURRENT USE OF INSULIN (H): ICD-10-CM

## 2025-04-24 DIAGNOSIS — E11.69 TYPE 2 DIABETES MELLITUS WITH OTHER SPECIFIED COMPLICATION, WITH LONG-TERM CURRENT USE OF INSULIN (H): ICD-10-CM

## 2025-04-24 PROBLEM — J84.89 ORGANIZING PNEUMONIA (H): Status: RESOLVED | Noted: 2022-09-05 | Resolved: 2025-04-24

## 2025-04-24 RX ORDER — HYDROCHLOROTHIAZIDE 25 MG/1
25 TABLET ORAL EVERY MORNING
Qty: 90 TABLET | Refills: 1 | Status: SHIPPED | OUTPATIENT
Start: 2025-04-24

## 2025-04-24 RX ORDER — ATORVASTATIN CALCIUM 40 MG/1
40 TABLET, FILM COATED ORAL DAILY
Qty: 90 TABLET | Refills: 1 | Status: SHIPPED | OUTPATIENT
Start: 2025-04-24

## 2025-04-24 RX ORDER — LISINOPRIL 5 MG/1
5 TABLET ORAL DAILY
Qty: 90 TABLET | Refills: 1 | Status: SHIPPED | OUTPATIENT
Start: 2025-04-24

## 2025-04-24 RX ORDER — INSULIN GLARGINE 100 [IU]/ML
INJECTION, SOLUTION SUBCUTANEOUS
Qty: 40 ML | Refills: 1 | Status: SHIPPED | OUTPATIENT
Start: 2025-04-24

## 2025-04-24 RX ORDER — GLIPIZIDE 10 MG/1
10 TABLET, FILM COATED, EXTENDED RELEASE ORAL DAILY
Qty: 90 TABLET | Refills: 1 | Status: SHIPPED | OUTPATIENT
Start: 2025-04-24

## 2025-04-24 RX ORDER — ATENOLOL 50 MG/1
50 TABLET ORAL DAILY
Qty: 90 TABLET | Refills: 1 | Status: SHIPPED | OUTPATIENT
Start: 2025-04-24

## 2025-04-24 RX ORDER — RIVAROXABAN 20 MG/1
20 TABLET, FILM COATED ORAL DAILY
Qty: 90 TABLET | Refills: 1 | Status: SHIPPED | OUTPATIENT
Start: 2025-04-24

## 2025-04-24 NOTE — PROGRESS NOTES
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 from the following:    Height as of this encounter: 1.702 m (5' 7\").    Weight as of this encounter: 102.1 kg (225 lb).    Physical Exam          4/24/2025   Mini Cog   Clock Draw Score 2 Normal   3 Item Recall 3 objects recalled   Mini Cog Total Score 5              Signed Electronically by: Raisa Davidson MD    "

## 2025-04-24 NOTE — NURSING NOTE
"Chief Complaint   Patient presents with    Medicare Visit     Non fasting     initial BP (!) 140/66   Pulse 65   Temp 97.1  F (36.2  C) (Tympanic)   Resp 16   Ht 1.702 m (5' 7\")   Wt 102.1 kg (225 lb)   LMP  (LMP Unknown)   SpO2 96%   BMI 35.24 kg/m   Estimated body mass index is 35.24 kg/m  as calculated from the following:    Height as of this encounter: 1.702 m (5' 7\").    Weight as of this encounter: 102.1 kg (225 lb)..  bp completed using cuff size large  UTE GUNTER LPN  "

## 2025-04-24 NOTE — PATIENT INSTRUCTIONS
Plan:  Continue same meds, same doses for now   2. Please make a lab appointment for fasting labs  few days prior appointment with Danisha jensen  3. Make sure you drink plenty of water the day of the blood test.    4. Mammogram ( please call [phone] to schedule it) after June 5  5. Schedule a follow up appointment in about 6 months - no labs prior  6.  Next ANNUAL EXAM after April 25, 2026

## 2025-05-20 ENCOUNTER — THERAPY VISIT (OUTPATIENT)
Dept: OCCUPATIONAL THERAPY | Facility: CLINIC | Age: 75
End: 2025-05-20
Attending: INTERNAL MEDICINE
Payer: COMMERCIAL

## 2025-05-20 DIAGNOSIS — I89.0 LYMPHEDEMA: Primary | ICD-10-CM

## 2025-05-20 PROCEDURE — 97140 MANUAL THERAPY 1/> REGIONS: CPT | Mod: GO | Performed by: OCCUPATIONAL THERAPIST

## 2025-05-20 NOTE — PROGRESS NOTES
05/20/25 0500   Appointment Info   Treating Provider Hilario Olmstead OTR/L; WILDER [facility]IO   Total/Authorized Visits 6   Visits Used 6   Medical Diagnosis lymphedema   OT Tx Diagnosis lymphedema   Progress Note/Certification   Start Of Care Date 09/09/24   Onset of Illness/Injury or Date of Surgery 09/03/24   Therapy Frequency 1x/wk   Predicted Duration 12 weeks with ability to taper as needed   Certification date from 02/26/25  (cert 12/3/24-2/25/25 sent for interim cert;)   Certification date to 05/21/25   Progress Note Due Date   (10th visit)   Goals   OT Goals 1;2;3   OT Goal 1   Goal Identifier Velcro Compression   Goal Description Pt to obtain velcro compression and wear initially 23/24hrs for RLE lymphedema reductions needed to reduce risk infection and promote more ease with mobility.   Rationale In order to maximize safety and independence with ADL/IADLs;In order to maximize safety and independence with functional transfers and functional mobility within the home or community   Target Date 12/02/24   Date Met 03/18/25   OT Goal 2   Goal Identifier HEP/MLD   Goal Description Pt to demo Ind with self MLD and HEP for RLE reducitons in lymphedema needed to reduce risk infections and promote more ease with mobility tasks   Rationale In order to maximize safety and independence with ADL/IADLs;In order to maximize safety and independence with functional transfers and functional mobility within the home or community   Target Date 12/02/24   Date Met 03/25/25   OT Goal 3   Goal Identifier Reductions   Goal Description Pt will display reduction RLE lymphedema from pitting to non pitting lymphedema to reduce risk infections and promote more ease with LB dressing and walking   Rationale In order to maximize safety and independence with ADL/IADLs;In order to maximize safety and independence with functional transfers and functional mobility within the home or community   Target Date 12/02/24   Treatment Interventions (OT)    Interventions Manual Therapy   Manual Therapy   Manual Therapy Minutes ([id number]) 35   Manual Therapy 1 - Details Pt returns today for follow up appt and is wearing BLE knee high velcro compression. She reports minimal to no HEP use and sparing use MLD. Measurements show an increase in RLE and stable LLE. The pt started to use the velcro compression with some clinic based MLD and HEP and then was measured. With scaling back on HEP and MLD possibly might be why RLE larger. pt ozzy reports some knee pian and irritation in RLE and feels she is off loading her limb when walking possibly explaining RLE. Ed the straps velcro could possibly be too loose but she seems to sourav well when checked today. Pt ed increase daily HEP and MLD use and tighten starps velcro more so for better RLE management. Pts RLE is still reduced since start services; plan to d/c and have pt manage BLE/RLE lymphedema from home.   Skilled Intervention ed; measurements   Education   Learner/Method Patient;Listening;Demonstration   Plan   Plan for next session d/c   Total Session Time   Timed Code Treatment Minutes 35   Total Treatment Time (sum of timed and untimed services) 35         DISCHARGE  Reason for Discharge: No further expectation of progress. Pt has HEP and write up for self MLD use. Pt solid with velcro compression use and has improved the volume retention BLE since start services. Discharge and Ind manage from home    Equipment Issued:     Discharge Plan: Patient to continue home program.    Referring Provider:  Raisa Davidson

## 2025-05-24 ENCOUNTER — HEALTH MAINTENANCE LETTER (OUTPATIENT)
Age: 75
End: 2025-05-24

## 2025-05-29 ENCOUNTER — LAB (OUTPATIENT)
Dept: LAB | Facility: CLINIC | Age: 75
End: 2025-05-29
Payer: COMMERCIAL

## 2025-05-29 DIAGNOSIS — Z79.4 DIABETES MELLITUS TYPE 2, INSULIN DEPENDENT (H): ICD-10-CM

## 2025-05-29 DIAGNOSIS — E11.9 DIABETES MELLITUS TYPE 2, INSULIN DEPENDENT (H): ICD-10-CM

## 2025-05-29 DIAGNOSIS — Z00.00 ROUTINE GENERAL MEDICAL EXAMINATION AT A HEALTH CARE FACILITY: ICD-10-CM

## 2025-05-29 DIAGNOSIS — I27.82 OTHER CHRONIC PULMONARY EMBOLISM WITHOUT ACUTE COR PULMONALE (H): ICD-10-CM

## 2025-05-29 DIAGNOSIS — E78.5 HYPERLIPIDEMIA WITH TARGET LDL LESS THAN 100: ICD-10-CM

## 2025-05-29 DIAGNOSIS — I10 HTN, GOAL BELOW 140/90: ICD-10-CM

## 2025-05-29 LAB
ERYTHROCYTE [DISTWIDTH] IN BLOOD BY AUTOMATED COUNT: 14 % (ref 10–15)
EST. AVERAGE GLUCOSE BLD GHB EST-MCNC: 180 MG/DL
HBA1C MFR BLD: 7.9 % (ref 0–5.6)
HCT VFR BLD AUTO: 40.9 % (ref 35–47)
HGB BLD-MCNC: 13.3 G/DL (ref 11.7–15.7)
MCH RBC QN AUTO: 26.8 PG (ref 26.5–33)
MCHC RBC AUTO-ENTMCNC: 32.5 G/DL (ref 31.5–36.5)
MCV RBC AUTO: 82 FL (ref 78–100)
PLATELET # BLD AUTO: 252 10E3/UL (ref 150–450)
RBC # BLD AUTO: 4.97 10E6/UL (ref 3.8–5.2)
WBC # BLD AUTO: 5.7 10E3/UL (ref 4–11)

## 2025-05-31 NOTE — TELEPHONE ENCOUNTER
Provider E-Visit time total (minutes): 3   Patient is a 42y old  Female who presents with a chief complaint of Abscess (31 May 2025 11:34)      HPI:  41 y/o female, PMH pHTN (on treprostinil SQ pump) currently on lung transplant list, HF (s/p PPM dual chamber 2016), chronic PE (dx 2017) no longer on DOAC, SVT (s/p ablation 05/2020), asthma, with recurrent abscesses (abdominal wall, UE, most recently MRSA) p/w upper extremity abscess. Patient recently presented to outpatient pulm for sob. Started on prednisone and z pack. At that time also noted to have worsening L upper extremity redness and pain, radiating down the extremity. Denies recent trauma to the area, recent travel, fevers/chills/drainage. Given the presentation, advised to present to the ED.    In the ED afeb hds. Labs notable for wbc 11.4 (84% neutrophils), BMP wnl. s/p I&D in the ER. Started on doxycycline.  (31 May 2025 11:34)     prior hospital charts reviewed [  ]  primary team notes reviewed [  ]  other consultant notes reviewed [  ]    PAST MEDICAL & SURGICAL HISTORY:  Anemia      Pulmonary hypertension      Asthma  On home O2 nightly at 2L via NC      PE (pulmonary thromboembolism)  on Xarelto 10 mg daily      Sinusitis      Right heart failure      H/O pulmonary hypertension      Pulmonary embolism      History of tachycardia      On supplemental oxygen by nasal cannula  O2 @ 2L      Pacemaker      Right atrial thrombus      Hypotension      Pacemaker      History of pacemaker  12/19 - [employer] model Ingevity 4469          Allergies  vancomycin (Rash)  adhesives (Rash)    ANTIMICROBIALS (past 90 days)  MEDICATIONS  (STANDING):    doxycycline IVPB   100 mL/Hr IV Intermittent (05-31-25 @ 00:18)    doxycycline IVPB   100 mL/Hr IV Intermittent (05-31-25 @ 09:54)      ANTIMICROBIALS:    azithromycin   Tablet 250 daily  DAPTOmycin IVPB    DAPTOmycin IVPB 600 once    OTHER MEDS: MEDICATIONS  (STANDING):  heparin   Injectable 5000 every 8 hours  ipratropium    for Nebulization 500 every 6 hours  pantoprazole    Tablet 40 before breakfast    SOCIAL HISTORY:   hx smoking  non-smoker    FAMILY HISTORY:  Family history of diabetes mellitus  in brother      REVIEW OF SYSTEMS  [  ] ROS unobtainable because:    [  ] All other systems negative except as noted below:	    Constitutional:  [ ] fever [ ] chills  [ ] weight loss  [ ] weakness  Skin:  [ ] rash [ ] phlebitis	  Eyes: [ ] icterus [ ] pain  [ ] discharge	  ENMT: [ ] sore throat  [ ] thrush [ ] ulcers [ ] exudates  Respiratory: [ ] dyspnea [ ] hemoptysis [ ] cough [ ] sputum	  Cardiovascular:  [ ] chest pain [ ] palpitations [ ] edema	  Gastrointestinal:  [ ] nausea [ ] vomiting [ ] diarrhea [ ] constipation [ ] pain	  Genitourinary:  [ ] dysuria [ ] frequency [ ] hematuria [ ] discharge [ ] flank pain  [ ] incontinence  Musculoskeletal:  [ ] myalgias [ ] arthralgias [ ] arthritis  [ ] back pain  Neurological:  [ ] headache [ ] seizures  [ ] confusion/altered mental status  Psychiatric:  [ ] anxiety [ ] depression	  Hematology/Lymphatics:  [ ] lymphadenopathy  Endocrine:  [ ] adrenal [ ] thyroid  Allergic/Immunologic:	 [ ] transplant [ ] seasonal    Vital Signs Last 24 Hrs  T(F): 97.7 (05-31-25 @ 13:54), Max: 98.4 (05-31-25 @ 11:45)  Vital Signs Last 24 Hrs  HR: 89 (05-31-25 @ 13:54) (89 - 117)  BP: 102/69 (05-31-25 @ 13:54) (102/69 - 120/76)  RR: 19 (05-31-25 @ 13:54)  SpO2: 93% (05-31-25 @ 13:54) (93% - 98%)  Wt(kg): --    PHYSICAL EXAMINATION:  General: Alert and Awake, NAD  HEENT: PERRL, EOMI, No subconjunctival hemorrhages, Oropharynx Clear, MMM  Neck: Supple, No PHOENIX  Cardiac: RRR, No M/R/G  Resp: CTAB, No Wh/Rh/Ra  Abdomen: NBS, NT/ND, No HSM, No rigidity or guarding  MSK: No LE edema. No stigmata of IE. No evidence of phlebitis. No evidence of synovitis.  : No lucas  Skin: No rashes or lesions. Skin is warm and dry to the touch.   Neuro: Alert and Awake. CN 2-12 Grossly intact. Moves all four extremities spontaneously.  Psych: Calm, Pleasant, Cooperative                          13.0   11.40 )-----------( 359      ( 31 May 2025 00:39 )             41.1     05-31    139  |  103  |  14  ----------------------------<  111[H]  3.9   |  22  |  0.97    Ca    8.7      31 May 2025 00:39    TPro  7.0  /  Alb  4.0  /  TBili  0.4  /  DBili  x   /  AST  14  /  ALT  11  /  AlkPhos  63  05-31    Urinalysis Basic - ( 31 May 2025 00:39 )    Color: x / Appearance: x / SG: x / pH: x  Gluc: 111 mg/dL / Ketone: x  / Bili: x / Urobili: x   Blood: x / Protein: x / Nitrite: x   Leuk Esterase: x / RBC: x / WBC x   Sq Epi: x / Non Sq Epi: x / Bacteria: x    MICROBIOLOGY:        Toxoplasma IgG Screen: <3.00 IU/mL (10-25-24 @ 07:14)  HIV-1 RNA Quantitative, Viral Load Log: NOT DET. lg /mL (10-22-24 @ 17:39)  CMV IgG Antibody: >10.00 U/mL (10-22-24 @ 15:11)          RADIOLOGY:    <The imaging below has been reviewed and visualized by me independently. Findings as detailed in report below>     Patient is a 42y old  Female who presents with a chief complaint of Abscess (31 May 2025 11:34)    HPI:    43 y/o female, PMH pHTN (on treprostinil SQ pump) currently on lung transplant list, HF (s/p PPM dual chamber 2016), chronic PE (dx 2017) no longer on DOAC, SVT (s/p ablation 05/2020), asthma, with recurrent abscesses (abdominal wall, UE, most recently MRSA) p/w upper extremity abscess. Patient recently presented to outpatient pulm for sob. Started on prednisone and z pack. At that time also noted to have worsening L upper extremity redness and pain, radiating down the extremity. Denies recent trauma to the area, recent travel, fevers/chills/drainage. Given the presentation, advised to present to the ED.    In the ED afeb hds. Labs notable for wbc 11.4 (84% neutrophils), BMP wnl. s/p I&D in the ER. Started on doxycycline.  (31 May 2025 11:34)     prior hospital charts reviewed [  ]  primary team notes reviewed [ x ]  other consultant notes reviewed [ x ]    PAST MEDICAL & SURGICAL HISTORY:  Anemia      Pulmonary hypertension      Asthma  On home O2 nightly at 2L via NC      PE (pulmonary thromboembolism)  on Xarelto 10 mg daily      Sinusitis      Right heart failure      H/O pulmonary hypertension      Pulmonary embolism      History of tachycardia      On supplemental oxygen by nasal cannula  O2 @ 2L      Pacemaker      Right atrial thrombus      Hypotension      Pacemaker      History of pacemaker  12/19 - [employer] model Ingevity 4469          Allergies  vancomycin (Rash)  adhesives (Rash)    ANTIMICROBIALS (past 90 days)  MEDICATIONS  (STANDING):    doxycycline IVPB   100 mL/Hr IV Intermittent (05-31-25 @ 00:18)    doxycycline IVPB   100 mL/Hr IV Intermittent (05-31-25 @ 09:54)      ANTIMICROBIALS:    azithromycin   Tablet 250 daily  DAPTOmycin IVPB    DAPTOmycin IVPB 600 once    OTHER MEDS: MEDICATIONS  (STANDING):  heparin   Injectable 5000 every 8 hours  ipratropium    for Nebulization 500 every 6 hours  pantoprazole    Tablet 40 before breakfast    SOCIAL HISTORY: no active smoking or etoh use.     FAMILY HISTORY:  Family history of diabetes mellitus  in brother      REVIEW OF SYSTEMS  [  ] ROS unobtainable because:    [ x ] All other systems negative except as noted below:	    Constitutional:  [ ] fever [ ] chills  [ ] weight loss  [ ] weakness  Skin:  [ x] rash [ ] phlebitis	  Eyes: [ ] icterus [ ] pain  [ ] discharge	  ENMT: [ ] sore throat  [ ] thrush [ ] ulcers [ ] exudates  Respiratory: [ ] dyspnea [ ] hemoptysis [ ] cough [ ] sputum	  Cardiovascular:  [ ] chest pain [ ] palpitations [ ] edema	  Gastrointestinal:  [ ] nausea [ ] vomiting [ ] diarrhea [ ] constipation [ ] pain	  Genitourinary:  [ ] dysuria [ ] frequency [ ] hematuria [ ] discharge [ ] flank pain  [ ] incontinence  Musculoskeletal:  [ ] myalgias [ ] arthralgias [ ] arthritis  [ ] back pain  Neurological:  [ ] headache [ ] seizures  [ ] confusion/altered mental status  Psychiatric:  [ ] anxiety [ ] depression	  Hematology/Lymphatics:  [ ] lymphadenopathy  Endocrine:  [ ] adrenal [ ] thyroid  Allergic/Immunologic:	 [ ] transplant [ ] seasonal    Vital Signs Last 24 Hrs  T(F): 97.7 (05-31-25 @ 13:54), Max: 98.4 (05-31-25 @ 11:45)  Vital Signs Last 24 Hrs  HR: 89 (05-31-25 @ 13:54) (89 - 117)  BP: 102/69 (05-31-25 @ 13:54) (102/69 - 120/76)  RR: 19 (05-31-25 @ 13:54)  SpO2: 93% (05-31-25 @ 13:54) (93% - 98%)  Wt(kg): --    PHYSICAL EXAMINATION:  General: Alert and Awake, NAD  HEENT: PERRL, EOMI,  Neck: Supple  Cardiac: RRR, No M/R/G  Resp: Rhonchorous breath sounds bilaterally, no Rales  Abdomen: NBS, NT/ND, No HSM, No rigidity or guarding  MSK: +abscess with packing at lateral left arm. No LE edema. No stigmata of IE. No evidence of phlebitis. No evidence of synovitis.  : No lucas  Skin: As per MSK section. Skin is warm and dry to the touch.   Neuro: Alert and Awake. CN 2-12 Grossly intact. Moves all four extremities spontaneously.  Psych: Calm, Pleasant, Cooperative                          13.0   11.40 )-----------( 359      ( 31 May 2025 00:39 )             41.1     05-31    139  |  103  |  14  ----------------------------<  111[H]  3.9   |  22  |  0.97    Ca    8.7      31 May 2025 00:39    TPro  7.0  /  Alb  4.0  /  TBili  0.4  /  DBili  x   /  AST  14  /  ALT  11  /  AlkPhos  63  05-31    Urinalysis Basic - ( 31 May 2025 00:39 )    Color: x / Appearance: x / SG: x / pH: x  Gluc: 111 mg/dL / Ketone: x  / Bili: x / Urobili: x   Blood: x / Protein: x / Nitrite: x   Leuk Esterase: x / RBC: x / WBC x   Sq Epi: x / Non Sq Epi: x / Bacteria: x    MICROBIOLOGY:        Toxoplasma IgG Screen: <3.00 IU/mL (10-25-24 @ 07:14)  HIV-1 RNA Quantitative, Viral Load Log: NOT DET. lg /mL (10-22-24 @ 17:39)  CMV IgG Antibody: >10.00 U/mL (10-22-24 @ 15:11)          RADIOLOGY:    <The imaging below has been reviewed and visualized by me independently. Findings as detailed in report below>    < from: MR MRCP No Cont (04.15.25 @ 16:42) >  MPRESSION:  Normal pancreas. No main pancreatic duct dilatation.    Decreased right ventral abdominal fluid collection.    Right external iliac and bilateral inguinal lymphadenopathy, similar to   recent prior CT 4/2/2025.    < end of copied text >

## 2025-06-02 ENCOUNTER — RESULTS FOLLOW-UP (OUTPATIENT)
Dept: INTERNAL MEDICINE | Facility: CLINIC | Age: 75
End: 2025-06-02

## 2025-06-04 DIAGNOSIS — E11.65 TYPE 2 DIABETES MELLITUS WITH HYPERGLYCEMIA, WITHOUT LONG-TERM CURRENT USE OF INSULIN (H): ICD-10-CM

## 2025-06-04 RX ORDER — SYRING-NEEDL,DISP,INSUL,0.3 ML 31GX15/64"
SYRINGE, EMPTY DISPOSABLE MISCELLANEOUS
Qty: 400 EACH | Refills: 1 | Status: SHIPPED | OUTPATIENT
Start: 2025-06-04

## 2025-06-09 ENCOUNTER — OFFICE VISIT (OUTPATIENT)
Dept: ENDOCRINOLOGY | Facility: CLINIC | Age: 75
End: 2025-06-09
Payer: COMMERCIAL

## 2025-06-09 VITALS
SYSTOLIC BLOOD PRESSURE: 153 MMHG | OXYGEN SATURATION: 96 % | HEART RATE: 66 BPM | DIASTOLIC BLOOD PRESSURE: 71 MMHG | BODY MASS INDEX: 35.24 KG/M2 | TEMPERATURE: 97.2 F | WEIGHT: 225 LBS | RESPIRATION RATE: 20 BRPM

## 2025-06-09 DIAGNOSIS — E11.65 TYPE 2 DIABETES MELLITUS WITH HYPERGLYCEMIA, WITHOUT LONG-TERM CURRENT USE OF INSULIN (H): ICD-10-CM

## 2025-06-09 RX ORDER — HYDROCHLOROTHIAZIDE 12.5 MG/1
1 CAPSULE ORAL SEE ADMIN INSTRUCTIONS
Qty: 6 EACH | Refills: 1 | Status: SHIPPED | OUTPATIENT
Start: 2025-06-09

## 2025-06-09 ASSESSMENT — PAIN SCALES - GENERAL: PAINLEVEL_OUTOF10: NO PAIN (0)

## 2025-06-09 NOTE — PROGRESS NOTES
Assessment/Plan :   Type 2 DM. Melva is doing great. Her blood sugars are stable. We reviewed her current CGMS report and her blood sugars look good. I do not see any reason to make adjustments to her current medications. We did discuss that it is okay to skip the Humalog, when her blood sugars are lower, especially when she is not eating a large meal. She is not to skip the Lantus. She understands. I encouraged her to keep up the good work and we will follow-up in 6 mos.       I have independently reviewed and interpreted labs, imaging as indicated.      Chief complaint:  Gricelda is a 75 year old female who returns for follow-up of type 2 diabetes.     I have reviewed Care Everywhere including [facility], [facility], [facility], [facility], [facility] , [facility], [place] lab reports, imaging reports and provider notes as indicated.      HISTORY OF PRESENT ILLNESS  Melva is doing well. She feels like her blood sugars are stable. She still worries about the ups and downs throughout the day, but, overall, she is happy with her readings. She has continued to take Lantus 36 unit(s) in the morning and 4 unit(s) in the evening. She also takes 7 unit(s) of Humalog with meals and glipizide XL 10 mg daily. She does not take the Humalog if her blood sugars are around 120 mg/dl for fear that her blood sugars may drop too low.     She has continued to monitor her blood sugars with the FreeStyle Baldo 3 plus sensor. She has not had any problems with severe hyperglycemia and/or hypoglycemia. She also has not had any problems with blurred vision or an increase in numbness/tingling in her feet. Melva reports that she has been feeling really good.      Melva was diagnosed with diabetes over 10 yrs ago. She has taken a variety of oral medications along with MDI insulin therapy for the last few years. Her diabetes is complicated by hyperlipidemia, obesity, HTN, CHERRY, a history of endometrial cancer, and a new  "diagnosis of metastatic melanoma. She has an upcoming PET scan and surgery, and she may be starting Keytruda in the near future.     Endocrine relevant labs are as follows:   Latest Reference Range & Units 05/29/25 09:58   Hemoglobin A1C 0.0 - 5.6 % 7.9 (H)   (H): Data is abnormally high   Latest Reference Range & Units 05/29/25 10:04   Albumin Urine mg/g Cr 0.00 - 25.00 mg/g Cr 14.08      Latest Reference Range & Units 05/29/25 10:04   Albumin Urine mg/L mg/L 26.1      Latest Reference Range & Units 11/19/24 15:05   Hemoglobin A1C 0.0 - 5.6 % 7.7 (H)   (H): Data is abnormally high    REVIEW OF SYSTEMS    10 system ROS otherwise as per the HPI or negative    Past Medical History  Past Medical History:   Diagnosis Date    Arthritis     Asthma, mild intermittent     Cataract     Endometrial cancer (H) 06/2022    HTN, goal below 140/90     Hyperlipidemia LDL goal < 100     Macular hole of left eye     Melanoma (H)     RIGHT KNEE    Sleep apnea     uses c pap    Type 2 diabetes, HbA1C goal < 8% (H)        Medications  Current Outpatient Medications   Medication Sig Dispense Refill    acetaminophen (TYLENOL) 325 MG tablet 500 mg.      albuterol (PROAIR HFA/PROVENTIL HFA/VENTOLIN HFA) 108 (90 Base) MCG/ACT inhaler INHALE 2 PUFFS BY MOUTH 4 TIMES DAILY AS NEEDED FOR WHEEZING      atenolol (TENORMIN) 50 MG tablet Take 1 tablet (50 mg) by mouth daily. 90 tablet 1    atorvastatin (LIPITOR) 40 MG tablet Take 1 tablet (40 mg) by mouth daily. 90 tablet 1    BD VEO INSULIN SYR U/F 1/2UNIT 31G X 15/64\" 0.3 ML USE AS DIRECTED WITH  4  SHOTS  OF  INSULIN  DAILY 400 each 1    cetirizine (ZYRTEC) 10 MG tablet Take 10 mg by mouth every morning      Continuous Glucose Sensor (FREESTYLE DAVID 3 PLUS SENSOR) MISC 1 Application See Admin Instructions. Change every 15 days 2 each 1    CONTOUR NEXT TEST test strip USE 1 STRIP TO CHECK GLUCOSE ONCE DAILY 100 strip 4    erythromycin (ROMYCIN) 5 MG/GM ophthalmic ointment APPLY A SMALL AMOUNT TO " EACH EYELID MARGIN EVERY DAY AT BEDTIME      glipiZIDE (GLUCOTROL XL) 10 MG 24 hr tablet Take 1 tablet (10 mg) by mouth daily. 90 tablet 1    HUMALOG 100 UNIT/ML injection Inject 7 Units subcutaneously 3 times daily (before meals). 20 mL 2    hydrochlorothiazide (HYDRODIURIL) 25 MG tablet Take 1 tablet (25 mg) by mouth every morning. 90 tablet 1    hydrOXYzine HCl (ATARAX) 25 MG tablet TAKE 1 TABLET BY MOUTH EVERY 6 HOURS AS NEEDED FOR PAIN AND FOR MUSCLE SPASM      insulin glargine (LANTUS VIAL) 100 UNIT/ML vial 36 units in am and 4 units in pm 40 mL 1    latanoprost (XALATAN) 0.005 % ophthalmic solution Place 1 drop into both eyes At Bedtime  2.5 mL 1    lisinopril (ZESTRIL) 5 MG tablet Take 1 tablet (5 mg) by mouth daily. 90 tablet 1    netarsudil (RHOPRESSA) 0.02 % ophthalmic solution Place 1 drop Into the left eye at bedtime      reason aspirin not prescribed, intentional, Please choose reason not prescribed from choices below.      timolol maleate (TIMOPTIC) 0.5 % ophthalmic solution INSTILL 1 DROP INTO EACH EYE TWICE DAILY      tiZANidine (ZANAFLEX) 2 MG tablet TAKE 1 TO 2 TABLETS BY MOUTH EVERY 6 TO 8 HOURS AS NEEDED FOR NECK PAIN      triamcinolone (KENALOG) 0.5 % external ointment Apply 1 applicator topically daily as needed for irritation.      XARELTO ANTICOAGULANT 20 MG TABS tablet Take 1 tablet (20 mg) by mouth daily. 90 tablet 1       Allergies  Allergies   Allergen Reactions    Bromfenac Visual Disturbance      xibrom  Causes sever eye pain    Codeine Nausea     Other reaction(s): Dizziness, Headache    Metformin GI Disturbance    Seasonal Allergies     Germanium Other (See Comments)     Eye drop- she doesn't remember the name, it caused eye pain         Family History  family history includes Asthma in her maternal aunt and another family member; Breast Cancer in her mother, niece, niece, and sister; Cancer in her paternal aunt and paternal grandmother; Cerebrovascular Disease in her maternal  grandfather, maternal grandmother, and mother; Connective Tissue Disorder in her sister; Coronary Artery Disease in her father; Diabetes in her mother and sister; Heart Disease in her father and paternal grandfather; Hyperlipidemia in an other family member; Hypertension in her brother and mother; Mental Illness in her cousin; Obesity in her daughter, daughter, father, maternal grandfather, mother, niece, sister, and other family members; Thyroid Disease in her mother; Uterine Cancer in her sister.    Social History  Social History     Tobacco Use    Smoking status: Never     Passive exposure: Never    Smokeless tobacco: Never   Vaping Use    Vaping status: Never Used   Substance Use Topics    Alcohol use: No    Drug use: No       Physical Exam  BP (!) 153/71 (BP Location: Right arm, Patient Position: Sitting, Cuff Size: Adult Regular)   Pulse 66   Temp 97.2  F (36.2  C) (Tympanic)   Resp 20   Wt 102.1 kg (225 lb)   LMP  (LMP Unknown)   SpO2 96%   BMI 35.24 kg/m    Body mass index is 35.24 kg/m .  GENERAL :  In no apparent distress  SKIN: Normal color, normal temperature, texture.  No hirsutism, alopecia or purple striae.     EYES: PERRL, EOMI, No scleral icterus,  No proptosis, conjunctival redness, stare, retraction  RESP: Lungs clear to auscultation bilaterally  CARDIAC: Regular rate and rhythm, normal S1 S2, without murmurs, rubs or gallops    NEURO: awake, alert, responds appropriately to questions.  Cranial nerves intact.   Moves all extremities; Gait normal.  No tremor of the outstretched hand.    EXTREMITIES: No clubbing, cyanosis or edema.    DATA REVIEW  FreeStyle LibreView  Time in target range 83%  Low 1%, High 16%  Current Ave  mg/dl

## 2025-06-09 NOTE — LETTER
6/9/2025      Gricelda Sabillon  [address] [address]      Dear Colleague,    Thank you for referring your patient, Gricelda Sabillon, to the [facility]. Please see a copy of my visit note below.    Assessment/Plan :   Type 2 DM. Melva is doing great. Her blood sugars are stable. We reviewed her current CGMS report and her blood sugars look good. I do not see any reason to make adjustments to her current medications. We did discuss that it is okay to skip the Humalog, when her blood sugars are lower, especially when she is not eating a large meal. She is not to skip the Lantus. She understands. I encouraged her to keep up the good work and we will follow-up in 6 mos.       I have independently reviewed and interpreted labs, imaging as indicated.      Chief complaint:  Gricelda is a 75 year old female who returns for follow-up of type 2 diabetes.     I have reviewed Care Everywhere including [facility], [facility], [facility], [facility], [facility] , [facility], [place] lab reports, imaging reports and provider notes as indicated.      HISTORY OF PRESENT ILLNESS  Melva is doing well. She feels like her blood sugars are stable. She still worries about the ups and downs throughout the day, but, overall, she is happy with her readings. She has continued to take Lantus 36 unit(s) in the morning and 4 unit(s) in the evening. She also takes 7 unit(s) of Humalog with meals and glipizide XL 10 mg daily. She does not take the Humalog if her blood sugars are around 120 mg/dl for fear that her blood sugars may drop too low.     She has continued to monitor her blood sugars with the FreeStyle Baldo 3 plus sensor. She has not had any problems with severe hyperglycemia and/or hypoglycemia. She also has not had any problems with blurred vision or an increase in numbness/tingling in her feet. Melva reports that she has been feeling really good.      Melva was diagnosed with  "diabetes over 10 yrs ago. She has taken a variety of oral medications along with MDI insulin therapy for the last few years. Her diabetes is complicated by hyperlipidemia, obesity, HTN, CHERRY, a history of endometrial cancer, and a new diagnosis of metastatic melanoma. She has an upcoming PET scan and surgery, and she may be starting Keytruda in the near future.     Endocrine relevant labs are as follows:   Latest Reference Range & Units 05/29/25 09:58   Hemoglobin A1C 0.0 - 5.6 % 7.9 (H)   (H): Data is abnormally high   Latest Reference Range & Units 05/29/25 10:04   Albumin Urine mg/g Cr 0.00 - 25.00 mg/g Cr 14.08      Latest Reference Range & Units 05/29/25 10:04   Albumin Urine mg/L mg/L 26.1      Latest Reference Range & Units 11/19/24 15:05   Hemoglobin A1C 0.0 - 5.6 % 7.7 (H)   (H): Data is abnormally high    REVIEW OF SYSTEMS    10 system ROS otherwise as per the HPI or negative    Past Medical History  Past Medical History:   Diagnosis Date     Arthritis      Asthma, mild intermittent      Cataract      Endometrial cancer (H) 06/2022     HTN, goal below 140/90      Hyperlipidemia LDL goal < 100      Macular hole of left eye      Melanoma (H)     RIGHT KNEE     Sleep apnea     uses c pap     Type 2 diabetes, HbA1C goal < 8% (H)        Medications  Current Outpatient Medications   Medication Sig Dispense Refill     acetaminophen (TYLENOL) 325 MG tablet 500 mg.       albuterol (PROAIR HFA/PROVENTIL HFA/VENTOLIN HFA) 108 (90 Base) MCG/ACT inhaler INHALE 2 PUFFS BY MOUTH 4 TIMES DAILY AS NEEDED FOR WHEEZING       atenolol (TENORMIN) 50 MG tablet Take 1 tablet (50 mg) by mouth daily. 90 tablet 1     atorvastatin (LIPITOR) 40 MG tablet Take 1 tablet (40 mg) by mouth daily. 90 tablet 1     BD VEO INSULIN SYR U/F 1/2UNIT 31G X 15/64\" 0.3 ML USE AS DIRECTED WITH  4  SHOTS  OF  INSULIN  DAILY 400 each 1     cetirizine (ZYRTEC) 10 MG tablet Take 10 mg by mouth every morning       Continuous Glucose Sensor (FREESTYLE " DAVID 3 PLUS SENSOR) MISC 1 Application See Admin Instructions. Change every 15 days 2 each 1     CONTOUR NEXT TEST test strip USE 1 STRIP TO CHECK GLUCOSE ONCE DAILY 100 strip 4     erythromycin (ROMYCIN) 5 MG/GM ophthalmic ointment APPLY A SMALL AMOUNT TO EACH EYELID MARGIN EVERY DAY AT BEDTIME       glipiZIDE (GLUCOTROL XL) 10 MG 24 hr tablet Take 1 tablet (10 mg) by mouth daily. 90 tablet 1     HUMALOG 100 UNIT/ML injection Inject 7 Units subcutaneously 3 times daily (before meals). 20 mL 2     hydrochlorothiazide (HYDRODIURIL) 25 MG tablet Take 1 tablet (25 mg) by mouth every morning. 90 tablet 1     hydrOXYzine HCl (ATARAX) 25 MG tablet TAKE 1 TABLET BY MOUTH EVERY 6 HOURS AS NEEDED FOR PAIN AND FOR MUSCLE SPASM       insulin glargine (LANTUS VIAL) 100 UNIT/ML vial 36 units in am and 4 units in pm 40 mL 1     latanoprost (XALATAN) 0.005 % ophthalmic solution Place 1 drop into both eyes At Bedtime  2.5 mL 1     lisinopril (ZESTRIL) 5 MG tablet Take 1 tablet (5 mg) by mouth daily. 90 tablet 1     netarsudil (RHOPRESSA) 0.02 % ophthalmic solution Place 1 drop Into the left eye at bedtime       reason aspirin not prescribed, intentional, Please choose reason not prescribed from choices below.       timolol maleate (TIMOPTIC) 0.5 % ophthalmic solution INSTILL 1 DROP INTO EACH EYE TWICE DAILY       tiZANidine (ZANAFLEX) 2 MG tablet TAKE 1 TO 2 TABLETS BY MOUTH EVERY 6 TO 8 HOURS AS NEEDED FOR NECK PAIN       triamcinolone (KENALOG) 0.5 % external ointment Apply 1 applicator topically daily as needed for irritation.       XARELTO ANTICOAGULANT 20 MG TABS tablet Take 1 tablet (20 mg) by mouth daily. 90 tablet 1       Allergies  Allergies   Allergen Reactions     Bromfenac Visual Disturbance      xibrom  Causes sever eye pain     Codeine Nausea     Other reaction(s): Dizziness, Headache     Metformin GI Disturbance     Seasonal Allergies      Germanium Other (See Comments)     Eye drop- she doesn't remember the name,  it caused eye pain         Family History  family history includes Asthma in her maternal aunt and another family member; Breast Cancer in her mother, niece, niece, and sister; Cancer in her paternal aunt and paternal grandmother; Cerebrovascular Disease in her maternal grandfather, maternal grandmother, and mother; Connective Tissue Disorder in her sister; Coronary Artery Disease in her father; Diabetes in her mother and sister; Heart Disease in her father and paternal grandfather; Hyperlipidemia in an other family member; Hypertension in her brother and mother; Mental Illness in her cousin; Obesity in her daughter, daughter, father, maternal grandfather, mother, niece, sister, and other family members; Thyroid Disease in her mother; Uterine Cancer in her sister.    Social History  Social History     Tobacco Use     Smoking status: Never     Passive exposure: Never     Smokeless tobacco: Never   Vaping Use     Vaping status: Never Used   Substance Use Topics     Alcohol use: No     Drug use: No       Physical Exam  BP (!) 153/71 (BP Location: Right arm, Patient Position: Sitting, Cuff Size: Adult Regular)   Pulse 66   Temp 97.2  F (36.2  C) (Tympanic)   Resp 20   Wt 102.1 kg (225 lb)   LMP  (LMP Unknown)   SpO2 96%   BMI 35.24 kg/m    Body mass index is 35.24 kg/m .  GENERAL :  In no apparent distress  SKIN: Normal color, normal temperature, texture.  No hirsutism, alopecia or purple striae.     EYES: PERRL, EOMI, No scleral icterus,  No proptosis, conjunctival redness, stare, retraction  RESP: Lungs clear to auscultation bilaterally  CARDIAC: Regular rate and rhythm, normal S1 S2, without murmurs, rubs or gallops    NEURO: awake, alert, responds appropriately to questions.  Cranial nerves intact.   Moves all extremities; Gait normal.  No tremor of the outstretched hand.    EXTREMITIES: No clubbing, cyanosis or edema.    DATA REVIEW  FreeStyle LibreView  Time in target range 83%  Low 1%, High 16%  Current  Ave  mg/dl        Again, thank you for allowing me to participate in the care of your patient.        Sincerely,        Danisha Villa PA-C    Electronically signed

## 2025-06-11 ENCOUNTER — HOSPITAL ENCOUNTER (OUTPATIENT)
Dept: MAMMOGRAPHY | Facility: CLINIC | Age: 75
Discharge: HOME | End: 2025-06-11
Attending: INTERNAL MEDICINE
Payer: COMMERCIAL

## 2025-06-11 DIAGNOSIS — Z12.31 ENCOUNTER FOR SCREENING MAMMOGRAM FOR BREAST CANCER: ICD-10-CM

## 2025-06-11 PROCEDURE — 77063 BREAST TOMOSYNTHESIS BI: CPT

## 2025-06-27 NOTE — PROGRESS NOTES
Medication Therapy Management (MTM) Encounter    ASSESSMENT:                            Medication Adherence/Access: No issues identified.    Diabetes  Patient is meeting A1c goal of < 8%.  Patient is meeting goal of > 50% time in target with continuous glucose monitoring.   Discussed appropriate timing of Humalog, should be before meals, not just three times daily every day like other meds. While blood sugars already look good this should also result in less spikes in sugars and less risk for lows.     Hyperlipidemia: stable     Hypertension   Stable. Patient is meeting blood pressure goal of < 140/90mmHg.     Melanoma/PE/Pneumonia: Stable.     Allergic Rhinitis: stable       PLAN:                            Inject you Humalog three times daily 15 min before a meal. If you skip a meal then don't take Humalog, or if you eat late wait to inject until you are going to eat.    Follow-up: Return in about 1 year (around 6/30/2026) for Medication Therapy Management.    SUBJECTIVE/OBJECTIVE:                          Melva Sabillon is a 75 year old female seen for an initial visit. She was referred to me from insurance provider.      Reason for visit: med review.    Allergies/ADRs: Reviewed in chart  Past Medical History: Reviewed in chart  Tobacco: She reports that she has never smoked. She has never been exposed to tobacco smoke. She has never used smokeless tobacco.  Alcohol: not currently using    Medication Adherence/Access: Patient uses pill box(es).  Per patient, misses medication 0 time(s) per week.   Medication barriers: none. Reports Xarelto is expensive, but no way around it.    Diabetes   Glipizide XL 10 mg daily  Humalog 7/7/4 units three times daily with meals - reports she injects her humalog at the same time every day, regardless of her meal time, thought this was just tid dosing   Lantus 36 units AM and 4 units PM - reports they felt she needed a little more coverage in the evening to get her through to the  morning  Patient is not experiencing side effects.  Follows with endocrinology, Danisha Villa PA-C.  Prefers the insulin vials to insulin pens.  Current diabetes symptoms: none  Diet/Exercise: Reports her meals can vary, often not hungry. Mornings will often have an english muffin with eggs and coffee. Reports at lunch may get busy doing things and isn't hungry, may eat a sandwich or a salad.  Blood sugar monitoring: Baldo; Ranges: see below       Eye exam is up to date  Foot exam is up to date  Lab Results   Component Value Date    A1C 7.9 05/29/2025    A1C 7.7 11/19/2024    A1C 7.7 08/15/2024    A1C 8.1 02/27/2024       Hypertension   Atenolol 50 mg daily  Hydrochlorothiazide 25 mg daily  Lisinopril 5 mg daily  Patient reports no current medication side effects. Rarely may have a low blood pressure resulting in dizziness, but this is rare. If this happens would take a half dose of atenolol, but rarely will do this.  Patient self monitors blood pressure.  Home BP monitoring 120-138/60-70. Reports if she sits down and relaxes she can get it to sit at 120's/60's, thinks 120/65 this morning.       BP Readings from Last 3 Encounters:   06/30/25 124/61   06/09/25 (!) 153/71   04/24/25 124/68       Hyperlipidemia   atorvastatin 40 mg daily  Patient reports no significant myalgias or other side effects. Reports she has pain, but believes it is from radiation.  The 10-year ASCVD risk score (Rosibel DK, et al., 2019) is: 40.2%    Values used to calculate the score:      Age: 75 years      Sex: Female      Is Non- : No      Diabetic: Yes      Tobacco smoker: No      Systolic Blood Pressure: 124 mmHg      Is BP treated: Yes      HDL Cholesterol: 36 mg/dL      Total Cholesterol: 140 mg/dL     Recent Labs   Lab Test 05/29/25  0958 03/04/24  0917   CHOL 140 150   HDL 36* 33*   LDL 61 71   TRIG 216* 230*       Allergy   cetirizine 10 mg once daily  Primary symptoms are nasal congestion and raspy voice. Patient  feels that current therapy is effective. Reports allergies have been pretty.       Melanoma/PE/Pneumonia  Followed by Dr. Sosa and Respiratory Therapy.    albuterol as needed; rarely, only needed twice this summer with poor air quality    Xarelto 20 mg daily  No concerns with side effects.    Uses a walker to get around, trying to get back to a cane.  No new concerns.     Today's Vitals: /61   Pulse 59   LMP  (LMP Unknown)   ----------------      I spent 35 minutes with this patient today. All changes were made via collaborative practice agreement with Raisa Davidson MD.     A summary of these recommendations was given to the patient.    Giovanna Heller, PharmD  Medication Therapy Management Pharmacist  Voicemail: [phone]           Medication Therapy Recommendations  Type 2 diabetes mellitus with hyperglycemia, without long-term current use of insulin (H)   1 Current Medication: HUMALOG 100 UNIT/ML injection   Current Medication Sig: Inject 7 Units subcutaneously 3 times daily (before meals).   Rationale: Does not understand instructions - Adherence - Adherence   Recommendation: Provide Education   Status: Patient Agreed - Adherence/Education   Identified Date: 6/30/2025 Completed Date: 6/30/2025

## 2025-06-30 ENCOUNTER — OFFICE VISIT (OUTPATIENT)
Dept: PHARMACY | Facility: CLINIC | Age: 75
End: 2025-06-30
Payer: COMMERCIAL

## 2025-06-30 VITALS — DIASTOLIC BLOOD PRESSURE: 61 MMHG | SYSTOLIC BLOOD PRESSURE: 124 MMHG | HEART RATE: 59 BPM

## 2025-06-30 DIAGNOSIS — E11.65 TYPE 2 DIABETES MELLITUS WITH HYPERGLYCEMIA, WITHOUT LONG-TERM CURRENT USE OF INSULIN (H): Primary | ICD-10-CM

## 2025-06-30 DIAGNOSIS — I10 ESSENTIAL HYPERTENSION WITH GOAL BLOOD PRESSURE LESS THAN 140/90: Chronic | ICD-10-CM

## 2025-06-30 DIAGNOSIS — E78.5 HYPERLIPIDEMIA WITH TARGET LDL LESS THAN 100: Chronic | ICD-10-CM

## 2025-06-30 DIAGNOSIS — I26.99 BILATERAL PULMONARY EMBOLISM (H): ICD-10-CM

## 2025-06-30 DIAGNOSIS — C43.9 MELANOMA OF SKIN (H): ICD-10-CM

## 2025-06-30 DIAGNOSIS — J30.9 ALLERGIC RHINITIS, UNSPECIFIED SEASONALITY, UNSPECIFIED TRIGGER: ICD-10-CM

## 2025-06-30 PROCEDURE — 99607 MTMS BY PHARM ADDL 15 MIN: CPT | Performed by: PHARMACIST

## 2025-06-30 PROCEDURE — 99605 MTMS BY PHARM NP 15 MIN: CPT | Performed by: PHARMACIST

## 2025-06-30 PROCEDURE — 3074F SYST BP LT 130 MM HG: CPT | Performed by: PHARMACIST

## 2025-06-30 PROCEDURE — 3078F DIAST BP <80 MM HG: CPT | Performed by: PHARMACIST

## 2025-06-30 NOTE — LETTER
"Recommended To-Do List      Prepared on: Jun 30, 2025       You can get the best results from your medications by completing the items on this \"To-Do List.\"      Bring your To-Do List when you go to your doctor. And, share it with your family or caregivers.    My To-Do List:  What we talked about: What I should do:   The importance of taking your medication as intended    Education: Inject you Humalog three times daily 15 min before a meal. If you skip a meal then don't take Humalog, or if you eat late wait to inject until you are going to eat.           What we talked about: What I should do:                     "

## 2025-06-30 NOTE — PATIENT INSTRUCTIONS
"Recommendations from today's MTM visit:                                                    MTM (medication therapy management) is a service provided by a clinical pharmacist designed to help you get the most of out of your medicines.   Today we reviewed what your medicines are for, how to know if they are working, that your medicines are safe and how to make your medicine regimen as easy as possible.      Inject you Humalog three times daily 15 min before a meal. If you skip a meal then don't take Humalog, or if you eat late wait to inject until you are going to eat.    Follow-up: Return in about 1 year (around 6/30/2026) for Medication Therapy Management.    It was great speaking with you today.  I value your experience and would be very thankful for your time in providing feedback in our clinic survey. In the next few days, you may receive an email or text message from [employer] with a link to a survey related to your  clinical pharmacist.\"     To schedule another MTM appointment, please call the clinic directly or you may call the MTM scheduling line at [phone].    My Clinical Pharmacist's contact information:                                                      Please feel free to contact me with any questions or concerns you have.      Giovanna Helelr, PharmD  Medication Therapy Management Pharmacist  Voicemail: [phone]     "

## 2025-06-30 NOTE — LETTER
"_  Medication List        Prepared on: Jun 30, 2025     Bring your Medication List when you go to the doctor, hospital, or   emergency room. And, share it with your family or caregivers.     Note any changes to how you take your medications.  Cross out medications when you no longer use them.    Medication How I take it Why I use it Prescriber   acetaminophen (TYLENOL) 325 MG tablet Take 500 mg by mouth every 6 hours as needed.  Pain Patient Reported   albuterol (PROAIR HFA/PROVENTIL HFA/VENTOLIN HFA) 108 (90 Base) MCG/ACT inhaler INHALE 2 PUFFS BY MOUTH 4 TIMES DAILY AS NEEDED FOR WHEEZING  Shortness of Breath Raisa Davidson MD   atenolol (TENORMIN) 50 MG tablet Take 1 tablet (50 mg) by mouth daily. HTN, goal below 140/90 Raisa Davidson MD   atorvastatin (LIPITOR) 40 MG tablet Take 1 tablet (40 mg) by mouth daily. Hyperlipidemia with Target LDL Less than 100 Raisa Davidson MD   BD VEO INSULIN SYR U/F 1/2UNIT 31G X 15/64\" 0.3 ML USE AS DIRECTED WITH  4  SHOTS  OF  INSULIN  DAILY Type 2 diabetes mellitus with hyperglycemia, without long-term current use of insulin (H) Raisa Davidson MD   cetirizine (ZYRTEC) 10 MG tablet Take 10 mg by mouth every morning  Allergies Raisa Davidson MD   Continuous Glucose Sensor (FREESTYLE DAVID 3 PLUS SENSOR) MISC 1 Application See Admin Instructions. Change every 15 days Type 2 diabetes mellitus with hyperglycemia, without long-term current use of insulin (H) Danisha Villa PA-C   CONTOUR NEXT TEST test strip USE 1 STRIP TO CHECK GLUCOSE ONCE DAILY Diabetes Mellitus without Complication (H) Raisa Davidson MD   erythromycin (ROMYCIN) 5 MG/GM ophthalmic ointment APPLY A SMALL AMOUNT TO EACH EYELID MARGIN EVERY DAY AT BEDTIME  Macular hole Raisa Davidson MD   glipiZIDE (GLUCOTROL XL) 10 MG 24 hr tablet Take 1 tablet (10 mg) by mouth daily. Diabetes mellitus type 2, insulin " dependent (H) Raisa Davidson MD   HUMALOG 100 UNIT/ML injection Inject 7 Units subcutaneously 3 times daily (before meals). Type 2 diabetes mellitus with hyperglycemia, without long-term current use of insulin (H) Danisha Villa PA-C   hydrochlorothiazide (HYDRODIURIL) 25 MG tablet Take 1 tablet (25 mg) by mouth every morning. HTN, goal below 140/90 Raisa Davidson MD   insulin glargine (LANTUS VIAL) 100 UNIT/ML vial 36 units in am and 4 units in pm Diabetes mellitus type 2, insulin dependent (H) Raisa Davidson MD   latanoprost (XALATAN) 0.005 % ophthalmic solution Place 1 drop into both eyes At Bedtime   Macular hole Raisa Davidson MD   lisinopril (ZESTRIL) 5 MG tablet Take 1 tablet (5 mg) by mouth daily. HTN, goal below 140/90 Raisa Davidson MD   netarsudil (RHOPRESSA) 0.02 % ophthalmic solution Place 1 drop Into the left eye at bedtime  Macular hole Raisa Davidson MD   timolol maleate (TIMOPTIC) 0.5 % ophthalmic solution INSTILL 1 DROP INTO EACH EYE TWICE DAILY  Macular hole Raisa Davidson MD   tiZANidine (ZANAFLEX) 2 MG tablet TAKE 1 TO 2 TABLETS BY MOUTH EVERY 6 TO 8 HOURS AS NEEDED FOR NECK PAIN  Muscle Spasms Raisa Davidson MD   triamcinolone (KENALOG) 0.5 % external ointment Apply 1 applicator topically daily as needed for irritation.  Rash Raisa Davidson MD   XARELTO ANTICOAGULANT 20 MG TABS tablet Take 1 tablet (20 mg) by mouth daily. Other chronic pulmonary embolism without acute cor pulmonale (H) Raisa Davidson MD         Add new medications, over-the-counter drugs, herbals, vitamins, or  minerals in the blank rows below.    Medication How I take it Why I use it Prescriber                                      Allergies:      - Bromfenac - Visual Disturbance  - Codeine - Nausea  - Metformin - GI Disturbance  - Seasonal Allergies        Side effects I  have had:      Not on File        Other Information:              My notes and questions:

## 2025-06-30 NOTE — LETTER
June 30, 2025  Gricelda Sabillon  [address] [place] [address]    Dear Ms. Sabillon, [facility]     Thank you for talking with me on Jun 30, 2025 about your health and medications. As a follow-up to our conversation, I have included two documents:      Your Recommended To-Do List has steps you should take to get the best results from your medications.  Your Medication List will help you keep track of your medications and how to take them.    If you want to talk about these documents, please call Giovanna Heller RPH at phone: [phone], Monday-Friday 8-4:30pm.    I look forward to working with you and your doctors to make sure your medications work well for you.    Sincerely,  Giovanna Heller RPH  [facility] Pharmacist, [facility]
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
## 2025-07-09 ENCOUNTER — ONCOLOGY VISIT (OUTPATIENT)
Dept: ONCOLOGY | Facility: CLINIC | Age: 75
End: 2025-07-09
Attending: NURSE PRACTITIONER
Payer: COMMERCIAL

## 2025-07-09 VITALS
SYSTOLIC BLOOD PRESSURE: 166 MMHG | RESPIRATION RATE: 14 BRPM | HEIGHT: 67 IN | HEART RATE: 61 BPM | TEMPERATURE: 98.5 F | OXYGEN SATURATION: 97 % | WEIGHT: 229 LBS | DIASTOLIC BLOOD PRESSURE: 79 MMHG | BODY MASS INDEX: 35.94 KG/M2

## 2025-07-09 DIAGNOSIS — C54.1 ENDOMETRIAL CANCER (H): Primary | ICD-10-CM

## 2025-07-09 PROCEDURE — G0463 HOSPITAL OUTPT CLINIC VISIT: HCPCS | Performed by: NURSE PRACTITIONER

## 2025-07-09 PROCEDURE — 99213 OFFICE O/P EST LOW 20 MIN: CPT | Performed by: NURSE PRACTITIONER

## 2025-07-09 ASSESSMENT — PAIN SCALES - GENERAL: PAINLEVEL_OUTOF10: NO PAIN (0)

## 2025-07-09 NOTE — NURSING NOTE
"Oncology Rooming Note    July 9, 2025 12:34 PM   Gricelda Sabillon is a 75 year old female who presents for:    Chief Complaint   Patient presents with    Oncology Clinic Visit     Endometrial cancer     Initial Vitals: BP (!) 166/79   Pulse 61   Temp 98.5  F (36.9  C) (Temporal)   Resp 14   Ht 1.702 m (5' 7.01\")   Wt 103.9 kg (229 lb)   LMP  (LMP Unknown)   SpO2 97%   BMI 35.86 kg/m   Estimated body mass index is 35.86 kg/m  as calculated from the following:    Height as of this encounter: 1.702 m (5' 7.01\").    Weight as of this encounter: 103.9 kg (229 lb). Body surface area is 2.22 meters squared.  No Pain (0) Comment: Data Unavailable   No LMP recorded (lmp unknown). Patient is postmenopausal.  Allergies reviewed: Yes  Medications reviewed: Yes    Medications: Medication refills not needed today.  Pharmacy name entered into EPIC:    [facility] - [place], [place] - [address] [address]    Frailty Screening:   Is the patient here for a new oncology consult visit in cancer care? 2. No    PHQ9:  Did this patient require a PHQ9?: No      Clinical concerns: Follow up       Vidhya George MA              "
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
## 2025-08-26 ENCOUNTER — TRANSFERRED RECORDS (OUTPATIENT)
Dept: HEALTH INFORMATION MANAGEMENT | Facility: CLINIC | Age: 75
End: 2025-08-26
Payer: COMMERCIAL

## (undated) DEVICE — SOL WATER IRRIG 500ML BOTTLE 2F7113

## (undated) DEVICE — BIOPSY VALVE BIOSHIELD 00711135

## (undated) DEVICE — ESU GROUND PAD ADULT W/CORD E7507

## (undated) DEVICE — DRAPE LAP PEDS DISP 29492

## (undated) DEVICE — ATTACHMENT CAP DISTAL REVEAL 11.8MM BX00711771

## (undated) DEVICE — CATH RETRIEVAL BALLOON EXTRACTOR PRO RX-S INJ ABOVE 9-12MM

## (undated) DEVICE — GUIDEWIRE NOVAGOLD .018X260CM STR TIP M00552000

## (undated) DEVICE — SOL NACL 0.9% IRRIG 1000ML BOTTLE 2F7124

## (undated) DEVICE — TUBING SUCTION 10'X3/16" N510

## (undated) DEVICE — TAPE CLOTH 3" CARDINAL 3TRCL03

## (undated) DEVICE — SU ETHILON 3-0 PS-1 18" 1663H

## (undated) DEVICE — SU ETHIBOND 1 CT-1 30" X425H

## (undated) DEVICE — SPONGE LAP 18X18" X8435

## (undated) DEVICE — SU WND CLOSURE RELOAD VLOC 180 ABS 0 GREEN 8" VLOCA008L

## (undated) DEVICE — ENDO BITE BLOCK ADULT OMNI-BLOC

## (undated) DEVICE — KIT CONNECTOR FOR OLYMPUS ENDOSCOPES DEFENDO 100310

## (undated) DEVICE — ENDO FORCEP ENDOJAW BIOPSY 2.8MMX230CM FB-220U

## (undated) DEVICE — SU VICRYL 0 TIE 54" J608H

## (undated) DEVICE — INTR ENDOSCOPIC STENT FUSION OASIS 09.0FRX200CM

## (undated) DEVICE — ENDO FUSION OMNI-TOME 21 FS-OMNI-21 G48675

## (undated) DEVICE — GLOVE PROTEXIS BLUE W/NEU-THERA 7.0  2D73EB70

## (undated) DEVICE — LINEN ORTHO PACK 5446

## (undated) DEVICE — DRSG GAUZE 4X8"

## (undated) DEVICE — LINEN HALF SHEET 5512

## (undated) DEVICE — WIRE GUIDE 0.025"X270CM STR VISIGLIDE G-240-2527S

## (undated) DEVICE — LINEN DRAPE 54X72" 5467

## (undated) DEVICE — PACK MINOR CUSTOM RIDGES SBA32RMRMA

## (undated) DEVICE — ADH SKIN CLOSURE PREMIERPRO EXOFIN 1.0ML 3470

## (undated) DEVICE — SPECIMEN TRAP POLYP 4 CHAMBER EZ710202 EZ710202

## (undated) DEVICE — ENDO SNARE POLYPECTOMY OVAL 15MM LOOP SD-240U-15

## (undated) DEVICE — Device

## (undated) DEVICE — DRSG PRIMAPORE 02X3" 7133

## (undated) DEVICE — SU VICRYL 2-0 CP-1 27" UND J266H

## (undated) DEVICE — RETR ELEV / UTERINE MANIPULATOR V-CARE MED CUP 60-6085-201A

## (undated) DEVICE — ENDO CAP AND TUBING STERILE FOR ENDOGATOR  100130

## (undated) DEVICE — NDL BLUNT FILL 18GA 1 1/2" 305064

## (undated) DEVICE — SOL WATER IRRIG 1000ML BOTTLE 2F7114

## (undated) DEVICE — PAD CHUX UNDERPAD 23X24" 7136

## (undated) DEVICE — SUCTION MANIFOLD NEPTUNE 2 SYS 4 PORT 0702-020-000

## (undated) DEVICE — ATOMIZER MUCOSAL MEMBRANE MAD100

## (undated) DEVICE — GLOVE PROTEXIS POWDER FREE 8.0 ORTHOPEDIC 2D73ET80

## (undated) DEVICE — KIT ENDO FIRST STEP DISINFECTANT 200ML W/POUCH EP-4

## (undated) DEVICE — ESU ELEC BLADE 4" COATED

## (undated) DEVICE — SUCTION IRR STRYKERFLOW II W/TIP 250-070-520

## (undated) DEVICE — GLOVE PROTEXIS POWDER FREE SMT 6.5  2D72PT65X

## (undated) DEVICE — APPLICATOR COTTON TIP 6"X2 STERILE LF 6012

## (undated) DEVICE — ESU ELEC BLADE 2.75" COATED/INSULATED E1455

## (undated) DEVICE — SUCTION MANIFOLD DORNOCH ULTRA CART UL-CL500

## (undated) DEVICE — SPECIMEN TRAP MUCOUS SIMILAC NTRL CARE GRAD 80ML 0045860

## (undated) DEVICE — GLOVE BIOGEL 6.5 LATEX

## (undated) DEVICE — ENDO SNARE EXACTO COLD 9MM LOOP 2.4MMX230CM 00711115

## (undated) DEVICE — ENDO FORCEP ENDOJAW BIOPSY 2.8MMX160CM FB-220K

## (undated) DEVICE — PACK ENDOSCOPY GI CUSTOM UMMC

## (undated) DEVICE — SOL NACL 0.9% IRRIG 3000ML BAG 2B7477

## (undated) DEVICE — ENDO TUBING CO2 SMARTCAP STERILE DISP 100145CO2EXT

## (undated) DEVICE — ENDO TROCAR FIRST ENTRY KII FIOS Z-THRD 05X100MM CTF03

## (undated) DEVICE — SYR 10ML SLIP TIP W/O NDL

## (undated) DEVICE — CATH SPYSCOPE DIGITAL ACCESS DS DISP M00546600

## (undated) DEVICE — WIPE PREMOIST CLEANSING WASHCLOTHS 7988

## (undated) DEVICE — GLOVE PROTEXIS POWDER FREE SMT 7.5  2D72PT75X

## (undated) DEVICE — DRAPE EXTREMITY W/ARMBOARD 29405

## (undated) DEVICE — GLOVE BIOGEL PI MICRO SZ 8.0 48580

## (undated) DEVICE — PACK MINOR CUSTOM ASC

## (undated) DEVICE — WIRE GUIDE 0.025"X270CM ANG VISIGLIDE G-240-2527A

## (undated) DEVICE — LINEN TOWEL PACK X30 5481

## (undated) DEVICE — DEVICE SUTURE GRASPER TROCAR CLOSURE 14GA PMITCSG

## (undated) DEVICE — SU VICRYL 0 CP-1 27" J467H

## (undated) DEVICE — PREP CHLORAPREP 26ML TINTED HI-LITE ORANGE 930815

## (undated) DEVICE — ORISE GEL

## (undated) DEVICE — GLOVE PROTEXIS POWDER FREE 7.5 ORTHOPEDIC 2D73ET75

## (undated) DEVICE — BAG CLEAR TRASH 1.3M 39X33" P4040C

## (undated) DEVICE — NDL SCLEROTHERAPY 25GA CARR-LOCK  00711811

## (undated) DEVICE — ENDO FUSION OMNI-TOME G31903

## (undated) DEVICE — ENDO TROCAR FIRST ENTRY KII FIOS Z-THRD 12X100MM CTF73

## (undated) DEVICE — DRSG ABDOMINAL 07 1/2X8" 7197D

## (undated) DEVICE — DRSG GAUZE 4X4" 3033

## (undated) DEVICE — GOWN XLG DISP 9545

## (undated) DEVICE — ENDO SNARE HEXAGONAL POLYPECTOMY 2.5X4.0CM 00711113

## (undated) DEVICE — NDL CANNULA INTERLINK BLUNT 18GA

## (undated) DEVICE — LINEN TOWEL PACK X5 5464

## (undated) DEVICE — DRSG ADAPTIC 3X8" 6113

## (undated) DEVICE — PREP CHLORAPREP 26ML TINTED ORANGE  260815

## (undated) DEVICE — SPECIMEN CONTAINER 3OZ

## (undated) DEVICE — LINEN TOWEL PACK X6 WHITE 5487

## (undated) DEVICE — SNARE CAPIVATOR II POLYPECTOMY 10X240MM M00561220

## (undated) DEVICE — ENDO NDL ASPIRATION EXCELON TRANSBRONCH 21GA 15MM

## (undated) DEVICE — PROBE LITHOTRIPTOR 1.9MM FOR SPYGLASS 9-195-371DS

## (undated) DEVICE — DEVICE RETRIEVER HEWSON 71111579

## (undated) DEVICE — CLIP HEMOCLIP ENDOSCOPIC INSTINCT 2.8X230CM INSC-7-230-SS

## (undated) DEVICE — SYR 10ML LL W/O NDL 302995

## (undated) DEVICE — LINEN FULL SHEET 5511

## (undated) DEVICE — PREP SCRUB SOL EXIDINE 4% CHG 4OZ 29002-404

## (undated) DEVICE — SU VICRYL 4-0 PS-2 18" UND J496H

## (undated) DEVICE — ESU LIGASURE LAPAROSCOPIC BLUNT TIP SEALER 5MMX37CM LF1837

## (undated) DEVICE — PACK TOTAL HIP RIDGES LATEX PO15HIFSG

## (undated) DEVICE — JELLY LUBRICATING SURGILUBE 2OZ TUBE

## (undated) DEVICE — WIRE GUIDE 0.025"X450CM ANG VISIGLIDE G-240-2545A

## (undated) DEVICE — NDL INSUFFLATION 13GA 120MM C2201

## (undated) DEVICE — ESU ERBE FIAPC PROBE 2.3MMX220CM

## (undated) DEVICE — SNARE CAPIVATOR II POLYPECTOMY 15X240MM M00561230

## (undated) DEVICE — ENDO SUCTION TRAP POLYP 4 CHAMBER H334

## (undated) DEVICE — GLOVE PROTEXIS POWDER FREE SMT 8.0  2D72PT80X

## (undated) DEVICE — DRSG GAUZE 4X4" TRAY

## (undated) DEVICE — SUCTION MANIFOLD NEPTUNE 2 SYS 1 PORT 702-025-000

## (undated) DEVICE — SOL NACL 0.9% INJ 1000ML BAG 2B1324X

## (undated) DEVICE — SU VICRYL 3-0 SH 27" J316H

## (undated) DEVICE — SU VICRYL 3-0 PS-2 27" UND J427H

## (undated) DEVICE — ENDO DEVICE LOCKING AND BIOPSY CAP M00545261

## (undated) DEVICE — ESU FCP MONOPOLAR COAGRASPER 5MMX165CM FD-410LR

## (undated) DEVICE — BLADE SAW SAGITTAL STRK 18X90X1.27MM HD SYS 6 6118-127-090

## (undated) DEVICE — TUBING SUCTION 12"X1/4" N612

## (undated) DEVICE — ENDO TROCAR SLEEVE KII Z-THREADED 05X100MM CTS02

## (undated) DEVICE — DECANTER BAG 2002S

## (undated) DEVICE — CAPTIVATOR COLD

## (undated) DEVICE — SU SILK 3-0 SH 30" K832H

## (undated) DEVICE — SPECIMEN TRAP MUCOUS 40ML LUKI C30200A

## (undated) DEVICE — GLOVE PROTEXIS BLUE W/NEU-THERA 8.0  2D73EB80

## (undated) DEVICE — DEVICE ENDO STITCH APPLIER 10MM 173016

## (undated) DEVICE — ENDO CATH BALLOON EXTRACTOR PRO RX 09-12MM 4700

## (undated) DEVICE — KOH COLPOTOMIZER OCCLUDER  CPO-6

## (undated) DEVICE — ENDO EXTRACTOR BALLOON 09-12MM 4690

## (undated) DEVICE — RAD RX ISOVUE 300 (50ML) 61% IOPAMIDOL CHARGE PER ML

## (undated) DEVICE — SOL NACL 0.9% IRRIG 500ML BOTTLE 2F7123

## (undated) DEVICE — TUBING OXYGEN CANNULA NASAL 7FT

## (undated) DEVICE — SU PDS II 4-0 FS-2 27" Z422H

## (undated) DEVICE — ESU ENDO SCISSORS 5MM CVD 5DCS

## (undated) DEVICE — DRSG TELFA 3X8" 1238

## (undated) DEVICE — CATH PROBE HABIB ENDOHPB BIPOLARE RFA 8FRX180CM 6800

## (undated) DEVICE — KIT PATIENT POSITIONING PIGAZZI LATEX FREE 40580

## (undated) RX ORDER — HEPARIN SODIUM (PORCINE) LOCK FLUSH IV SOLN 100 UNIT/ML 100 UNIT/ML
SOLUTION INTRAVENOUS
Status: DISPENSED
Start: 2022-07-12

## (undated) RX ORDER — PROPOFOL 10 MG/ML
INJECTION, EMULSION INTRAVENOUS
Status: DISPENSED
Start: 2023-03-16

## (undated) RX ORDER — SIMETHICONE 40MG/0.6ML
SUSPENSION, DROPS(FINAL DOSAGE FORM)(ML) ORAL
Status: DISPENSED
Start: 2019-04-18

## (undated) RX ORDER — FENTANYL CITRATE 50 UG/ML
INJECTION, SOLUTION INTRAMUSCULAR; INTRAVENOUS
Status: DISPENSED
Start: 2022-08-02

## (undated) RX ORDER — EPHEDRINE SULFATE 50 MG/ML
INJECTION, SOLUTION INTRAMUSCULAR; INTRAVENOUS; SUBCUTANEOUS
Status: DISPENSED
Start: 2023-03-16

## (undated) RX ORDER — HEPARIN SODIUM (PORCINE) LOCK FLUSH IV SOLN 100 UNIT/ML 100 UNIT/ML
SOLUTION INTRAVENOUS
Status: DISPENSED
Start: 2024-04-10

## (undated) RX ORDER — ONDANSETRON 2 MG/ML
INJECTION INTRAMUSCULAR; INTRAVENOUS
Status: DISPENSED
Start: 2019-02-28

## (undated) RX ORDER — EPHEDRINE SULFATE 50 MG/ML
INJECTION, SOLUTION INTRAMUSCULAR; INTRAVENOUS; SUBCUTANEOUS
Status: DISPENSED
Start: 2019-07-26

## (undated) RX ORDER — LIDOCAINE HYDROCHLORIDE AND EPINEPHRINE 10; 10 MG/ML; UG/ML
INJECTION, SOLUTION INFILTRATION; PERINEURAL
Status: DISPENSED
Start: 2024-04-10

## (undated) RX ORDER — OXYCODONE HYDROCHLORIDE 5 MG/1
TABLET ORAL
Status: DISPENSED
Start: 2022-06-23

## (undated) RX ORDER — HEPARIN SODIUM 5000 [USP'U]/.5ML
INJECTION, SOLUTION INTRAVENOUS; SUBCUTANEOUS
Status: DISPENSED
Start: 2022-06-23

## (undated) RX ORDER — WATER 10 ML/10ML
INJECTION INTRAMUSCULAR; INTRAVENOUS; SUBCUTANEOUS
Status: DISPENSED
Start: 2019-07-26

## (undated) RX ORDER — SODIUM CHLORIDE, SODIUM LACTATE, POTASSIUM CHLORIDE, CALCIUM CHLORIDE 600; 310; 30; 20 MG/100ML; MG/100ML; MG/100ML; MG/100ML
INJECTION, SOLUTION INTRAVENOUS
Status: DISPENSED
Start: 2019-02-28

## (undated) RX ORDER — LIDOCAINE HYDROCHLORIDE 20 MG/ML
INJECTION, SOLUTION EPIDURAL; INFILTRATION; INTRACAUDAL; PERINEURAL
Status: DISPENSED
Start: 2019-07-26

## (undated) RX ORDER — FENTANYL CITRATE 50 UG/ML
INJECTION, SOLUTION INTRAMUSCULAR; INTRAVENOUS
Status: DISPENSED
Start: 2022-06-23

## (undated) RX ORDER — HEPARIN SODIUM (PORCINE) LOCK FLUSH IV SOLN 100 UNIT/ML 100 UNIT/ML
SOLUTION INTRAVENOUS
Status: DISPENSED
Start: 2022-12-30

## (undated) RX ORDER — ONDANSETRON 2 MG/ML
INJECTION INTRAMUSCULAR; INTRAVENOUS
Status: DISPENSED
Start: 2022-06-23

## (undated) RX ORDER — HYDROMORPHONE HYDROCHLORIDE 1 MG/ML
INJECTION, SOLUTION INTRAMUSCULAR; INTRAVENOUS; SUBCUTANEOUS
Status: DISPENSED
Start: 2022-06-23

## (undated) RX ORDER — ONDANSETRON 2 MG/ML
INJECTION INTRAMUSCULAR; INTRAVENOUS
Status: DISPENSED
Start: 2022-01-13

## (undated) RX ORDER — PROPOFOL 10 MG/ML
INJECTION, EMULSION INTRAVENOUS
Status: DISPENSED
Start: 2022-03-28

## (undated) RX ORDER — FENTANYL CITRATE 50 UG/ML
INJECTION, SOLUTION INTRAMUSCULAR; INTRAVENOUS
Status: DISPENSED
Start: 2019-02-06

## (undated) RX ORDER — SIMETHICONE 40MG/0.6ML
SUSPENSION, DROPS(FINAL DOSAGE FORM)(ML) ORAL
Status: DISPENSED
Start: 2019-02-28

## (undated) RX ORDER — CEFAZOLIN SODIUM/WATER 2 G/20 ML
SYRINGE (ML) INTRAVENOUS
Status: DISPENSED
Start: 2022-06-23

## (undated) RX ORDER — IOPAMIDOL 510 MG/ML
INJECTION, SOLUTION INTRAVASCULAR
Status: DISPENSED
Start: 2019-02-06

## (undated) RX ORDER — FENTANYL CITRATE 50 UG/ML
INJECTION, SOLUTION INTRAMUSCULAR; INTRAVENOUS
Status: DISPENSED
Start: 2019-02-28

## (undated) RX ORDER — LIDOCAINE HYDROCHLORIDE 20 MG/ML
SOLUTION OROPHARYNGEAL
Status: DISPENSED
Start: 2022-08-11

## (undated) RX ORDER — FENTANYL CITRATE 50 UG/ML
INJECTION, SOLUTION INTRAMUSCULAR; INTRAVENOUS
Status: DISPENSED
Start: 2019-04-18

## (undated) RX ORDER — ENOXAPARIN SODIUM 100 MG/ML
INJECTION SUBCUTANEOUS
Status: DISPENSED
Start: 2024-04-10

## (undated) RX ORDER — PROPOFOL 10 MG/ML
INJECTION, EMULSION INTRAVENOUS
Status: DISPENSED
Start: 2024-04-10

## (undated) RX ORDER — ONDANSETRON 2 MG/ML
INJECTION INTRAMUSCULAR; INTRAVENOUS
Status: DISPENSED
Start: 2022-03-24

## (undated) RX ORDER — FENTANYL CITRATE 50 UG/ML
INJECTION, SOLUTION INTRAMUSCULAR; INTRAVENOUS
Status: DISPENSED
Start: 2022-01-13

## (undated) RX ORDER — BUPIVACAINE HYDROCHLORIDE 5 MG/ML
INJECTION, SOLUTION EPIDURAL; INTRACAUDAL
Status: DISPENSED
Start: 2022-03-28

## (undated) RX ORDER — EPINEPHRINE 1 MG/ML
INJECTION, SOLUTION INTRAMUSCULAR; SUBCUTANEOUS
Status: DISPENSED
Start: 2019-02-28

## (undated) RX ORDER — PROPOFOL 10 MG/ML
INJECTION, EMULSION INTRAVENOUS
Status: DISPENSED
Start: 2022-01-13

## (undated) RX ORDER — LIDOCAINE HYDROCHLORIDE AND EPINEPHRINE 10; 10 MG/ML; UG/ML
INJECTION, SOLUTION INFILTRATION; PERINEURAL
Status: DISPENSED
Start: 2022-01-13

## (undated) RX ORDER — FENTANYL CITRATE 0.05 MG/ML
INJECTION, SOLUTION INTRAMUSCULAR; INTRAVENOUS
Status: DISPENSED
Start: 2022-08-11

## (undated) RX ORDER — HYDRALAZINE HYDROCHLORIDE 20 MG/ML
INJECTION INTRAMUSCULAR; INTRAVENOUS
Status: DISPENSED
Start: 2017-09-25

## (undated) RX ORDER — PHENYLEPHRINE HCL IN 0.9% NACL 1 MG/10 ML
SYRINGE (ML) INTRAVENOUS
Status: DISPENSED
Start: 2019-07-26

## (undated) RX ORDER — KETOROLAC TROMETHAMINE 15 MG/ML
INJECTION, SOLUTION INTRAMUSCULAR; INTRAVENOUS
Status: DISPENSED
Start: 2017-09-25

## (undated) RX ORDER — ACETAMINOPHEN 325 MG/1
TABLET ORAL
Status: DISPENSED
Start: 2022-03-28

## (undated) RX ORDER — FENTANYL CITRATE 50 UG/ML
INJECTION, SOLUTION INTRAMUSCULAR; INTRAVENOUS
Status: DISPENSED
Start: 2022-03-24

## (undated) RX ORDER — BUPIVACAINE HYDROCHLORIDE 2.5 MG/ML
INJECTION, SOLUTION EPIDURAL; INFILTRATION; INTRACAUDAL
Status: DISPENSED
Start: 2024-04-10

## (undated) RX ORDER — IPRATROPIUM BROMIDE AND ALBUTEROL SULFATE 2.5; .5 MG/3ML; MG/3ML
SOLUTION RESPIRATORY (INHALATION)
Status: DISPENSED
Start: 2022-06-23

## (undated) RX ORDER — LIDOCAINE HYDROCHLORIDE 40 MG/ML
INJECTION, SOLUTION RETROBULBAR
Status: DISPENSED
Start: 2022-08-11

## (undated) RX ORDER — LIDOCAINE HYDROCHLORIDE AND EPINEPHRINE 10; 10 MG/ML; UG/ML
INJECTION, SOLUTION INFILTRATION; PERINEURAL
Status: DISPENSED
Start: 2022-03-28

## (undated) RX ORDER — INDOCYANINE GREEN AND WATER 25 MG
KIT INJECTION
Status: DISPENSED
Start: 2022-06-23

## (undated) RX ORDER — SODIUM CHLORIDE, SODIUM LACTATE, POTASSIUM CHLORIDE, CALCIUM CHLORIDE 600; 310; 30; 20 MG/100ML; MG/100ML; MG/100ML; MG/100ML
INJECTION, SOLUTION INTRAVENOUS
Status: DISPENSED
Start: 2022-06-23

## (undated) RX ORDER — EPHEDRINE SULFATE 50 MG/ML
INJECTION, SOLUTION INTRAMUSCULAR; INTRAVENOUS; SUBCUTANEOUS
Status: DISPENSED
Start: 2021-03-22

## (undated) RX ORDER — CEFAZOLIN SODIUM 2 G/100ML
INJECTION, SOLUTION INTRAVENOUS
Status: DISPENSED
Start: 2017-09-25

## (undated) RX ORDER — CEFAZOLIN SODIUM 2 G/50ML
SOLUTION INTRAVENOUS
Status: DISPENSED
Start: 2024-04-10

## (undated) RX ORDER — BUPIVACAINE HYDROCHLORIDE 5 MG/ML
INJECTION, SOLUTION EPIDURAL; INTRACAUDAL
Status: DISPENSED
Start: 2022-01-13

## (undated) RX ORDER — FENTANYL CITRATE 50 UG/ML
INJECTION, SOLUTION INTRAMUSCULAR; INTRAVENOUS
Status: DISPENSED
Start: 2021-03-22

## (undated) RX ORDER — FENTANYL CITRATE 50 UG/ML
INJECTION, SOLUTION INTRAMUSCULAR; INTRAVENOUS
Status: DISPENSED
Start: 2019-10-18

## (undated) RX ORDER — ONDANSETRON 2 MG/ML
INJECTION INTRAMUSCULAR; INTRAVENOUS
Status: DISPENSED
Start: 2019-10-18

## (undated) RX ORDER — LIDOCAINE HYDROCHLORIDE 10 MG/ML
INJECTION, SOLUTION EPIDURAL; INFILTRATION; INTRACAUDAL; PERINEURAL
Status: DISPENSED
Start: 2022-08-11

## (undated) RX ORDER — KETOROLAC TROMETHAMINE 30 MG/ML
INJECTION, SOLUTION INTRAMUSCULAR; INTRAVENOUS
Status: DISPENSED
Start: 2017-09-25

## (undated) RX ORDER — FENTANYL CITRATE 50 UG/ML
INJECTION, SOLUTION INTRAMUSCULAR; INTRAVENOUS
Status: DISPENSED
Start: 2017-09-25

## (undated) RX ORDER — FENTANYL CITRATE 50 UG/ML
INJECTION, SOLUTION INTRAMUSCULAR; INTRAVENOUS
Status: DISPENSED
Start: 2024-03-28

## (undated) RX ORDER — FENTANYL CITRATE-0.9 % NACL/PF 10 MCG/ML
PLASTIC BAG, INJECTION (ML) INTRAVENOUS
Status: DISPENSED
Start: 2021-03-22

## (undated) RX ORDER — SIMETHICONE 40MG/0.6ML
SUSPENSION, DROPS(FINAL DOSAGE FORM)(ML) ORAL
Status: DISPENSED
Start: 2020-09-10

## (undated) RX ORDER — ONDANSETRON 2 MG/ML
INJECTION INTRAMUSCULAR; INTRAVENOUS
Status: DISPENSED
Start: 2019-07-26

## (undated) RX ORDER — GLYCOPYRROLATE 0.2 MG/ML
INJECTION INTRAMUSCULAR; INTRAVENOUS
Status: DISPENSED
Start: 2017-09-25

## (undated) RX ORDER — FENTANYL CITRATE-0.9 % NACL/PF 10 MCG/ML
PLASTIC BAG, INJECTION (ML) INTRAVENOUS
Status: DISPENSED
Start: 2022-03-28

## (undated) RX ORDER — ALBUTEROL SULFATE 0.83 MG/ML
SOLUTION RESPIRATORY (INHALATION)
Status: DISPENSED
Start: 2022-08-11

## (undated) RX ORDER — ACETAMINOPHEN 325 MG/1
TABLET ORAL
Status: DISPENSED
Start: 2022-06-23

## (undated) RX ORDER — PHENYLEPHRINE HCL IN 0.9% NACL 1 MG/10 ML
SYRINGE (ML) INTRAVENOUS
Status: DISPENSED
Start: 2019-04-18

## (undated) RX ORDER — INDOMETHACIN 50 MG/1
SUPPOSITORY RECTAL
Status: DISPENSED
Start: 2022-03-24

## (undated) RX ORDER — IOPAMIDOL 510 MG/ML
INJECTION, SOLUTION INTRAVASCULAR
Status: DISPENSED
Start: 2020-09-10

## (undated) RX ORDER — PROPOFOL 10 MG/ML
INJECTION, EMULSION INTRAVENOUS
Status: DISPENSED
Start: 2021-03-22

## (undated) RX ORDER — LIDOCAINE HYDROCHLORIDE AND EPINEPHRINE 10; 10 MG/ML; UG/ML
INJECTION, SOLUTION INFILTRATION; PERINEURAL
Status: DISPENSED
Start: 2022-08-11

## (undated) RX ORDER — SIMETHICONE 40MG/0.6ML
SUSPENSION, DROPS(FINAL DOSAGE FORM)(ML) ORAL
Status: DISPENSED
Start: 2019-07-26

## (undated) RX ORDER — FENTANYL CITRATE 50 UG/ML
INJECTION, SOLUTION INTRAMUSCULAR; INTRAVENOUS
Status: DISPENSED
Start: 2024-04-10

## (undated) RX ORDER — PHENAZOPYRIDINE HYDROCHLORIDE 200 MG/1
TABLET, FILM COATED ORAL
Status: DISPENSED
Start: 2022-06-23

## (undated) RX ORDER — DEXAMETHASONE SODIUM PHOSPHATE 4 MG/ML
INJECTION, SOLUTION INTRA-ARTICULAR; INTRALESIONAL; INTRAMUSCULAR; INTRAVENOUS; SOFT TISSUE
Status: DISPENSED
Start: 2022-01-13

## (undated) RX ORDER — FENTANYL CITRATE-0.9 % NACL/PF 10 MCG/ML
PLASTIC BAG, INJECTION (ML) INTRAVENOUS
Status: DISPENSED
Start: 2022-01-13

## (undated) RX ORDER — PROPOFOL 10 MG/ML
INJECTION, EMULSION INTRAVENOUS
Status: DISPENSED
Start: 2022-03-24

## (undated) RX ORDER — EPHEDRINE SULFATE 50 MG/ML
INJECTION, SOLUTION INTRAMUSCULAR; INTRAVENOUS; SUBCUTANEOUS
Status: DISPENSED
Start: 2019-04-18

## (undated) RX ORDER — CEFAZOLIN SODIUM/WATER 2 G/20 ML
SYRINGE (ML) INTRAVENOUS
Status: DISPENSED
Start: 2022-01-13

## (undated) RX ORDER — INDOMETHACIN 50 MG/1
SUPPOSITORY RECTAL
Status: DISPENSED
Start: 2019-07-26

## (undated) RX ORDER — ONDANSETRON 2 MG/ML
INJECTION INTRAMUSCULAR; INTRAVENOUS
Status: DISPENSED
Start: 2019-04-18

## (undated) RX ORDER — PROPOFOL 10 MG/ML
INJECTION, EMULSION INTRAVENOUS
Status: DISPENSED
Start: 2024-03-28

## (undated) RX ORDER — PROPOFOL 10 MG/ML
INJECTION, EMULSION INTRAVENOUS
Status: DISPENSED
Start: 2019-04-18

## (undated) RX ORDER — FENTANYL CITRATE 50 UG/ML
INJECTION, SOLUTION INTRAMUSCULAR; INTRAVENOUS
Status: DISPENSED
Start: 2020-09-10

## (undated) RX ORDER — FENTANYL CITRATE 50 UG/ML
INJECTION, SOLUTION INTRAMUSCULAR; INTRAVENOUS
Status: DISPENSED
Start: 2023-03-16

## (undated) RX ORDER — ONDANSETRON 2 MG/ML
INJECTION INTRAMUSCULAR; INTRAVENOUS
Status: DISPENSED
Start: 2024-04-10

## (undated) RX ORDER — ONDANSETRON 2 MG/ML
INJECTION INTRAMUSCULAR; INTRAVENOUS
Status: DISPENSED
Start: 2022-03-28

## (undated) RX ORDER — FENTANYL CITRATE 50 UG/ML
INJECTION, SOLUTION INTRAMUSCULAR; INTRAVENOUS
Status: DISPENSED
Start: 2019-07-26

## (undated) RX ORDER — LIDOCAINE HYDROCHLORIDE 10 MG/ML
INJECTION, SOLUTION EPIDURAL; INFILTRATION; INTRACAUDAL; PERINEURAL
Status: DISPENSED
Start: 2022-01-13

## (undated) RX ORDER — ACETAMINOPHEN 325 MG/1
TABLET ORAL
Status: DISPENSED
Start: 2024-04-10

## (undated) RX ORDER — ALBUTEROL SULFATE 90 UG/1
AEROSOL, METERED RESPIRATORY (INHALATION)
Status: DISPENSED
Start: 2021-03-22

## (undated) RX ORDER — WATER 10 ML/10ML
INJECTION INTRAMUSCULAR; INTRAVENOUS; SUBCUTANEOUS
Status: DISPENSED
Start: 2019-02-28

## (undated) RX ORDER — METRONIDAZOLE 500 MG/100ML
INJECTION, SOLUTION INTRAVENOUS
Status: DISPENSED
Start: 2022-06-23

## (undated) RX ORDER — PROPOFOL 10 MG/ML
INJECTION, EMULSION INTRAVENOUS
Status: DISPENSED
Start: 2019-07-26

## (undated) RX ORDER — LIDOCAINE HYDROCHLORIDE 20 MG/ML
INJECTION, SOLUTION EPIDURAL; INFILTRATION; INTRACAUDAL; PERINEURAL
Status: DISPENSED
Start: 2022-03-24